# Patient Record
Sex: MALE | Race: WHITE | NOT HISPANIC OR LATINO | Employment: OTHER | URBAN - METROPOLITAN AREA
[De-identification: names, ages, dates, MRNs, and addresses within clinical notes are randomized per-mention and may not be internally consistent; named-entity substitution may affect disease eponyms.]

---

## 2018-04-04 DIAGNOSIS — M25.571 RIGHT ANKLE PAIN: ICD-10-CM

## 2018-04-04 RX ORDER — INDOMETHACIN 50 MG/1
CAPSULE ORAL
Qty: 21 CAPSULE | Refills: 1 | Status: SHIPPED | OUTPATIENT
Start: 2018-04-04 | End: 2018-09-24 | Stop reason: SDUPTHER

## 2018-04-04 NOTE — TELEPHONE ENCOUNTER
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

## 2018-09-24 DIAGNOSIS — M25.571 RIGHT ANKLE PAIN: ICD-10-CM

## 2018-09-24 RX ORDER — INDOMETHACIN 50 MG/1
CAPSULE ORAL
Qty: 21 CAPSULE | Refills: 0 | Status: SHIPPED | OUTPATIENT
Start: 2018-09-24 | End: 2018-10-17 | Stop reason: SDUPTHER

## 2018-09-24 NOTE — TELEPHONE ENCOUNTER
Patient last seen 2016 needs to come and get lab CBCs CMP lipids T4 TSH UA chest x-ray EKG is a physical if needs to be checked for gout and uric acid level.  One refill of indomethacin give time to come in get seen no further refills without lab work coming

## 2018-10-01 DIAGNOSIS — M25.571 RIGHT ANKLE PAIN: ICD-10-CM

## 2018-10-01 RX ORDER — INDOMETHACIN 50 MG/1
CAPSULE ORAL
Qty: 21 CAPSULE | Refills: 1 | Status: SHIPPED | OUTPATIENT
Start: 2018-10-01 | End: 2021-06-02

## 2018-10-01 NOTE — TELEPHONE ENCOUNTER
Pt not seen since in new computer needs come in fasting get seen , get lab , get refills -- needs CBC,CMP,lipid if has gout add uric acid level . Can overbook me 8 AM if needs be seen right away

## 2018-10-17 DIAGNOSIS — M25.571 RIGHT ANKLE PAIN: ICD-10-CM

## 2018-10-17 NOTE — TELEPHONE ENCOUNTER
----- Message from Valdez Joseph sent at 10/17/2018 11:47 AM CDT -----  Type:  RX Refill Request    Who Called:  Patient  Refill or New Rx:  Refill  RX Name and Strength:  indomethacin (INDOCIN) 50 MG capsule       How is the patient currently taking it? (ex. 1XDay):  As Needed  Is this a 30 day or 90 day RX:  7 Day  Preferred Pharmacy with phone number:    [id number] - [place] - [address]  [address] [place] [address]  Phone: [phone] Fax: [phone]      Local or Mail Order:  Local  Ordering Provider:  Beverley Yarbrough Call Back Number:  [phone]  Additional Information:

## 2018-10-18 RX ORDER — INDOMETHACIN 50 MG/1
CAPSULE ORAL
Qty: 21 CAPSULE | Refills: 0 | Status: SHIPPED | OUTPATIENT
Start: 2018-10-18 | End: 2019-04-01

## 2018-10-18 NOTE — TELEPHONE ENCOUNTER
Patient not new computer needs to come in get seen get lab CBCs CMP lipids T4 TSH uric acid level UA if over 2-3 years and chest x-ray EKG see me 2-3 weeks later OK 1 refill of in this

## 2019-04-01 ENCOUNTER — OFFICE VISIT (OUTPATIENT)
Dept: PRIMARY CARE CLINIC | Facility: CLINIC | Age: 69
End: 2019-04-01
Payer: COMMERCIAL

## 2019-04-01 VITALS
TEMPERATURE: 99 F | SYSTOLIC BLOOD PRESSURE: 143 MMHG | HEIGHT: 70 IN | BODY MASS INDEX: 36.79 KG/M2 | RESPIRATION RATE: 18 BRPM | OXYGEN SATURATION: 98 % | HEART RATE: 87 BPM | WEIGHT: 257 LBS | DIASTOLIC BLOOD PRESSURE: 71 MMHG

## 2019-04-01 DIAGNOSIS — R03.0 BORDERLINE HYPERTENSION: ICD-10-CM

## 2019-04-01 DIAGNOSIS — E66.9 OBESITY (BMI 35.0-39.9 WITHOUT COMORBIDITY): ICD-10-CM

## 2019-04-01 DIAGNOSIS — M10.9 ACUTE GOUT OF LEFT ANKLE, UNSPECIFIED CAUSE: Primary | ICD-10-CM

## 2019-04-01 DIAGNOSIS — B35.1 ONYCHOMYCOSIS: ICD-10-CM

## 2019-04-01 PROCEDURE — 96372 THER/PROPH/DIAG INJ SC/IM: CPT | Mod: S$GLB,,, | Performed by: FAMILY MEDICINE

## 2019-04-01 PROCEDURE — 1101F PT FALLS ASSESS-DOCD LE1/YR: CPT | Mod: CPTII,S$GLB,, | Performed by: FAMILY MEDICINE

## 2019-04-01 PROCEDURE — 99999 PR PBB SHADOW E&M-EST. PATIENT-LVL III: CPT | Mod: PBBFAC,,, | Performed by: FAMILY MEDICINE

## 2019-04-01 PROCEDURE — 99213 PR OFFICE/OUTPT VISIT, EST, LEVL III, 20-29 MIN: ICD-10-PCS | Mod: 25,S$GLB,, | Performed by: FAMILY MEDICINE

## 2019-04-01 PROCEDURE — 1101F PR PT FALLS ASSESS DOC 0-1 FALLS W/OUT INJ PAST YR: ICD-10-PCS | Mod: CPTII,S$GLB,, | Performed by: FAMILY MEDICINE

## 2019-04-01 PROCEDURE — 99213 OFFICE O/P EST LOW 20 MIN: CPT | Mod: 25,S$GLB,, | Performed by: FAMILY MEDICINE

## 2019-04-01 PROCEDURE — 96372 PR INJECTION,THERAP/PROPH/DIAG2ST, IM OR SUBCUT: ICD-10-PCS | Mod: S$GLB,,, | Performed by: FAMILY MEDICINE

## 2019-04-01 PROCEDURE — 99999 PR PBB SHADOW E&M-EST. PATIENT-LVL III: ICD-10-PCS | Mod: PBBFAC,,, | Performed by: FAMILY MEDICINE

## 2019-04-01 RX ORDER — TRIAMCINOLONE ACETONIDE 40 MG/ML
40 INJECTION, SUSPENSION INTRA-ARTICULAR; INTRAMUSCULAR
Status: COMPLETED | OUTPATIENT
Start: 2019-04-01 | End: 2019-04-01

## 2019-04-01 RX ORDER — IBUPROFEN 600 MG/1
600 TABLET ORAL EVERY 6 HOURS PRN
Qty: 100 TABLET | Refills: 5 | Status: SHIPPED | OUTPATIENT
Start: 2019-04-01 | End: 2019-04-01 | Stop reason: SDUPTHER

## 2019-04-01 RX ORDER — HYDROCODONE BITARTRATE AND ACETAMINOPHEN 5; 325 MG/1; MG/1
1 TABLET ORAL EVERY 6 HOURS PRN
Qty: 30 TABLET | Refills: 0 | Status: SHIPPED | OUTPATIENT
Start: 2019-04-01 | End: 2021-06-02

## 2019-04-01 RX ORDER — METHYLPREDNISOLONE 4 MG/1
TABLET ORAL
Qty: 1 PACKAGE | Refills: 0 | Status: SHIPPED | OUTPATIENT
Start: 2019-04-01 | End: 2021-06-02

## 2019-04-01 RX ORDER — INDOMETHACIN 50 MG/1
CAPSULE ORAL
Qty: 21 CAPSULE | Refills: 5 | Status: SHIPPED | OUTPATIENT
Start: 2019-04-01 | End: 2021-06-02 | Stop reason: SDUPTHER

## 2019-04-01 RX ORDER — IBUPROFEN 600 MG/1
TABLET ORAL
Qty: 100 TABLET | Refills: 5 | Status: SHIPPED | OUTPATIENT
Start: 2019-04-01 | End: 2020-08-16

## 2019-04-01 RX ADMIN — TRIAMCINOLONE ACETONIDE 40 MG: 40 INJECTION, SUSPENSION INTRA-ARTICULAR; INTRAMUSCULAR at 03:04

## 2019-04-01 NOTE — PROGRESS NOTES
Subjective:       Patient ID: Gamaliel Pete Jr. is a 68 y.o. male.    Chief Complaint: Gout (ankle pain)    HPI:  68-year-old white male in for gout pain left ankle--started Friday--little sore during the week but it is specially sore on Friday.  Pain is persisted since that time.  Had a fish plate Friday of catfish.  Avoiding crawfish and crab this season. Gout attacks ---October 2018.  Does have some minor episodes of pain not sure if it is gout or arthritis.  Has been on allopurinol in the past.    ROS:  Skin: no psoriasis, eczema, skin cancer  HEENT: No headache, ocular pain, blurred vision, diplopia, epistaxis, hoarseness change in voice, thyroid trouble  Lung: No pneumonia, asthma, Tb, wheezing, SOB, smoking  Heart: No chest pain, ankle edema, palpitations, MI, bam murmur, hypertension, hyperlipidemia blood pressure 143/77 blood pressure may be up due to severe pain in the foot no stent bypass arrhythmia  Abdomen: No nausea, vomiting, diarrhea, constipation, ulcers, hepatitis, gallbladder disease, melena, hematochezia, hematemesis  : no UTI, renal disease, stones  MS: no fractures, O/A, lupus, rheumatoid, +gout history of a fractured clavicle is a baby  Neuro: No dizziness, LOC, seizures   No diabetes, no anemia, no anxiety, no depression  , one child 2 stepchildren, work supervisor at [employer]. Lives with wife macario     Objective:   Physical Exam:  General: Well nourished, well developed, no acute distress  Skin: No lesions  HEENT: Eyes PERRLA, EOM intact, nose patent, throat non-erythematous   NECK: Supple, no bruits, No JVD, no nodes  Lungs: Clear, no rales, rhonchi, wheezing  Heart: Regular rate and rhythm, no murmurs, gallops, or rubs  Abdomen: flat, bowel sounds positive, no tenderness, or organomegaly  MS: Range of motion and muscle strength intact medial and lateral malleolar live markedly swollen warm to the touch pain with flexion extension inversion eversion or ambulation of  the ankle left foot   Neuro: Alert, CN intact, oriented X 3  Extremities: No cyanosis, clubbing, or edema         Assessment:       1. Acute gout of left ankle, unspecified cause    2. Borderline hypertension    3. Obesity (BMI 35.0-39.9 without comorbidity)    4. Onychomycosis        Plan:       Acute gout of left ankle, unspecified cause    Borderline hypertension    Obesity (BMI 35.0-39.9 without comorbidity)    Onychomycosis      Kenalog/Medrol--ibuprofen--for acute attack Indocin 50 mg t.i.d. x7 days only  Low-sodium diet/exercise/decrease weight  Wants well in off steroids for 2 weeks CBCs CMP lipids uric acid T4 TSH stool guaiac UA chest x-ray EKG is a physical  Needs copy of a low uric acid diet and low-sodium  Patient can drink black cherry juice--allopurinol 300 mg 1 p.o. q.d. if patient desires to try suppression was on this in the past and tolerated it well

## 2019-04-01 NOTE — PROGRESS NOTES
Verified pt ID using name and . NKDA. Administered 40 mg kenalog in right VG per physician order using aseptic technique. Aspirated and no blood return noted. Pt tolerated well with no adverse reactions noted.

## 2020-06-04 DIAGNOSIS — Z12.11 COLON CANCER SCREENING: ICD-10-CM

## 2020-08-16 RX ORDER — INDOMETHACIN 50 MG/1
CAPSULE ORAL
Qty: 21 CAPSULE | Refills: 2 | Status: SHIPPED | OUTPATIENT
Start: 2020-08-16 | End: 2021-06-02

## 2020-09-17 ENCOUNTER — PATIENT MESSAGE (OUTPATIENT)
Dept: ADMINISTRATIVE | Facility: HOSPITAL | Age: 70
End: 2020-09-17

## 2020-10-05 ENCOUNTER — PATIENT MESSAGE (OUTPATIENT)
Dept: ADMINISTRATIVE | Facility: HOSPITAL | Age: 70
End: 2020-10-05

## 2021-01-04 ENCOUNTER — PATIENT MESSAGE (OUTPATIENT)
Dept: ADMINISTRATIVE | Facility: HOSPITAL | Age: 71
End: 2021-01-04

## 2021-04-05 ENCOUNTER — PATIENT MESSAGE (OUTPATIENT)
Dept: ADMINISTRATIVE | Facility: HOSPITAL | Age: 71
End: 2021-04-05

## 2021-06-02 ENCOUNTER — OFFICE VISIT (OUTPATIENT)
Dept: PRIMARY CARE CLINIC | Facility: CLINIC | Age: 71
End: 2021-06-02
Payer: MEDICARE

## 2021-06-02 VITALS
WEIGHT: 263.25 LBS | BODY MASS INDEX: 37.69 KG/M2 | SYSTOLIC BLOOD PRESSURE: 130 MMHG | TEMPERATURE: 99 F | HEART RATE: 80 BPM | RESPIRATION RATE: 18 BRPM | OXYGEN SATURATION: 97 % | DIASTOLIC BLOOD PRESSURE: 76 MMHG | HEIGHT: 70 IN

## 2021-06-02 DIAGNOSIS — B35.1 ONYCHOMYCOSIS: ICD-10-CM

## 2021-06-02 DIAGNOSIS — Z12.11 ENCOUNTER FOR SCREENING FOR MALIGNANT NEOPLASM OF COLON: ICD-10-CM

## 2021-06-02 DIAGNOSIS — L29.9 PRURITUS, UNSPECIFIED: ICD-10-CM

## 2021-06-02 DIAGNOSIS — Z87.39 HISTORY OF GOUT: ICD-10-CM

## 2021-06-02 DIAGNOSIS — L82.1 SEBORRHEIC KERATOSIS: Primary | ICD-10-CM

## 2021-06-02 DIAGNOSIS — E66.9 OBESITY (BMI 35.0-39.9 WITHOUT COMORBIDITY): ICD-10-CM

## 2021-06-02 DIAGNOSIS — Z11.59 NEED FOR HEPATITIS C SCREENING TEST: ICD-10-CM

## 2021-06-02 PROCEDURE — 1126F AMNT PAIN NOTED NONE PRSNT: CPT | Mod: S$GLB,,, | Performed by: FAMILY MEDICINE

## 2021-06-02 PROCEDURE — 3008F BODY MASS INDEX DOCD: CPT | Mod: CPTII,S$GLB,, | Performed by: FAMILY MEDICINE

## 2021-06-02 PROCEDURE — 3288F FALL RISK ASSESSMENT DOCD: CPT | Mod: CPTII,S$GLB,, | Performed by: FAMILY MEDICINE

## 2021-06-02 PROCEDURE — 99999 PR PBB SHADOW E&M-EST. PATIENT-LVL III: CPT | Mod: PBBFAC,,, | Performed by: FAMILY MEDICINE

## 2021-06-02 PROCEDURE — 3288F PR FALLS RISK ASSESSMENT DOCUMENTED: ICD-10-PCS | Mod: CPTII,S$GLB,, | Performed by: FAMILY MEDICINE

## 2021-06-02 PROCEDURE — 1101F PT FALLS ASSESS-DOCD LE1/YR: CPT | Mod: CPTII,S$GLB,, | Performed by: FAMILY MEDICINE

## 2021-06-02 PROCEDURE — 1126F PR PAIN SEVERITY QUANTIFIED, NO PAIN PRESENT: ICD-10-PCS | Mod: S$GLB,,, | Performed by: FAMILY MEDICINE

## 2021-06-02 PROCEDURE — 99999 PR PBB SHADOW E&M-EST. PATIENT-LVL III: ICD-10-PCS | Mod: PBBFAC,,, | Performed by: FAMILY MEDICINE

## 2021-06-02 PROCEDURE — 99214 PR OFFICE/OUTPT VISIT, EST, LEVL IV, 30-39 MIN: ICD-10-PCS | Mod: S$GLB,,, | Performed by: FAMILY MEDICINE

## 2021-06-02 PROCEDURE — 3008F PR BODY MASS INDEX (BMI) DOCUMENTED: ICD-10-PCS | Mod: CPTII,S$GLB,, | Performed by: FAMILY MEDICINE

## 2021-06-02 PROCEDURE — 1101F PR PT FALLS ASSESS DOC 0-1 FALLS W/OUT INJ PAST YR: ICD-10-PCS | Mod: CPTII,S$GLB,, | Performed by: FAMILY MEDICINE

## 2021-06-02 PROCEDURE — 99214 OFFICE O/P EST MOD 30 MIN: CPT | Mod: S$GLB,,, | Performed by: FAMILY MEDICINE

## 2021-06-02 RX ORDER — INDOMETHACIN 50 MG/1
CAPSULE ORAL
Qty: 90 CAPSULE | Refills: 1 | Status: SHIPPED | OUTPATIENT
Start: 2021-06-02 | End: 2023-08-24 | Stop reason: SDUPTHER

## 2021-06-02 RX ORDER — SILDENAFIL 100 MG/1
100 TABLET, FILM COATED ORAL DAILY PRN
Qty: 30 TABLET | Refills: 11 | Status: SHIPPED | OUTPATIENT
Start: 2021-06-02 | End: 2023-12-12

## 2021-06-14 LAB — NONINV COLON CA DNA+OCC BLD SCRN STL QL: NORMAL

## 2021-07-23 LAB — NONINV COLON CA DNA+OCC BLD SCRN STL QL: POSITIVE

## 2021-07-25 DIAGNOSIS — R19.5 POSITIVE COLORECTAL CANCER SCREENING USING COLOGUARD TEST: Primary | ICD-10-CM

## 2021-07-26 ENCOUNTER — TELEPHONE (OUTPATIENT)
Dept: PRIMARY CARE CLINIC | Facility: CLINIC | Age: 71
End: 2021-07-26

## 2021-07-27 ENCOUNTER — TELEPHONE (OUTPATIENT)
Dept: SURGERY | Facility: CLINIC | Age: 71
End: 2021-07-27

## 2021-07-28 ENCOUNTER — TELEPHONE (OUTPATIENT)
Dept: SURGERY | Facility: CLINIC | Age: 71
End: 2021-07-28

## 2021-07-28 ENCOUNTER — TELEPHONE (OUTPATIENT)
Dept: PRIMARY CARE CLINIC | Facility: CLINIC | Age: 71
End: 2021-07-28

## 2021-12-07 ENCOUNTER — OFFICE VISIT (OUTPATIENT)
Dept: PRIMARY CARE CLINIC | Facility: CLINIC | Age: 71
End: 2021-12-07
Payer: MEDICARE

## 2021-12-07 ENCOUNTER — TELEPHONE (OUTPATIENT)
Dept: SURGERY | Facility: CLINIC | Age: 71
End: 2021-12-07
Payer: MEDICARE

## 2021-12-07 VITALS
DIASTOLIC BLOOD PRESSURE: 72 MMHG | HEIGHT: 70 IN | OXYGEN SATURATION: 97 % | SYSTOLIC BLOOD PRESSURE: 134 MMHG | BODY MASS INDEX: 35.9 KG/M2 | HEART RATE: 73 BPM | RESPIRATION RATE: 18 BRPM | WEIGHT: 250.75 LBS

## 2021-12-07 DIAGNOSIS — L29.9 PRURITUS, UNSPECIFIED: ICD-10-CM

## 2021-12-07 DIAGNOSIS — B35.4 TINEA CORPORIS: ICD-10-CM

## 2021-12-07 DIAGNOSIS — R19.5 POSITIVE COLORECTAL CANCER SCREENING USING COLOGUARD TEST: ICD-10-CM

## 2021-12-07 DIAGNOSIS — R03.0 BORDERLINE HYPERTENSION: Primary | ICD-10-CM

## 2021-12-07 DIAGNOSIS — E66.9 OBESITY (BMI 35.0-39.9 WITHOUT COMORBIDITY): ICD-10-CM

## 2021-12-07 DIAGNOSIS — Z87.39 HISTORY OF GOUT: ICD-10-CM

## 2021-12-07 PROCEDURE — 99214 OFFICE O/P EST MOD 30 MIN: CPT | Mod: S$GLB,,, | Performed by: FAMILY MEDICINE

## 2021-12-07 PROCEDURE — 99214 PR OFFICE/OUTPT VISIT, EST, LEVL IV, 30-39 MIN: ICD-10-PCS | Mod: S$GLB,,, | Performed by: FAMILY MEDICINE

## 2021-12-07 PROCEDURE — 99499 RISK ADDL DX/OHS AUDIT: ICD-10-PCS | Mod: S$GLB,,, | Performed by: FAMILY MEDICINE

## 2021-12-07 PROCEDURE — 99999 PR PBB SHADOW E&M-EST. PATIENT-LVL III: CPT | Mod: PBBFAC,,, | Performed by: FAMILY MEDICINE

## 2021-12-07 PROCEDURE — 99499 UNLISTED E&M SERVICE: CPT | Mod: S$GLB,,, | Performed by: FAMILY MEDICINE

## 2021-12-07 PROCEDURE — 99999 PR PBB SHADOW E&M-EST. PATIENT-LVL III: ICD-10-PCS | Mod: PBBFAC,,, | Performed by: FAMILY MEDICINE

## 2021-12-07 RX ORDER — BETAMETHASONE SODIUM PHOSPHATE AND BETAMETHASONE ACETATE 3; 3 MG/ML; MG/ML
12 INJECTION, SUSPENSION INTRA-ARTICULAR; INTRALESIONAL; INTRAMUSCULAR; SOFT TISSUE
Status: DISCONTINUED | OUTPATIENT
Start: 2021-12-07 | End: 2022-02-10

## 2021-12-07 RX ORDER — CLOTRIMAZOLE AND BETAMETHASONE DIPROPIONATE 10; .64 MG/G; MG/G
CREAM TOPICAL 2 TIMES DAILY
Qty: 45 G | Refills: 1 | Status: SHIPPED | OUTPATIENT
Start: 2021-12-07 | End: 2022-06-07

## 2021-12-07 RX ORDER — SODIUM, POTASSIUM,MAG SULFATES 17.5-3.13G
1 SOLUTION, RECONSTITUTED, ORAL ORAL DAILY
Qty: 1 KIT | Refills: 0 | Status: SHIPPED | OUTPATIENT
Start: 2021-12-07 | End: 2021-12-09

## 2021-12-07 RX ORDER — FLUCONAZOLE 150 MG/1
TABLET ORAL
Qty: 4 TABLET | Refills: 0 | Status: SHIPPED | OUTPATIENT
Start: 2021-12-07 | End: 2022-01-07 | Stop reason: CLARIF

## 2022-01-11 DIAGNOSIS — U07.1 COVID-19 VIRUS DETECTED: ICD-10-CM

## 2022-02-21 NOTE — TELEPHONE ENCOUNTER
----- Message from Rudy Matias sent at 2/21/2022  4:51 PM CST -----  Contact: pt  Pt calling to speak with you about a medication for indomethacin (INDOCIN) 50 MG capsule they are on back order at the [facility] pharmacy they will need to change it to 25 mg instead of 50mg capsules.Please advise

## 2022-02-22 NOTE — TELEPHONE ENCOUNTER
----- Message from Emiliana Dorado sent at 2/22/2022 11:29 AM CST -----  Contact: Self/[phone]  Pt said that he is calling in regards to needing to speak with the nurse to get see if the doctor okayed for the indomethacin (INDOCIN) 50 MG capsule  to be changed to 25mg (2 pills). Please advise

## 2022-02-23 RX ORDER — INDOMETHACIN 25 MG/1
25 CAPSULE ORAL 2 TIMES DAILY WITH MEALS
Qty: 180 CAPSULE | Refills: 1 | Status: SHIPPED | OUTPATIENT
Start: 2022-02-23 | End: 2023-08-24

## 2022-06-07 ENCOUNTER — OFFICE VISIT (OUTPATIENT)
Dept: PRIMARY CARE CLINIC | Facility: CLINIC | Age: 72
End: 2022-06-07
Payer: MEDICARE

## 2022-06-07 VITALS
HEIGHT: 70 IN | TEMPERATURE: 98 F | DIASTOLIC BLOOD PRESSURE: 72 MMHG | BODY MASS INDEX: 35.6 KG/M2 | WEIGHT: 248.69 LBS | OXYGEN SATURATION: 96 % | HEART RATE: 79 BPM | RESPIRATION RATE: 18 BRPM | SYSTOLIC BLOOD PRESSURE: 138 MMHG

## 2022-06-07 DIAGNOSIS — M79.645 FINGER PAIN, LEFT: ICD-10-CM

## 2022-06-07 DIAGNOSIS — E66.9 OBESITY (BMI 35.0-39.9 WITHOUT COMORBIDITY): ICD-10-CM

## 2022-06-07 DIAGNOSIS — Z87.39 HISTORY OF GOUT: ICD-10-CM

## 2022-06-07 DIAGNOSIS — M25.552 BILATERAL HIP PAIN: Primary | ICD-10-CM

## 2022-06-07 DIAGNOSIS — M25.551 BILATERAL HIP PAIN: Primary | ICD-10-CM

## 2022-06-07 DIAGNOSIS — R03.0 BORDERLINE HYPERTENSION: ICD-10-CM

## 2022-06-07 PROCEDURE — 3075F SYST BP GE 130 - 139MM HG: CPT | Mod: CPTII,S$GLB,, | Performed by: FAMILY MEDICINE

## 2022-06-07 PROCEDURE — 99499 UNLISTED E&M SERVICE: CPT | Mod: S$GLB,,, | Performed by: FAMILY MEDICINE

## 2022-06-07 PROCEDURE — 1101F PR PT FALLS ASSESS DOC 0-1 FALLS W/OUT INJ PAST YR: ICD-10-PCS | Mod: CPTII,S$GLB,, | Performed by: FAMILY MEDICINE

## 2022-06-07 PROCEDURE — 3075F PR MOST RECENT SYSTOLIC BLOOD PRESS GE 130-139MM HG: ICD-10-PCS | Mod: CPTII,S$GLB,, | Performed by: FAMILY MEDICINE

## 2022-06-07 PROCEDURE — 3288F PR FALLS RISK ASSESSMENT DOCUMENTED: ICD-10-PCS | Mod: CPTII,S$GLB,, | Performed by: FAMILY MEDICINE

## 2022-06-07 PROCEDURE — 3288F FALL RISK ASSESSMENT DOCD: CPT | Mod: CPTII,S$GLB,, | Performed by: FAMILY MEDICINE

## 2022-06-07 PROCEDURE — 99214 OFFICE O/P EST MOD 30 MIN: CPT | Mod: 25,S$GLB,, | Performed by: FAMILY MEDICINE

## 2022-06-07 PROCEDURE — 99999 PR PBB SHADOW E&M-EST. PATIENT-LVL IV: ICD-10-PCS | Mod: PBBFAC,,, | Performed by: FAMILY MEDICINE

## 2022-06-07 PROCEDURE — 96372 PR INJECTION,THERAP/PROPH/DIAG2ST, IM OR SUBCUT: ICD-10-PCS | Mod: S$GLB,,, | Performed by: FAMILY MEDICINE

## 2022-06-07 PROCEDURE — 1101F PT FALLS ASSESS-DOCD LE1/YR: CPT | Mod: CPTII,S$GLB,, | Performed by: FAMILY MEDICINE

## 2022-06-07 PROCEDURE — 3008F BODY MASS INDEX DOCD: CPT | Mod: CPTII,S$GLB,, | Performed by: FAMILY MEDICINE

## 2022-06-07 PROCEDURE — 3078F PR MOST RECENT DIASTOLIC BLOOD PRESSURE < 80 MM HG: ICD-10-PCS | Mod: CPTII,S$GLB,, | Performed by: FAMILY MEDICINE

## 2022-06-07 PROCEDURE — 1125F AMNT PAIN NOTED PAIN PRSNT: CPT | Mod: CPTII,S$GLB,, | Performed by: FAMILY MEDICINE

## 2022-06-07 PROCEDURE — 1159F PR MEDICATION LIST DOCUMENTED IN MEDICAL RECORD: ICD-10-PCS | Mod: CPTII,S$GLB,, | Performed by: FAMILY MEDICINE

## 2022-06-07 PROCEDURE — 99499 RISK ADDL DX/OHS AUDIT: ICD-10-PCS | Mod: S$GLB,,, | Performed by: FAMILY MEDICINE

## 2022-06-07 PROCEDURE — 1125F PR PAIN SEVERITY QUANTIFIED, PAIN PRESENT: ICD-10-PCS | Mod: CPTII,S$GLB,, | Performed by: FAMILY MEDICINE

## 2022-06-07 PROCEDURE — 99214 PR OFFICE/OUTPT VISIT, EST, LEVL IV, 30-39 MIN: ICD-10-PCS | Mod: 25,S$GLB,, | Performed by: FAMILY MEDICINE

## 2022-06-07 PROCEDURE — 3078F DIAST BP <80 MM HG: CPT | Mod: CPTII,S$GLB,, | Performed by: FAMILY MEDICINE

## 2022-06-07 PROCEDURE — 99999 PR PBB SHADOW E&M-EST. PATIENT-LVL IV: CPT | Mod: PBBFAC,,, | Performed by: FAMILY MEDICINE

## 2022-06-07 PROCEDURE — 96372 THER/PROPH/DIAG INJ SC/IM: CPT | Mod: S$GLB,,, | Performed by: FAMILY MEDICINE

## 2022-06-07 PROCEDURE — 1159F MED LIST DOCD IN RCRD: CPT | Mod: CPTII,S$GLB,, | Performed by: FAMILY MEDICINE

## 2022-06-07 PROCEDURE — 3008F PR BODY MASS INDEX (BMI) DOCUMENTED: ICD-10-PCS | Mod: CPTII,S$GLB,, | Performed by: FAMILY MEDICINE

## 2022-06-07 RX ORDER — MELOXICAM 7.5 MG/1
TABLET ORAL
Qty: 60 TABLET | Refills: 5 | Status: SHIPPED | OUTPATIENT
Start: 2022-06-07 | End: 2023-08-24

## 2022-06-07 RX ORDER — TRIAMCINOLONE ACETONIDE 40 MG/ML
40 INJECTION, SUSPENSION INTRA-ARTICULAR; INTRAMUSCULAR ONCE
Status: COMPLETED | OUTPATIENT
Start: 2022-06-07 | End: 2022-06-07

## 2022-06-07 RX ORDER — PREDNISONE 20 MG/1
TABLET ORAL
Qty: 10 TABLET | Refills: 0 | Status: SHIPPED | OUTPATIENT
Start: 2022-06-07 | End: 2023-08-28

## 2022-06-07 RX ADMIN — TRIAMCINOLONE ACETONIDE 40 MG: 40 INJECTION, SUSPENSION INTRA-ARTICULAR; INTRAMUSCULAR at 09:06

## 2022-06-07 NOTE — PROGRESS NOTES
Verified pt ID using name and . NKDA. Administered Kenalog 40MG in right VG per physician order using aseptic technique. Aspirated and no blood return noted. Pt tolerated well with no adverse reactions noted.

## 2022-06-07 NOTE — PROGRESS NOTES
Subjective:       Patient ID: Gamaliel Pete Jr. is a 71 y.o. male.    Chief Complaint: Follow-up    HPI:  70-year-old white male in for six-month follow--only complaint--getting arthritis--all over --hips.  Does get occasional gout flare up--has indomethacin takes intermittent--prn--not on allopurinol.  Usually in heel or ankle.   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 Left 3rd digit--light pain in joints all    ROS:  Skin: no psoriasis, eczema, skin cancer--lesion left leg popliteal area  HEENT: No headache, ocular pain, blurred vision, diplopia, epistaxis, hoarseness change in voice, thyroid trouble  Lung: No pneumonia, asthma, Tb, wheezing, SOB, no smoking  Heart: No chest pain, ankle edema, palpitations, MI, bam murmur, +hypertension,no hyperlipidemia--no stent bypass arrhythmia  Abdomen: No nausea, vomiting, diarrhea, constipation, ulcers, hepatitis, gallbladder disease, melena, hematochezia, hematemesis  : no UTI, renal disease, stones prostate venereal disease  MS: no fractures, O/A, lupus, rheumatoid, +gout--flares up on occasion but rarely--usually in ankle area used to be in the toe  Neuro: No dizziness, LOC, seizures   No diabetes, no anemia, no anxiety, no depression  , one biological 2 stepchildren work retired lives with wife     Objective:   Physical Exam:  General: Well nourished, well developed, no acute distress +obesity  Skin: No lesions skin lesion left shoulder seborrheic keratosis  HEENT: Eyes PERRLA, EOM intact, nose patent, throat non-erythematous ears TMs clear  NECK: Supple, no bruits, No JVD, no nodes  Lungs: Clear, no rales, rhonchi, wheezing  Heart: Regular rate and rhythm, no murmurs, gallops, or rubs  Abdomen: flat, bowel sounds positive, no tenderness, or organomegaly  MS:  Some tenderness left 3rd digit with flexion extension and  palpation no real significant swell---some pain with abduction abduction of the hip--some pain with squatting little difficulty arising anterior flexion the back normal  Neuro: Alert, CN intact, oriented X 3  Extremities: No cyanosis, clubbing, or edema         Assessment:       No diagnosis found.    Plan:       There are no diagnoses linked to this encounter.    Reason for visit annual physical with refills  Six-month checkup--hypertension blood pressure 134/72  History of gout having pain a ball of his foot right great toe--celestone --then indomethacin 50 mg t.i.d. p.r.n. gouty episodes could also be help with a Kenalog shot needs to be on low purine diet--can be helped with  black cherry juice  Tinea corporis popliteal area left knee--Lotrisone cream b.i.d. Diflucan 1 p.o. once a week x4  Obesity--exercise try to get ideal body weight   lab CBCs CMP lipids T4 TSH uric acid June 2022 in 6 months  Patient  Cologuard test +positive for blood needs colonoscopy  Health maintenance COVID tetanus shingles pneumococcal flu

## 2022-06-07 NOTE — PATIENT INSTRUCTIONS
Bilateral hip pain--left 3rd digit pain--x-ray hips bilaterally--  Kenalog shot IM now  Wednesday start prednisone 20 mg 1 p.o. q.d. times 10 days  After prednisone complete start Mobic 7.1 p.o. b.i.d. no other NSAIDs can take with Tylenol  Can use topical balms--theragesic--tiger balm--icy hot   Hypertension blood pressure 150/68 patient needs have arm blood pressure cuff should check blood pressure daily Needs blood pressure to be 139/90 or less and greater than 90/60  Exercise try to get ideal body weight  Recheck blood pressure in 2 weeks if still elevated will start on losartan 50 mg q.d.

## 2022-06-21 ENCOUNTER — CLINICAL SUPPORT (OUTPATIENT)
Dept: PRIMARY CARE CLINIC | Facility: CLINIC | Age: 72
End: 2022-06-21
Payer: MEDICARE

## 2022-06-21 VITALS — HEART RATE: 62 BPM | SYSTOLIC BLOOD PRESSURE: 138 MMHG | DIASTOLIC BLOOD PRESSURE: 64 MMHG | OXYGEN SATURATION: 96 %

## 2022-06-21 DIAGNOSIS — R03.0 ELEVATED BLOOD PRESSURE READING: Primary | ICD-10-CM

## 2022-06-21 PROCEDURE — 99999 PR PBB SHADOW E&M-EST. PATIENT-LVL I: ICD-10-PCS | Mod: PBBFAC,,,

## 2022-06-21 PROCEDURE — 99999 PR PBB SHADOW E&M-EST. PATIENT-LVL I: CPT | Mod: PBBFAC,,,

## 2022-06-21 NOTE — PROGRESS NOTES
Verified with patient name and date of birth. Patient here for a blood pressure check, due to last visit blood pressure was high. Patient doesn't take any blood pressure medications. He has also stated that he had about 3 cups of coffee before coming in today. Told patient to watch sodium in diet, drink lots of fluids and if any persistent headaches to call office. Patient states feels fine and he is on medication for back pain and he feels that is why his pressure goes up.

## 2022-10-14 ENCOUNTER — PES CALL (OUTPATIENT)
Dept: ADMINISTRATIVE | Facility: OTHER | Age: 72
End: 2022-10-14
Payer: MEDICARE

## 2023-08-22 ENCOUNTER — TELEPHONE (OUTPATIENT)
Dept: PRIMARY CARE CLINIC | Facility: CLINIC | Age: 73
End: 2023-08-22
Payer: MEDICARE

## 2023-08-22 NOTE — TELEPHONE ENCOUNTER
----- Message from Tash Zelaya sent at 8/22/2023  2:56 PM CDT -----  Contact: [phone] Patient  [email or web address]     Patient is calling for Medical Advice regarding: dark urine, passed some chunks of something.     How long has patient had these symptoms: 4 x in the last 3 weeks    Pharmacy name and phone#:   [facility] - [place] - [address]  [address] [place] [address]  Phone: [phone] Fax: [phone]        Would like response via [employer]:  Call Back Please. Pt would like to be seen if need be sooner than 08/30/2023 the next available or can just do a urine sample. Please call and advise.     Comments:

## 2023-08-22 NOTE — TELEPHONE ENCOUNTER
Patient states that he is having dark colored urine and is not sure if he has a UTI or if he is dehydrated.  I notified patient that I will call him tomorrow if I get a cancellation and will add him on to get a urine sample and to discuss with Dr. Lowery  Patient verbalized understanding.

## 2023-08-24 ENCOUNTER — TELEPHONE (OUTPATIENT)
Dept: PRIMARY CARE CLINIC | Facility: CLINIC | Age: 73
End: 2023-08-24
Payer: MEDICARE

## 2023-08-24 ENCOUNTER — OFFICE VISIT (OUTPATIENT)
Dept: PRIMARY CARE CLINIC | Facility: CLINIC | Age: 73
End: 2023-08-24
Payer: MEDICARE

## 2023-08-24 VITALS
BODY MASS INDEX: 34.74 KG/M2 | SYSTOLIC BLOOD PRESSURE: 134 MMHG | OXYGEN SATURATION: 98 % | HEART RATE: 76 BPM | WEIGHT: 242.63 LBS | RESPIRATION RATE: 18 BRPM | DIASTOLIC BLOOD PRESSURE: 74 MMHG | HEIGHT: 70 IN

## 2023-08-24 DIAGNOSIS — Z13.220 ENCOUNTER FOR LIPID SCREENING FOR CARDIOVASCULAR DISEASE: ICD-10-CM

## 2023-08-24 DIAGNOSIS — R31.0 GROSS HEMATURIA: Primary | ICD-10-CM

## 2023-08-24 DIAGNOSIS — R03.0 ELEVATED BLOOD PRESSURE READING: ICD-10-CM

## 2023-08-24 DIAGNOSIS — Z12.5 SCREENING FOR PROSTATE CANCER: ICD-10-CM

## 2023-08-24 DIAGNOSIS — Z13.6 ENCOUNTER FOR LIPID SCREENING FOR CARDIOVASCULAR DISEASE: ICD-10-CM

## 2023-08-24 DIAGNOSIS — Z87.39 HISTORY OF GOUT: ICD-10-CM

## 2023-08-24 DIAGNOSIS — R31.9 HEMATURIA, UNSPECIFIED TYPE: ICD-10-CM

## 2023-08-24 LAB
BILIRUB SERPL-MCNC: NEGATIVE MG/DL
BLOOD URINE, POC: 250
CLARITY, POC UA: ABNORMAL
COLOR, POC UA: ABNORMAL
GLUCOSE UR QL STRIP: NORMAL
KETONES UR QL STRIP: NEGATIVE
LEUKOCYTE ESTERASE URINE, POC: NEGATIVE
NITRITE, POC UA: NEGATIVE
PH, POC UA: 5
PROTEIN, POC: ABNORMAL
SPECIFIC GRAVITY, POC UA: 1.01
UROBILINOGEN, POC UA: NORMAL

## 2023-08-24 PROCEDURE — 99999 PR PBB SHADOW E&M-EST. PATIENT-LVL III: ICD-10-PCS | Mod: PBBFAC,,, | Performed by: INTERNAL MEDICINE

## 2023-08-24 PROCEDURE — 3288F FALL RISK ASSESSMENT DOCD: CPT | Mod: CPTII,S$GLB,, | Performed by: INTERNAL MEDICINE

## 2023-08-24 PROCEDURE — 3288F PR FALLS RISK ASSESSMENT DOCUMENTED: ICD-10-PCS | Mod: CPTII,S$GLB,, | Performed by: INTERNAL MEDICINE

## 2023-08-24 PROCEDURE — 99214 PR OFFICE/OUTPT VISIT, EST, LEVL IV, 30-39 MIN: ICD-10-PCS | Mod: S$GLB,,, | Performed by: INTERNAL MEDICINE

## 2023-08-24 PROCEDURE — 3078F DIAST BP <80 MM HG: CPT | Mod: CPTII,S$GLB,, | Performed by: INTERNAL MEDICINE

## 2023-08-24 PROCEDURE — 99999 PR PBB SHADOW E&M-EST. PATIENT-LVL III: CPT | Mod: PBBFAC,,, | Performed by: INTERNAL MEDICINE

## 2023-08-24 PROCEDURE — 3075F SYST BP GE 130 - 139MM HG: CPT | Mod: CPTII,S$GLB,, | Performed by: INTERNAL MEDICINE

## 2023-08-24 PROCEDURE — 1101F PR PT FALLS ASSESS DOC 0-1 FALLS W/OUT INJ PAST YR: ICD-10-PCS | Mod: CPTII,S$GLB,, | Performed by: INTERNAL MEDICINE

## 2023-08-24 PROCEDURE — 3075F PR MOST RECENT SYSTOLIC BLOOD PRESS GE 130-139MM HG: ICD-10-PCS | Mod: CPTII,S$GLB,, | Performed by: INTERNAL MEDICINE

## 2023-08-24 PROCEDURE — 1126F PR PAIN SEVERITY QUANTIFIED, NO PAIN PRESENT: ICD-10-PCS | Mod: CPTII,S$GLB,, | Performed by: INTERNAL MEDICINE

## 2023-08-24 PROCEDURE — 3008F PR BODY MASS INDEX (BMI) DOCUMENTED: ICD-10-PCS | Mod: CPTII,S$GLB,, | Performed by: INTERNAL MEDICINE

## 2023-08-24 PROCEDURE — 1101F PT FALLS ASSESS-DOCD LE1/YR: CPT | Mod: CPTII,S$GLB,, | Performed by: INTERNAL MEDICINE

## 2023-08-24 PROCEDURE — 3008F BODY MASS INDEX DOCD: CPT | Mod: CPTII,S$GLB,, | Performed by: INTERNAL MEDICINE

## 2023-08-24 PROCEDURE — 3078F PR MOST RECENT DIASTOLIC BLOOD PRESSURE < 80 MM HG: ICD-10-PCS | Mod: CPTII,S$GLB,, | Performed by: INTERNAL MEDICINE

## 2023-08-24 PROCEDURE — 1160F RVW MEDS BY RX/DR IN RCRD: CPT | Mod: CPTII,S$GLB,, | Performed by: INTERNAL MEDICINE

## 2023-08-24 PROCEDURE — 1159F PR MEDICATION LIST DOCUMENTED IN MEDICAL RECORD: ICD-10-PCS | Mod: CPTII,S$GLB,, | Performed by: INTERNAL MEDICINE

## 2023-08-24 PROCEDURE — 1159F MED LIST DOCD IN RCRD: CPT | Mod: CPTII,S$GLB,, | Performed by: INTERNAL MEDICINE

## 2023-08-24 PROCEDURE — 81002 URINALYSIS NONAUTO W/O SCOPE: CPT | Mod: S$GLB,,, | Performed by: INTERNAL MEDICINE

## 2023-08-24 PROCEDURE — 81002 POCT URINE DIPSTICK WITHOUT MICROSCOPE: ICD-10-PCS | Mod: S$GLB,,, | Performed by: INTERNAL MEDICINE

## 2023-08-24 PROCEDURE — 1126F AMNT PAIN NOTED NONE PRSNT: CPT | Mod: CPTII,S$GLB,, | Performed by: INTERNAL MEDICINE

## 2023-08-24 PROCEDURE — 1160F PR REVIEW ALL MEDS BY PRESCRIBER/CLIN PHARMACIST DOCUMENTED: ICD-10-PCS | Mod: CPTII,S$GLB,, | Performed by: INTERNAL MEDICINE

## 2023-08-24 PROCEDURE — 99214 OFFICE O/P EST MOD 30 MIN: CPT | Mod: S$GLB,,, | Performed by: INTERNAL MEDICINE

## 2023-08-24 RX ORDER — INDOMETHACIN 50 MG/1
CAPSULE ORAL
Qty: 90 CAPSULE | Refills: 0 | Status: ON HOLD | OUTPATIENT
Start: 2023-08-24 | End: 2023-11-18 | Stop reason: HOSPADM

## 2023-08-24 RX ORDER — CIPROFLOXACIN 500 MG/1
500 TABLET ORAL 2 TIMES DAILY
Qty: 20 TABLET | Refills: 0 | Status: SHIPPED | OUTPATIENT
Start: 2023-08-24 | End: 2023-10-01

## 2023-08-24 NOTE — TELEPHONE ENCOUNTER
Called patient regarding message. Patient said that he noticed blood in his urine no pain just blood. Patient said that needed to be seen asap. I asked  and I added the patient on the schedule for today. Patient verbalized understand.

## 2023-08-24 NOTE — TELEPHONE ENCOUNTER
----- Message from Marion Patel sent at 8/24/2023  1:14 PM CDT -----  Contact: [phone] @ patient  Good afternoon patient would like a call back ASAP he would like to see if he can get a apt today or tomorrow . Patient has been having blood in his urine for 3days. Please give patient a call back

## 2023-08-24 NOTE — PROGRESS NOTES
Subjective:       Patient ID: Gamaliel Peet Jr. is a 73 y.o. male.    Chief Complaint: Hematuria    HPI  Pt visit today with c/o seeing blood in urine on and off x 2 months no dysuria no fever chill no pelvic pain lower back pain he denies h/o kidney stone or any  problems in past no wt gain or loss . He ha sh/o gout that flare up sometime resolved with indocin and borderline elevated BP which is normal today he denies sob cp or JACOBSON  Review of Systems   Constitutional:  Negative for unexpected weight change.   Respiratory:  Negative for shortness of breath.    Cardiovascular:  Negative for chest pain.   Gastrointestinal:  Negative for abdominal distention.   Musculoskeletal:  Negative for arthralgias.   Psychiatric/Behavioral:  Negative for dysphoric mood.        Objective:      Physical Exam  Vitals and nursing note reviewed.   Constitutional:       General: He is not in acute distress.     Appearance: He is well-developed.   HENT:      Head: Normocephalic and atraumatic.      Right Ear: External ear normal.      Left Ear: External ear normal.      Nose: Nose normal.      Mouth/Throat:      Pharynx: No oropharyngeal exudate.   Eyes:      Extraocular Movements: Extraocular movements intact.      Conjunctiva/sclera: Conjunctivae normal.      Pupils: Pupils are equal, round, and reactive to light.   Neck:      Thyroid: No thyromegaly.   Cardiovascular:      Rate and Rhythm: Normal rate and regular rhythm.      Heart sounds: Normal heart sounds. No murmur heard.     No friction rub. No gallop.   Pulmonary:      Effort: Pulmonary effort is normal. No respiratory distress.      Breath sounds: Normal breath sounds. No wheezing.   Abdominal:      General: Bowel sounds are normal. There is no distension.      Palpations: Abdomen is soft.      Tenderness: There is no abdominal tenderness.   Musculoskeletal:         General: No tenderness or deformity. Normal range of motion.      Cervical back: Normal range of motion  and neck supple.   Lymphadenopathy:      Cervical: No cervical adenopathy.   Skin:     General: Skin is warm and dry.      Findings: No erythema or rash.   Neurological:      Mental Status: He is alert and oriented to person, place, and time.   Psychiatric:         Thought Content: Thought content normal.         Judgment: Judgment normal.         Assessment:       1. Gross hematuria    2. History of gout    3. Elevated blood pressure reading    4. Encounter for lipid screening for cardiovascular disease    5. Screening for prostate cancer    6. Hematuria, unspecified type        Plan:       Gross hematuria  Comments:  need  w/u and consult will tteat prophylactic with po antibiotic until results available  Orders:  -     ciprofloxacin HCl (CIPRO) 500 MG tablet; Take 1 tablet (500 mg total) by mouth 2 (two) times daily.  Dispense: 20 tablet; Refill: 0  -     US Retroperitoneal Complete (Kidney and; Future; Expected date: 08/24/2023  -     POCT URINE DIPSTICK WITHOUT MICROSCOPE    History of gout  -     indomethacin (INDOCIN) 50 MG capsule; One p.o. t.i.d. p.r.n. gouty attack no more than 7 days--do not take with other NSAIDs  Dispense: 90 capsule; Refill: 0  -     Uric Acid; Future; Expected date: 08/24/2023    Elevated blood pressure reading  Comments:  BP is normal today continue monitor  Orders:  -     CBC Auto Differential; Future; Expected date: 08/24/2023  -     Comprehensive Metabolic Panel; Future; Expected date: 08/24/2023    Encounter for lipid screening for cardiovascular disease  -     Lipid Panel; Future; Expected date: 08/24/2023    Screening for prostate cancer  -     PSA, Screening; Future; Expected date: 08/24/2023    Hematuria, unspecified type  -     POCT URINE DIPSTICK WITHOUT MICROSCOPE        Medication List with Changes/Refills   New Medications    CIPROFLOXACIN HCL (CIPRO) 500 MG TABLET    Take 1 tablet (500 mg total) by mouth 2 (two) times daily.   Current Medications    PREDNISONE  (DELTASONE) 20 MG TABLET    Start Wednesday 1 p.o. q.d. times 10 days then stop    SILDENAFIL (VIAGRA) 100 MG TABLET    Take 1 tablet (100 mg total) by mouth daily as needed for Erectile Dysfunction.   Changed and/or Refilled Medications    Modified Medication Previous Medication    INDOMETHACIN (INDOCIN) 25 MG CAPSULE indomethacin (INDOCIN) 25 MG capsule       TAKE 1 CAPSULE BY MOUTH TWICE DAILY WITH MEALS    Take 1 capsule (25 mg total) by mouth 2 (two) times daily with meals.    INDOMETHACIN (INDOCIN) 50 MG CAPSULE indomethacin (INDOCIN) 50 MG capsule       One p.o. t.i.d. p.r.n. gouty attack no more than 7 days--do not take with other NSAIDs    One p.o. t.i.d. p.r.n. gouty attack no more than 7 days--do not take with other NSAIDs   Discontinued Medications    MELOXICAM (MOBIC) 7.5 MG TABLET    Take after prednisone complete---1 p.o. b.i.d. p.r.n. pain or swelling--no other NSAIDs--can take with Tylenol

## 2023-08-28 DIAGNOSIS — Z87.39 HISTORY OF GOUT: Primary | ICD-10-CM

## 2023-08-28 DIAGNOSIS — R31.9 HEMATURIA, UNSPECIFIED TYPE: ICD-10-CM

## 2023-08-28 DIAGNOSIS — R97.20 ELEVATED PSA: ICD-10-CM

## 2023-08-28 RX ORDER — ALLOPURINOL 100 MG/1
100 TABLET ORAL DAILY
Qty: 30 TABLET | Refills: 11 | Status: SHIPPED | OUTPATIENT
Start: 2023-08-28 | End: 2023-10-01

## 2023-08-28 NOTE — PROGRESS NOTES
"Subjective:      Gamlaiel Pete Jr. is a 73 y.o. male who presents for evaluation of gross hematuria.      Elevated PSA  Patient is here with an elevated PSA. He has no personal history and no family history of prostate cancer. He has no prior genitourinary history of hematospermia, UTI, urolithiasis, previous  surgery.  Previous PSA values are :  Lab Results   Component Value Date    PSA 8.4 (H) 08/24/2023     Hematuria  Patient complains of gross hematuria. Onset of hematuria was several months ago and was sudden in onset. There is not a history of nephrolithiasis. There is not a history of urologic trauma. Denies LUTS. Patient admits to history of tobacco use as a teenager. Patient denies history of Agent Orange exposure,  surgeries, sexually transmitted diseases, tobacco use, trauma, and urolithiasis. Prior workup has been UA'S. POCT revealed +RBC's; no UC obtained. Patient is currently taking cipro x 10 days. Patient brought in cup with blood clots to show provider.     The following portions of the patient's history were reviewed and updated as appropriate: allergies, current medications, past family history, past medical history, past social history, past surgical history and problem list.    Review of Systems  Constitutional: no fever or chills  ENT: no nasal congestion or sore throat  Respiratory: no cough or shortness of breath  Cardiovascular: no chest pain or palpitations  Gastrointestinal: no nausea or vomiting, tolerating diet  Genitourinary: as per HPI  Hematologic/Lymphatic: no easy bruising or lymphadenopathy  Musculoskeletal: no arthralgias or myalgias  Neurological: no seizures or tremors  Behavioral/Psych: no auditory or visual hallucinations     Objective:   Vitals: BP (!) 193/88 (BP Location: Right arm, Patient Position: Sitting, BP Method: Small (Automatic))   Pulse 88   Ht 5' 10" (1.778 m)   Wt 111.1 kg (244 lb 14.9 oz)   BMI 35.14 kg/m²     Physical Exam   General: alert and " oriented, no acute distress  Head: normocephalic, atraumatic  Neck: supple, no lymphadenopathy, normal ROM, no masses  Respiratory: Symmetric expansion, non-labored breathing  Cardiovascular: regular rate and rhythm, nomal pulses, no peripheral edema  Skin: normal coloration and turgor, no rashes, no suspicious skin lesions noted  Neuro: alert and oriented x3, no gross deficits  Psych: normal judgment and insight, normal mood/affect, and non-anxious    Physical Exam    Lab Review   Urinalysis demonstrates sent for    Lab Results   Component Value Date    WBC 7.78 08/24/2023    HGB 13.5 (L) 08/24/2023    HCT 40.5 08/24/2023    MCV 88 08/24/2023     08/24/2023     Lab Results   Component Value Date    CREATININE 1.1 08/24/2023    BUN 16 08/24/2023     Lab Results   Component Value Date    PSA 8.4 (H) 08/24/2023       Assessment and Plan:   1. Gross hematuria  -- Discussed differential diagnosis of hematuria including but limited to: infection, inflammation, kidney stones, neoplasms of kidney, ureter, or bladder, trauma, prostate related issues such as BPH, prostate cancer, prostatitis.   - Urine culture  - CT Urogram Abd Pelvis W WO; Future  - Prostate Specific Antigen, Diagnostic; Future  - Cystoscopy; Future  - Creatinine, Serum; Future    2. Elevated PSA  - Urine culture  - Prostate Specific Antigen, Diagnostic; Future  - Creatinine, Serum; Future     --will notify with results  --RTC after CT     This note is dictated on M*Modal word recognition program.  There are word recognition mistakes that are occasionally missed on review.

## 2023-08-29 ENCOUNTER — OFFICE VISIT (OUTPATIENT)
Dept: UROLOGY | Facility: CLINIC | Age: 73
End: 2023-08-29
Payer: MEDICARE

## 2023-08-29 VITALS
WEIGHT: 244.94 LBS | BODY MASS INDEX: 35.07 KG/M2 | DIASTOLIC BLOOD PRESSURE: 88 MMHG | SYSTOLIC BLOOD PRESSURE: 193 MMHG | HEIGHT: 70 IN | HEART RATE: 88 BPM

## 2023-08-29 DIAGNOSIS — R97.20 ELEVATED PSA: ICD-10-CM

## 2023-08-29 DIAGNOSIS — R31.0 GROSS HEMATURIA: ICD-10-CM

## 2023-08-29 PROCEDURE — 1125F AMNT PAIN NOTED PAIN PRSNT: CPT | Mod: CPTII,S$GLB,, | Performed by: NURSE PRACTITIONER

## 2023-08-29 PROCEDURE — 99999 PR PBB SHADOW E&M-EST. PATIENT-LVL IV: ICD-10-PCS | Mod: PBBFAC,,, | Performed by: NURSE PRACTITIONER

## 2023-08-29 PROCEDURE — 87086 URINE CULTURE/COLONY COUNT: CPT | Performed by: NURSE PRACTITIONER

## 2023-08-29 PROCEDURE — 3288F FALL RISK ASSESSMENT DOCD: CPT | Mod: CPTII,S$GLB,, | Performed by: NURSE PRACTITIONER

## 2023-08-29 PROCEDURE — 3008F PR BODY MASS INDEX (BMI) DOCUMENTED: ICD-10-PCS | Mod: CPTII,S$GLB,, | Performed by: NURSE PRACTITIONER

## 2023-08-29 PROCEDURE — 1160F PR REVIEW ALL MEDS BY PRESCRIBER/CLIN PHARMACIST DOCUMENTED: ICD-10-PCS | Mod: CPTII,S$GLB,, | Performed by: NURSE PRACTITIONER

## 2023-08-29 PROCEDURE — 3077F PR MOST RECENT SYSTOLIC BLOOD PRESSURE >= 140 MM HG: ICD-10-PCS | Mod: CPTII,S$GLB,, | Performed by: NURSE PRACTITIONER

## 2023-08-29 PROCEDURE — 99204 PR OFFICE/OUTPT VISIT, NEW, LEVL IV, 45-59 MIN: ICD-10-PCS | Mod: S$GLB,,, | Performed by: NURSE PRACTITIONER

## 2023-08-29 PROCEDURE — 1160F RVW MEDS BY RX/DR IN RCRD: CPT | Mod: CPTII,S$GLB,, | Performed by: NURSE PRACTITIONER

## 2023-08-29 PROCEDURE — 3079F PR MOST RECENT DIASTOLIC BLOOD PRESSURE 80-89 MM HG: ICD-10-PCS | Mod: CPTII,S$GLB,, | Performed by: NURSE PRACTITIONER

## 2023-08-29 PROCEDURE — 1101F PT FALLS ASSESS-DOCD LE1/YR: CPT | Mod: CPTII,S$GLB,, | Performed by: NURSE PRACTITIONER

## 2023-08-29 PROCEDURE — 3079F DIAST BP 80-89 MM HG: CPT | Mod: CPTII,S$GLB,, | Performed by: NURSE PRACTITIONER

## 2023-08-29 PROCEDURE — 1101F PR PT FALLS ASSESS DOC 0-1 FALLS W/OUT INJ PAST YR: ICD-10-PCS | Mod: CPTII,S$GLB,, | Performed by: NURSE PRACTITIONER

## 2023-08-29 PROCEDURE — 1159F MED LIST DOCD IN RCRD: CPT | Mod: CPTII,S$GLB,, | Performed by: NURSE PRACTITIONER

## 2023-08-29 PROCEDURE — 3077F SYST BP >= 140 MM HG: CPT | Mod: CPTII,S$GLB,, | Performed by: NURSE PRACTITIONER

## 2023-08-29 PROCEDURE — 99204 OFFICE O/P NEW MOD 45 MIN: CPT | Mod: S$GLB,,, | Performed by: NURSE PRACTITIONER

## 2023-08-29 PROCEDURE — 3008F BODY MASS INDEX DOCD: CPT | Mod: CPTII,S$GLB,, | Performed by: NURSE PRACTITIONER

## 2023-08-29 PROCEDURE — 3288F PR FALLS RISK ASSESSMENT DOCUMENTED: ICD-10-PCS | Mod: CPTII,S$GLB,, | Performed by: NURSE PRACTITIONER

## 2023-08-29 PROCEDURE — 1125F PR PAIN SEVERITY QUANTIFIED, PAIN PRESENT: ICD-10-PCS | Mod: CPTII,S$GLB,, | Performed by: NURSE PRACTITIONER

## 2023-08-29 PROCEDURE — 99999 PR PBB SHADOW E&M-EST. PATIENT-LVL IV: CPT | Mod: PBBFAC,,, | Performed by: NURSE PRACTITIONER

## 2023-08-29 PROCEDURE — 1159F PR MEDICATION LIST DOCUMENTED IN MEDICAL RECORD: ICD-10-PCS | Mod: CPTII,S$GLB,, | Performed by: NURSE PRACTITIONER

## 2023-08-30 LAB — BACTERIA UR CULT: NO GROWTH

## 2023-08-31 ENCOUNTER — TELEPHONE (OUTPATIENT)
Dept: UROLOGY | Facility: CLINIC | Age: 73
End: 2023-08-31
Payer: MEDICARE

## 2023-08-31 NOTE — TELEPHONE ENCOUNTER
----- Message from Kyra Gallego NP sent at 8/31/2023  6:59 AM CDT -----  Please call patient and let him know that his urine culture is negative for infection.

## 2023-08-31 NOTE — TELEPHONE ENCOUNTER
Spoke with pt, pt is aware per broderick  his urine culture is negative for infection.   Pt understood.

## 2023-09-05 DIAGNOSIS — R31.0 GROSS HEMATURIA: Primary | ICD-10-CM

## 2023-09-05 DIAGNOSIS — N28.89 RENAL MASS, RIGHT: ICD-10-CM

## 2023-09-05 DIAGNOSIS — R97.20 ELEVATED PSA: ICD-10-CM

## 2023-09-05 DIAGNOSIS — N28.89 OTHER SPECIFIED DISORDERS OF KIDNEY AND URETER: ICD-10-CM

## 2023-09-06 ENCOUNTER — TELEPHONE (OUTPATIENT)
Dept: UROLOGY | Facility: CLINIC | Age: 73
End: 2023-09-06
Payer: MEDICARE

## 2023-09-06 NOTE — TELEPHONE ENCOUNTER
----- Message from Kyra Gallego NP sent at 9/6/2023  2:32 PM CDT -----  Regarding: Schedule imaging  Please schedule Chest CT and Renal CT ASAP. Orders are in; patient aware to expect call with scheduling details.     Thanks!  M

## 2023-09-06 NOTE — TELEPHONE ENCOUNTER
Called to schedule patient for a CT pelvic and CT chest scan on 9/7/23 at 4 and 5pm.  Patient was also scheduled for a MRI on 9/22/23 at 2pm.  Patient verbalized understanding.    KAUR Chew

## 2023-09-06 NOTE — TELEPHONE ENCOUNTER
----- Message from Karly Watson sent at 9/6/2023  2:01 PM CDT -----  Regarding: testing to be scheduled?  Contact: PT  [phone]  The patient called requesting to speak to Kyra Gallego, regarding some testing he says she wanted to order  Patient states has not heard from anyone, please reach out to patient to advise    No further information provided      Patient can be contacted @# [phone] (home)

## 2023-09-07 ENCOUNTER — PATIENT MESSAGE (OUTPATIENT)
Dept: HEMATOLOGY/ONCOLOGY | Facility: CLINIC | Age: 73
End: 2023-09-07
Payer: MEDICARE

## 2023-09-07 ENCOUNTER — TELEPHONE (OUTPATIENT)
Dept: HEMATOLOGY/ONCOLOGY | Facility: CLINIC | Age: 73
End: 2023-09-07
Payer: MEDICARE

## 2023-09-07 ENCOUNTER — TELEPHONE (OUTPATIENT)
Dept: UROLOGY | Facility: CLINIC | Age: 73
End: 2023-09-07
Payer: MEDICARE

## 2023-09-07 DIAGNOSIS — N28.89 RENAL MASS: Primary | ICD-10-CM

## 2023-09-07 NOTE — TELEPHONE ENCOUNTER
Called patient to discuss canceling MRI appointment.  Informed patient that I will cancel appointment due to the patient getting a CT scan on 9/7/23 at 4 and 5pm.  Sent patient [employer] message. Answered all of patient questions.  Patient verbalized understanding.    KAUR Chew

## 2023-09-07 NOTE — NURSING
Oncology Navigation   Intake  Cancer Type:   Internal / External Referral: Internal  Date of Referral: 09/06/23  Initial Nurse Navigator Contact: 09/07/23  Referral to Initial Contact Timeline (days): 1  Date Worked: 09/07/23  Appointment Date: 09/12/23  Multiple appointments: Yes     Treatment     Surgical Oncologist: Henry Michaels  Consult Date: 09/12/23    Medical Oncologist: Ruby Turk  Consult Date: 09/12/23       Procedures: CT  CT Schedule Date: 09/07/23                 Acuity      Follow Up  No follow-ups on file.

## 2023-09-07 NOTE — NURSING
Oncology nurse navigator contacted patient for scheduling referral to urology - Dr. Michaels and medical oncology- Dr. Turk. Date, time, and location discussed with patient. All questions/concerns addressed. Patient verbalized understanding. Contact information provided for further assistance.

## 2023-09-07 NOTE — TELEPHONE ENCOUNTER
"----- Message from Henry Michaels MD sent at 9/6/2023  1:46 PM CDT -----  Regarding: RE: follow up imaging  Kyra,    Please get a Ct renal protocol    Magalie, can you get this patient in to see me and Dr Burke clark after his imaging is done    Thanks    Sc      ----- Message -----  From: Kyra Gallego NP  Sent: 9/6/2023   8:23 AM CDT  To: Henry Michaels MD  Subject: follow up imaging                                Hey Dr. Michaels,     This gentleman came to see me last week with gross hematuria. Now improved- still having some episodes of dark colored urine.     CT urogram shows "Within the mid to lower pole of the right kidney is a 4.9 x 6.3 x 6.1 cm heterogeneous solid mass which is overall slightly hypodense in relation to the enhancing parenchyma.  The mass is partially exophytic posteriorly.  It does extend partially into the renal sinus."    CT also shows lung nodules concerning for METS.     I spoke with him about this results. I am going to order chest ct and additional renal imaging and get him setup to see you.  Do you prefer MRI or CT renal protocol?    ThanksKyra       ----- Message -----  From: Interface, Rad Results In  Sent: 9/5/2023   1:49 PM CDT  To: Kyra Gallego NP        "

## 2023-09-11 NOTE — PROGRESS NOTES
"Subjective:      Gamaliel Pete Jr. is a 73 y.o. male who presents today regarding his renal mass    Gross hematuria about 6 weeks ago  Painless    No flank pain    Saw Dr Aviles, PCP, in [facility]    No weight loss    Good appetite    Right knee pain    .    The following portions of the patient's history were reviewed and updated as appropriate: allergies, current medications, past family history, past medical history, past social history, past surgical history and problem list.    Review of Systems  Pertinent items are noted in HPI.  A comprehensive multipoint review of systems was negative except as otherwise stated in the HPI.    Past Medical History:   Diagnosis Date    Asthma     Borderline hypertension     ED (erectile dysfunction)     Gout      Past Surgical History:   Procedure Laterality Date    COLONOSCOPY  02/10/2022    COLONOSCOPY N/A 2/10/2022    Procedure: COLONOSCOPY;  Surgeon: Desmond Keenan MD;  Location: [facility];  Service: General;  Laterality: N/A;       Review of patient's allergies indicates:  No Known Allergies       Objective:   Vitals: BP (!) 170/76 (BP Location: Right arm, Patient Position: Sitting, BP Method: Large (Automatic))   Pulse 78   Resp 16   Ht 5' 10" (1.778 m)   Wt 107.9 kg (237 lb 12.3 oz)   SpO2 95%   BMI 34.12 kg/m²     Physical Exam   General: alert and oriented, no acute distress  Respiratory: Symmetric expansion, non-labored breathing  Cardiovascular: no peripheral edema  Abdomen: soft, non distended  Skin: normal coloration and turgor, no rashes, no suspicious skin lesions noted  Neuro: no gross deficits  Psych: normal judgment and insight, normal mood/affect, and non-anxious    Physical Exam    Lab Review   Urinalysis demonstrates no specimen    Lab Results   Component Value Date    WBC 7.78 08/24/2023    HGB 13.5 (L) 08/24/2023    HCT 40.5 08/24/2023    MCV 88 08/24/2023     08/24/2023     Lab Results   Component Value Date    CREATININE 1.1 " 09/05/2023    BUN 16 08/24/2023     Ca normal  LFT normal  Alk Phos normal    Imaging  CT ABDOMEN PELVIS W WO CONTRAST     CLINICAL HISTORY:  right renal mass; GH;Gross hematuria     TECHNIQUE:  Low dose axial images, sagittal and coronal reformations were obtained from the lung bases to the pubic symphysis before and following the IV administration of 100 mL of Omnipaque 350.  No oral contrast was given.  Examination was performed using the CT renal protocol.     COMPARISON:  CT urogram dated 09/05/2023.     FINDINGS:  CT renal protocol:     There is a 4.8 x 6.3 cm exophytic enhancing lesion in the lower pole of the right kidney.  There is hypoenhancement of the lesion compared to the normal renal parenchyma.  There is unchanged appearance of the mass extending into the right renal sinus.  There is no extension into the right renal vein     No additional enhancing lesions are identified.  There is a subcentimeter lesion in the right kidney is too small complete characterization.     There is prompt excretion from both collecting systems.  There is incomplete distension of the right distal ureter.  No definitive filling defect is identified within the.  The urinary bladder is unremarkable.     CT abdomen pelvis:     There is unchanged appearance of multiple pulmonary nodules in the lung bases.  No new nodules identified     The heart is unremarkable.  There is normal tapering of the abdominal aorta.  There are single bilateral renal arteries.  The renal veins remain patent.     There is no evidence of lymphadenopathy.     The esophagus, stomach, and duodenum are within normal limits.  The small bowel loops are unremarkable.  The appendix is not visualized.  There are no secondary findings of acute appendicitis.  There is colonic diverticula without evidence of acute diverticulitis.     The liver is unremarkable.  The gallbladder is within normal limits.  The biliary tree is within normal limits.  The spleen is  unremarkable.  The pancreas is within normal limits.  The adrenal glands are unremarkable.     There is no evidence of free fluid in the abdomen or pelvis.  There is no evidence of free air.  There is no evidence of pneumatosis.  No portal venous air is identified.     The psoas margins are unremarkable.  There are bilateral fat containing inguinal hernias.  There are degenerative changes in the osseous structures.  There is unchanged appearance of a lytic lesion involving the anterior aspect of the T9 vertebral body with associated cortical erosions.     Impression:     Unchanged 4.8 x 6.3 cm exophytic hypoenhancing lesion in the lower pole of the right kidney, concerning for renal cell carcinoma.  Extension of lesion into the renal sinus.  No evidence of renal vein invasion.  No regional lymphadenopathy.     Additional smaller subcentimeter lesion too small complete characterization in the right kidney.     Bilateral pulmonary nodules remain concerning for metastatic disease.     Lytic lesion along the anterior aspect of the T9 vertebral body, also concerning for osseous metastatic disease.     Additional findings as above.        Electronically signed by: Zeyad Phan MD  Date:                                            09/07/2023  Time:                                           20:18              Assessment and Plan:   Renal mass; right     Bone lesion; lytic T9    Pulmonary nodules    Gross hematuria    See heme onc today, Dr Turk  IR consult for biopsy    Cysto with Dr Michaels to complete hematuria work up

## 2023-09-12 ENCOUNTER — OFFICE VISIT (OUTPATIENT)
Dept: UROLOGY | Facility: CLINIC | Age: 73
End: 2023-09-12
Payer: MEDICARE

## 2023-09-12 ENCOUNTER — LAB VISIT (OUTPATIENT)
Dept: LAB | Facility: HOSPITAL | Age: 73
End: 2023-09-12
Attending: INTERNAL MEDICINE
Payer: MEDICARE

## 2023-09-12 ENCOUNTER — OFFICE VISIT (OUTPATIENT)
Dept: HEMATOLOGY/ONCOLOGY | Facility: CLINIC | Age: 73
End: 2023-09-12
Payer: MEDICARE

## 2023-09-12 VITALS
HEART RATE: 78 BPM | OXYGEN SATURATION: 95 % | OXYGEN SATURATION: 95 % | TEMPERATURE: 98 F | DIASTOLIC BLOOD PRESSURE: 76 MMHG | HEIGHT: 70 IN | HEART RATE: 78 BPM | WEIGHT: 237.75 LBS | HEIGHT: 70 IN | SYSTOLIC BLOOD PRESSURE: 170 MMHG | DIASTOLIC BLOOD PRESSURE: 76 MMHG | RESPIRATION RATE: 16 BRPM | BODY MASS INDEX: 34.04 KG/M2 | SYSTOLIC BLOOD PRESSURE: 170 MMHG | WEIGHT: 237.75 LBS | RESPIRATION RATE: 16 BRPM | BODY MASS INDEX: 34.04 KG/M2

## 2023-09-12 DIAGNOSIS — D50.9 MICROCYTIC ANEMIA: ICD-10-CM

## 2023-09-12 DIAGNOSIS — D50.8 OTHER IRON DEFICIENCY ANEMIA: ICD-10-CM

## 2023-09-12 DIAGNOSIS — M89.9 BONE LESION: ICD-10-CM

## 2023-09-12 DIAGNOSIS — N28.89 RENAL MASS: ICD-10-CM

## 2023-09-12 DIAGNOSIS — N28.89 RENAL MASS: Primary | ICD-10-CM

## 2023-09-12 DIAGNOSIS — M25.561 RIGHT KNEE PAIN, UNSPECIFIED CHRONICITY: ICD-10-CM

## 2023-09-12 DIAGNOSIS — C78.00 MALIGNANT NEOPLASM METASTATIC TO LUNG, UNSPECIFIED LATERALITY: ICD-10-CM

## 2023-09-12 DIAGNOSIS — R91.8 PULMONARY NODULES: ICD-10-CM

## 2023-09-12 DIAGNOSIS — R31.0 GROSS HEMATURIA: ICD-10-CM

## 2023-09-12 DIAGNOSIS — C79.51 METASTASIS TO BONE: ICD-10-CM

## 2023-09-12 LAB
ALBUMIN SERPL BCP-MCNC: 4.4 G/DL (ref 3.5–5.2)
ALP SERPL-CCNC: 77 U/L (ref 55–135)
ALT SERPL W/O P-5'-P-CCNC: 12 U/L (ref 10–44)
ANION GAP SERPL CALC-SCNC: 11 MMOL/L (ref 8–16)
AST SERPL-CCNC: 14 U/L (ref 10–40)
BASOPHILS # BLD AUTO: 0.04 K/UL (ref 0–0.2)
BASOPHILS NFR BLD: 0.5 % (ref 0–1.9)
BILIRUB SERPL-MCNC: 0.5 MG/DL (ref 0.1–1)
BUN SERPL-MCNC: 15 MG/DL (ref 8–23)
CALCIUM SERPL-MCNC: 10.3 MG/DL (ref 8.7–10.5)
CHLORIDE SERPL-SCNC: 105 MMOL/L (ref 95–110)
CO2 SERPL-SCNC: 27 MMOL/L (ref 23–29)
CREAT SERPL-MCNC: 1.1 MG/DL (ref 0.5–1.4)
DIFFERENTIAL METHOD: ABNORMAL
EOSINOPHIL # BLD AUTO: 0.1 K/UL (ref 0–0.5)
EOSINOPHIL NFR BLD: 1.8 % (ref 0–8)
ERYTHROCYTE [DISTWIDTH] IN BLOOD BY AUTOMATED COUNT: 13.2 % (ref 11.5–14.5)
EST. GFR  (NO RACE VARIABLE): >60 ML/MIN/1.73 M^2
GLUCOSE SERPL-MCNC: 98 MG/DL (ref 70–110)
HCT VFR BLD AUTO: 40 % (ref 40–54)
HGB BLD-MCNC: 13.4 G/DL (ref 14–18)
IMM GRANULOCYTES # BLD AUTO: 0.02 K/UL (ref 0–0.04)
IMM GRANULOCYTES NFR BLD AUTO: 0.3 % (ref 0–0.5)
IRON SERPL-MCNC: 50 UG/DL (ref 45–160)
LDH SERPL L TO P-CCNC: 177 U/L (ref 110–260)
LYMPHOCYTES # BLD AUTO: 2.5 K/UL (ref 1–4.8)
LYMPHOCYTES NFR BLD: 33.2 % (ref 18–48)
MCH RBC QN AUTO: 29.6 PG (ref 27–31)
MCHC RBC AUTO-ENTMCNC: 33.5 G/DL (ref 32–36)
MCV RBC AUTO: 88 FL (ref 82–98)
MONOCYTES # BLD AUTO: 0.6 K/UL (ref 0.3–1)
MONOCYTES NFR BLD: 8.4 % (ref 4–15)
NEUTROPHILS # BLD AUTO: 4.1 K/UL (ref 1.8–7.7)
NEUTROPHILS NFR BLD: 55.8 % (ref 38–73)
NRBC BLD-RTO: 0 /100 WBC
PLATELET # BLD AUTO: 253 K/UL (ref 150–450)
PMV BLD AUTO: 9.1 FL (ref 9.2–12.9)
POTASSIUM SERPL-SCNC: 4.8 MMOL/L (ref 3.5–5.1)
PROT SERPL-MCNC: 8.8 G/DL (ref 6–8.4)
RBC # BLD AUTO: 4.53 M/UL (ref 4.6–6.2)
SATURATED IRON: 12 % (ref 20–50)
SODIUM SERPL-SCNC: 143 MMOL/L (ref 136–145)
TOTAL IRON BINDING CAPACITY: 425 UG/DL (ref 250–450)
TRANSFERRIN SERPL-MCNC: 287 MG/DL (ref 200–375)
URATE SERPL-MCNC: 8 MG/DL (ref 3.4–7)
WBC # BLD AUTO: 7.4 K/UL (ref 3.9–12.7)

## 2023-09-12 PROCEDURE — 99205 PR OFFICE/OUTPT VISIT, NEW, LEVL V, 60-74 MIN: ICD-10-PCS | Mod: S$GLB,,, | Performed by: UROLOGY

## 2023-09-12 PROCEDURE — 99205 OFFICE O/P NEW HI 60 MIN: CPT | Mod: S$GLB,,, | Performed by: UROLOGY

## 2023-09-12 PROCEDURE — 1101F PR PT FALLS ASSESS DOC 0-1 FALLS W/OUT INJ PAST YR: ICD-10-PCS | Mod: CPTII,S$GLB,, | Performed by: UROLOGY

## 2023-09-12 PROCEDURE — 1101F PT FALLS ASSESS-DOCD LE1/YR: CPT | Mod: CPTII,S$GLB,, | Performed by: UROLOGY

## 2023-09-12 PROCEDURE — 84550 ASSAY OF BLOOD/URIC ACID: CPT | Performed by: INTERNAL MEDICINE

## 2023-09-12 PROCEDURE — 99999 PR PBB SHADOW E&M-EST. PATIENT-LVL III: ICD-10-PCS | Mod: PBBFAC,,, | Performed by: UROLOGY

## 2023-09-12 PROCEDURE — 1126F AMNT PAIN NOTED NONE PRSNT: CPT | Mod: CPTII,S$GLB,, | Performed by: UROLOGY

## 2023-09-12 PROCEDURE — 3288F FALL RISK ASSESSMENT DOCD: CPT | Mod: CPTII,S$GLB,, | Performed by: UROLOGY

## 2023-09-12 PROCEDURE — 3078F PR MOST RECENT DIASTOLIC BLOOD PRESSURE < 80 MM HG: ICD-10-PCS | Mod: CPTII,S$GLB,, | Performed by: UROLOGY

## 2023-09-12 PROCEDURE — 85025 COMPLETE CBC W/AUTO DIFF WBC: CPT | Performed by: INTERNAL MEDICINE

## 2023-09-12 PROCEDURE — 3078F DIAST BP <80 MM HG: CPT | Mod: CPTII,S$GLB,, | Performed by: UROLOGY

## 2023-09-12 PROCEDURE — 1126F AMNT PAIN NOTED NONE PRSNT: CPT | Mod: CPTII,S$GLB,, | Performed by: INTERNAL MEDICINE

## 2023-09-12 PROCEDURE — 99205 OFFICE O/P NEW HI 60 MIN: CPT | Mod: S$GLB,,, | Performed by: INTERNAL MEDICINE

## 2023-09-12 PROCEDURE — 36415 COLL VENOUS BLD VENIPUNCTURE: CPT | Performed by: INTERNAL MEDICINE

## 2023-09-12 PROCEDURE — 1126F PR PAIN SEVERITY QUANTIFIED, NO PAIN PRESENT: ICD-10-PCS | Mod: CPTII,S$GLB,, | Performed by: UROLOGY

## 2023-09-12 PROCEDURE — 1159F MED LIST DOCD IN RCRD: CPT | Mod: CPTII,S$GLB,, | Performed by: UROLOGY

## 2023-09-12 PROCEDURE — 83615 LACTATE (LD) (LDH) ENZYME: CPT | Performed by: INTERNAL MEDICINE

## 2023-09-12 PROCEDURE — 3078F DIAST BP <80 MM HG: CPT | Mod: CPTII,S$GLB,, | Performed by: INTERNAL MEDICINE

## 2023-09-12 PROCEDURE — 3077F PR MOST RECENT SYSTOLIC BLOOD PRESSURE >= 140 MM HG: ICD-10-PCS | Mod: CPTII,S$GLB,, | Performed by: UROLOGY

## 2023-09-12 PROCEDURE — 3008F BODY MASS INDEX DOCD: CPT | Mod: CPTII,S$GLB,, | Performed by: UROLOGY

## 2023-09-12 PROCEDURE — 1160F PR REVIEW ALL MEDS BY PRESCRIBER/CLIN PHARMACIST DOCUMENTED: ICD-10-PCS | Mod: CPTII,S$GLB,, | Performed by: INTERNAL MEDICINE

## 2023-09-12 PROCEDURE — 3008F BODY MASS INDEX DOCD: CPT | Mod: CPTII,S$GLB,, | Performed by: INTERNAL MEDICINE

## 2023-09-12 PROCEDURE — 3077F SYST BP >= 140 MM HG: CPT | Mod: CPTII,S$GLB,, | Performed by: INTERNAL MEDICINE

## 2023-09-12 PROCEDURE — 1159F MED LIST DOCD IN RCRD: CPT | Mod: CPTII,S$GLB,, | Performed by: INTERNAL MEDICINE

## 2023-09-12 PROCEDURE — 80053 COMPREHEN METABOLIC PANEL: CPT | Performed by: INTERNAL MEDICINE

## 2023-09-12 PROCEDURE — 99205 PR OFFICE/OUTPT VISIT, NEW, LEVL V, 60-74 MIN: ICD-10-PCS | Mod: S$GLB,,, | Performed by: INTERNAL MEDICINE

## 2023-09-12 PROCEDURE — 1126F PR PAIN SEVERITY QUANTIFIED, NO PAIN PRESENT: ICD-10-PCS | Mod: CPTII,S$GLB,, | Performed by: INTERNAL MEDICINE

## 2023-09-12 PROCEDURE — 3078F PR MOST RECENT DIASTOLIC BLOOD PRESSURE < 80 MM HG: ICD-10-PCS | Mod: CPTII,S$GLB,, | Performed by: INTERNAL MEDICINE

## 2023-09-12 PROCEDURE — 83540 ASSAY OF IRON: CPT | Performed by: INTERNAL MEDICINE

## 2023-09-12 PROCEDURE — 1160F RVW MEDS BY RX/DR IN RCRD: CPT | Mod: CPTII,S$GLB,, | Performed by: INTERNAL MEDICINE

## 2023-09-12 PROCEDURE — 1159F PR MEDICATION LIST DOCUMENTED IN MEDICAL RECORD: ICD-10-PCS | Mod: CPTII,S$GLB,, | Performed by: INTERNAL MEDICINE

## 2023-09-12 PROCEDURE — 3008F PR BODY MASS INDEX (BMI) DOCUMENTED: ICD-10-PCS | Mod: CPTII,S$GLB,, | Performed by: INTERNAL MEDICINE

## 2023-09-12 PROCEDURE — 1159F PR MEDICATION LIST DOCUMENTED IN MEDICAL RECORD: ICD-10-PCS | Mod: CPTII,S$GLB,, | Performed by: UROLOGY

## 2023-09-12 PROCEDURE — 99999 PR PBB SHADOW E&M-EST. PATIENT-LVL III: CPT | Mod: PBBFAC,,, | Performed by: UROLOGY

## 2023-09-12 PROCEDURE — 3077F PR MOST RECENT SYSTOLIC BLOOD PRESSURE >= 140 MM HG: ICD-10-PCS | Mod: CPTII,S$GLB,, | Performed by: INTERNAL MEDICINE

## 2023-09-12 PROCEDURE — 3077F SYST BP >= 140 MM HG: CPT | Mod: CPTII,S$GLB,, | Performed by: UROLOGY

## 2023-09-12 PROCEDURE — 3288F PR FALLS RISK ASSESSMENT DOCUMENTED: ICD-10-PCS | Mod: CPTII,S$GLB,, | Performed by: UROLOGY

## 2023-09-12 PROCEDURE — 99999 PR PBB SHADOW E&M-EST. PATIENT-LVL V: ICD-10-PCS | Mod: PBBFAC,,, | Performed by: INTERNAL MEDICINE

## 2023-09-12 PROCEDURE — 99999 PR PBB SHADOW E&M-EST. PATIENT-LVL V: CPT | Mod: PBBFAC,,, | Performed by: INTERNAL MEDICINE

## 2023-09-12 PROCEDURE — 3008F PR BODY MASS INDEX (BMI) DOCUMENTED: ICD-10-PCS | Mod: CPTII,S$GLB,, | Performed by: UROLOGY

## 2023-09-12 PROCEDURE — 84466 ASSAY OF TRANSFERRIN: CPT | Performed by: INTERNAL MEDICINE

## 2023-09-12 NOTE — PROGRESS NOTES
Subjective     Patient ID: Gamaliel Pete Jr. is a 73 y.o. male.    Chief Complaint: Renal mass    HPI    Presents a consult to discuss renal mass, bone lesion and innumerable pulmonary metastases    Reports has been clinically doing well with exception of hematuria leading to below work up  He has had no weight loss   Denies pain other than he may have current gout flare- takes indomethacin with flares- Does not take allopurinol  No fatigue  Had noted no changes    Oncology History:  - Presented with gross hematuria mid June 2023  Building a house and has been active working on this in the [place]- 1st noted dark urine and felt related to being dehydrated  Occurred a 2nd time approximately 2 weeks later and this time he noted darker and small clots  Noted again 2 weeks later and worse but ultimately he was prompted to seek evaluation when he had significant blood when he urinated    - went to his PCP and urine sample was yellow again  He had blood work done and referred to Urology at that time    - 9/5/2023 CT Urogram:  FINDINGS:  The lung bases demonstrate bilateral nodules, for example 9 mm at the right lung base and up to 11 mm at the left lung base.  Atelectasis present.  There are bilateral fat containing inguinal hernias.  The liver demonstrates no mass.  The spleen is not enlarged.  The stomach, pancreas and adrenal glands are within normal limits.  Gallbladder is unremarkable.  There is no biliary ductal dilatation.  The kidneys concentrate and excrete contrast satisfactorily.  There is no renal calculus or ureteral calculus.  Within the mid to lower pole of the right kidney is a 4.9 x 6.3 x 6.1 cm heterogeneous solid mass which is overall slightly hypodense in relation to the enhancing parenchyma.  The mass is partially exophytic posteriorly.  It does extend partially into the renal sinus  At the upper pole of the right kidney is a subcentimeter exophytic focus which is isodense to the parenchyma on  postcontrast imaging appearing slightly hyperdense on the noncontrast study, too small to characterize.  There are multiple additional subcentimeter hypodense right kidney foci which are suggestive of cysts.  Finally at the lower pole of the right kidney is a exophytic hypodense structure most compatible with a cyst.  The right main renal vein appears patent.  There is no significant periaortic lymphadenopathy.  Left kidney demonstrates several subcentimeter foci which are hypodense but too small to characterize, suggestive of cysts.  There is somewhat of a small amount of contrast within the upper collecting systems and ureters, with no definite focal abnormality, noting that there is some distortion of the collecting system in the region in which the right kidney mass involves the renal sinus.  The bladder is partially distended appearing grossly unremarkable.  The bowel demonstrates no significant abnormality.  The osseous structures demonstrate degenerative changes.  T9 demonstrates a lytic focus occupying the anterior half of the vertebral body.  There is slight loss of height along superior and inferior endplates with cortical disruption..  Impression:  Solid right renal mass concerning for neoplasm.  Multiple lung base nodules up to 11 mm, concerning for metastatic disease.  Recommend chest CT for full evaluation of lungs.  Lytic lesion at T9 with mild compression, concerning for pathologic compression fracture.  Subcentimeter right kidney lesion isodense to parenchyma on contrast enhanced imaging, too small to characterize.  Additional bilateral renal hypodensities which are too small to characterize but suggestive of cysts.    - 9/7/2023 CT Chest:  FINDINGS:  The base of the neck is within normal limits.  The thyroid gland is unremarkable.  The supraclavicular regions are within normal limits.  The trachea is unremarkable.  The central airways are within normal limits.  No endobronchial lesion is  identified.  There is no evidence of bronchiectasis.  The heart is unremarkable.  There are no pericardial effusions.  There are coronary artery calcifications.  The thoracic aorta is normal in caliber.  There are subcentimeter mediastinal and hilar lymph nodes.  There are subcentimeter axillary lymph nodes.  There are no pleural effusions.  There is no evidence of a pneumothorax.  There is no evidence of pneumomediastinum.  There are innumerable bilateral pulmonary nodules.  There is no focal consolidation.  The esophagus is unremarkable.  Please see the dedicated CT abdomen pelvis for the upper abdominal findings.  The chest wall is unremarkable.  There is unchanged lytic lesion involving the anterior aspect of T9 vertebral body with associated cortical erosions.  Impression:  Innumerable pulmonary nodules, concerning for metastatic disease to the chest.  Unchanged lytic lesion in the T9 vertebral body with cortical erosions.  Follow-up MRI of the spine, as clinically warranted.    - 9/7/2023 CT Abd:  FINDINGS:  CT renal protocol:  There is a 4.8 x 6.3 cm exophytic enhancing lesion in the lower pole of the right kidney.  There is hypoenhancement of the lesion compared to the normal renal parenchyma.  There is unchanged appearance of the mass extending into the right renal sinus.  There is no extension into the right renal vein  No additional enhancing lesions are identified.  There is a subcentimeter lesion in the right kidney is too small complete characterization.  There is prompt excretion from both collecting systems.  There is incomplete distension of the right distal ureter.  No definitive filling defect is identified within the.  The urinary bladder is unremarkable.  CT abdomen pelvis:  There is unchanged appearance of multiple pulmonary nodules in the lung bases.  No new nodules identified  The heart is unremarkable.  There is normal tapering of the abdominal aorta.  There are single bilateral renal  arteries.  The renal veins remain patent.  There is no evidence of lymphadenopathy.  The esophagus, stomach, and duodenum are within normal limits.  The small bowel loops are unremarkable.  The appendix is not visualized.  There are no secondary findings of acute appendicitis.  There is colonic diverticula without evidence of acute diverticulitis.  The liver is unremarkable.  The gallbladder is within normal limits.  The biliary tree is within normal limits.  The spleen is unremarkable.  The pancreas is within normal limits.  The adrenal glands are unremarkable.  There is no evidence of free fluid in the abdomen or pelvis.  There is no evidence of free air.  There is no evidence of pneumatosis.  No portal venous air is identified.  The psoas margins are unremarkable.  There are bilateral fat containing inguinal hernias.  There are degenerative changes in the osseous structures.  There is unchanged appearance of a lytic lesion involving the anterior aspect of the T9 vertebral body with associated cortical erosions.  Impression:  Unchanged 4.8 x 6.3 cm exophytic hypoenhancing lesion in the lower pole of the right kidney, concerning for renal cell carcinoma.  Extension of lesion into the renal sinus.  No evidence of renal vein invasion.  No regional lymphadenopathy.  Additional smaller subcentimeter lesion too small complete characterization in the right kidney.  Bilateral pulmonary nodules remain concerning for metastatic disease.  Lytic lesion along the anterior aspect of the T9 vertebral body, also concerning for osseous metastatic disease.  Additional findings as above.    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  - fractured collar bone  - Pediatric hernia    Active Ambulatory Problems     Diagnosis Date Noted    Obesity (BMI 35.0-39.9 without  comorbidity) 2019    Borderline hypertension 2019    Acute gout of left ankle 2019    Onychomycosis 2019    History of gout 2021    Seborrheic keratosis 2021    Tinea corporis 2021    Positive colorectal cancer screening using Cologuard test 2021     Resolved Ambulatory Problems     Diagnosis Date Noted    No Resolved Ambulatory Problems     Past Medical History:   Diagnosis Date    Asthma     ED (erectile dysfunction)     Gout      SH:  Retired from [employer]    1 child, 2 stepchildren  Prior tobacco- teens, early 20s  Social EtOH    FH:  Maternal grandmother-  at 91- she had prior cancers- colon cancer and breast cancer  Mother - liver cancer - prior blood transfusion Hep C-  at age 75 yo  Father-  in his 50s- EtOH cirrhosis  Paternal grandfather- cancer in his 70s, unclear type  Maternal grandfather- H+N cancer-  (smoker)    Review of Systems       Objective     Physical Exam  Vitals and nursing note reviewed.   Constitutional:       General: He is not in acute distress.     Appearance: Normal appearance. He is well-developed. He is obese. He is not ill-appearing.      Comments: Present with his wife  Overweight   Very pleasant   HENT:      Head: Normocephalic and atraumatic.   Eyes:      Extraocular Movements: Extraocular movements intact.      Conjunctiva/sclera: Conjunctivae normal.      Pupils: Pupils are equal, round, and reactive to light.   Neck:      Thyroid: No thyromegaly.   Cardiovascular:      Rate and Rhythm: Normal rate and regular rhythm.      Heart sounds: Normal heart sounds. No murmur heard.     No friction rub. No gallop.   Pulmonary:      Effort: Pulmonary effort is normal. No respiratory distress.      Breath sounds: Normal breath sounds. No wheezing.   Abdominal:      General: Abdomen is flat. Bowel sounds are normal. There is no distension.      Palpations: Abdomen is soft. There is no mass.      Tenderness: There is no  abdominal tenderness. There is no guarding or rebound.   Musculoskeletal:         General: No swelling, tenderness or deformity. Normal range of motion.      Cervical back: Normal range of motion and neck supple.      Right lower leg: No edema.      Left lower leg: No edema.   Lymphadenopathy:      Cervical: No cervical adenopathy.   Skin:     General: Skin is warm and dry.      Coloration: Skin is not jaundiced or pale.      Findings: No erythema, lesion or rash.   Neurological:      General: No focal deficit present.      Mental Status: He is alert and oriented to person, place, and time.      Motor: No weakness.      Coordination: Coordination normal.      Gait: Gait normal.   Psychiatric:         Mood and Affect: Mood normal.         Behavior: Behavior normal.         Thought Content: Thought content normal.         Judgment: Judgment normal.     Labs- reviewed  Imaging- reviewed       Assessment and Plan     1. Renal mass  -     Ambulatory referral/consult to Hematology / Oncology  -     CBC W/ AUTO DIFFERENTIAL; Future; Expected date: 09/12/2023  -     CMP; Future; Expected date: 09/12/2023  -     LACTATE DEHYDROGENASE; Future; Expected date: 09/12/2023  -     URIC ACID; Future; Expected date: 09/12/2023  -     NM Bone Scan Whole Body; Future; Expected date: 09/12/2023  -     Iron and TIBC; Future; Expected date: 09/12/2023  -     MRI Brain W WO Contrast; Future; Expected date: 09/12/2023    2. Microcytic anemia  -     Iron and TIBC; Future; Expected date: 09/12/2023    3. Malignant neoplasm metastatic to lung, unspecified laterality    4. Metastasis to bone      Lengthy discussion  Reviewed disease is metastatic and biopsy is indicated  We reviewed with significant volume of spread- surgery is not indicated  Needs biopsy and will discuss with IR    Needs to complete labs for risk stratification and scans - desires to do any scans here  Will include labs to work up anemia and include iron as hematuria    Will  be a candidate for bone directed therapy with bone lesion    90 minutes total time- 45 minutes direct with patient  30 minutes chart review and additional 15 minutes care coordination    Route Chart for Scheduling    Med Onc Chart Routing  Urgent    Follow up with physician . Bone scan and MRI brain [place] [facility] [place] [place], start IV iron next week x 2 weeks; Interventional radiology biopsy asap and RTC to see me 1 week post biopsy   Follow up with [facility]    Infusion scheduling note    Injection scheduling note    Labs    Imaging    Pharmacy appointment    Other referrals

## 2023-09-14 ENCOUNTER — TELEPHONE (OUTPATIENT)
Dept: HEMATOLOGY/ONCOLOGY | Facility: CLINIC | Age: 73
End: 2023-09-14
Payer: MEDICARE

## 2023-09-14 PROBLEM — C79.51 METASTASIS TO BONE: Status: ACTIVE | Noted: 2023-09-14

## 2023-09-14 PROBLEM — N28.89 RENAL MASS: Status: ACTIVE | Noted: 2023-09-14

## 2023-09-14 PROBLEM — D50.9 IRON DEFICIENCY ANEMIA: Status: ACTIVE | Noted: 2023-09-14

## 2023-09-14 PROBLEM — C78.00 CANCER WITH PULMONARY METASTASES: Status: ACTIVE | Noted: 2023-09-14

## 2023-09-14 RX ORDER — HEPARIN 100 UNIT/ML
5 SYRINGE INTRAVENOUS
Status: CANCELLED | OUTPATIENT
Start: 2023-09-25

## 2023-09-14 RX ORDER — DIPHENHYDRAMINE HYDROCHLORIDE 50 MG/ML
50 INJECTION INTRAMUSCULAR; INTRAVENOUS ONCE AS NEEDED
Status: CANCELLED | OUTPATIENT
Start: 2023-09-25

## 2023-09-14 RX ORDER — SODIUM CHLORIDE 0.9 % (FLUSH) 0.9 %
10 SYRINGE (ML) INJECTION
Status: CANCELLED | OUTPATIENT
Start: 2023-09-25

## 2023-09-14 RX ORDER — SODIUM CHLORIDE 9 MG/ML
INJECTION, SOLUTION INTRAVENOUS CONTINUOUS
Status: CANCELLED | OUTPATIENT
Start: 2023-09-25

## 2023-09-14 RX ORDER — EPINEPHRINE 0.3 MG/.3ML
0.3 INJECTION SUBCUTANEOUS ONCE AS NEEDED
Status: CANCELLED | OUTPATIENT
Start: 2023-09-25

## 2023-09-14 NOTE — TELEPHONE ENCOUNTER
Spoke to patient  MRI scheduled, will reach out about the bone scan.     Questions about the biopsy and insurance auth and why a biopsy and not surgery.     Would like to know if genetic screening is an option     Let him know that the biopsy will not cause the cancer to spread.

## 2023-09-14 NOTE — TELEPHONE ENCOUNTER
----- Message from Vivienne Luque sent at 9/14/2023 10:11 AM CDT -----  Regarding: Patient advice  Contact: Pt [phone]            Name of Caller:  Gamaliel     Contact Preference: [phone]    Nature of Call:  Requesting a call back have some additional questions he forgot to ask at visit

## 2023-09-15 ENCOUNTER — TELEPHONE (OUTPATIENT)
Dept: HEMATOLOGY/ONCOLOGY | Facility: CLINIC | Age: 73
End: 2023-09-15
Payer: MEDICARE

## 2023-09-15 NOTE — TELEPHONE ENCOUNTER
----- Message from Ree Hawkins sent at 9/15/2023  9:18 AM CDT -----  Type:  Needs Medical Advice     Who Called: LIBRA JENKINS JR. [[id number]]    Would the patient rather a call back or a response via [facility]? Call Back    Best Call Back Number: [phone]    Additional Information: would like to discuss test that was ordered

## 2023-09-17 ENCOUNTER — HOSPITAL ENCOUNTER (OUTPATIENT)
Dept: RADIOLOGY | Facility: HOSPITAL | Age: 73
Discharge: HOME | End: 2023-09-17
Attending: INTERNAL MEDICINE
Payer: MEDICARE

## 2023-09-17 DIAGNOSIS — N28.89 RENAL MASS: ICD-10-CM

## 2023-09-17 PROCEDURE — 70553 MRI BRAIN STEM W/O & W/DYE: CPT | Mod: TC

## 2023-09-17 PROCEDURE — A9585 GADOBUTROL INJECTION: HCPCS | Performed by: INTERNAL MEDICINE

## 2023-09-17 PROCEDURE — 70553 MRI BRAIN W WO CONTRAST: ICD-10-PCS | Mod: 26,,, | Performed by: RADIOLOGY

## 2023-09-17 PROCEDURE — 25500020 PHARM REV CODE 255: Performed by: INTERNAL MEDICINE

## 2023-09-17 PROCEDURE — 70553 MRI BRAIN STEM W/O & W/DYE: CPT | Mod: 26,,, | Performed by: RADIOLOGY

## 2023-09-17 RX ORDER — GADOBUTROL 604.72 MG/ML
10 INJECTION INTRAVENOUS
Status: COMPLETED | OUTPATIENT
Start: 2023-09-17 | End: 2023-09-17

## 2023-09-17 RX ADMIN — GADOBUTROL 10 ML: 604.72 INJECTION INTRAVENOUS at 08:09

## 2023-09-18 ENCOUNTER — TELEPHONE (OUTPATIENT)
Dept: HEMATOLOGY/ONCOLOGY | Facility: CLINIC | Age: 73
End: 2023-09-18
Payer: MEDICARE

## 2023-09-18 ENCOUNTER — PATIENT MESSAGE (OUTPATIENT)
Dept: PRIMARY CARE CLINIC | Facility: CLINIC | Age: 73
End: 2023-09-18
Payer: MEDICARE

## 2023-09-18 NOTE — TELEPHONE ENCOUNTER
----- Message from Elsa Shafer sent at 9/18/2023  9:25 AM CDT -----  Regarding: Medical Assistance  Contact: Patient  Patient is requesting a call back in reference to gout   Patient stated he would like to verify which medication is ok to take due to pain   Please Assist    Patient can be reached at [phone]

## 2023-09-18 NOTE — TELEPHONE ENCOUNTER
Spoke to patient, okay to take his medication for his gout flair. He will call with any other needs.

## 2023-09-19 ENCOUNTER — INFUSION (OUTPATIENT)
Dept: INFUSION THERAPY | Facility: HOSPITAL | Age: 73
End: 2023-09-19
Payer: MEDICARE

## 2023-09-19 VITALS
OXYGEN SATURATION: 97 % | BODY MASS INDEX: 33.52 KG/M2 | DIASTOLIC BLOOD PRESSURE: 77 MMHG | RESPIRATION RATE: 17 BRPM | SYSTOLIC BLOOD PRESSURE: 169 MMHG | WEIGHT: 234.13 LBS | TEMPERATURE: 98 F | HEART RATE: 80 BPM | HEIGHT: 70 IN

## 2023-09-19 DIAGNOSIS — D50.8 OTHER IRON DEFICIENCY ANEMIA: Primary | ICD-10-CM

## 2023-09-19 PROCEDURE — 63600175 PHARM REV CODE 636 W HCPCS: Mod: JZ,JG | Performed by: INTERNAL MEDICINE

## 2023-09-19 PROCEDURE — 96365 THER/PROPH/DIAG IV INF INIT: CPT

## 2023-09-19 PROCEDURE — 25000003 PHARM REV CODE 250: Performed by: INTERNAL MEDICINE

## 2023-09-19 RX ORDER — EPINEPHRINE 0.3 MG/.3ML
0.3 INJECTION SUBCUTANEOUS ONCE AS NEEDED
Status: CANCELLED | OUTPATIENT
Start: 2023-09-26

## 2023-09-19 RX ORDER — SODIUM CHLORIDE 9 MG/ML
INJECTION, SOLUTION INTRAVENOUS CONTINUOUS
Status: DISCONTINUED | OUTPATIENT
Start: 2023-09-19 | End: 2023-09-19 | Stop reason: HOSPADM

## 2023-09-19 RX ORDER — SODIUM CHLORIDE 0.9 % (FLUSH) 0.9 %
10 SYRINGE (ML) INJECTION
Status: DISCONTINUED | OUTPATIENT
Start: 2023-09-19 | End: 2023-09-19 | Stop reason: HOSPADM

## 2023-09-19 RX ORDER — HEPARIN 100 UNIT/ML
5 SYRINGE INTRAVENOUS
Status: CANCELLED | OUTPATIENT
Start: 2023-09-26

## 2023-09-19 RX ORDER — DIPHENHYDRAMINE HYDROCHLORIDE 50 MG/ML
50 INJECTION INTRAMUSCULAR; INTRAVENOUS ONCE AS NEEDED
Status: CANCELLED | OUTPATIENT
Start: 2023-09-26

## 2023-09-19 RX ORDER — SODIUM CHLORIDE 9 MG/ML
INJECTION, SOLUTION INTRAVENOUS CONTINUOUS
Status: CANCELLED | OUTPATIENT
Start: 2023-09-26

## 2023-09-19 RX ORDER — HEPARIN 100 UNIT/ML
5 SYRINGE INTRAVENOUS
Status: DISCONTINUED | OUTPATIENT
Start: 2023-09-19 | End: 2023-09-19 | Stop reason: HOSPADM

## 2023-09-19 RX ORDER — SODIUM CHLORIDE 0.9 % (FLUSH) 0.9 %
10 SYRINGE (ML) INJECTION
Status: CANCELLED | OUTPATIENT
Start: 2023-09-26

## 2023-09-19 RX ADMIN — SODIUM CHLORIDE: 9 INJECTION, SOLUTION INTRAVENOUS at 03:09

## 2023-09-19 RX ADMIN — FERRIC CARBOXYMALTOSE INJECTION 750 MG: 50 INJECTION, SOLUTION INTRAVENOUS at 03:09

## 2023-09-19 NOTE — NURSING
Pt tolerated tx1 without complication or complaint. VSS. Pt observed for 30 min post infusion. Pt aware of next appointment. Pt d/c from unit ambulatory and in stable condition with wife.

## 2023-09-20 ENCOUNTER — PATIENT MESSAGE (OUTPATIENT)
Dept: HEMATOLOGY/ONCOLOGY | Facility: CLINIC | Age: 73
End: 2023-09-20
Payer: MEDICARE

## 2023-09-20 ENCOUNTER — HOSPITAL ENCOUNTER (OUTPATIENT)
Dept: RADIOLOGY | Facility: HOSPITAL | Age: 73
Discharge: HOME | End: 2023-09-20
Attending: INTERNAL MEDICINE
Payer: MEDICARE

## 2023-09-20 DIAGNOSIS — C79.51 METASTASIS TO BONE: ICD-10-CM

## 2023-09-20 DIAGNOSIS — C78.00 SECONDARY MALIGNANT NEOPLASM OF UNSPECIFIED LUNG: ICD-10-CM

## 2023-09-20 DIAGNOSIS — C79.51 SECONDARY MALIGNANT NEOPLASM OF BONE: ICD-10-CM

## 2023-09-20 DIAGNOSIS — C78.00 MALIGNANT NEOPLASM METASTATIC TO LUNG, UNSPECIFIED LATERALITY: ICD-10-CM

## 2023-09-20 PROCEDURE — 78306 BONE IMAGING WHOLE BODY: CPT | Mod: 26,,, | Performed by: STUDENT IN AN ORGANIZED HEALTH CARE EDUCATION/TRAINING PROGRAM

## 2023-09-20 PROCEDURE — 78306 NM BONE SCAN WHOLE BODY: ICD-10-PCS | Mod: 26,,, | Performed by: STUDENT IN AN ORGANIZED HEALTH CARE EDUCATION/TRAINING PROGRAM

## 2023-09-20 PROCEDURE — 78306 BONE IMAGING WHOLE BODY: CPT | Mod: TC

## 2023-09-21 ENCOUNTER — PATIENT MESSAGE (OUTPATIENT)
Dept: HEMATOLOGY/ONCOLOGY | Facility: CLINIC | Age: 73
End: 2023-09-21
Payer: MEDICARE

## 2023-09-26 ENCOUNTER — INFUSION (OUTPATIENT)
Dept: INFUSION THERAPY | Facility: HOSPITAL | Age: 73
End: 2023-09-26
Payer: MEDICARE

## 2023-09-26 VITALS
TEMPERATURE: 99 F | BODY MASS INDEX: 33.52 KG/M2 | HEIGHT: 70 IN | HEART RATE: 100 BPM | SYSTOLIC BLOOD PRESSURE: 150 MMHG | DIASTOLIC BLOOD PRESSURE: 70 MMHG | RESPIRATION RATE: 18 BRPM | WEIGHT: 234.13 LBS

## 2023-09-26 DIAGNOSIS — D50.8 OTHER IRON DEFICIENCY ANEMIA: Primary | ICD-10-CM

## 2023-09-26 PROCEDURE — 96374 THER/PROPH/DIAG INJ IV PUSH: CPT

## 2023-09-26 PROCEDURE — 25000003 PHARM REV CODE 250: Performed by: INTERNAL MEDICINE

## 2023-09-26 PROCEDURE — 63600175 PHARM REV CODE 636 W HCPCS: Mod: JZ,JG | Performed by: INTERNAL MEDICINE

## 2023-09-26 RX ORDER — EPINEPHRINE 0.3 MG/.3ML
0.3 INJECTION SUBCUTANEOUS ONCE AS NEEDED
Status: DISCONTINUED | OUTPATIENT
Start: 2023-09-26 | End: 2023-09-26 | Stop reason: HOSPADM

## 2023-09-26 RX ORDER — SODIUM CHLORIDE 9 MG/ML
INJECTION, SOLUTION INTRAVENOUS CONTINUOUS
Status: DISCONTINUED | OUTPATIENT
Start: 2023-09-26 | End: 2023-09-26 | Stop reason: HOSPADM

## 2023-09-26 RX ORDER — DIPHENHYDRAMINE HYDROCHLORIDE 50 MG/ML
50 INJECTION INTRAMUSCULAR; INTRAVENOUS ONCE AS NEEDED
Status: DISCONTINUED | OUTPATIENT
Start: 2023-09-26 | End: 2023-09-26 | Stop reason: HOSPADM

## 2023-09-26 RX ORDER — SODIUM CHLORIDE 9 MG/ML
INJECTION, SOLUTION INTRAVENOUS CONTINUOUS
Status: CANCELLED | OUTPATIENT
Start: 2023-09-26

## 2023-09-26 RX ORDER — EPINEPHRINE 0.3 MG/.3ML
0.3 INJECTION SUBCUTANEOUS ONCE AS NEEDED
OUTPATIENT
Start: 2023-09-26

## 2023-09-26 RX ORDER — SODIUM CHLORIDE 0.9 % (FLUSH) 0.9 %
10 SYRINGE (ML) INJECTION
Status: DISCONTINUED | OUTPATIENT
Start: 2023-09-26 | End: 2023-09-26 | Stop reason: HOSPADM

## 2023-09-26 RX ORDER — SODIUM CHLORIDE 0.9 % (FLUSH) 0.9 %
10 SYRINGE (ML) INJECTION
Status: CANCELLED | OUTPATIENT
Start: 2023-09-26

## 2023-09-26 RX ORDER — HEPARIN 100 UNIT/ML
5 SYRINGE INTRAVENOUS
Status: DISCONTINUED | OUTPATIENT
Start: 2023-09-26 | End: 2023-09-26 | Stop reason: HOSPADM

## 2023-09-26 RX ORDER — HEPARIN 100 UNIT/ML
5 SYRINGE INTRAVENOUS
Status: CANCELLED | OUTPATIENT
Start: 2023-09-26

## 2023-09-26 RX ORDER — DIPHENHYDRAMINE HYDROCHLORIDE 50 MG/ML
50 INJECTION INTRAMUSCULAR; INTRAVENOUS ONCE AS NEEDED
OUTPATIENT
Start: 2023-09-26

## 2023-09-26 RX ADMIN — FERRIC CARBOXYMALTOSE INJECTION 750 MG: 50 INJECTION, SOLUTION INTRAVENOUS at 01:09

## 2023-09-26 RX ADMIN — SODIUM CHLORIDE: 0.9 INJECTION, SOLUTION INTRAVENOUS at 01:09

## 2023-09-28 ENCOUNTER — OFFICE VISIT (OUTPATIENT)
Dept: PRIMARY CARE CLINIC | Facility: CLINIC | Age: 73
End: 2023-09-28
Payer: MEDICARE

## 2023-09-28 VITALS
HEART RATE: 75 BPM | SYSTOLIC BLOOD PRESSURE: 142 MMHG | DIASTOLIC BLOOD PRESSURE: 60 MMHG | OXYGEN SATURATION: 97 % | BODY MASS INDEX: 33.12 KG/M2 | WEIGHT: 231.38 LBS | RESPIRATION RATE: 18 BRPM | HEIGHT: 70 IN

## 2023-09-28 DIAGNOSIS — R03.0 ELEVATED BLOOD PRESSURE READING: Primary | ICD-10-CM

## 2023-09-28 DIAGNOSIS — N28.89 RENAL MASS: ICD-10-CM

## 2023-09-28 DIAGNOSIS — R94.8 ABNORMAL BONE SCAN OF THORACIC SPINE: ICD-10-CM

## 2023-09-28 PROCEDURE — 3008F BODY MASS INDEX DOCD: CPT | Mod: CPTII,S$GLB,, | Performed by: INTERNAL MEDICINE

## 2023-09-28 PROCEDURE — 99999 PR PBB SHADOW E&M-EST. PATIENT-LVL III: CPT | Mod: PBBFAC,,, | Performed by: INTERNAL MEDICINE

## 2023-09-28 PROCEDURE — 3077F SYST BP >= 140 MM HG: CPT | Mod: CPTII,S$GLB,, | Performed by: INTERNAL MEDICINE

## 2023-09-28 PROCEDURE — 99214 PR OFFICE/OUTPT VISIT, EST, LEVL IV, 30-39 MIN: ICD-10-PCS | Mod: S$GLB,,, | Performed by: INTERNAL MEDICINE

## 2023-09-28 PROCEDURE — 3288F PR FALLS RISK ASSESSMENT DOCUMENTED: ICD-10-PCS | Mod: CPTII,S$GLB,, | Performed by: INTERNAL MEDICINE

## 2023-09-28 PROCEDURE — 1126F PR PAIN SEVERITY QUANTIFIED, NO PAIN PRESENT: ICD-10-PCS | Mod: CPTII,S$GLB,, | Performed by: INTERNAL MEDICINE

## 2023-09-28 PROCEDURE — 99214 OFFICE O/P EST MOD 30 MIN: CPT | Mod: S$GLB,,, | Performed by: INTERNAL MEDICINE

## 2023-09-28 PROCEDURE — 3008F PR BODY MASS INDEX (BMI) DOCUMENTED: ICD-10-PCS | Mod: CPTII,S$GLB,, | Performed by: INTERNAL MEDICINE

## 2023-09-28 PROCEDURE — 1101F PR PT FALLS ASSESS DOC 0-1 FALLS W/OUT INJ PAST YR: ICD-10-PCS | Mod: CPTII,S$GLB,, | Performed by: INTERNAL MEDICINE

## 2023-09-28 PROCEDURE — 1160F PR REVIEW ALL MEDS BY PRESCRIBER/CLIN PHARMACIST DOCUMENTED: ICD-10-PCS | Mod: CPTII,S$GLB,, | Performed by: INTERNAL MEDICINE

## 2023-09-28 PROCEDURE — 3078F DIAST BP <80 MM HG: CPT | Mod: CPTII,S$GLB,, | Performed by: INTERNAL MEDICINE

## 2023-09-28 PROCEDURE — 1159F PR MEDICATION LIST DOCUMENTED IN MEDICAL RECORD: ICD-10-PCS | Mod: CPTII,S$GLB,, | Performed by: INTERNAL MEDICINE

## 2023-09-28 PROCEDURE — 1101F PT FALLS ASSESS-DOCD LE1/YR: CPT | Mod: CPTII,S$GLB,, | Performed by: INTERNAL MEDICINE

## 2023-09-28 PROCEDURE — 99999 PR PBB SHADOW E&M-EST. PATIENT-LVL III: ICD-10-PCS | Mod: PBBFAC,,, | Performed by: INTERNAL MEDICINE

## 2023-09-28 PROCEDURE — 1126F AMNT PAIN NOTED NONE PRSNT: CPT | Mod: CPTII,S$GLB,, | Performed by: INTERNAL MEDICINE

## 2023-09-28 PROCEDURE — 3078F PR MOST RECENT DIASTOLIC BLOOD PRESSURE < 80 MM HG: ICD-10-PCS | Mod: CPTII,S$GLB,, | Performed by: INTERNAL MEDICINE

## 2023-09-28 PROCEDURE — 3077F PR MOST RECENT SYSTOLIC BLOOD PRESSURE >= 140 MM HG: ICD-10-PCS | Mod: CPTII,S$GLB,, | Performed by: INTERNAL MEDICINE

## 2023-09-28 PROCEDURE — 1160F RVW MEDS BY RX/DR IN RCRD: CPT | Mod: CPTII,S$GLB,, | Performed by: INTERNAL MEDICINE

## 2023-09-28 PROCEDURE — 3288F FALL RISK ASSESSMENT DOCD: CPT | Mod: CPTII,S$GLB,, | Performed by: INTERNAL MEDICINE

## 2023-09-28 PROCEDURE — 1159F MED LIST DOCD IN RCRD: CPT | Mod: CPTII,S$GLB,, | Performed by: INTERNAL MEDICINE

## 2023-09-28 RX ORDER — AMLODIPINE AND BENAZEPRIL HYDROCHLORIDE 5; 10 MG/1; MG/1
1 CAPSULE ORAL DAILY
Qty: 90 CAPSULE | Refills: 3 | Status: SHIPPED | OUTPATIENT
Start: 2023-09-28 | End: 2024-09-27

## 2023-10-01 NOTE — PROGRESS NOTES
Subjective:       Patient ID: Gamaliel Pete Jr. is a 73 y.o. male.    Chief Complaint: Follow-up    HPI  patient visit today for follow-up recently had an episode of gross hematuria found to have a right renal mass that extended into the right renal pelvis otherwise no evidence of metastasis except on the bone scan he has a active lesion at T9 otherwise unremarkable patient scheduled for thoracic spine biopsy before definitive treatment of the renal mass.  Clinically patient is doing well he deny any physical symptoms he is ready for the removal of the cancer from his right kidney he denies any history of bleeding disorder he is not on any anticoagulant does not smoke or drink alcohol he denies short of breath chest pain back pain or dyspnea with exertion he deny any adverse reaction to anesthesia he deny any pulmonary or cardiac history  Review of Systems    Objective:      Physical Exam  Vitals and nursing note reviewed.   Constitutional:       General: He is not in acute distress.     Appearance: He is well-developed.   HENT:      Head: Normocephalic and atraumatic.      Right Ear: External ear normal.      Left Ear: External ear normal.      Nose: Nose normal.      Mouth/Throat:      Pharynx: No oropharyngeal exudate.   Eyes:      General:         Right eye: No discharge.         Left eye: No discharge.      Conjunctiva/sclera: Conjunctivae normal.      Pupils: Pupils are equal, round, and reactive to light.   Neck:      Thyroid: No thyromegaly.   Cardiovascular:      Rate and Rhythm: Normal rate and regular rhythm.      Heart sounds: Normal heart sounds. No murmur heard.     No friction rub. No gallop.   Pulmonary:      Effort: Pulmonary effort is normal. No respiratory distress.      Breath sounds: Normal breath sounds. No wheezing.   Chest:      Chest wall: Tenderness present.   Abdominal:      General: Bowel sounds are normal. There is no distension.      Palpations: Abdomen is soft.      Tenderness:  There is no abdominal tenderness.   Musculoskeletal:         General: No tenderness or deformity. Normal range of motion.      Cervical back: Normal range of motion and neck supple.   Lymphadenopathy:      Cervical: No cervical adenopathy.   Skin:     General: Skin is warm and dry.      Findings: No erythema or rash.   Neurological:      Mental Status: He is alert and oriented to person, place, and time.   Psychiatric:         Mood and Affect: Mood normal.         Thought Content: Thought content normal.         Judgment: Judgment normal.         Assessment:       1. Elevated blood pressure reading    2. Renal mass    3. Abnormal bone scan of thoracic spine        Plan:       Elevated blood pressure reading  Comments:  Body from anxiety and stress continue with low-salt diet and monitor  Orders:  -     amlodipine-benazepril 5-10 mg (LOTREL) 5-10 mg per capsule; Take 1 capsule by mouth once daily.  Dispense: 90 capsule; Refill: 3    Renal mass  Comments:  Patient already to have it removed soon    Abnormal bone scan of thoracic spine  Comments:  Patient will have T9 biopsy next week        Medication List with Changes/Refills   New Medications    AMLODIPINE-BENAZEPRIL 5-10 MG (LOTREL) 5-10 MG PER CAPSULE    Take 1 capsule by mouth once daily.   Current Medications    INDOMETHACIN (INDOCIN) 50 MG CAPSULE    One p.o. t.i.d. p.r.n. gouty attack no more than 7 days--do not take with other NSAIDs    SILDENAFIL (VIAGRA) 100 MG TABLET    Take 1 tablet (100 mg total) by mouth daily as needed for Erectile Dysfunction.   Discontinued Medications    ALLOPURINOL (ZYLOPRIM) 100 MG TABLET    Take 1 tablet (100 mg total) by mouth once daily.    CIPROFLOXACIN HCL (CIPRO) 500 MG TABLET    Take 1 tablet (500 mg total) by mouth 2 (two) times daily.    INDOMETHACIN (INDOCIN) 25 MG CAPSULE    TAKE 1 CAPSULE BY MOUTH TWICE DAILY WITH MEALS

## 2023-10-02 ENCOUNTER — OFFICE VISIT (OUTPATIENT)
Dept: INTERVENTIONAL RADIOLOGY/VASCULAR | Facility: CLINIC | Age: 73
End: 2023-10-02
Payer: MEDICARE

## 2023-10-02 VITALS
HEART RATE: 70 BPM | SYSTOLIC BLOOD PRESSURE: 168 MMHG | HEIGHT: 70 IN | DIASTOLIC BLOOD PRESSURE: 73 MMHG | BODY MASS INDEX: 33.12 KG/M2 | WEIGHT: 231.38 LBS

## 2023-10-02 DIAGNOSIS — C79.51 METASTASIS TO BONE: Primary | ICD-10-CM

## 2023-10-02 DIAGNOSIS — D49.9 NEOPLASM: ICD-10-CM

## 2023-10-02 PROCEDURE — 1159F PR MEDICATION LIST DOCUMENTED IN MEDICAL RECORD: ICD-10-PCS | Mod: CPTII,S$GLB,,

## 2023-10-02 PROCEDURE — 99999 PR PBB SHADOW E&M-EST. PATIENT-LVL III: CPT | Mod: PBBFAC,,,

## 2023-10-02 PROCEDURE — 3008F BODY MASS INDEX DOCD: CPT | Mod: CPTII,S$GLB,,

## 2023-10-02 PROCEDURE — 1160F PR REVIEW ALL MEDS BY PRESCRIBER/CLIN PHARMACIST DOCUMENTED: ICD-10-PCS | Mod: CPTII,S$GLB,,

## 2023-10-02 PROCEDURE — 1159F MED LIST DOCD IN RCRD: CPT | Mod: CPTII,S$GLB,,

## 2023-10-02 PROCEDURE — 3078F DIAST BP <80 MM HG: CPT | Mod: CPTII,S$GLB,,

## 2023-10-02 PROCEDURE — 99204 PR OFFICE/OUTPT VISIT, NEW, LEVL IV, 45-59 MIN: ICD-10-PCS | Mod: S$GLB,,,

## 2023-10-02 PROCEDURE — 4010F ACE/ARB THERAPY RXD/TAKEN: CPT | Mod: CPTII,S$GLB,,

## 2023-10-02 PROCEDURE — 1160F RVW MEDS BY RX/DR IN RCRD: CPT | Mod: CPTII,S$GLB,,

## 2023-10-02 PROCEDURE — 3078F PR MOST RECENT DIASTOLIC BLOOD PRESSURE < 80 MM HG: ICD-10-PCS | Mod: CPTII,S$GLB,,

## 2023-10-02 PROCEDURE — 3077F SYST BP >= 140 MM HG: CPT | Mod: CPTII,S$GLB,,

## 2023-10-02 PROCEDURE — 3008F PR BODY MASS INDEX (BMI) DOCUMENTED: ICD-10-PCS | Mod: CPTII,S$GLB,,

## 2023-10-02 PROCEDURE — 3077F PR MOST RECENT SYSTOLIC BLOOD PRESSURE >= 140 MM HG: ICD-10-PCS | Mod: CPTII,S$GLB,,

## 2023-10-02 PROCEDURE — 99204 OFFICE O/P NEW MOD 45 MIN: CPT | Mod: S$GLB,,,

## 2023-10-02 PROCEDURE — 99999 PR PBB SHADOW E&M-EST. PATIENT-LVL III: ICD-10-PCS | Mod: PBBFAC,,,

## 2023-10-02 PROCEDURE — 4010F PR ACE/ARB THEARPY RXD/TAKEN: ICD-10-PCS | Mod: CPTII,S$GLB,,

## 2023-10-02 NOTE — PROGRESS NOTES
Subjective     Patient ID: Gamaliel Pete Jr. is a 73 y.o. male.    Chief Complaint: Back Pain    Referral from Dr. Turk to discuss and schedule T9 biopsy.   74 yo male presents with wife to discuss and schedule biopsy. He presented a couple months ago with painless hematuria. During imaging work up a right kidney mass was discovered concerning for RCC. There was also a T9 lytic lesion and pulmonary nodules. Patient reports felling well. He admits to thoracic back pain that comes and goes. He reports that he has changed his diet which has resulted in some weight loss but no unexpected weight loss or change in appetite.     PMH and medications reviewed.   Not taking any blood thinners.  No history of CPAP or O2 use at home.   Patient takes multiple vitamins and herbal supplements. Picture of supplements reviewed on phone. Does not need to hold for biopsy.      Review of Systems   Constitutional:  Negative for activity change, appetite change, fatigue and unexpected weight change.   Respiratory:  Negative for cough and shortness of breath.    Cardiovascular:  Negative for chest pain.   Gastrointestinal:  Negative for abdominal pain.   Genitourinary:  Positive for hematuria. Negative for bladder incontinence.   Musculoskeletal:  Positive for back pain. Negative for gait problem.   Neurological:  Negative for facial asymmetry and speech difficulty.   Psychiatric/Behavioral:  Negative for behavioral problems and confusion.         Objective     Physical Exam  Vitals reviewed.   Constitutional:       Appearance: Normal appearance.   HENT:      Head: Normocephalic and atraumatic.      Nose: Nose normal.   Eyes:      Conjunctiva/sclera: Conjunctivae normal.   Cardiovascular:      Rate and Rhythm: Normal rate.   Pulmonary:      Effort: Pulmonary effort is normal.   Abdominal:      General: Abdomen is flat.   Skin:     General: Skin is warm and dry.      Coloration: Skin is not jaundiced.   Neurological:      General:  No focal deficit present.      Mental Status: He is alert and oriented to person, place, and time.      Gait: Gait normal.   Psychiatric:         Mood and Affect: Mood normal.         Behavior: Behavior normal.                Assessment and Plan     1. Metastasis to bone  -     IR Biopsy Bone deep; Future; Expected date: 10/02/2023  -     CBC Auto Differential; Future; Expected date: 10/02/2023  -     Protime-INR; Future; Expected date: 10/02/2023    2. Neoplasm  -     Protime-INR; Future; Expected date: 10/02/2023    - Will schedule patient for T9 biopsy.   - Discussed procedure with patient.   - Discussed how the procedure will be performed, risks (including, but not limited to, pain, bleeding, infection, damage to nearby structures, and the need for additional procedures), benefits, possible complications, pre-post procedure expectations, and alternatives. The patient voices understanding and all questions have been answered.  The patient agrees to proceed as planned. Patient scheduled for 10/13/2023. Pre-procedure handout with clinic phone number provided.     Radha Bills PA-C  Interventional Radiology  [phone]

## 2023-10-02 NOTE — LETTER
October 2, 2023        Justin Pete Jr.  [address] [place] [id number]       [facility] Intervradiology 6th Fl  [address]  [place] [address]  Phone: [phone] PRE-PROCEDURE INSTRUCTIONS    Your procedure with Interventional Radiology is scheduled for 10/13/2023. Please arrive by 6:30am.    **Do not eat or drink anything between midnight and the time of your procedure. This includes gum, mints, and candy lemon drops.    **Do not smoke or drink alcoholic beverages 24 hours prior to your procedure.    **If you wear contact lenses, dentures, hearing aids, or glasses, bring a container to put them in during the procedure and give them to a family member for safekeeping.    **If you have been diagnosed with sleep apnea please bring your CPAP machine.    **If your doctor has scheduled you for an overnight stay, bring a small overnight bag with any personal items that you may need.    **Make arrangements in advance for transportation home by a responsible adult. It is not safe to drive a vehicle during the 24 hours following the procedure.    **All [facility] facilities and properties are tobacco free. Smoking is NOT allowed.    PLEASE NOTE: The procedure schedule has many variables which affect the time of your procedure. Family members should be available if your surgery time changes.    If you have any questions about these instructions call Interventional Radiology at [phone] Monday - Friday between 8:00am and 4:00pm or [phone] (ask for interventional radiology resident) for after hours.

## 2023-10-03 ENCOUNTER — TELEPHONE (OUTPATIENT)
Dept: HEMATOLOGY/ONCOLOGY | Facility: CLINIC | Age: 73
End: 2023-10-03
Payer: MEDICARE

## 2023-10-03 ENCOUNTER — PATIENT MESSAGE (OUTPATIENT)
Dept: HEMATOLOGY/ONCOLOGY | Facility: CLINIC | Age: 73
End: 2023-10-03
Payer: MEDICARE

## 2023-10-03 ENCOUNTER — ANESTHESIA EVENT (OUTPATIENT)
Dept: SURGERY | Facility: OTHER | Age: 73
DRG: 657 | End: 2023-10-03
Payer: MEDICARE

## 2023-10-03 ENCOUNTER — OFFICE VISIT (OUTPATIENT)
Dept: UROLOGY | Facility: CLINIC | Age: 73
End: 2023-10-03
Payer: MEDICARE

## 2023-10-03 VITALS
WEIGHT: 234.25 LBS | SYSTOLIC BLOOD PRESSURE: 172 MMHG | DIASTOLIC BLOOD PRESSURE: 79 MMHG | OXYGEN SATURATION: 97 % | HEIGHT: 70 IN | BODY MASS INDEX: 33.53 KG/M2 | HEART RATE: 79 BPM

## 2023-10-03 DIAGNOSIS — R31.0 GROSS HEMATURIA: Primary | ICD-10-CM

## 2023-10-03 DIAGNOSIS — R91.8 PULMONARY NODULES: ICD-10-CM

## 2023-10-03 DIAGNOSIS — M89.9 BONE LESION: ICD-10-CM

## 2023-10-03 DIAGNOSIS — N28.89 RENAL MASS: ICD-10-CM

## 2023-10-03 PROCEDURE — 1125F PR PAIN SEVERITY QUANTIFIED, PAIN PRESENT: ICD-10-PCS | Mod: CPTII,S$GLB,, | Performed by: UROLOGY

## 2023-10-03 PROCEDURE — 3078F PR MOST RECENT DIASTOLIC BLOOD PRESSURE < 80 MM HG: ICD-10-PCS | Mod: CPTII,S$GLB,, | Performed by: UROLOGY

## 2023-10-03 PROCEDURE — 99215 PR OFFICE/OUTPT VISIT, EST, LEVL V, 40-54 MIN: ICD-10-PCS | Mod: 57,S$GLB,, | Performed by: UROLOGY

## 2023-10-03 PROCEDURE — 4010F ACE/ARB THERAPY RXD/TAKEN: CPT | Mod: CPTII,S$GLB,, | Performed by: UROLOGY

## 2023-10-03 PROCEDURE — 1159F MED LIST DOCD IN RCRD: CPT | Mod: CPTII,S$GLB,, | Performed by: UROLOGY

## 2023-10-03 PROCEDURE — 4010F PR ACE/ARB THEARPY RXD/TAKEN: ICD-10-PCS | Mod: CPTII,S$GLB,, | Performed by: UROLOGY

## 2023-10-03 PROCEDURE — 1159F PR MEDICATION LIST DOCUMENTED IN MEDICAL RECORD: ICD-10-PCS | Mod: CPTII,S$GLB,, | Performed by: UROLOGY

## 2023-10-03 PROCEDURE — 3077F PR MOST RECENT SYSTOLIC BLOOD PRESSURE >= 140 MM HG: ICD-10-PCS | Mod: CPTII,S$GLB,, | Performed by: UROLOGY

## 2023-10-03 PROCEDURE — 1125F AMNT PAIN NOTED PAIN PRSNT: CPT | Mod: CPTII,S$GLB,, | Performed by: UROLOGY

## 2023-10-03 PROCEDURE — 99215 OFFICE O/P EST HI 40 MIN: CPT | Mod: 57,S$GLB,, | Performed by: UROLOGY

## 2023-10-03 PROCEDURE — 3078F DIAST BP <80 MM HG: CPT | Mod: CPTII,S$GLB,, | Performed by: UROLOGY

## 2023-10-03 PROCEDURE — 3008F PR BODY MASS INDEX (BMI) DOCUMENTED: ICD-10-PCS | Mod: CPTII,S$GLB,, | Performed by: UROLOGY

## 2023-10-03 PROCEDURE — 3077F SYST BP >= 140 MM HG: CPT | Mod: CPTII,S$GLB,, | Performed by: UROLOGY

## 2023-10-03 PROCEDURE — 3008F BODY MASS INDEX DOCD: CPT | Mod: CPTII,S$GLB,, | Performed by: UROLOGY

## 2023-10-03 NOTE — TELEPHONE ENCOUNTER
"----- Message from Anshugarfield Meekson sent at 10/3/2023  9:38 AM CDT -----  Consult/Advisory:      Name Of Caller: Self      Contact Preference?: [phone] (Mobile)      Provider Name: Burke      Does patient feel the need to be seen today? Yes      What is the nature of the call?: Calling to speak w/ nurse about wanting to be seen today. Stating he's currently experiencing a significant amount of blood in his urine (in solid chunks)      Additional Notes: Stating the matter is very urgent    "Thank you for all that you do for our patients"      "

## 2023-10-03 NOTE — H&P (VIEW-ONLY)
Subjective:      Gamaliel Pete Jr. is a 73 y.o. male who returns today regarding his     Urgent appt for severe hematuria.  Large amount of blood all day today.  Clots are long and stringy consistent with upper tract hematuria from a renal mass.    Otherwise doing well and excellent performance status    Cysto is scheduled 10/27 and IR biopsy of T9 mass is scheduled 10/13    He saw rebecca Saravia onc    The following portions of the patient's history were reviewed and updated as appropriate: allergies, current medications, past family history, past medical history, past social history, past surgical history and problem list.    Review of Systems  Pertinent items are noted in HPI.  A comprehensive multipoint review of systems was negative except as otherwise stated in the HPI.    Past Medical History:   Diagnosis Date    Asthma     Borderline hypertension     ED (erectile dysfunction)     Gout      Past Surgical History:   Procedure Laterality Date    COLONOSCOPY  02/10/2022    COLONOSCOPY N/A 2/10/2022    Procedure: COLONOSCOPY;  Surgeon: Desmond Keenan MD;  Location: [facility];  Service: General;  Laterality: N/A;       Review of patient's allergies indicates:  No Known Allergies       Objective:   Vitals: There were no vitals taken for this visit.    Physical Exam   General: alert and oriented, no acute distress  Respiratory: Symmetric expansion, non-labored breathing  Cardiovascular: no peripheral edema  Abdomen: soft, non distended  No scars  Skin: normal coloration and turgor, no rashes, no suspicious skin lesions noted  Neuro: no gross deficits  Psych: normal judgment and insight, normal mood/affect, and non-anxious    Physical Exam    Lab Review   Urinalysis demonstrates nitrite neg  TNTC rbc  +wbc    Lab Results   Component Value Date    WBC 7.40 09/12/2023    HGB 13.4 (L) 09/12/2023    HCT 40.0 09/12/2023    MCV 88 09/12/2023     09/12/2023     Lab Results   Component Value Date    CREATININE  1.1 09/12/2023    BUN 15 09/12/2023       Imaging      NM BONE SCAN WHOLE BODY     CLINICAL HISTORY:  Metastatic disease evaluation;  Secondary malignant neoplasm of unspecified lung     TECHNIQUE:  Approximately 2-3 hours following the IV administration of 23.3 mCi of Tc-99m-MDP, anterior and posterior delayed whole body images were acquired.  Lateral spot images of the skull and thorax were also acquired.     COMPARISON:  CT chest abdomen pelvis 09/07/2023     FINDINGS:  There is physiologic distribution of the radiopharmaceutical throughout the skeleton.     Focal uptake in the T9 vertebral body corresponding to lytic lesion seen on CT 09/05/2023.     Focal uptake in the right kidney in patient with known right renal mass.  There is otherwise normal uptake in the genitourinary system and soft tissues.     Impression:     Focal uptake in the T9 vertebral body corresponding to lytic lesion seen on prior CT and concerning for metastatic disease.  Additional focus of increased uptake in the right kidney in patient with known right renal mass.     Electronically signed by resident: Grupo Yo  Date:                                            09/20/2023  Time:                                           12:46     Electronically signed by: Ramez Doe  Date:                                            09/20/2023  Time:                                           14:56      MRI Brain no mets    CT CHEST WITHOUT CONTRAST     CLINICAL HISTORY:  nodules; CT urogram shows right renal mass with possible mets to lung; Gross hematuria     TECHNIQUE:  Low dose axial images, sagittal and coronal reformations were obtained from the thoracic inlet to the lung bases. Contrast was not administered.     COMPARISON:  Chest x-ray 06/04/2021     CT urogram dated 09/05/2023.     FINDINGS:  The base of the neck is within normal limits.  The thyroid gland is unremarkable.  The supraclavicular regions are within normal limits.     The  trachea is unremarkable.  The central airways are within normal limits.  No endobronchial lesion is identified.  There is no evidence of bronchiectasis.     The heart is unremarkable.  There are no pericardial effusions.  There are coronary artery calcifications.     The thoracic aorta is normal in caliber.  There are subcentimeter mediastinal and hilar lymph nodes.  There are subcentimeter axillary lymph nodes.     There are no pleural effusions.  There is no evidence of a pneumothorax.  There is no evidence of pneumomediastinum.     There are innumerable bilateral pulmonary nodules.  There is no focal consolidation.     The esophagus is unremarkable.  Please see the dedicated CT abdomen pelvis for the upper abdominal findings.     The chest wall is unremarkable.  There is unchanged lytic lesion involving the anterior aspect of T9 vertebral body with associated cortical erosions.     Impression:     Innumerable pulmonary nodules, concerning for metastatic disease to the chest.     Unchanged lytic lesion in the T9 vertebral body with cortical erosions.  Follow-up MRI of the spine, as clinically warranted.        Electronically signed by: Zeyad Phan MD  Date:                                            09/07/2023  Time:                                           16:56      Assessment and Plan:   Gross hematuria  Cysto today confirmed blood coming from the right ureteral orifice  Discussed with Dr Turk, heme onc  She agrees with plan for palliative nephrectomy    Renal mass  While cytoreductive nephrectomy is generally not the preferred upfront approach with widely metastatic disease, we should consider this since he has severe recurrent hematuria.   We discussed this vs angioembolization to control hematuria.    He prefers nephrectomy.    Schedule robotic right nephrectomy Thursday 10/5.  Discussed with Dr Turk.  She agrees with plan    Pulmonary nodules; likely to be mets  Per Dr Turk    Bone lesion  T9 biopsy  with IR 10/13  If nephrectomy specimen gives a pathologic diagnosis, we may be able to cancel bone biopsy  Will discuss further with Dr Turk

## 2023-10-03 NOTE — TELEPHONE ENCOUNTER
Spoke patient let him know he should reach out to urology and we will send them a message as well.

## 2023-10-03 NOTE — PRE ADMISSION SCREENING
Pre admit phone call completed.    Instructions given to patient about NPO status as follows:     The evening before surgery do not eat anything after 9 p.m. ( this includes hard candy, chewing gum and mints).  You may only have GATORADE, POWERADE AND WATER from 9 p.m. until you leave your home. DO NOT  DRINK ANY LIQUIDS ON THE WAY TO THE HOSPITAL.      Patient was also instructed on the below information:    Park in the Parking lot behind the hospital or in the [facility] across the street from the parking lot.  Parking is complimentary.  If you will be discharged the same day as your procedure, please arrange for a responsible adult to drive you home or  to accompany you if traveling by taxi.  YOU WILL NOT BE PERMITTED TO DRIVE OR TO LEAVE THE HOSPITAL ALONE AFTER SURGERY.  It is strongly recommended that you arrange for someone to remain with you for the first 24 hrs following your surgery.    Patient verbalized understanding of above instructions.

## 2023-10-03 NOTE — TELEPHONE ENCOUNTER
----- Message from Cassidy Harris sent at 10/3/2023  8:04 AM CDT -----  Regarding: Consult Advisoy  Contact: Pt  Consult/Advisory    Name Of Caller:Pt      Contact Preference:Phone     Nature of call:Pt is requesting a  callback regarding significant  amount of blood in urine he noticed this morning. Please adv pt      Confirmed contact below:   Contact Name:Gamaliel Pete  Phone Number: [phone]

## 2023-10-03 NOTE — PROGRESS NOTES
Subjective:      Gamaliel Pete Jr. is a 73 y.o. male who returns today regarding his     Urgent appt for severe hematuria.  Large amount of blood all day today.  Clots are long and stringy consistent with upper tract hematuria from a renal mass.    Otherwise doing well and excellent performance status    Cysto is scheduled 10/27 and IR biopsy of T9 mass is scheduled 10/13    He saw rebecca Saravia onc    The following portions of the patient's history were reviewed and updated as appropriate: allergies, current medications, past family history, past medical history, past social history, past surgical history and problem list.    Review of Systems  Pertinent items are noted in HPI.  A comprehensive multipoint review of systems was negative except as otherwise stated in the HPI.    Past Medical History:   Diagnosis Date    Asthma     Borderline hypertension     ED (erectile dysfunction)     Gout      Past Surgical History:   Procedure Laterality Date    COLONOSCOPY  02/10/2022    COLONOSCOPY N/A 2/10/2022    Procedure: COLONOSCOPY;  Surgeon: Desmond Keenan MD;  Location: [facility];  Service: General;  Laterality: N/A;       Review of patient's allergies indicates:  No Known Allergies       Objective:   Vitals: There were no vitals taken for this visit.    Physical Exam   General: alert and oriented, no acute distress  Respiratory: Symmetric expansion, non-labored breathing  Cardiovascular: no peripheral edema  Abdomen: soft, non distended  No scars  Skin: normal coloration and turgor, no rashes, no suspicious skin lesions noted  Neuro: no gross deficits  Psych: normal judgment and insight, normal mood/affect, and non-anxious    Physical Exam    Lab Review   Urinalysis demonstrates nitrite neg  TNTC rbc  +wbc    Lab Results   Component Value Date    WBC 7.40 09/12/2023    HGB 13.4 (L) 09/12/2023    HCT 40.0 09/12/2023    MCV 88 09/12/2023     09/12/2023     Lab Results   Component Value Date    CREATININE  1.1 09/12/2023    BUN 15 09/12/2023       Imaging      NM BONE SCAN WHOLE BODY     CLINICAL HISTORY:  Metastatic disease evaluation;  Secondary malignant neoplasm of unspecified lung     TECHNIQUE:  Approximately 2-3 hours following the IV administration of 23.3 mCi of Tc-99m-MDP, anterior and posterior delayed whole body images were acquired.  Lateral spot images of the skull and thorax were also acquired.     COMPARISON:  CT chest abdomen pelvis 09/07/2023     FINDINGS:  There is physiologic distribution of the radiopharmaceutical throughout the skeleton.     Focal uptake in the T9 vertebral body corresponding to lytic lesion seen on CT 09/05/2023.     Focal uptake in the right kidney in patient with known right renal mass.  There is otherwise normal uptake in the genitourinary system and soft tissues.     Impression:     Focal uptake in the T9 vertebral body corresponding to lytic lesion seen on prior CT and concerning for metastatic disease.  Additional focus of increased uptake in the right kidney in patient with known right renal mass.     Electronically signed by resident: Grupo Yo  Date:                                            09/20/2023  Time:                                           12:46     Electronically signed by: Ramez Doe  Date:                                            09/20/2023  Time:                                           14:56      MRI Brain no mets    CT CHEST WITHOUT CONTRAST     CLINICAL HISTORY:  nodules; CT urogram shows right renal mass with possible mets to lung; Gross hematuria     TECHNIQUE:  Low dose axial images, sagittal and coronal reformations were obtained from the thoracic inlet to the lung bases. Contrast was not administered.     COMPARISON:  Chest x-ray 06/04/2021     CT urogram dated 09/05/2023.     FINDINGS:  The base of the neck is within normal limits.  The thyroid gland is unremarkable.  The supraclavicular regions are within normal limits.     The  trachea is unremarkable.  The central airways are within normal limits.  No endobronchial lesion is identified.  There is no evidence of bronchiectasis.     The heart is unremarkable.  There are no pericardial effusions.  There are coronary artery calcifications.     The thoracic aorta is normal in caliber.  There are subcentimeter mediastinal and hilar lymph nodes.  There are subcentimeter axillary lymph nodes.     There are no pleural effusions.  There is no evidence of a pneumothorax.  There is no evidence of pneumomediastinum.     There are innumerable bilateral pulmonary nodules.  There is no focal consolidation.     The esophagus is unremarkable.  Please see the dedicated CT abdomen pelvis for the upper abdominal findings.     The chest wall is unremarkable.  There is unchanged lytic lesion involving the anterior aspect of T9 vertebral body with associated cortical erosions.     Impression:     Innumerable pulmonary nodules, concerning for metastatic disease to the chest.     Unchanged lytic lesion in the T9 vertebral body with cortical erosions.  Follow-up MRI of the spine, as clinically warranted.        Electronically signed by: Zeyad Phan MD  Date:                                            09/07/2023  Time:                                           16:56      Assessment and Plan:   Gross hematuria  Cysto today confirmed blood coming from the right ureteral orifice  Discussed with Dr Turk, heme onc  She agrees with plan for palliative nephrectomy    Renal mass  While cytoreductive nephrectomy is generally not the preferred upfront approach with widely metastatic disease, we should consider this since he has severe recurrent hematuria.   We discussed this vs angioembolization to control hematuria.    He prefers nephrectomy.    Schedule robotic right nephrectomy Thursday 10/5.  Discussed with Dr Turk.  She agrees with plan    Pulmonary nodules; likely to be mets  Per Dr Turk    Bone lesion  T9 biopsy  with IR 10/13  If nephrectomy specimen gives a pathologic diagnosis, we may be able to cancel bone biopsy  Will discuss further with Dr Turk

## 2023-10-04 ENCOUNTER — TELEPHONE (OUTPATIENT)
Dept: PRIMARY CARE CLINIC | Facility: CLINIC | Age: 73
End: 2023-10-04
Payer: MEDICARE

## 2023-10-04 ENCOUNTER — HOSPITAL ENCOUNTER (INPATIENT)
Facility: OTHER | Age: 73
LOS: 1 days | Discharge: HOME | DRG: 657 | End: 2023-10-05
Attending: UROLOGY | Admitting: UROLOGY
Payer: MEDICARE

## 2023-10-04 ENCOUNTER — ANESTHESIA (OUTPATIENT)
Dept: SURGERY | Facility: OTHER | Age: 73
DRG: 657 | End: 2023-10-04
Payer: MEDICARE

## 2023-10-04 DIAGNOSIS — N28.89 RENAL MASS: ICD-10-CM

## 2023-10-04 DIAGNOSIS — R31.0 GROSS HEMATURIA: ICD-10-CM

## 2023-10-04 LAB
ABO + RH BLD: NORMAL
ANION GAP SERPL CALC-SCNC: 9 MMOL/L (ref 8–16)
BASOPHILS # BLD AUTO: 0.03 K/UL (ref 0–0.2)
BASOPHILS NFR BLD: 0.5 % (ref 0–1.9)
BLD GP AB SCN CELLS X3 SERPL QL: NORMAL
BUN SERPL-MCNC: 16 MG/DL (ref 8–23)
CALCIUM SERPL-MCNC: 10.4 MG/DL (ref 8.7–10.5)
CHLORIDE SERPL-SCNC: 108 MMOL/L (ref 95–110)
CO2 SERPL-SCNC: 22 MMOL/L (ref 23–29)
CREAT SERPL-MCNC: 0.9 MG/DL (ref 0.5–1.4)
DIFFERENTIAL METHOD: ABNORMAL
EOSINOPHIL # BLD AUTO: 0.1 K/UL (ref 0–0.5)
EOSINOPHIL NFR BLD: 1.2 % (ref 0–8)
ERYTHROCYTE [DISTWIDTH] IN BLOOD BY AUTOMATED COUNT: 14.5 % (ref 11.5–14.5)
EST. GFR  (NO RACE VARIABLE): >60 ML/MIN/1.73 M^2
GLUCOSE SERPL-MCNC: 103 MG/DL (ref 70–110)
HCT VFR BLD AUTO: 39.7 % (ref 40–54)
HGB BLD-MCNC: 13.5 G/DL (ref 14–18)
IMM GRANULOCYTES # BLD AUTO: 0.03 K/UL (ref 0–0.04)
IMM GRANULOCYTES NFR BLD AUTO: 0.5 % (ref 0–0.5)
LYMPHOCYTES # BLD AUTO: 1.8 K/UL (ref 1–4.8)
LYMPHOCYTES NFR BLD: 27.7 % (ref 18–48)
MCH RBC QN AUTO: 29.5 PG (ref 27–31)
MCHC RBC AUTO-ENTMCNC: 34 G/DL (ref 32–36)
MCV RBC AUTO: 87 FL (ref 82–98)
MONOCYTES # BLD AUTO: 0.6 K/UL (ref 0.3–1)
MONOCYTES NFR BLD: 8.7 % (ref 4–15)
NEUTROPHILS # BLD AUTO: 3.9 K/UL (ref 1.8–7.7)
NEUTROPHILS NFR BLD: 61.4 % (ref 38–73)
NRBC BLD-RTO: 0 /100 WBC
PLATELET # BLD AUTO: 186 K/UL (ref 150–450)
PMV BLD AUTO: 9.2 FL (ref 9.2–12.9)
POTASSIUM SERPL-SCNC: 4.3 MMOL/L (ref 3.5–5.1)
RBC # BLD AUTO: 4.57 M/UL (ref 4.6–6.2)
SODIUM SERPL-SCNC: 139 MMOL/L (ref 136–145)
SPECIMEN OUTDATE: NORMAL
WBC # BLD AUTO: 6.42 K/UL (ref 3.9–12.7)

## 2023-10-04 PROCEDURE — 37000008 HC ANESTHESIA 1ST 15 MINUTES: Performed by: UROLOGY

## 2023-10-04 PROCEDURE — 99223 1ST HOSP IP/OBS HIGH 75: CPT | Mod: ,,, | Performed by: PHYSICIAN ASSISTANT

## 2023-10-04 PROCEDURE — 25000003 PHARM REV CODE 250: Performed by: NURSE ANESTHETIST, CERTIFIED REGISTERED

## 2023-10-04 PROCEDURE — 85025 COMPLETE CBC W/AUTO DIFF WBC: CPT | Performed by: UROLOGY

## 2023-10-04 PROCEDURE — 88307 TISSUE EXAM BY PATHOLOGIST: CPT | Mod: 26,,, | Performed by: PATHOLOGY

## 2023-10-04 PROCEDURE — 64486 PERIPHERAL BLOCK: ICD-10-PCS | Mod: 59,RT,, | Performed by: ANESTHESIOLOGY

## 2023-10-04 PROCEDURE — 63600175 PHARM REV CODE 636 W HCPCS: Performed by: NURSE ANESTHETIST, CERTIFIED REGISTERED

## 2023-10-04 PROCEDURE — D9220A PRA ANESTHESIA: Mod: ANES,,, | Performed by: ANESTHESIOLOGY

## 2023-10-04 PROCEDURE — 64486 TAP BLOCK UNIL BY INJECTION: CPT | Mod: 59,RT,, | Performed by: ANESTHESIOLOGY

## 2023-10-04 PROCEDURE — 76942 PR U/S GUIDANCE FOR NEEDLE GUIDANCE: ICD-10-PCS | Mod: 26,,, | Performed by: ANESTHESIOLOGY

## 2023-10-04 PROCEDURE — 80048 BASIC METABOLIC PNL TOTAL CA: CPT | Performed by: UROLOGY

## 2023-10-04 PROCEDURE — 50545 PR LAP, RADICAL NEPHRECTOMY: ICD-10-PCS | Mod: RT,,, | Performed by: UROLOGY

## 2023-10-04 PROCEDURE — 71000033 HC RECOVERY, INTIAL HOUR: Performed by: UROLOGY

## 2023-10-04 PROCEDURE — 36000712 HC OR TIME LEV V 1ST 15 MIN: Performed by: UROLOGY

## 2023-10-04 PROCEDURE — 25000003 PHARM REV CODE 250: Performed by: UROLOGY

## 2023-10-04 PROCEDURE — 86901 BLOOD TYPING SEROLOGIC RH(D): CPT | Performed by: UROLOGY

## 2023-10-04 PROCEDURE — D9220A PRA ANESTHESIA: Mod: CRNA,,, | Performed by: NURSE ANESTHETIST, CERTIFIED REGISTERED

## 2023-10-04 PROCEDURE — 63600175 PHARM REV CODE 636 W HCPCS: Performed by: ANESTHESIOLOGY

## 2023-10-04 PROCEDURE — 87086 URINE CULTURE/COLONY COUNT: CPT | Performed by: UROLOGY

## 2023-10-04 PROCEDURE — D9220A PRA ANESTHESIA: ICD-10-PCS | Mod: CRNA,,, | Performed by: NURSE ANESTHETIST, CERTIFIED REGISTERED

## 2023-10-04 PROCEDURE — 63600175 PHARM REV CODE 636 W HCPCS: Performed by: STUDENT IN AN ORGANIZED HEALTH CARE EDUCATION/TRAINING PROGRAM

## 2023-10-04 PROCEDURE — 88307 PR  SURG PATH,LEVEL V: ICD-10-PCS | Mod: 26,,, | Performed by: PATHOLOGY

## 2023-10-04 PROCEDURE — 64486 TAP BLOCK UNIL BY INJECTION: CPT | Performed by: ANESTHESIOLOGY

## 2023-10-04 PROCEDURE — 71000039 HC RECOVERY, EACH ADD'L HOUR: Performed by: UROLOGY

## 2023-10-04 PROCEDURE — 63600175 PHARM REV CODE 636 W HCPCS: Performed by: UROLOGY

## 2023-10-04 PROCEDURE — 27201423 OPTIME MED/SURG SUP & DEVICES STERILE SUPPLY: Performed by: UROLOGY

## 2023-10-04 PROCEDURE — 88342 IMHCHEM/IMCYTCHM 1ST ANTB: CPT | Mod: 26,,, | Performed by: PATHOLOGY

## 2023-10-04 PROCEDURE — C9290 INJ, BUPIVACAINE LIPOSOME: HCPCS | Performed by: ANESTHESIOLOGY

## 2023-10-04 PROCEDURE — 99223 PR INITIAL HOSPITAL CARE,LEVL III: ICD-10-PCS | Mod: ,,, | Performed by: PHYSICIAN ASSISTANT

## 2023-10-04 PROCEDURE — 88342 CHG IMMUNOCYTOCHEMISTRY: ICD-10-PCS | Mod: 26,,, | Performed by: PATHOLOGY

## 2023-10-04 PROCEDURE — 11000001 HC ACUTE MED/SURG PRIVATE ROOM

## 2023-10-04 PROCEDURE — 88307 TISSUE EXAM BY PATHOLOGIST: CPT | Performed by: PATHOLOGY

## 2023-10-04 PROCEDURE — 36000713 HC OR TIME LEV V EA ADD 15 MIN: Performed by: UROLOGY

## 2023-10-04 PROCEDURE — 25000003 PHARM REV CODE 250: Performed by: ANESTHESIOLOGY

## 2023-10-04 PROCEDURE — 76942 ECHO GUIDE FOR BIOPSY: CPT | Mod: 26,,, | Performed by: ANESTHESIOLOGY

## 2023-10-04 PROCEDURE — D9220A PRA ANESTHESIA: ICD-10-PCS | Mod: ANES,,, | Performed by: ANESTHESIOLOGY

## 2023-10-04 PROCEDURE — 37000009 HC ANESTHESIA EA ADD 15 MINS: Performed by: UROLOGY

## 2023-10-04 PROCEDURE — 25000003 PHARM REV CODE 250: Performed by: STUDENT IN AN ORGANIZED HEALTH CARE EDUCATION/TRAINING PROGRAM

## 2023-10-04 PROCEDURE — 88342 IMHCHEM/IMCYTCHM 1ST ANTB: CPT | Performed by: PATHOLOGY

## 2023-10-04 PROCEDURE — 50545 LAPARO RADICAL NEPHRECTOMY: CPT | Mod: RT,,, | Performed by: UROLOGY

## 2023-10-04 PROCEDURE — 63600175 PHARM REV CODE 636 W HCPCS

## 2023-10-04 PROCEDURE — 86920 COMPATIBILITY TEST SPIN: CPT | Performed by: UROLOGY

## 2023-10-04 PROCEDURE — P9045 ALBUMIN (HUMAN), 5%, 250 ML: HCPCS | Mod: JZ,JG | Performed by: NURSE ANESTHETIST, CERTIFIED REGISTERED

## 2023-10-04 RX ORDER — ALBUMIN HUMAN 50 G/1000ML
SOLUTION INTRAVENOUS
Status: DISCONTINUED | OUTPATIENT
Start: 2023-10-04 | End: 2023-10-04

## 2023-10-04 RX ORDER — MIDAZOLAM HYDROCHLORIDE 1 MG/ML
INJECTION, SOLUTION INTRAMUSCULAR; INTRAVENOUS
Status: DISCONTINUED | OUTPATIENT
Start: 2023-10-04 | End: 2023-10-04

## 2023-10-04 RX ORDER — SUCCINYLCHOLINE CHLORIDE 20 MG/ML
INJECTION INTRAMUSCULAR; INTRAVENOUS
Status: DISCONTINUED | OUTPATIENT
Start: 2023-10-04 | End: 2023-10-04

## 2023-10-04 RX ORDER — ACETAMINOPHEN 500 MG
1000 TABLET ORAL ONCE
Status: COMPLETED | OUTPATIENT
Start: 2023-10-04 | End: 2023-10-04

## 2023-10-04 RX ORDER — AMLODIPINE BESYLATE 5 MG/1
5 TABLET ORAL DAILY
Status: DISCONTINUED | OUTPATIENT
Start: 2023-10-05 | End: 2023-10-05 | Stop reason: HOSPADM

## 2023-10-04 RX ORDER — VECURONIUM BROMIDE FOR INJECTION 1 MG/ML
INJECTION, POWDER, LYOPHILIZED, FOR SOLUTION INTRAVENOUS
Status: DISCONTINUED | OUTPATIENT
Start: 2023-10-04 | End: 2023-10-04

## 2023-10-04 RX ORDER — EPHEDRINE SULFATE 50 MG/ML
INJECTION, SOLUTION INTRAVENOUS
Status: DISCONTINUED | OUTPATIENT
Start: 2023-10-04 | End: 2023-10-04

## 2023-10-04 RX ORDER — PREGABALIN 75 MG/1
150 CAPSULE ORAL NIGHTLY
Status: DISCONTINUED | OUTPATIENT
Start: 2023-10-04 | End: 2023-10-05 | Stop reason: HOSPADM

## 2023-10-04 RX ORDER — ACETAMINOPHEN 500 MG
1000 TABLET ORAL EVERY 6 HOURS
Status: DISCONTINUED | OUTPATIENT
Start: 2023-10-04 | End: 2023-10-05 | Stop reason: HOSPADM

## 2023-10-04 RX ORDER — AMOXICILLIN 500 MG
2 CAPSULE ORAL DAILY
COMMUNITY

## 2023-10-04 RX ORDER — KETOROLAC TROMETHAMINE 30 MG/ML
15 INJECTION, SOLUTION INTRAMUSCULAR; INTRAVENOUS EVERY 6 HOURS
Status: DISCONTINUED | OUTPATIENT
Start: 2023-10-04 | End: 2023-10-05 | Stop reason: HOSPADM

## 2023-10-04 RX ORDER — SODIUM CHLORIDE 0.9 % (FLUSH) 0.9 %
3 SYRINGE (ML) INJECTION
Status: DISCONTINUED | OUTPATIENT
Start: 2023-10-04 | End: 2023-10-04 | Stop reason: HOSPADM

## 2023-10-04 RX ORDER — OXYCODONE HYDROCHLORIDE 5 MG/1
10 TABLET ORAL EVERY 4 HOURS PRN
Status: DISCONTINUED | OUTPATIENT
Start: 2023-10-04 | End: 2023-10-05 | Stop reason: HOSPADM

## 2023-10-04 RX ORDER — MUPIROCIN 20 MG/G
OINTMENT TOPICAL 2 TIMES DAILY
Status: DISCONTINUED | OUTPATIENT
Start: 2023-10-04 | End: 2023-10-05 | Stop reason: HOSPADM

## 2023-10-04 RX ORDER — PROPOFOL 10 MG/ML
VIAL (ML) INTRAVENOUS
Status: DISCONTINUED | OUTPATIENT
Start: 2023-10-04 | End: 2023-10-04

## 2023-10-04 RX ORDER — TAMSULOSIN HYDROCHLORIDE 0.4 MG/1
0.4 CAPSULE ORAL NIGHTLY
Status: DISCONTINUED | OUTPATIENT
Start: 2023-10-04 | End: 2023-10-05 | Stop reason: HOSPADM

## 2023-10-04 RX ORDER — METHOCARBAMOL 500 MG/1
1000 TABLET, FILM COATED ORAL EVERY 6 HOURS PRN
Status: DISCONTINUED | OUTPATIENT
Start: 2023-10-04 | End: 2023-10-05 | Stop reason: HOSPADM

## 2023-10-04 RX ORDER — DIPHENHYDRAMINE HCL 25 MG
25 CAPSULE ORAL EVERY 12 HOURS PRN
Status: DISCONTINUED | OUTPATIENT
Start: 2023-10-04 | End: 2023-10-05 | Stop reason: HOSPADM

## 2023-10-04 RX ORDER — OXYCODONE HYDROCHLORIDE 5 MG/1
5 TABLET ORAL EVERY 4 HOURS PRN
Status: DISCONTINUED | OUTPATIENT
Start: 2023-10-04 | End: 2023-10-05 | Stop reason: HOSPADM

## 2023-10-04 RX ORDER — LIDOCAINE HYDROCHLORIDE 20 MG/ML
INJECTION INTRAVENOUS
Status: DISCONTINUED | OUTPATIENT
Start: 2023-10-04 | End: 2023-10-04

## 2023-10-04 RX ORDER — MEPERIDINE HYDROCHLORIDE 25 MG/ML
12.5 INJECTION INTRAMUSCULAR; INTRAVENOUS; SUBCUTANEOUS ONCE AS NEEDED
Status: DISCONTINUED | OUTPATIENT
Start: 2023-10-04 | End: 2023-10-04 | Stop reason: HOSPADM

## 2023-10-04 RX ORDER — ROCURONIUM BROMIDE 10 MG/ML
INJECTION, SOLUTION INTRAVENOUS
Status: DISCONTINUED | OUTPATIENT
Start: 2023-10-04 | End: 2023-10-04

## 2023-10-04 RX ORDER — HYDRALAZINE HYDROCHLORIDE 20 MG/ML
INJECTION INTRAMUSCULAR; INTRAVENOUS
Status: DISCONTINUED | OUTPATIENT
Start: 2023-10-04 | End: 2023-10-04

## 2023-10-04 RX ORDER — HYDROMORPHONE HYDROCHLORIDE 2 MG/ML
INJECTION, SOLUTION INTRAMUSCULAR; INTRAVENOUS; SUBCUTANEOUS
Status: DISCONTINUED | OUTPATIENT
Start: 2023-10-04 | End: 2023-10-04

## 2023-10-04 RX ORDER — FENTANYL CITRATE 50 UG/ML
INJECTION, SOLUTION INTRAMUSCULAR; INTRAVENOUS
Status: DISCONTINUED | OUTPATIENT
Start: 2023-10-04 | End: 2023-10-04

## 2023-10-04 RX ORDER — OXYCODONE HYDROCHLORIDE 5 MG/1
5 TABLET ORAL EVERY 4 HOURS PRN
Status: DISCONTINUED | OUTPATIENT
Start: 2023-10-04 | End: 2023-10-04 | Stop reason: HOSPADM

## 2023-10-04 RX ORDER — ONDANSETRON 2 MG/ML
4 INJECTION INTRAMUSCULAR; INTRAVENOUS EVERY 6 HOURS PRN
Status: DISCONTINUED | OUTPATIENT
Start: 2023-10-04 | End: 2023-10-05 | Stop reason: HOSPADM

## 2023-10-04 RX ORDER — FAMOTIDINE 20 MG/1
20 TABLET, FILM COATED ORAL 2 TIMES DAILY
Status: DISCONTINUED | OUTPATIENT
Start: 2023-10-04 | End: 2023-10-05 | Stop reason: HOSPADM

## 2023-10-04 RX ORDER — ONDANSETRON 2 MG/ML
INJECTION INTRAMUSCULAR; INTRAVENOUS
Status: DISCONTINUED | OUTPATIENT
Start: 2023-10-04 | End: 2023-10-04

## 2023-10-04 RX ORDER — LABETALOL HYDROCHLORIDE 5 MG/ML
INJECTION, SOLUTION INTRAVENOUS
Status: DISCONTINUED | OUTPATIENT
Start: 2023-10-04 | End: 2023-10-04

## 2023-10-04 RX ORDER — BENAZEPRIL HYDROCHLORIDE 5 MG/1
10 TABLET ORAL DAILY
Status: DISCONTINUED | OUTPATIENT
Start: 2023-10-05 | End: 2023-10-05 | Stop reason: HOSPADM

## 2023-10-04 RX ORDER — DEXAMETHASONE SODIUM PHOSPHATE 4 MG/ML
INJECTION, SOLUTION INTRA-ARTICULAR; INTRALESIONAL; INTRAMUSCULAR; INTRAVENOUS; SOFT TISSUE
Status: DISCONTINUED | OUTPATIENT
Start: 2023-10-04 | End: 2023-10-04

## 2023-10-04 RX ORDER — HYDROCODONE BITARTRATE AND ACETAMINOPHEN 500; 5 MG/1; MG/1
TABLET ORAL
Status: DISCONTINUED | OUTPATIENT
Start: 2023-10-04 | End: 2023-10-04 | Stop reason: HOSPADM

## 2023-10-04 RX ORDER — BUPIVACAINE HYDROCHLORIDE 2.5 MG/ML
INJECTION, SOLUTION EPIDURAL; INFILTRATION; INTRACAUDAL
Status: COMPLETED | OUTPATIENT
Start: 2023-10-04 | End: 2023-10-04

## 2023-10-04 RX ORDER — SODIUM CHLORIDE 9 MG/ML
INJECTION, SOLUTION INTRAVENOUS CONTINUOUS
Status: DISCONTINUED | OUTPATIENT
Start: 2023-10-04 | End: 2023-10-05 | Stop reason: HOSPADM

## 2023-10-04 RX ORDER — TALC
6 POWDER (GRAM) TOPICAL NIGHTLY PRN
Status: DISCONTINUED | OUTPATIENT
Start: 2023-10-04 | End: 2023-10-05 | Stop reason: HOSPADM

## 2023-10-04 RX ORDER — PROPOFOL 10 MG/ML
VIAL (ML) INTRAVENOUS CONTINUOUS PRN
Status: DISCONTINUED | OUTPATIENT
Start: 2023-10-04 | End: 2023-10-04

## 2023-10-04 RX ORDER — AMLODIPINE AND BENAZEPRIL HYDROCHLORIDE 5; 10 MG/1; MG/1
1 CAPSULE ORAL DAILY
Status: DISCONTINUED | OUTPATIENT
Start: 2023-10-05 | End: 2023-10-04

## 2023-10-04 RX ORDER — PROCHLORPERAZINE EDISYLATE 5 MG/ML
5 INJECTION INTRAMUSCULAR; INTRAVENOUS EVERY 30 MIN PRN
Status: DISCONTINUED | OUTPATIENT
Start: 2023-10-04 | End: 2023-10-04 | Stop reason: HOSPADM

## 2023-10-04 RX ORDER — HYDROMORPHONE HYDROCHLORIDE 2 MG/ML
0.4 INJECTION, SOLUTION INTRAMUSCULAR; INTRAVENOUS; SUBCUTANEOUS EVERY 5 MIN PRN
Status: DISCONTINUED | OUTPATIENT
Start: 2023-10-04 | End: 2023-10-04 | Stop reason: HOSPADM

## 2023-10-04 RX ORDER — DIPHENHYDRAMINE HYDROCHLORIDE 50 MG/ML
12.5 INJECTION INTRAMUSCULAR; INTRAVENOUS EVERY 30 MIN PRN
Status: DISCONTINUED | OUTPATIENT
Start: 2023-10-04 | End: 2023-10-04 | Stop reason: HOSPADM

## 2023-10-04 RX ADMIN — PROPOFOL 150 MG: 10 INJECTION, EMULSION INTRAVENOUS at 12:10

## 2023-10-04 RX ADMIN — VECURONIUM BROMIDE FOR INJECTION 3 MG: 1 INJECTION, POWDER, LYOPHILIZED, FOR SOLUTION INTRAVENOUS at 02:10

## 2023-10-04 RX ADMIN — SODIUM CHLORIDE, SODIUM LACTATE, POTASSIUM CHLORIDE, AND CALCIUM CHLORIDE: .6; .31; .03; .02 INJECTION, SOLUTION INTRAVENOUS at 02:10

## 2023-10-04 RX ADMIN — FENTANYL CITRATE 100 MCG: 50 INJECTION, SOLUTION INTRAMUSCULAR; INTRAVENOUS at 10:10

## 2023-10-04 RX ADMIN — BUPIVACAINE HYDROCHLORIDE 40 ML: 2.5 INJECTION, SOLUTION EPIDURAL; INFILTRATION; INTRACAUDAL; PERINEURAL at 11:10

## 2023-10-04 RX ADMIN — ROCURONIUM BROMIDE 40 MG: 10 INJECTION, SOLUTION INTRAVENOUS at 12:10

## 2023-10-04 RX ADMIN — HYDRALAZINE HYDROCHLORIDE 5 MG: 20 INJECTION INTRAMUSCULAR; INTRAVENOUS at 01:10

## 2023-10-04 RX ADMIN — DEXAMETHASONE SODIUM PHOSPHATE 8 MG: 4 INJECTION, SOLUTION INTRAMUSCULAR; INTRAVENOUS at 01:10

## 2023-10-04 RX ADMIN — PROPOFOL 60 MCG/KG/MIN: 10 INJECTION, EMULSION INTRAVENOUS at 12:10

## 2023-10-04 RX ADMIN — ACETAMINOPHEN 1000 MG: 500 TABLET ORAL at 09:10

## 2023-10-04 RX ADMIN — EPHEDRINE SULFATE 10 MG: 50 INJECTION INTRAVENOUS at 02:10

## 2023-10-04 RX ADMIN — SUCCINYLCHOLINE CHLORIDE 200 MG: 20 INJECTION, SOLUTION INTRAMUSCULAR; INTRAVENOUS at 12:10

## 2023-10-04 RX ADMIN — HYDROMORPHONE HYDROCHLORIDE 0.2 MG: 2 INJECTION INTRAMUSCULAR; INTRAVENOUS; SUBCUTANEOUS at 03:10

## 2023-10-04 RX ADMIN — VECURONIUM BROMIDE FOR INJECTION 3 MG: 1 INJECTION, POWDER, LYOPHILIZED, FOR SOLUTION INTRAVENOUS at 01:10

## 2023-10-04 RX ADMIN — TAMSULOSIN HYDROCHLORIDE 0.4 MG: 0.4 CAPSULE ORAL at 08:10

## 2023-10-04 RX ADMIN — ACETAMINOPHEN 1000 MG: 500 TABLET ORAL at 07:10

## 2023-10-04 RX ADMIN — FENTANYL CITRATE 100 MCG: 50 INJECTION, SOLUTION INTRAMUSCULAR; INTRAVENOUS at 12:10

## 2023-10-04 RX ADMIN — SODIUM CHLORIDE: 9 INJECTION, SOLUTION INTRAVENOUS at 06:10

## 2023-10-04 RX ADMIN — OXYCODONE HYDROCHLORIDE 5 MG: 5 TABLET ORAL at 04:10

## 2023-10-04 RX ADMIN — SUGAMMADEX 200 MG: 100 INJECTION, SOLUTION INTRAVENOUS at 03:10

## 2023-10-04 RX ADMIN — HYDROMORPHONE HYDROCHLORIDE 0.4 MG: 2 INJECTION INTRAMUSCULAR; INTRAVENOUS; SUBCUTANEOUS at 04:10

## 2023-10-04 RX ADMIN — VECURONIUM BROMIDE FOR INJECTION 2 MG: 1 INJECTION, POWDER, LYOPHILIZED, FOR SOLUTION INTRAVENOUS at 01:10

## 2023-10-04 RX ADMIN — GLYCOPYRROLATE 0.2 MG: 0.2 INJECTION, SOLUTION INTRAMUSCULAR; INTRAVITREAL at 12:10

## 2023-10-04 RX ADMIN — SODIUM CHLORIDE, SODIUM LACTATE, POTASSIUM CHLORIDE, AND CALCIUM CHLORIDE: .6; .31; .03; .02 INJECTION, SOLUTION INTRAVENOUS at 10:10

## 2023-10-04 RX ADMIN — HYDROMORPHONE HYDROCHLORIDE 0.5 MG: 2 INJECTION INTRAMUSCULAR; INTRAVENOUS; SUBCUTANEOUS at 01:10

## 2023-10-04 RX ADMIN — BUPIVACAINE 20 ML: 13.3 INJECTION, SUSPENSION, LIPOSOMAL INFILTRATION at 11:10

## 2023-10-04 RX ADMIN — KETOROLAC TROMETHAMINE 15 MG: 30 INJECTION, SOLUTION INTRAMUSCULAR; INTRAVENOUS at 07:10

## 2023-10-04 RX ADMIN — FAMOTIDINE 20 MG: 20 TABLET ORAL at 08:10

## 2023-10-04 RX ADMIN — ROCURONIUM BROMIDE 10 MG: 10 INJECTION, SOLUTION INTRAVENOUS at 12:10

## 2023-10-04 RX ADMIN — MUPIROCIN: 20 OINTMENT TOPICAL at 08:10

## 2023-10-04 RX ADMIN — MIDAZOLAM HYDROCHLORIDE 2 MG: 1 INJECTION, SOLUTION INTRAMUSCULAR; INTRAVENOUS at 10:10

## 2023-10-04 RX ADMIN — LIDOCAINE HYDROCHLORIDE 50 MG: 20 INJECTION, SOLUTION INTRAVENOUS at 12:10

## 2023-10-04 RX ADMIN — HYDROMORPHONE HYDROCHLORIDE 0.4 MG: 2 INJECTION INTRAMUSCULAR; INTRAVENOUS; SUBCUTANEOUS at 05:10

## 2023-10-04 RX ADMIN — CEFAZOLIN 2 G: 2 INJECTION, POWDER, FOR SOLUTION INTRAMUSCULAR; INTRAVENOUS at 12:10

## 2023-10-04 RX ADMIN — ONDANSETRON HYDROCHLORIDE 4 MG: 2 INJECTION INTRAMUSCULAR; INTRAVENOUS at 01:10

## 2023-10-04 RX ADMIN — ALBUMIN (HUMAN) 500 ML: 12.5 SOLUTION INTRAVENOUS at 01:10

## 2023-10-04 RX ADMIN — PREGABALIN 150 MG: 75 CAPSULE ORAL at 08:10

## 2023-10-04 RX ADMIN — LABETALOL HYDROCHLORIDE 15 MG: 5 INJECTION INTRAVENOUS at 01:10

## 2023-10-04 NOTE — ANESTHESIA POSTPROCEDURE EVALUATION
Anesthesia Post Evaluation    Patient: Gamaliel Pete Jr.    Procedure(s) Performed: Procedure(s) (LRB):  ROBOTIC NEPHRECTOMY (Right)    Final Anesthesia Type: general      Patient location during evaluation: PACU  Patient participation: Yes- Able to Participate  Level of consciousness: awake and alert  Post-procedure vital signs: reviewed and stable  Pain management: adequate  Airway patency: patent    PONV status at discharge: No PONV  Anesthetic complications: no      Cardiovascular status: blood pressure returned to baseline  Respiratory status: spontaneous ventilation  Hydration status: euvolemic  Follow-up not needed.          Vitals Value Taken Time   /70 10/04/23 1647   Temp 36.4 °C (97.6 °F) 10/04/23 1609   Pulse 70 10/04/23 1650   Resp 18 10/04/23 1645   SpO2 95 % 10/04/23 1650   Vitals shown include unvalidated device data.      No case tracking events are documented in the log.      Pain/Juany Score: Pain Rating Prior to Med Admin: 5 (10/4/2023  4:24 PM)  Pain Rating Post Med Admin: 5 (10/4/2023  4:39 PM)  Juany Score: 9 (10/4/2023  4:39 PM)

## 2023-10-04 NOTE — ANESTHESIA PROCEDURE NOTES
Intubation    Date/Time: 10/4/2023 12:40 PM    Performed by: Saida Felton CRNA  Authorized by: Farzad Del Real MD    Intubation:     Induction:  Intravenous    Intubated:  Postinduction    Mask Ventilation:  N/a    Attempts:  1    Attempted By:  CRNA    Method of Intubation:  Video laryngoscopy    Blade:  Yoder 4    Laryngeal View Grade: Grade I - full view of cords      Difficult Airway Encountered?: No      Complications:  None    Airway Device:  Oral endotracheal tube    Airway Device Size:  8.0    Style/Cuff Inflation:  Cuffed (inflated to minimal occlusive pressure)    Secured at:  The lips    Placement Verified By:  Capnometry    Complicating Factors:  None    Findings Post-Intubation:  BS equal bilateral and atraumatic/condition of teeth unchanged

## 2023-10-04 NOTE — BRIEF OP NOTE
[facility] Surgery ([place])  Brief Operative Note    SUMMARY     Surgery Date: 10/4/2023     Surgeon(s) and Role:     * Keyon Levy MD - Primary    Assisting Surgeon: None    Pre-op Diagnosis:  Gross hematuria [R31.0]  Renal mass [N28.89]    Post-op Diagnosis:  Post-Op Diagnosis Codes:     * Gross hematuria [R31.0]     * Renal mass [N28.89]    Procedure(s) (LRB):  ROBOTIC NEPHRECTOMY (Right)    Anesthesia: General    Operative Findings:   Robotic right nephrectomy performed in normal fashion.    Estimated Blood Loss: 75 mL    Estimated Blood Loss has been documented.         Specimens:   Specimen (24h ago, onward)       Start     Ordered    10/04/23 1515  Specimen to Pathology, Surgery Urology  Once        Comments: Pre-op Diagnosis: Gross hematuria [R31.0]Renal mass [N28.89]Procedure(s):ROBOTIC NEPHRECTOMY Number of specimens: 1Name of specimens: 1. Right Kidney - PERM     References:    Click here for ordering Quick Tip   Question Answer Comment   Procedure Type: Urology    Specimen Class: Routine/Screening    Which provider would you like to cc? KEYON LEVY    Release to patient Immediate        10/04/23 1526                    [id number]

## 2023-10-04 NOTE — INTERVAL H&P NOTE
The patient has been examined and the H&P has been reviewed:    I concur with the findings and no changes have occurred since H&P was written.    Surgery risks, benefits and alternative options discussed and understood by patient/family.    Side verified and marked  Hct 39  Cr normal    Answered all questions      There are no hospital problems to display for this patient.

## 2023-10-04 NOTE — ANESTHESIA PREPROCEDURE EVALUATION
10/04/2023  Gamaliel Pete Jr. is a 73 y.o., male.      Pre-op Assessment    I have reviewed the Patient Summary Reports.     I have reviewed the Nursing Notes. I have reviewed the NPO Status.   I have reviewed the Medications.     Review of Systems  Anesthesia Hx:  No problems with previous Anesthesia  Denies Family Hx of Anesthesia complications.   Denies Personal Hx of Anesthesia complications.   Social:  Former Smoker, No Alcohol Use Quit tob many years ago   Hematology/Oncology:         -- Anemia: Hematology Comments: hgb 13.5 Current/Recent Cancer.   Cardiovascular:   Exercise tolerance: good Hypertension Denies MI.    Denies CHF. hyperlipidemia    Pulmonary:   Asthma mild Denies Sleep Apnea.    Renal/:   Renal Mass   Hepatic/GI:  Hepatic/GI Normal    Musculoskeletal:   Arthritis   Spine Disorders: Degenerative disease    Neurological:  Neurology Normal    Endocrine:  Obesity / BMI > 30  Psych:  Psychiatric Normal           Physical Exam  General: Well nourished and Cooperative    Airway:  Mallampati: III   Mouth Opening: Normal  TM Distance: Normal  Neck ROM: Normal ROM    Dental:  Intact        Anesthesia Plan  Type of Anesthesia, risks & benefits discussed:    Anesthesia Type: Gen ETT  Intra-op Monitoring Plan: Standard ASA Monitors  Post Op Pain Control Plan: multimodal analgesia and peripheral nerve block  Induction:  IV  Airway Plan: Video  Informed Consent: Informed consent signed with the Patient and all parties understand the risks and agree with anesthesia plan.  All questions answered.   ASA Score: 3  Day of Surgery Review of History & Physical: H&P Update referred to the surgeon/provider.    Ready For Surgery From Anesthesia Perspective.     .

## 2023-10-04 NOTE — TELEPHONE ENCOUNTER
----- Message from Fred Aviles MD sent at 10/1/2023  4:37 PM CDT -----  Patient need to come back in 2 weeks for blood pressure check

## 2023-10-04 NOTE — TRANSFER OF CARE
"Anesthesia Transfer of Care Note    Patient: Gamaliel Pete Jr.    Procedure(s) Performed: Procedure(s) (LRB):  ROBOTIC NEPHRECTOMY (Right)    Patient location: PACU    Anesthesia Type: general    Transport from OR: Transported from OR on 6-10 L/min O2 by face mask with adequate spontaneous ventilation    Post pain: adequate analgesia    Post assessment: no apparent anesthetic complications and tolerated procedure well    Post vital signs: stable    Level of consciousness: awake    Nausea/Vomiting: no nausea/vomiting    Complications: none    Transfer of care protocol was followed      Last vitals:   Visit Vitals  BP (!) 160/65 (BP Location: Left arm, Patient Position: Lying)   Pulse 74   Temp 36.4 °C (97.6 °F)   Resp 18   Ht 5' 10" (1.778 m)   Wt 106.1 kg (234 lb)   SpO2 97%   BMI 33.58 kg/m²     "

## 2023-10-04 NOTE — OR NURSING
VSS on 2 L NC. Pain is tolerable per pt. X6 lap sites dermabond, CDI. PIV CDI. Meets PACU discharge criteria. Ready for transfer to [address] [place] [address]. Report given to BRIAN Esteves. Family notified.

## 2023-10-04 NOTE — ANESTHESIA PROCEDURE NOTES
Peripheral Block    Patient location during procedure: holding area   Block not for primary anesthetic.  Reason for block: at surgeon's request and post-op pain management   Post-op Pain Location: Right Flank Pain    End time: 10/4/2023 11:01 AM    Staffing  Authorizing Provider: Farzad Del Real MD  Performing Provider: Farzad Del Real MD    Staffing  Performed by: Farzad Del Real MD  Authorized by: Farzad Del Real MD    Preanesthetic Checklist  Completed: patient identified, IV checked, site marked, risks and benefits discussed, surgical consent, monitors and equipment checked, pre-op evaluation and timeout performed  Peripheral Block  Patient position: supine  Patient monitoring: heart rate, cardiac monitor, continuous pulse ox and frequent blood pressure checks  Block type: TAP  Laterality: right  Injection technique: single shot  Needle  Needle type: Echogenic   Needle gauge: 20 G  Needle localization: ultrasound guidance   -ultrasound image captured on disc.  Assessment  Injection assessment: negative aspiration and negative parasthesia  Paresthesia pain: none  Heart rate change: no  Slow fractionated injection: yes  Pain Tolerance: no complaints and comfortable throughout block  Medications:    Medications: bupivacaine (pf) (MARCAINE) injection 0.25% - Perineural   40 mL - 10/4/2023 11:02:00 AM    Additional Notes  Right TAP and Right Subcostal block; each with 20mL 0.25% bupiv and 10mL of Exparel

## 2023-10-04 NOTE — OP NOTE
[facility]  Operative Note     Date: 10/04/2023      Pre-Op Diagnosis: right renal mass suspicious for renal cell carcinoma. Significant gross hematuria    Patient Active Problem List   Diagnosis    Obesity (BMI 35.0-39.9 without comorbidity)    Borderline hypertension    Acute gout of left ankle    Onychomycosis    History of gout    Seborrheic keratosis    Tinea corporis    Positive colorectal cancer screening using Cologuard test    Renal mass    Cancer with pulmonary metastases    Metastasis to bone    Iron deficiency anemia        Post-Op Diagnosis: same     Procedure(s) Performed:   1.  Right robotic radical nephrectomy     Specimen(s):   - Right kidney     Surgeon: Henry Michaels MD     Bedside assistant: Jennifer HEART     Assistant: Jose Daniel Jones MD     Anesthesia: General endotracheal anesthesia     Indications: Gamaliel Pete Jr. is a 73 y.o. male  with right renal mass suspicious for renal cell carcinoma which is symptomatic with gross hematuria.  After discussion of management options and risks and benefits associated with each the patient has elected to pursue surgical management.       Findings:   - Robotic right nephrectomy performed in normal fashion.  - No immediate operative complications     EBL: 75 mL     Drains:   1.  20 Fr hyde catheter     Anesthesia: General endotracheal anesthesia     Complications:  none     Procedure in Detail: After discussion of risks and benefits of the procedure, informed consent was obtained.  The patient was brought to the operating room and placed supine on the operating table.  SCDs were applied and working prior to induction of anesthesia.  General anesthesia was administered. A 16 Fr Hyde catheter was placed in the standard fashion with 10 ml sterile water used to inflate the balloon. An OG tube was placed. The patient was then moved into the modified flank position with the right side up. The patient was  appropriately padded and secured to the table. The patient was then prepped and draped in the usual sterile fashion. Timeout was performed and preoperative antibiotics were confirmed.       A Veress needle was introduced into the abdomen. Entry into the peritoneal cavity was confirmed with the drop test and an initial insufflation pressure of <10mmHg. Aspiration during our drop test revealed no blood or succus. The peritoneal cavity was insufflated up to 15 mmHg and the Veress was removed. The location of the 8 mm camera port was marked with a marking pen and incised sharply using Bovie electrocautery. The 8 mm port was then introduced.  The camera was passed through the port and the abdomen was inspected and found to be free of bowel or vascular injury. Using direct vision the other 3 robotic ports were placed, just below the right costal margin and lower on the right abdomen, ensuring at least 8 cm between ports to allow robotic mobility. A 12 mm assistant port was placed in the lower midline and two 5 mm assistant port was placed in the upper midline. Each trocar was introduced under direct vision ensuring no injury to the underlying abdominal contents.       Dissection began along the white line of Toldt, reflecting the colon medially away from the kidney. The hepatic reflection was released.  The psoas muscle was identified.  Care was taken to avoid injury to the duodenum. Once the colon was reflected, dissection was carried out just below the kidney, and the ureter was identified.  The ureter was elevated and we continued our dissection toward the lower pole of the kidney proximally until we identified the renal hilum.      Careful dissection of the hilum was performed.  The renal vein was easily identified anteriorly.  There was 1 renal vein and 1 renal artery. The vessels were then skeletonized adequately. The artery and vein were then taken with two vascular staple loads. Hemostasis was excellent.      The  kidney was then freed from the remaining superior, posterior and lateral attachments. The ureter was clipped then divided. Hemostasis was again assessed and was excellent. The mass was placed into an EndoCatch bag via the 8mm midclavicular robotic port. The robot was undocked and all trocars were removed. The midclavicular port was extended from skin down to fascia to allow removal of the specimen. This was inspected and found to be intact. This was passed off the field for pathologic analysis.       The extraction site fascia was closed using interrupted 1-0 Ethibond in an interrupted fashion.  All skin incisions were closed using 4-0 monocryl. Dermabond was applied to all of the incisions.     The patient tolerated the procedure well and was transferred to the recovery room in stable condition.     Disposition: The patient will remain on the urology service overnight for observation.       Jose Daniel Jones MD

## 2023-10-05 ENCOUNTER — TELEPHONE (OUTPATIENT)
Dept: UROLOGY | Facility: CLINIC | Age: 73
End: 2023-10-05
Payer: MEDICARE

## 2023-10-05 VITALS
SYSTOLIC BLOOD PRESSURE: 127 MMHG | DIASTOLIC BLOOD PRESSURE: 58 MMHG | HEIGHT: 70 IN | WEIGHT: 230.63 LBS | HEART RATE: 76 BPM | BODY MASS INDEX: 33.02 KG/M2 | TEMPERATURE: 98 F | OXYGEN SATURATION: 95 % | RESPIRATION RATE: 20 BRPM

## 2023-10-05 LAB
ANION GAP SERPL CALC-SCNC: 9 MMOL/L (ref 8–16)
BACTERIA UR CULT: NO GROWTH
BASOPHILS # BLD AUTO: 0.01 K/UL (ref 0–0.2)
BASOPHILS NFR BLD: 0.1 % (ref 0–1.9)
BLD PROD TYP BPU: NORMAL
BLOOD UNIT EXPIRATION DATE: NORMAL
BLOOD UNIT TYPE CODE: 5100
BLOOD UNIT TYPE CODE: 5100
BLOOD UNIT TYPE CODE: 9500
BLOOD UNIT TYPE CODE: 9500
BLOOD UNIT TYPE: NORMAL
BUN SERPL-MCNC: 15 MG/DL (ref 8–23)
CALCIUM SERPL-MCNC: 9 MG/DL (ref 8.7–10.5)
CHLORIDE SERPL-SCNC: 108 MMOL/L (ref 95–110)
CO2 SERPL-SCNC: 20 MMOL/L (ref 23–29)
CODING SYSTEM: NORMAL
CREAT SERPL-MCNC: 1.3 MG/DL (ref 0.5–1.4)
CROSSMATCH INTERPRETATION: NORMAL
DIFFERENTIAL METHOD: ABNORMAL
DISPENSE STATUS: NORMAL
EOSINOPHIL # BLD AUTO: 0 K/UL (ref 0–0.5)
EOSINOPHIL NFR BLD: 0 % (ref 0–8)
ERYTHROCYTE [DISTWIDTH] IN BLOOD BY AUTOMATED COUNT: 14.6 % (ref 11.5–14.5)
EST. GFR  (NO RACE VARIABLE): 59 ML/MIN/1.73 M^2
GLUCOSE SERPL-MCNC: 132 MG/DL (ref 70–110)
HCT VFR BLD AUTO: 34.2 % (ref 40–54)
HGB BLD-MCNC: 11.1 G/DL (ref 14–18)
IMM GRANULOCYTES # BLD AUTO: 0.03 K/UL (ref 0–0.04)
IMM GRANULOCYTES NFR BLD AUTO: 0.4 % (ref 0–0.5)
LYMPHOCYTES # BLD AUTO: 1.2 K/UL (ref 1–4.8)
LYMPHOCYTES NFR BLD: 14.4 % (ref 18–48)
MCH RBC QN AUTO: 29.4 PG (ref 27–31)
MCHC RBC AUTO-ENTMCNC: 32.5 G/DL (ref 32–36)
MCV RBC AUTO: 91 FL (ref 82–98)
MONOCYTES # BLD AUTO: 0.7 K/UL (ref 0.3–1)
MONOCYTES NFR BLD: 8.1 % (ref 4–15)
NEUTROPHILS # BLD AUTO: 6.1 K/UL (ref 1.8–7.7)
NEUTROPHILS NFR BLD: 77 % (ref 38–73)
NRBC BLD-RTO: 0 /100 WBC
NUM UNITS TRANS PACKED RBC: NORMAL
PLATELET # BLD AUTO: 144 K/UL (ref 150–450)
PMV BLD AUTO: 9.6 FL (ref 9.2–12.9)
POTASSIUM SERPL-SCNC: 4.5 MMOL/L (ref 3.5–5.1)
RBC # BLD AUTO: 3.78 M/UL (ref 4.6–6.2)
SODIUM SERPL-SCNC: 137 MMOL/L (ref 136–145)
WBC # BLD AUTO: 7.98 K/UL (ref 3.9–12.7)

## 2023-10-05 PROCEDURE — 85025 COMPLETE CBC W/AUTO DIFF WBC: CPT | Performed by: STUDENT IN AN ORGANIZED HEALTH CARE EDUCATION/TRAINING PROGRAM

## 2023-10-05 PROCEDURE — 36415 COLL VENOUS BLD VENIPUNCTURE: CPT | Performed by: STUDENT IN AN ORGANIZED HEALTH CARE EDUCATION/TRAINING PROGRAM

## 2023-10-05 PROCEDURE — 80048 BASIC METABOLIC PNL TOTAL CA: CPT | Performed by: STUDENT IN AN ORGANIZED HEALTH CARE EDUCATION/TRAINING PROGRAM

## 2023-10-05 PROCEDURE — 63600175 PHARM REV CODE 636 W HCPCS: Performed by: STUDENT IN AN ORGANIZED HEALTH CARE EDUCATION/TRAINING PROGRAM

## 2023-10-05 PROCEDURE — 25000003 PHARM REV CODE 250: Performed by: UROLOGY

## 2023-10-05 PROCEDURE — 25000003 PHARM REV CODE 250: Performed by: STUDENT IN AN ORGANIZED HEALTH CARE EDUCATION/TRAINING PROGRAM

## 2023-10-05 RX ORDER — DEXTROMETHORPHAN HYDROBROMIDE, GUAIFENESIN 5; 100 MG/5ML; MG/5ML
650 LIQUID ORAL EVERY 8 HOURS
Qty: 20 TABLET | Refills: 0 | Status: SHIPPED | OUTPATIENT
Start: 2023-10-05 | End: 2023-10-12

## 2023-10-05 RX ORDER — OXYCODONE HYDROCHLORIDE 5 MG/1
5 TABLET ORAL EVERY 6 HOURS PRN
Qty: 10 TABLET | Refills: 0 | Status: SHIPPED | OUTPATIENT
Start: 2023-10-05 | End: 2023-10-25 | Stop reason: SDUPTHER

## 2023-10-05 RX ORDER — IBUPROFEN 800 MG/1
800 TABLET ORAL EVERY 8 HOURS
Qty: 20 TABLET | Refills: 0 | Status: ON HOLD | OUTPATIENT
Start: 2023-10-05 | End: 2023-11-02 | Stop reason: HOSPADM

## 2023-10-05 RX ADMIN — ACETAMINOPHEN 1000 MG: 500 TABLET ORAL at 12:10

## 2023-10-05 RX ADMIN — ACETAMINOPHEN 1000 MG: 500 TABLET ORAL at 01:10

## 2023-10-05 RX ADMIN — AMLODIPINE BESYLATE 5 MG: 5 TABLET ORAL at 09:10

## 2023-10-05 RX ADMIN — BENAZEPRIL HYDROCHLORIDE 10 MG: 5 TABLET ORAL at 09:10

## 2023-10-05 RX ADMIN — KETOROLAC TROMETHAMINE 15 MG: 30 INJECTION, SOLUTION INTRAMUSCULAR; INTRAVENOUS at 12:10

## 2023-10-05 RX ADMIN — KETOROLAC TROMETHAMINE 15 MG: 30 INJECTION, SOLUTION INTRAMUSCULAR; INTRAVENOUS at 07:10

## 2023-10-05 RX ADMIN — MUPIROCIN: 20 OINTMENT TOPICAL at 09:10

## 2023-10-05 RX ADMIN — FAMOTIDINE 20 MG: 20 TABLET ORAL at 09:10

## 2023-10-05 RX ADMIN — KETOROLAC TROMETHAMINE 15 MG: 30 INJECTION, SOLUTION INTRAMUSCULAR; INTRAVENOUS at 01:10

## 2023-10-05 NOTE — CONSULTS
[facility] Surg [address] Medicine  Consult Note    Patient Name: Gamaliel Pete Jr.  MRN: [id number]  Admission Date: 10/4/2023  Hospital Length of Stay: 0 days  Attending Physician: Henry Michaels MD   Primary Care Provider: Slava Lowery MD           Patient information was obtained from patient, past medical records and ER records.     Consults  Subjective:     Principal Problem: Renal mass    Chief Complaint: No chief complaint on file.       HPI: Gamaliel Pete Jr. is a 73M with history of innumerable pulmonary masses and bone lesion followed by Dr. Ruby Turk outpatient, hypertension and gout who presents for right robotic nephrectomy with Urology.  He had initially presented with hematuria and was found to have a mass suspicious for renal cell carcinoma.  He underwent planned procedure with Dr. Michaels today. At time of my exam he is resting in no apparent distress has no complaints, pain well controlled      Past Medical History:   Diagnosis Date    Asthma     no inhaler use    Borderline hypertension     ED (erectile dysfunction)     Gout     Hematuria     Hypertension        Past Surgical History:   Procedure Laterality Date    COLONOSCOPY  02/10/2022    COLONOSCOPY N/A 02/10/2022    Procedure: COLONOSCOPY;  Surgeon: Desmond Keenan MD;  Location: [facility];  Service: General;  Laterality: N/A;    TONSILLECTOMY         Review of patient's allergies indicates:  No Known Allergies    No current facility-administered medications on file prior to encounter.     Current Outpatient Medications on File Prior to Encounter   Medication Sig    amlodipine-benazepril 5-10 mg (LOTREL) 5-10 mg per capsule Take 1 capsule by mouth once daily.    indomethacin (INDOCIN) 50 MG capsule One p.o. t.i.d. p.r.n. gouty attack no more than 7 days--do not take with other NSAIDs    omega-3 fatty acids/fish oil (FISH OIL-OMEGA-3 FATTY ACIDS) 300-1,000 mg capsule Take 2 capsules by mouth  once daily.    turmeric (CURCUMIN MISC) 1,000 mg by Misc.(Non-Drug; Combo Route) route Daily.    UNABLE TO FIND Take 500 mg by mouth 2 (two) times a day. medication name: Tudca    UNABLE TO FIND Take 2 capsules by mouth 2 (two) times a day. medication name: Turkey tail mushroom    UNABLE TO FIND Take 15 drops by mouth once daily. medication name: Essiac-20    UNABLE TO FIND Take 5 mLs by mouth 2 (two) times a day. medication name: barley leaf juice powder    UNABLE TO FIND Take 5 mLs by mouth 2 (two) times a day. medication name: black seed oil (cumin seed)    OLIVE OIL ORAL Take 15 mLs by mouth 2 (two) times a day.    sildenafiL (VIAGRA) 100 MG tablet Take 1 tablet (100 mg total) by mouth daily as needed for Erectile Dysfunction.     Family History    None       Tobacco Use    Smoking status: Never    Smokeless tobacco: Never   Substance and Sexual Activity    Alcohol use: Not Currently     Alcohol/week: 0.0 standard drinks of alcohol     Comment: rarely    Drug use: Never    Sexual activity: Not on file     Review of Systems   Constitutional:  Negative for fever.   Respiratory:  Negative for cough and shortness of breath.    Cardiovascular:  Negative for chest pain.   Gastrointestinal:  Negative for abdominal pain and nausea.   Genitourinary:  Negative for difficulty urinating and dysuria.   Psychiatric/Behavioral:  Negative for agitation and confusion.      Objective:     Vital Signs (Most Recent):  Temp: 97.7 °F (36.5 °C) (10/04/23 1756)  Pulse: 74 (10/04/23 2011)  Resp: 20 (10/04/23 2011)  BP: (!) 151/68 (10/04/23 2011)  SpO2: 99 % (10/04/23 2011) Vital Signs (24h Range):  Temp:  [97.6 °F (36.4 °C)-98.1 °F (36.7 °C)] 97.7 °F (36.5 °C)  Pulse:  [64-81] 74  Resp:  [18-20] 20  SpO2:  [96 %-99 %] 99 %  BP: (142-174)/(65-77) 151/68     Weight: 104.6 kg (230 lb 9.6 oz)  Body mass index is 33.09 kg/m².     Physical Exam  Constitutional:       General: He is not in acute distress.     Appearance: He is  well-developed. He is not ill-appearing or diaphoretic.   HENT:      Head: Normocephalic and atraumatic.   Cardiovascular:      Rate and Rhythm: Normal rate and regular rhythm.   Pulmonary:      Effort: Pulmonary effort is normal. No respiratory distress.   Abdominal:      General: There is no distension.      Palpations: Abdomen is soft.   Musculoskeletal:      Right lower leg: No edema.      Left lower leg: No edema.   Skin:     General: Skin is warm and dry.   Neurological:      General: No focal deficit present.      Mental Status: He is alert.   Psychiatric:         Mood and Affect: Mood normal.         Behavior: Behavior normal.          Significant Labs: All pertinent labs within the past 24 hours have been reviewed.    Significant Imaging: I have reviewed all pertinent imaging results/findings within the past 24 hours.    Assessment/Plan:     * Renal mass  Concern for renal cell carcinoma   Right robotic nephrectomy done with Dr. Michaels 10/4  Management per primary  Follows with Dr. Turk outpatient      Borderline hypertension  Home amlodipine and benazepril continued      Iron deficiency anemia  On iron infusions outpatient        VTE Risk Mitigation (From admission, onward)    None              Thank you for your consult. I will follow-up with patient. Please contact us if you have any additional questions.    Elsa Mills PA-C  Department of Hospital Medicine   [facility] - Med Surg ([address])

## 2023-10-05 NOTE — PROGRESS NOTES
[facility] Surg [address])  Urology  Progress Note    Patient Name: Gamaliel Pete Jr.  MRN: [id number]  Admission Date: 10/4/2023  Hospital Length of Stay: 1 days  Code Status: Full Code   Attending Provider: Henry Michaels MD   Primary Care Physician: Slava Lowery MD    Subjective:     HPI:  Gamaliel Pete Jr. is a 73M with history of pulmonary masses and bone lesion followed by Dr. Ruby Turk outpatient, hypertension and gout who presents for right robotic nephrectomy.  He had initially presented with significantly symptomatic hematuria and was found to have a mass suspicious for renal cell carcinoma.      Interval History: NAEON. AFVSS. Tolerating PO. Ambulating well. Pain controlled.      Objective:     Temp:  [97.6 °F (36.4 °C)-98.4 °F (36.9 °C)] 98 °F (36.7 °C)  Pulse:  [64-81] 66  Resp:  [16-20] 18  SpO2:  [96 %-99 %] 98 %  BP: (127-174)/(60-77) 144/67     Body mass index is 33.09 kg/m².           Drains       Drain  Duration                  Urethral Catheter 10/04/23 1300 Non-latex;Straight-tip 16 Fr. <1 day                     Physical Exam  Vitals reviewed.   Constitutional:       General: He is not in acute distress.     Appearance: He is obese. He is not diaphoretic.   HENT:      Head: Normocephalic and atraumatic.      Nose: Nose normal.   Eyes:      Conjunctiva/sclera: Conjunctivae normal.   Cardiovascular:      Rate and Rhythm: Normal rate.   Pulmonary:      Effort: Pulmonary effort is normal. No respiratory distress.   Abdominal:      General: There is no distension.      Palpations: Abdomen is soft.      Tenderness: There is no abdominal tenderness. There is no guarding.      Comments: Lap incisions clean dry and intact.    Genitourinary:     Comments: Hoskins draining clear yellow urine.   Musculoskeletal:         General: Normal range of motion.      Cervical back: Normal range of motion.   Skin:     General: Skin is dry.   Neurological:      Mental Status: He is alert.    Psychiatric:         Behavior: Behavior normal.         Thought Content: Thought content normal.           Significant Labs:    BMP:  Recent Labs   Lab 10/04/23  0917 10/05/23  0424    137   K 4.3 4.5    108   CO2 22* 20*   BUN 16 15   CREATININE 0.9 1.3   CALCIUM 10.4 9.0       CBC:   Recent Labs   Lab 10/04/23  0917 10/05/23  0424   WBC 6.42 7.98   HGB 13.5* 11.1*   HCT 39.7* 34.2*    144*       All pertinent labs results from the past 24 hours have been reviewed.    Significant Imaging:  All pertinent imaging results/findings from the past 24 hours have been reviewed.                    Assessment/Plan:     * Renal mass  Gamaliel Pete . is an 73 y.o. male that is s/p Robotic right nephrectomy on 10/4/23. Doing well post-op.    - Pain - controlled  - Diet - tolerating diet  - OOB, ambulating  - Encourage IS  - DVT ppx   - Voiding Trial today    - Dispo: Discharge home after patient passes voiding trial.           VTE Risk Mitigation (From admission, onward)    None          Jose Daniel Jones MD  Urology  [facility] ([address])

## 2023-10-05 NOTE — SUBJECTIVE & OBJECTIVE
Interval History: NAEON. AFVSS. Tolerating PO. Ambulating well. Pain controlled.      Objective:     Temp:  [97.6 °F (36.4 °C)-98.4 °F (36.9 °C)] 98 °F (36.7 °C)  Pulse:  [64-81] 66  Resp:  [16-20] 18  SpO2:  [96 %-99 %] 98 %  BP: (127-174)/(60-77) 144/67     Body mass index is 33.09 kg/m².           Drains       Drain  Duration                  Urethral Catheter 10/04/23 1300 Non-latex;Straight-tip 16 Fr. <1 day                     Physical Exam  Vitals reviewed.   Constitutional:       General: He is not in acute distress.     Appearance: He is obese. He is not diaphoretic.   HENT:      Head: Normocephalic and atraumatic.      Nose: Nose normal.   Eyes:      Conjunctiva/sclera: Conjunctivae normal.   Cardiovascular:      Rate and Rhythm: Normal rate.   Pulmonary:      Effort: Pulmonary effort is normal. No respiratory distress.   Abdominal:      General: There is no distension.      Palpations: Abdomen is soft.      Tenderness: There is no abdominal tenderness. There is no guarding.      Comments: Lap incisions clean dry and intact.    Genitourinary:     Comments: Hoskins draining clear yellow urine.   Musculoskeletal:         General: Normal range of motion.      Cervical back: Normal range of motion.   Skin:     General: Skin is dry.   Neurological:      Mental Status: He is alert.   Psychiatric:         Behavior: Behavior normal.         Thought Content: Thought content normal.           Significant Labs:    BMP:  Recent Labs   Lab 10/04/23  0917 10/05/23  0424    137   K 4.3 4.5    108   CO2 22* 20*   BUN 16 15   CREATININE 0.9 1.3   CALCIUM 10.4 9.0       CBC:   Recent Labs   Lab 10/04/23  0917 10/05/23  0424   WBC 6.42 7.98   HGB 13.5* 11.1*   HCT 39.7* 34.2*    144*       All pertinent labs results from the past 24 hours have been reviewed.    Significant Imaging:  All pertinent imaging results/findings from the past 24 hours have been reviewed.

## 2023-10-05 NOTE — HPI
Gamaliel Pete . is a 73M with history of innumerable pulmonary masses and bone lesion followed by Dr. Ruby Turk outpatient, hypertension and gout who presents for right robotic nephrectomy with Urology.  He had initially presented with hematuria and was found to have a mass suspicious for renal cell carcinoma.  He underwent planned procedure with Dr. Michaels today. At time of my exam he is resting in no apparent distress has no complaints, pain well controlled

## 2023-10-05 NOTE — PLAN OF CARE
MSW met with the patient at the bedside. The patient's wife Mary is also at the bedside.     Patient is alert and oriented with no communication barriers.     Patient denies having DME at home.     Patients PCP is correct on the face sheet. Patient choice pharmacy is bedside delivery.     Patients' family will transport the patient home at discharge.     CM team will continue to follow.      10/05/23 8500   Discharge Assessment   Assessment Type Discharge Planning Assessment   Confirmed/corrected address, phone number and insurance Yes   Confirmed Demographics Correct on Facesheet   Source of Information patient;family;health record   People in Home significant other   Do you expect to return to your current living situation? Yes   Do you have help at home or someone to help you manage your care at home? Yes   Who are your caregiver(s) and their phone number(s)? wife/ Mary   Prior to hospitilization cognitive status: Alert/Oriented   Current cognitive status: Alert/Oriented   Equipment Currently Used at Home none   Readmission within 30 days? No   Patient currently being followed by outpatient case management? No   Do you currently have service(s) that help you manage your care at home? No   Do you take prescription medications? Yes   Do you have prescription coverage? No   Do you have any problems affording any of your prescribed medications? No   Is the patient taking medications as prescribed? yes   How do you get to doctors appointments? car, drives self;family or friend will provide   Are you on dialysis? No   Do you take coumadin? No   DME Needed Upon Discharge  none   Discharge Plan discussed with: Patient   Transition of Care Barriers None   Discharge Plan A Home with family   Discharge Plan B Home Health

## 2023-10-05 NOTE — ASSESSMENT & PLAN NOTE
Gamaliel ROSS Juan R Laureano. is an 73 y.o. male that is s/p Robotic right nephrectomy on 10/4/23. Doing well post-op.    - Pain - controlled  - Diet - tolerating diet  - OOB, ambulating  - Encourage IS  - DVT ppx   - Voiding Trial today    - Dispo: Discharge home after patient passes voiding trial.

## 2023-10-05 NOTE — HPI
Gamaliel ROSS Juan R Thomas is a 73M with history of pulmonary masses and bone lesion followed by Dr. Ruby Turk outpatient, hypertension and gout who presents for right robotic nephrectomy.  He had initially presented with significantly symptomatic hematuria and was found to have a mass suspicious for renal cell carcinoma.

## 2023-10-05 NOTE — NURSING
Plan of care reviewed with patient and spouse.  Due medications given.  Patient voided 125 ml post hyde removal.  Patient tolerating regular diet.  Patient ambulating in room.  Discharge orders noted.  IV's removed.  AVS printed and reviewed with patient and spouse.  Home medications delivered to bedside.

## 2023-10-05 NOTE — PLAN OF CARE
10/05/23 1005   Final Note   Assessment Type Final Discharge Note   Anticipated Discharge Disposition Home   Hospital Resources/Appts/Education Provided Provided patient/caregiver with written discharge plan information;Appointments scheduled and added to AVS   Post-Acute Status   Discharge Delays None known at this time

## 2023-10-05 NOTE — SUBJECTIVE & OBJECTIVE
Past Medical History:   Diagnosis Date    Asthma     no inhaler use    Borderline hypertension     ED (erectile dysfunction)     Gout     Hematuria     Hypertension        Past Surgical History:   Procedure Laterality Date    COLONOSCOPY  02/10/2022    COLONOSCOPY N/A 02/10/2022    Procedure: COLONOSCOPY;  Surgeon: Desmond Keenan MD;  Location: [facility];  Service: General;  Laterality: N/A;    TONSILLECTOMY         Review of patient's allergies indicates:  No Known Allergies    No current facility-administered medications on file prior to encounter.     Current Outpatient Medications on File Prior to Encounter   Medication Sig    amlodipine-benazepril 5-10 mg (LOTREL) 5-10 mg per capsule Take 1 capsule by mouth once daily.    indomethacin (INDOCIN) 50 MG capsule One p.o. t.i.d. p.r.n. gouty attack no more than 7 days--do not take with other NSAIDs    omega-3 fatty acids/fish oil (FISH OIL-OMEGA-3 FATTY ACIDS) 300-1,000 mg capsule Take 2 capsules by mouth once daily.    turmeric (CURCUMIN MISC) 1,000 mg by Misc.(Non-Drug; Combo Route) route Daily.    UNABLE TO FIND Take 500 mg by mouth 2 (two) times a day. medication name: Tudca    UNABLE TO FIND Take 2 capsules by mouth 2 (two) times a day. medication name: Turkey tail mushroom    UNABLE TO FIND Take 15 drops by mouth once daily. medication name: Essiac-20    UNABLE TO FIND Take 5 mLs by mouth 2 (two) times a day. medication name: barley leaf juice powder    UNABLE TO FIND Take 5 mLs by mouth 2 (two) times a day. medication name: black seed oil (cumin seed)    OLIVE OIL ORAL Take 15 mLs by mouth 2 (two) times a day.    sildenafiL (VIAGRA) 100 MG tablet Take 1 tablet (100 mg total) by mouth daily as needed for Erectile Dysfunction.     Family History    None       Tobacco Use    Smoking status: Never    Smokeless tobacco: Never   Substance and Sexual Activity    Alcohol use: Not Currently     Alcohol/week: 0.0 standard drinks of alcohol     Comment: rarely    Drug  use: Never    Sexual activity: Not on file     Review of Systems   Constitutional:  Negative for fever.   Respiratory:  Negative for cough and shortness of breath.    Cardiovascular:  Negative for chest pain.   Gastrointestinal:  Negative for abdominal pain and nausea.   Genitourinary:  Negative for difficulty urinating and dysuria.   Psychiatric/Behavioral:  Negative for agitation and confusion.      Objective:     Vital Signs (Most Recent):  Temp: 97.7 °F (36.5 °C) (10/04/23 1756)  Pulse: 74 (10/04/23 2011)  Resp: 20 (10/04/23 2011)  BP: (!) 151/68 (10/04/23 2011)  SpO2: 99 % (10/04/23 2011) Vital Signs (24h Range):  Temp:  [97.6 °F (36.4 °C)-98.1 °F (36.7 °C)] 97.7 °F (36.5 °C)  Pulse:  [64-81] 74  Resp:  [18-20] 20  SpO2:  [96 %-99 %] 99 %  BP: (142-174)/(65-77) 151/68     Weight: 104.6 kg (230 lb 9.6 oz)  Body mass index is 33.09 kg/m².     Physical Exam  Constitutional:       General: He is not in acute distress.     Appearance: He is well-developed. He is not ill-appearing or diaphoretic.   HENT:      Head: Normocephalic and atraumatic.   Cardiovascular:      Rate and Rhythm: Normal rate and regular rhythm.   Pulmonary:      Effort: Pulmonary effort is normal. No respiratory distress.   Abdominal:      General: There is no distension.      Palpations: Abdomen is soft.   Musculoskeletal:      Right lower leg: No edema.      Left lower leg: No edema.   Skin:     General: Skin is warm and dry.   Neurological:      General: No focal deficit present.      Mental Status: He is alert.   Psychiatric:         Mood and Affect: Mood normal.         Behavior: Behavior normal.          Significant Labs: All pertinent labs within the past 24 hours have been reviewed.    Significant Imaging: I have reviewed all pertinent imaging results/findings within the past 24 hours.

## 2023-10-05 NOTE — ASSESSMENT & PLAN NOTE
Concern for renal cell carcinoma   Right robotic nephrectomy done with Dr. [facility]aels 10/4  Management per primary  Follows with Dr. Turk outpatient

## 2023-10-06 ENCOUNTER — PATIENT OUTREACH (OUTPATIENT)
Dept: ADMINISTRATIVE | Facility: CLINIC | Age: 73
End: 2023-10-06
Payer: MEDICARE

## 2023-10-06 NOTE — PROGRESS NOTES
C3 nurse spoke with Gamaliel VANDANA Francoissamir Thomas's spouse Mary for a TCC post hospital discharge follow up call. The pts spouse denies any new symptoms. She had a question about when she could remove the pts dressings. C3 nurse messaged Jose Daniel Jones MD via SecureChat and he stated the dressingan be removed or exchanged whenever it is saturated. The pt verbalized understanding. The patient has a scheduled a HOSFU with Slava Lowery MD on 10/11/23 at 1440. No messages routed at this time.

## 2023-10-06 NOTE — DISCHARGE SUMMARY
[facility] ([address])  Urology  Discharge Summary      Patient Name: Gamaliel Pete Jr.  MRN: [id number]  Admission Date: 10/4/2023  Hospital Length of Stay: 1 days  Discharge Date and Time: 10/5/2023  4:49 PM  Attending Physician: Jose Daniel Jones MD    Discharging Provider: Jose Daniel Jones MD  Primary Care Physician: Slava Lowery MD    HPI:   Gamaliel Pete Jr. is a 73M with history of pulmonary masses and bone lesion followed by Dr. Ruby Turk outpatient, hypertension and gout who presents for right robotic nephrectomy.  He had initially presented with significantly symptomatic hematuria and was found to have a mass suspicious for renal cell carcinoma.     Procedure(s) (LRB):  ROBOTIC NEPHRECTOMY (Right)     Indwelling Lines/Drains at time of discharge:   Lines/Drains/Airways       None                   Hospital Course (synopsis of major diagnoses, care, treatment, and services provided during the course of the hospital stay):    CELESTINA PETE JR. 73 y.o.male underwent: Procedure(s) (LRB):  ROBOTIC NEPHRECTOMY (Right). The patient tolerated the procedure well, was transferred to recovery post-op, and then transferred to the floor for continuation of medical care. The patient's clinical condition progressively improved. By the time of discharge, he was tolerating a diet without nausea or vomiting, pain was well controlled with oral medications, and he was ambulating without difficulty. On POD 1 the patient was discharged to home. On discharge, the patient's incisions were c/d/i and the surgical site was soft and appropriately tender to palpation. Hoskins removed and VT passed. The patient will follow up in [facility] based on pathology results.      Medications and instructions as below.  For more thorough information, please refer to the hospital record and operative report.    Consults:     Significant Diagnostic Studies:     Pending Diagnostic Studies:       Procedure  Component Value Units Date/Time    Specimen to Pathology, Surgery Urology [[id number]] Collected: 10/04/23 1526    Order Status: Sent Lab Status: In process Updated: 10/04/23 1628    Specimen: Tissue             Final Active Diagnoses:    Diagnosis Date Noted POA    PRINCIPAL PROBLEM:  Renal mass [N28.89] 09/14/2023 Yes    Cancer with pulmonary metastases [C78.00] 09/14/2023 Yes    Iron deficiency anemia [D50.9] 09/14/2023 Yes    Borderline hypertension [R03.0] 04/01/2019 Yes      Problems Resolved During this Admission:       Discharged Condition: good    Disposition: Home or Self Care    Follow Up:   Follow-up Information       Henry [facility] MD Follow up in 2 week(s).    Specialty: Urology  Why: Post-op  Contact information:  [address] [place] [id number]  [phone]                           Patient Instructions:   No discharge procedures on file.  Medications:  Reconciled Home Medications:      Medication List        START taking these medications      acetaminophen 650 MG Tbsr  Commonly known as: TYLENOL  Take 1 tablet (650 mg total) by mouth every 8 (eight) hours. for 7 days     ibuprofen 800 MG tablet  Commonly known as: ADVIL,MOTRIN  Take 1 tablet (800 mg total) by mouth every 8 (eight) hours.     oxyCODONE 5 MG immediate release tablet  Commonly known as: ROXICODONE  Take 1 tablet (5 mg total) by mouth every 6 (six) hours as needed for Pain.            CONTINUE taking these medications      amlodipine-benazepril 5-10 mg 5-10 mg per capsule  Commonly known as: LOTREL  Take 1 capsule by mouth once daily.     CURCUMIN MISC  1,000 mg by Misc.(Non-Drug; Combo Route) route Daily.     fish oil-omega-3 fatty acids 300-1,000 mg capsule  Take 2 capsules by mouth once daily.     indomethacin 50 MG capsule  Commonly known as: INDOCIN  One p.o. t.i.d. p.r.n. gouty attack no more than 7 days--do not take with other NSAIDs     OLIVE OIL ORAL  Take 15 mLs by mouth 2 (two) times a day.     sildenafiL  100 MG tablet  Commonly known as: VIAGRA  Take 1 tablet (100 mg total) by mouth daily as needed for Erectile Dysfunction.     UNABLE TO FIND  Take 500 mg by mouth 2 (two) times a day. medication name: Tudca     UNABLE TO FIND  Take 2 capsules by mouth 2 (two) times a day. medication name: Turkey tail mushroom     UNABLE TO FIND  Take 15 drops by mouth once daily. medication name: Essiac-20     UNABLE TO FIND  Take 5 mLs by mouth 2 (two) times a day. medication name: barley leaf juice powder     UNABLE TO FIND  Take 5 mLs by mouth 2 (two) times a day. medication name: black seed oil (cumin seed)              Time spent on the discharge of patient: 15 minutes    Jose Daniel Jones MD  Urology  [facility] ([address])

## 2023-10-09 ENCOUNTER — TELEPHONE (OUTPATIENT)
Dept: HEMATOLOGY/ONCOLOGY | Facility: CLINIC | Age: 73
End: 2023-10-09
Payer: MEDICARE

## 2023-10-09 LAB
COMMENT: NORMAL
FINAL PATHOLOGIC DIAGNOSIS: NORMAL
GROSS: NORMAL
Lab: NORMAL

## 2023-10-09 NOTE — TELEPHONE ENCOUNTER
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  He is home and doing well following his nephrectomy    Do you still want the IR biopsy of the vertebral lesion or is this adequate to confirm the diagnosis of metastatic RCC?  The IR biopsy is scheduled for Friday    Thanks    Henry  ----- Message -----  From: Nate, [employer] [facility] Interface  Sent: 10/5/2023  10:30 PM CDT  To: Henry [facility] MD

## 2023-10-11 ENCOUNTER — OFFICE VISIT (OUTPATIENT)
Dept: PRIMARY CARE CLINIC | Facility: CLINIC | Age: 73
End: 2023-10-11
Payer: MEDICARE

## 2023-10-11 VITALS
BODY MASS INDEX: 32.71 KG/M2 | HEIGHT: 70 IN | OXYGEN SATURATION: 99 % | WEIGHT: 228.5 LBS | SYSTOLIC BLOOD PRESSURE: 132 MMHG | HEART RATE: 70 BPM | RESPIRATION RATE: 18 BRPM | DIASTOLIC BLOOD PRESSURE: 70 MMHG

## 2023-10-11 DIAGNOSIS — Z87.39 HISTORY OF GOUT: ICD-10-CM

## 2023-10-11 DIAGNOSIS — Z90.5 HISTORY OF RIGHT RADICAL NEPHRECTOMY: ICD-10-CM

## 2023-10-11 DIAGNOSIS — D50.0 IRON DEFICIENCY ANEMIA DUE TO CHRONIC BLOOD LOSS: ICD-10-CM

## 2023-10-11 DIAGNOSIS — C64.1 CLEAR CELL CARCINOMA OF RIGHT KIDNEY: ICD-10-CM

## 2023-10-11 DIAGNOSIS — E66.9 OBESITY (BMI 35.0-39.9 WITHOUT COMORBIDITY): ICD-10-CM

## 2023-10-11 DIAGNOSIS — R03.0 BORDERLINE HYPERTENSION: Primary | ICD-10-CM

## 2023-10-11 PROBLEM — M10.9 ACUTE GOUT OF LEFT ANKLE: Status: RESOLVED | Noted: 2019-04-01 | Resolved: 2023-10-11

## 2023-10-11 PROCEDURE — 3008F PR BODY MASS INDEX (BMI) DOCUMENTED: ICD-10-PCS | Mod: CPTII,S$GLB,, | Performed by: FAMILY MEDICINE

## 2023-10-11 PROCEDURE — 1159F PR MEDICATION LIST DOCUMENTED IN MEDICAL RECORD: ICD-10-PCS | Mod: CPTII,S$GLB,, | Performed by: FAMILY MEDICINE

## 2023-10-11 PROCEDURE — 3075F SYST BP GE 130 - 139MM HG: CPT | Mod: CPTII,S$GLB,, | Performed by: FAMILY MEDICINE

## 2023-10-11 PROCEDURE — 4010F ACE/ARB THERAPY RXD/TAKEN: CPT | Mod: CPTII,S$GLB,, | Performed by: FAMILY MEDICINE

## 2023-10-11 PROCEDURE — 1101F PR PT FALLS ASSESS DOC 0-1 FALLS W/OUT INJ PAST YR: ICD-10-PCS | Mod: CPTII,S$GLB,, | Performed by: FAMILY MEDICINE

## 2023-10-11 PROCEDURE — 3078F DIAST BP <80 MM HG: CPT | Mod: CPTII,S$GLB,, | Performed by: FAMILY MEDICINE

## 2023-10-11 PROCEDURE — 3288F FALL RISK ASSESSMENT DOCD: CPT | Mod: CPTII,S$GLB,, | Performed by: FAMILY MEDICINE

## 2023-10-11 PROCEDURE — 1101F PT FALLS ASSESS-DOCD LE1/YR: CPT | Mod: CPTII,S$GLB,, | Performed by: FAMILY MEDICINE

## 2023-10-11 PROCEDURE — 99214 OFFICE O/P EST MOD 30 MIN: CPT | Mod: S$GLB,,, | Performed by: FAMILY MEDICINE

## 2023-10-11 PROCEDURE — 1111F DSCHRG MED/CURRENT MED MERGE: CPT | Mod: CPTII,S$GLB,, | Performed by: FAMILY MEDICINE

## 2023-10-11 PROCEDURE — 1159F MED LIST DOCD IN RCRD: CPT | Mod: CPTII,S$GLB,, | Performed by: FAMILY MEDICINE

## 2023-10-11 PROCEDURE — 99999 PR PBB SHADOW E&M-EST. PATIENT-LVL III: ICD-10-PCS | Mod: PBBFAC,,, | Performed by: FAMILY MEDICINE

## 2023-10-11 PROCEDURE — 3008F BODY MASS INDEX DOCD: CPT | Mod: CPTII,S$GLB,, | Performed by: FAMILY MEDICINE

## 2023-10-11 PROCEDURE — 1126F AMNT PAIN NOTED NONE PRSNT: CPT | Mod: CPTII,S$GLB,, | Performed by: FAMILY MEDICINE

## 2023-10-11 PROCEDURE — 99214 PR OFFICE/OUTPT VISIT, EST, LEVL IV, 30-39 MIN: ICD-10-PCS | Mod: S$GLB,,, | Performed by: FAMILY MEDICINE

## 2023-10-11 PROCEDURE — 1111F PR DISCHARGE MEDS RECONCILED W/ CURRENT OUTPATIENT MED LIST: ICD-10-PCS | Mod: CPTII,S$GLB,, | Performed by: FAMILY MEDICINE

## 2023-10-11 PROCEDURE — 3288F PR FALLS RISK ASSESSMENT DOCUMENTED: ICD-10-PCS | Mod: CPTII,S$GLB,, | Performed by: FAMILY MEDICINE

## 2023-10-11 PROCEDURE — 4010F PR ACE/ARB THEARPY RXD/TAKEN: ICD-10-PCS | Mod: CPTII,S$GLB,, | Performed by: FAMILY MEDICINE

## 2023-10-11 PROCEDURE — 99999 PR PBB SHADOW E&M-EST. PATIENT-LVL III: CPT | Mod: PBBFAC,,, | Performed by: FAMILY MEDICINE

## 2023-10-11 PROCEDURE — 3075F PR MOST RECENT SYSTOLIC BLOOD PRESS GE 130-139MM HG: ICD-10-PCS | Mod: CPTII,S$GLB,, | Performed by: FAMILY MEDICINE

## 2023-10-11 PROCEDURE — 3078F PR MOST RECENT DIASTOLIC BLOOD PRESSURE < 80 MM HG: ICD-10-PCS | Mod: CPTII,S$GLB,, | Performed by: FAMILY MEDICINE

## 2023-10-11 PROCEDURE — 1126F PR PAIN SEVERITY QUANTIFIED, NO PAIN PRESENT: ICD-10-PCS | Mod: CPTII,S$GLB,, | Performed by: FAMILY MEDICINE

## 2023-10-11 NOTE — PROGRESS NOTES
Subjective:       Patient ID: Gamaliel Pete Jr. is a 73 y.o. male.    Chief Complaint: Follow-up (From surgery )    HPI:  73-year-old white male --came here had blood urine --sent Dr [facility]els---CT scan saw renal ---was bleeding on and off On 10-4-2023----bleeding really heavily --had right nephrectomy--Path report Clear cell renal cell carcinoma. Had spot on T9 was suppose be biopsied--2 spots one on each lung.   No further bleeding Hct decreased 40--39--34 had 2 iron infusions     ROS:  Skin: no psoriasis, eczema, skin cancer--  HEENT: No headache, ocular pain, blurred vision, diplopia, epistaxis, hoarseness change in voice, thyroid trouble  Lung: No pneumonia, asthma, Tb, wheezing, SOB, no smoking  Heart: No chest pain, ankle edema, palpitations, MI, bam murmur, +hypertension,no hyperlipidemia--no stent bypass arrhythmia  Abdomen: No nausea, vomiting, diarrhea, constipation, ulcers, hepatitis, gallbladder disease, melena, hematochezia, hematemesis  : no UTI, renal disease, stones prostate venereal disease--hx nephrectomy   MS: no fractures, O/A, lupus, rheumatoid, +gout--flares up on occasion but rarely--usually in ankle area used to be in the toe  Neuro: No dizziness, LOC, seizures   No diabetes, + allow after surgery nemia--, no anxiety, no depression  , one biological 2 stepchildren work retired lives with wife     Objective:   Physical Exam:  General: Well nourished, well developed, no acute distress +obesity  Skin: mephrectomy site covered with gauze--all over laparoscopic sites x 4 healing well   HEENT: Eyes PERRLA, EOM intact, nose patent, throat non-erythematous ears TMs clear  NECK: Supple, no bruits, No JVD, no nodes  Lungs: Clear, no rales, rhonchi, wheezing  Heart: Regular rate and rhythm, no murmurs, gallops, or rubs  Abdomen: flat, bowel sounds positive, no tenderness, or organomegaly  MS: ROM and MS intact   Neuro: Alert, CN intact, oriented X 3  Extremities: No cyanosis, clubbing,  or edema         Assessment:       1. Borderline hypertension    2. History of right radical nephrectomy    3. Clear cell carcinoma of right kidney    4. Iron deficiency anemia due to chronic blood loss    5. History of gout    6. Obesity (BMI 35.0-39.9 without comorbidity)        Plan:       Borderline hypertension    History of right radical nephrectomy    Clear cell carcinoma of right kidney  -     CBC Auto Differential; Future; Expected date: 10/11/2023  -     Comprehensive Metabolic Panel; Future; Expected date: 10/11/2023    Iron deficiency anemia due to chronic blood loss    History of gout    Obesity (BMI 35.0-39.9 without comorbidity)        Reason for visit annual physical with refills  Appt Dr Michaels Oct 23 rd check incision sites  Appt Mon 10- with Hem Onc   Surgery right nephrectomy [id number]   Hx anemia given 2 iron infusions   Patient has a possible lesion at T9 concern for metastatic disease supposed to have a bone biopsy--also had 2 lesions 1 in each lung  -- that were going to be discussed with heme Onc   hypertension blood pressure 132/70  History of gout no recent flairs   Tinea corporis popliteal area left knee--Lotrisone cream b.i.d. Diflucan 1 p.o. once a week x4  Obesity--exercise try to get ideal body weight   lab CBCs CMP after seeing hem onc in case wants additional tests  Health maintenance COVID tetanus shingles pneumococcal flu

## 2023-10-16 ENCOUNTER — OFFICE VISIT (OUTPATIENT)
Dept: HEMATOLOGY/ONCOLOGY | Facility: CLINIC | Age: 73
End: 2023-10-16
Payer: MEDICARE

## 2023-10-16 VITALS
TEMPERATURE: 98 F | WEIGHT: 224.19 LBS | HEIGHT: 70 IN | OXYGEN SATURATION: 97 % | DIASTOLIC BLOOD PRESSURE: 71 MMHG | SYSTOLIC BLOOD PRESSURE: 132 MMHG | RESPIRATION RATE: 18 BRPM | HEART RATE: 75 BPM | BODY MASS INDEX: 32.1 KG/M2

## 2023-10-16 DIAGNOSIS — C78.00 MALIGNANT NEOPLASM METASTATIC TO LUNG, UNSPECIFIED LATERALITY: Primary | ICD-10-CM

## 2023-10-16 DIAGNOSIS — C64.9 METASTATIC RENAL CELL CARCINOMA, UNSPECIFIED LATERALITY: ICD-10-CM

## 2023-10-16 DIAGNOSIS — C79.51 METASTASIS TO BONE: ICD-10-CM

## 2023-10-16 PROCEDURE — 99999 PR PBB SHADOW E&M-EST. PATIENT-LVL IV: ICD-10-PCS | Mod: PBBFAC,,, | Performed by: INTERNAL MEDICINE

## 2023-10-16 PROCEDURE — 1111F DSCHRG MED/CURRENT MED MERGE: CPT | Mod: CPTII,S$GLB,, | Performed by: INTERNAL MEDICINE

## 2023-10-16 PROCEDURE — 1101F PR PT FALLS ASSESS DOC 0-1 FALLS W/OUT INJ PAST YR: ICD-10-PCS | Mod: CPTII,S$GLB,, | Performed by: INTERNAL MEDICINE

## 2023-10-16 PROCEDURE — 1160F RVW MEDS BY RX/DR IN RCRD: CPT | Mod: CPTII,S$GLB,, | Performed by: INTERNAL MEDICINE

## 2023-10-16 PROCEDURE — 3078F PR MOST RECENT DIASTOLIC BLOOD PRESSURE < 80 MM HG: ICD-10-PCS | Mod: CPTII,S$GLB,, | Performed by: INTERNAL MEDICINE

## 2023-10-16 PROCEDURE — 3078F DIAST BP <80 MM HG: CPT | Mod: CPTII,S$GLB,, | Performed by: INTERNAL MEDICINE

## 2023-10-16 PROCEDURE — 3075F SYST BP GE 130 - 139MM HG: CPT | Mod: CPTII,S$GLB,, | Performed by: INTERNAL MEDICINE

## 2023-10-16 PROCEDURE — 99214 OFFICE O/P EST MOD 30 MIN: CPT | Mod: S$GLB,,, | Performed by: INTERNAL MEDICINE

## 2023-10-16 PROCEDURE — 3008F BODY MASS INDEX DOCD: CPT | Mod: CPTII,S$GLB,, | Performed by: INTERNAL MEDICINE

## 2023-10-16 PROCEDURE — 1160F PR REVIEW ALL MEDS BY PRESCRIBER/CLIN PHARMACIST DOCUMENTED: ICD-10-PCS | Mod: CPTII,S$GLB,, | Performed by: INTERNAL MEDICINE

## 2023-10-16 PROCEDURE — 1126F AMNT PAIN NOTED NONE PRSNT: CPT | Mod: CPTII,S$GLB,, | Performed by: INTERNAL MEDICINE

## 2023-10-16 PROCEDURE — 3075F PR MOST RECENT SYSTOLIC BLOOD PRESS GE 130-139MM HG: ICD-10-PCS | Mod: CPTII,S$GLB,, | Performed by: INTERNAL MEDICINE

## 2023-10-16 PROCEDURE — 4010F PR ACE/ARB THEARPY RXD/TAKEN: ICD-10-PCS | Mod: CPTII,S$GLB,, | Performed by: INTERNAL MEDICINE

## 2023-10-16 PROCEDURE — 3008F PR BODY MASS INDEX (BMI) DOCUMENTED: ICD-10-PCS | Mod: CPTII,S$GLB,, | Performed by: INTERNAL MEDICINE

## 2023-10-16 PROCEDURE — 3288F FALL RISK ASSESSMENT DOCD: CPT | Mod: CPTII,S$GLB,, | Performed by: INTERNAL MEDICINE

## 2023-10-16 PROCEDURE — 1126F PR PAIN SEVERITY QUANTIFIED, NO PAIN PRESENT: ICD-10-PCS | Mod: CPTII,S$GLB,, | Performed by: INTERNAL MEDICINE

## 2023-10-16 PROCEDURE — 99214 PR OFFICE/OUTPT VISIT, EST, LEVL IV, 30-39 MIN: ICD-10-PCS | Mod: S$GLB,,, | Performed by: INTERNAL MEDICINE

## 2023-10-16 PROCEDURE — 4010F ACE/ARB THERAPY RXD/TAKEN: CPT | Mod: CPTII,S$GLB,, | Performed by: INTERNAL MEDICINE

## 2023-10-16 PROCEDURE — 1159F PR MEDICATION LIST DOCUMENTED IN MEDICAL RECORD: ICD-10-PCS | Mod: CPTII,S$GLB,, | Performed by: INTERNAL MEDICINE

## 2023-10-16 PROCEDURE — 1101F PT FALLS ASSESS-DOCD LE1/YR: CPT | Mod: CPTII,S$GLB,, | Performed by: INTERNAL MEDICINE

## 2023-10-16 PROCEDURE — 1111F PR DISCHARGE MEDS RECONCILED W/ CURRENT OUTPATIENT MED LIST: ICD-10-PCS | Mod: CPTII,S$GLB,, | Performed by: INTERNAL MEDICINE

## 2023-10-16 PROCEDURE — 99999 PR PBB SHADOW E&M-EST. PATIENT-LVL IV: CPT | Mod: PBBFAC,,, | Performed by: INTERNAL MEDICINE

## 2023-10-16 PROCEDURE — 3288F PR FALLS RISK ASSESSMENT DOCUMENTED: ICD-10-PCS | Mod: CPTII,S$GLB,, | Performed by: INTERNAL MEDICINE

## 2023-10-16 PROCEDURE — 1159F MED LIST DOCD IN RCRD: CPT | Mod: CPTII,S$GLB,, | Performed by: INTERNAL MEDICINE

## 2023-10-16 NOTE — PROGRESS NOTES
Subjective     Patient ID: Gamaliel Pete Jr. is a 73 y.o. male.    Chief Complaint: Follow-up    HPI    Here for follow up  Notes diet changes-- no sugar  Using supplements    Recovered well from his surgery- minimal pain    Diagnosis: metastatic RCC     In the interval: required with hematuria as increased significantly    - 10/4/2023 Robotic right nephrectomy  Pathology:  RIGHT KIDNEY, TOTAL NEPHRECTOMY:   - Clear cell renal cell carcinoma, ISUP grade 4, 5.5 cm.   - Benign cortical cysts.   - See CAP synoptic report below.   SURGICAL PATHOLOGY CANCER CASE SUMMARY   Procedure: Total nephrectomy.   Specimen laterality: Right.   Tumor size: 5.5 cm.   Tumor focality: Unifocal.   Histologic type: Clear cell renal cell carcinoma.   Sarcomatoid features: Present, 5%.   Rhabdoid features: Not identified.   Histologic Grade (ISUP Grade): 4.   Tumor necrosis: Present, 20%.   Tumor extension: Extends into pelvicalyceal system and renal sinus fat.   Margins: Uninvolved.   Lymphovascular invasion (excluding renal vein and its segmental branches): Not identified.   Regional lymph nodes: No lymph nodes submitted or found.   Distant metastases: Not applicable in this specimen.   Pathologic stage (pTNM, AJCC 8th edition): pT3a pN not assigned (no lymph nodes submitted or found).      Oncology History:  - Presented with gross hematuria mid June 2023  Building a house and has been active working on this in the [facility]- 1st noted dark urine and felt related to being dehydrated  Occurred a 2nd time approximately 2 weeks later and this time he noted darker and small clots  Noted again 2 weeks later and worse but ultimately he was prompted to seek evaluation when he had significant blood when he urinated     - went to his PCP and urine sample was yellow again  He had blood work done and referred to Urology at that time     - 9/5/2023 CT Urogram:  FINDINGS:  The lung bases demonstrate bilateral nodules, for example 9 mm at the right  lung base and up to 11 mm at the left lung base.  Atelectasis present.  There are bilateral fat containing inguinal hernias.  The liver demonstrates no mass.  The spleen is not enlarged.  The stomach, pancreas and adrenal glands are within normal limits.  Gallbladder is unremarkable.  There is no biliary ductal dilatation.  The kidneys concentrate and excrete contrast satisfactorily.  There is no renal calculus or ureteral calculus.  Within the mid to lower pole of the right kidney is a 4.9 x 6.3 x 6.1 cm heterogeneous solid mass which is overall slightly hypodense in relation to the enhancing parenchyma.  The mass is partially exophytic posteriorly.  It does extend partially into the renal sinus  At the upper pole of the right kidney is a subcentimeter exophytic focus which is isodense to the parenchyma on postcontrast imaging appearing slightly hyperdense on the noncontrast study, too small to characterize.  There are multiple additional subcentimeter hypodense right kidney foci which are suggestive of cysts.  Finally at the lower pole of the right kidney is a exophytic hypodense structure most compatible with a cyst.  The right main renal vein appears patent.  There is no significant periaortic lymphadenopathy.  Left kidney demonstrates several subcentimeter foci which are hypodense but too small to characterize, suggestive of cysts.  There is somewhat of a small amount of contrast within the upper collecting systems and ureters, with no definite focal abnormality, noting that there is some distortion of the collecting system in the region in which the right kidney mass involves the renal sinus.  The bladder is partially distended appearing grossly unremarkable.  The bowel demonstrates no significant abnormality.  The osseous structures demonstrate degenerative changes.  T9 demonstrates a lytic focus occupying the anterior half of the vertebral body.  There is slight loss of height along superior and inferior  endplates with cortical disruption..  Impression:  Solid right renal mass concerning for neoplasm.  Multiple lung base nodules up to 11 mm, concerning for metastatic disease.  Recommend chest CT for full evaluation of lungs.  Lytic lesion at T9 with mild compression, concerning for pathologic compression fracture.  Subcentimeter right kidney lesion isodense to parenchyma on contrast enhanced imaging, too small to characterize.  Additional bilateral renal hypodensities which are too small to characterize but suggestive of cysts.     - 9/7/2023 CT Chest:  FINDINGS:  The base of the neck is within normal limits.  The thyroid gland is unremarkable.  The supraclavicular regions are within normal limits.  The trachea is unremarkable.  The central airways are within normal limits.  No endobronchial lesion is identified.  There is no evidence of bronchiectasis.  The heart is unremarkable.  There are no pericardial effusions.  There are coronary artery calcifications.  The thoracic aorta is normal in caliber.  There are subcentimeter mediastinal and hilar lymph nodes.  There are subcentimeter axillary lymph nodes.  There are no pleural effusions.  There is no evidence of a pneumothorax.  There is no evidence of pneumomediastinum.  There are innumerable bilateral pulmonary nodules.  There is no focal consolidation.  The esophagus is unremarkable.  Please see the dedicated CT abdomen pelvis for the upper abdominal findings.  The chest wall is unremarkable.  There is unchanged lytic lesion involving the anterior aspect of T9 vertebral body with associated cortical erosions.  Impression:  Innumerable pulmonary nodules, concerning for metastatic disease to the chest.  Unchanged lytic lesion in the T9 vertebral body with cortical erosions.  Follow-up MRI of the spine, as clinically warranted.     - 9/7/2023 CT Abd:  FINDINGS:  CT renal protocol:  There is a 4.8 x 6.3 cm exophytic enhancing lesion in the lower pole of the right  kidney.  There is hypoenhancement of the lesion compared to the normal renal parenchyma.  There is unchanged appearance of the mass extending into the right renal sinus.  There is no extension into the right renal vein  No additional enhancing lesions are identified.  There is a subcentimeter lesion in the right kidney is too small complete characterization.  There is prompt excretion from both collecting systems.  There is incomplete distension of the right distal ureter.  No definitive filling defect is identified within the.  The urinary bladder is unremarkable.  CT abdomen pelvis:  There is unchanged appearance of multiple pulmonary nodules in the lung bases.  No new nodules identified  The heart is unremarkable.  There is normal tapering of the abdominal aorta.  There are single bilateral renal arteries.  The renal veins remain patent.  There is no evidence of lymphadenopathy.  The esophagus, stomach, and duodenum are within normal limits.  The small bowel loops are unremarkable.  The appendix is not visualized.  There are no secondary findings of acute appendicitis.  There is colonic diverticula without evidence of acute diverticulitis.  The liver is unremarkable.  The gallbladder is within normal limits.  The biliary tree is within normal limits.  The spleen is unremarkable.  The pancreas is within normal limits.  The adrenal glands are unremarkable.  There is no evidence of free fluid in the abdomen or pelvis.  There is no evidence of free air.  There is no evidence of pneumatosis.  No portal venous air is identified.  The psoas margins are unremarkable.  There are bilateral fat containing inguinal hernias.  There are degenerative changes in the osseous structures.  There is unchanged appearance of a lytic lesion involving the anterior aspect of the T9 vertebral body with associated cortical erosions.  Impression:  Unchanged 4.8 x 6.3 cm exophytic hypoenhancing lesion in the lower pole of the right kidney,  concerning for renal cell carcinoma.  Extension of lesion into the renal sinus.  No evidence of renal vein invasion.  No regional lymphadenopathy.  Additional smaller subcentimeter lesion too small complete characterization in the right kidney.  Bilateral pulmonary nodules remain concerning for metastatic disease.  Lytic lesion along the anterior aspect of the T9 vertebral body, also concerning for osseous metastatic disease.  Additional findings as above.     - 9/20/2023 Bone scan:  FINDINGS:  There is physiologic distribution of the radiopharmaceutical throughout the skeleton.  Focal uptake in the T9 vertebral body corresponding to lytic lesion seen on CT 09/05/2023.  Focal uptake in the right kidney in patient with known right renal mass.  There is otherwise normal uptake in the genitourinary system and soft tissues.  Impression:  Focal uptake in the T9 vertebral body corresponding to lytic lesion seen on prior CT and concerning for metastatic disease.  Additional focus of increased uptake in the right kidney in patient with known right renal mass    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  - fractured collar bone  - Pediatric hernia          Active Ambulatory Problems     Diagnosis Date Noted    Obesity (BMI 35.0-39.9 without comorbidity) 04/01/2019    Borderline hypertension 04/01/2019    Acute gout of left ankle 04/01/2019    Onychomycosis 04/01/2019    History of gout 06/02/2021    Seborrheic keratosis 06/02/2021    Tinea corporis 12/07/2021    Positive colorectal cancer screening using Cologuard test 12/07/2021           Resolved Ambulatory Problems     Diagnosis Date Noted    No Resolved Ambulatory Problems           Past Medical History:   Diagnosis Date    Asthma      ED (erectile dysfunction)      Gout        SH:  Retired from  [employer]    1 child, 2 stepchildren  Prior tobacco- teens, early 20s  Social EtOH     FH:  Maternal grandmother-  at 91- she had prior cancers- colon cancer and breast cancer  Mother - liver cancer - prior blood transfusion Hep C-  at age 77 yo  Father-  in his 50s- EtOH cirrhosis  Paternal grandfather- cancer in his 70s, unclear type  Maternal grandfather- H+N cancer-  (smoker)    Review of Systems   Constitutional:  Negative for activity change, appetite change, fatigue and unexpected weight change.   Respiratory:  Negative for cough, shortness of breath and wheezing.    Cardiovascular:  Negative for chest pain, palpitations and leg swelling.   Gastrointestinal:  Negative for abdominal distention, abdominal pain, change in bowel habit, constipation, diarrhea, nausea, vomiting and reflux.   Genitourinary:  Negative for decreased urine volume, difficulty urinating, dysuria, frequency and hematuria.   Musculoskeletal:  Negative for arthralgias.   Neurological:  Negative for dizziness, weakness, numbness and headaches.   Psychiatric/Behavioral:  Negative for dysphoric mood. The patient is not nervous/anxious.           Objective     Physical Exam  Vitals and nursing note reviewed.   Constitutional:       General: He is not in acute distress.     Appearance: Normal appearance. He is well-developed. He is obese. He is not ill-appearing.      Comments: Present with his wife  Overweight   Very pleasant   HENT:      Head: Normocephalic and atraumatic.   Eyes:      Extraocular Movements: Extraocular movements intact.      Conjunctiva/sclera: Conjunctivae normal.      Pupils: Pupils are equal, round, and reactive to light.   Neck:      Thyroid: No thyromegaly.   Cardiovascular:      Rate and Rhythm: Normal rate and regular rhythm.      Heart sounds: Normal heart sounds. No murmur heard.     No friction rub. No gallop.   Pulmonary:      Effort: Pulmonary effort is normal. No respiratory distress.      Breath  sounds: Normal breath sounds. No wheezing or rhonchi.   Abdominal:      General: Abdomen is flat. Bowel sounds are normal. There is no distension.      Palpations: Abdomen is soft. There is no mass.      Tenderness: There is no abdominal tenderness. There is no guarding or rebound.   Musculoskeletal:         General: No swelling, tenderness or deformity. Normal range of motion.      Cervical back: Normal range of motion and neck supple.      Right lower leg: No edema.      Left lower leg: No edema.   Lymphadenopathy:      Cervical: No cervical adenopathy.   Skin:     General: Skin is warm and dry.      Coloration: Skin is not jaundiced or pale.      Findings: No erythema, lesion or rash.   Neurological:      General: No focal deficit present.      Mental Status: He is alert and oriented to person, place, and time.      Motor: No weakness.      Coordination: Coordination normal.      Gait: Gait normal.   Psychiatric:         Mood and Affect: Mood normal.         Behavior: Behavior normal.         Thought Content: Thought content normal.         Judgment: Judgment normal.       Labs- reviewed     Assessment and Plan     1. Malignant neoplasm metastatic to lung, unspecified laterality    2. Metastasis to bone    3. Metastatic renal cell carcinoma, unspecified laterality        Metastatic RCC   We will re-image for new baseline and then start systemic therapy    Route Chart for Scheduling    Med Onc Chart Routing      Follow up with physician . Ct scans nex week- virtual post   Follow up with [facility]    Infusion scheduling note    Injection scheduling note    Labs    Imaging    Pharmacy appointment    Other referrals                    Therapy Plan Information  EPINEPHrine (EPIPEN) 0.3 mg/0.3 mL pen injection 0.3 mg  0.3 mg, Intramuscular, PRN  diphenhydrAMINE injection 50 mg  50 mg, Intravenous, PRN  hydrocortisone sodium succinate injection 100 mg  100 mg, Intravenous, PRN

## 2023-10-18 ENCOUNTER — PATIENT MESSAGE (OUTPATIENT)
Dept: CARDIOLOGY | Facility: CLINIC | Age: 73
End: 2023-10-18
Payer: MEDICARE

## 2023-10-22 ENCOUNTER — HOSPITAL ENCOUNTER (EMERGENCY)
Facility: HOSPITAL | Age: 73
Discharge: HOME | End: 2023-10-23
Attending: STUDENT IN AN ORGANIZED HEALTH CARE EDUCATION/TRAINING PROGRAM
Payer: MEDICARE

## 2023-10-22 ENCOUNTER — NURSE TRIAGE (OUTPATIENT)
Dept: ADMINISTRATIVE | Facility: CLINIC | Age: 73
End: 2023-10-22
Payer: MEDICARE

## 2023-10-22 DIAGNOSIS — K59.00 CONSTIPATION, UNSPECIFIED CONSTIPATION TYPE: ICD-10-CM

## 2023-10-22 DIAGNOSIS — Z90.5 H/O RIGHT NEPHRECTOMY: ICD-10-CM

## 2023-10-22 DIAGNOSIS — R79.89 ELEVATED LFTS: ICD-10-CM

## 2023-10-22 DIAGNOSIS — M54.9 RIGHT-SIDED BACK PAIN: Primary | ICD-10-CM

## 2023-10-22 DIAGNOSIS — R10.9 RIGHT FLANK PAIN: ICD-10-CM

## 2023-10-22 LAB
ALBUMIN SERPL BCP-MCNC: 4.3 G/DL (ref 3.5–5.2)
ALP SERPL-CCNC: 81 U/L (ref 55–135)
ALT SERPL W/O P-5'-P-CCNC: 414 U/L (ref 10–44)
ANION GAP SERPL CALC-SCNC: 9 MMOL/L (ref 8–16)
AST SERPL-CCNC: 117 U/L (ref 10–40)
BACTERIA #/AREA URNS AUTO: NORMAL /HPF
BASOPHILS # BLD AUTO: 0.07 K/UL (ref 0–0.2)
BASOPHILS NFR BLD: 0.9 % (ref 0–1.9)
BILIRUB SERPL-MCNC: 0.4 MG/DL (ref 0.1–1)
BILIRUB UR QL STRIP: NEGATIVE
BUN SERPL-MCNC: 23 MG/DL (ref 8–23)
BUN SERPL-MCNC: 24 MG/DL (ref 6–30)
CALCIUM SERPL-MCNC: 10.4 MG/DL (ref 8.7–10.5)
CHLORIDE SERPL-SCNC: 106 MMOL/L (ref 95–110)
CHLORIDE SERPL-SCNC: 109 MMOL/L (ref 95–110)
CLARITY UR REFRACT.AUTO: CLEAR
CO2 SERPL-SCNC: 21 MMOL/L (ref 23–29)
COLOR UR AUTO: YELLOW
CREAT SERPL-MCNC: 1.6 MG/DL (ref 0.5–1.4)
CREAT SERPL-MCNC: 1.7 MG/DL (ref 0.5–1.4)
DIFFERENTIAL METHOD: ABNORMAL
EOSINOPHIL # BLD AUTO: 0.4 K/UL (ref 0–0.5)
EOSINOPHIL NFR BLD: 4.6 % (ref 0–8)
ERYTHROCYTE [DISTWIDTH] IN BLOOD BY AUTOMATED COUNT: 14.2 % (ref 11.5–14.5)
EST. GFR  (NO RACE VARIABLE): 45.8 ML/MIN/1.73 M^2
GLUCOSE SERPL-MCNC: 94 MG/DL (ref 70–110)
GLUCOSE SERPL-MCNC: 98 MG/DL (ref 70–110)
GLUCOSE UR QL STRIP: NEGATIVE
HCT VFR BLD AUTO: 38.6 % (ref 40–54)
HCT VFR BLD CALC: 39 %PCV (ref 36–54)
HCV AB SERPL QL IA: NORMAL
HGB BLD-MCNC: 13.1 G/DL (ref 14–18)
HGB UR QL STRIP: ABNORMAL
HIV 1+2 AB+HIV1 P24 AG SERPL QL IA: NORMAL
IMM GRANULOCYTES # BLD AUTO: 0.04 K/UL (ref 0–0.04)
IMM GRANULOCYTES NFR BLD AUTO: 0.5 % (ref 0–0.5)
KETONES UR QL STRIP: NEGATIVE
LEUKOCYTE ESTERASE UR QL STRIP: NEGATIVE
LYMPHOCYTES # BLD AUTO: 1.7 K/UL (ref 1–4.8)
LYMPHOCYTES NFR BLD: 21.6 % (ref 18–48)
MCH RBC QN AUTO: 30.4 PG (ref 27–31)
MCHC RBC AUTO-ENTMCNC: 33.9 G/DL (ref 32–36)
MCV RBC AUTO: 90 FL (ref 82–98)
MICROSCOPIC COMMENT: NORMAL
MONOCYTES # BLD AUTO: 0.7 K/UL (ref 0.3–1)
MONOCYTES NFR BLD: 8.2 % (ref 4–15)
NEUTROPHILS # BLD AUTO: 5.2 K/UL (ref 1.8–7.7)
NEUTROPHILS NFR BLD: 64.2 % (ref 38–73)
NITRITE UR QL STRIP: NEGATIVE
NRBC BLD-RTO: 0 /100 WBC
PH UR STRIP: 5 [PH] (ref 5–8)
PLATELET # BLD AUTO: 198 K/UL (ref 150–450)
PMV BLD AUTO: 9 FL (ref 9.2–12.9)
POC IONIZED CALCIUM: 1.28 MMOL/L (ref 1.06–1.42)
POC TCO2 (MEASURED): 23 MMOL/L (ref 23–27)
POTASSIUM BLD-SCNC: 4.4 MMOL/L (ref 3.5–5.1)
POTASSIUM SERPL-SCNC: 4.5 MMOL/L (ref 3.5–5.1)
PROT SERPL-MCNC: 8.4 G/DL (ref 6–8.4)
PROT UR QL STRIP: NEGATIVE
RBC # BLD AUTO: 4.31 M/UL (ref 4.6–6.2)
RBC #/AREA URNS AUTO: 3 /HPF (ref 0–4)
SAMPLE: ABNORMAL
SODIUM BLD-SCNC: 139 MMOL/L (ref 136–145)
SODIUM SERPL-SCNC: 139 MMOL/L (ref 136–145)
SP GR UR STRIP: 1.01 (ref 1–1.03)
SQUAMOUS #/AREA URNS AUTO: 0 /HPF
URN SPEC COLLECT METH UR: ABNORMAL
WBC # BLD AUTO: 8.06 K/UL (ref 3.9–12.7)
WBC #/AREA URNS AUTO: 1 /HPF (ref 0–5)

## 2023-10-22 PROCEDURE — 99285 EMERGENCY DEPT VISIT HI MDM: CPT | Mod: 25

## 2023-10-22 PROCEDURE — 81001 URINALYSIS AUTO W/SCOPE: CPT | Performed by: PHYSICIAN ASSISTANT

## 2023-10-22 PROCEDURE — 63600175 PHARM REV CODE 636 W HCPCS: Performed by: PHYSICIAN ASSISTANT

## 2023-10-22 PROCEDURE — 87389 HIV-1 AG W/HIV-1&-2 AB AG IA: CPT | Performed by: PHYSICIAN ASSISTANT

## 2023-10-22 PROCEDURE — 96375 TX/PRO/DX INJ NEW DRUG ADDON: CPT

## 2023-10-22 PROCEDURE — 96374 THER/PROPH/DIAG INJ IV PUSH: CPT

## 2023-10-22 PROCEDURE — 86803 HEPATITIS C AB TEST: CPT | Performed by: PHYSICIAN ASSISTANT

## 2023-10-22 PROCEDURE — 80053 COMPREHEN METABOLIC PANEL: CPT | Performed by: PHYSICIAN ASSISTANT

## 2023-10-22 PROCEDURE — 85025 COMPLETE CBC W/AUTO DIFF WBC: CPT | Performed by: PHYSICIAN ASSISTANT

## 2023-10-22 PROCEDURE — 96376 TX/PRO/DX INJ SAME DRUG ADON: CPT

## 2023-10-22 PROCEDURE — 25000003 PHARM REV CODE 250: Performed by: PHYSICIAN ASSISTANT

## 2023-10-22 PROCEDURE — 80048 BASIC METABOLIC PNL TOTAL CA: CPT | Mod: XB

## 2023-10-22 PROCEDURE — 96361 HYDRATE IV INFUSION ADD-ON: CPT

## 2023-10-22 PROCEDURE — 25500020 PHARM REV CODE 255: Performed by: STUDENT IN AN ORGANIZED HEALTH CARE EDUCATION/TRAINING PROGRAM

## 2023-10-22 RX ORDER — ONDANSETRON 2 MG/ML
4 INJECTION INTRAMUSCULAR; INTRAVENOUS
Status: COMPLETED | OUTPATIENT
Start: 2023-10-22 | End: 2023-10-22

## 2023-10-22 RX ORDER — MORPHINE SULFATE 4 MG/ML
4 INJECTION, SOLUTION INTRAMUSCULAR; INTRAVENOUS
Status: COMPLETED | OUTPATIENT
Start: 2023-10-22 | End: 2023-10-22

## 2023-10-22 RX ORDER — HYDROMORPHONE HYDROCHLORIDE 1 MG/ML
0.5 INJECTION, SOLUTION INTRAMUSCULAR; INTRAVENOUS; SUBCUTANEOUS
Status: COMPLETED | OUTPATIENT
Start: 2023-10-22 | End: 2023-10-22

## 2023-10-22 RX ORDER — METHOCARBAMOL 500 MG/1
1000 TABLET, FILM COATED ORAL 2 TIMES DAILY PRN
Qty: 20 TABLET | Refills: 0 | Status: SHIPPED | OUTPATIENT
Start: 2023-10-22 | End: 2023-10-27 | Stop reason: SDUPTHER

## 2023-10-22 RX ORDER — METHOCARBAMOL 500 MG/1
500 TABLET, FILM COATED ORAL
Status: COMPLETED | OUTPATIENT
Start: 2023-10-22 | End: 2023-10-22

## 2023-10-22 RX ORDER — ACETAMINOPHEN 500 MG
1000 TABLET ORAL
Status: COMPLETED | OUTPATIENT
Start: 2023-10-22 | End: 2023-10-22

## 2023-10-22 RX ADMIN — METHOCARBAMOL 500 MG: 500 TABLET ORAL at 03:10

## 2023-10-22 RX ADMIN — SODIUM CHLORIDE, POTASSIUM CHLORIDE, SODIUM LACTATE AND CALCIUM CHLORIDE 1000 ML: 600; 310; 30; 20 INJECTION, SOLUTION INTRAVENOUS at 05:10

## 2023-10-22 RX ADMIN — ACETAMINOPHEN 1000 MG: 500 TABLET ORAL at 03:10

## 2023-10-22 RX ADMIN — ONDANSETRON 4 MG: 2 INJECTION INTRAMUSCULAR; INTRAVENOUS at 11:10

## 2023-10-22 RX ADMIN — HYDROMORPHONE HYDROCHLORIDE 0.5 MG: 1 INJECTION, SOLUTION INTRAMUSCULAR; INTRAVENOUS; SUBCUTANEOUS at 11:10

## 2023-10-22 RX ADMIN — MORPHINE SULFATE 4 MG: 4 INJECTION INTRAVENOUS at 05:10

## 2023-10-22 RX ADMIN — IOHEXOL 100 ML: 350 INJECTION, SOLUTION INTRAVENOUS at 04:10

## 2023-10-22 RX ADMIN — ONDANSETRON 4 MG: 2 INJECTION INTRAMUSCULAR; INTRAVENOUS at 05:10

## 2023-10-22 NOTE — ED NOTES
I-STAT Chem-8+ Results:   Value Reference Range   Sodium 139 136-145 mmol/L   Potassium  4.4 3.5-5.1 mmol/L   Chloride 106  mmol/L   Ionized Calcium 1.28 1.06-1.42 mmol/L   CO2 (measured) 23 23-29 mmol/L   Glucose 98  mg/dL   BUN 24 6-30 mg/dL   Creatinine 1.7 0.5-1.4 mg/dL   Hematocrit 39 36-54%

## 2023-10-22 NOTE — ED NOTES
Patient identifiers verified and correct for  Mr Pete  C/C:  Back pain SEE NN  APPEARANCE: awake and alert in NAD. PAIN  10/10  SKIN: warm, dry and intact. No breakdown or bruising.  MUSCULOSKELETAL: Patient moving all extremities spontaneously, no obvious swelling or deformities noted. Ambulates independently.  RESPIRATORY: Denies shortness of breath.Respirations unlabored.   CARDIAC: Denies CP, 2+ distal pulses; no peripheral edema  ABDOMEN: S/ND/NT, Denies nausea  : voids spontaneously, denies difficulty  Neurologic: AAO x 4; follows commands equal strength in all extremities; denies numbness/tingling. Denies dizziness  Patient states right middle back pain,

## 2023-10-22 NOTE — PROVIDER PROGRESS NOTES - EMERGENCY DEPT.
"Encounter Date: 10/22/2023    ED Physician Progress Notes          Patient signed out to me by my colleague with instructions to follow-up pending work-up. Please see main ED note for previous ED stay documentation. Patient signed out to me with CT ab/pelv pending.    CT ab/pelv showing:  "Postoperative changes of right nephrectomy for clear cell carcinoma.  No enhancing mass or abscess within the nephrectomy bed.   Stable bilateral pulmonary nodules and stable T9 lytic lesion, concerning for metastatic disease."     Labs reviewed. Crt elevated 1.6, most recently 1.3 from after the surgery. AST/ALTs elevated 117/414. Normal Tbil. UA negative for UTI. Denies abdominal pain or N/V. Urology consulted given patient's post-operative status who evaluated at bedside. Suspect pain is more MSK in nature. Suspect Crt level is new baseline. IVF administered in the ED. RUQ U/S additionally obtained given LFT elevation which showed gallbladder wall adenomyomatosis in the gallbladder fundus. No sonographic acute cholecystitis findings. No RUQ tenderness or Rodriguez's sign on exam.      Discussed labs and imaging with patient and wife at bedside. Okay for outpatient follow-up with PCP and Urology. Patient has his previous prescription of oxycodone at home which I advised him to take as needed. Additionally provided RX for Robaxin. Advised him to avoid NSAIDs. Patient expresses understanding and agreeable to the plan. Return to ED precautions given for new, worsening, or concerning symptoms.    ED Diagnosis:  Final diagnoses:  [M54.9] Right-sided back pain (Primary)  [Z90.5] H/O right nephrectomy  [R10.9] Right flank pain  [R79.89] Elevated LFTs  [K59.00] Constipation, unspecified constipation type    ED Disposition:   ED Disposition Condition    Discharge Stable              "

## 2023-10-22 NOTE — TELEPHONE ENCOUNTER
"Pt calls stating that he is experiencing severe right-sided back pain that started several days ago. Pt is s/p robotic nephrectomy on 10/4. He describes the pain as "when I'm rubbing that spot it's at the lower part of my ribs but towards the back a little bit." Pt denies fever, new bruising, or swelling at the site. He states that he was prescribed tylenol, ibuprofen, and oxycodone for pain. Pt has been taking the tylenol and ibuprofen but does not feel comfortable taking oxycodone at this time. OCP, Dr. Zeng is called for further guidance. She advises that pt be evaluated in ED now if he feels that his pain is severe and cannot wait to be seen in clinic tomorrow.    Pt is informed. He verbalizes understanding and is instructed to call back with any new/worsening sxs, questions, or concerns.   Reason for Disposition   [1] SEVERE post-op pain (e.g., excruciating, pain scale 8-10) AND [2] not controlled with pain medications     Rates worse pain at a 10/10; pt was prescribed tylenol, ibuprofen, and oxycodone    Additional Information   Negative: Sounds like a life-threatening emergency to the triager   Negative: [1] Widespread rash AND [2] bright red, sunburn-like   Negative: [1] SEVERE headache AND [2] after spinal (epidural) anesthesia   Negative: [1] Vomiting AND [2] persists > 4 hours   Negative: [1] Vomiting AND [2] abdomen looks much more swollen than usual   Negative: [1] Drinking very little AND [2] dehydration suspected (e.g., no urine > 12 hours, very dry mouth, very lightheaded)   Negative: Patient sounds very sick or weak to the triager   Negative: Sounds like a serious complication to the triager   Negative: Fever > 100.4 F (38.0 C)    Protocols used: Post-Op Symptoms and [id number]    "

## 2023-10-22 NOTE — ED NOTES
Patient states non radiaitng  right middle  back pain onset Thursday, 2 1/2 weeks post op kidney removal for cancer, post op dressing in place right frontal abdomen, Oxycodone today  States pain worse with mvmt, no BM x 3 days

## 2023-10-22 NOTE — ED PROVIDER NOTES
Encounter Date: 10/22/2023       History     Chief Complaint   Patient presents with    Back Pain     X 3 days. Took 800mg Advil, then oxy bc pain was unbearable.      This is a 73 y.o. year old male with a PMH of metastatic RCC s/p Robotic right nephrectomy on 10/4/23, HTN, gout, sciatica, who presents to the ED with a chief complaint of intermittent back pain x 3 days described as stabbing.  The pain is R middle back without radiation.  Patient rates the pain 10/10. Aggravating factors include position and walking.  After nephrectomy patient was able to control pain with ibuprofen and tylenol.  Pt tried narcotic pain medications only today (oxycodone 5 mg at 11 AM).  Last BM 3 days ago.  Pt denies trauma, fever, chills, chest pain, shortness of breath, nausea, abdominal pain, bowel or bladder incontinence, hematuria, increased frequency, focal weakness or numbness.       Review of patient's allergies indicates:  No Known Allergies  Past Medical History:   Diagnosis Date    Asthma     no inhaler use    Borderline hypertension     ED (erectile dysfunction)     Gout     Hematuria     Hypertension      Past Surgical History:   Procedure Laterality Date    COLONOSCOPY  02/10/2022    COLONOSCOPY N/A 02/10/2022    Procedure: COLONOSCOPY;  Surgeon: Desmond Keenan MD;  Location: [facility] ENDO;  Service: General;  Laterality: N/A;    ROBOT-ASSISTED LAPAROSCOPIC NEPHRECTOMY Right 10/4/2023    Procedure: ROBOTIC NEPHRECTOMY;  Surgeon: Henry Michaels MD;  Location: [facility] OR;  Service: Urology;  Laterality: Right;    TONSILLECTOMY       History reviewed. No pertinent family history.  Social History     Tobacco Use    Smoking status: Never    Smokeless tobacco: Never   Substance Use Topics    Alcohol use: Not Currently     Alcohol/week: 0.0 standard drinks of alcohol     Comment: rarely    Drug use: Never     Review of Systems   Constitutional:  Negative for chills, diaphoresis, fatigue and fever.   Respiratory:  Negative for  cough and shortness of breath.    Cardiovascular:  Negative for chest pain, palpitations and leg swelling.   Gastrointestinal:  Negative for abdominal pain, diarrhea, nausea and vomiting.   Genitourinary:  Negative for difficulty urinating, dysuria, flank pain, frequency and hematuria.   Musculoskeletal:  Positive for back pain. Negative for arthralgias, gait problem and myalgias.   Skin:  Negative for color change.   Neurological:  Negative for dizziness, tremors, syncope, facial asymmetry, weakness, light-headedness, numbness and headaches.   Psychiatric/Behavioral:  Negative for confusion.        Physical Exam     Initial Vitals   BP Pulse Resp Temp SpO2   10/22/23 1234 10/22/23 1234 10/22/23 1234 10/22/23 1234 10/22/23 1235   (!) 143/65 67 18 97.7 °F (36.5 °C) 97 %      MAP       --                Physical Exam    Nursing note and vitals reviewed.  Constitutional: He appears well-developed and well-nourished.   HENT:   Head: Normocephalic and atraumatic.   Right Ear: External ear normal.   Left Ear: External ear normal.   Eyes: Conjunctivae and EOM are normal. Pupils are equal, round, and reactive to light.   Neck: Neck supple.   Normal range of motion.  Cardiovascular:  Normal rate and regular rhythm.           Pulmonary/Chest: Breath sounds normal. He has no wheezes.   Abdominal: Abdomen is soft. Bowel sounds are normal.   Musculoskeletal:         General: No tenderness. Normal range of motion.      Cervical back: Normal, normal range of motion and neck supple. No bony tenderness.      Thoracic back: Normal. No bony tenderness.      Lumbar back: Normal. No bony tenderness. Negative right straight leg raise test and negative left straight leg raise test.     Neurological: He is alert and oriented to person, place, and time. He has normal strength.   Skin: Skin is warm and dry.   Psychiatric: He has a normal mood and affect.         ED Course   Procedures  Labs Reviewed   URINALYSIS, REFLEX TO URINE CULTURE -  Abnormal; Notable for the following components:       Result Value    Occult Blood UA 1+ (*)     All other components within normal limits    Narrative:     Specimen Source->Urine   CBC W/ AUTO DIFFERENTIAL - Abnormal; Notable for the following components:    RBC 4.31 (*)     Hemoglobin 13.1 (*)     Hematocrit 38.6 (*)     MPV 9.0 (*)     All other components within normal limits   COMPREHENSIVE METABOLIC PANEL - Abnormal; Notable for the following components:    CO2 21 (*)     Creatinine 1.6 (*)      (*)      (*)     eGFR 45.8 (*)     All other components within normal limits   ISTAT PROCEDURE - Abnormal; Notable for the following components:    POC Creatinine 1.7 (*)     All other components within normal limits   URINALYSIS MICROSCOPIC    Narrative:     Specimen Source->Urine   HIV 1 / 2 ANTIBODY   HEPATITIS C ANTIBODY   ISTAT CHEM8          Imaging Results    None          Medications   acetaminophen tablet 1,000 mg (1,000 mg Oral Given 10/22/23 1532)   methocarbamoL tablet 500 mg (500 mg Oral Given 10/22/23 1532)     Medical Decision Making  73 y.o. year old male presenting with R mid back pain x 3 days.  Pt s/p Robotic right nephrectomy on 10/4 and only using tylenol and ibuprofen until today for pain management.    On exam patient is afebrile and nontoxic. HEENT exam normal. Heart rate and rhythm are regular. Lungs with clear breath sounds throughout. Abdomen is soft, nontender. No edema.  Straight leg raise negative.  No neurological deficits.  Gait stable.  No bony tenderness along entire spine.  Unable to reproduce pain with palpation.    DDx includes but is not limited to muscle strain/sprain, UTI/pyelonephritis, pneumonia, fracture.    ED workup reveals CBC without leukocytosis and hemoglobin at baseline.  UA  negative for infection.  CMP shows SABINE with Cr up from 1.3 to 1.6.      Imaging CXR and CT A/P with contrast ordered.    Pt given tylenol and robaxin.  Pt signed out to Lars Handy  LAURIE pending imaging and discussion with urology.    Discussed findings and plan with patient who verbalized understanding and agrees with the plan and course of treatment. I discussed the care of this patient with my supervising physician.     Amount and/or Complexity of Data Reviewed  Labs: ordered.  Radiology: ordered.    Risk  OTC drugs.  Prescription drug management.              Attending Attestation:     Physician Attestation Statement for NP/PA:   I have directed and reviewed the workup performed by the PA/NP.  I performed the substantive portion of the medical decision making.                               Clinical Impression:   Final diagnoses:  [M54.9] Right-sided back pain               Maya Sorto PA-C  10/22/23 1612       Fady Yap MD  10/22/23 8139

## 2023-10-23 VITALS
OXYGEN SATURATION: 96 % | TEMPERATURE: 98 F | WEIGHT: 240 LBS | BODY MASS INDEX: 34.44 KG/M2 | SYSTOLIC BLOOD PRESSURE: 137 MMHG | DIASTOLIC BLOOD PRESSURE: 60 MMHG | RESPIRATION RATE: 18 BRPM | HEART RATE: 59 BPM

## 2023-10-23 NOTE — CONSULTS
[facility] - Emergency Dept  Urology  Consult Note    Patient Name: Gamaliel Pete Jr.  MRN: [id number]  Admission Date: 10/22/2023  Hospital Length of Stay: 0   Code Status: Prior   Attending Provider: No att. providers found   Consulting Provider: Tash Samuels MD  Primary Care Physician: Slava Lowery MD  Principal Problem:<principal problem not specified>    Inpatient consult to Urology  Consult performed by: Tash Samuels MD  Consult ordered by: Lars Handy PA-C  Reason for consult: post-op nephrectomy           Subjective:     HPI:   73M s/p R robotic nephrectomy with Dr. Michaels on 10/4.  He presents today with 3 days of worsening right flank pain.  He denies fevers,chills, hematuria, dysuria.  He states he has been taking ibuprofen for the pain intermittently as well as 1 oxycodone today.  His flank pain is minimal at rest but worsens with movement.  Overall his postop course has been uncomplicated.    On assessment he is afebrile, vital signs are stable. Creatinine is 1.6 from 1.3 postop.  No leukocytosis and UA is not concerning for infection.  CTAP shows absent right kidney with no concern for abscess.        Past Medical History:   Diagnosis Date    Asthma     no inhaler use    Borderline hypertension     ED (erectile dysfunction)     Gout     Hematuria     Hypertension        Past Surgical History:   Procedure Laterality Date    COLONOSCOPY  02/10/2022    COLONOSCOPY N/A 02/10/2022    Procedure: COLONOSCOPY;  Surgeon: Desmond Keenan MD;  Location: [facility];  Service: General;  Laterality: N/A;    ROBOT-ASSISTED LAPAROSCOPIC NEPHRECTOMY Right 10/4/2023    Procedure: ROBOTIC NEPHRECTOMY;  Surgeon: Henry Michaels MD;  Location: [facility];  Service: Urology;  Laterality: Right;    TONSILLECTOMY         Review of patient's allergies indicates:  No Known Allergies    Family History    None         Tobacco Use    Smoking status: Never    Smokeless tobacco: Never   Substance and Sexual Activity     Alcohol use: Not Currently     Alcohol/week: 0.0 standard drinks of alcohol     Comment: rarely    Drug use: Never    Sexual activity: Not on file       Review of Systems   Constitutional:  Negative for chills and fever.   HENT:  Negative for trouble swallowing.    Gastrointestinal:  Negative for abdominal distention, abdominal pain and vomiting.   Genitourinary:  Positive for flank pain. Negative for decreased urine volume, difficulty urinating, dysuria and hematuria.       Objective:     Temp:  [97.7 °F (36.5 °C)-97.9 °F (36.6 °C)] 97.9 °F (36.6 °C)  Pulse:  [62-70] 62  Resp:  [18] 18  SpO2:  [96 %-98 %] 96 %  BP: (141-148)/(65-66) 148/66  Weight: 108.9 kg (240 lb)  Body mass index is 34.44 kg/m².           Drains       None                    Physical Exam  Constitutional:       General: He is not in acute distress.  HENT:      Head: Normocephalic.   Eyes:      Extraocular Movements: Extraocular movements intact.   Cardiovascular:      Rate and Rhythm: Normal rate.   Pulmonary:      Effort: Pulmonary effort is normal. No respiratory distress.   Abdominal:      Palpations: Abdomen is soft. There is no mass.      Comments: Abdominal incisions well healed with scabs.  Soft, nontender.     Musculoskeletal:         General: No swelling or deformity.      Cervical back: Normal range of motion.   Skin:     General: Skin is warm and dry.   Neurological:      General: No focal deficit present.      Mental Status: He is alert.   Psychiatric:         Mood and Affect: Mood normal.         Behavior: Behavior normal.          Significant Labs:    BMP:  Recent Labs   Lab 10/22/23  1521      K 4.5      CO2 21*   BUN 23   CREATININE 1.6*   CALCIUM 10.4       CBC:  Recent Labs   Lab 10/22/23  1521 10/22/23  1527   WBC 8.06  --    HGB 13.1*  --    HCT 38.6* 39     --        All pertinent labs results from the past 24 hours have been reviewed.    Significant Imaging:  All pertinent imaging results/findings  from the past 24 hours have been reviewed.                      Assessment and Plan:     Renal mass  73-year-old male status post right nephrectomy..    --okay for discharge from the ED  --recommend multimodal pain control including Robaxin for likely MSK pain  -- would discontinue ibuprofen given mild SABINE  --s/p 1 L bolus of LR  -- encourage p.o. hydration          VTE Risk Mitigation (From admission, onward)    None          Thank you for your consult. I will sign off. Please contact us if you have any additional questions.    Tash Samuels MD  Urology  [facility] - Emergency Dept

## 2023-10-23 NOTE — DISCHARGE INSTRUCTIONS
Continue hydrating by drinking 10-12 glasses of water per day   Continue Tylenol and the prescribed Robaxin for pain at home  Avoid NSAIDs like Ibuprofen, Advil, Naproxen, Mobic    Follow-up with your primary care provider and Dr. Michaels for ongoing management.    For your constipation:  Drink a cup of Smooth Move Tea before going to bed. This has senna in it and can also provide hydration.  Start a soluble fiber such as Metamucil, Fibercon, or Citrucel. Make sure it dissolves in liquid and is not a capsule or tablet.  Take Miralax (17g or 1 cap full with 8 oz of fluid) twice daily.  Start use Doculax (Bisacodyl) suppositories if you are not having satisfactory bowel movements with the above regimen.     You can purchase all of the above from most drugstores or grocery stores. Opioid containing pain medications very commonly cause constipation. Please limit these pain medications as much as possible. Increase your consumption of fruits/vegetables while also decreasing your intake of meats and fatty foods.     Return to the emergency room for new, worsening, or concerning symptoms.     Future Appointments   Date Time Provider Department Center   10/25/2023  2:30 PM Chanel Carias NP [facility] UROLOGY [facility]   10/27/2023  2:00 PM [facility] CT1 [facility] CT JASVIR Garsia   4/15/2024  3:00 PM [facility]

## 2023-10-23 NOTE — SUBJECTIVE & OBJECTIVE
Past Medical History:   Diagnosis Date    Asthma     no inhaler use    Borderline hypertension     ED (erectile dysfunction)     Gout     Hematuria     Hypertension        Past Surgical History:   Procedure Laterality Date    COLONOSCOPY  02/10/2022    COLONOSCOPY N/A 02/10/2022    Procedure: COLONOSCOPY;  Surgeon: Desmond Keenan MD;  Location: [facility];  Service: General;  Laterality: N/A;    ROBOT-ASSISTED LAPAROSCOPIC NEPHRECTOMY Right 10/4/2023    Procedure: ROBOTIC NEPHRECTOMY;  Surgeon: Henry Michaels MD;  Location: [facility];  Service: Urology;  Laterality: Right;    TONSILLECTOMY         Review of patient's allergies indicates:  No Known Allergies    Family History    None         Tobacco Use    Smoking status: Never    Smokeless tobacco: Never   Substance and Sexual Activity    Alcohol use: Not Currently     Alcohol/week: 0.0 standard drinks of alcohol     Comment: rarely    Drug use: Never    Sexual activity: Not on file       Review of Systems   Constitutional:  Negative for chills and fever.   HENT:  Negative for trouble swallowing.    Gastrointestinal:  Negative for abdominal distention, abdominal pain and vomiting.   Genitourinary:  Positive for flank pain. Negative for decreased urine volume, difficulty urinating, dysuria and hematuria.       Objective:     Temp:  [97.7 °F (36.5 °C)-97.9 °F (36.6 °C)] 97.9 °F (36.6 °C)  Pulse:  [62-70] 62  Resp:  [18] 18  SpO2:  [96 %-98 %] 96 %  BP: (141-148)/(65-66) 148/66  Weight: 108.9 kg (240 lb)  Body mass index is 34.44 kg/m².           Drains       None                    Physical Exam  Constitutional:       General: He is not in acute distress.  HENT:      Head: Normocephalic.   Eyes:      Extraocular Movements: Extraocular movements intact.   Cardiovascular:      Rate and Rhythm: Normal rate.   Pulmonary:      Effort: Pulmonary effort is normal. No respiratory distress.   Abdominal:      Palpations: Abdomen is soft. There is no mass.      Comments:  Abdominal incisions well healed with scabs.  Soft, nontender.     Musculoskeletal:         General: No swelling or deformity.      Cervical back: Normal range of motion.   Skin:     General: Skin is warm and dry.   Neurological:      General: No focal deficit present.      Mental Status: He is alert.   Psychiatric:         Mood and Affect: Mood normal.         Behavior: Behavior normal.          Significant Labs:    BMP:  Recent Labs   Lab 10/22/23  1521      K 4.5      CO2 21*   BUN 23   CREATININE 1.6*   CALCIUM 10.4       CBC:  Recent Labs   Lab 10/22/23  1521 10/22/23  1527   WBC 8.06  --    HGB 13.1*  --    HCT 38.6* 39     --        All pertinent labs results from the past 24 hours have been reviewed.    Significant Imaging:  All pertinent imaging results/findings from the past 24 hours have been reviewed.

## 2023-10-23 NOTE — HPI
73M s/p R robotic nephrectomy with Dr. Michaels on 10/4.  He presents today with 3 days of worsening right flank pain.  He denies fevers,chills, hematuria, dysuria.  He states he has been taking ibuprofen for the pain intermittently as well as 1 oxycodone today.  His flank pain is minimal at rest but worsens with movement.  Overall his postop course has been uncomplicated.    On assessment he is afebrile, vital signs are stable. Creatinine is 1.6 from 1.3 postop.  No leukocytosis and UA is not concerning for infection.  CTAP shows absent right kidney with no concern for abscess.

## 2023-10-24 ENCOUNTER — TUMOR BOARD CONFERENCE (OUTPATIENT)
Dept: UROLOGY | Facility: HOSPITAL | Age: 73
End: 2023-10-24
Payer: MEDICARE

## 2023-10-24 NOTE — PROGRESS NOTES
[facility]      GENITOURINARY MULTIDISCIPLINARY TUMOR BOARD  PATIENT REVIEW FORM     CLINIC #: [id number]  DATE: 10/24/2023    TUMOR SITE:   Kidney     ATTENDING:   Henry Michaels MD; Ruby Turk MD     PATIENT SUMMARY:   Gamaliel Pete Jr. is a 73M with history of pulmonary masses and bone lesion who recently underwent right robotic nephrectomy on 10/4/23 with Dr. Michaels.  Path significant for grade 4 RCC with sarcomatoid features.  He had initially presented with significantly symptomatic hematuria and was found to have a mass suspicious for renal cell carcinoma.    - 9/20/2023 Bone scan: Focal uptake in the T9 vertebral body corresponding to lytic lesion seen on prior CT and concerning for metastatic disease.  Additional focus of increased uptake in the right kidney in patient with known right renal mass    - 10/4/2023 Robotic right nephrectomy  Pathology:  RIGHT KIDNEY, TOTAL NEPHRECTOMY:  - Clear cell renal cell carcinoma, ISUP grade 4, 5.5 cm.  Tumor size: 5.5 cm.  Histologic type: Clear cell renal cell carcinoma.  Sarcomatoid features: Present, 5%.  Rhabdoid features: Not identified.  Histologic Grade (ISUP Grade): 4.  Tumor necrosis: Present, 20%.  Tumor extension: Extends into pelvicalyceal system and renal sinus fat.  Margins: Uninvolved.  Lymphovascular invasion: Not identified.  Regional lymph nodes: No lymph nodes submitted or found.  Distant metastases: Not applicable in this specimen.  Pathologic stage (pTNM, AJCC 8th edition): pT3a pN not assigned (no lymph nodes submitted or found).     DISCUSSION:  Question: Metastatic RCC s/p nephrectomy. Discuss therapy options.   RT for his T9 if positive, also have to follow some pulmonary nodules     PERFORMANCE STATUS:  ECOG 0    Estimated GFR/CKD Stage: GFR 45 (Cr 1.6)    Clinical/Pathologic Stage (TNM): [id number]    FACULTY IN ATTENDANCE:    Urologic Oncology: Henry Michaels MD; Abdirahman Quarles MD; Adrien Vasquez MD     Radiation Oncology: Russ  MD Akash    Hematology/Oncology: Ruby Turk MD; Lars Sullivan MD    Pathology    CONSULT NEEDED:     [x] Urologic Oncology    [x] Hem/Onc    [x] Rad/Onc   []     [] Physical/Occupational Therapy    [] Psychology  [] Other: ___________________    [x] Treatment Guidelines (NCCN and AUA) reviewed and care planned is consistent with guidelines.    PRESENTATION AT CANCER CONFERENCE:         [] Prospective    [] Retrospective     [] Follow-Up          [] Eligible for clinical trial    TUMOR BOARD RECOMMENDATIONS/PLAN/CONSENSUS:     Case discussed among group. Pathology and radiologic images were reviewed (if applicable).    Needs T9 biopsy.  Possible SBRT if biopsy proves mets.

## 2023-10-25 ENCOUNTER — TELEPHONE (OUTPATIENT)
Dept: HEMATOLOGY/ONCOLOGY | Facility: CLINIC | Age: 73
End: 2023-10-25
Payer: MEDICARE

## 2023-10-25 ENCOUNTER — OFFICE VISIT (OUTPATIENT)
Dept: UROLOGY | Facility: CLINIC | Age: 73
End: 2023-10-25
Payer: MEDICARE

## 2023-10-25 VITALS
BODY MASS INDEX: 34.37 KG/M2 | DIASTOLIC BLOOD PRESSURE: 65 MMHG | SYSTOLIC BLOOD PRESSURE: 137 MMHG | OXYGEN SATURATION: 96 % | HEIGHT: 70 IN | HEART RATE: 75 BPM | WEIGHT: 240.06 LBS

## 2023-10-25 DIAGNOSIS — C64.1 RENAL CELL CARCINOMA OF RIGHT KIDNEY METASTATIC TO OTHER SITE: ICD-10-CM

## 2023-10-25 DIAGNOSIS — M54.9 RIGHT-SIDED BACK PAIN, UNSPECIFIED BACK LOCATION, UNSPECIFIED CHRONICITY: Primary | ICD-10-CM

## 2023-10-25 PROCEDURE — 1159F PR MEDICATION LIST DOCUMENTED IN MEDICAL RECORD: ICD-10-PCS | Mod: CPTII,S$GLB,, | Performed by: NURSE PRACTITIONER

## 2023-10-25 PROCEDURE — 3078F PR MOST RECENT DIASTOLIC BLOOD PRESSURE < 80 MM HG: ICD-10-PCS | Mod: CPTII,S$GLB,, | Performed by: NURSE PRACTITIONER

## 2023-10-25 PROCEDURE — 1160F PR REVIEW ALL MEDS BY PRESCRIBER/CLIN PHARMACIST DOCUMENTED: ICD-10-PCS | Mod: CPTII,S$GLB,, | Performed by: NURSE PRACTITIONER

## 2023-10-25 PROCEDURE — 1125F AMNT PAIN NOTED PAIN PRSNT: CPT | Mod: CPTII,S$GLB,, | Performed by: NURSE PRACTITIONER

## 2023-10-25 PROCEDURE — 99024 PR POST-OP FOLLOW-UP VISIT: ICD-10-PCS | Mod: S$GLB,,, | Performed by: NURSE PRACTITIONER

## 2023-10-25 PROCEDURE — 3075F SYST BP GE 130 - 139MM HG: CPT | Mod: CPTII,S$GLB,, | Performed by: NURSE PRACTITIONER

## 2023-10-25 PROCEDURE — 4010F ACE/ARB THERAPY RXD/TAKEN: CPT | Mod: CPTII,S$GLB,, | Performed by: NURSE PRACTITIONER

## 2023-10-25 PROCEDURE — 3078F DIAST BP <80 MM HG: CPT | Mod: CPTII,S$GLB,, | Performed by: NURSE PRACTITIONER

## 2023-10-25 PROCEDURE — 1125F PR PAIN SEVERITY QUANTIFIED, PAIN PRESENT: ICD-10-PCS | Mod: CPTII,S$GLB,, | Performed by: NURSE PRACTITIONER

## 2023-10-25 PROCEDURE — 4010F PR ACE/ARB THEARPY RXD/TAKEN: ICD-10-PCS | Mod: CPTII,S$GLB,, | Performed by: NURSE PRACTITIONER

## 2023-10-25 PROCEDURE — 99024 POSTOP FOLLOW-UP VISIT: CPT | Mod: S$GLB,,, | Performed by: NURSE PRACTITIONER

## 2023-10-25 PROCEDURE — 1160F RVW MEDS BY RX/DR IN RCRD: CPT | Mod: CPTII,S$GLB,, | Performed by: NURSE PRACTITIONER

## 2023-10-25 PROCEDURE — 1159F MED LIST DOCD IN RCRD: CPT | Mod: CPTII,S$GLB,, | Performed by: NURSE PRACTITIONER

## 2023-10-25 PROCEDURE — 3075F PR MOST RECENT SYSTOLIC BLOOD PRESS GE 130-139MM HG: ICD-10-PCS | Mod: CPTII,S$GLB,, | Performed by: NURSE PRACTITIONER

## 2023-10-25 RX ORDER — OXYCODONE HYDROCHLORIDE 5 MG/1
5 TABLET ORAL EVERY 6 HOURS PRN
Qty: 12 TABLET | Refills: 0 | Status: ON HOLD | OUTPATIENT
Start: 2023-10-25 | End: 2023-11-02 | Stop reason: HOSPADM

## 2023-10-25 NOTE — NURSING
Oncology nurse navigator contacted patient to discuss necessary visit with Dr. Turk to review most recent scans completed over the weekend while patient was in the ED. Patient agreed to appt on 10/31,3pm.  Date, time, location of appointment discussed. Encouraged patient to call with any concerns.

## 2023-10-25 NOTE — PROGRESS NOTES
"Subjective:      Gamaliel Pete Jr. is a 73 y.o. male who returns today for wound check. Established patient of Dr. Michaels' and is new to me today.     The patient is s/p R robotic nephrectomy on 10/4/23 with Dr. Michaels. Discharged on POD#2.     He presented to the ED on 10/22 with midline back pain that began on 10/19. CT demonstrated "Postoperative changes of right nephrectomy for clear cell carcinoma.  No enhancing mass or abscess within the nephrectomy bed. Stable bilateral pulmonary nodules and stable T9 lytic lesion, concerning for metastatic disease." Increase in creatinine to 1.6 (baseline 0.9, 1.3 post operatively). UA non concerning for infection. Discharged with robaxin.     Today he reports persistence of the back pain. The pain is aggravated by movement. Improved yesterday with robaxin but not today. Taking ibuprofen 600 mg. He denies dysuria, frequency, urgency, gross hematuria, and fever/chills. Nausea only after taking a narcotic. + constipation.     Scheduled to see Dr. Turk on 10/31.     The following portions of the patient's history were reviewed and updated as appropriate: allergies, current medications, past family history, past medical history, past social history, past surgical history and problem list.    Review of Systems  Constitutional: no fever or chills  ENT: no nasal congestion or sore throat  Respiratory: no cough or shortness of breath  Cardiovascular: no chest pain or palpitations  Gastrointestinal: no nausea or vomiting, tolerating diet  Genitourinary: as per HPI  Hematologic/Lymphatic: no easy bruising or lymphadenopathy  Musculoskeletal: no arthralgias or myalgias  Neurological: no seizures or tremors  Behavioral/Psych: no auditory or visual hallucinations     Objective:   Vitals:   Vitals:    10/25/23 1319   BP: 137/65   Pulse: 75     Physical Exam   General: alert and oriented, no acute distress  Head: normocephalic, atraumatic  Neck: supple, normal ROM  Respiratory: " "Symmetric expansion, non-labored breathing  Cardiovascular: regular rate and rhythm  Abdomen: soft, non tender, non distended; abdominal incisions CDI dressed with dermabond healing well  Genitourinary: deferred  Skin: normal coloration and turgor, no rashes, no suspicious skin lesions noted  Neuro: alert and oriented x3, no gross deficits  Psych: normal judgment and insight, normal mood/affect, and non-anxious    Lab Review   Urinalysis demonstrates : no sample  Lab Results   Component Value Date    WBC 8.06 10/22/2023    HGB 13.1 (L) 10/22/2023    HCT 39 10/22/2023    HCT 38.6 (L) 10/22/2023    MCV 90 10/22/2023     10/22/2023     Lab Results   Component Value Date    CREATININE 1.6 (H) 10/22/2023    BUN 23 10/22/2023     Lab Results   Component Value Date    PSA 8.4 (H) 08/24/2023     Imaging  CT abdomen/pelvis with contrast- "Postoperative changes of right nephrectomy for clear cell carcinoma.  No enhancing mass or abscess within the nephrectomy bed. Stable bilateral pulmonary nodules and stable T9 lytic lesion, concerning for metastatic disease."    Final Pathologic Diagnosis RIGHT KIDNEY, TOTAL NEPHRECTOMY:   - Clear cell renal cell carcinoma, ISUP grade 4, 5.5 cm.   - Benign cortical cysts.   - See [facility] synoptic report below.     SURGICAL PATHOLOGY CANCER CASE SUMMARY   Procedure: Total nephrectomy.   Specimen laterality: Right.   Tumor size: 5.5 cm.   Tumor focality: Unifocal.   Histologic type: Clear cell renal cell carcinoma.   Sarcomatoid features: Present, 5%.   Rhabdoid features: Not identified.   Histologic Grade (ISUP Grade): 4.   Tumor necrosis: Present, 20%.   Tumor extension: Extends into pelvicalyceal system and renal sinus fat.   Margins: Uninvolved.   Lymphovascular invasion (excluding renal vein and its segmental branches): Not identified.   Regional lymph nodes: No lymph nodes submitted or found.   Distant metastases: Not applicable in this specimen.   Pathologic stage (pTNM, AJCC 8th " edition): pT3a pN not assigned (no lymph nodes submitted or found).     Blocks for potential molecular or ancillary studies:   Tumor block: 1G or 1E      Assessment:     1. Right-sided back pain, unspecified back location, unspecified chronicity    2. Renal cell carcinoma of right kidney metastatic to other site      Plan:   Gamaliel was seen today for post op .    Diagnoses and all orders for this visit:    Right-sided back pain, unspecified back location, unspecified chronicity  -     Ambulatory referral/consult to [facility]; Future  -     oxyCODONE (ROXICODONE) 5 MG immediate release tablet; Take 1 tablet (5 mg total) by mouth every 6 (six) hours as needed for Pain.    Renal cell carcinoma of right kidney metastatic to other site    Plan:  --Minimize ibuprofen with solitary kidney  --Try consistent tylenol with robaxin, backup rx for oxycodone  --Miralax and stool softener   --Follow up with Dr. Turk as scheduled

## 2023-10-25 NOTE — Clinical Note
Mr. Pete has had significant thoracic back pain- CT without abnormality. Discharged from the ED with robaxin. Any further recommendations?

## 2023-10-26 ENCOUNTER — TELEPHONE (OUTPATIENT)
Dept: ORTHOPEDICS | Facility: CLINIC | Age: 73
End: 2023-10-26
Payer: MEDICARE

## 2023-10-26 ENCOUNTER — PATIENT MESSAGE (OUTPATIENT)
Dept: UROLOGY | Facility: CLINIC | Age: 73
End: 2023-10-26
Payer: MEDICARE

## 2023-10-26 DIAGNOSIS — M54.6 ACUTE RIGHT-SIDED THORACIC BACK PAIN: Primary | ICD-10-CM

## 2023-10-26 DIAGNOSIS — M51.36 DDD (DEGENERATIVE DISC DISEASE), LUMBAR: Primary | ICD-10-CM

## 2023-10-27 DIAGNOSIS — R10.9 FLANK PAIN: Primary | ICD-10-CM

## 2023-10-27 RX ORDER — METHOCARBAMOL 500 MG/1
1000 TABLET, FILM COATED ORAL 2 TIMES DAILY PRN
Qty: 20 TABLET | Refills: 0 | Status: ON HOLD | OUTPATIENT
Start: 2023-10-27 | End: 2023-11-02 | Stop reason: HOSPADM

## 2023-10-30 ENCOUNTER — OFFICE VISIT (OUTPATIENT)
Dept: ORTHOPEDICS | Facility: CLINIC | Age: 73
DRG: 948 | End: 2023-10-30
Payer: MEDICARE

## 2023-10-30 ENCOUNTER — HOSPITAL ENCOUNTER (OUTPATIENT)
Dept: RADIOLOGY | Facility: HOSPITAL | Age: 73
Discharge: HOME | DRG: 948 | End: 2023-10-30
Attending: ORTHOPAEDIC SURGERY
Payer: MEDICARE

## 2023-10-30 DIAGNOSIS — M54.9 RIGHT-SIDED BACK PAIN, UNSPECIFIED BACK LOCATION, UNSPECIFIED CHRONICITY: ICD-10-CM

## 2023-10-30 DIAGNOSIS — M51.36 DDD (DEGENERATIVE DISC DISEASE), LUMBAR: ICD-10-CM

## 2023-10-30 PROCEDURE — 4010F ACE/ARB THERAPY RXD/TAKEN: CPT | Mod: CPTII,S$GLB,, | Performed by: ORTHOPAEDIC SURGERY

## 2023-10-30 PROCEDURE — 4010F PR ACE/ARB THEARPY RXD/TAKEN: ICD-10-PCS | Mod: CPTII,S$GLB,, | Performed by: ORTHOPAEDIC SURGERY

## 2023-10-30 PROCEDURE — 99999 PR PBB SHADOW E&M-EST. PATIENT-LVL I: ICD-10-PCS | Mod: PBBFAC,,, | Performed by: ORTHOPAEDIC SURGERY

## 2023-10-30 PROCEDURE — 72110 XR LUMBAR SPINE AP AND LAT WITH FLEX/EXT: ICD-10-PCS | Mod: 26,,, | Performed by: RADIOLOGY

## 2023-10-30 PROCEDURE — 99204 OFFICE O/P NEW MOD 45 MIN: CPT | Mod: S$GLB,,, | Performed by: ORTHOPAEDIC SURGERY

## 2023-10-30 PROCEDURE — 99999 PR PBB SHADOW E&M-EST. PATIENT-LVL I: CPT | Mod: PBBFAC,,, | Performed by: ORTHOPAEDIC SURGERY

## 2023-10-30 PROCEDURE — 1111F DSCHRG MED/CURRENT MED MERGE: CPT | Mod: CPTII,S$GLB,, | Performed by: ORTHOPAEDIC SURGERY

## 2023-10-30 PROCEDURE — 72110 X-RAY EXAM L-2 SPINE 4/>VWS: CPT | Mod: TC

## 2023-10-30 PROCEDURE — 1111F PR DISCHARGE MEDS RECONCILED W/ CURRENT OUTPATIENT MED LIST: ICD-10-PCS | Mod: CPTII,S$GLB,, | Performed by: ORTHOPAEDIC SURGERY

## 2023-10-30 PROCEDURE — 72110 X-RAY EXAM L-2 SPINE 4/>VWS: CPT | Mod: 26,,, | Performed by: RADIOLOGY

## 2023-10-30 PROCEDURE — 99204 PR OFFICE/OUTPT VISIT, NEW, LEVL IV, 45-59 MIN: ICD-10-PCS | Mod: S$GLB,,, | Performed by: ORTHOPAEDIC SURGERY

## 2023-10-30 RX ORDER — GABAPENTIN 300 MG/1
300 CAPSULE ORAL 3 TIMES DAILY
Qty: 90 CAPSULE | Refills: 11 | Status: SHIPPED | OUTPATIENT
Start: 2023-10-30 | End: 2023-10-30

## 2023-10-30 RX ORDER — GABAPENTIN 300 MG/1
300 CAPSULE ORAL 3 TIMES DAILY
Qty: 90 CAPSULE | Refills: 11 | Status: SHIPPED | OUTPATIENT
Start: 2023-10-30 | End: 2024-10-29

## 2023-10-30 NOTE — PROGRESS NOTES
DATE: 10/30/2023  PATIENT: Gamaliel Pete Jr.    Supervising Physician: Jean Paul Petersen M.D.    CHIEF COMPLAINT: back pain    HISTORY:  Gamaliel Pete Jr. is a 73 y.o. male with a pmhx of renal cell cx (sp right nephrectomy 10/4/23)  here for initial evaluation of mid/low back pain (Back - 10, Leg - 0).  The pain in the left side of the mid/low back is what bothers him most.  The pain has been present for a few weeks and started shortly after his right nephrectomy surgery. The patient describes the pain as sharp and burning.  The pain is worse with movement and improved by nothing. There is negative associated numbness and tingling. There is negative subjective weakness. Prior treatments have included no previous PT, ESIs, surgery.    The patient denies myelopathic symptoms such as handwriting changes or difficulty with buttons/coins/keys. Denies perineal paresthesias, bowel/bladder dysfunction.    PAST MEDICAL/SURGICAL HISTORY:  Past Medical History:   Diagnosis Date    Asthma     no inhaler use    Borderline hypertension     ED (erectile dysfunction)     Gout     Hematuria     Hypertension      Past Surgical History:   Procedure Laterality Date    COLONOSCOPY  02/10/2022    COLONOSCOPY N/A 02/10/2022    Procedure: COLONOSCOPY;  Surgeon: Desmond Keenan MD;  Location: [facility];  Service: General;  Laterality: N/A;    ROBOT-ASSISTED LAPAROSCOPIC NEPHRECTOMY Right 10/4/2023    Procedure: ROBOTIC NEPHRECTOMY;  Surgeon: Henry Michaels MD;  Location: [facility];  Service: Urology;  Laterality: Right;    TONSILLECTOMY         Medications:   Current Outpatient Medications on File Prior to Visit   Medication Sig Dispense Refill    amlodipine-benazepril 5-10 mg (LOTREL) 5-10 mg per capsule Take 1 capsule by mouth once daily. 90 capsule 3    ibuprofen (ADVIL,MOTRIN) 800 MG tablet Take 1 tablet (800 mg total) by mouth every 8 (eight) hours. 20 tablet 0    indomethacin (INDOCIN) 50 MG capsule One p.o. t.i.d. p.r.patricia manning  attack no more than 7 days--do not take with other NSAIDs 90 capsule 0    methocarbamoL (ROBAXIN) 500 MG Tab Take 2 tablets (1,000 mg total) by mouth 2 (two) times daily as needed (pain). 20 tablet 0    OLIVE OIL ORAL Take 15 mLs by mouth 2 (two) times a day.      omega-3 fatty acids/fish oil (FISH OIL-OMEGA-3 FATTY ACIDS) 300-1,000 mg capsule Take 2 capsules by mouth once daily.      oxyCODONE (ROXICODONE) 5 MG immediate release tablet Take 1 tablet (5 mg total) by mouth every 6 (six) hours as needed for Pain. 12 tablet 0    sildenafiL (VIAGRA) 100 MG tablet Take 1 tablet (100 mg total) by mouth daily as needed for Erectile Dysfunction. 30 tablet 11    turmeric (CURCUMIN MISC) 1,000 mg by Misc.(Non-Drug; Combo Route) route Daily.      UNABLE TO FIND Take 500 mg by mouth 2 (two) times a day. medication name: Tudca      UNABLE TO FIND Take 2 capsules by mouth 2 (two) times a day. medication name: Turkey tail mushroom      UNABLE TO FIND Take 15 drops by mouth once daily. medication name: Essiac-20      UNABLE TO FIND Take 5 mLs by mouth 2 (two) times a day. medication name: barley leaf juice powder      UNABLE TO FIND Take 5 mLs by mouth 2 (two) times a day. medication name: black seed oil (cumin seed)       No current facility-administered medications on file prior to visit.       Social History:   Social History     Socioeconomic History    Marital status:    Tobacco Use    Smoking status: Never    Smokeless tobacco: Never   Substance and Sexual Activity    Alcohol use: Not Currently     Alcohol/week: 0.0 standard drinks of alcohol     Comment: rarely    Drug use: Never       REVIEW OF SYSTEMS:  Constitution: Negative. Negative for chills, fever and night sweats.   Cardiovascular: Negative for chest pain and syncope.   Respiratory: Negative for cough and shortness of breath.   Gastrointestinal: See HPI. Negative for nausea/vomiting. Negative for abdominal pain.  Genitourinary: See HPI. Negative for  discoloration or dysuria.  Skin: Negative for dry skin, itching and rash.   Hematologic/Lymphatic: Negative for bleeding problem. Does not bruise/bleed easily.   Musculoskeletal: Negative for falls and muscle weakness.   Neurological: See HPI. No seizures.   Endocrine: Negative for polydipsia, polyphagia and polyuria.   Allergic/Immunologic: Negative for hives and persistent infections.     EXAM:  There were no vitals taken for this visit.    General: The patient is a very pleasant 73 y.o. male in no apparent distress, the patient is oriented to person, place and time.  Psych: Normal mood and affect  HEENT: Vision grossly intact, hearing intact to the spoken word.  Lungs: Respirations unlabored.  Gait: Antalgic station and gait, no difficulty with toe or heel walk.   Skin: Dorsal lumbar skin negative for rashes, lesions, hairy patches and surgical scars. There is positive right sided thoracolumbar tenderness to palpation, around level of T9  Range of motion: Lumbar range of motion is acceptable.  Spinal Balance: Global saggital and coronal spinal balance acceptable, not significant for scoliosis and kyphosis.  Musculoskeletal: No pain with the range of motion of the bilateral hips. No trochanteric tenderness to palpation.  Vascular: Bilateral lower extremities warm and well perfused, dorsalis pedis pulses 2+ bilaterally.  Neurological: Normal strength and tone in all major motor groups in the bilateral lower extremities. Normal sensation to light touch in the L2-S1 dermatomes bilaterally.  Deep tendon reflexes symmetric 2+ in the bilateral lower extremities.  Negative Babinski bilaterally. Straight leg raise negative bilaterally.    IMAGING:      Today I personally reviewed AP, Lat and Flex/Ex  upright L-spine films that demonstrate minimal degenerative changes.    CT abdomen pelvis demonstrates stable T9 lytic lesion, concerning for metastatic disease.     There is no height or weight on file to calculate BMI.   "  No results found for: "HGBA1C"        ASSESSMENT/PLAN:    There are no diagnoses linked to this encounter.    Pt sp right nephrectomy for renal cancer presents with acute back pain. Pain seems to be at the level of previously identified T9 lesion. Will send gabapentin to pharmacy and message oncology. Pt has appt with Dr. Turk tomorrow.    "

## 2023-10-31 ENCOUNTER — OFFICE VISIT (OUTPATIENT)
Dept: HEMATOLOGY/ONCOLOGY | Facility: CLINIC | Age: 73
End: 2023-10-31
Payer: MEDICARE

## 2023-10-31 ENCOUNTER — HOSPITAL ENCOUNTER (INPATIENT)
Facility: HOSPITAL | Age: 73
LOS: 2 days | Discharge: HOME | DRG: 948 | End: 2023-11-02
Attending: EMERGENCY MEDICINE | Admitting: HOSPITALIST
Payer: MEDICARE

## 2023-10-31 VITALS
BODY MASS INDEX: 31.78 KG/M2 | SYSTOLIC BLOOD PRESSURE: 148 MMHG | RESPIRATION RATE: 17 BRPM | HEIGHT: 70 IN | HEART RATE: 82 BPM | WEIGHT: 222 LBS | TEMPERATURE: 97 F | DIASTOLIC BLOOD PRESSURE: 65 MMHG | OXYGEN SATURATION: 98 %

## 2023-10-31 DIAGNOSIS — C78.00 MALIGNANT NEOPLASM METASTATIC TO LUNG, UNSPECIFIED LATERALITY: ICD-10-CM

## 2023-10-31 DIAGNOSIS — M54.9 RIGHT-SIDED BACK PAIN, UNSPECIFIED BACK LOCATION, UNSPECIFIED CHRONICITY: ICD-10-CM

## 2023-10-31 DIAGNOSIS — M84.48XA PATHOLOGICAL FRACTURE OF CERVICAL VERTEBRA, INITIAL ENCOUNTER: ICD-10-CM

## 2023-10-31 DIAGNOSIS — M54.9 INTRACTABLE BACK PAIN: ICD-10-CM

## 2023-10-31 DIAGNOSIS — R07.9 CHEST PAIN: ICD-10-CM

## 2023-10-31 DIAGNOSIS — C79.51 METASTASIS TO BONE: ICD-10-CM

## 2023-10-31 DIAGNOSIS — R52 INADEQUATE PAIN CONTROL: Primary | ICD-10-CM

## 2023-10-31 DIAGNOSIS — C64.1 CLEAR CELL CARCINOMA OF RIGHT KIDNEY: Primary | ICD-10-CM

## 2023-10-31 PROBLEM — R74.01 TRANSAMINITIS: Status: ACTIVE | Noted: 2023-10-31

## 2023-10-31 PROBLEM — N17.9 AKI (ACUTE KIDNEY INJURY): Status: ACTIVE | Noted: 2023-10-31

## 2023-10-31 PROBLEM — E83.52 HYPERCALCEMIA: Status: ACTIVE | Noted: 2023-10-31

## 2023-10-31 PROBLEM — C64.9 METASTATIC RENAL CELL CARCINOMA: Status: RESOLVED | Noted: 2023-10-16 | Resolved: 2023-10-31

## 2023-10-31 LAB
ALBUMIN SERPL BCP-MCNC: 4.4 G/DL (ref 3.5–5.2)
ALP SERPL-CCNC: 111 U/L (ref 55–135)
ALT SERPL W/O P-5'-P-CCNC: 144 U/L (ref 10–44)
ANION GAP SERPL CALC-SCNC: 8 MMOL/L (ref 8–16)
AST SERPL-CCNC: 43 U/L (ref 10–40)
BASOPHILS # BLD AUTO: 0.06 K/UL (ref 0–0.2)
BASOPHILS NFR BLD: 0.7 % (ref 0–1.9)
BILIRUB SERPL-MCNC: 0.4 MG/DL (ref 0.1–1)
BUN SERPL-MCNC: 30 MG/DL (ref 8–23)
CALCIUM SERPL-MCNC: 10.9 MG/DL (ref 8.7–10.5)
CHLORIDE SERPL-SCNC: 101 MMOL/L (ref 95–110)
CO2 SERPL-SCNC: 27 MMOL/L (ref 23–29)
CREAT SERPL-MCNC: 1.5 MG/DL (ref 0.5–1.4)
DIFFERENTIAL METHOD: ABNORMAL
EOSINOPHIL # BLD AUTO: 0.5 K/UL (ref 0–0.5)
EOSINOPHIL NFR BLD: 6.2 % (ref 0–8)
ERYTHROCYTE [DISTWIDTH] IN BLOOD BY AUTOMATED COUNT: 13.7 % (ref 11.5–14.5)
EST. GFR  (NO RACE VARIABLE): 49.5 ML/MIN/1.73 M^2
GLUCOSE SERPL-MCNC: 90 MG/DL (ref 70–110)
HCT VFR BLD AUTO: 40.4 % (ref 40–54)
HGB BLD-MCNC: 13.6 G/DL (ref 14–18)
IMM GRANULOCYTES # BLD AUTO: 0.03 K/UL (ref 0–0.04)
IMM GRANULOCYTES NFR BLD AUTO: 0.4 % (ref 0–0.5)
LYMPHOCYTES # BLD AUTO: 2.3 K/UL (ref 1–4.8)
LYMPHOCYTES NFR BLD: 28.1 % (ref 18–48)
MCH RBC QN AUTO: 30 PG (ref 27–31)
MCHC RBC AUTO-ENTMCNC: 33.7 G/DL (ref 32–36)
MCV RBC AUTO: 89 FL (ref 82–98)
MONOCYTES # BLD AUTO: 0.8 K/UL (ref 0.3–1)
MONOCYTES NFR BLD: 9.2 % (ref 4–15)
NEUTROPHILS # BLD AUTO: 4.6 K/UL (ref 1.8–7.7)
NEUTROPHILS NFR BLD: 55.4 % (ref 38–73)
NRBC BLD-RTO: 0 /100 WBC
PLATELET # BLD AUTO: 223 K/UL (ref 150–450)
PMV BLD AUTO: 9 FL (ref 9.2–12.9)
POTASSIUM SERPL-SCNC: 5 MMOL/L (ref 3.5–5.1)
PROT SERPL-MCNC: 9.1 G/DL (ref 6–8.4)
RBC # BLD AUTO: 4.53 M/UL (ref 4.6–6.2)
SARS-COV-2 RDRP RESP QL NAA+PROBE: NEGATIVE
SODIUM SERPL-SCNC: 136 MMOL/L (ref 136–145)
WBC # BLD AUTO: 8.33 K/UL (ref 3.9–12.7)

## 2023-10-31 PROCEDURE — 99285 EMERGENCY DEPT VISIT HI MDM: CPT | Mod: 25

## 2023-10-31 PROCEDURE — 1101F PT FALLS ASSESS-DOCD LE1/YR: CPT | Mod: CPTII,S$GLB,, | Performed by: INTERNAL MEDICINE

## 2023-10-31 PROCEDURE — 1125F AMNT PAIN NOTED PAIN PRSNT: CPT | Mod: CPTII,S$GLB,, | Performed by: INTERNAL MEDICINE

## 2023-10-31 PROCEDURE — 99999 PR PBB SHADOW E&M-EST. PATIENT-LVL IV: ICD-10-PCS | Mod: PBBFAC,,, | Performed by: INTERNAL MEDICINE

## 2023-10-31 PROCEDURE — 1159F PR MEDICATION LIST DOCUMENTED IN MEDICAL RECORD: ICD-10-PCS | Mod: CPTII,S$GLB,, | Performed by: INTERNAL MEDICINE

## 2023-10-31 PROCEDURE — 3078F PR MOST RECENT DIASTOLIC BLOOD PRESSURE < 80 MM HG: ICD-10-PCS | Mod: CPTII,S$GLB,, | Performed by: INTERNAL MEDICINE

## 2023-10-31 PROCEDURE — 3077F PR MOST RECENT SYSTOLIC BLOOD PRESSURE >= 140 MM HG: ICD-10-PCS | Mod: CPTII,S$GLB,, | Performed by: INTERNAL MEDICINE

## 2023-10-31 PROCEDURE — 99214 PR OFFICE/OUTPT VISIT, EST, LEVL IV, 30-39 MIN: ICD-10-PCS | Mod: S$GLB,,, | Performed by: INTERNAL MEDICINE

## 2023-10-31 PROCEDURE — 3288F PR FALLS RISK ASSESSMENT DOCUMENTED: ICD-10-PCS | Mod: CPTII,S$GLB,, | Performed by: INTERNAL MEDICINE

## 2023-10-31 PROCEDURE — 1101F PR PT FALLS ASSESS DOC 0-1 FALLS W/OUT INJ PAST YR: ICD-10-PCS | Mod: CPTII,S$GLB,, | Performed by: INTERNAL MEDICINE

## 2023-10-31 PROCEDURE — 1111F PR DISCHARGE MEDS RECONCILED W/ CURRENT OUTPATIENT MED LIST: ICD-10-PCS | Mod: CPTII,S$GLB,, | Performed by: INTERNAL MEDICINE

## 2023-10-31 PROCEDURE — 63600175 PHARM REV CODE 636 W HCPCS

## 2023-10-31 PROCEDURE — 3008F BODY MASS INDEX DOCD: CPT | Mod: CPTII,S$GLB,, | Performed by: INTERNAL MEDICINE

## 2023-10-31 PROCEDURE — 1160F PR REVIEW ALL MEDS BY PRESCRIBER/CLIN PHARMACIST DOCUMENTED: ICD-10-PCS | Mod: CPTII,S$GLB,, | Performed by: INTERNAL MEDICINE

## 2023-10-31 PROCEDURE — 1111F DSCHRG MED/CURRENT MED MERGE: CPT | Mod: CPTII,S$GLB,, | Performed by: INTERNAL MEDICINE

## 2023-10-31 PROCEDURE — 3008F PR BODY MASS INDEX (BMI) DOCUMENTED: ICD-10-PCS | Mod: CPTII,S$GLB,, | Performed by: INTERNAL MEDICINE

## 2023-10-31 PROCEDURE — 20600001 HC STEP DOWN PRIVATE ROOM

## 2023-10-31 PROCEDURE — 1125F PR PAIN SEVERITY QUANTIFIED, PAIN PRESENT: ICD-10-PCS | Mod: CPTII,S$GLB,, | Performed by: INTERNAL MEDICINE

## 2023-10-31 PROCEDURE — 25500020 PHARM REV CODE 255: Performed by: EMERGENCY MEDICINE

## 2023-10-31 PROCEDURE — 96375 TX/PRO/DX INJ NEW DRUG ADDON: CPT

## 2023-10-31 PROCEDURE — 3077F SYST BP >= 140 MM HG: CPT | Mod: CPTII,S$GLB,, | Performed by: INTERNAL MEDICINE

## 2023-10-31 PROCEDURE — 99214 OFFICE O/P EST MOD 30 MIN: CPT | Mod: S$GLB,,, | Performed by: INTERNAL MEDICINE

## 2023-10-31 PROCEDURE — 1160F RVW MEDS BY RX/DR IN RCRD: CPT | Mod: CPTII,S$GLB,, | Performed by: INTERNAL MEDICINE

## 2023-10-31 PROCEDURE — 3078F DIAST BP <80 MM HG: CPT | Mod: CPTII,S$GLB,, | Performed by: INTERNAL MEDICINE

## 2023-10-31 PROCEDURE — 4010F ACE/ARB THERAPY RXD/TAKEN: CPT | Mod: CPTII,S$GLB,, | Performed by: INTERNAL MEDICINE

## 2023-10-31 PROCEDURE — 4010F PR ACE/ARB THEARPY RXD/TAKEN: ICD-10-PCS | Mod: CPTII,S$GLB,, | Performed by: INTERNAL MEDICINE

## 2023-10-31 PROCEDURE — A9585 GADOBUTROL INJECTION: HCPCS | Performed by: EMERGENCY MEDICINE

## 2023-10-31 PROCEDURE — 1159F MED LIST DOCD IN RCRD: CPT | Mod: CPTII,S$GLB,, | Performed by: INTERNAL MEDICINE

## 2023-10-31 PROCEDURE — 85025 COMPLETE CBC W/AUTO DIFF WBC: CPT

## 2023-10-31 PROCEDURE — 80053 COMPREHEN METABOLIC PANEL: CPT

## 2023-10-31 PROCEDURE — 25000003 PHARM REV CODE 250

## 2023-10-31 PROCEDURE — 99999 PR PBB SHADOW E&M-EST. PATIENT-LVL IV: CPT | Mod: PBBFAC,,, | Performed by: INTERNAL MEDICINE

## 2023-10-31 PROCEDURE — 96374 THER/PROPH/DIAG INJ IV PUSH: CPT

## 2023-10-31 PROCEDURE — 3288F FALL RISK ASSESSMENT DOCD: CPT | Mod: CPTII,S$GLB,, | Performed by: INTERNAL MEDICINE

## 2023-10-31 PROCEDURE — U0002 COVID-19 LAB TEST NON-CDC: HCPCS

## 2023-10-31 RX ORDER — IBUPROFEN 200 MG
16 TABLET ORAL
Status: DISCONTINUED | OUTPATIENT
Start: 2023-10-31 | End: 2023-11-02 | Stop reason: HOSPADM

## 2023-10-31 RX ORDER — SODIUM CHLORIDE 0.9 % (FLUSH) 0.9 %
10 SYRINGE (ML) INJECTION EVERY 12 HOURS PRN
Status: DISCONTINUED | OUTPATIENT
Start: 2023-10-31 | End: 2023-11-02 | Stop reason: HOSPADM

## 2023-10-31 RX ORDER — HYDROMORPHONE HYDROCHLORIDE 1 MG/ML
1 INJECTION, SOLUTION INTRAMUSCULAR; INTRAVENOUS; SUBCUTANEOUS
Status: COMPLETED | OUTPATIENT
Start: 2023-10-31 | End: 2023-10-31

## 2023-10-31 RX ORDER — SODIUM CHLORIDE 9 MG/ML
INJECTION, SOLUTION INTRAVENOUS CONTINUOUS
Status: ACTIVE | OUTPATIENT
Start: 2023-10-31 | End: 2023-11-01

## 2023-10-31 RX ORDER — HYDROMORPHONE HYDROCHLORIDE 1 MG/ML
1 INJECTION, SOLUTION INTRAMUSCULAR; INTRAVENOUS; SUBCUTANEOUS EVERY 6 HOURS PRN
Status: DISCONTINUED | OUTPATIENT
Start: 2023-10-31 | End: 2023-11-01

## 2023-10-31 RX ORDER — HYDROMORPHONE HYDROCHLORIDE 1 MG/ML
1 INJECTION, SOLUTION INTRAMUSCULAR; INTRAVENOUS; SUBCUTANEOUS EVERY 6 HOURS PRN
Status: DISCONTINUED | OUTPATIENT
Start: 2023-10-31 | End: 2023-10-31

## 2023-10-31 RX ORDER — METHOCARBAMOL 500 MG/1
500 TABLET, FILM COATED ORAL 3 TIMES DAILY
Status: DISCONTINUED | OUTPATIENT
Start: 2023-10-31 | End: 2023-11-02 | Stop reason: HOSPADM

## 2023-10-31 RX ORDER — POLYETHYLENE GLYCOL 3350 17 G/17G
17 POWDER, FOR SOLUTION ORAL DAILY
Status: DISCONTINUED | OUTPATIENT
Start: 2023-11-01 | End: 2023-11-02 | Stop reason: HOSPADM

## 2023-10-31 RX ORDER — ENOXAPARIN SODIUM 100 MG/ML
40 INJECTION SUBCUTANEOUS EVERY 24 HOURS
Status: DISCONTINUED | OUTPATIENT
Start: 2023-11-01 | End: 2023-11-02 | Stop reason: HOSPADM

## 2023-10-31 RX ORDER — SENNOSIDES 8.6 MG/1
8.6 TABLET ORAL DAILY PRN
Status: DISCONTINUED | OUTPATIENT
Start: 2023-10-31 | End: 2023-11-02

## 2023-10-31 RX ORDER — MORPHINE SULFATE 2 MG/ML
6 INJECTION, SOLUTION INTRAMUSCULAR; INTRAVENOUS
Status: COMPLETED | OUTPATIENT
Start: 2023-10-31 | End: 2023-10-31

## 2023-10-31 RX ORDER — AMLODIPINE AND BENAZEPRIL HYDROCHLORIDE 5; 10 MG/1; MG/1
1 CAPSULE ORAL DAILY
Status: DISCONTINUED | OUTPATIENT
Start: 2023-11-01 | End: 2023-11-02 | Stop reason: HOSPADM

## 2023-10-31 RX ORDER — IBUPROFEN 200 MG
24 TABLET ORAL
Status: DISCONTINUED | OUTPATIENT
Start: 2023-10-31 | End: 2023-11-02 | Stop reason: HOSPADM

## 2023-10-31 RX ORDER — GADOBUTROL 604.72 MG/ML
10 INJECTION INTRAVENOUS
Status: COMPLETED | OUTPATIENT
Start: 2023-10-31 | End: 2023-10-31

## 2023-10-31 RX ORDER — GABAPENTIN 300 MG/1
300 CAPSULE ORAL 3 TIMES DAILY
Status: DISCONTINUED | OUTPATIENT
Start: 2023-10-31 | End: 2023-11-02 | Stop reason: HOSPADM

## 2023-10-31 RX ORDER — NALOXONE HCL 0.4 MG/ML
0.02 VIAL (ML) INJECTION
Status: DISCONTINUED | OUTPATIENT
Start: 2023-10-31 | End: 2023-11-02 | Stop reason: HOSPADM

## 2023-10-31 RX ORDER — ONDANSETRON 2 MG/ML
4 INJECTION INTRAMUSCULAR; INTRAVENOUS
Status: COMPLETED | OUTPATIENT
Start: 2023-10-31 | End: 2023-10-31

## 2023-10-31 RX ORDER — OXYCODONE HYDROCHLORIDE 5 MG/1
5 TABLET ORAL EVERY 6 HOURS PRN
Status: DISCONTINUED | OUTPATIENT
Start: 2023-10-31 | End: 2023-11-01

## 2023-10-31 RX ORDER — GLUCAGON 1 MG
1 KIT INJECTION
Status: DISCONTINUED | OUTPATIENT
Start: 2023-10-31 | End: 2023-11-02 | Stop reason: HOSPADM

## 2023-10-31 RX ADMIN — HYDROMORPHONE HYDROCHLORIDE 1 MG: 1 INJECTION, SOLUTION INTRAMUSCULAR; INTRAVENOUS; SUBCUTANEOUS at 04:10

## 2023-10-31 RX ADMIN — SODIUM CHLORIDE: 9 INJECTION, SOLUTION INTRAVENOUS at 09:10

## 2023-10-31 RX ADMIN — MORPHINE SULFATE 6 MG: 2 INJECTION, SOLUTION INTRAMUSCULAR; INTRAVENOUS at 06:10

## 2023-10-31 RX ADMIN — ONDANSETRON 4 MG: 2 INJECTION INTRAMUSCULAR; INTRAVENOUS at 04:10

## 2023-10-31 RX ADMIN — GABAPENTIN 300 MG: 300 CAPSULE ORAL at 09:10

## 2023-10-31 RX ADMIN — METHOCARBAMOL 500 MG: 500 TABLET ORAL at 09:10

## 2023-10-31 RX ADMIN — GADOBUTROL 10 ML: 604.72 INJECTION INTRAVENOUS at 05:10

## 2023-10-31 NOTE — PROGRESS NOTES
Subjective     Patient ID: Gamaliel Pete Jr. is a 73 y.o. male.    Chief Complaint: Malignant neoplasm metastatic to lung, unspecified laterali    HPI    Here for follow up  Presents with intractable back pain  He has recently been to ED for this and NSAIDs not relieving  He saw orthopedics yesterday and prescribed Gabapentin-- thought pain better yesterday but back 10/10  He is having trouble ambulating with pain  10/10  Radiates in band around to front  Denies numbness or weakness below    Constipated and relieved with Mag Citrate    Most recent imaging:  - 10/22/2023 CT C/A/P:  FINDINGS:  Lungs: Stable bilateral pulmonary nodules measuring up to 0.9 cm on the right and 0.9 cm on the left.  Right lower lobe subsegmental atelectasis versus bandlike scarring.  Heart: Normal size. No pericardial effusion.  Liver: No focal abnormality.  Gallbladder: No pericholecystic inflammatory changes.  Bile ducts: No intrahepatic or extrahepatic biliary ductal dilatation.  Spleen: Normal size.  Pancreas: No mass or ductal dilatation.  No peripancreatic fat stranding.  Adrenals: No significant abnormality  Renal/Ureters: Postoperative changes of right nephrectomy.  No enhancing mass within the nephrectomy bed.  No left-sided hydroureteronephrosis or stones.  The urinary bladder is not well distended.  Reproductive: Prostate is mildly enlarged with dystrophic calcifications.  Stomach/Bowel: No evidence of obstruction or inflammatory changes.  Appendix is normal.  Peritoneum: No organized fluid collections.  No free fluid. No intraperitoneal free air.  Lymph Nodes: No pathologic nicole enlargement in the abdomen or pelvis.  Vasculature: Abdominal aorta tapers normally.  Mild atherosclerosis of the abdominal aorta and its branches. Portal vasculature, SMV, and splenic vein are patent.  Bones: Partially imaged lytic lesion within anterior aspect T9 vertebral body, better evaluated on prior imaging.  Hemangioma at L4. Degenerative  changes of the spine.  Soft Tissues: Bilateral fat containing inguinal hernias.  Postoperative changes anterior abdominal wall.  Impression:  Postoperative changes of right nephrectomy for clear cell carcinoma.  No enhancing mass or abscess within the nephrectomy bed.  Stable bilateral pulmonary nodules and stable T9 lytic lesion, concerning for metastatic disease.    Diagnosis: metastatic RCC     Oncology History:  - Presented with gross hematuria mid June 2023  Building a house and has been active working on this in the hat- 1st noted dark urine and felt related to being dehydrated  Occurred a 2nd time approximately 2 weeks later and this time he noted darker and small clots  Noted again 2 weeks later and worse but ultimately he was prompted to seek evaluation when he had significant blood when he urinated     - went to his PCP and urine sample was yellow again  He had blood work done and referred to Urology at that time     - 9/5/2023 CT Urogram:  FINDINGS:  The lung bases demonstrate bilateral nodules, for example 9 mm at the right lung base and up to 11 mm at the left lung base.  Atelectasis present.  There are bilateral fat containing inguinal hernias.  The liver demonstrates no mass.  The spleen is not enlarged.  The stomach, pancreas and adrenal glands are within normal limits.  Gallbladder is unremarkable.  There is no biliary ductal dilatation.  The kidneys concentrate and excrete contrast satisfactorily.  There is no renal calculus or ureteral calculus.  Within the mid to lower pole of the right kidney is a 4.9 x 6.3 x 6.1 cm heterogeneous solid mass which is overall slightly hypodense in relation to the enhancing parenchyma.  The mass is partially exophytic posteriorly.  It does extend partially into the renal sinus  At the upper pole of the right kidney is a subcentimeter exophytic focus which is isodense to the parenchyma on postcontrast imaging appearing slightly hyperdense on the noncontrast study,  too small to characterize.  There are multiple additional subcentimeter hypodense right kidney foci which are suggestive of cysts.  Finally at the lower pole of the right kidney is a exophytic hypodense structure most compatible with a cyst.  The right main renal vein appears patent.  There is no significant periaortic lymphadenopathy.  Left kidney demonstrates several subcentimeter foci which are hypodense but too small to characterize, suggestive of cysts.  There is somewhat of a small amount of contrast within the upper collecting systems and ureters, with no definite focal abnormality, noting that there is some distortion of the collecting system in the region in which the right kidney mass involves the renal sinus.  The bladder is partially distended appearing grossly unremarkable.  The bowel demonstrates no significant abnormality.  The osseous structures demonstrate degenerative changes.  T9 demonstrates a lytic focus occupying the anterior half of the vertebral body.  There is slight loss of height along superior and inferior endplates with cortical disruption..  Impression:  Solid right renal mass concerning for neoplasm.  Multiple lung base nodules up to 11 mm, concerning for metastatic disease.  Recommend chest CT for full evaluation of lungs.  Lytic lesion at T9 with mild compression, concerning for pathologic compression fracture.  Subcentimeter right kidney lesion isodense to parenchyma on contrast enhanced imaging, too small to characterize.  Additional bilateral renal hypodensities which are too small to characterize but suggestive of cysts.     - 9/7/2023 CT Chest:  FINDINGS:  The base of the neck is within normal limits.  The thyroid gland is unremarkable.  The supraclavicular regions are within normal limits.  The trachea is unremarkable.  The central airways are within normal limits.  No endobronchial lesion is identified.  There is no evidence of bronchiectasis.  The heart is unremarkable.   There are no pericardial effusions.  There are coronary artery calcifications.  The thoracic aorta is normal in caliber.  There are subcentimeter mediastinal and hilar lymph nodes.  There are subcentimeter axillary lymph nodes.  There are no pleural effusions.  There is no evidence of a pneumothorax.  There is no evidence of pneumomediastinum.  There are innumerable bilateral pulmonary nodules.  There is no focal consolidation.  The esophagus is unremarkable.  Please see the dedicated CT abdomen pelvis for the upper abdominal findings.  The chest wall is unremarkable.  There is unchanged lytic lesion involving the anterior aspect of T9 vertebral body with associated cortical erosions.  Impression:  Innumerable pulmonary nodules, concerning for metastatic disease to the chest.  Unchanged lytic lesion in the T9 vertebral body with cortical erosions.  Follow-up MRI of the spine, as clinically warranted.     - 9/7/2023 CT Abd:  FINDINGS:  CT renal protocol:  There is a 4.8 x 6.3 cm exophytic enhancing lesion in the lower pole of the right kidney.  There is hypoenhancement of the lesion compared to the normal renal parenchyma.  There is unchanged appearance of the mass extending into the right renal sinus.  There is no extension into the right renal vein  No additional enhancing lesions are identified.  There is a subcentimeter lesion in the right kidney is too small complete characterization.  There is prompt excretion from both collecting systems.  There is incomplete distension of the right distal ureter.  No definitive filling defect is identified within the.  The urinary bladder is unremarkable.  CT abdomen pelvis:  There is unchanged appearance of multiple pulmonary nodules in the lung bases.  No new nodules identified  The heart is unremarkable.  There is normal tapering of the abdominal aorta.  There are single bilateral renal arteries.  The renal veins remain patent.  There is no evidence of  lymphadenopathy.  The esophagus, stomach, and duodenum are within normal limits.  The small bowel loops are unremarkable.  The appendix is not visualized.  There are no secondary findings of acute appendicitis.  There is colonic diverticula without evidence of acute diverticulitis.  The liver is unremarkable.  The gallbladder is within normal limits.  The biliary tree is within normal limits.  The spleen is unremarkable.  The pancreas is within normal limits.  The adrenal glands are unremarkable.  There is no evidence of free fluid in the abdomen or pelvis.  There is no evidence of free air.  There is no evidence of pneumatosis.  No portal venous air is identified.  The psoas margins are unremarkable.  There are bilateral fat containing inguinal hernias.  There are degenerative changes in the osseous structures.  There is unchanged appearance of a lytic lesion involving the anterior aspect of the T9 vertebral body with associated cortical erosions.  Impression:  Unchanged 4.8 x 6.3 cm exophytic hypoenhancing lesion in the lower pole of the right kidney, concerning for renal cell carcinoma.  Extension of lesion into the renal sinus.  No evidence of renal vein invasion.  No regional lymphadenopathy.  Additional smaller subcentimeter lesion too small complete characterization in the right kidney.  Bilateral pulmonary nodules remain concerning for metastatic disease.  Lytic lesion along the anterior aspect of the T9 vertebral body, also concerning for osseous metastatic disease.  Additional findings as above.     - 9/20/2023 Bone scan:  FINDINGS:  There is physiologic distribution of the radiopharmaceutical throughout the skeleton.  Focal uptake in the T9 vertebral body corresponding to lytic lesion seen on CT 09/05/2023.  Focal uptake in the right kidney in patient with known right renal mass.  There is otherwise normal uptake in the genitourinary system and soft tissues.  Impression:  Focal uptake in the T9 vertebral  body corresponding to lytic lesion seen on prior CT and concerning for metastatic disease.  Additional focus of increased uptake in the right kidney in patient with known right renal mass     - required nephrectomy with hematuria as increased significantly     - 10/4/2023 Robotic right nephrectomy  Pathology:  RIGHT KIDNEY, TOTAL NEPHRECTOMY:   - Clear cell renal cell carcinoma, ISUP grade 4, 5.5 cm.   - Benign cortical cysts.   - See [facility] synoptic report below.   SURGICAL PATHOLOGY CANCER CASE SUMMARY   Procedure: Total nephrectomy.   Specimen laterality: Right.   Tumor size: 5.5 cm.   Tumor focality: Unifocal.   Histologic type: Clear cell renal cell carcinoma.   Sarcomatoid features: Present, 5%.   Rhabdoid features: Not identified.   Histologic Grade (ISUP Grade): 4.   Tumor necrosis: Present, 20%.   Tumor extension: Extends into pelvicalyceal system and renal sinus fat.   Margins: Uninvolved.   Lymphovascular invasion (excluding renal vein and its segmental branches): Not identified.   Regional lymph nodes: No lymph nodes submitted or found.   Distant metastases: Not applicable in this specimen.   Pathologic stage (pTNM, AJCC 8th edition): pT3a pN not assigned (no lymph nodes submitted or found).      [facility]                                                GENITOURINARY MULTIDISCIPLINARY TUMOR BOARD  PATIENT REVIEW FORM   CLINIC #: [id number]      DATE: 10/24/2023  TUMOR SITE:   Kidney   ATTENDING:   Henry Michaels MD; Ruby Turk MD   PATIENT SUMMARY:   Gamaliel Pete Jr. is a 73M with history of pulmonary masses and bone lesion who recently underwent right robotic nephrectomy on 10/4/23 with Dr. Michaels.  Path significant for grade 4 RCC with sarcomatoid features.  He had initially presented with significantly symptomatic hematuria and was found to have a mass suspicious for renal cell carcinoma.  - 9/20/2023 Bone scan: Focal uptake in the T9 vertebral body corresponding to lytic lesion seen on  prior CT and concerning for metastatic disease.  Additional focus of increased uptake in the right kidney in patient with known right renal mass  - 10/4/2023 Robotic right nephrectomy  Pathology:  RIGHT KIDNEY, TOTAL NEPHRECTOMY:  - Clear cell renal cell carcinoma, ISUP grade 4, 5.5 cm.  Tumor size: 5.5 cm.  Histologic type: Clear cell renal cell carcinoma.  Sarcomatoid features: Present, 5%.  Rhabdoid features: Not identified.  Histologic Grade (ISUP Grade): 4.  Tumor necrosis: Present, 20%.  Tumor extension: Extends into pelvicalyceal system and renal sinus fat.  Margins: Uninvolved.  Lymphovascular invasion: Not identified.  Regional lymph nodes: No lymph nodes submitted or found.  Distant metastases: Not applicable in this specimen.  Pathologic stage (pTNM, AJCC 8th edition): pT3a pN not assigned (no lymph nodes submitted or found).   DISCUSSION:  Question: Metastatic RCC s/p nephrectomy. Discuss therapy options.   RT for his T9 if positive, also have to follow some pulmonary nodules   PERFORMANCE STATUS:  ECOG 0  Estimated GFR/CKD Stage: GFR 45 (Cr 1.6)  Clinical/Pathologic Stage (TNM): [id number]  FACULTY IN ATTENDANCE:    Urologic Oncology: Henry Michaels MD; Abdirahman Quarles MD; Adrien Vasquez MD   Radiation Oncology: Russ Bustos MD  Hematology/Oncology: Ruby Turk MD; Lars Sullivan MD  Pathology  CONSULT NEEDED:     [x] Urologic Oncology    [x] Hem/Onc    [x] Rad/Onc   []     [] Physical/Occupational Therapy    [] Psychology  [] Other: ___________________  [x] Treatment Guidelines (NCCN and AUA) reviewed and care planned is consistent with guidelines.  PRESENTATION AT CANCER CONFERENCE:         [] Prospective    [] Retrospective     [] Follow-Up           [] Eligible for clinical trial     TUMOR BOARD RECOMMENDATIONS/PLAN/CONSENSUS:   Case discussed among group. Pathology and radiologic images were reviewed (if applicable).  Needs T9 biopsy.  Possible SBRT if biopsy proves mets.      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  - fractured collar bone  - Pediatric hernia             Active Ambulatory Problems     Diagnosis Date Noted    Obesity (BMI 35.0-39.9 without comorbidity) 2019    Borderline hypertension 2019    Acute gout of left ankle 2019    Onychomycosis 2019    History of gout 2021    Seborrheic keratosis 2021    Tinea corporis 2021    Positive colorectal cancer screening using Cologuard test 2021              Resolved Ambulatory Problems     Diagnosis Date Noted    No Resolved Ambulatory Problems              Past Medical History:   Diagnosis Date    Asthma      ED (erectile dysfunction)      Gout        SH:  Retired from [employer]    1 child, 2 stepchildren  Prior tobacco- teens, early 20s  Social EtOH     FH:  Maternal grandmother-  at 91- she had prior cancers- colon cancer and breast cancer  Mother - liver cancer - prior blood transfusion Hep C-  at age 77 yo  Father-  in his 50s- EtOH cirrhosis  Paternal grandfather- cancer in his 70s, unclear type  Maternal grandfather- H+N cancer-  (smoker)    Review of Systems  See above     Objective     Physical Exam  Abnormal gait  No leg weakness or numbness     Assessment and Plan     1. Clear cell carcinoma of right kidney    2. Malignant neoplasm metastatic to lung, unspecified laterality    3. Metastasis to bone    4. Intractable back pain    Brought to ED for MRI T spine  Discussed may need admission for management of pain and to initiate XRT    Route Chart for Scheduling    Med Onc Chart Routing  Urgent    Follow up with physician . 1 week- next Tuesday   Follow up with [facility]    Infusion scheduling note    Injection scheduling note    Labs    Imaging    Pharmacy appointment    Other referrals                 Therapy Plan Information  EPINEPHrine (EPIPEN) 0.3 mg/0.3 mL pen injection 0.3 mg  0.3 mg, Intramuscular, PRN  diphenhydrAMINE injection 50 mg  50 mg, Intravenous, PRN  hydrocortisone sodium succinate injection 100 mg  100 mg, Intravenous, PRN

## 2023-10-31 NOTE — ED PROVIDER NOTES
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  Patient was at hematology oncology clinic and was instructed to come to the emergency department for MRI and further evaluation and pain control.  Patient reports that pain has worsened over the last several days.  He reports that today he had to move around to go upstairs and get in the car in which the pain was severe.  He reports that he took an Oxy yesterday and was taking gabapentin which minimally improved his pain.   Patient currently denies any numbness, tingling, urinary or bowel incontinence, difficulty walking secondary to leg weakness or any other complaints at this time.    The history is provided by the patient.     Review of patient's allergies indicates:  No Known Allergies  Past Medical History:   Diagnosis Date    Asthma     no inhaler use    Borderline hypertension     ED (erectile dysfunction)     Gout     Hematuria     Hypertension      Past Surgical History:   Procedure Laterality Date    COLONOSCOPY  02/10/2022    COLONOSCOPY N/A 02/10/2022    Procedure: COLONOSCOPY;  Surgeon: Desmond Keenan MD;  Location: [facility];  Service: General;  Laterality: N/A;    ROBOT-ASSISTED LAPAROSCOPIC NEPHRECTOMY Right 10/4/2023    Procedure: ROBOTIC NEPHRECTOMY;  Surgeon: Henry Michaels MD;  Location: [facility];  Service: Urology;  Laterality: Right;    TONSILLECTOMY       History reviewed. No pertinent family history.  Social History     Tobacco Use    Smoking status: Never    Smokeless tobacco: Never   Substance Use Topics    Alcohol use: Not Currently     Alcohol/week: 0.0 standard drinks of alcohol     Comment: rarely    Drug  use: Never         Physical Exam     Initial Vitals   BP Pulse Resp Temp SpO2   10/31/23 1552 10/31/23 1552 10/31/23 1552 10/31/23 1916 10/31/23 1552   (!) 143/65 77 18 98.1 °F (36.7 °C) 98 %      MAP       --                Physical Exam    Nursing note and vitals reviewed.  Constitutional: He appears well-developed and well-nourished.   Cardiovascular:  Normal rate and regular rhythm.           Pulmonary/Chest: Breath sounds normal.   Abdominal: Abdomen is soft. He exhibits no distension.   Musculoskeletal:         General: Normal range of motion.     Neurological: He is alert and oriented to person, place, and time. He has normal strength. No cranial nerve deficit or sensory deficit.   Skin: Skin is warm and dry.         ED Course   Procedures  Labs Reviewed   CBC W/ AUTO DIFFERENTIAL - Abnormal; Notable for the following components:       Result Value    RBC 4.53 (*)     Hemoglobin 13.6 (*)     MPV 9.0 (*)     All other components within normal limits   COMPREHENSIVE METABOLIC PANEL - Abnormal; Notable for the following components:    BUN 30 (*)     Creatinine 1.5 (*)     Calcium 10.9 (*)     Total Protein 9.1 (*)     AST 43 (*)      (*)     eGFR 49.5 (*)     All other components within normal limits   SARS-COV-2 RNA AMPLIFICATION, QUAL   URINALYSIS, REFLEX TO URINE CULTURE          Imaging Results              MRI Thoracic Spine W WO Cont (Final result)  Result time 10/31/23 19:08:07      Final result by Lars Snyder MD (10/31/23 19:08:07)                   Impression:      Enhancing destructive lesions involving the C5 and T9 vertebral bodies with associated compression deformity/height loss, progressed from prior at the T9 level.  No prior imaging of the C5 vertebral body is available for comparison.  Destruction of the C5 vertebral body contributes to moderate canal stenosis at this level.  Subtle signal abnormality of the spinal cord at that level possibly reflecting compressive myelopathy.   Additional mild retropulsion of T9 causing mild canal stenosis at the T8-T9 level.  Possible lesion involving the anterior C6 vertebral body.  Consider further evaluation with cervical spine MRI.    Subcentimeter right hepatic lobe lesion demonstrating questionable enhancement without corresponding abnormality on recent CT.    Additional findings as described.    Electronically signed by resident: Syd Smith  Date:    10/31/2023  Time:    18:01    Electronically signed by: Lars Snyder MD  Date:    10/31/2023  Time:    19:08               Narrative:    EXAMINATION:  MRI THORACIC SPINE W WO CONTRAST    CLINICAL HISTORY:  Bone mass or bone pain, thoracic spine, aggressive features on xray;    TECHNIQUE:  Multiplanar multisequence images of the thoracic spine were obtained before and after the administration of 10 mL of intravenous gadobutrol.    COMPARISON:  Lumbar spine radiograph 10/30/2023, CT abdomen pelvis 10/22/2023, CT chest 09/07/2023, nuclear medicine bone scan 09/20/2023    FINDINGS:  ALIGNMENT: Sagittal alignment is preserved.    BONE: T1 hypointense lesion demonstrating STIR abnormality and postcontrast enhancement of the T9 vertebral body with associated compression deformity, progressed from prior examination 09/07/2023, allowing for differences in imaging technique.  Minimal retropulsion into the spinal canal.  Additional lesion identified involving the C5 vertebral body with associated compression deformity and approximately 40% height loss.  Abnormal signal is noted extending into the right lateral aspect of the C4 vertebral body (sequence 16 image 9).  This level is not well evaluated on postcontrast enhanced sequence.  Possible additional metastatic lesion involving the anterior C6 vertebral body.    DISC: Mild multilevel desiccation and height loss.    SPINAL CANAL: Subtle increased T2/STIR signal noted at the C4-C5 level, not well imaged on this examination.  The conus medullaris has a  normal appearance and terminates below the field of view.  No enhancing spinal cord lesions.    PARASPINAL SOFT TISSUES/MISCELLANEOUS: Right hepatic lobe subcentimeter T2 intermediate (sequence 19 image 56), not well evaluated on this examination.  No corresponding abnormality noted of on recent CT 10/22/2023.  Mild possible enhancement on the postcontrast T1 images in that region (sequence 23 image 56).  Several scattered bilateral pulmonary nodules are noted, better demonstrated on previous cross-sectional imaging    SIGNIFICANT FINDINGS:    C4-C5: Compression deformity of the C5 vertebral body with retrolisthesis of C4 on C5 as well as a posterior disc osteophyte complex/retropulsion of the C5 contributes to at least moderate spinal canal stenosis at this level.    T8-T9: Posterior disc protrusion/osteophyte with minimal retropulsion of T9 causes mild canal stenosis.  No high-grade neural foraminal narrowing.    No evidence of high-grade spinal canal stenosis or neural foraminal narrowing throughout the remainder of the visualized thoracic spine.                                       Medications   oxyCODONE immediate release tablet 5 mg (has no administration in time range)   amlodipine-benazepril 5-10 mg per capsule 1 capsule (has no administration in time range)   gabapentin capsule 300 mg (has no administration in time range)   methocarbamoL tablet 500 mg (has no administration in time range)   HYDROmorphone injection 1 mg (1 mg Intravenous Given 10/31/23 1651)   ondansetron injection 4 mg (4 mg Intravenous Given 10/31/23 1649)   gadobutroL (GADAVIST) injection 10 mL (10 mLs Intravenous Given 10/31/23 1746)   morphine injection 6 mg (6 mg Intravenous Given 10/31/23 1824)     Medical Decision Making  73-year-old male presenting to the emergency department from Heme-Onc Clinic for persistent pain and Justine lesion requiring potential radiation therapy.  On examination patient is alert oriented in no acute  distress.  Patient has no numbness tingling.  Strength is intact in the lower extremity.  No history of bowel or urinary incontinence.  Low suspicion for cauda equina compression of the spinal cord however MRI recommended by oncologist.  MRI obtained.  Patient given 1 mg of Dilaudid followed by 4 mg Zofran for nausea.  Patient reported mild relief in symptoms and after MRI was still in pain given 6 of morphine with symptomatic relief.  CBC, CMP obtained evaluate for leukocytosis anemia, metabolic derangements renal impairment.  At this time discussed case with hematology oncology for admission.  After evaluation they admitted patient for continued care.  Plan discussed with patient and wife who are agreeable with admission and plan.  At this time MRI reading pending.    Amount and/or Complexity of Data Reviewed  Labs: ordered.  Radiology: ordered.    Risk  Prescription drug management.  Decision regarding hospitalization.                               Clinical Impression:   Final diagnoses:  [R52] Inadequate pain control        ED Disposition Condition    Admit                 Wiliam Manriquez MD  Resident  10/31/23 2007

## 2023-10-31 NOTE — ED TRIAGE NOTES
Gamaliel Pete Jr., a 73 y.o. male presents to the ED w/ complaint of back pain.  Patient had kidney removed on 10/4. Back pain started 2 weeks after kidney removal.  Lesions found on T9.  Denies any bowel/bladder issues.  Pain is not midline more so upon bending or twisting. Pain 8/10.  Recently prescribed gabapentin.    Triage note:  Chief Complaint   Patient presents with    Back Pain     Sent here for mri , had lesion on back  T9, denies bowel/bladder incontinence     Review of patient's allergies indicates:  No Known Allergies  Past Medical History:   Diagnosis Date    Asthma     no inhaler use    Borderline hypertension     ED (erectile dysfunction)     Gout     Hematuria     Hypertension

## 2023-11-01 ENCOUNTER — TELEPHONE (OUTPATIENT)
Dept: ORTHOPEDICS | Facility: CLINIC | Age: 73
End: 2023-11-01
Payer: MEDICARE

## 2023-11-01 LAB
ALBUMIN SERPL BCP-MCNC: 3.6 G/DL (ref 3.5–5.2)
ALP SERPL-CCNC: 96 U/L (ref 55–135)
ALT SERPL W/O P-5'-P-CCNC: 112 U/L (ref 10–44)
ANION GAP SERPL CALC-SCNC: 7 MMOL/L (ref 8–16)
AST SERPL-CCNC: 33 U/L (ref 10–40)
BASOPHILS # BLD AUTO: 0.05 K/UL (ref 0–0.2)
BASOPHILS NFR BLD: 0.8 % (ref 0–1.9)
BILIRUB SERPL-MCNC: 0.4 MG/DL (ref 0.1–1)
BILIRUB UR QL STRIP: NEGATIVE
BUN SERPL-MCNC: 26 MG/DL (ref 8–23)
CALCIUM SERPL-MCNC: 10.2 MG/DL (ref 8.7–10.5)
CHLORIDE SERPL-SCNC: 105 MMOL/L (ref 95–110)
CLARITY UR REFRACT.AUTO: CLEAR
CO2 SERPL-SCNC: 25 MMOL/L (ref 23–29)
COLOR UR AUTO: YELLOW
CREAT SERPL-MCNC: 1.5 MG/DL (ref 0.5–1.4)
DIFFERENTIAL METHOD: ABNORMAL
EOSINOPHIL # BLD AUTO: 0.6 K/UL (ref 0–0.5)
EOSINOPHIL NFR BLD: 8.5 % (ref 0–8)
ERYTHROCYTE [DISTWIDTH] IN BLOOD BY AUTOMATED COUNT: 13.7 % (ref 11.5–14.5)
EST. GFR  (NO RACE VARIABLE): 49.5 ML/MIN/1.73 M^2
GLUCOSE SERPL-MCNC: 100 MG/DL (ref 70–110)
GLUCOSE UR QL STRIP: NEGATIVE
HCT VFR BLD AUTO: 36.7 % (ref 40–54)
HGB BLD-MCNC: 12.1 G/DL (ref 14–18)
HGB UR QL STRIP: NEGATIVE
IMM GRANULOCYTES # BLD AUTO: 0.02 K/UL (ref 0–0.04)
IMM GRANULOCYTES NFR BLD AUTO: 0.3 % (ref 0–0.5)
KETONES UR QL STRIP: NEGATIVE
LEUKOCYTE ESTERASE UR QL STRIP: NEGATIVE
LYMPHOCYTES # BLD AUTO: 1.9 K/UL (ref 1–4.8)
LYMPHOCYTES NFR BLD: 29.2 % (ref 18–48)
MAGNESIUM SERPL-MCNC: 2.5 MG/DL (ref 1.6–2.6)
MCH RBC QN AUTO: 29.3 PG (ref 27–31)
MCHC RBC AUTO-ENTMCNC: 33 G/DL (ref 32–36)
MCV RBC AUTO: 89 FL (ref 82–98)
MONOCYTES # BLD AUTO: 0.8 K/UL (ref 0.3–1)
MONOCYTES NFR BLD: 11.9 % (ref 4–15)
NEUTROPHILS # BLD AUTO: 3.2 K/UL (ref 1.8–7.7)
NEUTROPHILS NFR BLD: 49.3 % (ref 38–73)
NITRITE UR QL STRIP: NEGATIVE
NRBC BLD-RTO: 0 /100 WBC
PH UR STRIP: 6 [PH] (ref 5–8)
PHOSPHATE SERPL-MCNC: 3.9 MG/DL (ref 2.7–4.5)
PLATELET # BLD AUTO: 187 K/UL (ref 150–450)
PMV BLD AUTO: 9.2 FL (ref 9.2–12.9)
POTASSIUM SERPL-SCNC: 4.2 MMOL/L (ref 3.5–5.1)
PROT SERPL-MCNC: 7.5 G/DL (ref 6–8.4)
PROT UR QL STRIP: NEGATIVE
RBC # BLD AUTO: 4.13 M/UL (ref 4.6–6.2)
SODIUM SERPL-SCNC: 137 MMOL/L (ref 136–145)
SP GR UR STRIP: 1.01 (ref 1–1.03)
URN SPEC COLLECT METH UR: NORMAL
WBC # BLD AUTO: 6.55 K/UL (ref 3.9–12.7)

## 2023-11-01 PROCEDURE — 84100 ASSAY OF PHOSPHORUS: CPT

## 2023-11-01 PROCEDURE — 85025 COMPLETE CBC W/AUTO DIFF WBC: CPT

## 2023-11-01 PROCEDURE — 81003 URINALYSIS AUTO W/O SCOPE: CPT

## 2023-11-01 PROCEDURE — 83735 ASSAY OF MAGNESIUM: CPT

## 2023-11-01 PROCEDURE — 80053 COMPREHEN METABOLIC PANEL: CPT

## 2023-11-01 PROCEDURE — 20600001 HC STEP DOWN PRIVATE ROOM

## 2023-11-01 PROCEDURE — 99223 PR INITIAL HOSPITAL CARE,LEVL III: ICD-10-PCS | Mod: AI,GC,, | Performed by: HOSPITALIST

## 2023-11-01 PROCEDURE — 97162 PT EVAL MOD COMPLEX 30 MIN: CPT

## 2023-11-01 PROCEDURE — 99223 1ST HOSP IP/OBS HIGH 75: CPT | Mod: AI,GC,, | Performed by: HOSPITALIST

## 2023-11-01 PROCEDURE — 36415 COLL VENOUS BLD VENIPUNCTURE: CPT

## 2023-11-01 PROCEDURE — 25000003 PHARM REV CODE 250

## 2023-11-01 PROCEDURE — 63600175 PHARM REV CODE 636 W HCPCS

## 2023-11-01 PROCEDURE — 97530 THERAPEUTIC ACTIVITIES: CPT

## 2023-11-01 RX ORDER — HYDROMORPHONE HYDROCHLORIDE 1 MG/ML
1 INJECTION, SOLUTION INTRAMUSCULAR; INTRAVENOUS; SUBCUTANEOUS EVERY 6 HOURS PRN
Status: DISCONTINUED | OUTPATIENT
Start: 2023-11-01 | End: 2023-11-02 | Stop reason: HOSPADM

## 2023-11-01 RX ORDER — INDOMETHACIN 50 MG/1
50 CAPSULE ORAL 3 TIMES DAILY PRN
Status: DISCONTINUED | OUTPATIENT
Start: 2023-11-01 | End: 2023-11-02

## 2023-11-01 RX ORDER — INDOMETHACIN 25 MG/1
25 CAPSULE ORAL 3 TIMES DAILY PRN
Status: DISCONTINUED | OUTPATIENT
Start: 2023-11-01 | End: 2023-11-01

## 2023-11-01 RX ORDER — OXYCODONE HYDROCHLORIDE 5 MG/1
5 TABLET ORAL EVERY 4 HOURS PRN
Status: DISCONTINUED | OUTPATIENT
Start: 2023-11-01 | End: 2023-11-01

## 2023-11-01 RX ORDER — OXYCODONE HYDROCHLORIDE 10 MG/1
10 TABLET ORAL EVERY 4 HOURS PRN
Status: DISCONTINUED | OUTPATIENT
Start: 2023-11-01 | End: 2023-11-02

## 2023-11-01 RX ADMIN — HYDROMORPHONE HYDROCHLORIDE 1 MG: 1 INJECTION, SOLUTION INTRAMUSCULAR; INTRAVENOUS; SUBCUTANEOUS at 09:11

## 2023-11-01 RX ADMIN — AMLODIPINE AND BENAZEPRIL HYDROCHLORIDE 1 CAPSULE: 5; 10 CAPSULE ORAL at 08:11

## 2023-11-01 RX ADMIN — SODIUM CHLORIDE 1000 ML: 9 INJECTION, SOLUTION INTRAVENOUS at 11:11

## 2023-11-01 RX ADMIN — POLYETHYLENE GLYCOL 3350 17 G: 17 POWDER, FOR SOLUTION ORAL at 08:11

## 2023-11-01 RX ADMIN — GABAPENTIN 300 MG: 300 CAPSULE ORAL at 08:11

## 2023-11-01 RX ADMIN — OXYCODONE HYDROCHLORIDE 10 MG: 10 TABLET ORAL at 06:11

## 2023-11-01 RX ADMIN — SODIUM CHLORIDE: 9 INJECTION, SOLUTION INTRAVENOUS at 06:11

## 2023-11-01 RX ADMIN — METHOCARBAMOL 500 MG: 500 TABLET ORAL at 08:11

## 2023-11-01 RX ADMIN — GABAPENTIN 300 MG: 300 CAPSULE ORAL at 03:11

## 2023-11-01 RX ADMIN — OXYCODONE HYDROCHLORIDE 10 MG: 10 TABLET ORAL at 01:11

## 2023-11-01 RX ADMIN — METHOCARBAMOL 500 MG: 500 TABLET ORAL at 03:11

## 2023-11-01 RX ADMIN — HYDROMORPHONE HYDROCHLORIDE 1 MG: 1 INJECTION, SOLUTION INTRAMUSCULAR; INTRAVENOUS; SUBCUTANEOUS at 01:11

## 2023-11-01 RX ADMIN — INDOMETHACIN 50 MG: 50 CAPSULE ORAL at 09:11

## 2023-11-01 RX ADMIN — INDOMETHACIN 50 MG: 50 CAPSULE ORAL at 12:11

## 2023-11-01 NOTE — CONSULTS
[facility] - Oncology ([facility])  Neurosurgery  Consult Note    Inpatient consult to Neurosurgery  Consult performed by: Brandon Hawk MD  Consult ordered by: Dustin Cordero DO        Subjective:     Chief Complaint/Reason for Admission: Back pain. C5/t9 mets    History of Present Illness: Mr. 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 Pain has been worsening over the lat several days. He endorses low back pain, and mild neck pain however his chief pain source is in his back. He denies GI symptoms except occasional constipation that is relieved with OTC remedies. MRI concerning for C5 and T9 lesion.Patient admitted to medical oncology for management of pain symptoms, SABINE, hypercalcemia, with rad onc and NSGY consulted due to the spine mets.        Medications Prior to Admission   Medication Sig Dispense Refill Last Dose    amlodipine-benazepril 5-10 mg (LOTREL) 5-10 mg per capsule Take 1 capsule by mouth once daily. 90 capsule 3     gabapentin (NEURONTIN) 300 MG capsule Take 1 capsule (300 mg total) by mouth 3 (three) times daily. 90 capsule 11     ibuprofen (ADVIL,MOTRIN) 800 MG tablet Take 1 tablet (800 mg total) by mouth every 8 (eight) hours. 20 tablet 0     indomethacin (INDOCIN) 50 MG capsule One p.o. t.i.d. p.r.n. gouty attack no more than 7 days--do not take with other NSAIDs 90 capsule 0     methocarbamoL (ROBAXIN) 500 MG Tab Take 2 tablets (1,000 mg total) by mouth 2 (two) times daily as needed (pain). 20 tablet 0     OLIVE OIL ORAL Take 15 mLs by mouth 2 (two) times a day.       omega-3 fatty acids/fish oil (FISH OIL-OMEGA-3 FATTY ACIDS) 300-1,000 mg capsule Take 2 capsules by mouth once daily.       oxyCODONE (ROXICODONE) 5 MG immediate release tablet Take 1 tablet (5 mg total) by mouth every 6 (six) hours as needed for Pain. 12 tablet 0     sildenafiL (VIAGRA) 100 MG tablet Take 1  tablet (100 mg total) by mouth daily as needed for Erectile Dysfunction. 30 tablet 11     turmeric (CURCUMIN MISC) 1,000 mg by Misc.(Non-Drug; Combo Route) route Daily.       UNABLE TO FIND Take 500 mg by mouth 2 (two) times a day. medication name: Tudca       UNABLE TO FIND Take 2 capsules by mouth 2 (two) times a day. medication name: Turkey tail mushroom       UNABLE TO FIND Take 15 drops by mouth once daily. medication name: Essiac-20       UNABLE TO FIND Take 5 mLs by mouth 2 (two) times a day. medication name: barley leaf juice powder       UNABLE TO FIND Take 5 mLs by mouth 2 (two) times a day. medication name: black seed oil (cumin seed)          Review of patient's allergies indicates:  No Known Allergies    Past Medical History:   Diagnosis Date    Asthma     no inhaler use    Borderline hypertension     ED (erectile dysfunction)     Gout     Hematuria     Hypertension      Past Surgical History:   Procedure Laterality Date    COLONOSCOPY  02/10/2022    COLONOSCOPY N/A 02/10/2022    Procedure: COLONOSCOPY;  Surgeon: Desmond Keenan MD;  Location: [facility]O;  Service: General;  Laterality: N/A;    ROBOT-ASSISTED LAPAROSCOPIC NEPHRECTOMY Right 10/4/2023    Procedure: ROBOTIC NEPHRECTOMY;  Surgeon: Henry Michaels MD;  Location: [facility] OR;  Service: Urology;  Laterality: Right;    TONSILLECTOMY       Family History    None       Tobacco Use    Smoking status: Never    Smokeless tobacco: Never   Substance and Sexual Activity    Alcohol use: Not Currently     Alcohol/week: 0.0 standard drinks of alcohol     Comment: rarely    Drug use: Never    Sexual activity: Not on file     Review of Systems  Objective:     Weight: 100 kg (220 lb 7.4 oz)  Body mass index is 31.63 kg/m².  Vital Signs (Most Recent):  Temp: 97.6 °F (36.4 °C) (11/01/23 0425)  Pulse: 76 (11/01/23 0425)  Resp: 16 (11/01/23 0425)  BP: (!) 112/53 (11/01/23 0425)  SpO2: 95 % (11/01/23 0425) Vital Signs (24h Range):  Temp:  [97 °F (36.1 °C)-98.1 °F  "(36.7 °C)] 97.6 °F (36.4 °C)  Pulse:  [68-82] 76  Resp:  [16-22] 16  SpO2:  [95 %-98 %] 95 %  BP: (112-148)/(53-69) 112/53                                 Neurosurgery Physical Exam    General: well developed, well nourished, no distress.   HEENT: normocephalic, atraumatic  CV: regular rate   Pulmonary: normal respirations, no signs of respiratory distress  Abdomen: soft, non-distended, not tender to palpation. Kidney incision cdi  Skin: Skin is warm, dry and intact  Heme: no bruising    Neuro:   Mental Status: AO x4  CN II-XII intact  Sensory: intact to light touch throughout  Motor:    Strength   Deltoids Biceps Triceps Wrist Extension Wrist Flexion    Upper: R 5/5 5/5 5/5 5/5 5/5 5/5     L 5/5 5/5 5/5 5/5 5/5 5/5       Hip Flex Knee Ext Knee Flex DF PF EHL   Lower: R 5/5 5/5 5/5 5/5 5/5 5/5     L 5/5 5/5 5/5 5/5 5/5 5/5   Reflexes: -Maldonado's, -Babinski, no clonus. Patellar: 2+ bilaterally   Back: no tenderness to palpation along spinous processes  Rectal: tone intact              Significant Labs:  Recent Labs   Lab 10/31/23  1655 11/01/23  0333   GLU 90 100    137   K 5.0 4.2    105   CO2 27 25   BUN 30* 26*   CREATININE 1.5* 1.5*   CALCIUM 10.9* 10.2   MG  --  2.5     Recent Labs   Lab 10/31/23  1655 11/01/23  0333   WBC 8.33 6.55   HGB 13.6* 12.1*   HCT 40.4 36.7*    187     No results for input(s): "LABPT", "INR", "APTT" in the last 48 hours.  Microbiology Results (last 7 days)       ** No results found for the last 168 hours. **          All pertinent labs from the last 24 hours have been reviewed.    Significant Diagnostics:  MRI: No results found in the last 24 hours.    Assessment/Plan:     Metastasis to bone  Mr. 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 Pain has been worsening over the lat several days. He endorses low back pain, and mild neck pain however his " chief pain source is in his back. He denies GI symptoms except occasional constipation that is relieved with OTC remedies. MRI concerning for C5 and T9 lesion.Patient admitted to medical oncology for management of pain symptoms, SABINE, hypercalcemia, with rad onc and NSGY consulted due to the spine mets.      Imaging:  MRI Tsp: C5 &T9 Bony destruction with mild canal stenosis at C5.  CT Spine: Pending  MRI C spine: Pending    Recommendations:  -Pending imaging  -Continue to monitor clinically, notify NSGY immediately with any changes in neuro status    Dispo: Admitted to [facility], ongoing  Discussed with Dr. Martinez          Thank you for your consult. I will follow-up with patient. Please contact us if you have any additional questions.    Brandon Hawk MD  Neurosurgery  [facility] - Oncology ([facility])

## 2023-11-01 NOTE — H&P
[facility] - Oncology ([facility])  Hematology/Oncology  H&P    Patient Name: Gamaliel Pete Jr.  MRN: [id number]  Admission Date: 10/31/2023  Code Status: Full Code   Attending Provider: Jerri Rubi MD  Primary Care Physician: Slava Lowery MD  Principal Problem:Intractable back pain    Subjective:     HPI: Mr. Pete is a 73 year old male with hx of RCC with lung and spinal mets s/p resection presents with worsening back pain unrelieved with home PO regimen. Pain has been worsening over the lat several days. He endorses low back pain, and mild neck pain however his chief pain source is in his back. He denies GI symptoms except occasional constipation that is relieved with OTC remedies. MRI concerning for C5 and T9 lesion as well as compressive myelopathy. He also has a mildly elevated calcium and Cr.Patient admitted to medical oncology for management of pain symptoms, SABINE, hypercalcemia, with rad onc and NSGY consult.      Oncology Treatment Plan:   [No matching plan found]    Medications:  Continuous Infusions:   sodium chloride 0.9%       Scheduled Meds:   [START ON 11/1/2023] amlodipine-benazepril 5-10 mg  1 capsule Oral Daily    [START ON 11/1/2023] enoxparin  40 mg Subcutaneous Daily    gabapentin  300 mg Oral TID    methocarbamoL  500 mg Oral TID    [START ON 11/1/2023] polyethylene glycol  17 g Oral Daily     PRN Meds:glucagon (human recombinant), glucose, glucose, naloxone, oxyCODONE, senna, sodium chloride 0.9%     Review of patient's allergies indicates:  No Known Allergies     Past Medical History:   Diagnosis Date    Asthma     no inhaler use    Borderline hypertension     ED (erectile dysfunction)     Gout     Hematuria     Hypertension      Past Surgical History:   Procedure Laterality Date    COLONOSCOPY  02/10/2022    COLONOSCOPY N/A 02/10/2022    Procedure: COLONOSCOPY;  Surgeon: Desmond Keenan MD;  Location: [facility];  Service: General;  Laterality: N/A;    ROBOT-ASSISTED  LAPAROSCOPIC NEPHRECTOMY Right 10/4/2023    Procedure: ROBOTIC NEPHRECTOMY;  Surgeon: Henry Michaels MD;  Location: [facility];  Service: Urology;  Laterality: Right;    TONSILLECTOMY       Family History    None       Tobacco Use    Smoking status: Never    Smokeless tobacco: Never   Substance and Sexual Activity    Alcohol use: Not Currently     Alcohol/week: 0.0 standard drinks of alcohol     Comment: rarely    Drug use: Never    Sexual activity: Not on file       Review of Systems   Constitutional:  Negative for appetite change, chills, diaphoresis, fatigue and fever.   Respiratory:  Negative for shortness of breath and wheezing.    Cardiovascular:  Negative for chest pain, palpitations and leg swelling.   Gastrointestinal:  Negative for abdominal distention, abdominal pain, diarrhea, nausea and vomiting.   Genitourinary: Negative.    Musculoskeletal:  Positive for back pain and neck pain.   Skin: Negative.    Psychiatric/Behavioral:  Negative for agitation, behavioral problems and confusion.      Objective:     Vital Signs (Most Recent):  Temp: 97.7 °F (36.5 °C) (10/31/23 2056)  Pulse: 68 (10/31/23 2056)  Resp: 16 (10/31/23 2056)  BP: 133/69 (10/31/23 2056)  SpO2: 97 % (10/31/23 2056) Vital Signs (24h Range):  Temp:  [97 °F (36.1 °C)-98.1 °F (36.7 °C)] 97.7 °F (36.5 °C)  Pulse:  [68-82] 68  Resp:  [16-18] 16  SpO2:  [97 %-98 %] 97 %  BP: (128-148)/(65-69) 133/69     Weight: 100 kg (220 lb 7.4 oz)  Body mass index is 31.63 kg/m².  Body surface area is 2.22 meters squared.    No intake or output data in the 24 hours ending 10/31/23 2100     Physical Exam  Constitutional:       General: He is not in acute distress.     Appearance: He is not diaphoretic.   HENT:      Head: Normocephalic and atraumatic.   Cardiovascular:      Rate and Rhythm: Normal rate and regular rhythm.   Pulmonary:      Effort: Pulmonary effort is normal. No respiratory distress.      Breath sounds: Normal breath sounds. No wheezing.    Abdominal:      General: There is no distension.      Tenderness: There is no abdominal tenderness.   Musculoskeletal:         General: Tenderness present.   Skin:     General: Skin is warm and dry.   Neurological:      General: No focal deficit present.      Mental Status: He is oriented to person, place, and time. Mental status is at baseline.   Psychiatric:         Mood and Affect: Mood normal.         Behavior: Behavior normal.          Significant Labs:   All pertinent labs from the last 24 hours have been reviewed.    Diagnostic Results:  I have reviewed all pertinent imaging results/findings within the past 24 hours.  Assessment/Plan:     * Intractable back pain  73 year old with RCC s/p R nephrectomy presents at request of outpatient oncologist for worsening back meseret most likely 2/2 to spinal mets for pain control and rational oncology consult. Patient overall feels well with no symptoms other than abdominal pain 2/2 to recent nephrectomy, and some occasional constipation most likely attributed to opioid usage    Plan:   - Resume home pain regimen robaxin, gabapentin, oxy. Escalate PO and IV for adequate pain control   - MM pain regimen   - Rad onc consult       Hypercalcemia  Calcium 10.9 at time of admission, was previously borderline 10.4 on several occasions prior. IN setting of Cr increase though right nephrectomy less than 3 weeks prior coincides with Cr increase.     Plan:   - daily BMP  - IVF NS for now       SABINE (acute kidney injury)  Cr elevated 1.5>1.6>1.3>0.9 as far back as 3 weeks prior though patient had nephrectomy 10/4. Cr may be new baseline but unclear. Will assume SABINE for now. Also in setting of mild hypercalcemia, dehydration may be a factor     Plan:   - NS 100cc/hr for 10 hours   - Dialy BMP   - Cr increase may be new baseline s/p surgery     Clear cell carcinoma of right kidney  See Intractable back Pain     History of right radical nephrectomy  See SABINE     Metastasis to  "bone  Patient admitted for worsening back pain concern for mets to spine. Also on MRI found concerns for possible "compressive myelopathy" though patient has no neuro deficits at time of admission    Plan:  - NSGY consult, spoke to overnight team without neuro deficits no immediate concerns will formally staff in am       Borderline hypertension  Cont dat CCB             Dustin Cordero,   Hematology/Oncology  [facility] - Oncology (Hospital)        "

## 2023-11-01 NOTE — PLAN OF CARE
Plan of care reviewed with patient. No acute events. Only complaint today was pain, PRN oxy given with moderate relief obtained. Back brace and cervical collar given to patient, PT evaluated patient and gave instructions for use. Xray and Ct scan performed. VSS, q1h patient rounds, bed in low position and wheels locked, rails up x2, call light and personal belongings within reach.    Gabi Sultana   11/1/2023   4:34 PM

## 2023-11-01 NOTE — PT/OT/SLP EVAL
Physical Therapy Evaluation and Discharge Note    Patient Name:  Gamaliel Pete Jr.   MRN:  [id number]    Recommendations:     Discharge Recommendations: No Therapy Indicated  Discharge Equipment Recommendations: none   Barriers to discharge: None    Assessment:     Gamaliel Pete Jr. is a 73 y.o. male admitted with a medical diagnosis of Intractable back pain. .  At this time, patient is functioning at their prior level of function and does not require further acute PT services.     Recent Surgery: * No surgery found *      Plan:     During this hospitalization, patient does not require further acute PT services.  Please re-consult if situation changes.      Subjective     Chief Complaint: mild back pain  Patient/Family Comments/goals: pt reports he feels they got the right medication and dosage and feels like he is at PLOF now as a few days ago he could not ambulate 2/2 pain  Pain/Comfort:  Pain Rating 1: 3/10  Location - Orientation 1: generalized  Location 1: back  Pain Addressed 1: Pre-medicate for activity, Reposition, Distraction  Pain Rating Post-Intervention 1: 3/10    Patients cultural, spiritual, Oriental orthodox conflicts given the current situation: no    Living Environment:  Pt lives w/ wife in a 2SH w/ 5 DIEGO no handrail and a full flight of stairs up to bedroom and full bathroom w/ RHR.  Prior to admission, patients level of function was independent.  Equipment used at home: none.  DME owned (not currently used): none.  Upon discharge, patient will have assistance from wife.    Objective:     Communicated with RN prior to session.  Patient found HOB elevated with  (no active lines) upon PT entry to room.    General Precautions: Standard, fall    Orthopedic Precautions:spinal precautions   Braces: Cervical collar, TLSO  Respiratory Status: Room air    Exams:  Cognitive Exam:  Patient is oriented to Person, Place, Time, and Situation  Sensation:    -       Intact  RLE ROM: WFL  RLE Strength: WFL  LLE  ROM: WFL  LLE Strength: WFL    Functional Mobility:  Bed Mobility:     Supine to Sit: independence  Sit to Supine: independence  Transfers:     Sit to Stand:  independence with no AD  Gait: 30' independently  Tinetti Total Score: 28  < 18 = High Risk of Falls, 19-23 = Moderate Risk of Falls, > 24 = Low Risk of Falls    AM-PAC 6 CLICK MOBILITY  Total Score:24       Treatment and Education:  Pt educated on use of C collar and TLSO for OOB mobility and assisted w/ donning/doffing braces w/ step-by-step cueing provided w/ goal to progress to pt being able to independently don/doff braces w/ family assist as needed. Pt informed they are required to wear these braces until told otherwise by MD. Pt verbalized understanding. Pt able to doff both braces independently w/ minimal cueing.  PT educated patient on spinal precautions: no bending, lifting >10lbs (~1 gallon of milk), or twisting, w/ acronym no BLT to assist w/ remembering precautions. Patient verbalized understanding of spinal precautions and that these precautions will remain in place until MD clears pt to return to these activities.   Pt and family educated on PT assessment and plan to d/c orders 2/2 no current needs for skilled PT. Pt instructed to slowly progress mobility each day towards PLOF without attempting to jump right into previous daily routine immediately upon d/c 2/2 general deconditioning from being in hospital. Pt and family in agreement w/ POC and verbalized understanding w/ plan to slowly progress to normal daily activities at home and inform MD team if pt experiences any difficulty progressing back to PLOF while here or upon d/c in order for PT to be re-consulted/orders placed for OP PT as needed.     AM-PAC 6 CLICK MOBILITY  Total Score:24     Patient left sitting edge of bed with all lines intact, call button in reach, RN notified, and spouse and transport staff present.    GOALS:   Multidisciplinary Problems       Physical Therapy Goals        Not on file                    History:     Past Medical History:   Diagnosis Date    Asthma     no inhaler use    Borderline hypertension     ED (erectile dysfunction)     Gout     Hematuria     Hypertension        Past Surgical History:   Procedure Laterality Date    COLONOSCOPY  02/10/2022    COLONOSCOPY N/A 02/10/2022    Procedure: COLONOSCOPY;  Surgeon: Desmond Keenan MD;  Location: [facility];  Service: General;  Laterality: N/A;    ROBOT-ASSISTED LAPAROSCOPIC NEPHRECTOMY Right 10/4/2023    Procedure: ROBOTIC NEPHRECTOMY;  Surgeon: Henry Michaels MD;  Location: [facility];  Service: Urology;  Laterality: Right;    TONSILLECTOMY         Time Tracking:     PT Received On: 11/01/23  PT Start Time: 1605     PT Stop Time: 1624  PT Total Time (min): 19 min     Billable Minutes: Evaluation 9 and Therapeutic Activity 10      11/01/2023

## 2023-11-01 NOTE — SUBJECTIVE & OBJECTIVE
Medications Prior to Admission   Medication Sig Dispense Refill Last Dose    amlodipine-benazepril 5-10 mg (LOTREL) 5-10 mg per capsule Take 1 capsule by mouth once daily. 90 capsule 3     gabapentin (NEURONTIN) 300 MG capsule Take 1 capsule (300 mg total) by mouth 3 (three) times daily. 90 capsule 11     ibuprofen (ADVIL,MOTRIN) 800 MG tablet Take 1 tablet (800 mg total) by mouth every 8 (eight) hours. 20 tablet 0     indomethacin (INDOCIN) 50 MG capsule One p.o. t.i.d. p.r.n. gouty attack no more than 7 days--do not take with other NSAIDs 90 capsule 0     methocarbamoL (ROBAXIN) 500 MG Tab Take 2 tablets (1,000 mg total) by mouth 2 (two) times daily as needed (pain). 20 tablet 0     OLIVE OIL ORAL Take 15 mLs by mouth 2 (two) times a day.       omega-3 fatty acids/fish oil (FISH OIL-OMEGA-3 FATTY ACIDS) 300-1,000 mg capsule Take 2 capsules by mouth once daily.       oxyCODONE (ROXICODONE) 5 MG immediate release tablet Take 1 tablet (5 mg total) by mouth every 6 (six) hours as needed for Pain. 12 tablet 0     sildenafiL (VIAGRA) 100 MG tablet Take 1 tablet (100 mg total) by mouth daily as needed for Erectile Dysfunction. 30 tablet 11     turmeric (CURCUMIN MISC) 1,000 mg by Misc.(Non-Drug; Combo Route) route Daily.       UNABLE TO FIND Take 500 mg by mouth 2 (two) times a day. medication name: Tudca       UNABLE TO FIND Take 2 capsules by mouth 2 (two) times a day. medication name: Turkey tail mushroom       UNABLE TO FIND Take 15 drops by mouth once daily. medication name: Essiac-20       UNABLE TO FIND Take 5 mLs by mouth 2 (two) times a day. medication name: barley leaf juice powder       UNABLE TO FIND Take 5 mLs by mouth 2 (two) times a day. medication name: black seed oil (cumin seed)          Review of patient's allergies indicates:  No Known Allergies    Past Medical History:   Diagnosis Date    Asthma     no inhaler use    Borderline hypertension     ED (erectile dysfunction)     Gout     Hematuria      Hypertension      Past Surgical History:   Procedure Laterality Date    COLONOSCOPY  02/10/2022    COLONOSCOPY N/A 02/10/2022    Procedure: COLONOSCOPY;  Surgeon: Desmond Keenan MD;  Location: [facility];  Service: General;  Laterality: N/A;    ROBOT-ASSISTED LAPAROSCOPIC NEPHRECTOMY Right 10/4/2023    Procedure: ROBOTIC NEPHRECTOMY;  Surgeon: Henry Michaels MD;  Location: [facility];  Service: Urology;  Laterality: Right;    TONSILLECTOMY       Family History    None       Tobacco Use    Smoking status: Never    Smokeless tobacco: Never   Substance and Sexual Activity    Alcohol use: Not Currently     Alcohol/week: 0.0 standard drinks of alcohol     Comment: rarely    Drug use: Never    Sexual activity: Not on file     Review of Systems  Objective:     Weight: 100 kg (220 lb 7.4 oz)  Body mass index is 31.63 kg/m².  Vital Signs (Most Recent):  Temp: 97.6 °F (36.4 °C) (11/01/23 0425)  Pulse: 76 (11/01/23 0425)  Resp: 16 (11/01/23 0425)  BP: (!) 112/53 (11/01/23 0425)  SpO2: 95 % (11/01/23 0425) Vital Signs (24h Range):  Temp:  [97 °F (36.1 °C)-98.1 °F (36.7 °C)] 97.6 °F (36.4 °C)  Pulse:  [68-82] 76  Resp:  [16-22] 16  SpO2:  [95 %-98 %] 95 %  BP: (112-148)/(53-69) 112/53                                 Neurosurgery Physical Exam    General: well developed, well nourished, no distress.   HEENT: normocephalic, atraumatic  CV: regular rate   Pulmonary: normal respirations, no signs of respiratory distress  Abdomen: soft, non-distended, not tender to palpation. Kidney incision cdi  Skin: Skin is warm, dry and intact  Heme: no bruising    Neuro:   Mental Status: AO x4  CN II-XII intact  Sensory: intact to light touch throughout  Motor:    Strength   Deltoids Biceps Triceps Wrist Extension Wrist Flexion    Upper: R 5/5 5/5 5/5 5/5 5/5 5/5     L 5/5 5/5 5/5 5/5 5/5 5/5       Hip Flex Knee Ext Knee Flex DF PF EHL   Lower: R 5/5 5/5 5/5 5/5 5/5 5/5     L 5/5 5/5 5/5 5/5 5/5 5/5   Reflexes: -Maldonado's, -Babinski, no  "clonus. Patellar: 2+ bilaterally   Back: no tenderness to palpation along spinous processes  Rectal: tone intact              Significant Labs:  Recent Labs   Lab 10/31/23  1655 11/01/23  0333   GLU 90 100    137   K 5.0 4.2    105   CO2 27 25   BUN 30* 26*   CREATININE 1.5* 1.5*   CALCIUM 10.9* 10.2   MG  --  2.5     Recent Labs   Lab 10/31/23  1655 11/01/23  0333   WBC 8.33 6.55   HGB 13.6* 12.1*   HCT 40.4 36.7*    187     No results for input(s): "LABPT", "INR", "APTT" in the last 48 hours.  Microbiology Results (last 7 days)       ** No results found for the last 168 hours. **          All pertinent labs from the last 24 hours have been reviewed.    Significant Diagnostics:  MRI: No results found in the last 24 hours.  "

## 2023-11-01 NOTE — HPI
Mr. 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 Pain has been worsening over the lat several days. He endorses low back pain, and mild neck pain however his chief pain source is in his back. He denies GI symptoms except occasional constipation that is relieved with OTC remedies. MRI concerning for C5 and T9 lesion.Patient admitted to medical oncology for management of pain symptoms, SABINE, hypercalcemia, with rad onc and NSGY consulted due to the spine mets.

## 2023-11-01 NOTE — SUBJECTIVE & OBJECTIVE
Oncology Treatment Plan:   [No matching plan found]    Medications:  Continuous Infusions:   sodium chloride 0.9%       Scheduled Meds:   [START ON 11/1/2023] amlodipine-benazepril 5-10 mg  1 capsule Oral Daily    [START ON 11/1/2023] enoxparin  40 mg Subcutaneous Daily    gabapentin  300 mg Oral TID    methocarbamoL  500 mg Oral TID    [START ON 11/1/2023] polyethylene glycol  17 g Oral Daily     PRN Meds:glucagon (human recombinant), glucose, glucose, naloxone, oxyCODONE, senna, sodium chloride 0.9%     Review of patient's allergies indicates:  No Known Allergies     Past Medical History:   Diagnosis Date    Asthma     no inhaler use    Borderline hypertension     ED (erectile dysfunction)     Gout     Hematuria     Hypertension      Past Surgical History:   Procedure Laterality Date    COLONOSCOPY  02/10/2022    COLONOSCOPY N/A 02/10/2022    Procedure: COLONOSCOPY;  Surgeon: Desmond Keenan MD;  Location: [facility];  Service: General;  Laterality: N/A;    ROBOT-ASSISTED LAPAROSCOPIC NEPHRECTOMY Right 10/4/2023    Procedure: ROBOTIC NEPHRECTOMY;  Surgeon: Henry Michaels MD;  Location: [facility];  Service: Urology;  Laterality: Right;    TONSILLECTOMY       Family History    None       Tobacco Use    Smoking status: Never    Smokeless tobacco: Never   Substance and Sexual Activity    Alcohol use: Not Currently     Alcohol/week: 0.0 standard drinks of alcohol     Comment: rarely    Drug use: Never    Sexual activity: Not on file       Review of Systems   Constitutional:  Negative for appetite change, chills, diaphoresis, fatigue and fever.   Respiratory:  Negative for shortness of breath and wheezing.    Cardiovascular:  Negative for chest pain, palpitations and leg swelling.   Gastrointestinal:  Negative for abdominal distention, abdominal pain, diarrhea, nausea and vomiting.   Genitourinary: Negative.    Musculoskeletal:  Positive for back pain and neck pain.   Skin: Negative.    Psychiatric/Behavioral:   Negative for agitation, behavioral problems and confusion.      Objective:     Vital Signs (Most Recent):  Temp: 97.7 °F (36.5 °C) (10/31/23 2056)  Pulse: 68 (10/31/23 2056)  Resp: 16 (10/31/23 2056)  BP: 133/69 (10/31/23 2056)  SpO2: 97 % (10/31/23 2056) Vital Signs (24h Range):  Temp:  [97 °F (36.1 °C)-98.1 °F (36.7 °C)] 97.7 °F (36.5 °C)  Pulse:  [68-82] 68  Resp:  [16-18] 16  SpO2:  [97 %-98 %] 97 %  BP: (128-148)/(65-69) 133/69     Weight: 100 kg (220 lb 7.4 oz)  Body mass index is 31.63 kg/m².  Body surface area is 2.22 meters squared.    No intake or output data in the 24 hours ending 10/31/23 2100     Physical Exam  Constitutional:       General: He is not in acute distress.     Appearance: He is not diaphoretic.   HENT:      Head: Normocephalic and atraumatic.   Cardiovascular:      Rate and Rhythm: Normal rate and regular rhythm.   Pulmonary:      Effort: Pulmonary effort is normal. No respiratory distress.      Breath sounds: Normal breath sounds. No wheezing.   Abdominal:      General: There is no distension.      Tenderness: There is no abdominal tenderness.   Musculoskeletal:         General: Tenderness present.   Skin:     General: Skin is warm and dry.   Neurological:      General: No focal deficit present.      Mental Status: He is oriented to person, place, and time. Mental status is at baseline.   Psychiatric:         Mood and Affect: Mood normal.         Behavior: Behavior normal.          Significant Labs:   All pertinent labs from the last 24 hours have been reviewed.    Diagnostic Results:  I have reviewed all pertinent imaging results/findings within the past 24 hours.

## 2023-11-01 NOTE — NURSING
Pt admitted into room 804. Pt brought to floor on stretcher via hospital transportation accompanied by spouse. MD notified of pt arrival.    Vitals and assessment as charted. Incision site at right abdomen open to air. C/d/I. All skin dry, clean, and intact.    Upon arrival to room, pt and family oriented to room, floor, and call light. Bed locked and in lowest position. Call light within reach. Instructed to call for assistance.

## 2023-11-01 NOTE — CONSULTS
PATIENT IDENTIFICATION:  Patient Name: Gamaliel Pete Jr.  MRN: [id number]  : 1950    DIAGNOSIS:  Cancer Staging   Clear cell carcinoma of right kidney  Staging form: Kidney, AJCC 8th Edition  - Clinical stage from 10/31/2023: Stage IV (cT3a, cNX, cM1) - Signed by Ruby Turk MD on 10/31/2023      HISTORY OF PRESENT ILLNESS:   The patient is a 73 year old man with recent diagnosis of metastatic renal cell carcinoma.  He has suspicious lesions at C5 and T9.  The patient has been referred for consideration of palliative radiation.  The patient presented in 2023 with complaint of gross hematuria.  The patient was referred to Urology for management.  CT scan performed on 2023 revealed a mass in the right kidney lower pole measuring 4.9 x 6.3 x 6.1 cm.  There were multiple lung nodules seen measuring up to 11 mm concerning for metastatic disease.  A lytic lesion at T9 with mild compression was noted.    Bone scan performed on 2023 revealed focal uptake in the T9 vertebral body corresponding to the lytic lesion seen on CT scan.  The patient was planned for biopsy of the T9 lesion but developed worsening hematuria and was recommended to undergo nephrectomy.    The patient was taken to the operating room on 10/04/2023 for right robotic nephrectomy.  Pathology revealed a grade 4 clear cell renal cell carcinoma measuring 5.5 cm in greatest dimension.  There were no lymph nodes submitted.    The patient reports worsening back pain over the past two weeks.  He was admitted on 10/31/2023 for pain control and evaluation by Neurosurgery.  MRI of the thoracic spine performed yesterday revealed enhancing destructive lesions involving C5 and T9 vertebral bodies with associated compression deformity, progressed from prior at level T9.  Additional mild retropulsion of T9 causing mild canal stenosis at the T8-T9 level.  Possible lesion involving the anterior C6 vertebral body.  Consider further evaluation  with cervical spine MRI.    Oncology History   Clear cell carcinoma of right kidney   10/11/2023 Initial Diagnosis    Clear cell carcinoma of right kidney     10/31/2023 Cancer Staged    Staging form: Kidney, AJCC 8th Edition  - Clinical stage from 10/31/2023: Stage IV (cT3a, cNX, cM1)          REVIEW OF SYSTEMS:   Review of Systems   Musculoskeletal:  Positive for back pain.   Neurological:  Negative for dizziness and headaches.       PAST MEDICAL HISTORY:  Past Medical History:   Diagnosis Date    Asthma     no inhaler use    Borderline hypertension     ED (erectile dysfunction)     Gout     Hematuria     Hypertension        PAST SURGICAL HISTORY:  Past Surgical History:   Procedure Laterality Date    COLONOSCOPY  02/10/2022    COLONOSCOPY N/A 02/10/2022    Procedure: COLONOSCOPY;  Surgeon: Desmond Keenan MD;  Location: [facility];  Service: General;  Laterality: N/A;    ROBOT-ASSISTED LAPAROSCOPIC NEPHRECTOMY Right 10/4/2023    Procedure: ROBOTIC NEPHRECTOMY;  Surgeon: Henry Michaels MD;  [facility];  Service: Urology;  Laterality: Right;    TONSILLECTOMY         ALLERGIES:   Review of patient's allergies indicates:  No Known Allergies    MEDICATIONS:  Current Facility-Administered Medications   Medication    amlodipine-benazepril 5-10 mg per capsule 1 capsule    enoxaparin injection 40 mg    gabapentin capsule 300 mg    glucagon (human recombinant) injection 1 mg    glucose chewable tablet 16 g    glucose chewable tablet 24 g    HYDROmorphone injection 1 mg    indomethacin capsule 25 mg    methocarbamoL tablet 500 mg    naloxone 0.4 mg/mL injection 0.02 mg    oxyCODONE immediate release tablet 5 mg    polyethylene glycol packet 17 g    senna tablet 8.6 mg    sodium chloride 0.9% bolus 1,000 mL 1,000 mL    sodium chloride 0.9% flush 10 mL       SOCIAL HISTORY:  Social History     Socioeconomic History    Marital status:    Tobacco Use    Smoking status: Never    Smokeless tobacco: Never    Substance and Sexual Activity    Alcohol use: Not Currently     Alcohol/week: 0.0 standard drinks of alcohol     Comment: rarely    Drug use: Never       FAMILY HISTORY:  History reviewed. No pertinent family history.      PHYSICAL EXAMINATION:  Vitals:    23 0928   BP:    Pulse:    Resp: 16   Temp:      Body mass index is 31.63 kg/m².    ECO  Physical Exam  Constitutional:       General: He is not in acute distress.     Appearance: Normal appearance. He is normal weight.   HENT:      Head: Normocephalic and atraumatic.      Mouth/Throat:      Mouth: Mucous membranes are moist.   Eyes:      Extraocular Movements: Extraocular movements intact.      Conjunctiva/sclera: Conjunctivae normal.   Pulmonary:      Effort: Pulmonary effort is normal.   Abdominal:      Palpations: Abdomen is soft.   Neurological:      Mental Status: He is alert and oriented to person, place, and time.   Psychiatric:         Mood and Affect: Mood normal.         Behavior: Behavior normal.         Thought Content: Thought content normal.         Judgment: Judgment normal.           RADIOLOGY  MRI T SPINE 10/31/23  Impression:     Enhancing destructive lesions involving the C5 and T9 vertebral bodies with associated compression deformity/height loss, progressed from prior at the T9 level.  No prior imaging of the C5 vertebral body is available for comparison.  Destruction of the C5 vertebral body contributes to moderate canal stenosis at this level.  Subtle signal abnormality of the spinal cord at that level possibly reflecting compressive myelopathy.  Additional mild retropulsion of T9 causing mild canal stenosis at the T8-T9 level.  Possible lesion involving the anterior C6 vertebral body.  Consider further evaluation with cervical spine MRI.     Subcentimeter right hepatic lobe lesion demonstrating questionable enhancement without corresponding abnormality on recent CT.          ASSESSMENT/PLAN:  The patient is a 73 year old man  metastatic renal cell carcinoma involving T9 and likely cervical spine.  Agree with plan to perform MRI C spine to better characterize disease at that site.      Awaiting recommendations from NSG to determine if patient will undergo kyphoplasty or decompressive laminectomy at T9.  If planned for surgery then would recommend post-operative radiation.  If no surgical intervention then would recommend SABR in 2-3 fractions to the T9 lesion.

## 2023-11-01 NOTE — PLAN OF CARE
Care Update:    CT spine reviewed with patient and Dr. Martinez at bedside.  -Significant for C5 lytic lesion with extensive destruction of vertebral body, extending into inferior C4 vertebral body as well. No significant retropulsion into the canal.  -T9 lytic lesion within the vertebral body with approximately 25% anterior loss of height, without apparent retropulsion.    Patient remains neurologically intact without deficits, pain relatively well controlled at this time.    Plan/Recommendations:  - C-collar and TLSO brace when OOB/ambulating (ordered)  - Upright/supine XR's of the cervical and thoracic spine  - MRI cervical spine w/wo contrast  - Likely would recommend C4/5 corpectomy and fusion for treatment of cervical lesion.  - For T9 lesion, would recommend IR consult/referral for kyphoplasty, and concurrent bone biopsy.   - As long as X-rays appear stable and pt's pain is able to be reasonably controlled, this could all be done as an outpatient  - Will follow up with patient once imaging completed  - Please contact NSGY with any questions/concerns in the meantime    Kandace Hastings PA-C  Neurosurgery  [facility]

## 2023-11-01 NOTE — HPI
Mr. Pete is a 73 year old male with hx of RCC with lung and spinal mets s/p resection presents with worsening back pain unrelieved with home PO regimen. Pain has been worsening over the lat several days. He endorses low back pain, and mild neck pain however his chief pain source is in his back. He denies GI symptoms except occasional constipation that is relieved with OTC remedies. MRI concerning for C5 and T9 lesion as well as compressive myelopathy. He also has a mildly elevated calcium and Cr.Patient admitted to medical oncology for management of pain symptoms, SABINE, hypercalcemia, with rad onc and NSGY consult.

## 2023-11-01 NOTE — PLAN OF CARE
Pt involved in plan of care and communicating needs throughout shift. Up in room and to bathroom with standby assist. Pt c/o intense back pain. PRN IV dilaudid administered x1. Heating pads given. Moderate relief obtained. UA sent. IVF infusing. All VSS; no acute events so far this shift.  Pt remaining free from falls or injury throughout shift; bed locked and in lowest position; call light within reach.  Pt instructed to call for assistance as needed. Q2H rounding done on pt.

## 2023-11-02 VITALS
HEART RATE: 72 BPM | SYSTOLIC BLOOD PRESSURE: 145 MMHG | HEIGHT: 70 IN | WEIGHT: 220.44 LBS | TEMPERATURE: 98 F | RESPIRATION RATE: 20 BRPM | DIASTOLIC BLOOD PRESSURE: 69 MMHG | BODY MASS INDEX: 31.56 KG/M2 | OXYGEN SATURATION: 93 %

## 2023-11-02 DIAGNOSIS — M84.58XS PATHOLOGICAL FRACTURE IN NEOPLASTIC DISEASE, OTHER SPECIFIED SITE, SEQUELA: ICD-10-CM

## 2023-11-02 DIAGNOSIS — C79.51 METASTATIC CANCER TO SPINE: Primary | ICD-10-CM

## 2023-11-02 LAB
ALBUMIN SERPL BCP-MCNC: 3.2 G/DL (ref 3.5–5.2)
ALP SERPL-CCNC: 84 U/L (ref 55–135)
ALT SERPL W/O P-5'-P-CCNC: 84 U/L (ref 10–44)
ANION GAP SERPL CALC-SCNC: 6 MMOL/L (ref 8–16)
AST SERPL-CCNC: 26 U/L (ref 10–40)
BASOPHILS # BLD AUTO: 0.04 K/UL (ref 0–0.2)
BASOPHILS NFR BLD: 0.7 % (ref 0–1.9)
BILIRUB SERPL-MCNC: 0.2 MG/DL (ref 0.1–1)
BUN SERPL-MCNC: 23 MG/DL (ref 8–23)
CALCIUM SERPL-MCNC: 9.3 MG/DL (ref 8.7–10.5)
CHLORIDE SERPL-SCNC: 107 MMOL/L (ref 95–110)
CO2 SERPL-SCNC: 25 MMOL/L (ref 23–29)
CREAT SERPL-MCNC: 1.6 MG/DL (ref 0.5–1.4)
DIFFERENTIAL METHOD: ABNORMAL
EOSINOPHIL # BLD AUTO: 0.5 K/UL (ref 0–0.5)
EOSINOPHIL NFR BLD: 8.6 % (ref 0–8)
ERYTHROCYTE [DISTWIDTH] IN BLOOD BY AUTOMATED COUNT: 13.7 % (ref 11.5–14.5)
EST. GFR  (NO RACE VARIABLE): 45.8 ML/MIN/1.73 M^2
GLUCOSE SERPL-MCNC: 113 MG/DL (ref 70–110)
HCT VFR BLD AUTO: 31.6 % (ref 40–54)
HGB BLD-MCNC: 10.4 G/DL (ref 14–18)
IMM GRANULOCYTES # BLD AUTO: 0.01 K/UL (ref 0–0.04)
IMM GRANULOCYTES NFR BLD AUTO: 0.2 % (ref 0–0.5)
LYMPHOCYTES # BLD AUTO: 1.8 K/UL (ref 1–4.8)
LYMPHOCYTES NFR BLD: 32 % (ref 18–48)
MAGNESIUM SERPL-MCNC: 2.4 MG/DL (ref 1.6–2.6)
MCH RBC QN AUTO: 29.5 PG (ref 27–31)
MCHC RBC AUTO-ENTMCNC: 32.9 G/DL (ref 32–36)
MCV RBC AUTO: 90 FL (ref 82–98)
MONOCYTES # BLD AUTO: 0.7 K/UL (ref 0.3–1)
MONOCYTES NFR BLD: 13.3 % (ref 4–15)
NEUTROPHILS # BLD AUTO: 2.5 K/UL (ref 1.8–7.7)
NEUTROPHILS NFR BLD: 45.2 % (ref 38–73)
NRBC BLD-RTO: 0 /100 WBC
PHOSPHATE SERPL-MCNC: 4.4 MG/DL (ref 2.7–4.5)
PLATELET # BLD AUTO: 170 K/UL (ref 150–450)
PMV BLD AUTO: 9.2 FL (ref 9.2–12.9)
POTASSIUM SERPL-SCNC: 4.5 MMOL/L (ref 3.5–5.1)
PROT SERPL-MCNC: 6.7 G/DL (ref 6–8.4)
RBC # BLD AUTO: 3.53 M/UL (ref 4.6–6.2)
SODIUM SERPL-SCNC: 138 MMOL/L (ref 136–145)
WBC # BLD AUTO: 5.47 K/UL (ref 3.9–12.7)

## 2023-11-02 PROCEDURE — 84100 ASSAY OF PHOSPHORUS: CPT

## 2023-11-02 PROCEDURE — 80053 COMPREHEN METABOLIC PANEL: CPT

## 2023-11-02 PROCEDURE — 97530 THERAPEUTIC ACTIVITIES: CPT

## 2023-11-02 PROCEDURE — A9585 GADOBUTROL INJECTION: HCPCS | Performed by: HOSPITALIST

## 2023-11-02 PROCEDURE — 99233 PR SUBSEQUENT HOSPITAL CARE,LEVL III: ICD-10-PCS | Mod: ,,, | Performed by: PHYSICIAN ASSISTANT

## 2023-11-02 PROCEDURE — 99239 PR HOSPITAL DISCHARGE DAY,>30 MIN: ICD-10-PCS | Mod: GC,,, | Performed by: HOSPITALIST

## 2023-11-02 PROCEDURE — 25500020 PHARM REV CODE 255: Performed by: HOSPITALIST

## 2023-11-02 PROCEDURE — 25000003 PHARM REV CODE 250

## 2023-11-02 PROCEDURE — 97165 OT EVAL LOW COMPLEX 30 MIN: CPT

## 2023-11-02 PROCEDURE — 99239 HOSP IP/OBS DSCHRG MGMT >30: CPT | Mod: GC,,, | Performed by: HOSPITALIST

## 2023-11-02 PROCEDURE — 99233 SBSQ HOSP IP/OBS HIGH 50: CPT | Mod: ,,, | Performed by: PHYSICIAN ASSISTANT

## 2023-11-02 PROCEDURE — 36415 COLL VENOUS BLD VENIPUNCTURE: CPT

## 2023-11-02 PROCEDURE — 85025 COMPLETE CBC W/AUTO DIFF WBC: CPT

## 2023-11-02 PROCEDURE — 83735 ASSAY OF MAGNESIUM: CPT

## 2023-11-02 PROCEDURE — 25000003 PHARM REV CODE 250: Performed by: STUDENT IN AN ORGANIZED HEALTH CARE EDUCATION/TRAINING PROGRAM

## 2023-11-02 PROCEDURE — 63600175 PHARM REV CODE 636 W HCPCS: Performed by: STUDENT IN AN ORGANIZED HEALTH CARE EDUCATION/TRAINING PROGRAM

## 2023-11-02 RX ORDER — SENNOSIDES 8.6 MG/1
8.6 TABLET ORAL DAILY
Status: DISCONTINUED | OUTPATIENT
Start: 2023-11-02 | End: 2023-11-02 | Stop reason: HOSPADM

## 2023-11-02 RX ORDER — OXYCODONE HCL 20 MG/1
20 TABLET, FILM COATED, EXTENDED RELEASE ORAL EVERY 12 HOURS
Qty: 14 TABLET | Refills: 0 | Status: SHIPPED | OUTPATIENT
Start: 2023-11-02 | End: 2023-11-02 | Stop reason: HOSPADM

## 2023-11-02 RX ORDER — NALOXONE HYDROCHLORIDE 4 MG/.1ML
SPRAY NASAL
Qty: 2 EACH | Refills: 0 | Status: SHIPPED | OUTPATIENT
Start: 2023-11-02

## 2023-11-02 RX ORDER — OXYCODONE 18 MG/1
18 CAPSULE, EXTENDED RELEASE ORAL 2 TIMES DAILY
Qty: 14 CAPSULE | Refills: 0 | Status: ON HOLD | OUTPATIENT
Start: 2023-11-02 | End: 2023-11-09 | Stop reason: SDUPTHER

## 2023-11-02 RX ORDER — SODIUM CHLORIDE 9 MG/ML
INJECTION, SOLUTION INTRAVENOUS CONTINUOUS
Status: DISCONTINUED | OUTPATIENT
Start: 2023-11-02 | End: 2023-11-02 | Stop reason: HOSPADM

## 2023-11-02 RX ORDER — SENNOSIDES 8.6 MG/1
1 TABLET ORAL DAILY
Qty: 30 TABLET | Refills: 0 | Status: SHIPPED | OUTPATIENT
Start: 2023-11-03 | End: 2023-12-03

## 2023-11-02 RX ORDER — OXYCODONE HCL 10 MG/1
20 TABLET, FILM COATED, EXTENDED RELEASE ORAL EVERY 12 HOURS
Status: DISCONTINUED | OUTPATIENT
Start: 2023-11-02 | End: 2023-11-02 | Stop reason: HOSPADM

## 2023-11-02 RX ORDER — INDOMETHACIN 50 MG/1
50 CAPSULE ORAL 3 TIMES DAILY PRN
Status: DISCONTINUED | OUTPATIENT
Start: 2023-11-02 | End: 2023-11-02 | Stop reason: HOSPADM

## 2023-11-02 RX ORDER — OXYCODONE HYDROCHLORIDE 10 MG/1
10 TABLET ORAL EVERY 6 HOURS PRN
Qty: 28 TABLET | Refills: 0 | Status: ON HOLD | OUTPATIENT
Start: 2023-11-02 | End: 2023-11-09 | Stop reason: HOSPADM

## 2023-11-02 RX ORDER — POLYETHYLENE GLYCOL 3350 17 G/17G
17 POWDER, FOR SOLUTION ORAL DAILY
Qty: 238 G | Refills: 0 | Status: SHIPPED | OUTPATIENT
Start: 2023-11-03 | End: 2023-12-03

## 2023-11-02 RX ORDER — METHOCARBAMOL 500 MG/1
500 TABLET, FILM COATED ORAL 3 TIMES DAILY
Qty: 30 TABLET | Refills: 0 | Status: SHIPPED | OUTPATIENT
Start: 2023-11-02 | End: 2023-11-12

## 2023-11-02 RX ORDER — OXYCODONE HYDROCHLORIDE 10 MG/1
10 TABLET ORAL EVERY 6 HOURS PRN
Status: DISCONTINUED | OUTPATIENT
Start: 2023-11-02 | End: 2023-11-02 | Stop reason: HOSPADM

## 2023-11-02 RX ORDER — GADOBUTROL 604.72 MG/ML
10 INJECTION INTRAVENOUS
Status: COMPLETED | OUTPATIENT
Start: 2023-11-02 | End: 2023-11-02

## 2023-11-02 RX ADMIN — OXYCODONE HYDROCHLORIDE 10 MG: 10 TABLET ORAL at 06:11

## 2023-11-02 RX ADMIN — HYDROMORPHONE HYDROCHLORIDE 1 MG: 1 INJECTION, SOLUTION INTRAMUSCULAR; INTRAVENOUS; SUBCUTANEOUS at 04:11

## 2023-11-02 RX ADMIN — OXYCODONE HYDROCHLORIDE 20 MG: 10 TABLET, FILM COATED, EXTENDED RELEASE ORAL at 10:11

## 2023-11-02 RX ADMIN — GADOBUTROL 10 ML: 604.72 INJECTION INTRAVENOUS at 05:11

## 2023-11-02 RX ADMIN — AMLODIPINE AND BENAZEPRIL HYDROCHLORIDE 1 CAPSULE: 5; 10 CAPSULE ORAL at 09:11

## 2023-11-02 RX ADMIN — SENNOSIDES 8.6 MG: 8.6 TABLET, FILM COATED ORAL at 10:11

## 2023-11-02 RX ADMIN — OXYCODONE HYDROCHLORIDE 10 MG: 10 TABLET ORAL at 12:11

## 2023-11-02 RX ADMIN — METHOCARBAMOL 500 MG: 500 TABLET ORAL at 09:11

## 2023-11-02 RX ADMIN — GABAPENTIN 300 MG: 300 CAPSULE ORAL at 02:11

## 2023-11-02 RX ADMIN — METHOCARBAMOL 500 MG: 500 TABLET ORAL at 02:11

## 2023-11-02 RX ADMIN — POLYETHYLENE GLYCOL 3350 17 G: 17 POWDER, FOR SOLUTION ORAL at 09:11

## 2023-11-02 RX ADMIN — SODIUM CHLORIDE: 9 INJECTION, SOLUTION INTRAVENOUS at 10:11

## 2023-11-02 RX ADMIN — GABAPENTIN 300 MG: 300 CAPSULE ORAL at 09:11

## 2023-11-02 NOTE — PT/OT/SLP EVAL
Occupational Therapy   Evaluation    Name: Gamaliel Pete Jr.  MRN: [id number]  Admitting Diagnosis: Intractable back pain  Recent Surgery: Procedure(s) (LRB):  CORPECTOMY, SPINE, CERVICAL (Right)      Recommendations:     Discharge Recommendations: No Therapy Indicated  Discharge Equipment Recommendations:  none  Barriers to discharge:  None    Assessment:     Gamaliel Pete Jr. is a 73 y.o. male with a medical diagnosis of Intractable back pain.  He presents with decreased independence with ADL's. Performance deficits affecting function: impaired self care skills, impaired functional mobility, impaired balance, orthopedic precautions.      Rehab Prognosis: Good; patient would benefit from acute skilled OT services to address these deficits and reach maximum level of function.       Plan:     Patient to be seen 2 x/week to address the above listed problems via self-care/home management, therapeutic activities, therapeutic exercises  Plan of Care Expires: 12/02/23  Plan of Care Reviewed with: patient, spouse    Subjective     Chief Complaint: not wanting to wear TLSO when using toilet for BM.  Patient/Family Comments/goals: Pt reported that he did not want to have to wear the C-collar while urinating due to not being able to bend his neck & did not want to wear the TLSO while toileting in sitting due to worries about soiling the brace during hygiene aspects of toileting.    Occupational Profile:  Living Environment: Pt resides with spouse in 2 story house with 5 steps no rails to enter & full flight with right sided rail to 2nd floor where bed & full bath are located. Pt has a 1/2 bath on first floor & has been sleeping in the recliner since recent surgery.  Pt has required (A) with donning socks since surgery however normally is fully independent with ADL's & ambulation. 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  Pt enjoys spending time with his family.  Pt has a walk-in  shower with built-in bench & hand held shower hose for bathing.    Equipment Used at Home: none  Assistance upon Discharge: spouse    Pain/Comfort:  Pain Rating 1: 0/10  Pain Rating Post-Intervention 1: 0/10    Patients cultural, spiritual, Buddhist conflicts given the current situation: no    Objective:     Communicated with: RN prior to session.  Patient found supine with  (no lines; spouse present during session) upon OT entry to room.    General Precautions: Standard, fall  Orthopedic Precautions: spinal precautions  Braces: Cervical collar, TLSO  Respiratory Status: Room air    Occupational Performance:    Bed Mobility:    Patient completed Supine to Sit with modified independence with HOB elevated  Patient completed Sit to Supine with modified independence with HOB elevated    Functional Mobility/Transfers:  Patient completed Sit <> Stand Transfer with modified independence  with  no assistive device   Patient completed Toilet Transfer Step Transfer technique with modified independence with  no AD  Functional Mobility: Modified independent with functional mobility to bathroom & back during ADL's    Activities of Daily Living:  Upper Body Dressing: minimum assistance while seated EOB to don gown around back; Max (A) with donning TLSO & c-collar (pt reported that his wife would (A) - wife demonstrated fair understanding of donning braces)  Lower Body Dressing: total assistance donning socks seated EOB  Toileting: modified independence from standard commode for urinating    Cognitive/Visual Perceptual:  Cognitive/Psychosocial Skills:     -       Oriented to: Person, Place, Time, and Situation   -       Follows Commands/attention:Follows multistep  commands  -       Safety awareness/insight to disability: intact     Physical Exam:  Sensation:    -       Intact  Dominant hand:    -       right  Upper Extremity Range of Motion:  BUE WFL  Upper Extremity Strength: NT formally due to spinal precautions    Strength: BUE WFL    AMPA 6 Click ADL:  AMPAC Total Score: 15    Treatment & Education:  Provided education on spinal precautions, safety during mobility, importance of wearing c-collar & TLSO as directed, & how to don both braces.  Discussed at length that orders for braces were to be worn when OOB (pt reported that he would wear then when out of the house but did not feel need to wear during toileting & mobility to bathroom as just a short distance to walk - provided education that orders were for don when OOB and that he should discuss his thoughts regarding when to wear braces with MD, pt reported that he would discuss with MD).  Provided education regarding role of OT, POC, & discharge recommendations with pt & spouse verbalizing understanding.  Pt had no further questions & when asked whether there were any concerns pt reported none.      Patient left supine with all lines intact, call button in reach, RN notified, and spouse present    GOALS:   Multidisciplinary Problems       Occupational Therapy Goals          Problem: Occupational Therapy    Goal Priority Disciplines Outcome Interventions   Occupational Therapy Goal     OT, PT/OT Ongoing, Progressing    Description: Goals to be met by: 11/27     Patient will increase functional independence with ADLs by performing:    UE Dressing with [place].  LE Dressing with Minimal Assistance.  Grooming while standing with [place].  Toileting from toilet with [place] for hygiene and clothing management.   Supine to sit with [place] with HOB flat.  Step transfer with [place]  Toilet transfer to toilet with [place].                         History:     Past Medical History:   Diagnosis Date    Asthma     no inhaler use    Borderline hypertension     ED (erectile dysfunction)     Gout     Hematuria     Hypertension          Past Surgical History:   Procedure Laterality Date    COLONOSCOPY  02/10/2022    COLONOSCOPY N/A 02/10/2022     Procedure: COLONOSCOPY;  Surgeon: Desmond Keenan MD;  Location: [facility];  Service: General;  Laterality: N/A;    ROBOT-ASSISTED LAPAROSCOPIC NEPHRECTOMY Right 10/4/2023    Procedure: ROBOTIC NEPHRECTOMY;  Surgeon: Henry Michaels MD;  Location: [facility];  Service: Urology;  Laterality: Right;    TONSILLECTOMY         Time Tracking:     OT Date of Treatment: 11/02/23  OT Start Time: 0930  OT Stop Time: 1008  OT Total Time (min): 38 min    Billable Minutes:Evaluation 15  Therapeutic Activity 23 11/2/2023

## 2023-11-02 NOTE — PROGRESS NOTES
Admit Assessment    Patient Identification  Gamaliel Pete Jr.   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   Additional oncologic and medical history is noted in H&P, in chart.     Oncology Social Worker is involved. Patient was referred to the Social Work Department via admission list/census. Patient presents as a 73 y.o. year old, ,  male.    Persons interviewed with patient's permission: Met with patient and his wife Mary Pete in patient's room to complete assessment. Patient was alert, oriented, cooperative, and conversant, as was his wife.     Living Situation:    Patient's resides with his wife Mary Pete in a 2-story house located at [address] [place] [address].   The bedrooms and full bathrooms are on the second floor. There is a half bathroom on the first floor. Patient has been sleeping in a reclining chair in the living room on the first floor for the past several weeks since his surgery and discharge from hospital. He has gone upstairs to take a shower maybe 2-3 times.  Phone Numbers:  [phone] Home  [phone] patient's cell.  [phone] wife's cell.      Patient and wife have been  35 years. He has a son from his prior marriage and she has 2 sons from her prior marriage. Together they have 5 grandchildren and 2 great-grandsons.     Patient has had difficulty with ambulation and mobility, and it has been hard for him to stand up straight for at least the past two weeks. He has needed assistance from his wife with ADLs. He had previously been completely independent with everything.    Patient's wife is retired and  home with him daily. She is ambulatory and independent; and is able to assist him. She drives during the daytime and has been driving him to his medical appointments as he cannot drive at present.     They recently finished building a house in [place] but haven't moved into it yet (their detention home).     (RETIRED) Functional Status Prior  Ambulation Prior: 0-->independent  Transferrin-->independent  Toiletin-->independent  Bathin-->independent  Dressin-->independent  Eatin-->independent  Communication: understands/communicates without difficulty  Swallowing: swallows foods/liquids without difficulty    Current or Past Agencies and Description of Services/Supplies    DME  Agency Name: N/a  Agency Phone Number: N/a  Equipment Currently Used at Home: none    Home Health  Agency Name: N/a  Agency Phone Number: N/a  Services: None    IV Infusion  Agency Name: N/a  Agency Phone Number: N/a  Services/Supplies Provided: None    Nutrition: Oral Diet    Outpatient Pharmacy:     [facility] [place] - [address]  [address] [place]) [place] [address]  Phone: [phone] Fax: [phone]      Patient Preference of agencies include: none specified.     Patient/Caregiver informed of right to choose providers or agencies.  Patient provides permission to release any necessary information to [facility] and to Non[facility] agencies as needed to facilitate patient care, treatment planning, and patient discharge planning.  Written and verbal resources will be provided as needed/requested.      Coping  Appropriate to situation.   Patient has good family support.  Patient has a positive attitude.     Last Noted Distress Score: 0 - No Distress [10/31/23 at 1505]          Adjustment to Diagnosis and Treatment  Appropriate to situation.      Emotional/Behavioral/Cognitive Issues  None noted/observed.          History/Current Symptoms of Anxiety/Depression:  No    History/Current Substance Use: As per chart:  Social History     Tobacco Use    Smoking status: Never    Smokeless tobacco: Never   Substance and Sexual Activity    Alcohol use: Not Currently     Alcohol/week: 0.0 standard drinks of alcohol     Comment: rarely    Drug use: Never    Sexual activity: Not on file       Indications of Abuse/Neglect: no  As per chart:  Abuse Screen (yes response referral indicated)  Feels Unsafe at Home or Work/School: no  Feels Threatened by Someone: no  Does anyone try to keep you from having contact with others or doing things outside your home?: no  Physical Signs of Abuse Present: no      Financial:  Payer/Plan Subscr  Sex Relation Sub. Ins. ID Effective Group Num   1. 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     His medical insurance is [employer] Managed Medicare.         Other identified concerns/needs: PT/OT evals    Plan: Patient plans to return home with his wife via family car at discharge from the hospital. Patient and wife stated no needs at this time.    Interventions/Referrals: to be determined    Patient/caregiver engaged in treatment planning process.    Oncology Social Worker providing psychosocial and supportive counseling, resources, education, assistance and discharge planning as appropriate.  Patient/caregiver state understanding of  available resources,  following, remains available.    Provided patient and wife with Oncology Social Worker's business card containing all contact information, and advised them to contact me as needed.     Will continue to follow.

## 2023-11-02 NOTE — HOSPITAL COURSE
Pt admitted for intractable back pain in the setting of renal carcinoma (CCC) s/p R nephrectomy 10/4 followed by Dr. Turk not on systemic therapy. CT and MRI concerning for spinal, lung, and hepatic mets. NSGY, Rad Onc, and IR on board in the hospital and evaluated for C5 and T9 lesions on spine. Pt controlled with oxycodone 12 HR BID and Oxycodone 10 PRN for thoracic and cervical pain. Neurologically stable and no need for acute intervention. NSGY to embolize and operate on C4/5 11/6-11/7 outpatient, IR to perform osteocool/kyphoplasty/biopsy T9 11/14. To see Dr. Turk for systemic therapy afterwards as well as option for postoperative radiation. Stable to discharge home with c-collar and back brace.

## 2023-11-02 NOTE — ASSESSMENT & PLAN NOTE
"Patient admitted for worsening back pain concern for mets to spine. Also on MRI found concerns for possible "compressive myelopathy" though patient has no neuro deficits at time of admission    Plan:  - NSGY consult, spoke to overnight team without neuro deficits no immediate concerns will formally staff in am     "
73 year old with RCC s/p R nephrectomy presents at request of outpatient oncologist for worsening back meseret most likely 2/2 to spinal mets for pain control and rational oncology consult. Patient overall feels well with no symptoms other than abdominal pain 2/2 to recent nephrectomy, and some occasional constipation most likely attributed to opioid usage    Plan:   - Resume home pain regimen robaxin, gabapentin, oxy. Escalate with IV for adequate pain control   - Rad onc consult     
AST/ALT elevation notable 43/144 down from 9 days prior 117/414     Plan:  - daily cmp  - AST/ALT downtrending     
Calcium 10.9 at time of admission, was previously borderline 10.4 on several occasions prior. IN setting of Cr increase though right nephrectomy less than 3 weeks prior coincides with Cr increase.     Plan:   - daily BMP  - IVF NS for now     
Cont dat CCB   
Cr elevated 1.5>1.6>1.3>0.9 as far back as 3 weeks prior though patient had nephrectomy 10/4. Cr may be new baseline but unclear. Will assume SABINE for now. Also in setting of mild hypercalcemia, dehydration may be a factor     Plan:   - NS 100cc/hr for 10 hours   - Dialy BMP   - Cr increase may be new baseline s/p surgery   
Mr. 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 Pain has been worsening over the lat several days. He endorses low back pain, and mild neck pain however his chief pain source is in his back. He denies GI symptoms except occasional constipation that is relieved with OTC remedies. MRI concerning for C5 and T9 lesion.Patient admitted to medical oncology for management of pain symptoms, SABINE, hypercalcemia, with rad onc and NSGY consulted due to the spine mets.      Imaging:  MRI C and T spine completed, C5 vertebral body metastatic lesion extending across disc space into C4 vertebral body with epidural extension. T9 vertebral body metastatic lesion with 25% height loss and minimal retropulsion without high grade stenosis. Reviewed with Dr. Martinez.     -Recommend C4 and C5 corpectomies with IR embolization prior. Scheduled with IR on 11/6 for embolization of C4, C5 and T9. OR scheduled 11/7. Recommend T9 osteocool and kyphoplasty with IR, scheduled on 11/14.  -Patient should wear c-collar when OOB.   -Okay to discharge home as patient is neuro stable and return for scheduled procedures.       
Mr. 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 Pain has been worsening over the lat several days. He endorses low back pain, and mild neck pain however his chief pain source is in his back. He denies GI symptoms except occasional constipation that is relieved with OTC remedies. MRI concerning for C5 and T9 lesion.Patient admitted to medical oncology for management of pain symptoms, SABINE, hypercalcemia, with rad onc and NSGY consulted due to the spine mets.      Imaging:  MRI Tsp: C5 &T9 Bony destruction with mild canal stenosis at C5.  CT Spine: Pending  MRI C spine: Pending    Recommendations:  -Pending imaging  -Continue to monitor clinically, notify NSGY immediately with any changes in neuro status    Dispo: Admitted to [facility], ongoing  Discussed with Dr. Martinez    
See Intractable back Pain   
See SABINE   
No

## 2023-11-02 NOTE — DISCHARGE SUMMARY
[facility] - Oncology ([facility])  Hematology/Oncology  Discharge Summary      Patient Name: Gamaliel Pete Jr.  MRN: [id number]  Admission Date: 10/31/2023  Hospital Length of Stay: 2 days  Discharge Date and Time:  11/02/2023 1:40 PM  Attending Physician: Jerri Rubi MD   Discharging Provider: José Shaffer MD  Primary Care Provider: Slava Lowery MD    HPI: Mr. Pete is a 73 year old male with hx of RCC with lung and spinal mets s/p resection presents with worsening back pain unrelieved with home PO regimen. Pain has been worsening over the lat several days. He endorses low back pain, and mild neck pain however his chief pain source is in his back. He denies GI symptoms except occasional constipation that is relieved with OTC remedies. MRI concerning for C5 and T9 lesion as well as compressive myelopathy. He also has a mildly elevated calcium and Cr.Patient admitted to medical oncology for management of pain symptoms, SABINE, hypercalcemia, with rad onc and NSGY consult.     Procedure(s) (LRB):  CORPECTOMY, SPINE, CERVICAL (Right)     Hospital Course: Pt admitted for intractable back pain in the setting of renal carcinoma (CCC) s/p R nephrectomy 10/4 followed by Dr. Turk not on systemic therapy. CT and MRI concerning for spinal, lung, and hepatic mets. NSGY, Rad Onc, and IR on board in the hospital and evaluated for C5 and T9 lesions on spine. Pt controlled with oxycodone 12 HR BID and Oxycodone 10 PRN for thoracic and cervical pain. Neurologically stable and no need for acute intervention. NSGY to embolize and operate on C4/5 11/6-11/7 outpatient, IR to perform osteocool/kyphoplasty/biopsy T9 11/14. To see Dr. Turk for systemic therapy afterwards as well as option for postoperative radiation. Stable to discharge home with c-collar and back brace.         Vitals:    11/02/23 0609 11/02/23 0856 11/02/23 1046 11/02/23 1120   BP:  (!) 158/70  (!) 145/69   BP Location:    Left arm   Patient Position:   Lying  Lying   Pulse:  68  72   Resp: 19 16 16 20   Temp:  97.6 °F (36.4 °C)  98 °F (36.7 °C)   TempSrc:  Oral  Oral   SpO2:  96%  (!) 93%   Weight:       Height:           Physical Exam  Constitutional:       General: He is not in acute distress.     Appearance: He is not diaphoretic.   HENT:      Head: Normocephalic and atraumatic.   Cardiovascular:      Rate and Rhythm: Normal rate and regular rhythm.   Pulmonary:      Effort: Pulmonary effort is normal. No respiratory distress.      Breath sounds: Normal breath sounds. No wheezing.   Abdominal:      General: There is no distension.      Tenderness: There is no abdominal tenderness.   Musculoskeletal:         General: Tenderness present.   Skin:     General: Skin is warm and dry.   Neurological:      General: No focal deficit present.      Mental Status: He is oriented to person, place, and time. Mental status is at baseline.   Psychiatric:         Mood and Affect: Mood normal.         Behavior: Behavior normal.       Goals of Care Treatment Preferences:  Code Status: Full Code      Consults:   Consults (From admission, onward)          Status Ordering Provider     Inpatient consult to Interventional Radiology  Once        Provider:  (Not yet assigned)    RUSLAN Martinez     Inpatient consult to Neurosurgery  Once        Provider:  (Not yet assigned)    Completed ARNEL LOVETT     Inpatient consult to Radiation Oncology  Once        Provider:  (Not yet assigned)    Completed ARNEL LOVETT            Significant Diagnostic Studies: N/A    Pending Diagnostic Studies:       Procedure Component Value Units Date/Time    IR Angiogram Cerebral Intracranial ea Add vessel [[id number]]     Order Status: Sent Lab Status: No result           Final Active Diagnoses:    Diagnosis Date Noted POA    PRINCIPAL PROBLEM:  Intractable back pain [M54.9] 10/31/2023 Yes    SABINE (acute kidney injury) [N17.9] 10/31/2023 Yes    Hypercalcemia [E83.52] 10/31/2023 Yes     Transaminitis [R74.01] 10/31/2023 Yes    History of right radical nephrectomy [Z90.5] 10/11/2023 Not Applicable    Clear cell carcinoma of right kidney [C64.1] 10/11/2023 Yes    Metastasis to bone [C79.51] 09/14/2023 Yes    Borderline hypertension [R03.0] 04/01/2019 Yes      Problems Resolved During this Admission:      Discharged Condition: stable    Disposition: Home or Self Care    Follow Up:    Patient Instructions:      Ambulatory referral/consult to [facility] Palliative Care   Standing Status: Future   Referral Priority: Routine Referral Type: Consultation   Requested Specialty: Palliative Medicine   Number of Visits Requested: 1     Medications:  Reconciled Home Medications:      Medication List        START taking these medications      naloxone 4 mg/actuation Spry  Commonly known as: NARCAN  1 spray (4mg) by nasal route as needed for opioid overdose; may repeat every 2-3 minutes in alternating nostrils until medical help arrives. Call 911     polyethylene glycol 17 gram/dose powder  Commonly known as: GLYCOLAX  Use cap to measure 17 grams, mix in liquid and take by mouth once daily.  Start taking on: November 3, 2023     SENNA 8.6 mg tablet  Generic drug: senna  Take 1 tablet by mouth once daily.  Start taking on: November 3, 2023     XTAMPZA ER 18 mg Cspt  Generic drug: oxyCODONE myristate  Take 1 capsule (18 mg total) by mouth 2 (two) times a day. for 7 days            CHANGE how you take these medications      methocarbamoL 500 MG Tab  Commonly known as: ROBAXIN  Take 1 tablet (500 mg total) by mouth 3 (three) times daily. for 10 days  What changed:   how much to take  when to take this  reasons to take this     oxyCODONE 10 mg Tab immediate release tablet  Commonly known as: ROXICODONE  Take 1 tablet (10 mg total) by mouth every 6 (six) hours as needed for Pain.  What changed:   medication strength  how much to take            CONTINUE taking these medications      amlodipine-benazepril 5-10 mg 5-10 mg per  capsule  Commonly known as: LOTREL  Take 1 capsule by mouth once daily.     CURCUMIN MISC  1,000 mg by Misc.(Non-Drug; Combo Route) route Daily.     fish oil-omega-3 fatty acids 300-1,000 mg capsule  Take 2 capsules by mouth once daily.     gabapentin 300 MG capsule  Commonly known as: NEURONTIN  Take 1 capsule (300 mg total) by mouth 3 (three) times daily.     indomethacin 50 MG capsule  Commonly known as: INDOCIN  One p.o. t.i.d. p.r.n. gouty attack no more than 7 days--do not take with other NSAIDs     OLIVE OIL ORAL  Take 15 mLs by mouth 2 (two) times a day.     sildenafiL 100 MG tablet  Commonly known as: VIAGRA  Take 1 tablet (100 mg total) by mouth daily as needed for Erectile Dysfunction.     UNABLE TO FIND  Take 500 mg by mouth 2 (two) times a day. medication name: Tudca     UNABLE TO FIND  Take 2 capsules by mouth 2 (two) times a day. medication name: Turkey tail mushroom     UNABLE TO FIND  Take 15 drops by mouth once daily. medication name: Essiac-20     UNABLE TO FIND  Take 5 mLs by mouth 2 (two) times a day. medication name: barley leaf juice powder     UNABLE TO FIND  Take 5 mLs by mouth 2 (two) times a day. medication name: black seed oil (cumin seed)            STOP taking these medications      ibuprofen 800 MG tablet  Commonly known as: LEYDA GARCIA MD  Hematology/Oncology  [facility] - Oncology \[facility]\"")

## 2023-11-02 NOTE — PROGRESS NOTES
Lj Krueger - Oncology ([facility])  Neurosurgery  Progress Note    Subjective:     History of Present Illness: Mr. 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 Pain has been worsening over the lat several days. He endorses low back pain, and mild neck pain however his chief pain source is in his back. He denies GI symptoms except occasional constipation that is relieved with OTC remedies. MRI concerning for C5 and T9 lesion.Patient admitted to medical oncology for management of pain symptoms, SABINE, hypercalcemia, with rad onc and NSGY consulted due to the spine mets.        Post-Op Info:  Procedure(s) (LRB):  CORPECTOMY, SPINE, CERVICAL (Right)         Interval History: Patient neuro stable. He reports mid back pain and tenderness. Also reports soreness around the ribs bilaterally. MRI C and T spine completed, C5 vertebral body metastatic lesion extending across disc space into C4 vertebral body with epidural extension. T9 vertebral body metastatic lesion with 25% height loss and minimal retropulsion without high grade stenosis. Reviewed with Dr. Martinez. Recommend C4 and C5 corpectomies with IR embolization prior. Scheduled with IR on 11/6 for embolization of C4, C5 and T9. OR scheduled 11/7. Recommend T9 osteocool and kyphoplasty with IR, scheduled on 11/14. Patient should wear c-collar when OOB.     Medications:  Continuous Infusions:   sodium chloride 0.9% 100 mL/hr at 11/02/23 1051     Scheduled Meds:   amlodipine-benazepril 5-10 mg  1 capsule Oral Daily    enoxparin  40 mg Subcutaneous Daily    gabapentin  300 mg Oral TID    methocarbamoL  500 mg Oral TID    oxyCODONE  20 mg Oral Q12H    polyethylene glycol  17 g Oral Daily    senna  8.6 mg Oral Daily     PRN Meds:glucagon (human recombinant), glucose, glucose, HYDROmorphone, indomethacin, naloxone, oxyCODONE, sodium chloride 0.9%     Review of  Systems  Objective:     Weight: 100 kg (220 lb 7.4 oz)  Body mass index is 31.63 kg/m².  Vital Signs (Most Recent):  Temp: 98 °F (36.7 °C) (11/02/23 1120)  Pulse: 72 (11/02/23 1120)  Resp: 20 (11/02/23 1120)  BP: (!) 145/69 (11/02/23 1120)  SpO2: (!) 93 % (11/02/23 1120) Vital Signs (24h Range):  Temp:  [97.5 °F (36.4 °C)-98 °F (36.7 °C)] 98 °F (36.7 °C)  Pulse:  [58-73] 72  Resp:  [16-20] 20  SpO2:  [93 %-96 %] 93 %  BP: (134-159)/(61-70) 145/69         Neurosurgery Physical Exam  General: well developed, well nourished, no distress.   Head: normocephalic, atraumatic  Neurologic: Alert and oriented. Thought content appropriate.  GCS: Motor: 6/Verbal: 5/Eyes: 4 GCS Total: 15  Mental Status: Awake, Alert, Oriented x 4  Language: No aphasia  Speech: No dysarthria  Cranial nerves: face symmetric, tongue midline, CN II-XII grossly intact.   Eyes: pupils equal, round, reactive to light with accommodation, EOMI.   Pulmonary: normal respirations, no signs of respiratory distress  Skin: Skin is warm, dry and intact.  Sensory: intact to light touch throughout  Motor Strength:Moves all extremities spontaneously with good tone.  Full strength upper and lower extremities. No abnormal movements seen.     Strength  Deltoids Triceps Biceps Wrist Extension Wrist Flexion Hand    Upper: R 5/5 5/5 5/5 5/5 5/5 5/5    L 5/5 5/5 5/5 5/5 5/5 5/5     Iliopsoas Quadriceps Knee  Flexion Tibialis  anterior Gastro- cnemius EHL   Lower: R 5/5 5/5 5/5 5/5 5/5 5/5    L 5/5 5/5 5/5 5/5 5/5 5/5     DTR: 2+ symmetrically throughout.  Maldonado's: Negative.  Clonus: Negative.            Significant Labs:  Recent Labs   Lab 10/31/23  1655 11/01/23  0333 11/02/23  0246   GLU 90 100 113*    137 138   K 5.0 4.2 4.5    105 107   CO2 27 25 25   BUN 30* 26* 23   CREATININE 1.5* 1.5* 1.6*   CALCIUM 10.9* 10.2 9.3   MG  --  2.5 2.4     Recent Labs   Lab 10/31/23  1655 11/01/23  0333 11/02/23  0246   WBC 8.33 6.55 5.47   HGB 13.6* 12.1* 10.4*  "  HCT 40.4 36.7* 31.6*    187 170     No results for input(s): "LABPT", "INR", "APTT" in the last 48 hours.  Microbiology Results (last 7 days)       ** No results found for the last 168 hours. **          All pertinent labs from the last 24 hours have been reviewed.    Significant Diagnostics:  I have reviewed all pertinent imaging results/findings within the past 24 hours.    Assessment/Plan:     Metastasis to bone  Mr. 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 Pain has been worsening over the lat several days. He endorses low back pain, and mild neck pain however his chief pain source is in his back. He denies GI symptoms except occasional constipation that is relieved with OTC remedies. MRI concerning for C5 and T9 lesion.Patient admitted to medical oncology for management of pain symptoms, SABINE, hypercalcemia, with rad onc and NSGY consulted due to the spine mets.      Imaging:  MRI C and T spine completed, C5 vertebral body metastatic lesion extending across disc space into C4 vertebral body with epidural extension. T9 vertebral body metastatic lesion with 25% height loss and minimal retropulsion without high grade stenosis. Reviewed with Dr. Martinez.     -Recommend C4 and C5 corpectomies with IR embolization prior. Scheduled with IR on 11/6 for embolization of C4, C5 and T9. OR scheduled 11/7. Recommend T9 osteocool and kyphoplasty with IR, scheduled on 11/14.  -Patient should wear c-collar when OOB.   -Okay to discharge home as patient is neuro stable and return for scheduled procedures.             Esther Hood PA-C  Neurosurgery  [facility] - [facility] (Hospital)  "

## 2023-11-02 NOTE — PLAN OF CARE
Problem: Adult Inpatient Plan of Care  Goal: Readiness for Transition of Care  Intervention: Mutually Develop Transition Plan  Flowsheets (Taken 11/2/2023 5110)  Communicated WAQAS with patient/caregiver: Yes  Do you expect to return to your current living situation?: Yes  Do you have help at home or someone to help you manage your care at home?: Yes     Problem: Adult Inpatient Plan of Care  Goal: Plan of Care Review  Outcome: Met

## 2023-11-02 NOTE — H&P (VIEW-ONLY)
Lj Krueger - Oncology ([facility])  Neurosurgery  Progress Note    Subjective:     History of Present Illness: Mr. 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 Pain has been worsening over the lat several days. He endorses low back pain, and mild neck pain however his chief pain source is in his back. He denies GI symptoms except occasional constipation that is relieved with OTC remedies. MRI concerning for C5 and T9 lesion.Patient admitted to medical oncology for management of pain symptoms, SABINE, hypercalcemia, with rad onc and NSGY consulted due to the spine mets.        Post-Op Info:  Procedure(s) (LRB):  CORPECTOMY, SPINE, CERVICAL (Right)         Interval History: Patient neuro stable. He reports mid back pain and tenderness. Also reports soreness around the ribs bilaterally. MRI C and T spine completed, C5 vertebral body metastatic lesion extending across disc space into C4 vertebral body with epidural extension. T9 vertebral body metastatic lesion with 25% height loss and minimal retropulsion without high grade stenosis. Reviewed with Dr. Martinez. Recommend C4 and C5 corpectomies with IR embolization prior. Scheduled with IR on 11/6 for embolization of C4, C5 and T9. OR scheduled 11/7. Recommend T9 osteocool and kyphoplasty with IR, scheduled on 11/14. Patient should wear c-collar when OOB.     Medications:  Continuous Infusions:   sodium chloride 0.9% 100 mL/hr at 11/02/23 1051     Scheduled Meds:   amlodipine-benazepril 5-10 mg  1 capsule Oral Daily    enoxparin  40 mg Subcutaneous Daily    gabapentin  300 mg Oral TID    methocarbamoL  500 mg Oral TID    oxyCODONE  20 mg Oral Q12H    polyethylene glycol  17 g Oral Daily    senna  8.6 mg Oral Daily     PRN Meds:glucagon (human recombinant), glucose, glucose, HYDROmorphone, indomethacin, naloxone, oxyCODONE, sodium chloride 0.9%     Review of  Systems  Objective:     Weight: 100 kg (220 lb 7.4 oz)  Body mass index is 31.63 kg/m².  Vital Signs (Most Recent):  Temp: 98 °F (36.7 °C) (11/02/23 1120)  Pulse: 72 (11/02/23 1120)  Resp: 20 (11/02/23 1120)  BP: (!) 145/69 (11/02/23 1120)  SpO2: (!) 93 % (11/02/23 1120) Vital Signs (24h Range):  Temp:  [97.5 °F (36.4 °C)-98 °F (36.7 °C)] 98 °F (36.7 °C)  Pulse:  [58-73] 72  Resp:  [16-20] 20  SpO2:  [93 %-96 %] 93 %  BP: (134-159)/(61-70) 145/69         Neurosurgery Physical Exam  General: well developed, well nourished, no distress.   Head: normocephalic, atraumatic  Neurologic: Alert and oriented. Thought content appropriate.  GCS: Motor: 6/Verbal: 5/Eyes: 4 GCS Total: 15  Mental Status: Awake, Alert, Oriented x 4  Language: No aphasia  Speech: No dysarthria  Cranial nerves: face symmetric, tongue midline, CN II-XII grossly intact.   Eyes: pupils equal, round, reactive to light with accommodation, EOMI.   Pulmonary: normal respirations, no signs of respiratory distress  Skin: Skin is warm, dry and intact.  Sensory: intact to light touch throughout  Motor Strength:Moves all extremities spontaneously with good tone.  Full strength upper and lower extremities. No abnormal movements seen.     Strength  Deltoids Triceps Biceps Wrist Extension Wrist Flexion Hand    Upper: R 5/5 5/5 5/5 5/5 5/5 5/5    L 5/5 5/5 5/5 5/5 5/5 5/5     Iliopsoas Quadriceps Knee  Flexion Tibialis  anterior Gastro- cnemius EHL   Lower: R 5/5 5/5 5/5 5/5 5/5 5/5    L 5/5 5/5 5/5 5/5 5/5 5/5     DTR: 2+ symmetrically throughout.  Maldonado's: Negative.  Clonus: Negative.            Significant Labs:  Recent Labs   Lab 10/31/23  1655 11/01/23  0333 11/02/23  0246   GLU 90 100 113*    137 138   K 5.0 4.2 4.5    105 107   CO2 27 25 25   BUN 30* 26* 23   CREATININE 1.5* 1.5* 1.6*   CALCIUM 10.9* 10.2 9.3   MG  --  2.5 2.4     Recent Labs   Lab 10/31/23  1655 11/01/23  0333 11/02/23  0246   WBC 8.33 6.55 5.47   HGB 13.6* 12.1* 10.4*  "  HCT 40.4 36.7* 31.6*    187 170     No results for input(s): "LABPT", "INR", "APTT" in the last 48 hours.  Microbiology Results (last 7 days)       ** No results found for the last 168 hours. **          All pertinent labs from the last 24 hours have been reviewed.    Significant Diagnostics:  I have reviewed all pertinent imaging results/findings within the past 24 hours.    Assessment/Plan:     Metastasis to bone  Mr. 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 Pain has been worsening over the lat several days. He endorses low back pain, and mild neck pain however his chief pain source is in his back. He denies GI symptoms except occasional constipation that is relieved with OTC remedies. MRI concerning for C5 and T9 lesion.Patient admitted to medical oncology for management of pain symptoms, SABINE, hypercalcemia, with rad onc and NSGY consulted due to the spine mets.      Imaging:  MRI C and T spine completed, C5 vertebral body metastatic lesion extending across disc space into C4 vertebral body with epidural extension. T9 vertebral body metastatic lesion with 25% height loss and minimal retropulsion without high grade stenosis. Reviewed with Dr. Martinez.     -Recommend C4 and C5 corpectomies with IR embolization prior. Scheduled with IR on 11/6 for embolization of C4, C5 and T9. OR scheduled 11/7. Recommend T9 osteocool and kyphoplasty with IR, scheduled on 11/14.  -Patient should wear c-collar when OOB.   -Okay to discharge home as patient is neuro stable and return for scheduled procedures.             Esther Hood PA-C  Neurosurgery  [facility] ([facility]  "

## 2023-11-02 NOTE — PLAN OF CARE
Problem: Occupational Therapy  Goal: Occupational Therapy Goal  Description: Goals to be met by: 11/27     Patient will increase functional independence with ADLs by performing:    UE Dressing with [place].  LE Dressing with Minimal Assistance.  Grooming while standing with [place].  Toileting from toilet with [place] for hygiene and clothing management.   Supine to sit with [place] with HOB flat.  Step transfer with [place]  Toilet transfer to toilet with [place].    Outcome: Ongoing, Progressing     OT eval completed.

## 2023-11-02 NOTE — NURSING
Assumed care for patient at 0700. VSS, AOX4. Resting in bed. Ambulates with assistance to restroom. Voiding approprietly. All medications given as scheduled. Prn pain medications given as need. Discharge orders have been placed and reviewed with patient and spouse. Medications being delivered to bedside. Education about new medications provided. AVS given. PIV removed. No acute needs at this time.

## 2023-11-02 NOTE — PLAN OF CARE
Plan of care reviewed with patient and wife. VSS. No acute events overnight. Pain controlled with scheduled meds and PRN oxy x1. Pt unable to complete MRI on day shift due to back pain. Attempting again this AM with dose of Dilaudid prior to scan. Pt ambulates with stand-by assist for safety. Nonskid footwear on with bed in lowest position and locked. Pt instructed to call for assistance if needed. Call light within reach and verbalized understanding.

## 2023-11-02 NOTE — SUBJECTIVE & OBJECTIVE
Interval History: Patient neuro stable. He reports mid back pain and tenderness. Also reports soreness around the ribs bilaterally. MRI C and T spine completed, C5 vertebral body metastatic lesion extending across disc space into C4 vertebral body with epidural extension. T9 vertebral body metastatic lesion with 25% height loss and minimal retropulsion without high grade stenosis. Reviewed with Dr. Martinez. Recommend C4 and C5 corpectomies with IR embolization prior. Scheduled with IR on 11/6 for embolization of C4, C5 and T9. OR scheduled 11/7. Recommend T9 osteocool and kyphoplasty with IR, scheduled on 11/14. Patient should wear c-collar when OOB.     Medications:  Continuous Infusions:   sodium chloride 0.9% 100 mL/hr at 11/02/23 1051     Scheduled Meds:   amlodipine-benazepril 5-10 mg  1 capsule Oral Daily    enoxparin  40 mg Subcutaneous Daily    gabapentin  300 mg Oral TID    methocarbamoL  500 mg Oral TID    oxyCODONE  20 mg Oral Q12H    polyethylene glycol  17 g Oral Daily    senna  8.6 mg Oral Daily     PRN Meds:glucagon (human recombinant), glucose, glucose, HYDROmorphone, indomethacin, naloxone, oxyCODONE, sodium chloride 0.9%     Review of Systems  Objective:     Weight: 100 kg (220 lb 7.4 oz)  Body mass index is 31.63 kg/m².  Vital Signs (Most Recent):  Temp: 98 °F (36.7 °C) (11/02/23 1120)  Pulse: 72 (11/02/23 1120)  Resp: 20 (11/02/23 1120)  BP: (!) 145/69 (11/02/23 1120)  SpO2: (!) 93 % (11/02/23 1120) Vital Signs (24h Range):  Temp:  [97.5 °F (36.4 °C)-98 °F (36.7 °C)] 98 °F (36.7 °C)  Pulse:  [58-73] 72  Resp:  [16-20] 20  SpO2:  [93 %-96 %] 93 %  BP: (134-159)/(61-70) 145/69         Neurosurgery Physical Exam  General: well developed, well nourished, no distress.   Head: normocephalic, atraumatic  Neurologic: Alert and oriented. Thought content appropriate.  GCS: Motor: 6/Verbal: 5/Eyes: 4 GCS Total: 15  Mental Status: Awake, Alert, Oriented x 4  Language: No aphasia  Speech: No  "dysarthria  Cranial nerves: face symmetric, tongue midline, CN II-XII grossly intact.   Eyes: pupils equal, round, reactive to light with accommodation, EOMI.   Pulmonary: normal respirations, no signs of respiratory distress  Skin: Skin is warm, dry and intact.  Sensory: intact to light touch throughout  Motor Strength:Moves all extremities spontaneously with good tone.  Full strength upper and lower extremities. No abnormal movements seen.     Strength  Deltoids Triceps Biceps Wrist Extension Wrist Flexion Hand    Upper: R 5/5 5/5 5/5 5/5 5/5 5/5    L 5/5 5/5 5/5 5/5 5/5 5/5     Iliopsoas Quadriceps Knee  Flexion Tibialis  anterior Gastro- cnemius EHL   Lower: R 5/5 5/5 5/5 5/5 5/5 5/5    L 5/5 5/5 5/5 5/5 5/5 5/5     DTR: 2+ symmetrically throughout.  Maldonado's: Negative.  Clonus: Negative.            Significant Labs:  Recent Labs   Lab 10/31/23  1655 11/01/23  0333 11/02/23  0246   GLU 90 100 113*    137 138   K 5.0 4.2 4.5    105 107   CO2 27 25 25   BUN 30* 26* 23   CREATININE 1.5* 1.5* 1.6*   CALCIUM 10.9* 10.2 9.3   MG  --  2.5 2.4     Recent Labs   Lab 10/31/23  1655 11/01/23  0333 11/02/23  0246   WBC 8.33 6.55 5.47   HGB 13.6* 12.1* 10.4*   HCT 40.4 36.7* 31.6*    187 170     No results for input(s): "LABPT", "INR", "APTT" in the last 48 hours.  Microbiology Results (last 7 days)       ** No results found for the last 168 hours. **          All pertinent labs from the last 24 hours have been reviewed.    Significant Diagnostics:  I have reviewed all pertinent imaging results/findings within the past 24 hours.  "

## 2023-11-03 ENCOUNTER — TELEPHONE (OUTPATIENT)
Dept: PALLIATIVE MEDICINE | Facility: CLINIC | Age: 73
End: 2023-11-03
Payer: MEDICARE

## 2023-11-06 ENCOUNTER — ANESTHESIA EVENT (OUTPATIENT)
Dept: INTERVENTIONAL RADIOLOGY/VASCULAR | Facility: HOSPITAL | Age: 73
DRG: 472 | End: 2023-11-06
Payer: MEDICARE

## 2023-11-06 ENCOUNTER — ANESTHESIA EVENT (OUTPATIENT)
Dept: SURGERY | Facility: HOSPITAL | Age: 73
DRG: 472 | End: 2023-11-06
Payer: MEDICARE

## 2023-11-06 ENCOUNTER — HOSPITAL ENCOUNTER (INPATIENT)
Dept: INTERVENTIONAL RADIOLOGY/VASCULAR | Facility: HOSPITAL | Age: 73
LOS: 3 days | Discharge: HOME HEALTH | DRG: 472 | End: 2023-11-09
Admitting: INTERNAL MEDICINE
Payer: MEDICARE

## 2023-11-06 DIAGNOSIS — C79.51 METASTATIC CANCER TO SPINE: Primary | ICD-10-CM

## 2023-11-06 DIAGNOSIS — M54.9 INTRACTABLE BACK PAIN: ICD-10-CM

## 2023-11-06 DIAGNOSIS — C79.51 SPINAL CORD COMPRESSION DUE TO MALIGNANT NEOPLASM METASTATIC TO SPINE: ICD-10-CM

## 2023-11-06 DIAGNOSIS — G95.29 SPINAL CORD COMPRESSION DUE TO MALIGNANT NEOPLASM METASTATIC TO SPINE: ICD-10-CM

## 2023-11-06 DIAGNOSIS — R52 INADEQUATE PAIN CONTROL: ICD-10-CM

## 2023-11-06 DIAGNOSIS — Z09 SURGERY FOLLOW-UP: ICD-10-CM

## 2023-11-06 DIAGNOSIS — C79.51 METASTASIS TO BONE: ICD-10-CM

## 2023-11-06 LAB — POCT GLUCOSE: 120 MG/DL (ref 70–110)

## 2023-11-06 PROCEDURE — 63600175 PHARM REV CODE 636 W HCPCS: Performed by: REGISTERED NURSE

## 2023-11-06 PROCEDURE — 75774 PR  ANGIO EA ADDNL SELECTV VESSEL: ICD-10-PCS | Mod: 26,59,, | Performed by: PSYCHIATRY & NEUROLOGY

## 2023-11-06 PROCEDURE — 99223 PR INITIAL HOSPITAL CARE,LEVL III: ICD-10-PCS | Mod: ,,, | Performed by: PHYSICIAN ASSISTANT

## 2023-11-06 PROCEDURE — 37243 VASC EMBOLIZE/OCCLUDE ORGAN: CPT | Performed by: PSYCHIATRY & NEUROLOGY

## 2023-11-06 PROCEDURE — 36224 PR ANGIO INTRCRNL ART +/- CERVIOCEREBRAL ARCH, INTRNL CAROTID ART, SELECTV CATH ,S&I: ICD-10-PCS | Mod: 59,50,, | Performed by: PSYCHIATRY & NEUROLOGY

## 2023-11-06 PROCEDURE — 36226 PR ANGIO VERTEBRAL ARTERY +/- CERVIOCEREBRAL ARCH, VERTEBRAL ART, SELECTV CATH,S&I: ICD-10-PCS | Mod: 59,50,, | Performed by: PSYCHIATRY & NEUROLOGY

## 2023-11-06 PROCEDURE — D9220A PRA ANESTHESIA: ICD-10-PCS | Mod: CRNA,,, | Performed by: REGISTERED NURSE

## 2023-11-06 PROCEDURE — 36226 PLACE CATH VERTEBRAL ART: CPT | Mod: 59,50 | Performed by: PSYCHIATRY & NEUROLOGY

## 2023-11-06 PROCEDURE — 25000003 PHARM REV CODE 250: Performed by: SURGERY

## 2023-11-06 PROCEDURE — D9220A PRA ANESTHESIA: Mod: CRNA,,, | Performed by: REGISTERED NURSE

## 2023-11-06 PROCEDURE — 75774 ARTERY X-RAY EACH VESSEL: CPT | Mod: 26,59,, | Performed by: PSYCHIATRY & NEUROLOGY

## 2023-11-06 PROCEDURE — 37243 IR ANGIOGRAM CEREBRAL INTRACRANIAL EA ADD VESSEL: ICD-10-PCS | Mod: ,,, | Performed by: PSYCHIATRY & NEUROLOGY

## 2023-11-06 PROCEDURE — 25000003 PHARM REV CODE 250: Performed by: REGISTERED NURSE

## 2023-11-06 PROCEDURE — 20000000 HC ICU ROOM

## 2023-11-06 PROCEDURE — 25000003 PHARM REV CODE 250: Performed by: FAMILY MEDICINE

## 2023-11-06 PROCEDURE — 63600175 PHARM REV CODE 636 W HCPCS: Performed by: SURGERY

## 2023-11-06 PROCEDURE — 36226 PLACE CATH VERTEBRAL ART: CPT | Mod: 59,50,, | Performed by: PSYCHIATRY & NEUROLOGY

## 2023-11-06 PROCEDURE — 37000008 HC ANESTHESIA 1ST 15 MINUTES

## 2023-11-06 PROCEDURE — 25500020 PHARM REV CODE 255: Performed by: SURGERY

## 2023-11-06 PROCEDURE — 37243 VASC EMBOLIZE/OCCLUDE ORGAN: CPT | Mod: ,,, | Performed by: PSYCHIATRY & NEUROLOGY

## 2023-11-06 PROCEDURE — 36224 PLACE CATH CAROTD ART: CPT | Mod: 59,50 | Performed by: PSYCHIATRY & NEUROLOGY

## 2023-11-06 PROCEDURE — 36218 PR PLACE CATH ADDN SUBSELEC ART,NECK: ICD-10-PCS | Mod: 59,LT,, | Performed by: PSYCHIATRY & NEUROLOGY

## 2023-11-06 PROCEDURE — 25000003 PHARM REV CODE 250: Performed by: PHYSICIAN ASSISTANT

## 2023-11-06 PROCEDURE — 36224 PLACE CATH CAROTD ART: CPT | Mod: 59,50,, | Performed by: PSYCHIATRY & NEUROLOGY

## 2023-11-06 PROCEDURE — 99291 CRITICAL CARE FIRST HOUR: CPT | Mod: GC,,, | Performed by: INTERNAL MEDICINE

## 2023-11-06 PROCEDURE — D9220A PRA ANESTHESIA: ICD-10-PCS | Mod: ANES,,, | Performed by: ANESTHESIOLOGY

## 2023-11-06 PROCEDURE — 36218 PLACE CATHETER IN ARTERY: CPT | Mod: 59,LT,, | Performed by: PSYCHIATRY & NEUROLOGY

## 2023-11-06 PROCEDURE — 99291 PR CRITICAL CARE, E/M 30-74 MINUTES: ICD-10-PCS | Mod: GC,,, | Performed by: INTERNAL MEDICINE

## 2023-11-06 PROCEDURE — 99223 1ST HOSP IP/OBS HIGH 75: CPT | Mod: ,,, | Performed by: PHYSICIAN ASSISTANT

## 2023-11-06 PROCEDURE — C1760 CLOSURE DEV, VASC: HCPCS

## 2023-11-06 PROCEDURE — 37000009 HC ANESTHESIA EA ADD 15 MINS

## 2023-11-06 PROCEDURE — D9220A PRA ANESTHESIA: Mod: ANES,,, | Performed by: ANESTHESIOLOGY

## 2023-11-06 PROCEDURE — 36218 PLACE CATHETER IN ARTERY: CPT | Mod: 59,LT | Performed by: PSYCHIATRY & NEUROLOGY

## 2023-11-06 PROCEDURE — 75774 ARTERY X-RAY EACH VESSEL: CPT | Mod: TC,59 | Performed by: PSYCHIATRY & NEUROLOGY

## 2023-11-06 RX ORDER — PHENYLEPHRINE HYDROCHLORIDE 10 MG/ML
INJECTION INTRAVENOUS
Status: DISCONTINUED | OUTPATIENT
Start: 2023-11-06 | End: 2023-11-06

## 2023-11-06 RX ORDER — AMOXICILLIN 250 MG
2 CAPSULE ORAL NIGHTLY
Status: CANCELLED | OUTPATIENT
Start: 2023-11-06

## 2023-11-06 RX ORDER — MUPIROCIN 20 MG/G
OINTMENT TOPICAL 2 TIMES DAILY
Status: CANCELLED | OUTPATIENT
Start: 2023-11-06 | End: 2023-11-11

## 2023-11-06 RX ORDER — HYDROMORPHONE HYDROCHLORIDE 1 MG/ML
0.2 INJECTION, SOLUTION INTRAMUSCULAR; INTRAVENOUS; SUBCUTANEOUS EVERY 5 MIN PRN
Status: DISCONTINUED | OUTPATIENT
Start: 2023-11-06 | End: 2023-11-07

## 2023-11-06 RX ORDER — DEXAMETHASONE SODIUM PHOSPHATE 4 MG/ML
INJECTION, SOLUTION INTRA-ARTICULAR; INTRALESIONAL; INTRAMUSCULAR; INTRAVENOUS; SOFT TISSUE
Status: DISCONTINUED | OUTPATIENT
Start: 2023-11-06 | End: 2023-11-06

## 2023-11-06 RX ORDER — OXYCODONE HYDROCHLORIDE 5 MG/1
5 TABLET ORAL EVERY 4 HOURS PRN
Status: CANCELLED | OUTPATIENT
Start: 2023-11-06

## 2023-11-06 RX ORDER — MAG HYDROX/ALUMINUM HYD/SIMETH 200-200-20
30 SUSPENSION, ORAL (FINAL DOSE FORM) ORAL EVERY 4 HOURS PRN
Status: CANCELLED | OUTPATIENT
Start: 2023-11-06

## 2023-11-06 RX ORDER — IODIXANOL 320 MG/ML
210 INJECTION, SOLUTION INTRAVASCULAR
Status: COMPLETED | OUTPATIENT
Start: 2023-11-06 | End: 2023-11-06

## 2023-11-06 RX ORDER — SODIUM CHLORIDE 9 MG/ML
INJECTION, SOLUTION INTRAVENOUS ONCE
Status: COMPLETED | OUTPATIENT
Start: 2023-11-06 | End: 2023-11-06

## 2023-11-06 RX ORDER — DEXMEDETOMIDINE HYDROCHLORIDE 100 UG/ML
INJECTION, SOLUTION INTRAVENOUS
Status: DISCONTINUED | OUTPATIENT
Start: 2023-11-06 | End: 2023-11-06

## 2023-11-06 RX ORDER — ACETAMINOPHEN 325 MG/1
650 TABLET ORAL EVERY 6 HOURS PRN
Status: DISCONTINUED | OUTPATIENT
Start: 2023-11-06 | End: 2023-11-07

## 2023-11-06 RX ORDER — EPHEDRINE SULFATE 50 MG/ML
INJECTION, SOLUTION INTRAVENOUS
Status: DISCONTINUED | OUTPATIENT
Start: 2023-11-06 | End: 2023-11-06

## 2023-11-06 RX ORDER — SODIUM CHLORIDE 9 MG/ML
INJECTION, SOLUTION INTRAVENOUS CONTINUOUS
Status: DISCONTINUED | OUTPATIENT
Start: 2023-11-06 | End: 2023-11-08

## 2023-11-06 RX ORDER — FENTANYL CITRATE 50 UG/ML
25 INJECTION, SOLUTION INTRAMUSCULAR; INTRAVENOUS EVERY 5 MIN PRN
Status: DISCONTINUED | OUTPATIENT
Start: 2023-11-06 | End: 2023-11-06 | Stop reason: SDUPTHER

## 2023-11-06 RX ORDER — PROPOFOL 10 MG/ML
VIAL (ML) INTRAVENOUS
Status: DISCONTINUED | OUTPATIENT
Start: 2023-11-06 | End: 2023-11-06

## 2023-11-06 RX ORDER — ROCURONIUM BROMIDE 10 MG/ML
INJECTION, SOLUTION INTRAVENOUS
Status: DISCONTINUED | OUTPATIENT
Start: 2023-11-06 | End: 2023-11-06

## 2023-11-06 RX ORDER — LIDOCAINE HYDROCHLORIDE 20 MG/ML
INJECTION INTRAVENOUS
Status: DISCONTINUED | OUTPATIENT
Start: 2023-11-06 | End: 2023-11-06

## 2023-11-06 RX ORDER — SODIUM CHLORIDE 0.9 % (FLUSH) 0.9 %
10 SYRINGE (ML) INJECTION
Status: DISCONTINUED | OUTPATIENT
Start: 2023-11-06 | End: 2023-11-07

## 2023-11-06 RX ORDER — ONDANSETRON 2 MG/ML
4 INJECTION INTRAMUSCULAR; INTRAVENOUS DAILY PRN
Status: DISCONTINUED | OUTPATIENT
Start: 2023-11-06 | End: 2023-11-07

## 2023-11-06 RX ORDER — FENTANYL CITRATE 50 UG/ML
INJECTION, SOLUTION INTRAMUSCULAR; INTRAVENOUS
Status: DISCONTINUED | OUTPATIENT
Start: 2023-11-06 | End: 2023-11-06

## 2023-11-06 RX ORDER — ONDANSETRON 2 MG/ML
INJECTION INTRAMUSCULAR; INTRAVENOUS
Status: DISCONTINUED | OUTPATIENT
Start: 2023-11-06 | End: 2023-11-06

## 2023-11-06 RX ORDER — FENTANYL CITRATE 50 UG/ML
25 INJECTION, SOLUTION INTRAMUSCULAR; INTRAVENOUS EVERY 5 MIN PRN
Status: DISCONTINUED | OUTPATIENT
Start: 2023-11-06 | End: 2023-11-07

## 2023-11-06 RX ORDER — SUCCINYLCHOLINE CHLORIDE 20 MG/ML
INJECTION INTRAMUSCULAR; INTRAVENOUS
Status: DISCONTINUED | OUTPATIENT
Start: 2023-11-06 | End: 2023-11-06

## 2023-11-06 RX ORDER — LIDOCAINE HYDROCHLORIDE 10 MG/ML
1 INJECTION, SOLUTION EPIDURAL; INFILTRATION; INTRACAUDAL; PERINEURAL ONCE
Status: DISCONTINUED | OUTPATIENT
Start: 2023-11-06 | End: 2023-11-08

## 2023-11-06 RX ORDER — POLYETHYLENE GLYCOL 3350 17 G/17G
17 POWDER, FOR SOLUTION ORAL DAILY
Status: DISCONTINUED | OUTPATIENT
Start: 2023-11-06 | End: 2023-11-08

## 2023-11-06 RX ORDER — VASOPRESSIN 20 [USP'U]/ML
INJECTION, SOLUTION INTRAMUSCULAR; SUBCUTANEOUS
Status: DISCONTINUED | OUTPATIENT
Start: 2023-11-06 | End: 2023-11-06

## 2023-11-06 RX ADMIN — DEXAMETHASONE SODIUM PHOSPHATE 4 MG: 4 INJECTION, SOLUTION INTRAMUSCULAR; INTRAVENOUS at 02:11

## 2023-11-06 RX ADMIN — EPHEDRINE SULFATE 10 MG: 50 INJECTION INTRAVENOUS at 02:11

## 2023-11-06 RX ADMIN — LIDOCAINE HYDROCHLORIDE 100 MG: 20 INJECTION INTRAVENOUS at 02:11

## 2023-11-06 RX ADMIN — HYDROMORPHONE HYDROCHLORIDE 0.2 MG: 1 INJECTION, SOLUTION INTRAMUSCULAR; INTRAVENOUS; SUBCUTANEOUS at 05:11

## 2023-11-06 RX ADMIN — SUCCINYLCHOLINE CHLORIDE 120 MG: 20 INJECTION, SOLUTION INTRAMUSCULAR; INTRAVENOUS at 02:11

## 2023-11-06 RX ADMIN — VASOPRESSIN 1 UNITS: 20 INJECTION INTRAVENOUS at 03:11

## 2023-11-06 RX ADMIN — ROCURONIUM BROMIDE 10 MG: 10 INJECTION, SOLUTION INTRAVENOUS at 02:11

## 2023-11-06 RX ADMIN — ROCURONIUM BROMIDE 25 MG: 10 INJECTION, SOLUTION INTRAVENOUS at 02:11

## 2023-11-06 RX ADMIN — PHENYLEPHRINE HYDROCHLORIDE 100 MCG: 10 INJECTION INTRAVENOUS at 02:11

## 2023-11-06 RX ADMIN — IODIXANOL 210 ML: 320 INJECTION, SOLUTION INTRAVASCULAR at 04:11

## 2023-11-06 RX ADMIN — PROPOFOL 200 MG: 10 INJECTION, EMULSION INTRAVENOUS at 02:11

## 2023-11-06 RX ADMIN — DEXMEDETOMIDINE 12 MCG: 100 INJECTION, SOLUTION, CONCENTRATE INTRAVENOUS at 03:11

## 2023-11-06 RX ADMIN — PHENYLEPHRINE HYDROCHLORIDE 200 MCG: 10 INJECTION INTRAVENOUS at 02:11

## 2023-11-06 RX ADMIN — SODIUM CHLORIDE: 9 INJECTION, SOLUTION INTRAVENOUS at 10:11

## 2023-11-06 RX ADMIN — FENTANYL CITRATE 100 MCG: 50 INJECTION, SOLUTION INTRAMUSCULAR; INTRAVENOUS at 02:11

## 2023-11-06 RX ADMIN — ACETAMINOPHEN 650 MG: 325 TABLET ORAL at 11:11

## 2023-11-06 RX ADMIN — VASOPRESSIN 1 UNITS: 20 INJECTION INTRAVENOUS at 02:11

## 2023-11-06 RX ADMIN — SUGAMMADEX 400 MG: 100 INJECTION, SOLUTION INTRAVENOUS at 03:11

## 2023-11-06 RX ADMIN — HYDROMORPHONE HYDROCHLORIDE 0.2 MG: 1 INJECTION, SOLUTION INTRAMUSCULAR; INTRAVENOUS; SUBCUTANEOUS at 06:11

## 2023-11-06 RX ADMIN — POLYETHYLENE GLYCOL 3350 17 G: 17 POWDER, FOR SOLUTION ORAL at 09:11

## 2023-11-06 RX ADMIN — ROCURONIUM BROMIDE 5 MG: 10 INJECTION, SOLUTION INTRAVENOUS at 02:11

## 2023-11-06 RX ADMIN — SODIUM CHLORIDE: 9 INJECTION, SOLUTION INTRAVENOUS at 12:11

## 2023-11-06 RX ADMIN — ONDANSETRON 4 MG: 2 INJECTION INTRAMUSCULAR; INTRAVENOUS at 03:11

## 2023-11-06 RX ADMIN — SODIUM CHLORIDE: 0.9 INJECTION, SOLUTION INTRAVENOUS at 01:11

## 2023-11-06 NOTE — PLAN OF CARE
Pt arrived to IR rm 4 for Spinal Angiogram, embollization. Pt oriented to unit and staff, Pt safely transferred from stretcher to procedural table. Fall risk reviewed and comfort measures utilized with interventions. Safety strap applied, position pillows to minimize pressure points. Blankets applied. Pt prepped and draped utilizing standard sterile technique. Patient placed on continuous monitoring, as required by sedation policy. Timeouts implemented utilizing standard universal time-out per department and facility policy. CRNA to remain at bedside with continuous monitoring. Pt resting comfortably. Denies pain/discomfort. Will continue to monitor. See flow sheets for monitoring, medication administration, and updates. patient verbalizes understanding.

## 2023-11-06 NOTE — ANESTHESIA PROCEDURE NOTES
Intubation    Date/Time: 11/6/2023 2:10 PM    Performed by: Charmaine Luu CRNA  Authorized by: Armand Burgos MD    Intubation:     Induction:  Rapid sequence induction    Intubated:  Postinduction    Mask Ventilation:  Not attempted    Attempts:  1    Attempted By:  CRNA    Method of Intubation:  Video laryngoscopy    Blade:  Yoder 3    Laryngeal View Grade: Grade I - full view of cords      Difficult Airway Encountered?: No      Complications:  None    Airway Device:  Oral endotracheal tube    Airway Device Size:  7.5    Style/Cuff Inflation:  Cuffed (inflated to minimal occlusive pressure)    Tube secured:  23    Secured at:  The lips    Placement Verified By:  Capnometry (auscultation)    Complicating Factors:  None    Findings Post-Intubation:  BS equal bilateral and atraumatic/condition of teeth unchanged  Notes:      Head and neck neutral

## 2023-11-06 NOTE — ANESTHESIA PREPROCEDURE EVALUATION
[facility]  Anesthesia Pre-Operative Evaluation         Patient Name: Gamaliel Pete Jr.  YOB: 1950  MRN: [id number]    SUBJECTIVE:     Pre-operative evaluation for * No procedures listed *     11/06/2023    Gamaliel Pete Jr. is a 73 y.o. 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    Scheduled with IR on 11/6 for embolization of C4, C5 and T9.     Patient now presents for the above procedure(s).      LDA: None documented.        Prev airway: None documented.    Drips: None documented.       Patient Active Problem List   Diagnosis    Obesity (BMI 35.0-39.9 without comorbidity)    Borderline hypertension    Onychomycosis    History of gout    Seborrheic keratosis    Tinea corporis    Positive colorectal cancer screening using Cologuard test    Renal mass    Cancer with pulmonary metastases    Metastasis to bone    Iron deficiency anemia    History of right radical nephrectomy    Clear cell carcinoma of right kidney    Intractable back pain    SABINE (acute kidney injury)    Hypercalcemia    Transaminitis       Review of patient's allergies indicates:  No Known Allergies    Current Outpatient Medications:    Current Outpatient Medications:     amlodipine-benazepril 5-10 mg (LOTREL) 5-10 mg per capsule, Take 1 capsule by mouth once daily., Disp: 90 capsule, Rfl: 3    gabapentin (NEURONTIN) 300 MG capsule, Take 1 capsule (300 mg total) by mouth 3 (three) times daily., Disp: 90 capsule, Rfl: 11    indomethacin (INDOCIN) 50 MG capsule, One p.o. t.i.d. p.r.n. gouty attack no more than 7 days--do not take with other NSAIDs, Disp: 90 capsule, Rfl: 0    methocarbamoL (ROBAXIN) 500 MG Tab, Take 1 tablet (500 mg total) by mouth 3 (three) times daily. for 10 days, Disp: 30 tablet, Rfl: 0    naloxone (NARCAN) 4 mg/actuation Spry, 1 spray (4mg) by nasal route as needed for opioid overdose;  may repeat every 2-3 minutes in alternating nostrils until medical help arrives. Call 911, Disp: 2 each, Rfl: 0    OLIVE OIL ORAL, Take 15 mLs by mouth 2 (two) times a day., Disp: , Rfl:     omega-3 fatty acids/fish oil (FISH OIL-OMEGA-3 FATTY ACIDS) 300-1,000 mg capsule, Take 2 capsules by mouth once daily., Disp: , Rfl:     oxyCODONE (ROXICODONE) 10 mg Tab immediate release tablet, Take 1 tablet (10 mg total) by mouth every 6 (six) hours as needed for Pain., Disp: 28 tablet, Rfl: 0    oxyCODONE myristate (XTAMPZA ER) 18 mg CSpT, Take 1 capsule (18 mg total) by mouth 2 (two) times a day. for 7 days, Disp: 14 capsule, Rfl: 0    polyethylene glycol (GLYCOLAX) 17 gram/dose powder, Use cap to measure 17 grams, mix in liquid and take by mouth once daily., Disp: 238 g, Rfl: 0    senna (SENOKOT) 8.6 mg tablet, Take 1 tablet by mouth once daily., Disp: 30 tablet, Rfl: 0    sildenafiL (VIAGRA) 100 MG tablet, Take 1 tablet (100 mg total) by mouth daily as needed for Erectile Dysfunction., Disp: 30 tablet, Rfl: 11    turmeric (CURCUMIN MISC), 1,000 mg by Misc.(Non-Drug; Combo Route) route Daily., Disp: , Rfl:     UNABLE TO FIND, Take 500 mg by mouth 2 (two) times a day. medication name: Tudca, Disp: , Rfl:     UNABLE TO FIND, Take 2 capsules by mouth 2 (two) times a day. medication name: Turkey tail mushroom, Disp: , Rfl:     UNABLE TO FIND, Take 15 drops by mouth once daily. medication name: Essiac-20, Disp: , Rfl:     UNABLE TO FIND, Take 5 mLs by mouth 2 (two) times a day. medication name: barley leaf juice powder, Disp: , Rfl:     UNABLE TO FIND, Take 5 mLs by mouth 2 (two) times a day. medication name: black seed oil (cumin seed), Disp: , Rfl:     Past Surgical History:   Procedure Laterality Date    COLONOSCOPY  02/10/2022    COLONOSCOPY N/A 02/10/2022    Procedure: COLONOSCOPY;  Surgeon: Desmond Keenan MD;  Location: [facility];  Service: General;  Laterality: N/A;    ROBOT-ASSISTED LAPAROSCOPIC NEPHRECTOMY Right  10/4/2023    Procedure: ROBOTIC NEPHRECTOMY;  Surgeon: Henry Michaels MD;  Location: [facility];  Service: Urology;  Laterality: Right;    TONSILLECTOMY         Social History     Socioeconomic History    Marital status:    Tobacco Use    Smoking status: Never    Smokeless tobacco: Never   Substance and Sexual Activity    Alcohol use: Not Currently     Alcohol/week: 0.0 standard drinks of alcohol     Comment: rarely    Drug use: Never       OBJECTIVE:     Vital Signs Range (Last 24H):         Significant Labs:  Lab Results   Component Value Date    WBC 5.47 11/02/2023    HGB 10.4 (L) 11/02/2023    HCT 31.6 (L) 11/02/2023     11/02/2023    CHOL 221 (H) 08/24/2023    TRIG 208 (H) 08/24/2023    HDL 30 (L) 08/24/2023    ALT 84 (H) 11/02/2023    AST 26 11/02/2023     11/02/2023    K 4.5 11/02/2023     11/02/2023    CREATININE 1.6 (H) 11/02/2023    BUN 23 11/02/2023    CO2 25 11/02/2023    TSH 2.854 06/04/2021    PSA 8.4 (H) 08/24/2023       Diagnostic Studies: No relevant studies.    EKG:   Results for orders placed or performed during the hospital encounter of 02/10/22   EKG 12-lead    Collection Time: 02/10/22  8:13 AM    Narrative    Test Reason : Z01.818,    Vent. Rate : 077 BPM     Atrial Rate : 077 BPM     P-R Int : 192 ms          QRS Dur : 148 ms      QT Int : 424 ms       P-R-T Axes : 049 -74 061 degrees     QTc Int : 479 ms    Normal sinus rhythm  Right bundle branch block  Left anterior fascicular block   Bifascicular block   Cannot rule out Anteroseptal infarct ,age undetermined  Abnormal ECG  No previous ECGs available  Confirmed by Liban Woodson MD ([id number]) on 2/10/2022 8:15:29 PM    Referred By: JOHNATHON PAYNE           Confirmed By:Liban Woodson MD       2D ECHO:  TTE:  No results found for this or any previous visit.    JOEY:  No results found for this or any previous visit.    ASSESSMENT/PLAN:           Pre-op Assessment    I have reviewed the Patient Summary Reports.     I  have reviewed the Nursing Notes. I have reviewed the NPO Status.   I have reviewed the Medications.     Review of Systems  Anesthesia Hx:  No problems with previous Anesthesia             Denies Family Hx of Anesthesia complications.    Denies Personal Hx of Anesthesia complications.                    Social:  Former Smoker, No Alcohol Use       Hematology/Oncology:       -- Anemia:                  Current/Recent Cancer.                Cardiovascular:  Exercise tolerance: good   Hypertension   Denies MI.         Denies CHF.    hyperlipidemia                             Pulmonary:    Asthma mild    Denies Sleep Apnea.                Renal/:     Renal Mass             Hepatic/GI:  Hepatic/GI Normal                 Musculoskeletal:  Arthritis          Spine Disorders:  Degenerative disease           Neurological:  Neurology Normal                                      Endocrine:        Obesity / BMI > 30  Psych:  Psychiatric Normal                    Physical Exam  General: Well nourished, Cooperative, Alert and Oriented    Airway:  Mallampati: III   Mouth Opening: Normal  TM Distance: Normal  Neck ROM: Normal ROM    Dental:  Intact    Chest/Lungs:  Clear to auscultation, Normal Respiratory Rate    Heart:  Rate: Normal  Sounds: Normal        Anesthesia Plan  Type of Anesthesia, risks & benefits discussed:    Anesthesia Type: Gen ETT  Intra-op Monitoring Plan: Standard ASA Monitors  Post Op Pain Control Plan: multimodal analgesia and peripheral nerve block  Induction:  IV and rapid sequence  Airway Plan: Video  Informed Consent: Informed consent signed with the Patient and all parties understand the risks and agree with anesthesia plan.  All questions answered.   ASA Score: 3  Day of Surgery Review of History & Physical: H&P Update referred to the surgeon/provider.  Anesthesia Plan Notes: Patient NPO since 8am (toast and coffee) will need to wait for NPO>6hours for a light meal prior to anesthesia    Ready For  Surgery From Anesthesia Perspective.     .

## 2023-11-06 NOTE — PROCEDURES
Radiology Post-Procedure Note    Pre Op Diagnosis: Renal cell carcinoma with Mets vertebral column at C4-C5, and T9 levels    Post Op Diagnosis:  Same    Procedure: Cerebral angiogram and artial Embolization of C4 through left thyrocervical trunk    Procedure performed by: Graham Masters MD    Written Informed Consent Obtained: Yes    Specimen Removed: NO    Estimated Blood Loss: less than 100     Procedure report:     A 5F sheath was placed into the right femoral artery and a 5F Colton catheter was advanced into the aortic arch. The  right and left left ECA and subclavian arteries were subselected and angiography of the brain was performed after injection into each of these vessels.    Select 132 microcatheter was advanced over synchro 2 micro wire.  After confirming the placement into right thyrocervical trunk and confirming the tumor blush, selective contrast from was performed.  After multiple attempts, access into the feeding artery was not successful.     Using same system, access was obtained into the left thyrocervical trunk and feeding artery was identified using the selective contrast injection.  After confirming the tumor blush and accessing the feeding artery, embolization using 250 micron particles was performed.     Preliminary interpretation:  Successful partial embolization at feeders from left thyrocervical trunk at C4 level.  Please see Imaging report for full details.    A right femoral artery angiogram was performed, the sheath removed and hemostasis achieved using Vascade device.  No hematoma was present at the time of hemostasis.    The patient tolerated the procedure well.         Jake Bradley MD, MHA  Fellow, NeuroEndovascular Surgery, [facility]  Neurologist, [facility]

## 2023-11-06 NOTE — ANESTHESIA PREPROCEDURE EVALUATION
[facility]  Anesthesia Pre-Operative Evaluation         Patient Name: Gamaliel Pete Jr.  YOB: 1950  MRN: [id number]    SUBJECTIVE:     Pre-operative evaluation for Procedure(s) (LRB):  CORPECTOMY, SPINE, CERVICAL (Right)     11/06/2023    Gamaliel Pete Jr. is a 73 y.o. 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 He presented for evaluation of back/neck pain. He presented on 11/6 for spinal emobolization w/ IR and now presents for further surgical intervention.    Patient now presents for the above procedure(s).      LDA:        Peripheral IV - Single Lumen 11/06/23 1245 20 G Left;Posterior Hand (Active)   Site Assessment Clean;Dry;Intact 11/06/23 1600   Extremity Assessment Distal to IV No swelling 11/06/23 1317   Line Status Flushed;Saline locked 11/06/23 1600   Dressing Status Clean;Intact;Dry 11/06/23 1600   Dressing Intervention First dressing 11/06/23 1317   Number of days: 0       Prev airway: 11/06/23; Placement Time: 1410 (created via procedure documentation); Method of Intubation: Video Laryngoscopy; Intubated: Postinduction; Blade: Yoder #3; Airway Device Size: 7.5; Cuff Inflation: Minimal occlusive pressure; Placement Verified By: Capnometry (auscultation); Complicating Factors: None     Drips: None documented.      Patient Active Problem List   Diagnosis    Obesity (BMI 35.0-39.9 without comorbidity)    Borderline hypertension    Onychomycosis    History of gout    Seborrheic keratosis    Tinea corporis    Positive colorectal cancer screening using Cologuard test    Renal mass    Cancer with pulmonary metastases    Metastasis to bone    Iron deficiency anemia    History of right radical nephrectomy    Clear cell carcinoma of right kidney    Intractable back pain    SABINE (acute kidney injury)    Hypercalcemia    Transaminitis       Review of patient's allergies indicates:  No Known Allergies    Current  Inpatient Medications:      Current Facility-Administered Medications on File Prior to Encounter   Medication Dose Route Frequency Provider Last Rate Last Admin    0.9%  NaCl infusion   Intravenous Continuous InyoJennifer Gonzales, NP        fentaNYL 50 mcg/mL injection 25 mcg  25 mcg Intravenous Q5 Min PRN Armand Burgos MD        HYDROmorphone injection 0.2 mg  0.2 mg Intravenous Q5 Min PRN Armand Burgos MD        LIDOcaine (PF) 10 mg/ml (1%) injection 10 mg  1 mL Other Once Jennifer Hummel NP        ondansetron injection 4 mg  4 mg Intravenous Daily PRN Gabriel Haro MD        sodium chloride 0.9% flush 10 mL  10 mL Intravenous PRN Armand Burgos MD         Current Outpatient Medications on File Prior to Encounter   Medication Sig Dispense Refill    amlodipine-benazepril 5-10 mg (LOTREL) 5-10 mg per capsule Take 1 capsule by mouth once daily. 90 capsule 3    gabapentin (NEURONTIN) 300 MG capsule Take 1 capsule (300 mg total) by mouth 3 (three) times daily. 90 capsule 11    indomethacin (INDOCIN) 50 MG capsule One p.o. t.i.d. p.r.n. gouty attack no more than 7 days--do not take with other NSAIDs 90 capsule 0    methocarbamoL (ROBAXIN) 500 MG Tab Take 1 tablet (500 mg total) by mouth 3 (three) times daily. for 10 days 30 tablet 0    naloxone (NARCAN) 4 mg/actuation Spry 1 spray (4mg) by nasal route as needed for opioid overdose; may repeat every 2-3 minutes in alternating nostrils until medical help arrives. Call 911 2 each 0    OLIVE OIL ORAL Take 15 mLs by mouth 2 (two) times a day.      omega-3 fatty acids/fish oil (FISH OIL-OMEGA-3 FATTY ACIDS) 300-1,000 mg capsule Take 2 capsules by mouth once daily.      oxyCODONE (ROXICODONE) 10 mg Tab immediate release tablet Take 1 tablet (10 mg total) by mouth every 6 (six) hours as needed for Pain. 28 tablet 0    oxyCODONE myristate (XTAMPZA ER) 18 mg CSpT Take 1 capsule (18 mg total) by mouth 2 (two) times a day. for 7 days 14 capsule 0     polyethylene glycol (GLYCOLAX) 17 gram/dose powder Use cap to measure 17 grams, mix in liquid and take by mouth once daily. (Patient taking differently: Take 8 g by mouth once daily.) 238 g 0    senna (SENOKOT) 8.6 mg tablet Take 1 tablet by mouth once daily. 30 tablet 0    sildenafiL (VIAGRA) 100 MG tablet Take 1 tablet (100 mg total) by mouth daily as needed for Erectile Dysfunction. 30 tablet 11    turmeric (CURCUMIN MISC) 1,000 mg by Misc.(Non-Drug; Combo Route) route Daily.      UNABLE TO FIND Take 500 mg by mouth 2 (two) times a day. medication name: Tudca      UNABLE TO FIND Take 2 capsules by mouth 2 (two) times a day. medication name: Turkey tail mushroom      UNABLE TO FIND Take 15 drops by mouth once daily. medication name: Essiac-20      UNABLE TO FIND Take 5 mLs by mouth 2 (two) times a day. medication name: barley leaf juice powder      UNABLE TO FIND Take 5 mLs by mouth 2 (two) times a day. medication name: black seed oil (cumin seed)         Past Surgical History:   Procedure Laterality Date    COLONOSCOPY  02/10/2022    COLONOSCOPY N/A 02/10/2022    Procedure: COLONOSCOPY;  Surgeon: Desmond Keenan MD;  Location: [facility];  Service: General;  Laterality: N/A;    ROBOT-ASSISTED LAPAROSCOPIC NEPHRECTOMY Right 10/4/2023    Procedure: ROBOTIC NEPHRECTOMY;  Surgeon: Henry Michaels MD;  Location: [facility];  Service: Urology;  Laterality: Right;    TONSILLECTOMY         Social History:  Tobacco Use: Low Risk  (11/6/2023)    Patient History     Smoking Tobacco Use: Never     Smokeless Tobacco Use: Never     Passive Exposure: Not on file      Alcohol Use: Not on file        OBJECTIVE:     Vital Signs Range (Last 24H):  Temp:  [36.6 °C (97.9 °F)-36.7 °C (98.1 °F)]   Pulse:  []   Resp:  [10-20]   BP: (145-160)/(67-74)   SpO2:  [96 %-99 %]       Significant Labs:  Lab Results   Component Value Date    WBC 5.47 11/02/2023    HGB 10.4 (L) 11/02/2023    HCT 31.6 (L) 11/02/2023     11/02/2023     CHOL 221 (H) 08/24/2023    TRIG 208 (H) 08/24/2023    HDL 30 (L) 08/24/2023    ALT 84 (H) 11/02/2023    AST 26 11/02/2023     11/02/2023    K 4.5 11/02/2023     11/02/2023    CREATININE 1.6 (H) 11/02/2023    BUN 23 11/02/2023    CO2 25 11/02/2023    TSH 2.854 06/04/2021    PSA 8.4 (H) 08/24/2023       Diagnostic Studies: No relevant studies.    EKG:   Results for orders placed or performed during the hospital encounter of 02/10/22   EKG 12-lead    Collection Time: 02/10/22  8:13 AM    Narrative    Test Reason : Z01.818,    Vent. Rate : 077 BPM     Atrial Rate : 077 BPM     P-R Int : 192 ms          QRS Dur : 148 ms      QT Int : 424 ms       P-R-T Axes : 049 -74 061 degrees     QTc Int : 479 ms    Normal sinus rhythm  Right bundle branch block  Left anterior fascicular block   Bifascicular block   Cannot rule out Anteroseptal infarct ,age undetermined  Abnormal ECG  No previous ECGs available  Confirmed by Liban Woodson MD ([id number]) on 2/10/2022 8:15:29 PM    Referred By: JOHNATHON PAYNE           Confirmed By:Liban Woodson MD       2D ECHO:  TTE:  No results found for this or any previous visit.    JOEY:  No results found for this or any previous visit.    ASSESSMENT/PLAN:           Pre-op Assessment    I have reviewed the Patient Summary Reports.     I have reviewed the Nursing Notes. I have reviewed the NPO Status.   I have reviewed the Medications.     Review of Systems  Anesthesia Hx:  No problems with previous Anesthesia   History of prior surgery of interest to airway management or planning:            Denies Personal Hx of Anesthesia complications.                    Social:  Non-Smoker       Hematology/Oncology:  Hematology Normal                     Current/Recent Cancer.                EENT/Dental:  EENT/Dental Normal           Cardiovascular:  Exercise tolerance: good   Hypertension                                        Pulmonary:    Asthma                    Renal/:  Renal/ Normal                  Hepatic/GI:  Hepatic/GI Normal                 Musculoskeletal:  Musculoskeletal Normal                Neurological:  Neurology Normal                                      Endocrine:  Endocrine Normal                Physical Exam  General: Well nourished, Cooperative, Alert and Oriented    Airway:  Mallampati: III   Tongue: Normal    Dental:  Periodontal disease        Anesthesia Plan  Type of Anesthesia, risks & benefits discussed:    Anesthesia Type: Gen ETT  Intra-op Monitoring Plan: Standard ASA Monitors and Art Line  Post Op Pain Control Plan: multimodal analgesia and IV/PO Opioids PRN  Induction:  IV  Airway Plan: Direct, Post-Induction  Informed Consent: Informed consent signed with the Patient and all parties understand the risks and agree with anesthesia plan.  All questions answered.   ASA Score: 3  Day of Surgery Review of History & Physical: H&P Update referred to the surgeon/provider.    Ready For Surgery From Anesthesia Perspective.     .

## 2023-11-06 NOTE — H&P
Interventional Neuroradiology Pre-procedure Note    Procedure: Diagnostic cerebral angiogram    History of Present Illness:  Gamaliel Pete Jr. is a 73 y.o. 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 Most recent admission H&P reviewed.    ROS:   Hematological: no known coagulopathies  Respiratory: no shortness of breath  Cardiovascular: no chest pain  Gastrointestinal: no abdominal pain  Genito-Urinary: no dysuria  Musculoskeletal: +back pain  Neurological: no TIA or stroke symptoms     Scheduled Meds:   Current Meds:   Current Outpatient Medications:     amlodipine-benazepril 5-10 mg (LOTREL) 5-10 mg per capsule, Take 1 capsule by mouth once daily., Disp: 90 capsule, Rfl: 3    gabapentin (NEURONTIN) 300 MG capsule, Take 1 capsule (300 mg total) by mouth 3 (three) times daily., Disp: 90 capsule, Rfl: 11    indomethacin (INDOCIN) 50 MG capsule, One p.o. t.i.d. p.r.n. gouty attack no more than 7 days--do not take with other NSAIDs, Disp: 90 capsule, Rfl: 0    methocarbamoL (ROBAXIN) 500 MG Tab, Take 1 tablet (500 mg total) by mouth 3 (three) times daily. for 10 days, Disp: 30 tablet, Rfl: 0    naloxone (NARCAN) 4 mg/actuation Spry, 1 spray (4mg) by nasal route as needed for opioid overdose; may repeat every 2-3 minutes in alternating nostrils until medical help arrives. Call 911, Disp: 2 each, Rfl: 0    OLIVE OIL ORAL, Take 15 mLs by mouth 2 (two) times a day., Disp: , Rfl:     omega-3 fatty acids/fish oil (FISH OIL-OMEGA-3 FATTY ACIDS) 300-1,000 mg capsule, Take 2 capsules by mouth once daily., Disp: , Rfl:     oxyCODONE (ROXICODONE) 10 mg Tab immediate release tablet, Take 1 tablet (10 mg total) by mouth every 6 (six) hours as needed for Pain., Disp: 28 tablet, Rfl: 0    oxyCODONE myristate (XTAMPZA ER) 18 mg CSpT, Take 1 capsule (18 mg total) by mouth 2 (two) times a day. for 7 days,  Disp: 14 capsule, Rfl: 0    polyethylene glycol (GLYCOLAX) 17 gram/dose powder, Use cap to measure 17 grams, mix in liquid and take by mouth once daily., Disp: 238 g, Rfl: 0    senna (SENOKOT) 8.6 mg tablet, Take 1 tablet by mouth once daily., Disp: 30 tablet, Rfl: 0    sildenafiL (VIAGRA) 100 MG tablet, Take 1 tablet (100 mg total) by mouth daily as needed for Erectile Dysfunction., Disp: 30 tablet, Rfl: 11    turmeric (CURCUMIN MISC), 1,000 mg by Misc.(Non-Drug; Combo Route) route Daily., Disp: , Rfl:     UNABLE TO FIND, Take 500 mg by mouth 2 (two) times a day. medication name: Tudca, Disp: , Rfl:     UNABLE TO FIND, Take 2 capsules by mouth 2 (two) times a day. medication name: Turkey tail mushroom, Disp: , Rfl:     UNABLE TO FIND, Take 15 drops by mouth once daily. medication name: Essiac-20, Disp: , Rfl:     UNABLE TO FIND, Take 5 mLs by mouth 2 (two) times a day. medication name: barley leaf juice powder, Disp: , Rfl:     UNABLE TO FIND, Take 5 mLs by mouth 2 (two) times a day. medication name: black seed oil (cumin seed), Disp: , Rfl:    Continuous Infusions:   PRN Meds:    Allergies: Review of patient's allergies indicates:  No Known Allergies  Sedation Hx: No adverse events.    Labs:  All the relevant labs were reviewed     Objective:  Vitals: There were no vitals taken for this visit.     Physical Exam:  General: well developed, well nourished, no distress.   Head: normocephalic, atraumatic  Neck: No tracheal deviation. No palpable masses. Full ROM.   Neurologic: Alert and oriented. Thought content appropriate.  GCS: E4 V5 M6; Total: 15  Language: No aphasia  Speech: No dysarthria  Cranial nerves: face symmetric, tongue midline, CN II-XII grossly intact.   Eyes: pupils equal, round, reactive to light with accomodation, EOMI.   Pulmonary: normal respirations, no signs of respiratory distress  Abdomen: soft, non-distended, not tender to palpation  Vascular: Pulses 2+ and symmetric radial and dorsalis pedis.  No LE edema.   Skin: Skin is warm, dry and intact.  Sensory: intact to light touch throughout  Motor Strength: Moves all extremities spontaneously with good tone.  Full strength upper and lower extremities. No abnormal movements seen.     ASA: 2  MAL: 2    Plan:  -Patient will undergo: DSA for diagnostic as well as T9 and possibly C4,C5 spinal level embolization  -Sedation Plan: Per anesthesia            Jake Bradley MD, MHA  Fellow, NeuroEndovascular Surgery, [facility]  Neurologist, [facility], [place]

## 2023-11-06 NOTE — PLAN OF CARE
Procedure complete. Pt tolerated well. Recovery for 2 hr flat. Hemostasis time 1530. Site CDI. Pt transferred to PACU and report to be given bedside, bed request in for Neuro ICU.

## 2023-11-07 ENCOUNTER — ANESTHESIA (OUTPATIENT)
Dept: SURGERY | Facility: HOSPITAL | Age: 73
DRG: 472 | End: 2023-11-07
Payer: MEDICARE

## 2023-11-07 PROBLEM — K59.03 THERAPEUTIC OPIOID-INDUCED CONSTIPATION (OIC): Status: ACTIVE | Noted: 2023-11-07

## 2023-11-07 PROBLEM — T40.2X5A THERAPEUTIC OPIOID-INDUCED CONSTIPATION (OIC): Status: ACTIVE | Noted: 2023-11-07

## 2023-11-07 PROBLEM — I10 ESSENTIAL HYPERTENSION: Status: ACTIVE | Noted: 2023-11-07

## 2023-11-07 PROBLEM — E66.811 CLASS 1 OBESITY WITHOUT SERIOUS COMORBIDITY WITH BODY MASS INDEX (BMI) OF 31.0 TO 31.9 IN ADULT: Status: ACTIVE | Noted: 2019-04-01

## 2023-11-07 LAB
ABO + RH BLD: NORMAL
ALBUMIN SERPL BCP-MCNC: 3.3 G/DL (ref 3.5–5.2)
ALP SERPL-CCNC: 93 U/L (ref 55–135)
ALT SERPL W/O P-5'-P-CCNC: 73 U/L (ref 10–44)
ANION GAP SERPL CALC-SCNC: 13 MMOL/L (ref 8–16)
ANISOCYTOSIS BLD QL SMEAR: SLIGHT
APTT PPP: 22.8 SEC (ref 21–32)
AST SERPL-CCNC: 31 U/L (ref 10–40)
BASOPHILS # BLD AUTO: 0.02 K/UL (ref 0–0.2)
BASOPHILS NFR BLD: 0.4 % (ref 0–1.9)
BILIRUB SERPL-MCNC: 0.3 MG/DL (ref 0.1–1)
BLD GP AB SCN CELLS X3 SERPL QL: NORMAL
BUN SERPL-MCNC: 27 MG/DL (ref 8–23)
CALCIUM SERPL-MCNC: 9.8 MG/DL (ref 8.7–10.5)
CHLORIDE SERPL-SCNC: 103 MMOL/L (ref 95–110)
CO2 SERPL-SCNC: 21 MMOL/L (ref 23–29)
CREAT SERPL-MCNC: 1.4 MG/DL (ref 0.5–1.4)
DIFFERENTIAL METHOD: ABNORMAL
EOSINOPHIL # BLD AUTO: 0 K/UL (ref 0–0.5)
EOSINOPHIL NFR BLD: 0 % (ref 0–8)
ERYTHROCYTE [DISTWIDTH] IN BLOOD BY AUTOMATED COUNT: 13.3 % (ref 11.5–14.5)
EST. GFR  (NO RACE VARIABLE): 53.7 ML/MIN/1.73 M^2
GLUCOSE SERPL-MCNC: 129 MG/DL (ref 70–110)
GLUCOSE SERPL-MCNC: 132 MG/DL (ref 70–110)
HCO3 UR-SCNC: 24.2 MMOL/L (ref 24–28)
HCT VFR BLD AUTO: 33.8 % (ref 40–54)
HCT VFR BLD CALC: 26 %PCV (ref 36–54)
HGB BLD-MCNC: 11.6 G/DL (ref 14–18)
HYPOCHROMIA BLD QL SMEAR: ABNORMAL
IMM GRANULOCYTES # BLD AUTO: 0.04 K/UL (ref 0–0.04)
IMM GRANULOCYTES NFR BLD AUTO: 0.7 % (ref 0–0.5)
INR PPP: 1 (ref 0.8–1.2)
LYMPHOCYTES # BLD AUTO: 0.8 K/UL (ref 1–4.8)
LYMPHOCYTES NFR BLD: 14.8 % (ref 18–48)
MAGNESIUM SERPL-MCNC: 1.9 MG/DL (ref 1.6–2.6)
MCH RBC QN AUTO: 30.4 PG (ref 27–31)
MCHC RBC AUTO-ENTMCNC: 34.3 G/DL (ref 32–36)
MCV RBC AUTO: 89 FL (ref 82–98)
MONOCYTES # BLD AUTO: 0.5 K/UL (ref 0.3–1)
MONOCYTES NFR BLD: 8.4 % (ref 4–15)
NEUTROPHILS # BLD AUTO: 4.1 K/UL (ref 1.8–7.7)
NEUTROPHILS NFR BLD: 75.7 % (ref 38–73)
NRBC BLD-RTO: 0 /100 WBC
PCO2 BLDA: 37.3 MMHG (ref 35–45)
PH SMN: 7.42 [PH] (ref 7.35–7.45)
PHOSPHATE SERPL-MCNC: 3.9 MG/DL (ref 2.7–4.5)
PLATELET # BLD AUTO: 194 K/UL (ref 150–450)
PLATELET BLD QL SMEAR: ABNORMAL
PMV BLD AUTO: 9.2 FL (ref 9.2–12.9)
PO2 BLDA: 112 MMHG (ref 80–100)
POC BE: 0 MMOL/L
POC IONIZED CALCIUM: 1.23 MMOL/L (ref 1.06–1.42)
POC SATURATED O2: 98 % (ref 95–100)
POC TCO2: 25 MMOL/L (ref 23–27)
POCT GLUCOSE: 121 MG/DL (ref 70–110)
POTASSIUM BLD-SCNC: 4.4 MMOL/L (ref 3.5–5.1)
POTASSIUM SERPL-SCNC: 4.7 MMOL/L (ref 3.5–5.1)
POTASSIUM SERPL-SCNC: 5.5 MMOL/L (ref 3.5–5.1)
PROT SERPL-MCNC: 7.4 G/DL (ref 6–8.4)
PROTHROMBIN TIME: 11.1 SEC (ref 9–12.5)
RBC # BLD AUTO: 3.82 M/UL (ref 4.6–6.2)
SAMPLE: ABNORMAL
SODIUM BLD-SCNC: 137 MMOL/L (ref 136–145)
SODIUM SERPL-SCNC: 137 MMOL/L (ref 136–145)
SPECIMEN OUTDATE: NORMAL
WBC # BLD AUTO: 5.47 K/UL (ref 3.9–12.7)

## 2023-11-07 PROCEDURE — 22554 ARTHRD ANT NTRBD MIN DSC CRV: CPT | Mod: 51,,, | Performed by: STUDENT IN AN ORGANIZED HEALTH CARE EDUCATION/TRAINING PROGRAM

## 2023-11-07 PROCEDURE — 36000710: Performed by: STUDENT IN AN ORGANIZED HEALTH CARE EDUCATION/TRAINING PROGRAM

## 2023-11-07 PROCEDURE — C1713 ANCHOR/SCREW BN/BN,TIS/BN: HCPCS | Performed by: STUDENT IN AN ORGANIZED HEALTH CARE EDUCATION/TRAINING PROGRAM

## 2023-11-07 PROCEDURE — 25000003 PHARM REV CODE 250: Performed by: PHYSICIAN ASSISTANT

## 2023-11-07 PROCEDURE — 99291 CRITICAL CARE FIRST HOUR: CPT | Mod: GC,,, | Performed by: INTERNAL MEDICINE

## 2023-11-07 PROCEDURE — 27800903 OPTIME MED/SURG SUP & DEVICES OTHER IMPLANTS: Performed by: STUDENT IN AN ORGANIZED HEALTH CARE EDUCATION/TRAINING PROGRAM

## 2023-11-07 PROCEDURE — 20930 SP BONE ALGRFT MORSEL ADD-ON: CPT | Mod: ,,, | Performed by: STUDENT IN AN ORGANIZED HEALTH CARE EDUCATION/TRAINING PROGRAM

## 2023-11-07 PROCEDURE — 63308 REMOVE VERTEBRAL BODY ADD-ON: CPT | Mod: ,,, | Performed by: STUDENT IN AN ORGANIZED HEALTH CARE EDUCATION/TRAINING PROGRAM

## 2023-11-07 PROCEDURE — 37000009 HC ANESTHESIA EA ADD 15 MINS: Performed by: STUDENT IN AN ORGANIZED HEALTH CARE EDUCATION/TRAINING PROGRAM

## 2023-11-07 PROCEDURE — 36620 ARTERIAL: ICD-10-PCS | Mod: 59,,, | Performed by: ANESTHESIOLOGY

## 2023-11-07 PROCEDURE — 25000003 PHARM REV CODE 250: Performed by: NURSE PRACTITIONER

## 2023-11-07 PROCEDURE — 25000003 PHARM REV CODE 250: Performed by: STUDENT IN AN ORGANIZED HEALTH CARE EDUCATION/TRAINING PROGRAM

## 2023-11-07 PROCEDURE — 63308 PR REMV VERT,EACH ADDNL SEGMENT: ICD-10-PCS | Mod: ,,, | Performed by: STUDENT IN AN ORGANIZED HEALTH CARE EDUCATION/TRAINING PROGRAM

## 2023-11-07 PROCEDURE — 25500020 PHARM REV CODE 255: Performed by: STUDENT IN AN ORGANIZED HEALTH CARE EDUCATION/TRAINING PROGRAM

## 2023-11-07 PROCEDURE — 93010 ELECTROCARDIOGRAM REPORT: CPT | Mod: ,,, | Performed by: INTERNAL MEDICINE

## 2023-11-07 PROCEDURE — 63600175 PHARM REV CODE 636 W HCPCS

## 2023-11-07 PROCEDURE — 22585 ARTHRD ANT NTRBD MIN DSC EA: CPT | Mod: ,,, | Performed by: STUDENT IN AN ORGANIZED HEALTH CARE EDUCATION/TRAINING PROGRAM

## 2023-11-07 PROCEDURE — 36000711: Performed by: STUDENT IN AN ORGANIZED HEALTH CARE EDUCATION/TRAINING PROGRAM

## 2023-11-07 PROCEDURE — 27201037 HC PRESSURE MONITORING SET UP

## 2023-11-07 PROCEDURE — 36620 INSERTION CATHETER ARTERY: CPT | Mod: 59,,, | Performed by: ANESTHESIOLOGY

## 2023-11-07 PROCEDURE — 63600175 PHARM REV CODE 636 W HCPCS: Performed by: STUDENT IN AN ORGANIZED HEALTH CARE EDUCATION/TRAINING PROGRAM

## 2023-11-07 PROCEDURE — 84132 ASSAY OF SERUM POTASSIUM: CPT | Performed by: PHYSICIAN ASSISTANT

## 2023-11-07 PROCEDURE — 22854 INSJ BIOMECHANICAL DEVICE: CPT | Mod: ,,, | Performed by: STUDENT IN AN ORGANIZED HEALTH CARE EDUCATION/TRAINING PROGRAM

## 2023-11-07 PROCEDURE — C1769 GUIDE WIRE: HCPCS | Performed by: STUDENT IN AN ORGANIZED HEALTH CARE EDUCATION/TRAINING PROGRAM

## 2023-11-07 PROCEDURE — C1729 CATH, DRAINAGE: HCPCS | Performed by: STUDENT IN AN ORGANIZED HEALTH CARE EDUCATION/TRAINING PROGRAM

## 2023-11-07 PROCEDURE — 22846 PR ANTERIOR INSTRUMENTATION 4-7 VERTEBRAL SEGMENTS: ICD-10-PCS | Mod: 59,,, | Performed by: STUDENT IN AN ORGANIZED HEALTH CARE EDUCATION/TRAINING PROGRAM

## 2023-11-07 PROCEDURE — 22554 PR ARTHRODESIS ANT INTERBODY MIN DISCECTOMY, CERVICAL BELOW C2: ICD-10-PCS | Mod: 51,,, | Performed by: STUDENT IN AN ORGANIZED HEALTH CARE EDUCATION/TRAINING PROGRAM

## 2023-11-07 PROCEDURE — 20000000 HC ICU ROOM

## 2023-11-07 PROCEDURE — D9220A PRA ANESTHESIA: Mod: ,,, | Performed by: ANESTHESIOLOGY

## 2023-11-07 PROCEDURE — 99900035 HC TECH TIME PER 15 MIN (STAT)

## 2023-11-07 PROCEDURE — D9220A PRA ANESTHESIA: ICD-10-PCS | Mod: ,,, | Performed by: ANESTHESIOLOGY

## 2023-11-07 PROCEDURE — 93005 ELECTROCARDIOGRAM TRACING: CPT

## 2023-11-07 PROCEDURE — 85610 PROTHROMBIN TIME: CPT | Performed by: PHYSICIAN ASSISTANT

## 2023-11-07 PROCEDURE — 99291 PR CRITICAL CARE, E/M 30-74 MINUTES: ICD-10-PCS | Mod: GC,,, | Performed by: INTERNAL MEDICINE

## 2023-11-07 PROCEDURE — 22854 PR INS BIOMECH DEV, VERT CORPECTOMY W/INTRBODY ARTHRODESIS EA INTERSPC: ICD-10-PCS | Mod: ,,, | Performed by: STUDENT IN AN ORGANIZED HEALTH CARE EDUCATION/TRAINING PROGRAM

## 2023-11-07 PROCEDURE — 63300 PR REMV VERT,EXDUR,CERV TUMOR: ICD-10-PCS | Mod: ,,, | Performed by: STUDENT IN AN ORGANIZED HEALTH CARE EDUCATION/TRAINING PROGRAM

## 2023-11-07 PROCEDURE — 93010 EKG 12-LEAD: ICD-10-PCS | Mod: ,,, | Performed by: INTERNAL MEDICINE

## 2023-11-07 PROCEDURE — 83735 ASSAY OF MAGNESIUM: CPT | Performed by: PHYSICIAN ASSISTANT

## 2023-11-07 PROCEDURE — 94761 N-INVAS EAR/PLS OXIMETRY MLT: CPT

## 2023-11-07 PROCEDURE — 37000008 HC ANESTHESIA 1ST 15 MINUTES: Performed by: STUDENT IN AN ORGANIZED HEALTH CARE EDUCATION/TRAINING PROGRAM

## 2023-11-07 PROCEDURE — 86920 COMPATIBILITY TEST SPIN: CPT | Performed by: INTERNAL MEDICINE

## 2023-11-07 PROCEDURE — 22846 INSERT SPINE FIXATION DEVICE: CPT | Mod: 59,,, | Performed by: STUDENT IN AN ORGANIZED HEALTH CARE EDUCATION/TRAINING PROGRAM

## 2023-11-07 PROCEDURE — 86901 BLOOD TYPING SEROLOGIC RH(D): CPT | Performed by: PHYSICIAN ASSISTANT

## 2023-11-07 PROCEDURE — 63300 REMOVE VERT XDRL BODY CRVCL: CPT | Mod: ,,, | Performed by: STUDENT IN AN ORGANIZED HEALTH CARE EDUCATION/TRAINING PROGRAM

## 2023-11-07 PROCEDURE — 25000003 PHARM REV CODE 250

## 2023-11-07 PROCEDURE — 22585 PR ARTHRODESIS ANT INTERBODY MIN DISCECTOMY,EA ADDL: ICD-10-PCS | Mod: ,,, | Performed by: STUDENT IN AN ORGANIZED HEALTH CARE EDUCATION/TRAINING PROGRAM

## 2023-11-07 PROCEDURE — 27201423 OPTIME MED/SURG SUP & DEVICES STERILE SUPPLY: Performed by: STUDENT IN AN ORGANIZED HEALTH CARE EDUCATION/TRAINING PROGRAM

## 2023-11-07 PROCEDURE — 80053 COMPREHEN METABOLIC PANEL: CPT | Performed by: PHYSICIAN ASSISTANT

## 2023-11-07 PROCEDURE — 85730 THROMBOPLASTIN TIME PARTIAL: CPT | Performed by: PHYSICIAN ASSISTANT

## 2023-11-07 PROCEDURE — 84100 ASSAY OF PHOSPHORUS: CPT | Performed by: PHYSICIAN ASSISTANT

## 2023-11-07 PROCEDURE — 85025 COMPLETE CBC W/AUTO DIFF WBC: CPT | Performed by: PHYSICIAN ASSISTANT

## 2023-11-07 PROCEDURE — 20930 PR ALLOGRAFT FOR SPINE SURGERY ONLY MORSELIZED: ICD-10-PCS | Mod: ,,, | Performed by: STUDENT IN AN ORGANIZED HEALTH CARE EDUCATION/TRAINING PROGRAM

## 2023-11-07 DEVICE — IMPLANTABLE DEVICE: Type: IMPLANTABLE DEVICE | Site: SPINE CERVICAL | Status: FUNCTIONAL

## 2023-11-07 DEVICE — SPACER FORTIFY SAG 12X14X12MM: Type: IMPLANTABLE DEVICE | Site: SPINE CERVICAL | Status: FUNCTIONAL

## 2023-11-07 DEVICE — SCREW XTEND VA SD 4.2X16MM: Type: IMPLANTABLE DEVICE | Site: SPINE CERVICAL | Status: FUNCTIONAL

## 2023-11-07 DEVICE — PIN DISTRACTOR 14MM: Type: IMPLANTABLE DEVICE | Site: SPINE CERVICAL | Status: FUNCTIONAL

## 2023-11-07 DEVICE — GRAFT ALTAPORE BIOACTIVE 2.5ML: Type: IMPLANTABLE DEVICE | Site: SPINE CERVICAL | Status: FUNCTIONAL

## 2023-11-07 DEVICE — SPACER FORTIFY 12MM 33-53MM: Type: IMPLANTABLE DEVICE | Site: SPINE CERVICAL | Status: FUNCTIONAL

## 2023-11-07 RX ORDER — AMOXICILLIN 250 MG
2 CAPSULE ORAL 2 TIMES DAILY
Status: DISCONTINUED | OUTPATIENT
Start: 2023-11-07 | End: 2023-11-09 | Stop reason: HOSPADM

## 2023-11-07 RX ORDER — PROCHLORPERAZINE EDISYLATE 5 MG/ML
5 INJECTION INTRAMUSCULAR; INTRAVENOUS EVERY 6 HOURS PRN
Status: CANCELLED | OUTPATIENT
Start: 2023-11-07

## 2023-11-07 RX ORDER — DEXAMETHASONE SODIUM PHOSPHATE 4 MG/ML
INJECTION, SOLUTION INTRA-ARTICULAR; INTRALESIONAL; INTRAMUSCULAR; INTRAVENOUS; SOFT TISSUE
Status: DISCONTINUED | OUTPATIENT
Start: 2023-11-07 | End: 2023-11-07

## 2023-11-07 RX ORDER — ACETAMINOPHEN 325 MG/1
650 TABLET ORAL EVERY 6 HOURS PRN
Status: DISCONTINUED | OUTPATIENT
Start: 2023-11-07 | End: 2023-11-07

## 2023-11-07 RX ORDER — OXYCODONE AND ACETAMINOPHEN 10; 325 MG/1; MG/1
1 TABLET ORAL EVERY 4 HOURS PRN
Status: DISCONTINUED | OUTPATIENT
Start: 2023-11-07 | End: 2023-11-08

## 2023-11-07 RX ORDER — OXYCODONE HYDROCHLORIDE 5 MG/1
5 TABLET ORAL EVERY 4 HOURS PRN
Status: CANCELLED | OUTPATIENT
Start: 2023-11-07

## 2023-11-07 RX ORDER — LIDOCAINE HYDROCHLORIDE 20 MG/ML
INJECTION, SOLUTION EPIDURAL; INFILTRATION; INTRACAUDAL; PERINEURAL
Status: DISCONTINUED | OUTPATIENT
Start: 2023-11-07 | End: 2023-11-07

## 2023-11-07 RX ORDER — PHENYLEPHRINE HCL IN 0.9% NACL 1 MG/10 ML
SYRINGE (ML) INTRAVENOUS
Status: DISCONTINUED | OUTPATIENT
Start: 2023-11-07 | End: 2023-11-07

## 2023-11-07 RX ORDER — HYDROMORPHONE HYDROCHLORIDE 1 MG/ML
1 INJECTION, SOLUTION INTRAMUSCULAR; INTRAVENOUS; SUBCUTANEOUS EVERY 6 HOURS PRN
Status: DISCONTINUED | OUTPATIENT
Start: 2023-11-07 | End: 2023-11-09

## 2023-11-07 RX ORDER — ONDANSETRON 8 MG/1
8 TABLET, ORALLY DISINTEGRATING ORAL EVERY 6 HOURS PRN
Status: CANCELLED | OUTPATIENT
Start: 2023-11-07

## 2023-11-07 RX ORDER — LANOLIN ALCOHOL/MO/W.PET/CERES
800 CREAM (GRAM) TOPICAL
Status: DISCONTINUED | OUTPATIENT
Start: 2023-11-07 | End: 2023-11-09

## 2023-11-07 RX ORDER — OXYCODONE HYDROCHLORIDE 10 MG/1
10 TABLET ORAL EVERY 4 HOURS PRN
Status: CANCELLED | OUTPATIENT
Start: 2023-11-07

## 2023-11-07 RX ORDER — AMOXICILLIN 250 MG
2 CAPSULE ORAL NIGHTLY PRN
Status: CANCELLED | OUTPATIENT
Start: 2023-11-07

## 2023-11-07 RX ORDER — ACETAMINOPHEN 500 MG
1000 TABLET ORAL EVERY 8 HOURS
Status: DISCONTINUED | OUTPATIENT
Start: 2023-11-07 | End: 2023-11-09 | Stop reason: HOSPADM

## 2023-11-07 RX ORDER — SODIUM CHLORIDE, SODIUM LACTATE, POTASSIUM CHLORIDE, CALCIUM CHLORIDE 600; 310; 30; 20 MG/100ML; MG/100ML; MG/100ML; MG/100ML
INJECTION, SOLUTION INTRAVENOUS CONTINUOUS
Status: CANCELLED | OUTPATIENT
Start: 2023-11-07

## 2023-11-07 RX ORDER — AMLODIPINE AND BENAZEPRIL HYDROCHLORIDE 5; 10 MG/1; MG/1
1 CAPSULE ORAL DAILY
Status: DISCONTINUED | OUTPATIENT
Start: 2023-11-07 | End: 2023-11-07

## 2023-11-07 RX ORDER — ACETAMINOPHEN 325 MG/1
650 TABLET ORAL EVERY 6 HOURS
Status: CANCELLED | OUTPATIENT
Start: 2023-11-07

## 2023-11-07 RX ORDER — LIDOCAINE HYDROCHLORIDE AND EPINEPHRINE 10; 10 MG/ML; UG/ML
INJECTION, SOLUTION INFILTRATION; PERINEURAL
Status: DISCONTINUED | OUTPATIENT
Start: 2023-11-07 | End: 2023-11-07 | Stop reason: HOSPADM

## 2023-11-07 RX ORDER — SODIUM,POTASSIUM PHOSPHATES 280-250MG
2 POWDER IN PACKET (EA) ORAL
Status: DISCONTINUED | OUTPATIENT
Start: 2023-11-07 | End: 2023-11-09

## 2023-11-07 RX ORDER — MUPIROCIN 20 MG/G
OINTMENT TOPICAL 2 TIMES DAILY
Status: CANCELLED | OUTPATIENT
Start: 2023-11-07 | End: 2023-11-12

## 2023-11-07 RX ORDER — ACETAMINOPHEN 10 MG/ML
INJECTION, SOLUTION INTRAVENOUS
Status: DISCONTINUED | OUTPATIENT
Start: 2023-11-07 | End: 2023-11-07

## 2023-11-07 RX ORDER — SUCCINYLCHOLINE CHLORIDE 20 MG/ML
INJECTION INTRAMUSCULAR; INTRAVENOUS
Status: DISCONTINUED | OUTPATIENT
Start: 2023-11-07 | End: 2023-11-07

## 2023-11-07 RX ORDER — DIAZEPAM 5 MG/1
5 TABLET ORAL EVERY 6 HOURS PRN
Status: CANCELLED | OUTPATIENT
Start: 2023-11-10

## 2023-11-07 RX ORDER — FENTANYL CITRATE 50 UG/ML
INJECTION, SOLUTION INTRAMUSCULAR; INTRAVENOUS
Status: DISCONTINUED | OUTPATIENT
Start: 2023-11-07 | End: 2023-11-07

## 2023-11-07 RX ORDER — PROPOFOL 10 MG/ML
VIAL (ML) INTRAVENOUS
Status: DISCONTINUED | OUTPATIENT
Start: 2023-11-07 | End: 2023-11-07

## 2023-11-07 RX ORDER — FAMOTIDINE 10 MG/ML
20 INJECTION INTRAVENOUS 2 TIMES DAILY
Status: DISCONTINUED | OUTPATIENT
Start: 2023-11-07 | End: 2023-11-08

## 2023-11-07 RX ORDER — HEPARIN SODIUM 5000 [USP'U]/ML
5000 INJECTION, SOLUTION INTRAVENOUS; SUBCUTANEOUS EVERY 8 HOURS
Status: CANCELLED | OUTPATIENT
Start: 2023-11-08

## 2023-11-07 RX ORDER — ONDANSETRON 2 MG/ML
INJECTION INTRAMUSCULAR; INTRAVENOUS
Status: DISCONTINUED | OUTPATIENT
Start: 2023-11-07 | End: 2023-11-07

## 2023-11-07 RX ORDER — MORPHINE SULFATE 2 MG/ML
2 INJECTION, SOLUTION INTRAMUSCULAR; INTRAVENOUS
Status: CANCELLED | OUTPATIENT
Start: 2023-11-07

## 2023-11-07 RX ORDER — BISACODYL 10 MG
10 SUPPOSITORY, RECTAL RECTAL DAILY PRN
Status: DISCONTINUED | OUTPATIENT
Start: 2023-11-07 | End: 2023-11-09 | Stop reason: HOSPADM

## 2023-11-07 RX ORDER — HEPARIN SODIUM 5000 [USP'U]/ML
5000 INJECTION, SOLUTION INTRAVENOUS; SUBCUTANEOUS EVERY 8 HOURS
Status: DISCONTINUED | OUTPATIENT
Start: 2023-11-07 | End: 2023-11-07

## 2023-11-07 RX ORDER — KETAMINE HCL IN 0.9 % NACL 50 MG/5 ML
SYRINGE (ML) INTRAVENOUS
Status: DISCONTINUED | OUTPATIENT
Start: 2023-11-07 | End: 2023-11-07

## 2023-11-07 RX ORDER — ACETAMINOPHEN 325 MG/1
650 TABLET ORAL EVERY 6 HOURS PRN
Status: CANCELLED | OUTPATIENT
Start: 2023-11-07

## 2023-11-07 RX ORDER — TALC
6 POWDER (GRAM) TOPICAL NIGHTLY PRN
Status: CANCELLED | OUTPATIENT
Start: 2023-11-07

## 2023-11-07 RX ORDER — OXYCODONE HYDROCHLORIDE 5 MG/1
5 TABLET ORAL EVERY 4 HOURS PRN
Status: DISCONTINUED | OUTPATIENT
Start: 2023-11-07 | End: 2023-11-07

## 2023-11-07 RX ORDER — ROCURONIUM BROMIDE 10 MG/ML
INJECTION, SOLUTION INTRAVENOUS
Status: DISCONTINUED | OUTPATIENT
Start: 2023-11-07 | End: 2023-11-07

## 2023-11-07 RX ORDER — GABAPENTIN 300 MG/1
300 CAPSULE ORAL 3 TIMES DAILY
Status: DISCONTINUED | OUTPATIENT
Start: 2023-11-07 | End: 2023-11-09 | Stop reason: HOSPADM

## 2023-11-07 RX ORDER — LABETALOL HCL 20 MG/4 ML
10 SYRINGE (ML) INTRAVENOUS EVERY 4 HOURS PRN
Status: DISCONTINUED | OUTPATIENT
Start: 2023-11-07 | End: 2023-11-09

## 2023-11-07 RX ORDER — HEPARIN SODIUM 5000 [USP'U]/ML
5000 INJECTION, SOLUTION INTRAVENOUS; SUBCUTANEOUS EVERY 8 HOURS
Status: DISCONTINUED | OUTPATIENT
Start: 2023-11-08 | End: 2023-11-09 | Stop reason: HOSPADM

## 2023-11-07 RX ORDER — METHYLPREDNISOLONE ACETATE 40 MG/ML
INJECTION, SUSPENSION INTRA-ARTICULAR; INTRALESIONAL; INTRAMUSCULAR; SOFT TISSUE
Status: DISCONTINUED | OUTPATIENT
Start: 2023-11-07 | End: 2023-11-07 | Stop reason: HOSPADM

## 2023-11-07 RX ORDER — MAG HYDROX/ALUMINUM HYD/SIMETH 200-200-20
30 SUSPENSION, ORAL (FINAL DOSE FORM) ORAL EVERY 4 HOURS PRN
Status: CANCELLED | OUTPATIENT
Start: 2023-11-07

## 2023-11-07 RX ORDER — METHOCARBAMOL 500 MG/1
500 TABLET, FILM COATED ORAL 3 TIMES DAILY
Status: DISCONTINUED | OUTPATIENT
Start: 2023-11-07 | End: 2023-11-09 | Stop reason: HOSPADM

## 2023-11-07 RX ORDER — AMLODIPINE BESYLATE 5 MG/1
5 TABLET ORAL DAILY
Status: DISCONTINUED | OUTPATIENT
Start: 2023-11-07 | End: 2023-11-09 | Stop reason: HOSPADM

## 2023-11-07 RX ORDER — CEFAZOLIN SODIUM 1 G/3ML
INJECTION, POWDER, FOR SOLUTION INTRAMUSCULAR; INTRAVENOUS
Status: DISCONTINUED | OUTPATIENT
Start: 2023-11-07 | End: 2023-11-07

## 2023-11-07 RX ADMIN — Medication 5 MG: at 09:11

## 2023-11-07 RX ADMIN — ROCURONIUM BROMIDE 5 MG: 10 INJECTION INTRAVENOUS at 07:11

## 2023-11-07 RX ADMIN — DEXAMETHASONE SODIUM PHOSPHATE 12 MG: 4 INJECTION, SOLUTION INTRAMUSCULAR; INTRAVENOUS at 07:11

## 2023-11-07 RX ADMIN — GABAPENTIN 300 MG: 300 CAPSULE ORAL at 03:11

## 2023-11-07 RX ADMIN — Medication 20 MG: at 07:11

## 2023-11-07 RX ADMIN — SODIUM CHLORIDE: 9 INJECTION, SOLUTION INTRAVENOUS at 07:11

## 2023-11-07 RX ADMIN — SODIUM CHLORIDE 0.8 MCG/KG/MIN: 9 INJECTION, SOLUTION INTRAVENOUS at 07:11

## 2023-11-07 RX ADMIN — OXYCODONE AND ACETAMINOPHEN 1 TABLET: 10; 325 TABLET ORAL at 06:11

## 2023-11-07 RX ADMIN — ONDANSETRON 4 MG: 2 INJECTION INTRAMUSCULAR; INTRAVENOUS at 12:11

## 2023-11-07 RX ADMIN — ACETAMINOPHEN 1000 MG: 500 TABLET ORAL at 03:11

## 2023-11-07 RX ADMIN — SODIUM CHLORIDE 0.25 MCG/KG/MIN: 9 INJECTION, SOLUTION INTRAVENOUS at 07:11

## 2023-11-07 RX ADMIN — AMLODIPINE BESYLATE 5 MG: 5 TABLET ORAL at 01:11

## 2023-11-07 RX ADMIN — CEFAZOLIN 2 G: 2 INJECTION, POWDER, FOR SOLUTION INTRAMUSCULAR; INTRAVENOUS at 10:11

## 2023-11-07 RX ADMIN — Medication 10 MG: at 10:11

## 2023-11-07 RX ADMIN — SUCCINYLCHOLINE CHLORIDE 200 MG: 20 INJECTION, SOLUTION INTRAMUSCULAR; INTRAVENOUS at 07:11

## 2023-11-07 RX ADMIN — CEFAZOLIN 2 G: 330 INJECTION, POWDER, FOR SOLUTION INTRAMUSCULAR; INTRAVENOUS at 11:11

## 2023-11-07 RX ADMIN — SENNOSIDES AND DOCUSATE SODIUM 2 TABLET: 8.6; 5 TABLET ORAL at 09:11

## 2023-11-07 RX ADMIN — CEFAZOLIN 2 G: 330 INJECTION, POWDER, FOR SOLUTION INTRAMUSCULAR; INTRAVENOUS at 07:11

## 2023-11-07 RX ADMIN — FENTANYL CITRATE 100 MCG: 50 INJECTION, SOLUTION INTRAMUSCULAR; INTRAVENOUS at 07:11

## 2023-11-07 RX ADMIN — Medication 100 MCG: at 07:11

## 2023-11-07 RX ADMIN — SODIUM CHLORIDE 0.2 MCG/KG/MIN: 9 INJECTION, SOLUTION INTRAVENOUS at 07:11

## 2023-11-07 RX ADMIN — FAMOTIDINE 20 MG: 10 INJECTION, SOLUTION INTRAVENOUS at 09:11

## 2023-11-07 RX ADMIN — Medication 15 MG: at 08:11

## 2023-11-07 RX ADMIN — HYDROMORPHONE HYDROCHLORIDE 1 MG: 1 INJECTION, SOLUTION INTRAMUSCULAR; INTRAVENOUS; SUBCUTANEOUS at 09:11

## 2023-11-07 RX ADMIN — CEFAZOLIN 2 G: 2 INJECTION, POWDER, FOR SOLUTION INTRAMUSCULAR; INTRAVENOUS at 03:11

## 2023-11-07 RX ADMIN — PROPOFOL 200 MG: 10 INJECTION, EMULSION INTRAVENOUS at 07:11

## 2023-11-07 RX ADMIN — GABAPENTIN 300 MG: 300 CAPSULE ORAL at 09:11

## 2023-11-07 RX ADMIN — ACETAMINOPHEN 1000 MG: 10 INJECTION, SOLUTION INTRAVENOUS at 09:11

## 2023-11-07 RX ADMIN — METHOCARBAMOL 500 MG: 500 TABLET ORAL at 03:11

## 2023-11-07 RX ADMIN — Medication 100 MCG: at 08:11

## 2023-11-07 RX ADMIN — OXYCODONE AND ACETAMINOPHEN 1 TABLET: 10; 325 TABLET ORAL at 10:11

## 2023-11-07 RX ADMIN — ACETAMINOPHEN 1000 MG: 500 TABLET ORAL at 09:11

## 2023-11-07 RX ADMIN — METHOCARBAMOL 500 MG: 500 TABLET ORAL at 09:11

## 2023-11-07 RX ADMIN — Medication 10 MG: at 11:11

## 2023-11-07 RX ADMIN — HYDROMORPHONE HYDROCHLORIDE 1 MG: 1 INJECTION, SOLUTION INTRAMUSCULAR; INTRAVENOUS; SUBCUTANEOUS at 01:11

## 2023-11-07 RX ADMIN — OXYCODONE AND ACETAMINOPHEN 1 TABLET: 10; 325 TABLET ORAL at 01:11

## 2023-11-07 RX ADMIN — SODIUM CHLORIDE, SODIUM GLUCONATE, SODIUM ACETATE, POTASSIUM CHLORIDE, MAGNESIUM CHLORIDE, SODIUM PHOSPHATE, DIBASIC, AND POTASSIUM PHOSPHATE: .53; .5; .37; .037; .03; .012; .00082 INJECTION, SOLUTION INTRAVENOUS at 07:11

## 2023-11-07 RX ADMIN — LIDOCAINE HYDROCHLORIDE 100 MG: 20 INJECTION, SOLUTION EPIDURAL; INFILTRATION; INTRACAUDAL; PERINEURAL at 07:11

## 2023-11-07 RX ADMIN — PROPOFOL 30 MG: 10 INJECTION, EMULSION INTRAVENOUS at 08:11

## 2023-11-07 NOTE — ASSESSMENT & PLAN NOTE
Holding home amlodipine-Benazepril for hyperkalemia 5.5 prior to surgery  SBP < 160   Amlodipine 5 mg post-operatively   Labetalol PRN for SBP > 160

## 2023-11-07 NOTE — PLAN OF CARE
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 [G95.29, C79.51]  Metastasis to bone [C79.51]    Admission Date: 11/6/2023  Expected Discharge Date:     Transition of Care Barriers: None    Payor: [employer] MEDICARE / Plan: [employer] 65 / Product Type: Medicare Advantage /     Extended Emergency Contact Information  Primary Emergency Contact: Mary Pete  Address: [address] [place] [address] [id number] [place] [address]  Home Phone: [phone]  Mobile Phone: [phone]  Relation: Spouse    Discharge Plan A: Home Health  Discharge Plan B: Home      [facility] [place] - [address]  [address] [place]) [place] [address]  Phone: [phone] Fax: [phone]      Transferred from:  home    Past Medical History:   Diagnosis Date    Asthma     no inhaler use    Borderline hypertension     ED (erectile dysfunction)     Gout     Hematuria     Hypertension          CM met with patient and Mary Pete (wife) [phone] in room for Discharge Planning Assessment.  Patient is able to answer questions.  Per patient, he lives with his wife in a 2 story house with 5 step(s) to enter.  The bed and bath are on the second floor with 10 steps to a landing and then 6 steps.  Patient stated that he has been sleeping in a recliner on the first floor and there is a half bath downstairs.  Per patient, he was independent with ADLS (needs help with socks)and used no dme for ambulation.  Patient will have assistance from his wife upon discharge.   Discharge Planning Booklet given to patient/family and discussed.  All questions addressed.  CM will follow for needs.    Patient stated that the recliner is painful to sleep in.  Patient stated he cannot go upstairs.  Hospital bed ordered per   Tom for discharge.     Discharge Plan A and Plan B have been determined by review of patient's clinical status, future medical and therapeutic needs, and coverage/benefits for post-acute care in coordination with multidisciplinary team members.      Initial Assessment (most recent)       Adult Discharge Assessment - 11/07/23 1604          Discharge Assessment    Assessment Type Discharge Planning Assessment     Confirmed/corrected address, phone number and insurance Yes     Confirmed Demographics Correct on Facesheet     Communicated WAQAS with patient/caregiver Date not available/Unable to determine     Reason For Admission Metastatic cancer to spine     People in Home spouse     Facility Arrived From: home     Do you expect to return to your current living situation? Yes     Do you have help at home or someone to help you manage your care at home? Yes     Who are your caregiver(s) and their phone number(s)? Mary Pete (wife) [phone]     Prior to hospitilization cognitive status: Alert/Oriented     Current cognitive status: Alert/Oriented     Walking or Climbing Stairs --   independent    Dressing/Bathing dressing difficulty, requires equipment     Dressing/Bathing Management help with socks     Home Accessibility stairs to enter home     Number of Stairs, Main Entrance five     Stair Railings, Main Entrance none     Home Layout Bathroom on 2nd floor;Bedroom on 2nd floor;Able to live on 1st floor     Equipment Currently Used at Home none     Readmission within 30 days? Yes     Patient currently being followed by outpatient case management? No     Do you currently have service(s) that help you manage your care at home? No     Do you take prescription medications? Yes     Do you have prescription coverage? Yes     Coverage [employer] CHOICES 65     Do you have any problems affording any of your prescribed medications? No     Is the patient taking medications as  prescribed? yes     Who is going to help you get home at discharge? Mary Pete (wife) [phone]     How do you get to doctors appointments? family or friend will provide     Are you on dialysis? No     Do you take coumadin? No     DME Needed Upon Discharge  hospital bed     Discharge Plan discussed with: Patient;Spouse/sig other     Name(s) and Number(s) Mary Pete (wife) [phone] (at bedside)     Transition of Care Barriers None     Discharge Plan A Home Health     Discharge Plan B Home        Physical Activity    On average, how many days per week do you engage in moderate to strenuous exercise (like a brisk walk)? 0 days     On average, how many minutes do you engage in exercise at this level? 0 min        Financial Resource Strain    How hard is it for you to pay for the very basics like food, housing, medical care, and heating? Not hard at all        Housing Stability    In the last 12 months, was there a time when you were not able to pay the mortgage or rent on time? No     In the last 12 months, how many places have you lived? 1     In the last 12 months, was there a time when you did not have a steady place to sleep or slept in a shelter (including now)? No        Transportation Needs    In the past 12 months, has lack of transportation kept you from medical appointments or from getting medications? No     In the past 12 months, has lack of transportation kept you from meetings, work, or from getting things needed for daily living? No        Food Insecurity    Within the past 12 months, you worried that your food would run out before you got the money to buy more. Never true     Within the past 12 months, the food you bought just didn't last and you didn't have money to get more. Never true        Stress    Do you feel stress - tense, restless, nervous, or anxious, or unable to sleep at night because your mind is troubled all the time - these days? Not at all        Social Connections    In  a typical week, how many times do you talk on the phone with family, friends, or neighbors? More than three times a week     How often do you get together with friends or relatives? Once a week     How often do you attend Restorationist or Roman Catholic services? Never     Do you belong to any clubs or organizations such as Restorationist groups, unions, fraternal or athletic groups, or school groups? No     How often do you attend meetings of the clubs or organizations you belong to? Never     Are you , , , , never , or living with a partner?         Alcohol Use    Q1: How often do you have a drink containing alcohol? Never     Q2: How many drinks containing alcohol do you have on a typical day when you are drinking? Patient does not drink     Q3: How often do you have six or more drinks on one occasion? Never        OTHER    Name(s) of People in Home Mary Pete (wife) [phone]                     Readmission Assessment (most recent)       Readmission Assessment - 11/07/23 1608          Readmission    Why were you hospitalized in the last 30 days? Back Pain     Why were you readmitted? Planned readmission     When you left the hospital where did you go? Home with Family     Did patient/caregiver refused recommended DC plan? No     Tell me about what happened between when you left the hospital and the day you returned. Planned surgery     Was this a planned readmission? Yes                    Harriett Ingram RN, CCRN-K, [facility]  Neuro-Critical Care   [phone]

## 2023-11-07 NOTE — ASSESSMENT & PLAN NOTE
Body mass index is 31.44 kg/m².  Dietary, nutritional, weight loss counseling on discharge  Nutrition consulted, f/u recs  PT/OT evals pending

## 2023-11-07 NOTE — H&P
[facility] - Neuro Critical Care  Neurocritical Care  History & Physical    Admit Date: 11/6/2023  Service Date: 11/07/2023  Length of Stay: 1    Subjective:     Chief Complaint: Metastatic cancer to spine    History of Present Illness: Justin Pete is a 73-year-old male with PMHx of HTN, asthma, RCC (s/p kidney resection) with lung and spinal mets (C4-5 and T9) who presents to NCC unit post-operatively after DSA and partial embolization of C4. Pending OR tomorrow for C4-5 corpectomies. Patient tolerated procedure well and is being admitted for Q1H post-op neurological monitoring.         Past Medical History:   Diagnosis Date    Asthma     no inhaler use    Borderline hypertension     ED (erectile dysfunction)     Gout     Hematuria     Hypertension      Past Surgical History:   Procedure Laterality Date    COLONOSCOPY  02/10/2022    COLONOSCOPY N/A 02/10/2022    Procedure: COLONOSCOPY;  Surgeon: Desmond Keenan MD;  Location: [facility] ENDO;  Service: General;  Laterality: N/A;    ROBOT-ASSISTED LAPAROSCOPIC NEPHRECTOMY Right 10/4/2023    Procedure: ROBOTIC NEPHRECTOMY;  Surgeon: Henry Michaels MD;  Location: [facility] OR;  Service: Urology;  Laterality: Right;    TONSILLECTOMY        No current facility-administered medications on file prior to encounter.     Current Outpatient Medications on File Prior to Encounter   Medication Sig Dispense Refill    amlodipine-benazepril 5-10 mg (LOTREL) 5-10 mg per capsule Take 1 capsule by mouth once daily. 90 capsule 3    gabapentin (NEURONTIN) 300 MG capsule Take 1 capsule (300 mg total) by mouth 3 (three) times daily. 90 capsule 11    indomethacin (INDOCIN) 50 MG capsule One p.o. t.i.d. p.r.n. gouty attack no more than 7 days--do not take with other NSAIDs 90 capsule 0    methocarbamoL (ROBAXIN) 500 MG Tab Take 1 tablet (500 mg total) by mouth 3 (three) times daily. for 10 days 30 tablet 0    oxyCODONE (ROXICODONE) 10 mg Tab immediate release tablet Take 1 tablet  (10 mg total) by mouth every 6 (six) hours as needed for Pain. 28 tablet 0    oxyCODONE myristate (XTAMPZA ER) 18 mg CSpT Take 1 capsule (18 mg total) by mouth 2 (two) times a day. for 7 days 14 capsule 0    polyethylene glycol (GLYCOLAX) 17 gram/dose powder Use cap to measure 17 grams, mix in liquid and take by mouth once daily. (Patient taking differently: Take 8 g by mouth once daily.) 238 g 0    senna (SENOKOT) 8.6 mg tablet Take 1 tablet by mouth once daily. 30 tablet 0    turmeric (CURCUMIN MISC) 1,000 mg by Misc.(Non-Drug; Combo Route) route Daily.      UNABLE TO FIND Take 500 mg by mouth 2 (two) times a day. medication name: Tudca      UNABLE TO FIND Take 2 capsules by mouth 2 (two) times a day. medication name: Turkey tail mushroom      UNABLE TO FIND Take 15 drops by mouth once daily. medication name: Essiac-20      UNABLE TO FIND Take 5 mLs by mouth 2 (two) times a day. medication name: barley leaf juice powder      UNABLE TO FIND Take 5 mLs by mouth 2 (two) times a day. medication name: black seed oil (cumin seed)      naloxone (NARCAN) 4 mg/actuation Spry 1 spray (4mg) by nasal route as needed for opioid overdose; may repeat every 2-3 minutes in alternating nostrils until medical help arrives. Call 911 2 each 0    OLIVE OIL ORAL Take 15 mLs by mouth 2 (two) times a day.      omega-3 fatty acids/fish oil (FISH OIL-OMEGA-3 FATTY ACIDS) 300-1,000 mg capsule Take 2 capsules by mouth once daily.      sildenafiL (VIAGRA) 100 MG tablet Take 1 tablet (100 mg total) by mouth daily as needed for Erectile Dysfunction. 30 tablet 11      Allergies: Patient has no known allergies.    N/A  History reviewed. No pertinent family history.  Social History     Tobacco Use    Smoking status: Never    Smokeless tobacco: Never   Substance Use Topics    Alcohol use: Not Currently     Alcohol/week: 0.0 standard drinks of alcohol     Comment: rarely    Drug use: Never     Review of Systems   Constitutional:   Negative for appetite change, fatigue and fever.   HENT:  Negative for sore throat, trouble swallowing and voice change.    Eyes:  Negative for photophobia, pain and visual disturbance.   Respiratory:  Negative for cough, shortness of breath and wheezing.    Cardiovascular:  Negative for chest pain, palpitations and leg swelling.   Gastrointestinal:  Positive for constipation. Negative for abdominal pain, nausea and vomiting.   Endocrine: Negative.    Genitourinary:  Negative for decreased urine volume, difficulty urinating and dysuria.   Musculoskeletal:  Positive for back pain. Negative for gait problem and neck stiffness.   Skin:  Negative for pallor, rash and wound.   Allergic/Immunologic: Negative for environmental allergies, food allergies and immunocompromised state.   Neurological:  Negative for seizures, facial asymmetry, speech difficulty, weakness, numbness and headaches.   Hematological: Negative.    Psychiatric/Behavioral:  Negative for confusion and sleep disturbance. The patient is not nervous/anxious.      Objective:     Vitals:    Temp: 98 °F (36.7 °C)  Pulse: 74  Rhythm: normal sinus rhythm  BP: 132/60  MAP (mmHg): 87  Resp: 12  SpO2: (!) 93 %    Temp  Min: 97.8 °F (36.6 °C)  Max: 98.4 °F (36.9 °C)  Pulse  Min: 74  Max: 106  BP  Min: 123/60  Max: 164/77  MAP (mmHg)  Min: 86  Max: 110  Resp  Min: 10  Max: 29  SpO2  Min: 91 %  Max: 99 %    11/06 0701 - 11/07 0700  In: 360 [P.O.:360]  Out: -             Physical Exam  Vitals and nursing note reviewed.   Constitutional:       General: He is not in acute distress.     Appearance: He is well-developed. He is obese. He is not ill-appearing.   HENT:      Head: Normocephalic and atraumatic.      Right Ear: External ear normal.      Left Ear: External ear normal.      Nose: Nose normal.      Right Nostril: No epistaxis.      Left Nostril: No epistaxis.      Mouth/Throat:      Lips: Pink. No lesions.      Mouth: Mucous membranes are moist. No angioedema.    Eyes:      General: Lids are normal. No scleral icterus.     Conjunctiva/sclera:      Right eye: Right conjunctiva is not injected.      Left eye: Left conjunctiva is not injected.   Neck:      Vascular: No JVD.      Trachea: Phonation normal.   Cardiovascular:      Rate and Rhythm: Normal rate and regular rhythm.      Pulses: No decreased pulses.   Pulmonary:      Effort: Pulmonary effort is normal. No tachypnea, bradypnea or respiratory distress.   Abdominal:      General: Abdomen is flat.      Palpations: Abdomen is soft.      Tenderness: There is no abdominal tenderness.   Musculoskeletal:      Cervical back: Neck supple. No edema or erythema.      Right lower leg: No edema.      Left lower leg: No edema.   Neurological:      Comments:   Mentation: Awake, alert, and oriented x4;   Following commands   Fluent spontaneous speech  Normal attention and concentration  GCS: 15 (E4V5M6)  CN: hearing intact to conversational speech  EOMI, PERRL (2mm and brisk)  No facial asymmetry  No aphasia, no dysarthria, swallowing intact  SILT, equal sensation bilaterally  SANGITA, normal tone and bulk in UE and LE  No pronator drift  RUE 5/5, RLE 5/5  LUE 5/5, LLE 5/5  Coordination: intact   Gait: not tested for safety   Psychiatric:         Behavior: Behavior is cooperative.          Today I personally reviewed pertinent medications, lines/drains/airways, imaging, cardiology results, laboratory results, microbiology results,       Assessment/Plan:     Renal/  SABINE (acute kidney injury)  Baseline Cr around 1.3  Current Cr 1.6  NS @75mL/hr while NPO for OR  Strict I/O's  Goal UOP >0.5 ml/hr  Avoid nephrotoxic agents    Oncology  * 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 Pending OR tomorrow for C4-5 corpectomies.    - Admit to [facility] for close neurologic monitoring  - Q1H neuro checks  -  Q1H VS  - NPO at midnight for OR    - SBP goal <160   - PRN labetalol  - Goal euvolemia, eunatremia  - CXR: pending  - EKG: pending     - PT/INR, PTT: pending prior to OR  - Daily CBC, CMP, Mg, Phos   - Replete electrolytes PRN  - SCDs, defer SQH until after surgery  - PT/OT/SLP consulted  - Nutrition, PMR, and SW/CM consulted    Clear cell carcinoma of right kidney  S/p R radical nephrectomy  Mets to lungs and spine    Endocrine  Class 1 obesity without serious comorbidity with body mass index (BMI) of 31.0 to 31.9 in adult  Body mass index is 31.44 kg/m².  Dietary, nutritional, weight loss counseling on discharge  Nutrition consulted, f/u recs  PT/OT evals pending    GI  Therapeutic opioid-induced constipation (OIC)  Senna-docusate BID  Miralax QD  PRN suppository          The patient is being Prophylaxed for:  Venous Thromboembolism with: Mechanical  Stress Ulcer with: None  Ventilator Pneumonia with: not applicable    N/A  Family history non-contributory to current problem     Activity Orders          Diet NPO: NPO starting at 11/07 0001    Elevate HOB 30 starting at 11/06 2122    Bed rest starting at 11/06 1237        Full Code     Elsa Castillo PA-C  Neurocritical Care  [facility] - Neuro Critical Care

## 2023-11-07 NOTE — PLAN OF CARE
Attempted to meet with patient and or family in room for discharge planning assessment. Pt unable to answer questions 2/2 patient in surgery.  No family present in room.  Will revisit.      Harriett Ingram RN, CCRN-K, [facility]  Neuro-Critical Care   [phone]

## 2023-11-07 NOTE — ANESTHESIA POSTPROCEDURE EVALUATION
Anesthesia Post Evaluation    Patient: Gamaliel Pete JrLizz    Procedure(s) Performed: * No procedures listed *    Final Anesthesia Type: general      Patient location during evaluation: PACU  Patient participation: Yes- Able to Participate  Level of consciousness: awake and alert  Post-procedure vital signs: reviewed and stable  Pain management: adequate  Airway patency: patent    PONV status at discharge: No PONV  Anesthetic complications: no      Cardiovascular status: blood pressure returned to baseline  Respiratory status: unassisted and nasal cannula  Hydration status: euvolemic  Follow-up not needed.          Vitals Value Taken Time   /81 11/07/23 0701   Temp 37.1 °C (98.7 °F) 11/07/23 0701   Pulse 109 11/07/23 0714   Resp 36 11/07/23 0709   SpO2 96 % 11/07/23 0714   Vitals shown include unvalidated device data.      Event Time   Out of Recovery 20:30:00         Pain/Juany Score: Pain Rating Prior to Med Admin: 6 (11/6/2023 11:35 PM)  Pain Rating Post Med Admin: 5 (11/6/2023  6:45 PM)  Juany Score: 10 (11/6/2023  6:45 PM)

## 2023-11-07 NOTE — NURSING
"Gamaliel "Justin"Juan R 74 yo Male FULL CODE NKDA    PMHx: RCC with lung and spinal mets s/p kidney resection; obesity, borderline HTN, gout, cancer with pulmonary metastases, metastasis to bone, iron deficiency anemia, hx of right radical nephrectomy, clear cell carcinoma of right kidney, SABINE    Lung cancer, htn, kidney resection.      Pt presents on 11/6 for spinal embolization of C4, C5, and T9 with IR .     NEURO: intact, moves, equal, speech fine    CARDIAC: NSR in 90s 120-140/70    RESPIRATORY: 2L to RA    GI: no diet. - wants to eat before midnight.     :     SKIN:   2x2 gauze cdi.      ACCESS: 20 ga SL left hand    Gtts:     LABS:     NOTES:     CEREBRAL REJI         "

## 2023-11-07 NOTE — HPI
Justin Pete is a 73-year-old male with PMHx of HTN, asthma, RCC (s/p kidney resection) with lung and spinal mets (C4-5 and T9) who presents to NCC unit post-operatively after DSA and partial embolization of C4. Pending OR tomorrow for C4-5 corpectomies. Patient tolerated procedure well and is being admitted for Q1H post-op neurological monitoring.

## 2023-11-07 NOTE — ANESTHESIA PROCEDURE NOTES
Intubation    Date/Time: 11/7/2023 7:34 AM    Performed by: Giuseppe Kaur MD  Authorized by: Raisa Rider MD    Intubation:     Induction:  Intravenous    Intubated:  Postinduction    Mask Ventilation:  Easy mask    Attempts:  1    Attempted By:  Resident anesthesiologist    Method of Intubation:  Video laryngoscopy    Blade:  Yoder 3    Laryngeal View Grade: Grade I - full view of cords      Difficult Airway Encountered?: No      Complications:  None    Airway Device:  Oral endotracheal tube    Airway Device Size:  7.5    Style/Cuff Inflation:  Cuffed (inflated to minimal occlusive pressure)    Secured at:  The lips    Placement Verified By:  Capnometry and Revisualization with laryngoscopy    Complicating Factors:  Obesity, short neck and poor neck/head extension    Findings Post-Intubation:  BS equal bilateral and atraumatic/condition of teeth unchanged

## 2023-11-07 NOTE — ANESTHESIA POSTPROCEDURE EVALUATION
Anesthesia Post Evaluation    Patient: Gamaliel Pete Jr.    Procedure(s) Performed: Procedure(s) (LRB):  CORPECTOMY, SPINE, CERVICAL (Right)    Final Anesthesia Type: general      Patient location during evaluation: ICU  Patient participation: Yes- Able to Participate  Level of consciousness: awake and alert  Post-procedure vital signs: reviewed and stable  Pain management: adequate  Airway patency: patent    PONV status at discharge: No PONV  Anesthetic complications: no      Cardiovascular status: blood pressure returned to baseline  Respiratory status: unassisted  Hydration status: euvolemic  Follow-up not needed.          Vitals Value Taken Time   /69 11/07/23 1401   Temp 36.7 °C (98.1 °F) 11/07/23 1310   Pulse 82 11/07/23 1413   Resp 12 11/07/23 1413   SpO2 96 % 11/07/23 1413   Vitals shown include unvalidated device data.      No case tracking events are documented in the log.      Pain/Juany Score: Pain Rating Prior to Med Admin: 8 (11/7/2023  1:55 PM)  Pain Rating Post Med Admin: 7 (11/7/2023  1:49 PM)  Juany Score: 10 (11/6/2023  6:45 PM)

## 2023-11-07 NOTE — PT/OT/SLP PROGRESS
Physical Therapy Attempt      Patient Name:  Gamaliel Pete Jr.   MRN:  [id number]    PT orders received and acknowledged. Patient not seen today secondary to Off the floor for procedure/surgery. Patient discharged from PT due to surgery; please re-consult PT for evaluation post-surgically as appropriate.

## 2023-11-07 NOTE — NURSING
Patient arrived to [facility] from PACU     Report received from: BRIAN Adams    Type of stroke/diagnosis:  Spinal cord compression      Current symptoms: A/Ox4, equal strength, Pulses 2+, no defects, right groin site    Skin Assessment done: Yes  Wounds noted:Right groin  *If wounds noted, was Wound Care consulted?  *If wounds noted, LDA placed? Y/N  Skin Assessment Verified by:  BRIAN De Luna and BRIAN Arguelles Completed? Yes    Patient Belongings on Admit: none    NCC notified: CHINO Castillo

## 2023-11-07 NOTE — TRANSFER OF CARE
"Anesthesia Transfer of Care Note    Patient: Gamaliel Pete Jr.    Procedure(s) Performed: Procedure(s) (LRB):  CORPECTOMY, SPINE, CERVICAL (Right)    Patient location: ICU    Anesthesia Type: general    Transport from OR: Transported from OR on 6-10 L/min O2 by face mask with adequate spontaneous ventilation    Post pain: adequate analgesia    Post vital signs: stable    Level of consciousness: awake and responds to stimulation    Nausea/Vomiting: no nausea/vomiting    Complications: none    Transfer of care protocol was followed    Last vitals: Visit Vitals  BP (!) 157/81 (BP Location: Right arm, Patient Position: Lying)   Pulse 83   Temp 37.1 °C (98.7 °F) (Oral)   Resp 18   Ht 5' 10" (1.778 m)   Wt 99.4 kg (219 lb 2.2 oz)   SpO2 97%   BMI 31.44 kg/m²     "

## 2023-11-07 NOTE — NURSING TRANSFER
Nursing Transfer Note      11/6/2023   8:12 PM    Nurse giving handoff:Angie TORRES  Nurse receiving handoff:Blane TORRES    Reason patient is being transferred: [facility]    Transfer : [id number]    Transfer via stretcher    Transfer with monitor    Transported by RN    Transfer Vital Signs:  Blood Pressure:see flowsheet  Heart Rate:  O2:  Temperature:  Respirations:    Telemetry: yes  Order for Tele Monitor? ICU    Additional Lines: yes  4eyes on Skin: yes    Medicines sent: no    Any special needs or follow-up needed: no    Patient belongings transferred with patient: no    Chart send with patient: yes    Notified: spouse @ BS    Patient reassessed at: 11/0+6/23@2000

## 2023-11-07 NOTE — SUBJECTIVE & OBJECTIVE
Past Medical History:   Diagnosis Date    Asthma     no inhaler use    Borderline hypertension     ED (erectile dysfunction)     Gout     Hematuria     Hypertension      Past Surgical History:   Procedure Laterality Date    COLONOSCOPY  02/10/2022    COLONOSCOPY N/A 02/10/2022    Procedure: COLONOSCOPY;  Surgeon: Desmond Keenan MD;  Location: [facility];  Service: General;  Laterality: N/A;    ROBOT-ASSISTED LAPAROSCOPIC NEPHRECTOMY Right 10/4/2023    Procedure: ROBOTIC NEPHRECTOMY;  Surgeon: Henry Michaels MD;  Location: [facility];  Service: Urology;  Laterality: Right;    TONSILLECTOMY        No current facility-administered medications on file prior to encounter.     Current Outpatient Medications on File Prior to Encounter   Medication Sig Dispense Refill    amlodipine-benazepril 5-10 mg (LOTREL) 5-10 mg per capsule Take 1 capsule by mouth once daily. 90 capsule 3    gabapentin (NEURONTIN) 300 MG capsule Take 1 capsule (300 mg total) by mouth 3 (three) times daily. 90 capsule 11    indomethacin (INDOCIN) 50 MG capsule One p.o. t.i.d. p.r.n. gouty attack no more than 7 days--do not take with other NSAIDs 90 capsule 0    methocarbamoL (ROBAXIN) 500 MG Tab Take 1 tablet (500 mg total) by mouth 3 (three) times daily. for 10 days 30 tablet 0    oxyCODONE (ROXICODONE) 10 mg Tab immediate release tablet Take 1 tablet (10 mg total) by mouth every 6 (six) hours as needed for Pain. 28 tablet 0    oxyCODONE myristate (XTAMPZA ER) 18 mg CSpT Take 1 capsule (18 mg total) by mouth 2 (two) times a day. for 7 days 14 capsule 0    polyethylene glycol (GLYCOLAX) 17 gram/dose powder Use cap to measure 17 grams, mix in liquid and take by mouth once daily. (Patient taking differently: Take 8 g by mouth once daily.) 238 g 0    senna (SENOKOT) 8.6 mg tablet Take 1 tablet by mouth once daily. 30 tablet 0    turmeric (CURCUMIN MISC) 1,000 mg by Misc.(Non-Drug; Combo Route) route Daily.      UNABLE TO FIND Take 500 mg by mouth 2  (two) times a day. medication name: Tudca      UNABLE TO FIND Take 2 capsules by mouth 2 (two) times a day. medication name: Turkey tail mushroom      UNABLE TO FIND Take 15 drops by mouth once daily. medication name: Essiac-20      UNABLE TO FIND Take 5 mLs by mouth 2 (two) times a day. medication name: barley leaf juice powder      UNABLE TO FIND Take 5 mLs by mouth 2 (two) times a day. medication name: black seed oil (cumin seed)      naloxone (NARCAN) 4 mg/actuation Spry 1 spray (4mg) by nasal route as needed for opioid overdose; may repeat every 2-3 minutes in alternating nostrils until medical help arrives. Call [phone] each 0    OLIVE OIL ORAL Take 15 mLs by mouth 2 (two) times a day.      omega-3 fatty acids/fish oil (FISH OIL-OMEGA-3 FATTY ACIDS) 300-1,000 mg capsule Take 2 capsules by mouth once daily.      sildenafiL (VIAGRA) 100 MG tablet Take 1 tablet (100 mg total) by mouth daily as needed for Erectile Dysfunction. 30 tablet 11      Allergies: Patient has no known allergies.    N/A  History reviewed. No pertinent family history.  Social History     Tobacco Use    Smoking status: Never    Smokeless tobacco: Never   Substance Use Topics    Alcohol use: Not Currently     Alcohol/week: 0.0 standard drinks of alcohol     Comment: rarely    Drug use: Never     Review of Systems   Constitutional:  Negative for appetite change, fatigue and fever.   HENT:  Negative for sore throat, trouble swallowing and voice change.    Eyes:  Negative for photophobia, pain and visual disturbance.   Respiratory:  Negative for cough, shortness of breath and wheezing.    Cardiovascular:  Negative for chest pain, palpitations and leg swelling.   Gastrointestinal:  Positive for constipation. Negative for abdominal pain, nausea and vomiting.   Endocrine: Negative.    Genitourinary:  Negative for decreased urine volume, difficulty urinating and dysuria.   Musculoskeletal:  Positive for back pain. Negative for gait problem and neck  stiffness.   Skin:  Negative for pallor, rash and wound.   Allergic/Immunologic: Negative for environmental allergies, food allergies and immunocompromised state.   Neurological:  Negative for seizures, facial asymmetry, speech difficulty, weakness, numbness and headaches.   Hematological: Negative.    Psychiatric/Behavioral:  Negative for confusion and sleep disturbance. The patient is not nervous/anxious.      Objective:     Vitals:    Temp: 98 °F (36.7 °C)  Pulse: 74  Rhythm: normal sinus rhythm  BP: 132/60  MAP (mmHg): 87  Resp: 12  SpO2: (!) 93 %    Temp  Min: 97.8 °F (36.6 °C)  Max: 98.4 °F (36.9 °C)  Pulse  Min: 74  Max: 106  BP  Min: 123/60  Max: 164/77  MAP (mmHg)  Min: 86  Max: 110  Resp  Min: 10  Max: 29  SpO2  Min: 91 %  Max: 99 %    11/06 0701 - 11/07 0700  In: 360 [P.O.:360]  Out: -             Physical Exam  Vitals and nursing note reviewed.   Constitutional:       General: He is not in acute distress.     Appearance: He is well-developed. He is obese. He is not ill-appearing.   HENT:      Head: Normocephalic and atraumatic.      Right Ear: External ear normal.      Left Ear: External ear normal.      Nose: Nose normal.      Right Nostril: No epistaxis.      Left Nostril: No epistaxis.      Mouth/Throat:      Lips: Pink. No lesions.      Mouth: Mucous membranes are moist. No angioedema.   Eyes:      General: Lids are normal. No scleral icterus.     Conjunctiva/sclera:      Right eye: Right conjunctiva is not injected.      Left eye: Left conjunctiva is not injected.   Neck:      Vascular: No JVD.      Trachea: Phonation normal.   Cardiovascular:      Rate and Rhythm: Normal rate and regular rhythm.      Pulses: No decreased pulses.   Pulmonary:      Effort: Pulmonary effort is normal. No tachypnea, bradypnea or respiratory distress.   Abdominal:      General: Abdomen is flat.      Palpations: Abdomen is soft.      Tenderness: There is no abdominal tenderness.   Musculoskeletal:      Cervical back:  Neck supple. No edema or erythema.      Right lower leg: No edema.      Left lower leg: No edema.   Neurological:      Comments:   Mentation: Awake, alert, and oriented x4;   Following commands   Fluent spontaneous speech  Normal attention and concentration  GCS: 15 (E4V5M6)  CN: hearing intact to conversational speech  EOMI, PERRL (2mm and brisk)  No facial asymmetry  No aphasia, no dysarthria, swallowing intact  SILT, equal sensation bilaterally  SANGITA, normal tone and bulk in UE and LE  No pronator drift  RUE 5/5, RLE 5/5  LUE 5/5, LLE 5/5  Coordination: intact   Gait: not tested for safety   Psychiatric:         Behavior: Behavior is cooperative.          Today I personally reviewed pertinent medications, lines/drains/airways, imaging, cardiology results, laboratory results, microbiology results,

## 2023-11-07 NOTE — ANESTHESIA PROCEDURE NOTES
Arterial    Diagnosis: Metastatic disease    Patient location during procedure: done in OR    Staffing  Authorizing Provider: Raisa Rider MD  Performing Provider: Giuseppe Kaur MD    Staffing  Performed by: Giuseppe Kaur MD  Authorized by: Raisa Rider MD    Anesthesiologist was present at the time of the procedure.    Preanesthetic Checklist  Completed: patient identified, IV checked, site marked, risks and benefits discussed, surgical consent, monitors and equipment checked, pre-op evaluation, timeout performed and anesthesia consent givenArterial  Skin Prep: chlorhexidine gluconate  Local Infiltration: none  Orientation: left  Location: radial    Catheter Size: 20 G  Catheter placement by Ultrasound guidance. Heme positive aspiration all ports.   Vessel Caliber: patent  Needle advanced into vessel with real time Ultrasound guidance.Insertion Attempts: 1  Assessment  Dressing: secured with tape and tegaderm  Patient: Tolerated well

## 2023-11-07 NOTE — NURSING
13:12 - 12 lead EKG obtained at this time. NCC team made aware that EKG is complete. QT/QTc: 344/456. Paper copy in chart at bedside and electronic copy sent to [place].

## 2023-11-07 NOTE — SUBJECTIVE & OBJECTIVE
Interval History:  Patient back from C4-C5 corpectomy. Tolerated surgery well but is complaining of neck pain and stiffness.     Review of Systems   Musculoskeletal:  Positive for neck pain and neck stiffness.   Neurological:  Negative for headaches.     Objective:     Vitals:  Temp: 98.7 °F (37.1 °C)  Pulse: 83  Rhythm: normal sinus rhythm  BP: (!) 157/81  MAP (mmHg): 105  Resp: 18  SpO2: 97 %    Temp  Min: 97.8 °F (36.6 °C)  Max: 98.7 °F (37.1 °C)  Pulse  Min: 72  Max: 106  BP  Min: 122/59  Max: 164/77  MAP (mmHg)  Min: 85  Max: 110  Resp  Min: 10  Max: 29  SpO2  Min: 91 %  Max: 99 %    11/06 0701 - 11/07 0700  In: 360 [P.O.:360]  Out: -    Unmeasured Output  Urine Occurrence: 1        Physical Exam  Vitals and nursing note reviewed.   Constitutional:       General: He is not in acute distress.     Appearance: He is well-developed. He is obese. He is not ill-appearing.   HENT:      Head: Normocephalic and atraumatic.      Right Ear: External ear normal.      Left Ear: External ear normal.      Nose: Nose normal.      Right Nostril: No epistaxis.      Left Nostril: No epistaxis.      Mouth/Throat:      Lips: Pink. No lesions.      Mouth: Mucous membranes are moist. No angioedema.   Eyes:      General: Lids are normal. No scleral icterus.     Conjunctiva/sclera:      Right eye: Right conjunctiva is not injected.      Left eye: Left conjunctiva is not injected.   Neck:      Vascular: No JVD.      Trachea: Phonation normal.   Cardiovascular:      Rate and Rhythm: Normal rate and regular rhythm.      Pulses: No decreased pulses.   Pulmonary:      Effort: Pulmonary effort is normal. No tachypnea, bradypnea or respiratory distress.   Abdominal:      General: Abdomen is flat.      Palpations: Abdomen is soft.      Tenderness: There is no abdominal tenderness.   Musculoskeletal:      Cervical back: No edema or erythema.      Right lower leg: No edema.      Left lower leg: No edema.      Comments: Surgical drain to suction  in place at R anterior neck   Skin:     General: Skin is warm.   Neurological:      General: No focal deficit present.      Mental Status: He is oriented to person, place, and time. Mental status is at baseline.      Comments: Mentation: Awake, alert, and oriented x4;   Following commands, repetition intact.   EOMI, PERRL (2mm and brisk)  No pronator drift  RUE 5/5, RLE 5/5  LUE 5/5, LLE 5/5     Psychiatric:         Behavior: Behavior is cooperative.              Medications:  Continuoussodium chloride 0.9%, Last Rate: 75 mL/hr at 11/06/23 2202    Scheduledacetaminophen, 1,000 mg, Q8H  amLODIPine, 5 mg, Daily  famotidine (PF), 20 mg, BID  gabapentin, 300 mg, TID  [START ON 11/8/2023] heparin (porcine), 5,000 Units, Q8H  LIDOcaine (PF) 10 mg/ml (1%), 1 mL, Once  methocarbamoL, 500 mg, TID  polyethylene glycol, 17 g, Daily  senna-docusate 8.6-50 mg, 2 tablet, BID    PRNbisacodyL, 10 mg, Daily PRN  HYDROmorphone, 1 mg, Q6H PRN  labetalol, 10 mg, Q4H PRN  magnesium oxide, 800 mg, PRN  magnesium oxide, 800 mg, PRN  oxyCODONE-acetaminophen, 1 tablet, Q4H PRN  potassium bicarbonate, 35 mEq, PRN  potassium bicarbonate, 50 mEq, PRN  potassium bicarbonate, 60 mEq, PRN  potassium, sodium phosphates, 2 packet, PRN  potassium, sodium phosphates, 2 packet, PRN  potassium, sodium phosphates, 2 packet, PRN      Today I personally reviewed pertinent medications, lines/drains/airways, imaging, laboratory results, notably:    Diet  Diet Adult Regular (IDDSI Level 7)  Diet Adult Regular (IDDSI Level 7)

## 2023-11-07 NOTE — ASSESSMENT & PLAN NOTE
73-year-old male with PMHx of HTN, asthma, RCC (s/p kidney resection) with lung and spinal mets (C4-5 and T9) admitted after DSA and partial embolization of C4. Went for C4-5 corpectomies on 11/7.    - Admit to [facility] for close neurologic monitoring  - Q1H neuro checks  - Q1H VS  - No head of bed restrictions per NSGY, HOB 30 when resting   - Surgical drain to full suction  - C collar when out of bed  - Discontinue Hoskins within 24 hours of surgery  - Resume diet as tolerated  - SBP goal <160   - PRN labetalol  - Goal euvolemia, eunatremia  - CXR: pending post-op  - EKG: pending post-op   - Daily CBC, CMP, Mg, Phos   - Replete electrolytes PRN  - SCDs, SubQ heparin at 24 hours post-op  - PT/OT/SLP consulted  - Nutrition, PMR, and SW/CM consulted

## 2023-11-07 NOTE — OP NOTE
DATE OF SURGERY: 11/7/2023                PREOPERATIVE DIAGNOSIS:  1. Cervical 4/5 vertebral body lesions  2. Hx of renal cell cancer s/p nephrectomy with mets  3. Impending cervical instability        POSTOPERATIVE DIAGNOSIS:  1. As above     PROCEDURE PERFORMED:  1. Anterior cervical corpectomy C4/5 for resection of C4/5 vertebral body lesions  2. Application of titanium expandable corpectomy cage C4/5  3. Application of cervical plate C3-6  4. Use of intraoperative microscope for microdissection  5. Use of neuromonitoring with MEPs  6. Use of intraoperative fluoroscopy  7. Synthetic bone grafting  8. Cervical traction with Lobo Wells tongs     SURGEON: Nasim Martinez DO        ASSISTANT: Viksa Gonzales MD     ANESTHESIA: GETA     ESTIMATED BLOOD LOSS: 500cc     COMPLICATIONS: None     DRAINS: HV     SPECIMENS SENT: C4/5 vertebral body tumor     INDICATIONS:   Justin Pete is a 73-year-old male with PMHx of HTN, asthma, RCC (s/p kidney resection) with lung and spinal mets (C4-5 and T9).  Imaging showed complete involvement of the C5 vertebral body and partial of the C4 vertebral body.  His cervical spine demonstrated impending instability due to the involvement of the tumor.  He underwent preop embolization of the cervical tumor with IR on 11/6/23.  He was offered a C4/5 anterior cervical corpectomy in order to obtain tissue diagnosis, resect the lesions, and to stabilize the spine following.       Risks, benefits, alternatives, indications and methods were reviewed in detail and the patient wished to proceed. All questions were answered. No guarantees about the results of the procedure were made.     PROCEDURE:     The patient was brought into the operating room where he was intubated and placed under general anesthesia without difficulty. All lines were placed. The patient was positioned supine onto the operating table with appropriate padding of all pressure points.  Lobo Wells tongs were placed  bitemporally in neutral position. The head and neck were placed in slight extension, secured in caspar head galeana. Lateral x-rays were taken for localization and to assess cervical lordosis. Baseline motors were present in all 4s and reliable. SSEPs were unreliable in the LLE. 15 lbs of cervical traction was then placed and motors were repeated which were stable from baseline.  Fluoro showed satisfactory alignment.  The right neck was marked, prepped and draped in the usual sterile fashion. A timeout was performed prior to the procedure. Ten mL of Lidocaine with epinephrine were injected into the skin.     A transverse linear skin incision was made at the right neck and Weitlaner retractors were used to widen the incision. The platysma was divided sharply with Metzenbaum scissors. A sub-plastysmal dissection was carried out with Bovie electrocautery. A Jackson Dumont approach to the the anterior cervical spine was performed in the usual fashion. The carotid artery was palpated lateral to the working corridor. The prevertebral soft tissues were bluntly dissected with Kitner dissectors. A spinal needle was placed into the presumed C3/4 disc space, and was confirmed on lateral x-ray. Subperiosteal dissection was carried out at C3-6 vertebral bodies with Bovie electrocautery.   The Trimline retractor system was put into place. Kuna pins were placed at C3 and C6 and confirmed to be in good position on xray. They were then distracted to widen the disc spaces.     Discectomies were performed at C5/6 and C3/4 with straight and angled curettes and kerrison punches until the PLL was encountered.  The C5 vertebral body was removed with pituitaries as it was composed of tumor.  This was sent for pathology.  A trough was drilled in the C4 body.  Omnipaque was then placed in the trough and an AP fluoro shot confirmed our midline location.  The microscope was then brought into the field for microdissection and to complete our  C4/5 corpectomies.  The remaining portions of the C4,5 vertebral bodies were drilled out with high speed sri until the PLL was encountered from C3-6.  Greater than 50% of the C4/5 vertebral bodies were removed once completed.  There was copious bleeding which was controlled with gel foam, surgiflo, and bipolar.  A nerve hook was used to elevate the PLL from the underlying dura.  Kerrison punches and upgoing curettes were used to gently elevate the PLL from the underlying dura from C3-6.  Once complete there was excellent decompression from C3/4-5/6 centrally and out the foramen bilaterally from C3/4 to 5/6.  This was confirmed with nerve hook.  Hemostasis was then achieved with bipolar, surgiflo, and gel foam.  10 lbs of cervical traction was then removed from the tongs.     A cervical corpectomy trial was then placed into the defect and expanded until it was flush with the endplates.  We then countersunk an expandable titanium cervical corpectomy cage packed with synthetic bone into the defect and expanded it until it had a snug fit.  Fluoro showed excellent placement of hardware.  All weight was then removed from the GW tongs.   A cervical plate was then sized and secured to the C3 and C6 vertebral bodies using size 16 variable screws.  Fluoro showed excellent placement of hardware.  The screws were finally tightened and locked.  The trimline retractors were then removed.     The wound was then copiously irrigated with dilute betadine and saline.  Hemostasis was achieved with bipolar and surgiflo.  Depomedrol was placed over the esophagus.  A HV drain was left in the prevertebral space and tunneled out of the wound where it was secured with vicryl suture.  The wound was then closed in layers with 3/0 vicryls.  A subcuticular 4/0 monocryl was used for the skin.  The incision was then covered with dermabond.     Motors and SSEPs remained stable throughout the case from baseline.  I was present for all critical  portions of the procedure and all counts were correct.     The GW tongs were then removed from the patient and he was transferred onto his hospital bed where he was extubated and sent to the ICU for recovery.

## 2023-11-07 NOTE — HOSPITAL COURSE
11/7: Patient had partial embolization of C4 prior to admission to [facility], and is now s/p C4-C5 corpectomy with neurosurgery.   11/8: Post-operative day 1 after C4-C5 corpectomy. Pain well controlled, patient eating and drinking. Pending post-op cervical spine x-ray. Stable for transfer to medical oncology service.

## 2023-11-07 NOTE — PT/OT/SLP PROGRESS
Occupational Therapy      Patient Name:  Gamaliel Pete .   MRN:  [id number]    OT orders received and acknowledged, however patient not seen today secondary to Off the floor for procedure/surgery. Current OT orders discharged, will require new orders once medically appropriate.  11/7/2023

## 2023-11-07 NOTE — PROGRESS NOTES
[facility] - Neuro Critical Care  Neurocritical Care  Progress Note    Admit Date: 11/6/2023  Service Date: 11/07/2023  Length of Stay: 1    Subjective:     Chief Complaint: Metastatic cancer to spine    History of Present Illness: Justin Pete is a 73-year-old male with PMHx of HTN, asthma, RCC (s/p kidney resection) with lung and spinal mets (C4-5 and T9) who presents to [facility] unit post-operatively after DSA and partial embolization of C4. Pending OR tomorrow for C4-5 corpectomies. Patient tolerated procedure well and is being admitted for Q1H post-op neurological monitoring.     Hospital Course: 11/7: Patient had partial embolization of C4 prior to admission to [facility], and is now s/p C4-C5 corpectomy with neurosurgery.     Interval History:  Patient back from C4-C5 corpectomy. Tolerated surgery well but is complaining of neck pain and stiffness.     Review of Systems   Musculoskeletal:  Positive for neck pain and neck stiffness.   Neurological:  Negative for headaches.     Objective:     Vitals:  Temp: 98.7 °F (37.1 °C)  Pulse: 83  Rhythm: normal sinus rhythm  BP: (!) 157/81  MAP (mmHg): 105  Resp: 18  SpO2: 97 %    Temp  Min: 97.8 °F (36.6 °C)  Max: 98.7 °F (37.1 °C)  Pulse  Min: 72  Max: 106  BP  Min: 122/59  Max: 164/77  MAP (mmHg)  Min: 85  Max: 110  Resp  Min: 10  Max: 29  SpO2  Min: 91 %  Max: 99 %    11/06 0701 - 11/07 0700  In: 360 [P.O.:360]  Out: -    Unmeasured Output  Urine Occurrence: 1        Physical Exam  Vitals and nursing note reviewed.   Constitutional:       General: He is not in acute distress.     Appearance: He is well-developed. He is obese. He is not ill-appearing.   HENT:      Head: Normocephalic and atraumatic.      Right Ear: External ear normal.      Left Ear: External ear normal.      Nose: Nose normal.      Right Nostril: No epistaxis.      Left Nostril: No epistaxis.      Mouth/Throat:      Lips: Pink. No lesions.      Mouth: Mucous membranes are moist. No angioedema.   Eyes:       General: Lids are normal. No scleral icterus.     Conjunctiva/sclera:      Right eye: Right conjunctiva is not injected.      Left eye: Left conjunctiva is not injected.   Neck:      Vascular: No JVD.      Trachea: Phonation normal.   Cardiovascular:      Rate and Rhythm: Normal rate and regular rhythm.      Pulses: No decreased pulses.   Pulmonary:      Effort: Pulmonary effort is normal. No tachypnea, bradypnea or respiratory distress.   Abdominal:      General: Abdomen is flat.      Palpations: Abdomen is soft.      Tenderness: There is no abdominal tenderness.   Musculoskeletal:      Cervical back: No edema or erythema.      Right lower leg: No edema.      Left lower leg: No edema.      Comments: Surgical drain to suction in place at R anterior neck   Skin:     General: Skin is warm.   Neurological:      General: No focal deficit present.      Mental Status: He is oriented to person, place, and time. Mental status is at baseline.      Comments: Mentation: Awake, alert, and oriented x4;   Following commands, repetition intact.   EOMI, PERRL (2mm and brisk)  No pronator drift  RUE 5/5, RLE 5/5  LUE 5/5, LLE 5/5     Psychiatric:         Behavior: Behavior is cooperative.              Medications:  Continuoussodium chloride 0.9%, Last Rate: 75 mL/hr at 11/06/23 2202    Scheduledacetaminophen, 1,000 mg, Q8H  amLODIPine, 5 mg, Daily  famotidine (PF), 20 mg, BID  gabapentin, 300 mg, TID  [START ON 11/8/2023] heparin (porcine), 5,000 Units, Q8H  LIDOcaine (PF) 10 mg/ml (1%), 1 mL, Once  methocarbamoL, 500 mg, TID  polyethylene glycol, 17 g, Daily  senna-docusate 8.6-50 mg, 2 tablet, BID    PRNbisacodyL, 10 mg, Daily PRN  HYDROmorphone, 1 mg, Q6H PRN  labetalol, 10 mg, Q4H PRN  magnesium oxide, 800 mg, PRN  magnesium oxide, 800 mg, PRN  oxyCODONE-acetaminophen, 1 tablet, Q4H PRN  potassium bicarbonate, 35 mEq, PRN  potassium bicarbonate, 50 mEq, PRN  potassium bicarbonate, 60 mEq, PRN  potassium, sodium phosphates, 2  packet, PRN  potassium, sodium phosphates, 2 packet, PRN  potassium, sodium phosphates, 2 packet, PRN      Today I personally reviewed pertinent medications, lines/drains/airways, imaging, laboratory results, notably:    Diet  Diet Adult Regular (IDDSI Level 7)  Diet Adult Regular (IDDSI Level 7)        Assessment/Plan:     Cardiac/Vascular  Essential hypertension  Holding home amlodipine-Benazepril for hyperkalemia 5.5 prior to surgery  SBP < 160   Amlodipine 5 mg post-operatively   Labetalol PRN for SBP > 160     Renal/  SABINE (acute kidney injury)  Baseline Cr around 1.3  Current Cr 1.6    Strict I/O's  Goal UOP >0.5 ml/hr  Avoid nephrotoxic agents  Renally dose medications  Replete K, P, Mg as needed    Oncology  * 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 Went for C4-5 corpectomies on 11/7.    - Admit to [facility] for close neurologic monitoring  - Q1H neuro checks  - Q1H VS  - No head of bed restrictions per NSGY, HOB 30 when resting   - Surgical drain to full suction  - C collar when out of bed  - Discontinue Hoskins within 24 hours of surgery  - Resume diet as tolerated  - SBP goal <160   - PRN labetalol  - Goal euvolemia, eunatremia  - CXR: pending post-op  - EKG: pending post-op   - Daily CBC, CMP, Mg, Phos   - Replete electrolytes PRN  - SCDs, SubQ heparin at 24 hours post-op  - PT/OT/SLP consulted  - Nutrition, PMR, and SW/CM consulted    Clear cell carcinoma of right kidney  S/p R radical nephrectomy  Mets to lungs and spine    Endocrine  Class 1 obesity without serious comorbidity with body mass index (BMI) of 31.0 to 31.9 in adult  Body mass index is 31.44 kg/m².  Dietary, nutritional, weight loss counseling on discharge  Nutrition consulted, f/u recs  PT/OT evals pending    GI  Therapeutic opioid-induced constipation (OIC)  Senna-docusate BID  Miralax QD  PRN  suppository          The patient is being Prophylaxed for:  Venous Thromboembolism with: Mechanical  Stress Ulcer with: H2B  Ventilator Pneumonia with: not applicable    Activity Orders            Turn patient every 2 hours starting at 11/07 1400    Diet Adult Regular (IDDSI Level 7): Regular starting at 11/07 1302    Elevate HOB 30 starting at 11/06 2122          Full Code    Clyde Goodman MD  Neurocritical Care  [facility] - Neuro Critical Care

## 2023-11-07 NOTE — ASSESSMENT & PLAN NOTE
Baseline Cr around 1.3  Current Cr 1.6    Strict I/O's  Goal UOP >0.5 ml/hr  Avoid nephrotoxic agents  Renally dose medications  Replete K, P, Mg as needed

## 2023-11-07 NOTE — PLAN OF CARE
"[facility] Care Plan    POC reviewed with Gamaliel Pete Jr. and family at 0300. Pt verbalized understanding. Questions and concerns addressed. No acute events overnight. Pt progressing toward goals. Will continue to monitor. See below and flowsheets for full assessment and VS info.     - NPO after midnight for Corpectomy procedure.    - CHG bath given.      Is this a stroke patient? no    Neuro:  Stevo Coma Scale  Best Eye Response: 4-->(E4) spontaneous  Best Motor Response: 6-->(M6) obeys commands  Best Verbal Response: 5-->(V5) oriented  [place] Coma Scale Score: 15  Assessment Qualifiers: no eye obstruction present, patient not sedated/intubated  Pupil PERRLA: yes     24hr Temp:  [97.8 °F (36.6 °C)-98.4 °F (36.9 °C)]     CV:   Rhythm: normal sinus rhythm  BP goals:   SBP < 160  MAP > 65    Resp: 2L NC          Plan: N/A    GI/:     Diet/Nutrition Received: NPO  Last Bowel Movement: 11/06/23       Intake/Output Summary (Last 24 hours) at 11/7/2023 0532  Last data filed at 11/6/2023 2201  Gross per 24 hour   Intake 360 ml   Output --   Net 360 ml          Labs/Accuchecks:  Recent Labs   Lab 11/07/23  0247   WBC 5.47   RBC 3.82*   HGB 11.6*   HCT 33.8*         Recent Labs   Lab 11/07/23  0247      K 5.5*   CO2 21*      BUN 27*   CREATININE 1.4   ALKPHOS 93   ALT 73*   AST 31   BILITOT 0.3      Recent Labs   Lab 11/07/23  0337   INR 1.0   APTT 22.8    No results for input(s): "CPK", "CPKMB", "TROPONINI", "MB" in the last 168 hours.    Electrolytes: N/A - electrolytes WDL  Accuchecks: Q6H    Gtts:   sodium chloride 0.9% 75 mL/hr at 11/06/23 2202       LDA/Wounds:  Lines/Drains/Airways       Peripheral Intravenous Line  Duration                  Peripheral IV - Single Lumen 11/06/23 1245 20 G Left;Posterior Hand <1 day                  Wounds: Yes  Wound care consulted: Yes    "

## 2023-11-07 NOTE — INTERVAL H&P NOTE
The patient has been examined and the H&P has been reviewed:    I concur with the findings and no changes have occurred since H&P was written.    Surgery risks, benefits and alternative options discussed and understood by patient/family.          Active Hospital Problems    Diagnosis  POA    *Metastatic cancer to spine [C79.51]  Yes    Essential hypertension [I10]  Yes    Therapeutic opioid-induced constipation (OIC) [K59.03, T40.2X5A]  Yes    SABINE (acute kidney injury) [N17.9]  Yes    Clear cell carcinoma of right kidney [C64.1]  Yes    Class 1 obesity without serious comorbidity with body mass index (BMI) of 31.0 to 31.9 in adult [E66.9, Z68.31]  Not Applicable      Resolved Hospital Problems   No resolved problems to display.

## 2023-11-07 NOTE — ASSESSMENT & PLAN NOTE
73-year-old male with PMHx of HTN, asthma, RCC (s/p kidney resection) with lung and spinal mets (C4-5 and T9) admitted after DSA and partial embolization of C4. Pending OR tomorrow for C4-5 corpectomies.    - Admit to [facility] for close neurologic monitoring  - Q1H neuro checks  - Q1H VS  - NPO at midnight for OR    - SBP goal <160   - PRN labetalol  - Goal euvolemia, eunatremia  - CXR: pending  - EKG: pending     - PT/INR, PTT: pending prior to OR  - Daily CBC, CMP, Mg, Phos   - Replete electrolytes PRN  - SCDs, defer SQH until after surgery  - PT/OT/SLP consulted  - Nutrition, PMR, and SW/CM consulted

## 2023-11-07 NOTE — PLAN OF CARE
"  POC reviewed with Gamaliel Pete Jr. and family at 1400. Pt verbalized understanding. Questions and concerns addressed. No acute events today. Pt progressing toward goals. Will continue to monitor. See below and flowsheets for full assessment and VS info.     - Pt returned from OR s/p corpectomy @ 12:52. Hemovac drain in place to full suction. Arterial line in place.   - Carrillo passed post-op.   - Dilaudid 1 mg given x 1.   - Oxycodone 10 given x 1.   - Post-op cervical spine xrays completed.    Neuro:  Stevo Coma Scale  Best Eye Response: 4-->(E4) spontaneous  Best Motor Response: 6-->(M6) obeys commands  Best Verbal Response: 5-->(V5) oriented  [place] Coma Scale Score: 15  Assessment Qualifiers: patient not sedated/intubated, no eye obstruction present  Pupil PERRLA: yes     24 hr Temp:  [97.8 °F (36.6 °C)-98.7 °F (37.1 °C)]     CV:   Rhythm: sinus tachycardia  BP goals:   SBP < 160  MAP > 65    Resp:           Plan: N/A    GI/:     Diet/Nutrition Received: NPO  Last Bowel Movement: 11/03/23       Intake/Output Summary (Last 24 hours) at 11/7/2023 1412  Last data filed at 11/7/2023 1401  Gross per 24 hour   Intake 4056.33 ml   Output 1525 ml   Net 2531.33 ml     Unmeasured Output  Urine Occurrence: 1    Labs/Accuchecks:  Recent Labs   Lab 11/07/23  0247 11/07/23  1010   WBC 5.47  --    RBC 3.82*  --    HGB 11.6*  --    HCT 33.8* 26*     --       Recent Labs   Lab 11/07/23  0247 11/07/23  0653     --    K 5.5* 4.7   CO2 21*  --      --    BUN 27*  --    CREATININE 1.4  --    ALKPHOS 93  --    ALT 73*  --    AST 31  --    BILITOT 0.3  --       Recent Labs   Lab 11/07/23  0337   INR 1.0   APTT 22.8    No results for input(s): "CPK", "CPKMB", "TROPONINI", "MB" in the last 168 hours.    Electrolytes: N/A - electrolytes WDL  Accuchecks: none    Gtts:   sodium chloride 0.9% 75 mL/hr at 11/06/23 2202       LDA/Wounds:  Lines/Drains/Airways       Drain  Duration                  Closed/Suction " Drain 11/07/23 1215 Tube - 1 Anterior Neck 10 Fr. <1 day         Urethral Catheter 11/07/23 0737 Non-latex;Straight-tip;Double-lumen 16 Fr. <1 day              Arterial Line  Duration             Arterial Line 11/07/23 0804 Left Radial <1 day              Peripheral Intravenous Line  Duration                  Peripheral IV - Single Lumen 11/06/23 1245 20 G Left;Posterior Hand 1 day         Peripheral IV - Single Lumen 11/07/23 0700 20 G Anterior;Right Forearm <1 day         Peripheral IV - Single Lumen 11/07/23 0742 16 G Right Forearm <1 day                  Wounds: Yes  Wound care consulted: No    Is this a stroke patient? No.      Problem: Adult Inpatient Plan of Care  Goal: Plan of Care Review  Outcome: Ongoing, Progressing     Problem: Adult Inpatient Plan of Care  Goal: Optimal Comfort and Wellbeing  Outcome: Ongoing, Progressing     Problem: Infection  Goal: Absence of Infection Signs and Symptoms  Outcome: Ongoing, Progressing

## 2023-11-07 NOTE — ASSESSMENT & PLAN NOTE
Baseline Cr around 1.3  Current Cr 1.6  NS @75mL/hr while NPO for OR  Strict I/O's  Goal UOP >0.5 ml/hr  Avoid nephrotoxic agents

## 2023-11-08 LAB
ALBUMIN SERPL BCP-MCNC: 3.2 G/DL (ref 3.5–5.2)
ALP SERPL-CCNC: 91 U/L (ref 55–135)
ALT SERPL W/O P-5'-P-CCNC: 55 U/L (ref 10–44)
ANION GAP SERPL CALC-SCNC: 10 MMOL/L (ref 8–16)
AST SERPL-CCNC: 27 U/L (ref 10–40)
BASOPHILS # BLD AUTO: 0.01 K/UL (ref 0–0.2)
BASOPHILS NFR BLD: 0.1 % (ref 0–1.9)
BILIRUB SERPL-MCNC: 0.3 MG/DL (ref 0.1–1)
BUN SERPL-MCNC: 25 MG/DL (ref 8–23)
CALCIUM SERPL-MCNC: 9.4 MG/DL (ref 8.7–10.5)
CHLORIDE SERPL-SCNC: 106 MMOL/L (ref 95–110)
CO2 SERPL-SCNC: 24 MMOL/L (ref 23–29)
CREAT SERPL-MCNC: 1.4 MG/DL (ref 0.5–1.4)
DIFFERENTIAL METHOD: ABNORMAL
EOSINOPHIL # BLD AUTO: 0 K/UL (ref 0–0.5)
EOSINOPHIL NFR BLD: 0 % (ref 0–8)
ERYTHROCYTE [DISTWIDTH] IN BLOOD BY AUTOMATED COUNT: 13.3 % (ref 11.5–14.5)
EST. GFR  (NO RACE VARIABLE): 53.7 ML/MIN/1.73 M^2
GLUCOSE SERPL-MCNC: 117 MG/DL (ref 70–110)
HCT VFR BLD AUTO: 27.8 % (ref 40–54)
HGB BLD-MCNC: 9.2 G/DL (ref 14–18)
IMM GRANULOCYTES # BLD AUTO: 0.04 K/UL (ref 0–0.04)
IMM GRANULOCYTES NFR BLD AUTO: 0.4 % (ref 0–0.5)
LYMPHOCYTES # BLD AUTO: 1.1 K/UL (ref 1–4.8)
LYMPHOCYTES NFR BLD: 12.3 % (ref 18–48)
MAGNESIUM SERPL-MCNC: 2.3 MG/DL (ref 1.6–2.6)
MCH RBC QN AUTO: 29.2 PG (ref 27–31)
MCHC RBC AUTO-ENTMCNC: 33.1 G/DL (ref 32–36)
MCV RBC AUTO: 88 FL (ref 82–98)
MONOCYTES # BLD AUTO: 0.7 K/UL (ref 0.3–1)
MONOCYTES NFR BLD: 7.5 % (ref 4–15)
NEUTROPHILS # BLD AUTO: 7.2 K/UL (ref 1.8–7.7)
NEUTROPHILS NFR BLD: 79.7 % (ref 38–73)
NRBC BLD-RTO: 0 /100 WBC
PHOSPHATE SERPL-MCNC: 4.7 MG/DL (ref 2.7–4.5)
PLATELET # BLD AUTO: 200 K/UL (ref 150–450)
PMV BLD AUTO: 8.9 FL (ref 9.2–12.9)
POTASSIUM SERPL-SCNC: 5.1 MMOL/L (ref 3.5–5.1)
PROT SERPL-MCNC: 6.8 G/DL (ref 6–8.4)
RBC # BLD AUTO: 3.15 M/UL (ref 4.6–6.2)
SODIUM SERPL-SCNC: 140 MMOL/L (ref 136–145)
WBC # BLD AUTO: 9.02 K/UL (ref 3.9–12.7)

## 2023-11-08 PROCEDURE — 99291 PR CRITICAL CARE, E/M 30-74 MINUTES: ICD-10-PCS | Mod: GC,,, | Performed by: INTERNAL MEDICINE

## 2023-11-08 PROCEDURE — 25000003 PHARM REV CODE 250

## 2023-11-08 PROCEDURE — 99291 CRITICAL CARE FIRST HOUR: CPT | Mod: GC,,, | Performed by: INTERNAL MEDICINE

## 2023-11-08 PROCEDURE — 25000003 PHARM REV CODE 250: Performed by: PHYSICIAN ASSISTANT

## 2023-11-08 PROCEDURE — 97165 OT EVAL LOW COMPLEX 30 MIN: CPT

## 2023-11-08 PROCEDURE — 63600175 PHARM REV CODE 636 W HCPCS

## 2023-11-08 PROCEDURE — 99900035 HC TECH TIME PER 15 MIN (STAT)

## 2023-11-08 PROCEDURE — 63600175 PHARM REV CODE 636 W HCPCS: Performed by: PHYSICIAN ASSISTANT

## 2023-11-08 PROCEDURE — 83735 ASSAY OF MAGNESIUM: CPT | Performed by: PHYSICIAN ASSISTANT

## 2023-11-08 PROCEDURE — 25000003 PHARM REV CODE 250: Performed by: STUDENT IN AN ORGANIZED HEALTH CARE EDUCATION/TRAINING PROGRAM

## 2023-11-08 PROCEDURE — 20600001 HC STEP DOWN PRIVATE ROOM

## 2023-11-08 PROCEDURE — 94761 N-INVAS EAR/PLS OXIMETRY MLT: CPT

## 2023-11-08 PROCEDURE — 97112 NEUROMUSCULAR REEDUCATION: CPT

## 2023-11-08 PROCEDURE — 97530 THERAPEUTIC ACTIVITIES: CPT

## 2023-11-08 PROCEDURE — 84100 ASSAY OF PHOSPHORUS: CPT | Performed by: PHYSICIAN ASSISTANT

## 2023-11-08 PROCEDURE — 80053 COMPREHEN METABOLIC PANEL: CPT | Performed by: PHYSICIAN ASSISTANT

## 2023-11-08 PROCEDURE — 85025 COMPLETE CBC W/AUTO DIFF WBC: CPT | Performed by: PHYSICIAN ASSISTANT

## 2023-11-08 PROCEDURE — 97162 PT EVAL MOD COMPLEX 30 MIN: CPT

## 2023-11-08 RX ORDER — POLYETHYLENE GLYCOL 3350 17 G/17G
17 POWDER, FOR SOLUTION ORAL 2 TIMES DAILY
Status: DISCONTINUED | OUTPATIENT
Start: 2023-11-08 | End: 2023-11-09 | Stop reason: HOSPADM

## 2023-11-08 RX ORDER — OXYCODONE HYDROCHLORIDE 10 MG/1
10 TABLET ORAL EVERY 4 HOURS PRN
Status: DISCONTINUED | OUTPATIENT
Start: 2023-11-08 | End: 2023-11-09 | Stop reason: HOSPADM

## 2023-11-08 RX ADMIN — CEFAZOLIN 2 G: 2 INJECTION, POWDER, FOR SOLUTION INTRAMUSCULAR; INTRAVENOUS at 10:11

## 2023-11-08 RX ADMIN — SENNOSIDES AND DOCUSATE SODIUM 2 TABLET: 8.6; 5 TABLET ORAL at 09:11

## 2023-11-08 RX ADMIN — HYDROMORPHONE HYDROCHLORIDE 1 MG: 1 INJECTION, SOLUTION INTRAMUSCULAR; INTRAVENOUS; SUBCUTANEOUS at 02:11

## 2023-11-08 RX ADMIN — CEFAZOLIN 2 G: 2 INJECTION, POWDER, FOR SOLUTION INTRAMUSCULAR; INTRAVENOUS at 06:11

## 2023-11-08 RX ADMIN — POLYETHYLENE GLYCOL 3350 17 G: 17 POWDER, FOR SOLUTION ORAL at 09:11

## 2023-11-08 RX ADMIN — HEPARIN SODIUM 5000 UNITS: 5000 INJECTION INTRAVENOUS; SUBCUTANEOUS at 09:11

## 2023-11-08 RX ADMIN — LABETALOL HYDROCHLORIDE 10 MG: 5 INJECTION, SOLUTION INTRAVENOUS at 11:11

## 2023-11-08 RX ADMIN — METHOCARBAMOL 500 MG: 500 TABLET ORAL at 09:11

## 2023-11-08 RX ADMIN — GABAPENTIN 300 MG: 300 CAPSULE ORAL at 09:11

## 2023-11-08 RX ADMIN — CEFAZOLIN 2 G: 2 INJECTION, POWDER, FOR SOLUTION INTRAMUSCULAR; INTRAVENOUS at 03:11

## 2023-11-08 RX ADMIN — OXYCODONE AND ACETAMINOPHEN 1 TABLET: 10; 325 TABLET ORAL at 10:11

## 2023-11-08 RX ADMIN — AMLODIPINE BESYLATE 5 MG: 5 TABLET ORAL at 09:11

## 2023-11-08 RX ADMIN — METHOCARBAMOL 500 MG: 500 TABLET ORAL at 03:11

## 2023-11-08 RX ADMIN — ACETAMINOPHEN 1000 MG: 500 TABLET ORAL at 09:11

## 2023-11-08 RX ADMIN — LABETALOL HYDROCHLORIDE 10 MG: 5 INJECTION, SOLUTION INTRAVENOUS at 03:11

## 2023-11-08 RX ADMIN — ACETAMINOPHEN 1000 MG: 500 TABLET ORAL at 06:11

## 2023-11-08 RX ADMIN — OXYCODONE HYDROCHLORIDE 10 MG: 10 TABLET ORAL at 05:11

## 2023-11-08 RX ADMIN — HEPARIN SODIUM 5000 UNITS: 5000 INJECTION INTRAVENOUS; SUBCUTANEOUS at 01:11

## 2023-11-08 RX ADMIN — GABAPENTIN 300 MG: 300 CAPSULE ORAL at 03:11

## 2023-11-08 NOTE — PT/OT/SLP EVAL
Physical Therapy Co-Evaluation and Treatment    Patient Name: Gamaliel Pete Jr.   MRN: [id number]  Recent Surgery: * No procedures listed * 2 Days Post-Op    Recommendations:     Discharge Recommendations: Low Intensity Therapy    Discharge Equipment Recommendations: walker, rolling, bedside commode, hospital bed   Barriers to discharge: Increased level of assist    Assessment:     Gamaliel Pete Jr. is a 73 y.o. male admitted with a medical diagnosis of Metastatic cancer to spine. He presents with the following impairments/functional limitations: weakness, impaired self care skills, impaired functional mobility, impaired endurance, gait instability, impaired balance, decreased upper extremity function, decreased lower extremity function, decreased safety awareness, pain, orthopedic precautions. Pt reported discomfort from ill fitting cervical collar, w/ brace unable to be properly donned so was doffed and OOB mobility deferred. He demonstrates good strength and ROM, able to scoot along EOB w/ minimal increase in pain.    Rehab Prognosis: Good; patient would benefit from acute skilled PT services to address these deficits and reach maximum level of function.  Recent Surgery: * No procedures listed * 2 Days Post-Op    Plan:     During this hospitalization, patient to be seen 4 x/week to address the identified rehab impairments via gait training, therapeutic activities, therapeutic exercises, neuromuscular re-education and progress toward the following goals:    Plan of Care Expires: 12/08/23    Subjective     Chief Complaint: neck and back pain  Patient/Family Comments/Goals: pt reported discomfort from ill fitting cervical collar, w/ brace unable to be properly donned so was doffed and OOB mobility deferred  Pain/Comfort:  Pain Rating 1: 5/10  Location 1: neck  Pain Addressed 1: Pre-medicate for activity, Reposition, Distraction, Cessation of Activity  Pain Rating Post-Intervention 1: 7/10    Patients  cultural, spiritual, Latter day conflicts given the current situation: no    Social History:  Living Environment: Patient lives with their spouse in a 2-story house, number of outside stairs: 5, has been staying on first floor and sleeping in a recliner . Unable to go up stairs to bedroom 2/2 pain  Prior Level of Function: Prior to admission, patient was independent with ADLs.  Equipment Used at Home: none    DME owned (not currently used): none  Assistance Upon Discharge: family    Objective:     Communicated with RN prior to session. Patient found HOB elevated with telemetry, blood pressure cuff, pulse ox (continuous), SCD, peripheral IV, arterial line upon PT entry to room Co-evaluation w/ OT 2/2 suspected pt complexity and requirement of assistance from 2 skilled therapists to maximize treatment potential and maintain pt safety     General Precautions: Standard, fall   Orthopedic Precautions:spinal precautions   Braces: Cervical collar  Respiratory Status: Nasal cannula, flow 0.5 L/min    Exams:  Cognitive Exam: Patient is oriented to Person, Place, Time, Situation, follows commands 100% of the time  RLE ROM: WFL  RLE Strength: WFL  LLE ROM: WFL  LLE Strength: WFL  Sensation: Intact light touch to BLEs    Functional Mobility:  Bed Mobility:  Supine to Sit: stand by assistance  Sit to Supine: stand by assistance  Balance:   Static Sitting: stand by assistance at EOB  Dynamic Sitting: contact guard assistance at EOB    Therapeutic Activities and Exercises:  Patient educated on role of acute care PT and PT POC, safety while in hospital including calling nurse for mobility, and call light usage  Patient performed 2 set(s) of 5 repetitions of  the following seated exercises: ankle pumps, long arc quads, and marches for bilateral LE. Patient required skilled PT for instruction of exercises and appropriate cues to perform exercises safely and appropriately  Educated on spinal precautions including avoidance of bending,  lifting, and twisting. Patient expresses understanding. Educated on log roll technique, performed to left  Patient educated about importance of OOB mobility  Pt educated on use of C collar for OOB mobility and assisted w/ donning/doffing brace w/ step-by-step cueing provided w/ goal to progress to pt being able to independently don/doff brace w/ family assist as needed. Pt informed they are required to wear this brace until told otherwise by MD. Pt verbalized understanding.     Patient not clear to transfer with RN/PCT, medi-chair transfer via drawsheet only.    AM-PAC 6 CLICK MOBILITY  Turning over in bed (including adjusting bedclothes, sheets and blankets)?: 4  Sitting down on and standing up from a chair with arms (e.g., wheelchair, bedside commode, etc.): 3  Moving from lying on back to sitting on the side of the bed?: 3  Moving to and from a bed to a chair (including a wheelchair)?: 3  Need to walk in hospital room?: 2  Climbing 3-5 steps with a railing?: 2  Basic Mobility Total Score: 17     Patient left HOB elevated with all lines intact, call button in reach, RN notified, bed alarm on, and spouse present.    GOALS:   Multidisciplinary Problems       Physical Therapy Goals          Problem: Physical Therapy    Goal Priority Disciplines Outcome Goal Variances Interventions   Physical Therapy Goal     PT, PT/OT Ongoing, Progressing     Description: Goals to be completed by: 12/8/23    Pt will perform sup<>sit transfers w/ independence  Pt will have sufficient dynamic balance to sit EOB while performing ADLs/therex w/ independence  Pt will be able to stand up from EOB w/ independence using LRAD  Pt will ambulate 250 feet w/ modified independence using LRAD  Pt will be independent w/ HEP therex on BLE w/ good form and ROM   Pt will be independent w/ donning/doffing brace and recalling 3/3 spinal precautions                        History:     Past Medical History:   Diagnosis Date    Asthma     no inhaler use     Borderline hypertension     ED (erectile dysfunction)     Gout     Hematuria     Hypertension        Past Surgical History:   Procedure Laterality Date    COLONOSCOPY  02/10/2022    COLONOSCOPY N/A 02/10/2022    Procedure: COLONOSCOPY;  Surgeon: Desmond Keenan MD;  Location: [facility];  Service: General;  Laterality: N/A;    ROBOT-ASSISTED LAPAROSCOPIC NEPHRECTOMY Right 10/4/2023    Procedure: ROBOTIC NEPHRECTOMY;  Surgeon: Henry Michaels MD;  Location: [facility];  Service: Urology;  Laterality: Right;    SURGICAL REMOVAL OF VERTEBRAL BODY OF CERVICAL SPINE Right 11/7/2023    Procedure: CORPECTOMY, SPINE, CERVICAL;  Surgeon: Nasim Martinez DO;  Location: [address];  Service: Neurosurgery;  Laterality: Right;  C4 and C5 corpectomy with globus;  wells tongs; c-arm; neuromonitoring; microscope; type and cross 2 units    TONSILLECTOMY         Time Tracking:     PT Received On: 11/08/23  PT Start Time: 1304  PT Stop Time: 1330  PT Total Time (min): 26 min     Billable Minutes: Evaluation 11 and Neuromuscular Re-education 15    11/08/2023

## 2023-11-08 NOTE — PT/OT/SLP EVAL
Occupational Therapy   Co-Evaluation    Name: Gamaliel Pete Jr.  MRN: [id number]  Admitting Diagnosis: Metastatic cancer to spine  Recent Surgery: * No procedures listed * 2 Days Post-Op    Co-evaluation/treatment performed due to patient's multiple deficits requiring two skilled therapists to appropriately and safely assess patient's strength and endurance while facilitating functional tasks in addition to accommodating for patient's activity tolerance.    Recommendations:     Discharge Recommendations: Low Intensity Therapy  Discharge Equipment Recommendations:  walker, rolling, bedside commode, hospital bed  Barriers to discharge:  Inaccessible home environment    Assessment:     Gamaliel Pete Jr. is a 73 y.o. male with a medical diagnosis of Metastatic cancer to spine.  He presents with performance deficits affecting function: impaired balance, weakness, impaired endurance, orthopedic precautions, pain, decreased lower extremity function, gait instability, impaired functional mobility, impaired self care skills, decreased coordination, decreased safety awareness.    Pt tolerated tx well but unable to complete formal evaluation 2/2 ill fitting cervical collar. Pt reported discomfort and requested collar to removed so therapists unable to assess all aspects of functional mobility. Pt with good strength and ROM but limited 2/2 pain, spinal precautions, and lines intact. Pt will continue to benefit from skilled OT services to address impairments listed above.     Rehab Prognosis: Good; patient would benefit from acute skilled OT services to address these deficits and reach maximum level of function.       Plan:     Patient to be seen 3 x/week to address the above listed problems via self-care/home management, therapeutic exercises, therapeutic activities, neuromuscular re-education  Plan of Care Expires: 12/08/23  Plan of Care Reviewed with: patient, spouse    Subjective     Chief Complaint: low back  pain  Patient/Family Comments/goals: pt requested to remove cervical collar 2/2 discomfort from not fitting appropriately so OOB mobility was deferred     Occupational Profile:  Living Environment: Patient lives with their spouse in a 2-story house, number of outside stairs: 5, has been staying on first floor and sleeping in a recliner . Unable to go up stairs to bedroom 2/2 pain  Prior Level of Function: Prior to admission, patient was independent with ADLs but wife assisted him with socks and shoes   Equipment Used at Home: none  Assistance upon Discharge: wife    Pain/Comfort:  Pain Rating 1: 5/10  Location - Side 1: Right  Location - Orientation 1: lower  Location 1: back  Pain Addressed 1: Reposition, Distraction, Cessation of Activity  Pain Rating Post-Intervention 1: 7/10    Patients cultural, spiritual, Bahai conflicts given the current situation: no    Objective:     Communicated with: RN prior to session.  Patient found HOB elevated with arterial line, blood pressure cuff, pulse ox (continuous), SCD, peripheral IV upon OT entry to room.    General Precautions: Standard, fall  Orthopedic Precautions: spinal precautions  Braces: Cervical collar  Respiratory Status: Nasal cannula, flow 0.5 L/min    Occupational Performance:    Bed Mobility:    Patient completed Rolling/Turning to Left with  stand by assistance  Patient completed Scooting/Bridging with stand by assistance  Patient completed Supine to Sit with stand by assistance  Patient completed Sit to Supine with stand by assistance    Functional Mobility/Transfers:  Deferred 2/2 ill fitting cervical collar  Balance- sitting balance CGA-Min A seated EOB holding onto bed rail, pt able to let go of bed rail towards end of session for brief period of time  Activities of Daily Living:  Grooming: no need at this time      Cognitive/Visual Perceptual:  Cognitive/Psychosocial Skills:     -       Oriented to: Person, Place, Time, and Situation   -        Follows Commands/attention:Follows two-step commands  -       Communication: clear/fluent  -       Safety awareness/insight to disability: intact     Physical Exam:  Upper Extremity Range of Motion:     -       Right Upper Extremity: WFL  -       Left Upper Extremity: WFL  Upper Extremity Strength:    -       Right Upper Extremity: WFL  -       Left Upper Extremity: WFL   Strength:    -       Right Upper Extremity: WFL  -       Left Upper Extremity: WFL    AMPAC 6 Click ADL:  AMPAC Total Score: 20    Treatment & Education:  Pt educated on   Role of OT and OT POC  Use of cervical collar when OOB   Educated on spinal precautions, expressed understanding  Safe transfer techniques and proper body mechanics for fall prevention and improved independence with functional transfers  Importance of OOB activities to increase endurance and tolerance for increased participation in daily ADLs    Utilizing the call bell to request for assistance with all functional mobility to ensure safety during hospital stay.    Pt verbalized understanding and all questions were addressed within the scope of OT.       Patient left HOB elevated with all lines intact, call button in reach, RN notified, and spouse present    GOALS:   Multidisciplinary Problems       Occupational Therapy Goals          Problem: Occupational Therapy    Goal Priority Disciplines Outcome Interventions   Occupational Therapy Goal     OT, PT/OT Ongoing, Progressing    Description: Goals to be met by: 11/15/23     Patient will increase functional independence with ADLs by performing:    Feeding with Modified [place].  UE Dressing with [place].  LE Dressing with Minimal Assistance.  Grooming while standing at sink with Stand-by Assistance.  Step transfer with Supervision  Toilet transfer to toilet with Supervision.                         History:     Past Medical History:   Diagnosis Date    Asthma     no inhaler use    Borderline hypertension     ED  (erectile dysfunction)     Gout     Hematuria     Hypertension          Past Surgical History:   Procedure Laterality Date    COLONOSCOPY  02/10/2022    COLONOSCOPY N/A 02/10/2022    Procedure: COLONOSCOPY;  Surgeon: Desmond Keenan MD;  Location: [facility];  Service: General;  Laterality: N/A;    ROBOT-ASSISTED LAPAROSCOPIC NEPHRECTOMY Right 10/4/2023    Procedure: ROBOTIC NEPHRECTOMY;  Surgeon: Henry Michaels MD;  Location: [facility];  Service: Urology;  Laterality: Right;    SURGICAL REMOVAL OF VERTEBRAL BODY OF CERVICAL SPINE Right 11/7/2023    Procedure: CORPECTOMY, SPINE, CERVICAL;  Surgeon: Nasim Martinez DO;  Location: [address];  Service: Neurosurgery;  Laterality: Right;  C4 and C5 corpectomy with globus;  wells tongs; c-arm; neuromonitoring; microscope; type and cross 2 units    TONSILLECTOMY         Time Tracking:     OT Date of Treatment: 11/08/23  OT Start Time: 1304  OT Stop Time: 1330  OT Total Time (min): 26 min    Billable Minutes:Evaluation 11  Therapeutic Activity 15    11/8/2023

## 2023-11-08 NOTE — PLAN OF CARE
"  POC reviewed with Gamaliel Pete Jr. and family at 1400. Pt verbalized understanding. Questions and concerns addressed. No acute events today. Pt progressing toward goals. Will continue to monitor. See below and flowsheets for full assessment and VS info.     - Oxycodone given x 1 for neck pain.   - Transfer orders to ONC, room is ready, waiting for bed to become ready. Report has been called.   - Hoskins and arterial line discontinued.   - Regular size c-collar ordered for PT/OT.   - Pending spinal x-rays.     Neuro:  Stevo Coma Scale  Best Eye Response: 4-->(E4) spontaneous  Best Motor Response: 6-->(M6) obeys commands  Best Verbal Response: 5-->(V5) oriented  Stevo Coma Scale Score: 15  Assessment Qualifiers: patient not sedated/intubated, no eye obstruction present  Pupil PERRLA: yes     24 hr Temp:  [97.7 °F (36.5 °C)-98.6 °F (37 °C)]     CV:   Rhythm: normal sinus rhythm  BP goals:   SBP < 160  MAP > 65    Resp:           Plan: N/A    GI/:     Diet/Nutrition Received: regular  Last Bowel Movement: 11/03/23  Voiding Characteristics: urethral catheter (bladder)    Intake/Output Summary (Last 24 hours) at 11/8/2023 1425  Last data filed at 11/8/2023 1301  Gross per 24 hour   Intake 1541.38 ml   Output 2214 ml   Net -672.62 ml     Unmeasured Output  Urine Occurrence: 1    Labs/Accuchecks:  Recent Labs   Lab 11/08/23  0245   WBC 9.02   RBC 3.15*   HGB 9.2*   HCT 27.8*         Recent Labs   Lab 11/08/23  0245      K 5.1   CO2 24      BUN 25*   CREATININE 1.4   ALKPHOS 91   ALT 55*   AST 27   BILITOT 0.3      Recent Labs   Lab 11/07/23  0337   INR 1.0   APTT 22.8    No results for input(s): "CPK", "CPKMB", "TROPONINI", "MB" in the last 168 hours.    Electrolytes: N/A - electrolytes WDL  Accuchecks: none    Gtts:      LDA/Wounds:  Lines/Drains/Airways       Drain  Duration                  Closed/Suction Drain 11/07/23 1215 Tube - 1 Anterior Neck 10 Fr. 1 day              Peripheral " Intravenous Line  Duration                  Peripheral IV - Single Lumen 11/07/23 0700 20 G Anterior;Right Forearm 1 day         Peripheral IV - Single Lumen 11/07/23 0742 16 G Right Forearm 1 day                  Wounds: Yes  Wound care consulted: No    Is this a stroke patient? No.      Problem: Adult Inpatient Plan of Care  Goal: Plan of Care Review  11/8/2023 1425 by Padmini Palomo RN  Outcome: Adequate for Care Transition  11/8/2023 1425 by Padmini Palomo RN  Outcome: Ongoing, Progressing     Problem: Adult Inpatient Plan of Care  Goal: Optimal Comfort and Wellbeing  11/8/2023 1425 by Padmini Palomo RN  Outcome: Adequate for Care Transition  11/8/2023 1425 by Padmini Palomo RN  Outcome: Ongoing, Progressing     Problem: Adult Inpatient Plan of Care  Goal: Readiness for Transition of Care  11/8/2023 1425 by Padmini Palomo RN  Outcome: Adequate for Care Transition  11/8/2023 1425 by Padmini Palomo RN  Outcome: Ongoing, Progressing     Problem: Infection  Goal: Absence of Infection Signs and Symptoms  11/8/2023 1425 by Padmini Palomo RN  Outcome: Adequate for Care Transition  11/8/2023 1425 by Padmini Palomo RN  Outcome: Ongoing, Progressing

## 2023-11-08 NOTE — PROGRESS NOTES
[facility]  Neurosurgery  Progress Note    Subjective:     History of Present Illness: No notes on file    Post-Op Info:  * No procedures listed *   2 Days Post-Op     Interval History: 11/8: POD 1 from C4-C5 corpectomy. BOAZ. CLAUDETTE. HV w 4cc output ss. Pending post op films. Okay to TTF to oncology    Medications:  Continuous Infusions:   sodium chloride 0.9% 75 mL/hr at 11/06/23 2202     Scheduled Meds:   acetaminophen  1,000 mg Oral Q8H    amLODIPine  5 mg Oral Daily    ceFAZolin (ANCEF) IVPB  2 g Intravenous Q8H    gabapentin  300 mg Oral TID    heparin (porcine)  5,000 Units Subcutaneous Q8H    LIDOcaine (PF) 10 mg/ml (1%)  1 mL Other Once    methocarbamoL  500 mg Oral TID    polyethylene glycol  17 g Oral BID    senna-docusate 8.6-50 mg  2 tablet Oral BID     PRN Meds:bisacodyL, HYDROmorphone, labetalol, magnesium oxide, magnesium oxide, oxyCODONE-acetaminophen, potassium bicarbonate, potassium bicarbonate, potassium bicarbonate, potassium, sodium phosphates, potassium, sodium phosphates, potassium, sodium phosphates     Review of Systems  Objective:     Weight: 102.4 kg (225 lb 12 oz)  Body mass index is 32.39 kg/m².  Vital Signs (Most Recent):  Temp: 98.1 °F (36.7 °C) (11/08/23 0701)  Pulse: 91 (11/08/23 1033)  Resp: 20 (11/08/23 1050)  BP: (!) 150/68 (11/08/23 1001)  SpO2: 96 % (11/08/23 1001) Vital Signs (24h Range):  Temp:  [97.7 °F (36.5 °C)-98.4 °F (36.9 °C)] 98.1 °F (36.7 °C)  Pulse:  [] 91  Resp:  [8-24] 20  SpO2:  [95 %-99 %] 96 %  BP: (123-176)/(58-79) 150/68  Arterial Line BP: (115-169)/(37-65) 150/49     Date 11/08/23 0700 - 11/09/23 0659   Shift 5925-5302 2106-1518 6021-8807 24 Hour Total   INTAKE   P.O. 550   550   IV Piggyback 49.6   49.6   Shift Total(mL/kg) 599.6(5.9)   599.6(5.9)   OUTPUT   Urine(mL/kg/hr) 340   340   Shift Total(mL/kg) 340(3.3)   340(3.3)   Weight (kg) 102.4 102.4 102.4 102.4       Neurosurgery Physical Exam  General: well developed,  well nourished, no distress.   Head: normocephalic, atraumatic  Neurologic: Alert and oriented. Thought content appropriate.  GCS: Motor: 6/Verbal: 5/Eyes: 4 GCS Total: 15  Mental Status: Awake, Alert, Oriented x 4  Language: No aphasia  Speech: No dysarthria  Cranial nerves: face symmetric, tongue midline, CN II-XII grossly intact.   Eyes: pupils equal, round, reactive to light with accommodation, EOMI.   Pulmonary: normal respirations, no signs of respiratory distress  Skin: Skin is warm, dry and intact.  Sensory: intact to light touch throughout  Motor Strength:Moves all extremities spontaneously with good tone.  Full strength upper and lower extremities. No abnormal movements seen.     Strength  Deltoids Triceps Biceps Wrist Extension Wrist Flexion Hand    Upper: R 5/5 5/5 5/5 5/5 5/5 5/5    L 5/5 5/5 5/5 5/5 5/5 5/5     Iliopsoas Quadriceps Knee  Flexion Tibialis  anterior Gastro- cnemius EHL   Lower: R 5/5 5/5 5/5 5/5 5/5 5/5    L 5/5 5/5 5/5 5/5 5/5 5/5     DTR: 2+ symmetrically throughout.  Maldonado's: Negative.  Clonus: Negative.    Hvx1 in place, ss scant output  Incision c/d/i        Significant Labs:  Recent Labs   Lab 11/07/23  0247 11/07/23  0653 11/08/23  0245   *  --  117*     --  140   K 5.5* 4.7 5.1     --  106   CO2 21*  --  24   BUN 27*  --  25*   CREATININE 1.4  --  1.4   CALCIUM 9.8  --  9.4   MG 1.9  --  2.3       Recent Labs   Lab 11/07/23  0247 11/07/23  1010 11/08/23  0245   WBC 5.47  --  9.02   HGB 11.6*  --  9.2*   HCT 33.8* 26* 27.8*     --  200       Recent Labs   Lab 11/07/23  0337   INR 1.0   APTT 22.8     Microbiology Results (last 7 days)       ** No results found for the last 168 hours. **          All pertinent labs from the last 24 hours have been reviewed.    Significant Diagnostics:  I have reviewed all pertinent imaging results/findings within the past 24 hours.  X-Ray Chest AP Portable    Result Date: 11/7/2023  See above Electronically signed  by: Fred Garcia MD Date:    11/07/2023 Time:    14:34       Assessment/Plan:     * Metastatic cancer to spine  73-year-old male with PMHx of HTN, asthma, RCC (s/p kidney resection) with lung and spinal mets (C4-5 and T9).  Imaging showed complete involvement of the C5 vertebral body and partial of the C4 vertebral body.  His cervical spine demonstrated impending instability due to the involvement of the tumor.  He underwent preop embolization of the cervical tumor with IR on 11/6/23.  He was offered a C4/5 anterior cervical corpectomy in order to obtain tissue diagnosis, resect the lesions, and to stabilize the spine.     Now s/p C4-C5 corpectomy 11/7/23    Plan:  --admitted neuro ICU; q1h nc/vs in ICU, q2h stepdown, q4h floor  --Okay to TTF to oncology  --all labs and significant diagnostics reviewed   --pending post op films  --HV x1 in place; ancef while in place  --PT/OT  --brace when OOB  --please notify nsgy for any acute neurological decline        Brandt Cedeno MD  Neurosurgery  [facility] - [facility]

## 2023-11-08 NOTE — PLAN OF CARE
PT Eval complete, appropriate goals created    Problem: Physical Therapy  Goal: Physical Therapy Goal  Description: Goals to be completed by: 12/8/23    Pt will perform sup<>sit transfers w/ independence  Pt will have sufficient dynamic balance to sit EOB while performing ADLs/therex w/ independence  Pt will be able to stand up from EOB w/ independence using LRAD  Pt will ambulate 250 feet w/ modified independence using LRAD  Pt will be independent w/ HEP therex on BLE w/ good form and ROM   Pt will be independent w/ donning/doffing brace and recalling 3/3 spinal precautions   Outcome: Ongoing, Progressing

## 2023-11-08 NOTE — NURSING
RN attempted to call report, receiving RN stated that she just went on break. This RN will attempt to call report again around 13:15. [facility].     13:21 - RN attempted to call report again at this time. No answer at [facility] . Pt working with PT/OT currently at this time. Will attempt to call back shortly.     13:35 - Report called to BRIAN Lamb at this time. This RN spoke with [facility]C charge RN. No bed in room, new bed has been ordered and charge nurse stated that he would call [facility] when bed was ready. [facility].     15:07 - RN called ONC receiving RN and asked about status of bed. BRIAN Lamb stated that they were still working on it. [facility].

## 2023-11-08 NOTE — ASSESSMENT & PLAN NOTE
Baseline Cr around 1.3  Current Cr 1.4    Strict I/O's  Goal UOP >0.5 ml/hr  Avoid nephrotoxic agents  Renally dose medications  Replete K, P, Mg as needed

## 2023-11-08 NOTE — ASSESSMENT & PLAN NOTE
Holding home combo pill amlodipine-Benazepril for hyperkalemia 5.5 prior to surgery  SBP < 160   Amlodipine 5 mg post-operatively   Labetalol PRN for SBP > 160

## 2023-11-08 NOTE — ASSESSMENT & PLAN NOTE
73-year-old male with PMHx of HTN, asthma, RCC (s/p kidney resection) with lung and spinal mets (C4-5 and T9).  Imaging showed complete involvement of the C5 vertebral body and partial of the C4 vertebral body.  His cervical spine demonstrated impending instability due to the involvement of the tumor.  He underwent preop embolization of the cervical tumor with IR on 11/6/23.  He was offered a C4/5 anterior cervical corpectomy in order to obtain tissue diagnosis, resect the lesions, and to stabilize the spine.     Now s/p C4-C5 corpectomy 11/7/23    Plan:  --admitted neuro ICU; q1h nc/vs in ICU, q2h stepdown, q4h floor  --Okay to TTF to oncology  --all labs and significant diagnostics reviewed   --pending post op films  --HV x1 in place; ancef while in place  --PT/OT  --brace when OOB  --please notify nsgy for any acute neurological decline

## 2023-11-08 NOTE — HOSPITAL COURSE
11/8: POD 1 from C4-C5 corpectomy. NAEON. AFVSS. HV w 4cc output ss. Pending post op films. Okay to TTF to oncology  11/9: POD 2. Stepped down to floor. NAEON. AFVSS. Pt doing well. Postop XR's show good placement of hardware. Pain controlled. Neuro stable. HV drain with minimal output, removed. Tolerating solid diet.

## 2023-11-08 NOTE — SUBJECTIVE & OBJECTIVE
Interval History: 11/8: POD 1 from C4-C5 corpectomy. BOAZ. AFVSS. HV w 4cc output ss. Pending post op films. Okay to TTF to oncology    Medications:  Continuous Infusions:   sodium chloride 0.9% 75 mL/hr at 11/06/23 2202     Scheduled Meds:   acetaminophen  1,000 mg Oral Q8H    amLODIPine  5 mg Oral Daily    ceFAZolin (ANCEF) IVPB  2 g Intravenous Q8H    gabapentin  300 mg Oral TID    heparin (porcine)  5,000 Units Subcutaneous Q8H    LIDOcaine (PF) 10 mg/ml (1%)  1 mL Other Once    methocarbamoL  500 mg Oral TID    polyethylene glycol  17 g Oral BID    senna-docusate 8.6-50 mg  2 tablet Oral BID     PRN Meds:bisacodyL, HYDROmorphone, labetalol, magnesium oxide, magnesium oxide, oxyCODONE-acetaminophen, potassium bicarbonate, potassium bicarbonate, potassium bicarbonate, potassium, sodium phosphates, potassium, sodium phosphates, potassium, sodium phosphates     Review of Systems  Objective:     Weight: 102.4 kg (225 lb 12 oz)  Body mass index is 32.39 kg/m².  Vital Signs (Most Recent):  Temp: 98.1 °F (36.7 °C) (11/08/23 0701)  Pulse: 91 (11/08/23 1033)  Resp: 20 (11/08/23 1050)  BP: (!) 150/68 (11/08/23 1001)  SpO2: 96 % (11/08/23 1001) Vital Signs (24h Range):  Temp:  [97.7 °F (36.5 °C)-98.4 °F (36.9 °C)] 98.1 °F (36.7 °C)  Pulse:  [] 91  Resp:  [8-24] 20  SpO2:  [95 %-99 %] 96 %  BP: (123-176)/(58-79) 150/68  Arterial Line BP: (115-169)/(37-65) 150/49     Date 11/08/23 0700 - 11/09/23 0659   Shift 2238-8085 0059-3894 4933-8258 24 Hour Total   INTAKE   P.O. 550   550   IV Piggyback 49.6   49.6   Shift Total(mL/kg) 599.6(5.9)   599.6(5.9)   OUTPUT   Urine(mL/kg/hr) 340   340   Shift Total(mL/kg) 340(3.3)   340(3.3)   Weight (kg) 102.4 102.4 102.4 102.4       Neurosurgery Physical Exam  General: well developed, well nourished, no distress.   Head: normocephalic, atraumatic  Neurologic: Alert and oriented. Thought content appropriate.  GCS: Motor: 6/Verbal: 5/Eyes: 4 GCS Total: 15  Mental Status: Awake, Alert,  Oriented x 4  Language: No aphasia  Speech: No dysarthria  Cranial nerves: face symmetric, tongue midline, CN II-XII grossly intact.   Eyes: pupils equal, round, reactive to light with accommodation, EOMI.   Pulmonary: normal respirations, no signs of respiratory distress  Skin: Skin is warm, dry and intact.  Sensory: intact to light touch throughout  Motor Strength:Moves all extremities spontaneously with good tone.  Full strength upper and lower extremities. No abnormal movements seen.     Strength  Deltoids Triceps Biceps Wrist Extension Wrist Flexion Hand    Upper: R 5/5 5/5 5/5 5/5 5/5 5/5    L 5/5 5/5 5/5 5/5 5/5 5/5     Iliopsoas Quadriceps Knee  Flexion Tibialis  anterior Gastro- cnemius EHL   Lower: R 5/5 5/5 5/5 5/5 5/5 5/5    L 5/5 5/5 5/5 5/5 5/5 5/5     DTR: 2+ symmetrically throughout.  Maldonado's: Negative.  Clonus: Negative.    Hvx1 in place, ss scant output  Incision c/d/i        Significant Labs:  Recent Labs   Lab 11/07/23  0247 11/07/23  0653 11/08/23  0245   *  --  117*     --  140   K 5.5* 4.7 5.1     --  106   CO2 21*  --  24   BUN 27*  --  25*   CREATININE 1.4  --  1.4   CALCIUM 9.8  --  9.4   MG 1.9  --  2.3       Recent Labs   Lab 11/07/23  0247 11/07/23  1010 11/08/23  0245   WBC 5.47  --  9.02   HGB 11.6*  --  9.2*   HCT 33.8* 26* 27.8*     --  200       Recent Labs   Lab 11/07/23  0337   INR 1.0   APTT 22.8     Microbiology Results (last 7 days)       ** No results found for the last 168 hours. **          All pertinent labs from the last 24 hours have been reviewed.    Significant Diagnostics:  I have reviewed all pertinent imaging results/findings within the past 24 hours.  X-Ray Chest AP Portable    Result Date: 11/7/2023  See above Electronically signed by: Fred Garcia MD Date:    11/07/2023 Time:    14:34

## 2023-11-08 NOTE — ASSESSMENT & PLAN NOTE
73-year-old male with PMHx of HTN, asthma, RCC (s/p kidney resection) with lung and spinal mets (C4-5 and T9) admitted after DSA and partial embolization of C4. Went for C4-5 corpectomies on 11/7.    - Admit to [facility] for close neurologic monitoring  - Transition to q4 hour neuro checks.  - Transition to q4 hour vital signs.   - No head of bed restrictions per NSGY, HOB 30 when resting   - Surgical drain to full suction  - C collar when out of bed  - Discontinue Hoskins   - Resume diet as tolerated  - SBP goal <160   - PRN labetalol  - Goal euvolemia, eunatremia  - CXR completed  - Post op C-spine xrays pending  - EKG completed  - Daily CBC, CMP, Mg, Phos   - Replete electrolytes PRN  - SCDs, SubQ heparin at 24 hours post-op  - PT/OT/SLP consulted  - Nutrition, PMR, and SW/CM consulted

## 2023-11-08 NOTE — PLAN OF CARE
Problem: Occupational Therapy  Goal: Occupational Therapy Goal  Description: Goals to be met by: 11/15/23     Patient will increase functional independence with ADLs by performing:    Feeding with Modified [place].  UE Dressing with [place].  LE Dressing with Minimal Assistance.  Grooming while standing at sink with Stand-by Assistance.  Step transfer with Supervision  Toilet transfer to toilet with Supervision.    Outcome: Ongoing, Progressing

## 2023-11-08 NOTE — PROGRESS NOTES
[facility] - Neuro Critical Care  Neurocritical Care  Progress Note    Admit Date: 11/6/2023  Service Date: 11/08/2023  Length of Stay: 2    Subjective:     Chief Complaint: Metastatic cancer to spine    History of Present Illness: Justin Pete is a 73-year-old male with PMHx of HTN, asthma, RCC (s/p kidney resection) with lung and spinal mets (C4-5 and T9) who presents to [facility] unit post-operatively after DSA and partial embolization of C4. Pending OR tomorrow for C4-5 corpectomies. Patient tolerated procedure well and is being admitted for Q1H post-op neurological monitoring.     Hospital Course: 11/7: Patient had partial embolization of C4 prior to admission to [facility], and is now s/p C4-C5 corpectomy with neurosurgery.   11/8: Post-operative day 1 after C4-C5 corpectomy. Pain well controlled, patient eating and drinking. Pending post-op cervical spine x-ray. Stable for transfer to medical oncology service.     Interval History:  No acute events overnight. Patient reports minimal post operative pain. Blood pressure via arterial line increases when he speaks (very loquacious). Potassium on the upper limit of normal, continuing to hold home benazepril.      Review of Systems   Constitutional:  Negative for appetite change and fever.   HENT:  Negative for trouble swallowing and voice change.    Respiratory:  Negative for cough, choking, chest tightness and shortness of breath.    Gastrointestinal:  Positive for constipation. Negative for diarrhea, nausea and vomiting.   Musculoskeletal:  Positive for neck pain and neck stiffness.   Neurological:  Negative for dizziness, tremors, seizures, facial asymmetry, speech difficulty, weakness, numbness and headaches.   Psychiatric/Behavioral:  Negative for confusion.    All other systems reviewed and are negative.      Objective:     Vitals:  Temp: 98.6 °F (37 °C)  Pulse: 69  Rhythm: normal sinus rhythm  BP: (!) 144/67  MAP (mmHg): 96  Resp: (!) 21  SpO2: 97 %    Temp  Min: 97.7 °F  (36.5 °C)  Max: 98.6 °F (37 °C)  Pulse  Min: 55  Max: 114  BP  Min: 123/60  Max: 176/76  MAP (mmHg)  Min: 84  Max: 113  Resp  Min: 8  Max: 24  SpO2  Min: 95 %  Max: 99 %    11/07 0701 - 11/08 0700  In: 4338.1 [P.O.:690; I.V.:183.2]  Out: 3039 [Urine:2535; Drains:4]   Unmeasured Output  Urine Occurrence: 1        Physical Exam  Vitals and nursing note reviewed.   Constitutional:       General: He is not in acute distress.     Appearance: He is well-developed. He is obese. He is not ill-appearing.   HENT:      Head: Normocephalic and atraumatic.      Right Ear: External ear normal.      Left Ear: External ear normal.      Nose: Nose normal.      Right Nostril: No epistaxis.      Left Nostril: No epistaxis.      Mouth/Throat:      Lips: Pink. No lesions.      Mouth: Mucous membranes are moist. No angioedema.   Eyes:      General: Lids are normal. No scleral icterus.     Conjunctiva/sclera:      Right eye: Right conjunctiva is not injected.      Left eye: Left conjunctiva is not injected.   Neck:      Vascular: No JVD.      Trachea: Phonation normal.      Comments: Surgical drain to suction in place at R anterior neck  Surgical incision clean, dry, and intact  Cardiovascular:      Rate and Rhythm: Normal rate and regular rhythm.      Pulses: No decreased pulses.   Pulmonary:      Effort: Pulmonary effort is normal. No tachypnea, bradypnea or respiratory distress.   Abdominal:      General: Abdomen is flat.      Palpations: Abdomen is soft.      Tenderness: There is no abdominal tenderness.   Musculoskeletal:      Cervical back: No edema or erythema.      Right lower leg: No edema.      Left lower leg: No edema.      Comments: Surgical drain to suction in place at R anterior neck   Skin:     General: Skin is warm.   Neurological:      General: No focal deficit present.      Mental Status: He is oriented to person, place, and time. Mental status is at baseline.      Comments: Mentation: Awake, alert, and oriented x4;    Following commands, repetition intact.   EOMI, PERRL (2mm and brisk)  No pronator drift  RUE 5/5, RLE 5/5  LUE 5/5, LLE 5/5     Psychiatric:         Behavior: Behavior is cooperative.           Medications:  Continuoussodium chloride 0.9%, Last Rate: 75 mL/hr at 11/06/23 2202    Scheduledacetaminophen, 1,000 mg, Q8H  amLODIPine, 5 mg, Daily  ceFAZolin (ANCEF) IVPB, 2 g, Q8H  gabapentin, 300 mg, TID  heparin (porcine), 5,000 Units, Q8H  LIDOcaine (PF) 10 mg/ml (1%), 1 mL, Once  methocarbamoL, 500 mg, TID  polyethylene glycol, 17 g, BID  senna-docusate 8.6-50 mg, 2 tablet, BID    PRNbisacodyL, 10 mg, Daily PRN  HYDROmorphone, 1 mg, Q6H PRN  labetalol, 10 mg, Q4H PRN  magnesium oxide, 800 mg, PRN  magnesium oxide, 800 mg, PRN  oxyCODONE-acetaminophen, 1 tablet, Q4H PRN  potassium bicarbonate, 35 mEq, PRN  potassium bicarbonate, 50 mEq, PRN  potassium bicarbonate, 60 mEq, PRN  potassium, sodium phosphates, 2 packet, PRN  potassium, sodium phosphates, 2 packet, PRN  potassium, sodium phosphates, 2 packet, PRN      Today I personally reviewed pertinent medications, lines/drains/airways, imaging, laboratory results, notably:    XR CHEST AP PORTABLE     CLINICAL HISTORY:  post op;     TECHNIQUE:  Single frontal view of the chest was performed.     COMPARISON:  No 11/07/2023 ne     FINDINGS:  Heart size normal.  The lungs are clear.  No pleural effusion.    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 Went for C4-5 corpectomies on 11/7.    - Admit to [facility] for close neurologic monitoring  - Transition to q4 hour neuro checks.  - Transition to q4 hour vital signs.   - No head of bed restrictions per NSGY, HOB 30 when resting   - Surgical drain to full suction  - C collar when out of bed  - Discontinue Hoskins   - Resume diet as tolerated  - SBP goal <160   - PRN labetalol  - Goal euvolemia, eunatremia  - CXR completed  - Post op C-spine xrays pending  - EKG completed  - Daily CBC, CMP, Mg, Phos   - Replete electrolytes PRN  - SCDs, SubQ heparin at 24 hours post-op  - PT/OT/SLP consulted  - Nutrition, PMR, and SW/CM consulted    Clear cell carcinoma of right kidney  S/p R radical nephrectomy  Mets to lungs and spine    Step down to medical oncology    Endocrine  Class 1 obesity without serious comorbidity with body mass index (BMI) of 31.0 to 31.9 in adult  Body mass index is 32.39 kg/m².  Dietary, nutritional, weight loss counseling on discharge  Nutrition consulted, f/u recs  PT/OT evals pending    GI  Therapeutic opioid-induced constipation (OIC)  Senna-docusate BID  Miralax QD  PRN suppository  Last BM reported 10/3          The patient is being Prophylaxed for:  Venous Thromboembolism with: Chemical  Stress Ulcer with: Not Applicable   Ventilator Pneumonia with: not applicable    Activity Orders            Turn patient every 2 hours starting at 11/07 1400    Diet Adult Regular (IDDSI Level 7): Regular starting at 11/07 1302    Elevate HOB 30 starting at 11/06 2122          Full Code    Clyde Goodman MD  Neurocritical Care  [facility] - Neuro Critical Care

## 2023-11-08 NOTE — SUBJECTIVE & OBJECTIVE
Interval History:  No acute events overnight. Patient reports minimal post operative pain. Blood pressure via arterial line increases when he speaks (very loquacious). Potassium on the upper limit of normal, continuing to hold home benazepril.      Review of Systems   Constitutional:  Negative for appetite change and fever.   HENT:  Negative for trouble swallowing and voice change.    Respiratory:  Negative for cough, choking, chest tightness and shortness of breath.    Gastrointestinal:  Positive for constipation. Negative for diarrhea, nausea and vomiting.   Musculoskeletal:  Positive for neck pain and neck stiffness.   Neurological:  Negative for dizziness, tremors, seizures, facial asymmetry, speech difficulty, weakness, numbness and headaches.   Psychiatric/Behavioral:  Negative for confusion.    All other systems reviewed and are negative.      Objective:     Vitals:  Temp: 98.6 °F (37 °C)  Pulse: 69  Rhythm: normal sinus rhythm  BP: (!) 144/67  MAP (mmHg): 96  Resp: (!) 21  SpO2: 97 %    Temp  Min: 97.7 °F (36.5 °C)  Max: 98.6 °F (37 °C)  Pulse  Min: 55  Max: 114  BP  Min: 123/60  Max: 176/76  MAP (mmHg)  Min: 84  Max: 113  Resp  Min: 8  Max: 24  SpO2  Min: 95 %  Max: 99 %    11/07 0701 - 11/08 0700  In: 4338.1 [P.O.:690; I.V.:183.2]  Out: 3039 [Urine:2535; Drains:4]   Unmeasured Output  Urine Occurrence: 1        Physical Exam  Vitals and nursing note reviewed.   Constitutional:       General: He is not in acute distress.     Appearance: He is well-developed. He is obese. He is not ill-appearing.   HENT:      Head: Normocephalic and atraumatic.      Right Ear: External ear normal.      Left Ear: External ear normal.      Nose: Nose normal.      Right Nostril: No epistaxis.      Left Nostril: No epistaxis.      Mouth/Throat:      Lips: Pink. No lesions.      Mouth: Mucous membranes are moist. No angioedema.   Eyes:      General: Lids are normal. No scleral icterus.     Conjunctiva/sclera:      Right eye:  Right conjunctiva is not injected.      Left eye: Left conjunctiva is not injected.   Neck:      Vascular: No JVD.      Trachea: Phonation normal.      Comments: Surgical drain to suction in place at R anterior neck  Surgical incision clean, dry, and intact  Cardiovascular:      Rate and Rhythm: Normal rate and regular rhythm.      Pulses: No decreased pulses.   Pulmonary:      Effort: Pulmonary effort is normal. No tachypnea, bradypnea or respiratory distress.   Abdominal:      General: Abdomen is flat.      Palpations: Abdomen is soft.      Tenderness: There is no abdominal tenderness.   Musculoskeletal:      Cervical back: No edema or erythema.      Right lower leg: No edema.      Left lower leg: No edema.      Comments: Surgical drain to suction in place at R anterior neck   Skin:     General: Skin is warm.   Neurological:      General: No focal deficit present.      Mental Status: He is oriented to person, place, and time. Mental status is at baseline.      Comments: Mentation: Awake, alert, and oriented x4;   Following commands, repetition intact.   EOMI, PERRL (2mm and brisk)  No pronator drift  RUE 5/5, RLE 5/5  LUE 5/5, LLE 5/5     Psychiatric:         Behavior: Behavior is cooperative.           Medications:  Continuoussodium chloride 0.9%, Last Rate: 75 mL/hr at 11/06/23 2202    Scheduledacetaminophen, 1,000 mg, Q8H  amLODIPine, 5 mg, Daily  ceFAZolin (ANCEF) IVPB, 2 g, Q8H  gabapentin, 300 mg, TID  heparin (porcine), 5,000 Units, Q8H  LIDOcaine (PF) 10 mg/ml (1%), 1 mL, Once  methocarbamoL, 500 mg, TID  polyethylene glycol, 17 g, BID  senna-docusate 8.6-50 mg, 2 tablet, BID    PRNbisacodyL, 10 mg, Daily PRN  HYDROmorphone, 1 mg, Q6H PRN  labetalol, 10 mg, Q4H PRN  magnesium oxide, 800 mg, PRN  magnesium oxide, 800 mg, PRN  oxyCODONE-acetaminophen, 1 tablet, Q4H PRN  potassium bicarbonate, 35 mEq, PRN  potassium bicarbonate, 50 mEq, PRN  potassium bicarbonate, 60 mEq, PRN  potassium, sodium phosphates, 2  packet, PRN  potassium, sodium phosphates, 2 packet, PRN  potassium, sodium phosphates, 2 packet, PRN      Today I personally reviewed pertinent medications, lines/drains/airways, imaging, laboratory results, notably:    XR CHEST AP PORTABLE     CLINICAL HISTORY:  post op;     TECHNIQUE:  Single frontal view of the chest was performed.     COMPARISON:  No 11/07/2023 ne     FINDINGS:  Heart size normal.  The lungs are clear.  No pleural effusion.    Diet  Diet Adult Regular (IDDSI Level 7)  Diet Adult Regular (IDDSI Level 7)

## 2023-11-08 NOTE — NURSING TRANSFER
Nursing Transfer Note      11/8/2023   4:59 PM    Nurse giving handoff: BRIAN Washington  Nurse receiving handoff:BRIAN Lamb    Reason patient is being transferred: adequate for step down    Transfer To: 811    Transfer via bed    Transfer with cardiac monitoring    Transported by BRIAN Washington    Transfer Vital Signs:  Blood Pressure:127/59  Heart Rate:70  O2:96  Temperature:98.7  Respirations:19    Telemetry: Box Number [id number], Rate  , Rhythm NSR, and Telemetry  [place]  Order for Tele Monitor? Yes    Additional Lines: n/a    4eyes on Skin: yes    Medicines sent: none    Any special needs or follow-up needed: none    Patient belongings transferred with patient: Yes    Chart send with patient: Yes    Notified: spouse    Patient reassessed at: 11/8/2023 @ 16:55     Upon arrival to floor: cardiac monitor applied, patient oriented to room, call bell in reach, and bed in lowest position

## 2023-11-08 NOTE — ASSESSMENT & PLAN NOTE
Body mass index is 32.39 kg/m².  Dietary, nutritional, weight loss counseling on discharge  Nutrition consulted, f/u recs  PT/OT evals pending

## 2023-11-08 NOTE — PLAN OF CARE
SW placed referral for Care At Home due to High Risk.          Suyapa Coles LMSW  PRN - [facility]  [phone]

## 2023-11-09 VITALS
HEART RATE: 85 BPM | DIASTOLIC BLOOD PRESSURE: 67 MMHG | TEMPERATURE: 98 F | BODY MASS INDEX: 32.32 KG/M2 | RESPIRATION RATE: 18 BRPM | SYSTOLIC BLOOD PRESSURE: 155 MMHG | WEIGHT: 225.75 LBS | HEIGHT: 70 IN | OXYGEN SATURATION: 94 %

## 2023-11-09 DIAGNOSIS — C79.51 METASTATIC CANCER TO SPINE: Primary | ICD-10-CM

## 2023-11-09 DIAGNOSIS — M53.2X2 CERVICAL SPINE INSTABILITY: ICD-10-CM

## 2023-11-09 PROBLEM — R53.81 DEBILITY: Status: ACTIVE | Noted: 2023-11-09

## 2023-11-09 LAB
ALBUMIN SERPL BCP-MCNC: 3.2 G/DL (ref 3.5–5.2)
ALP SERPL-CCNC: 81 U/L (ref 55–135)
ALT SERPL W/O P-5'-P-CCNC: 29 U/L (ref 10–44)
ANION GAP SERPL CALC-SCNC: 8 MMOL/L (ref 8–16)
AST SERPL-CCNC: 21 U/L (ref 10–40)
BASOPHILS # BLD AUTO: 0.04 K/UL (ref 0–0.2)
BASOPHILS NFR BLD: 0.5 % (ref 0–1.9)
BILIRUB SERPL-MCNC: 0.2 MG/DL (ref 0.1–1)
BUN SERPL-MCNC: 33 MG/DL (ref 8–23)
CALCIUM SERPL-MCNC: 9.6 MG/DL (ref 8.7–10.5)
CHLORIDE SERPL-SCNC: 106 MMOL/L (ref 95–110)
CO2 SERPL-SCNC: 26 MMOL/L (ref 23–29)
CREAT SERPL-MCNC: 1.5 MG/DL (ref 0.5–1.4)
DIFFERENTIAL METHOD: ABNORMAL
EOSINOPHIL # BLD AUTO: 0 K/UL (ref 0–0.5)
EOSINOPHIL NFR BLD: 0.5 % (ref 0–8)
ERYTHROCYTE [DISTWIDTH] IN BLOOD BY AUTOMATED COUNT: 13.7 % (ref 11.5–14.5)
EST. GFR  (NO RACE VARIABLE): 49.5 ML/MIN/1.73 M^2
GLUCOSE SERPL-MCNC: 92 MG/DL (ref 70–110)
HCT VFR BLD AUTO: 25.6 % (ref 40–54)
HGB BLD-MCNC: 8.6 G/DL (ref 14–18)
IMM GRANULOCYTES # BLD AUTO: 0.05 K/UL (ref 0–0.04)
IMM GRANULOCYTES NFR BLD AUTO: 0.7 % (ref 0–0.5)
LYMPHOCYTES # BLD AUTO: 2.2 K/UL (ref 1–4.8)
LYMPHOCYTES NFR BLD: 29.3 % (ref 18–48)
MAGNESIUM SERPL-MCNC: 2.2 MG/DL (ref 1.6–2.6)
MCH RBC QN AUTO: 30 PG (ref 27–31)
MCHC RBC AUTO-ENTMCNC: 33.6 G/DL (ref 32–36)
MCV RBC AUTO: 89 FL (ref 82–98)
MONOCYTES # BLD AUTO: 0.8 K/UL (ref 0.3–1)
MONOCYTES NFR BLD: 10.5 % (ref 4–15)
NEUTROPHILS # BLD AUTO: 4.3 K/UL (ref 1.8–7.7)
NEUTROPHILS NFR BLD: 58.5 % (ref 38–73)
NRBC BLD-RTO: 0 /100 WBC
PHOSPHATE SERPL-MCNC: 3.5 MG/DL (ref 2.7–4.5)
PLATELET # BLD AUTO: 169 K/UL (ref 150–450)
PMV BLD AUTO: 9.2 FL (ref 9.2–12.9)
POTASSIUM SERPL-SCNC: 4.3 MMOL/L (ref 3.5–5.1)
PROT SERPL-MCNC: 6.6 G/DL (ref 6–8.4)
RBC # BLD AUTO: 2.87 M/UL (ref 4.6–6.2)
SODIUM SERPL-SCNC: 140 MMOL/L (ref 136–145)
WBC # BLD AUTO: 7.4 K/UL (ref 3.9–12.7)

## 2023-11-09 PROCEDURE — 25000003 PHARM REV CODE 250

## 2023-11-09 PROCEDURE — 97116 GAIT TRAINING THERAPY: CPT

## 2023-11-09 PROCEDURE — 99024 POSTOP FOLLOW-UP VISIT: CPT | Mod: ,,, | Performed by: PHYSICIAN ASSISTANT

## 2023-11-09 PROCEDURE — 36415 COLL VENOUS BLD VENIPUNCTURE: CPT | Performed by: PHYSICIAN ASSISTANT

## 2023-11-09 PROCEDURE — 99024 PR POST-OP FOLLOW-UP VISIT: ICD-10-PCS | Mod: ,,, | Performed by: PHYSICIAN ASSISTANT

## 2023-11-09 PROCEDURE — 83735 ASSAY OF MAGNESIUM: CPT | Performed by: PHYSICIAN ASSISTANT

## 2023-11-09 PROCEDURE — 63600175 PHARM REV CODE 636 W HCPCS

## 2023-11-09 PROCEDURE — 25000003 PHARM REV CODE 250: Performed by: PHYSICIAN ASSISTANT

## 2023-11-09 PROCEDURE — 85025 COMPLETE CBC W/AUTO DIFF WBC: CPT | Performed by: PHYSICIAN ASSISTANT

## 2023-11-09 PROCEDURE — 97530 THERAPEUTIC ACTIVITIES: CPT

## 2023-11-09 PROCEDURE — 99239 PR HOSPITAL DISCHARGE DAY,>30 MIN: ICD-10-PCS | Mod: GC,,, | Performed by: INTERNAL MEDICINE

## 2023-11-09 PROCEDURE — 80053 COMPREHEN METABOLIC PANEL: CPT | Performed by: PHYSICIAN ASSISTANT

## 2023-11-09 PROCEDURE — 84100 ASSAY OF PHOSPHORUS: CPT | Performed by: PHYSICIAN ASSISTANT

## 2023-11-09 PROCEDURE — 99239 HOSP IP/OBS DSCHRG MGMT >30: CPT | Mod: GC,,, | Performed by: INTERNAL MEDICINE

## 2023-11-09 PROCEDURE — 25000003 PHARM REV CODE 250: Performed by: STUDENT IN AN ORGANIZED HEALTH CARE EDUCATION/TRAINING PROGRAM

## 2023-11-09 RX ORDER — OXYCODONE 18 MG/1
18 CAPSULE, EXTENDED RELEASE ORAL 2 TIMES DAILY
Qty: 14 CAPSULE | Refills: 0 | Status: ON HOLD | OUTPATIENT
Start: 2023-11-09 | End: 2023-11-18 | Stop reason: HOSPADM

## 2023-11-09 RX ORDER — OXYCODONE HYDROCHLORIDE 10 MG/1
10 TABLET ORAL EVERY 4 HOURS PRN
Qty: 75 TABLET | Refills: 0 | Status: SHIPPED | OUTPATIENT
Start: 2023-11-09 | End: 2023-11-09 | Stop reason: SDUPTHER

## 2023-11-09 RX ORDER — OXYCODONE HYDROCHLORIDE 10 MG/1
10 TABLET ORAL EVERY 4 HOURS PRN
Qty: 50 TABLET | Refills: 0 | Status: ON HOLD | OUTPATIENT
Start: 2023-11-09 | End: 2023-11-18 | Stop reason: HOSPADM

## 2023-11-09 RX ADMIN — METHOCARBAMOL 500 MG: 500 TABLET ORAL at 04:11

## 2023-11-09 RX ADMIN — HEPARIN SODIUM 5000 UNITS: 5000 INJECTION INTRAVENOUS; SUBCUTANEOUS at 06:11

## 2023-11-09 RX ADMIN — GABAPENTIN 300 MG: 300 CAPSULE ORAL at 04:11

## 2023-11-09 RX ADMIN — SENNOSIDES AND DOCUSATE SODIUM 2 TABLET: 8.6; 5 TABLET ORAL at 09:11

## 2023-11-09 RX ADMIN — AMLODIPINE BESYLATE 5 MG: 5 TABLET ORAL at 09:11

## 2023-11-09 RX ADMIN — METHOCARBAMOL 500 MG: 500 TABLET ORAL at 09:11

## 2023-11-09 RX ADMIN — CEFAZOLIN 2 G: 2 INJECTION, POWDER, FOR SOLUTION INTRAMUSCULAR; INTRAVENOUS at 06:11

## 2023-11-09 RX ADMIN — OXYCODONE HYDROCHLORIDE 10 MG: 10 TABLET ORAL at 02:11

## 2023-11-09 RX ADMIN — GABAPENTIN 300 MG: 300 CAPSULE ORAL at 09:11

## 2023-11-09 RX ADMIN — OXYCODONE HYDROCHLORIDE 10 MG: 10 TABLET ORAL at 03:11

## 2023-11-09 RX ADMIN — POLYETHYLENE GLYCOL 3350 17 G: 17 POWDER, FOR SOLUTION ORAL at 09:11

## 2023-11-09 RX ADMIN — ACETAMINOPHEN 1000 MG: 500 TABLET ORAL at 06:11

## 2023-11-09 NOTE — ASSESSMENT & PLAN NOTE
Baseline Cr around 1.4  Current Cr 1.5     Strict I/O's  Goal UOP >0.5 ml/hr  Avoid nephrotoxic agents  Renally dose medications  Replete K, P, Mg as needed    PLAN:   - Discussed with patient increasing hydration and avoiding nephrotoxic agents

## 2023-11-09 NOTE — PLAN OF CARE
CHW met with patient/family at bedside. Patient experience rounding completed and reviewed the following.     Do you know your discharge plan? Yes,    If yes, what is the plan? Home Health     Have you discussed your needs and preferences with your SW/CM? Yes     If you are discharging home, do you have help at home? Yes     Do you think you will need help additional at home at discharge? Yes      Do you currently have difficulty keeping up with bills, affording medicine or buying food? No    Assigned SW/CM notified of any patient/family needs or concerns. Appropriate resources provided to address patient's needs.

## 2023-11-09 NOTE — ASSESSMENT & PLAN NOTE
73-year-old male with PMHx of HTN, asthma, RCC (s/p kidney resection) with lung and spinal mets (C4-5 and T9).  Imaging showed complete involvement of the C5 vertebral body and partial of the C4 vertebral body.  His cervical spine demonstrated impending instability due to the involvement of the tumor.  He underwent preop embolization of the cervical tumor with IR on 11/6/23.  He was offered a C4/5 anterior cervical corpectomy in order to obtain tissue diagnosis, resect the lesions, and to stabilize the spine.     Now s/p C4-C5 corpectomy 11/7/23    Plan:  --Stepped down to floor under Oncology service  --Q4H neuro checks  --all labs and significant diagnostics reviewed   --post op XR's show satisfactory alignment/placement of hardware  --HV drain x 1 removed 11/9  --PT/OT  --brace when OOB  --multimodal pain control; no NSAIDs  --T9 kyphoplasty booked with IR for 11/14  --Plan for 2nd stage of surgery with posterior cervical fixation, booked for 11/15  --Okay for discharge from NSGY standpoint when pt is otherwise medically ready  --please notify nsgy for any acute neurological decline

## 2023-11-09 NOTE — ASSESSMENT & PLAN NOTE
Staging form: Kidney, AJCC 8th Edition  - Clinical stage from 10/31/2023: Stage IV (cT3a, cNX, cM1)     PLAN:   - F/u with Dr. Turk on 11/20/23 for further evaluation and workup

## 2023-11-09 NOTE — DISCHARGE INSTRUCTIONS
Post op Spine Patient Instructions    Activity Restrictions:  [x]  Return to work will be determined on an individual basis.  [x]  No lifting greater than 5-10 pounds.  [x]  Avoid bending and twisting the area of your surgery more than 45 degrees from neutral position in any direction.  [x]  No driving or operating machinery:  [x]  until cleared by your surgeon.  [x]  while taking narcotic pain medications or muscle relaxants.  [x]  Wear your brace at all times except when lying in bed, showering, eating, using the restroom, or performing hygiene tasks.   [x]  Increase ambulation over the next 2 weeks. Walk on paved surfaces only. It is okay to walk up and down stairs while holding onto a side rail.  [x]  No sexual activity for 2 weeks.    Discharge Medication/Follow-up:  [x]  Please refer to discharge medication reconciliation form.  [x]  Take Tylenol (acetaminophen) 650 mg every 6 hours as needed for additional pain control.  [x]  Do not take ANY Aspirin or Aspirin containing products for 2 weeks after surgery (unless otherwise directed in discharge medication list).   [x]  Do not take ANY herbal supplements for 2 weeks after surgery.    [x]  Do not take ANY non-steroidal anti-inflammatory drugs (NSAIDS), including the following: ibuprofen, naprosyn, Aleve, Advil, Indocin, Mobic, or Celebrex for 12 weeks after surgery.   [x]  Prescriptions for appropriate medication will be given upon discharge.  [x]  Take the pain medication as prescribed. We recommend to wean use of your pain medication after 1 week of taking as prescribed (Ex: After 1 week of taking every 4 hours as needed, wean down to taking your pain medication every 6 hours as needed).  [x]  Take docusate (Colace 100 mg): take one capsule a day as needed for constipation. You can get this over the counter.  [x]  Follow-up appointment:   You will be schedule for stage 2 of surgery (posterior neck fusion) within the next 2 weeks.   We will contact you with  more information regarding date/time and other instructions.      Wound Care:  [x]  Remove the small dressing near your incision in 2 days.   [x]  No bandage required. Keep your incision open to the air. You have dermabond (skin glue) covering your incision. This will begin to flake off over the next 2 weeks. Do not remove this on your own, allow it to peel off. Do not apply ointments or creams to your incision.  [x]  You may shower on the 2nd day after your surgery. Keep the incision clean and dry at all times. Do not allow the force of water to hit the incision. If the incision gets damp, pat it dry. Do not rub or scrub the incision.  [x]  You cannot take a bath until 8 weeks after surgery.    Call your doctor or go to the Emergency Room for any signs of infection, including: increased redness, drainage, pain, or fever (temperature ?101). Call your doctor or go to the Emergency Room if there are any localized neurological changes; problems with speech, vision, numbness, tingling, weakness, or severe headache; inability to control urination or bowel movements; inability to urinate; or for other concerns.            Special Instructions:  [x]  No use of tobacco products.  [x]  Diet: Please eat a regular diet as tolerated.      Physicians need 3 days' notice for pain medicine to be refilled. Pain medicine will only be refilled between 8 AM and 5 PM, Monday through Friday, due to Food and Drug Administration regulation of documentation.    If you have any questions about this form, please call [phone].    Form No. 22616 (Revised 10/31/2013)            :  [facility], [phone], providing a rolling walker (delivery to patient's hospital room on 11/9/23) and a hospital bed (delivery to patient's home on tomorrow 11/10/23 per patient/wife's request). Bedside commode declined at this time.     Referral for home health services including skilled nursing assessments, aide  visits, physical and occupational therapy has been faxed to [facility] for authorization and agency placement.    Magalie Cheung, MSW, [facility]  [phone]

## 2023-11-09 NOTE — ASSESSMENT & PLAN NOTE
Patient s/p corpectomy of C4-C5 and recent diagnoses of RCC with mets to spine and lungs. Patient requires DME to improve his weakness and rehabilitate him s/p surgeries.     PLAN:     Justin Pete requires a hospital bed due to him requiring positioning of the body in ways not feasible with an ordinary bed to alleviate pain/ is completely immobile /or limited mobility and cannot independently make changes in body position without the use of the bed. The positioning of the body cannot be sufficiently resolved by the use of pillows and wedges.       The mobility limitation cannot be sufficiently resolved by the use of a cane. Patient's functional mobility deficit can be sufficiently resolved with the use of a Rolling Walker. Patient's mobility limitation significantly impairs their ability to participate in one of more activities of daily living. The use of a Rolling Walker will significantly improve the patient's ability to participate in MRADLS and the patient will use it on regular basis in the home.

## 2023-11-09 NOTE — NURSING
Nurses Note -- 4 Eyes      11/8/2023   7:34 PM      Skin assessed during: Transfer      [] No Altered Skin Integrity Present    [x]Prevention Measures Documented      [x] Yes- Altered Skin Integrity Present or Discovered   Wounds in LDA present before transfer to unit.    Wound Care Consulted? No    Attending Nurse:  Zainab TORRES     Second RN/Staff Member:  Gabi TORRES

## 2023-11-09 NOTE — ASSESSMENT & PLAN NOTE
73-year-old male with PMHx of HTN, asthma, RCC (s/p kidney resection) with lung and spinal mets (C4-5 and T9).  Imaging showed complete involvement of the C5 vertebral body and partial of the C4 vertebral body.  His cervical spine demonstrated impending instability due to the involvement of the tumor.  He underwent preop embolization of the cervical tumor with IR on 11/6/23.  He was offered a C4/5 anterior cervical corpectomy in order to obtain tissue diagnosis, resect the lesions, and to stabilize the spine. Patient s/p C4-C5 corpectomy on 11/7/23. Patient tolerated procedure well and drain has been pulled. Plan to follow up with IR for kyphoplasty of T9 on 11/14/23. And follow up with Dr. Martinez for second stage of operation on 11/15/23.        Plan:  - Cervical collar brace with precautions when patient is ambulating   - F/u with IR for kyphoplasty on 11/14/23. F/u with Dr. Martinez on 11/15/23 for second stage of corpectomy surgery. F/u with heme/onc for planning on 11/20/23  - Wear back brace as needed for stability and pain

## 2023-11-09 NOTE — PLAN OF CARE
Discharge instructions and prescriptions given and explained to pt. Pt. verbalized understanding with no further questions. Roller walker delivered to bedside. Hospital bed will be delivered to patient's home tomorrow, MD fine with patient discharging today. SW gave instructions given to patient and spouse before leaving. Oxycodone 10mg given for back pain, patient educated on taking opioids at home. Peripheral IV D/C'd with catheter tip intact. VS WDL. C collar on and patient educated on when to use. Patient is awaiting ride home by spouse.

## 2023-11-09 NOTE — SUBJECTIVE & OBJECTIVE
Interval History: POD 2. Stepped down to floor. NAEON. AFVSS. Pt doing well. Postop XR's show good placement of hardware. Pain controlled. Neuro stable. HV drain with minimal output, removed. Tolerating solid diet.     Medications:  Continuous Infusions:  Scheduled Meds:   acetaminophen  1,000 mg Oral Q8H    amLODIPine  5 mg Oral Daily    ceFAZolin (ANCEF) IVPB  2 g Intravenous Q8H    gabapentin  300 mg Oral TID    heparin (porcine)  5,000 Units Subcutaneous Q8H    methocarbamoL  500 mg Oral TID    polyethylene glycol  17 g Oral BID    senna-docusate 8.6-50 mg  2 tablet Oral BID     PRN Meds:bisacodyL, oxyCODONE     Review of Systems  Objective:     Weight: 102.4 kg (225 lb 12 oz)  Body mass index is 32.39 kg/m².  Vital Signs (Most Recent):  Temp: 97.6 °F (36.4 °C) (11/09/23 0734)  Pulse: 65 (11/09/23 0734)  Resp: 18 (11/09/23 0734)  BP: 137/63 (11/09/23 0734)  SpO2: (!) 94 % (11/09/23 0734) Vital Signs (24h Range):  Temp:  [97.5 °F (36.4 °C)-98.7 °F (37.1 °C)] 97.6 °F (36.4 °C)  Pulse:  [63-90] 65  Resp:  [11-25] 18  SpO2:  [91 %-97 %] 94 %  BP: (118-167)/(57-72) 137/63  Arterial Line BP: (168-170)/(60-66) 168/66                              Closed/Suction Drain 11/07/23 1215 Tube - 1 Anterior Neck 10 Fr. (Active)   Site Description Healing;Other (Comment) 11/09/23 0926   Dressing Type Gauze 11/09/23 0926   Dressing Status Old drainage 11/09/23 0926   Dressing Intervention Integrity maintained 11/09/23 0926   Drainage None 11/09/23 0926   Status Other (Comment) 11/09/23 0926   Output (mL) 0 mL 11/08/23 0300          Physical Exam         Neurosurgery Physical Exam  General: well developed, well nourished, no distress.   Head: normocephalic, atraumatic  Neurologic: Alert and oriented. Thought content appropriate.  GCS: Motor: 6/Verbal: 5/Eyes: 4 GCS Total: 15  Mental Status: Awake, Alert, Oriented x 4  Language: No aphasia  Speech: No dysarthria  Cranial nerves: face symmetric, tongue midline, CN II-XII grossly  "intact.   Eyes: pupils equal, round, reactive to light with accommodation, EOMI.   Pulmonary: normal respirations, no signs of respiratory distress  Skin: Skin is warm, dry and intact.  Sensory: intact to light touch throughout  Motor Strength:Moves all extremities spontaneously with good tone.  Full strength upper and lower extremities. No abnormal movements seen.      Strength   Deltoids Triceps Biceps Wrist Extension Wrist Flexion Hand    Upper: R 5/5 5/5 5/5 5/5 5/5 5/5     L 5/5 5/5 5/5 5/5 5/5 5/5       Iliopsoas Quadriceps Knee  Flexion Tibialis  anterior Gastro- cnemius EHL   Lower: R 5/5 5/5 5/5 5/5 5/5 5/5     L 5/5 5/5 5/5 5/5 5/5 5/5         Hvx1 in place, ss scant output  Anterior Cervical Incision c/d/I with Dermabond    Significant Labs:  Recent Labs   Lab 11/08/23  0245 11/09/23  0350   * 92    140   K 5.1 4.3    106   CO2 24 26   BUN 25* 33*   CREATININE 1.4 1.5*   CALCIUM 9.4 9.6   MG 2.3 2.2     Recent Labs   Lab 11/08/23  0245 11/09/23  0350   WBC 9.02 7.40   HGB 9.2* 8.6*   HCT 27.8* 25.6*    169     No results for input(s): "LABPT", "INR", "APTT" in the last 48 hours.  Microbiology Results (last 7 days)       ** No results found for the last 168 hours. **          All pertinent labs from the last 24 hours have been reviewed.    Significant Diagnostics:  I have reviewed and interpreted all pertinent imaging results/findings within the past 24 hours.  "

## 2023-11-09 NOTE — PLAN OF CARE
[facility] - Oncology ([facility])      HOME HEALTH ORDERS  FACE TO FACE ENCOUNTER    Patient Name: Gamaliel Pete Jr.  YOB: 1950    PCP: Slava Lowery MD   PCP Address: [address] / [place] [address]  PCP Phone Number: [phone]  PCP Fax: [phone]    Encounter Date: 11/6/23    Admit to Home Health    Diagnoses:  Active Hospital Problems    Diagnosis  POA    *Metastatic cancer to spine [C79.51]  Yes    Essential hypertension [I10]  Yes    Therapeutic opioid-induced constipation (OIC) [K59.03, T40.2X5A]  Yes    SABINE (acute kidney injury) [N17.9]  Yes    Clear cell carcinoma of right kidney [C64.1]  Yes    Class 1 obesity without serious comorbidity with body mass index (BMI) of 31.0 to 31.9 in adult [E66.9, Z68.31]  Not Applicable      Resolved Hospital Problems   No resolved problems to display.       Follow Up Appointments:  Future Appointments   Date Time Provider Department Center   11/14/2023 12:00 PM [facility] [id number] [facility]   11/20/2023 11:30 AM Ruby Turk MD [facility] [id number] [facility]   4/15/2024  3:00 PM Slava Lowery MD [facility]       Allergies:Review of patient's allergies indicates:  No Known Allergies    Medications: Review discharge medications with patient and family and provide education.    Current Facility-Administered Medications   Medication Dose Route Frequency Provider Last Rate Last Admin    acetaminophen tablet 1,000 mg  1,000 mg Oral Q8H Clyde Goodman MD   1,000 mg at 11/09/23 0620    amLODIPine tablet 5 mg  5 mg Oral Daily Clyde Goodman MD   5 mg at 11/09/23 0911    bisacodyL suppository 10 mg  10 mg Rectal Daily PRN Clyde Goodman MD        ceFAZolin 2 g in dextrose 5 % in water (D5W) 50 mL IVPB (MB+)  2 g Intravenous Q8H Jihan Jason MD   Stopped at 11/09/23 0656    gabapentin capsule 300 mg  300 mg Oral TID Elsa Castillo PA-C   300 mg at 11/09/23 0911    heparin (porcine) injection 5,000  Units  5,000 Units Subcutaneous Q8H Clyde Goodman MD   5,000 Units at 11/09/23 0620    methocarbamoL tablet 500 mg  500 mg Oral TID Elsa Castillo PA-C   500 mg at 11/09/23 0911    oxyCODONE immediate release tablet Tab 10 mg  10 mg Oral Q4H PRN Janiya Jarvis MD   10 mg at 11/09/23 0225    polyethylene glycol packet 17 g  17 g Oral BID Clyde Goodman MD   17 g at 11/09/23 0911    senna-docusate 8.6-50 mg per tablet 2 tablet  2 tablet Oral BID Elsa Castillo PA-C   2 tablet at 11/09/23 0911     Current Discharge Medication List        CONTINUE these medications which have CHANGED    Details   oxyCODONE (ROXICODONE) 10 mg Tab immediate release tablet Take 1 tablet (10 mg total) by mouth every 4 (four) hours as needed for Pain.  Qty: 50 tablet, Refills: 0    Comments: Quantity prescribed more than 7 day supply? Yes, quantity medically necessary      oxyCODONE myristate (XTAMPZA ER) 18 mg CSpT Take 1 capsule (18 mg total) by mouth 2 (two) times a day. for 7 days  Qty: 14 capsule, Refills: 0    Comments: Quantity prescribed more than 7 day supply? No  Associated Diagnoses: Inadequate pain control           CONTINUE these medications which have NOT CHANGED    Details   amlodipine-benazepril 5-10 mg (LOTREL) 5-10 mg per capsule Take 1 capsule by mouth once daily.  Qty: 90 capsule, Refills: 3    Associated Diagnoses: Elevated blood pressure reading      gabapentin (NEURONTIN) 300 MG capsule Take 1 capsule (300 mg total) by mouth 3 (three) times daily.  Qty: 90 capsule, Refills: 11      indomethacin (INDOCIN) 50 MG capsule One p.o. t.i.d. p.r.n. gouty attack no more than 7 days--do not take with other NSAIDs  Qty: 90 capsule, Refills: 0    Associated Diagnoses: History of gout      methocarbamoL (ROBAXIN) 500 MG Tab Take 1 tablet (500 mg total) by mouth 3 (three) times daily. for 10 days  Qty: 30 tablet, Refills: 0    Associated Diagnoses: Inadequate pain control      polyethylene glycol (GLYCOLAX) 17  gram/dose powder Use cap to measure 17 grams, mix in liquid and take by mouth once daily.  Qty: 238 g, Refills: 0    Associated Diagnoses: Inadequate pain control      senna (SENOKOT) 8.6 mg tablet Take 1 tablet by mouth once daily.  Qty: 30 tablet, Refills: 0    Associated Diagnoses: Inadequate pain control      turmeric (CURCUMIN MISC) 1,000 mg by Misc.(Non-Drug; Combo Route) route Daily.      !! UNABLE TO FIND Take 500 mg by mouth 2 (two) times a day. medication name: Tudca      !! UNABLE TO FIND Take 2 capsules by mouth 2 (two) times a day. medication name: Turkey tail mushroom      !! UNABLE TO FIND Take 15 drops by mouth once daily. medication name: Essiac-20      !! UNABLE TO FIND Take 5 mLs by mouth 2 (two) times a day. medication name: barley leaf juice powder      !! UNABLE TO FIND Take 5 mLs by mouth 2 (two) times a day. medication name: black seed oil (cumin seed)      naloxone (NARCAN) 4 mg/actuation Spry 1 spray (4mg) by nasal route as needed for opioid overdose; may repeat every 2-3 minutes in alternating nostrils until medical help arrives. Call 911  Qty: 2 each, Refills: 0    Associated Diagnoses: Intractable back pain      OLIVE OIL ORAL Take 15 mLs by mouth 2 (two) times a day.      omega-3 fatty acids/fish oil (FISH OIL-OMEGA-3 FATTY ACIDS) 300-1,000 mg capsule Take 2 capsules by mouth once daily.      sildenafiL (VIAGRA) 100 MG tablet Take 1 tablet (100 mg total) by mouth daily as needed for Erectile Dysfunction.  Qty: 30 tablet, Refills: 11       !! - Potential duplicate medications found. Please discuss with provider.            I have seen and examined this patient within the last 30 days. My clinical findings that support the need for the home health skilled services and home bound status are the following:no   Weakness/numbness causing balance and gait disturbance due to Surgery making it taxing to leave home.  Requiring assistive device to leave home due to unsteady gait caused by   Surgery.     Diet:   cardiac diet      Referrals/ Consults  Physical Therapy to evaluate and treat. Evaluate for home safety and equipment needs; Establish/upgrade home exercise program. Perform / instruct on therapeutic exercises, gait training, transfer training, and Range of Motion.  Occupational Therapy to evaluate and treat. Evaluate home environment for safety and equipment needs. Perform/Instruct on transfers, ADL training, ROM, and therapeutic exercises.  Aide to provide assistance with personal care, ADLs, and vital signs.    Activities:   activity as tolerated    Nursing:   Agency to admit patient within 24 hours of hospital discharge unless specified on physician order or at patient request    SN to complete comprehensive assessment including routine vital signs. Instruct on disease process and s/s of complications to report to MD. Review/verify medication list sent home with the patient at time of discharge  and instruct patient/caregiver as needed. Frequency may be adjusted depending on start of care date.     Skilled nurse to perform up to 3 visits PRN for symptoms related to diagnosis    Notify MD if SBP > 160 or < 90; DBP > 90 or < 50; HR > 120 or < 50; Temp > 101; O2 < 88%; Other:       Ok to schedule additional visits based on staff availability and patient request on consecutive days within the home health episode.    When multiple disciplines ordered:    Start of Care occurs on Sunday - Wednesday schedule remaining discipline evaluations as ordered on separate consecutive days following the start of care.    Thursday SOC -schedule subsequent evaluations Friday and Monday the following week.     Friday - Saturday SOC - schedule subsequent discipline evaluations on consecutive days starting Monday of the following week.    For all post-discharge communication and subsequent orders please contact patient's primary care physician.         Home Health Aide:  Nursing Three times weekly, Physical  Therapy Three times weekly, Occupational Therapy Three times weekly, and Home Health Aide Three times weekly    Wound Care Orders  no    I certify that this patient is confined to his home and needs intermittent skilled nursing care, physical therapy, and occupational therapy.

## 2023-11-09 NOTE — PT/OT/SLP PROGRESS
Physical Therapy Treatment/Discharge    Patient Name:  Gamaliel Pete Jr.   MRN:  [id number]    Recommendations:     Discharge Recommendations: No Therapy Indicated  Discharge Equipment Recommendations: none  Barriers to discharge: None    Assessment:     Gamaliel Pete Jr. is a 73 y.o. male admitted with a medical diagnosis of Metastatic cancer to spine.  He presents with the following impairments/functional limitations:  (pt. progressing towards functional baseline) Pt. cooperative and tolerated treatment well. Pt. progressing with mobility and has met goals for acute PT.    Rehab Prognosis: Good; patient would benefit from acute skilled PT services to address these deficits and reach maximum level of function.    Recent Surgery: * No procedures listed * 3 Days Post-Op    Plan:       (Discontinue acute PT)     Plan of Care Expires:  12/08/23    Subjective     Chief Complaint: no new c/o  Patient/Family Comments/goals: pt. Agreeable to PT  Pain/Comfort:  Pain Rating 1:  (pt. did not rate)      Objective:     Communicated with nursing prior to session.  Patient found supine with cervical collar, peripheral IV upon PT entry to room.     General Precautions: Standard, fall  Orthopedic Precautions: spinal precautions  Braces: Aspen collar  Respiratory Status: Room air     Functional Mobility:  Bed Mobility:     Rolling Right: supervision  Scooting: supervision  Supine to Sit: supervision  Transfers:     Sit to Stand:  supervision with no AD  Gait: >400' with Supervision without AD or LOB. Pt. amb. with steady gait with no significant deviations  Balance: good  Stairs:  Pt ascended/descended 4 stair(s) with No Assistive Device with right handrail with Supervision or Set-up Assistance.       AM-PAC 6 CLICK MOBILITY  Turning over in bed (including adjusting bedclothes, sheets and blankets)?: 4  Sitting down on and standing up from a chair with arms (e.g., wheelchair, bedside commode, etc.): 4  Moving from lying on  back to sitting on the side of the bed?: 4  Moving to and from a bed to a chair (including a wheelchair)?: 4  Need to walk in hospital room?: 3  Climbing 3-5 steps with a railing?: 3  Basic Mobility Total Score: 22       Treatment & Education:  Discussed pt.'s progress, goals, and POC.    Patient left supine with all lines intact and call button in reach..    GOALS:   Multidisciplinary Problems       Physical Therapy Goals       Not on file              Multidisciplinary Problems (Resolved)          Problem: Physical Therapy    Goal Priority Disciplines Outcome Goal Variances Interventions   Physical Therapy Goal   (Resolved)     PT, PT/OT Met     Description: Goals to be completed by: 12/8/23    Pt will perform sup<>sit transfers w/ independence  Pt will have sufficient dynamic balance to sit EOB while performing ADLs/therex w/ independence  Pt will be able to stand up from EOB w/ independence using LRAD  Pt will ambulate 250 feet w/ modified independence using LRAD  Pt will be independent w/ HEP therex on BLE w/ good form and ROM   Pt will be independent w/ donning/doffing brace and recalling 3/3 spinal precautions                        Time Tracking:     PT Received On: 11/09/23  PT Start Time: 0959     PT Stop Time: 1022  PT Total Time (min): 23 min     Billable Minutes: Gait Training 15 and Therapeutic Activity 8    Treatment Type: Treatment  PT/PTA: PT     Number of PTA visits since last PT visit: 0     11/09/2023

## 2023-11-09 NOTE — HOSPITAL COURSE
Pt admitted for intractable back pain in the setting of renal carcinoma (RCC) s/p R nephrectomy 10/4 followed by Dr. Turk not on systemic therapy. CT and MRI concerning for spinal, lung, and hepatic mets. NSGY, Rad Onc, and IR on board in the hospital and evaluated for C5 and T9 lesions on spine. Pt pain controlled with oxycodone 12 HR BID and Oxycodone 10 PRN for thoracic and cervical pain. Patient underwent preop embolization of cervical tumor with IR on 11/6/23. And then had a cervical corpectomy with NSGY on 11/7/23 which he tolerated well. They obtained tissue samples and sent it over to pathology. IR to perform osteocool/kyphoplasty/biopsy T9 11/14. 2nd stage of surgery with NSGY on 11/15 with Dr. Martinez after kyphoplasty. Patient is to see Dr. Turk for systemic therapy afterwards as well as option for postoperative radiation. Stable to discharge home with c-collar and back brace. Patient also to receive rolling walker and hospital bed due to weakness and pain.

## 2023-11-09 NOTE — PROGRESS NOTES
"Copy from AVS:  ":  [facility], [phone], providing a rolling walker (delivery to patient's hospital room on 11/9/23) and a hospital bed (delivery to patient's home on tomorrow 11/10/23 per patient/wife's request). Bedside commode declined at this time.      Referral for home health services including skilled nursing assessments, aide visits, physical and occupational therapy has been faxed to [facility] for authorization and agency placement.     Magalie Cheung, MSW, LCSW  [phone]"  "

## 2023-11-09 NOTE — PROGRESS NOTES
"Oncology Social Worker is now following patent's case again as he was stepped down to Medical Oncology Service.     Received report from the Medical Oncology team following rounds this morning that patient is being discharged today.     Sent a Secure Chat message to Elsa Gallego with [employer] re: the DME orders - a rolling walker and bedside commode were ordered today by Dr. Mora; a hospital bed order was entered on 11/7 by Dr. Santos. Elsa replied: "The patient is not eligible for BSC since he can ambulate with RW. If he wants BSC he will have to purchase out of pocket. We will need medical necessity documented by the ordering physician on the rolling walker & the hospital bed." Asked Elsa to let patient/wife know for the bedside commode and quote the purchase cost to them. Added the Medical Oncology team to the Secure Chat so the physicians can see what specific information needs to be added in their notes and discharge summary.     Sent a Secure Chat message to the physical and occupational therapists informing them that patient was stepped down to Medical Oncology and is being discharged today, and inquired if any additional training is needed with patient/family so that this can be done prior to discharge.         Completed the [facility] Medical Necessity Form and faxed it along with the home health orders, admission record, H&P, operative note, and current physician, physical therapy and occupational therapy notes to [facility] at [phone]. Called [facility] at [phone] and spoke with Melissa with the [place] [id number] team to inform her that I have faxed a referral requesting that home health services be set up for [place] [facility] [place]. She said that it has not shown up in their system yet but she will keep an eye out for it.    Sent a Secure Chat message to the Medical Oncology team informing that the home health referral has to be sent to [facility] for authorization " and agency placement by [facility]'s staff. Also informing that patient's not likely to receive much from home health in between tomorrow and Mon. He has a pre-op appointment next Tues., and surgery next Wed.    Will continue to follow and assist as needs are identified.

## 2023-11-09 NOTE — PLAN OF CARE
"Patient and spouse is involved in patient's care. Pt is AAOx4; VSS; afebrile; up with assist x1. Pt c/o back pain after deep coughing a "6/10"; pt rec'd oxy 10 mg prn as requested. Denies SOB, chest pain, N/V/D, and HA. Main concern is healing properly and having a BM. 1L NC. Hemovach noted on R IJ. Gauze applied to R IJ incision is CDI; drainage is sanguinous. Incision noted across neck. Mepilex on sacrum and heels for protection. SCDs noted. Telemetry- NSR. Rec'd abx. Urinal at bedside. Spouse at bedside. HOB elevated. Q2H rounding done on pt. Fall precaution in place with call light in reach. No new orders at the moment.  "

## 2023-11-09 NOTE — DISCHARGE SUMMARY
[facility] - Oncology ([facility])  Hematology/Oncology  Discharge Summary      Patient Name: Gamaliel Pete Jr.  MRN: [id number]  Admission Date: 11/6/2023  Hospital Length of Stay: 3 days  Discharge Date and Time:  11/09/2023 1:10 PM  Attending Physician: Nasim Ramirez MD   Discharging Provider: Luis Mora DO  Primary Care Provider: Slava Lowery MD    HPI: Justin Pete is a 73-year-old male with PMHx of HTN, asthma, RCC (s/p kidney resection) with lung and spinal mets (C4-5 and T9) who presents to medical oncology service s/p DSA , partial embolization of C4, and C4-5 corpectomies. Imaging showed complete involvement of the C5 vertebral body and partial of the C4 vertebral body.  His cervical spine demonstrated impending instability due to the involvement of the tumor.  He underwent preop embolization of the cervical tumor with IR on 11/6/23.  He was offered a C4/5 anterior cervical corpectomy on 11/7/23 in order to obtain tissue diagnosis, resect the lesions, and to stabilize the spine.  Patient tolerated procedure well and was step down from NCC to med onc floor for further evaluation pending discharge.    Oncologic History:     Diagnosis: metastatic RCC     - Presented with gross hematuria mid June 2023  Building a house and has been active working on this in the [facility]- 1st noted dark urine and felt related to being dehydrated  Occurred a 2nd time approximately 2 weeks later and this time he noted darker and small clots  Noted again 2 weeks later and worse but ultimately he was prompted to seek evaluation when he had significant blood when he urinated     - went to his PCP and urine sample was yellow again  He had blood work done and referred to Urology at that time     - 9/5/2023 CT Urogram:  FINDINGS:  The lung bases demonstrate bilateral nodules, for example 9 mm at the right lung base and up to 11 mm at the left lung base.  Atelectasis present.  There are bilateral fat containing inguinal  hernias.  The liver demonstrates no mass.  The spleen is not enlarged.  The stomach, pancreas and adrenal glands are within normal limits.  Gallbladder is unremarkable.  There is no biliary ductal dilatation.  The kidneys concentrate and excrete contrast satisfactorily.  There is no renal calculus or ureteral calculus.  Within the mid to lower pole of the right kidney is a 4.9 x 6.3 x 6.1 cm heterogeneous solid mass which is overall slightly hypodense in relation to the enhancing parenchyma.  The mass is partially exophytic posteriorly.  It does extend partially into the renal sinus  At the upper pole of the right kidney is a subcentimeter exophytic focus which is isodense to the parenchyma on postcontrast imaging appearing slightly hyperdense on the noncontrast study, too small to characterize.  There are multiple additional subcentimeter hypodense right kidney foci which are suggestive of cysts.  Finally at the lower pole of the right kidney is a exophytic hypodense structure most compatible with a cyst.  The right main renal vein appears patent.  There is no significant periaortic lymphadenopathy.  Left kidney demonstrates several subcentimeter foci which are hypodense but too small to characterize, suggestive of cysts.  There is somewhat of a small amount of contrast within the upper collecting systems and ureters, with no definite focal abnormality, noting that there is some distortion of the collecting system in the region in which the right kidney mass involves the renal sinus.  The bladder is partially distended appearing grossly unremarkable.  The bowel demonstrates no significant abnormality.  The osseous structures demonstrate degenerative changes.  T9 demonstrates a lytic focus occupying the anterior half of the vertebral body.  There is slight loss of height along superior and inferior endplates with cortical disruption..  Impression:  Solid right renal mass concerning for neoplasm.  Multiple lung base  nodules up to 11 mm, concerning for metastatic disease.  Recommend chest CT for full evaluation of lungs.  Lytic lesion at T9 with mild compression, concerning for pathologic compression fracture.  Subcentimeter right kidney lesion isodense to parenchyma on contrast enhanced imaging, too small to characterize.  Additional bilateral renal hypodensities which are too small to characterize but suggestive of cysts.     - 9/7/2023 CT Chest:  FINDINGS:  The base of the neck is within normal limits.  The thyroid gland is unremarkable.  The supraclavicular regions are within normal limits.  The trachea is unremarkable.  The central airways are within normal limits.  No endobronchial lesion is identified.  There is no evidence of bronchiectasis.  The heart is unremarkable.  There are no pericardial effusions.  There are coronary artery calcifications.  The thoracic aorta is normal in caliber.  There are subcentimeter mediastinal and hilar lymph nodes.  There are subcentimeter axillary lymph nodes.  There are no pleural effusions.  There is no evidence of a pneumothorax.  There is no evidence of pneumomediastinum.  There are innumerable bilateral pulmonary nodules.  There is no focal consolidation.  The esophagus is unremarkable.  Please see the dedicated CT abdomen pelvis for the upper abdominal findings.  The chest wall is unremarkable.  There is unchanged lytic lesion involving the anterior aspect of T9 vertebral body with associated cortical erosions.  Impression:  Innumerable pulmonary nodules, concerning for metastatic disease to the chest.  Unchanged lytic lesion in the T9 vertebral body with cortical erosions.  Follow-up MRI of the spine, as clinically warranted.     - 9/7/2023 CT Abd:  FINDINGS:  CT renal protocol:  There is a 4.8 x 6.3 cm exophytic enhancing lesion in the lower pole of the right kidney.  There is hypoenhancement of the lesion compared to the normal renal parenchyma.  There is unchanged appearance of  the mass extending into the right renal sinus.  There is no extension into the right renal vein  No additional enhancing lesions are identified.  There is a subcentimeter lesion in the right kidney is too small complete characterization.  There is prompt excretion from both collecting systems.  There is incomplete distension of the right distal ureter.  No definitive filling defect is identified within the.  The urinary bladder is unremarkable.  CT abdomen pelvis:  There is unchanged appearance of multiple pulmonary nodules in the lung bases.  No new nodules identified  The heart is unremarkable.  There is normal tapering of the abdominal aorta.  There are single bilateral renal arteries.  The renal veins remain patent.  There is no evidence of lymphadenopathy.  The esophagus, stomach, and duodenum are within normal limits.  The small bowel loops are unremarkable.  The appendix is not visualized.  There are no secondary findings of acute appendicitis.  There is colonic diverticula without evidence of acute diverticulitis.  The liver is unremarkable.  The gallbladder is within normal limits.  The biliary tree is within normal limits.  The spleen is unremarkable.  The pancreas is within normal limits.  The adrenal glands are unremarkable.  There is no evidence of free fluid in the abdomen or pelvis.  There is no evidence of free air.  There is no evidence of pneumatosis.  No portal venous air is identified.  The psoas margins are unremarkable.  There are bilateral fat containing inguinal hernias.  There are degenerative changes in the osseous structures.  There is unchanged appearance of a lytic lesion involving the anterior aspect of the T9 vertebral body with associated cortical erosions.  Impression:  Unchanged 4.8 x 6.3 cm exophytic hypoenhancing lesion in the lower pole of the right kidney, concerning for renal cell carcinoma.  Extension of lesion into the renal sinus.  No evidence of renal vein invasion.  No  regional lymphadenopathy.  Additional smaller subcentimeter lesion too small complete characterization in the right kidney.  Bilateral pulmonary nodules remain concerning for metastatic disease.  Lytic lesion along the anterior aspect of the T9 vertebral body, also concerning for osseous metastatic disease.  Additional findings as above.     - 9/20/2023 Bone scan:  FINDINGS:  There is physiologic distribution of the radiopharmaceutical throughout the skeleton.  Focal uptake in the T9 vertebral body corresponding to lytic lesion seen on CT 09/05/2023.  Focal uptake in the right kidney in patient with known right renal mass.  There is otherwise normal uptake in the genitourinary system and soft tissues.  Impression:  Focal uptake in the T9 vertebral body corresponding to lytic lesion seen on prior CT and concerning for metastatic disease.  Additional focus of increased uptake in the right kidney in patient with known right renal mass     - required nephrectomy with hematuria as increased significantly     - 10/4/2023 Robotic right nephrectomy  Pathology:  RIGHT KIDNEY, TOTAL NEPHRECTOMY:   - Clear cell renal cell carcinoma, ISUP grade 4, 5.5 cm.   - Benign cortical cysts.   - See CAP synoptic report below.   SURGICAL PATHOLOGY CANCER CASE SUMMARY   Procedure: Total nephrectomy.   Specimen laterality: Right.   Tumor size: 5.5 cm.   Tumor focality: Unifocal.   Histologic type: Clear cell renal cell carcinoma.   Sarcomatoid features: Present, 5%.   Rhabdoid features: Not identified.   Histologic Grade (ISUP Grade): 4.   Tumor necrosis: Present, 20%.   Tumor extension: Extends into pelvicalyceal system and renal sinus fat.   Margins: Uninvolved.   Lymphovascular invasion (excluding renal vein and its segmental branches): Not identified.   Regional lymph nodes: No lymph nodes submitted or found.   Distant metastases: Not applicable in this specimen.   Pathologic stage (pTNM, AJCC 8th edition): pT3a pN not assigned (no  lymph nodes submitted or found).     * No procedures listed *     Hospital Course: Pt admitted for intractable back pain in the setting of renal carcinoma (RCC) s/p R nephrectomy 10/4 followed by Dr. Turk not on systemic therapy. CT and MRI concerning for spinal, lung, and hepatic mets. NSGY, Rad Onc, and IR on board in the hospital and evaluated for C5 and T9 lesions on spine. Pt pain controlled with oxycodone 12 HR BID and Oxycodone 10 PRN for thoracic and cervical pain. Patient underwent preop embolization of cervical tumor with IR on 11/6/23. And then had a cervical corpectomy with NSGY on 11/7/23 which he tolerated well. They obtained tissue samples and sent it over to pathology. IR to perform osteocool/kyphoplasty/biopsy T9 11/14. 2nd stage of surgery with NSGY on 11/15 with Dr. Martinez after kyphoplasty. Patient is to see Dr. Turk for systemic therapy afterwards as well as option for postoperative radiation. Stable to discharge home with c-collar and back brace. Patient also to receive rolling walker and hospital bed due to weakness and pain.     Goals of Care Treatment Preferences:  Code Status: Full Code    Vitals:    11/09/23 1149   BP: (!) 141/66   Pulse: 81   Resp: 18   Temp: 98.6 °F (37 °C)      Physical Exam  Constitutional:       General: He is not in acute distress.     Appearance: He is not diaphoretic.   HENT:      Head: Normocephalic and atraumatic.   Cardiovascular:      Rate and Rhythm: Normal rate and regular rhythm.   Pulmonary:      Effort: Pulmonary effort is normal. No respiratory distress.      Breath sounds: Normal breath sounds. No wheezing.   Abdominal:      General: There is no distension.      Tenderness: There is no abdominal tenderness.   Musculoskeletal:         General: Tenderness present.   Skin:     General: Skin is warm and dry.   Neurological:      General: No focal deficit present.      Mental Status: He is oriented to person, place, and time. Mental status is at baseline.    Psychiatric:         Mood and Affect: Mood normal.         Behavior: Behavior normal.     Consults:     Significant Diagnostic Studies: N/A    Pending Diagnostic Studies:       Procedure Component Value Units Date/Time    Specimen to Pathology, Surgery Neurosurgery [[id number]] Collected: 11/07/23 1133    Order Status: Sent Lab Status: In process Updated: 11/07/23 2008    Specimen: Tissue     X-Ray Cervical Spine AP And Lateral [[id number]] Resulted: 11/08/23 1648    Order Status: Sent Lab Status: In process Updated: 11/08/23 1653          Final Active Diagnoses:    Diagnosis Date Noted POA    PRINCIPAL PROBLEM:  Metastatic cancer to spine [C79.51] 09/14/2023 Yes    Debility [R53.81] 11/09/2023 Unknown    Essential hypertension [I10] 11/07/2023 Yes    Therapeutic opioid-induced constipation (OIC) [K59.03, T40.2X5A] 11/07/2023 Yes    SABINE (acute kidney injury) [N17.9] 10/31/2023 Yes    Clear cell carcinoma of right kidney [C64.1] 10/11/2023 Yes    Class 1 obesity without serious comorbidity with body mass index (BMI) of 31.0 to 31.9 in adult [E66.9, Z68.31] 04/01/2019 Not Applicable      Problems Resolved During this Admission:      Discharged Condition: stable    Disposition: [place]-Health Care [facility] [place]    Follow Up:    Patient Instructions:     Patient s/p corpectomy of C4-C5 and recent diagnoses of RCC with mets to spine and lungs. Patient requires DME to improve his weakness and rehabilitate him s/p surgeries.     PLAN:     Justin Pete requires a hospital bed due to him requiring positioning of the body in ways not feasible with an ordinary bed to alleviate pain/ is completely immobile /or limited mobility and cannot independently make changes in body position without the use of the bed. The positioning of the body cannot be sufficiently resolved by the use of pillows and wedges.       The mobility limitation cannot be sufficiently resolved by the use of a cane. Patient's functional mobility deficit can be  "sufficiently resolved with the use of a Rolling Walker. Patient's mobility limitation significantly impairs their ability to participate in one of more activities of daily living. The use of a Rolling Walker will significantly improve the patient's ability to participate in MRADLS and the patient will use it on regular basis in the home.        HOSPITAL BED FOR HOME USE     Order Specific Question Answer Comments   Type: Semi-electric    Length of need (1-99 months): 99    Height: 5' 10" (1.778 m)    Weight: 99.4 kg (219 lb 2.2 oz)    Accessories: Low air loss mattress    Please check all that apply: Patient requires, for the alleviation of pain, positioning of the body in ways not feasible in an ordinary bed.      COMMODE FOR HOME USE     Order Specific Question Answer Comments   Type: Standard    Height: 5' 10" (1.778 m)    Weight: 102.4 kg (225 lb 12 oz)    Does patient have medical equipment at home? none    Length of need (1-99 months): 99      WALKER FOR HOME USE     Order Specific Question Answer Comments   Type of Walker: Adult (5'4"-6'6")    With wheels? Yes    Height: 5' 10" (1.778 m)    Weight: 102.4 kg (225 lb 12 oz)    Length of need (1-99 months): 99    Does patient have medical equipment at home? none    Please check all that apply: Patient is unable to safely ambulate without equipment.    Please check all that apply: Walker will be used for gait training.    Please check all that apply: Patient needs help to get in and out of chair.      Ambulatory referral/consult to [facility]   Standing Status: Future   Referral Priority: Routine Referral Type: Consultation   Referral Reason: Specialty Services Required   Number of Visits Requested: 1     Medications:  Reconciled Home Medications:      Medication List        CHANGE how you take these medications      oxyCODONE 10 mg Tab immediate release tablet  Commonly known as: ROXICODONE  Take 1 tablet (10 mg total) by mouth every 4 (four) " hours as needed for Pain.  What changed: when to take this     polyethylene glycol 17 gram/dose powder  Commonly known as: GLYCOLAX  Use cap to measure 17 grams, mix in liquid and take by mouth once daily.  What changed: how much to take            CONTINUE taking these medications      amlodipine-benazepril 5-10 mg 5-10 mg per capsule  Commonly known as: LOTREL  Take 1 capsule by mouth once daily.     CURCUMIN MISC  1,000 mg by Misc.(Non-Drug; Combo Route) route Daily.     fish oil-omega-3 fatty acids 300-1,000 mg capsule  Take 2 capsules by mouth once daily.     gabapentin 300 MG capsule  Commonly known as: NEURONTIN  Take 1 capsule (300 mg total) by mouth 3 (three) times daily.     indomethacin 50 MG capsule  Commonly known as: INDOCIN  One p.o. t.i.d. p.r.n. gouty attack no more than 7 days--do not take with other NSAIDs     methocarbamoL 500 MG Tab  Commonly known as: ROBAXIN  Take 1 tablet (500 mg total) by mouth 3 (three) times daily. for 10 days     naloxone 4 mg/actuation Spry  Commonly known as: NARCAN  1 spray (4mg) by nasal route as needed for opioid overdose; may repeat every 2-3 minutes in alternating nostrils until medical help arrives. Call 911     OLIVE OIL ORAL  Take 15 mLs by mouth 2 (two) times a day.     SENNA 8.6 mg tablet  Generic drug: senna  Take 1 tablet by mouth once daily.     sildenafiL 100 MG tablet  Commonly known as: VIAGRA  Take 1 tablet (100 mg total) by mouth daily as needed for Erectile Dysfunction.     UNABLE TO FIND  Take 500 mg by mouth 2 (two) times a day. medication name: Tudca     UNABLE TO FIND  Take 2 capsules by mouth 2 (two) times a day. medication name: Turkey tail mushroom     UNABLE TO FIND  Take 15 drops by mouth once daily. medication name: Essiac-20     UNABLE TO FIND  Take 5 mLs by mouth 2 (two) times a day. medication name: barley leaf juice powder     UNABLE TO FIND  Take 5 mLs by mouth 2 (two) times a day. medication name: black seed oil (cumin seed)      XTAMPZA ER 18 mg Cspt  Generic drug: oxyCODONE myristate  Take 1 capsule (18 mg total) by mouth 2 (two) times a day. for 7 days              Luis Mora DO  Hematology/Oncology  [facility] - Oncology ([facility])

## 2023-11-09 NOTE — HPI
Justin Pete is a 73-year-old male with PMHx of HTN, asthma, RCC (s/p kidney resection) with lung and spinal mets (C4-5 and T9) who presents to medical oncology service s/p DSA , partial embolization of C4, and C4-5 corpectomies. Imaging showed complete involvement of the C5 vertebral body and partial of the C4 vertebral body.  His cervical spine demonstrated impending instability due to the involvement of the tumor.  He underwent preop embolization of the cervical tumor with IR on 11/6/23.  He was offered a C4/5 anterior cervical corpectomy on 11/7/23 in order to obtain tissue diagnosis, resect the lesions, and to stabilize the spine.  Patient tolerated procedure well and was step down from NCC to med onc floor for further evaluation pending discharge.    Oncologic History:     Diagnosis: metastatic RCC     - Presented with gross hematuria mid June 2023  Building a house and has been active working on this in the hat- 1st noted dark urine and felt related to being dehydrated  Occurred a 2nd time approximately 2 weeks later and this time he noted darker and small clots  Noted again 2 weeks later and worse but ultimately he was prompted to seek evaluation when he had significant blood when he urinated     - went to his PCP and urine sample was yellow again  He had blood work done and referred to Urology at that time     - 9/5/2023 CT Urogram:  FINDINGS:  The lung bases demonstrate bilateral nodules, for example 9 mm at the right lung base and up to 11 mm at the left lung base.  Atelectasis present.  There are bilateral fat containing inguinal hernias.  The liver demonstrates no mass.  The spleen is not enlarged.  The stomach, pancreas and adrenal glands are within normal limits.  Gallbladder is unremarkable.  There is no biliary ductal dilatation.  The kidneys concentrate and excrete contrast satisfactorily.  There is no renal calculus or ureteral calculus.  Within the mid to lower pole of the right kidney is a 4.9  x 6.3 x 6.1 cm heterogeneous solid mass which is overall slightly hypodense in relation to the enhancing parenchyma.  The mass is partially exophytic posteriorly.  It does extend partially into the renal sinus  At the upper pole of the right kidney is a subcentimeter exophytic focus which is isodense to the parenchyma on postcontrast imaging appearing slightly hyperdense on the noncontrast study, too small to characterize.  There are multiple additional subcentimeter hypodense right kidney foci which are suggestive of cysts.  Finally at the lower pole of the right kidney is a exophytic hypodense structure most compatible with a cyst.  The right main renal vein appears patent.  There is no significant periaortic lymphadenopathy.  Left kidney demonstrates several subcentimeter foci which are hypodense but too small to characterize, suggestive of cysts.  There is somewhat of a small amount of contrast within the upper collecting systems and ureters, with no definite focal abnormality, noting that there is some distortion of the collecting system in the region in which the right kidney mass involves the renal sinus.  The bladder is partially distended appearing grossly unremarkable.  The bowel demonstrates no significant abnormality.  The osseous structures demonstrate degenerative changes.  T9 demonstrates a lytic focus occupying the anterior half of the vertebral body.  There is slight loss of height along superior and inferior endplates with cortical disruption..  Impression:  Solid right renal mass concerning for neoplasm.  Multiple lung base nodules up to 11 mm, concerning for metastatic disease.  Recommend chest CT for full evaluation of lungs.  Lytic lesion at T9 with mild compression, concerning for pathologic compression fracture.  Subcentimeter right kidney lesion isodense to parenchyma on contrast enhanced imaging, too small to characterize.  Additional bilateral renal hypodensities which are too small to  characterize but suggestive of cysts.     - 9/7/2023 CT Chest:  FINDINGS:  The base of the neck is within normal limits.  The thyroid gland is unremarkable.  The supraclavicular regions are within normal limits.  The trachea is unremarkable.  The central airways are within normal limits.  No endobronchial lesion is identified.  There is no evidence of bronchiectasis.  The heart is unremarkable.  There are no pericardial effusions.  There are coronary artery calcifications.  The thoracic aorta is normal in caliber.  There are subcentimeter mediastinal and hilar lymph nodes.  There are subcentimeter axillary lymph nodes.  There are no pleural effusions.  There is no evidence of a pneumothorax.  There is no evidence of pneumomediastinum.  There are innumerable bilateral pulmonary nodules.  There is no focal consolidation.  The esophagus is unremarkable.  Please see the dedicated CT abdomen pelvis for the upper abdominal findings.  The chest wall is unremarkable.  There is unchanged lytic lesion involving the anterior aspect of T9 vertebral body with associated cortical erosions.  Impression:  Innumerable pulmonary nodules, concerning for metastatic disease to the chest.  Unchanged lytic lesion in the T9 vertebral body with cortical erosions.  Follow-up MRI of the spine, as clinically warranted.     - 9/7/2023 CT Abd:  FINDINGS:  CT renal protocol:  There is a 4.8 x 6.3 cm exophytic enhancing lesion in the lower pole of the right kidney.  There is hypoenhancement of the lesion compared to the normal renal parenchyma.  There is unchanged appearance of the mass extending into the right renal sinus.  There is no extension into the right renal vein  No additional enhancing lesions are identified.  There is a subcentimeter lesion in the right kidney is too small complete characterization.  There is prompt excretion from both collecting systems.  There is incomplete distension of the right distal ureter.  No definitive  filling defect is identified within the.  The urinary bladder is unremarkable.  CT abdomen pelvis:  There is unchanged appearance of multiple pulmonary nodules in the lung bases.  No new nodules identified  The heart is unremarkable.  There is normal tapering of the abdominal aorta.  There are single bilateral renal arteries.  The renal veins remain patent.  There is no evidence of lymphadenopathy.  The esophagus, stomach, and duodenum are within normal limits.  The small bowel loops are unremarkable.  The appendix is not visualized.  There are no secondary findings of acute appendicitis.  There is colonic diverticula without evidence of acute diverticulitis.  The liver is unremarkable.  The gallbladder is within normal limits.  The biliary tree is within normal limits.  The spleen is unremarkable.  The pancreas is within normal limits.  The adrenal glands are unremarkable.  There is no evidence of free fluid in the abdomen or pelvis.  There is no evidence of free air.  There is no evidence of pneumatosis.  No portal venous air is identified.  The psoas margins are unremarkable.  There are bilateral fat containing inguinal hernias.  There are degenerative changes in the osseous structures.  There is unchanged appearance of a lytic lesion involving the anterior aspect of the T9 vertebral body with associated cortical erosions.  Impression:  Unchanged 4.8 x 6.3 cm exophytic hypoenhancing lesion in the lower pole of the right kidney, concerning for renal cell carcinoma.  Extension of lesion into the renal sinus.  No evidence of renal vein invasion.  No regional lymphadenopathy.  Additional smaller subcentimeter lesion too small complete characterization in the right kidney.  Bilateral pulmonary nodules remain concerning for metastatic disease.  Lytic lesion along the anterior aspect of the T9 vertebral body, also concerning for osseous metastatic disease.  Additional findings as above.     - 9/20/2023 Bone  scan:  FINDINGS:  There is physiologic distribution of the radiopharmaceutical throughout the skeleton.  Focal uptake in the T9 vertebral body corresponding to lytic lesion seen on CT 09/05/2023.  Focal uptake in the right kidney in patient with known right renal mass.  There is otherwise normal uptake in the genitourinary system and soft tissues.  Impression:  Focal uptake in the T9 vertebral body corresponding to lytic lesion seen on prior CT and concerning for metastatic disease.  Additional focus of increased uptake in the right kidney in patient with known right renal mass     - required nephrectomy with hematuria as increased significantly     - 10/4/2023 Robotic right nephrectomy  Pathology:  RIGHT KIDNEY, TOTAL NEPHRECTOMY:   - Clear cell renal cell carcinoma, ISUP grade 4, 5.5 cm.   - Benign cortical cysts.   - See CAP synoptic report below.   SURGICAL PATHOLOGY CANCER CASE SUMMARY   Procedure: Total nephrectomy.   Specimen laterality: Right.   Tumor size: 5.5 cm.   Tumor focality: Unifocal.   Histologic type: Clear cell renal cell carcinoma.   Sarcomatoid features: Present, 5%.   Rhabdoid features: Not identified.   Histologic Grade (ISUP Grade): 4.   Tumor necrosis: Present, 20%.   Tumor extension: Extends into pelvicalyceal system and renal sinus fat.   Margins: Uninvolved.   Lymphovascular invasion (excluding renal vein and its segmental branches): Not identified.   Regional lymph nodes: No lymph nodes submitted or found.   Distant metastases: Not applicable in this specimen.   Pathologic stage (pTNM, AJCC 8th edition): pT3a pN not assigned (no lymph nodes submitted or found).

## 2023-11-09 NOTE — PT/OT/SLP PROGRESS
Occupational Therapy      Patient Name:  Gamaliel Pete Jr.   MRN:  [id number]    Pt reporting that he walked well with PT earlier and is waiting to be D/Cd home later. Reports that from a strength and independence standpoint that he is good to go home. Discussed any DME needs and pt states that he would like a hospital bed, RW and a shower chair. He did not want to pay for a shower chair so OT recommended getting a BSC and using it as a shower chair. Pt understood and was in agreement to do so.   Time spent with patient = 12:49 - 12:59 (1 TA charged).    DAVID Arnold    11/9/2023

## 2023-11-09 NOTE — NURSING
Patient transferred from Neuro ICU to room 811 @17:00. SBP was 167, MD notified and said increased BP was due to pain. Oxycodone 10mg ordered and given. Pt. with nonskid footwear on, bed in lowest position, and locked with bed rails up x2. Bed alarm set. Pt. instructed to call prior to getting OOB. Pt. has call light and personal items within reach. All questions and concerns addressed at this time.

## 2023-11-09 NOTE — PROGRESS NOTES
[facility] - Oncology ([facility])  Neurosurgery  Progress Note    Subjective:     History of Present Illness: Mr. 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 Pain has been worsening over the lat several days. He endorses low back pain, and mild neck pain however his chief pain source is in his back. He denies GI symptoms except occasional constipation that is relieved with OTC remedies. MRI concerning for C5 and T9 lesion.Patient admitted to medical oncology for management of pain symptoms, SABINE, hypercalcemia, with rad onc and NSGY consulted due to the spine mets.        Post-Op Info:  * No procedures listed *   3 Days Post-Op     Interval History: POD 2. Stepped down to floor. NAEON. AFVSS. Pt doing well. Postop XR's show good placement of hardware. Pain controlled. Neuro stable. HV drain with minimal output, removed. Tolerating solid diet.     Medications:  Continuous Infusions:  Scheduled Meds:   acetaminophen  1,000 mg Oral Q8H    amLODIPine  5 mg Oral Daily    ceFAZolin (ANCEF) IVPB  2 g Intravenous Q8H    gabapentin  300 mg Oral TID    heparin (porcine)  5,000 Units Subcutaneous Q8H    methocarbamoL  500 mg Oral TID    polyethylene glycol  17 g Oral BID    senna-docusate 8.6-50 mg  2 tablet Oral BID     PRN Meds:bisacodyL, oxyCODONE     Review of Systems  Objective:     Weight: 102.4 kg (225 lb 12 oz)  Body mass index is 32.39 kg/m².  Vital Signs (Most Recent):  Temp: 97.6 °F (36.4 °C) (11/09/23 0734)  Pulse: 65 (11/09/23 0734)  Resp: 18 (11/09/23 0734)  BP: 137/63 (11/09/23 0734)  SpO2: (!) 94 % (11/09/23 0734) Vital Signs (24h Range):  Temp:  [97.5 °F (36.4 °C)-98.7 °F (37.1 °C)] 97.6 °F (36.4 °C)  Pulse:  [63-90] 65  Resp:  [11-25] 18  SpO2:  [91 %-97 %] 94 %  BP: (118-167)/(57-72) 137/63  Arterial Line BP: (168-170)/(60-66) 168/66                              Closed/Suction Drain  "11/07/23 1215 Tube - 1 Anterior Neck 10 Fr. (Active)   Site Description Healing;Other (Comment) 11/09/23 0926   Dressing Type Gauze 11/09/23 0926   Dressing Status Old drainage 11/09/23 0926   Dressing Intervention Integrity maintained 11/09/23 0926   Drainage None 11/09/23 0926   Status Other (Comment) 11/09/23 0926   Output (mL) 0 mL 11/08/23 0300          Physical Exam         Neurosurgery Physical Exam  General: well developed, well nourished, no distress.   Head: normocephalic, atraumatic  Neurologic: Alert and oriented. Thought content appropriate.  GCS: Motor: 6/Verbal: 5/Eyes: 4 GCS Total: 15  Mental Status: Awake, Alert, Oriented x 4  Language: No aphasia  Speech: No dysarthria  Cranial nerves: face symmetric, tongue midline, CN II-XII grossly intact.   Eyes: pupils equal, round, reactive to light with accommodation, EOMI.   Pulmonary: normal respirations, no signs of respiratory distress  Skin: Skin is warm, dry and intact.  Sensory: intact to light touch throughout  Motor Strength:Moves all extremities spontaneously with good tone.  Full strength upper and lower extremities. No abnormal movements seen.      Strength   Deltoids Triceps Biceps Wrist Extension Wrist Flexion Hand    Upper: R 5/5 5/5 5/5 5/5 5/5 5/5     L 5/5 5/5 5/5 5/5 5/5 5/5       Iliopsoas Quadriceps Knee  Flexion Tibialis  anterior Gastro- cnemius EHL   Lower: R 5/5 5/5 5/5 5/5 5/5 5/5     L 5/5 5/5 5/5 5/5 5/5 5/5         Hvx1 in place, ss scant output  Anterior Cervical Incision c/d/I with Dermabond    Significant Labs:  Recent Labs   Lab 11/08/23 0245 11/09/23  0350   * 92    140   K 5.1 4.3    106   CO2 24 26   BUN 25* 33*   CREATININE 1.4 1.5*   CALCIUM 9.4 9.6   MG 2.3 2.2     Recent Labs   Lab 11/08/23 0245 11/09/23  0350   WBC 9.02 7.40   HGB 9.2* 8.6*   HCT 27.8* 25.6*    169     No results for input(s): "LABPT", "INR", "APTT" in the last 48 hours.  Microbiology Results (last 7 days)       ** No " results found for the last 168 hours. **          All pertinent labs from the last 24 hours have been reviewed.    Significant Diagnostics:  I have reviewed and interpreted all pertinent imaging results/findings within the past 24 hours.    Assessment/Plan:     * Metastatic cancer to spine  73-year-old male with PMHx of HTN, asthma, RCC (s/p kidney resection) with lung and spinal mets (C4-5 and T9).  Imaging showed complete involvement of the C5 vertebral body and partial of the C4 vertebral body.  His cervical spine demonstrated impending instability due to the involvement of the tumor.  He underwent preop embolization of the cervical tumor with IR on 11/6/23.  He was offered a C4/5 anterior cervical corpectomy in order to obtain tissue diagnosis, resect the lesions, and to stabilize the spine.     Now s/p C4-C5 corpectomy 11/7/23    Plan:  --Stepped down to floor under Oncology service  --Q4H neuro checks  --all labs and significant diagnostics reviewed   --post op XR's show satisfactory alignment/placement of hardware  --HV drain x 1 removed 11/9  --PT/OT  --brace when OOB  --multimodal pain control; no NSAIDs  --T9 kyphoplasty booked with IR for 11/14  --Plan for 2nd stage of surgery with posterior cervical fixation, booked for 11/15  --Okay for discharge from NSGY standpoint when pt is otherwise medically ready  --please notify nsgy for any acute neurological decline        Kandace Hastings PA-C  Neurosurgery  [facility] - Oncology ([facility])

## 2023-11-10 ENCOUNTER — DOCUMENTATION ONLY (OUTPATIENT)
Dept: HEMATOLOGY/ONCOLOGY | Facility: CLINIC | Age: 73
End: 2023-11-10
Payer: MEDICARE

## 2023-11-10 ENCOUNTER — PES CALL (OUTPATIENT)
Dept: HOME HEALTH SERVICES | Facility: CLINIC | Age: 73
End: 2023-11-10
Payer: MEDICARE

## 2023-11-10 NOTE — PROGRESS NOTES
NOMI received a call from Princess with [facility] to inform the patient will be admitted to [facility] on Monday. Per Princess, Guardian  will contact the patient and to arrange a time to admit the patent on Monday. NOMI will continue to follow.     ROC Roland, [facility]  Oncology Social Worker   [facility] - Oncology  [phone]

## 2023-11-11 LAB
BLD PROD TYP BPU: NORMAL
BLD PROD TYP BPU: NORMAL
BLOOD UNIT EXPIRATION DATE: NORMAL
BLOOD UNIT EXPIRATION DATE: NORMAL
BLOOD UNIT TYPE CODE: 9500
BLOOD UNIT TYPE CODE: 9500
BLOOD UNIT TYPE: NORMAL
BLOOD UNIT TYPE: NORMAL
CODING SYSTEM: NORMAL
CODING SYSTEM: NORMAL
CROSSMATCH INTERPRETATION: NORMAL
CROSSMATCH INTERPRETATION: NORMAL
DISPENSE STATUS: NORMAL
DISPENSE STATUS: NORMAL
TRANS ERYTHROCYTES VOL PATIENT: NORMAL ML
TRANS ERYTHROCYTES VOL PATIENT: NORMAL ML

## 2023-11-13 ENCOUNTER — TELEPHONE (OUTPATIENT)
Dept: INTERVENTIONAL RADIOLOGY/VASCULAR | Facility: HOSPITAL | Age: 73
End: 2023-11-13
Payer: MEDICARE

## 2023-11-13 ENCOUNTER — PATIENT OUTREACH (OUTPATIENT)
Dept: ADMINISTRATIVE | Facility: CLINIC | Age: 73
End: 2023-11-13
Payer: MEDICARE

## 2023-11-13 LAB
COMMENT: NORMAL
FINAL PATHOLOGIC DIAGNOSIS: NORMAL
FROZEN SECTION DIAGNOSIS: NORMAL
FROZEN SECTION FOOTNOTE: NORMAL
GROSS: NORMAL
Lab: NORMAL
MICROSCOPIC EXAM: NORMAL

## 2023-11-13 NOTE — PATIENT INSTRUCTIONS
Please forward this important TCC information to your provider in order to maximize the post discharge care delivery of this patient.    C3 nurse spoke with Gamaliel Pete Jr.  for a TCC post hospital discharge follow up call. The patient does not have a scheduled HOSFU appointment with Slava Lowery MD  within 5-7 days post hospital discharge date 11/09/2023. C3 nurse was unable to schedule HOSFU appointment in [facility].  Please contact pcp and schedule follow up appointment using HOSFU visit type on or before 11/16/2023.    PATIENT HAVING UPCOMING PROCEDURE AND DECLINED HOSP F/U APPPTS. WILL SCHEDULE AFTER ALL PROCEDURES ARE COMPLETED.  DECLINED NP HOME VISIT.    Respectfully,  Maira Spears LPN    [facility] C[facility]    [email or web address]       Please do not reply to this message, as this inbox is not routinely monitored.

## 2023-11-13 NOTE — PROGRESS NOTES
C3 nurse spoke with Gamaliel Pete Jr.  for a TCC post hospital discharge follow up call. The patient does not have a scheduled HOSFU appointment with Slava Lowery MD  within 5-7 days post hospital discharge date 11/09/2023. C3 nurse was unable to schedule HOSFU appointment in [facility].  Please contact pcp and schedule follow up appointment using HOSFU visit type on or before 11/16/2023.    PATIENT HAVING UPCOMING PROCEDURE AND DECLINED HOSP F/U APPPTS. WILL SCHEDULE AFTER ALL PROCEDURES ARE COMPLETED.  DECLINED NP HOME VISIT.

## 2023-11-14 ENCOUNTER — ANESTHESIA EVENT (OUTPATIENT)
Dept: SURGERY | Facility: HOSPITAL | Age: 73
DRG: 454 | End: 2023-11-14
Payer: MEDICARE

## 2023-11-14 ENCOUNTER — TELEPHONE (OUTPATIENT)
Dept: NEUROSURGERY | Facility: CLINIC | Age: 73
End: 2023-11-14
Payer: MEDICARE

## 2023-11-14 ENCOUNTER — HOSPITAL ENCOUNTER (OUTPATIENT)
Dept: INTERVENTIONAL RADIOLOGY/VASCULAR | Facility: HOSPITAL | Age: 73
Discharge: HOME | DRG: 454 | End: 2023-11-14
Attending: RADIOLOGY | Admitting: RADIOLOGY
Payer: MEDICARE

## 2023-11-14 ENCOUNTER — HOSPITAL ENCOUNTER (INPATIENT)
Facility: HOSPITAL | Age: 73
LOS: 4 days | Discharge: HOME | DRG: 454 | End: 2023-11-18
Attending: STUDENT IN AN ORGANIZED HEALTH CARE EDUCATION/TRAINING PROGRAM | Admitting: STUDENT IN AN ORGANIZED HEALTH CARE EDUCATION/TRAINING PROGRAM
Payer: MEDICARE

## 2023-11-14 VITALS
WEIGHT: 225 LBS | HEIGHT: 70 IN | BODY MASS INDEX: 32.21 KG/M2 | TEMPERATURE: 99 F | OXYGEN SATURATION: 96 % | DIASTOLIC BLOOD PRESSURE: 58 MMHG | SYSTOLIC BLOOD PRESSURE: 123 MMHG | HEART RATE: 84 BPM | RESPIRATION RATE: 18 BRPM

## 2023-11-14 DIAGNOSIS — C79.51 METASTATIC CANCER TO SPINE: Primary | ICD-10-CM

## 2023-11-14 DIAGNOSIS — C79.51 METASTASIS TO BONE: ICD-10-CM

## 2023-11-14 DIAGNOSIS — M84.58XS PATHOLOGICAL FRACTURE IN NEOPLASTIC DISEASE, OTHER SPECIFIED SITE, SEQUELA: ICD-10-CM

## 2023-11-14 DIAGNOSIS — C64.1 CLEAR CELL CARCINOMA OF RIGHT KIDNEY: Primary | ICD-10-CM

## 2023-11-14 DIAGNOSIS — C79.51 METASTATIC CANCER TO SPINE: ICD-10-CM

## 2023-11-14 DIAGNOSIS — C79.51 CANCER, METASTATIC TO BONE: ICD-10-CM

## 2023-11-14 LAB
ABO + RH BLD: NORMAL
ALBUMIN SERPL BCP-MCNC: 3.6 G/DL (ref 3.5–5.2)
ALP SERPL-CCNC: 92 U/L (ref 55–135)
ALT SERPL W/O P-5'-P-CCNC: 43 U/L (ref 10–44)
ANION GAP SERPL CALC-SCNC: 10 MMOL/L (ref 8–16)
APTT PPP: 26.9 SEC (ref 21–32)
AST SERPL-CCNC: 29 U/L (ref 10–40)
BASOPHILS # BLD AUTO: 0.04 K/UL (ref 0–0.2)
BASOPHILS NFR BLD: 0.4 % (ref 0–1.9)
BILIRUB SERPL-MCNC: 0.5 MG/DL (ref 0.1–1)
BLD GP AB SCN CELLS X3 SERPL QL: NORMAL
BUN SERPL-MCNC: 19 MG/DL (ref 8–23)
CALCIUM SERPL-MCNC: 10.2 MG/DL (ref 8.7–10.5)
CHLORIDE SERPL-SCNC: 104 MMOL/L (ref 95–110)
CO2 SERPL-SCNC: 25 MMOL/L (ref 23–29)
CREAT SERPL-MCNC: 1.2 MG/DL (ref 0.5–1.4)
DIFFERENTIAL METHOD: ABNORMAL
EOSINOPHIL # BLD AUTO: 0.2 K/UL (ref 0–0.5)
EOSINOPHIL NFR BLD: 1.8 % (ref 0–8)
ERYTHROCYTE [DISTWIDTH] IN BLOOD BY AUTOMATED COUNT: 14.2 % (ref 11.5–14.5)
EST. GFR  (NO RACE VARIABLE): >60 ML/MIN/1.73 M^2
GLUCOSE SERPL-MCNC: 140 MG/DL (ref 70–110)
HCT VFR BLD AUTO: 27.7 % (ref 40–54)
HGB BLD-MCNC: 9.5 G/DL (ref 14–18)
IMM GRANULOCYTES # BLD AUTO: 0.08 K/UL (ref 0–0.04)
IMM GRANULOCYTES NFR BLD AUTO: 0.9 % (ref 0–0.5)
INR PPP: 1 (ref 0.8–1.2)
LYMPHOCYTES # BLD AUTO: 1.2 K/UL (ref 1–4.8)
LYMPHOCYTES NFR BLD: 12.8 % (ref 18–48)
MCH RBC QN AUTO: 30.2 PG (ref 27–31)
MCHC RBC AUTO-ENTMCNC: 34.3 G/DL (ref 32–36)
MCV RBC AUTO: 88 FL (ref 82–98)
MONOCYTES # BLD AUTO: 0.7 K/UL (ref 0.3–1)
MONOCYTES NFR BLD: 8 % (ref 4–15)
NEUTROPHILS # BLD AUTO: 6.9 K/UL (ref 1.8–7.7)
NEUTROPHILS NFR BLD: 76.1 % (ref 38–73)
NRBC BLD-RTO: 0 /100 WBC
PLATELET # BLD AUTO: 273 K/UL (ref 150–450)
PMV BLD AUTO: 9.4 FL (ref 9.2–12.9)
POTASSIUM SERPL-SCNC: 4.1 MMOL/L (ref 3.5–5.1)
PROT SERPL-MCNC: 7.5 G/DL (ref 6–8.4)
PROTHROMBIN TIME: 10.8 SEC (ref 9–12.5)
RBC # BLD AUTO: 3.15 M/UL (ref 4.6–6.2)
SODIUM SERPL-SCNC: 139 MMOL/L (ref 136–145)
SPECIMEN OUTDATE: NORMAL
WBC # BLD AUTO: 9.04 K/UL (ref 3.9–12.7)

## 2023-11-14 PROCEDURE — 85025 COMPLETE CBC W/AUTO DIFF WBC: CPT | Performed by: PHYSICIAN ASSISTANT

## 2023-11-14 PROCEDURE — 11000001 HC ACUTE MED/SURG PRIVATE ROOM

## 2023-11-14 PROCEDURE — 88342 IMHCHEM/IMCYTCHM 1ST ANTB: CPT | Performed by: PATHOLOGY

## 2023-11-14 PROCEDURE — 88342 IMHCHEM/IMCYTCHM 1ST ANTB: CPT | Mod: 26,,, | Performed by: PATHOLOGY

## 2023-11-14 PROCEDURE — 99152 MOD SED SAME PHYS/QHP 5/>YRS: CPT | Performed by: RADIOLOGY

## 2023-11-14 PROCEDURE — 25000003 PHARM REV CODE 250: Performed by: PHYSICIAN ASSISTANT

## 2023-11-14 PROCEDURE — 99153 MOD SED SAME PHYS/QHP EA: CPT | Performed by: RADIOLOGY

## 2023-11-14 PROCEDURE — 88307 TISSUE EXAM BY PATHOLOGIST: CPT | Mod: 26,,, | Performed by: PATHOLOGY

## 2023-11-14 PROCEDURE — 25000003 PHARM REV CODE 250: Performed by: RADIOLOGY

## 2023-11-14 PROCEDURE — 22513 PERQ VERTEBRAL AUGMENTATION: CPT | Performed by: RADIOLOGY

## 2023-11-14 PROCEDURE — 88342 CHG IMMUNOCYTOCHEMISTRY: ICD-10-PCS | Mod: 26,,, | Performed by: PATHOLOGY

## 2023-11-14 PROCEDURE — 20982 IR ABLATION RF BONE WITH CT IMAGING: ICD-10-PCS | Mod: 51,RT,, | Performed by: RADIOLOGY

## 2023-11-14 PROCEDURE — 88307 PR  SURG PATH,LEVEL V: ICD-10-PCS | Mod: 26,,, | Performed by: PATHOLOGY

## 2023-11-14 PROCEDURE — 88307 TISSUE EXAM BY PATHOLOGIST: CPT | Performed by: PATHOLOGY

## 2023-11-14 PROCEDURE — 80053 COMPREHEN METABOLIC PANEL: CPT | Performed by: PHYSICIAN ASSISTANT

## 2023-11-14 PROCEDURE — 22513 IR KYPHOPLASTY THORACIC FIRST VERTEBRAL: ICD-10-PCS | Mod: ,,, | Performed by: RADIOLOGY

## 2023-11-14 PROCEDURE — 85610 PROTHROMBIN TIME: CPT | Performed by: PHYSICIAN ASSISTANT

## 2023-11-14 PROCEDURE — 86850 RBC ANTIBODY SCREEN: CPT | Performed by: PHYSICIAN ASSISTANT

## 2023-11-14 PROCEDURE — 20982 ABLATE BONE TUMOR(S) PERQ: CPT | Mod: RT | Performed by: RADIOLOGY

## 2023-11-14 PROCEDURE — A4550 SURGICAL TRAYS: HCPCS

## 2023-11-14 PROCEDURE — 36415 COLL VENOUS BLD VENIPUNCTURE: CPT | Performed by: PHYSICIAN ASSISTANT

## 2023-11-14 PROCEDURE — 63600175 PHARM REV CODE 636 W HCPCS: Performed by: RADIOLOGY

## 2023-11-14 PROCEDURE — 85730 THROMBOPLASTIN TIME PARTIAL: CPT | Performed by: PHYSICIAN ASSISTANT

## 2023-11-14 RX ORDER — OXYCODONE HYDROCHLORIDE 10 MG/1
10 TABLET ORAL EVERY 4 HOURS PRN
Status: DISCONTINUED | OUTPATIENT
Start: 2023-11-14 | End: 2023-11-15

## 2023-11-14 RX ORDER — LIDOCAINE HYDROCHLORIDE 10 MG/ML
1 INJECTION, SOLUTION EPIDURAL; INFILTRATION; INTRACAUDAL; PERINEURAL ONCE
Status: DISCONTINUED | OUTPATIENT
Start: 2023-11-14 | End: 2023-11-15 | Stop reason: HOSPADM

## 2023-11-14 RX ORDER — OXYCODONE HYDROCHLORIDE 5 MG/1
10 TABLET ORAL ONCE
Status: COMPLETED | OUTPATIENT
Start: 2023-11-14 | End: 2023-11-14

## 2023-11-14 RX ORDER — GABAPENTIN 300 MG/1
300 CAPSULE ORAL 3 TIMES DAILY
Status: DISCONTINUED | OUTPATIENT
Start: 2023-11-14 | End: 2023-11-18 | Stop reason: HOSPADM

## 2023-11-14 RX ORDER — MIDAZOLAM HYDROCHLORIDE 1 MG/ML
INJECTION INTRAMUSCULAR; INTRAVENOUS
Status: COMPLETED | OUTPATIENT
Start: 2023-11-14 | End: 2023-11-14

## 2023-11-14 RX ORDER — CEFAZOLIN SODIUM 1 G/50ML
SOLUTION INTRAVENOUS
Status: COMPLETED | OUTPATIENT
Start: 2023-11-14 | End: 2023-11-14

## 2023-11-14 RX ORDER — AMLODIPINE BESYLATE 5 MG/1
5 TABLET ORAL DAILY
Status: DISCONTINUED | OUTPATIENT
Start: 2023-11-15 | End: 2023-11-18 | Stop reason: HOSPADM

## 2023-11-14 RX ORDER — IBUPROFEN 200 MG
24 TABLET ORAL
Status: DISCONTINUED | OUTPATIENT
Start: 2023-11-14 | End: 2023-11-18 | Stop reason: HOSPADM

## 2023-11-14 RX ORDER — SODIUM CHLORIDE 9 MG/ML
INJECTION, SOLUTION INTRAVENOUS CONTINUOUS
Status: DISCONTINUED | OUTPATIENT
Start: 2023-11-14 | End: 2023-11-15 | Stop reason: HOSPADM

## 2023-11-14 RX ORDER — FENTANYL CITRATE 50 UG/ML
INJECTION, SOLUTION INTRAMUSCULAR; INTRAVENOUS
Status: COMPLETED | OUTPATIENT
Start: 2023-11-14 | End: 2023-11-14

## 2023-11-14 RX ORDER — IBUPROFEN 200 MG
16 TABLET ORAL
Status: DISCONTINUED | OUTPATIENT
Start: 2023-11-14 | End: 2023-11-18 | Stop reason: HOSPADM

## 2023-11-14 RX ORDER — OXYCODONE HYDROCHLORIDE 5 MG/1
TABLET ORAL
Status: DISCONTINUED
Start: 2023-11-14 | End: 2023-11-15 | Stop reason: HOSPADM

## 2023-11-14 RX ORDER — GLUCAGON 1 MG
1 KIT INJECTION
Status: DISCONTINUED | OUTPATIENT
Start: 2023-11-14 | End: 2023-11-18 | Stop reason: HOSPADM

## 2023-11-14 RX ORDER — ONDANSETRON 2 MG/ML
4 INJECTION INTRAMUSCULAR; INTRAVENOUS EVERY 6 HOURS PRN
Status: DISCONTINUED | OUTPATIENT
Start: 2023-11-14 | End: 2023-11-15 | Stop reason: HOSPADM

## 2023-11-14 RX ADMIN — CEFAZOLIN SODIUM 1 G: 1 SOLUTION INTRAVENOUS at 12:11

## 2023-11-14 RX ADMIN — OXYCODONE HYDROCHLORIDE 10 MG: 10 TABLET ORAL at 11:11

## 2023-11-14 RX ADMIN — OXYCODONE HYDROCHLORIDE 10 MG: 10 TABLET ORAL at 07:11

## 2023-11-14 RX ADMIN — MIDAZOLAM HYDROCHLORIDE 1 MG: 2 INJECTION, SOLUTION INTRAMUSCULAR; INTRAVENOUS at 12:11

## 2023-11-14 RX ADMIN — OXYCODONE 10 MG: 5 TABLET ORAL at 02:11

## 2023-11-14 RX ADMIN — FENTANYL CITRATE 50 MCG: 50 INJECTION, SOLUTION INTRAMUSCULAR; INTRAVENOUS at 12:11

## 2023-11-14 RX ADMIN — GABAPENTIN 300 MG: 300 CAPSULE ORAL at 09:11

## 2023-11-14 NOTE — NURSING
Pt is being admitted to  946.  Pt report called to the floor nurse and all questions addressed.     no

## 2023-11-14 NOTE — PROCEDURES
Radiology Post-Procedure Note    Pre Op Diagnosis: T9 vertebral body Mets (primary: RCC)    Post Op Diagnosis:  Same    Procedure:  T9 vertebral body biopsy, RF ablation and kyphoplasty    Procedure performed by: Willian Jung MD and Jake Bradley MD    Written Informed Consent Obtained: Yes    Specimen Removed: YES. Aspiration biopsy of vertebral body     Estimated Blood Loss: less than 100     Findings: Moderate sedation was used.    Under fluoroscopic surveillance, after identifying T9 vertebral body, kyphoplasty needles were inserted from 2 sites via percutaneous  transpedicular approach at the level of T9.  Aspiration biopsy was performed after confirming the placement of the tip of the needles.  Following this, radiofrequency ablation using osteo cool was performed.  At the end, kyphoplasty was performed using contrast injected balloons inflation at the sites of ablation and injection of methylmethacrylate.         The patient tolerated the procedure well and there were no complications.            Jake Bradley MD, MHA  Fellow, NeuroEndovascular Surgery, [facility]  Neurologist, [facility] [place]

## 2023-11-14 NOTE — PLAN OF CARE
Pt tolerated T9 kyphoplasty w/osteocool well, middle back dressing c/d/I x2, 3 hr recovcery, 1st hr flat, next 2 hr roberth erect, transfer to mpu, report given at bedside

## 2023-11-14 NOTE — PLAN OF CARE
Pt arrived to 4 for T9 kyphoplasty w/ osteocool. Pt oriented to unit and staff, Pt safely transferred from stretcher to procedural table. Fall risk reviewed and comfort measures utilized with interventions. Safety strap applied, position pillows to minimize pressure points. Blankets applied. Pt prepped and draped utilizing standard sterile technique. Patient placed on continuous monitoring, as required by sedation policy. Timeouts implemented utilizing standard universal time-out per department and facility policy. RN to remain at bedside with continuous monitoring. Pt resting comfortably. Denies pain/discomfort. Will continue to monitor. See flow sheets for monitoring, medication administration, and updates. patient verbalizes understanding.

## 2023-11-14 NOTE — H&P
Radiology History & Physical      SUBJECTIVE:     Chief Complaint: T9 vertebral body mets    History of Present Illness:  Gamaliel Pete Jr. is a 73 y.o. male with renal carcinoma (RCC) s/p R nephrectomy and mets to T9 vertebral body is referred for T9 body biopsy, RF ablation and kyphoplasty       Past Medical History:   Diagnosis Date    Asthma     no inhaler use    Borderline hypertension     ED (erectile dysfunction)     Gout     Hematuria     Hypertension      Past Surgical History:   Procedure Laterality Date    COLONOSCOPY  02/10/2022    COLONOSCOPY N/A 02/10/2022    Procedure: COLONOSCOPY;  Surgeon: Desmond Keenan MD;  Location: [facility] ENDO;  Service: General;  Laterality: N/A;    ROBOT-ASSISTED LAPAROSCOPIC NEPHRECTOMY Right 10/4/2023    Procedure: ROBOTIC NEPHRECTOMY;  Surgeon: Henry Michaels MD;  Location: [facility] OR;  Service: Urology;  Laterality: Right;    SURGICAL REMOVAL OF VERTEBRAL BODY OF CERVICAL SPINE Right 11/7/2023    Procedure: CORPECTOMY, SPINE, CERVICAL;  Surgeon: Nasim Martinez DO;  Location: [address];  Service: Neurosurgery;  Laterality: Right;  C4 and C5 corpectomy with globus;  wells tongs; c-arm; neuromonitoring; microscope; type and cross 2 units    TONSILLECTOMY         Home Meds:   Prior to Admission medications    Medication Sig Start Date End Date Taking? Authorizing Provider   amlodipine-benazepril 5-10 mg (LOTREL) 5-10 mg per capsule Take 1 capsule by mouth once daily. 9/28/23 9/27/24  Fred Aviles MD   gabapentin (NEURONTIN) 300 MG capsule Take 1 capsule (300 mg total) by mouth 3 (three) times daily. 10/30/23 10/29/24  Sejal Yates PA-C   indomethacin (INDOCIN) 50 MG capsule One p.o. t.i.d. p.r.n. gouty attack no more than 7 days--do not take with other NSAIDs 8/24/23   Fred Aviles MD   naloxone (NARCAN) 4 mg/actuation Spry 1 spray (4mg) by nasal route as needed for opioid overdose; may repeat every 2-3 minutes in alternating nostrils  until medical help arrives. Call 911 11/2/23   Janiya Jarvis MD   OLIVE OIL ORAL Take 15 mLs by mouth 2 (two) times a day.    Provider, Historical   omega-3 fatty acids/fish oil (FISH OIL-OMEGA-3 FATTY ACIDS) 300-1,000 mg capsule Take 2 capsules by mouth once daily.    Provider, Historical   oxyCODONE (ROXICODONE) 10 mg Tab immediate release tablet Take 1 tablet (10 mg total) by mouth every 4 (four) hours as needed for Pain. 11/9/23   Luis Mora,    oxyCODONE myristate (XTAMPZA ER) 18 mg CSpT Take 1 capsule (18 mg total) by mouth 2 (two) times a day. for 7 days 11/9/23 11/16/23  Luis Mora DO   polyethylene glycol (GLYCOLAX) 17 gram/dose powder Use cap to measure 17 grams, mix in liquid and take by mouth once daily. 11/3/23 12/3/23  Janiya Jarvis MD   senna (SENOKOT) 8.6 mg tablet Take 1 tablet by mouth once daily. 11/3/23 12/3/23  Janiya Jarvis MD   sildenafiL (VIAGRA) 100 MG tablet Take 1 tablet (100 mg total) by mouth daily as needed for Erectile Dysfunction. 6/2/21 10/11/23  Slava Lowery MD   turmeric (CURCUMIN MISC) 1,000 mg by Misc.(Non-Drug; Combo Route) route Daily.    Provider, Historical   UNABLE TO FIND Take 500 mg by mouth 2 (two) times a day. medication name: Tudca    Provider, Historical   UNABLE TO FIND Take 2 capsules by mouth 2 (two) times a day. medication name: Turkey tail mushroom    Provider, Historical   UNABLE TO FIND Take 15 drops by mouth once daily. medication name: Essiac-20    Provider, Historical   UNABLE TO FIND Take 5 mLs by mouth 2 (two) times a day. medication name: barley leaf juice powder    Provider, Historical   UNABLE TO FIND Take 5 mLs by mouth 2 (two) times a day. medication name: black seed oil (cumin seed)    Provider, Historical     Anticoagulants/Antiplatelets: no anticoagulation    Allergies: Review of patient's allergies indicates:  No Known Allergies  Sedation History:  have not been any systemic reactions    Review of Systems:   Hematological: negative  no  known coagulopathies  Respiratory: no cough, shortness of breath, or wheezing  no shortness of breath  Cardiovascular: no chest pain or dyspnea on exertion  no chest pain  Gastrointestinal: no abdominal pain, change in bowel habits, or black or bloody stools  no abdominal pain  Genito-Urinary: no dysuria, trouble voiding, or hematuria  no dysuria  Musculoskeletal: +back pain  Neurological: no TIA or stroke symptoms         OBJECTIVE:     Vital Signs (Most Recent)       Physical Exam:  ASA: 2  Mallampati: 2    General: no acute distress  Mental Status: alert and oriented to person, place and time  HEENT: normocephalic, atraumatic  Chest: unlabored breathing  Heart: regular heart rate  Abdomen: nondistended  Extremity: moves all extremities    Laboratory  Lab Results   Component Value Date    INR 1.0 11/07/2023       Lab Results   Component Value Date    WBC 7.40 11/09/2023    HGB 8.6 (L) 11/09/2023    HCT 25.6 (L) 11/09/2023    MCV 89 11/09/2023     11/09/2023      Lab Results   Component Value Date    GLU 92 11/09/2023     11/09/2023    K 4.3 11/09/2023     11/09/2023    CO2 26 11/09/2023    BUN 33 (H) 11/09/2023    CREATININE 1.5 (H) 11/09/2023    CALCIUM 9.6 11/09/2023    MG 2.2 11/09/2023    ALT 29 11/09/2023    AST 21 11/09/2023    ALBUMIN 3.2 (L) 11/09/2023    BILITOT 0.2 11/09/2023       ASSESSMENT/PLAN:     Sedation Plan: Up to moderate   Patient will undergo: IR guided biopsy f/b RF ablation of vertebral body mets at T9 level f/b kyphoplasty               Jake Bradley MD, MHA  Fellow, NeuroEndovascular Surgery, [facility]  Neurologist, [facility] [place], [place]

## 2023-11-14 NOTE — DISCHARGE INSTRUCTIONS
For scheduling: Call Magalie at [phone]    For questions or concerns call: ASHLEY MON-FRI 8 AM- 5PM [phone]. Radiology resident on call [phone].    For immediate concerns that are not emergent, you may call our radiology clinic at: [phone]

## 2023-11-15 ENCOUNTER — ANESTHESIA (OUTPATIENT)
Dept: SURGERY | Facility: HOSPITAL | Age: 73
DRG: 454 | End: 2023-11-15
Payer: MEDICARE

## 2023-11-15 VITALS — RESPIRATION RATE: 10 BRPM

## 2023-11-15 LAB
ALBUMIN SERPL BCP-MCNC: 3.5 G/DL (ref 3.5–5.2)
ALP SERPL-CCNC: 93 U/L (ref 55–135)
ALT SERPL W/O P-5'-P-CCNC: 38 U/L (ref 10–44)
ANION GAP SERPL CALC-SCNC: 8 MMOL/L (ref 8–16)
AST SERPL-CCNC: 25 U/L (ref 10–40)
BASOPHILS # BLD AUTO: 0.05 K/UL (ref 0–0.2)
BASOPHILS NFR BLD: 0.8 % (ref 0–1.9)
BILIRUB SERPL-MCNC: 0.5 MG/DL (ref 0.1–1)
BUN SERPL-MCNC: 18 MG/DL (ref 8–23)
CALCIUM SERPL-MCNC: 10 MG/DL (ref 8.7–10.5)
CHLORIDE SERPL-SCNC: 104 MMOL/L (ref 95–110)
CO2 SERPL-SCNC: 27 MMOL/L (ref 23–29)
CREAT SERPL-MCNC: 1 MG/DL (ref 0.5–1.4)
DIFFERENTIAL METHOD: ABNORMAL
EOSINOPHIL # BLD AUTO: 0.2 K/UL (ref 0–0.5)
EOSINOPHIL NFR BLD: 2.5 % (ref 0–8)
ERYTHROCYTE [DISTWIDTH] IN BLOOD BY AUTOMATED COUNT: 13.8 % (ref 11.5–14.5)
EST. GFR  (NO RACE VARIABLE): >60 ML/MIN/1.73 M^2
GLUCOSE SERPL-MCNC: 97 MG/DL (ref 70–110)
HCT VFR BLD AUTO: 31.2 % (ref 40–54)
HGB BLD-MCNC: 10 G/DL (ref 14–18)
IMM GRANULOCYTES # BLD AUTO: 0.05 K/UL (ref 0–0.04)
IMM GRANULOCYTES NFR BLD AUTO: 0.8 % (ref 0–0.5)
LYMPHOCYTES # BLD AUTO: 1.7 K/UL (ref 1–4.8)
LYMPHOCYTES NFR BLD: 25.3 % (ref 18–48)
MCH RBC QN AUTO: 29.8 PG (ref 27–31)
MCHC RBC AUTO-ENTMCNC: 32.1 G/DL (ref 32–36)
MCV RBC AUTO: 93 FL (ref 82–98)
MONOCYTES # BLD AUTO: 0.8 K/UL (ref 0.3–1)
MONOCYTES NFR BLD: 11.5 % (ref 4–15)
NEUTROPHILS # BLD AUTO: 3.9 K/UL (ref 1.8–7.7)
NEUTROPHILS NFR BLD: 59.1 % (ref 38–73)
NRBC BLD-RTO: 0 /100 WBC
PLATELET # BLD AUTO: 224 K/UL (ref 150–450)
PMV BLD AUTO: 8.7 FL (ref 9.2–12.9)
POTASSIUM SERPL-SCNC: 4.3 MMOL/L (ref 3.5–5.1)
PROT SERPL-MCNC: 7.3 G/DL (ref 6–8.4)
RBC # BLD AUTO: 3.36 M/UL (ref 4.6–6.2)
SODIUM SERPL-SCNC: 139 MMOL/L (ref 136–145)
WBC # BLD AUTO: 6.52 K/UL (ref 3.9–12.7)

## 2023-11-15 PROCEDURE — 36620 ARTERIAL: ICD-10-PCS | Mod: 59,,, | Performed by: SURGERY

## 2023-11-15 PROCEDURE — 80053 COMPREHEN METABOLIC PANEL: CPT | Performed by: PHYSICIAN ASSISTANT

## 2023-11-15 PROCEDURE — 36000710: Performed by: STUDENT IN AN ORGANIZED HEALTH CARE EDUCATION/TRAINING PROGRAM

## 2023-11-15 PROCEDURE — 25000003 PHARM REV CODE 250: Performed by: PHYSICIAN ASSISTANT

## 2023-11-15 PROCEDURE — 22842 INSERT SPINE FIXATION DEVICE: CPT | Mod: 59,,, | Performed by: STUDENT IN AN ORGANIZED HEALTH CARE EDUCATION/TRAINING PROGRAM

## 2023-11-15 PROCEDURE — 27201037 HC PRESSURE MONITORING SET UP

## 2023-11-15 PROCEDURE — 99900035 HC TECH TIME PER 15 MIN (STAT)

## 2023-11-15 PROCEDURE — 25000003 PHARM REV CODE 250: Performed by: STUDENT IN AN ORGANIZED HEALTH CARE EDUCATION/TRAINING PROGRAM

## 2023-11-15 PROCEDURE — 37000008 HC ANESTHESIA 1ST 15 MINUTES: Performed by: STUDENT IN AN ORGANIZED HEALTH CARE EDUCATION/TRAINING PROGRAM

## 2023-11-15 PROCEDURE — 22600 ARTHRD PST TQ 1NTRSPC CRV: CPT | Mod: 51,78,, | Performed by: STUDENT IN AN ORGANIZED HEALTH CARE EDUCATION/TRAINING PROGRAM

## 2023-11-15 PROCEDURE — 63600175 PHARM REV CODE 636 W HCPCS: Performed by: STUDENT IN AN ORGANIZED HEALTH CARE EDUCATION/TRAINING PROGRAM

## 2023-11-15 PROCEDURE — D9220A PRA ANESTHESIA: Mod: ANES,,, | Performed by: SURGERY

## 2023-11-15 PROCEDURE — 85025 COMPLETE CBC W/AUTO DIFF WBC: CPT | Performed by: PHYSICIAN ASSISTANT

## 2023-11-15 PROCEDURE — 88300 PR  SURG PATH,GROSS,LEVEL I: ICD-10-PCS | Mod: 26,,, | Performed by: PATHOLOGY

## 2023-11-15 PROCEDURE — 36000711: Performed by: STUDENT IN AN ORGANIZED HEALTH CARE EDUCATION/TRAINING PROGRAM

## 2023-11-15 PROCEDURE — 71000015 HC POSTOP RECOV 1ST HR: Performed by: STUDENT IN AN ORGANIZED HEALTH CARE EDUCATION/TRAINING PROGRAM

## 2023-11-15 PROCEDURE — 88300 SURGICAL PATH GROSS: CPT | Performed by: PATHOLOGY

## 2023-11-15 PROCEDURE — D9220A PRA ANESTHESIA: ICD-10-PCS | Mod: ANES,,, | Performed by: SURGERY

## 2023-11-15 PROCEDURE — 22614 PR ARTHRODESIS, POST/POSTLAT, SNGL INTERSPACE, EA ADDTL: ICD-10-PCS | Mod: ,,, | Performed by: STUDENT IN AN ORGANIZED HEALTH CARE EDUCATION/TRAINING PROGRAM

## 2023-11-15 PROCEDURE — A6011 COLLAGEN GEL/PASTE WOUND FIL: HCPCS | Performed by: STUDENT IN AN ORGANIZED HEALTH CARE EDUCATION/TRAINING PROGRAM

## 2023-11-15 PROCEDURE — 36620 INSERTION CATHETER ARTERY: CPT | Mod: 59,,, | Performed by: SURGERY

## 2023-11-15 PROCEDURE — 22614 ARTHRD PST TQ 1NTRSPC EA ADD: CPT | Mod: ,,, | Performed by: STUDENT IN AN ORGANIZED HEALTH CARE EDUCATION/TRAINING PROGRAM

## 2023-11-15 PROCEDURE — 27201423 OPTIME MED/SURG SUP & DEVICES STERILE SUPPLY: Performed by: STUDENT IN AN ORGANIZED HEALTH CARE EDUCATION/TRAINING PROGRAM

## 2023-11-15 PROCEDURE — 71000033 HC RECOVERY, INTIAL HOUR: Performed by: STUDENT IN AN ORGANIZED HEALTH CARE EDUCATION/TRAINING PROGRAM

## 2023-11-15 PROCEDURE — 22600 PR ARTHRODESIS, POST/POSTLAT, SNGL INTERSPACE, CERVICAL BELOW C2: ICD-10-PCS | Mod: 51,78,, | Performed by: STUDENT IN AN ORGANIZED HEALTH CARE EDUCATION/TRAINING PROGRAM

## 2023-11-15 PROCEDURE — 27100171 HC OXYGEN HIGH FLOW UP TO 24 HOURS

## 2023-11-15 PROCEDURE — C1713 ANCHOR/SCREW BN/BN,TIS/BN: HCPCS | Performed by: STUDENT IN AN ORGANIZED HEALTH CARE EDUCATION/TRAINING PROGRAM

## 2023-11-15 PROCEDURE — 22842 PR POSTERIOR SEGMENTAL INSTRUMENTATION 3-6 VRT SEG: ICD-10-PCS | Mod: 59,,, | Performed by: STUDENT IN AN ORGANIZED HEALTH CARE EDUCATION/TRAINING PROGRAM

## 2023-11-15 PROCEDURE — 36415 COLL VENOUS BLD VENIPUNCTURE: CPT | Performed by: PHYSICIAN ASSISTANT

## 2023-11-15 PROCEDURE — D9220A PRA ANESTHESIA: Mod: CRNA,,, | Performed by: NURSE ANESTHETIST, CERTIFIED REGISTERED

## 2023-11-15 PROCEDURE — 22849 PR REINSERT SPINAL FIXATION: ICD-10-PCS | Mod: 78,,, | Performed by: STUDENT IN AN ORGANIZED HEALTH CARE EDUCATION/TRAINING PROGRAM

## 2023-11-15 PROCEDURE — D9220A PRA ANESTHESIA: ICD-10-PCS | Mod: CRNA,,, | Performed by: NURSE ANESTHETIST, CERTIFIED REGISTERED

## 2023-11-15 PROCEDURE — 20930 SP BONE ALGRFT MORSEL ADD-ON: CPT | Mod: ,,, | Performed by: STUDENT IN AN ORGANIZED HEALTH CARE EDUCATION/TRAINING PROGRAM

## 2023-11-15 PROCEDURE — 63600175 PHARM REV CODE 636 W HCPCS: Performed by: NURSE ANESTHETIST, CERTIFIED REGISTERED

## 2023-11-15 PROCEDURE — 20930 PR ALLOGRAFT FOR SPINE SURGERY ONLY MORSELIZED: ICD-10-PCS | Mod: ,,, | Performed by: STUDENT IN AN ORGANIZED HEALTH CARE EDUCATION/TRAINING PROGRAM

## 2023-11-15 PROCEDURE — 27800903 OPTIME MED/SURG SUP & DEVICES OTHER IMPLANTS: Performed by: STUDENT IN AN ORGANIZED HEALTH CARE EDUCATION/TRAINING PROGRAM

## 2023-11-15 PROCEDURE — 71000016 HC POSTOP RECOV ADDL HR: Performed by: STUDENT IN AN ORGANIZED HEALTH CARE EDUCATION/TRAINING PROGRAM

## 2023-11-15 PROCEDURE — 37000009 HC ANESTHESIA EA ADD 15 MINS: Performed by: STUDENT IN AN ORGANIZED HEALTH CARE EDUCATION/TRAINING PROGRAM

## 2023-11-15 PROCEDURE — 88300 SURGICAL PATH GROSS: CPT | Mod: 26,,, | Performed by: PATHOLOGY

## 2023-11-15 PROCEDURE — 94761 N-INVAS EAR/PLS OXIMETRY MLT: CPT

## 2023-11-15 PROCEDURE — 11000001 HC ACUTE MED/SURG PRIVATE ROOM

## 2023-11-15 PROCEDURE — 22849 REINSERT SPINAL FIXATION: CPT | Mod: 78,,, | Performed by: STUDENT IN AN ORGANIZED HEALTH CARE EDUCATION/TRAINING PROGRAM

## 2023-11-15 DEVICE — SCREW BONE SPINAL ANT 16MM: Type: IMPLANTABLE DEVICE | Site: CRANIAL | Status: FUNCTIONAL

## 2023-11-15 DEVICE — ROD SYMPHONY PRE-CUT 3.5X55MM: Type: IMPLANTABLE DEVICE | Site: CRANIAL | Status: FUNCTIONAL

## 2023-11-15 DEVICE — TAP SYMPHONY STD THRD 3.5MM: Type: IMPLANTABLE DEVICE | Site: CRANIAL | Status: FUNCTIONAL

## 2023-11-15 DEVICE — SET SYMPHONY SCREW NS: Type: IMPLANTABLE DEVICE | Site: CRANIAL | Status: FUNCTIONAL

## 2023-11-15 DEVICE — ROD SYMPHONY PRE-CUT 3.5X60MM: Type: IMPLANTABLE DEVICE | Site: CRANIAL | Status: FUNCTIONAL

## 2023-11-15 DEVICE — IMPLANTABLE DEVICE: Type: IMPLANTABLE DEVICE | Site: CRANIAL | Status: FUNCTIONAL

## 2023-11-15 DEVICE — SCREW SYMPHONY PA 3.5X14MM: Type: IMPLANTABLE DEVICE | Site: CRANIAL | Status: FUNCTIONAL

## 2023-11-15 DEVICE — BONE 30CC CANCELLOUS CRUSHED: Type: IMPLANTABLE DEVICE | Site: CRANIAL | Status: FUNCTIONAL

## 2023-11-15 DEVICE — GRAFT ALTAPORE BIOACTIVE 2.5ML: Type: IMPLANTABLE DEVICE | Site: CRANIAL | Status: FUNCTIONAL

## 2023-11-15 RX ORDER — ONDANSETRON 8 MG/1
8 TABLET, ORALLY DISINTEGRATING ORAL EVERY 6 HOURS PRN
Status: DISCONTINUED | OUTPATIENT
Start: 2023-11-15 | End: 2023-11-18 | Stop reason: HOSPADM

## 2023-11-15 RX ORDER — HYDROMORPHONE HYDROCHLORIDE 1 MG/ML
0.2 INJECTION, SOLUTION INTRAMUSCULAR; INTRAVENOUS; SUBCUTANEOUS EVERY 5 MIN PRN
Status: DISCONTINUED | OUTPATIENT
Start: 2023-11-15 | End: 2023-11-15 | Stop reason: HOSPADM

## 2023-11-15 RX ORDER — ONDANSETRON 2 MG/ML
INJECTION INTRAMUSCULAR; INTRAVENOUS
Status: DISCONTINUED | OUTPATIENT
Start: 2023-11-15 | End: 2023-11-15

## 2023-11-15 RX ORDER — METHOCARBAMOL 750 MG/1
750 TABLET, FILM COATED ORAL 4 TIMES DAILY
Status: DISCONTINUED | OUTPATIENT
Start: 2023-11-15 | End: 2023-11-18 | Stop reason: HOSPADM

## 2023-11-15 RX ORDER — AMOXICILLIN 250 MG
2 CAPSULE ORAL NIGHTLY PRN
Status: DISCONTINUED | OUTPATIENT
Start: 2023-11-15 | End: 2023-11-18 | Stop reason: HOSPADM

## 2023-11-15 RX ORDER — HEPARIN SODIUM 5000 [USP'U]/ML
5000 INJECTION, SOLUTION INTRAVENOUS; SUBCUTANEOUS EVERY 8 HOURS
Status: DISCONTINUED | OUTPATIENT
Start: 2023-11-15 | End: 2023-11-18 | Stop reason: HOSPADM

## 2023-11-15 RX ORDER — CEFAZOLIN SODIUM 1 G/3ML
INJECTION, POWDER, FOR SOLUTION INTRAMUSCULAR; INTRAVENOUS
Status: DISCONTINUED | OUTPATIENT
Start: 2023-11-15 | End: 2023-11-15

## 2023-11-15 RX ORDER — MUPIROCIN 20 MG/G
OINTMENT TOPICAL 2 TIMES DAILY
Status: DISCONTINUED | OUTPATIENT
Start: 2023-11-15 | End: 2023-11-18 | Stop reason: HOSPADM

## 2023-11-15 RX ORDER — PROCHLORPERAZINE EDISYLATE 5 MG/ML
5 INJECTION INTRAMUSCULAR; INTRAVENOUS EVERY 30 MIN PRN
Status: DISCONTINUED | OUTPATIENT
Start: 2023-11-15 | End: 2023-11-15 | Stop reason: HOSPADM

## 2023-11-15 RX ORDER — VANCOMYCIN HYDROCHLORIDE 1 G/20ML
INJECTION, POWDER, LYOPHILIZED, FOR SOLUTION INTRAVENOUS
Status: DISCONTINUED | OUTPATIENT
Start: 2023-11-15 | End: 2023-11-15 | Stop reason: HOSPADM

## 2023-11-15 RX ORDER — SUCCINYLCHOLINE CHLORIDE 20 MG/ML
INJECTION INTRAMUSCULAR; INTRAVENOUS
Status: DISCONTINUED | OUTPATIENT
Start: 2023-11-15 | End: 2023-11-15

## 2023-11-15 RX ORDER — LIDOCAINE HYDROCHLORIDE 20 MG/ML
INJECTION, SOLUTION EPIDURAL; INFILTRATION; INTRACAUDAL; PERINEURAL
Status: DISCONTINUED | OUTPATIENT
Start: 2023-11-15 | End: 2023-11-15

## 2023-11-15 RX ORDER — KETAMINE HCL IN 0.9 % NACL 50 MG/5 ML
SYRINGE (ML) INTRAVENOUS
Status: DISCONTINUED | OUTPATIENT
Start: 2023-11-15 | End: 2023-11-15

## 2023-11-15 RX ORDER — MAG HYDROX/ALUMINUM HYD/SIMETH 200-200-20
30 SUSPENSION, ORAL (FINAL DOSE FORM) ORAL EVERY 4 HOURS PRN
Status: DISCONTINUED | OUTPATIENT
Start: 2023-11-15 | End: 2023-11-18 | Stop reason: HOSPADM

## 2023-11-15 RX ORDER — LIDOCAINE HYDROCHLORIDE AND EPINEPHRINE 10; 10 MG/ML; UG/ML
INJECTION, SOLUTION INFILTRATION; PERINEURAL
Status: DISCONTINUED | OUTPATIENT
Start: 2023-11-15 | End: 2023-11-15 | Stop reason: HOSPADM

## 2023-11-15 RX ORDER — PROPOFOL 10 MG/ML
INJECTION, EMULSION INTRAVENOUS CONTINUOUS PRN
Status: DISCONTINUED | OUTPATIENT
Start: 2023-11-15 | End: 2023-11-15

## 2023-11-15 RX ORDER — SODIUM CHLORIDE 9 MG/ML
INJECTION, SOLUTION INTRAVENOUS CONTINUOUS
Status: DISCONTINUED | OUTPATIENT
Start: 2023-11-15 | End: 2023-11-16

## 2023-11-15 RX ORDER — PROCHLORPERAZINE EDISYLATE 5 MG/ML
5 INJECTION INTRAMUSCULAR; INTRAVENOUS EVERY 6 HOURS PRN
Status: DISCONTINUED | OUTPATIENT
Start: 2023-11-15 | End: 2023-11-18 | Stop reason: HOSPADM

## 2023-11-15 RX ORDER — BISACODYL 10 MG
10 SUPPOSITORY, RECTAL RECTAL DAILY
Status: DISCONTINUED | OUTPATIENT
Start: 2023-11-16 | End: 2023-11-18 | Stop reason: HOSPADM

## 2023-11-15 RX ORDER — OXYCODONE HYDROCHLORIDE 10 MG/1
10 TABLET ORAL EVERY 4 HOURS PRN
Status: DISCONTINUED | OUTPATIENT
Start: 2023-11-15 | End: 2023-11-18 | Stop reason: HOSPADM

## 2023-11-15 RX ORDER — LABETALOL HYDROCHLORIDE 5 MG/ML
INJECTION, SOLUTION INTRAVENOUS
Status: DISCONTINUED | OUTPATIENT
Start: 2023-11-15 | End: 2023-11-15

## 2023-11-15 RX ORDER — EPHEDRINE SULFATE 50 MG/ML
INJECTION, SOLUTION INTRAVENOUS
Status: DISCONTINUED | OUTPATIENT
Start: 2023-11-15 | End: 2023-11-15

## 2023-11-15 RX ORDER — HYDROMORPHONE HYDROCHLORIDE 1 MG/ML
2 INJECTION, SOLUTION INTRAMUSCULAR; INTRAVENOUS; SUBCUTANEOUS EVERY 4 HOURS PRN
Status: DISCONTINUED | OUTPATIENT
Start: 2023-11-15 | End: 2023-11-18 | Stop reason: HOSPADM

## 2023-11-15 RX ORDER — SODIUM CHLORIDE 0.9 % (FLUSH) 0.9 %
10 SYRINGE (ML) INJECTION
Status: DISCONTINUED | OUTPATIENT
Start: 2023-11-15 | End: 2023-11-18 | Stop reason: HOSPADM

## 2023-11-15 RX ORDER — FENTANYL CITRATE 50 UG/ML
INJECTION, SOLUTION INTRAMUSCULAR; INTRAVENOUS
Status: DISCONTINUED | OUTPATIENT
Start: 2023-11-15 | End: 2023-11-15

## 2023-11-15 RX ORDER — PROPOFOL 10 MG/ML
VIAL (ML) INTRAVENOUS
Status: DISCONTINUED | OUTPATIENT
Start: 2023-11-15 | End: 2023-11-15

## 2023-11-15 RX ORDER — METHYLPREDNISOLONE ACETATE 40 MG/ML
INJECTION, SUSPENSION INTRA-ARTICULAR; INTRALESIONAL; INTRAMUSCULAR; SOFT TISSUE
Status: DISCONTINUED | OUTPATIENT
Start: 2023-11-15 | End: 2023-11-15 | Stop reason: HOSPADM

## 2023-11-15 RX ORDER — REMIFENTANIL HYDROCHLORIDE 1 MG/ML
INJECTION, POWDER, LYOPHILIZED, FOR SOLUTION INTRAVENOUS CONTINUOUS PRN
Status: DISCONTINUED | OUTPATIENT
Start: 2023-11-15 | End: 2023-11-15

## 2023-11-15 RX ORDER — DEXAMETHASONE SODIUM PHOSPHATE 4 MG/ML
INJECTION, SOLUTION INTRA-ARTICULAR; INTRALESIONAL; INTRAMUSCULAR; INTRAVENOUS; SOFT TISSUE
Status: DISCONTINUED | OUTPATIENT
Start: 2023-11-15 | End: 2023-11-15

## 2023-11-15 RX ADMIN — SODIUM CHLORIDE, SODIUM GLUCONATE, SODIUM ACETATE, POTASSIUM CHLORIDE AND MAGNESIUM CHLORIDE: 526; 502; 368; 37; 30 INJECTION, SOLUTION INTRAVENOUS at 02:11

## 2023-11-15 RX ADMIN — OXYCODONE HYDROCHLORIDE 10 MG: 10 TABLET ORAL at 07:11

## 2023-11-15 RX ADMIN — HEPARIN SODIUM 5000 UNITS: 5000 INJECTION INTRAVENOUS; SUBCUTANEOUS at 09:11

## 2023-11-15 RX ADMIN — AMLODIPINE BESYLATE 5 MG: 5 TABLET ORAL at 08:11

## 2023-11-15 RX ADMIN — EPHEDRINE SULFATE 10 MG: 50 INJECTION INTRAVENOUS at 03:11

## 2023-11-15 RX ADMIN — CEFAZOLIN 2 G: 2 INJECTION, POWDER, FOR SOLUTION INTRAMUSCULAR; INTRAVENOUS at 09:11

## 2023-11-15 RX ADMIN — DEXAMETHASONE SODIUM PHOSPHATE 12 MG: 4 INJECTION INTRA-ARTICULAR; INTRALESIONAL; INTRAMUSCULAR; INTRAVENOUS; SOFT TISSUE at 02:11

## 2023-11-15 RX ADMIN — HYDROMORPHONE HYDROCHLORIDE 0.2 MG: 1 INJECTION, SOLUTION INTRAMUSCULAR; INTRAVENOUS; SUBCUTANEOUS at 06:11

## 2023-11-15 RX ADMIN — METHOCARBAMOL 750 MG: 750 TABLET ORAL at 08:11

## 2023-11-15 RX ADMIN — MUPIROCIN: 20 OINTMENT TOPICAL at 08:11

## 2023-11-15 RX ADMIN — Medication 10 MG: at 02:11

## 2023-11-15 RX ADMIN — PROPOFOL 200 MG: 10 INJECTION, EMULSION INTRAVENOUS at 01:11

## 2023-11-15 RX ADMIN — EPHEDRINE SULFATE 10 MG: 50 INJECTION INTRAVENOUS at 01:11

## 2023-11-15 RX ADMIN — PROPOFOL 150 MCG/KG/MIN: 10 INJECTION, EMULSION INTRAVENOUS at 01:11

## 2023-11-15 RX ADMIN — EPHEDRINE SULFATE 10 MG: 50 INJECTION INTRAVENOUS at 02:11

## 2023-11-15 RX ADMIN — SODIUM CHLORIDE 0.4 MCG/KG/MIN: 9 INJECTION, SOLUTION INTRAVENOUS at 02:11

## 2023-11-15 RX ADMIN — Medication 10 MG: at 03:11

## 2023-11-15 RX ADMIN — GABAPENTIN 300 MG: 300 CAPSULE ORAL at 08:11

## 2023-11-15 RX ADMIN — HYDROMORPHONE HYDROCHLORIDE 0.2 MG: 1 INJECTION, SOLUTION INTRAMUSCULAR; INTRAVENOUS; SUBCUTANEOUS at 07:11

## 2023-11-15 RX ADMIN — LIDOCAINE HYDROCHLORIDE 100 MG: 20 INJECTION, SOLUTION EPIDURAL; INFILTRATION; INTRACAUDAL at 01:11

## 2023-11-15 RX ADMIN — REMIFENTANIL HYDROCHLORIDE 0.2 MCG/KG/MIN: 1 INJECTION, POWDER, LYOPHILIZED, FOR SOLUTION INTRAVENOUS at 01:11

## 2023-11-15 RX ADMIN — HYDROMORPHONE HYDROCHLORIDE 2 MG: 1 INJECTION, SOLUTION INTRAMUSCULAR; INTRAVENOUS; SUBCUTANEOUS at 09:11

## 2023-11-15 RX ADMIN — ONDANSETRON 4 MG: 2 INJECTION INTRAMUSCULAR; INTRAVENOUS at 05:11

## 2023-11-15 RX ADMIN — SODIUM CHLORIDE: 9 INJECTION, SOLUTION INTRAVENOUS at 08:11

## 2023-11-15 RX ADMIN — CEFAZOLIN 2 G: 330 INJECTION, POWDER, FOR SOLUTION INTRAMUSCULAR; INTRAVENOUS at 02:11

## 2023-11-15 RX ADMIN — Medication 30 MG: at 01:11

## 2023-11-15 RX ADMIN — LABETALOL HYDROCHLORIDE 5 MG: 5 INJECTION, SOLUTION INTRAVENOUS at 05:11

## 2023-11-15 RX ADMIN — SUCCINYLCHOLINE CHLORIDE 120 MG: 20 INJECTION, SOLUTION INTRAMUSCULAR; INTRAVENOUS; PARENTERAL at 01:11

## 2023-11-15 RX ADMIN — SODIUM CHLORIDE: 9 INJECTION, SOLUTION INTRAVENOUS at 01:11

## 2023-11-15 RX ADMIN — FENTANYL CITRATE 100 MCG: 50 INJECTION INTRAMUSCULAR; INTRAVENOUS at 01:11

## 2023-11-15 RX ADMIN — OXYCODONE HYDROCHLORIDE 10 MG: 10 TABLET ORAL at 11:11

## 2023-11-15 NOTE — PLAN OF CARE
Problem: Fluid and Electrolyte Imbalance (Acute Kidney Injury/Impairment)  Goal: Fluid and Electrolyte Balance  Outcome: Ongoing, Progressing     Problem: Oral Intake Inadequate (Acute Kidney Injury/Impairment)  Goal: Optimal Nutrition Intake  Outcome: Ongoing, Progressing     Problem: Renal Function Impairment (Acute Kidney Injury/Impairment)  Goal: Effective Renal Function  11/15/2023 0357 by Jeffrey Lyons RN  Outcome: Ongoing, Progressing  11/15/2023 0356 by Jeffrey Lyons RN  Outcome: Ongoing, Progressing     Problem: Bleeding (Surgery Nonspecified)  Goal: Absence of Bleeding  Outcome: Ongoing, Progressing     Problem: Pain (Surgery Nonspecified)  Goal: Acceptable Pain Control  Outcome: Ongoing, Progressing

## 2023-11-15 NOTE — ANESTHESIA PROCEDURE NOTES
Intubation    Date/Time: 11/15/2023 1:26 PM    Performed by: Vidal Dailey MD  Authorized by: Armand Burgos MD    Intubation:     Induction:  Intravenous    Intubated:  Postinduction    Mask Ventilation:  Moderately difficult with oral airway    Attempts:  1    Attempted By:  Resident anesthesiologist    Method of Intubation:  Video laryngoscopy    Blade:  Yoder 3    Laryngeal View Grade: Grade I - full view of cords      Difficult Airway Encountered?: No      Complications:  None    Airway Device:  Oral endotracheal tube    Airway Device Size:  7.5    Style/Cuff Inflation:  Cuffed (inflated to minimal occlusive pressure)    Placement Verified By:  Capnometry    Complicating Factors:  Oropharyngeal edema or fat and obesity    Findings Post-Intubation:  BS equal bilateral and atraumatic/condition of teeth unchanged

## 2023-11-15 NOTE — BRIEF OP NOTE
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 Item Serial No.  Lot No. LRB No. Used Action   BONE 30CC CANCELLOUS CRUSHED - [id number]  BONE 30CC CANCELLOUS CRUSHED [id number] MUSCULOSKELETAL TRANSPLANT FND  N/A 1 Implanted   Cellerate   [id number]   N/A 1 Implanted   GRAFT [place] BIOACTIVE 2.5ML - [id number]  GRAFT [place] BIOACTIVE 2.5ML   [id number] N/A 1 Implanted   TAP SYMPHONY STD THRD 3.5MM - [id number]  TAP SYMPHONY STD THRD 3.5MM  [employer]  N/A 1 Implanted   SCREW SYMPHONY PA 3.5X14MM - [id number]  SCREW SYMPHONY PA 3.5X14MM  SY[employer]THES  N/A 8 Implanted   NGHIA SYMPHONY PRE-CUT 3.5X55MM - [id number]  NGHIA SYMPHONY PRE-CUT 3.5X55MM  SYNTHES  N/A 1 Implanted   NGHIA SYMPHONY PRE-CUT 3.5X60MM - [id number]  NGHIA SYMPHONY PRE-CUT 3.5X60MM  SYNTHES  N/A 1 Implanted   SET SYMPHONY SCREW NS - [id number]  SET SYMPHONY SCREW NS  SYNTHES  N/A 8 Implanted   PLATE TOMEKA LVL 3 ANT CERV 51MM - [id number]  PLATE TOMEKA LVL 3 ANT CERV 51MM  SYNTHES  N/A 1 Implanted   SCREW BONE SPINAL ANT 16MM - [id number]  SCREW BONE SPINAL ANT 16MM  [employer]  N/A 1 Implanted   4.5 x 16mm SCREW      N/A 4 Implanted       Operative Findings: C3-6 PSIF with revision of C4-5 corpectomy cage with C3-6 plate revision    Estimated Blood Loss: 100cc    Estimated Blood Loss has been documented.         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 KIN LAMB    Release to patient Immediate        11/15/23 6331                    [id number]

## 2023-11-15 NOTE — NURSING
Patient is awake and alert, received in room via a stretcher.Able to move all his extremities and to m,sharon his needs known.Bed is in low position, call light within reach and bed alarm on.

## 2023-11-15 NOTE — TRANSFER OF CARE
Anesthesia Transfer of Care Note    Patient: Gamaliel Pete Jr.    Procedure(s) Performed: Procedure(s) (LRB):  SPINE, CERVICAL, WITH POSTERIOR FUSION T4-6 (N/A)  CORPECTOMY, SPINE, CERVICAL C3-6 REVISION (N/A)    Patient location: PACU    Anesthesia Type: general    Transport from OR: Transported from OR on 6-10 L/min O2 by face mask with adequate spontaneous ventilation    Post pain: adequate analgesia    Post assessment: no apparent anesthetic complications and tolerated procedure well    Post vital signs: stable    Level of consciousness: sedated and responds to stimulation    Nausea/Vomiting: no nausea/vomiting    Complications: none    Transfer of care protocol was followed      Last vitals: Visit Vitals  /65 (BP Location: Left arm, Patient Position: Lying)   Pulse 73   Temp 37.2 °C (99 °F) (Oral)   Resp 18   Wt 102.1 kg (225 lb 1.4 oz)   SpO2 96%   BMI 32.30 kg/m²

## 2023-11-15 NOTE — PROGRESS NOTES
The resident was called to the bedside to review consent with the patient. Consent was signed by PA. Consent needed to be signed by surgeon.    Patient said his IR procedure was done with no Anesthesia. Anesthesia Consent not linked to surgery but was done yesterday. Dr Wells was called twice - no answer.    CHG wipes given. Heel pads and SCDs placed.

## 2023-11-15 NOTE — H&P
[facility] - Neurosurgery ([facility])    History & Physical      Patient Name: Gamaliel Pete Jr.  MRN: [id number]  Admission Date: 11/14/2023  Attending Physician: Nasim Martinez DO   Primary Care Provider: Slava Lowery MD         Patient information was obtained from patient.     Subjective:     Principal Problem:<principal problem not specified>    Chief Complaint: No chief complaint on file.       HPI: Mr. Pete is a 72yo man w/PMHx RCC s/p R nephrectomy with lung and spinal mets who was recently admitted on 11/1/23 for back pain. During that admission he was found to have a C4-C5 lytic lesion with extensive destruction of the vertebral body and a T9 lytic lesion. He re-presented 11/6 and underwent scheduled DSA and T9 embolization by Neuro IR, and C4-C5 anterior corpectomy and was discharged 11/9. He presented again 11/14 for scheduled T9 kyphoplasty and was admitted post operatively for pain control. He complains of severe neck pain with LUE radiculopathy to the elbow. He is otherwise neurologically intact. Scheduled for posterior cervical fusion with Dr. Martinez today.          Past Medical History:   Diagnosis Date    Asthma     no inhaler use    Borderline hypertension     ED (erectile dysfunction)     Gout     Hematuria     Hypertension        Past Surgical History:   Procedure Laterality Date    COLONOSCOPY  02/10/2022    COLONOSCOPY N/A 02/10/2022    Procedure: COLONOSCOPY;  Surgeon: Desmond Keenan MD;  Location: [facility];  Service: General;  Laterality: N/A;    ROBOT-ASSISTED LAPAROSCOPIC NEPHRECTOMY Right 10/4/2023    Procedure: ROBOTIC NEPHRECTOMY;  Surgeon: Henry Michaels MD;  Location: [facility];  Service: Urology;  Laterality: Right;    SURGICAL REMOVAL OF VERTEBRAL BODY OF CERVICAL SPINE Right 11/7/2023    Procedure: CORPECTOMY, SPINE, CERVICAL;  Surgeon: Nasim Martinez DO;  Location: [address];  Service: Neurosurgery;  Laterality: Right;  C4 and C5 corpectomy with  tommy; familia bustillos; c-arm; neuromonitoring; microscope; type and cross 2 units    TONSILLECTOMY         Review of patient's allergies indicates:  No Known Allergies    Current Facility-Administered Medications on File Prior to Encounter   Medication    [COMPLETED] ceFAZolin (ANCEF) 1 gram in dextrose 5 % 50 mL IVPB (premix)    [COMPLETED] fentaNYL 50 mcg/mL injection    [COMPLETED] midazolam (VERSED) 1 mg/mL injection    [COMPLETED] oxyCODONE immediate release tablet 10 mg    [DISCONTINUED] 0.9%  NaCl infusion    [DISCONTINUED] LIDOcaine (PF) 10 mg/ml (1%) injection 10 mg    [DISCONTINUED] ondansetron injection 4 mg    [DISCONTINUED] oxyCODONE (ROXICODONE) 5 MG immediate release tablet     Current Outpatient Medications on File Prior to Encounter   Medication Sig    amlodipine-benazepril 5-10 mg (LOTREL) 5-10 mg per capsule Take 1 capsule by mouth once daily.    gabapentin (NEURONTIN) 300 MG capsule Take 1 capsule (300 mg total) by mouth 3 (three) times daily.    indomethacin (INDOCIN) 50 MG capsule One p.o. t.i.d. p.r.n. gouty attack no more than 7 days--do not take with other NSAIDs    naloxone (NARCAN) 4 mg/actuation Spry 1 spray (4mg) by nasal route as needed for opioid overdose; may repeat every 2-3 minutes in alternating nostrils until medical help arrives. Call 911    OLIVE OIL ORAL Take 15 mLs by mouth 2 (two) times a day.    omega-3 fatty acids/fish oil (FISH OIL-OMEGA-3 FATTY ACIDS) 300-1,000 mg capsule Take 2 capsules by mouth once daily.    oxyCODONE (ROXICODONE) 10 mg Tab immediate release tablet Take 1 tablet (10 mg total) by mouth every 4 (four) hours as needed for Pain.    oxyCODONE myristate (XTAMPZA ER) 18 mg CSpT Take 1 capsule (18 mg total) by mouth 2 (two) times a day. for 7 days    polyethylene glycol (GLYCOLAX) 17 gram/dose powder Use cap to measure 17 grams, mix in liquid and take by mouth once daily.    senna (SENOKOT) 8.6 mg tablet Take 1 tablet by mouth once daily.    sildenafiL  (VIAGRA) 100 MG tablet Take 1 tablet (100 mg total) by mouth daily as needed for Erectile Dysfunction.    turmeric (CURCUMIN MISC) 1,000 mg by Misc.(Non-Drug; Combo Route) route Daily.    UNABLE TO FIND Take 500 mg by mouth 2 (two) times a day. medication name: Tudca    UNABLE TO FIND Take 2 capsules by mouth 2 (two) times a day. medication name: Turkey tail mushroom    UNABLE TO FIND Take 15 drops by mouth once daily. medication name: Essiac-20    UNABLE TO FIND Take 5 mLs by mouth 2 (two) times a day. medication name: barley leaf juice powder    UNABLE TO FIND Take 5 mLs by mouth 2 (two) times a day. medication name: black seed oil (cumin seed)     Family History    None       Tobacco Use    Smoking status: Never    Smokeless tobacco: Never   Substance and Sexual Activity    Alcohol use: Not Currently     Alcohol/week: 0.0 standard drinks of alcohol     Comment: rarely    Drug use: Never    Sexual activity: Not on file     Review of Systems  Objective:     Vital Signs (Most Recent):  Temp: 99 °F (37.2 °C) (11/15/23 0728)  Pulse: 73 (11/15/23 0728)  Resp: 18 (11/15/23 0754)  BP: 134/65 (11/15/23 0728)  SpO2: 96 % (11/15/23 0728) Vital Signs (24h Range):  Temp:  [97.5 °F (36.4 °C)-99 °F (37.2 °C)] 99 °F (37.2 °C)  Pulse:  [] 73  Resp:  [8-20] 18  SpO2:  [94 %-100 %] 96 %  BP: (106-194)/(58-88) 134/65     Weight: 102.1 kg (225 lb 1.4 oz)  Body mass index is 32.3 kg/m².    Physical Exam  General: well developed, well nourished, no distress.   Head: normocephalic, atraumatic  Mental Status: Awake, Alert, Oriented  Speech: Clear with content appropriate to conversation  Cranial nerves: face symmetric, CN II-XII grossly intact.   Sensory: intact to light touch throughout    Motor Strength:Moves all extremities spontaneously with good tone.  Full strength upper and lower extremities. No abnormal movements seen.     Strength  Deltoids Triceps Biceps Wrist Extension Wrist Flexion Hand    Upper: R 5/5 5/5 5/5 5/5  5/5 5/5    L 5/5 5/5 5/5 5/5 5/5 5/5     Iliopsoas Quadriceps Knee  Flexion Tibialis  anterior Gastro- cnemius EHL   Lower: R 5/5 5/5 5/5 5/5 5/5 5/5    L 5/5 5/5 5/5 5/5 5/5 5/5     Maldonado: absent  Anterior incision clean, dry, well-healing       Significant Labs: All pertinent labs within the past 24 hours have been reviewed.    Significant Imaging: I have reviewed all pertinent imaging results/findings within the past 24 hours.  No new relevant imaging to discuss    Assessment/Plan:     Mr. Pete is a 74yo man w/PMHx RCC s/p R nephrectomy with lung and spinal mets s/p C4-C5 anterior corpectomy, and DSA and T9 embolization by Neuro IR admitted for scheduled cervical posterior fusion by Dr. Martinez. Scheduled for OR today, risks vs benefits thoroughly discussed with patient and his wife who agreed to proceed; signed consent filed in chart.      Mariya Phelan PA-C  Department of Hospital Medicine   [facility] - Neurosurgery ([facility])

## 2023-11-15 NOTE — ANESTHESIA PREPROCEDURE EVALUATION
[facility]  Anesthesia Pre-Operative Evaluation         Patient Name: Gamaliel Pete Jr.  YOB: 1950  MRN: [id number]    SUBJECTIVE:     Pre-operative evaluation for Procedure(s) (LRB):  CORPECTOMY, SPINE, CERVICAL (Right)     11/14/2023    Gamaliel Pete Jr. is a 73 y.o. 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 He presented for evaluation of back/neck pain. He presented on 11/14 for kyphoplasty w/ IR and now presents for further surgical intervention.    Patient now presents for the above procedure(s).      LDA:        Peripheral IV - Single Lumen 11/06/23 1245 20 G Left;Posterior Hand (Active)   Site Assessment Clean;Dry;Intact 11/06/23 1600   Extremity Assessment Distal to IV No swelling 11/06/23 1317   Line Status Flushed;Saline locked 11/06/23 1600   Dressing Status Clean;Intact;Dry 11/06/23 1600   Dressing Intervention First dressing 11/06/23 1317   Number of days: 0       Prev airway: 11/06/23; Placement Time: 1410 (created via procedure documentation); Method of Intubation: Video Laryngoscopy; Intubated: Postinduction; Blade: Yoder #3; Airway Device Size: 7.5; Cuff Inflation: Minimal occlusive pressure; Placement Verified By: Capnometry (auscultation); Complicating Factors: None     Drips: None documented.      Patient Active Problem List   Diagnosis    Class 1 obesity without serious comorbidity with body mass index (BMI) of 31.0 to 31.9 in adult    Borderline hypertension    Onychomycosis    History of gout    Seborrheic keratosis    Tinea corporis    Positive colorectal cancer screening using Cologuard test    Renal mass    Cancer with pulmonary metastases    Metastatic cancer to spine    Iron deficiency anemia    History of right radical nephrectomy    Clear cell carcinoma of right kidney    Intractable back pain    SABINE (acute kidney injury)    Hypercalcemia    Transaminitis    Essential hypertension     Therapeutic opioid-induced constipation (OIC)    Debility       Review of patient's allergies indicates:  No Known Allergies    Current Inpatient Medications:      No current facility-administered medications on file prior to encounter.     Current Outpatient Medications on File Prior to Encounter   Medication Sig Dispense Refill    amlodipine-benazepril 5-10 mg (LOTREL) 5-10 mg per capsule Take 1 capsule by mouth once daily. 90 capsule 3    gabapentin (NEURONTIN) 300 MG capsule Take 1 capsule (300 mg total) by mouth 3 (three) times daily. 90 capsule 11    indomethacin (INDOCIN) 50 MG capsule One p.o. t.i.d. p.r.n. gouty attack no more than 7 days--do not take with other NSAIDs 90 capsule 0    naloxone (NARCAN) 4 mg/actuation Spry 1 spray (4mg) by nasal route as needed for opioid overdose; may repeat every 2-3 minutes in alternating nostrils until medical help arrives. Call 911 2 each 0    OLIVE OIL ORAL Take 15 mLs by mouth 2 (two) times a day.      omega-3 fatty acids/fish oil (FISH OIL-OMEGA-3 FATTY ACIDS) 300-1,000 mg capsule Take 2 capsules by mouth once daily.      oxyCODONE (ROXICODONE) 10 mg Tab immediate release tablet Take 1 tablet (10 mg total) by mouth every 4 (four) hours as needed for Pain. 50 tablet 0    oxyCODONE myristate (XTAMPZA ER) 18 mg CSpT Take 1 capsule (18 mg total) by mouth 2 (two) times a day. for 7 days 14 capsule 0    polyethylene glycol (GLYCOLAX) 17 gram/dose powder Use cap to measure 17 grams, mix in liquid and take by mouth once daily. 238 g 0    senna (SENOKOT) 8.6 mg tablet Take 1 tablet by mouth once daily. 30 tablet 0    sildenafiL (VIAGRA) 100 MG tablet Take 1 tablet (100 mg total) by mouth daily as needed for Erectile Dysfunction. 30 tablet 11    turmeric (CURCUMIN MISC) 1,000 mg by Misc.(Non-Drug; Combo Route) route Daily.      UNABLE TO FIND Take 500 mg by mouth 2 (two) times a day. medication name: Tudca      UNABLE TO FIND Take 2 capsules by mouth 2 (two) times a day.  medication name: Turkey tail mushroom      UNABLE TO FIND Take 15 drops by mouth once daily. medication name: Essiac-20      UNABLE TO FIND Take 5 mLs by mouth 2 (two) times a day. medication name: barley leaf juice powder      UNABLE TO FIND Take 5 mLs by mouth 2 (two) times a day. medication name: black seed oil (cumin seed)         Past Surgical History:   Procedure Laterality Date    COLONOSCOPY  02/10/2022    COLONOSCOPY N/A 02/10/2022    Procedure: COLONOSCOPY;  Surgeon: Desmond Keenan MD;  Location: [facility] ENDO;  Service: General;  Laterality: N/A;    ROBOT-ASSISTED LAPAROSCOPIC NEPHRECTOMY Right 10/4/2023    Procedure: ROBOTIC NEPHRECTOMY;  Surgeon: Henry Michaels MD;  Location: [facility] OR;  Service: Urology;  Laterality: Right;    SURGICAL REMOVAL OF VERTEBRAL BODY OF CERVICAL SPINE Right 11/7/2023    Procedure: CORPECTOMY, SPINE, CERVICAL;  Surgeon: Nasim Martinez DO;  Location: [address];  Service: Neurosurgery;  Laterality: Right;  C4 and C5 corpectomy with globus;  wells tongs; c-arm; neuromonitoring; microscope; type and cross 2 units    TONSILLECTOMY         Social History:  Tobacco Use: Low Risk  (11/14/2023)    Patient History     Smoking Tobacco Use: Never     Smokeless Tobacco Use: Never     Passive Exposure: Not on file      Alcohol Use: Not At Risk (11/7/2023)    AUDIT-C     Frequency of Alcohol Consumption: Never     Average Number of Drinks: Patient does not drink     Frequency of Binge Drinking: Never        OBJECTIVE:     Vital Signs Range (Last 24H):  Temp:  [36.4 °C (97.5 °F)-36.7 °C (98 °F)]   Pulse:  []   Resp:  [8-20]   BP: (106-194)/(58-88)   SpO2:  [94 %-100 %]       Significant Labs:  Lab Results   Component Value Date    WBC 7.40 11/09/2023    HGB 8.6 (L) 11/09/2023    HCT 25.6 (L) 11/09/2023     11/09/2023    CHOL 221 (H) 08/24/2023    TRIG 208 (H) 08/24/2023    HDL 30 (L) 08/24/2023    ALT 29 11/09/2023    AST 21 11/09/2023     11/09/2023     K 4.3 11/09/2023     11/09/2023    CREATININE 1.5 (H) 11/09/2023    BUN 33 (H) 11/09/2023    CO2 26 11/09/2023    TSH 2.854 06/04/2021    PSA 8.4 (H) 08/24/2023    INR 1.0 11/07/2023       Diagnostic Studies: No relevant studies.    EKG:   Results for orders placed or performed during the hospital encounter of 11/06/23   EKG 12-lead    Collection Time: 11/07/23  1:12 PM    Narrative    Test Reason : C79.51,    Vent. Rate : 106 BPM     Atrial Rate : 106 BPM     P-R Int : 226 ms          QRS Dur : 118 ms      QT Int : 344 ms       P-R-T Axes : 068 -73 085 degrees     QTc Int : 456 ms    Sinus tachycardia with 1st degree A-V block  Incomplete right bundle branch block  Left axis deviation  LVH with QRS widening and repolarization abnormality  Abnormal ECG  When compared with ECG of 10-FEB-2022 08:13,  No significant change was found    Confirmed by Vikas Wells MD ([id number]) on 11/7/2023 3:18:00 PM    Referred By: JESSA MELGAR           Confirmed By:Vikas Wells MD       2D ECHO:  TTE:  No results found for this or any previous visit.    JOEY:  No results found for this or any previous visit.    ASSESSMENT/PLAN:           Pre-op Assessment    I have reviewed the Patient Summary Reports.     I have reviewed the Nursing Notes. I have reviewed the NPO Status.   I have reviewed the Medications.     Review of Systems  Anesthesia Hx:  No problems with previous Anesthesia   History of prior surgery of interest to airway management or planning:            Denies Personal Hx of Anesthesia complications.                    Social:  Non-Smoker       Hematology/Oncology:  Hematology Normal                     Current/Recent Cancer.                EENT/Dental:  EENT/Dental Normal           Cardiovascular:  Exercise tolerance: good   Hypertension                                        Pulmonary:    Asthma                    Renal/:  Renal/ Normal                 Hepatic/GI:  Hepatic/GI Normal                  Musculoskeletal:  Musculoskeletal Normal                Neurological:  Neurology Normal                                      Endocrine:  Endocrine Normal                Physical Exam  General: Well nourished, Cooperative, Alert and Oriented    Airway:  Mallampati: III   Tongue: Normal    Dental:  Periodontal disease        Anesthesia Plan  Type of Anesthesia, risks & benefits discussed:    Anesthesia Type: Gen ETT  Intra-op Monitoring Plan: Standard ASA Monitors and Art Line  Post Op Pain Control Plan: multimodal analgesia and IV/PO Opioids PRN  Induction:  IV  Airway Plan: Direct, Post-Induction  Informed Consent: Informed consent signed with the Patient and all parties understand the risks and agree with anesthesia plan.  All questions answered.   ASA Score: 3  Day of Surgery Review of History & Physical: H&P Update referred to the surgeon/provider.    Ready For Surgery From Anesthesia Perspective.     .

## 2023-11-15 NOTE — ANESTHESIA PROCEDURE NOTES
Arterial    Diagnosis: Cervical spine instability    Patient location during procedure: done in OR    Staffing  Authorizing Provider: Armand Burgos MD  Performing Provider: Vidal Dailey MD    Staffing  Performed by: Vidal Dailey MD  Authorized by: Armand Burgos MD    Anesthesiologist was present at the time of the procedure.    Preanesthetic Checklist  Completed: patient identified, IV checked, site marked, risks and benefits discussed, surgical consent, monitors and equipment checked, pre-op evaluation, timeout performed and anesthesia consent givenArterial  Skin Prep: chlorhexidine gluconate and isopropyl alcohol  Local Infiltration: none  Location: radial    Catheter Size: 20 G  Catheter placement by Anatomical landmarks and Ultrasound guidance. Heme positive aspiration all ports.   Vessel Caliber: small, patent, compressibility normal  Needle advanced into vessel with real time Ultrasound guidance.Insertion Attempts: 2  Assessment  Dressing: secured with tape and tegaderm  Patient: Tolerated well

## 2023-11-15 NOTE — NURSING
Nurses Note -- 4 Eyes      11/14/2023   6:03 PM      Skin assessed during: Transfer      [x] No Altered Skin Integrity Present    [x]Prevention Measures Documented      [] Yes- Altered Skin Integrity Present or Discovered   [] LDA Added if Not in Epic (Describe Wound)   [] New Altered Skin Integrity was Present on Admit and Documented in LDA   [] Wound Image Taken    Wound Care Consulted? No    Attending Nurse:  BRIAN Gonzalez    Second RN/Staff Member:  BRIAN Rae

## 2023-11-16 PROBLEM — M54.9 INTRACTABLE BACK PAIN: Status: RESOLVED | Noted: 2023-10-31 | Resolved: 2023-11-16

## 2023-11-16 LAB
ALBUMIN SERPL BCP-MCNC: 3.4 G/DL (ref 3.5–5.2)
ALP SERPL-CCNC: 95 U/L (ref 55–135)
ALT SERPL W/O P-5'-P-CCNC: 29 U/L (ref 10–44)
ANION GAP SERPL CALC-SCNC: 10 MMOL/L (ref 8–16)
AST SERPL-CCNC: 24 U/L (ref 10–40)
BASOPHILS # BLD AUTO: 0.01 K/UL (ref 0–0.2)
BASOPHILS NFR BLD: 0.1 % (ref 0–1.9)
BILIRUB SERPL-MCNC: 0.4 MG/DL (ref 0.1–1)
BUN SERPL-MCNC: 17 MG/DL (ref 8–23)
CALCIUM SERPL-MCNC: 9.8 MG/DL (ref 8.7–10.5)
CHLORIDE SERPL-SCNC: 103 MMOL/L (ref 95–110)
CO2 SERPL-SCNC: 23 MMOL/L (ref 23–29)
CREAT SERPL-MCNC: 1.2 MG/DL (ref 0.5–1.4)
DIFFERENTIAL METHOD: ABNORMAL
EOSINOPHIL # BLD AUTO: 0 K/UL (ref 0–0.5)
EOSINOPHIL NFR BLD: 0 % (ref 0–8)
ERYTHROCYTE [DISTWIDTH] IN BLOOD BY AUTOMATED COUNT: 13.5 % (ref 11.5–14.5)
EST. GFR  (NO RACE VARIABLE): >60 ML/MIN/1.73 M^2
GLUCOSE SERPL-MCNC: 122 MG/DL (ref 70–110)
HCT VFR BLD AUTO: 27.2 % (ref 40–54)
HGB BLD-MCNC: 9.3 G/DL (ref 14–18)
IMM GRANULOCYTES # BLD AUTO: 0.09 K/UL (ref 0–0.04)
IMM GRANULOCYTES NFR BLD AUTO: 1.2 % (ref 0–0.5)
LYMPHOCYTES # BLD AUTO: 0.7 K/UL (ref 1–4.8)
LYMPHOCYTES NFR BLD: 9.6 % (ref 18–48)
MCH RBC QN AUTO: 30.2 PG (ref 27–31)
MCHC RBC AUTO-ENTMCNC: 34.2 G/DL (ref 32–36)
MCV RBC AUTO: 88 FL (ref 82–98)
MONOCYTES # BLD AUTO: 0.7 K/UL (ref 0.3–1)
MONOCYTES NFR BLD: 8.8 % (ref 4–15)
NEUTROPHILS # BLD AUTO: 6 K/UL (ref 1.8–7.7)
NEUTROPHILS NFR BLD: 80.3 % (ref 38–73)
NRBC BLD-RTO: 0 /100 WBC
PLATELET # BLD AUTO: 264 K/UL (ref 150–450)
PMV BLD AUTO: 9 FL (ref 9.2–12.9)
POTASSIUM SERPL-SCNC: 5.1 MMOL/L (ref 3.5–5.1)
PROT SERPL-MCNC: 7.4 G/DL (ref 6–8.4)
RBC # BLD AUTO: 3.08 M/UL (ref 4.6–6.2)
SODIUM SERPL-SCNC: 136 MMOL/L (ref 136–145)
WBC # BLD AUTO: 7.49 K/UL (ref 3.9–12.7)

## 2023-11-16 PROCEDURE — 36415 COLL VENOUS BLD VENIPUNCTURE: CPT | Performed by: PHYSICIAN ASSISTANT

## 2023-11-16 PROCEDURE — 85025 COMPLETE CBC W/AUTO DIFF WBC: CPT | Performed by: PHYSICIAN ASSISTANT

## 2023-11-16 PROCEDURE — 80053 COMPREHEN METABOLIC PANEL: CPT | Performed by: PHYSICIAN ASSISTANT

## 2023-11-16 PROCEDURE — 25000003 PHARM REV CODE 250: Performed by: STUDENT IN AN ORGANIZED HEALTH CARE EDUCATION/TRAINING PROGRAM

## 2023-11-16 PROCEDURE — 63600175 PHARM REV CODE 636 W HCPCS: Performed by: STUDENT IN AN ORGANIZED HEALTH CARE EDUCATION/TRAINING PROGRAM

## 2023-11-16 PROCEDURE — 11000001 HC ACUTE MED/SURG PRIVATE ROOM

## 2023-11-16 PROCEDURE — 94761 N-INVAS EAR/PLS OXIMETRY MLT: CPT

## 2023-11-16 PROCEDURE — 25000003 PHARM REV CODE 250: Performed by: PHYSICIAN ASSISTANT

## 2023-11-16 RX ADMIN — CEFAZOLIN 2 G: 2 INJECTION, POWDER, FOR SOLUTION INTRAMUSCULAR; INTRAVENOUS at 05:11

## 2023-11-16 RX ADMIN — METHOCARBAMOL 750 MG: 750 TABLET ORAL at 01:11

## 2023-11-16 RX ADMIN — HYDROMORPHONE HYDROCHLORIDE 2 MG: 1 INJECTION, SOLUTION INTRAMUSCULAR; INTRAVENOUS; SUBCUTANEOUS at 02:11

## 2023-11-16 RX ADMIN — HYDROMORPHONE HYDROCHLORIDE 2 MG: 1 INJECTION, SOLUTION INTRAMUSCULAR; INTRAVENOUS; SUBCUTANEOUS at 03:11

## 2023-11-16 RX ADMIN — METHOCARBAMOL 750 MG: 750 TABLET ORAL at 04:11

## 2023-11-16 RX ADMIN — HYDROMORPHONE HYDROCHLORIDE 2 MG: 1 INJECTION, SOLUTION INTRAMUSCULAR; INTRAVENOUS; SUBCUTANEOUS at 09:11

## 2023-11-16 RX ADMIN — METHOCARBAMOL 750 MG: 750 TABLET ORAL at 08:11

## 2023-11-16 RX ADMIN — MUPIROCIN: 20 OINTMENT TOPICAL at 10:11

## 2023-11-16 RX ADMIN — OXYCODONE HYDROCHLORIDE 10 MG: 10 TABLET ORAL at 05:11

## 2023-11-16 RX ADMIN — CEFAZOLIN 2 G: 2 INJECTION, POWDER, FOR SOLUTION INTRAMUSCULAR; INTRAVENOUS at 01:11

## 2023-11-16 RX ADMIN — AMLODIPINE BESYLATE 5 MG: 5 TABLET ORAL at 08:11

## 2023-11-16 RX ADMIN — OXYCODONE HYDROCHLORIDE 10 MG: 10 TABLET ORAL at 01:11

## 2023-11-16 RX ADMIN — GABAPENTIN 300 MG: 300 CAPSULE ORAL at 04:11

## 2023-11-16 RX ADMIN — CEFAZOLIN 2 G: 2 INJECTION, POWDER, FOR SOLUTION INTRAMUSCULAR; INTRAVENOUS at 09:11

## 2023-11-16 RX ADMIN — HYDROMORPHONE HYDROCHLORIDE 2 MG: 1 INJECTION, SOLUTION INTRAMUSCULAR; INTRAVENOUS; SUBCUTANEOUS at 10:11

## 2023-11-16 RX ADMIN — HEPARIN SODIUM 5000 UNITS: 5000 INJECTION INTRAVENOUS; SUBCUTANEOUS at 01:11

## 2023-11-16 RX ADMIN — BISACODYL 10 MG: 10 SUPPOSITORY RECTAL at 08:11

## 2023-11-16 RX ADMIN — HEPARIN SODIUM 5000 UNITS: 5000 INJECTION INTRAVENOUS; SUBCUTANEOUS at 09:11

## 2023-11-16 RX ADMIN — GABAPENTIN 300 MG: 300 CAPSULE ORAL at 09:11

## 2023-11-16 RX ADMIN — METHOCARBAMOL 750 MG: 750 TABLET ORAL at 09:11

## 2023-11-16 RX ADMIN — GABAPENTIN 300 MG: 300 CAPSULE ORAL at 08:11

## 2023-11-16 RX ADMIN — OXYCODONE HYDROCHLORIDE 10 MG: 10 TABLET ORAL at 09:11

## 2023-11-16 RX ADMIN — OXYCODONE HYDROCHLORIDE 10 MG: 10 TABLET ORAL at 06:11

## 2023-11-16 RX ADMIN — HEPARIN SODIUM 5000 UNITS: 5000 INJECTION INTRAVENOUS; SUBCUTANEOUS at 05:11

## 2023-11-16 NOTE — PLAN OF CARE
11/16/23 1602   Readmission   Why were you readmitted? Planned readmission   When you left the hospital where did you go? Home with Home Health   Was this a planned readmission? Yes

## 2023-11-16 NOTE — HOSPITAL COURSE
11/14: admitted; IR kyphoplasty of T9 today  11/15: OR today for C3-6 PCF.   11/16: POD 1 s/p C3-C6 PCF. NAEO. VSS, afebrile and WBC WNL. Patient complains of mild incisional pain but reports his LUE radiculopathy is improved. Reports mild discomfort with swallowing but no difficulty. Pending PT.   11/17: POD 2 s/p C3-C6 PCF. NAEO and VSS. Mild SABINE on BMP, will resume fluids. Anterior HV drain removed at bedside. Two posterior HV drains remain w/output 50 each.   11/18: POD 3. NAEON. AFVSS. Neurologically stable. Tolerating PO diet and voiding spontaneously. HV drain x 2 removed and pressure dressing placed. Medically stable to discharge home. Follow up in clinic in 2 weeks. Discharge instructions given verbally to patient. All of his questions were answered.  He is encouraged to call the clinic with any questions or concerns prior to follow up appt.     The above is purely a summary of documentation by other providers. This discharge summary is in no way a substitute for medical documentation that has been provided throughout the stay by care-giving providers. Please reference all documentation in the   Electronic medical record should any questions or concerns arise.       The patient presented [place] [facility] [place]14/23  for the above stated procedure. The patient tolerated the procedure well and there were no intra-operative complications. After surgery, the patient was extubated and taken to recovery in stable condition.  After observation in recovery, the patient was transferred to the neurosurgical floor.  Post-op X-rays showed good placement of the hardware. PT/OT were consulted, the patient progressed, and was cleared to go home.  Drain was removed on 11/18/23 without complication.  On 11/18/23 he was discharged home with pain medication and follow up appointments. The patient was tolerating a regular diet and voiding without difficulty. Activity as tolerated. At the time of discharge, the patient was  neurologically stable, was afebrile, and vital signs were stable. Discharge instructions were given verbally/written to the patient and their family, including wound care and follow-up appointments, and all of their questions were answered. The patient was also given a discharge instruction sheet explaining the aforementioned discussion.  The patient verbalized understanding of instructions and agreed to the plan. They were encouraged to call the clinic with any questions they might have prior to the follow up appointments.       Physical Exam 11/18/23:  General: well developed, well nourished, no distress.   Head: normocephalic, atraumatic  Neurologic: Alert and oriented. Thought content appropriate.  GCS: Motor: 6/Verbal: 5/Eyes: 4 GCS Total: 15  Mental Status: Awake, Alert, Oriented x 4  Language: No aphasia  Speech: No dysarthria  Cranial nerves: face symmetric, tongue midline, CN II-XII grossly intact.   Eyes: pupils equal, round, reactive to light with accommodation, EOMI.   Pulmonary: normal respirations, no signs of respiratory distress  Abdomen: soft, non-distended, not tender to palpation  Skin: Skin is warm, dry and intact.  Sensory: intact to light touch throughout  Motor Strength:Moves all extremities spontaneously with good tone.  Full strength upper and lower extremities. No abnormal movements seen.     Strength  Deltoids Triceps Biceps Wrist Extension Wrist Flexion Hand    Upper: R 5/5 5/5 5/5 5/5 5/5 5/5    L 5/5 5/5 5/5 5/5 5/5 5/5     Iliopsoas Quadriceps Knee  Flexion Tibialis  anterior Gastro- cnemius EHL   Lower: R 5/5 5/5 5/5 5/5 5/5 5/5    L 5/5 5/5 5/5 5/5 5/5 5/5     Apsen collar in place; anterior and posterior cervical incision with dermabond/prineo intact; HV drains x2 removed with ease and pressure dressing placed

## 2023-11-16 NOTE — ANESTHESIA POSTPROCEDURE EVALUATION
Anesthesia Post Evaluation    Patient: Gamaliel Pete JrLizz    Procedure(s) Performed: Procedure(s) (LRB):  SPINE, CERVICAL, WITH POSTERIOR FUSION T4-6 (N/A)  CORPECTOMY, SPINE, CERVICAL C3-6 REVISION (N/A)    Final Anesthesia Type: general      Patient location during evaluation: PACU  Patient participation: Yes- Able to Participate  Level of consciousness: awake and alert  Post-procedure vital signs: reviewed and stable  Pain management: adequate  Airway patency: patent  DONAVAN mitigation strategies: Multimodal analgesia, Preoperative use of mandibular advancement devices or oral appliances, Intraoperative administration of CPAP, nasopharyngeal airway, or oral appliance during sedation, Extubation while patient is awake and Verification of full reversal of neuromuscular block  PONV status at discharge: No PONV  Anesthetic complications: no      Cardiovascular status: blood pressure returned to baseline, hemodynamically stable and stable  Respiratory status: unassisted and spontaneous ventilation  Hydration status: euvolemic  Follow-up not needed.          Vitals Value Taken Time   /69 11/16/23 0724   Temp 36.7 °C (98 °F) 11/16/23 0724   Pulse 85 11/16/23 0724   Resp 18 11/16/23 0724   SpO2 95 % 11/16/23 0724         Event Time   Out of Recovery 11/15/2023 18:30:00         Pain/Juany Score: Pain Rating Prior to Med Admin: 5 (11/16/2023  5:18 AM)  Pain Rating Post Med Admin: 4 (11/16/2023  4:20 AM)  Juany Score: 9 (11/15/2023  6:30 PM)

## 2023-11-16 NOTE — ASSESSMENT & PLAN NOTE
Mr. Pete is a 72yo man w/PMHx RCC s/p R nephrectomy with lung and spinal mets s/p anterior C4-C5 corpectomy and T9 kyphoplasty who presented for scheduled C3-C6 PCF with Dr. Martinez.     Now s/p C3-C6 PCF 11/15    --3 Hemovac drains in place with qshift output recording  --Ancef while surgical drains are in place  --OOB with PT  --Maintain C collar when out of bed  --X-ray C spine AP and lateral  --Pain Control    Dispo ongoing    Case and plan discussed with Dr. Martinez

## 2023-11-16 NOTE — PLAN OF CARE
Problem: Adult Inpatient Plan of Care  Goal: Absence of Hospital-Acquired Illness or Injury  Outcome: Ongoing, Progressing  Goal: Optimal Comfort and Wellbeing  Outcome: Ongoing, Progressing     Problem: Renal Function Impairment (Acute Kidney Injury/Impairment)  Goal: Effective Renal Function  Outcome: Ongoing, Progressing     Problem: Bleeding (Surgery Nonspecified)  Goal: Absence of Bleeding  Outcome: Ongoing, Progressing     Problem: Pain (Surgery Nonspecified)  Goal: Acceptable Pain Control  Outcome: Ongoing, Progressing     Problem: Infection  Goal: Absence of Infection Signs and Symptoms  Outcome: Ongoing, Progressing   Patient is alertx4, room air,  pain controlled well overnight with oxycodone and dilaudid. Hoskins catheter intact. Noticed redness/excoriation on buttock after coming back from surgery. 4 eyes documented. Wound care order placed.

## 2023-11-16 NOTE — SUBJECTIVE & OBJECTIVE
Interval History: 11/16: POD 1 s/p C3-C6 PCF. NAEO. VSS, afebrile and WBC WNL. Patient complains of mild incisional pain but reports his LUE radiculopathy is improved. Reports mild discomfort with swallowing but no difficulty. Pending PT.     Medications:  Continuous Infusions:   sodium chloride 0.9% 100 mL/hr at 11/15/23 2055     Scheduled Meds:   amLODIPine  5 mg Oral Daily    bisacodyL  10 mg Rectal Daily    ceFAZolin (ANCEF) IVPB  2 g Intravenous Q8H    gabapentin  300 mg Oral TID    heparin (porcine)  5,000 Units Subcutaneous Q8H    methocarbamoL  750 mg Oral QID    mupirocin   Nasal BID     PRN Meds:aluminum-magnesium hydroxide-simethicone, dextrose 10%, dextrose 10%, glucagon (human recombinant), glucose, glucose, glucose, HYDROmorphone, ondansetron, oxyCODONE, prochlorperazine, senna-docusate 8.6-50 mg, sodium chloride 0.9%     Review of Systems  Objective:     Weight: 102.1 kg (225 lb 1.4 oz)  Body mass index is 32.3 kg/m².  Vital Signs (Most Recent):  Temp: 99.2 °F (37.3 °C) (11/16/23 1116)  Pulse: 95 (11/16/23 1116)  Resp: 18 (11/16/23 1116)  BP: (!) 157/76 (11/16/23 1116)  SpO2: 96 % (11/16/23 1116) Vital Signs (24h Range):  Temp:  [97.5 °F (36.4 °C)-99.2 °F (37.3 °C)] 99.2 °F (37.3 °C)  Pulse:  [77-95] 95  Resp:  [10-22] 18  SpO2:  [93 %-100 %] 96 %  BP: (148-182)/(69-84) 157/76          Closed/Suction Drain 11/15/23 1457 Tube - 1 Right Back Accordion 10 Fr. (Active)   Site Description Healing 11/16/23 0800   Dressing Type Transparent (Tegaderm) 11/16/23 0800   Dressing Status Clean;Dry;Intact 11/16/23 0800   Dressing Intervention Integrity maintained 11/16/23 0800   Drainage Sanguineous 11/16/23 0800   Status To bulb suction 11/16/23 0800   Output (mL) 50 mL 11/16/23 0435            Closed/Suction Drain 11/15/23 1458 Tube - 2 Left Back Accordion 10 Fr. (Active)   Site Description Healing 11/16/23 0800   Dressing Type Transparent (Tegaderm) 11/16/23 0800   Dressing Status Clean;Dry;Intact 11/16/23 0800    Dressing Intervention Integrity maintained 11/16/23 0800   Drainage Sanguineous 11/16/23 0800   Status To bulb suction 11/16/23 0800   Output (mL) 0 mL 11/16/23 0435            Closed/Suction Drain 11/15/23 1654 Right Neck Accordion 10 Fr. (Active)   Site Description Healing 11/16/23 0800   Dressing Type Transparent (Tegaderm) 11/16/23 0800   Dressing Status Clean;Dry;Intact 11/16/23 0800   Dressing Intervention Integrity maintained 11/16/23 0800   Drainage Sanguineous 11/16/23 0800   Status To bulb suction 11/16/23 0800   Output (mL) 20 mL 11/16/23 0435        Physical Exam  General: well developed, well nourished, no distress.   Head: normocephalic, atraumatic  Mental Status: Awake, Alert, Oriented  Speech: Clear with content appropriate to conversation  Cranial nerves: face symmetric, CN II-XII grossly intact.   Sensory: intact to light touch throughout    Motor Strength:Moves all extremities spontaneously with good tone.  Full strength upper and lower extremities. No abnormal movements seen.      Strength   Deltoids Triceps Biceps Wrist Extension Wrist Flexion Hand    Upper: R 5/5 5/5 5/5 5/5 5/5 5/5     L 5/5 5/5 5/5 5/5 5/5 5/5       Iliopsoas Quadriceps Knee  Flexion Tibialis  anterior Gastro- cnemius EHL   Lower: R 5/5 5/5 5/5 5/5 5/5 5/5     L 5/5 5/5 5/5 5/5 5/5 5/5      Maldonado: absent  Anterior incision clean, dry, well-healing  Posterior dressing in place  3 HV drains in place    Significant Labs:  Recent Labs   Lab 11/14/23  1850 11/15/23  0402 11/16/23  0348   * 97 122*    139 136   K 4.1 4.3 5.1    104 103   CO2 25 27 23   BUN 19 18 17   CREATININE 1.2 1.0 1.2   CALCIUM 10.2 10.0 9.8     Recent Labs   Lab 11/14/23  1850 11/15/23  0402 11/16/23  0348   WBC 9.04 6.52 7.49   HGB 9.5* 10.0* 9.3*   HCT 27.7* 31.2* 27.2*    224 264     Recent Labs   Lab 11/14/23  1850   INR 1.0   APTT 26.9     Microbiology Results (last 7 days)       ** No results found for the last 168  hours. **          All pertinent labs from the last 24 hours have been reviewed.    Significant Diagnostics:  No new relevant imaging to discuss.

## 2023-11-16 NOTE — PLAN OF CARE
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]    Admission Date: 11/14/2023  Expected Discharge Date: 11/17/2023    Transition of Care Barriers: None    Payor: [employer]ARE / Plan: [employer] 65 / Product Type: Medicare Advantage /     Extended Emergency Contact Information  Primary Emergency Contact: Mary Pete  Address: [address] [place] [address]           [place] [address] [facility]  Home Phone: [phone]  Mobile Phone: [phone]  Relation: Spouse    Discharge Plan A: Rehab  Discharge Plan B: Home with family, Home Health      [facility] - [place] - [address]  [address] [place] [address]  Phone: [phone] Fax: [phone]    CM obtained discharge planning assessment with spouse, patient is off unit.  Spouse declined discharge planning booklet stating I have one from last week.    Initial Assessment (most recent)       Adult Discharge Assessment - 11/16/23 1557          Discharge Assessment    Assessment Type Discharge Planning Assessment     Confirmed/corrected address, phone number and insurance Yes     Confirmed Demographics Correct on Facesheet     Source of Information family     Communicated WAQAS with patient/caregiver Yes     People in Home spouse     Do you expect to return to your current living situation? Yes     Do you have help at home or someone to help you manage your care at home? Yes     Who are your caregiver(s) and their phone number(s)? Lakesha - spouse [phone]     Prior to hospitilization cognitive status: Alert/Oriented     Current cognitive status: Alert/Oriented     Walking or Climbing Stairs ambulation difficulty, requires equipment     Mobility Management walker     Equipment Currently Used at Home walker,  rolling;hospital bed     Readmission within 30 days? Yes     Patient currently being followed by outpatient case management? No     Do you currently have service(s) that help you manage your care at home? Yes     Name and Contact number of agency hh     Is the pt/caregiver preference to resume services with current agency Yes     Do you take prescription medications? Yes     Do you have prescription coverage? Yes     Coverage [employer] - [employer] 65     Do you have any problems affording any of your prescribed medications? No     Is the patient taking medications as prescribed? yes     Who is going to help you get home at discharge? Family     How do you get to doctors appointments? family or friend will provide     Are you on dialysis? No     Do you take coumadin? No     DME Needed Upon Discharge  none     Discharge Plan discussed with: Spouse/sig other     Name(s) and Number(s) Lakesha - spouse [phone]     Transition of Care Barriers None     Discharge Plan A Rehab     Discharge Plan B Home with family;Home Health        Physical Activity    On average, how many days per week do you engage in moderate to strenuous exercise (like a brisk walk)? 0 days     On average, how many minutes do you engage in exercise at this level? 0 min        Financial Resource Strain    How hard is it for you to pay for the very basics like food, housing, medical care, and heating? Not hard at all        Housing Stability    In the last 12 months, was there a time when you were not able to pay the mortgage or rent on time? No     In the last 12 months, was there a time when you did not have a steady place to sleep or slept in a shelter (including now)? No        Transportation Needs    In the past 12 months, has lack of transportation kept you from medical appointments or from getting medications? No     In the past 12 months, has lack of transportation kept you from meetings, work, or from  getting things needed for daily living? No        Food Insecurity    Within the past 12 months, you worried that your food would run out before you got the money to buy more. Never true     Within the past 12 months, the food you bought just didn't last and you didn't have money to get more. Never true        Stress    Do you feel stress - tense, restless, nervous, or anxious, or unable to sleep at night because your mind is troubled all the time - these days? Not at all        Social Connections    In a typical week, how many times do you talk on the phone with family, friends, or neighbors? More than three times a week     How often do you get together with friends or relatives? More than three times a week     How often do you attend Lutheran or Gnosticist services? Never     Do you belong to any clubs or organizations such as Lutheran groups, unions, fraternal or athletic groups, or school groups? No     How often do you attend meetings of the clubs or organizations you belong to? Never     Are you , , , , never , or living with a partner?         Alcohol Use    Q1: How often do you have a drink containing alcohol? Never     Q2: How many drinks containing alcohol do you have on a typical day when you are drinking? Patient does not drink     Q3: How often do you have six or more drinks on one occasion? Never        OTHER    Name(s) of People in Home Lakesha - spouse                     Readmission Assessment (most recent)       Readmission Assessment - 11/16/23 1602          Readmission    Why were you readmitted? Planned readmission     When you left the hospital where did you go? Home with Home Health     Was this a planned readmission? Yes

## 2023-11-16 NOTE — OP NOTE
DATE OF PROCEDURE: 11/15/23    PREOPERATIVE DIAGNOSIS:  1. S/p C4/5 anterior cervical corpectomy and fusion  2. Hardware failure of C4/5 corpectomy and distal plate  3. 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     PROCEDURE PERFORMED:  1. Posterior spinal fusion, C3-C6  2. Posterior segmental spinal fixation, C3-C6 (Depuy)  3. Revision/fusion of C4/5 anterior cervical corpectomy cage with revision of plate from C3-6  4. Use of intraoperative flouroscopy  5. Use of intraoperative neuromonitoring with MEPs  6. Synthetic bone grafting     PRIMARY SURGEON: Nasim Martinez DO    ASSISTANT: Antony Forman MD     ANESTHESIA: GETA     ESTIMATED BLOOD LOSS: 50mL     COMPLICATIONS: None     DRAINS: One deep, one superficial HV posterior; one anterior HV drain     SPECIMENS SENT: C4/5 corpectomy cage     FINDINGS: None     INDICATIONS:     73 M with hx of metastatic RCC to spine who is s/p C4/5 corpectomy and C3-6 anterior fusion on 11/7/23 presented for elective posterior instrumented fusion from C3-6 to stabilize his anterior construct.  He had done well from the index procedure and his postop xrays showed no evidence of hardware failure or malfunction.  His pathology confirmed RCC from the corpectomy.    I explained the risks, benefits, alternatives, indications and methods of the procedure in detail. The patient voiced understanding and all questions were answered. No guarantees were made. The patient agreed to proceed as planned.    PROCEDURE:     The patient was brought into the operating room where he was intubated and placed under general anesthesia without difficulty. All lines were placed.  A Patel clamp was placed bitemporally and he was repositioned prone onto the hospital bed with the head fixed to the table in capital flexion and neck extension. Appropriate padding of all pressure points was placed.  Xrays were used to localize the region of interest. It also showed adequate spinal  alignment in the sagittal plane.  However, it showed that the prior C4/5 corpectomy cage had collapsed and was no longer flush with the endplates.  The C6 screws and plate were also backed out.  The decision was made to continue with the posterior instrumented fusion and then flip the patient supine to revise the prior corpectomy cage and plate.  The posterior cervical spine was marked prepped and draped in the usual sterile fashion.  A timeout was performed prior to the procedure.  10mL of Lidocaine with epinephrine were injected in the skin.     A linear incision was made with a 10 blade from approximately C3-C6.  Supra and subfascial dissection was carried out in the midline with Bovie electrocautery. Subfascial dissection was carried out with Bovie electrocautery and Sheppard elevators to expose the posterior elements from C3-C6. Levels were confirmed with lateral xray.     First, lateral mass screw tracts were prepared bilaterally from C3-C6 using freehand technique and anatomic landmarks.     Lateral mass screws were then placed in the previously prepared tracts. AP and lateral xray showed excellent position of all screws. Titanium rods were sized, contoured and reduced into the tulip heads. Set screws were finally tightened.  Final xrays showed excellent spinal alignment and hardware position.     The wound was copiously irrigated with a dilute Betadine solution and normal saline.   Exposed bony surfaces from C3-C6 were decorticated with a high-speed drill.  Synthetic bone was placed bilaterally for arthrodesis from C3-C6.  One gram of vancomycin powder was placed into the wound. A subfascial Hemovac drains was placed and tunneled through a separate incision, where it was secured with Vicryl sutures.  A watertight fascial closure was achieved with interrupted 0 vicryl sutures and a running Stratafix suture.  The suprafascial layer was then undermined with bovie to create a tension free closure.  A superficial HV  drain was left in this space and tunneled out of the wound where it was secured with vicryl suture.  The soft tissues were closed in layers and the skin was closed with 4/0 running monocryl.  Preneo tape and dermabond was then placed over the incision.  Motors remained consistent with baseline.    The patient was then removed from the [place] head galeana and transferred to his hospital bed.  He remained intubated.  He was then transferred to the OR table supine where his head and neck were placed in slight extension resting on foam donut.  The anterior neck was then prepped and draped in the usual sterile fashion.    The prior right anterior neck incision was opened with metzenbaum scissors.  The platysma was also opened with metzenbaum scissors.  A weitlaner retractor was then placed to open the incision.  The tissues from the prior corpectomy hadn't healed yet and the space between the SCM and the trachea and esophagus were easily dissected with blunt finger dissection.  The carotid was palpated lateral to the working corridor.  The trimline retractor was then put into place exposing the prior plate from C3-6.  The C6 screws were noticeably loose and easily removed.  The C3 screws had solid purchase and were removed as well.  The plate was then passed off the field.  The corpectomy cage was also loose and was then reengaged and removed from the operative site.  This cage was sent as specimen.  We then extended our dissection down further on the C6 vertebral body with bovie to expose more bone.  We then placed a new expandable titanium corpectomy cage packed with synthetic bone into the defect and expanded it until was flush with the endplates and had a very snug fit.  Excellent placement was confirmed on fluoro and motors remained stable.  A longer plate was then placed over the corpectomy cage and was secured with size 16 variable rescue screws.  Fluoro showed excellent placement of hardware.  The wound was then  copiously irrigated with dilute betadine and saline.  1 gm of vanc was placed into the wound and depomedrol was placed over the esophagus and retropharyngeal space.  A HV drain was left in the prevertebral space and tunneled out of the wound where it was secured with vicryl suture.  The platysma was closed with 3/0 vicryl and th wound was closed in layers with 4/0 subcuticular monocryl used for the skin.  Dermabond was placed over the incision.     The patient appeared to tolerate the procedure well from a hemodynamic and neuromonitoring standpoint.  Motor evoked potentials were present and stable in all extremities throughout the case. I was present for all critical portions of the case, and at the end of the case all counts were correct.   The patient was then transferred to his hospital bed and extubated and sent to PACU for recovery.

## 2023-11-16 NOTE — NURSING TRANSFER
Nursing Transfer Note      11/15/2023   8:20 PM    Nurse giving handoff:smith  Nurse receiving handoff:NPU RN    Reason patient is being transferred: recovery care complete    Transfer To: 946    Transfer via bed    Transported by PCT  Order for Tele Monitor? No    Additional Lines: Hosikns Catheter    4eyes on Skin: yes    Medicines sent: N/A    Any special needs or follow-up needed: routine    Patient belongings transferred with patient: No    Chart send with patient: Yes    Notified: spouse    Patient reassessed at: 11/15/23 2000 (date, time)

## 2023-11-16 NOTE — NURSING
Nurses Note -- 4 Eyes      11/16/2023   12:26 AM      Skin assessed during: Transfer      [] No Altered Skin Integrity Present    [x]Prevention Measures Documented      [x] Yes- Altered Skin Integrity Present or Discovered   [x] LDA Added if Not in Epic (Describe Wound)   [] New Altered Skin Integrity was Present on Admit and Documented in LDA   [] Wound Image Taken    Wound Care Consulted? yes    Attending Nurse:  Jeffrey Schwab RN/Staff Member:  Marcy

## 2023-11-16 NOTE — CONSULTS
[facility] - Neurosurgery ([facility])  Wound Care    Patient Name:  Gamaliel Pete Jr.   MRN:  [id number]  Date: 11/16/2023  Diagnosis: Metastatic cancer to spine    History:     Past Medical History:   Diagnosis Date    Asthma     no inhaler use    Borderline hypertension     ED (erectile dysfunction)     Gout     Hematuria     Hypertension        Social History     Socioeconomic History    Marital status:    Tobacco Use    Smoking status: Never    Smokeless tobacco: Never   Substance and Sexual Activity    Alcohol use: Not Currently     Alcohol/week: 0.0 standard drinks of alcohol     Comment: rarely    Drug use: Never     Social Determinants of Health     Financial Resource Strain: Low Risk  (11/7/2023)    Overall Financial Resource Strain (CARDIA)     Difficulty of Paying Living Expenses: Not hard at all   Food Insecurity: No Food Insecurity (11/7/2023)    Hunger Vital Sign     Worried About Running Out of Food in the Last Year: Never true     Ran Out of Food in the Last Year: Never true   Transportation Needs: No Transportation Needs (11/7/2023)    PRAPARE - Transportation     Lack of Transportation (Medical): No     Lack of Transportation (Non-Medical): No   Physical Activity: Inactive (11/7/2023)    Exercise Vital Sign     Days of Exercise per Week: 0 days     Minutes of Exercise per Session: 0 min   Stress: No Stress Concern Present (11/7/2023)    [employer] - Occupational Stress Questionnaire     Feeling of Stress : Not at all   Social Connections: Moderately Isolated (11/7/2023)    Social Connection and Isolation Panel [NHANES]     Frequency of Communication with Friends and Family: More than three times a week     Frequency of Social Gatherings with Friends and Family: Once a week     Attends Confucianist Services: Never     Active Member of Clubs or Organizations: No     Attends Club or Organization Meetings: Never     Marital Status:    Housing Stability: Low Risk   (11/7/2023)    Housing Stability Vital Sign     Unable to Pay for Housing in the Last Year: No     Number of Places Lived in the Last Year: 1     Unstable Housing in the Last Year: No       Precautions:     Allergies as of 11/14/2023    (No Known Allergies)       [facility] Assessment Details/Treatment     Patient seen for wound care consultation for buttocks.   Reviewed chart for this encounter.   See Flow Sheet for findings.    Pt found lying in bed. Pt states he has already had a dressing applied by bedside nursing. Pt states he often finds himself picking at the skin of his arms/elbows and a spot on his buttocks. Refusing turn at this time, recommended to pt to keep area covered w/ dressing of choice (extra foam dressings left at bedside) to minimize desire to pick. Verbalized understanding.      Nursing to continue care & monitoring.  Nursing to maintain pressure injury prevention interventions. Wound care will sign off.

## 2023-11-16 NOTE — PROGRESS NOTES
[facility] - Neurosurgery ([facility])  Neurosurgery  Progress Note    Subjective:     History of Present Illness: No notes on file    Post-Op Info:  Procedure(s) (LRB):  SPINE, CERVICAL, WITH POSTERIOR FUSION T4-6 (N/A)  CORPECTOMY, SPINE, CERVICAL C3-6 REVISION (N/A)   1 Day Post-Op   Interval History: 11/16: POD 1 s/p C3-C6 PCF. NAEO. VSS, afebrile and WBC WNL. Patient complains of mild incisional pain but reports his LUE radiculopathy is improved. Reports mild discomfort with swallowing but no difficulty. Pending PT.     Medications:  Continuous Infusions:   sodium chloride 0.9% 100 mL/hr at 11/15/23 2055     Scheduled Meds:   amLODIPine  5 mg Oral Daily    bisacodyL  10 mg Rectal Daily    ceFAZolin (ANCEF) IVPB  2 g Intravenous Q8H    gabapentin  300 mg Oral TID    heparin (porcine)  5,000 Units Subcutaneous Q8H    methocarbamoL  750 mg Oral QID    mupirocin   Nasal BID     PRN Meds:aluminum-magnesium hydroxide-simethicone, dextrose 10%, dextrose 10%, glucagon (human recombinant), glucose, glucose, glucose, HYDROmorphone, ondansetron, oxyCODONE, prochlorperazine, senna-docusate 8.6-50 mg, sodium chloride 0.9%     Review of Systems  Objective:     Weight: 102.1 kg (225 lb 1.4 oz)  Body mass index is 32.3 kg/m².  Vital Signs (Most Recent):  Temp: 99.2 °F (37.3 °C) (11/16/23 1116)  Pulse: 95 (11/16/23 1116)  Resp: 18 (11/16/23 1116)  BP: (!) 157/76 (11/16/23 1116)  SpO2: 96 % (11/16/23 1116) Vital Signs (24h Range):  Temp:  [97.5 °F (36.4 °C)-99.2 °F (37.3 °C)] 99.2 °F (37.3 °C)  Pulse:  [77-95] 95  Resp:  [10-22] 18  SpO2:  [93 %-100 %] 96 %  BP: (148-182)/(69-84) 157/76          Closed/Suction Drain 11/15/23 1457 Tube - 1 Right Back Accordion 10 Fr. (Active)   Site Description Healing 11/16/23 0800   Dressing Type Transparent (Tegaderm) 11/16/23 0800   Dressing Status Clean;Dry;Intact 11/16/23 0800   Dressing Intervention Integrity maintained 11/16/23 0800   Drainage Sanguineous 11/16/23 0800   Status To bulb  suction 11/16/23 0800   Output (mL) 50 mL 11/16/23 0435            Closed/Suction Drain 11/15/23 1458 Tube - 2 Left Back Accordion 10 Fr. (Active)   Site Description Healing 11/16/23 0800   Dressing Type Transparent (Tegaderm) 11/16/23 0800   Dressing Status Clean;Dry;Intact 11/16/23 0800   Dressing Intervention Integrity maintained 11/16/23 0800   Drainage Sanguineous 11/16/23 0800   Status To bulb suction 11/16/23 0800   Output (mL) 0 mL 11/16/23 0435            Closed/Suction Drain 11/15/23 1654 Right Neck Accordion 10 Fr. (Active)   Site Description Healing 11/16/23 0800   Dressing Type Transparent (Tegaderm) 11/16/23 0800   Dressing Status Clean;Dry;Intact 11/16/23 0800   Dressing Intervention Integrity maintained 11/16/23 0800   Drainage Sanguineous 11/16/23 0800   Status To bulb suction 11/16/23 0800   Output (mL) 20 mL 11/16/23 0435        Physical Exam  General: well developed, well nourished, no distress.   Head: normocephalic, atraumatic  Mental Status: Awake, Alert, Oriented  Speech: Clear with content appropriate to conversation  Cranial nerves: face symmetric, CN II-XII grossly intact.   Sensory: intact to light touch throughout    Motor Strength:Moves all extremities spontaneously with good tone.  Full strength upper and lower extremities. No abnormal movements seen.      Strength   Deltoids Triceps Biceps Wrist Extension Wrist Flexion Hand    Upper: R 5/5 5/5 5/5 5/5 5/5 5/5     L 5/5 5/5 5/5 5/5 5/5 5/5       Iliopsoas Quadriceps Knee  Flexion Tibialis  anterior Gastro- cnemius EHL   Lower: R 5/5 5/5 5/5 5/5 5/5 5/5     L 5/5 5/5 5/5 5/5 5/5 5/5      Maldon[place]: absent  Anterior incision clean, dry, well-healing  Posterior dressing in place  3 HV drains in place    Significant Labs:  Recent Labs   Lab 11/14/23  1850 11/15/23  0402 11/16/23  0348   * 97 122*    139 136   K 4.1 4.3 5.1    104 103   CO2 25 27 23   BUN 19 18 17   CREATININE 1.2 1.0 1.2   CALCIUM 10.2 10.0 9.8      Recent Labs   Lab 11/14/23  1850 11/15/23  0402 11/16/23  0348   WBC 9.04 6.52 7.49   HGB 9.5* 10.0* 9.3*   HCT 27.7* 31.2* 27.2*    224 264     Recent Labs   Lab 11/14/23  1850   INR 1.0   APTT 26.9     Microbiology Results (last 7 days)       ** No results found for the last 168 hours. **          All pertinent labs from the last 24 hours have been reviewed.    Significant Diagnostics:  No new relevant imaging to discuss.   Assessment/Plan:     * Metastatic cancer to spine  Mr. Pete is a 74yo man w/PMHx RCC s/p R nephrectomy with lung and spinal mets s/p anterior C4-C5 corpectomy and T9 kyphoplasty who presented for scheduled C3-C6 PCF with Dr. Martinez.     Now s/p C3-C6 PCF 11/15    --3 Hemovac drains in place with qshift output recording  --Ancef while surgical drains are in place  --OOB with PT  --Maintain C collar when out of bed  --X-ray C spine AP and lateral  --Pain Control    Dispo ongoing    Case and plan discussed with Dr. Martinez    Essential hypertension  --Continue home amlodipine        Mariya Phelan PA-C  Neurosurgery  [facility] - Neurosurgery ([facility])

## 2023-11-17 LAB
ALBUMIN SERPL BCP-MCNC: 3.1 G/DL (ref 3.5–5.2)
ALP SERPL-CCNC: 83 U/L (ref 55–135)
ALT SERPL W/O P-5'-P-CCNC: 16 U/L (ref 10–44)
ANION GAP SERPL CALC-SCNC: 8 MMOL/L (ref 8–16)
AST SERPL-CCNC: 17 U/L (ref 10–40)
BASOPHILS # BLD AUTO: 0.02 K/UL (ref 0–0.2)
BASOPHILS NFR BLD: 0.3 % (ref 0–1.9)
BILIRUB SERPL-MCNC: 0.2 MG/DL (ref 0.1–1)
BUN SERPL-MCNC: 25 MG/DL (ref 8–23)
CALCIUM SERPL-MCNC: 9.2 MG/DL (ref 8.7–10.5)
CHLORIDE SERPL-SCNC: 100 MMOL/L (ref 95–110)
CO2 SERPL-SCNC: 23 MMOL/L (ref 23–29)
CREAT SERPL-MCNC: 1.5 MG/DL (ref 0.5–1.4)
DIFFERENTIAL METHOD: ABNORMAL
EOSINOPHIL # BLD AUTO: 0 K/UL (ref 0–0.5)
EOSINOPHIL NFR BLD: 0.4 % (ref 0–8)
ERYTHROCYTE [DISTWIDTH] IN BLOOD BY AUTOMATED COUNT: 13.7 % (ref 11.5–14.5)
EST. GFR  (NO RACE VARIABLE): 49.5 ML/MIN/1.73 M^2
GLUCOSE SERPL-MCNC: 101 MG/DL (ref 70–110)
HCT VFR BLD AUTO: 24.7 % (ref 40–54)
HGB BLD-MCNC: 8.1 G/DL (ref 14–18)
IMM GRANULOCYTES # BLD AUTO: 0.07 K/UL (ref 0–0.04)
IMM GRANULOCYTES NFR BLD AUTO: 0.9 % (ref 0–0.5)
LYMPHOCYTES # BLD AUTO: 1.6 K/UL (ref 1–4.8)
LYMPHOCYTES NFR BLD: 19.8 % (ref 18–48)
MCH RBC QN AUTO: 29.8 PG (ref 27–31)
MCHC RBC AUTO-ENTMCNC: 32.8 G/DL (ref 32–36)
MCV RBC AUTO: 91 FL (ref 82–98)
MONOCYTES # BLD AUTO: 1.1 K/UL (ref 0.3–1)
MONOCYTES NFR BLD: 13.4 % (ref 4–15)
NEUTROPHILS # BLD AUTO: 5.2 K/UL (ref 1.8–7.7)
NEUTROPHILS NFR BLD: 65.2 % (ref 38–73)
NRBC BLD-RTO: 0 /100 WBC
PLATELET # BLD AUTO: 228 K/UL (ref 150–450)
PMV BLD AUTO: 8.8 FL (ref 9.2–12.9)
POTASSIUM SERPL-SCNC: 3.8 MMOL/L (ref 3.5–5.1)
PROT SERPL-MCNC: 6.7 G/DL (ref 6–8.4)
RBC # BLD AUTO: 2.72 M/UL (ref 4.6–6.2)
SODIUM SERPL-SCNC: 131 MMOL/L (ref 136–145)
WBC # BLD AUTO: 7.92 K/UL (ref 3.9–12.7)

## 2023-11-17 PROCEDURE — 97116 GAIT TRAINING THERAPY: CPT

## 2023-11-17 PROCEDURE — 36415 COLL VENOUS BLD VENIPUNCTURE: CPT | Performed by: PHYSICIAN ASSISTANT

## 2023-11-17 PROCEDURE — 11000001 HC ACUTE MED/SURG PRIVATE ROOM

## 2023-11-17 PROCEDURE — 85025 COMPLETE CBC W/AUTO DIFF WBC: CPT | Performed by: PHYSICIAN ASSISTANT

## 2023-11-17 PROCEDURE — 80053 COMPREHEN METABOLIC PANEL: CPT | Performed by: PHYSICIAN ASSISTANT

## 2023-11-17 PROCEDURE — 97530 THERAPEUTIC ACTIVITIES: CPT

## 2023-11-17 PROCEDURE — 25000003 PHARM REV CODE 250: Performed by: STUDENT IN AN ORGANIZED HEALTH CARE EDUCATION/TRAINING PROGRAM

## 2023-11-17 PROCEDURE — 25000003 PHARM REV CODE 250: Performed by: PHYSICIAN ASSISTANT

## 2023-11-17 PROCEDURE — 97166 OT EVAL MOD COMPLEX 45 MIN: CPT

## 2023-11-17 PROCEDURE — 63600175 PHARM REV CODE 636 W HCPCS: Performed by: STUDENT IN AN ORGANIZED HEALTH CARE EDUCATION/TRAINING PROGRAM

## 2023-11-17 PROCEDURE — 97162 PT EVAL MOD COMPLEX 30 MIN: CPT

## 2023-11-17 RX ORDER — SODIUM CHLORIDE 9 MG/ML
INJECTION, SOLUTION INTRAVENOUS CONTINUOUS
Status: DISCONTINUED | OUTPATIENT
Start: 2023-11-17 | End: 2023-11-18

## 2023-11-17 RX ORDER — POLYETHYLENE GLYCOL 3350 17 G/17G
17 POWDER, FOR SOLUTION ORAL DAILY
Status: DISCONTINUED | OUTPATIENT
Start: 2023-11-17 | End: 2023-11-18 | Stop reason: HOSPADM

## 2023-11-17 RX ADMIN — BISACODYL 10 MG: 10 SUPPOSITORY RECTAL at 08:11

## 2023-11-17 RX ADMIN — CEFAZOLIN 2 G: 2 INJECTION, POWDER, FOR SOLUTION INTRAMUSCULAR; INTRAVENOUS at 09:11

## 2023-11-17 RX ADMIN — METHOCARBAMOL 750 MG: 750 TABLET ORAL at 04:11

## 2023-11-17 RX ADMIN — POLYETHYLENE GLYCOL 3350 17 G: 17 POWDER, FOR SOLUTION ORAL at 11:11

## 2023-11-17 RX ADMIN — METHOCARBAMOL 750 MG: 750 TABLET ORAL at 09:11

## 2023-11-17 RX ADMIN — CEFAZOLIN 2 G: 2 INJECTION, POWDER, FOR SOLUTION INTRAMUSCULAR; INTRAVENOUS at 01:11

## 2023-11-17 RX ADMIN — GABAPENTIN 300 MG: 300 CAPSULE ORAL at 03:11

## 2023-11-17 RX ADMIN — HEPARIN SODIUM 5000 UNITS: 5000 INJECTION INTRAVENOUS; SUBCUTANEOUS at 05:11

## 2023-11-17 RX ADMIN — OXYCODONE HYDROCHLORIDE 10 MG: 10 TABLET ORAL at 03:11

## 2023-11-17 RX ADMIN — AMLODIPINE BESYLATE 5 MG: 5 TABLET ORAL at 08:11

## 2023-11-17 RX ADMIN — HEPARIN SODIUM 5000 UNITS: 5000 INJECTION INTRAVENOUS; SUBCUTANEOUS at 01:11

## 2023-11-17 RX ADMIN — SODIUM CHLORIDE: 9 INJECTION, SOLUTION INTRAVENOUS at 11:11

## 2023-11-17 RX ADMIN — OXYCODONE HYDROCHLORIDE 10 MG: 10 TABLET ORAL at 08:11

## 2023-11-17 RX ADMIN — GABAPENTIN 300 MG: 300 CAPSULE ORAL at 08:11

## 2023-11-17 RX ADMIN — MUPIROCIN: 20 OINTMENT TOPICAL at 09:11

## 2023-11-17 RX ADMIN — CEFAZOLIN 2 G: 2 INJECTION, POWDER, FOR SOLUTION INTRAMUSCULAR; INTRAVENOUS at 05:11

## 2023-11-17 RX ADMIN — METHOCARBAMOL 750 MG: 750 TABLET ORAL at 01:11

## 2023-11-17 RX ADMIN — HEPARIN SODIUM 5000 UNITS: 5000 INJECTION INTRAVENOUS; SUBCUTANEOUS at 09:11

## 2023-11-17 RX ADMIN — METHOCARBAMOL 750 MG: 750 TABLET ORAL at 08:11

## 2023-11-17 RX ADMIN — HYDROMORPHONE HYDROCHLORIDE 2 MG: 1 INJECTION, SOLUTION INTRAMUSCULAR; INTRAVENOUS; SUBCUTANEOUS at 11:11

## 2023-11-17 RX ADMIN — HYDROMORPHONE HYDROCHLORIDE 2 MG: 1 INJECTION, SOLUTION INTRAMUSCULAR; INTRAVENOUS; SUBCUTANEOUS at 03:11

## 2023-11-17 RX ADMIN — MUPIROCIN: 20 OINTMENT TOPICAL at 08:11

## 2023-11-17 RX ADMIN — GABAPENTIN 300 MG: 300 CAPSULE ORAL at 09:11

## 2023-11-17 RX ADMIN — OXYCODONE HYDROCHLORIDE 10 MG: 10 TABLET ORAL at 09:11

## 2023-11-17 RX ADMIN — OXYCODONE HYDROCHLORIDE 10 MG: 10 TABLET ORAL at 01:11

## 2023-11-17 NOTE — ASSESSMENT & PLAN NOTE
Mr. Pete is a 74yo man w/PMHx RCC s/p R nephrectomy with lung and spinal mets s/p anterior C4-C5 corpectomy and T9 kyphoplasty who presented for scheduled C3-C6 PCF with Dr. Martinez.     Now s/p C3-C6 PCF 11/15    --2 Hemovac drains in place with qshift output recording  --Ancef while surgical drains are in place  --OOB with PT  --Maintain C collar when out of bed  --Bowel regimen  --Pain Control    Dispo ongoing    Case and plan discussed with Dr. Martinez

## 2023-11-17 NOTE — SUBJECTIVE & OBJECTIVE
Interval History: 11/17: POD 2 s/p C3-C6 PCF. NAEO and VSS. Mild SABINE on BMP, will resume fluids. Anterior HV drain removed at bedside. Two posterior HV drains remain w/output 50 each.     Medications:  Continuous Infusions:  Scheduled Meds:   amLODIPine  5 mg Oral Daily    bisacodyL  10 mg Rectal Daily    ceFAZolin (ANCEF) IVPB  2 g Intravenous Q8H    gabapentin  300 mg Oral TID    heparin (porcine)  5,000 Units Subcutaneous Q8H    methocarbamoL  750 mg Oral QID    mupirocin   Nasal BID     PRN Meds:aluminum-magnesium hydroxide-simethicone, dextrose 10%, dextrose 10%, glucagon (human recombinant), glucose, glucose, glucose, HYDROmorphone, ondansetron, oxyCODONE, prochlorperazine, senna-docusate 8.6-50 mg, sodium chloride 0.9%     Review of Systems  Objective:     Weight: 102.7 kg (226 lb 6.6 oz)  Body mass index is 32.49 kg/m².  Vital Signs (Most Recent):  Temp: 98.7 °F (37.1 °C) (11/17/23 0734)  Pulse: 77 (11/17/23 0734)  Resp: 18 (11/17/23 0807)  BP: (!) 144/65 (11/17/23 0734)  SpO2: 95 % (11/17/23 0734) Vital Signs (24h Range):  Temp:  [98.2 °F (36.8 °C)-99.2 °F (37.3 °C)] 98.7 °F (37.1 °C)  Pulse:  [71-96] 77  Resp:  [16-18] 18  SpO2:  [93 %-97 %] 95 %  BP: (133-157)/(61-76) 144/65     Date 11/17/23 0700 - 11/18/23 0659   Shift 9336-4099 7249-5241 7867-1795 24 Hour Total   INTAKE   Shift Total(mL/kg)       OUTPUT   Drains 50   50   Shift Total(mL/kg) 50(0.5)   50(0.5)   Weight (kg) 102.7 102.7 102.7 102.7          Closed/Suction Drain 11/15/23 1457 Tube - 1 Right Back Accordion 10 Fr. (Active)   Site Description Healing 11/17/23 0753   Dressing Type Transparent (Tegaderm) 11/17/23 0753   Dressing Status Clean;Dry;Intact 11/17/23 0753   Dressing Intervention Integrity maintained 11/17/23 0753   Drainage Sanguineous 11/17/23 0753   Status To bulb suction 11/17/23 0753   Output (mL) 50 mL 11/17/23 0715            Closed/Suction Drain 11/15/23 1458 Tube - 2 Left Back Accordion 10 Fr. (Active)   Site Description  Healing 11/17/23 0753   Dressing Type Transparent (Tegaderm) 11/17/23 0753   Dressing Status Clean;Dry;Intact 11/17/23 0753   Dressing Intervention Integrity maintained 11/17/23 0753   Drainage Sanguineous 11/17/23 0753   Status To bulb suction 11/17/23 0753   Output (mL) 0 mL 11/16/23 1800            Closed/Suction Drain 11/15/23 1654 Right Neck Accordion 10 Fr. (Active)   Site Description Healing 11/17/23 0753   Dressing Type Transparent (Tegaderm) 11/17/23 0753   Dressing Status Clean;Dry;Intact 11/17/23 0753   Dressing Intervention Integrity maintained 11/17/23 0753   Drainage Sanguineous 11/17/23 0753   Status To bulb suction 11/17/23 0753   Output (mL) 15 mL 11/16/23 1800      Physical Exam  General: well developed, well nourished, no distress.   Head: normocephalic, atraumatic  Mental Status: Awake, Alert, Oriented  Speech: Clear with content appropriate to conversation  Cranial nerves: face symmetric, CN II-XII grossly intact.   Sensory: intact to light touch throughout     Motor Strength:Moves all extremities spontaneously with good tone.  Full strength upper and lower extremities. No abnormal movements seen.      Strength   Deltoids Triceps Biceps Wrist Extension Wrist Flexion Hand    Upper: R 5/5 5/5 5/5 5/5 5/5 5/5     L 5/5 5/5 5/5 5/5 5/5 5/5       Iliopsoas Quadriceps Knee  Flexion Tibialis  anterior Gastro- cnemius EHL   Lower: R 5/5 5/5 5/5 5/5 5/5 5/5     L 5/5 5/5 5/5 5/5 5/5 5/5      Maldonado: absent  Anterior incision clean, dry, well-healing  Posterior dressing in place  2 HV drains in place, anterior drain removed and drain site covered with gauze and tegaderm     Significant Labs:  Recent Labs   Lab 11/16/23 0348 11/17/23 0343   * 101    131*   K 5.1 3.8    100   CO2 23 23   BUN 17 25*   CREATININE 1.2 1.5*   CALCIUM 9.8 9.2     Recent Labs   Lab 11/16/23 0348 11/17/23 0343   WBC 7.49 7.92   HGB 9.3* 8.1*   HCT 27.2* 24.7*    228     No results for input(s):  ""LABPT", "INR", "APTT" in the last 48 hours.  Microbiology Results (last 7 days)       ** No results found for the last 168 hours. **          All pertinent labs from the last 24 hours have been reviewed.    Significant Diagnostics:  X-ray cervical spine 11/15/23 : Intact hardware with good alignment.     I have personally reviewed the above mentioned imaging.   "

## 2023-11-17 NOTE — PLAN OF CARE
CHW met with patient/family at bedside. Patient experience rounding completed and reviewed the following.     Do you know your discharge plan?   Yes Rehab    Have you discussed your needs and preferences with your SW/CM?  Yes     If you are discharging home, do you have help at home?  Yes    Do you think you will need help additional at home at discharge?   No     Do you currently have difficulty keeping up with bills, affording medicine or buying food?  No    Assigned SW/CM notified of any patient/family needs or concerns. Appropriate resources provided to address patient's needs.  Nicole Morales KINZA  Case Management  [phone]

## 2023-11-17 NOTE — PLAN OF CARE
[facility] (Hospital)  Discharge Reassessment    Primary Care Provider: Slava Lowery MD    Expected Discharge Date: 11/20/2023    Patient does not want hh or OP at this time. Patient has equipment at home.    Reassessment (most recent)       Discharge Reassessment - 11/17/23 1413          Discharge Reassessment    Assessment Type Discharge Planning Reassessment     Did the patient's condition or plan change since previous assessment? Yes     Discharge Plan discussed with: Patient     Communicated WAQAS with patient/caregiver Yes     Discharge Plan A Home with family     Discharge Plan B Home with family;Home Health

## 2023-11-17 NOTE — PLAN OF CARE
Problem: Adult Inpatient Plan of Care  Goal: Plan of Care Review  Outcome: Ongoing, Progressing  Goal: Patient-Specific Goal (Individualized)  Outcome: Ongoing, Progressing  Goal: Absence of Hospital-Acquired Illness or Injury  Outcome: Ongoing, Progressing  Goal: Optimal Comfort and Wellbeing  Outcome: Ongoing, Progressing  Goal: Readiness for Transition of Care  Outcome: Ongoing, Progressing     Problem: Fluid and Electrolyte Imbalance (Acute Kidney Injury/Impairment)  Goal: Fluid and Electrolyte Balance  Outcome: Ongoing, Progressing     Problem: Oral Intake Inadequate (Acute Kidney Injury/Impairment)  Goal: Optimal Nutrition Intake  Outcome: Ongoing, Progressing     Problem: Renal Function Impairment (Acute Kidney Injury/Impairment)  Goal: Effective Renal Function  Outcome: Ongoing, Progressing     Problem: Bleeding (Surgery Nonspecified)  Goal: Absence of Bleeding  Outcome: Ongoing, Progressing     Problem: Pain (Surgery Nonspecified)  Goal: Acceptable Pain Control  Outcome: Ongoing, Progressing     Problem: Skin Injury Risk Increased  Goal: Skin Health and Integrity  Outcome: Ongoing, Progressing     Problem: Infection  Goal: Absence of Infection Signs and Symptoms  Outcome: Ongoing, Progressing     Problem: Impaired Wound Healing  Goal: Optimal Wound Healing  Outcome: Ongoing, Progressing     Problem: Fall Injury Risk  Goal: Absence of Fall and Fall-Related Injury  Outcome: Ongoing, Progressing

## 2023-11-17 NOTE — PT/OT/SLP EVAL
Occupational Therapy   Evaluation and Discharge Note    Name: Gamaliel Pete Jr.  MRN: [id number]  Admitting Diagnosis: Metastatic cancer to spine  Recent Surgery: Procedure(s) (LRB):  SPINE, CERVICAL, WITH POSTERIOR FUSION T4-6 (N/A)  CORPECTOMY, SPINE, CERVICAL C3-6 REVISION (N/A) 2 Days Post-Op    Recommendations:     Discharge Recommendations: No Therapy Indicated  Discharge Equipment Recommendations: none  Barriers to discharge:  None    Assessment:     Gamaliel Pete Jr. is a 73 y.o. male with a medical diagnosis of Metastatic cancer to spine. At this time, patient is functioning at their prior level of function and does not require further acute OT services.     Plan:     During this hospitalization, patient does not require further acute OT services.  Please re-consult if situation changes.    Plan of Care Reviewed with: patient, spouse    Subjective     Chief Complaint: None at this time   Patient/Family Comments/goals: DC     Occupational Profile:  Living Environment: Patient lives in 2 [place] home with spouse and 5 DIE[address]. Patient bedroom on 1st floor, bathroom on 2nd floor. Patient has both a walk in shower and tub.   Previous level of function: Patient needing intermittent assist at bathing and LBD   Roles and Routines: DC   Equipment Used at home: walker, rolling, hospital bed  Assistance upon Discharge: Wife     Pain/Comfort:  Pain Rating 1: 6/10  Location - Side 1: Bilateral  Location - Orientation 1: posterior  Location 1: neck  Pain Addressed 1: Distraction, Cessation of Activity    Patients cultural, spiritual, [employer] conflicts given the current situation: no    Objective:     Communicated with: RN prior to session.  Patient found supine with hemovac, cervical collar upon OT entry to room.    General Precautions: Standard, fall  Orthopedic Precautions: spinal precautions  Braces: Aspen collar  Respiratory Status: Room air     Occupational Performance:    Bed Mobility:    Patient completed  Rolling/Turning to Left with  stand by assistance  Patient completed Scooting/Bridging with stand by assistance  Patient completed Supine to Sit with stand by assistance  Patient completed Sit to Supine with stand by assistance    Functional Mobility/Transfers:  Patient completed Sit <> Stand Transfer with stand by assistance  with  no assistive device   Functional Mobility: Patient able to ambulate throughout velazquez with no AD and standby A. Patient with one slight LOB; patient self recovered.     Activities of Daily Living:  Grooming: stand by assistance    Upper Body Dressing: stand by assistance    Lower Body Dressing: stand by assistance      Cognitive/Visual Perceptual:  Cognitive/Psychosocial Skills:     -       Oriented to: Person, Place, and Time   -       Follows Commands/attention:Follows multistep  commands  -       Communication: clear/fluent  -       Safety awareness/insight to disability: intact   Visual/Perceptual:      -Intact      Physical Exam:  Upper Extremity Range of Motion:     Upper Extremity Strength:    -       Right Upper Extremity: WFL  -       Left Upper Extremity: WFL   Strength:    -       Right Upper Extremity: WFL  -       Left Upper Extremity: WFL  Fine Motor Coordination:    -       Intact    AMPAC 6 Click ADL:  AMPAC Total Score: 24    Treatment & Education:  Co-evaluation completed due to patient medical instability and to ensure patient safety. Provided education regarding role of OT, POC, & discharge recommendations.  Pt with no further questions/concerns at this time. OT provided education on home recommendations and fall prevention in preparation for D/C.     Patient left supine with all lines intact and call button in reach    GOALS:   Multidisciplinary Problems       Occupational Therapy Goals       Not on file                    History:     Past Medical History:   Diagnosis Date    Asthma     no inhaler use    Borderline hypertension     ED (erectile dysfunction)      Gout     Hematuria     Hypertension          Past Surgical History:   Procedure Laterality Date    COLONOSCOPY  02/10/2022    COLONOSCOPY N/A 02/10/2022    Procedure: COLONOSCOPY;  Surgeon: Desmond Keenan MD;  Location: [facility];  Service: General;  Laterality: N/A;    POSTERIOR FUSION OF CERVICAL SPINE WITH LAMINECTOMY N/A 11/15/2023    Procedure: SPINE, CERVICAL, WITH POSTERIOR FUSION T4-6;  Surgeon: Nasim Martinez DO;  Location: [address];  Service: Neurosurgery;  Laterality: N/A;  C2-T1 laminectomy and posterior fusion. [place]. C-arm. [employer]. Neuromonitoring.    ROBOT-ASSISTED LAPAROSCOPIC NEPHRECTOMY Right 10/4/2023    Procedure: ROBOTIC NEPHRECTOMY;  Surgeon: Henry Michaels MD;  Location: [facility];  Service: Urology;  Laterality: Right;    SURGICAL REMOVAL OF VERTEBRAL BODY OF CERVICAL SPINE Right 11/7/2023    Procedure: CORPECTOMY, SPINE, CERVICAL;  Surgeon: Nasim Martinez DO;  Location: [address];  Service: Neurosurgery;  Laterality: Right;  C4 and C5 corpectomy with globus;  wells tongs; c-arm; neuromonitoring; microscope; type and cross 2 units    SURGICAL REMOVAL OF VERTEBRAL BODY OF CERVICAL SPINE N/A 11/15/2023    Procedure: CORPECTOMY, SPINE, CERVICAL C3-6 REVISION;  Surgeon: Nasim Martinez DO;  Location: [facility] OR [address] [place] [address];  Service: Neurosurgery;  Laterality: N/A;    TONSILLECTOMY         Time Tracking:     OT Date of Treatment: 11/17/23  OT Start Time: 1021   OT Stop Time:  1039  OT Total Time (min):  18 min     Billable Minutes:Evaluation 8 min  Therapeutic Activity 10 min    11/17/2023

## 2023-11-17 NOTE — PLAN OF CARE
[facility] (Hospital)  Discharge Reassessment    Primary Care Provider: Slava Lowery MD    Expected Discharge Date: 11/17/2023    Patient discharge plan changed to home with home health - Plan A and Home - Plan B    Discharge Plan A and Plan B have been determined by review of patient's clinical status, future medical and therapeutic needs, and coverage/benefits for post-acute care in coordination with multidisciplinary team members.     Patient does not want home health or OP therapy.  Reassessment (most recent)       Discharge Reassessment - 11/17/23 1100          Discharge Reassessment    Assessment Type Discharge Planning Reassessment     Did the patient's condition or plan change since previous assessment? Yes     Discharge Plan A Home with family;Home Health     Discharge Plan B Home with family     DME Needed Upon Discharge  none     Transition of Care Barriers None     Why the patient remains in the hospital Requires continued medical care        Post-Acute Status    Discharge Delays None known at this time

## 2023-11-17 NOTE — PROGRESS NOTES
[facility] ([facility])  Neurosurgery  Progress Note    Subjective:     History of Present Illness: No notes on file    Post-Op Info:  Procedure(s) (LRB):  SPINE, CERVICAL, WITH POSTERIOR FUSION T4-6 (N/A)  CORPECTOMY, SPINE, CERVICAL C3-6 REVISION (N/A)   2 Days Post-Op   Interval History: 11/17: POD 2 s/p C3-C6 PCF. NAEO and VSS. Mild SABINE on BMP, will resume fluids. Anterior HV drain removed at bedside. Two posterior HV drains remain w/output 50 each.     Medications:  Continuous Infusions:  Scheduled Meds:   amLODIPine  5 mg Oral Daily    bisacodyL  10 mg Rectal Daily    ceFAZolin (ANCEF) IVPB  2 g Intravenous Q8H    gabapentin  300 mg Oral TID    heparin (porcine)  5,000 Units Subcutaneous Q8H    methocarbamoL  750 mg Oral QID    mupirocin   Nasal BID     PRN Meds:aluminum-magnesium hydroxide-simethicone, dextrose 10%, dextrose 10%, glucagon (human recombinant), glucose, glucose, glucose, HYDROmorphone, ondansetron, oxyCODONE, prochlorperazine, senna-docusate 8.6-50 mg, sodium chloride 0.9%     Review of Systems  Objective:     Weight: 102.7 kg (226 lb 6.6 oz)  Body mass index is 32.49 kg/m².  Vital Signs (Most Recent):  Temp: 98.7 °F (37.1 °C) (11/17/23 0734)  Pulse: 77 (11/17/23 0734)  Resp: 18 (11/17/23 0807)  BP: (!) 144/65 (11/17/23 0734)  SpO2: 95 % (11/17/23 0734) Vital Signs (24h Range):  Temp:  [98.2 °F (36.8 °C)-99.2 °F (37.3 °C)] 98.7 °F (37.1 °C)  Pulse:  [71-96] 77  Resp:  [16-18] 18  SpO2:  [93 %-97 %] 95 %  BP: (133-157)/(61-76) 144/65     Date 11/17/23 0700 - 11/18/23 0659   Shift 5246-3626 9821-1830 5645-0317 24 Hour Total   INTAKE   Shift Total(mL/kg)       OUTPUT   Drains 50   50   Shift Total(mL/kg) 50(0.5)   50(0.5)   Weight (kg) 102.7 102.7 102.7 102.7          Closed/Suction Drain 11/15/23 1457 Tube - 1 Right Back Accordion 10 Fr. (Active)   Site Description Healing 11/17/23 0753   Dressing Type Transparent (Tegaderm) 11/17/23 0753   Dressing Status Clean;Dry;Intact 11/17/23  0753   Dressing Intervention Integrity maintained 11/17/23 0753   Drainage Sanguineous 11/17/23 0753   Status To bulb suction 11/17/23 0753   Output (mL) 50 mL 11/17/23 0715            Closed/Suction Drain 11/15/23 1458 Tube - 2 Left Back Accordion 10 Fr. (Active)   Site Description Healing 11/17/23 0753   Dressing Type Transparent (Tegaderm) 11/17/23 0753   Dressing Status Clean;Dry;Intact 11/17/23 0753   Dressing Intervention Integrity maintained 11/17/23 0753   Drainage Sanguineous 11/17/23 0753   Status To bulb suction 11/17/23 0753   Output (mL) 0 mL 11/16/23 1800            Closed/Suction Drain 11/15/23 1654 Right Neck Accordion 10 Fr. (Active)   Site Description Healing 11/17/23 0753   Dressing Type Transparent (Tegaderm) 11/17/23 0753   Dressing Status Clean;Dry;Intact 11/17/23 0753   Dressing Intervention Integrity maintained 11/17/23 0753   Drainage Sanguineous 11/17/23 0753   Status To bulb suction 11/17/23 0753   Output (mL) 15 mL 11/16/23 1800      Physical Exam  General: well developed, well nourished, no distress.   Head: normocephalic, atraumatic  Mental Status: Awake, Alert, Oriented  Speech: Clear with content appropriate to conversation  Cranial nerves: face symmetric, CN II-XII grossly intact.   Sensory: intact to light touch throughout     Motor Strength:Moves all extremities spontaneously with good tone.  Full strength upper and lower extremities. No abnormal movements seen.      Strength   Deltoids Triceps Biceps Wrist Extension Wrist Flexion Hand    Upper: R 5/5 5/5 5/5 5/5 5/5 5/5     L 5/5 5/5 5/5 5/5 5/5 5/5       Iliopsoas Quadriceps Knee  Flexion Tibialis  anterior Gastro- cnemius EHL   Lower: R 5/5 5/5 5/5 5/5 5/5 5/5     L 5/5 5/5 5/5 5/5 5/5 5/5      Maldonado: absent  Anterior incision clean, dry, well-healing  Posterior dressing in place  2 HV drains in place, anterior drain removed and drain site covered with gauze and tegaderm     Significant Labs:  Recent Labs   Lab  "11/16/23 0348 11/17/23  0343   * 101    131*   K 5.1 3.8    100   CO2 23 23   BUN 17 25*   CREATININE 1.2 1.5*   CALCIUM 9.8 9.2     Recent Labs   Lab 11/16/23 0348 11/17/23  0343   WBC 7.49 7.92   HGB 9.3* 8.1*   HCT 27.2* 24.7*    228     No results for input(s): "LABPT", "INR", "APTT" in the last 48 hours.  Microbiology Results (last 7 days)       ** No results found for the last 168 hours. **          All pertinent labs from the last 24 hours have been reviewed.    Significant Diagnostics:  X-ray cervical spine 11/15/23 : Intact hardware with good alignment.     I have personally reviewed the above mentioned imaging.   Assessment/Plan:     * Metastatic cancer to spine  Mr. Pete is a 74yo man w/PMHx RCC s/p R nephrectomy with lung and spinal mets s/p anterior C4-C5 corpectomy and T9 kyphoplasty who presented for scheduled C3-C6 PCF with Dr. Martinez.     Now s/p C3-C6 PCF 11/15    --2 Hemovac drains in place with qshift output recording  --Ancef while surgical drains are in place  --OOB with PT  --Maintain C collar when out of bed  --Bowel regimen  --Pain Control    Dispo ongoing    Case and plan discussed with Dr. Martinez    Essential hypertension  --Continue home amlodipine    SABINE (acute kidney injury)  - Continuous normal saline resumed  - Q12 BMPs        Mariya Phelan PA-C  Neurosurgery  [facility] Neurosurgery ([facility])  "

## 2023-11-17 NOTE — PT/OT/SLP EVAL
"Physical Therapy Evaluation and Discharge Note    Patient Name:  Gamaliel Pete Jr.   MRN:  [id number]    Recommendations:     Discharge Recommendations: No Therapy Indicated  Discharge Equipment Recommendations: none   Barriers to discharge: None    Assessment:     Gamaliel Pete Jr. is a 73 y.o. male admitted with a medical diagnosis of Metastatic cancer to spine. At this time, patient is functioning at their prior level of function and does not require further acute PT services. Patient performed all mobility and transfers with no assistance, no new acute deficits noted.    Patient is safe to ambulate with nursing, encouraged to sit up in chair when able.      Recent Surgery: Procedure(s) (LRB):  SPINE, CERVICAL, WITH POSTERIOR FUSION T4-6 (N/A)  CORPECTOMY, SPINE, CERVICAL C3-6 REVISION (N/A) 2 Days Post-Op    Plan:     During this hospitalization, patient does not require further acute PT services.  Please re-consult if situation changes.      Subjective     Chief Complaint: pain  Patient/Family Comments/goals: "I take things a little slower"  Pain/Comfort:  Pain Rating 1: 6/10  Location - Side 1: Bilateral  Location - Orientation 1: posterior  Location 1: neck  Pain Addressed 1: Distraction, Cessation of Activity  Pain Rating Post-Intervention 1: 6/10    Patients cultural, spiritual, Cheondoism conflicts given the current situation: no    Living Environment:  Patient lives with wife in [facility] with 0STE. Can stay on 1st floor.   Prior to admission, patients level of function was some assistance from wife.  Equipment used at home: walker, rolling, hospital bed.  DME owned (not currently used):  patient does not use walker often .  Upon discharge, patient will have assistance from wife (24/7).    Objective:     Communicated with RN prior to session.  Patient found HOB elevated with hemovac, cervical collar upon PT entry to room.    General Precautions: Standard, fall    Orthopedic Precautions:spinal " precautions   Braces: Aspen collar  Respiratory Status: Room air    Exams:  RLE ROM: WNL  RLE Strength: WNL  LLE ROM: WNL  LLE Strength: WNL    Functional Mobility:  Bed Mobility:     Supine to Sit: stand by assistance  Sit to Supine: stand by assistance  Transfers:     Sit to Stand:  stand by assistance with no AD  Gait: patient ambulated 70' with SBA no AD. Wide AHMET, decreased gait speed- due to patient walking with increased caution. Patient with 1 loss of balance, self corrected, due to turning corner too quickly.  Stairs:  Pt ascended/descended 12 stair(s) with No Assistive Device with right handrail with Stand-by Assistance.  Step to ascending, reciprocal descending.     AM-PAC 6 CLICK MOBILITY  Total Score:24       Treatment and Education:  Education: patient educated on POC and discharge from therapy, safety with mobility upon return home, discharge recommendations and equipment recommendations. Patient verbalized understanding of all topics.   Education and cues for ambulation to use walker at home and in hallway during hospitalization for safety and to decrease gait deviations.   Co-evaluation with OT performed due to patient complexity and deficits, requiring two skilled therapists to appropriately and safely assess patient's strength and endurance while facilitating functional tasks in addition to accommodating for patient's activity tolerance.        AM-PAC 6 CLICK MOBILITY  Total Score:24     Patient left HOB elevated with all lines intact, call button in reach, and wife present.    GOALS:   Multidisciplinary Problems       Physical Therapy Goals       Not on file              Multidisciplinary Problems (Resolved)          Problem: Physical Therapy    Goal Priority Disciplines Outcome Goal Variances Interventions   Physical Therapy Goal   (Resolved)     PT, PT/OT Met     Description: Patient evaluated and discharged, no goals or care plan created.                           History:     Past Medical  History:   Diagnosis Date    Asthma     no inhaler use    Borderline hypertension     ED (erectile dysfunction)     Gout     Hematuria     Hypertension        Past Surgical History:   Procedure Laterality Date    COLONOSCOPY  02/10/2022    COLONOSCOPY N/A 02/10/2022    Procedure: COLONOSCOPY;  Surgeon: Desmond Keenan MD;  Location: [facility];  Service: General;  Laterality: N/A;    POSTERIOR FUSION OF CERVICAL SPINE WITH LAMINECTOMY N/A 11/15/2023    Procedure: SPINE, CERVICAL, WITH POSTERIOR FUSION T4-6;  Surgeon: Nasim Martinez DO;  Location: [address];  Service: Neurosurgery;  Laterality: N/A;  C2-T1 laminectomy and posterior fusion. [place]. C-arm. 100Plus. Neuromonitoring.    ROBOT-ASSISTED LAPAROSCOPIC NEPHRECTOMY Right 10/4/2023    Procedure: ROBOTIC NEPHRECTOMY;  Surgeon: Henry Michaels MD;  Location: [facility];  Service: Urology;  Laterality: Right;    SURGICAL REMOVAL OF VERTEBRAL BODY OF CERVICAL SPINE Right 11/7/2023    Procedure: CORPECTOMY, SPINE, CERVICAL;  Surgeon: Nasim Martinez DO;  Location: [address];  Service: Neurosurgery;  Laterality: Right;  C4 and C5 corpectomy with globus;  wells tongs; c-arm; neuromonitoring; microscope; type and cross 2 units    SURGICAL REMOVAL OF VERTEBRAL BODY OF CERVICAL SPINE N/A 11/15/2023    Procedure: CORPECTOMY, SPINE, CERVICAL C3-6 REVISION;  Surgeon: Nasim Martinez DO;  Location: [facility] OR [address] [place] [address];  Service: Neurosurgery;  Laterality: N/A;    TONSILLECTOMY         Time Tracking:     PT Received On: 11/17/23  PT Start Time: 1021     PT Stop Time: 1039  PT Total Time (min): 18 min     Billable Minutes: Evaluation 10 minutes and Gait Training 8 minutes      11/17/2023

## 2023-11-18 VITALS
HEIGHT: 70 IN | HEART RATE: 86 BPM | WEIGHT: 226.44 LBS | OXYGEN SATURATION: 96 % | BODY MASS INDEX: 32.42 KG/M2 | RESPIRATION RATE: 18 BRPM | DIASTOLIC BLOOD PRESSURE: 68 MMHG | SYSTOLIC BLOOD PRESSURE: 158 MMHG | TEMPERATURE: 99 F

## 2023-11-18 LAB
ALBUMIN SERPL BCP-MCNC: 3.3 G/DL (ref 3.5–5.2)
ALP SERPL-CCNC: 90 U/L (ref 55–135)
ALT SERPL W/O P-5'-P-CCNC: 14 U/L (ref 10–44)
ANION GAP SERPL CALC-SCNC: 11 MMOL/L (ref 8–16)
AST SERPL-CCNC: 22 U/L (ref 10–40)
BASOPHILS # BLD AUTO: 0.05 K/UL (ref 0–0.2)
BASOPHILS NFR BLD: 0.5 % (ref 0–1.9)
BILIRUB SERPL-MCNC: 0.4 MG/DL (ref 0.1–1)
BUN SERPL-MCNC: 20 MG/DL (ref 8–23)
CALCIUM SERPL-MCNC: 10 MG/DL (ref 8.7–10.5)
CHLORIDE SERPL-SCNC: 103 MMOL/L (ref 95–110)
CO2 SERPL-SCNC: 22 MMOL/L (ref 23–29)
CREAT SERPL-MCNC: 1.2 MG/DL (ref 0.5–1.4)
DIFFERENTIAL METHOD: ABNORMAL
EOSINOPHIL # BLD AUTO: 0 K/UL (ref 0–0.5)
EOSINOPHIL NFR BLD: 0.3 % (ref 0–8)
ERYTHROCYTE [DISTWIDTH] IN BLOOD BY AUTOMATED COUNT: 13.7 % (ref 11.5–14.5)
EST. GFR  (NO RACE VARIABLE): >60 ML/MIN/1.73 M^2
GLUCOSE SERPL-MCNC: 95 MG/DL (ref 70–110)
HCT VFR BLD AUTO: 26.2 % (ref 40–54)
HGB BLD-MCNC: 8.8 G/DL (ref 14–18)
IMM GRANULOCYTES # BLD AUTO: 0.11 K/UL (ref 0–0.04)
IMM GRANULOCYTES NFR BLD AUTO: 1.1 % (ref 0–0.5)
LYMPHOCYTES # BLD AUTO: 1.8 K/UL (ref 1–4.8)
LYMPHOCYTES NFR BLD: 17.7 % (ref 18–48)
MCH RBC QN AUTO: 29.8 PG (ref 27–31)
MCHC RBC AUTO-ENTMCNC: 33.6 G/DL (ref 32–36)
MCV RBC AUTO: 89 FL (ref 82–98)
MONOCYTES # BLD AUTO: 1.4 K/UL (ref 0.3–1)
MONOCYTES NFR BLD: 13.3 % (ref 4–15)
NEUTROPHILS # BLD AUTO: 7 K/UL (ref 1.8–7.7)
NEUTROPHILS NFR BLD: 67.1 % (ref 38–73)
NRBC BLD-RTO: 0 /100 WBC
PLATELET # BLD AUTO: 240 K/UL (ref 150–450)
PMV BLD AUTO: 9.5 FL (ref 9.2–12.9)
POTASSIUM SERPL-SCNC: 4.7 MMOL/L (ref 3.5–5.1)
PROT SERPL-MCNC: 7.4 G/DL (ref 6–8.4)
RBC # BLD AUTO: 2.95 M/UL (ref 4.6–6.2)
SODIUM SERPL-SCNC: 136 MMOL/L (ref 136–145)
WBC # BLD AUTO: 10.35 K/UL (ref 3.9–12.7)

## 2023-11-18 PROCEDURE — 99024 PR POST-OP FOLLOW-UP VISIT: ICD-10-PCS | Mod: ,,, | Performed by: PHYSICIAN ASSISTANT

## 2023-11-18 PROCEDURE — 99024 POSTOP FOLLOW-UP VISIT: CPT | Mod: ,,, | Performed by: PHYSICIAN ASSISTANT

## 2023-11-18 PROCEDURE — 80053 COMPREHEN METABOLIC PANEL: CPT | Performed by: PHYSICIAN ASSISTANT

## 2023-11-18 PROCEDURE — 85025 COMPLETE CBC W/AUTO DIFF WBC: CPT | Performed by: PHYSICIAN ASSISTANT

## 2023-11-18 PROCEDURE — 36415 COLL VENOUS BLD VENIPUNCTURE: CPT | Performed by: PHYSICIAN ASSISTANT

## 2023-11-18 PROCEDURE — 25000003 PHARM REV CODE 250: Performed by: PHYSICIAN ASSISTANT

## 2023-11-18 PROCEDURE — 25000003 PHARM REV CODE 250: Performed by: STUDENT IN AN ORGANIZED HEALTH CARE EDUCATION/TRAINING PROGRAM

## 2023-11-18 PROCEDURE — 63600175 PHARM REV CODE 636 W HCPCS: Performed by: STUDENT IN AN ORGANIZED HEALTH CARE EDUCATION/TRAINING PROGRAM

## 2023-11-18 RX ORDER — LIDOCAINE 50 MG/G
1 PATCH TOPICAL DAILY
Qty: 15 PATCH | Refills: 0 | Status: ON HOLD | OUTPATIENT
Start: 2023-11-18 | End: 2024-01-03

## 2023-11-18 RX ORDER — OXYCODONE AND ACETAMINOPHEN 5; 325 MG/1; MG/1
TABLET ORAL
Qty: 56 TABLET | Refills: 0 | Status: SHIPPED | OUTPATIENT
Start: 2023-11-18 | End: 2023-12-12 | Stop reason: SDUPTHER

## 2023-11-18 RX ORDER — METHOCARBAMOL 750 MG/1
750 TABLET, FILM COATED ORAL 4 TIMES DAILY PRN
Qty: 90 TABLET | Refills: 0 | Status: ON HOLD | OUTPATIENT
Start: 2023-11-18 | End: 2024-01-10 | Stop reason: HOSPADM

## 2023-11-18 RX ORDER — DIAZEPAM 5 MG/1
5 TABLET ORAL EVERY 6 HOURS PRN
Qty: 10 TABLET | Refills: 0 | Status: ON HOLD | OUTPATIENT
Start: 2023-11-18 | End: 2024-02-06 | Stop reason: HOSPADM

## 2023-11-18 RX ADMIN — HEPARIN SODIUM 5000 UNITS: 5000 INJECTION INTRAVENOUS; SUBCUTANEOUS at 06:11

## 2023-11-18 RX ADMIN — OXYCODONE HYDROCHLORIDE 10 MG: 10 TABLET ORAL at 03:11

## 2023-11-18 RX ADMIN — METHOCARBAMOL 750 MG: 750 TABLET ORAL at 07:11

## 2023-11-18 RX ADMIN — OXYCODONE HYDROCHLORIDE 10 MG: 10 TABLET ORAL at 12:11

## 2023-11-18 RX ADMIN — METHOCARBAMOL 750 MG: 750 TABLET ORAL at 12:11

## 2023-11-18 RX ADMIN — AMLODIPINE BESYLATE 5 MG: 5 TABLET ORAL at 08:11

## 2023-11-18 RX ADMIN — CEFAZOLIN 2 G: 2 INJECTION, POWDER, FOR SOLUTION INTRAMUSCULAR; INTRAVENOUS at 06:11

## 2023-11-18 RX ADMIN — OXYCODONE HYDROCHLORIDE 10 MG: 10 TABLET ORAL at 07:11

## 2023-11-18 RX ADMIN — MUPIROCIN: 20 OINTMENT TOPICAL at 08:11

## 2023-11-18 RX ADMIN — GABAPENTIN 300 MG: 300 CAPSULE ORAL at 07:11

## 2023-11-18 NOTE — DISCHARGE SUMMARY
[facility] - Neurosurgery ([facility])  Neurosurgery  Discharge Summary      Patient Name: Gamaliel Pete Jr.  MRN: [id number]  Admission Date: 11/14/2023  Hospital Length of Stay: 4 days  Discharge Date and Time:  11/18/2023 12:05 PM  Attending Physician: Nasim Martinez DO   Discharging Provider: Esther Hood PA-C  Primary Care Provider: Slava Lowery MD    HPI:   No notes on file    Procedure(s) (LRB):  SPINE, CERVICAL, WITH POSTERIOR FUSION T4-6 (N/A)  CORPECTOMY, SPINE, CERVICAL C3-6 REVISION (N/A)     Hospital Course: 11/14: admitted; IR kyphoplasty of T9 today  11/15: OR today for C3-6 PCF.   11/16: POD 1 s/p C3-C6 PCF. NAEO. VSS, afebrile and WBC WNL. Patient complains of mild incisional pain but reports his LUE radiculopathy is improved. Reports mild discomfort with swallowing but no difficulty. Pending PT.   11/17: POD 2 s/p C3-C6 PCF. NAEO and VSS. Mild SABINE on BMP, will resume fluids. Anterior HV drain removed at bedside. Two posterior HV drains remain w/output 50 each.   11/18: POD 3. NAEON. AFVSS. Neurologically stable. Tolerating PO diet and voiding spontaneously. HV drain x 2 removed and pressure dressing placed. Medically stable to discharge home. Follow up in clinic in 2 weeks. Discharge instructions given verbally to patient. All of his questions were answered.  He is encouraged to call the clinic with any questions or concerns prior to follow up appt.     The above is purely a summary of documentation by other providers. This discharge summary is in no way a substitute for medical documentation that has been provided throughout the stay by care-giving providers. Please reference all documentation in the   Electronic medical record should any questions or concerns arise.       The patient presented to [facility] [place]14/23  for the above stated procedure. The patient tolerated the procedure well and there were no intra-operative complications. After surgery, the patient was extubated and  taken to recovery in stable condition.  After observation in recovery, the patient was transferred to the neurosurgical floor.  Post-op X-rays showed good placement of the hardware. PT/OT were consulted, the patient progressed, and was cleared to go home.  Drain was removed on 11/18/23 without complication.  On 11/18/23 he was discharged home with pain medication and follow up appointments. The patient was tolerating a regular diet and voiding without difficulty. Activity as tolerated. At the time of discharge, the patient was neurologically stable, was afebrile, and vital signs were stable. Discharge instructions were given verbally/written to the patient and their family, including wound care and follow-up appointments, and all of their questions were answered. The patient was also given a discharge instruction sheet explaining the aforementioned discussion.  The patient verbalized understanding of instructions and agreed to the plan. They were encouraged to call the clinic with any questions they might have prior to the follow up appointments.       Physical Exam 11/18/23:  General: well developed, well nourished, no distress.   Head: normocephalic, atraumatic  Neurologic: Alert and oriented. Thought content appropriate.  GCS: Motor: 6/Verbal: 5/Eyes: 4 GCS Total: 15  Mental Status: Awake, Alert, Oriented x 4  Language: No aphasia  Speech: No dysarthria  Cranial nerves: face symmetric, tongue midline, CN II-XII grossly intact.   Eyes: pupils equal, round, reactive to light with accommodation, EOMI.   Pulmonary: normal respirations, no signs of respiratory distress  Abdomen: soft, non-distended, not tender to palpation  Skin: Skin is warm, dry and intact.  Sensory: intact to light touch throughout  Motor Strength:Moves all extremities spontaneously with good tone.  Full strength upper and lower extremities. No abnormal movements seen.     Strength  Deltoids Triceps Biceps Wrist Extension Wrist Flexion Hand     Upper: R 5/5 5/5 5/5 5/5 5/5 5/5    L 5/5 5/5 5/5 5/5 5/5 5/5     Iliopsoas Quadriceps Knee  Flexion Tibialis  anterior Gastro- cnemius EHL   Lower: R 5/5 5/5 5/5 5/5 5/5 5/5    L 5/5 5/5 5/5 5/5 5/5 5/5     Apsen collar in place; anterior and posterior cervical incision with dermabond/prineo intact; HV drains x2 removed with ease and pressure dressing placed               Goals of Care Treatment Preferences:  Code Status: Full Code      Consults:     Significant Diagnostic Studies: Labs: BMP:   Recent Labs   Lab 11/17/23  0343 11/18/23  0322    95   * 136   K 3.8 4.7    103   CO2 23 22*   BUN 25* 20   CREATININE 1.5* 1.2   CALCIUM 9.2 10.0   , CMP   Recent Labs   Lab 11/17/23 0343 11/18/23  0322   * 136   K 3.8 4.7    103   CO2 23 22*    95   BUN 25* 20   CREATININE 1.5* 1.2   CALCIUM 9.2 10.0   PROT 6.7 7.4   ALBUMIN 3.1* 3.3*   BILITOT 0.2 0.4   ALKPHOS 83 90   AST 17 22   ALT 16 14   ANIONGAP 8 11   , CBC   Recent Labs   Lab 11/17/23  0343 11/18/23  0322   WBC 7.92 10.35   HGB 8.1* 8.8*   HCT 24.7* 26.2*    240   , and All labs within the past 24 hours have been reviewed  Radiology: X-Ray:  cervical AP/lat  Specimen (24h ago, onward)      None            Pending Diagnostic Studies:       Procedure Component Value Units Date/Time    Specimen to Pathology, Surgery Neurosurgery [[id number]] Collected: 11/15/23 1647    Order Status: Sent Lab Status: In process Updated: 11/16/23 1111    Specimen: Tissue           Final Active Diagnoses:    Diagnosis Date Noted POA    PRINCIPAL PROBLEM:  Metastatic cancer to spine [C79.51] 09/14/2023 Yes    Essential hypertension [I10] 11/07/2023 Yes    SABINE (acute kidney injury) [N17.9] 10/31/2023 Yes    Clear cell carcinoma of right kidney [C64.1] 10/11/2023 Yes    History of right radical nephrectomy [Z90.5] 10/11/2023 Not Applicable      Problems Resolved During this Admission:      Discharged Condition: stable     Disposition: Home  or Self Care    Follow Up:   Future Appointments   Date Time Provider Department Center   11/20/2023 11:30 AM Ruby Turk MD [facility] [address] [place]   4/15/2024  3:00 PM [facility] weeks [facility] op     Patient Instructions:      X-Ray Cervical Spine AP And Lateral   Standing Status: Future Standing Exp. Date: 11/18/24     Order Specific Question Answer Comments   Does the patient have a neck collar or brace on? Yes    May the Collar or Brace be removed for the Xray? No    Reason for Exam: s/p corpectomy and PSF    May the Radiologist modify the order per protocol to meet the clinical needs of the patient? Yes    Release to patient Immediate      No driving until:   Order Comments: 6 weeks     Notify your health care provider if you experience any of the following:  increased confusion or weakness     Notify your health care provider if you experience any of the following:  persistent dizziness, light-headedness, or visual disturbances     Notify your health care provider if you experience any of the following:  worsening rash     Notify your health care provider if you experience any of the following:  severe persistent headache     Notify your health care provider if you experience any of the following:  difficulty breathing or increased cough     Notify your health care provider if you experience any of the following:  redness, tenderness, or signs of infection (pain, swelling, redness, odor or green/yellow discharge around incision site)     Notify your health care provider if you experience any of the following:  severe uncontrolled pain     Notify your health care provider if you experience any of the following:  persistent nausea and vomiting or diarrhea     Notify your health care provider if you experience any of the following:  temperature >100.4     Activity as tolerated     Weight bearing restrictions (specify):   Order Comments: No lifting > 10 lbs      Medications:  Reconciled Home Medications:      Medication List        START taking these medications      diazePAM 5 MG tablet  Commonly known as: VALIUM  Take 1 tablet (5 mg total) by mouth every 6 (six) hours as needed (severe muscles spasms).     LIDOcaine 5 %  Commonly known as: LIDODERM  Place 1 patch onto the skin once daily. Remove & Discard patch within 12 hours or as directed by MD     methocarbamoL 750 MG Tab  Commonly known as: ROBAXIN  Take 1 tablet (750 mg total) by mouth 4 (four) times daily as needed (muscle spasms).     oxyCODONE-acetaminophen 5-325 mg per tablet  Commonly known as: PERCOCET  Take 1 to 2 tablets by mouth every 4-6 hours as needed for pain            CONTINUE taking these medications      amlodipine-benazepril 5-10 mg 5-10 mg per capsule  Commonly known as: LOTREL  Take 1 capsule by mouth once daily.     CURCUMIN MISC  1,000 mg by Misc.(Non-Drug; Combo Route) route Daily.     fish oil-omega-3 fatty acids 300-1,000 mg capsule  Take 2 capsules by mouth once daily.     gabapentin 300 MG capsule  Commonly known as: NEURONTIN  Take 1 capsule (300 mg total) by mouth 3 (three) times daily.     naloxone 4 mg/actuation Spry  Commonly known as: NARCAN  1 spray (4mg) by nasal route as needed for opioid overdose; may repeat every 2-3 minutes in alternating nostrils until medical help arrives. Call 911     polyethylene glycol 17 gram/dose powder  Commonly known as: GLYCOLAX  Use cap to measure 17 grams, mix in liquid and take by mouth once daily.     SENNA 8.6 mg tablet  Generic drug: senna  Take 1 tablet by mouth once daily.     sildenafiL 100 MG tablet  Commonly known as: VIAGRA  Take 1 tablet (100 mg total) by mouth daily as needed for Erectile Dysfunction.     UNABLE TO FIND  Take 500 mg by mouth 2 (two) times a day. medication name: Tudca     UNABLE TO FIND  Take 2 capsules by mouth 2 (two) times a day. medication name: Turkey tail mushroom     UNABLE TO FIND  Take 15 drops by mouth once  daily. medication name: Essiac-20     UNABLE TO FIND  Take 5 mLs by mouth 2 (two) times a day. medication name: barley leaf juice powder     UNABLE TO FIND  Take 5 mLs by mouth 2 (two) times a day. medication name: black seed oil (cumin seed)            STOP taking these medications      indomethacin 50 MG capsule  Commonly known as: INDOCIN     OLIVE OIL ORAL     oxyCODONE 10 mg Tab immediate release tablet  Commonly known as: ROXICODONE     XTAMPZA ER 18 mg Cspt  Generic drug: oxyCODONE myristate              Esther Hood PA-C  Neurosurgery  [facility] - Neurosurgery ([facility])

## 2023-11-18 NOTE — DISCHARGE INSTRUCTIONS
Post op Spine Patient Instructions    Activity Restrictions:  [x]  Return to work will be determined on an individual basis.  [x]  No lifting greater than 5-10 pounds.  [x]  Avoid bending and twisting the area of your surgery more than 45 degrees from neutral position in any direction.  [x]  No driving or operating machinery:  [x]  until cleared by your surgeon.  [x]  while taking narcotic pain medications or muscle relaxants.  [x]  Wear your brace at all times except when lying in bed, showering, eating, using the restroom, or performing hygiene tasks.   [x]  Increase ambulation over the next 2 weeks. Walk on paved surfaces only. It is okay to walk up and down stairs while holding onto a side rail.  [x]  No sexual activity for 2 weeks.    Discharge Medication/Follow-up:  [x]  Please refer to discharge medication reconciliation form.  [x]  Do not take any OTC products containing acetaminophen (tylenol) at the same time as you take your narcotic pain medication. Medications that may contain acetaminophen include but are not limited to: Excedrin and other headache medications, arthritis medications, cold and sinus medications, etc. Please review the list of active ingredients in any OTC medication prior to taking it.  [x]  Do not take ANY Aspirin or Aspirin containing products for 2 weeks after surgery (unless otherwise directed in discharge medication list).   [x]  Do not take ANY herbal supplements for 2 weeks after surgery.    [x]  Do not take ANY non-steroidal anti-inflammatory drugs (NSAIDS), including the following: ibuprofen, naprosyn, Aleve, Advil, Indocin, Mobic, or Celebrex for 12 weeks after surgery.   [x]  Prescriptions for appropriate medication will be given upon discharge.  [x]  Take the pain medication as prescribed. We recommend to wean use of your pain medication after 1 week of taking as prescribed (Ex: After 1 week of taking every 4 hours as needed, wean down to taking your pain medication every 6  hours as needed).  [x]  Take docusate (Colace 100 mg): take one capsule a day as needed for constipation. You can get this over the counter.  [x]  Follow-up appointment:  [x]  10-14 days post-op for wound check by physician assistant/nurse  [x]  4-6 weeks with MD:  [x]  with x-rays  [x]  An appointment will be mailed to you.  Future Appointments   Date Time Provider Department [place]   11/20/2023 11:30 AM Ruby Turk MD [facility] [address] [place]   4/15/2024  3:00 PM Slava Lowery MD [facility]         Wound Care:  [x]  Remove the small dressing near your incision in 2 days.   [x]  No bandage required. Keep your incision open to the air. You have Prineo (skin glue) covering your incision. This will begin to flake off over the next 2 weeks. Do not remove this on your own, allow it to peel off. Do not apply ointments or creams to your incision.  [x]  You may shower on the 2nd day after your surgery. Keep the incision clean and dry at all times. Do not allow the force of water to hit the incision. If the incision gets damp, pat it dry. Do not rub or scrub the incision.  [x]  You cannot take a bath until 8 weeks after surgery.    Call your doctor or go to the Emergency Room for any signs of infection, including: increased redness, drainage, pain, or fever (temperature ?101). Call your doctor or go to the Emergency Room if there are any localized neurological changes; problems with speech, vision, numbness, tingling, weakness, or severe headache; inability to control urination or bowel movements; inability to urinate; or for other concerns.            Special Instructions:  [x]  No use of tobacco products.  [x]  Diet: Please eat a regular diet as tolerated.      Physicians need 3 days' notice for pain medicine to be refilled. Pain medicine will only be refilled between 8 AM and 5 PM, Monday through Friday, due to Food and Drug Administration regulation of documentation.    If you have any questions  about this form, please call [phone].    Form No. 69899 (Revised 10/31/2013)

## 2023-11-18 NOTE — NURSING
Patient resting in room at this time, all alarms and safety features active, plan of care discussed with patient and family when available. All meds given and tolerated by patient unless otherwise specified.  All questions and concerned answered. All personal items needed in reach, call light in reach as well. Hourly purposeful rounding noted and explained to family and patient. No further concerns at this time will cont with current plan of care.    Drains x2 in place, no drainage at this time     Preparing patient for discharge home with family     Verified with CHINO Roberts that patient is okay to discharge prior to getting next Abx dose and IV can be removed for prior to discharge     Update:    Patient and family received medication from pharmacy,   no further concerns at this time  Patient discharged at 1330      Patient is discharged to home with self care

## 2023-11-18 NOTE — PLAN OF CARE
[facility] - Neurosurgery (Hospital)  Discharge Final Note    Primary Care Provider: Slava Lowery MD    Expected Discharge Date: 11/18/2023    Patient cleared to discharge from case management standpoint.      Final Discharge Note (most recent)       Final Note - 11/18/23 1357          Final Note    Assessment Type Final Discharge Note     Anticipated Discharge Disposition Home or Self Care     Hospital Resources/Appts/Education Provided Provided patient/caregiver with written discharge plan information        Post-Acute Status    Discharge Delays None known at this time                   Important Message from Medicare  Important Message from Medicare regarding Discharge Appeal Rights: Given to patient/caregiver, Explained to patient/caregiver, Signed/date by patient/caregiver     Date IMM was signed: 11/17/23  Time IMM was signed: 1004      Kelsea Ruano RN  Weekend  - [facility] [place]  Spectralink: [phone]

## 2023-11-18 NOTE — PLAN OF CARE
CM noted discharge order. Reviewed chart to confirm plan and needs. Patient to discharge home, no post-acute needs. Appointments scheduled and added to discharge paperwork.       Kelsea Ruano RN  Weekend  - [facility] [place] [address]  Spectralink: [phone]

## 2023-11-18 NOTE — NURSING
Nurses Note -- 4 Eyes      11/17/2023  8:00 PM      Skin assessed during: Q Shift Change      [] No Altered Skin Integrity Present    []Prevention Measures Documented      [] Yes- Altered Skin Integrity Present or Discovered   [] LDA Added if Not in Epic (Describe Wound)   [x] New Altered Skin Integrity was Present on Admit and Documented in LDA   [] Wound Image Taken    Wound Care Consulted? Yes    Attending Nurse:  Funmi Schwab RN/Staff Member:   Lynnette

## 2023-11-20 ENCOUNTER — HOSPITAL ENCOUNTER (EMERGENCY)
Facility: HOSPITAL | Age: 73
Discharge: HOME | End: 2023-11-20
Attending: EMERGENCY MEDICINE
Payer: MEDICARE

## 2023-11-20 ENCOUNTER — TELEPHONE (OUTPATIENT)
Dept: NEUROSURGERY | Facility: CLINIC | Age: 73
End: 2023-11-20
Payer: MEDICARE

## 2023-11-20 ENCOUNTER — OFFICE VISIT (OUTPATIENT)
Dept: HEMATOLOGY/ONCOLOGY | Facility: CLINIC | Age: 73
End: 2023-11-20
Payer: MEDICARE

## 2023-11-20 VITALS
HEART RATE: 75 BPM | DIASTOLIC BLOOD PRESSURE: 61 MMHG | HEIGHT: 70 IN | BODY MASS INDEX: 24.34 KG/M2 | WEIGHT: 170 LBS | OXYGEN SATURATION: 97 % | SYSTOLIC BLOOD PRESSURE: 128 MMHG | RESPIRATION RATE: 18 BRPM | TEMPERATURE: 98 F

## 2023-11-20 VITALS
TEMPERATURE: 97 F | OXYGEN SATURATION: 99 % | HEART RATE: 86 BPM | RESPIRATION RATE: 16 BRPM | DIASTOLIC BLOOD PRESSURE: 64 MMHG | SYSTOLIC BLOOD PRESSURE: 119 MMHG

## 2023-11-20 DIAGNOSIS — R55 SYNCOPE: Primary | ICD-10-CM

## 2023-11-20 DIAGNOSIS — C64.1 CLEAR CELL CARCINOMA OF RIGHT KIDNEY: Primary | ICD-10-CM

## 2023-11-20 DIAGNOSIS — R11.2 NAUSEA AND VOMITING, UNSPECIFIED VOMITING TYPE: ICD-10-CM

## 2023-11-20 DIAGNOSIS — D64.9 NORMOCYTIC ANEMIA: ICD-10-CM

## 2023-11-20 DIAGNOSIS — C78.00 MALIGNANT NEOPLASM METASTATIC TO LUNG, UNSPECIFIED LATERALITY: ICD-10-CM

## 2023-11-20 DIAGNOSIS — C79.51 METASTATIC CANCER TO SPINE: ICD-10-CM

## 2023-11-20 DIAGNOSIS — R61 DIAPHORESIS: ICD-10-CM

## 2023-11-20 LAB
ALBUMIN SERPL BCP-MCNC: 3.2 G/DL (ref 3.5–5.2)
ALP SERPL-CCNC: 87 U/L (ref 55–135)
ALT SERPL W/O P-5'-P-CCNC: 25 U/L (ref 10–44)
ANION GAP SERPL CALC-SCNC: 9 MMOL/L (ref 8–16)
AST SERPL-CCNC: 25 U/L (ref 10–40)
BACTERIA #/AREA URNS AUTO: NORMAL /HPF
BASOPHILS # BLD AUTO: 0.02 K/UL (ref 0–0.2)
BASOPHILS NFR BLD: 0.2 % (ref 0–1.9)
BILIRUB SERPL-MCNC: 0.4 MG/DL (ref 0.1–1)
BILIRUB UR QL STRIP: NEGATIVE
BUN SERPL-MCNC: 21 MG/DL (ref 8–23)
CALCIUM SERPL-MCNC: 10.2 MG/DL (ref 8.7–10.5)
CHLORIDE SERPL-SCNC: 100 MMOL/L (ref 95–110)
CLARITY UR REFRACT.AUTO: CLEAR
CO2 SERPL-SCNC: 27 MMOL/L (ref 23–29)
COLOR UR AUTO: YELLOW
CREAT SERPL-MCNC: 1.1 MG/DL (ref 0.5–1.4)
D DIMER PPP IA.FEU-MCNC: 2.51 MG/L FEU
DIFFERENTIAL METHOD: ABNORMAL
EOSINOPHIL # BLD AUTO: 0 K/UL (ref 0–0.5)
EOSINOPHIL NFR BLD: 0.4 % (ref 0–8)
ERYTHROCYTE [DISTWIDTH] IN BLOOD BY AUTOMATED COUNT: 13.8 % (ref 11.5–14.5)
EST. GFR  (NO RACE VARIABLE): >60 ML/MIN/1.73 M^2
GLUCOSE SERPL-MCNC: 101 MG/DL (ref 70–110)
GLUCOSE UR QL STRIP: NEGATIVE
HCT VFR BLD AUTO: 27 % (ref 40–54)
HGB BLD-MCNC: 9.1 G/DL (ref 14–18)
HGB UR QL STRIP: ABNORMAL
HYALINE CASTS UR QL AUTO: 0 /LPF
IMM GRANULOCYTES # BLD AUTO: 0.09 K/UL (ref 0–0.04)
IMM GRANULOCYTES NFR BLD AUTO: 0.8 % (ref 0–0.5)
KETONES UR QL STRIP: NEGATIVE
LEUKOCYTE ESTERASE UR QL STRIP: NEGATIVE
LYMPHOCYTES # BLD AUTO: 1 K/UL (ref 1–4.8)
LYMPHOCYTES NFR BLD: 8.7 % (ref 18–48)
MCH RBC QN AUTO: 30.1 PG (ref 27–31)
MCHC RBC AUTO-ENTMCNC: 33.7 G/DL (ref 32–36)
MCV RBC AUTO: 89 FL (ref 82–98)
MICROSCOPIC COMMENT: NORMAL
MONOCYTES # BLD AUTO: 0.9 K/UL (ref 0.3–1)
MONOCYTES NFR BLD: 7.6 % (ref 4–15)
NEUTROPHILS # BLD AUTO: 9.3 K/UL (ref 1.8–7.7)
NEUTROPHILS NFR BLD: 82.3 % (ref 38–73)
NITRITE UR QL STRIP: NEGATIVE
NRBC BLD-RTO: 0 /100 WBC
PH UR STRIP: 6 [PH] (ref 5–8)
PLATELET # BLD AUTO: 280 K/UL (ref 150–450)
PMV BLD AUTO: 9.1 FL (ref 9.2–12.9)
POTASSIUM SERPL-SCNC: 4.7 MMOL/L (ref 3.5–5.1)
PROT SERPL-MCNC: 7.5 G/DL (ref 6–8.4)
PROT UR QL STRIP: ABNORMAL
RBC # BLD AUTO: 3.02 M/UL (ref 4.6–6.2)
RBC #/AREA URNS AUTO: 3 /HPF (ref 0–4)
SODIUM SERPL-SCNC: 136 MMOL/L (ref 136–145)
SP GR UR STRIP: 1.02 (ref 1–1.03)
TROPONIN I SERPL DL<=0.01 NG/ML-MCNC: <0.006 NG/ML (ref 0–0.03)
URN SPEC COLLECT METH UR: ABNORMAL
WBC # BLD AUTO: 11.23 K/UL (ref 3.9–12.7)
WBC #/AREA URNS AUTO: 1 /HPF (ref 0–5)

## 2023-11-20 PROCEDURE — 99999 PR PBB SHADOW E&M-EST. PATIENT-LVL III: CPT | Mod: PBBFAC,,, | Performed by: INTERNAL MEDICINE

## 2023-11-20 PROCEDURE — 3074F PR MOST RECENT SYSTOLIC BLOOD PRESSURE < 130 MM HG: ICD-10-PCS | Mod: CPTII,S$GLB,, | Performed by: INTERNAL MEDICINE

## 2023-11-20 PROCEDURE — 80053 COMPREHEN METABOLIC PANEL: CPT | Performed by: EMERGENCY MEDICINE

## 2023-11-20 PROCEDURE — 99285 EMERGENCY DEPT VISIT HI MDM: CPT | Mod: 25

## 2023-11-20 PROCEDURE — 4010F ACE/ARB THERAPY RXD/TAKEN: CPT | Mod: CPTII,S$GLB,, | Performed by: INTERNAL MEDICINE

## 2023-11-20 PROCEDURE — 3074F SYST BP LT 130 MM HG: CPT | Mod: CPTII,S$GLB,, | Performed by: INTERNAL MEDICINE

## 2023-11-20 PROCEDURE — 81001 URINALYSIS AUTO W/SCOPE: CPT

## 2023-11-20 PROCEDURE — 3078F PR MOST RECENT DIASTOLIC BLOOD PRESSURE < 80 MM HG: ICD-10-PCS | Mod: CPTII,S$GLB,, | Performed by: INTERNAL MEDICINE

## 2023-11-20 PROCEDURE — 96374 THER/PROPH/DIAG INJ IV PUSH: CPT

## 2023-11-20 PROCEDURE — 63600175 PHARM REV CODE 636 W HCPCS

## 2023-11-20 PROCEDURE — 1111F PR DISCHARGE MEDS RECONCILED W/ CURRENT OUTPATIENT MED LIST: ICD-10-PCS | Mod: CPTII,S$GLB,, | Performed by: INTERNAL MEDICINE

## 2023-11-20 PROCEDURE — 99999 PR PBB SHADOW E&M-EST. PATIENT-LVL III: ICD-10-PCS | Mod: PBBFAC,,, | Performed by: INTERNAL MEDICINE

## 2023-11-20 PROCEDURE — 3078F DIAST BP <80 MM HG: CPT | Mod: CPTII,S$GLB,, | Performed by: INTERNAL MEDICINE

## 2023-11-20 PROCEDURE — 1111F DSCHRG MED/CURRENT MED MERGE: CPT | Mod: CPTII,S$GLB,, | Performed by: INTERNAL MEDICINE

## 2023-11-20 PROCEDURE — 99213 OFFICE O/P EST LOW 20 MIN: CPT | Mod: S$GLB,,, | Performed by: INTERNAL MEDICINE

## 2023-11-20 PROCEDURE — 85379 FIBRIN DEGRADATION QUANT: CPT | Performed by: EMERGENCY MEDICINE

## 2023-11-20 PROCEDURE — 93010 ELECTROCARDIOGRAM REPORT: CPT | Mod: ,,, | Performed by: INTERNAL MEDICINE

## 2023-11-20 PROCEDURE — 93010 EKG 12-LEAD: ICD-10-PCS | Mod: ,,, | Performed by: INTERNAL MEDICINE

## 2023-11-20 PROCEDURE — 25500020 PHARM REV CODE 255: Performed by: EMERGENCY MEDICINE

## 2023-11-20 PROCEDURE — 93005 ELECTROCARDIOGRAM TRACING: CPT

## 2023-11-20 PROCEDURE — 84484 ASSAY OF TROPONIN QUANT: CPT | Performed by: EMERGENCY MEDICINE

## 2023-11-20 PROCEDURE — 85025 COMPLETE CBC W/AUTO DIFF WBC: CPT | Performed by: EMERGENCY MEDICINE

## 2023-11-20 PROCEDURE — 99213 PR OFFICE/OUTPT VISIT, EST, LEVL III, 20-29 MIN: ICD-10-PCS | Mod: S$GLB,,, | Performed by: INTERNAL MEDICINE

## 2023-11-20 PROCEDURE — 4010F PR ACE/ARB THEARPY RXD/TAKEN: ICD-10-PCS | Mod: CPTII,S$GLB,, | Performed by: INTERNAL MEDICINE

## 2023-11-20 RX ORDER — MORPHINE SULFATE 2 MG/ML
2 INJECTION, SOLUTION INTRAMUSCULAR; INTRAVENOUS
Status: COMPLETED | OUTPATIENT
Start: 2023-11-20 | End: 2023-11-20

## 2023-11-20 RX ADMIN — IOHEXOL 100 ML: 350 INJECTION, SOLUTION INTRAVENOUS at 08:11

## 2023-11-20 RX ADMIN — MORPHINE SULFATE 2 MG: 2 INJECTION, SOLUTION INTRAMUSCULAR; INTRAVENOUS at 05:11

## 2023-11-20 NOTE — ED PROVIDER NOTES
Encounter Date: 11/20/2023       History     Chief Complaint   Patient presents with    Mutiple complaints     Sent from [place], earlier today, sweats, vomited, denies loc but wife reports was slapping face and not responding     73-year-old male with a past medical history of HTN, asthma, gout, HTN and RCC status post nephrectomy and surgical removal of vertebral bodies due to metastatic cells presents the ED with wife at bedside sent from the [facility] as the patient had an episode of syncope around 9:00 a.m. this morning.  Patient was recently discharged and decided to walk up stairs to shower.  He states that he felt lightheaded and her his wife he became unresponsive and did not wake up after stopping his face repeatedly.  Patient regained consciousness in 1 minute and has since had no symptoms.  He did have 1 episode of vomiting.  Wife also notes that he took oxycodone, gabapentin and Robaxin earlier this morning around 7:00 a.m..  He denies chest pain, shortness of breath, abdominal pain, headache.  No other complaints at this time.    The history is provided by the patient and medical records.     Review of patient's allergies indicates:  No Known Allergies  Past Medical History:   Diagnosis Date    Asthma     no inhaler use    Borderline hypertension     ED (erectile dysfunction)     Gout     Hematuria     Hypertension      Past Surgical History:   Procedure Laterality Date    COLONOSCOPY  02/10/2022    COLONOSCOPY N/A 02/10/2022    Procedure: COLONOSCOPY;  Surgeon: Desmond Keenan MD;  Location: [facility];  Service: General;  Laterality: N/A;    POSTERIOR FUSION OF CERVICAL SPINE WITH LAMINECTOMY N/A 11/15/2023    Procedure: SPINE, CERVICAL, WITH POSTERIOR FUSION T4-6;  Surgeon: Nasim Martinez DO;  Location: [address];  Service: Neurosurgery;  Laterality: N/A;  C2-T1 laminectomy and posterior fusion. Patel. C-arm. [employer]. Neuromonitoring.    ROBOT-ASSISTED LAPAROSCOPIC NEPHRECTOMY  Right 10/4/2023    Procedure: ROBOTIC NEPHRECTOMY;  Surgeon: Henry Michaels MD;  Location: [facility];  Service: Urology;  Laterality: Right;    SURGICAL REMOVAL OF VERTEBRAL BODY OF CERVICAL SPINE Right 11/7/2023    Procedure: CORPECTOMY, SPINE, CERVICAL;  Surgeon: Nasim Martinez DO;  [facility];  Service: Neurosurgery;  Laterality: Right;  C4 and C5 corpectomy with globus;  wells tongs; c-arm; neuromonitoring; microscope; type and cross 2 units    SURGICAL REMOVAL OF VERTEBRAL BODY OF CERVICAL SPINE N/A 11/15/2023    Procedure: CORPECTOMY, SPINE, CERVICAL C3-6 REVISION;  Surgeon: Nasim Martinez DO;  [facility];  Service: Neurosurgery;  Laterality: N/A;    TONSILLECTOMY       History reviewed. No pertinent family history.  Social History     Tobacco Use    Smoking status: Never    Smokeless tobacco: Never   Substance Use Topics    Alcohol use: Not Currently     Alcohol/week: 0.0 standard drinks of alcohol     Comment: rarely    Drug use: Never     Review of Systems    Physical Exam     Initial Vitals [11/20/23 1213]   BP Pulse Resp Temp SpO2   121/83 86 18 97.6 °F (36.4 °C) 100 %      MAP       --         Physical Exam    Nursing note and vitals reviewed.  Constitutional: Vital signs are normal. He appears well-developed and well-nourished. He is not diaphoretic. No distress.   HENT:   Head: Normocephalic and atraumatic.   Right Ear: External ear normal.   Left Ear: External ear normal.   Neck: Trachea normal. Neck supple. No thyroid mass present.   In C-collar.    Postsurgical scar in the anterior and posterior neck.  Well-appearing.   Cardiovascular:  Normal rate, regular rhythm, normal heart sounds and intact distal pulses.     Exam reveals no gallop and no friction rub.       No murmur heard.  Pulmonary/Chest: Breath sounds normal. No respiratory distress. He has no wheezes. He has no rhonchi. He has no rales.   Abdominal: Abdomen is soft. Bowel sounds are  normal. He exhibits no distension. There is no abdominal tenderness. There is no rebound and no guarding.   Musculoskeletal:         General: No tenderness or edema. Normal range of motion.      Cervical back: Neck supple.     Neurological: He is alert and oriented to person, place, and time. He has normal strength. No cranial nerve deficit or sensory deficit. GCS score is 15. GCS eye subscore is 4. GCS verbal subscore is 5. GCS motor subscore is 6.   Skin: Skin is warm and dry. Capillary refill takes less than 2 seconds. No rash noted.   Psychiatric: He has a normal mood and affect.         ED Course   Procedures  Labs Reviewed   CBC W/ AUTO DIFFERENTIAL - Abnormal; Notable for the following components:       Result Value    RBC 3.02 (*)     Hemoglobin 9.1 (*)     Hematocrit 27.0 (*)     MPV 9.1 (*)     Immature Granulocytes 0.8 (*)     Gran # (ANC) 9.3 (*)     Immature Grans (Abs) 0.09 (*)     Gran % 82.3 (*)     Lymph % 8.7 (*)     All other components within normal limits   COMPREHENSIVE METABOLIC PANEL - Abnormal; Notable for the following components:    Albumin 3.2 (*)     All other components within normal limits   URINALYSIS, REFLEX TO URINE CULTURE - Abnormal; Notable for the following components:    Protein, UA 1+ (*)     Occult Blood UA Trace (*)     All other components within normal limits    Narrative:     Specimen Source->Urine   D DIMER, QUANTITATIVE - Abnormal; Notable for the following components:    D-Dimer 2.51 (*)     All other components within normal limits   TROPONIN I   URINALYSIS MICROSCOPIC    Narrative:     Specimen Source->Urine     EKG Readings: (Independently Interpreted)   81 beats per minute.  Normal sinus rhythm. RBBB.  No STEMI.     ECG Results              EKG 12-lead (Final result)  Result time 11/20/23 16:43:48      Final result by Interface, Lab In [facility] (11/20/23 16:43:48)                   Narrative:    Test Reason : R55,    Vent. Rate : 081 BPM     Atrial Rate : 081 BPM      P-R Int : 160 ms          QRS Dur : 146 ms      QT Int : 402 ms       P-R-T Axes : 000 246 129 degrees     QTc Int : 466 ms    Normal sinus rhythm  Right bundle branch block  Septal infarct ,age undetermined  T wave abnormality, consider lateral ischemia  Abnormal ECG  When compared with ECG of 07-NOV-2023 13:12,  Significant changes have occurred  Confirmed by Teodoro WEIR, Jade ([id number]) on 11/20/2023 4:43:39 PM    Referred By: [email or web address]   SELF           Confirmed By:Jade Valerio MD                                  Imaging Results              X-Ray Chest AP Portable (Final result)  Result time 11/20/23 15:14:14      Final result by Hugo Corcoran MD (11/20/23 15:14:14)                   Impression:      See above      Electronically signed by: Hugo Corcoran MD  Date:    11/20/2023  Time:    15:14               Narrative:    EXAMINATION:  XR CHEST AP PORTABLE    CLINICAL HISTORY:  Syncope and collapse    TECHNIQUE:  Single frontal view of the chest was performed.    COMPARISON:  11/07/2023    FINDINGS:  Cardiac size is normal.  Lungs are clear and well aerated.  No infiltrate or vascular congestion.                                       Medications   morphine injection 2 mg (2 mg Intravenous Given 11/20/23 1728)     Medical Decision Making  73-year-old male who appears to be in NAD presents to the ED with wife at bedside complaining of syncope this morning at 9:00 AM.  ABC's intact.  Initial triage vital signs are within normal limits.    Differential diagnosis includes but is not limited to ACS, cardiac arrhythmia, electrolyte abnormality, anemia, pneumonia, unlikely pulmonary embolism.    Amount and/or Complexity of Data Reviewed  Labs:  Decision-making details documented in ED Course.  Radiology: ordered. Decision-making details documented in ED Course.    Risk  Prescription drug management.               ED Course as of 11/20/23 1903   Mon Nov 20, 2023   1243 Following initial evaluation I will  obtain labs and imaging were obtained with the patient's episode of syncope earlier today at 9:00 a.m.. [BG]   1500 CBC auto differential(!)  No leukocytosis.  Stable anemia. [BG]   1500 D-Dimer(!): 2.51  Will order CT PE. [BG]   1533 Troponin I: <0.006  Decreases likelihood of ACS in the setting of syncope episode occurring at 9:00 a.m.. [BG]   1533 Comprehensive metabolic panel(!)  Unremarkable. [BG]   1534 X-Ray Chest AP Portable  Cardiac size is normal.  Lungs are clear and well aerated.  No infiltrate or vascular congestion. [BG]   1657 Urinalysis Microscopic  Unlikely UTI. [BG]   1727 RN notified me that he has pain of his neck due to his recent interventions.  I will give him 2 mg of morphine. [BG]   1903 Patient is active pending a CT PE. I anticipate likely disposition is discharge. I will sign out patient to oncoming provider and leave ultimate disposition up to them. [BG]      ED Course User Index  [BG] Carmelo Palmer MD                        Clinical Impression:  Final diagnoses:  [R55] Syncope                 Carmelo Palmer MD  Resident  11/20/23 1904

## 2023-11-20 NOTE — TELEPHONE ENCOUNTER
----- Message from Esther Hood PA-C sent at 11/18/2023  9:11 AM CST -----  Regarding: post op appts  Please schedule a follow up appointment for this patient:    With: RN @ 2 weeks; jazz/benja @ 6 weeks  Diagnosis: mets to spine s/p C4-5 corpectomy (11/7) and C4-5 PSF (11/15/)  Imaging: cervical AP/Lat XR @ 6 weeks (ordered)      Thank you!    Esther

## 2023-11-20 NOTE — PROGRESS NOTES
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  diphenhydrAMINE injection 50 mg  50 mg, Intravenous, PRN  hydrocortisone sodium succinate injection 100 mg  100 mg, Intravenous, PRN

## 2023-11-20 NOTE — ED TRIAGE NOTES
Gamaliel VANDANA Pete Jr., a 73 y.o. male presents to the ED w/ complaint of syncopal episode at home around 0930. Wife reports pt passed out before getting into shower. Pt was awake but unresponsive to voice. Episode lasted approx 2 minutes before pt came to. Had appt at [facility] already,  sent pt to ED for eval after being made aware of syncopal episode    Triage note:  Chief Complaint   Patient presents with    Mutiple complaints     Sent from [place], earlier today, sweats, vomited, denies loc but wife reports was slapping face and not responding     Review of patient's allergies indicates:  No Known Allergies  Past Medical History:   Diagnosis Date    Asthma     no inhaler use    Borderline hypertension     ED (erectile dysfunction)     Gout     Hematuria     Hypertension

## 2023-11-20 NOTE — FIRST PROVIDER EVALUATION
"Medical screening examination initiated.  I have conducted a focused provider triage encounter, findings are as follows:    Brief history of present illness:    73-year-old past medical history of renal cell carcinoma status post right nephrectomy.  C5 T9 lesion on spine.  Presenting from [facility] for syncope/near-syncope episode.  Patient mentions prior to presentation have an episode of diaphoresis along with severe nausea and vomiting.  Patient denies LOC however patient's wife accompanying him mentions patient had syncopal episode as she was trying to hit his face to wake him up.  Episode lasted for seconds.  Denies any chest pain, shortness of breath.      Vitals:    11/20/23 1213   BP: 121/83   Pulse: 86   Resp: 18   Temp: 97.6 °F (36.4 °C)   TempSrc: Oral   SpO2: 100%   Weight: 77.1 kg (170 lb)   Height: 5' 10" (1.778 m)       Pertinent physical exam:    Pt awake alert,   Moving all extremities   NAD     Brief workup plan:    Cbc, cmp, EKG, trop     Preliminary workup initiated; this workup will be continued and followed by the physician or advanced practice provider that is assigned to the patient when roomed.  "

## 2023-11-20 NOTE — PLAN OF CARE
CLINICAL TRIAL SELECTED - Renal Cell    Trial 2019.229: [id number] PD-Inhibitor (Nivolumab) and Ipilimumab Followed by   Nivolumab vs. VEGF TKI Cabozantinib with Nivolumab: A Phase III Trial in   Metastatic Untreated Renal Cell Cancer (PDIGREE)    **Trial eligibility and accrual should be confirmed by your research team**    Patient Characteristics:  Stage IV (Unresected T4M0 or Any T, M1)/Metastatic Disease, Clear Cell, First   Line, Intermediate or Poor Risk  Therapeutic Status: Stage IV (Unresected T4M0 or Any T, M1)/Metastatic Disease  Histology: Clear Cell  Line of Therapy: First Line  Risk Status: Poor Risk  Intent of Therapy:  Non-Curative / Palliative Intent, Discussed with Patient

## 2023-11-21 LAB
FINAL PATHOLOGIC DIAGNOSIS: ABNORMAL
GROSS: ABNORMAL
Lab: ABNORMAL
MICROSCOPIC EXAM: ABNORMAL

## 2023-11-21 NOTE — DISCHARGE INSTRUCTIONS
Imaging Results              CTA Chest Non-Coronary (PE Studies) (Final result)  Result time 11/20/23 20:47:01      Final result by Anshu Vu DO (11/20/23 20:47:01)                   Impression:      1. No large central or lobar pulmonary embolism.  2. Stable numerous bilateral pulmonary nodules, concerning for pulmonary metastatic disease.      Electronically signed by: Anshu Vu  Date:    11/20/2023  Time:    20:47               Narrative:    EXAMINATION:  CTA CHEST NON CORONARY (PE STUDIES)    CLINICAL HISTORY:  Pulmonary embolism (PE) suspected, positive D-dimer;    TECHNIQUE:  Low dose axial images, sagittal and coronal reformations were obtained from the thoracic inlet to the lung bases following the IV administration of 100 mL of Omnipaque 350.  Contrast timing was optimized to evaluate the pulmonary arteries.  Maximum intensity projection images were provided for review.    COMPARISON:  CT chest dated 09/07/2023.    FINDINGS:  Pulmonary vasculature: Slightly suboptimal contrast bolus timing and streak and motion artifact limits examination.  There is no evidence of a large central or lobar pulmonary embolism seen.    Aorta: Left-sided aortic arch.  No aneurysm and no significant atherosclerosis.    Base of Neck: No significant abnormality.    Thoracic soft tissues: Normal.    Heart: Normal size. No effusion.    Cynthia/Mediastinum: No pathologic nicole enlargement.    Airways: The large airways are patent. No foci of endobronchial filling.    Lungs/Pleura: There are numerous bilateral pulmonary nodules, the largest of which in the right lung measures approximately 8 mm (series 4, image 332), previously 8 mm.  The largest of which in the left lung measures approximately 8 mm (series 4, image 258), previously 8 mm.  No new pulmonary nodules or significant detrimental change.  Pleural spaces are clear.    Esophagus: Normal.    Upper Abdomen: No abnormality of the partially imaged upper  abdomen.    Bones: Interval vertebroplasty cement placement within the T9 vertebral body with interval worsening height loss when compared with prior CT dated 09/07/2023.  The previously seen lytic lesion within the T9 vertebral body is obscured no new focal osseous lesions.  No acute fracture or dislocation.                                       X-Ray Chest AP Portable (Final result)  Result time 11/20/23 15:14:14      Final result by Hugo Corcoran MD (11/20/23 15:14:14)                   Impression:      See above      Electronically signed by: Hugo Corcoran MD  Date:    11/20/2023  Time:    15:14               Narrative:    EXAMINATION:  XR CHEST AP PORTABLE    CLINICAL HISTORY:  Syncope and collapse    TECHNIQUE:  Single frontal view of the chest was performed.    COMPARISON:  11/07/2023    FINDINGS:  Cardiac size is normal.  Lungs are clear and well aerated.  No infiltrate or vascular congestion.                                    Future Appointments   Date Time Provider Department Center   11/29/2023 11:30 AM [facility]   12/28/2023  1:00 PM [facility] OIC-XRAY [facility] XRAY [facility]   12/28/2023  2:30 PM Nadine Santos, PA-CHERY [facility] NEUROS8 [facility]   4/15/2024  3:00 PM Slava Lowery MD [facility] Clin

## 2023-11-21 NOTE — PROVIDER PROGRESS NOTES - EMERGENCY DEPT.
Encounter Date: 11/20/2023    ED Physician Progress Notes          STAFF PHYSICIAN F/U NOTE:  Gamaliel Pete Jr. has been evaluated and treated.  Transfer of care to me pending CT imaging which shows no pulmonary emboli.  Discussed with patient pulmonary nodules.  Imaging Results              CTA Chest Non-Coronary (PE Studies) (Final result)  Result time 11/20/23 20:47:01      Final result by Anshu Vu DO (11/20/23 20:47:01)                   Impression:      1. No large central or lobar pulmonary embolism.  2. Stable numerous bilateral pulmonary nodules, concerning for pulmonary metastatic disease.      Electronically signed by: Anshu Vu  Date:    11/20/2023  Time:    20:47               Narrative:    EXAMINATION:  CTA CHEST NON CORONARY (PE STUDIES)    CLINICAL HISTORY:  Pulmonary embolism (PE) suspected, positive D-dimer;    TECHNIQUE:  Low dose axial images, sagittal and coronal reformations were obtained from the thoracic inlet to the lung bases following the IV administration of 100 mL of Omnipaque 350.  Contrast timing was optimized to evaluate the pulmonary arteries.  Maximum intensity projection images were provided for review.    COMPARISON:  CT chest dated 09/07/2023.    FINDINGS:  Pulmonary vasculature: Slightly suboptimal contrast bolus timing and streak and motion artifact limits examination.  There is no evidence of a large central or lobar pulmonary embolism seen.    Aorta: Left-sided aortic arch.  No aneurysm and no significant atherosclerosis.    Base of Neck: No significant abnormality.    Thoracic soft tissues: Normal.    Heart: Normal size. No effusion.    Cynthia/Mediastinum: No pathologic nicole enlargement.    Airways: The large airways are patent. No foci of endobronchial filling.    Lungs/Pleura: There are numerous bilateral pulmonary nodules, the largest of which in the right lung measures approximately 8 mm (series 4, image 332), previously 8 mm.  The largest of which in  the left lung measures approximately 8 mm (series 4, image 258), previously 8 mm.  No new pulmonary nodules or significant detrimental change.  Pleural spaces are clear.    Esophagus: Normal.    Upper Abdomen: No abnormality of the partially imaged upper abdomen.    Bones: Interval vertebroplasty cement placement within the T9 vertebral body with interval worsening height loss when compared with prior CT dated 09/07/2023.  The previously seen lytic lesion within the T9 vertebral body is obscured no new focal osseous lesions.  No acute fracture or dislocation.                                       X-Ray Chest AP Portable (Final result)  Result time 11/20/23 15:14:14      Final result by Hugo Corcoran MD (11/20/23 15:14:14)                   Impression:      See above      Electronically signed by: Hugo Corcoran MD  Date:    11/20/2023  Time:    15:14               Narrative:    EXAMINATION:  XR CHEST AP PORTABLE    CLINICAL HISTORY:  Syncope and collapse    TECHNIQUE:  Single frontal view of the chest was performed.    COMPARISON:  11/07/2023    FINDINGS:  Cardiac size is normal.  Lungs are clear and well aerated.  No infiltrate or vascular congestion.                                    He reports much improvement/complete resolution of Sx and is ready to return home. Currently patient reports no new Sx and is tolerating PO challenge. We discussed Sx warranting immediate ED return, which were acknowledged. I recommended F/U and discussion of ED visit with primary care physician.  ____________________  Ray Lei MD, [facility]  Emergency Medicine Staff  8:58 PM 11/20/2023

## 2023-11-29 ENCOUNTER — CLINICAL SUPPORT (OUTPATIENT)
Dept: NEUROSURGERY | Facility: CLINIC | Age: 73
End: 2023-11-29
Payer: MEDICARE

## 2023-11-29 DIAGNOSIS — M53.2X2 CERVICAL SPINE INSTABILITY: Primary | ICD-10-CM

## 2023-11-29 PROCEDURE — 99999 PR PBB SHADOW E&M-EST. PATIENT-LVL II: ICD-10-PCS | Mod: PBBFAC,,,

## 2023-11-29 PROCEDURE — 99999 PR PBB SHADOW E&M-EST. PATIENT-LVL II: CPT | Mod: PBBFAC,,,

## 2023-11-29 NOTE — PROGRESS NOTES
Gamaliel Pete Jr.is a 73 y.o. male here for 2 week post op wound check.    Surgery: Pt is POD 14 FROM Spine, Cervical, With Posterior Fusion T4-6 and Corpectomy, Spine, Cervical C3-6 Revision on 11/15/2023 by Dr. Martinez.    DME: none    Brace in Use: Rigid C-Collar     SUBJECTIVE    New Symptoms: none      PAIN MANAGEMENT     0,       INCISION (S)    Location: anterior cervical and posterior neck    Incision Status: Clean, Dry, ELIJAH. Edges well-approximated. No drainage or swelling noted.  and preneo dressing to anterior neck incision. Starting to roll at bottom edge. Instructed wife may clip when it is rolling if it is bothering pt.     PICTURE        INTERVENTION    Incision: Dissolvable Sutures in Place    Medication Refills Requested: None     EDUCATION    Reviewed Post Op instructions with patient/caregiver.  Keep incision ELIJAH, no lotions, creams or bandages.  Ok to shower without direct water pressure to incision. Pat dry after shower.  Do not submerge wound in bath tub or go swimming until released by surgeon.  No lifting > 10lbs.  No twisting or bending surgical area greater than 45 degrees from neutral position.  No driving or operating machinery while taking narcotic pain medication or muscle relaxers and cleared by surgeon.  Wear brace at all times except when lying in bed, reclining or taking a shower/no brace required.  Patient encouraged to walk as much as possible but advised to walk with someone and rest as necessary.  Do not take ANY non-steroidal anti-inflammatory drugs (NSAIDS), including the following: ibuprofen, naprosyn Aleve, Advil, Indocin, Mobic, or Celebrex for 6 weeks following surgery.   Take over the counter stool softner/laxative for constipation.  Call your doctor or report to the Emergency Room for any signs of infection, including: increased redness, drainage, pain or fever (temperature greater than or equal to 101.5 for 24 hours). Call doctor or go to the Emergency Room if there  are any localized neurological changes; problems with speech, vision, numbness, tingling, weakness, or severe headache; or for other concerns.      Written copy of post op instructions provided to patient/caregiver. All questions answered. Pt/caregiver encouraged to call clinic or send message through portal with any future concerns. Patient/caregiver verbalized understanding.     FOLLOW UP    Future Appointments   Date Time Provider Department Center   12/22/2023 10:00 AM Ruby Turk MD [facility] HEM ONC [place]   12/28/2023  1:00 PM [facility] OIC-XRAY N[facility] XRAY IC Imaging Ctr   12/28/2023  2:30 PM Nadine Santos, PA-C [facility] NEUROS8 [address]   4/15/2024  3:00 PM Slava Lowery MD [facility]

## 2023-12-05 ENCOUNTER — PATIENT MESSAGE (OUTPATIENT)
Dept: HEMATOLOGY/ONCOLOGY | Facility: CLINIC | Age: 73
End: 2023-12-05
Payer: MEDICARE

## 2023-12-06 LAB
FINAL PATHOLOGIC DIAGNOSIS: NORMAL
GROSS: NORMAL
Lab: NORMAL

## 2023-12-07 ENCOUNTER — PATIENT MESSAGE (OUTPATIENT)
Dept: NEUROSURGERY | Facility: CLINIC | Age: 73
End: 2023-12-07
Payer: MEDICARE

## 2023-12-08 ENCOUNTER — PATIENT MESSAGE (OUTPATIENT)
Dept: PRIMARY CARE CLINIC | Facility: CLINIC | Age: 73
End: 2023-12-08
Payer: MEDICARE

## 2023-12-08 NOTE — TELEPHONE ENCOUNTER
Patient message in asking if you can prescribe something else other then Indomethacin for his gout attack. Patient said that  don't want him to take any NSAIDs due to surgery recovery. Can you please prescribe an alternative.

## 2023-12-11 ENCOUNTER — TELEPHONE (OUTPATIENT)
Dept: HEMATOLOGY/ONCOLOGY | Facility: CLINIC | Age: 73
End: 2023-12-11
Payer: MEDICARE

## 2023-12-11 NOTE — TELEPHONE ENCOUNTER
Called patient regarding message. Patient was informed of providers message. Patient was schedule for Nurse Visit on tomorrow for Kenalog 40mg injection.

## 2023-12-11 NOTE — TELEPHONE ENCOUNTER
I called patient to discuss [employer] testing, scheduled lab following appt with provider. Verified appt/location/time, pt verbalized understanding. [employer] sent with additional information on [employer] and their financial assistance, if interested.

## 2023-12-12 ENCOUNTER — OFFICE VISIT (OUTPATIENT)
Dept: HEMATOLOGY/ONCOLOGY | Facility: CLINIC | Age: 73
End: 2023-12-12
Payer: MEDICARE

## 2023-12-12 ENCOUNTER — CLINICAL SUPPORT (OUTPATIENT)
Dept: PRIMARY CARE CLINIC | Facility: CLINIC | Age: 73
End: 2023-12-12
Payer: MEDICARE

## 2023-12-12 ENCOUNTER — LAB VISIT (OUTPATIENT)
Dept: LAB | Facility: HOSPITAL | Age: 73
End: 2023-12-12
Payer: MEDICARE

## 2023-12-12 ENCOUNTER — PATIENT MESSAGE (OUTPATIENT)
Dept: ADMINISTRATIVE | Facility: OTHER | Age: 73
End: 2023-12-12
Payer: MEDICARE

## 2023-12-12 VITALS
TEMPERATURE: 98 F | SYSTOLIC BLOOD PRESSURE: 149 MMHG | RESPIRATION RATE: 18 BRPM | DIASTOLIC BLOOD PRESSURE: 71 MMHG | BODY MASS INDEX: 29.97 KG/M2 | HEART RATE: 80 BPM | WEIGHT: 208.88 LBS | OXYGEN SATURATION: 100 %

## 2023-12-12 DIAGNOSIS — R93.89 ABNORMAL FINDINGS ON DIAGNOSTIC IMAGING OF OTHER SPECIFIED BODY STRUCTURES: ICD-10-CM

## 2023-12-12 DIAGNOSIS — M10.9 GOUT, UNSPECIFIED CAUSE, UNSPECIFIED CHRONICITY, UNSPECIFIED SITE: Primary | ICD-10-CM

## 2023-12-12 DIAGNOSIS — C79.51 METASTASIS TO BONE: ICD-10-CM

## 2023-12-12 DIAGNOSIS — M79.604 RIGHT LEG PAIN: Primary | ICD-10-CM

## 2023-12-12 DIAGNOSIS — D64.9 NORMOCYTIC ANEMIA: ICD-10-CM

## 2023-12-12 DIAGNOSIS — C64.1 CLEAR CELL CARCINOMA OF RIGHT KIDNEY: ICD-10-CM

## 2023-12-12 DIAGNOSIS — M10.9 GOUT, UNSPECIFIED CAUSE, UNSPECIFIED CHRONICITY, UNSPECIFIED SITE: ICD-10-CM

## 2023-12-12 DIAGNOSIS — C79.51 METASTATIC CANCER TO SPINE: ICD-10-CM

## 2023-12-12 DIAGNOSIS — I10 ESSENTIAL HYPERTENSION: ICD-10-CM

## 2023-12-12 DIAGNOSIS — C78.00 MALIGNANT NEOPLASM METASTATIC TO LUNG, UNSPECIFIED LATERALITY: ICD-10-CM

## 2023-12-12 LAB
ALBUMIN SERPL BCP-MCNC: 3.9 G/DL (ref 3.5–5.2)
ALP SERPL-CCNC: 86 U/L (ref 55–135)
ALT SERPL W/O P-5'-P-CCNC: 11 U/L (ref 10–44)
ANION GAP SERPL CALC-SCNC: 11 MMOL/L (ref 8–16)
AST SERPL-CCNC: 11 U/L (ref 10–40)
BASOPHILS # BLD AUTO: 0.03 K/UL (ref 0–0.2)
BASOPHILS NFR BLD: 0.6 % (ref 0–1.9)
BILIRUB SERPL-MCNC: 0.5 MG/DL (ref 0.1–1)
BUN SERPL-MCNC: 20 MG/DL (ref 8–23)
CALCIUM SERPL-MCNC: 10.8 MG/DL (ref 8.7–10.5)
CHLORIDE SERPL-SCNC: 105 MMOL/L (ref 95–110)
CO2 SERPL-SCNC: 25 MMOL/L (ref 23–29)
CREAT SERPL-MCNC: 1.3 MG/DL (ref 0.5–1.4)
DIFFERENTIAL METHOD: ABNORMAL
EOSINOPHIL # BLD AUTO: 0.1 K/UL (ref 0–0.5)
EOSINOPHIL NFR BLD: 2.8 % (ref 0–8)
ERYTHROCYTE [DISTWIDTH] IN BLOOD BY AUTOMATED COUNT: 14 % (ref 11.5–14.5)
EST. GFR  (NO RACE VARIABLE): 58.7 ML/MIN/1.73 M^2
GLUCOSE SERPL-MCNC: 92 MG/DL (ref 70–110)
HCT VFR BLD AUTO: 33.5 % (ref 40–54)
HGB BLD-MCNC: 11.3 G/DL (ref 14–18)
IMM GRANULOCYTES # BLD AUTO: 0.01 K/UL (ref 0–0.04)
IMM GRANULOCYTES NFR BLD AUTO: 0.2 % (ref 0–0.5)
LYMPHOCYTES # BLD AUTO: 1.1 K/UL (ref 1–4.8)
LYMPHOCYTES NFR BLD: 22.3 % (ref 18–48)
MCH RBC QN AUTO: 30.1 PG (ref 27–31)
MCHC RBC AUTO-ENTMCNC: 33.7 G/DL (ref 32–36)
MCV RBC AUTO: 89 FL (ref 82–98)
MONOCYTES # BLD AUTO: 0.4 K/UL (ref 0.3–1)
MONOCYTES NFR BLD: 7.2 % (ref 4–15)
NEUTROPHILS # BLD AUTO: 3.4 K/UL (ref 1.8–7.7)
NEUTROPHILS NFR BLD: 66.9 % (ref 38–73)
NRBC BLD-RTO: 0 /100 WBC
PLATELET # BLD AUTO: 228 K/UL (ref 150–450)
PMV BLD AUTO: 9 FL (ref 9.2–12.9)
POTASSIUM SERPL-SCNC: 4.4 MMOL/L (ref 3.5–5.1)
PROT SERPL-MCNC: 8.6 G/DL (ref 6–8.4)
RBC # BLD AUTO: 3.75 M/UL (ref 4.6–6.2)
SODIUM SERPL-SCNC: 141 MMOL/L (ref 136–145)
WBC # BLD AUTO: 5.03 K/UL (ref 3.9–12.7)

## 2023-12-12 PROCEDURE — 1101F PR PT FALLS ASSESS DOC 0-1 FALLS W/OUT INJ PAST YR: ICD-10-PCS | Mod: CPTII,S$GLB,, | Performed by: INTERNAL MEDICINE

## 2023-12-12 PROCEDURE — 3078F DIAST BP <80 MM HG: CPT | Mod: CPTII,S$GLB,, | Performed by: INTERNAL MEDICINE

## 2023-12-12 PROCEDURE — 99499 UNLISTED E&M SERVICE: CPT | Mod: S$GLB,,, | Performed by: INTERNAL MEDICINE

## 2023-12-12 PROCEDURE — 99999 PR PBB SHADOW E&M-EST. PATIENT-LVL IV: ICD-10-PCS | Mod: PBBFAC,,, | Performed by: INTERNAL MEDICINE

## 2023-12-12 PROCEDURE — 4010F ACE/ARB THERAPY RXD/TAKEN: CPT | Mod: CPTII,S$GLB,, | Performed by: INTERNAL MEDICINE

## 2023-12-12 PROCEDURE — 1111F PR DISCHARGE MEDS RECONCILED W/ CURRENT OUTPATIENT MED LIST: ICD-10-PCS | Mod: CPTII,S$GLB,, | Performed by: INTERNAL MEDICINE

## 2023-12-12 PROCEDURE — 96372 THER/PROPH/DIAG INJ SC/IM: CPT | Mod: S$GLB,,, | Performed by: FAMILY MEDICINE

## 2023-12-12 PROCEDURE — 80053 COMPREHEN METABOLIC PANEL: CPT | Performed by: INTERNAL MEDICINE

## 2023-12-12 PROCEDURE — 1125F AMNT PAIN NOTED PAIN PRSNT: CPT | Mod: CPTII,S$GLB,, | Performed by: INTERNAL MEDICINE

## 2023-12-12 PROCEDURE — 3008F BODY MASS INDEX DOCD: CPT | Mod: CPTII,S$GLB,, | Performed by: INTERNAL MEDICINE

## 2023-12-12 PROCEDURE — 1160F RVW MEDS BY RX/DR IN RCRD: CPT | Mod: CPTII,S$GLB,, | Performed by: INTERNAL MEDICINE

## 2023-12-12 PROCEDURE — 1125F PR PAIN SEVERITY QUANTIFIED, PAIN PRESENT: ICD-10-PCS | Mod: CPTII,S$GLB,, | Performed by: INTERNAL MEDICINE

## 2023-12-12 PROCEDURE — 36415 COLL VENOUS BLD VENIPUNCTURE: CPT | Performed by: INTERNAL MEDICINE

## 2023-12-12 PROCEDURE — 99999 PR PBB SHADOW E&M-EST. PATIENT-LVL IV: CPT | Mod: PBBFAC,,, | Performed by: INTERNAL MEDICINE

## 2023-12-12 PROCEDURE — 1159F MED LIST DOCD IN RCRD: CPT | Mod: CPTII,S$GLB,, | Performed by: INTERNAL MEDICINE

## 2023-12-12 PROCEDURE — 4010F PR ACE/ARB THEARPY RXD/TAKEN: ICD-10-PCS | Mod: CPTII,S$GLB,, | Performed by: INTERNAL MEDICINE

## 2023-12-12 PROCEDURE — 85025 COMPLETE CBC W/AUTO DIFF WBC: CPT | Performed by: INTERNAL MEDICINE

## 2023-12-12 PROCEDURE — 3077F SYST BP >= 140 MM HG: CPT | Mod: CPTII,S$GLB,, | Performed by: INTERNAL MEDICINE

## 2023-12-12 PROCEDURE — 1159F PR MEDICATION LIST DOCUMENTED IN MEDICAL RECORD: ICD-10-PCS | Mod: CPTII,S$GLB,, | Performed by: INTERNAL MEDICINE

## 2023-12-12 PROCEDURE — 3008F PR BODY MASS INDEX (BMI) DOCUMENTED: ICD-10-PCS | Mod: CPTII,S$GLB,, | Performed by: INTERNAL MEDICINE

## 2023-12-12 PROCEDURE — 3077F PR MOST RECENT SYSTOLIC BLOOD PRESSURE >= 140 MM HG: ICD-10-PCS | Mod: CPTII,S$GLB,, | Performed by: INTERNAL MEDICINE

## 2023-12-12 PROCEDURE — 1101F PT FALLS ASSESS-DOCD LE1/YR: CPT | Mod: CPTII,S$GLB,, | Performed by: INTERNAL MEDICINE

## 2023-12-12 PROCEDURE — 3288F FALL RISK ASSESSMENT DOCD: CPT | Mod: CPTII,S$GLB,, | Performed by: INTERNAL MEDICINE

## 2023-12-12 PROCEDURE — 99215 OFFICE O/P EST HI 40 MIN: CPT | Mod: S$GLB,,, | Performed by: INTERNAL MEDICINE

## 2023-12-12 PROCEDURE — 99215 PR OFFICE/OUTPT VISIT, EST, LEVL V, 40-54 MIN: ICD-10-PCS | Mod: S$GLB,,, | Performed by: INTERNAL MEDICINE

## 2023-12-12 PROCEDURE — 3288F PR FALLS RISK ASSESSMENT DOCUMENTED: ICD-10-PCS | Mod: CPTII,S$GLB,, | Performed by: INTERNAL MEDICINE

## 2023-12-12 PROCEDURE — 96372 PR INJECTION,THERAP/PROPH/DIAG2ST, IM OR SUBCUT: ICD-10-PCS | Mod: S$GLB,,, | Performed by: FAMILY MEDICINE

## 2023-12-12 PROCEDURE — 1111F DSCHRG MED/CURRENT MED MERGE: CPT | Mod: CPTII,S$GLB,, | Performed by: INTERNAL MEDICINE

## 2023-12-12 PROCEDURE — 1160F PR REVIEW ALL MEDS BY PRESCRIBER/CLIN PHARMACIST DOCUMENTED: ICD-10-PCS | Mod: CPTII,S$GLB,, | Performed by: INTERNAL MEDICINE

## 2023-12-12 PROCEDURE — 3078F PR MOST RECENT DIASTOLIC BLOOD PRESSURE < 80 MM HG: ICD-10-PCS | Mod: CPTII,S$GLB,, | Performed by: INTERNAL MEDICINE

## 2023-12-12 RX ORDER — TRIAMCINOLONE ACETONIDE 40 MG/ML
40 INJECTION, SUSPENSION INTRA-ARTICULAR; INTRAMUSCULAR
Status: COMPLETED | OUTPATIENT
Start: 2023-12-12 | End: 2023-12-12

## 2023-12-12 RX ORDER — ALLOPURINOL 100 MG/1
100 TABLET ORAL DAILY
Qty: 30 TABLET | Refills: 3 | Status: SHIPPED | OUTPATIENT
Start: 2023-12-12

## 2023-12-12 RX ORDER — MORPHINE SULFATE 15 MG/1
15 TABLET, FILM COATED, EXTENDED RELEASE ORAL EVERY 12 HOURS
Qty: 60 TABLET | Refills: 0 | Status: SHIPPED | OUTPATIENT
Start: 2023-12-12 | End: 2023-12-27

## 2023-12-12 RX ORDER — OXYCODONE AND ACETAMINOPHEN 5; 325 MG/1; MG/1
TABLET ORAL
Qty: 100 TABLET | Refills: 0 | Status: SHIPPED | OUTPATIENT
Start: 2023-12-12 | End: 2023-12-28

## 2023-12-12 RX ADMIN — TRIAMCINOLONE ACETONIDE 40 MG: 40 INJECTION, SUSPENSION INTRA-ARTICULAR; INTRAMUSCULAR at 03:12

## 2023-12-12 NOTE — PROGRESS NOTES
Subjective     Patient ID: Gamaliel Pete Jr. is a 73 y.o. male.    Chief Complaint: Clear cell carcinoma of right kidney    HPI    Here for follow up and to consent for systemic therapy- Pembrolizumab and Axitinib    In the interval he reports the following:  Left ankle gout flare  PCP notified and avoided NSAIDs due to diagnosis  Not on allopurinol- discussed starting today  Sees PCP later today for a steroid injection and oral medications    Reports a right upper leg pain  Feels like arthritis pain  States pretty constant  Taking Oxy 5 mg- tries to avoid taking pain meds so stretches to every 8- 24 hours    Sent to ED at last visit to rule out cardiac event as reported syncope and wife stated unresponsive that AM at home  ED ruled out for cardiac event- however, note EKG had changes from EKG on 11/7/2023  - 11/20/2023 EKG:  Vent. Rate : 081 BPM     Atrial Rate : 081 BPM      P-R Int : 160 ms          QRS Dur : 146 ms       QT Int : 402 ms       P-R-T Axes : 000 246 129 degrees      QTc Int : 466 ms   Normal sinus rhythm   Right bundle branch block   Septal infarct ,age undetermined   T wave abnormality, consider lateral ischemia   Abnormal ECG   When compared with ECG of 07-NOV-2023 13:12,   Significant changes have occurred     - Troponin negative    - 11/20/2023 CTA:  Impression:  1. No large central or lobar pulmonary embolism.  2. Stable numerous bilateral pulmonary nodules, concerning for pulmonary metastatic disease.    [facility] (11/30/2023 9:26 AM CST)  Results - [facility] (11/30/2023 9:26 AM CST)  Component Value Ref Range Test Method Analysis Time Performed At Pathologist Signature   Reason for Study To identify somatic and germline mutations relevant to patient's cancer.     11/30/2023 9:26 AM CST [facility]     Genetic Diseases Assessed Cancer     11/30/2023 9:26 AM CST [facility]     Description of Ranges of DNA Sequences Examined 648 gene panel     11/30/2023 9:26 AM CST [facility]      Overall Interpretation positive     11/30/2023 9:26 AM CST [facility]     MSI Stable     11/30/2023 9:26 AM CST [facility]     TMB 7.4 m/MB   11/30/2023 9:26 AM CST [facility]     [facility] Portal [email or web address]     11/30/2023 9:26 AM CST [facility]     Comment: [facility] link   Fusion Addendum Issue Type NEGATIVE  Negative - This addendum is being issued to report that no gene fusions or altered splicing variants from RNA sequencing analysis were found.     11/30/2023 9:26 AM CST [facility]     Therapy Count 4     11/30/2023 9:26 AM CST [facility]     [facility]: Potential Therapy 1 Gene: 02508^VHL^HGNC  Variant: p.E186fs  Agent: Belzutifan  Tissue: Renal Cell Carcinoma  Association: response  Evidence Status: Consensus  Evidence ID: NCCN  Evidence URL:  FDA Approved?: Yes  on label?: No     11/30/2023 9:26 AM CST [facility]     [facility]: Potential Therapy 2 Gene: 43672^PBRM1^HGNC  Variant: p.L618fs  Agent: Nivolumab  Tissue: Clear Cell Renal Cell Carcinoma  Association: response  Evidence Status: Clinical research  Evidence ID: [id number]  Evidence URL: https://www.ncbi.nlm.nih.gov/pubmed/41547585  FDA Approved?: Yes  on label?: Yes     11/30/2023 9:26 AM CST [facility]     [facility]: Potential Therapy 3 Gene: 8975^PIK3CA^HGNC  Variant: p.Q546E  Agent: Capivasertib + Fulvestrant  Tissue: Breast Cancer  Association: response  Evidence Status: Consensus  Evidence ID: FDA  Evidence URL:  FDA Approved?: Yes  on label?: No     11/30/2023 9:26 AM CST [facility]     [facility]: Potential Therapy 4 Gene: 8975^PIK3CA^HGNC  Variant: p.Q546E  Agent: Alpelisib  Tissue: Solid Tumors  Association: response  Evidence Status: Clinical research  Evidence ID: [id number]  Evidence URL: https://www.ncbi.nlm.nih.gov/pubmed/34709205  FDA Approved?: Yes  on label?: No     11/30/2023 9:26 AM CST [facility]     Trial Count 3      11/30/2023 9:26 AM CST [facility]     [facility]: Clinical Trial Match 1 Clinical Trial NCT ID: [id number]  Clinical Trial Title: XGS424 as a Single Agent and in Combination With Everolimus & Immuno-Oncology Agents in Advanced/Relapsed Renal Cancer & Other Malignancies  Clinical Trial URL: https://clinicaltrials.gov/ct2/show/OIW03770738  Clinical Phase: Phase 1  Matched criteria: VHL p.E186fs mutation     11/30/2023 9:26 AM CST [facility]     [facility]: Clinical Trial Match 2 Clinical Trial NCT ID: [id number]  Clinical Trial Title: First-in-Human Study of STX-478 as Monotherapy and in Combination With Other Antineoplastic Agents in Participants With Advanced Solid Tumors  Clinical Trial URL: https://clinicaltrials.gov/ct2/show/NHY98086344  Clinical Phase: Phase 1/Phase 2  Matched criteria: PIK3CA p.Q546E mutation     11/30/2023 9:26 AM CST [facility]     [facility]: Clinical Trial Match 3 Clinical Trial NCT ID: [id number]  Clinical Trial Title: Testing the Combination of Two Anti-cancer Drugs, Peposertib () and  for Advanced Solid Tumors  Clinical Trial URL: https://clinicaltrials.gov/ct2/show/LWE03867425  Clinical Phase: Phase 1  Matched criteria: PBRM1 p.L618fs mutation     11/30/2023 9:26 AM CST [facility]       Results - [facility] x[facility] (11/30/2023 9:26 AM CST)  Specimen (Source) Anatomical Location / Laterality Collection Method / Volume Collection Time Received Time   Tissue       11/16/2023 11:02 AM CST     Results - [facility] x[facility] (11/30/2023 9:26 AM CST)  Narrative   This result has genomic variants that were not included in this document.         Results - [facility] (11/30/2023 9:26 AM CST)  Authorizing Provider Result Type   Ruby Turk MD LAB MOLECULAR DIAGNOSTICS ORDERABLES     Results - [facility] x[facility] (11/30/2023 9:26 AM CST)  Performing Organization Address City/State/ZIP Code Phone Number   [facility]  [address], [address]  [place] [address], [place]  [phone]        Diagnosis: metastatic RCC      Oncology History:  - Presented with gross hematuria mid June 2023  Building a house and has been active working on this in the hat- 1st noted dark urine and felt related to being dehydrated  Occurred a 2nd time approximately 2 weeks later and this time he noted darker and small clots  Noted again 2 weeks later and worse but ultimately he was prompted to seek evaluation when he had significant blood when he urinated     - went to his PCP and urine sample was yellow again  He had blood work done and referred to Urology at that time     - 9/5/2023 CT Urogram:  FINDINGS:  The lung bases demonstrate bilateral nodules, for example 9 mm at the right lung base and up to 11 mm at the left lung base.  Atelectasis present.  There are bilateral fat containing inguinal hernias.  The liver demonstrates no mass.  The spleen is not enlarged.  The stomach, pancreas and adrenal glands are within normal limits.  Gallbladder is unremarkable.  There is no biliary ductal dilatation.  The kidneys concentrate and excrete contrast satisfactorily.  There is no renal calculus or ureteral calculus.  Within the mid to lower pole of the right kidney is a 4.9 x 6.3 x 6.1 cm heterogeneous solid mass which is overall slightly hypodense in relation to the enhancing parenchyma.  The mass is partially exophytic posteriorly.  It does extend partially into the renal sinus  At the upper pole of the right kidney is a subcentimeter exophytic focus which is isodense to the parenchyma on postcontrast imaging appearing slightly hyperdense on the noncontrast study, too small to characterize.  There are multiple additional subcentimeter hypodense right kidney foci which are suggestive of cysts.  Finally at the lower pole of the right kidney is a exophytic hypodense structure most compatible with a cyst.  The right main renal vein appears patent.  There is no significant periaortic lymphadenopathy.  Left kidney demonstrates several subcentimeter foci which are  hypodense but too small to characterize, suggestive of cysts.  There is somewhat of a small amount of contrast within the upper collecting systems and ureters, with no definite focal abnormality, noting that there is some distortion of the collecting system in the region in which the right kidney mass involves the renal sinus.  The bladder is partially distended appearing grossly unremarkable.  The bowel demonstrates no significant abnormality.  The osseous structures demonstrate degenerative changes.  T9 demonstrates a lytic focus occupying the anterior half of the vertebral body.  There is slight loss of height along superior and inferior endplates with cortical disruption..  Impression:  Solid right renal mass concerning for neoplasm.  Multiple lung base nodules up to 11 mm, concerning for metastatic disease.  Recommend chest CT for full evaluation of lungs.  Lytic lesion at T9 with mild compression, concerning for pathologic compression fracture.  Subcentimeter right kidney lesion isodense to parenchyma on contrast enhanced imaging, too small to characterize.  Additional bilateral renal hypodensities which are too small to characterize but suggestive of cysts.     - 9/7/2023 CT Chest:  FINDINGS:  The base of the neck is within normal limits.  The thyroid gland is unremarkable.  The supraclavicular regions are within normal limits.  The trachea is unremarkable.  The central airways are within normal limits.  No endobronchial lesion is identified.  There is no evidence of bronchiectasis.  The heart is unremarkable.  There are no pericardial effusions.  There are coronary artery calcifications.  The thoracic aorta is normal in caliber.  There are subcentimeter mediastinal and hilar lymph nodes.  There are subcentimeter axillary lymph nodes.  There are no pleural effusions.  There is no evidence of a pneumothorax.  There is no evidence of pneumomediastinum.  There are innumerable bilateral pulmonary nodules.  There  is no focal consolidation.  The esophagus is unremarkable.  Please see the dedicated CT abdomen pelvis for the upper abdominal findings.  The chest wall is unremarkable.  There is unchanged lytic lesion involving the anterior aspect of T9 vertebral body with associated cortical erosions.  Impression:  Innumerable pulmonary nodules, concerning for metastatic disease to the chest.  Unchanged lytic lesion in the T9 vertebral body with cortical erosions.  Follow-up MRI of the spine, as clinically warranted.     - 9/7/2023 CT Abd:  FINDINGS:  CT renal protocol:  There is a 4.8 x 6.3 cm exophytic enhancing lesion in the lower pole of the right kidney.  There is hypoenhancement of the lesion compared to the normal renal parenchyma.  There is unchanged appearance of the mass extending into the right renal sinus.  There is no extension into the right renal vein  No additional enhancing lesions are identified.  There is a subcentimeter lesion in the right kidney is too small complete characterization.  There is prompt excretion from both collecting systems.  There is incomplete distension of the right distal ureter.  No definitive filling defect is identified within the.  The urinary bladder is unremarkable.  CT abdomen pelvis:  There is unchanged appearance of multiple pulmonary nodules in the lung bases.  No new nodules identified  The heart is unremarkable.  There is normal tapering of the abdominal aorta.  There are single bilateral renal arteries.  The renal veins remain patent.  There is no evidence of lymphadenopathy.  The esophagus, stomach, and duodenum are within normal limits.  The small bowel loops are unremarkable.  The appendix is not visualized.  There are no secondary findings of acute appendicitis.  There is colonic diverticula without evidence of acute diverticulitis.  The liver is unremarkable.  The gallbladder is within normal limits.  The biliary tree is within normal limits.  The spleen is unremarkable.   The pancreas is within normal limits.  The adrenal glands are unremarkable.  There is no evidence of free fluid in the abdomen or pelvis.  There is no evidence of free air.  There is no evidence of pneumatosis.  No portal venous air is identified.  The psoas margins are unremarkable.  There are bilateral fat containing inguinal hernias.  There are degenerative changes in the osseous structures.  There is unchanged appearance of a lytic lesion involving the anterior aspect of the T9 vertebral body with associated cortical erosions.  Impression:  Unchanged 4.8 x 6.3 cm exophytic hypoenhancing lesion in the lower pole of the right kidney, concerning for renal cell carcinoma.  Extension of lesion into the renal sinus.  No evidence of renal vein invasion.  No regional lymphadenopathy.  Additional smaller subcentimeter lesion too small complete characterization in the right kidney.  Bilateral pulmonary nodules remain concerning for metastatic disease.  Lytic lesion along the anterior aspect of the T9 vertebral body, also concerning for osseous metastatic disease.  Additional findings as above.     - 9/20/2023 Bone scan:  FINDINGS:  There is physiologic distribution of the radiopharmaceutical throughout the skeleton.  Focal uptake in the T9 vertebral body corresponding to lytic lesion seen on CT 09/05/2023.  Focal uptake in the right kidney in patient with known right renal mass.  There is otherwise normal uptake in the genitourinary system and soft tissues.  Impression:  Focal uptake in the T9 vertebral body corresponding to lytic lesion seen on prior CT and concerning for metastatic disease.  Additional focus of increased uptake in the right kidney in patient with known right renal mass     - 10/4/2023 Robotic right nephrectomy  Pathology:  RIGHT KIDNEY, TOTAL NEPHRECTOMY:   - Clear cell renal cell carcinoma, ISUP grade 4, 5.5 cm.   - Benign cortical cysts.   - See [facility] synoptic report below.   SURGICAL PATHOLOGY CANCER  CASE SUMMARY   Procedure: Total nephrectomy.   Specimen laterality: Right.   Tumor size: 5.5 cm.   Tumor focality: Unifocal.   Histologic type: Clear cell renal cell carcinoma.   Sarcomatoid features: Present, 5%.   Rhabdoid features: Not identified.   Histologic Grade (ISUP Grade): 4.   Tumor necrosis: Present, 20%.   Tumor extension: Extends into pelvicalyceal system and renal sinus fat.   Margins: Uninvolved.   Lymphovascular invasion (excluding renal vein and its segmental branches): Not identified.   Regional lymph nodes: No lymph nodes submitted or found.   Distant metastases: Not applicable in this specimen.   Pathologic stage (pTNM, AJCC 8th edition): pT3a pN not assigned (no lymph nodes submitted or found).     - treatment not initiated with below issues:  2 prior admissions      - Admitted from 11/6- 11/9/2023  Hospital Course: Pt admitted for intractable back pain in the setting of renal carcinoma (RCC) s/p R nephrectomy 10/4 followed by Dr. Turk not on systemic therapy. CT and MRI concerning for spinal, lung, and hepatic mets. NSGY, Rad Onc, and IR on board in the hospital and evaluated for C5 and T9 lesions on spine. Pt pain controlled with oxycodone 12 HR BID and Oxycodone 10 PRN for thoracic and cervical pain. Patient underwent preop embolization of cervical tumor with IR on 11/6/23. And then had a cervical corpectomy with NSGY on 11/7/23 which he tolerated well. They obtained tissue samples and sent it over to pathology. IR to perform osteocool/kyphoplasty/biopsy T9 11/14. 2nd stage of surgery with NSGY on 11/15 with Dr. Martinez after kyphoplasty. Patient is to see Dr. Turk for systemic therapy afterwards as well as option for postoperative radiation. Stable to discharge home with c-collar and back brace. Patient also to receive rolling walker and hospital bed due to weakness and pain.   Pathology:  Spine, C5 vertebral body, corpectomy:   - Metastatic renal cell carcinoma, consistent with  patient's history   - [facility] has been ordered and results will be issued in a separate      - Admitted 11/14- 11/18/2023 and underwent IR kyphoplasty of T4-6  Procedure(s) (LRB):  SPINE, CERVICAL, WITH POSTERIOR FUSION T4-6 (N/A)  CORPECTOMY, SPINE, CERVICAL C3-6 REVISION (N/A)   Hospital Course: 11/14: admitted; IR kyphoplasty of T9 today  11/15: OR today for C3-6 PCF.   11/16: POD 1 s/p C3-C6 PCF. NAEO. VSS, afebrile and WBC WNL. Patient complains of mild incisional pain but reports his LUE radiculopathy is improved. Reports mild discomfort with swallowing but no difficulty. Pending PT.   11/17: POD 2 s/p C3-C6 PCF. NAEO and VSS. Mild SABINE on BMP, will resume fluids. Anterior HV drain removed at bedside. Two posterior HV drains remain w/output 50 each.   11/18: POD 3. NAEON. AFVSS. Neurologically stable. Tolerating PO diet and voiding spontaneously. HV drain x 2 removed and pressure dressing placed. Medically stable to discharge home. Follow up in clinic in 2 weeks. Discharge instructions given verbally to patient. All of his questions were answered.  He is encouraged to call the clinic with any questions or concerns prior to follow up appt.     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  - fractured collar bone  - Pediatric hernia             Active Ambulatory Problems     Diagnosis Date Noted    Obesity (BMI 35.0-39.9 without comorbidity) 04/01/2019    Borderline hypertension 04/01/2019    Acute gout of left ankle 04/01/2019    Onychomycosis 04/01/2019    History of gout 06/02/2021    Seborrheic keratosis 06/02/2021    Tinea corporis 12/07/2021    Positive colorectal cancer screening using Cologuard test 12/07/2021              Resolved Ambulatory Problems     Diagnosis Date Noted    No Resolved Ambulatory Problems              Past  Medical History:   Diagnosis Date    Asthma      ED (erectile dysfunction)      Gout        SH:  Retired from [employer]    1 child, 2 stepchildren  Prior tobacco- teens, early 20s  Social EtOH     FH:  Maternal grandmother-  at 91- she had prior cancers- colon cancer and breast cancer  Mother - liver cancer - prior blood transfusion Hep C-  at age 77 yo  Father-  in his 50s- EtOH cirrhosis  Paternal grandfather- cancer in his 70s, unclear type  Maternal grandfather- H+N cancer-  (smoker)    Review of Systems   Constitutional:  Positive for fatigue. Negative for activity change, appetite change and unexpected weight change.   Respiratory:  Negative for cough, shortness of breath and wheezing.    Cardiovascular:  Negative for chest pain, palpitations and leg swelling.   Gastrointestinal:  Negative for abdominal distention, abdominal pain, change in bowel habit, constipation, diarrhea, nausea, vomiting and reflux.   Genitourinary:  Negative for decreased urine volume, difficulty urinating, dysuria, frequency and hematuria.   Musculoskeletal:  Positive for arthralgias, back pain and neck pain.   Neurological:  Negative for dizziness, weakness, numbness and headaches.   Psychiatric/Behavioral:  Negative for dysphoric mood and sleep disturbance. The patient is not nervous/anxious.           Objective     Physical Exam  Vitals and nursing note reviewed.   Constitutional:       General: He is not in acute distress.     Appearance: Normal appearance. He is normal weight. He is not ill-appearing.      Comments: Presents with his wife  Cervical collar in place  Very pleasant  ECOG= 0   Musculoskeletal:      Cervical back: Rigidity present.   Neurological:      Mental Status: He is alert and oriented to person, place, and time.      Motor: No weakness.      Gait: Gait normal.   Psychiatric:         Mood and Affect: Mood normal.         Behavior: Behavior normal.         Thought Content: Thought content  normal.         Judgment: Judgment normal.        Assessment and Plan     1. Right leg pain  -     X-Ray Femur 2 View Right; Future; Expected date: 12/12/2023    2. Clear cell carcinoma of right kidney  -     [facility] Enrollment Chemotherapy; [facility]  -     morphine (MS CONTIN) 15 MG 12 hr tablet; Take 1 tablet (15 mg total) by mouth every 12 (twelve) hours.  Dispense: 60 tablet; Refill: 0  -     oxyCODONE-acetaminophen (PERCOCET) 5-325 mg per tablet; Take 1 to 2 tablets by mouth every 4-6 hours as needed for pain  Dispense: 100 tablet; Refill: 0    3. Gout, unspecified cause, unspecified chronicity, unspecified site  -     allopurinoL (ZYLOPRIM) 100 MG tablet; Take 1 tablet (100 mg total) by mouth once daily.  Dispense: 30 tablet; Refill: 3    4. Metastatic cancer to spine    5. Malignant neoplasm metastatic to lung, unspecified laterality    6. Essential hypertension    7. Normocytic anemia        Right leg pain- imaging needed    Metastatic RCC to spine, pulmonary metastases  Initiate Pembrolizumab, Axitinib  Consented today- consent for reviewed and signed    Gout- see above    Reports plans to move ot [facility]  Hopes to move treatment to [facility] in future date and reviewed this is possible    Chemo Care companion enrollment today    > 60 minutes total    Route Chart for Scheduling    Med Onc Chart Routing  Urgent    Follow up with physician . Start chemo asap, needs cbc, cmp, tsh and t4 (consented today)- Please let me know when scheduled and he femur xrays, needs to see [facility] 3 weeks later with labs 9cbc, cmp, tsh, t4) and EP day prior and chemo next day   Follow up with [facility]    Infusion scheduling note    Injection scheduling note    Labs    Imaging    Pharmacy appointment    Other referrals            Treatment Plan Information   OP AXITINIB + PEMBROLIZUMAB 200MG Q3W   Ruby Turk MD   Upcoming Treatment Dates - OP AXITINIB + PEMBROLIZUMAB 200MG Q3W    12/25/2023       Antiemetics        Physician communication order       Take Home Chemotherapy       axitinib (INLYTA) 5 mg Tab  12/26/2023       Chemotherapy       PEMBROLIZUMAB INFUSION  1/16/2024       Chemotherapy       PEMBROLIZUMAB INFUSION  2/6/2024       Chemotherapy       PEMBROLIZUMAB INFUSION    Therapy Plan Information  EPINEPHrine (EPIPEN) 0.3 mg/0.3 mL pen injection 0.3 mg  0.3 mg, Intramuscular, PRN  diphenhydrAMINE injection 50 mg  50 mg, Intravenous, PRN  hydrocortisone sodium succinate injection 100 mg  100 mg, Intravenous, PRN

## 2023-12-12 NOTE — PROGRESS NOTES
Verified pt ID using name and . ndka. Administered kenalog 40 mg  in left dorsagluteal per physician order using aseptic technique. Aspirated and no blood return noted. Pt tolerated well with no adverse reactions noted.

## 2023-12-12 NOTE — PLAN OF CARE
DISCONTINUE SELECTED CLINICAL TRIAL - Renal Cell    Trial 2019.229: [id number] PD-Inhibitor (Nivolumab) and Ipilimumab Followed by   Nivolumab vs. VEGF TKI Cabozantinib with Nivolumab: A Phase III Trial in   Metastatic Untreated Renal Cell Cancer (PDIGREE)    **Trial eligibility and accrual should be confirmed by your research team**    REASON: Other Reason  PRIOR TREATMENT: Trial 2019.229: [id number] PD-Inhibitor (Nivolumab) and Ipilimumab   Followed by Nivolumab vs. VEGF TKI Cabozantinib with Nivolumab: A Phase III   Trial in Metastatic Untreated Renal Cell Cancer (PDIGREE)  TREATMENT RESPONSE: Unable to Evaluate    START ON PATHWAY REGIMEN - Renal Cell    [id number]        Axitinib (Inlyta)       Pembrolizumab (Keytruda)           Additional Orders: Axitinib dosing requires titration, see PI for   details. In KEYNOTE-426, pembrolizumab was administered for a maximum of 24   months, and axitinib was given until progression or unacceptable toxicity.   Serious immune-mediated adverse events can occur with pembrolizumab. Please   monitor your patient and refer to the linked immune-mediated adverse reaction   management materials for more information.    **Always confirm dose/schedule in your pharmacy ordering system**    Patient Characteristics:  Stage IV (Unresected T4M0 or Any T, M1)/Metastatic Disease, Clear Cell, First   Line, Intermediate or Poor Risk  Therapeutic Status: Stage IV (Unresected T4M0 or Any T, M1)/Metastatic Disease  Histology: Clear Cell  Line of Therapy: First Line  Risk Status: Poor Risk  Intent of Therapy:  Non-Curative / Palliative Intent, Discussed with Patient

## 2023-12-13 ENCOUNTER — PATIENT MESSAGE (OUTPATIENT)
Dept: HEMATOLOGY/ONCOLOGY | Facility: CLINIC | Age: 73
End: 2023-12-13
Payer: MEDICARE

## 2023-12-13 DIAGNOSIS — C79.51 METASTATIC CANCER TO SPINE: Primary | ICD-10-CM

## 2023-12-13 RX ORDER — PREDNISONE 20 MG/1
20 TABLET ORAL DAILY
Qty: 10 TABLET | Refills: 0 | Status: ON HOLD | OUTPATIENT
Start: 2023-12-13 | End: 2024-01-03

## 2023-12-14 DIAGNOSIS — C79.51 METASTATIC ADENOCARCINOMA TO RIGHT FEMUR: Primary | ICD-10-CM

## 2023-12-15 ENCOUNTER — TELEPHONE (OUTPATIENT)
Dept: ORTHOPEDICS | Facility: CLINIC | Age: 73
End: 2023-12-15
Payer: MEDICARE

## 2023-12-15 DIAGNOSIS — M89.9 LYTIC BONE LESIONS ON XRAY: Primary | ICD-10-CM

## 2023-12-15 NOTE — TELEPHONE ENCOUNTER
Spoke to patient. Scheduled for Xrays and appointment on Monday. Patient is asked about extra imaging for shoulder worried about metastases. R shoulder xrays included.     Arminda Centeno MS, ATC, OTC  Clinical Assistant - Dr. Parvez Rock   Orthopedic Oncology   [facility]  Phone: [phone]

## 2023-12-18 ENCOUNTER — TELEPHONE (OUTPATIENT)
Dept: PREADMISSION TESTING | Facility: HOSPITAL | Age: 73
End: 2023-12-18
Payer: MEDICARE

## 2023-12-18 ENCOUNTER — OFFICE VISIT (OUTPATIENT)
Dept: ORTHOPEDICS | Facility: CLINIC | Age: 73
End: 2023-12-18
Payer: MEDICARE

## 2023-12-18 ENCOUNTER — HOSPITAL ENCOUNTER (OUTPATIENT)
Dept: RADIOLOGY | Facility: HOSPITAL | Age: 73
Discharge: HOME | End: 2023-12-18
Attending: ORTHOPAEDIC SURGERY
Payer: MEDICARE

## 2023-12-18 DIAGNOSIS — C78.00 MALIGNANT NEOPLASM METASTATIC TO LUNG, UNSPECIFIED LATERALITY: Primary | ICD-10-CM

## 2023-12-18 DIAGNOSIS — C64.9 METASTATIC RENAL CELL CARCINOMA TO BONE: Primary | ICD-10-CM

## 2023-12-18 DIAGNOSIS — C79.51 METASTATIC ADENOCARCINOMA TO RIGHT FEMUR: ICD-10-CM

## 2023-12-18 DIAGNOSIS — M89.9 LYTIC BONE LESIONS ON XRAY: ICD-10-CM

## 2023-12-18 DIAGNOSIS — C79.51 METASTATIC RENAL CELL CARCINOMA TO BONE: Primary | ICD-10-CM

## 2023-12-18 DIAGNOSIS — Z91.89 IMPENDING PATHOLOGIC FRACTURE: Primary | ICD-10-CM

## 2023-12-18 DIAGNOSIS — M89.9 LYTIC BONE LESIONS ON XRAY: Primary | ICD-10-CM

## 2023-12-18 PROCEDURE — 1111F PR DISCHARGE MEDS RECONCILED W/ CURRENT OUTPATIENT MED LIST: ICD-10-PCS | Mod: CPTII,S$GLB,, | Performed by: ORTHOPAEDIC SURGERY

## 2023-12-18 PROCEDURE — 72170 X-RAY EXAM OF PELVIS: CPT | Mod: TC

## 2023-12-18 PROCEDURE — 73030 X-RAY EXAM OF SHOULDER: CPT | Mod: 26,RT,, | Performed by: RADIOLOGY

## 2023-12-18 PROCEDURE — 99203 OFFICE O/P NEW LOW 30 MIN: CPT | Mod: S$GLB,,, | Performed by: ORTHOPAEDIC SURGERY

## 2023-12-18 PROCEDURE — 73030 XR SHOULDER COMPLETE 2 OR MORE VIEWS RIGHT: ICD-10-PCS | Mod: 26,RT,, | Performed by: RADIOLOGY

## 2023-12-18 PROCEDURE — 1111F DSCHRG MED/CURRENT MED MERGE: CPT | Mod: CPTII,S$GLB,, | Performed by: ORTHOPAEDIC SURGERY

## 2023-12-18 PROCEDURE — 4010F PR ACE/ARB THEARPY RXD/TAKEN: ICD-10-PCS | Mod: CPTII,S$GLB,, | Performed by: ORTHOPAEDIC SURGERY

## 2023-12-18 PROCEDURE — 72170 X-RAY EXAM OF PELVIS: CPT | Mod: 26,,, | Performed by: RADIOLOGY

## 2023-12-18 PROCEDURE — 73552 XR FEMUR 2 VIEW LEFT: ICD-10-PCS | Mod: 26,LT,, | Performed by: RADIOLOGY

## 2023-12-18 PROCEDURE — 72170 XR PELVIS ROUTINE AP: ICD-10-PCS | Mod: 26,,, | Performed by: RADIOLOGY

## 2023-12-18 PROCEDURE — 73552 X-RAY EXAM OF FEMUR 2/>: CPT | Mod: TC,LT

## 2023-12-18 PROCEDURE — 4010F ACE/ARB THERAPY RXD/TAKEN: CPT | Mod: CPTII,S$GLB,, | Performed by: ORTHOPAEDIC SURGERY

## 2023-12-18 PROCEDURE — 73552 X-RAY EXAM OF FEMUR 2/>: CPT | Mod: 26,LT,, | Performed by: RADIOLOGY

## 2023-12-18 PROCEDURE — 99203 PR OFFICE/OUTPT VISIT, NEW, LEVL III, 30-44 MIN: ICD-10-PCS | Mod: S$GLB,,, | Performed by: ORTHOPAEDIC SURGERY

## 2023-12-18 PROCEDURE — 73030 X-RAY EXAM OF SHOULDER: CPT | Mod: TC,RT

## 2023-12-18 NOTE — PROGRESS NOTES
Orthopaedic Oncology New Patient Visit:      Dear Self, Aaareferral  No address on file and Slava Lowery MD,    We had the pleasure of evaluating Gamaliel Pete Jr. in the Orthopaedic Clinic today at the [facility] today.      Chief Complaint: right thigh pain, right shoulder pain, metastatic RCC    As you may recall, Gamaliel Pete Jr. is a 73 y.o. male has been experiencing a few weeks of right thigh pain. He had radiographs done which demonstrated a new proximal femur lesion and was therefore referred to me. He ambulates without assistive devices, but he does have a walker and cane. He did undergo revision C spine surgery in mid November and is recovering from that currently. He is in a c collar. He denies any left thigh or leg pain. He also reports some right shoulder pain which is relatively new. 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 He is currently not on any therapy. He tells me is going to start new immunotherapy soon. He has not had any radiation. He denies new numbness or tingling. No recent falls. No rest pain.     PMH:   Past Medical History:   Diagnosis Date    Asthma     no inhaler use    Borderline hypertension     ED (erectile dysfunction)     Gout     Hematuria     Hypertension        PSH:  has a past surgical history that includes Colonoscopy (02/10/2022); Colonoscopy (N/A, 02/10/2022); Tonsillectomy; Robot-assisted laparoscopic nephrectomy (Right, 10/4/2023); Surgical removal of vertebral body of cervical spine (Right, 11/7/2023); Posterior fusion of cervical spine with laminectomy (N/A, 11/15/2023); and Surgical removal of vertebral body of cervical spine (N/A, 11/15/2023).    Allergies:   Allergies as of 12/18/2023    (No Known Allergies)       Medications:    Current Outpatient Medications:     allopurinoL (ZYLOPRIM) 100 MG tablet, Take 1 tablet (100 mg total) by mouth once daily., Disp: 30 tablet, Rfl: 3     amlodipine-benazepril 5-10 mg (LOTREL) 5-10 mg per capsule, Take 1 capsule by mouth once daily., Disp: 90 capsule, Rfl: 3    diazePAM (VALIUM) 5 MG tablet, Take 1 tablet (5 mg total) by mouth every 6 (six) hours as needed (severe muscles spasms). (Patient not taking: Reported on 11/29/2023), Disp: 10 tablet, Rfl: 0    gabapentin (NEURONTIN) 300 MG capsule, Take 1 capsule (300 mg total) by mouth 3 (three) times daily., Disp: 90 capsule, Rfl: 11    LIDOcaine (LIDODERM) 5 %, Place 1 patch onto the skin once daily. Remove & Discard patch within 12 hours or as directed by MD, Disp: 15 patch, Rfl: 0    methocarbamoL (ROBAXIN) 750 MG Tab, Take 1 tablet (750 mg total) by mouth 4 (four) times daily as needed (muscle spasms)., Disp: 90 tablet, Rfl: 0    morphine (MS CONTIN) 15 MG 12 hr tablet, Take 1 tablet (15 mg total) by mouth every 12 (twelve) hours., Disp: 60 tablet, Rfl: 0    naloxone (NARCAN) 4 mg/actuation Spry, 1 spray (4mg) by nasal route as needed for opioid overdose; may repeat every 2-3 minutes in alternating nostrils until medical help arrives. Call 911, Disp: 2 each, Rfl: 0    omega-3 fatty acids/fish oil (FISH OIL-OMEGA-3 FATTY ACIDS) 300-1,000 mg capsule, Take 2 capsules by mouth once daily., Disp: , Rfl:     oxyCODONE-acetaminophen (PERCOCET) 5-325 mg per tablet, Take 1 to 2 tablets by mouth every 4-6 hours as needed for pain, Disp: 100 tablet, Rfl: 0    predniSONE (DELTASONE) 20 MG tablet, Take 1 tablet (20 mg total) by mouth once daily., Disp: 10 tablet, Rfl: 0    sildenafiL (VIAGRA) 100 MG tablet, Take 1 tablet (100 mg total) by mouth daily as needed for Erectile Dysfunction., Disp: 30 tablet, Rfl: 11    turmeric (CURCUMIN MISC), 1,000 mg by Misc.(Non-Drug; Combo Route) route Daily., Disp: , Rfl:     UNABLE TO FIND, Take 500 mg by mouth 2 (two) times a day. medication name: Tudca, Disp: , Rfl:     UNABLE TO FIND, Take 2 capsules by mouth 2 (two) times a day. medication name: Turkey tail mushroom, Disp: ,  Rfl:     UNABLE TO FIND, Take 15 drops by mouth once daily. medication name: Essiac-20, Disp: , Rfl:     UNABLE TO FIND, Take 5 mLs by mouth 2 (two) times a day. medication name: barley leaf juice powder, Disp: , Rfl:     UNABLE TO FIND, Take 5 mLs by mouth 2 (two) times a day. medication name: black seed oil (cumin seed), Disp: , Rfl:     Family History: family history is not on file.    Social History:  reports that he has never smoked. He has never used smokeless tobacco. He reports that he does not currently use alcohol. He reports that he does not use drugs.    ROS:  A Complete review of systems was performed.  Pertinent positives are listed in the history of present illness above.  Remainder of review of systems were negative.      Vital Signs:      Physical exam:  General:  AAOX3, No acute distress, normal affect and disposition  Respiratory: Respirations unlabored  C collar in place and well fitting  Anterior neck incision healing well    Right lower extremity  Focal pain about the right proximal lateral thigh  Tolerates axial load and log roll  No pain with knee or ankle ROM  Distal neurovascular exam was normal with 5/5 motor for the tibialis anterior, extensor hallucis longus, and gastrocsoleus.  Sensory exam intact in sural, saphenous, superficial peroneal, deep peroneal, and tibial nerve distributions  Foot is perfused    Right upper extremity  No deformity  Pain with palpation distal clavicle  Tolerates passive shoulder ROM  Neurovascularly intact distally    Left lower extremity  No pain with palpation  Tolerates hip ROM log roll and axial load  No pain with knee or ankle ROM  Neurovascularly intact distally      Imaging:    Radiographic Data:  right femur radiograph demonstrates a cortically based lytic lesion about the lateral subtrochanteric region, no acute fracture, left femur radiograph demonstrates a diaphyseal lytic lesion which is smaller than the contralateral one visualized, also no acute  fracture, right shoulder radiograph may demonstrate a subtle lucency in the distal clavicle/acromion region but not a clear lesion, no acute fracture    Pathology: metastatic RCC from spine biopsy    Assessment/Plan: Gamaliel Pete Jr. is a 73 y.o. 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 We had a long conversation about options in light of his total care. I am concerned about his fracture risk on the right side given its location and also his new pain. I did explain for RCC lesion I have a strong preference to coordinate with preoperative embolization with IR when able to prevent unnecessary blood loss from these hypervascular tumors even though I would be doing IMN, I would still open the area and curettage and possibly cement augment the area so I do think there is significant benefit to doing preoperative embolization. We would coordinate this within 24 hours of surgery ideally. Additionally I will keep a close eye on the left femur. He has no symptoms whatsoever on the left side currently, and it is possible that this lesion would stabilize on treatment, but we will need to see. I did explain that although there is some lucency about the right shoulder in the distal clavicle/acromion region that this could be normal, but also will keep an eye on this in light of his known metastatic disease. Additionally, we will need to coordinate with his medical oncologist about what immunotherapy he will be starting and if we need to hold for surgery. All of this will take time and since he is able to ambulate currently without a walker I do think it is prudent to coordinate all of these things in a safe fashion before surgery. That being said we will tentatively try for a 1/9 surgical date pending coordination. Lastly I would like to make sure from his cervical spine perspective he would be healed enough for surgery then as well. I will see him back  on 1/3 with repeat radiographs of bilateral femurs. He will use a walker at all times in the interim. We had a good informed discussion about risk of fracture and he understands concerning signs and symptoms. He will reach out if he develops worsening pain or new pain to let me know as soon as possible. I will reach out to pertinent teams to coordinate surgery for the target date of 1/9. Consent was signed and obtained in clinic today including the risks of surgery. All questions answered.     Thank you for the referral and the opportunity to participate in the care of your patient.  If you have any questions regarding our treatment recommendations don't hesitate to call.    Electronically signed by:    Parvez Rock MD   Orthopedic Oncology and Complex Arthroplasty Reconstruction  [facility]

## 2023-12-19 ENCOUNTER — PATIENT MESSAGE (OUTPATIENT)
Dept: NEUROSURGERY | Facility: CLINIC | Age: 73
End: 2023-12-19
Payer: MEDICARE

## 2023-12-19 LAB
DNA RANGE(S) EXAMINED NAR: NORMAL
GENE DIS ANL INTERP-IMP: POSITIVE
GENE DIS ASSESSED: NORMAL
REASON FOR STUDY: NORMAL
TEMPUS LCA: NORMAL
TEMPUS PORTAL: NORMAL
TEMPUS THERAPY1: NORMAL
TEMPUS THERAPY2: NORMAL
TEMPUS THERAPY3: NORMAL
TEMPUS THERAPYCOUNT: 3
TEMPUS TRIAL1: NORMAL
TEMPUS TRIAL2: NORMAL
TEMPUS TRIAL3: NORMAL
TEMPUS TRIALCOUNT: 3

## 2023-12-20 ENCOUNTER — TELEPHONE (OUTPATIENT)
Dept: NEUROSURGERY | Facility: CLINIC | Age: 73
End: 2023-12-20
Payer: MEDICARE

## 2023-12-20 DIAGNOSIS — M54.50 LOW BACK PAIN, UNSPECIFIED BACK PAIN LATERALITY, UNSPECIFIED CHRONICITY, UNSPECIFIED WHETHER SCIATICA PRESENT: Primary | ICD-10-CM

## 2023-12-20 LAB
DNA RANGE(S) EXAMINED NAR: NORMAL
GENE DIS ANL INTERP-IMP: POSITIVE
GENE DIS ASSESSED: NORMAL
GENE MUT TESTED BLD/T: 7.4 M/MB
MSI CA SPEC-IMP: NORMAL
REASON FOR STUDY: NORMAL
TEMPUS AMENDMENTNOTE1: NORMAL
TEMPUS FUSIONADDENDUM: NORMAL
TEMPUS PORTAL: NORMAL
TEMPUS THERAPY1: NORMAL
TEMPUS THERAPY2: NORMAL
TEMPUS THERAPY3: NORMAL
TEMPUS THERAPYCOUNT: 3
TEMPUS TRIAL1: NORMAL
TEMPUS TRIAL2: NORMAL
TEMPUS TRIAL3: NORMAL
TEMPUS TRIALCOUNT: 3

## 2023-12-21 ENCOUNTER — TELEPHONE (OUTPATIENT)
Dept: HEMATOLOGY/ONCOLOGY | Facility: CLINIC | Age: 73
End: 2023-12-21
Payer: MEDICARE

## 2023-12-21 DIAGNOSIS — Z91.89 IMPENDING PATHOLOGIC FRACTURE: Primary | ICD-10-CM

## 2023-12-21 NOTE — TELEPHONE ENCOUNTER
Patient states that he takes Axitinib (INLYTA) Q12hrs and would like to verify that it is ok for him to take his AM does prior to his infusion (OK to take on the same day) Please advise.

## 2023-12-22 ENCOUNTER — OFFICE VISIT (OUTPATIENT)
Dept: HEMATOLOGY/ONCOLOGY | Facility: CLINIC | Age: 73
End: 2023-12-22
Payer: MEDICARE

## 2023-12-22 ENCOUNTER — INFUSION (OUTPATIENT)
Dept: INFUSION THERAPY | Facility: OTHER | Age: 73
End: 2023-12-22
Attending: STUDENT IN AN ORGANIZED HEALTH CARE EDUCATION/TRAINING PROGRAM
Payer: MEDICARE

## 2023-12-22 ENCOUNTER — PATIENT MESSAGE (OUTPATIENT)
Dept: HEMATOLOGY/ONCOLOGY | Facility: CLINIC | Age: 73
End: 2023-12-22
Payer: MEDICARE

## 2023-12-22 ENCOUNTER — PATIENT MESSAGE (OUTPATIENT)
Dept: ADMINISTRATIVE | Facility: OTHER | Age: 73
End: 2023-12-22
Payer: MEDICARE

## 2023-12-22 VITALS
RESPIRATION RATE: 18 BRPM | WEIGHT: 208.75 LBS | TEMPERATURE: 98 F | DIASTOLIC BLOOD PRESSURE: 77 MMHG | HEART RATE: 79 BPM | SYSTOLIC BLOOD PRESSURE: 163 MMHG | HEIGHT: 70 IN | BODY MASS INDEX: 29.89 KG/M2 | OXYGEN SATURATION: 100 %

## 2023-12-22 DIAGNOSIS — C78.00 MALIGNANT NEOPLASM METASTATIC TO LUNG, UNSPECIFIED LATERALITY: Primary | ICD-10-CM

## 2023-12-22 DIAGNOSIS — C78.00 MALIGNANT NEOPLASM METASTATIC TO LUNG, UNSPECIFIED LATERALITY: ICD-10-CM

## 2023-12-22 DIAGNOSIS — I10 ESSENTIAL HYPERTENSION: ICD-10-CM

## 2023-12-22 DIAGNOSIS — C64.1 CLEAR CELL CARCINOMA OF RIGHT KIDNEY: Primary | ICD-10-CM

## 2023-12-22 DIAGNOSIS — C64.1 CLEAR CELL CARCINOMA OF RIGHT KIDNEY: ICD-10-CM

## 2023-12-22 DIAGNOSIS — R93.89 ABNORMAL FINDINGS ON DIAGNOSTIC IMAGING OF OTHER SPECIFIED BODY STRUCTURES: ICD-10-CM

## 2023-12-22 DIAGNOSIS — C79.51 METASTATIC CANCER TO SPINE: ICD-10-CM

## 2023-12-22 LAB
ALBUMIN SERPL BCP-MCNC: 4.1 G/DL (ref 3.5–5.2)
ALP SERPL-CCNC: 68 U/L (ref 55–135)
ALT SERPL W/O P-5'-P-CCNC: 30 U/L (ref 10–44)
ANION GAP SERPL CALC-SCNC: 14 MMOL/L (ref 8–16)
AST SERPL-CCNC: 28 U/L (ref 10–40)
BASOPHILS # BLD AUTO: 0.02 K/UL (ref 0–0.2)
BASOPHILS NFR BLD: 0.2 % (ref 0–1.9)
BILIRUB SERPL-MCNC: 0.4 MG/DL (ref 0.1–1)
BUN SERPL-MCNC: 28 MG/DL (ref 8–23)
CALCIUM SERPL-MCNC: 10.5 MG/DL (ref 8.7–10.5)
CHLORIDE SERPL-SCNC: 102 MMOL/L (ref 95–110)
CO2 SERPL-SCNC: 20 MMOL/L (ref 23–29)
CREAT SERPL-MCNC: 1.3 MG/DL (ref 0.5–1.4)
DIFFERENTIAL METHOD: ABNORMAL
EOSINOPHIL # BLD AUTO: 0 K/UL (ref 0–0.5)
EOSINOPHIL NFR BLD: 0 % (ref 0–8)
ERYTHROCYTE [DISTWIDTH] IN BLOOD BY AUTOMATED COUNT: 14.5 % (ref 11.5–14.5)
EST. GFR  (NO RACE VARIABLE): 59 ML/MIN/1.73 M^2
GLUCOSE SERPL-MCNC: 109 MG/DL (ref 70–110)
HCT VFR BLD AUTO: 35.4 % (ref 40–54)
HGB BLD-MCNC: 11.8 G/DL (ref 14–18)
IMM GRANULOCYTES # BLD AUTO: 0.09 K/UL (ref 0–0.04)
IMM GRANULOCYTES NFR BLD AUTO: 0.9 % (ref 0–0.5)
LYMPHOCYTES # BLD AUTO: 1 K/UL (ref 1–4.8)
LYMPHOCYTES NFR BLD: 10.6 % (ref 18–48)
MCH RBC QN AUTO: 29.5 PG (ref 27–31)
MCHC RBC AUTO-ENTMCNC: 33.3 G/DL (ref 32–36)
MCV RBC AUTO: 89 FL (ref 82–98)
MONOCYTES # BLD AUTO: 0.4 K/UL (ref 0.3–1)
MONOCYTES NFR BLD: 3.6 % (ref 4–15)
NEUTROPHILS # BLD AUTO: 8.2 K/UL (ref 1.8–7.7)
NEUTROPHILS NFR BLD: 84.7 % (ref 38–73)
NRBC BLD-RTO: 0 /100 WBC
PLATELET # BLD AUTO: 291 K/UL (ref 150–450)
PMV BLD AUTO: 9.2 FL (ref 9.2–12.9)
POTASSIUM SERPL-SCNC: 5.1 MMOL/L (ref 3.5–5.1)
PROT SERPL-MCNC: 8.7 G/DL (ref 6–8.4)
RBC # BLD AUTO: 4 M/UL (ref 4.6–6.2)
SODIUM SERPL-SCNC: 136 MMOL/L (ref 136–145)
T4 SERPL-MCNC: 7.8 UG/DL (ref 4.5–11.5)
TSH SERPL DL<=0.005 MIU/L-ACNC: 0.85 UIU/ML (ref 0.4–4)
WBC # BLD AUTO: 9.71 K/UL (ref 3.9–12.7)

## 2023-12-22 PROCEDURE — 4010F PR ACE/ARB THEARPY RXD/TAKEN: ICD-10-PCS | Mod: CPTII,95,, | Performed by: INTERNAL MEDICINE

## 2023-12-22 PROCEDURE — 25000003 PHARM REV CODE 250: Performed by: INTERNAL MEDICINE

## 2023-12-22 PROCEDURE — 99499 UNLISTED E&M SERVICE: CPT | Mod: 95,,, | Performed by: INTERNAL MEDICINE

## 2023-12-22 PROCEDURE — 80053 COMPREHEN METABOLIC PANEL: CPT | Performed by: INTERNAL MEDICINE

## 2023-12-22 PROCEDURE — 99213 PR OFFICE/OUTPT VISIT, EST, LEVL III, 20-29 MIN: ICD-10-PCS | Mod: 95,,, | Performed by: INTERNAL MEDICINE

## 2023-12-22 PROCEDURE — 85025 COMPLETE CBC W/AUTO DIFF WBC: CPT | Performed by: INTERNAL MEDICINE

## 2023-12-22 PROCEDURE — 84443 ASSAY THYROID STIM HORMONE: CPT | Performed by: INTERNAL MEDICINE

## 2023-12-22 PROCEDURE — 1160F RVW MEDS BY RX/DR IN RCRD: CPT | Mod: CPTII,95,, | Performed by: INTERNAL MEDICINE

## 2023-12-22 PROCEDURE — 1160F PR REVIEW ALL MEDS BY PRESCRIBER/CLIN PHARMACIST DOCUMENTED: ICD-10-PCS | Mod: CPTII,95,, | Performed by: INTERNAL MEDICINE

## 2023-12-22 PROCEDURE — 1159F MED LIST DOCD IN RCRD: CPT | Mod: CPTII,95,, | Performed by: INTERNAL MEDICINE

## 2023-12-22 PROCEDURE — 96413 CHEMO IV INFUSION 1 HR: CPT

## 2023-12-22 PROCEDURE — 1159F PR MEDICATION LIST DOCUMENTED IN MEDICAL RECORD: ICD-10-PCS | Mod: CPTII,95,, | Performed by: INTERNAL MEDICINE

## 2023-12-22 PROCEDURE — 4010F ACE/ARB THERAPY RXD/TAKEN: CPT | Mod: CPTII,95,, | Performed by: INTERNAL MEDICINE

## 2023-12-22 PROCEDURE — 84436 ASSAY OF TOTAL THYROXINE: CPT | Performed by: INTERNAL MEDICINE

## 2023-12-22 PROCEDURE — 99213 OFFICE O/P EST LOW 20 MIN: CPT | Mod: 95,,, | Performed by: INTERNAL MEDICINE

## 2023-12-22 PROCEDURE — 63600175 PHARM REV CODE 636 W HCPCS: Mod: JZ,JG | Performed by: INTERNAL MEDICINE

## 2023-12-22 RX ORDER — EPINEPHRINE 0.3 MG/.3ML
0.3 INJECTION SUBCUTANEOUS ONCE AS NEEDED
Status: DISCONTINUED | OUTPATIENT
Start: 2023-12-22 | End: 2023-12-22 | Stop reason: HOSPADM

## 2023-12-22 RX ORDER — SODIUM CHLORIDE 0.9 % (FLUSH) 0.9 %
10 SYRINGE (ML) INJECTION
Status: CANCELLED | OUTPATIENT
Start: 2023-12-26

## 2023-12-22 RX ORDER — HEPARIN 100 UNIT/ML
500 SYRINGE INTRAVENOUS
Status: CANCELLED | OUTPATIENT
Start: 2023-12-26

## 2023-12-22 RX ORDER — DIPHENHYDRAMINE HYDROCHLORIDE 50 MG/ML
50 INJECTION INTRAMUSCULAR; INTRAVENOUS ONCE AS NEEDED
Status: DISCONTINUED | OUTPATIENT
Start: 2023-12-22 | End: 2023-12-22 | Stop reason: HOSPADM

## 2023-12-22 RX ORDER — HEPARIN 100 UNIT/ML
500 SYRINGE INTRAVENOUS
Status: DISCONTINUED | OUTPATIENT
Start: 2023-12-22 | End: 2023-12-22 | Stop reason: HOSPADM

## 2023-12-22 RX ORDER — SODIUM CHLORIDE 0.9 % (FLUSH) 0.9 %
10 SYRINGE (ML) INJECTION
Status: DISCONTINUED | OUTPATIENT
Start: 2023-12-22 | End: 2023-12-22 | Stop reason: HOSPADM

## 2023-12-22 RX ORDER — DIPHENHYDRAMINE HYDROCHLORIDE 50 MG/ML
50 INJECTION INTRAMUSCULAR; INTRAVENOUS ONCE AS NEEDED
Status: CANCELLED | OUTPATIENT
Start: 2023-12-26

## 2023-12-22 RX ORDER — EPINEPHRINE 0.3 MG/.3ML
0.3 INJECTION SUBCUTANEOUS ONCE AS NEEDED
Status: CANCELLED | OUTPATIENT
Start: 2023-12-26

## 2023-12-22 RX ADMIN — SODIUM CHLORIDE: 9 INJECTION, SOLUTION INTRAVENOUS at 11:12

## 2023-12-22 RX ADMIN — SODIUM CHLORIDE 200 MG: 9 INJECTION, SOLUTION INTRAVENOUS at 12:12

## 2023-12-22 NOTE — PROGRESS NOTES
Subjective     Patient ID: Gamaliel Pete Jr. is a 73 y.o. male.    Chief Complaint: No chief complaint on file.    HPI    The patient location is: home  The chief complaint leading to consultation is: follow up  Visit type: audiovisual  Face to Face time with patient: 10 minutes  15 minutes of total time spent on the encounter, which includes face to face time and non-face to face time preparing to see the patient (eg, review of tests), Obtaining and/or reviewing separately obtained history, Documenting clinical information in the electronic or other health record, Independently interpreting results (not separately reported) and communicating results to the patient/family/caregiver, or Care coordination (not separately reported).   Each patient to whom he or she provides medical services by telemedicine is:  (1) informed of the relationship between the physician and patient and the respective role of any other health care provider with respect to management of the patient; and (2) notified that he or she may decline to receive medical services by telemedicine and may withdraw from such care at any time.    Notes:     In the interval we referred him to Dr. Rock after concern for imaging findings at right femur.  She plans to presumptively do IMN surgery on 1/9 including preoperative embolization with IR when able to prevent unnecessary blood loss from these hypervascular tumors.    He has just started systemic therapy- Pembrolizumab and Axitinib  He reports overall better pain control but has times when poorly controlled. He acknowledged holding doses of his long-acting pain medication, or delaying, when he feels the pain is well-controlled and taking short acting pain medications instead.  We discussed the importance of maintaining schedule for long-acting pain medication.  He will take on schedule, and document when he needs short acting medication so we can help better calculate pain medication  needs.  Denies new pain areas.  Weight stable.  Has noted no side effects of treatment to date.  Notes- needs handicap form for DMV.     Diagnosis: metastatic RCC     Oncology History:  - Presented with gross hematuria mid June 2023  Building a house and has been active working on this in the hat- 1st noted dark urine and felt related to being dehydrated  Occurred a 2nd time approximately 2 weeks later and this time he noted darker and small clots  Noted again 2 weeks later and worse but ultimately he was prompted to seek evaluation when he had significant blood when he urinated     - went to his PCP and urine sample was yellow again  He had blood work done and referred to Urology at that time     - 9/5/2023 CT Urogram:  FINDINGS:  The lung bases demonstrate bilateral nodules, for example 9 mm at the right lung base and up to 11 mm at the left lung base.  Atelectasis present.  There are bilateral fat containing inguinal hernias.  The liver demonstrates no mass.  The spleen is not enlarged.  The stomach, pancreas and adrenal glands are within normal limits.  Gallbladder is unremarkable.  There is no biliary ductal dilatation.  The kidneys concentrate and excrete contrast satisfactorily.  There is no renal calculus or ureteral calculus.  Within the mid to lower pole of the right kidney is a 4.9 x 6.3 x 6.1 cm heterogeneous solid mass which is overall slightly hypodense in relation to the enhancing parenchyma.  The mass is partially exophytic posteriorly.  It does extend partially into the renal sinus  At the upper pole of the right kidney is a subcentimeter exophytic focus which is isodense to the parenchyma on postcontrast imaging appearing slightly hyperdense on the noncontrast study, too small to characterize.  There are multiple additional subcentimeter hypodense right kidney foci which are suggestive of cysts.  Finally at the lower pole of the right kidney is a exophytic hypodense structure most compatible with a  cyst.  The right main renal vein appears patent.  There is no significant periaortic lymphadenopathy.  Left kidney demonstrates several subcentimeter foci which are hypodense but too small to characterize, suggestive of cysts.  There is somewhat of a small amount of contrast within the upper collecting systems and ureters, with no definite focal abnormality, noting that there is some distortion of the collecting system in the region in which the right kidney mass involves the renal sinus.  The bladder is partially distended appearing grossly unremarkable.  The bowel demonstrates no significant abnormality.  The osseous structures demonstrate degenerative changes.  T9 demonstrates a lytic focus occupying the anterior half of the vertebral body.  There is slight loss of height along superior and inferior endplates with cortical disruption..  Impression:  Solid right renal mass concerning for neoplasm.  Multiple lung base nodules up to 11 mm, concerning for metastatic disease.  Recommend chest CT for full evaluation of lungs.  Lytic lesion at T9 with mild compression, concerning for pathologic compression fracture.  Subcentimeter right kidney lesion isodense to parenchyma on contrast enhanced imaging, too small to characterize.  Additional bilateral renal hypodensities which are too small to characterize but suggestive of cysts.     - 9/7/2023 CT Chest:  FINDINGS:  The base of the neck is within normal limits.  The thyroid gland is unremarkable.  The supraclavicular regions are within normal limits.  The trachea is unremarkable.  The central airways are within normal limits.  No endobronchial lesion is identified.  There is no evidence of bronchiectasis.  The heart is unremarkable.  There are no pericardial effusions.  There are coronary artery calcifications.  The thoracic aorta is normal in caliber.  There are subcentimeter mediastinal and hilar lymph nodes.  There are subcentimeter axillary lymph nodes.  There are  no pleural effusions.  There is no evidence of a pneumothorax.  There is no evidence of pneumomediastinum.  There are innumerable bilateral pulmonary nodules.  There is no focal consolidation.  The esophagus is unremarkable.  Please see the dedicated CT abdomen pelvis for the upper abdominal findings.  The chest wall is unremarkable.  There is unchanged lytic lesion involving the anterior aspect of T9 vertebral body with associated cortical erosions.  Impression:  Innumerable pulmonary nodules, concerning for metastatic disease to the chest.  Unchanged lytic lesion in the T9 vertebral body with cortical erosions.  Follow-up MRI of the spine, as clinically warranted.     - 9/7/2023 CT Abd:  FINDINGS:  CT renal protocol:  There is a 4.8 x 6.3 cm exophytic enhancing lesion in the lower pole of the right kidney.  There is hypoenhancement of the lesion compared to the normal renal parenchyma.  There is unchanged appearance of the mass extending into the right renal sinus.  There is no extension into the right renal vein  No additional enhancing lesions are identified.  There is a subcentimeter lesion in the right kidney is too small complete characterization.  There is prompt excretion from both collecting systems.  There is incomplete distension of the right distal ureter.  No definitive filling defect is identified within the.  The urinary bladder is unremarkable.  CT abdomen pelvis:  There is unchanged appearance of multiple pulmonary nodules in the lung bases.  No new nodules identified  The heart is unremarkable.  There is normal tapering of the abdominal aorta.  There are single bilateral renal arteries.  The renal veins remain patent.  There is no evidence of lymphadenopathy.  The esophagus, stomach, and duodenum are within normal limits.  The small bowel loops are unremarkable.  The appendix is not visualized.  There are no secondary findings of acute appendicitis.  There is colonic diverticula without evidence  of acute diverticulitis.  The liver is unremarkable.  The gallbladder is within normal limits.  The biliary tree is within normal limits.  The spleen is unremarkable.  The pancreas is within normal limits.  The adrenal glands are unremarkable.  There is no evidence of free fluid in the abdomen or pelvis.  There is no evidence of free air.  There is no evidence of pneumatosis.  No portal venous air is identified.  The psoas margins are unremarkable.  There are bilateral fat containing inguinal hernias.  There are degenerative changes in the osseous structures.  There is unchanged appearance of a lytic lesion involving the anterior aspect of the T9 vertebral body with associated cortical erosions.  Impression:  Unchanged 4.8 x 6.3 cm exophytic hypoenhancing lesion in the lower pole of the right kidney, concerning for renal cell carcinoma.  Extension of lesion into the renal sinus.  No evidence of renal vein invasion.  No regional lymphadenopathy.  Additional smaller subcentimeter lesion too small complete characterization in the right kidney.  Bilateral pulmonary nodules remain concerning for metastatic disease.  Lytic lesion along the anterior aspect of the T9 vertebral body, also concerning for osseous metastatic disease.  Additional findings as above.     - 9/20/2023 Bone scan:  FINDINGS:  There is physiologic distribution of the radiopharmaceutical throughout the skeleton.  Focal uptake in the T9 vertebral body corresponding to lytic lesion seen on CT 09/05/2023.  Focal uptake in the right kidney in patient with known right renal mass.  There is otherwise normal uptake in the genitourinary system and soft tissues.  Impression:  Focal uptake in the T9 vertebral body corresponding to lytic lesion seen on prior CT and concerning for metastatic disease.  Additional focus of increased uptake in the right kidney in patient with known right renal mass     - 10/4/2023 Robotic right nephrectomy  Pathology:  RIGHT KIDNEY,  TOTAL NEPHRECTOMY:   - Clear cell renal cell carcinoma, ISUP grade 4, 5.5 cm.   - Benign cortical cysts.   - See [facility] synoptic report below.   SURGICAL PATHOLOGY CANCER CASE SUMMARY   Procedure: Total nephrectomy.   Specimen laterality: Right.   Tumor size: 5.5 cm.   Tumor focality: Unifocal.   Histologic type: Clear cell renal cell carcinoma.   Sarcomatoid features: Present, 5%.   Rhabdoid features: Not identified.   Histologic Grade (ISUP Grade): 4.   Tumor necrosis: Present, 20%.   Tumor extension: Extends into pelvicalyceal system and renal sinus fat.   Margins: Uninvolved.   Lymphovascular invasion (excluding renal vein and its segmental branches): Not identified.   Regional lymph nodes: No lymph nodes submitted or found.   Distant metastases: Not applicable in this specimen.   Pathologic stage (pTNM, AJCC 8th edition): pT3a pN not assigned (no lymph nodes submitted or found).      - treatment not initiated with below issues:  2 prior admissions      - Admitted from 11/6- 11/9/2023  Hospital Course: Pt admitted for intractable back pain in the setting of renal carcinoma (RCC) s/p R nephrectomy 10/4 followed by Dr. Turk not on systemic therapy. CT and MRI concerning for spinal, lung, and hepatic mets. NSGY, Rad Onc, and IR on board in the hospital and evaluated for C5 and T9 lesions on spine. Pt pain controlled with oxycodone 12 HR BID and Oxycodone 10 PRN for thoracic and cervical pain. Patient underwent preop embolization of cervical tumor with IR on 11/6/23. And then had a cervical corpectomy with NSGY on 11/7/23 which he tolerated well. They obtained tissue samples and sent it over to pathology. IR to perform osteocool/kyphoplasty/biopsy T9 11/14. 2nd stage of surgery with NSGY on 11/15 with Dr. Martinez after kyphoplasty. Patient is to see Dr. Turk for systemic therapy afterwards as well as option for postoperative radiation. Stable to discharge home with c-collar and back brace. Patient also to receive  rolling walker and hospital bed due to weakness and pain.   Pathology:  Spine, C5 vertebral body, corpectomy:   - Metastatic renal cell carcinoma, consistent with patient's history   - [facility] has been ordered and results will be issued in a separate      - Admitted 11/14- 11/18/2023 and underwent IR kyphoplasty of T4-6  Procedure(s) (LRB):  SPINE, CERVICAL, WITH POSTERIOR FUSION T4-6 (N/A)  CORPECTOMY, SPINE, CERVICAL C3-6 REVISION (N/A)   Hospital Course: 11/14: admitted; IR kyphoplasty of T9 today  11/15: OR today for C3-6 PCF.   11/16: POD 1 s/p C3-C6 PCF. NAEO. VSS, afebrile and WBC WNL. Patient complains of mild incisional pain but reports his LUE radiculopathy is improved. Reports mild discomfort with swallowing but no difficulty. Pending PT.   11/17: POD 2 s/p C3-C6 PCF. NAEO and VSS. Mild SAIBNE on BMP, will resume fluids. Anterior HV drain removed at bedside. Two posterior HV drains remain w/output 50 each.   11/18: POD 3. NAEON. AFVSS. Neurologically stable. Tolerating PO diet and voiding spontaneously. HV drain x 2 removed and pressure dressing placed. Medically stable to discharge home. Follow up in clinic in 2 weeks. Discharge instructions given verbally to patient. All of his questions were answered.  He is encouraged to call the clinic with any questions or concerns prior to follow up appt.      [facility] (11/30/2023 9:26 AM CST)  Results - [facility] (11/30/2023 9:26 AM CST)  Component Value Ref Range Test Method Analysis Time Performed At Pathologist Signature   Reason for Study To identify somatic and germline mutations relevant to patient's cancer.     11/30/2023 9:26 AM CST [facility]     Genetic Diseases Assessed Cancer     11/30/2023 9:26 AM CST [facility]     Description of Ranges of DNA Sequences Examined 648 gene panel     11/30/2023 9:26 AM CST [facility]     Overall Interpretation positive     11/30/2023 9:26 AM CST [facility]     MSI Stable     11/30/2023 9:26 AM CST [facility]      TMB 7.4 m/MB   11/30/2023 9:26 AM CST [facility]     [facility] [email or web address]     11/30/2023 9:26 AM CST [facility]     Comment: [facility] link   Fusion Addendum Issue Type NEGATIVE  Negative - This addendum is being issued to report that no gene fusions or altered splicing variants from RNA sequencing analysis were found.     11/30/2023 9:26 AM CST [facility]     Therapy Count 4     11/30/2023 9:26 AM CST [facility]     [facility]: Potential Therapy 1 Gene: 63479^VHL^HGNC  Variant: p.E186fs  Agent: Belzutifan  Tissue: Renal Cell Carcinoma  Association: response  Evidence Status: Consensus  Evidence ID: NCCN  Evidence URL:  FDA Approved?: Yes  on label?: No     11/30/2023 9:26 AM CST [facility]     [facility]: Potential Therapy 2 Gene: 18334^PBRM1^HGNC  Variant: p.L618fs  Agent: Nivolumab  Tissue: Clear Cell Renal Cell Carcinoma  Association: response  Evidence Status: Clinical research  Evidence ID: [id number]  Evidence URL: https://www.ncbi.nlm.nih.gov/pubmed/61687890  FDA Approved?: Yes  on label?: Yes     11/30/2023 9:26 AM CST [facility]     [facility]: Potential Therapy 3 Gene: 8975^PIK3CA^HGNC  Variant: p.Q546E  Agent: Capivasertib + Fulvestrant  Tissue: Breast Cancer  Association: response  Evidence Status: Consensus  Evidence ID: FDA  Evidence URL:  FDA Approved?: Yes  on label?: No     11/30/2023 9:26 AM CST [facility]     [facility]: Potential Therapy 4 Gene: 8975^PIK3CA^HGNC  Variant: p.Q546E  Agent: Alpelisib  Tissue: Solid Tumors  Association: response  Evidence Status: Clinical research  Evidence ID: [id number]  Evidence URL: https://www.ncbi.nlm.nih.gov/pubmed/68714137  FDA Approved?: Yes  on label?: No     11/30/2023 9:26 AM CST [facility]     Trial Count 3     11/30/2023 9:26 AM CST [facility]     [facility]: Clinical Trial Match 1 Clinical Trial NCT ID: [id number]  Clinical Trial Title:  HWW317 as a Single Agent and in Combination With Everolimus & Immuno-Oncology Agents in Advanced/Relapsed Renal Cancer & Other Malignancies  Clinical Trial URL: https://clinicaltrials.gov/ct2/show/JBU01983767  Clinical Phase: Phase 1  Matched criteria: VHL p.E186fs mutation     11/30/2023 9:26 AM CST [facility]     [facility]: Clinical Trial Match 2 Clinical Trial NCT ID: [id number]  Clinical Trial Title: First-in-Human Study of STX-478 as Monotherapy and in Combination With Other Antineoplastic Agents in Participants With Advanced Solid Tumors  Clinical Trial URL: https://clinicaltrials.gov/ct2/show/KXZ55599709  Clinical Phase: Phase 1/Phase 2  Matched criteria: PIK3CA p.Q546E mutation     11/30/2023 9:26 AM CST [facility]     [facility]: Clinical Trial Match 3 Clinical Trial NCT ID: [id number]  Clinical Trial Title: Testing the Combination of Two Anti-cancer Drugs, Peposertib () and  for Advanced Solid Tumors  Clinical Trial URL: https://clinicaltrials.gov/ct2/show/HVA46120845  Clinical Phase: Phase 1  Matched criteria: PBRM1 p.L618fs mutation     11/30/2023 9:26 AM CST No [place]!        Results - [facility] (11/30/2023 9:26 AM CST)  Specimen (Source) Anatomical Location / Laterality Collection Method / Volume Collection Time Received Time   Tissue       11/16/2023 11:02 AM CST      Results - [facility] (11/30/2023 9:26 AM CST)  Narrative   This result has genomic variants that were not included in this document.          Results - [facility] (11/30/2023 9:26 AM CST)  Authorizing Provider Result Type   Ruby Turk MD LAB MOLECULAR DIAGNOSTICS ORDERABLES      Results - [facility] (11/30/2023 9:26 AM CST)  Performing Organization Address City/State/ZIP Code Phone Number   [place]  [address], [address]  [place] [address],   [phone]         - initiated systemic therapy with Pembro/Axitinib on 12/18/2023    PMH:  - Borderline HTN   Initiated on antihypertensives for borderline parameters  Off  therapy x years  - Gout  - Childhood asthma  Rare as an adult- worked for [employer] - 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  - fractured collar bone  - Pediatric hernia             Active Ambulatory Problems     Diagnosis Date Noted    Obesity (BMI 35.0-39.9 without comorbidity) 2019    Borderline hypertension 2019    Acute gout of left ankle 2019    Onychomycosis 2019    History of gout 2021    Seborrheic keratosis 2021    Tinea corporis 2021    Positive colorectal cancer screening using Cologuard test 2021              Resolved Ambulatory Problems     Diagnosis Date Noted    No Resolved Ambulatory Problems              Past Medical History:   Diagnosis Date    Asthma      ED (erectile dysfunction)      Gout        SH:  Retired from [employer]    1 child, 2 stepchildren  Prior tobacco- teens, early 20s  Social EtOH     FH:  Maternal grandmother-  at 91- she had prior cancers- colon cancer and breast cancer  Mother - liver cancer - prior blood transfusion Hep C-  at age 77 yo  Father-  in his 50s- EtOH cirrhosis  Paternal grandfather- cancer in his 70s, unclear type  Maternal grandfather- H+N cancer-  (smoker)      Review of Systems  See above     Objective     Physical Exam  Very pleasant  Virtual visit     Assessment and Plan     1. Clear cell carcinoma of right kidney    2. Metastatic cancer to spine    3. Malignant neoplasm metastatic to lung, unspecified laterality    4. Essential hypertension    Other orders  -     Cancel: pembrolizumab (KEYTRUDA) 200 mg in sodium chloride 0.9% SolP 108 mL infusion  -     Cancel: EPINEPHrine (EPIPEN) 0.3 mg/0.3 mL pen injection 0.3 mg  -     Cancel: diphenhydrAMINE injection 50 mg  -     Cancel: hydrocortisone sodium succinate injection 100 mg  -     Cancel: sodium chloride 0.9% 100 mL flush bag  -     Cancel: sodium chloride 0.9% flush 10 mL  -     Cancel: heparin, porcine (PF) 100 unit/mL injection flush 500  Units  -     Cancel: alteplase injection 2 mg    Pembro scheduled well around surgical date- as surgery 1/9 likely, will give C2 following week  He will need to hold axitinib at least 2 days (I prefer 4-5 days) pre-operatively - we will finalize plan when surgery date verified    Reviewed pain medications    HTN- monitor    Route Chart for Scheduling    Med Onc Chart Routing  Urgent    Follow up with physician . For [facility] schedulers: 1/16/2023 needs cbc, cmp, tsh and t4 and appt with Kelly; 1/19/2023 Chemo infusion   Follow up with [facility]    Infusion scheduling note    Injection scheduling note    Labs    Imaging    Pharmacy appointment    Other referrals            Treatment Plan Information   OP AXITINIB + PEMBROLIZUMAB 200MG Q3W   Ruby Turk MD   Upcoming Treatment Dates - OP AXITINIB + PEMBROLIZUMAB 200MG Q3W    1/16/2024       Chemotherapy       pembrolizumab (KEYTRUDA) 200 mg in sodium chloride 0.9% SolP 108 mL infusion  2/6/2024       Chemotherapy       pembrolizumab (KEYTRUDA) 200 mg in sodium chloride 0.9% SolP 108 mL infusion  2/27/2024       Chemotherapy       pembrolizumab (KEYTRUDA) 200 mg in sodium chloride 0.9% SolP 108 mL infusion  3/19/2024       Chemotherapy       pembrolizumab (KEYTRUDA) 200 mg in sodium chloride 0.9% SolP 108 mL infusion    Therapy Plan Information  EPINEPHrine (EPIPEN) 0.3 mg/0.3 mL pen injection 0.3 mg  0.3 mg, Intramuscular, PRN  diphenhydrAMINE injection 50 mg  50 mg, Intravenous, PRN  hydrocortisone sodium succinate injection 100 mg  100 mg, Intravenous, PRN               No follow-ups on file.

## 2023-12-22 NOTE — PLAN OF CARE
Vital Signs Stable, No apparent distress noted; Pt tolerated _Keytruda_ infusion w/o difficulty.  24g piv removed;No bleeding,swelling or drainage noted to the site.  AVS/Discharge instructions reviewed;all questions answered ;Pt verbalizes understanding. Follow up appts per mychart; ambulated from clinic in NAD, accompanied by wife

## 2023-12-23 ENCOUNTER — PATIENT MESSAGE (OUTPATIENT)
Dept: ADMINISTRATIVE | Facility: OTHER | Age: 73
End: 2023-12-23
Payer: MEDICARE

## 2023-12-24 ENCOUNTER — PATIENT MESSAGE (OUTPATIENT)
Dept: ADMINISTRATIVE | Facility: OTHER | Age: 73
End: 2023-12-24
Payer: MEDICARE

## 2023-12-25 ENCOUNTER — PATIENT MESSAGE (OUTPATIENT)
Dept: ADMINISTRATIVE | Facility: OTHER | Age: 73
End: 2023-12-25
Payer: MEDICARE

## 2023-12-26 ENCOUNTER — TELEPHONE (OUTPATIENT)
Dept: HEMATOLOGY/ONCOLOGY | Facility: CLINIC | Age: 73
End: 2023-12-26
Payer: MEDICARE

## 2023-12-26 ENCOUNTER — PATIENT MESSAGE (OUTPATIENT)
Dept: HEMATOLOGY/ONCOLOGY | Facility: CLINIC | Age: 73
End: 2023-12-26
Payer: MEDICARE

## 2023-12-26 ENCOUNTER — PATIENT MESSAGE (OUTPATIENT)
Dept: ADMINISTRATIVE | Facility: OTHER | Age: 73
End: 2023-12-26
Payer: MEDICARE

## 2023-12-26 DIAGNOSIS — C64.1 CLEAR CELL CARCINOMA OF RIGHT KIDNEY: ICD-10-CM

## 2023-12-26 NOTE — TELEPHONE ENCOUNTER
Dr. Turk is out of clinic.   I called and spoke with the pt.   Pt is experiencing extreme back pain which he said he will talk to Dr. Pacheco office about. However he is also experiencing weight loss as shown below. He uses the [employer] scale that Dr. Turk set him up with.   I offered him a visit- as ROSINA Mendoza has an urgent care appt on Thursday.   He would like Kelly or Dr. Turk to call him to talk through what's going on to make sure/ see if he would need to come in.   I did advise that Dr. Turk is out of clinic this week so someone will be in touch.     Pt thankful for call from clinic.

## 2023-12-26 NOTE — TELEPHONE ENCOUNTER
Spoke with patient. Started treatment on 12/22/23 with pembrolizumab + axitinib. Chemo care companion readings show progressive weight loss. Eating well overall (2 eggs and sausage for breakfast around 8AM, large leftover [employer] dinner plate for lunch, and ready for dinner). Drinking a lot more water. States underwear are fitting the same over the last week (not wearing a lot of clothes to compare with week prior). Not having any diarrhea, nausea or vomiting. Has been picking scale up after readings and moving to new locations each day. Encouraged to place scale in single location for better consistency. Will trial this for a few days. If still continuing to downtrend, will have him bring equipment to [facility] for eval and/or get him set up with nutrition. He is in agreement with this plan.     Pain is the same pain since bone biopsy to T9. Currently taking MS Contin BID + percocet q4-6 hours as needed. Scheduled to see Dr. Martinez's team this week with new imaging.

## 2023-12-26 NOTE — TELEPHONE ENCOUNTER
Care Companion Intervention    Reason for intervention: Questionnaire response  Comment:  New pain    Intervention: Education provided to patient  Comment:  Message sent via [facility] asking detailed questions about pain. Will continue to follow.

## 2023-12-27 ENCOUNTER — PATIENT MESSAGE (OUTPATIENT)
Dept: ADMINISTRATIVE | Facility: OTHER | Age: 73
End: 2023-12-27
Payer: MEDICARE

## 2023-12-27 ENCOUNTER — TELEPHONE (OUTPATIENT)
Dept: HEMATOLOGY/ONCOLOGY | Facility: CLINIC | Age: 73
End: 2023-12-27
Payer: MEDICARE

## 2023-12-27 DIAGNOSIS — C64.1 CLEAR CELL CARCINOMA OF RIGHT KIDNEY: ICD-10-CM

## 2023-12-27 RX ORDER — MORPHINE SULFATE 30 MG/1
30 TABLET, FILM COATED, EXTENDED RELEASE ORAL EVERY 12 HOURS
Qty: 60 TABLET | Refills: 0 | Status: CANCELLED | OUTPATIENT
Start: 2023-12-27

## 2023-12-27 RX ORDER — MORPHINE SULFATE 30 MG/1
30 TABLET, FILM COATED, EXTENDED RELEASE ORAL EVERY 12 HOURS
Qty: 60 TABLET | Refills: 0 | Status: SHIPPED | OUTPATIENT
Start: 2023-12-27 | End: 2023-12-28

## 2023-12-27 NOTE — TELEPHONE ENCOUNTER
----- Message from Maria Eugenia Wilson sent at 12/27/2023  2:11 PM CST -----  Type: General Call Back     Name of Caller:pharmacy  Symptoms:medication  Would the patient rather a call back or a response via [employer]? Call back   Best Call Back Number:[phone]  Additional Information: pharmacist is calling in regard to medication prescribed. Pharmacist is wondering if the 30mg is correct due to last prescription being 15mg. Pharmacist would like a call back about correct milligrams. Please call back with further assistance and more information.

## 2023-12-27 NOTE — TELEPHONE ENCOUNTER
Pt messaged on portal requesting Rx to be sent over to [facility] instead of [facility].

## 2023-12-28 ENCOUNTER — OFFICE VISIT (OUTPATIENT)
Dept: NEUROSURGERY | Facility: CLINIC | Age: 73
End: 2023-12-28
Payer: MEDICARE

## 2023-12-28 ENCOUNTER — HOSPITAL ENCOUNTER (OUTPATIENT)
Dept: RADIOLOGY | Facility: HOSPITAL | Age: 73
Discharge: HOME | End: 2023-12-28
Payer: MEDICARE

## 2023-12-28 ENCOUNTER — PATIENT MESSAGE (OUTPATIENT)
Dept: ADMINISTRATIVE | Facility: OTHER | Age: 73
End: 2023-12-28
Payer: MEDICARE

## 2023-12-28 VITALS — SYSTOLIC BLOOD PRESSURE: 111 MMHG | HEART RATE: 89 BPM | DIASTOLIC BLOOD PRESSURE: 68 MMHG

## 2023-12-28 DIAGNOSIS — Z98.1 S/P CERVICAL SPINAL FUSION: Primary | ICD-10-CM

## 2023-12-28 DIAGNOSIS — C79.51 METASTATIC CANCER TO SPINE: ICD-10-CM

## 2023-12-28 DIAGNOSIS — C79.51 CANCER, METASTATIC TO BONE: ICD-10-CM

## 2023-12-28 DIAGNOSIS — M54.50 LOW BACK PAIN, UNSPECIFIED BACK PAIN LATERALITY, UNSPECIFIED CHRONICITY, UNSPECIFIED WHETHER SCIATICA PRESENT: ICD-10-CM

## 2023-12-28 DIAGNOSIS — S22.070D COMPRESSION FRACTURE OF T9 VERTEBRA WITH ROUTINE HEALING, SUBSEQUENT ENCOUNTER: ICD-10-CM

## 2023-12-28 DIAGNOSIS — M53.2X2 CERVICAL SPINE INSTABILITY: ICD-10-CM

## 2023-12-28 PROCEDURE — 99999 PR PBB SHADOW E&M-EST. PATIENT-LVL III: ICD-10-PCS | Mod: PBBFAC,,,

## 2023-12-28 PROCEDURE — 72040 XR CERVICAL SPINE AP LATERAL: ICD-10-PCS | Mod: 26,,, | Performed by: RADIOLOGY

## 2023-12-28 PROCEDURE — 3288F FALL RISK ASSESSMENT DOCD: CPT | Mod: CPTII,S$GLB,,

## 2023-12-28 PROCEDURE — 3074F PR MOST RECENT SYSTOLIC BLOOD PRESSURE < 130 MM HG: ICD-10-PCS | Mod: CPTII,S$GLB,,

## 2023-12-28 PROCEDURE — 99024 PR POST-OP FOLLOW-UP VISIT: ICD-10-PCS | Mod: S$GLB,,,

## 2023-12-28 PROCEDURE — 99999 PR PBB SHADOW E&M-EST. PATIENT-LVL III: CPT | Mod: PBBFAC,,,

## 2023-12-28 PROCEDURE — 3078F DIAST BP <80 MM HG: CPT | Mod: CPTII,S$GLB,,

## 2023-12-28 PROCEDURE — 1159F PR MEDICATION LIST DOCUMENTED IN MEDICAL RECORD: ICD-10-PCS | Mod: CPTII,S$GLB,,

## 2023-12-28 PROCEDURE — 72100 X-RAY EXAM L-S SPINE 2/3 VWS: CPT | Mod: 26,,, | Performed by: RADIOLOGY

## 2023-12-28 PROCEDURE — 1125F PR PAIN SEVERITY QUANTIFIED, PAIN PRESENT: ICD-10-PCS | Mod: CPTII,S$GLB,,

## 2023-12-28 PROCEDURE — 72070 X-RAY EXAM THORAC SPINE 2VWS: CPT | Mod: TC

## 2023-12-28 PROCEDURE — 1125F AMNT PAIN NOTED PAIN PRSNT: CPT | Mod: CPTII,S$GLB,,

## 2023-12-28 PROCEDURE — 72100 XR LUMBAR SPINE AP AND LATERAL: ICD-10-PCS | Mod: 26,,, | Performed by: RADIOLOGY

## 2023-12-28 PROCEDURE — 99024 POSTOP FOLLOW-UP VISIT: CPT | Mod: S$GLB,,,

## 2023-12-28 PROCEDURE — 72070 X-RAY EXAM THORAC SPINE 2VWS: CPT | Mod: 26,,, | Performed by: RADIOLOGY

## 2023-12-28 PROCEDURE — 4010F PR ACE/ARB THEARPY RXD/TAKEN: ICD-10-PCS | Mod: CPTII,S$GLB,,

## 2023-12-28 PROCEDURE — 1159F MED LIST DOCD IN RCRD: CPT | Mod: CPTII,S$GLB,,

## 2023-12-28 PROCEDURE — 1101F PR PT FALLS ASSESS DOC 0-1 FALLS W/OUT INJ PAST YR: ICD-10-PCS | Mod: CPTII,S$GLB,,

## 2023-12-28 PROCEDURE — 4010F ACE/ARB THERAPY RXD/TAKEN: CPT | Mod: CPTII,S$GLB,,

## 2023-12-28 PROCEDURE — 1101F PT FALLS ASSESS-DOCD LE1/YR: CPT | Mod: CPTII,S$GLB,,

## 2023-12-28 PROCEDURE — 3288F PR FALLS RISK ASSESSMENT DOCUMENTED: ICD-10-PCS | Mod: CPTII,S$GLB,,

## 2023-12-28 PROCEDURE — 72040 X-RAY EXAM NECK SPINE 2-3 VW: CPT | Mod: TC

## 2023-12-28 PROCEDURE — 3078F PR MOST RECENT DIASTOLIC BLOOD PRESSURE < 80 MM HG: ICD-10-PCS | Mod: CPTII,S$GLB,,

## 2023-12-28 PROCEDURE — 3074F SYST BP LT 130 MM HG: CPT | Mod: CPTII,S$GLB,,

## 2023-12-28 PROCEDURE — 72070 XR THORACIC SPINE AP LATERAL: ICD-10-PCS | Mod: 26,,, | Performed by: RADIOLOGY

## 2023-12-28 PROCEDURE — 72100 X-RAY EXAM L-S SPINE 2/3 VWS: CPT | Mod: TC

## 2023-12-28 PROCEDURE — 72040 X-RAY EXAM NECK SPINE 2-3 VW: CPT | Mod: 26,,, | Performed by: RADIOLOGY

## 2023-12-28 RX ORDER — MORPHINE SULFATE 30 MG/1
30 TABLET, FILM COATED, EXTENDED RELEASE ORAL EVERY 12 HOURS
Qty: 60 TABLET | Refills: 0 | Status: ON HOLD | OUTPATIENT
Start: 2023-12-28 | End: 2024-01-10 | Stop reason: HOSPADM

## 2023-12-28 RX ORDER — OXYCODONE AND ACETAMINOPHEN 5; 325 MG/1; MG/1
TABLET ORAL
Qty: 100 TABLET | Refills: 0 | Status: ON HOLD | OUTPATIENT
Start: 2023-12-28 | End: 2024-02-06 | Stop reason: HOSPADM

## 2023-12-29 ENCOUNTER — PATIENT MESSAGE (OUTPATIENT)
Dept: NEUROSURGERY | Facility: CLINIC | Age: 73
End: 2023-12-29
Payer: MEDICARE

## 2023-12-29 ENCOUNTER — TELEPHONE (OUTPATIENT)
Dept: HEMATOLOGY/ONCOLOGY | Facility: CLINIC | Age: 73
End: 2023-12-29
Payer: MEDICARE

## 2023-12-29 ENCOUNTER — PATIENT MESSAGE (OUTPATIENT)
Dept: ADMINISTRATIVE | Facility: OTHER | Age: 73
End: 2023-12-29
Payer: MEDICARE

## 2023-12-29 ENCOUNTER — PATIENT MESSAGE (OUTPATIENT)
Dept: HEMATOLOGY/ONCOLOGY | Facility: CLINIC | Age: 73
End: 2023-12-29
Payer: MEDICARE

## 2023-12-29 DIAGNOSIS — S22.070D COMPRESSION FRACTURE OF T9 VERTEBRA WITH ROUTINE HEALING, SUBSEQUENT ENCOUNTER: Primary | ICD-10-CM

## 2023-12-29 NOTE — PROGRESS NOTES
[facility]  Neurosurgery    SUBJECTIVE:   History of Present Illness:  Gamaliel Pete Jr. is a 73 y.o. 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 Martinez due to metastatic disease to those areas. He is also s/p T9 kyphoplasty for a pathological fracture at that level. Patient states that his back pain at that level worsened after the kyphoplasty. The pain is burning and located in the middle-left area, radiating around the trunk at that level on the left. The pain is constant. He is on multiple pain medications for his cancer and states they do not help with the pain. They just make him sleepy. He denies new weakness, numbness, tingling, bowel/bladder dysfunction, gait difficulties, falls or other injuries. He has been compliant with his c-collar and wears a TLSO for comfort.     Of note, the patient has started chemotherapy- Pembrolizumab and Axitinib . There are also plans for a right femur IMN on 1/9 for metastasis to this area and high fracture risk. There is also some lucency about the right shoulder int he distal clavicle/acromian that ortho plans to monitor for metastasis.      Review of patient's allergies indicates:  No Known Allergies    Current Outpatient Medications:     allopurinoL (ZYLOPRIM) 100 MG tablet, Take 1 tablet (100 mg total) by mouth once daily., Disp: 30 tablet, Rfl: 3    amlodipine-benazepril 5-10 mg (LOTREL) 5-10 mg per capsule, Take 1 capsule by mouth once daily., Disp: 90 capsule, Rfl: 3    axitinib (INLYTA) 5 mg Tab, Take 1 tablet (5 mg) by mouth 2 (two) times daily., Disp: 60 tablet, Rfl: 11    diazePAM (VALIUM) 5 MG tablet, Take 1 tablet (5 mg total) by mouth every 6 (six) hours as needed (severe muscles spasms)., Disp: 10 tablet, Rfl: 0    gabapentin (NEURONTIN) 300 MG capsule, Take 1 capsule (300 mg  total) by mouth 3 (three) times daily., Disp: 90 capsule, Rfl: 11    morphine (MS CONTIN) 30 MG 12 hr tablet, Take 1 tablet (30 mg total) by mouth every 12 (twelve) hours., Disp: 60 tablet, Rfl: 0    oxyCODONE-acetaminophen (PERCOCET) 5-325 mg per tablet, Take 1 to 2 tablets by mouth every 4-6 hours as needed for pain, Disp: 100 tablet, Rfl: 0    LIDOcaine (LIDODERM) 5 %, Place 1 patch onto the skin once daily. Remove & Discard patch within 12 hours or as directed by MD (Patient not taking: Reported on 12/28/2023), Disp: 15 patch, Rfl: 0    methocarbamoL (ROBAXIN) 750 MG Tab, Take 1 tablet (750 mg total) by mouth 4 (four) times daily as needed (muscle spasms)., Disp: 90 tablet, Rfl: 0    naloxone (NARCAN) 4 mg/actuation Spry, 1 spray (4mg) by nasal route as needed for opioid overdose; may repeat every 2-3 minutes in alternating nostrils until medical help arrives. Call 911 (Patient not taking: Reported on 12/28/2023), Disp: 2 each, Rfl: 0    omega-3 fatty acids/fish oil (FISH OIL-OMEGA-3 FATTY ACIDS) 300-1,000 mg capsule, Take 2 capsules by mouth once daily., Disp: , Rfl:     predniSONE (DELTASONE) 20 MG tablet, Take 1 tablet (20 mg total) by mouth once daily. (Patient not taking: Reported on 12/28/2023), Disp: 10 tablet, Rfl: 0    sildenafiL (VIAGRA) 100 MG tablet, Take 1 tablet (100 mg total) by mouth daily as needed for Erectile Dysfunction., Disp: 30 tablet, Rfl: 11    turmeric (CURCUMIN MISC), 1,000 mg by Misc.(Non-Drug; Combo Route) route Daily., Disp: , Rfl:     UNABLE TO FIND, Take 500 mg by mouth 2 (two) times a day. medication name: Tudca, Disp: , Rfl:     UNABLE TO FIND, Take 2 capsules by mouth 2 (two) times a day. medication name: Turkey tail mushroom, Disp: , Rfl:     UNABLE TO FIND, Take 15 drops by mouth once daily. medication name: Essiac-20, Disp: , Rfl:     UNABLE TO FIND, Take 5 mLs by mouth 2 (two) times a day. medication name: barley leaf juice powder, Disp: , Rfl:     UNABLE TO FIND, Take 5  mLs by mouth 2 (two) times a day. medication name: black seed oil (cumin seed), Disp: , Rfl:      Past Medical History:   Diagnosis Date    Asthma     no inhaler use    Borderline hypertension     ED (erectile dysfunction)     Gout     Hematuria     Hypertension      Past Surgical History:   Procedure Laterality Date    COLONOSCOPY  02/10/2022    COLONOSCOPY N/A 02/10/2022    Procedure: COLONOSCOPY;  Surgeon: Desmond Keenan MD;  Location: [facility];  Service: General;  Laterality: N/A;    POSTERIOR FUSION OF CERVICAL SPINE WITH LAMINECTOMY N/A 11/15/2023    Procedure: SPINE, CERVICAL, WITH POSTERIOR FUSION T4-6;  Surgeon: Nasim Martinez DO;  Location: [facility] OR 2ND FLR;  Service: Neurosurgery;  Laterality: N/A;  C2-T1 laminectomy and posterior fusion. [place]. C-arm. [employer]. Neuromonitoring.    ROBOT-ASSISTED LAPAROSCOPIC NEPHRECTOMY Right 10/4/2023    Procedure: ROBOTIC NEPHRECTOMY;  Surgeon: Henry Michaels MD;  Location: [facility];  Service: Urology;  Laterality: Right;    SURGICAL REMOVAL OF VERTEBRAL BODY OF CERVICAL SPINE Right 11/7/2023    Procedure: CORPECTOMY, SPINE, CERVICAL;  Surgeon: Nasim Martinez DO;  Location: [facility] OR 2ND FLR;  Service: Neurosurgery;  Laterality: Right;  C4 and C5 corpectomy with globus;  wells tongs; c-arm; neuromonitoring; microscope; type and cross 2 units    SURGICAL REMOVAL OF VERTEBRAL BODY OF CERVICAL SPINE N/A 11/15/2023    Procedure: CORPECTOMY, SPINE, CERVICAL C3-6 REVISION;  Surgeon: Nasim Martinez DO;  Location: [facility] OR 2ND FLR;  Service: Neurosurgery;  Laterality: N/A;    TONSILLECTOMY       No family history on file.  Social History     Tobacco Use    Smoking status: Never    Smokeless tobacco: Never   Substance Use Topics    Alcohol use: Not Currently     Alcohol/week: 0.0 standard drinks of alcohol     Comment: rarely    Drug use: Never       OBJECTIVE:     Vital Signs (Most Recent):  Pulse: 89 (12/28/23 1426)  BP: 111/68 (12/28/23  1426)    Physical Exam:  General: Well developed, well nourished, no distress.  Head: Normocephalic, atraumatic.  Neurologic: Alert and oriented. Thought content appropriate  GCS: Motor: 6/Verbal: 5/Eyes: 4 GCS Total: 15  Mental Status: Awake, Alert, Oriented x 4.  Language: No aphasia.  Speech: No dysarthria.  Cranial nerves: Face symmetric, tongue midline, CN II-XII grossly intact.   Eyes: Pupils equal, round, reactive to light with accommodation, EOMI.  Pulmonary: Normal respirations, not labored, no accessory muscles used.  Abdomen: Soft, non-distended, not tender to palpation.  Sensory: Intact to light touch throughout.  Motor Strength: Moves all extremities spontaneously with good tone. Full strength upper and lower extremities. No abnormal movements seen.     Strength  Deltoids Triceps Biceps Wrist Extension Wrist Flexion Hand    Upper: R 5/5 5/5 5/5 5/5 5/5 5/5    L 5/5 5/5 5/5 5/5 5/5 5/5     Iliopsoas Quadriceps Knee  Flexion Tibialis  anterior Gastro- cnemius EHL   Lower: R 5/5 5/5 5/5 5/5 5/5 5/5    L 5/5 5/5 5/5 5/5 5/5 5/5     [place]aldon[place]: Absent.  Clonus: Absent.  Vascular: Pulses 2+ and symmetric radial and dorsalis pedis. No LE edema or skin changes.  Skin: Warm, dry and intact, no rashes.  Gait: Normal.      Anterior and posterior incisions well healed.     Laboratory:  I have reviewed all pertinent lab results within the past 24 hours.    Diagnostic Results:  X-Ray cervical spine AP and Lateral 12/28: no hardware complication or acute abnormalities.  X-Ray lumbar spine AP and Lateral 12/28: no acute abnormalities.    ASSESSMENT/PLAN:     Gamaliel Pete Jr. is a 73 y.o. 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 Martinez due to metastatic disease to those areas. He is also s/p T9 kyphoplasty for a pathological fracture at that  level.     Patient is doing well in regards to his cervical spine fusion. There is no hardware complication seen on cervical spine X-Rays. He remains neurologically stable.     I would also like for the patient to obtain an X-Ray thoracic spine AP and Lateral to ensure there are no acute changes.     All symptoms and signs that require emergent or urgent treatment were reviewed. The plan was discussed with the patient. All of the the patient's questions and concerns were answered and he voiced understanding. Please feel free to call with any further questions.     Nadine Santos PA-C  Neurosurgery   [facility]

## 2023-12-29 NOTE — TELEPHONE ENCOUNTER
----- Message from Vivienne Luque sent at 12/29/2023  9:29 AM CST -----  Regarding: Refill  Contact: Silver [phone]            Name of Pharmacy:    [facility] [place] - [address]  [address] [place]) [place] [address]  Phone: [phone] Fax: [phone]       Name Of caller: Silver     Name of Medication:  morphine (MS CONTIN) 30 MG 12 hr tablet    Comments/advisory:  Requesting approval to allow pt to start this scrip pt received 15mg 12/12/23 for 30 day supply

## 2023-12-30 ENCOUNTER — HOSPITAL ENCOUNTER (OUTPATIENT)
Dept: RADIOLOGY | Facility: HOSPITAL | Age: 73
Discharge: HOME | End: 2023-12-30
Payer: MEDICARE

## 2023-12-30 DIAGNOSIS — S22.070D COMPRESSION FRACTURE OF T9 VERTEBRA WITH ROUTINE HEALING, SUBSEQUENT ENCOUNTER: ICD-10-CM

## 2023-12-30 PROCEDURE — 72128 CT CHEST SPINE W/O DYE: CPT | Mod: TC

## 2023-12-30 PROCEDURE — 25500020 PHARM REV CODE 255

## 2023-12-30 PROCEDURE — 72157 MRI CHEST SPINE W/O & W/DYE: CPT | Mod: TC

## 2023-12-30 PROCEDURE — 72157 MRI CHEST SPINE W/O & W/DYE: CPT | Mod: 26,,, | Performed by: RADIOLOGY

## 2023-12-30 PROCEDURE — 72157 MRI THORACIC SPINE W WO CONTRAST: ICD-10-PCS | Mod: 26,,, | Performed by: RADIOLOGY

## 2023-12-30 PROCEDURE — A9585 GADOBUTROL INJECTION: HCPCS

## 2023-12-30 PROCEDURE — 72128 CT CHEST SPINE W/O DYE: CPT | Mod: 26,,, | Performed by: INTERNAL MEDICINE

## 2023-12-30 PROCEDURE — 72128 CT THORACIC SPINE WITHOUT CONTRAST: ICD-10-PCS | Mod: 26,,, | Performed by: INTERNAL MEDICINE

## 2023-12-30 RX ORDER — GADOBUTROL 604.72 MG/ML
10 INJECTION INTRAVENOUS
Status: COMPLETED | OUTPATIENT
Start: 2023-12-30 | End: 2023-12-30

## 2023-12-30 RX ADMIN — GADOBUTROL 10 ML: 604.72 INJECTION INTRAVENOUS at 11:12

## 2023-12-31 ENCOUNTER — PATIENT MESSAGE (OUTPATIENT)
Dept: HEMATOLOGY/ONCOLOGY | Facility: CLINIC | Age: 73
End: 2023-12-31
Payer: MEDICARE

## 2023-12-31 ENCOUNTER — PATIENT MESSAGE (OUTPATIENT)
Dept: ORTHOPEDICS | Facility: CLINIC | Age: 73
End: 2023-12-31
Payer: MEDICARE

## 2023-12-31 ENCOUNTER — PATIENT MESSAGE (OUTPATIENT)
Dept: ADMINISTRATIVE | Facility: OTHER | Age: 73
End: 2023-12-31
Payer: MEDICARE

## 2023-12-31 ENCOUNTER — PATIENT MESSAGE (OUTPATIENT)
Dept: NEUROSURGERY | Facility: CLINIC | Age: 73
End: 2023-12-31
Payer: MEDICARE

## 2024-01-01 ENCOUNTER — PATIENT MESSAGE (OUTPATIENT)
Dept: ADMINISTRATIVE | Facility: OTHER | Age: 74
End: 2024-01-01
Payer: MEDICARE

## 2024-01-02 ENCOUNTER — TELEPHONE (OUTPATIENT)
Dept: ORTHOPEDICS | Facility: CLINIC | Age: 74
End: 2024-01-02
Payer: MEDICARE

## 2024-01-02 ENCOUNTER — HOSPITAL ENCOUNTER (INPATIENT)
Facility: HOSPITAL | Age: 74
LOS: 8 days | Discharge: HOME HEALTH | DRG: 457 | End: 2024-01-10
Attending: EMERGENCY MEDICINE | Admitting: INTERNAL MEDICINE
Payer: MEDICARE

## 2024-01-02 ENCOUNTER — TELEPHONE (OUTPATIENT)
Dept: NEUROSURGERY | Facility: CLINIC | Age: 74
End: 2024-01-02
Payer: MEDICARE

## 2024-01-02 ENCOUNTER — TELEPHONE (OUTPATIENT)
Dept: HEMATOLOGY/ONCOLOGY | Facility: CLINIC | Age: 74
End: 2024-01-02
Payer: MEDICARE

## 2024-01-02 DIAGNOSIS — Z01.810 PREOP CARDIOVASCULAR EXAM: ICD-10-CM

## 2024-01-02 DIAGNOSIS — R07.9 CHEST PAIN: ICD-10-CM

## 2024-01-02 DIAGNOSIS — M84.58XA PATHOLOGICAL FRACTURE OF THORACIC VERTEBRA DUE TO NEOPLASTIC DISEASE: ICD-10-CM

## 2024-01-02 DIAGNOSIS — C79.51 METASTATIC CANCER TO SPINE: Primary | ICD-10-CM

## 2024-01-02 LAB
ABO + RH BLD: NORMAL
ALBUMIN SERPL BCP-MCNC: 3.9 G/DL (ref 3.5–5.2)
ALP SERPL-CCNC: 91 U/L (ref 55–135)
ALT SERPL W/O P-5'-P-CCNC: 465 U/L (ref 10–44)
ANION GAP SERPL CALC-SCNC: 14 MMOL/L (ref 8–16)
APTT PPP: 30.5 SEC (ref 21–32)
AST SERPL-CCNC: 224 U/L (ref 10–40)
BASOPHILS # BLD AUTO: 0.05 K/UL (ref 0–0.2)
BASOPHILS NFR BLD: 0.6 % (ref 0–1.9)
BILIRUB SERPL-MCNC: 1.2 MG/DL (ref 0.1–1)
BLD GP AB SCN CELLS X3 SERPL QL: NORMAL
BUN SERPL-MCNC: 17 MG/DL (ref 8–23)
CALCIUM SERPL-MCNC: 10.4 MG/DL (ref 8.7–10.5)
CHLORIDE SERPL-SCNC: 99 MMOL/L (ref 95–110)
CO2 SERPL-SCNC: 21 MMOL/L (ref 23–29)
CREAT SERPL-MCNC: 1.1 MG/DL (ref 0.5–1.4)
DIFFERENTIAL METHOD BLD: ABNORMAL
EOSINOPHIL # BLD AUTO: 0.2 K/UL (ref 0–0.5)
EOSINOPHIL NFR BLD: 1.9 % (ref 0–8)
ERYTHROCYTE [DISTWIDTH] IN BLOOD BY AUTOMATED COUNT: 13.7 % (ref 11.5–14.5)
EST. GFR  (NO RACE VARIABLE): >60 ML/MIN/1.73 M^2
GLUCOSE SERPL-MCNC: 84 MG/DL (ref 70–110)
HCT VFR BLD AUTO: 40.5 % (ref 40–54)
HGB BLD-MCNC: 14.3 G/DL (ref 14–18)
IMM GRANULOCYTES # BLD AUTO: 0.03 K/UL (ref 0–0.04)
IMM GRANULOCYTES NFR BLD AUTO: 0.4 % (ref 0–0.5)
INR PPP: 1 (ref 0.8–1.2)
LYMPHOCYTES # BLD AUTO: 1.6 K/UL (ref 1–4.8)
LYMPHOCYTES NFR BLD: 20.2 % (ref 18–48)
MAGNESIUM SERPL-MCNC: 1.9 MG/DL (ref 1.6–2.6)
MCH RBC QN AUTO: 29.4 PG (ref 27–31)
MCHC RBC AUTO-ENTMCNC: 35.3 G/DL (ref 32–36)
MCV RBC AUTO: 83 FL (ref 82–98)
MONOCYTES # BLD AUTO: 0.8 K/UL (ref 0.3–1)
MONOCYTES NFR BLD: 10.1 % (ref 4–15)
NEUTROPHILS # BLD AUTO: 5.2 K/UL (ref 1.8–7.7)
NEUTROPHILS NFR BLD: 66.8 % (ref 38–73)
NRBC BLD-RTO: 0 /100 WBC
PHOSPHATE SERPL-MCNC: 3.7 MG/DL (ref 2.7–4.5)
PLATELET # BLD AUTO: 190 K/UL (ref 150–450)
PMV BLD AUTO: 9 FL (ref 9.2–12.9)
POTASSIUM SERPL-SCNC: 3.8 MMOL/L (ref 3.5–5.1)
PROT SERPL-MCNC: 8.8 G/DL (ref 6–8.4)
PROTHROMBIN TIME: 11.2 SEC (ref 9–12.5)
RBC # BLD AUTO: 4.87 M/UL (ref 4.6–6.2)
SODIUM SERPL-SCNC: 134 MMOL/L (ref 136–145)
SPECIMEN OUTDATE: NORMAL
WBC # BLD AUTO: 7.74 K/UL (ref 3.9–12.7)

## 2024-01-02 PROCEDURE — 93005 ELECTROCARDIOGRAM TRACING: CPT

## 2024-01-02 PROCEDURE — 85610 PROTHROMBIN TIME: CPT | Performed by: INTERNAL MEDICINE

## 2024-01-02 PROCEDURE — 85025 COMPLETE CBC W/AUTO DIFF WBC: CPT | Performed by: STUDENT IN AN ORGANIZED HEALTH CARE EDUCATION/TRAINING PROGRAM

## 2024-01-02 PROCEDURE — 93010 ELECTROCARDIOGRAM REPORT: CPT | Mod: ,,, | Performed by: INTERNAL MEDICINE

## 2024-01-02 PROCEDURE — 84100 ASSAY OF PHOSPHORUS: CPT | Performed by: INTERNAL MEDICINE

## 2024-01-02 PROCEDURE — 99285 EMERGENCY DEPT VISIT HI MDM: CPT

## 2024-01-02 PROCEDURE — 86850 RBC ANTIBODY SCREEN: CPT | Performed by: INTERNAL MEDICINE

## 2024-01-02 PROCEDURE — 85730 THROMBOPLASTIN TIME PARTIAL: CPT | Performed by: INTERNAL MEDICINE

## 2024-01-02 PROCEDURE — 12000002 HC ACUTE/MED SURGE SEMI-PRIVATE ROOM

## 2024-01-02 PROCEDURE — 25000003 PHARM REV CODE 250: Performed by: INTERNAL MEDICINE

## 2024-01-02 PROCEDURE — 83735 ASSAY OF MAGNESIUM: CPT | Performed by: INTERNAL MEDICINE

## 2024-01-02 PROCEDURE — U0002 COVID-19 LAB TEST NON-CDC: HCPCS | Performed by: INTERNAL MEDICINE

## 2024-01-02 PROCEDURE — 80053 COMPREHEN METABOLIC PANEL: CPT | Performed by: STUDENT IN AN ORGANIZED HEALTH CARE EDUCATION/TRAINING PROGRAM

## 2024-01-02 RX ORDER — PANTOPRAZOLE SODIUM 40 MG/1
40 TABLET, DELAYED RELEASE ORAL DAILY
Status: DISCONTINUED | OUTPATIENT
Start: 2024-01-02 | End: 2024-01-10 | Stop reason: HOSPADM

## 2024-01-02 RX ORDER — IBUPROFEN 200 MG
24 TABLET ORAL
Status: DISCONTINUED | OUTPATIENT
Start: 2024-01-02 | End: 2024-01-10 | Stop reason: HOSPADM

## 2024-01-02 RX ORDER — IPRATROPIUM BROMIDE AND ALBUTEROL SULFATE 2.5; .5 MG/3ML; MG/3ML
3 SOLUTION RESPIRATORY (INHALATION) EVERY 4 HOURS PRN
Status: DISCONTINUED | OUTPATIENT
Start: 2024-01-02 | End: 2024-01-10 | Stop reason: HOSPADM

## 2024-01-02 RX ORDER — ALLOPURINOL 100 MG/1
100 TABLET ORAL DAILY
Status: DISCONTINUED | OUTPATIENT
Start: 2024-01-03 | End: 2024-01-10 | Stop reason: HOSPADM

## 2024-01-02 RX ORDER — AMOXICILLIN 250 MG
1 CAPSULE ORAL 2 TIMES DAILY
Status: DISCONTINUED | OUTPATIENT
Start: 2024-01-02 | End: 2024-01-10 | Stop reason: HOSPADM

## 2024-01-02 RX ORDER — OXYCODONE HYDROCHLORIDE 10 MG/1
10 TABLET ORAL EVERY 6 HOURS PRN
Status: DISCONTINUED | OUTPATIENT
Start: 2024-01-02 | End: 2024-01-10 | Stop reason: HOSPADM

## 2024-01-02 RX ORDER — METHOCARBAMOL 750 MG/1
750 TABLET, FILM COATED ORAL 4 TIMES DAILY PRN
Status: DISCONTINUED | OUTPATIENT
Start: 2024-01-02 | End: 2024-01-05

## 2024-01-02 RX ORDER — SODIUM CHLORIDE 0.9 % (FLUSH) 0.9 %
10 SYRINGE (ML) INJECTION EVERY 12 HOURS PRN
Status: DISCONTINUED | OUTPATIENT
Start: 2024-01-02 | End: 2024-01-10 | Stop reason: HOSPADM

## 2024-01-02 RX ORDER — SODIUM CHLORIDE 9 MG/ML
INJECTION, SOLUTION INTRAVENOUS CONTINUOUS
Status: DISCONTINUED | OUTPATIENT
Start: 2024-01-03 | End: 2024-01-04

## 2024-01-02 RX ORDER — AMLODIPINE AND BENAZEPRIL HYDROCHLORIDE 5; 10 MG/1; MG/1
1 CAPSULE ORAL DAILY
Status: DISCONTINUED | OUTPATIENT
Start: 2024-01-03 | End: 2024-01-02

## 2024-01-02 RX ORDER — LISINOPRIL 5 MG/1
10 TABLET ORAL DAILY
Status: DISCONTINUED | OUTPATIENT
Start: 2024-01-03 | End: 2024-01-03

## 2024-01-02 RX ORDER — ONDANSETRON HYDROCHLORIDE 2 MG/ML
4 INJECTION, SOLUTION INTRAVENOUS EVERY 8 HOURS PRN
Status: DISCONTINUED | OUTPATIENT
Start: 2024-01-02 | End: 2024-01-10 | Stop reason: HOSPADM

## 2024-01-02 RX ORDER — POLYETHYLENE GLYCOL 3350 17 G/17G
17 POWDER, FOR SOLUTION ORAL DAILY
Status: DISCONTINUED | OUTPATIENT
Start: 2024-01-03 | End: 2024-01-10 | Stop reason: HOSPADM

## 2024-01-02 RX ORDER — ACETAMINOPHEN 325 MG/1
650 TABLET ORAL EVERY 4 HOURS PRN
Status: DISCONTINUED | OUTPATIENT
Start: 2024-01-02 | End: 2024-01-05

## 2024-01-02 RX ORDER — PROCHLORPERAZINE EDISYLATE 5 MG/ML
5 INJECTION INTRAMUSCULAR; INTRAVENOUS EVERY 6 HOURS PRN
Status: DISCONTINUED | OUTPATIENT
Start: 2024-01-02 | End: 2024-01-10 | Stop reason: HOSPADM

## 2024-01-02 RX ORDER — OXYCODONE HYDROCHLORIDE 5 MG/1
5 TABLET ORAL EVERY 6 HOURS PRN
Status: DISCONTINUED | OUTPATIENT
Start: 2024-01-02 | End: 2024-01-10 | Stop reason: HOSPADM

## 2024-01-02 RX ORDER — TALC
6 POWDER (GRAM) TOPICAL NIGHTLY PRN
Status: DISCONTINUED | OUTPATIENT
Start: 2024-01-02 | End: 2024-01-10 | Stop reason: HOSPADM

## 2024-01-02 RX ORDER — SIMETHICONE 80 MG
1 TABLET,CHEWABLE ORAL 4 TIMES DAILY PRN
Status: DISCONTINUED | OUTPATIENT
Start: 2024-01-02 | End: 2024-01-10 | Stop reason: HOSPADM

## 2024-01-02 RX ORDER — IBUPROFEN 200 MG
16 TABLET ORAL
Status: DISCONTINUED | OUTPATIENT
Start: 2024-01-02 | End: 2024-01-10 | Stop reason: HOSPADM

## 2024-01-02 RX ORDER — BISACODYL 10 MG/1
10 SUPPOSITORY RECTAL DAILY PRN
Status: DISCONTINUED | OUTPATIENT
Start: 2024-01-02 | End: 2024-01-09

## 2024-01-02 RX ORDER — DIAZEPAM 5 MG/1
5 TABLET ORAL EVERY 6 HOURS PRN
Status: DISCONTINUED | OUTPATIENT
Start: 2024-01-02 | End: 2024-01-09

## 2024-01-02 RX ORDER — GLUCAGON 1 MG
1 KIT INJECTION
Status: DISCONTINUED | OUTPATIENT
Start: 2024-01-02 | End: 2024-01-10 | Stop reason: HOSPADM

## 2024-01-02 RX ORDER — DEXAMETHASONE SODIUM PHOSPHATE 4 MG/ML
4 INJECTION, SOLUTION INTRA-ARTICULAR; INTRALESIONAL; INTRAMUSCULAR; INTRAVENOUS; SOFT TISSUE EVERY 6 HOURS
Status: DISCONTINUED | OUTPATIENT
Start: 2024-01-02 | End: 2024-01-03

## 2024-01-02 RX ORDER — MORPHINE SULFATE 30 MG/1
30 TABLET, FILM COATED, EXTENDED RELEASE ORAL EVERY 12 HOURS
Status: DISPENSED | OUTPATIENT
Start: 2024-01-02 | End: 2024-01-09

## 2024-01-02 RX ORDER — NALOXONE HCL 0.4 MG/ML
0.02 VIAL (ML) INJECTION
Status: DISCONTINUED | OUTPATIENT
Start: 2024-01-02 | End: 2024-01-10 | Stop reason: HOSPADM

## 2024-01-02 RX ORDER — GABAPENTIN 300 MG/1
300 CAPSULE ORAL 3 TIMES DAILY
Status: DISCONTINUED | OUTPATIENT
Start: 2024-01-02 | End: 2024-01-10 | Stop reason: HOSPADM

## 2024-01-02 RX ORDER — AMLODIPINE BESYLATE 5 MG/1
5 TABLET ORAL DAILY
Status: DISCONTINUED | OUTPATIENT
Start: 2024-01-03 | End: 2024-01-04

## 2024-01-02 RX ORDER — ALUMINUM HYDROXIDE, MAGNESIUM HYDROXIDE, AND SIMETHICONE 1200; 120; 1200 MG/30ML; MG/30ML; MG/30ML
30 SUSPENSION ORAL 4 TIMES DAILY PRN
Status: DISCONTINUED | OUTPATIENT
Start: 2024-01-02 | End: 2024-01-10 | Stop reason: HOSPADM

## 2024-01-02 RX ADMIN — SENNOSIDES AND DOCUSATE SODIUM 1 TABLET: 8.6; 5 TABLET ORAL at 09:01

## 2024-01-02 RX ADMIN — GABAPENTIN 300 MG: 300 CAPSULE ORAL at 09:01

## 2024-01-02 RX ADMIN — MORPHINE SULFATE 30 MG: 30 TABLET, FILM COATED, EXTENDED RELEASE ORAL at 09:01

## 2024-01-02 NOTE — TELEPHONE ENCOUNTER
"----- Message from Anshu Meekson sent at 1/2/2024  9:46 AM CST -----  Consult/Advisory:        Name Of Caller: Mary Pete (Spouse)      Contact Preference?: [phone]      Provider Name: Juan      Does patient feel the need to be seen today? No      What is the nature of the call?: Following up w/ office regarding urgent message from yesterday; stating pt is currently still experiencing a lot of pain      Additional Notes:  "Thank you for all that you do for our patients"       "

## 2024-01-02 NOTE — TELEPHONE ENCOUNTER
MD called pt. Advised below.     ----- Message from Ruby Turk MD sent at 1/2/2024 12:46 PM CST -----  Regarding: FW: Thoracic fracture  Please save message    Called Dr. Martinez  He spoke via phone to patient and I will be calling him next   Pain is intractable  Denied neuro changes  Desires IR embolization prior to surgery- has time on Friday  He also does not feel safe for him to remain home with scan findings  He will transfer to their service post surgery    Called and advised we should admit him for intractable pain- we discussed direct admit but he is unable to get out of the bed so will need to take an ambulance   We advised ED required if coming by ambulance and we will call ED and floor to make aware  Consult Dr. Martinez inpatient   States he has a couple of things to do before he leaves his home    Spoke to Nasim juan who will work on bed assignment  Spoke to ED to give above report and plan        ----- Message -----  From: Nasim Martinez DO  Sent: 1/2/2024  12:27 PM CST  To: Ruby Turk MD  Subject: Thoracic fracture                                Good afternoon Dr. Turk,    Would you be able to give me a call about this pt.  His thoracic fracture is worse and there looks like there is now epidural extension of tumor at T9 causing cord compression.  I have called the patient and his back pain is worse.  I think he needs a thoracic decompression/fusion in the near future.  My cell is .  Thank you for your help.    Nasim Martinez

## 2024-01-02 NOTE — TELEPHONE ENCOUNTER
Patient spoke with Dr. Rock. His T spine disease has advanced. He is seeing neurosurgeon Thursday to see if they are going to do anything. He does not need to come in tomorrow but he is going to let me know what plan for T spine is after appointment Thursday.     Arminda Centeno MS, ATC, OTC  Clinical Assistant - Dr. Parvez Rock   Orthopedic Oncology   [facility]  Phone: [phone]

## 2024-01-02 NOTE — TELEPHONE ENCOUNTER
Spoke to patient. Still waiting to hear back from Dr. Martinez. Can do pre-op clearance while patient is admitted if needed. Patient verbalized understanding.    Arminda Centeno MS, ATC, OTC  Clinical Assistant - Dr. Parvez Rock   Orthopedic Oncology   [facility]  Phone: [phone]

## 2024-01-02 NOTE — TELEPHONE ENCOUNTER
"Called pt. He stated he is in excruciating pain. (Back)  I asked if he was taking the med's as prescribed. He said yes but they don't help.   I then asked how is he taking them and he said that he takes the morphine every 12 hours. But the oxycodone he took 2 and it made him throw up; so he didn't take anymore. He also said he was told to only take it when he needs it.   I asked about his appetite- he said he's not hungry because he's in pain. I explained the importance of eating and drinking while taking pain medications.   I also explained to the pt that he is prescribed medications for when he needs it and right now he needs it.   I advised him to take his morphine and ONE oxycodone at 10 am and to continue taking he oxycodone only every 4 hours. To get the pain under control.     I explained to the patient that until he gets the pain under control it will always feel like nothing is working.     I asked how his bowel movements were and he explained that he is taking senacot and glycolax- I again informed him of the importance that he have something in his stomach prior to taking the medications as they will upset his stomach and cause constipation.     He voiced understanding at the end of our call and was very thankful for the information being given.     ----- Message from Inez Soriano sent at 1/2/2024  9:38 AM CST -----  Regarding: Pt advice  Contact: Pt     Pt requesting call back in regards to portal message sent yesterday. Pt stated he is in pain  Please call and adv @       Confirmed contact below:   Contact Name: Gamaliel VANDANA Pete Jr.  Phone Number: [phone]               Additional Notes:  "Thank you for all that you do for our patients"                                           "

## 2024-01-03 PROBLEM — M84.58XA PATHOLOGICAL FRACTURE OF THORACIC VERTEBRA DUE TO NEOPLASTIC DISEASE: Status: ACTIVE | Noted: 2024-01-03

## 2024-01-03 PROBLEM — I10 PRIMARY HYPERTENSION: Status: RESOLVED | Noted: 2019-04-01 | Resolved: 2024-01-03

## 2024-01-03 PROBLEM — M10.9 GOUT OF ANKLE: Status: ACTIVE | Noted: 2024-01-03

## 2024-01-03 PROBLEM — I10 PRIMARY HYPERTENSION: Status: ACTIVE | Noted: 2019-04-01

## 2024-01-03 LAB
ANION GAP SERPL CALC-SCNC: 13 MMOL/L (ref 8–16)
BASOPHILS # BLD AUTO: 0.02 K/UL (ref 0–0.2)
BASOPHILS NFR BLD: 0.3 % (ref 0–1.9)
BILIRUB UR QL STRIP: NEGATIVE
BUN SERPL-MCNC: 22 MG/DL (ref 8–23)
CALCIUM SERPL-MCNC: 9.5 MG/DL (ref 8.7–10.5)
CHLORIDE SERPL-SCNC: 101 MMOL/L (ref 95–110)
CLARITY UR REFRACT.AUTO: CLEAR
CO2 SERPL-SCNC: 19 MMOL/L (ref 23–29)
COLOR UR AUTO: YELLOW
CREAT SERPL-MCNC: 1 MG/DL (ref 0.5–1.4)
DIFFERENTIAL METHOD BLD: ABNORMAL
EOSINOPHIL # BLD AUTO: 0 K/UL (ref 0–0.5)
EOSINOPHIL NFR BLD: 0.3 % (ref 0–8)
ERYTHROCYTE [DISTWIDTH] IN BLOOD BY AUTOMATED COUNT: 13.7 % (ref 11.5–14.5)
EST. GFR  (NO RACE VARIABLE): >60 ML/MIN/1.73 M^2
GLUCOSE SERPL-MCNC: 106 MG/DL (ref 70–110)
GLUCOSE UR QL STRIP: NEGATIVE
HCT VFR BLD AUTO: 35.9 % (ref 40–54)
HGB BLD-MCNC: 12.4 G/DL (ref 14–18)
HGB UR QL STRIP: ABNORMAL
IMM GRANULOCYTES # BLD AUTO: 0.03 K/UL (ref 0–0.04)
IMM GRANULOCYTES NFR BLD AUTO: 0.4 % (ref 0–0.5)
KETONES UR QL STRIP: ABNORMAL
LEUKOCYTE ESTERASE UR QL STRIP: NEGATIVE
LYMPHOCYTES # BLD AUTO: 0.6 K/UL (ref 1–4.8)
LYMPHOCYTES NFR BLD: 9.6 % (ref 18–48)
MAGNESIUM SERPL-MCNC: 2 MG/DL (ref 1.6–2.6)
MCH RBC QN AUTO: 28.9 PG (ref 27–31)
MCHC RBC AUTO-ENTMCNC: 34.5 G/DL (ref 32–36)
MCV RBC AUTO: 84 FL (ref 82–98)
MONOCYTES # BLD AUTO: 0.3 K/UL (ref 0.3–1)
MONOCYTES NFR BLD: 5.1 % (ref 4–15)
NEUTROPHILS # BLD AUTO: 5.6 K/UL (ref 1.8–7.7)
NEUTROPHILS NFR BLD: 84.3 % (ref 38–73)
NITRITE UR QL STRIP: NEGATIVE
NRBC BLD-RTO: 0 /100 WBC
PH UR STRIP: 5 [PH] (ref 5–8)
PHOSPHATE SERPL-MCNC: 4.5 MG/DL (ref 2.7–4.5)
PLATELET # BLD AUTO: 172 K/UL (ref 150–450)
PMV BLD AUTO: 9.3 FL (ref 9.2–12.9)
POTASSIUM SERPL-SCNC: 4.4 MMOL/L (ref 3.5–5.1)
PROT UR QL STRIP: ABNORMAL
RBC # BLD AUTO: 4.29 M/UL (ref 4.6–6.2)
SARS-COV-2 RDRP RESP QL NAA+PROBE: NEGATIVE
SODIUM SERPL-SCNC: 133 MMOL/L (ref 136–145)
SP GR UR STRIP: 1.02 (ref 1–1.03)
URN SPEC COLLECT METH UR: ABNORMAL
WBC # BLD AUTO: 6.68 K/UL (ref 3.9–12.7)

## 2024-01-03 PROCEDURE — 20600001 HC STEP DOWN PRIVATE ROOM

## 2024-01-03 PROCEDURE — 63600175 PHARM REV CODE 636 W HCPCS: Performed by: INTERNAL MEDICINE

## 2024-01-03 PROCEDURE — 80048 BASIC METABOLIC PNL TOTAL CA: CPT | Performed by: INTERNAL MEDICINE

## 2024-01-03 PROCEDURE — 81003 URINALYSIS AUTO W/O SCOPE: CPT | Performed by: INTERNAL MEDICINE

## 2024-01-03 PROCEDURE — 25000003 PHARM REV CODE 250: Performed by: INTERNAL MEDICINE

## 2024-01-03 PROCEDURE — 84100 ASSAY OF PHOSPHORUS: CPT | Performed by: INTERNAL MEDICINE

## 2024-01-03 PROCEDURE — 83735 ASSAY OF MAGNESIUM: CPT | Performed by: INTERNAL MEDICINE

## 2024-01-03 PROCEDURE — 36415 COLL VENOUS BLD VENIPUNCTURE: CPT | Performed by: INTERNAL MEDICINE

## 2024-01-03 PROCEDURE — 85025 COMPLETE CBC W/AUTO DIFF WBC: CPT | Performed by: INTERNAL MEDICINE

## 2024-01-03 RX ORDER — DEXAMETHASONE SODIUM PHOSPHATE 4 MG/ML
4 INJECTION, SOLUTION INTRA-ARTICULAR; INTRALESIONAL; INTRAMUSCULAR; INTRAVENOUS; SOFT TISSUE EVERY 6 HOURS
Status: COMPLETED | OUTPATIENT
Start: 2024-01-03 | End: 2024-01-08

## 2024-01-03 RX ADMIN — SODIUM CHLORIDE: 9 INJECTION, SOLUTION INTRAVENOUS at 03:01

## 2024-01-03 RX ADMIN — PANTOPRAZOLE SODIUM 40 MG: 40 TABLET, DELAYED RELEASE ORAL at 09:01

## 2024-01-03 RX ADMIN — GABAPENTIN 300 MG: 300 CAPSULE ORAL at 09:01

## 2024-01-03 RX ADMIN — GABAPENTIN 300 MG: 300 CAPSULE ORAL at 03:01

## 2024-01-03 RX ADMIN — MORPHINE SULFATE 30 MG: 30 TABLET, FILM COATED, EXTENDED RELEASE ORAL at 09:01

## 2024-01-03 RX ADMIN — DEXAMETHASONE SODIUM PHOSPHATE 4 MG: 4 INJECTION INTRA-ARTICULAR; INTRALESIONAL; INTRAMUSCULAR; INTRAVENOUS; SOFT TISSUE at 11:01

## 2024-01-03 RX ADMIN — POLYETHYLENE GLYCOL 3350 17 G: 17 POWDER, FOR SOLUTION ORAL at 09:01

## 2024-01-03 RX ADMIN — SODIUM CHLORIDE: 9 INJECTION, SOLUTION INTRAVENOUS at 01:01

## 2024-01-03 RX ADMIN — DEXAMETHASONE SODIUM PHOSPHATE 4 MG: 4 INJECTION INTRA-ARTICULAR; INTRALESIONAL; INTRAMUSCULAR; INTRAVENOUS; SOFT TISSUE at 09:01

## 2024-01-03 RX ADMIN — DEXAMETHASONE SODIUM PHOSPHATE 4 MG: 4 INJECTION INTRA-ARTICULAR; INTRALESIONAL; INTRAMUSCULAR; INTRAVENOUS; SOFT TISSUE at 01:01

## 2024-01-03 RX ADMIN — SENNOSIDES AND DOCUSATE SODIUM 1 TABLET: 8.6; 5 TABLET ORAL at 09:01

## 2024-01-03 RX ADMIN — ALLOPURINOL 100 MG: 100 TABLET ORAL at 09:01

## 2024-01-03 RX ADMIN — AMLODIPINE BESYLATE 5 MG: 5 TABLET ORAL at 09:01

## 2024-01-03 RX ADMIN — DEXAMETHASONE SODIUM PHOSPHATE 4 MG: 4 INJECTION INTRA-ARTICULAR; INTRALESIONAL; INTRAMUSCULAR; INTRAVENOUS; SOFT TISSUE at 06:01

## 2024-01-03 NOTE — ED NOTES
Pt placed on telemetry box [id number] . Rhythm and rate confirmed by telemetry technician: NSR at a rate of 71

## 2024-01-03 NOTE — ED TRIAGE NOTES
Patient is a 73-year-old male presents to the ED via EMS with c/o back pain. Patient states that he has a surgery scheduled tomorrow and he is not suppose to be in the ER. Denies SOB or chest pain

## 2024-01-03 NOTE — ASSESSMENT & PLAN NOTE
He is also followed in Heme-Onc clinic by Dr. 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  She notes that he was being referred to Dr. Rock for right femur findings, for which IMN surgery on 1/9/24 with preoperative embolization by IR.  He just started systemic therapy with Pembrolizumab (KEYTRUDA) and Axitinib.

## 2024-01-03 NOTE — CONSULTS
Left message on voicemail to contact office to schedule Ortho appointment.   [facility] - Transplant Stepdown  Neurosurgery  Consult Note    Inpatient consult to Neurosurgery  Consult performed by: Esther Hood PA-C  Consult ordered by: TAMIA Meléndez MD        Subjective:     Chief Complaint/Reason for Admission: RCC mets to spine     History of Present Illness: Gamaliel Pete Jr. is a 73 y.o. 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 Martinez due to metastatic disease to those areas. He is also s/p T9 kyphoplasty for a pathological fracture at that level. Patient states that his back pain at that level worsened after the kyphoplasty. The pain is burning and located in the middle-left area, radiating around the trunk at that level on the left. The pain is constant. He is on multiple pain medications for his cancer and states they do not help with the pain. They just make him sleepy. He denies new weakness, numbness, tingling, bowel/bladder dysfunction, gait difficulties, falls or other injuries. He has been compliant with his c-collar and wears a TLSO for comfort. Patient had outpatient MRI and CT of thoracic spine which demonstrates new hung mets to T6, T8, T12, and progressive tumor growth at T9 with extension into ventral epidural space with cord compression as well as invasion into T10 superior endplate. Patient was notified of new results and was direct admitted with plans for IR embolization of T9 lesion with OR booked for 1/5/24 for T7-T11 Robotic fusion.     Of note, the patient has started chemotherapy- Pembrolizumab and Axitinib . There are also plans for a right femur IMN on 1/9 for metastasis to this area and high fracture risk. There is also some lucency about the right shoulder int he distal clavicle/acromian that ortho plans to monitor for metastasis.    Medications Prior to Admission    Medication Sig Dispense Refill Last Dose    allopurinoL (ZYLOPRIM) 100 MG tablet Take 1 tablet (100 mg total) by mouth once daily. 30 tablet 3     amlodipine-benazepril 5-10 mg (LOTREL) 5-10 mg per capsule Take 1 capsule by mouth once daily. 90 capsule 3     axitinib (INLYTA) 5 mg Tab Take 1 tablet (5 mg) by mouth 2 (two) times daily. 60 tablet 11     diazePAM (VALIUM) 5 MG tablet Take 1 tablet (5 mg total) by mouth every 6 (six) hours as needed (severe muscles spasms). 10 tablet 0     gabapentin (NEURONTIN) 300 MG capsule Take 1 capsule (300 mg total) by mouth 3 (three) times daily. 90 capsule 11     methocarbamoL (ROBAXIN) 750 MG Tab Take 1 tablet (750 mg total) by mouth 4 (four) times daily as needed (muscle spasms). 90 tablet 0     morphine (MS CONTIN) 30 MG 12 hr tablet Take 1 tablet (30 mg total) by mouth every 12 (twelve) hours. 60 tablet 0     naloxone (NARCAN) 4 mg/actuation Spry 1 spray (4mg) by nasal route as needed for opioid overdose; may repeat every 2-3 minutes in alternating nostrils until medical help arrives. Call 911 (Patient not taking: Reported on 12/28/2023) 2 each 0     omega-3 fatty acids/fish oil (FISH OIL-OMEGA-3 FATTY ACIDS) 300-1,000 mg capsule Take 2 capsules by mouth once daily.       oxyCODONE-acetaminophen (PERCOCET) 5-325 mg per tablet Take 1 to 2 tablets by mouth every 4-6 hours as needed for pain 100 tablet 0     sildenafiL (VIAGRA) 100 MG tablet Take 1 tablet (100 mg total) by mouth daily as needed for Erectile Dysfunction. 30 tablet 11     turmeric (CURCUMIN MISC) 1,000 mg by Misc.(Non-Drug; Combo Route) route Daily.       UNABLE TO FIND Take 500 mg by mouth 2 (two) times a day. medication name: Tudca       UNABLE TO FIND Take 2 capsules by mouth 2 (two) times a day. medication name: Turkey tail mushroom       UNABLE TO FIND Take 15 drops by mouth once daily. medication name: Essiac-20       UNABLE TO FIND Take 5 mLs by mouth 2 (two) times a day. medication name: barley leaf  juice powder       UNABLE TO FIND Take 5 mLs by mouth 2 (two) times a day. medication name: black seed oil (cumin seed)          Review of patient's allergies indicates:  No Known Allergies    Past Medical History:   Diagnosis Date    Asthma     no inhaler use    Borderline hypertension     ED (erectile dysfunction)     Gout     Hematuria     Hypertension      Past Surgical History:   Procedure Laterality Date    COLONOSCOPY  02/10/2022    COLONOSCOPY N/A 02/10/2022    Procedure: COLONOSCOPY;  Surgeon: Desmond Keenan MD;  Location: [facility];  Service: General;  Laterality: N/A;    POSTERIOR FUSION OF CERVICAL SPINE WITH LAMINECTOMY N/A 11/15/2023    Procedure: SPINE, CERVICAL, WITH POSTERIOR FUSION T4-6;  Surgeon: Nasim Martinez DO;  Location: [address];  Service: Neurosurgery;  Laterality: N/A;  C2-T1 laminectomy and posterior fusion. [place]. C-arm. [place]. Neuromonitoring.    ROBOT-ASSISTED LAPAROSCOPIC NEPHRECTOMY Right 10/4/2023    Procedure: ROBOTIC NEPHRECTOMY;  Surgeon: Henry Michaels MD;  Location: [facility];  Service: Urology;  Laterality: Right;    SURGICAL REMOVAL OF VERTEBRAL BODY OF CERVICAL SPINE Right 11/7/2023    Procedure: CORPECTOMY, SPINE, CERVICAL;  Surgeon: Nasim Martinez DO;  Location: [facility] OR [address] [place] [address];  Service: Neurosurgery;  Laterality: Right;  C4 and C5 corpectomy with globus;  wells tongs; c-arm; neuromonitoring; microscope; type and cross 2 units    SURGICAL REMOVAL OF VERTEBRAL BODY OF CERVICAL SPINE N/A 11/15/2023    Procedure: CORPECTOMY, SPINE, CERVICAL C3-6 REVISION;  Surgeon: Nasim Martinez DO;  [facility];  Service: Neurosurgery;  Laterality: N/A;    TONSILLECTOMY       Family History    None       Tobacco Use    Smoking status: Never    Smokeless tobacco: Never   Substance and Sexual Activity    Alcohol use: Not Currently     Alcohol/week: 0.0 standard drinks of alcohol     Comment: rarely    Drug use: Never    Sexual activity:  Not on file     Review of Systems   Constitutional:  Negative for chills, fatigue and fever.   Eyes:  Negative for photophobia and visual disturbance.   Respiratory:  Negative for cough and shortness of breath.    Cardiovascular:  Negative for chest pain, palpitations and leg swelling.   Gastrointestinal:  Negative for constipation, diarrhea, nausea and vomiting.   Genitourinary:  Negative for difficulty urinating, dysuria, frequency and urgency.   Musculoskeletal:  Positive for back pain. Negative for gait problem and neck pain.   Neurological:  Negative for dizziness, seizures, speech difficulty, weakness, numbness and headaches.   Psychiatric/Behavioral:  Negative for confusion. The patient is not nervous/anxious.      Objective:     Weight: 95.3 kg (210 lb)  Body mass index is 30.13 kg/m².  Vital Signs (Most Recent):  Temp: 98.5 °F (36.9 °C) (01/03/24 0818)  Pulse: 98 (01/03/24 1115)  Resp: 18 (01/03/24 0950)  BP: 124/63 (01/03/24 0818)  SpO2: 96 % (01/03/24 0517) Vital Signs (24h Range):  Temp:  [97.7 °F (36.5 °C)-98.5 °F (36.9 °C)] 98.5 °F (36.9 °C)  Pulse:  [70-98] 98  Resp:  [18-20] 18  SpO2:  [96 %-99 %] 96 %  BP: (124-160)/() 124/63     Date 01/03/24 0700 - 01/04/24 0659   Shift 4343-1542 6240-2490 0643-8729 24 Hour Total   INTAKE   Shift Total(mL/kg)       OUTPUT   Urine(mL/kg/hr) 625   625   Shift Total(mL/kg) 625(6.6)   625(6.6)   Weight (kg) 95.3 95.3 95.3 95.3                            Closed/Suction Drain 11/15/23 1457 Tube - 1 Right Back Accordion 10 Fr. (Active)            Closed/Suction Drain 11/15/23 1458 Tube - 2 Left Back Accordion 10 Fr. (Active)         Neurosurgery Physical Exam  General: Well developed, well nourished, no distress.  Head: Normocephalic, atraumatic.  Neurologic: Alert and oriented. Thought content appropriate  GCS: Motor: 6/Verbal: 5/Eyes: 4 GCS Total: 15  Mental Status: Awake, Alert, Oriented x 4.  Language: No aphasia.  Speech: No dysarthria.  Cranial nerves: Face  symmetric, tongue midline, CN II-XII grossly intact.   Eyes: Pupils equal, round, reactive to light with accommodation, EOMI.  Pulmonary: Normal respirations, not labored, no accessory muscles used.  Sensory: Intact to light touch throughout.  Motor Strength: Moves all extremities spontaneously with good tone. Full strength upper and lower extremities. No abnormal movements seen.      Strength   Deltoids Triceps Biceps Wrist Extension Wrist Flexion Hand    Upper: R 5/5 5/5 5/5 5/5 5/5 5/5     L 5/5 5/5 5/5 5/5 5/5 5/5       Iliopsoas Quadriceps Knee  Flexion Tibialis  anterior Gastro- cnemius EHL   Lower: R 5/5 5/5 5/5 5/5 5/5 5/5     L 5/5 5/5 5/5 5/5 5/5 5/5      [place]: Absent.  Clonus: subtle bilateral clonus (2+ beat) appreciated  Vascular: Pulses 2+ and symmetric radial and dorsalis pedis. No LE edema or skin changes.  Skin: Warm, dry and intact, no rashes.  Gait: Normal.                    Anterior and posterior incisions well healed.  Significant Labs:  Recent Labs   Lab 01/02/24 2104 01/03/24  0623   GLU 84 106   * 133*   K 3.8 4.4   CL 99 101   CO2 21* 19*   BUN 17 22   CREATININE 1.1 1.0   CALCIUM 10.4 9.5   MG 1.9 2.0     Recent Labs   Lab 01/02/24 2104 01/03/24  0623   WBC 7.74 6.68   HGB 14.3 12.4*   HCT 40.5 35.9*    172     Recent Labs   Lab 01/02/24 2104   INR 1.0   APTT 30.5     Microbiology Results (last 7 days)       ** No results found for the last 168 hours. **          All pertinent labs from the last 24 hours have been reviewed.    Significant Diagnostics:  I have reviewed all pertinent imaging results/findings within the past 24 hours.  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 Martinez due to metastatic disease to those areas. He is also s/p T9 kyphoplasty for a pathological fracture at that level. Unfortunately, T9 lesion continued to grow, now with hung destruction of T9 with ventral epidural space extension as well as extension to T10 superior enplate causing moderate cord compression and T9-T10 foraminal stenosis.     MRI and CT of thoracic spine which demonstrates new hung mets to T6, T8, T12, and progressive tumor growth at T9 with extension into ventral epidural space with cord compression as well as invasion into T10 superior endplate.    -admitted to oncology   -q4h neuro checks  -IR consulted for pre-op emobolization of T9 lesion  -OR booked for T7-T11 robotic fusion with Dr. Martinez on 1/5/23  -TLSO brace to be worn at all times  -Spine precautions  -Pre-op cleared per primary team  -Notify nsgy with any acute exam changes     Discussed with Dr. Martinez         Thank you for your consult. I will follow-up with patient. Please contact us if you have any additional questions.    Esther Hood PA-C  Neurosurgery  [facility] - Transplant Stepdown

## 2024-01-03 NOTE — SUBJECTIVE & OBJECTIVE
Past Medical History:   Diagnosis Date    Asthma     no inhaler use    Borderline hypertension     ED (erectile dysfunction)     Gout     Hematuria     Hypertension        Past Surgical History:   Procedure Laterality Date    COLONOSCOPY  02/10/2022    COLONOSCOPY N/A 02/10/2022    Procedure: COLONOSCOPY;  Surgeon: Desmond Keenan MD;  Location: [facility];  Service: General;  Laterality: N/A;    POSTERIOR FUSION OF CERVICAL SPINE WITH LAMINECTOMY N/A 11/15/2023    Procedure: SPINE, CERVICAL, WITH POSTERIOR FUSION T4-6;  Surgeon: Nasim Martinez DO;  Location: [address];  Service: Neurosurgery;  Laterality: N/A;  C2-T1 laminectomy and posterior fusion. [place]. C-arm. [employer]. Neuromonitoring.    ROBOT-ASSISTED LAPAROSCOPIC NEPHRECTOMY Right 10/4/2023    Procedure: ROBOTIC NEPHRECTOMY;  Surgeon: Henry Michaels MD;  Location: [facility];  Service: Urology;  Laterality: Right;    SURGICAL REMOVAL OF VERTEBRAL BODY OF CERVICAL SPINE Right 11/7/2023    Procedure: CORPECTOMY, SPINE, CERVICAL;  Surgeon: Nasim Martinez DO;  Location: [address];  Service: Neurosurgery;  Laterality: Right;  C4 and C5 corpectomy with globus; familia wells tongs; c-arm; neuromonitoring; microscope; type and cross 2 units    SURGICAL REMOVAL OF VERTEBRAL BODY OF CERVICAL SPINE N/A 11/15/2023    Procedure: CORPECTOMY, SPINE, CERVICAL C3-6 REVISION;  Surgeon: Nasim Martinez DO;  Location: [facility] OR [address] [place] [address];  Service: Neurosurgery;  Laterality: N/A;    TONSILLECTOMY         Review of patient's allergies indicates:  No Known Allergies    No current facility-administered medications on file prior to encounter.     Current Outpatient Medications on File Prior to Encounter   Medication Sig    allopurinoL (ZYLOPRIM) 100 MG tablet Take 1 tablet (100 mg total) by mouth once daily.    amlodipine-benazepril 5-10 mg (LOTREL) 5-10 mg per capsule Take 1 capsule by mouth once daily.    axitinib (INLYTA) 5 mg Tab Take 1 tablet (5 mg)  by mouth 2 (two) times daily.    diazePAM (VALIUM) 5 MG tablet Take 1 tablet (5 mg total) by mouth every 6 (six) hours as needed (severe muscles spasms).    gabapentin (NEURONTIN) 300 MG capsule Take 1 capsule (300 mg total) by mouth 3 (three) times daily.    LIDOcaine (LIDODERM) 5 % Place 1 patch onto the skin once daily. Remove & Discard patch within 12 hours or as directed by MD (Patient not taking: Reported on 12/28/2023)    methocarbamoL (ROBAXIN) 750 MG Tab Take 1 tablet (750 mg total) by mouth 4 (four) times daily as needed (muscle spasms).    morphine (MS CONTIN) 30 MG 12 hr tablet Take 1 tablet (30 mg total) by mouth every 12 (twelve) hours.    naloxone (NARCAN) 4 mg/actuation Spry 1 spray (4mg) by nasal route as needed for opioid overdose; may repeat every 2-3 minutes in alternating nostrils until medical help arrives. Call 911 (Patient not taking: Reported on 12/28/2023)    omega-3 fatty acids/fish oil (FISH OIL-OMEGA-3 FATTY ACIDS) 300-1,000 mg capsule Take 2 capsules by mouth once daily.    oxyCODONE-acetaminophen (PERCOCET) 5-325 mg per tablet Take 1 to 2 tablets by mouth every 4-6 hours as needed for pain    predniSONE (DELTASONE) 20 MG tablet Take 1 tablet (20 mg total) by mouth once daily. (Patient not taking: Reported on 12/28/2023)    sildenafiL (VIAGRA) 100 MG tablet Take 1 tablet (100 mg total) by mouth daily as needed for Erectile Dysfunction.    turmeric (CURCUMIN MISC) 1,000 mg by Misc.(Non-Drug; Combo Route) route Daily.    UNABLE TO FIND Take 500 mg by mouth 2 (two) times a day. medication name: Tudca    UNABLE TO FIND Take 2 capsules by mouth 2 (two) times a day. medication name: Turkey tail mushroom    UNABLE TO FIND Take 15 drops by mouth once daily. medication name: Essiac-20    UNABLE TO FIND Take 5 mLs by mouth 2 (two) times a day. medication name: barley leaf juice powder    UNABLE TO FIND Take 5 mLs by mouth 2 (two) times a day. medication name: black seed oil (cumin seed)      Family History    None       Tobacco Use    Smoking status: Never    Smokeless tobacco: Never   Substance and Sexual Activity    Alcohol use: Not Currently     Alcohol/week: 0.0 standard drinks of alcohol     Comment: rarely    Drug use: Never    Sexual activity: Not on file     Review of Systems   Constitutional:  Positive for activity change and fatigue.   HENT:  Negative for congestion.    Eyes:  Negative for visual disturbance.   Respiratory:  Negative for cough and shortness of breath.    Cardiovascular:  Negative for chest pain.   Gastrointestinal:  Negative for diarrhea, nausea and vomiting.   Endocrine: Positive for cold intolerance.   Genitourinary:  Negative for dysuria.   Musculoskeletal:  Positive for arthralgias, back pain and gait problem.   Skin:  Negative for rash.   Allergic/Immunologic: Positive for immunocompromised state.   Neurological:  Negative for light-headedness and headaches.   Hematological:  Does not bruise/bleed easily.   Psychiatric/Behavioral:  Negative for decreased concentration and dysphoric mood.      Objective:     Vital Signs (Most Recent):  Temp: 97.7 °F (36.5 °C) (01/02/24 2135)  Pulse: 82 (01/03/24 0110)  Resp: 18 (01/03/24 0110)  BP: 130/69 (01/03/24 0110)  SpO2: 99 % (01/03/24 0110) Vital Signs (24h Range):  Temp:  [97.7 °F (36.5 °C)-98 °F (36.7 °C)] 97.7 °F (36.5 °C)  Pulse:  [70-82] 82  Resp:  [18-20] 18  SpO2:  [96 %-99 %] 99 %  BP: (124-160)/() 130/69     Weight: 95.3 kg (210 lb)  Body mass index is 30.13 kg/m².     Physical Exam  Constitutional:       General: He is not in acute distress.     Appearance: He is normal weight. He is ill-appearing.   HENT:      Head: Normocephalic and atraumatic.   Neck:      Comments: Cervical collar in place  Pulmonary:      Effort: No tachypnea or bradypnea.   Abdominal:      Comments: Mid thoracic and abdominal binding hard brace in place   Genitourinary:     Comments: No hyde in place  Neurological:      Mental Status: He  is alert and oriented to person, place, and time.      Comments: He is moving all extremities on camera with no limitations   Psychiatric:         Attention and Perception: Attention and perception normal.         Speech: Speech is tangential.         Cognition and Memory: Cognition and memory normal.                Significant Labs: All pertinent labs within the past 24 hours have been reviewed.  Recent Results (from the past 24 hour(s))   CBC auto differential    Collection Time: 01/02/24  9:04 PM   Result Value Ref Range    WBC 7.74 3.90 - 12.70 K/uL    RBC 4.87 4.60 - 6.20 M/uL    Hemoglobin 14.3 14.0 - 18.0 g/dL    Hematocrit 40.5 40.0 - 54.0 %    MCV 83 82 - 98 fL    MCH 29.4 27.0 - 31.0 pg    MCHC 35.3 32.0 - 36.0 g/dL    RDW 13.7 11.5 - 14.5 %    Platelets 190 150 - 450 K/uL    MPV 9.0 (L) 9.2 - 12.9 fL    Immature Granulocytes 0.4 0.0 - 0.5 %    Gran # (ANC) 5.2 1.8 - 7.7 K/uL    Immature Grans (Abs) 0.03 0.00 - 0.04 K/uL    Lymph # 1.6 1.0 - 4.8 K/uL    Mono # 0.8 0.3 - 1.0 K/uL    Eos # 0.2 0.0 - 0.5 K/uL    Baso # 0.05 0.00 - 0.20 K/uL    nRBC 0 0 /100 WBC    Gran % 66.8 38.0 - 73.0 %    Lymph % 20.2 18.0 - 48.0 %    Mono % 10.1 4.0 - 15.0 %    Eosinophil % 1.9 0.0 - 8.0 %    Basophil % 0.6 0.0 - 1.9 %    Differential Method Automated    Comprehensive metabolic panel    Collection Time: 01/02/24  9:04 PM   Result Value Ref Range    Sodium 134 (L) 136 - 145 mmol/L    Potassium 3.8 3.5 - 5.1 mmol/L    Chloride 99 95 - 110 mmol/L    CO2 21 (L) 23 - 29 mmol/L    Glucose 84 70 - 110 mg/dL    BUN 17 8 - 23 mg/dL    Creatinine 1.1 0.5 - 1.4 mg/dL    Calcium 10.4 8.7 - 10.5 mg/dL    Total Protein 8.8 (H) 6.0 - 8.4 g/dL    Albumin 3.9 3.5 - 5.2 g/dL    Total Bilirubin 1.2 (H) 0.1 - 1.0 mg/dL    Alkaline Phosphatase 91 55 - 135 U/L     (H) 10 - 40 U/L     (H) 10 - 44 U/L    eGFR >60.0 >60 mL/min/1.73 m^2    Anion Gap 14 8 - 16 mmol/L   APTT    Collection Time: 01/02/24  9:04 PM   Result Value Ref  Range    aPTT 30.5 21.0 - 32.0 sec   Protime-INR    Collection Time: 01/02/24  9:04 PM   Result Value Ref Range    Prothrombin Time 11.2 9.0 - 12.5 sec    INR 1.0 0.8 - 1.2   Type & Screen    Collection Time: 01/02/24  9:04 PM   Result Value Ref Range    Group & Rh O NEG     Indirect Anil NEG     Specimen Outdate 01/05/2024 23:59    Magnesium    Collection Time: 01/02/24  9:04 PM   Result Value Ref Range    Magnesium 1.9 1.6 - 2.6 mg/dL   Phosphorus    Collection Time: 01/02/24  9:04 PM   Result Value Ref Range    Phosphorus 3.7 2.7 - 4.5 mg/dL   COVID-19 Rapid Screening    Collection Time: 01/02/24 10:47 PM   Result Value Ref Range    SARS-CoV-2 RNA, Amplification, Qual Negative Negative         Significant Imaging: I have reviewed all pertinent imaging results/findings within the past 24 hours.

## 2024-01-03 NOTE — ASSESSMENT & PLAN NOTE
Body mass index is 30.13 kg/m². Morbid obesity complicates all aspects of disease management from diagnostic modalities to treatment. Weight loss encouraged and health benefits explained to patient.

## 2024-01-03 NOTE — ASSESSMENT & PLAN NOTE
Gamaliel Pete Jr. is a 73 y.o. 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 Martinez due to metastatic disease to those areas. He is also s/p T9 kyphoplasty for a pathological fracture at that level. Unfortunately, T9 lesion continued to grow, now with hung destruction of T9 with ventral epidural space extension as well as extension to T10 superior enplate causing moderate cord compression and T9-T10 foraminal stenosis.     MRI and CT of thoracic spine which demonstrates new hung mets to T6, T8, T12, and progressive tumor growth at T9 with extension into ventral epidural space with cord compression as well as invasion into T10 superior endplate.    -admitted to oncology   -q4h neuro checks  -IR consulted for pre-op emobolization of T9 lesion  -OR booked for T7-T11 robotic fusion with Dr. Martinez on 1/5/23  -TLSO brace to be worn at all times  -Spine precautions  -Pre-op cleared per primary team  -Notify nsgy with any acute exam changes     Discussed with Dr. Martinez

## 2024-01-03 NOTE — NURSING
"Pt transferred to unit via stretcher. Pt AAOx4, VSS. NSR on tele. Pt expressed confusion regarding POC, spoke with  via telemedicine with questions answered. Pt NPO since midnight for possible procedure tomorrow (awaiting further assessment). NS infusion started @75cc/h. Pt bed bound, able to roll but pt experiences severe pain with movement. Pt with chronic pain refusing pain medication -- states that "I have tried everything, nothing makes the pain better"--pt informed of PRN muscle relaxants and pain medications available. Pt with TLSO brace and neck brace in place... states that he can remove his neck brace but prefers to keep wearing the TLSO brace. Awaiting urine sample. Wife at bedside, bed in low locked position, call light within reach, POC ongoing.   "

## 2024-01-03 NOTE — ASSESSMENT & PLAN NOTE
MRI shows significant worsening of T9 compression Fx since kyphoplasty  This is in tandem with significant new pain in that area    MRI T spine 12/30/23 showed possible cord involvement: entered at the T9 level there is posterior buckling of the cortex and overlying enhancing soft tissue in the ventral epidural space, possibly combination of extraosseous extension of neoplastic soft tissue and inflammatory change. This enhancing soft tissue measures on the order of 8 x 19 x 27 mm (AP x ML x CC). This contributes to moderate central spinal canal stenosis with effacement of CSF and ventral cord deformity. Question intramedullary edema-like signal.    Holding NPO and placed consult to NSG and XRT  Started Decadron 4mg iv q 6 hours  Opioids for pain control  Continue to wear thoracic brace and lie flat on bed for now  Holding DVT dose lovenox for now pending need for IR thromboembolism and spine surgery  Consult IR for preop embolization

## 2024-01-03 NOTE — ASSESSMENT & PLAN NOTE
His bony and lung metastatic disease has also been complicated by lytic lesions in his spine with pathologic compression fractures.  He was admitted 11/6/23-11/9/23 with cervical corpectomy with NSGY on 11/7/23 which he tolerated well. They obtained tissue samples and sent it over to pathology. IR to perform osteocool/kyphoplasty/biopsy T9 11/14. 2nd stage of surgery with NSGY on 11/15 with Dr. Martinez after kyphoplasty.  He was then admitted 11/4/23-11/18/23 and underwent IR kyphoplasty of T4-6, SPINE, CERVICAL, WITH POSTERIOR FUSION T4-6 (N/A), CORPECTOMY, SPINE, CERVICAL C3-6 REVISION (N/A).          diazePAM tablet 5 mg, 5 mg, Oral, Q6H PRN, back spasm    gabapentin capsule 300 mg, 300 mg, Oral, TID     methocarbamoL tablet 750 mg, 750 mg, Oral, QID PRN, back spasm    morphine 12 hr tablet 30 mg, 30 mg, Oral, Q12H     oxyCODONE immediate release tablet 5 mg, 5 mg, Oral, Q6H PRN, moderate pain    oxyCODONE immediate release tablet Tab 10 mg, 10 mg, Oral, Q6H PRN, severe pain

## 2024-01-03 NOTE — SUBJECTIVE & OBJECTIVE
Medications Prior to Admission   Medication Sig Dispense Refill Last Dose    allopurinoL (ZYLOPRIM) 100 MG tablet Take 1 tablet (100 mg total) by mouth once daily. 30 tablet 3     amlodipine-benazepril 5-10 mg (LOTREL) 5-10 mg per capsule Take 1 capsule by mouth once daily. 90 capsule 3     axitinib (INLYTA) 5 mg Tab Take 1 tablet (5 mg) by mouth 2 (two) times daily. 60 tablet 11     diazePAM (VALIUM) 5 MG tablet Take 1 tablet (5 mg total) by mouth every 6 (six) hours as needed (severe muscles spasms). 10 tablet 0     gabapentin (NEURONTIN) 300 MG capsule Take 1 capsule (300 mg total) by mouth 3 (three) times daily. 90 capsule 11     methocarbamoL (ROBAXIN) 750 MG Tab Take 1 tablet (750 mg total) by mouth 4 (four) times daily as needed (muscle spasms). 90 tablet 0     morphine (MS CONTIN) 30 MG 12 hr tablet Take 1 tablet (30 mg total) by mouth every 12 (twelve) hours. 60 tablet 0     naloxone (NARCAN) 4 mg/actuation Spry 1 spray (4mg) by nasal route as needed for opioid overdose; may repeat every 2-3 minutes in alternating nostrils until medical help arrives. Call 911 (Patient not taking: Reported on 12/28/2023) 2 each 0     omega-3 fatty acids/fish oil (FISH OIL-OMEGA-3 FATTY ACIDS) 300-1,000 mg capsule Take 2 capsules by mouth once daily.       oxyCODONE-acetaminophen (PERCOCET) 5-325 mg per tablet Take 1 to 2 tablets by mouth every 4-6 hours as needed for pain 100 tablet 0     sildenafiL (VIAGRA) 100 MG tablet Take 1 tablet (100 mg total) by mouth daily as needed for Erectile Dysfunction. 30 tablet 11     turmeric (CURCUMIN MISC) 1,000 mg by Misc.(Non-Drug; Combo Route) route Daily.       UNABLE TO FIND Take 500 mg by mouth 2 (two) times a day. medication name: Tudca       UNABLE TO FIND Take 2 capsules by mouth 2 (two) times a day. medication name: Turkey tail mushroom       UNABLE TO FIND Take 15 drops by mouth once daily. medication name: Essiac-20       UNABLE TO FIND Take 5 mLs by mouth 2 (two) times a day.  medication name: barley leaf juice powder       UNABLE TO FIND Take 5 mLs by mouth 2 (two) times a day. medication name: black seed oil (cumin seed)          Review of patient's allergies indicates:  No Known Allergies    Past Medical History:   Diagnosis Date    Asthma     no inhaler use    Borderline hypertension     ED (erectile dysfunction)     Gout     Hematuria     Hypertension      Past Surgical History:   Procedure Laterality Date    COLONOSCOPY  02/10/2022    COLONOSCOPY N/A 02/10/2022    Procedure: COLONOSCOPY;  Surgeon: Desmond Keenan MD;  Location: [facility];  Service: General;  Laterality: N/A;    POSTERIOR FUSION OF CERVICAL SPINE WITH LAMINECTOMY N/A 11/15/2023    Procedure: SPINE, CERVICAL, WITH POSTERIOR FUSION T4-6;  Surgeon: Nasim Martinez DO;  Location: [address];  Service: Neurosurgery;  Laterality: N/A;  C2-T1 laminectomy and posterior fusion. [place]. C-arm. Tubis. Neuromonitoring.    ROBOT-ASSISTED LAPAROSCOPIC NEPHRECTOMY Right 10/4/2023    Procedure: ROBOTIC NEPHRECTOMY;  Surgeon: Henry Michaels MD;  Location: [facility];  Service: Urology;  Laterality: Right;    SURGICAL REMOVAL OF VERTEBRAL BODY OF CERVICAL SPINE Right 11/7/2023    Procedure: CORPECTOMY, SPINE, CERVICAL;  Surgeon: Nasim Martinez DO;  Location: [address];  Service: Neurosurgery;  Laterality: Right;  C4 and C5 corpectomy with globus;  wells tongs; c-arm; neuromonitoring; microscope; type and cross 2 units    SURGICAL REMOVAL OF VERTEBRAL BODY OF CERVICAL SPINE N/A 11/15/2023    Procedure: CORPECTOMY, SPINE, CERVICAL C3-6 REVISION;  Surgeon: Nasim Martinez DO;  Location: [facility] OR [address] [place] [address];  Service: Neurosurgery;  Laterality: N/A;    TONSILLECTOMY       Family History    None       Tobacco Use    Smoking status: Never    Smokeless tobacco: Never   Substance and Sexual Activity    Alcohol use: Not Currently     Alcohol/week: 0.0 standard drinks of alcohol     Comment: rarely    Drug use:  Never    Sexual activity: Not on file     Review of Systems   Constitutional:  Negative for chills, fatigue and fever.   Eyes:  Negative for photophobia and visual disturbance.   Respiratory:  Negative for cough and shortness of breath.    Cardiovascular:  Negative for chest pain, palpitations and leg swelling.   Gastrointestinal:  Negative for constipation, diarrhea, nausea and vomiting.   Genitourinary:  Negative for difficulty urinating, dysuria, frequency and urgency.   Musculoskeletal:  Positive for back pain. Negative for gait problem and neck pain.   Neurological:  Negative for dizziness, seizures, speech difficulty, weakness, numbness and headaches.   Psychiatric/Behavioral:  Negative for confusion. The patient is not nervous/anxious.      Objective:     Weight: 95.3 kg (210 lb)  Body mass index is 30.13 kg/m².  Vital Signs (Most Recent):  Temp: 98.5 °F (36.9 °C) (01/03/24 0818)  Pulse: 98 (01/03/24 1115)  Resp: 18 (01/03/24 0950)  BP: 124/63 (01/03/24 0818)  SpO2: 96 % (01/03/24 0517) Vital Signs (24h Range):  Temp:  [97.7 °F (36.5 °C)-98.5 °F (36.9 °C)] 98.5 °F (36.9 °C)  Pulse:  [70-98] 98  Resp:  [18-20] 18  SpO2:  [96 %-99 %] 96 %  BP: (124-160)/() 124/63     Date 01/03/24 0700 - 01/04/24 0659   Shift 3602-6560 5181-7822 4969-2147 24 Hour Total   INTAKE   Shift Total(mL/kg)       OUTPUT   Urine(mL/kg/hr) 625   625   Shift Total(mL/kg) 625(6.6)   625(6.6)   Weight (kg) 95.3 95.3 95.3 95.3                            Closed/Suction Drain 11/15/23 1457 Tube - 1 Right Back Accordion 10 Fr. (Active)            Closed/Suction Drain 11/15/23 1458 Tube - 2 Left Back Accordion 10 Fr. (Active)         Neurosurgery Physical Exam  General: Well developed, well nourished, no distress.  Head: Normocephalic, atraumatic.  Neurologic: Alert and oriented. Thought content appropriate  GCS: Motor: 6/Verbal: 5/Eyes: 4 GCS Total: 15  Mental Status: Awake, Alert, Oriented x 4.  Language: No aphasia.  Speech: No  dysarthria.  Cranial nerves: Face symmetric, tongue midline, CN II-XII grossly intact.   Eyes: Pupils equal, round, reactive to light with accommodation, EOMI.  Pulmonary: Normal respirations, not labored, no accessory muscles used.  Sensory: Intact to light touch throughout.  Motor Strength: Moves all extremities spontaneously with good tone. Full strength upper and lower extremities. No abnormal movements seen.      Strength   Deltoids Triceps Biceps Wrist Extension Wrist Flexion Hand    Upper: R 5/5 5/5 5/5 5/5 5/5 5/5     L 5/5 5/5 5/5 5/5 5/5 5/5       Iliopsoas Quadriceps Knee  Flexion Tibialis  anterior Gastro- cnemius EHL   Lower: R 5/5 5/5 5/5 5/5 5/5 5/5     L 5/5 5/5 5/5 5/5 5/5 5/5      [place]: Absent.  Clonus: subtle bilateral clonus (2+ beat) appreciated  Vascular: Pulses 2+ and symmetric radial and dorsalis pedis. No LE edema or skin changes.  Skin: Warm, dry and intact, no rashes.  Gait: Normal.                    Anterior and posterior incisions well healed.  Significant Labs:  Recent Labs   Lab 01/02/24 2104 01/03/24  0623   GLU 84 106   * 133*   K 3.8 4.4   CL 99 101   CO2 21* 19*   BUN 17 22   CREATININE 1.1 1.0   CALCIUM 10.4 9.5   MG 1.9 2.0     Recent Labs   Lab 01/02/24 2104 01/03/24  0623   WBC 7.74 6.68   HGB 14.3 12.4*   HCT 40.5 35.9*    172     Recent Labs   Lab 01/02/24 2104   INR 1.0   APTT 30.5     Microbiology Results (last 7 days)       ** No results found for the last 168 hours. **          All pertinent labs from the last 24 hours have been reviewed.    Significant Diagnostics:  I have reviewed all pertinent imaging results/findings within the past 24 hours.

## 2024-01-03 NOTE — ASSESSMENT & PLAN NOTE
He notes recurrent gout of both ankles  The decadron for his spine should assist with this as well

## 2024-01-03 NOTE — HPI
Mr. Pete is a 72yo man with a past medical history of asthma, HTN, ED, gout, and HTN.  He is also followed in Heme-Onc clinic by Dr. 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  She notes that he was being referred to Dr. Rcok for right femur findings, for which IMN surgery on 1/9/24 with preoperative embolization by IR.  He just started systemic therapy with Pembrolizumab (KEYTRUDA) and Axitinib.      His bony and lung metastatic disease has also been complicated by lytic lesions in his spine with pathologic compression fractures.  He was admitted 11/6/23-11/9/23 with cervical corpectomy with NSGY on 11/7/23 which he tolerated well. They obtained tissue samples and sent it over to pathology. IR to perform osteocool/kyphoplasty/biopsy T9 11/14. 2nd stage of surgery with NSGY on 11/15 with Dr. Martinez after kyphoplasty.  He was then admitted 11/4/23-11/18/23 and underwent IR kyphoplasty of T4-6, SPINE, CERVICAL, WITH POSTERIOR FUSION T4-6 (N/A), CORPECTOMY, SPINE, CERVICAL C3-6 REVISION (N/A).     He recently underwent NC CT T-spine on 12/30/23 which showed a pathologic compression fracture of T9 status post vertebral augmentation.  Compared to 11/01/2023, there is increased height loss and an enlarging destructive lesion with extraosseous extension into the ventral epidural space resulting in moderate canal stenosis and left T9-10 neural foramen resulting in moderate foraminal stenosis.  New erosive changes involving the T10 superior endplate, concerning for direct invasion.  New 0.9 cm lytic lesion in the T6 vertebral body.      Follow up MRI T-spine that same day showed 1. Relative to prior MRI of the thoracic spine performed 10/31/2023, the patient is now status post vertebral augmentation at T9, at the level of presumed pathologic fracture due to metastasis. There has been further  "height loss of the vertebra since the reference MR examination, however configuration of the T9 vertebra appears similar when compared to more recently performed intervening CTA of the chest of 11/20/2023.  2. On the current examination, centered at the T9 level there is posterior buckling of the cortex and overlying enhancing soft tissue in the ventral epidural space, possibly combination of extraosseous extension of neoplastic soft tissue and inflammatory change. This enhancing soft tissue measures on the order of 8 x 19 x 27 mm (AP x ML x CC). This contributes to moderate central spinal canal stenosis with effacement of CSF and ventral cord deformity. Question intramedullary edema-like signal.  3. Additionally, there is new mild height loss of the T8 vertebra with patchy edema-like signal enhancement since the October 2023 MRI, at least some of which is likely related to acute or subacute fracture deformity, with underlying extension of metastasis not excluded. Question subtle height loss of the T8 vertebra since the 11/20/2023 CT chest.  4. Further, there appears to be a new enhancing lesion within the left paramedian T6 vertebral body, possibly metastatic lesion.  Equivocal new T2 weighted lesion within T12 vertebra.    Dr. Martinez followed up and found that his back pain was worse and notified Dr. Turk, "His thoracic fracture is worse and there looks like there is now epidural extension of tumor at T9 causing cord compression.  I have called the patient and his back pain is worse.  I think he needs a thoracic decompression/fusion in the near future."  Dr. Turk instructed him to come to the ED.  The patient tells me that his legs are fine with no focal weakness or neuropathic pain.  He is mostly having severe pain to his mid-thoracic back making it almost impossible to move around.  He is fine if lying perfectly flat on his back without moving, but excruciating with any attempt to stand or walk.      His " only other complaint is new gout that started over the past few days to his ankles and maybe early gout to left knee.    In the ED he was treated with:  Medications - No data to display

## 2024-01-03 NOTE — CONSULTS
PATIENT IDENTIFICATION:  Patient Name: Gamaliel Pete Jr.  MRN: [id number]  : 1950    DIAGNOSIS:  Cancer Staging   Clear cell carcinoma of right kidney  Staging form: Kidney, AJCC 8th Edition  - Clinical stage from 10/31/2023: Stage IV (cT3a, cNX, cM1) - Signed by Ruby Turk MD on 10/31/2023      HISTORY OF PRESENT ILLNESS:   The patient is a 73 year old man with metastatic RCC.  He is status post c spine corpectomy, T9 kyphoplasty.  He now has spinal cord compression at T9 and is awaiting decompressive laminectomy and intramedullary nail at the right femur.  The patient has been referred for consideration of palliative radiation to these sites.    The patient presented in 2023 with complaint of gross hematuria.  The patient was referred to Urology for management.  CT scan performed on 2023 revealed a mass in the right kidney lower pole measuring 4.9 x 6.3 x 6.1 cm.  There were multiple lung nodules seen measuring up to 11 mm concerning for metastatic disease.  A lytic lesion at T9 with mild compression was noted.     Bone scan performed on 2023 revealed focal uptake in the T9 vertebral body corresponding to the lytic lesion seen on CT scan.  The patient was planned for biopsy of the T9 lesion but developed worsening hematuria and was recommended to undergo nephrectomy.     The patient was taken to the operating room on 10/04/2023 for right robotic nephrectomy.  Pathology revealed a grade 4 clear cell renal cell carcinoma measuring 5.5 cm in greatest dimension.  There were no lymph nodes submitted.     The patient reports worsening back pain over the past two weeks.  He was admitted on 10/31/2023 for pain control and evaluation by Neurosurgery.  MRI of the thoracic spine performed yesterday revealed enhancing destructive lesions involving C5 and T9 vertebral bodies with associated compression deformity, progressed from prior at level T9.  Additional mild retropulsion of T9 causing  mild canal stenosis at the T8-T9 level.  Possible lesion involving the anterior C6 vertebral body.  Consider further evaluation with cervical spine MRI.    The patient was taken to the OR on 11/7/23 for C spine corpectomy.  Pathology was c/w metastatic RCC.    MRI t spine performed 12/30/23: Impression:     1. Relative to prior MRI of the thoracic spine performed 10/31/2023, the patient is now status post vertebral augmentation at T9, at the level of presumed pathologic fracture due to metastasis. There has been further height loss of the vertebra since the reference MR examination, however configuration of the T9 vertebra appears similar when compared to more recently performed intervening CTA of the chest of 11/20/2023.  2. On the current examination, centered at the T9 level there is posterior buckling of the cortex and overlying enhancing soft tissue in the ventral epidural space, possibly combination of extraosseous extension of neoplastic soft tissue and inflammatory change. This enhancing soft tissue measures on the order of 8 x 19 x 27 mm (AP x ML x CC). This contributes to moderate central spinal canal stenosis with effacement of CSF and ventral cord deformity. Question intramedullary edema-like signal.  3. Additionally, there is new mild height loss of the T8 vertebra with patchy edema-like signal enhancement since the October 2023 MRI, at least some of which is likely related to acute or subacute fracture deformity, with underlying extension of metastasis not excluded. Question subtle height loss of the T8 vertebra since the 11/20/2023 CT chest.  4. Further, there appears to be a new enhancing lesion within the left paramedian T6 vertebral body, possibly metastatic lesion.  Equivocal new T2 weighted lesion within T12 vertebra.  Attention on follow-up recommended.      Oncology History   Cancer with pulmonary metastases   9/14/2023 Initial Diagnosis    Cancer with pulmonary metastases     12/22/2023 -   Chemotherapy    Treatment Summary   Plan Name: OP AXITINIB + PEMBROLIZUMAB 200MG Q3W  Treatment Goal: Palliative  Status: Active  Start Date: 12/22/2023  End Date: 12/9/2025 (Planned)  Provider: Ruby Turk MD  Chemotherapy: axitinib (INLYTA) 5 mg Tab, 5 mg, Oral, 2 times daily, 1 of 1 cycle, Start date: 12/18/2023, End date: --     Clear cell carcinoma of right kidney   10/11/2023 Initial Diagnosis    Clear cell carcinoma of right kidney     10/31/2023 Cancer Staged    Staging form: Kidney, AJCC 8th Edition  - Clinical stage from 10/31/2023: Stage IV (cT3a, cNX, cM1)          REVIEW OF SYSTEMS:   Review of Systems   Musculoskeletal:  Positive for back pain.       PAST MEDICAL HISTORY:  Past Medical History:   Diagnosis Date    Asthma     no inhaler use    Borderline hypertension     ED (erectile dysfunction)     Gout     Hematuria     Hypertension        PAST SURGICAL HISTORY:  Past Surgical History:   Procedure Laterality Date    COLONOSCOPY  02/10/2022    COLONOSCOPY N/A 02/10/2022    Procedure: COLONOSCOPY;  Surgeon: Desmond Keenan MD;  Location: [facility];  Service: General;  Laterality: N/A;    POSTERIOR FUSION OF CERVICAL SPINE WITH LAMINECTOMY N/A 11/15/2023    Procedure: SPINE, CERVICAL, WITH POSTERIOR FUSION T4-6;  Surgeon: Nasim Martinez DO;  Location: [address];  Service: Neurosurgery;  Laterality: N/A;  C2-T1 laminectomy and posterior fusion. [place]. C-arm. [employer]. Neuromonitoring.    ROBOT-ASSISTED LAPAROSCOPIC NEPHRECTOMY Right 10/4/2023    Procedure: ROBOTIC NEPHRECTOMY;  Surgeon: Henry Michaels MD;  Location: [facility];  Service: Urology;  Laterality: Right;    SURGICAL REMOVAL OF VERTEBRAL BODY OF CERVICAL SPINE Right 11/7/2023    Procedure: CORPECTOMY, SPINE, CERVICAL;  Surgeon: Nasim Martinez DO;  Location: [address];  Service: Neurosurgery;  Laterality: Right;  C4 and C5 corpectomy with globus;  wells tongs; c-arm; neuromonitoring; microscope; type and  cross 2 units    SURGICAL REMOVAL OF VERTEBRAL BODY OF CERVICAL SPINE N/A 11/15/2023    Procedure: CORPECTOMY, SPINE, CERVICAL C3-6 REVISION;  Surgeon: Nasim Martinez DO;  Location: [facility] OR [address] [place] [address];  Service: Neurosurgery;  Laterality: N/A;    TONSILLECTOMY         ALLERGIES:   Review of patient's allergies indicates:  No Known Allergies    MEDICATIONS:  Current Facility-Administered Medications   Medication    0.9%  NaCl infusion    acetaminophen tablet 650 mg    albuterol-ipratropium 2.5 mg-0.5 mg/3 mL nebulizer solution 3 mL    allopurinoL tablet 100 mg    aluminum-magnesium hydroxide-simethicone 200-200-20 mg/5 mL suspension 30 mL    amLODIPine tablet 5 mg    bisacodyL suppository 10 mg    dexAMETHasone injection 4 mg    dextrose 10% bolus 125 mL 125 mL    dextrose 10% bolus 250 mL 250 mL    diazePAM tablet 5 mg    gabapentin capsule 300 mg    glucagon (human recombinant) injection 1 mg    glucose chewable tablet 16 g    glucose chewable tablet 24 g    melatonin tablet 6 mg    methocarbamoL tablet 750 mg    morphine 12 hr tablet 30 mg    naloxone 0.4 mg/mL injection 0.02 mg    ondansetron injection 4 mg    oxyCODONE immediate release tablet 5 mg    oxyCODONE immediate release tablet Tab 10 mg    pantoprazole EC tablet 40 mg    polyethylene glycol packet 17 g    prochlorperazine injection Soln 5 mg    senna-docusate 8.6-50 mg per tablet 1 tablet    simethicone chewable tablet 80 mg    sodium chloride 0.9% flush 10 mL       SOCIAL HISTORY:  Social History     Socioeconomic History    Marital status:    Tobacco Use    Smoking status: Never    Smokeless tobacco: Never   Substance and Sexual Activity    Alcohol use: Not Currently     Alcohol/week: 0.0 standard drinks of alcohol     Comment: rarely    Drug use: Never     Social Determinants of Health     Financial Resource Strain: Patient Declined (12/22/2023)    Overall Financial Resource Strain (CARDIA)     Difficulty of Paying Living Expenses: Patient  declined   Food Insecurity: Patient Declined (12/22/2023)    Hunger Vital Sign     Worried About Running Out of Food in the Last Year: Patient declined     Ran Out of Food in the Last Year: Patient declined   Transportation Needs: Patient Declined (12/22/2023)    PRAPARE - Transportation     Lack of Transportation (Medical): Patient declined     Lack of Transportation (Non-Medical): Patient declined   Physical Activity: Unknown (12/22/2023)    Exercise Vital Sign     Days of Exercise per Week: Patient declined     Minutes of Exercise per Session: 0 min   Recent Concern: Physical Activity - Inactive (11/16/2023)    Exercise Vital Sign     Days of Exercise per Week: 0 days     Minutes of Exercise per Session: 0 min   Stress: Patient Declined (12/22/2023)    [employer] - Occupational Stress Questionnaire     Feeling of Stress : Patient declined   Social Connections: Unknown (12/22/2023)    Social Connection and Isolation Panel [NHANES]     Frequency of Communication with Friends and Family: Patient declined     Frequency of Social Gatherings with Friends and Family: Patient declined     Attends Caodaism Services: Never     Active Member of Clubs or Organizations: Patient declined     Attends Club or Organization Meetings: Patient declined     Marital Status: Patient declined   Recent Concern: Social Connections - Moderately Isolated (11/16/2023)    Social Connection and Isolation Panel [NHANES]     Frequency of Communication with Friends and Family: More than three times a week     Frequency of Social Gatherings with Friends and Family: More than three times a week     Attends Caodaism Services: Never     Active Member of Clubs or Organizations: No     Attends Club or Organization Meetings: Never     Marital Status:    Housing Stability: Patient Declined (12/22/2023)    Housing Stability Vital Sign     Unable to Pay for Housing in the Last Year: Patient declined     Number of  Places Lived in the Last Year: 1     Unstable Housing in the Last Year: Patient declined       FAMILY HISTORY:  No family history on file.      PHYSICAL EXAMINATION:  Vitals:    24 1143   BP: 119/69   Pulse: 86   Resp: 17   Temp: 97.4 °F (36.3 °C)     Body mass index is 30.13 kg/m².    ECO  Physical Exam  Constitutional:       Appearance: Normal appearance.   HENT:      Head: Normocephalic and atraumatic.      Nose: Nose normal.      Mouth/Throat:      Mouth: Mucous membranes are moist.   Cardiovascular:      Rate and Rhythm: Normal rate.   Pulmonary:      Effort: Pulmonary effort is normal.   Abdominal:      General: Abdomen is flat.      Palpations: Abdomen is soft.   Musculoskeletal:         General: Normal range of motion.      Cervical back: Normal range of motion.   Skin:     General: Skin is warm and dry.   Neurological:      General: No focal deficit present.      Mental Status: He is alert. Mental status is at baseline.             ASSESSMENT/PLAN:      The patient is a 73 year old man with metastatic RCC to the cervical spine, thoracic spine and femur.  He has evidence of spinal cord compression at T9 and is awaiting decompressive laminectomy.  He would be recommended palliative radiation to the cervical, thoracic spine and right femur.  Refer patient to radiation oncology once he has been cleared by Orthopedics and NSG for adjuvant radiation.  He would receive 1-2 weeks of radiation to these sites.    The risks and benefits of treatment have been discussed with the patient and he expressed full understanding. he understands the treatment plan and willing to proceed accordingly.    I spent approximately 60 minutes reviewing the available records and evaluating the patient, out of which over 50% of the time was spent face to face with the patient in counseling and coordinating this patient's care.

## 2024-01-03 NOTE — ED NOTES
Patient refused to put hospital gown on. Patient states he will put in on when he gets up to his room. Patient requesting to do urinalysis when he gets up to his room

## 2024-01-03 NOTE — HPI
Gamaliel Pete Jr. is a 73 y.o. 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 Martinez due to metastatic disease to those areas. He is also s/p T9 kyphoplasty for a pathological fracture at that level. Patient states that his back pain at that level worsened after the kyphoplasty. The pain is burning and located in the middle-left area, radiating around the trunk at that level on the left. The pain is constant. He is on multiple pain medications for his cancer and states they do not help with the pain. They just make him sleepy. He denies new weakness, numbness, tingling, bowel/bladder dysfunction, gait difficulties, falls or other injuries. He has been compliant with his c-collar and wears a TLSO for comfort. Patient had outpatient MRI and CT of thoracic spine which demonstrates new hung mets to T6, T8, T12, and progressive tumor growth at T9 with extension into ventral epidural space with cord compression as well as invasion into T10 superior endplate. Patient was notified of new results and was direct admitted with plans for IR embolization of T9 lesion with OR booked for 1/5/24 for T7-T11 Robotic fusion.     Of note, the patient has started chemotherapy- Pembrolizumab and Axitinib . There are also plans for a right femur IMN on 1/9 for metastasis to this area and high fracture risk. There is also some lucency about the right shoulder int he distal clavicle/acromian that ortho plans to monitor for metastasis.

## 2024-01-03 NOTE — H&P
[facility] Medicine  History & Physical    Patient Name: Gamaliel Pete Jr.  MRN: [id number]  Admission Date: 1/2/2024  Attending Physician: Esther Ayala MD   Primary Care Provider: Slava Lowery MD         Patient information was obtained from patient, spouse/SO, past medical records, and ER records.       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 Hx of multiple spinal surgeries.         HPI: Mr. Pete is a 74yo man with a past medical history of asthma, HTN, ED, gout, and HTN.  He is also followed in Heme-Onc clinic by Dr. uTrk for metastatic right RCC (RIGHT KIDNEY, TOTAL NEPHRECTOMY: Clear cell renal cell carcinoma, ISUP grade 4, 5.5 cm), who last saw him in a VM visit on 12/22/23.  She notes that he was being referred to Dr. Rock for right femur findings, for which IMN surgery on 1/9/24 with preoperative embolization by IR.  He just started systemic therapy with Pembrolizumab (KEYTRUDA) and Axitinib.      His bony and lung metastatic disease has also been complicated by lytic lesions in his spine with pathologic compression fractures.  He was admitted 11/6/23-11/9/23 with cervical corpectomy with NSGY on 11/7/23 which he tolerated well. They obtained tissue samples and sent it over to pathology. IR to perform osteocool/kyphoplasty/biopsy T9 11/14. 2nd stage of surgery with NSGY on 11/15 with Dr. Martinez after kyphoplasty.  He was then admitted 11/4/23-11/18/23 and underwent IR kyphoplasty of T4-6, SPINE, CERVICAL, WITH POSTERIOR FUSION T4-6 (N/A), CORPECTOMY, SPINE, CERVICAL C3-6 REVISION (N/A).     He recently underwent NC CT T-spine on 12/30/23 which showed a pathologic compression fracture of T9 status post vertebral augmentation.   Compared to 11/01/2023, there is increased height loss and an enlarging destructive lesion with extraosseous extension into the ventral epidural space resulting in moderate canal stenosis and left T9-10 neural foramen resulting in moderate foraminal stenosis.  New erosive changes involving the T10 superior endplate, concerning for direct invasion.  New 0.9 cm lytic lesion in the T6 vertebral body.      Follow up MRI T-spine that same day showed 1. Relative to prior MRI of the thoracic spine performed 10/31/2023, the patient is now status post vertebral augmentation at T9, at the level of presumed pathologic fracture due to metastasis. There has been further height loss of the vertebra since the reference MR examination, however configuration of the T9 vertebra appears similar when compared to more recently performed intervening CTA of the chest of 11/20/2023.  2. On the current examination, centered at the T9 level there is posterior buckling of the cortex and overlying enhancing soft tissue in the ventral epidural space, possibly combination of extraosseous extension of neoplastic soft tissue and inflammatory change. This enhancing soft tissue measures on the order of 8 x 19 x 27 mm (AP x ML x CC). This contributes to moderate central spinal canal stenosis with effacement of CSF and ventral cord deformity. Question intramedullary edema-like signal.  3. Additionally, there is new mild height loss of the T8 vertebra with patchy edema-like signal enhancement since the October 2023 MRI, at least some of which is likely related to acute or subacute fracture deformity, with underlying extension of metastasis not excluded. Question subtle height loss of the T8 vertebra since the 11/20/2023 CT chest.  4. Further, there appears to be a new enhancing lesion within the left paramedian T6 vertebral body, possibly metastatic lesion.  Equivocal new T2 weighted lesion within T12 vertebra.    Dr. Martinez followed up and found that  "his back pain was worse and notified Dr. Turk, "His thoracic fracture is worse and there looks like there is now epidural extension of tumor at T9 causing cord compression.  I have called the patient and his back pain is worse.  I think he needs a thoracic decompression/fusion in the near future."  Dr. Turk instructed him to come to the ED.  The patient tells me that his legs are fine with no focal weakness or neuropathic pain.  He is mostly having severe pain to his mid-thoracic back making it almost impossible to move around.  He is fine if lying perfectly flat on his back without moving, but excruciating with any attempt to stand or walk.      His only other complaint is new gout that started over the past few days to his ankles and maybe early gout to left knee.    In the ED he was treated with:  Medications - No data to display            Past Medical History:   Diagnosis Date    Asthma     no inhaler use    Borderline hypertension     ED (erectile dysfunction)     Gout     Hematuria     Hypertension        Past Surgical History:   Procedure Laterality Date    COLONOSCOPY  02/10/2022    COLONOSCOPY N/A 02/10/2022    Procedure: COLONOSCOPY;  Surgeon: Desmond Keenan MD;  Location: [facility];  Service: General;  Laterality: N/A;    POSTERIOR FUSION OF CERVICAL SPINE WITH LAMINECTOMY N/A 11/15/2023    Procedure: SPINE, CERVICAL, WITH POSTERIOR FUSION T4-6;  Surgeon: Nasim Martinez DO;  Location: [address];  Service: Neurosurgery;  Laterality: N/A;  C2-T1 laminectomy and posterior fusion. [facility]. C-arm. [employer]. Neuromonitoring.    ROBOT-ASSISTED LAPAROSCOPIC NEPHRECTOMY Right 10/4/2023    Procedure: ROBOTIC NEPHRECTOMY;  Surgeon: Henry Michaels MD;  Location[facility];  Service: Urology;  Laterality: Right;    SURGICAL REMOVAL OF VERTEBRAL BODY OF CERVICAL SPINE Right 11/7/2023    Procedure: CORPECTOMY, SPINE, CERVICAL;  Surgeon: Nasim Martinez DO;  Location: [address];  Service: " Neurosurgery;  Laterality: Right;  C4 and C5 corpectomy with globus; familia brito tongs; c-arm; neuromonitoring; microscope; type and cross 2 units    SURGICAL REMOVAL OF VERTEBRAL BODY OF CERVICAL SPINE N/A 11/15/2023    Procedure: CORPECTOMY, SPINE, CERVICAL C3-6 REVISION;  Surgeon: Nasim Martinez DO;  Location: [facility] OR [address] [place] [address];  Service: Neurosurgery;  Laterality: N/A;    TONSILLECTOMY         Review of patient's allergies indicates:  No Known Allergies    No current facility-administered medications on file prior to encounter.     Current Outpatient Medications on File Prior to Encounter   Medication Sig    allopurinoL (ZYLOPRIM) 100 MG tablet Take 1 tablet (100 mg total) by mouth once daily.    amlodipine-benazepril 5-10 mg (LOTREL) 5-10 mg per capsule Take 1 capsule by mouth once daily.    axitinib (INLYTA) 5 mg Tab Take 1 tablet (5 mg) by mouth 2 (two) times daily.    diazePAM (VALIUM) 5 MG tablet Take 1 tablet (5 mg total) by mouth every 6 (six) hours as needed (severe muscles spasms).    gabapentin (NEURONTIN) 300 MG capsule Take 1 capsule (300 mg total) by mouth 3 (three) times daily.    LIDOcaine (LIDODERM) 5 % Place 1 patch onto the skin once daily. Remove & Discard patch within 12 hours or as directed by MD (Patient not taking: Reported on 12/28/2023)    methocarbamoL (ROBAXIN) 750 MG Tab Take 1 tablet (750 mg total) by mouth 4 (four) times daily as needed (muscle spasms).    morphine (MS CONTIN) 30 MG 12 hr tablet Take 1 tablet (30 mg total) by mouth every 12 (twelve) hours.    naloxone (NARCAN) 4 mg/actuation Spry 1 spray (4mg) by nasal route as needed for opioid overdose; may repeat every 2-3 minutes in alternating nostrils until medical help arrives. Call 911 (Patient not taking: Reported on 12/28/2023)    omega-3 fatty acids/fish oil (FISH OIL-OMEGA-3 FATTY ACIDS) 300-1,000 mg capsule Take 2 capsules by mouth once daily.    oxyCODONE-acetaminophen (PERCOCET) 5-325 mg per tablet Take 1 to 2  tablets by mouth every 4-6 hours as needed for pain    predniSONE (DELTASONE) 20 MG tablet Take 1 tablet (20 mg total) by mouth once daily. (Patient not taking: Reported on 12/28/2023)    sildenafiL (VIAGRA) 100 MG tablet Take 1 tablet (100 mg total) by mouth daily as needed for Erectile Dysfunction.    turmeric (CURCUMIN MISC) 1,000 mg by Misc.(Non-Drug; Combo Route) route Daily.    UNABLE TO FIND Take 500 mg by mouth 2 (two) times a day. medication name: Tudca    UNABLE TO FIND Take 2 capsules by mouth 2 (two) times a day. medication name: Turkey tail mushroom    UNABLE TO FIND Take 15 drops by mouth once daily. medication name: Essiac-20    UNABLE TO FIND Take 5 mLs by mouth 2 (two) times a day. medication name: barley leaf juice powder    UNABLE TO FIND Take 5 mLs by mouth 2 (two) times a day. medication name: black seed oil (cumin seed)     Family History    None       Tobacco Use    Smoking status: Never    Smokeless tobacco: Never   Substance and Sexual Activity    Alcohol use: Not Currently     Alcohol/week: 0.0 standard drinks of alcohol     Comment: rarely    Drug use: Never    Sexual activity: Not on file     Review of Systems   Constitutional:  Positive for activity change and fatigue.   HENT:  Negative for congestion.    Eyes:  Negative for visual disturbance.   Respiratory:  Negative for cough and shortness of breath.    Cardiovascular:  Negative for chest pain.   Gastrointestinal:  Negative for diarrhea, nausea and vomiting.   Endocrine: Positive for cold intolerance.   Genitourinary:  Negative for dysuria.   Musculoskeletal:  Positive for arthralgias, back pain and gait problem.   Skin:  Negative for rash.   Allergic/Immunologic: Positive for immunocompromised state.   Neurological:  Negative for light-headedness and headaches.   Hematological:  Does not bruise/bleed easily.   Psychiatric/Behavioral:  Negative for decreased concentration and dysphoric mood.      Objective:     Vital Signs (Most  Recent):  Temp: 97.7 °F (36.5 °C) (01/02/24 2135)  Pulse: 82 (01/03/24 0110)  Resp: 18 (01/03/24 0110)  BP: 130/69 (01/03/24 0110)  SpO2: 99 % (01/03/24 0110) Vital Signs (24h Range):  Temp:  [97.7 °F (36.5 °C)-98 °F (36.7 °C)] 97.7 °F (36.5 °C)  Pulse:  [70-82] 82  Resp:  [18-20] 18  SpO2:  [96 %-99 %] 99 %  BP: (124-160)/() 130/69     Weight: 95.3 kg (210 lb)  Body mass index is 30.13 kg/m².     Physical Exam  Constitutional:       General: He is not in acute distress.     Appearance: He is normal weight. He is ill-appearing.   HENT:      Head: Normocephalic and atraumatic.   Neck:      Comments: Cervical collar in place  Pulmonary:      Effort: No tachypnea or bradypnea.   Abdominal:      Comments: Mid thoracic and abdominal binding hard brace in place   Genitourinary:     Comments: No hyde in place  Neurological:      Mental Status: He is alert and oriented to person, place, and time.      Comments: He is moving all extremities on camera with no limitations   Psychiatric:         Attention and Perception: Attention and perception normal.         Speech: Speech is tangential.         Cognition and Memory: Cognition and memory normal.                Significant Labs: All pertinent labs within the past 24 hours have been reviewed.  Recent Results (from the past 24 hour(s))   CBC auto differential    Collection Time: 01/02/24  9:04 PM   Result Value Ref Range    WBC 7.74 3.90 - 12.70 K/uL    RBC 4.87 4.60 - 6.20 M/uL    Hemoglobin 14.3 14.0 - 18.0 g/dL    Hematocrit 40.5 40.0 - 54.0 %    MCV 83 82 - 98 fL    MCH 29.4 27.0 - 31.0 pg    MCHC 35.3 32.0 - 36.0 g/dL    RDW 13.7 11.5 - 14.5 %    Platelets 190 150 - 450 K/uL    MPV 9.0 (L) 9.2 - 12.9 fL    Immature Granulocytes 0.4 0.0 - 0.5 %    Gran # (ANC) 5.2 1.8 - 7.7 K/uL    Immature Grans (Abs) 0.03 0.00 - 0.04 K/uL    Lymph # 1.6 1.0 - 4.8 K/uL    Mono # 0.8 0.3 - 1.0 K/uL    Eos # 0.2 0.0 - 0.5 K/uL    Baso # 0.05 0.00 - 0.20 K/uL    nRBC 0 0 /100 WBC     Gran % 66.8 38.0 - 73.0 %    Lymph % 20.2 18.0 - 48.0 %    Mono % 10.1 4.0 - 15.0 %    Eosinophil % 1.9 0.0 - 8.0 %    Basophil % 0.6 0.0 - 1.9 %    Differential Method Automated    Comprehensive metabolic panel    Collection Time: 01/02/24  9:04 PM   Result Value Ref Range    Sodium 134 (L) 136 - 145 mmol/L    Potassium 3.8 3.5 - 5.1 mmol/L    Chloride 99 95 - 110 mmol/L    CO2 21 (L) 23 - 29 mmol/L    Glucose 84 70 - 110 mg/dL    BUN 17 8 - 23 mg/dL    Creatinine 1.1 0.5 - 1.4 mg/dL    Calcium 10.4 8.7 - 10.5 mg/dL    Total Protein 8.8 (H) 6.0 - 8.4 g/dL    Albumin 3.9 3.5 - 5.2 g/dL    Total Bilirubin 1.2 (H) 0.1 - 1.0 mg/dL    Alkaline Phosphatase 91 55 - 135 U/L     (H) 10 - 40 U/L     (H) 10 - 44 U/L    eGFR >60.0 >60 mL/min/1.73 m^2    Anion Gap 14 8 - 16 mmol/L   APTT    Collection Time: 01/02/24  9:04 PM   Result Value Ref Range    aPTT 30.5 21.0 - 32.0 sec   Protime-INR    Collection Time: 01/02/24  9:04 PM   Result Value Ref Range    Prothrombin Time 11.2 9.0 - 12.5 sec    INR 1.0 0.8 - 1.2   Type & Screen    Collection Time: 01/02/24  9:04 PM   Result Value Ref Range    Group & Rh O NEG     Indirect Anil NEG     Specimen Outdate 01/05/2024 23:59    Magnesium    Collection Time: 01/02/24  9:04 PM   Result Value Ref Range    Magnesium 1.9 1.6 - 2.6 mg/dL   Phosphorus    Collection Time: 01/02/24  9:04 PM   Result Value Ref Range    Phosphorus 3.7 2.7 - 4.5 mg/dL   COVID-19 Rapid Screening    Collection Time: 01/02/24 10:47 PM   Result Value Ref Range    SARS-CoV-2 RNA, Amplification, Qual Negative Negative         Significant Imaging: I have reviewed all pertinent imaging results/findings within the past 24 hours.  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 This enhancing soft tissue measures on the order of 8 x 19 x 27 mm (AP x ML x CC). This contributes to moderate central spinal canal stenosis with effacement of CSF and ventral cord deformity. Question intramedullary edema-like signal.    Holding NPO and placed consult to NSG and XRT  Started Decadron 4mg iv q 6 hours  Opioids for pain control  Continue to wear thoracic brace and lie flat on bed for now  Holding DVT dose lovenox for now pending need for IR thromboembolism and spine surgery  Consult IR for preop embolization      Metastatic cancer to spine  His bony and lung metastatic disease has also been complicated by lytic lesions in his spine with pathologic compression fractures.  He was admitted 11/6/23-11/9/23 with cervical corpectomy with NSGY on 11/7/23 which he tolerated well. They obtained tissue samples and sent it over to pathology. IR to perform osteocool/kyphoplasty/biopsy T9 11/14. 2nd stage of surgery with NSGY on 11/15 with Dr. Martinez after kyphoplasty.  He was then admitted 11/4/23-11/18/23 and underwent IR kyphoplasty of T4-6, SPINE, CERVICAL, WITH POSTERIOR FUSION T4-6 (N/A), CORPECTOMY, SPINE, CERVICAL C3-6 REVISION (N/A).          diazePAM tablet 5 mg, 5 mg, Oral, Q6H PRN, back spasm    gabapentin capsule 300 mg, 300 mg, Oral, TID     methocarbamoL tablet 750 mg, 750 mg, Oral, QID PRN, back spasm    morphine 12 hr tablet 30 mg, 30 mg, Oral, Q12H     oxyCODONE immediate release tablet 5 mg, 5 mg, Oral, Q6H PRN, moderate pain    oxyCODONE immediate release tablet Tab 10 mg, 10 mg, Oral, Q6H PRN, severe pain      Clear cell carcinoma of right kidney  He is also followed in Heme-Onc clinic by Dr. Turk for metastatic right RCC (RIGHT KIDNEY, TOTAL NEPHRECTOMY: Clear  cell renal cell carcinoma, ISUP grade 4, 5.5 cm), who last saw him in a VM visit on 12/22/23.  She notes that he was being referred to Dr. Rock for right femur findings, for which IMN surgery on 1/9/24 with preoperative embolization by IR.  He just started systemic therapy with Pembrolizumab (KEYTRUDA) and Axitinib.        Essential hypertension    amLODIPine tablet 5 mg, 5 mg, Oral, Daily     lisinopriL tablet 10 mg, 10 mg, Oral, Daily         Class 1 obesity without serious comorbidity with body mass index (BMI) of 31.0 to 31.9 in adult  Body mass index is 30.13 kg/m². Morbid obesity complicates all aspects of disease management from diagnostic modalities to treatment. Weight loss encouraged and health benefits explained to patient.         Gout of ankle  He notes recurrent gout of both ankles  The decadron for his spine should assist with this as well        VTE Risk Mitigation (From admission, onward)           Ordered     Place JONATHON hose  Until discontinued         01/02/24 1953     Reason for No Pharmacological VTE Prophylaxis  Once        Question:  Reasons:  Answer:  Physician Provided (leave comment)  Comment:  May need spine surgery in am    01/02/24 1953     IP VTE HIGH RISK PATIENT  Once         01/02/24 1953     Place sequential compression device  Until discontinued         01/02/24 1953                         The attending portion of this evaluation, treatment, and documentation was performed per KINZA Meléndez MD via Telemedicine [employer] using the secure [employer] software platform with 2 way audio/video. The provider was located off-site and the patient is located in the hospital. The aforementioned video software was utilized to document the relevant history and physical exam            KINZA Meléndez MD  Department of Hospital Medicine   [facility] [place]

## 2024-01-04 ENCOUNTER — ANESTHESIA EVENT (OUTPATIENT)
Dept: INTERVENTIONAL RADIOLOGY/VASCULAR | Facility: HOSPITAL | Age: 74
DRG: 457 | End: 2024-01-04
Payer: MEDICARE

## 2024-01-04 ENCOUNTER — ANESTHESIA EVENT (OUTPATIENT)
Dept: SURGERY | Facility: HOSPITAL | Age: 74
DRG: 457 | End: 2024-01-04
Payer: MEDICARE

## 2024-01-04 LAB
ANION GAP SERPL CALC-SCNC: 11 MMOL/L (ref 8–16)
BASOPHILS # BLD AUTO: 0 K/UL (ref 0–0.2)
BASOPHILS NFR BLD: 0 % (ref 0–1.9)
BUN SERPL-MCNC: 28 MG/DL (ref 8–23)
CALCIUM SERPL-MCNC: 9.7 MG/DL (ref 8.7–10.5)
CHLORIDE SERPL-SCNC: 108 MMOL/L (ref 95–110)
CO2 SERPL-SCNC: 18 MMOL/L (ref 23–29)
CREAT SERPL-MCNC: 1.1 MG/DL (ref 0.5–1.4)
DIFFERENTIAL METHOD BLD: ABNORMAL
EOSINOPHIL # BLD AUTO: 0 K/UL (ref 0–0.5)
EOSINOPHIL NFR BLD: 0 % (ref 0–8)
ERYTHROCYTE [DISTWIDTH] IN BLOOD BY AUTOMATED COUNT: 13.8 % (ref 11.5–14.5)
EST. GFR  (NO RACE VARIABLE): >60 ML/MIN/1.73 M^2
GLUCOSE SERPL-MCNC: 124 MG/DL (ref 70–110)
HCT VFR BLD AUTO: 33.2 % (ref 40–54)
HGB BLD-MCNC: 11.6 G/DL (ref 14–18)
IMM GRANULOCYTES # BLD AUTO: 0.02 K/UL (ref 0–0.04)
IMM GRANULOCYTES NFR BLD AUTO: 0.6 % (ref 0–0.5)
LYMPHOCYTES # BLD AUTO: 0.8 K/UL (ref 1–4.8)
LYMPHOCYTES NFR BLD: 23.4 % (ref 18–48)
MAGNESIUM SERPL-MCNC: 2.2 MG/DL (ref 1.6–2.6)
MCH RBC QN AUTO: 29 PG (ref 27–31)
MCHC RBC AUTO-ENTMCNC: 34.9 G/DL (ref 32–36)
MCV RBC AUTO: 83 FL (ref 82–98)
MONOCYTES # BLD AUTO: 0.2 K/UL (ref 0.3–1)
MONOCYTES NFR BLD: 5.3 % (ref 4–15)
NEUTROPHILS # BLD AUTO: 2.4 K/UL (ref 1.8–7.7)
NEUTROPHILS NFR BLD: 70.7 % (ref 38–73)
NRBC BLD-RTO: 0 /100 WBC
PHOSPHATE SERPL-MCNC: 3.9 MG/DL (ref 2.7–4.5)
PLATELET # BLD AUTO: 189 K/UL (ref 150–450)
PMV BLD AUTO: 9 FL (ref 9.2–12.9)
POTASSIUM SERPL-SCNC: 4.7 MMOL/L (ref 3.5–5.1)
RBC # BLD AUTO: 4 M/UL (ref 4.6–6.2)
SODIUM SERPL-SCNC: 137 MMOL/L (ref 136–145)
WBC # BLD AUTO: 3.37 K/UL (ref 3.9–12.7)

## 2024-01-04 PROCEDURE — 63600175 PHARM REV CODE 636 W HCPCS: Performed by: PSYCHIATRY & NEUROLOGY

## 2024-01-04 PROCEDURE — 25000003 PHARM REV CODE 250: Performed by: NURSE ANESTHETIST, CERTIFIED REGISTERED

## 2024-01-04 PROCEDURE — 84100 ASSAY OF PHOSPHORUS: CPT | Performed by: INTERNAL MEDICINE

## 2024-01-04 PROCEDURE — 25000003 PHARM REV CODE 250: Performed by: ANESTHESIOLOGY

## 2024-01-04 PROCEDURE — 63600175 PHARM REV CODE 636 W HCPCS: Performed by: STUDENT IN AN ORGANIZED HEALTH CARE EDUCATION/TRAINING PROGRAM

## 2024-01-04 PROCEDURE — D9220A PRA ANESTHESIA: Mod: CRNA,,, | Performed by: NURSE ANESTHETIST, CERTIFIED REGISTERED

## 2024-01-04 PROCEDURE — 83735 ASSAY OF MAGNESIUM: CPT | Performed by: INTERNAL MEDICINE

## 2024-01-04 PROCEDURE — D9220A PRA ANESTHESIA: Mod: ANES,,, | Performed by: ANESTHESIOLOGY

## 2024-01-04 PROCEDURE — 36415 COLL VENOUS BLD VENIPUNCTURE: CPT | Performed by: INTERNAL MEDICINE

## 2024-01-04 PROCEDURE — 20600001 HC STEP DOWN PRIVATE ROOM

## 2024-01-04 PROCEDURE — 25500020 PHARM REV CODE 255: Performed by: INTERNAL MEDICINE

## 2024-01-04 PROCEDURE — 85025 COMPLETE CBC W/AUTO DIFF WBC: CPT | Performed by: INTERNAL MEDICINE

## 2024-01-04 PROCEDURE — 25000003 PHARM REV CODE 250

## 2024-01-04 PROCEDURE — 25000003 PHARM REV CODE 250: Performed by: STUDENT IN AN ORGANIZED HEALTH CARE EDUCATION/TRAINING PROGRAM

## 2024-01-04 PROCEDURE — B31NYZZ FLUOROSCOPY OF OTHER UPPER ARTERIES USING OTHER CONTRAST: ICD-10-PCS | Performed by: PSYCHIATRY & NEUROLOGY

## 2024-01-04 PROCEDURE — 80048 BASIC METABOLIC PNL TOTAL CA: CPT | Performed by: INTERNAL MEDICINE

## 2024-01-04 PROCEDURE — 99233 SBSQ HOSP IP/OBS HIGH 50: CPT | Mod: GC,,, | Performed by: INTERNAL MEDICINE

## 2024-01-04 PROCEDURE — 63600175 PHARM REV CODE 636 W HCPCS: Performed by: INTERNAL MEDICINE

## 2024-01-04 PROCEDURE — 25000003 PHARM REV CODE 250: Performed by: PSYCHIATRY & NEUROLOGY

## 2024-01-04 PROCEDURE — 25000003 PHARM REV CODE 250: Performed by: INTERNAL MEDICINE

## 2024-01-04 PROCEDURE — 03LY3DZ OCCLUSION OF UPPER ARTERY WITH INTRALUMINAL DEVICE, PERCUTANEOUS APPROACH: ICD-10-PCS | Performed by: PSYCHIATRY & NEUROLOGY

## 2024-01-04 PROCEDURE — 63600175 PHARM REV CODE 636 W HCPCS: Performed by: ANESTHESIOLOGY

## 2024-01-04 PROCEDURE — 99024 POSTOP FOLLOW-UP VISIT: CPT | Mod: ,,,

## 2024-01-04 RX ORDER — MIDAZOLAM HYDROCHLORIDE 1 MG/ML
INJECTION, SOLUTION INTRAMUSCULAR; INTRAVENOUS
Status: DISCONTINUED | OUTPATIENT
Start: 2024-01-04 | End: 2024-01-04

## 2024-01-04 RX ORDER — HYDROMORPHONE HYDROCHLORIDE 1 MG/ML
0.5 INJECTION, SOLUTION INTRAMUSCULAR; INTRAVENOUS; SUBCUTANEOUS EVERY 10 MIN PRN
Status: DISCONTINUED | OUTPATIENT
Start: 2024-01-04 | End: 2024-01-04

## 2024-01-04 RX ORDER — HYDROMORPHONE HYDROCHLORIDE 1 MG/ML
0.5 INJECTION, SOLUTION INTRAMUSCULAR; INTRAVENOUS; SUBCUTANEOUS EVERY 6 HOURS PRN
Status: DISCONTINUED | OUTPATIENT
Start: 2024-01-04 | End: 2024-01-05

## 2024-01-04 RX ORDER — ONDANSETRON HYDROCHLORIDE 2 MG/ML
4 INJECTION, SOLUTION INTRAVENOUS DAILY PRN
Status: DISCONTINUED | OUTPATIENT
Start: 2024-01-04 | End: 2024-01-05 | Stop reason: HOSPADM

## 2024-01-04 RX ORDER — LIDOCAINE HYDROCHLORIDE 10 MG/ML
INJECTION, SOLUTION INTRAVENOUS
Status: DISCONTINUED | OUTPATIENT
Start: 2024-01-04 | End: 2024-01-04

## 2024-01-04 RX ORDER — SODIUM CHLORIDE 0.9 % (FLUSH) 0.9 %
10 SYRINGE (ML) INJECTION
Status: DISCONTINUED | OUTPATIENT
Start: 2024-01-04 | End: 2024-01-10 | Stop reason: HOSPADM

## 2024-01-04 RX ORDER — PROCHLORPERAZINE EDISYLATE 5 MG/ML
5 INJECTION INTRAMUSCULAR; INTRAVENOUS EVERY 30 MIN PRN
Status: DISCONTINUED | OUTPATIENT
Start: 2024-01-04 | End: 2024-01-05 | Stop reason: HOSPADM

## 2024-01-04 RX ORDER — FENTANYL CITRATE 50 UG/ML
INJECTION, SOLUTION INTRAMUSCULAR; INTRAVENOUS
Status: DISCONTINUED | OUTPATIENT
Start: 2024-01-04 | End: 2024-01-04

## 2024-01-04 RX ORDER — FENTANYL CITRATE 50 UG/ML
25 INJECTION, SOLUTION INTRAMUSCULAR; INTRAVENOUS EVERY 5 MIN PRN
Status: DISCONTINUED | OUTPATIENT
Start: 2024-01-04 | End: 2024-01-04

## 2024-01-04 RX ORDER — AMLODIPINE BESYLATE 10 MG/1
10 TABLET ORAL DAILY
Status: DISCONTINUED | OUTPATIENT
Start: 2024-01-04 | End: 2024-01-10 | Stop reason: HOSPADM

## 2024-01-04 RX ORDER — IODIXANOL 320 MG/ML
200 INJECTION, SOLUTION INTRAVASCULAR
Status: COMPLETED | OUTPATIENT
Start: 2024-01-04 | End: 2024-01-04

## 2024-01-04 RX ORDER — AMLODIPINE BESYLATE 5 MG/1
5 TABLET ORAL ONCE
Status: DISCONTINUED | OUTPATIENT
Start: 2024-01-04 | End: 2024-01-04

## 2024-01-04 RX ORDER — SODIUM CHLORIDE 9 MG/ML
INJECTION, SOLUTION INTRAVENOUS CONTINUOUS
Status: DISCONTINUED | OUTPATIENT
Start: 2024-01-05 | End: 2024-01-05

## 2024-01-04 RX ORDER — IODIXANOL 320 MG/ML
200 INJECTION, SOLUTION INTRAVASCULAR
Status: DISCONTINUED | OUTPATIENT
Start: 2024-01-04 | End: 2024-01-10 | Stop reason: HOSPADM

## 2024-01-04 RX ORDER — HYDRALAZINE HYDROCHLORIDE 25 MG/1
25 TABLET, FILM COATED ORAL EVERY 6 HOURS PRN
Status: DISCONTINUED | OUTPATIENT
Start: 2024-01-04 | End: 2024-01-10 | Stop reason: HOSPADM

## 2024-01-04 RX ORDER — PROPOFOL 10 MG/ML
VIAL (ML) INTRAVENOUS
Status: DISCONTINUED | OUTPATIENT
Start: 2024-01-04 | End: 2024-01-04

## 2024-01-04 RX ORDER — AMLODIPINE BESYLATE 10 MG/1
10 TABLET ORAL DAILY
Status: DISCONTINUED | OUTPATIENT
Start: 2024-01-05 | End: 2024-01-04

## 2024-01-04 RX ORDER — OXYCODONE HYDROCHLORIDE 5 MG/1
5 TABLET ORAL
Status: DISCONTINUED | OUTPATIENT
Start: 2024-01-04 | End: 2024-01-05 | Stop reason: HOSPADM

## 2024-01-04 RX ADMIN — DEXAMETHASONE SODIUM PHOSPHATE 4 MG: 4 INJECTION INTRA-ARTICULAR; INTRALESIONAL; INTRAMUSCULAR; INTRAVENOUS; SOFT TISSUE at 05:01

## 2024-01-04 RX ADMIN — IODIXANOL 120 ML: 320 INJECTION, SOLUTION INTRAVASCULAR at 01:01

## 2024-01-04 RX ADMIN — GABAPENTIN 300 MG: 300 CAPSULE ORAL at 11:01

## 2024-01-04 RX ADMIN — DEXAMETHASONE SODIUM PHOSPHATE 4 MG: 4 INJECTION INTRA-ARTICULAR; INTRALESIONAL; INTRAMUSCULAR; INTRAVENOUS; SOFT TISSUE at 11:01

## 2024-01-04 RX ADMIN — FENTANYL CITRATE 25 MCG: 50 INJECTION, SOLUTION INTRAMUSCULAR; INTRAVENOUS at 11:01

## 2024-01-04 RX ADMIN — FENTANYL CITRATE 25 MCG: 50 INJECTION, SOLUTION INTRAMUSCULAR; INTRAVENOUS at 12:01

## 2024-01-04 RX ADMIN — SENNOSIDES AND DOCUSATE SODIUM 1 TABLET: 8.6; 5 TABLET ORAL at 05:01

## 2024-01-04 RX ADMIN — MIDAZOLAM 2 MG: 1 INJECTION INTRAMUSCULAR; INTRAVENOUS at 11:01

## 2024-01-04 RX ADMIN — PROPOFOL 50 MG: 10 INJECTION, EMULSION INTRAVENOUS at 12:01

## 2024-01-04 RX ADMIN — AMLODIPINE BESYLATE 10 MG: 10 TABLET ORAL at 05:01

## 2024-01-04 RX ADMIN — MORPHINE SULFATE 30 MG: 30 TABLET, FILM COATED, EXTENDED RELEASE ORAL at 08:01

## 2024-01-04 RX ADMIN — ALLOPURINOL 100 MG: 100 TABLET ORAL at 05:01

## 2024-01-04 RX ADMIN — PANTOPRAZOLE SODIUM 40 MG: 40 TABLET, DELAYED RELEASE ORAL at 05:01

## 2024-01-04 RX ADMIN — SENNOSIDES AND DOCUSATE SODIUM 1 TABLET: 8.6; 5 TABLET ORAL at 11:01

## 2024-01-04 RX ADMIN — SODIUM CHLORIDE: 9 INJECTION, SOLUTION INTRAVENOUS at 11:01

## 2024-01-04 RX ADMIN — SODIUM CHLORIDE: 0.9 INJECTION, SOLUTION INTRAVENOUS at 11:01

## 2024-01-04 RX ADMIN — OXYCODONE HYDROCHLORIDE 5 MG: 5 TABLET ORAL at 05:01

## 2024-01-04 RX ADMIN — HEPARIN SODIUM 5000 ML: 1000 INJECTION, SOLUTION INTRAVENOUS; SUBCUTANEOUS at 12:01

## 2024-01-04 RX ADMIN — GABAPENTIN 300 MG: 300 CAPSULE ORAL at 05:01

## 2024-01-04 RX ADMIN — FENTANYL CITRATE 50 MCG: 50 INJECTION, SOLUTION INTRAMUSCULAR; INTRAVENOUS at 12:01

## 2024-01-04 RX ADMIN — SODIUM CHLORIDE: 9 INJECTION, SOLUTION INTRAVENOUS at 02:01

## 2024-01-04 RX ADMIN — LIDOCAINE HYDROCHLORIDE 50 MG: 10 INJECTION, SOLUTION INTRAVENOUS at 12:01

## 2024-01-04 RX ADMIN — PROPOFOL 75 MCG/KG/MIN: 10 INJECTION, EMULSION INTRAVENOUS at 12:01

## 2024-01-04 RX ADMIN — HYDROMORPHONE HYDROCHLORIDE 0.5 MG: 1 INJECTION, SOLUTION INTRAMUSCULAR; INTRAVENOUS; SUBCUTANEOUS at 06:01

## 2024-01-04 RX ADMIN — METHOCARBAMOL 750 MG: 750 TABLET ORAL at 05:01

## 2024-01-04 NOTE — HPI
Mr. Pete is a 72yo man with a past medical history of asthma, HTN, ED, gout, and HTN.  He is also followed in Heme-Onc clinic by Dr. 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  She notes that he was being referred to Dr. Rock for right femur findings, for which IMN surgery on 1/9/24 with preoperative embolization by IR.  He just started systemic therapy with Pembrolizumab (KEYTRUDA) and Axitinib.       His bony and lung metastatic disease has also been complicated by lytic lesions in his spine with pathologic compression fractures.  He was admitted 11/6/23-11/9/23 with cervical corpectomy with NSGY on 11/7/23 which he tolerated well. They obtained tissue samples and sent it over to pathology. IR to perform osteocool/kyphoplasty/biopsy T9 11/14. 2nd stage of surgery with NSGY on 11/15 with Dr. Martinez after kyphoplasty.  He was then admitted 11/4/23-11/18/23 and underwent IR kyphoplasty of T4-6, SPINE, CERVICAL, WITH POSTERIOR FUSION T4-6 (N/A), CORPECTOMY, SPINE, CERVICAL C3-6 REVISION (N/A).      He recently underwent NC CT T-spine on 12/30/23 which showed a pathologic compression fracture of T9 status post vertebral augmentation.  Compared to 11/01/2023, there is increased height loss and an enlarging destructive lesion with extraosseous extension into the ventral epidural space resulting in moderate canal stenosis and left T9-10 neural foramen resulting in moderate foraminal stenosis.  New erosive changes involving the T10 superior endplate, concerning for direct invasion.  New 0.9 cm lytic lesion in the T6 vertebral body.       Follow up MRI T-spine that same day showed 1. Relative to prior MRI of the thoracic spine performed 10/31/2023, the patient is now status post vertebral augmentation at T9, at the level of presumed pathologic fracture due to metastasis. There has been further  "height loss of the vertebra since the reference MR examination, however configuration of the T9 vertebra appears similar when compared to more recently performed intervening CTA of the chest of 11/20/2023.  2. On the current examination, centered at the T9 level there is posterior buckling of the cortex and overlying enhancing soft tissue in the ventral epidural space, possibly combination of extraosseous extension of neoplastic soft tissue and inflammatory change. This enhancing soft tissue measures on the order of 8 x 19 x 27 mm (AP x ML x CC). This contributes to moderate central spinal canal stenosis with effacement of CSF and ventral cord deformity. Question intramedullary edema-like signal.  3. Additionally, there is new mild height loss of the T8 vertebra with patchy edema-like signal enhancement since the October 2023 MRI, at least some of which is likely related to acute or subacute fracture deformity, with underlying extension of metastasis not excluded. Question subtle height loss of the T8 vertebra since the 11/20/2023 CT chest.  4. Further, there appears to be a new enhancing lesion within the left paramedian T6 vertebral body, possibly metastatic lesion.  Equivocal new T2 weighted lesion within T12 vertebra.     Dr. Martinez followed up and found that his back pain was worse and notified Dr. Turk, "His thoracic fracture is worse and there looks like there is now epidural extension of tumor at T9 causing cord compression.  I have called the patient and his back pain is worse.  I think he needs a thoracic decompression/fusion in the near future."  Dr. Turk instructed him to come to the ED.  The patient tells me that his legs are fine with no focal weakness or neuropathic pain.  He is mostly having severe pain to his mid-thoracic back making it almost impossible to move around.  He is fine if lying perfectly flat on his back without moving, but excruciating with any attempt to stand or walk.       His " only other complaint is new gout that started over the past few days to his ankles and maybe early gout to left knee.

## 2024-01-04 NOTE — ASSESSMENT & PLAN NOTE
Pathologic T9 fx with severe pain with movement. Noted metastatic process on T6, T8, T12, and progressive tumor growth at T9 with extension into ventral epidural space with cord compression as well as invasion into T10 superior endplate. Patient was notified of new results and was direct admitted with plans for IR embolization of T9 lesion with OR booked for 1/5/24 for T7-T11 Robotic fusion.     PLAN:  -pain control  -NPO MN for NSGY  -holding LVX and Lisinopril

## 2024-01-04 NOTE — SUBJECTIVE & OBJECTIVE
Interval History: BOAZ, VSS. Pain well controlled. No previous poor reactions to anesthesia, no recent illnesses, no dyspnea, no CP.     Oncology Treatment Plan:   OP AXITINIB + PEMBROLIZUMAB 200MG Q3W    Medications:  Continuous Infusions:   sodium chloride 0.9% 75 mL/hr at 01/04/24 0234     Scheduled Meds:   allopurinoL  100 mg Oral Daily    amLODIPine  5 mg Oral Daily    dexAMETHasone  4 mg Intravenous Q6H    gabapentin  300 mg Oral TID    morphine  30 mg Oral Q12H    pantoprazole  40 mg Oral Daily    polyethylene glycol  17 g Oral Daily    senna-docusate 8.6-50 mg  1 tablet Oral BID     PRN Meds:acetaminophen, albuterol-ipratropium, aluminum-magnesium hydroxide-simethicone, bisacodyL, dextrose 10%, dextrose 10%, diazePAM, glucagon (human recombinant), glucose, glucose, melatonin, methocarbamoL, naloxone, ondansetron, oxyCODONE, oxyCODONE, prochlorperazine, simethicone, sodium chloride 0.9%     Review of Systems   Constitutional:  Negative for appetite change, chills, fatigue and fever.   HENT:  Negative for congestion, mouth sores, nosebleeds and tinnitus.    Eyes:  Negative for discharge and itching.   Respiratory:  Negative for cough, chest tightness and shortness of breath.    Cardiovascular:  Negative for chest pain and leg swelling.   Gastrointestinal:  Negative for abdominal distention and abdominal pain.   Endocrine: Negative for cold intolerance and heat intolerance.   Genitourinary:  Negative for dysuria and frequency.   Musculoskeletal:  Positive for back pain. Negative for arthralgias, joint swelling and neck pain.   Skin:  Negative for color change and pallor.   Allergic/Immunologic: Negative for environmental allergies and food allergies.   Neurological:  Negative for dizziness and light-headedness.   Hematological:  Negative for adenopathy.   Psychiatric/Behavioral:  Negative for agitation, behavioral problems and confusion.      Objective:     Vital Signs (Most Recent):  Temp: 97.6 °F (36.4 °C)  (01/04/24 0756)  Pulse: 66 (01/04/24 1128)  Resp: 18 (01/04/24 0756)  BP: 138/65 (01/04/24 1000)  SpO2: 95 % (01/04/24 0756) Vital Signs (24h Range):  Temp:  [97 °F (36.1 °C)-98 °F (36.7 °C)] 97.6 °F (36.4 °C)  Pulse:  [55-77] 66  Resp:  [15-20] 18  SpO2:  [90 %-97 %] 95 %  BP: (136-144)/(61-69) 138/65     Weight: 94.6 kg (208 lb 8.9 oz)  Body mass index is 29.92 kg/m².  Body surface area is 2.16 meters squared.      Intake/Output Summary (Last 24 hours) at 1/4/2024 1253  Last data filed at 1/4/2024 0552  Gross per 24 hour   Intake 1985.5 ml   Output 1620 ml   Net 365.5 ml        Physical Exam  Constitutional:       General: He is not in acute distress.     Appearance: Normal appearance. He is not ill-appearing.   HENT:      Head: Normocephalic and atraumatic.      Comments: C-collar resting behind patient's head     Right Ear: External ear normal.      Left Ear: External ear normal.      Nose: Nose normal.      Mouth/Throat:      Mouth: Mucous membranes are moist.      Pharynx: Oropharynx is clear.   Eyes:      Extraocular Movements: Extraocular movements intact.      Pupils: Pupils are equal, round, and reactive to light.   Cardiovascular:      Rate and Rhythm: Normal rate and regular rhythm.      Pulses: Normal pulses.      Heart sounds: Normal heart sounds.   Pulmonary:      Effort: Pulmonary effort is normal.      Breath sounds: Normal breath sounds.   Abdominal:      General: Abdomen is flat. There is no distension.      Palpations: Abdomen is soft.      Tenderness: There is no abdominal tenderness.   Musculoskeletal:         General: No swelling. Normal range of motion.      Cervical back: Normal range of motion and neck supple.   Skin:     General: Skin is warm and dry.      Capillary Refill: Capillary refill takes less than 2 seconds.   Neurological:      General: No focal deficit present.      Mental Status: He is alert and oriented to person, place, and time.      Comments: 5/5 strength in b/l UE and  LE  No cerebellar signs  Sensation intact to light touch   Psychiatric:         Mood and Affect: Mood normal.         Behavior: Behavior normal.          Significant Labs:   CBC:   Recent Labs   Lab 01/02/24 2104 01/03/24 0623 01/04/24 0602   WBC 7.74 6.68 3.37*   HGB 14.3 12.4* 11.6*   HCT 40.5 35.9* 33.2*    172 189    and CMP:   Recent Labs   Lab 01/02/24 2104 01/03/24 0623 01/04/24  0602   * 133* 137   K 3.8 4.4 4.7   CL 99 101 108   CO2 21* 19* 18*   GLU 84 106 124*   BUN 17 22 28*   CREATININE 1.1 1.0 1.1   CALCIUM 10.4 9.5 9.7   PROT 8.8*  --   --    ALBUMIN 3.9  --   --    BILITOT 1.2*  --   --    ALKPHOS 91  --   --    *  --   --    *  --   --    ANIONGAP 14 13 11       Diagnostic Results:  I have reviewed all pertinent imaging results/findings within the past 24 hours.

## 2024-01-04 NOTE — PROCEDURES
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 The segmental arteries from bilateral T7 to L4  were subselected and angiography was performed after injection into each of these vessels that reveals tumor stain arising from the right T8-T9 and T10 (Highest flow coming from Right T9). Embolization using 150-250 microns PVA particles was performed at all the three levels. Follow up angiographic run demonstrated near total statis and resolution of tumor stain. Left T9 segmental artery injection also revealed supply to the tumor, however, it was skipped as artery of adamkiewicz is noted to be arising from the left T9 segmental artery.     Preliminary interpretation: Successful particle embolization of the right T8-T9-T10 levels.  Please see Imaging report for full details.    A right femoral artery angiogram was performed, the sheath removed and hemostasis achieved using 6F Vascade device.  No hematoma was present at the time of hemostasis.    The patient tolerated the procedure well.     Plan:  -To medicine after recovery in PACU   -Bed rest for 2h  -Groin check and pulse check q2h   -Remainder of care, per Nsx and Medicine teams   -Avoid carrying heavy weights > 10 lbs x 24 hrs   -Remove groin dressing tomorrow                       Jake Bradley MD, MHA  Fellow, NeuroEndovascular Surgery, [facility]  Neurologist, [facility] [place], [place]

## 2024-01-04 NOTE — ANESTHESIA PREPROCEDURE EVALUATION
[facility]  Anesthesia Pre-Operative Evaluation   01/04/2024        Gamaliel Pete Jr., 1950  [id number]  Procedure(s) (LRB):  LAMINECTOMY, SPINE, THORACIC, WITH FUSION (N/A)    Subjective    Gamaliel Pete Jr. is a 73 y.o. male w/ a significant PMHx of RCC,asthma, s/p C4/5 corpectomy,, C3-C6 anterior fusion, T9 hypoplasty,  , s/p radial nephrectomy, obesity, HTN who was admitted for IR embolization of T9 lesion who is not to have T7-T11 robotic fusion. On ACEi.     Patient now presents for above procedure(s).     Prev Airway: Performed by: Vidal Dailey MD  Authorized by: Armand Burgos MD    Intubation:     Induction:  Intravenous    Intubated:  Postinduction    Mask Ventilation:  Moderately difficult with oral airway    Attempts:  1    Attempted By:  Resident anesthesiologist    Method of Intubation:  Video laryngoscopy    Blade:  Yoder 3    Laryngeal View Grade: Grade I - full view of cords      Difficult Airway Encountered?: No      Complications:  None    Airway Device:  Oral endotracheal tube    Airway Device Size:  7.5    Style/Cuff Inflation:  Cuffed (inflated to minimal occlusive pressure)    Placement Verified By:  Capnometry    Complicating Factors:  Oropharyngeal edema or fat and obesity    Findings Post-Intubation:  BS equal bilateral and atraumatic/condition of teeth unchanged          Peripheral IV - Single Lumen 01/02/24 2106 1 3/4 in;20 G Left Antecubital (Active)   Site Assessment Clean;Dry;Intact;No redness;No swelling 01/04/24 0758   Extremity Assessment Distal to IV No warmth;No swelling;No redness 01/04/24 0758   Line Status Infusing 01/04/24 0758   Dressing Status Clean;Dry;Intact 01/04/24 0758   Dressing Intervention Integrity maintained 01/04/24 0758   Dressing Change Due 01/06/24 01/04/24 0758   Site Change Due 01/06/24 01/04/24 0758   Reason Not Rotated Not due 01/04/24 0758   Number of days: 1            Closed/Suction Drain 11/15/23 1457 Tube - 1 Right  Back Accordion 10 Fr. (Active)   Number of days: 49            Closed/Suction Drain 11/15/23 1458 Tube - 2 Left Back Accordion 10 Fr. (Active)   Number of days: 49       Drips:    sodium chloride 0.9% 75 mL/hr at 01/04/24 0234       Patient Active Problem List   Diagnosis    Class 1 obesity without serious comorbidity with body mass index (BMI) of 31.0 to 31.9 in adult    Onychomycosis    History of gout    Seborrheic keratosis    Tinea corporis    Positive colorectal cancer screening using Cologuard test    Renal mass    Cancer with pulmonary metastases    Metastatic cancer to spine    Iron deficiency anemia    History of right radical nephrectomy    Clear cell carcinoma of right kidney    SABINE (acute kidney injury)    Hypercalcemia    Transaminitis    Essential hypertension    Therapeutic opioid-induced constipation (OIC)    Debility    Diaphoresis    Nausea & vomiting    Normocytic anemia    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     Current Outpatient Medications on File Prior to Encounter   Medication Sig Dispense Refill    allopurinoL (ZYLOPRIM) 100 MG tablet Take 1 tablet (100 mg total) by mouth once daily. 30 tablet 3    amlodipine-benazepril 5-10 mg (LOTREL) 5-10 mg per capsule Take 1 capsule by mouth once daily. 90 capsule 3    axitinib (INLYTA) 5 mg Tab Take 1 tablet (5 mg) by mouth 2 (two) times daily. 60 tablet 11    diazePAM (VALIUM) 5 MG tablet Take 1 tablet (5 mg total) by mouth every 6 (six) hours  as needed (severe muscles spasms). 10 tablet 0    gabapentin (NEURONTIN) 300 MG capsule Take 1 capsule (300 mg total) by mouth 3 (three) times daily. 90 capsule 11    methocarbamoL (ROBAXIN) 750 MG Tab Take 1 tablet (750 mg total) by mouth 4 (four) times daily as needed (muscle spasms). 90 tablet 0    morphine (MS CONTIN) 30 MG 12 hr tablet Take 1 tablet (30 mg total) by mouth every 12 (twelve) hours. 60 tablet 0    naloxone (NARCAN) 4 mg/actuation Spry 1 spray (4mg) by nasal route as needed for opioid overdose; may repeat every 2-3 minutes in alternating nostrils until medical help arrives. Call 911 (Patient not taking: Reported on 12/28/2023) 2 each 0    omega-3 fatty acids/fish oil (FISH OIL-OMEGA-3 FATTY ACIDS) 300-1,000 mg capsule Take 2 capsules by mouth once daily.      oxyCODONE-acetaminophen (PERCOCET) 5-325 mg per tablet Take 1 to 2 tablets by mouth every 4-6 hours as needed for pain 100 tablet 0    sildenafiL (VIAGRA) 100 MG tablet Take 1 tablet (100 mg total) by mouth daily as needed for Erectile Dysfunction. 30 tablet 11    turmeric (CURCUMIN MISC) 1,000 mg by Misc.(Non-Drug; Combo Route) route Daily.      UNABLE TO FIND Take 500 mg by mouth 2 (two) times a day. medication name: Tudca      UNABLE TO FIND Take 2 capsules by mouth 2 (two) times a day. medication name: Turkey tail mushroom      UNABLE TO FIND Take 15 drops by mouth once daily. medication name: Essiac-20      UNABLE TO FIND Take 5 mLs by mouth 2 (two) times a day. medication name: barley leaf juice powder      UNABLE TO FIND Take 5 mLs by mouth 2 (two) times a day. medication name: black seed oil (cumin seed)         Past Surgical History:   Procedure Laterality Date    COLONOSCOPY  02/10/2022    COLONOSCOPY N/A 02/10/2022    Procedure: COLONOSCOPY;  Surgeon: Desmond Keenan MD;  Location: [facility];  Service: General;  Laterality: N/A;    POSTERIOR FUSION OF CERVICAL SPINE WITH LAMINECTOMY N/A 11/15/2023    Procedure: SPINE, CERVICAL,  WITH POSTERIOR FUSION T4-6;  Surgeon: Nasim Martinez DO;  Location: [facility] OR 2ND FLR;  Service: Neurosurgery;  Laterality: N/A;  C2-T1 laminectomy and posterior fusion. [place]. C-arm. [place]. Neuromonitoring.    ROBOT-ASSISTED LAPAROSCOPIC NEPHRECTOMY Right 10/4/2023    Procedure: ROBOTIC NEPHRECTOMY;  Surgeon: Henry Michaels MD;  Location: [facility];  Service: Urology;  Laterality: Right;    SURGICAL REMOVAL OF VERTEBRAL BODY OF CERVICAL SPINE Right 11/7/2023    Procedure: CORPECTOMY, SPINE, CERVICAL;  Surgeon: Nasim Martinez DO;  Location: [facility] OR 2ND FLR;  Service: Neurosurgery;  Laterality: Right;  C4 and C5 corpectomy with globus;  wells tongs; c-arm; neuromonitoring; microscope; type and cross 2 units    SURGICAL REMOVAL OF VERTEBRAL BODY OF CERVICAL SPINE N/A 11/15/2023    Procedure: CORPECTOMY, SPINE, CERVICAL C3-6 REVISION;  Surgeon: Nasim Martinez DO;  [facility];  Service: Neurosurgery;  Laterality: N/A;    TONSILLECTOMY         Social History:  Tobacco Use: Low Risk  (1/2/2024)    Patient History     Smoking Tobacco Use: Never     Smokeless Tobacco Use: Never     Passive Exposure: Not on file       Alcohol Use: Patient Declined (12/22/2023)    AUDIT-C     Frequency of Alcohol Consumption: Patient declined     Average Number of Drinks: Patient declined     Frequency of Binge Drinking: Patient declined       Objective    Vital Signs Range:  BMI Readings from Last 1 Encounters:   01/04/24 29.92 kg/m²       Temp:  [36.1 °C (97 °F)-36.7 °C (98 °F)]   Pulse:  [55-61]   Resp:  [15-18]   BP: (136-144)/(61-65)   SpO2:  [95 %-97 %]        Significant Labs:        Component Value Date/Time    WBC 3.37 (L) 01/04/2024 0602    HGB 11.6 (L) 01/04/2024 0602    HCT 33.2 (L) 01/04/2024 0602    HCT 26 (L) 11/07/2023 1010     01/04/2024 0602     01/04/2024 0602    K 4.7 01/04/2024 0602     01/04/2024 0602    CO2 18 (L) 01/04/2024 0602     (H) 01/04/2024  0602    BUN 28 (H) 01/04/2024 0602    CREATININE 1.1 01/04/2024 0602    MG 2.2 01/04/2024 0602    PHOS 3.9 01/04/2024 0602    CALCIUM 9.7 01/04/2024 0602    ALBUMIN 3.9 01/02/2024 2104    PROT 8.8 (H) 01/02/2024 2104    ALKPHOS 91 01/02/2024 2104    BILITOT 1.2 (H) 01/02/2024 2104     (H) 01/02/2024 2104     (H) 01/02/2024 2104    INR 1.0 01/02/2024 2104        Please see Results Review for additional labs.     Diagnostic Studies: All relevant studies, reviewed.      EKG:   Results for orders placed or performed during the hospital encounter of 01/02/24   EKG 12-lead    Collection Time: 01/02/24  8:34 PM    Narrative    Test Reason : Z01.810,    Vent. Rate : 079 BPM     Atrial Rate : 079 BPM     P-R Int : 210 ms          QRS Dur : 162 ms      QT Int : 410 ms       P-R-T Axes : 068 -78 079 degrees     QTc Int : 470 ms    Sinus rhythm with 1st degree A-V block  Right bundle branch block  Left anterior fascicular block   Bifascicular block   LVH with repolarization abnormality  Cannot rule out Septal infarct (cited on or before 20-NOV-2023)  Abnormal ECG  When compared with ECG of 20-NOV-2023 12:17,  LA interval has increased  Confirmed by NAMAN SANTOS MD ([id number]) on 1/3/2024 11:36:57 PM    Referred By: [email or web address]   SELF           Confirmed By:NAMAN SANTOS MD       ECHO:  No results found for this or any previous visit.      Pre-op Assessment    I have reviewed the Patient Summary Reports.     I have reviewed the Nursing Notes. I have reviewed the NPO Status.   I have reviewed the Medications.     Review of Systems  Anesthesia Hx:   History of prior surgery of interest to airway management or planning:          Denies Family Hx of Anesthesia complications.    Denies Personal Hx of Anesthesia complications.                    Hematology/Oncology:                      Current/Recent Cancer.                Cardiovascular:     Hypertension                                        Pulmonary:    Asthma                     Renal/:  Chronic Renal Disease                    Physical Exam  General: Well nourished, Cooperative, Alert and Oriented    Airway:  Mallampati: III / II  Mouth Opening: Small, but > 3cm  TM Distance: Normal  Tongue: Normal  Neck ROM: Extension Decreased, Flexion Decreased, Left Lateral Motion Decreased, Right Lateral Motion Decreased, Extension Painful    Dental:  Intact    Skin:  beard      Anesthesia Plan  Type of Anesthesia, risks & benefits discussed:    Anesthesia Type: Gen ETT  Intra-op Monitoring Plan: Standard ASA Monitors and Art Line  Post Op Pain Control Plan: multimodal analgesia and IV/PO Opioids PRN  Induction:  IV  Airway Plan: Direct and Video  Informed Consent: Informed consent signed with the Patient and all parties understand the risks and agree with anesthesia plan.  All questions answered. Patient consented to blood products? Yes  ASA Score: 3  Day of Surgery Review of History & Physical: H&P Update referred to the surgeon/provider.    Ready For Surgery From Anesthesia Perspective.     .

## 2024-01-04 NOTE — PROGRESS NOTES
[facility]  Neurosurgery  Progress Note    Subjective:     History of Present Illness: Gamaliel Pete Jr. is a 73 y.o. 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 Martinez due to metastatic disease to those areas. He is also s/p T9 kyphoplasty for a pathological fracture at that level. Patient states that his back pain at that level worsened after the kyphoplasty. The pain is burning and located in the middle-left area, radiating around the trunk at that level on the left. The pain is constant. He is on multiple pain medications for his cancer and states they do not help with the pain. They just make him sleepy. He denies new weakness, numbness, tingling, bowel/bladder dysfunction, gait difficulties, falls or other injuries. He has been compliant with his c-collar and wears a TLSO for comfort. Patient had outpatient MRI and CT of thoracic spine which demonstrates new hung mets to T6, T8, T12, and progressive tumor growth at T9 with extension into ventral epidural space with cord compression as well as invasion into T10 superior endplate. Patient was notified of new results and was direct admitted with plans for IR embolization of T9 lesion with OR booked for 1/5/24 for T7-T11 Robotic fusion.     Of note, the patient has started chemotherapy- Pembrolizumab and Axitinib . There are also plans for a right femur IMN on 1/9 for metastasis to this area and high fracture risk. There is also some lucency about the right shoulder int he distal clavicle/acromian that ortho plans to monitor for metastasis.    Post-Op Info:  Procedure(s) (LRB):  LAMINECTOMY, SPINE, THORACIC, WITH FUSION (N/A)       Interval History: MARITZAEON. IR embolization of T9 today. Reports his back pain has improved. Denies new symtpoms. +BM last night, voiding  spontaneously. Neuro stable. Plans for T7-11 decompression/fusion and separation surgery tomorrow. Please optimize patient and provide preop risk stratification. Preop coags reviewed. NPO at midnight tonight. Informed consents obtained.     Medications:  Continuous Infusions:   sodium chloride 0.9% 75 mL/hr at 01/04/24 0234     Scheduled Meds:   allopurinoL  100 mg Oral Daily    amLODIPine  5 mg Oral Daily    dexAMETHasone  4 mg Intravenous Q6H    gabapentin  300 mg Oral TID    morphine  30 mg Oral Q12H    pantoprazole  40 mg Oral Daily    polyethylene glycol  17 g Oral Daily    senna-docusate 8.6-50 mg  1 tablet Oral BID     PRN Meds:acetaminophen, albuterol-ipratropium, aluminum-magnesium hydroxide-simethicone, bisacodyL, dextrose 10%, dextrose 10%, diazePAM, glucagon (human recombinant), glucose, glucose, melatonin, methocarbamoL, naloxone, ondansetron, oxyCODONE, oxyCODONE, prochlorperazine, simethicone, sodium chloride 0.9%     Review of Systems  Objective:     Weight: 94.6 kg (208 lb 8.9 oz)  Body mass index is 29.92 kg/m².  Vital Signs (Most Recent):  Temp: 97.6 °F (36.4 °C) (01/04/24 0756)  Pulse: (!) 58 (01/04/24 0756)  Resp: 18 (01/04/24 0756)  BP: 136/61 (01/04/24 0756)  SpO2: 95 % (01/04/24 0756) Vital Signs (24h Range):  Temp:  [97 °F (36.1 °C)-98 °F (36.7 °C)] 97.6 °F (36.4 °C)  Pulse:  [55-98] 58  Resp:  [15-20] 18  SpO2:  [90 %-97 %] 95 %  BP: (119-144)/(61-69) 136/61                              Closed/Suction Drain 11/15/23 1457 Tube - 1 Right Back Accordion 10 Fr. (Active)            Closed/Suction Drain 11/15/23 1458 Tube - 2 Left Back Accordion 10 Fr. (Active)         Neurosurgery Physical Exam  General: Well developed, well nourished, no distress.  Head: Normocephalic, atraumatic.  Neurologic: Alert and oriented. Thought content appropriate  GCS: Motor: 6/Verbal: 5/Eyes: 4 GCS Total: 15  Mental Status: Awake, Alert, Oriented x 4.  Language: No aphasia.  Speech: No dysarthria.  Cranial nerves:  Face symmetric, tongue midline, CN II-XII grossly intact.   Eyes: Pupils equal, round, reactive to light with accommodation, EOMI.  Pulmonary: Normal respirations, not labored, no accessory muscles used.  Sensory: Intact to light touch throughout.  Motor Strength: Moves all extremities spontaneously with good tone. Full strength upper and lower extremities. No abnormal movements seen.      Strength   Deltoids Triceps Biceps Wrist Extension Wrist Flexion Hand    Upper: R 5/5 5/5 5/5 5/5 5/5 5/5     L 5/5 5/5 5/5 5/5 5/5 5/5       Iliopsoas Quadriceps Knee  Flexion Tibialis  anterior Gastro- cnemius EHL   Lower: R 5/5 5/5 5/5 5/5 5/5 5/5     L 5/5 5/5 5/5 5/5 5/5 5/5      [place]: Absent.  Clonus: subtle bilateral clonus (2+ beat) appreciated  Vascular: Pulses 2+ and symmetric radial and dorsalis pedis. No LE edema or skin changes.  Skin: Warm, dry and intact, no rashes.  Gait: Normal.                    Anterior and posterior incisions well healed.    Significant Labs:  Recent Labs   Lab 01/02/24 2104 01/03/24  0623 01/04/24  0602   GLU 84 106 124*   * 133* 137   K 3.8 4.4 4.7   CL 99 101 108   CO2 21* 19* 18*   BUN 17 22 28*   CREATININE 1.1 1.0 1.1   CALCIUM 10.4 9.5 9.7   MG 1.9 2.0 2.2     Recent Labs   Lab 01/02/24 2104 01/03/24  0623 01/04/24  0602   WBC 7.74 6.68 3.37*   HGB 14.3 12.4* 11.6*   HCT 40.5 35.9* 33.2*    172 189     Recent Labs   Lab 01/02/24 2104   INR 1.0   APTT 30.5     Microbiology Results (last 7 days)       ** No results found for the last 168 hours. **          All pertinent labs from the last 24 hours have been reviewed.    Significant Diagnostics:  I have reviewed and interpreted all pertinent imaging results/findings within the past 24 hours.  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 is a 73 y.o. 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 Martinez due to metastatic disease to those areas. He is also s/p T9 kyphoplasty for a pathological fracture at that level. Unfortunately, T9 lesion continued to grow, now with hung destruction of T9 with ventral epidural space extension as well as extension to T10 superior enplate causing moderate cord compression and T9-T10 foraminal stenosis.     MRI and CT of thoracic spine which demonstrates new hung mets to T6, T8, T12, and progressive tumor growth at T9 with extension into ventral epidural space with cord compression as well as invasion into T10 superior endplate.    -Admitted to oncology.   -q4h neuro checks.  -NeuroIR consulted for pre-op emobolization of T9 lesion, likely today 1/4.  -OR booked for T7-T11 robotic decompression/fusion and separation surgery with Dr. Martinez on 1/5/23.   -Pre-op risk stratification and optimization per primary team.   -NPO at midnight tonight.   -Preop coags reviewed.   -Informed consents obtained.   -Holding chemotherapy medications per onc.  -TLSO brace to be worn at all times.  -Spine precautions when transferring and during bed mobility.  -Rad onc recs radiation tx to the cervical and thoracic spine. Likely okay to start 2 weeks post op once incision has healed. Will need clearance at 2 week post op visit.   -Ortho plans for intramedullary dulce to R femur 1/9. Will determine clearance after spine surgery 1/5.   -Pain control per primary team.  -Notify nsgy with any acute exam changes.     Discussed with Dr. Juan Santos PA-C  Neurosurgery  [facility]

## 2024-01-04 NOTE — PROGRESS NOTES
Reached out to treatment team via secure chat with Dr. Cassidy Shelby regarding patient's blood pressures. An order was placed with parameters to notify physician is SBP is greater than 140. Patient's SBP have been running between 140 and 146. Dr. Shelby stated that patient was ok to return to his room. She stated that he's slightly hypertensive because she was holding a BP med while he's undergoing procedures/surgery.

## 2024-01-04 NOTE — PLAN OF CARE
Pt AAOx4, VSS. NSR on tele. Pt NPO since midnight for embolization procedure with IR. NS infusion infusing @75cc/h. Pt bed bound, able to roll but pt experiences severe pain with movement. Pt with TLSO brace and neck brace in place..orders to wear TLSO brace at all times. Pt voiding , see flowsheet for I/O. Wife at bedside, bed in low locked position, call light within reach, POC ongoing.

## 2024-01-04 NOTE — PLAN OF CARE
Patient remaining free from injury this shift.  Patient noted in bed with back brace in place.  Patient denies skin breakdown and would not turn for nurse to check, and wife confirmed no breakdown.  Patient reports pain < 5 at rest as long as not moving.  Patient declines getting up in chair.  Patient unable to tolerated HOB > 35 degrees.  Patient VSS.  Patient has had 625cc urine.  Noted to patient that he needs to void in next 30 min or may need a hyde placed.  Plan to do embolizations tomorrow.  NPO at midnight.  Patient denies numbness or tingling to extremeties.  Patient able to move limbs independently.

## 2024-01-04 NOTE — NURSING TRANSFER
Nursing Transfer Note      Reason patient is being transferred: PACU     Transfer To: [facility] [id number]    Transfer via stretcher    Transfer with cardiac monitoring    Transported by Patient Transport    Medicines sent: None    Any special needs or follow-up needed: Routine Care    Chart send with patient: Yes    Notified: spouse    Patient reassessed at: 1/4/2024 9877 (date, time)

## 2024-01-04 NOTE — ASSESSMENT & PLAN NOTE
Gamaliel Pete Jr. is a 73 y.o. 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 Martinez due to metastatic disease to those areas. He is also s/p T9 kyphoplasty for a pathological fracture at that level. Unfortunately, T9 lesion continued to grow, now with hung destruction of T9 with ventral epidural space extension as well as extension to T10 superior enplate causing moderate cord compression and T9-T10 foraminal stenosis.     MRI and CT of thoracic spine which demonstrates new hung mets to T6, T8, T12, and progressive tumor growth at T9 with extension into ventral epidural space with cord compression as well as invasion into T10 superior endplate.    -Admitted to oncology.   -q4h neuro checks.  -NeuroIR consulted for pre-op emobolization of T9 lesion, likely today 1/4.  -OR booked for T7-T11 robotic decompression/fusion and separation surgery with Dr. Martinez on 1/5/23.   -Pre-op risk stratification and optimization per primary team.   -NPO at midnight tonight.   -Preop coags reviewed.   -Informed consents obtained.   -Holding chemotherapy medications per onc.  -TLSO brace to be worn at all times.  -Spine precautions when transferring and during bed mobility.  -Rad onc recs radiation tx to the cervical and thoracic spine. Likely okay to start 2 weeks post op once incision has healed. Will need clearance at 2 week post op visit.   -Ortho plans for intramedullary dulce to R femur 1/9. Will determine clearance after spine surgery 1/5.   -Pain control per primary team.  -Notify nsgy with any acute exam changes.     Discussed with Dr. Martinez

## 2024-01-04 NOTE — PROGRESS NOTES
[facility]  Hematology/Oncology  Progress Note    Patient Name: Gamaliel Pete Jr.  Admission Date: 1/2/2024  Hospital Length of Stay: 2 days  Code Status: Full Code     Subjective:     HPI:  Mr. Pete is a 72yo man with a past medical history of asthma, HTN, ED, gout, and HTN.  He is also followed in Heme-Onc clinic by Dr. 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  She notes that he was being referred to Dr. Rock for right femur findings, for which IMN surgery on 1/9/24 with preoperative embolization by IR.  He just started systemic therapy with Pembrolizumab (KEYTRUDA) and Axitinib.       His bony and lung metastatic disease has also been complicated by lytic lesions in his spine with pathologic compression fractures.  He was admitted 11/6/23-11/9/23 with cervical corpectomy with NSGY on 11/7/23 which he tolerated well. They obtained tissue samples and sent it over to pathology. IR to perform osteocool/kyphoplasty/biopsy T9 11/14. 2nd stage of surgery with NSGY on 11/15 with Dr. Martinez after kyphoplasty.  He was then admitted 11/4/23-11/18/23 and underwent IR kyphoplasty of T4-6, SPINE, CERVICAL, WITH POSTERIOR FUSION T4-6 (N/A), CORPECTOMY, SPINE, CERVICAL C3-6 REVISION (N/A).      He recently underwent NC CT T-spine on 12/30/23 which showed a pathologic compression fracture of T9 status post vertebral augmentation.  Compared to 11/01/2023, there is increased height loss and an enlarging destructive lesion with extraosseous extension into the ventral epidural space resulting in moderate canal stenosis and left T9-10 neural foramen resulting in moderate foraminal stenosis.  New erosive changes involving the T10 superior endplate, concerning for direct invasion.  New 0.9 cm lytic lesion in the T6 vertebral body.       Follow up MRI T-spine that same day showed  "1. Relative to prior MRI of the thoracic spine performed 10/31/2023, the patient is now status post vertebral augmentation at T9, at the level of presumed pathologic fracture due to metastasis. There has been further height loss of the vertebra since the reference MR examination, however configuration of the T9 vertebra appears similar when compared to more recently performed intervening CTA of the chest of 11/20/2023.  2. On the current examination, centered at the T9 level there is posterior buckling of the cortex and overlying enhancing soft tissue in the ventral epidural space, possibly combination of extraosseous extension of neoplastic soft tissue and inflammatory change. This enhancing soft tissue measures on the order of 8 x 19 x 27 mm (AP x ML x CC). This contributes to moderate central spinal canal stenosis with effacement of CSF and ventral cord deformity. Question intramedullary edema-like signal.  3. Additionally, there is new mild height loss of the T8 vertebra with patchy edema-like signal enhancement since the October 2023 MRI, at least some of which is likely related to acute or subacute fracture deformity, with underlying extension of metastasis not excluded. Question subtle height loss of the T8 vertebra since the 11/20/2023 CT chest.  4. Further, there appears to be a new enhancing lesion within the left paramedian T6 vertebral body, possibly metastatic lesion.  Equivocal new T2 weighted lesion within T12 vertebra.     Dr. Mratinez followed up and found that his back pain was worse and notified Dr. Turk, "His thoracic fracture is worse and there looks like there is now epidural extension of tumor at T9 causing cord compression.  I have called the patient and his back pain is worse.  I think he needs a thoracic decompression/fusion in the near future."  Dr. Turk instructed him to come to the ED.  The patient tells me that his legs are fine with no focal weakness or neuropathic pain.  He is " mostly having severe pain to his mid-thoracic back making it almost impossible to move around.  He is fine if lying perfectly flat on his back without moving, but excruciating with any attempt to stand or walk.       His only other complaint is new gout that started over the past few days to his ankles and maybe early gout to left knee.    Interval History: WARNER SANDRA. Pain well controlled. No previous poor reactions to anesthesia, no recent illnesses, no dyspnea, no CP.     Oncology Treatment Plan:   OP AXITINIB + PEMBROLIZUMAB 200MG Q3W    Medications:  Continuous Infusions:   sodium chloride 0.9% 75 mL/hr at 01/04/24 0234     Scheduled Meds:   allopurinoL  100 mg Oral Daily    amLODIPine  5 mg Oral Daily    dexAMETHasone  4 mg Intravenous Q6H    gabapentin  300 mg Oral TID    morphine  30 mg Oral Q12H    pantoprazole  40 mg Oral Daily    polyethylene glycol  17 g Oral Daily    senna-docusate 8.6-50 mg  1 tablet Oral BID     PRN Meds:acetaminophen, albuterol-ipratropium, aluminum-magnesium hydroxide-simethicone, bisacodyL, dextrose 10%, dextrose 10%, diazePAM, glucagon (human recombinant), glucose, glucose, melatonin, methocarbamoL, naloxone, ondansetron, oxyCODONE, oxyCODONE, prochlorperazine, simethicone, sodium chloride 0.9%     Review of Systems   Constitutional:  Negative for appetite change, chills, fatigue and fever.   HENT:  Negative for congestion, mouth sores, nosebleeds and tinnitus.    Eyes:  Negative for discharge and itching.   Respiratory:  Negative for cough, chest tightness and shortness of breath.    Cardiovascular:  Negative for chest pain and leg swelling.   Gastrointestinal:  Negative for abdominal distention and abdominal pain.   Endocrine: Negative for cold intolerance and heat intolerance.   Genitourinary:  Negative for dysuria and frequency.   Musculoskeletal:  Positive for back pain. Negative for arthralgias, joint swelling and neck pain.   Skin:  Negative for color change and pallor.    Allergic/Immunologic: Negative for environmental allergies and food allergies.   Neurological:  Negative for dizziness and light-headedness.   Hematological:  Negative for adenopathy.   Psychiatric/Behavioral:  Negative for agitation, behavioral problems and confusion.      Objective:     Vital Signs (Most Recent):  Temp: 97.6 °F (36.4 °C) (01/04/24 0756)  Pulse: 66 (01/04/24 1128)  Resp: 18 (01/04/24 0756)  BP: 138/65 (01/04/24 1000)  SpO2: 95 % (01/04/24 0756) Vital Signs (24h Range):  Temp:  [97 °F (36.1 °C)-98 °F (36.7 °C)] 97.6 °F (36.4 °C)  Pulse:  [55-77] 66  Resp:  [15-20] 18  SpO2:  [90 %-97 %] 95 %  BP: (136-144)/(61-69) 138/65     Weight: 94.6 kg (208 lb 8.9 oz)  Body mass index is 29.92 kg/m².  Body surface area is 2.16 meters squared.      Intake/Output Summary (Last 24 hours) at 1/4/2024 1253  Last data filed at 1/4/2024 0552  Gross per 24 hour   Intake 1985.5 ml   Output 1620 ml   Net 365.5 ml        Physical Exam  Constitutional:       General: He is not in acute distress.     Appearance: Normal appearance. He is not ill-appearing.   HENT:      Head: Normocephalic and atraumatic.      Comments: C-collar resting behind patient's head     Right Ear: External ear normal.      Left Ear: External ear normal.      Nose: Nose normal.      Mouth/Throat:      Mouth: Mucous membranes are moist.      Pharynx: Oropharynx is clear.   Eyes:      Extraocular Movements: Extraocular movements intact.      Pupils: Pupils are equal, round, and reactive to light.   Cardiovascular:      Rate and Rhythm: Normal rate and regular rhythm.      Pulses: Normal pulses.      Heart sounds: Normal heart sounds.   Pulmonary:      Effort: Pulmonary effort is normal.      Breath sounds: Normal breath sounds.   Abdominal:      General: Abdomen is flat. There is no distension.      Palpations: Abdomen is soft.      Tenderness: There is no abdominal tenderness.   Musculoskeletal:         General: No swelling. Normal range of motion.       Cervical back: Normal range of motion and neck supple.   Skin:     General: Skin is warm and dry.      Capillary Refill: Capillary refill takes less than 2 seconds.   Neurological:      General: No focal deficit present.      Mental Status: He is alert and oriented to person, place, and time.      Comments: 5/5 strength in b/l UE and LE  No cerebellar signs  Sensation intact to light touch   Psychiatric:         Mood and Affect: Mood normal.         Behavior: Behavior normal.          Significant Labs:   CBC:   Recent Labs   Lab 01/02/24 2104 01/03/24 0623 01/04/24 0602   WBC 7.74 6.68 3.37*   HGB 14.3 12.4* 11.6*   HCT 40.5 35.9* 33.2*    172 189    and CMP:   Recent Labs   Lab 01/02/24 2104 01/03/24 0623 01/04/24 0602   * 133* 137   K 3.8 4.4 4.7   CL 99 101 108   CO2 21* 19* 18*   GLU 84 106 124*   BUN 17 22 28*   CREATININE 1.1 1.0 1.1   CALCIUM 10.4 9.5 9.7   PROT 8.8*  --   --    ALBUMIN 3.9  --   --    BILITOT 1.2*  --   --    ALKPHOS 91  --   --    *  --   --    *  --   --    ANIONGAP 14 13 11       Diagnostic Results:  I have reviewed all pertinent imaging results/findings within the past 24 hours.  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 Noted metastatic process on T6, T8, T12, and progressive tumor growth at T9 with extension into ventral epidural space with cord compression as well as invasion into T10 superior endplate. Patient was notified of new results and was direct admitted with plans for IR embolization of T9 lesion with OR booked for 1/5/24 for T7-T11 Robotic fusion.     PLAN:  -pain control  -NPO MN for NSGY  -holding LVX and Lisinopril    Gout of ankle  Continue allopurinol    Essential hypertension  Holding Lisinopril in anticipation of surgical intervention. Continue amlodipine 10mg    Clear cell carcinoma of right kidney  See HPI for more  details    Metastatic cancer to spine  To undergo T7-T11 Robotic fusion on 1/5/24. IR embolization 1/4/24 to minimize bleeding risk. RCRI as below. Low risk of adverse cardiac event given previous ability to achieve 4 METS and lack of comborbid medical conditions. EKG obtained - widened QRS (160ms) with 1st degree AV block and bifasicular block. Lytes within normal limits. No arrhythmia.     Revised Cardiac Risk Index   High risk surgery: No  History of ischemic heart disease: No  History of congestive heart failure: No  History of stroke: No  Insulin dependent diabetes: No  Cr > 2: No  0 points, class I risk, 3.9% risk of cardiac event    Class 1 obesity without serious comorbidity with body mass index (BMI) of 31.0 to 31.9 in adult  Advised to maintain regular physical activity following recovery from orthopedic and neurosurgical procedures             Cassidy Shelby MD  Hematology/Oncology  [facility] - [facility] Stepdown

## 2024-01-04 NOTE — ASSESSMENT & PLAN NOTE
To undergo T7-T11 Robotic fusion on 1/5/24. IR embolization 1/4/24 to minimize bleeding risk. RCRI as below. Low risk of adverse cardiac event given previous ability to achieve 4 METS and lack of comborbid medical conditions. EKG obtained - widened QRS (160ms) with 1st degree AV block and bifasicular block. Lytes within normal limits. No arrhythmia.     Revised Cardiac Risk Index   High risk surgery: No  History of ischemic heart disease: No  History of congestive heart failure: No  History of stroke: No  Insulin dependent diabetes: No  Cr > 2: No  0 points, class I risk, 3.9% risk of cardiac event

## 2024-01-04 NOTE — PLAN OF CARE
Spinal angiogram with T9 embolization procedure completed. Vascade closure device deployed in right  femoral artery; hemostasis achieved at 1305. Patient to lay flat for 2 hours until 1505 on 01/04/2024 per MD. Right groin site clean, dry, and intact; no bleeding or hematoma noted. Patient to be transferred to PACU for post-procedural recovery per MD. Report to be given at bedside to RN.

## 2024-01-04 NOTE — SUBJECTIVE & OBJECTIVE
Interval History: NAEON. IR embolization of T9 today. Reports his back pain has improved. Denies new symtpoms. +BM last night, voiding spontaneously. Neuro stable. Plans for T7-11 decompression/fusion and separation surgery tomorrow. Please optimize patient and provide preop risk stratification. Preop coags reviewed. NPO at midnight tonight. Informed consents obtained.     Medications:  Continuous Infusions:   sodium chloride 0.9% 75 mL/hr at 01/04/24 0234     Scheduled Meds:   allopurinoL  100 mg Oral Daily    amLODIPine  5 mg Oral Daily    dexAMETHasone  4 mg Intravenous Q6H    gabapentin  300 mg Oral TID    morphine  30 mg Oral Q12H    pantoprazole  40 mg Oral Daily    polyethylene glycol  17 g Oral Daily    senna-docusate 8.6-50 mg  1 tablet Oral BID     PRN Meds:acetaminophen, albuterol-ipratropium, aluminum-magnesium hydroxide-simethicone, bisacodyL, dextrose 10%, dextrose 10%, diazePAM, glucagon (human recombinant), glucose, glucose, melatonin, methocarbamoL, naloxone, ondansetron, oxyCODONE, oxyCODONE, prochlorperazine, simethicone, sodium chloride 0.9%     Review of Systems  Objective:     Weight: 94.6 kg (208 lb 8.9 oz)  Body mass index is 29.92 kg/m².  Vital Signs (Most Recent):  Temp: 97.6 °F (36.4 °C) (01/04/24 0756)  Pulse: (!) 58 (01/04/24 0756)  Resp: 18 (01/04/24 0756)  BP: 136/61 (01/04/24 0756)  SpO2: 95 % (01/04/24 0756) Vital Signs (24h Range):  Temp:  [97 °F (36.1 °C)-98 °F (36.7 °C)] 97.6 °F (36.4 °C)  Pulse:  [55-98] 58  Resp:  [15-20] 18  SpO2:  [90 %-97 %] 95 %  BP: (119-144)/(61-69) 136/61                              Closed/Suction Drain 11/15/23 1457 Tube - 1 Right Back Accordion 10 Fr. (Active)            Closed/Suction Drain 11/15/23 1458 Tube - 2 Left Back Accordion 10 Fr. (Active)         Neurosurgery Physical Exam  General: Well developed, well nourished, no distress.  Head: Normocephalic, atraumatic.  Neurologic: Alert and oriented. Thought content appropriate  GCS: Motor:  6/Verbal: 5/Eyes: 4 GCS Total: 15  Mental Status: Awake, Alert, Oriented x 4.  Language: No aphasia.  Speech: No dysarthria.  Cranial nerves: Face symmetric, tongue midline, CN II-XII grossly intact.   Eyes: Pupils equal, round, reactive to light with accommodation, EOMI.  Pulmonary: Normal respirations, not labored, no accessory muscles used.  Sensory: Intact to light touch throughout.  Motor Strength: Moves all extremities spontaneously with good tone. Full strength upper and lower extremities. No abnormal movements seen.      Strength   Deltoids Triceps Biceps Wrist Extension Wrist Flexion Hand    Upper: R 5/5 5/5 5/5 5/5 5/5 5/5     L 5/5 5/5 5/5 5/5 5/5 5/5       Iliopsoas Quadriceps Knee  Flexion Tibialis  anterior Gastro- cnemius EHL   Lower: R 5/5 5/5 5/5 5/5 5/5 5/5     L 5/5 5/5 5/5 5/5 5/5 5/5      [place]: Absent.  Clonus: subtle bilateral clonus (2+ beat) appreciated  Vascular: Pulses 2+ and symmetric radial and dorsalis pedis. No LE edema or skin changes.  Skin: Warm, dry and intact, no rashes.  Gait: Normal.                    Anterior and posterior incisions well healed.    Significant Labs:  Recent Labs   Lab 01/02/24 2104 01/03/24 0623 01/04/24  0602   GLU 84 106 124*   * 133* 137   K 3.8 4.4 4.7   CL 99 101 108   CO2 21* 19* 18*   BUN 17 22 28*   CREATININE 1.1 1.0 1.1   CALCIUM 10.4 9.5 9.7   MG 1.9 2.0 2.2     Recent Labs   Lab 01/02/24 2104 01/03/24 0623 01/04/24  0602   WBC 7.74 6.68 3.37*   HGB 14.3 12.4* 11.6*   HCT 40.5 35.9* 33.2*    172 189     Recent Labs   Lab 01/02/24 2104   INR 1.0   APTT 30.5     Microbiology Results (last 7 days)       ** No results found for the last 168 hours. **          All pertinent labs from the last 24 hours have been reviewed.    Significant Diagnostics:  I have reviewed and interpreted all pertinent imaging results/findings within the past 24 hours.

## 2024-01-04 NOTE — ASSESSMENT & PLAN NOTE
Advised to maintain regular physical activity following recovery from orthopedic and neurosurgical procedures

## 2024-01-04 NOTE — ANESTHESIA PREPROCEDURE EVALUATION
01/04/2024  Gamaliel Pete Jr. is a 73 y.o., male.    Pre-operative evaluation for * No procedures listed *    Gamaliel Pete Jr. is a 73 y.o. male     Patient Active Problem List   Diagnosis    Class 1 obesity without serious comorbidity with body mass index (BMI) of 31.0 to 31.9 in adult    Onychomycosis    History of gout    Seborrheic keratosis    Tinea corporis    Positive colorectal cancer screening using Cologuard test    Renal mass    Cancer with pulmonary metastases    Metastatic cancer to spine    Iron deficiency anemia    History of right radical nephrectomy    Clear cell carcinoma of right kidney    SABINE (acute kidney injury)    Hypercalcemia    Transaminitis    Essential hypertension    Therapeutic opioid-induced constipation (OIC)    Debility    Diaphoresis    Nausea & vomiting    Normocytic anemia    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     Current Outpatient Medications on File Prior to Encounter   Medication Sig Dispense Refill    allopurinoL (ZYLOPRIM) 100 MG tablet Take 1 tablet (100 mg total) by mouth once daily. 30 tablet 3    amlodipine-benazepril 5-10 mg (LOTREL) 5-10 mg per capsule Take 1 capsule by mouth once daily. 90 capsule 3    axitinib (INLYTA) 5 mg Tab Take 1 tablet (5 mg) by mouth 2 (two) times daily. 60 tablet 11    diazePAM (VALIUM) 5 MG tablet Take 1 tablet (5 mg total) by mouth every 6 (six) hours as needed (severe muscles spasms). 10 tablet 0    gabapentin (NEURONTIN) 300 MG capsule Take 1 capsule (300 mg total) by mouth 3 (three) times daily. 90 capsule 11    methocarbamoL (ROBAXIN) 750 MG Tab Take 1 tablet (750 mg total) by mouth 4 (four) times daily as needed (muscle  spasms). 90 tablet 0    morphine (MS CONTIN) 30 MG 12 hr tablet Take 1 tablet (30 mg total) by mouth every 12 (twelve) hours. 60 tablet 0    naloxone (NARCAN) 4 mg/actuation Spry 1 spray (4mg) by nasal route as needed for opioid overdose; may repeat every 2-3 minutes in alternating nostrils until medical help arrives. Call 911 (Patient not taking: Reported on 12/28/2023) 2 each 0    omega-3 fatty acids/fish oil (FISH OIL-OMEGA-3 FATTY ACIDS) 300-1,000 mg capsule Take 2 capsules by mouth once daily.      oxyCODONE-acetaminophen (PERCOCET) 5-325 mg per tablet Take 1 to 2 tablets by mouth every 4-6 hours as needed for pain 100 tablet 0    sildenafiL (VIAGRA) 100 MG tablet Take 1 tablet (100 mg total) by mouth daily as needed for Erectile Dysfunction. 30 tablet 11    turmeric (CURCUMIN MISC) 1,000 mg by Misc.(Non-Drug; Combo Route) route Daily.      UNABLE TO FIND Take 500 mg by mouth 2 (two) times a day. medication name: Tudca      UNABLE TO FIND Take 2 capsules by mouth 2 (two) times a day. medication name: Turkey tail mushroom      UNABLE TO FIND Take 15 drops by mouth once daily. medication name: Essiac-20      UNABLE TO FIND Take 5 mLs by mouth 2 (two) times a day. medication name: barley leaf juice powder      UNABLE TO FIND Take 5 mLs by mouth 2 (two) times a day. medication name: black seed oil (cumin seed)         Past Surgical History:   Procedure Laterality Date    COLONOSCOPY  02/10/2022    COLONOSCOPY N/A 02/10/2022    Procedure: COLONOSCOPY;  Surgeon: Desmond Keenan MD;  Location: [facility];  Service: General;  Laterality: N/A;    POSTERIOR FUSION OF CERVICAL SPINE WITH LAMINECTOMY N/A 11/15/2023    Procedure: SPINE, CERVICAL, WITH POSTERIOR FUSION T4-6;  Surgeon: Nasim Martinez DO;  Location: [address];  Service: Neurosurgery;  Laterality: N/A;  C2-T1 laminectomy and posterior fusion. Patel. C-arm. [facility]. Neuromonitoring.    ROBOT-ASSISTED LAPAROSCOPIC NEPHRECTOMY Right 10/4/2023     Procedure: ROBOTIC NEPHRECTOMY;  Surgeon: Henry Michaels MD;  Location: [facility];  Service: Urology;  Laterality: Right;    SURGICAL REMOVAL OF VERTEBRAL BODY OF CERVICAL SPINE Right 11/7/2023    Procedure: CORPECTOMY, SPINE, CERVICAL;  Surgeon: Nasim Martinez DO;  [facility];  Service: Neurosurgery;  Laterality: Right;  C4 and C5 corpectomy with globus;  wells tongs; c-arm; neuromonitoring; microscope; type and cross 2 units    SURGICAL REMOVAL OF VERTEBRAL BODY OF CERVICAL SPINE N/A 11/15/2023    Procedure: CORPECTOMY, SPINE, CERVICAL C3-6 REVISION;  Surgeon: Nasim Martinez DO;  [facility];  Service: Neurosurgery;  Laterality: N/A;    TONSILLECTOMY         Social History     Socioeconomic History    Marital status:    Tobacco Use    Smoking status: Never    Smokeless tobacco: Never   Substance and Sexual Activity    Alcohol use: Not Currently     Alcohol/week: 0.0 standard drinks of alcohol     Comment: rarely    Drug use: Never     Social Determinants of Health     Financial Resource Strain: Patient Declined (12/22/2023)    Overall Financial Resource Strain (CARDIA)     Difficulty of Paying Living Expenses: Patient declined   Food Insecurity: Patient Declined (12/22/2023)    Hunger Vital Sign     Worried About Running Out of Food in the Last Year: Patient declined     Ran Out of Food in the Last Year: Patient declined   Transportation Needs: Patient Declined (12/22/2023)    PRAPARE - Transportation     Lack of Transportation (Medical): Patient declined     Lack of Transportation (Non-Medical): Patient declined   Physical Activity: Unknown (12/22/2023)    Exercise Vital Sign     Days of Exercise per Week: Patient declined     Minutes of Exercise per Session: 0 min   Recent Concern: Physical Activity - Inactive (11/16/2023)    Exercise Vital Sign     Days of Exercise per Week: 0 days     Minutes of Exercise per Session: 0 min   Stress: Patient Declined  (12/22/2023)    [employer] [place] [employer] - Occupational Stress Questionnaire     Feeling of Stress : Patient declined   Social Connections: Unknown (12/22/2023)    Social Connection and Isolation Panel [NHANES]     Frequency of Communication with Friends and Family: Patient declined     Frequency of Social Gatherings with Friends and Family: Patient declined     Attends Evangelical Services: Never     Active Member of Clubs or Organizations: Patient declined     Attends Club or Organization Meetings: Patient declined     Marital Status: Patient declined   Recent Concern: Social Connections - Moderately Isolated (11/16/2023)    Social Connection and Isolation Panel [NHANES]     Frequency of Communication with Friends and Family: More than three times a week     Frequency of Social Gatherings with Friends and Family: More than three times a week     Attends Evangelical Services: Never     Active Member of Clubs or Organizations: No     Attends Club or Organization Meetings: Never     Marital Status:    Housing Stability: Patient Declined (12/22/2023)    Housing Stability Vital Sign     Unable to Pay for Housing in the Last Year: Patient declined     Number of Places Lived in the Last Year: 1     Unstable Housing in the Last Year: Patient declined         CBC:   Recent Labs     01/03/24  0623 01/04/24  0602   WBC 6.68 3.37*   RBC 4.29* 4.00*   HGB 12.4* 11.6*   HCT 35.9* 33.2*    189   MCV 84 83   MCH 28.9 29.0   MCHC 34.5 34.9       CMP:   Recent Labs     01/02/24  2104 01/03/24  0623 01/04/24  0602   * 133* 137   K 3.8 4.4 4.7   CL 99 101 108   CO2 21* 19* 18*   BUN 17 22 28*   CREATININE 1.1 1.0 1.1   GLU 84 106 124*   MG 1.9 2.0 2.2   PHOS 3.7 4.5 3.9   CALCIUM 10.4 9.5 9.7   ALBUMIN 3.9  --   --    PROT 8.8*  --   --    ALKPHOS 91  --   --    *  --   --    *  --   --    BILITOT 1.2*  --   --        INR  Recent Labs     01/02/24 2104   INR 1.0   APTT 30.5            Diagnostic Studies:      EKD Echo:  No results found for this or any previous visit.        Pre-op Assessment    I have reviewed the Patient Summary Reports.     I have reviewed the Nursing Notes. I have reviewed the NPO Status.   I have reviewed the Medications.     Review of Systems  Anesthesia Hx:  No problems with previous Anesthesia                Social:  No Alcohol Use, Non-Smoker       Hematology/Oncology:       -- Anemia:                                  Cardiovascular:  Exercise tolerance: poor   Hypertension               Exercise tolerance recently limited by pain. He was able to walk 2 blocks about one to 2 months ago/                         Pulmonary:    Asthma (In childhood)                    Renal/:  Chronic Renal Disease                Musculoskeletal:         Spine Disorders: (metastatic RCC to the spine, now s/p C4/5 corpectomy and C3-6 anterior fusion and revision of the C4/5 anterior corpectomy cage and C3-6 plate on 23 and then C3-6 posterior fusion on 23) cervical            Neurological:  Neurology Normal                                          Physical Exam  General: Well nourished, Cooperative and Alert    Airway:  Mallampati: III   Mouth Opening: Normal  TM Distance: Normal  Tongue: Normal  Neck ROM: Extension Decreased    Dental:  Periodontal disease    Chest/Lungs:  Normal Respiratory Rate    Heart:  Rate: Normal        Anesthesia Plan  Type of Anesthesia, risks & benefits discussed:    Anesthesia Type: Gen ETT, Gen Natural Airway  Intra-op Monitoring Plan: Standard ASA Monitors  Post Op Pain Control Plan: multimodal analgesia and IV/PO Opioids PRN  Induction:  IV  Airway Plan: Video and Fiberoptic, Post-Induction  Informed Consent: Informed consent signed with the Patient and all parties understand the risks and agree with anesthesia plan.  All questions answered.   ASA Score: 3  Day of Surgery Review of History & Physical: H&P Update referred to the  surgeon/provider.    Ready For Surgery From Anesthesia Perspective.     .

## 2024-01-04 NOTE — HOSPITAL COURSE
Admitted to [facility] on 1/3/24 for neurosurgical evaluation of T9 pathologic fracture with central cord compression. IR consulted to perform embolization of T9 met to mitigate bleeding for NSGY fusion on 1/5/24.     Pt stepped down to med onc after NSGY performed intervention. Managing pain and working on mobilizing pt. PT/OT recommending high intensity therapy upon discharge. He was scheduled for a femur embolization/stabilization this Tuesday, but given he has required transfusion of pRBCS, surgery has rescheduled these procedures.

## 2024-01-04 NOTE — SEDATION DOCUMENTATION
Pt arrived to 4 for spinal angio. Pt oriented to unit and staff, Pt safely transferred from stretcher to procedural table. Fall risk reviewed and comfort measures utilized with interventions. Safety strap applied, position pillows to minimize pressure points. Blankets applied. Pt prepped and draped utilizing standard sterile technique. Patient placed on continuous monitoring, as required by sedation policy. Timeouts implemented utilizing standard universal time-out per department and facility policy. CRNA to remain at bedside with continuous monitoring. Pt resting comfortably. Denies pain/discomfort. Will continue to monitor. See flow sheets for monitoring, medication administration, and updates. patient verbalizes understanding.

## 2024-01-04 NOTE — TRANSFER OF CARE
"Anesthesia Transfer of Care Note    Patient: Gamaliel Pete Jr.    Procedure(s) Performed: * No procedures listed *    Patient location: PACU    Anesthesia Type: general    Transport from OR: Transported from OR on 6-10 L/min O2 by face mask with adequate spontaneous ventilation    Post pain: adequate analgesia    Post assessment: no apparent anesthetic complications    Post vital signs: stable    Level of consciousness: awake, alert and oriented    Nausea/Vomiting: no nausea/vomiting    Complications: none    Transfer of care protocol was followed      Last vitals: Visit Vitals  /65   Pulse 66   Temp 36.4 °C (97.6 °F) (Oral)   Resp 18   Ht 5' 10" (1.778 m)   Wt 94.6 kg (208 lb 8.9 oz)   SpO2 95%   BMI 29.92 kg/m²     "

## 2024-01-05 ENCOUNTER — ANESTHESIA (OUTPATIENT)
Dept: SURGERY | Facility: HOSPITAL | Age: 74
DRG: 457 | End: 2024-01-05
Payer: MEDICARE

## 2024-01-05 LAB
ALBUMIN SERPL BCP-MCNC: 2.7 G/DL (ref 3.5–5.2)
ALLENS TEST: ABNORMAL
ALP SERPL-CCNC: 58 U/L (ref 55–135)
ALT SERPL W/O P-5'-P-CCNC: 141 U/L (ref 10–44)
ANION GAP SERPL CALC-SCNC: 12 MMOL/L (ref 8–16)
ANION GAP SERPL CALC-SCNC: 8 MMOL/L (ref 8–16)
APTT PPP: 22.3 SEC (ref 21–32)
AST SERPL-CCNC: 32 U/L (ref 10–40)
BASOPHILS # BLD AUTO: 0.02 K/UL (ref 0–0.2)
BASOPHILS # BLD AUTO: 0.03 K/UL (ref 0–0.2)
BASOPHILS NFR BLD: 0.1 % (ref 0–1.9)
BASOPHILS NFR BLD: 0.4 % (ref 0–1.9)
BILIRUB SERPL-MCNC: 0.6 MG/DL (ref 0.1–1)
BUN SERPL-MCNC: 28 MG/DL (ref 8–23)
BUN SERPL-MCNC: 33 MG/DL (ref 8–23)
CALCIUM SERPL-MCNC: 8.1 MG/DL (ref 8.7–10.5)
CALCIUM SERPL-MCNC: 9.3 MG/DL (ref 8.7–10.5)
CHLORIDE SERPL-SCNC: 110 MMOL/L (ref 95–110)
CHLORIDE SERPL-SCNC: 111 MMOL/L (ref 95–110)
CO2 SERPL-SCNC: 15 MMOL/L (ref 23–29)
CO2 SERPL-SCNC: 21 MMOL/L (ref 23–29)
CREAT SERPL-MCNC: 1.1 MG/DL (ref 0.5–1.4)
CREAT SERPL-MCNC: 1.2 MG/DL (ref 0.5–1.4)
DIFFERENTIAL METHOD BLD: ABNORMAL
DIFFERENTIAL METHOD BLD: ABNORMAL
EOSINOPHIL # BLD AUTO: 0 K/UL (ref 0–0.5)
EOSINOPHIL # BLD AUTO: 0 K/UL (ref 0–0.5)
EOSINOPHIL NFR BLD: 0 % (ref 0–8)
EOSINOPHIL NFR BLD: 0 % (ref 0–8)
ERYTHROCYTE [DISTWIDTH] IN BLOOD BY AUTOMATED COUNT: 13.9 % (ref 11.5–14.5)
ERYTHROCYTE [DISTWIDTH] IN BLOOD BY AUTOMATED COUNT: 14 % (ref 11.5–14.5)
EST. GFR  (NO RACE VARIABLE): >60 ML/MIN/1.73 M^2
EST. GFR  (NO RACE VARIABLE): >60 ML/MIN/1.73 M^2
FIBRINOGEN PPP-MCNC: 365 MG/DL (ref 182–400)
GLUCOSE SERPL-MCNC: 116 MG/DL (ref 70–110)
GLUCOSE SERPL-MCNC: 122 MG/DL (ref 70–110)
GLUCOSE SERPL-MCNC: 149 MG/DL (ref 70–110)
HCO3 UR-SCNC: 19.8 MMOL/L (ref 24–28)
HCO3 UR-SCNC: 20.8 MMOL/L (ref 24–28)
HCT VFR BLD AUTO: 29 % (ref 40–54)
HCT VFR BLD AUTO: 35.1 % (ref 40–54)
HCT VFR BLD CALC: 26 %PCV (ref 36–54)
HCT VFR BLD CALC: 27 %PCV (ref 36–54)
HGB BLD-MCNC: 12 G/DL (ref 14–18)
HGB BLD-MCNC: 9.9 G/DL (ref 14–18)
IMM GRANULOCYTES # BLD AUTO: 0.06 K/UL (ref 0–0.04)
IMM GRANULOCYTES # BLD AUTO: 0.17 K/UL (ref 0–0.04)
IMM GRANULOCYTES NFR BLD AUTO: 0.7 % (ref 0–0.5)
IMM GRANULOCYTES NFR BLD AUTO: 1.2 % (ref 0–0.5)
INR PPP: 1 (ref 0.8–1.2)
LACTATE SERPL-SCNC: 1.8 MMOL/L (ref 0.5–2.2)
LYMPHOCYTES # BLD AUTO: 0.5 K/UL (ref 1–4.8)
LYMPHOCYTES # BLD AUTO: 0.5 K/UL (ref 1–4.8)
LYMPHOCYTES NFR BLD: 3.5 % (ref 18–48)
LYMPHOCYTES NFR BLD: 5.8 % (ref 18–48)
MAGNESIUM SERPL-MCNC: 2.2 MG/DL (ref 1.6–2.6)
MCH RBC QN AUTO: 29.3 PG (ref 27–31)
MCH RBC QN AUTO: 29.4 PG (ref 27–31)
MCHC RBC AUTO-ENTMCNC: 34.1 G/DL (ref 32–36)
MCHC RBC AUTO-ENTMCNC: 34.2 G/DL (ref 32–36)
MCV RBC AUTO: 86 FL (ref 82–98)
MCV RBC AUTO: 86 FL (ref 82–98)
MONOCYTES # BLD AUTO: 0.3 K/UL (ref 0.3–1)
MONOCYTES # BLD AUTO: 0.7 K/UL (ref 0.3–1)
MONOCYTES NFR BLD: 3.4 % (ref 4–15)
MONOCYTES NFR BLD: 5.1 % (ref 4–15)
NEUTROPHILS # BLD AUTO: 12.6 K/UL (ref 1.8–7.7)
NEUTROPHILS # BLD AUTO: 7.7 K/UL (ref 1.8–7.7)
NEUTROPHILS NFR BLD: 89.7 % (ref 38–73)
NEUTROPHILS NFR BLD: 90.1 % (ref 38–73)
NRBC BLD-RTO: 0 /100 WBC
NRBC BLD-RTO: 0 /100 WBC
PCO2 BLDA: 36.7 MMHG (ref 35–45)
PCO2 BLDA: 43.9 MMHG (ref 35–45)
PH SMN: 7.28 [PH] (ref 7.35–7.45)
PH SMN: 7.34 [PH] (ref 7.35–7.45)
PHOSPHATE SERPL-MCNC: 3.7 MG/DL (ref 2.7–4.5)
PLATELET # BLD AUTO: 205 K/UL (ref 150–450)
PLATELET # BLD AUTO: 294 K/UL (ref 150–450)
PMV BLD AUTO: 8.9 FL (ref 9.2–12.9)
PMV BLD AUTO: 9.2 FL (ref 9.2–12.9)
PO2 BLDA: 163 MMHG (ref 80–100)
PO2 BLDA: 422 MMHG (ref 80–100)
POC BE: -6 MMOL/L
POC BE: -6 MMOL/L
POC IONIZED CALCIUM: 1.18 MMOL/L (ref 1.06–1.42)
POC IONIZED CALCIUM: 1.21 MMOL/L (ref 1.06–1.42)
POC SATURATED O2: 100 % (ref 95–100)
POC SATURATED O2: 99 % (ref 95–100)
POC TCO2: 21 MMOL/L (ref 23–27)
POC TCO2: 22 MMOL/L (ref 23–27)
POTASSIUM BLD-SCNC: 4.1 MMOL/L (ref 3.5–5.1)
POTASSIUM BLD-SCNC: 4.5 MMOL/L (ref 3.5–5.1)
POTASSIUM SERPL-SCNC: 4.7 MMOL/L (ref 3.5–5.1)
POTASSIUM SERPL-SCNC: 4.9 MMOL/L (ref 3.5–5.1)
PROT SERPL-MCNC: 5.9 G/DL (ref 6–8.4)
PROTHROMBIN TIME: 10.8 SEC (ref 9–12.5)
RBC # BLD AUTO: 3.37 M/UL (ref 4.6–6.2)
RBC # BLD AUTO: 4.09 M/UL (ref 4.6–6.2)
SAMPLE: ABNORMAL
SAMPLE: ABNORMAL
SITE: ABNORMAL
SODIUM BLD-SCNC: 138 MMOL/L (ref 136–145)
SODIUM BLD-SCNC: 139 MMOL/L (ref 136–145)
SODIUM SERPL-SCNC: 138 MMOL/L (ref 136–145)
SODIUM SERPL-SCNC: 139 MMOL/L (ref 136–145)
WBC # BLD AUTO: 13.97 K/UL (ref 3.9–12.7)
WBC # BLD AUTO: 8.57 K/UL (ref 3.9–12.7)

## 2024-01-05 PROCEDURE — 85025 COMPLETE CBC W/AUTO DIFF WBC: CPT | Mod: 91 | Performed by: STUDENT IN AN ORGANIZED HEALTH CARE EDUCATION/TRAINING PROGRAM

## 2024-01-05 PROCEDURE — 88341 IMHCHEM/IMCYTCHM EA ADD ANTB: CPT | Performed by: STUDENT IN AN ORGANIZED HEALTH CARE EDUCATION/TRAINING PROGRAM

## 2024-01-05 PROCEDURE — 0RG70J1 FUSION OF 2 TO 7 THORACIC VERTEBRAL JOINTS WITH SYNTHETIC SUBSTITUTE, POSTERIOR APPROACH, POSTERIOR COLUMN, OPEN APPROACH: ICD-10-PCS | Performed by: STUDENT IN AN ORGANIZED HEALTH CARE EDUCATION/TRAINING PROGRAM

## 2024-01-05 PROCEDURE — 36415 COLL VENOUS BLD VENIPUNCTURE: CPT | Performed by: INTERNAL MEDICINE

## 2024-01-05 PROCEDURE — 85730 THROMBOPLASTIN TIME PARTIAL: CPT | Performed by: STUDENT IN AN ORGANIZED HEALTH CARE EDUCATION/TRAINING PROGRAM

## 2024-01-05 PROCEDURE — 83735 ASSAY OF MAGNESIUM: CPT | Performed by: INTERNAL MEDICINE

## 2024-01-05 PROCEDURE — 22842 INSERT SPINE FIXATION DEVICE: CPT | Mod: ,,, | Performed by: STUDENT IN AN ORGANIZED HEALTH CARE EDUCATION/TRAINING PROGRAM

## 2024-01-05 PROCEDURE — 94761 N-INVAS EAR/PLS OXIMETRY MLT: CPT

## 2024-01-05 PROCEDURE — 86920 COMPATIBILITY TEST SPIN: CPT | Performed by: STUDENT IN AN ORGANIZED HEALTH CARE EDUCATION/TRAINING PROGRAM

## 2024-01-05 PROCEDURE — 8E0W0CZ ROBOTIC ASSISTED PROCEDURE OF TRUNK REGION, OPEN APPROACH: ICD-10-PCS | Performed by: ORTHOPAEDIC SURGERY

## 2024-01-05 PROCEDURE — 0PU40JZ SUPPLEMENT THORACIC VERTEBRA WITH SYNTHETIC SUBSTITUTE, OPEN APPROACH: ICD-10-PCS | Performed by: STUDENT IN AN ORGANIZED HEALTH CARE EDUCATION/TRAINING PROGRAM

## 2024-01-05 PROCEDURE — 82803 BLOOD GASES ANY COMBINATION: CPT

## 2024-01-05 PROCEDURE — 84100 ASSAY OF PHOSPHORUS: CPT | Performed by: INTERNAL MEDICINE

## 2024-01-05 PROCEDURE — 84295 ASSAY OF SERUM SODIUM: CPT

## 2024-01-05 PROCEDURE — 00NX0ZZ RELEASE THORACIC SPINAL CORD, OPEN APPROACH: ICD-10-PCS | Performed by: ORTHOPAEDIC SURGERY

## 2024-01-05 PROCEDURE — 63600175 PHARM REV CODE 636 W HCPCS

## 2024-01-05 PROCEDURE — 36620 INSERTION CATHETER ARTERY: CPT | Mod: 59,,, | Performed by: ANESTHESIOLOGY

## 2024-01-05 PROCEDURE — 88342 IMHCHEM/IMCYTCHM 1ST ANTB: CPT | Mod: 26,,, | Performed by: STUDENT IN AN ORGANIZED HEALTH CARE EDUCATION/TRAINING PROGRAM

## 2024-01-05 PROCEDURE — 80048 BASIC METABOLIC PNL TOTAL CA: CPT | Mod: XB | Performed by: INTERNAL MEDICINE

## 2024-01-05 PROCEDURE — 25000003 PHARM REV CODE 250: Performed by: NURSE ANESTHETIST, CERTIFIED REGISTERED

## 2024-01-05 PROCEDURE — 85014 HEMATOCRIT: CPT

## 2024-01-05 PROCEDURE — 61783 SCAN PROC SPINAL: CPT | Mod: ,,, | Performed by: STUDENT IN AN ORGANIZED HEALTH CARE EDUCATION/TRAINING PROGRAM

## 2024-01-05 PROCEDURE — 36000711: Performed by: STUDENT IN AN ORGANIZED HEALTH CARE EDUCATION/TRAINING PROGRAM

## 2024-01-05 PROCEDURE — C1729 CATH, DRAINAGE: HCPCS | Performed by: STUDENT IN AN ORGANIZED HEALTH CARE EDUCATION/TRAINING PROGRAM

## 2024-01-05 PROCEDURE — 25000003 PHARM REV CODE 250: Performed by: STUDENT IN AN ORGANIZED HEALTH CARE EDUCATION/TRAINING PROGRAM

## 2024-01-05 PROCEDURE — 37000009 HC ANESTHESIA EA ADD 15 MINS: Performed by: STUDENT IN AN ORGANIZED HEALTH CARE EDUCATION/TRAINING PROGRAM

## 2024-01-05 PROCEDURE — 82800 BLOOD PH: CPT

## 2024-01-05 PROCEDURE — 30233N1 TRANSFUSION OF NONAUTOLOGOUS RED BLOOD CELLS INTO PERIPHERAL VEIN, PERCUTANEOUS APPROACH: ICD-10-PCS | Performed by: STUDENT IN AN ORGANIZED HEALTH CARE EDUCATION/TRAINING PROGRAM

## 2024-01-05 PROCEDURE — 63600175 PHARM REV CODE 636 W HCPCS: Performed by: STUDENT IN AN ORGANIZED HEALTH CARE EDUCATION/TRAINING PROGRAM

## 2024-01-05 PROCEDURE — 63600175 PHARM REV CODE 636 W HCPCS: Performed by: INTERNAL MEDICINE

## 2024-01-05 PROCEDURE — C1713 ANCHOR/SCREW BN/BN,TIS/BN: HCPCS | Performed by: STUDENT IN AN ORGANIZED HEALTH CARE EDUCATION/TRAINING PROGRAM

## 2024-01-05 PROCEDURE — 25000003 PHARM REV CODE 250: Performed by: INTERNAL MEDICINE

## 2024-01-05 PROCEDURE — 20000000 HC ICU ROOM

## 2024-01-05 PROCEDURE — 86850 RBC ANTIBODY SCREEN: CPT | Performed by: STUDENT IN AN ORGANIZED HEALTH CARE EDUCATION/TRAINING PROGRAM

## 2024-01-05 PROCEDURE — 82330 ASSAY OF CALCIUM: CPT

## 2024-01-05 PROCEDURE — 36000710: Performed by: STUDENT IN AN ORGANIZED HEALTH CARE EDUCATION/TRAINING PROGRAM

## 2024-01-05 PROCEDURE — 88307 TISSUE EXAM BY PATHOLOGIST: CPT | Performed by: STUDENT IN AN ORGANIZED HEALTH CARE EDUCATION/TRAINING PROGRAM

## 2024-01-05 PROCEDURE — 99291 CRITICAL CARE FIRST HOUR: CPT | Mod: ,,,

## 2024-01-05 PROCEDURE — 27201423 OPTIME MED/SURG SUP & DEVICES STERILE SUPPLY: Performed by: STUDENT IN AN ORGANIZED HEALTH CARE EDUCATION/TRAINING PROGRAM

## 2024-01-05 PROCEDURE — D9220A PRA ANESTHESIA: Mod: CRNA,,, | Performed by: NURSE ANESTHETIST, CERTIFIED REGISTERED

## 2024-01-05 PROCEDURE — 25000003 PHARM REV CODE 250

## 2024-01-05 PROCEDURE — 88342 IMHCHEM/IMCYTCHM 1ST ANTB: CPT | Performed by: STUDENT IN AN ORGANIZED HEALTH CARE EDUCATION/TRAINING PROGRAM

## 2024-01-05 PROCEDURE — P9016 RBC LEUKOCYTES REDUCED: HCPCS | Performed by: STUDENT IN AN ORGANIZED HEALTH CARE EDUCATION/TRAINING PROGRAM

## 2024-01-05 PROCEDURE — 22610 ARTHRD PST TQ 1NTRSPC THRC: CPT | Mod: 62,51,78, | Performed by: STUDENT IN AN ORGANIZED HEALTH CARE EDUCATION/TRAINING PROGRAM

## 2024-01-05 PROCEDURE — 63600175 PHARM REV CODE 636 W HCPCS: Performed by: NURSE ANESTHETIST, CERTIFIED REGISTERED

## 2024-01-05 PROCEDURE — 37000008 HC ANESTHESIA 1ST 15 MINUTES: Performed by: STUDENT IN AN ORGANIZED HEALTH CARE EDUCATION/TRAINING PROGRAM

## 2024-01-05 PROCEDURE — 63276 BX/EXC XDRL SPINE LESN THRC: CPT | Mod: 62,78,, | Performed by: ORTHOPAEDIC SURGERY

## 2024-01-05 PROCEDURE — 88307 TISSUE EXAM BY PATHOLOGIST: CPT | Mod: 26,,, | Performed by: STUDENT IN AN ORGANIZED HEALTH CARE EDUCATION/TRAINING PROGRAM

## 2024-01-05 PROCEDURE — 85384 FIBRINOGEN ACTIVITY: CPT | Performed by: STUDENT IN AN ORGANIZED HEALTH CARE EDUCATION/TRAINING PROGRAM

## 2024-01-05 PROCEDURE — 88341 IMHCHEM/IMCYTCHM EA ADD ANTB: CPT | Mod: 26,,, | Performed by: STUDENT IN AN ORGANIZED HEALTH CARE EDUCATION/TRAINING PROGRAM

## 2024-01-05 PROCEDURE — 85025 COMPLETE CBC W/AUTO DIFF WBC: CPT | Performed by: INTERNAL MEDICINE

## 2024-01-05 PROCEDURE — 83605 ASSAY OF LACTIC ACID: CPT | Performed by: STUDENT IN AN ORGANIZED HEALTH CARE EDUCATION/TRAINING PROGRAM

## 2024-01-05 PROCEDURE — 20930 SP BONE ALGRFT MORSEL ADD-ON: CPT | Mod: ,,, | Performed by: STUDENT IN AN ORGANIZED HEALTH CARE EDUCATION/TRAINING PROGRAM

## 2024-01-05 PROCEDURE — 84132 ASSAY OF SERUM POTASSIUM: CPT

## 2024-01-05 PROCEDURE — 99900035 HC TECH TIME PER 15 MIN (STAT)

## 2024-01-05 PROCEDURE — 88309 TISSUE EXAM BY PATHOLOGIST: CPT | Performed by: STUDENT IN AN ORGANIZED HEALTH CARE EDUCATION/TRAINING PROGRAM

## 2024-01-05 PROCEDURE — 85610 PROTHROMBIN TIME: CPT | Performed by: STUDENT IN AN ORGANIZED HEALTH CARE EDUCATION/TRAINING PROGRAM

## 2024-01-05 PROCEDURE — D9220A PRA ANESTHESIA: Mod: ANES,,, | Performed by: ANESTHESIOLOGY

## 2024-01-05 PROCEDURE — 99233 SBSQ HOSP IP/OBS HIGH 50: CPT | Mod: GC,,, | Performed by: HOSPITALIST

## 2024-01-05 PROCEDURE — 22614 ARTHRD PST TQ 1NTRSPC EA ADD: CPT | Mod: 62,,, | Performed by: ORTHOPAEDIC SURGERY

## 2024-01-05 PROCEDURE — 80053 COMPREHEN METABOLIC PANEL: CPT | Performed by: STUDENT IN AN ORGANIZED HEALTH CARE EDUCATION/TRAINING PROGRAM

## 2024-01-05 PROCEDURE — 22842 INSERT SPINE FIXATION DEVICE: CPT | Mod: 82,,, | Performed by: ORTHOPAEDIC SURGERY

## 2024-01-05 PROCEDURE — 22614 ARTHRD PST TQ 1NTRSPC EA ADD: CPT | Mod: 62,,, | Performed by: STUDENT IN AN ORGANIZED HEALTH CARE EDUCATION/TRAINING PROGRAM

## 2024-01-05 PROCEDURE — 37799 UNLISTED PX VASCULAR SURGERY: CPT

## 2024-01-05 PROCEDURE — 63276 BX/EXC XDRL SPINE LESN THRC: CPT | Mod: 62,78,, | Performed by: STUDENT IN AN ORGANIZED HEALTH CARE EDUCATION/TRAINING PROGRAM

## 2024-01-05 PROCEDURE — 27800903 OPTIME MED/SURG SUP & DEVICES OTHER IMPLANTS: Performed by: STUDENT IN AN ORGANIZED HEALTH CARE EDUCATION/TRAINING PROGRAM

## 2024-01-05 PROCEDURE — 22610 ARTHRD PST TQ 1NTRSPC THRC: CPT | Mod: 62,51,78, | Performed by: ORTHOPAEDIC SURGERY

## 2024-01-05 DEVICE — IMPLANTABLE DEVICE: Type: IMPLANTABLE DEVICE | Site: BACK | Status: FUNCTIONAL

## 2024-01-05 DEVICE — GRAFT ALTAPORE BIOACTIVE 10ML: Type: IMPLANTABLE DEVICE | Site: BACK | Status: FUNCTIONAL

## 2024-01-05 DEVICE — CAP CREO LOK SPINE FENESTRATE: Type: IMPLANTABLE DEVICE | Site: BACK | Status: FUNCTIONAL

## 2024-01-05 RX ORDER — HYDROMORPHONE HYDROCHLORIDE 1 MG/ML
2 INJECTION, SOLUTION INTRAMUSCULAR; INTRAVENOUS; SUBCUTANEOUS EVERY 6 HOURS PRN
Status: DISCONTINUED | OUTPATIENT
Start: 2024-01-05 | End: 2024-01-05

## 2024-01-05 RX ORDER — FENTANYL CITRATE 50 UG/ML
INJECTION, SOLUTION INTRAMUSCULAR; INTRAVENOUS
Status: DISCONTINUED | OUTPATIENT
Start: 2024-01-05 | End: 2024-01-05

## 2024-01-05 RX ORDER — LORAZEPAM 2 MG/ML
0.25 INJECTION INTRAMUSCULAR ONCE AS NEEDED
Status: CANCELLED | OUTPATIENT
Start: 2024-01-05 | End: 2035-06-03

## 2024-01-05 RX ORDER — HYDROMORPHONE HYDROCHLORIDE 1 MG/ML
0.2 INJECTION, SOLUTION INTRAMUSCULAR; INTRAVENOUS; SUBCUTANEOUS EVERY 6 HOURS PRN
Status: DISCONTINUED | OUTPATIENT
Start: 2024-01-05 | End: 2024-01-09

## 2024-01-05 RX ORDER — ACETAMINOPHEN 500 MG
1000 TABLET ORAL EVERY 8 HOURS
Status: DISCONTINUED | OUTPATIENT
Start: 2024-01-05 | End: 2024-01-06

## 2024-01-05 RX ORDER — EPHEDRINE SULFATE 50 MG/ML
INJECTION, SOLUTION INTRAVENOUS
Status: DISCONTINUED | OUTPATIENT
Start: 2024-01-05 | End: 2024-01-05

## 2024-01-05 RX ORDER — PHENYLEPHRINE HYDROCHLORIDE 10 MG/ML
INJECTION INTRAVENOUS
Status: DISCONTINUED | OUTPATIENT
Start: 2024-01-05 | End: 2024-01-05

## 2024-01-05 RX ORDER — METHOCARBAMOL 500 MG/1
500 TABLET, FILM COATED ORAL 4 TIMES DAILY
Status: DISCONTINUED | OUTPATIENT
Start: 2024-01-05 | End: 2024-01-05

## 2024-01-05 RX ORDER — SUCCINYLCHOLINE CHLORIDE 20 MG/ML
INJECTION INTRAMUSCULAR; INTRAVENOUS
Status: DISCONTINUED | OUTPATIENT
Start: 2024-01-05 | End: 2024-01-05

## 2024-01-05 RX ORDER — PROPOFOL 10 MG/ML
VIAL (ML) INTRAVENOUS
Status: DISCONTINUED | OUTPATIENT
Start: 2024-01-05 | End: 2024-01-05

## 2024-01-05 RX ORDER — HYDROCODONE BITARTRATE AND ACETAMINOPHEN 500; 5 MG/1; MG/1
TABLET ORAL
Status: DISCONTINUED | OUTPATIENT
Start: 2024-01-05 | End: 2024-01-10 | Stop reason: HOSPADM

## 2024-01-05 RX ORDER — HALOPERIDOL 5 MG/ML
0.5 INJECTION INTRAMUSCULAR EVERY 10 MIN PRN
Status: CANCELLED | OUTPATIENT
Start: 2024-01-05

## 2024-01-05 RX ORDER — METHOCARBAMOL 750 MG/1
750 TABLET, FILM COATED ORAL 4 TIMES DAILY
Status: DISCONTINUED | OUTPATIENT
Start: 2024-01-05 | End: 2024-01-10 | Stop reason: HOSPADM

## 2024-01-05 RX ORDER — MIDAZOLAM HYDROCHLORIDE 1 MG/ML
INJECTION, SOLUTION INTRAMUSCULAR; INTRAVENOUS
Status: DISCONTINUED | OUTPATIENT
Start: 2024-01-05 | End: 2024-01-05

## 2024-01-05 RX ORDER — DEXMEDETOMIDINE HYDROCHLORIDE 100 UG/ML
INJECTION, SOLUTION INTRAVENOUS
Status: DISCONTINUED | OUTPATIENT
Start: 2024-01-05 | End: 2024-01-05

## 2024-01-05 RX ORDER — SODIUM CHLORIDE 9 MG/ML
INJECTION, SOLUTION INTRAVENOUS CONTINUOUS
Status: CANCELLED | OUTPATIENT
Start: 2024-01-05

## 2024-01-05 RX ORDER — PHENYLEPHRINE HCL IN 0.9% NACL 1 MG/10 ML
0-3 SYRINGE (ML) INTRAVENOUS CONTINUOUS
Status: DISCONTINUED | OUTPATIENT
Start: 2024-01-05 | End: 2024-01-05

## 2024-01-05 RX ORDER — ROCURONIUM BROMIDE 10 MG/ML
INJECTION, SOLUTION INTRAVENOUS
Status: DISCONTINUED | OUTPATIENT
Start: 2024-01-05 | End: 2024-01-05

## 2024-01-05 RX ORDER — KETAMINE HYDROCHLORIDE 100 MG/ML
INJECTION, SOLUTION INTRAMUSCULAR; INTRAVENOUS
Status: DISCONTINUED | OUTPATIENT
Start: 2024-01-05 | End: 2024-01-05

## 2024-01-05 RX ORDER — LIDOCAINE HYDROCHLORIDE 20 MG/ML
INJECTION, SOLUTION EPIDURAL; INFILTRATION; INTRACAUDAL; PERINEURAL
Status: DISCONTINUED | OUTPATIENT
Start: 2024-01-05 | End: 2024-01-05

## 2024-01-05 RX ORDER — REMIFENTANIL HYDROCHLORIDE 1 MG/ML
INJECTION, POWDER, LYOPHILIZED, FOR SOLUTION INTRAVENOUS CONTINUOUS PRN
Status: DISCONTINUED | OUTPATIENT
Start: 2024-01-05 | End: 2024-01-05

## 2024-01-05 RX ORDER — CEFAZOLIN SODIUM 1 G/3ML
INJECTION, POWDER, FOR SOLUTION INTRAMUSCULAR; INTRAVENOUS
Status: DISCONTINUED | OUTPATIENT
Start: 2024-01-05 | End: 2024-01-05

## 2024-01-05 RX ORDER — ONDANSETRON HYDROCHLORIDE 2 MG/ML
4 INJECTION, SOLUTION INTRAVENOUS ONCE AS NEEDED
Status: CANCELLED | OUTPATIENT
Start: 2024-01-05 | End: 2035-06-03

## 2024-01-05 RX ORDER — ONDANSETRON 2 MG/ML
INJECTION INTRAMUSCULAR; INTRAVENOUS
Status: DISCONTINUED | OUTPATIENT
Start: 2024-01-05 | End: 2024-01-05

## 2024-01-05 RX ORDER — HYDROMORPHONE HYDROCHLORIDE 1 MG/ML
0.2 INJECTION, SOLUTION INTRAMUSCULAR; INTRAVENOUS; SUBCUTANEOUS EVERY 5 MIN PRN
Status: CANCELLED | OUTPATIENT
Start: 2024-01-05

## 2024-01-05 RX ORDER — SODIUM CHLORIDE 9 MG/ML
INJECTION, SOLUTION INTRAVENOUS CONTINUOUS
Status: DISCONTINUED | OUTPATIENT
Start: 2024-01-05 | End: 2024-01-06

## 2024-01-05 RX ORDER — SODIUM CHLORIDE 0.9 % (FLUSH) 0.9 %
3 SYRINGE (ML) INJECTION
Status: CANCELLED | OUTPATIENT
Start: 2024-01-05

## 2024-01-05 RX ORDER — PHENYLEPHRINE HCL IN 0.9% NACL 20MG/250ML
0-5 PLASTIC BAG, INJECTION (ML) INTRAVENOUS CONTINUOUS
Status: DISCONTINUED | OUTPATIENT
Start: 2024-01-05 | End: 2024-01-06

## 2024-01-05 RX ORDER — VANCOMYCIN HYDROCHLORIDE 1 G/20ML
INJECTION, POWDER, LYOPHILIZED, FOR SOLUTION INTRAVENOUS
Status: DISCONTINUED | OUTPATIENT
Start: 2024-01-05 | End: 2024-01-05 | Stop reason: HOSPADM

## 2024-01-05 RX ADMIN — CEFAZOLIN 2 G: 330 INJECTION, POWDER, FOR SOLUTION INTRAMUSCULAR; INTRAVENOUS at 03:01

## 2024-01-05 RX ADMIN — HYDROMORPHONE HYDROCHLORIDE 0.2 MG: 0.5 INJECTION, SOLUTION INTRAMUSCULAR; INTRAVENOUS; SUBCUTANEOUS at 09:01

## 2024-01-05 RX ADMIN — DEXMEDETOMIDINE 20 MCG: 100 INJECTION, SOLUTION, CONCENTRATE INTRAVENOUS at 06:01

## 2024-01-05 RX ADMIN — PROPOFOL 100 MG: 10 INJECTION, EMULSION INTRAVENOUS at 02:01

## 2024-01-05 RX ADMIN — SUGAMMADEX 200 MG: 100 INJECTION, SOLUTION INTRAVENOUS at 04:01

## 2024-01-05 RX ADMIN — MIDAZOLAM HYDROCHLORIDE 2 MG: 1 INJECTION, SOLUTION INTRAMUSCULAR; INTRAVENOUS at 02:01

## 2024-01-05 RX ADMIN — METHOCARBAMOL 750 MG: 750 TABLET ORAL at 09:01

## 2024-01-05 RX ADMIN — AMLODIPINE BESYLATE 10 MG: 10 TABLET ORAL at 08:01

## 2024-01-05 RX ADMIN — PHENYLEPHRINE HYDROCHLORIDE 100 MCG: 10 INJECTION INTRAVENOUS at 04:01

## 2024-01-05 RX ADMIN — GABAPENTIN 300 MG: 300 CAPSULE ORAL at 09:01

## 2024-01-05 RX ADMIN — FENTANYL CITRATE 50 MCG: 50 INJECTION INTRAMUSCULAR; INTRAVENOUS at 02:01

## 2024-01-05 RX ADMIN — PANTOPRAZOLE SODIUM 40 MG: 40 TABLET, DELAYED RELEASE ORAL at 08:01

## 2024-01-05 RX ADMIN — SUCCINYLCHOLINE CHLORIDE 100 MG: 20 INJECTION, SOLUTION INTRAMUSCULAR; INTRAVENOUS; PARENTERAL at 02:01

## 2024-01-05 RX ADMIN — KETAMINE HYDROCHLORIDE 20 MG: 100 INJECTION INTRAMUSCULAR; INTRAVENOUS at 03:01

## 2024-01-05 RX ADMIN — SODIUM CHLORIDE: 9 INJECTION, SOLUTION INTRAVENOUS at 08:01

## 2024-01-05 RX ADMIN — MORPHINE SULFATE 30 MG: 30 TABLET, FILM COATED, EXTENDED RELEASE ORAL at 09:01

## 2024-01-05 RX ADMIN — REMIFENTANIL HYDROCHLORIDE 0.2 MCG/KG/MIN: 1 INJECTION, POWDER, LYOPHILIZED, FOR SOLUTION INTRAVENOUS at 02:01

## 2024-01-05 RX ADMIN — EPHEDRINE SULFATE 10 MG: 50 INJECTION INTRAVENOUS at 03:01

## 2024-01-05 RX ADMIN — OXYCODONE HYDROCHLORIDE 10 MG: 10 TABLET ORAL at 11:01

## 2024-01-05 RX ADMIN — ACETAMINOPHEN 1000 MG: 500 TABLET ORAL at 09:01

## 2024-01-05 RX ADMIN — LIDOCAINE HYDROCHLORIDE 100 MG: 20 INJECTION, SOLUTION EPIDURAL; INFILTRATION; INTRACAUDAL at 02:01

## 2024-01-05 RX ADMIN — ALLOPURINOL 100 MG: 100 TABLET ORAL at 08:01

## 2024-01-05 RX ADMIN — EPHEDRINE SULFATE 5 MG: 50 INJECTION INTRAVENOUS at 02:01

## 2024-01-05 RX ADMIN — SODIUM CHLORIDE 0.2 MCG/KG/MIN: 9 INJECTION, SOLUTION INTRAVENOUS at 02:01

## 2024-01-05 RX ADMIN — DEXAMETHASONE SODIUM PHOSPHATE 4 MG: 4 INJECTION INTRA-ARTICULAR; INTRALESIONAL; INTRAMUSCULAR; INTRAVENOUS; SOFT TISSUE at 05:01

## 2024-01-05 RX ADMIN — KETAMINE HYDROCHLORIDE 10 MG: 100 INJECTION INTRAMUSCULAR; INTRAVENOUS at 05:01

## 2024-01-05 RX ADMIN — GABAPENTIN 300 MG: 300 CAPSULE ORAL at 08:01

## 2024-01-05 RX ADMIN — Medication 0.5 MCG/KG/MIN: at 09:01

## 2024-01-05 RX ADMIN — ROCURONIUM BROMIDE 10 MG: 10 INJECTION INTRAVENOUS at 02:01

## 2024-01-05 RX ADMIN — DEXAMETHASONE SODIUM PHOSPHATE 4 MG: 4 INJECTION INTRA-ARTICULAR; INTRALESIONAL; INTRAMUSCULAR; INTRAVENOUS; SOFT TISSUE at 11:01

## 2024-01-05 RX ADMIN — SODIUM CHLORIDE, SODIUM GLUCONATE, SODIUM ACETATE, POTASSIUM CHLORIDE, MAGNESIUM CHLORIDE, SODIUM PHOSPHATE, DIBASIC, AND POTASSIUM PHOSPHATE: .53; .5; .37; .037; .03; .012; .00082 INJECTION, SOLUTION INTRAVENOUS at 03:01

## 2024-01-05 RX ADMIN — ONDANSETRON 4 MG: 2 INJECTION INTRAMUSCULAR; INTRAVENOUS at 06:01

## 2024-01-05 RX ADMIN — EPHEDRINE SULFATE 15 MG: 50 INJECTION INTRAVENOUS at 06:01

## 2024-01-05 RX ADMIN — PHENYLEPHRINE HYDROCHLORIDE 100 MCG: 10 INJECTION INTRAVENOUS at 02:01

## 2024-01-05 RX ADMIN — ROCURONIUM BROMIDE 40 MG: 10 INJECTION INTRAVENOUS at 03:01

## 2024-01-05 RX ADMIN — SENNOSIDES AND DOCUSATE SODIUM 1 TABLET: 8.6; 5 TABLET ORAL at 09:01

## 2024-01-05 RX ADMIN — EPHEDRINE SULFATE 10 MG: 50 INJECTION INTRAVENOUS at 06:01

## 2024-01-05 RX ADMIN — DEXAMETHASONE SODIUM PHOSPHATE 10 MG: 4 INJECTION INTRA-ARTICULAR; INTRALESIONAL; INTRAMUSCULAR; INTRAVENOUS; SOFT TISSUE at 03:01

## 2024-01-05 RX ADMIN — KETAMINE HYDROCHLORIDE 20 MG: 100 INJECTION INTRAMUSCULAR; INTRAVENOUS at 05:01

## 2024-01-05 RX ADMIN — SODIUM CHLORIDE: 0.9 INJECTION, SOLUTION INTRAVENOUS at 02:01

## 2024-01-05 NOTE — ASSESSMENT & PLAN NOTE
Gamaliel Pete Jr. is a 73 y.o. 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 Martinez due to metastatic disease to those areas. He is also s/p T9 kyphoplasty for a pathological fracture at that level. Unfortunately, T9 lesion continued to grow, now with hung destruction of T9 with ventral epidural space extension as well as extension to T10 superior enplate causing moderate cord compression and T9-T10 foraminal stenosis.     MRI and CT of thoracic spine which demonstrates new hung mets to T6, T8, T12, and progressive tumor growth at T9 with extension into ventral epidural space with cord compression as well as invasion into T10 superior endplate.    Now status post IR embolization on 01/04        -Admitted to oncology.   -q4h neuro checks.  -OR today for T7-T11 robotic decompression/fusion and separation surgery with Dr. Martinez on 1/5/23.   -Pre-op risk stratification and optimization per primary team.   -NPO at midnight.   -Preop coags reviewed.   -Informed consents obtained.   -Holding chemotherapy medications per onc.  -TLSO brace to be worn at all times.  -Spine precautions when transferring and during bed mobility.  -Rad onc recs radiation tx to the cervical and thoracic spine. Likely okay to start 2 weeks post op once incision has healed. Will need clearance at 2 week post op visit.   -Ortho plans for intramedullary dulce to R femur 1/9. Will determine clearance after spine surgery 1/5.   -Pain control per primary team.  -Notify nsgy with any acute exam changes.

## 2024-01-05 NOTE — PROGRESS NOTES
Admit Assessment    Patient Identification  Gamaliel Pete Jr.   :  1950  Admit Date:  2024  Attending Provider:  Esther Ayala MD              Referral:   Patient was admitted to Medical Oncology Service with a diagnosis of Clear Cell Renal Cell Carcinoma of the Right Kidney, s/p Right Total Nephrectomy, with Mets. to Lung and Bone, and he was admitted this hospital stay due to 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.  Preop cardiovascular exam [Z01.810]  Metastatic cancer to spine [C79.51]  Chest pain [R07.9].   Additional oncologic and medical history is noted in H&P, in chart.    Oncology Social Worker is involved. Patient  was referred to the Social Work Department via admission list/census - routine referral.  Patient presents as a 73 y.o. year old, ,  male.    Patient is known to Oncology Social Worker from recent hospital stay in 2023.    Persons interviewed: Met with patient's wife Mary Pete and two other family members in patient's room this afternoon. Patient was off of the floor for a diagnostic spinal angiogram and tumor embolization at right T8-T9-T10.      Living Situation:    Patient's resides with his wife Mary Pete in a 2-story house located at [address] [place] [address].   The bedrooms and full bathrooms are on the second floor. There is a half bathroom on the first floor. Patient had been sleeping in a reclining chair in the living room on the first floor. A hospital bed was ordered for him at discharge from the hospital in November.   Phone Numbers:  [phone] Home  [phone] patient's cell.  [phone] wife's cell.       Patient and wife have been  35 years. He has a son from his prior marriage and she has 2 sons from her prior marriage. Together they have 5 grandchildren and 2 great-grandsons.      Patient experienced increasingly worse back pain and immobility since  [place]. His wife assisted him as needed with ADLs.     (RETIRED) Functional Status Prior  Ambulation Prior: 3-->assistive equipment and person  Transferring: 3-->assistive equipment and person  Toileting: 3-->assistive equipment and person  Bathing: 3-->assistive equipment and person  Dressing: 3-->assistive equipment and person  Eatin-->independent  Communication: understands/communicates without difficulty  Swallowing: swallows foods/liquids without difficulty    Patient's wife is retired and home with him daily. She is ambulatory and independent; and is able to assist him. She drives during the daytime and has been driving him to his medical appointments as he cannot drive at present.      They recently finished building a house in [place] but haven't moved into it yet (their halfway home).            Current or Past Agencies and Description of Services/Supplies    DME  Agency Name: [facility]  Agency Phone Number: [phone]  A rolling walker was delivered to patient's hospital room on 23 for discharge, and a hospital bed was delivered to patient's home on 11/10/23 per patient's wife's request. They declined the bedside commode that was ordered.     Home Health  Agency Name: [facility]  Services: Skilled nursing assessments, aide visits, physical and occupational therapy services were ordered for discharge from the hospital on 23 and the referral was sent to [facility] for authorization and agency placement. During his subsequent hospital stay in November the  noted that patient did not want home health or outpatient therapy at the time.       IV Infusion  None    Nutrition: Oral diet      Outpatient Pharmacy:     [facility] [place] - [address] [place] [address]  Phone: [phone] Fax: [phone]      Patient Preference of agencies include: none specified    Patient/Caregiver informed of right to  choose providers or agencies.  Patient provides permission to release any necessary information to [facility] and to Non[facility] agencies as needed to facilitate patient care, treatment planning, and patient discharge planning.  Written and verbal resources provided.      Coping  Unable to assess patient - he was out of his room for a procedure.  Wife appeared to be coping appropriately to the situation.      Last Noted Distress Score: 0 - No Distress [10/31/23 at 1505]           Adjustment to Diagnosis and Treatment  Unable to assess patient - he was out of his room for a procedure.  Ongoing process; patient and his wife will benefit from reinforcement of information regarding his diagnosis and treatment plan, and from supportive services.      Emotional/Behavioral/Cognitive Issues  Unable to assess patient - he was out of his room for a procedure.           History/Current Symptoms of Anxiety/Depression: No    History/Current Substance Use: As per chart:  Social History     Tobacco Use    Smoking status: Never    Smokeless tobacco: Never   Substance and Sexual Activity    Alcohol use: Not Currently     Alcohol/week: 0.0 standard drinks of alcohol     Comment: rarely    Drug use: Never    Sexual activity: Not on file       Indications of Abuse/Neglect: No  As per chart:  Abuse Screen (yes response referral indicated)  Feels Unsafe at Home or Work/School: no  Feels Threatened by Someone: no  Does anyone try to keep you from having contact with others or doing things outside your home?: no  Physical Signs of Abuse Present: no    Financial:  Payer/Plan Subscr  Sex Relation Sub. Ins. ID Effective Group Num   1. 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      His medical insurance is [employer].      Other identified  concerns/needs: none stated by patient's wife today    Plan: to be determined; PT and OT evals will be needed after patient has surgery and is medically stable     Interventions/Referrals: to be determined    Patient/caregiver engaged in treatment planning process.    Oncology Social Worker providing psychosocial and supportive counseling, resources, education, assistance and discharge planning as appropriate.  Patient/caregiver state understanding of  available resources,  following, remains available.    Magalie Cheung, MSW, LCSW  [phone]

## 2024-01-05 NOTE — PROGRESS NOTES
[facility] - Surgery ([facility])  Hematology/Oncology  Progress Note    Patient Name: Gamaliel Pete Jr.  Admission Date: 1/2/2024  Hospital Length of Stay: 3 days  Code Status: Full Code     Subjective:     HPI:  Mr. Pete is a 74yo man with a past medical history of asthma, HTN, ED, gout, and HTN.  He is also followed in Heme-Onc clinic by Dr. 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  She notes that he was being referred to Dr. Rock for right femur findings, for which IMN surgery on 1/9/24 with preoperative embolization by IR.  He just started systemic therapy with Pembrolizumab (KEYTRUDA) and Axitinib.       His bony and lung metastatic disease has also been complicated by lytic lesions in his spine with pathologic compression fractures.  He was admitted 11/6/23-11/9/23 with cervical corpectomy with NSGY on 11/7/23 which he tolerated well. They obtained tissue samples and sent it over to pathology. IR to perform osteocool/kyphoplasty/biopsy T9 11/14. 2nd stage of surgery with NSGY on 11/15 with Dr. Martinez after kyphoplasty.  He was then admitted 11/4/23-11/18/23 and underwent IR kyphoplasty of T4-6, SPINE, CERVICAL, WITH POSTERIOR FUSION T4-6 (N/A), CORPECTOMY, SPINE, CERVICAL C3-6 REVISION (N/A).      He recently underwent NC CT T-spine on 12/30/23 which showed a pathologic compression fracture of T9 status post vertebral augmentation.  Compared to 11/01/2023, there is increased height loss and an enlarging destructive lesion with extraosseous extension into the ventral epidural space resulting in moderate canal stenosis and left T9-10 neural foramen resulting in moderate foraminal stenosis.  New erosive changes involving the T10 superior endplate, concerning for direct invasion.  New 0.9 cm lytic lesion in the T6 vertebral body.       Follow up MRI T-spine that same day showed 1.  "Relative to prior MRI of the thoracic spine performed 10/31/2023, the patient is now status post vertebral augmentation at T9, at the level of presumed pathologic fracture due to metastasis. There has been further height loss of the vertebra since the reference MR examination, however configuration of the T9 vertebra appears similar when compared to more recently performed intervening CTA of the chest of 11/20/2023.  2. On the current examination, centered at the T9 level there is posterior buckling of the cortex and overlying enhancing soft tissue in the ventral epidural space, possibly combination of extraosseous extension of neoplastic soft tissue and inflammatory change. This enhancing soft tissue measures on the order of 8 x 19 x 27 mm (AP x ML x CC). This contributes to moderate central spinal canal stenosis with effacement of CSF and ventral cord deformity. Question intramedullary edema-like signal.  3. Additionally, there is new mild height loss of the T8 vertebra with patchy edema-like signal enhancement since the October 2023 MRI, at least some of which is likely related to acute or subacute fracture deformity, with underlying extension of metastasis not excluded. Question subtle height loss of the T8 vertebra since the 11/20/2023 CT chest.  4. Further, there appears to be a new enhancing lesion within the left paramedian T6 vertebral body, possibly metastatic lesion.  Equivocal new T2 weighted lesion within T12 vertebra.     Dr. Martinez followed up and found that his back pain was worse and notified Dr. Turk, "His thoracic fracture is worse and there looks like there is now epidural extension of tumor at T9 causing cord compression.  I have called the patient and his back pain is worse.  I think he needs a thoracic decompression/fusion in the near future."  Dr. Turk instructed him to come to the ED.  The patient tells me that his legs are fine with no focal weakness or neuropathic pain.  He is mostly " having severe pain to his mid-thoracic back making it almost impossible to move around.  He is fine if lying perfectly flat on his back without moving, but excruciating with any attempt to stand or walk.       His only other complaint is new gout that started over the past few days to his ankles and maybe early gout to left knee.    Interval History: NAEON. Underwent IR embo yday, OR with NSGY today    Oncology Treatment Plan:   OP AXITINIB + PEMBROLIZUMAB 200MG Q3W    Medications:  Continuous Infusions:   sodium chloride 0.9% 75 mL/hr at 01/04/24 2356     Scheduled Meds:   allopurinoL  100 mg Oral Daily    amLODIPine  10 mg Oral Daily    dexAMETHasone  4 mg Intravenous Q6H    gabapentin  300 mg Oral TID    morphine  30 mg Oral Q12H    pantoprazole  40 mg Oral Daily    polyethylene glycol  17 g Oral Daily    senna-docusate 8.6-50 mg  1 tablet Oral BID     PRN Meds:acetaminophen, albuterol-ipratropium, aluminum-magnesium hydroxide-simethicone, bisacodyL, dextrose 10%, dextrose 10%, diazePAM, glucagon (human recombinant), glucose, glucose, hydrALAZINE, HYDROmorphone, iodixanoL, melatonin, methocarbamoL, naloxone, ondansetron, ondansetron, oxyCODONE, oxyCODONE, oxyCODONE, prochlorperazine, prochlorperazine, simethicone, sodium chloride 0.9%, sodium chloride 0.9%     Review of Systems   Constitutional:  Negative for appetite change, chills, fatigue and fever.   HENT:  Negative for congestion, mouth sores, nosebleeds and tinnitus.    Eyes:  Negative for discharge and itching.   Respiratory:  Negative for cough, chest tightness and shortness of breath.    Cardiovascular:  Negative for chest pain and leg swelling.   Gastrointestinal:  Negative for abdominal distention and abdominal pain.   Endocrine: Negative for cold intolerance and heat intolerance.   Genitourinary:  Negative for dysuria and frequency.   Musculoskeletal:  Positive for back pain. Negative for arthralgias, joint swelling and neck pain.   Skin:  Negative  for color change and pallor.   Allergic/Immunologic: Negative for environmental allergies and food allergies.   Neurological:  Negative for dizziness and light-headedness.   Hematological:  Negative for adenopathy.   Psychiatric/Behavioral:  Negative for agitation, behavioral problems and confusion.      Objective:     Vital Signs (Most Recent):  Temp: 97.7 °F (36.5 °C) (01/05/24 1142)  Pulse: 66 (01/05/24 1142)  Resp: 18 (01/05/24 1142)  BP: (!) 140/65 (01/05/24 1142)  SpO2: (!) 94 % (01/05/24 1142) Vital Signs (24h Range):  Temp:  [97.3 °F (36.3 °C)-97.8 °F (36.6 °C)] 97.7 °F (36.5 °C)  Pulse:  [53-78] 66  Resp:  [10-22] 18  SpO2:  [94 %-99 %] 94 %  BP: (125-150)/(55-75) 140/65     Weight: 94.6 kg (208 lb 8.9 oz)  Body mass index is 29.92 kg/m².  Body surface area is 2.16 meters squared.      Intake/Output Summary (Last 24 hours) at 1/5/2024 1333  Last data filed at 1/5/2024 0548  Gross per 24 hour   Intake 1912.08 ml   Output 2200 ml   Net -287.92 ml        Physical Exam  Constitutional:       General: He is not in acute distress.     Appearance: Normal appearance. He is not ill-appearing.   HENT:      Head: Normocephalic and atraumatic.      Comments: C-collar resting behind patient's head     Right Ear: External ear normal.      Left Ear: External ear normal.      Nose: Nose normal.      Mouth/Throat:      Mouth: Mucous membranes are moist.      Pharynx: Oropharynx is clear.   Eyes:      Extraocular Movements: Extraocular movements intact.      Pupils: Pupils are equal, round, and reactive to light.   Cardiovascular:      Rate and Rhythm: Normal rate and regular rhythm.      Pulses: Normal pulses.      Heart sounds: Normal heart sounds.   Pulmonary:      Effort: Pulmonary effort is normal.      Breath sounds: Normal breath sounds.   Abdominal:      General: Abdomen is flat. There is no distension.      Palpations: Abdomen is soft.      Tenderness: There is no abdominal tenderness.   Musculoskeletal:          General: No swelling. Normal range of motion.      Cervical back: Normal range of motion and neck supple.   Skin:     General: Skin is warm and dry.      Capillary Refill: Capillary refill takes less than 2 seconds.   Neurological:      General: No focal deficit present.      Mental Status: He is alert and oriented to person, place, and time.      Comments: 5/5 strength in b/l UE and LE  No cerebellar signs  Sensation intact to light touch   Psychiatric:         Mood and Affect: Mood normal.         Behavior: Behavior normal.          Significant Labs:   All pertinent labs from the last 24 hours have been reviewed.    Diagnostic Results:  I have reviewed all pertinent imaging results/findings within the past 24 hours.  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 Noted metastatic process on T6, T8, T12, and progressive tumor growth at T9 with extension into ventral epidural space with cord compression as well as invasion into T10 superior endplate. Patient was notified of new results and was direct admitted with plans for IR embolization of T9 lesion with OR booked for 1/5/24 for T7-T11 Robotic fusion.     PLAN:  -pain control  -NPO for NSGY, will got to NCC after  -holding LVX and Lisinopril    Gout of ankle  Continue allopurinol    Essential hypertension  Holding Lisinopril in anticipation of surgical intervention. 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 IR embolization 1/4/24 to minimize bleeding risk. RCRI as below. Low risk of adverse cardiac event given previous ability to achieve 4 METS and lack of comborbid medical conditions. EKG obtained - widened QRS (160ms) with 1st degree AV block and bifasicular block. Lytes within normal limits. No arrhythmia.     Revised Cardiac Risk Index   High  risk surgery: No  History of ischemic heart disease: No  History of congestive heart failure: No  History of stroke: No  Insulin dependent diabetes: No  Cr > 2: No  0 points, class I risk, 3.9% risk of cardiac event    Class 1 obesity without serious comorbidity with body mass index (BMI) of 31.0 to 31.9 in adult  Advised to maintain regular physical activity following recovery from orthopedic and neurosurgical procedures             Angel Leary MD  Hematology/Oncology  [facility] - Surgery (2nd Fl)

## 2024-01-05 NOTE — PROGRESS NOTES
[facility] - Transplant Stepdown  Neurosurgery  Progress Note    Subjective:     History of Present Illness: Gamaliel Pete Jr. is a 73 y.o. 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 Martinez due to metastatic disease to those areas. He is also s/p T9 kyphoplasty for a pathological fracture at that level. Patient states that his back pain at that level worsened after the kyphoplasty. The pain is burning and located in the middle-left area, radiating around the trunk at that level on the left. The pain is constant. He is on multiple pain medications for his cancer and states they do not help with the pain. They just make him sleepy. He denies new weakness, numbness, tingling, bowel/bladder dysfunction, gait difficulties, falls or other injuries. He has been compliant with his c-collar and wears a TLSO for comfort. Patient had outpatient MRI and CT of thoracic spine which demonstrates new hung mets to T6, T8, T12, and progressive tumor growth at T9 with extension into ventral epidural space with cord compression as well as invasion into T10 superior endplate. Patient was notified of new results and was direct admitted with plans for IR embolization of T9 lesion with OR booked for 1/5/24 for T7-T11 Robotic fusion.     Of note, the patient has started chemotherapy- Pembrolizumab and Axitinib . There are also plans for a right femur IMN on 1/9 for metastasis to this area and high fracture risk. There is also some lucency about the right shoulder int he distal clavicle/acromian that ortho plans to monitor for metastasis.    Post-Op Info:  Procedure(s) (LRB):  LAMINECTOMY, SPINE, THORACIC, WITH FUSION (N/A)   Day of Surgery   Interval History: MARITZAEON.  Underwent IR embolization yesterday.  Neuro exam is stable.  Denies any new complaints.  Plan for OR  today       Medications:  Continuous Infusions:   sodium chloride 0.9% 75 mL/hr at 01/04/24 0234     Scheduled Meds:   allopurinoL  100 mg Oral Daily    amLODIPine  5 mg Oral Daily    dexAMETHasone  4 mg Intravenous Q6H    gabapentin  300 mg Oral TID    morphine  30 mg Oral Q12H    pantoprazole  40 mg Oral Daily    polyethylene glycol  17 g Oral Daily    senna-docusate 8.6-50 mg  1 tablet Oral BID     PRN Meds:acetaminophen, albuterol-ipratropium, aluminum-magnesium hydroxide-simethicone, bisacodyL, dextrose 10%, dextrose 10%, diazePAM, glucagon (human recombinant), glucose, glucose, melatonin, methocarbamoL, naloxone, ondansetron, oxyCODONE, oxyCODONE, prochlorperazine, simethicone, sodium chloride 0.9%     Review of Systems  Objective:     Weight: 94.6 kg (208 lb 8.9 oz)  Body mass index is 29.92 kg/m².  Vital Signs (Most Recent):  Temp: 97.6 °F (36.4 °C) (01/04/24 0756)  Pulse: (!) 58 (01/04/24 0756)  Resp: 18 (01/04/24 0756)  BP: 136/61 (01/04/24 0756)  SpO2: 95 % (01/04/24 0756) Vital Signs (24h Range):  Temp:  [97 °F (36.1 °C)-98 °F (36.7 °C)] 97.6 °F (36.4 °C)  Pulse:  [55-98] 58  Resp:  [15-20] 18  SpO2:  [90 %-97 %] 95 %  BP: (119-144)/(61-69) 136/61                              Closed/Suction Drain 11/15/23 1457 Tube - 1 Right Back Accordion 10 Fr. (Active)            Closed/Suction Drain 11/15/23 1458 Tube - 2 Left Back Accordion 10 Fr. (Active)         Neurosurgery Physical Exam  General: Well developed, well nourished, no distress.  Head: Normocephalic, atraumatic.  Neurologic: Alert and oriented. Thought content appropriate  GCS: Motor: 6/Verbal: 5/Eyes: 4 GCS Total: 15  Mental Status: Awake, Alert, Oriented x 4.  Language: No aphasia.  Speech: No dysarthria.  Cranial nerves: Face symmetric, tongue midline, CN II-XII grossly intact.   Eyes: Pupils equal, round, reactive to light with accommodation, EOMI.  Pulmonary: Normal respirations, not labored, no accessory muscles used.  Sensory: Intact to light  touch throughout.  Motor Strength: Moves all extremities spontaneously with good tone. Full strength upper and lower extremities. No abnormal movements seen.      Strength   Deltoids Triceps Biceps Wrist Extension Wrist Flexion Hand    Upper: R 5/5 5/5 5/5 5/5 5/5 5/5     L 5/5 5/5 5/5 5/5 5/5 5/5       Iliopsoas Quadriceps Knee  Flexion Tibialis  anterior Gastro- cnemius EHL   Lower: R 5/5 5/5 5/5 5/5 5/5 5/5     L 5/5 5/5 5/5 5/5 5/5 5/5      [place]on[place]: Absent.  Clonus: subtle bilateral clonus (2+ beat) appreciated  Vascular: Pulses 2+ and symmetric radial and dorsalis pedis. No LE edema or skin changes.  Skin: Warm, dry and intact, no rashes.  Gait: Normal.                    Anterior and posterior incisions well healed.    Significant Labs:  Recent Labs   Lab 01/02/24 2104 01/03/24  0623 01/04/24  0602   GLU 84 106 124*   * 133* 137   K 3.8 4.4 4.7   CL 99 101 108   CO2 21* 19* 18*   BUN 17 22 28*   CREATININE 1.1 1.0 1.1   CALCIUM 10.4 9.5 9.7   MG 1.9 2.0 2.2     Recent Labs   Lab 01/02/24 2104 01/03/24 0623 01/04/24  0602   WBC 7.74 6.68 3.37*   HGB 14.3 12.4* 11.6*   HCT 40.5 35.9* 33.2*    172 189     Recent Labs   Lab 01/02/24 2104   INR 1.0   APTT 30.5     Microbiology Results (last 7 days)       ** No results found for the last 168 hours. **          All pertinent labs from the last 24 hours have been reviewed.    Significant Diagnostics:  I have reviewed and interpreted all pertinent imaging results/findings within the past 24 hours.  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 Martinez due to metastatic disease to those areas. He is also s/p T9 kyphoplasty for a pathological fracture at that  level. Unfortunately, T9 lesion continued to grow, now with hung destruction of T9 with ventral epidural space extension as well as extension to T10 superior enplate causing moderate cord compression and T9-T10 foraminal stenosis.     MRI and CT of thoracic spine which demonstrates new hung mets to T6, T8, T12, and progressive tumor growth at T9 with extension into ventral epidural space with cord compression as well as invasion into T10 superior endplate.    Now status post IR embolization on 01/04        -Admitted to oncology.   -q4h neuro checks.  -OR today for T7-T11 robotic decompression/fusion and separation surgery with Dr. Martinez on 1/5/23.   -Pre-op risk stratification and optimization per primary team.   -NPO at midnight.   -Preop coags reviewed.   -Informed consents obtained.   -Holding chemotherapy medications per onc.  -TLSO brace to be worn at all times.  -Spine precautions when transferring and during bed mobility.  -Rad onc recs radiation tx to the cervical and thoracic spine. Likely okay to start 2 weeks post op once incision has healed. Will need clearance at 2 week post op visit.   -Ortho plans for intramedullary dulce to R femur 1/9. Will determine clearance after spine surgery 1/5.   -Pain control per primary team.  -Notify nsgy with any acute exam changes.           Luke Landaverde MD  Neurosurgery  [facility] - Transplant Stepdown

## 2024-01-05 NOTE — ASSESSMENT & PLAN NOTE
Advised to maintain regular physical activity following recovery from orthopedic and neurosurgical procedures   no 16-Sep-2019 17:19

## 2024-01-05 NOTE — ASSESSMENT & PLAN NOTE
Pathologic T9 fx with severe pain with movement. Noted metastatic process on T6, T8, T12, and progressive tumor growth at T9 with extension into ventral epidural space with cord compression as well as invasion into T10 superior endplate. Patient was notified of new results and was direct admitted with plans for IR embolization of T9 lesion with OR booked for 1/5/24 for T7-T11 Robotic fusion.     PLAN:  -pain control  -NPO for NSGY, will got to NCC after  -holding LVX and Lisinopril

## 2024-01-05 NOTE — SUBJECTIVE & OBJECTIVE
Interval History: NAEON.  Underwent IR embolization yesterday.  Neuro exam is stable.  Denies any new complaints.  Plan for OR today       Medications:  Continuous Infusions:   sodium chloride 0.9% 75 mL/hr at 01/04/24 0234     Scheduled Meds:   allopurinoL  100 mg Oral Daily    amLODIPine  5 mg Oral Daily    dexAMETHasone  4 mg Intravenous Q6H    gabapentin  300 mg Oral TID    morphine  30 mg Oral Q12H    pantoprazole  40 mg Oral Daily    polyethylene glycol  17 g Oral Daily    senna-docusate 8.6-50 mg  1 tablet Oral BID     PRN Meds:acetaminophen, albuterol-ipratropium, aluminum-magnesium hydroxide-simethicone, bisacodyL, dextrose 10%, dextrose 10%, diazePAM, glucagon (human recombinant), glucose, glucose, melatonin, methocarbamoL, naloxone, ondansetron, oxyCODONE, oxyCODONE, prochlorperazine, simethicone, sodium chloride 0.9%     Review of Systems  Objective:     Weight: 94.6 kg (208 lb 8.9 oz)  Body mass index is 29.92 kg/m².  Vital Signs (Most Recent):  Temp: 97.6 °F (36.4 °C) (01/04/24 0756)  Pulse: (!) 58 (01/04/24 0756)  Resp: 18 (01/04/24 0756)  BP: 136/61 (01/04/24 0756)  SpO2: 95 % (01/04/24 0756) Vital Signs (24h Range):  Temp:  [97 °F (36.1 °C)-98 °F (36.7 °C)] 97.6 °F (36.4 °C)  Pulse:  [55-98] 58  Resp:  [15-20] 18  SpO2:  [90 %-97 %] 95 %  BP: (119-144)/(61-69) 136/61                              Closed/Suction Drain 11/15/23 1457 Tube - 1 Right Back Accordion 10 Fr. (Active)            Closed/Suction Drain 11/15/23 1458 Tube - 2 Left Back Accordion 10 Fr. (Active)         Neurosurgery Physical Exam  General: Well developed, well nourished, no distress.  Head: Normocephalic, atraumatic.  Neurologic: Alert and oriented. Thought content appropriate  GCS: Motor: 6/Verbal: 5/Eyes: 4 GCS Total: 15  Mental Status: Awake, Alert, Oriented x 4.  Language: No aphasia.  Speech: No dysarthria.  Cranial nerves: Face symmetric, tongue midline, CN II-XII grossly intact.   Eyes: Pupils equal, round, reactive to  light with accommodation, EOMI.  Pulmonary: Normal respirations, not labored, no accessory muscles used.  Sensory: Intact to light touch throughout.  Motor Strength: Moves all extremities spontaneously with good tone. Full strength upper and lower extremities. No abnormal movements seen.      Strength   Deltoids Triceps Biceps Wrist Extension Wrist Flexion Hand    Upper: R 5/5 5/5 5/5 5/5 5/5 5/5     L 5/5 5/5 5/5 5/5 5/5 5/5       Iliopsoas Quadriceps Knee  Flexion Tibialis  anterior Gastro- cnemius EHL   Lower: R 5/5 5/5 5/5 5/5 5/5 5/5     L 5/5 5/5 5/5 5/5 5/5 5/5      [place]on[place]: Absent.  Clonus: subtle bilateral clonus (2+ beat) appreciated  Vascular: Pulses 2+ and symmetric radial and dorsalis pedis. No LE edema or skin changes.  Skin: Warm, dry and intact, no rashes.  Gait: Normal.                    Anterior and posterior incisions well healed.    Significant Labs:  Recent Labs   Lab 01/02/24 2104 01/03/24  0623 01/04/24  0602   GLU 84 106 124*   * 133* 137   K 3.8 4.4 4.7   CL 99 101 108   CO2 21* 19* 18*   BUN 17 22 28*   CREATININE 1.1 1.0 1.1   CALCIUM 10.4 9.5 9.7   MG 1.9 2.0 2.2     Recent Labs   Lab 01/02/24  2104 01/03/24  0623 01/04/24  0602   WBC 7.74 6.68 3.37*   HGB 14.3 12.4* 11.6*   HCT 40.5 35.9* 33.2*    172 189     Recent Labs   Lab 01/02/24  2104   INR 1.0   APTT 30.5     Microbiology Results (last 7 days)       ** No results found for the last 168 hours. **          All pertinent labs from the last 24 hours have been reviewed.    Significant Diagnostics:  I have reviewed and interpreted all pertinent imaging results/findings within the past 24 hours.

## 2024-01-05 NOTE — PROGRESS NOTES
Called by  to stated that she can hear patient screaming in his room.  Upon entering, patient lying flat and moaning and groaning that he had to lie flat and could not tolerate being at the 25 degrees to eat.  Notified Dr. Shelby and received clarification and order for Diluaded for BTP.  Patient requesting to continue to lie flat.  Will continue to monitor patient.

## 2024-01-05 NOTE — PLAN OF CARE
Pt AAOx4, VSS. NSR on tele. Pt NPO since midnight for spinal fusion procedure 1/5. NS infusion infusing @75cc/h. Pt bed bound, able to roll but pt experiences severe pain with movement. Pt with TLSO brace and neck brace in place..orders to wear TLSO brace at all times. Pt voiding , see flowsheet for I/O. Wife at bedside, bed in low locked position, call light within reach, POC ongoing.

## 2024-01-05 NOTE — SUBJECTIVE & OBJECTIVE
Interval History: NAEON. Underwent IR embo yday, OR with NSGY today    Oncology Treatment Plan:   OP AXITINIB + PEMBROLIZUMAB 200MG Q3W    Medications:  Continuous Infusions:   sodium chloride 0.9% 75 mL/hr at 01/04/24 2356     Scheduled Meds:   allopurinoL  100 mg Oral Daily    amLODIPine  10 mg Oral Daily    dexAMETHasone  4 mg Intravenous Q6H    gabapentin  300 mg Oral TID    morphine  30 mg Oral Q12H    pantoprazole  40 mg Oral Daily    polyethylene glycol  17 g Oral Daily    senna-docusate 8.6-50 mg  1 tablet Oral BID     PRN Meds:acetaminophen, albuterol-ipratropium, aluminum-magnesium hydroxide-simethicone, bisacodyL, dextrose 10%, dextrose 10%, diazePAM, glucagon (human recombinant), glucose, glucose, hydrALAZINE, HYDROmorphone, iodixanoL, melatonin, methocarbamoL, naloxone, ondansetron, ondansetron, oxyCODONE, oxyCODONE, oxyCODONE, prochlorperazine, prochlorperazine, simethicone, sodium chloride 0.9%, sodium chloride 0.9%     Review of Systems   Constitutional:  Negative for appetite change, chills, fatigue and fever.   HENT:  Negative for congestion, mouth sores, nosebleeds and tinnitus.    Eyes:  Negative for discharge and itching.   Respiratory:  Negative for cough, chest tightness and shortness of breath.    Cardiovascular:  Negative for chest pain and leg swelling.   Gastrointestinal:  Negative for abdominal distention and abdominal pain.   Endocrine: Negative for cold intolerance and heat intolerance.   Genitourinary:  Negative for dysuria and frequency.   Musculoskeletal:  Positive for back pain. Negative for arthralgias, joint swelling and neck pain.   Skin:  Negative for color change and pallor.   Allergic/Immunologic: Negative for environmental allergies and food allergies.   Neurological:  Negative for dizziness and light-headedness.   Hematological:  Negative for adenopathy.   Psychiatric/Behavioral:  Negative for agitation, behavioral problems and confusion.      Objective:     Vital Signs  (Most Recent):  Temp: 97.7 °F (36.5 °C) (01/05/24 1142)  Pulse: 66 (01/05/24 1142)  Resp: 18 (01/05/24 1142)  BP: (!) 140/65 (01/05/24 1142)  SpO2: (!) 94 % (01/05/24 1142) Vital Signs (24h Range):  Temp:  [97.3 °F (36.3 °C)-97.8 °F (36.6 °C)] 97.7 °F (36.5 °C)  Pulse:  [53-78] 66  Resp:  [10-22] 18  SpO2:  [94 %-99 %] 94 %  BP: (125-150)/(55-75) 140/65     Weight: 94.6 kg (208 lb 8.9 oz)  Body mass index is 29.92 kg/m².  Body surface area is 2.16 meters squared.      Intake/Output Summary (Last 24 hours) at 1/5/2024 1333  Last data filed at 1/5/2024 0548  Gross per 24 hour   Intake 1912.08 ml   Output 2200 ml   Net -287.92 ml        Physical Exam  Constitutional:       General: He is not in acute distress.     Appearance: Normal appearance. He is not ill-appearing.   HENT:      Head: Normocephalic and atraumatic.      Comments: C-collar resting behind patient's head     Right Ear: External ear normal.      Left Ear: External ear normal.      Nose: Nose normal.      Mouth/Throat:      Mouth: Mucous membranes are moist.      Pharynx: Oropharynx is clear.   Eyes:      Extraocular Movements: Extraocular movements intact.      Pupils: Pupils are equal, round, and reactive to light.   Cardiovascular:      Rate and Rhythm: Normal rate and regular rhythm.      Pulses: Normal pulses.      Heart sounds: Normal heart sounds.   Pulmonary:      Effort: Pulmonary effort is normal.      Breath sounds: Normal breath sounds.   Abdominal:      General: Abdomen is flat. There is no distension.      Palpations: Abdomen is soft.      Tenderness: There is no abdominal tenderness.   Musculoskeletal:         General: No swelling. Normal range of motion.      Cervical back: Normal range of motion and neck supple.   Skin:     General: Skin is warm and dry.      Capillary Refill: Capillary refill takes less than 2 seconds.   Neurological:      General: No focal deficit present.      Mental Status: He is alert and oriented to person, place,  and time.      Comments: 5/5 strength in b/l UE and LE  No cerebellar signs  Sensation intact to light touch   Psychiatric:         Mood and Affect: Mood normal.         Behavior: Behavior normal.          Significant Labs:   All pertinent labs from the last 24 hours have been reviewed.    Diagnostic Results:  I have reviewed all pertinent imaging results/findings within the past 24 hours.

## 2024-01-05 NOTE — ANESTHESIA PROCEDURE NOTES
Intubation    Date/Time: 1/5/2024 2:27 PM    Performed by: Kandace Marshall CRNA  Authorized by: Dimitrios Wells MD    Intubation:     Induction:  Intravenous    Intubated:  Postinduction    Mask Ventilation:  N/a    Attempts:  1    Attempted By:  Student    Method of Intubation:  Video laryngoscopy    Blade:  Yoder 3    Laryngeal View Grade: Grade I - full view of cords      Difficult Airway Encountered?: No      Airway Device:  Oral endotracheal tube    Airway Device Size:  7.5    Style/Cuff Inflation:  Cuffed (inflated to minimal occlusive pressure)    Tube secured:  23    Secured at:  The lips    Placement Verified By:  Capnometry and Revisualization with laryngoscopy    Complicating Factors:  None    Findings Post-Intubation:  BS equal bilateral and atraumatic/condition of teeth unchanged

## 2024-01-05 NOTE — PROGRESS NOTES
Patient returned via stretcher from IR.  Patient pulled from stretcher to bed.  Back and neck brace in place.  Patient reports pain about a 5/10.  Patient AAOx4, VSS, and in NAD.  R groin gauze and tegaderm dressing CDI.  Wife and friends at bedside.  Will continue to monitor patient.

## 2024-01-05 NOTE — ANESTHESIA PROCEDURE NOTES
Arterial    Diagnosis: spinal mass    Patient location during procedure: done in OR  Procedure start time: 1/5/2024 2:38 PM  Timeout: 1/5/2024 2:37 PM  Procedure end time: 1/5/2024 2:40 PM    Staffing  Authorizing Provider: Dimitrios Wells MD  Performing Provider: Dimitrios Wells MD    Staffing  Performed by: Dimitrios Wells MD  Authorized by: Dimitrios Wells MD    Anesthesiologist was present at the time of the procedure.    Preanesthetic Checklist  Completed: patient identified, IV checked, site marked, risks and benefits discussed, surgical consent, monitors and equipment checked, pre-op evaluation, timeout performed and anesthesia consent givenArterial  Skin Prep: chlorhexidine gluconate  Local Infiltration: none  Orientation: left  Location: radial    Catheter Size: 20 G  Catheter placement by Ultrasound guidance. Heme positive aspiration all ports.   Vessel Caliber: medium, patent, compressibility normal  Vascular Doppler:  not done  Needle advanced into vessel with real time Ultrasound guidance.Insertion Attempts: 2  Assessment  Dressing: secured with tape and tegaderm  Patient: Tolerated well

## 2024-01-05 NOTE — ANESTHESIA POSTPROCEDURE EVALUATION
Anesthesia Post Evaluation    Patient: Gamaliel Pete     Procedure(s) Performed: * No procedures listed *    Final Anesthesia Type: general      Patient location during evaluation: PACU  Patient participation: Yes- Able to Participate  Level of consciousness: awake  Post-procedure vital signs: reviewed and stable  Pain management: adequate  Airway patency: patent    PONV status at discharge: No PONV  Anesthetic complications: no      Cardiovascular status: blood pressure returned to baseline  Respiratory status: unassisted  Hydration status: euvolemic  Follow-up not needed.              Vitals Value Taken Time   /55 01/05/24 0555   Temp 36.4 °C (97.5 °F) 01/05/24 0555   Pulse 68 01/05/24 0555   Resp 15 01/05/24 0555   SpO2 95 % 01/05/24 0555         Event Time   Out of Recovery 01/04/2024 16:59:57         Pain/Juany Score: Pain Rating Prior to Med Admin: 10 (1/4/2024  6:16 PM)  Pain Rating Post Med Admin: 8 (1/4/2024  6:36 PM)  Juany Score: 10 (1/4/2024  2:00 PM)

## 2024-01-05 NOTE — PLAN OF CARE
Patient remained free from injury today.  Patient off floor to IR for embolization.  Patient tolerated procedure well and dressing CDI to R groin. Patient voided >1L urine and had BM per bedpan.  Patient would not allow RN to turn patient and check buttocks, but patient's wife states it is intact.  Patient unable to sit up > 20 degrees today.  Patient noted with BTP and Dilauded administered in addition to Oxy 5mg and Robaxin.  Patient keeping back brace on at all times.  Patient to be NPO tonight and go to surgery for fusion tomorrow and to NCC after for monitoring.

## 2024-01-05 NOTE — PROGRESS NOTES
Patient arrived to [facility] from [id number]. Patient changed from clothes into gown. NPO status confirmed. Upon inspection of consents, consents signed by patient and witness but not MD. Neurosurgery called and asked to come to bedside to redo consents. PIV flushed and fluids started. Wife at bedside and signed up for texts. Anesthesia consent witnessed.

## 2024-01-06 LAB
ALBUMIN SERPL BCP-MCNC: 2.7 G/DL (ref 3.5–5.2)
ALP SERPL-CCNC: 61 U/L (ref 55–135)
ALT SERPL W/O P-5'-P-CCNC: 129 U/L (ref 10–44)
ANION GAP SERPL CALC-SCNC: 9 MMOL/L (ref 8–16)
AST SERPL-CCNC: 30 U/L (ref 10–40)
BASOPHILS # BLD AUTO: 0.02 K/UL (ref 0–0.2)
BASOPHILS NFR BLD: 0.1 % (ref 0–1.9)
BILIRUB SERPL-MCNC: 0.5 MG/DL (ref 0.1–1)
BLD PROD TYP BPU: NORMAL
BLD PROD TYP BPU: NORMAL
BLOOD UNIT EXPIRATION DATE: NORMAL
BLOOD UNIT EXPIRATION DATE: NORMAL
BLOOD UNIT TYPE CODE: 5100
BLOOD UNIT TYPE CODE: 5100
BLOOD UNIT TYPE: NORMAL
BLOOD UNIT TYPE: NORMAL
BUN SERPL-MCNC: 28 MG/DL (ref 8–23)
CALCIUM SERPL-MCNC: 8.2 MG/DL (ref 8.7–10.5)
CHLORIDE SERPL-SCNC: 112 MMOL/L (ref 95–110)
CO2 SERPL-SCNC: 17 MMOL/L (ref 23–29)
CODING SYSTEM: NORMAL
CODING SYSTEM: NORMAL
CREAT SERPL-MCNC: 1 MG/DL (ref 0.5–1.4)
CROSSMATCH INTERPRETATION: NORMAL
CROSSMATCH INTERPRETATION: NORMAL
DIFFERENTIAL METHOD BLD: ABNORMAL
DISPENSE STATUS: NORMAL
DISPENSE STATUS: NORMAL
EOSINOPHIL # BLD AUTO: 0 K/UL (ref 0–0.5)
EOSINOPHIL NFR BLD: 0 % (ref 0–8)
ERYTHROCYTE [DISTWIDTH] IN BLOOD BY AUTOMATED COUNT: 14.5 % (ref 11.5–14.5)
EST. GFR  (NO RACE VARIABLE): >60 ML/MIN/1.73 M^2
GLUCOSE SERPL-MCNC: 141 MG/DL (ref 70–110)
HCT VFR BLD AUTO: 30.7 % (ref 40–54)
HGB BLD-MCNC: 10.6 G/DL (ref 14–18)
IMM GRANULOCYTES # BLD AUTO: 0.13 K/UL (ref 0–0.04)
IMM GRANULOCYTES NFR BLD AUTO: 0.9 % (ref 0–0.5)
LYMPHOCYTES # BLD AUTO: 0.6 K/UL (ref 1–4.8)
LYMPHOCYTES NFR BLD: 4 % (ref 18–48)
MAGNESIUM SERPL-MCNC: 2.3 MG/DL (ref 1.6–2.6)
MCH RBC QN AUTO: 28.8 PG (ref 27–31)
MCHC RBC AUTO-ENTMCNC: 34.5 G/DL (ref 32–36)
MCV RBC AUTO: 83 FL (ref 82–98)
MONOCYTES # BLD AUTO: 0.9 K/UL (ref 0.3–1)
MONOCYTES NFR BLD: 5.9 % (ref 4–15)
NEUTROPHILS # BLD AUTO: 13.5 K/UL (ref 1.8–7.7)
NEUTROPHILS NFR BLD: 89.1 % (ref 38–73)
NRBC BLD-RTO: 0 /100 WBC
NUM UNITS TRANS PACKED RBC: NORMAL
NUM UNITS TRANS PACKED RBC: NORMAL
PHOSPHATE SERPL-MCNC: 3.8 MG/DL (ref 2.7–4.5)
PLATELET # BLD AUTO: 267 K/UL (ref 150–450)
PMV BLD AUTO: 8.8 FL (ref 9.2–12.9)
POTASSIUM SERPL-SCNC: 4.7 MMOL/L (ref 3.5–5.1)
PROT SERPL-MCNC: 5.8 G/DL (ref 6–8.4)
RBC # BLD AUTO: 3.68 M/UL (ref 4.6–6.2)
SODIUM SERPL-SCNC: 138 MMOL/L (ref 136–145)
WBC # BLD AUTO: 15.12 K/UL (ref 3.9–12.7)

## 2024-01-06 PROCEDURE — 97530 THERAPEUTIC ACTIVITIES: CPT

## 2024-01-06 PROCEDURE — 25000003 PHARM REV CODE 250

## 2024-01-06 PROCEDURE — 80053 COMPREHEN METABOLIC PANEL: CPT | Performed by: PSYCHIATRY & NEUROLOGY

## 2024-01-06 PROCEDURE — 63600175 PHARM REV CODE 636 W HCPCS: Performed by: REGISTERED NURSE

## 2024-01-06 PROCEDURE — 99233 SBSQ HOSP IP/OBS HIGH 50: CPT | Mod: FS,,, | Performed by: PSYCHIATRY & NEUROLOGY

## 2024-01-06 PROCEDURE — 97112 NEUROMUSCULAR REEDUCATION: CPT

## 2024-01-06 PROCEDURE — 25000003 PHARM REV CODE 250: Performed by: INTERNAL MEDICINE

## 2024-01-06 PROCEDURE — 63600175 PHARM REV CODE 636 W HCPCS

## 2024-01-06 PROCEDURE — 84100 ASSAY OF PHOSPHORUS: CPT | Performed by: PSYCHIATRY & NEUROLOGY

## 2024-01-06 PROCEDURE — 63600175 PHARM REV CODE 636 W HCPCS: Performed by: INTERNAL MEDICINE

## 2024-01-06 PROCEDURE — 25000003 PHARM REV CODE 250: Performed by: STUDENT IN AN ORGANIZED HEALTH CARE EDUCATION/TRAINING PROGRAM

## 2024-01-06 PROCEDURE — 85025 COMPLETE CBC W/AUTO DIFF WBC: CPT | Performed by: PSYCHIATRY & NEUROLOGY

## 2024-01-06 PROCEDURE — 94761 N-INVAS EAR/PLS OXIMETRY MLT: CPT

## 2024-01-06 PROCEDURE — 20600001 HC STEP DOWN PRIVATE ROOM

## 2024-01-06 PROCEDURE — 83735 ASSAY OF MAGNESIUM: CPT | Performed by: PSYCHIATRY & NEUROLOGY

## 2024-01-06 PROCEDURE — 97162 PT EVAL MOD COMPLEX 30 MIN: CPT

## 2024-01-06 PROCEDURE — 97166 OT EVAL MOD COMPLEX 45 MIN: CPT

## 2024-01-06 RX ORDER — ENOXAPARIN SODIUM 100 MG/ML
40 INJECTION SUBCUTANEOUS EVERY 24 HOURS
Status: DISCONTINUED | OUTPATIENT
Start: 2024-01-06 | End: 2024-01-10 | Stop reason: HOSPADM

## 2024-01-06 RX ORDER — ACETAMINOPHEN 325 MG/1
650 TABLET ORAL EVERY 6 HOURS PRN
Status: DISCONTINUED | OUTPATIENT
Start: 2024-01-06 | End: 2024-01-10 | Stop reason: HOSPADM

## 2024-01-06 RX ORDER — ENOXAPARIN SODIUM 100 MG/ML
30 INJECTION SUBCUTANEOUS EVERY 24 HOURS
Status: DISCONTINUED | OUTPATIENT
Start: 2024-01-06 | End: 2024-01-06

## 2024-01-06 RX ADMIN — DEXAMETHASONE SODIUM PHOSPHATE 4 MG: 4 INJECTION INTRA-ARTICULAR; INTRALESIONAL; INTRAMUSCULAR; INTRAVENOUS; SOFT TISSUE at 12:01

## 2024-01-06 RX ADMIN — Medication 0.5 MCG/KG/MIN: at 10:01

## 2024-01-06 RX ADMIN — HYDROMORPHONE HYDROCHLORIDE 0.2 MG: 0.5 INJECTION, SOLUTION INTRAMUSCULAR; INTRAVENOUS; SUBCUTANEOUS at 02:01

## 2024-01-06 RX ADMIN — DEXAMETHASONE SODIUM PHOSPHATE 4 MG: 4 INJECTION INTRA-ARTICULAR; INTRALESIONAL; INTRAMUSCULAR; INTRAVENOUS; SOFT TISSUE at 05:01

## 2024-01-06 RX ADMIN — DIAZEPAM 5 MG: 5 TABLET ORAL at 01:01

## 2024-01-06 RX ADMIN — OXYCODONE HYDROCHLORIDE 10 MG: 10 TABLET ORAL at 05:01

## 2024-01-06 RX ADMIN — SODIUM CHLORIDE: 9 INJECTION, SOLUTION INTRAVENOUS at 05:01

## 2024-01-06 RX ADMIN — METHOCARBAMOL 750 MG: 750 TABLET ORAL at 05:01

## 2024-01-06 RX ADMIN — Medication 6 MG: at 01:01

## 2024-01-06 RX ADMIN — GABAPENTIN 300 MG: 300 CAPSULE ORAL at 08:01

## 2024-01-06 RX ADMIN — MORPHINE SULFATE 30 MG: 30 TABLET, FILM COATED, EXTENDED RELEASE ORAL at 09:01

## 2024-01-06 RX ADMIN — GABAPENTIN 300 MG: 300 CAPSULE ORAL at 02:01

## 2024-01-06 RX ADMIN — SENNOSIDES AND DOCUSATE SODIUM 1 TABLET: 8.6; 5 TABLET ORAL at 09:01

## 2024-01-06 RX ADMIN — ACETAMINOPHEN 650 MG: 325 TABLET ORAL at 08:01

## 2024-01-06 RX ADMIN — GABAPENTIN 300 MG: 300 CAPSULE ORAL at 09:01

## 2024-01-06 RX ADMIN — SENNOSIDES AND DOCUSATE SODIUM 1 TABLET: 8.6; 5 TABLET ORAL at 08:01

## 2024-01-06 RX ADMIN — HYDROMORPHONE HYDROCHLORIDE 0.2 MG: 0.5 INJECTION, SOLUTION INTRAMUSCULAR; INTRAVENOUS; SUBCUTANEOUS at 08:01

## 2024-01-06 RX ADMIN — PANTOPRAZOLE SODIUM 40 MG: 40 TABLET, DELAYED RELEASE ORAL at 08:01

## 2024-01-06 RX ADMIN — METHOCARBAMOL 750 MG: 750 TABLET ORAL at 12:01

## 2024-01-06 RX ADMIN — Medication 0.5 MCG/KG/MIN: at 03:01

## 2024-01-06 RX ADMIN — ACETAMINOPHEN 650 MG: 325 TABLET ORAL at 02:01

## 2024-01-06 RX ADMIN — METHOCARBAMOL 750 MG: 750 TABLET ORAL at 09:01

## 2024-01-06 RX ADMIN — ENOXAPARIN SODIUM 40 MG: 40 INJECTION SUBCUTANEOUS at 05:01

## 2024-01-06 RX ADMIN — OXYCODONE HYDROCHLORIDE 10 MG: 10 TABLET ORAL at 02:01

## 2024-01-06 RX ADMIN — METHOCARBAMOL 750 MG: 750 TABLET ORAL at 08:01

## 2024-01-06 RX ADMIN — MORPHINE SULFATE 30 MG: 30 TABLET, FILM COATED, EXTENDED RELEASE ORAL at 08:01

## 2024-01-06 RX ADMIN — AMLODIPINE BESYLATE 10 MG: 10 TABLET ORAL at 08:01

## 2024-01-06 RX ADMIN — ALLOPURINOL 100 MG: 100 TABLET ORAL at 08:01

## 2024-01-06 NOTE — OP NOTE
DATE OF PROCEDURE: 1/5/24     PRIMARY SURGEON: Nasim Martinez DO Neurosurgery      CO-SURGEON: Jean Paul Petersen M.D. 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 Laminectomy for decompression of thecal sac T8-10  2. Bilateral complete facetectomies T8/9, 9/10  3. Bilateral T9 transpedicular decompression for resection of ventral epidural tumor  4. Posterior spinal fusion, T7-11  5. Segmental fixation with pedicle screws and rods, T7-11 Globus  6. Use of Intalio robotic navigation for instrumentation.  7. Synthetic bone grafting  8. Use of intraoperative neuromonitoring with MEPs, SSEPs  9. Use of intraoperative flouroscopy  10. Cement augmentation of bilateral T7,11 pedicle screws     ANESTHESIA: General endotracheal anesthesia.     ESTIMATED BLOOD LOSS: 200 mL.     IMPLANTS: [employer] Synthes screws     SPECIMENS: T9 tumor     DRAINS: One deep and one superficial HV drain.     COMPLICATIONS: None.     SPONGE AND NEEDLE COUNT: Correct x2.     NEUROLOGICAL MONITORING: Motor evoked potentials, somatosensory evoked potential, free-running EMG.  There were no changes to preincisional MEP and SSEP baselines.       REASON FOR OPERATION AND BRIEF HISTORY AND PHYSICAL: Gamaliel Pete Jr.is a 73 y.o. male with a hx of known metastatic renal cell cancer to the spine s/p 360 cervical decompression/fusion and T9 vertebroplasty presented in fu endorsing worsening thoracic back pain.  Repeat imaging demonstrated worsening collapse of the T9 vertebral body with new ventral epidural tumor causing moderate to severe central stenosis.  He was unable to ambulate d/t his back pain although he had no weakness or numbness in his legs.  He was offered a T7-11 decompression/fusion in order to resect ventral epidural tumor and to stabilize his spine  following.  He had undergone bilateral T9 embolization of the metastatic renal cell tumor on 1/4/24.    Risks, benefits, alternatives, indications and methods were reviewed in detail and the patient wished to proceed. All questions were answered. No guarantees about the results of the procedure were made.     DESCRIPTION OF INFORMED CONSENT: Please see Dr. Martinez's note for informed consent.     DESCRIPTION OF PROCEDURE: The patient was brought to the OR following his angiogram and successful bilateral embolization of the T9 lesion on 1/4/24.  He was intubated and all lines were placed by anesthesia.  He was then flipped prone onto the Bo 4 post with appropriate padding of all pressure points.   AP and lateral x-rays were used to localize from T7-T12.  Motors and SSEPs were present and reliable in all 4 extremities. The thoracic spine was marked and prepped in sterile fashion.  A timeout was performed prior to the procedure.  10ccs of lidocaine with epinephrine were injected into the skin.     A linear skin incision was made with the 10 blade from approximately T7-T12.  Supra and subfascial midline dissection was carried out with Bovie electrocautery.  Subperiosteal dissection was carried out with Bovie electrocautery and Sheppard elevators to expose the posterior elements from approximately T7-12.  Levels were localized with AP/lateral xrays.     The neuronavigation array was then placed on the T12 spinous process.  We then obtained AP/lateral fluoro images to merge onto the robotic platform where we had planned our bilateral T7-11 pedicle screws.  The merge was satisfactory and the robotic arm was brought into the field for instrumentation.      Navigation was then used to place bilateral T7-11 pedicle screws skipping bilateral T9.  Fluoro showed satisfactory placement of hardware.  There were no changes in neuromonitoring.  We then performed bilateral T7,11 cement augmentation of the pedicle screws under  fluoroscopy.  Fluoro showed excellent placement of cement and there were no changes in monitoring.        We then turned our attention to the neurologic decompression.  A laminectomy was performed from T8-10 with a combination of high-speed drill, Leksell rongeurs and Kerrison punches.  We then turned our attention to performing bilateral T8/9, 9/10 facetectomies with high speed drill and kerrison punches.  This provided a wide decompression and we were able to mobilize the epidural tumor located laterally on the thecal sac away from the cord.  This was completed with penfield 4 and pituitaries.  This epidural tumor was sent for pathology.  Next we used high speed drill, rongeurs, and kerrison punches to perform a right sided T9 transpedicular decompression.  We then used down pushing curettes to push ventral tumor away from the thecal sac into the defect which we created.  This tumor was also sent as specimen.  We then repeated the same maneuver from a left sided T9 transpedicular approach.  Once this was completed the thecal sac was circumferentially decompressed from tumor from T8 to T10.  This was confirmed by easily passing a perez ball feeler and robert probe ventrally from one side of the thecal sac and visualizing it on the other.  We then turned our attention to hemostasis with gelfoam, nu-knit patties, and bipolar.  There was excellent hemostasis following this.     Titanium rods were sequentially sized contoured and reduced into the tulip heads on both sides.  Set screws were finally tightened and locked.  Final x-rays showed excellent position of the final construct.  Motors and SSEPs remained consistent with baseline.  The wound was copiously irrigated with sterile normal saline and a dilute betadine solution.  Exposed bony surfaces from T7-11 were decorticated with the high-speed drill.  Synthetic bone was placed posterolaterally from T7-11 bilaterally for arthrodesis.  1 gm of vanc was placed into  the subfascial space.  A deep HV drain was left in the subfascial space and tunneled out of the wound where it was secured with vicryl suture.     A watertight fascial closure was achieved with interrupted 0 Vicryl sutures and a running Stratafix suture.      Please see Dr. Martinez's OP Note for closure and extubation.    JUSTIFICATION OF CO-SURGEON AND MODIFIER -22: This was a complex thoracic decompression/fusion in a patient with metastatic renal cell cancer and very small pedicles.  It was felt that 2 attending surgeons were needed in order to accurately place hardware, limit blood loss, and perform the decompression/fusion in order to optimize patient outcomes.     Jean Paul Petersen MD  Orthopaedic Spine Surgeon  Department of Orthopaedic Surgery  [phone]

## 2024-01-06 NOTE — HPI
Gamaliel Pete Jr. Is a 73 y.o. 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 The patient was admitted to the hospital on 1/3/24 with non-traumatic back pain. Recent outpatient MRI and CT T-spine showed new bony mets to T6, T8, and T12 with progressive tumor growth at T9 with extension into the ventral epidural space, as well as invasion into the T10 superior end plate. He was taken to IR for tumor embolization on 1/4/24 and went to the OR today, 1/5/24 for T7-T11 laminectomy and fusion. The patient will be admitted to [facility] for close monitoring and a higher level of care.

## 2024-01-06 NOTE — TRANSFER OF CARE
"Anesthesia Transfer of Care Note    Patient: Gamaliel Pete Jr.    Procedure(s) Performed: Procedure(s) (LRB):  LAMINECTOMY, SPINE, THORACIC, WITH FUSION (N/A)    Patient location: ICU    Anesthesia Type: general    Transport from OR: Transported from OR on 6-10 L/min O2 by face mask with adequate spontaneous ventilation. Continuous ECG monitoring in transport. Continuous SpO2 monitoring in transport. Continuos invasive BP monitoring in transport    Post pain: adequate analgesia    Post assessment: no apparent anesthetic complications and tolerated procedure well    Post vital signs: stable    Level of consciousness: sedated and responds to stimulation    Nausea/Vomiting: no nausea/vomiting    Complications: none    Transfer of care protocol was followed      Last vitals: Visit Vitals  /66 (BP Location: Right arm, Patient Position: Lying)   Pulse (!) 117   Temp 36.7 °C (98.1 °F) (Temporal)   Resp (!) 24   Ht 5' 10" (1.778 m)   Wt 94.6 kg (208 lb 8.9 oz)   SpO2 100%   BMI 29.92 kg/m²     "

## 2024-01-06 NOTE — PT/OT/SLP EVAL
Occupational Therapy  Co- Evaluation w/ PT      Name: Gamaliel Pete Jr.  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 is a 73 y.o. 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  He presents with decrease functional status secondary to medical diagnosis. Performance deficits affecting function: weakness, impaired endurance, impaired self care skills, decreased lower extremity function, decreased ROM, impaired balance, gait instability, impaired functional mobility, pain.  Patient with fair tolerance to therapy this date HR rising to 144 therefore requiring multiple seated RB to manage tachycardia. Patient with good bed mobility and EOB balance but unable to achieve full standing position due to increased back P! this date. Patient is highly motivated to progress with therapy and wanting to complete mobility tasks without physical assistance. Patient is therefore appropriate for acute OT services to increase patient self care performance and functional mobility. Following DC from [facility] patient should continue with a high intensity therapy to ensure patient safety and promote return to independence.       Rehab Prognosis: Good; patient would benefit from acute skilled OT services to address these deficits and reach maximum level of function.       Plan:     Patient to be seen 4 x/week to address the above listed problems via self-care/home management, therapeutic activities, therapeutic exercises, neuromuscular re-education  Plan of Care Expires: 02/06/24  Plan of Care Reviewed with: patient, spouse    Subjective  "    Chief Complaint: Back p!   Patient/Family Comments/goals: Gain functional status     Occupational Profile:  Living Environment: Patient lives with spouse in 2 story house with no stairs to enter.   Previous level of function: Independent with decreased mobility lately due to back pain.  Roles and Routines: unknown   Equipment Used at Home: walker, rolling, hospital bed  Assistance upon Discharge: Wife     Pain/Comfort:   Pain Rating 1:  ("sharp and quick")  Location - Orientation 1: lower  Location 1: back  Pain Addressed 1: Reposition, Cessation of Activity, Distraction    Patients cultural, spiritual, Worship conflicts given the current situation: no    Objective:     Communicated with: RN prior to session.  Patient found supine with telemetry, blood pressure cuff upon OT entry to room.    General Precautions: Standard, fall  Orthopedic Precautions: N/A  Braces: N/A  Respiratory Status: Room air    Occupational Performance:    Bed Mobility:    Patient completed Scooting/Bridging with contact guard assistance; additional time needed   Patient completed Supine to Sit with contact guard assistance  Patient completed Sit to Supine with contact guard assistance    Functional Mobility/Transfers:  Patient completed Sit <> Stand Transfer with contact guard assistance  with  no assistive device   Patient completed stand >sit transfer with CGA and no AD   X3 trials from bed; patient declined assist from therapist able to achieve 40% stand     Activities of Daily Living:  Lower Body Dressing: maximal assistance to don socks EOB    Cognitive/Visual Perceptual:  Cognitive/Psychosocial Skills:     -       Oriented to: Person, Place, Time, and Situation   -       Follows Commands/attention:Follows multistep  commands  -       Communication: clear/fluent  -       Safety awareness/insight to disability: intact   Visual/Perceptual:      -Intact      Physical Exam:  Sensation:    -       Intact  Upper Extremity Range of " Motion:     -       Right Upper Extremity: WFL  -       Left Upper Extremity: WFL  Upper Extremity Strength:    -       Right Upper Extremity: WFL  -       Left Upper Extremity: WFL   Strength:    -       Right Upper Extremity: WFL  -       Left Upper Extremity: WFL  Fine Motor Coordination:    -       Intact    AMPAC 6 Click ADL:  AMPAC Total Score: 12    Treatment & Education:  Co-evaluation completed due to patient medical instability and to ensure patient safety. Provided education regarding role of OT, POC, & discharge recommendations.  Pt with no further questions/concerns at this time. OT provided education on home recommendations and fall prevention in preparation for D/C.     Patient left HOB elevated with all lines intact and call button in reach    GOALS:   Multidisciplinary Problems       Occupational Therapy Goals          Problem: Occupational Therapy    Goal Priority Disciplines Outcome Interventions   Occupational Therapy Goal     OT, PT/OT Ongoing, Progressing    Description: Goals to be met by: 2/6/24     Patient will increase functional independence with ADLs by performing:    UE Dressing with [place].  LE Dressing with [place].  Grooming while standing with [place].  Toileting from toilet with [place] for hygiene and clothing management.   Toilet transfer to toilet with [place].                         History:     Past Medical History:   Diagnosis Date    Asthma     no inhaler use    Borderline hypertension     ED (erectile dysfunction)     Gout     Hematuria     Hypertension          Past Surgical History:   Procedure Laterality Date    COLONOSCOPY  02/10/2022    COLONOSCOPY N/A 02/10/2022    Procedure: COLONOSCOPY;  Surgeon: Desmond Keenan MD;  Location: [facility];  Service: General;  Laterality: N/A;    POSTERIOR FUSION OF CERVICAL SPINE WITH LAMINECTOMY N/A 11/15/2023    Procedure: SPINE, CERVICAL, WITH POSTERIOR FUSION T4-6;  Surgeon: Nasim Martinez DO;   [facility];  Service: Neurosurgery;  Laterality: N/A;  C2-T1 laminectomy and posterior fusion. [place]. C-arm. [employer]. Neuromonitoring.    ROBOT-ASSISTED LAPAROSCOPIC NEPHRECTOMY Right 10/4/2023    Procedure: ROBOTIC NEPHRECTOMY;  Surgeon: Henry Michaels MD;  Location: [facility];  Service: Urology;  Laterality: Right;    SURGICAL REMOVAL OF VERTEBRAL BODY OF CERVICAL SPINE Right 11/7/2023    Procedure: CORPECTOMY, SPINE, CERVICAL;  Surgeon: Nasim Martinez DO;  Location: [facility] OR [address] [place] [address];  Service: Neurosurgery;  Laterality: Right;  C4 and C5 corpectomy with globus; familia brito tongs; c-arm; neuromonitoring; microscope; type and cross 2 units    SURGICAL REMOVAL OF VERTEBRAL BODY OF CERVICAL SPINE N/A 11/15/2023    Procedure: CORPECTOMY, SPINE, CERVICAL C3-6 REVISION;  Surgeon: Nasim Martinez DO;  Location: [facility] OR [address] [place] [address];  Service: Neurosurgery;  Laterality: N/A;    TONSILLECTOMY         Time Tracking:     OT Date of Treatment: 01/06/24  OT Start Time: 1321  OT Stop Time: 1358  OT Total Time (min): 37 min    Billable Minutes:Evaluation 10 minutes  Therapeutic Activity 15  Neuromuscular Re-education 12 minutes    1/6/2024

## 2024-01-06 NOTE — HPI
Gamaliel Pete Jr. Is a 73 y.o. 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 Per chart review, the patient was admitted to the hospital on 1/3/24 for non-traumatic back pain. Recent outpatient MRI and CT T-spine showed new bony mets to T6, T8, and T12 with progressive tumor growth at T9 with extension into the ventral epidural space, as well as invasion into the T10 superior end plate. He was taken to IR for tumor embolization on 1/4/24 and was taken to the OR on 1/5/24 for T7-11 laminectomy and fusion. The patient will be admitted to [facility] for close monitoring and a higher level of care.

## 2024-01-06 NOTE — SUBJECTIVE & OBJECTIVE
Interval History: 1 unit PRBCs given overnight for slight decrease in H/H. No other issues. Stable for stepdown to heme/onc.    Review of Systems   Respiratory:  Negative for cough and shortness of breath.    Cardiovascular:  Negative for chest pain and palpitations.   Gastrointestinal:  Negative for abdominal pain, nausea and vomiting.   Musculoskeletal:  Positive for back pain.   Neurological:  Negative for weakness, numbness and headaches.       Objective:     Vitals:  Temp: 97.9 °F (36.6 °C)  Pulse: 108  Rhythm: normal sinus rhythm  BP: (!) 101/58  MAP (mmHg): 75  Resp: (!) 24  SpO2: 98 %    Temp  Min: 97.4 °F (36.3 °C)  Max: 97.9 °F (36.6 °C)  Pulse  Min: 64  Max: 118  BP  Min: 101/58  Max: 163/63  MAP (mmHg)  Min: 75  Max: 105  Resp  Min: 8  Max: 27  SpO2  Min: 93 %  Max: 100 %    01/05 0701 - 01/06 0700  In: 6296 [I.V.:2464.8]  Out: 1730 [Urine:1420; Drains:210]            Physical Exam  Vitals and nursing note reviewed.   Constitutional:       General: He is not in acute distress.     Appearance: Normal appearance.   Eyes:      Extraocular Movements: Extraocular movements intact.      Pupils: Pupils are equal, round, and reactive to light.   Cardiovascular:      Rate and Rhythm: Normal rate and regular rhythm.      Pulses: Normal pulses.   Pulmonary:      Effort: Pulmonary effort is normal.   Abdominal:      Palpations: Abdomen is soft.   Musculoskeletal:         General: Normal range of motion.      Cervical back: Normal range of motion and neck supple.   Skin:     General: Skin is warm and dry.      Capillary Refill: Capillary refill takes less than 2 seconds.   Neurological:      Mental Status: He is alert and oriented to person, place, and time.      GCS: GCS eye subscore is 4. GCS verbal subscore is 5. GCS motor subscore is 6.      Cranial Nerves: Cranial nerves 2-12 are intact.      Sensory: Sensation is intact.      Motor: Motor function is intact.      Coordination: Coordination is intact.       Comments:   -E4 V5 M6  -PERRL  -Oriented to person, place, time, and situation  -Follows commands equally in all extremities  -BARRERA equally/spontaneously  -RUE 5/5  -LUE 5/5  -RLE 5/5  -LLE 5/5              Medications:  Continuous ScheduledallopurinoL, 100 mg, Daily  amLODIPine, 10 mg, Daily  dexAMETHasone, 4 mg, Q6H  enoxparin, 30 mg, Q24H (prophylaxis, 1700)  gabapentin, 300 mg, TID  methocarbamoL, 750 mg, QID  morphine, 30 mg, Q12H  pantoprazole, 40 mg, Daily  polyethylene glycol, 17 g, Daily  senna-docusate 8.6-50 mg, 1 tablet, BID    PRN0.9%  NaCl infusion (for blood administration), , Q24H PRN  acetaminophen, 650 mg, Q6H PRN  albuterol-ipratropium, 3 mL, Q4H PRN  aluminum-magnesium hydroxide-simethicone, 30 mL, QID PRN  bisacodyL, 10 mg, Daily PRN  dextrose 10%, 12.5 g, PRN  dextrose 10%, 25 g, PRN  diazePAM, 5 mg, Q6H PRN  glucagon (human recombinant), 1 mg, PRN  glucose, 16 g, PRN  glucose, 24 g, PRN  hydrALAZINE, 25 mg, Q6H PRN  HYDROmorphone, 0.2 mg, Q6H PRN  iodixanoL, 200 mL, ONCE PRN  melatonin, 6 mg, Nightly PRN  naloxone, 0.02 mg, PRN  ondansetron, 4 mg, Q8H PRN  oxyCODONE, 5 mg, Q6H PRN  oxyCODONE, 10 mg, Q6H PRN  prochlorperazine, 5 mg, Q6H PRN  simethicone, 1 tablet, QID PRN  sodium chloride 0.9%, 10 mL, Q12H PRN  sodium chloride 0.9%, 10 mL, PRN      Today I personally reviewed pertinent medications, lines/drains/airways, imaging, cardiology results, laboratory results,  Diet  Diet Adult Regular (IDDSI Level 7)  Diet Adult Regular (IDDSI Level 7)

## 2024-01-06 NOTE — SUBJECTIVE & OBJECTIVE
Past Medical History:   Diagnosis Date    Asthma     no inhaler use    Borderline hypertension     ED (erectile dysfunction)     Gout     Hematuria     Hypertension      Past Surgical History:   Procedure Laterality Date    COLONOSCOPY  02/10/2022    COLONOSCOPY N/A 02/10/2022    Procedure: COLONOSCOPY;  Surgeon: Desmond Keenan MD;  Location: [facility];  Service: General;  Laterality: N/A;    POSTERIOR FUSION OF CERVICAL SPINE WITH LAMINECTOMY N/A 11/15/2023    Procedure: SPINE, CERVICAL, WITH POSTERIOR FUSION T4-6;  Surgeon: Nasim Martinez DO;  Location: [address];  Service: Neurosurgery;  Laterality: N/A;  C2-T1 laminectomy and posterior fusion. [place]. C-arm. [employer]. Neuromonitoring.    ROBOT-ASSISTED LAPAROSCOPIC NEPHRECTOMY Right 10/4/2023    Procedure: ROBOTIC NEPHRECTOMY;  Surgeon: Henry Michaels MD;  Location: [facility];  Service: Urology;  Laterality: Right;    SURGICAL REMOVAL OF VERTEBRAL BODY OF CERVICAL SPINE Right 11/7/2023    Procedure: CORPECTOMY, SPINE, CERVICAL;  Surgeon: Nasim Martinez DO;  Location: [address];  Service: Neurosurgery;  Laterality: Right;  C4 and C5 corpectomy with globus; familia wells tongs; c-arm; neuromonitoring; microscope; type and cross 2 units    SURGICAL REMOVAL OF VERTEBRAL BODY OF CERVICAL SPINE N/A 11/15/2023    Procedure: CORPECTOMY, SPINE, CERVICAL C3-6 REVISION;  Surgeon: Nasim Martinez DO;  Location: [facility] OR [address] [place] [address];  Service: Neurosurgery;  Laterality: N/A;    TONSILLECTOMY        No current facility-administered medications on file prior to encounter.     Current Outpatient Medications on File Prior to Encounter   Medication Sig Dispense Refill    allopurinoL (ZYLOPRIM) 100 MG tablet Take 1 tablet (100 mg total) by mouth once daily. 30 tablet 3    amlodipine-benazepril 5-10 mg (LOTREL) 5-10 mg per capsule Take 1 capsule by mouth once daily. 90 capsule 3    axitinib (INLYTA) 5 mg Tab Take 1 tablet (5 mg) by mouth 2 (two) times  OBSTETRIC HISTORY AND PHYSICAL EXAM    PRIMARY CERTIFIED NURSE MIDWIFE:  MARICRUZ  ADMITTING ATTENDING:  Cassandra Ruiz MD  ADMITTING CERTIFIED NURSE MIDWIFE:  Beba Huerta, CNMIGUEL ANGEL    Chief Complaint   Patient presents with   • Leakage/loss Of Fluid     HISTORY OF PRESENT ILLNESS:  Dimitrios Curiel is a 23 year old Black/  female, , estimated date of delivery 2023, by 7w Ultrasound @ 34w1d who presents with leaking of fluid. Describes the fluid as clear. Patient noted leaking at 0300 this morning. She is currently experiencing cramping about every 5-6 minutes. Coping and declines pain medication at this time. Fetal movement present.     Pregnancy is significant for previous mole pregnancy, gonorrhea infection and GBS unknown status.    OB History    Para Term  AB Living   2       1     SAB IAB Ectopic Molar Multiple Live Births     0   1          # Outcome Date GA Lbr Ad/2nd Weight Sex Delivery Anes PTL Lv   2 Current            1 Molar 19              Obstetric Comments   Menache: 8-9 cycle: monthly x 3-5 d with clots/back pain   Past use contraception: OCPs   Past STIs: none     CURRENT PREGNANCY:   No past medical history on file.  Past Surgical History:   Procedure Laterality Date   • D and c N/A     with molar pregnancy age 19       GYNECOLOGIC HISTORY:  Dimitrios Curiel has positive history of sexually transmitted diseases, Gonorrhea.  She has no history of abnormal Pap smears.       SOCIAL HISTORY:  Social History     Tobacco Use   • Smoking status: Never   • Smokeless tobacco: Never   Substance Use Topics   • Alcohol use: Never       Sexually Active: Yes             Partners with: Male       Birth Control/Protection: Condom      Drug Use:    Never           Review of patient's social economics indicates:  No social economics status on file      FAMILY HISTORY:  Family History   Problem Relation Age of Onset   • Systemic Lupus Erythematosus Maternal Grandmother     • Patient is unaware of any medical problems Paternal Grandmother    • Patient is unaware of any medical problems Paternal Grandfather        ACTIVE MEDICATIONS:  Outpatient Medications Marked as Taking for the 12/30/22 encounter (Hospital Encounter)   Medication Sig Dispense Refill   • ondansetron (ZOFRAN ODT) 4 MG disintegrating tablet Place 1 tablet onto the tongue every 6 hours as needed (severe prolonged nausea with vomiting). 20 tablet 1   • Prenatal 27-1 MG tablet Take 1 tablet by mouth daily. 90 tablet 3       ALLERGIES:  ALLERGIES:  Patient has no known allergies.    REVIEW OF SYSTEMS:    Eye Problem(s):  negative  ENT Problem(s):  negative  Cardiovascular problem(s):  negative  Respiratory problem(s):  negative  Gastrointestinal problem(s):  Negative except for abdominal cramping     Genitourinary problem(s):  Negative except for leaking of fluid  Musculoskeletal problem(s):  negative  Integumentary problem(s):  negative  Neurological problem(s):  negative  Psychiatric problem(s):  negative  Endocrine problem(s):  negative  Hematologic and/or Lymphatic problem(s):  negative    PHYSICAL EXAM:  Visit Vitals  /75   Pulse 84   Temp 98.5 °F (36.9 °C) (Oral)   Resp 16   Ht 5' 2\" (1.575 m)   Wt 53.7 kg   LMP 04/03/2022   SpO2 98%   BMI 21.65 kg/m²       General:   alert, appears stated age and cooperative,  female   Psychiatric:  Appropriate mood and affect   Skin:   normal   Lungs:   Respirations unlabored   Heart:  Regular rate and perfusion     Abdomen:  soft, non-tender; bowel sounds normal; no masses,  no organomegaly   Extremities Normal   Fetal Surveillance/  Tocodynamometer: Fetal heart variability: moderate  Fetal Heart Rate decelerations: none  Fetal Heart Rate accelerations: yes  Baseline FHR: 145 per minute  Uterine contractions: regular, every 5-6 minutes   Presentation: cephalic by SVE     Sterile Vaginal Exam:    Dilation 4 cm   Effacement 90%   Station +2   Consistency    daily. 60 tablet 11    diazePAM (VALIUM) 5 MG tablet Take 1 tablet (5 mg total) by mouth every 6 (six) hours as needed (severe muscles spasms). 10 tablet 0    gabapentin (NEURONTIN) 300 MG capsule Take 1 capsule (300 mg total) by mouth 3 (three) times daily. 90 capsule 11    methocarbamoL (ROBAXIN) 750 MG Tab Take 1 tablet (750 mg total) by mouth 4 (four) times daily as needed (muscle spasms). 90 tablet 0    morphine (MS CONTIN) 30 MG 12 hr tablet Take 1 tablet (30 mg total) by mouth every 12 (twelve) hours. 60 tablet 0    naloxone (NARCAN) 4 mg/actuation Spry 1 spray (4mg) by nasal route as needed for opioid overdose; may repeat every 2-3 minutes in alternating nostrils until medical help arrives. Call 911 (Patient not taking: Reported on 12/28/2023) 2 each 0    omega-3 fatty acids/fish oil (FISH OIL-OMEGA-3 FATTY ACIDS) 300-1,000 mg capsule Take 2 capsules by mouth once daily.      oxyCODONE-acetaminophen (PERCOCET) 5-325 mg per tablet Take 1 to 2 tablets by mouth every 4-6 hours as needed for pain 100 tablet 0    sildenafiL (VIAGRA) 100 MG tablet Take 1 tablet (100 mg total) by mouth daily as needed for Erectile Dysfunction. 30 tablet 11    turmeric (CURCUMIN MISC) 1,000 mg by Misc.(Non-Drug; Combo Route) route Daily.      UNABLE TO FIND Take 500 mg by mouth 2 (two) times a day. medication name: Tudca      UNABLE TO FIND Take 2 capsules by mouth 2 (two) times a day. medication name: Turkey tail mushroom      UNABLE TO FIND Take 15 drops by mouth once daily. medication name: Essiac-20      UNABLE TO FIND Take 5 mLs by mouth 2 (two) times a day. medication name: barley leaf juice powder      UNABLE TO FIND Take 5 mLs by mouth 2 (two) times a day. medication name: black seed oil (cumin seed)        Allergies: Patient has no known allergies.  History reviewed. No pertinent family history.  Social History     Tobacco Use    Smoking status: Never    Smokeless tobacco: Never   Substance Use Topics    Alcohol use: Not    Position  Presentation             PRENATAL LABS:  Blood type:  O Rh Positive      Recent Labs   Lab 22  1535 22  1531   Neisseria gonorrhoeae by Nucleic Acid Amplification Positive*  --    Chlamydia trachomatis by Nucleic Acid Amplification Negative  --    HGB 11.1* 13.4   HCT 32.6* 39.9    208   HIV Antigen/ Antibody Combo Screen Nonreactive Nonreactive   Rubella Antibody, IgG  --  54.1   RPR Nonreactive Nonreactive     Group B Streptococcus:  unknown  HGB (g/dL)   Date Value   2022 11.1 (L)   /  HCT (%)   Date Value   2022 32.6 (L)     Most Recent Pap Smear:  No results found for: THINHPVRPT  First Trimester/Quad Screen:  declined  One hour glucose tolerance test:  Normal      IMAGING:  Please see imaging tab for full ultrasound reports  22 at 32w1d [facility] anatomy:norm placenta:anter MVP:  5.22 AC: 29% EFW: 1770g, 25%      ASSESSMENT/PLAN:  Dimitrios Curiel is a 23 year old  female at 34w1d with DEMARIO: 2023 by 7 wk US who presents with pPROM and early latent labor.  Patient Active Problem List   Diagnosis   • Prenatal care, subsequent pregnancy in first trimester   • Gonorrhea affecting pregnancy   •  premature rupture of membranes (PPROM) with unknown onset of labor      1. Latent  Labor  1.  Admit to L&D, inpatient  2. GBS unknown. PCN IV ordered  3. Diet: regular  4. Labs: CBC, T&S, COVID19, RPR  5. Insert capped IV  6. IV fluids: LR  2. Fetal Well-Being  1. CEFM  3. Postpartum        1. Male infant        2. breastfeeding  Expectant management  Beba Wilsonn, CNM       Currently     Alcohol/week: 0.0 standard drinks of alcohol     Comment: rarely    Drug use: Never     Review of Systems   Unable to perform ROS: Other (level of consciousness)     Objective:     Vitals:    Temp: 97.6 °F (36.4 °C)  Pulse: 80  Rhythm: normal sinus rhythm  BP: (!) 158/72  MAP (mmHg): 103  Resp: 15  SpO2: 97 %    Temp  Min: 97.4 °F (36.3 °C)  Max: 98.1 °F (36.7 °C)  Pulse  Min: 59  Max: 118  BP  Min: 108/66  Max: 158/72  MAP (mmHg)  Min: 78  Max: 104  Resp  Min: 8  Max: 24  SpO2  Min: 94 %  Max: 100 %    01/05 0701 - 01/06 0700  In: 5034 [I.V.:1534]  Out: 1275 [Urine:1175]            Physical Exam  Vitals and nursing note reviewed.   Constitutional:       General: He is not in acute distress.     Appearance: Normal appearance.      Comments: Examined in the immediate post-operative period, exam slightly limited by anesthesia     HENT:      Head: Normocephalic and atraumatic.      Right Ear: External ear normal.      Left Ear: External ear normal.      Nose: Nose normal.      Mouth/Throat:      Mouth: Mucous membranes are moist.      Pharynx: Oropharynx is clear.   Eyes:      General: No scleral icterus.     Extraocular Movements: Extraocular movements intact.      Pupils: Pupils are equal, round, and reactive to light.   Cardiovascular:      Rate and Rhythm: Normal rate and regular rhythm.   Pulmonary:      Effort: Pulmonary effort is normal. No respiratory distress.   Abdominal:      General: Abdomen is flat. There is no distension.   Musculoskeletal:      Right lower leg: No edema.      Left lower leg: No edema.   Skin:     General: Skin is warm and dry.   Neurological:      Mental Status: He is alert.      Comments: E3V5M6  Drowsy, but easily arousable. Oriented x 4. Speech fluent. Follows commands briskly.   PERRL. EOMI. No facial asymmetry. Conversational hearing intact. Tongue protrudes midline with no deviations or fasciculations.   Moves all extremities spontaneously, symmetrically, and against  gravity. SILT in all 4 extremities. Tone normal.         Gait & coordination exams deferred.       Today I personally reviewed pertinent medications, lines/drains/airways, imaging, laboratory results, notably:    Laboratory:  CBC:  Recent Labs   Lab 01/05/24 2013   WBC 13.97*   RBC 3.37*   HGB 9.9*   HCT 29.0*      MCV 86   MCH 29.4   MCHC 34.1       CMP:  Recent Labs   Lab 01/05/24 2013   CALCIUM 8.1*   ALBUMIN 2.7*   PROT 5.9*      K 4.7   CO2 15*   *   BUN 28*   CREATININE 1.1   ALKPHOS 58   *   AST 32   BILITOT 0.6     Recent Labs   Lab 01/05/24  0729   MG 2.2   PHOS 3.7       Coagulation:  Recent Labs   Lab 01/05/24 2013   LABPROT 10.8   INR 1.0   APTT 22.3   FIBRINOGEN 365     ABG:  Recent Labs     01/05/24 1956   PH 7.282*   PCO2 43.9   HCO3 20.8*   POCSATURATED 99   BE -6*       Urinalysis:  Recent Labs   Lab 01/03/24  0825   COLORU Yellow   SPECGRAV 1.020   PHUR 5.0   OCCULTUA Trace*   GLUCUA Negative   KETONESU Trace*   PROTEINUA Trace*   BILIRUBINUA Negative

## 2024-01-06 NOTE — HOSPITAL COURSE
01/06/2024 Slight decrease in H/H following OR; 1 unit PRBCs given overnight. MAP goal relaxed, stable for stepdown.

## 2024-01-06 NOTE — NURSING
1945 - Patient arrived to 9067 from OR on lima gtt @ 0.5 and no sedation currently infusing. CRNA at bedside reporting soft pressures following extubation. Oropharyngeal airway (yellow) in place with 4L O2 via simple facemask on.   Patient's BP cuff 108/66 MAP 82, A-line 111/49 MAP 73. Tachycardia noted, -125. Patient unresponsive with no response to painful stimuli in all extremities, pupils brisk equal and reactive. Absent gag and cough, corneals present. CRNA removed oropharyngeal airway and placed simple face mask back on. Patient satting 100% on 4L but noisy, labored breathing noted following removal, RN immediately replaced airway device out of precaution. RT notified and came to bedside.   CRNA at bedside with concern of post-op bleeding. CHINO Alfred notified and came to bedside. ABG ordered, hematocrit 26 on results. 1u RBC ordered.     2032 - Blood transfusion started. Patient tolerating well.     2100 - Patient now AAOx4, opening eyes spontaneously, and moving all four extremities to command. All reflexes present. Oropharyngeal airway removed, tolerated well. O2 weaned, now on room air and satting %. Remains tachycardic 110-115, /64 MAP 85. Complaining of pain.     2145 - HR now NSR 75-90, patient AAOx4 and following commands. /71 MAP 96. WCTM.

## 2024-01-06 NOTE — OP NOTE
DATE OF SURGERY: 1/5/2024             PREOPERATIVE DIAGNOSIS:  T9 pathologic compression fracture with ventral epidural tumor  Severe thoracic stenosis T9  Hx of metastatic renal cell cancer     POSTOPERATIVE DIAGNOSIS:  As above     PROCEDURE PERFORMED:  1. Laminectomy for decompression of thecal sac T8-10  2. Bilateral complete facetectomies T8/9, 9/10  3. Bilateral T9 transpedicular decompression for resection of ventral epidural tumor  4. Posterior spinal fusion, T7-11  5. Segmental fixation with pedicle screws and rods, T7-11 Globus  6. Use of Globus robotic navigation for instrumentation.  7. Synthetic bone grafting  8. Use of intraoperative neuromonitoring with MEPs, SSEPs  9. Use of intraoperative flouroscopy  10. Cement augmentation of bilateral T7,11 pedicle screws     PRIMARY SURGEON: Nasim Martinez DO    Cosurgeon: Jean Paul Petersen MD Ortho Spine     Assistant: CHINO Ulrich; Vikas Gonzales MD     ANESTHESIA: GETA     ESTIMATED BLOOD LOSS: 200cc     COMPLICATIONS: None     DRAINS: 1 deep HV, 1 superficial HV     SPECIMENS SENT: Ventral epidural tumor        INDICATIONS:  73 M with hx of known metastatic renal cell cancer to the spine s/p 360 cervical decompression/fusion and T9 vertebroplasty presented in fu endorsing worsening thoracic back pain.  Repeat imaging demonstrated worsening collapse of the T9 vertebral body with new ventral epidural tumor causing moderate to severe central stenosis.  He was unable to ambulate d/t his back pain although he had no weakness or numbness in his legs.  He was offered a T7-11 decompression/fusion in order to resect ventral epidural tumor and to stabilize his spine following.  He had undergone bilateral T9 embolization of the metastatic renal cell tumor on 1/4/24.    Risks, benefits, alternatives, indications and methods were reviewed in detail and the patient wished to proceed. All questions were answered. No guarantees about the results of the procedure were  made.        PROCEDURE:     The patient was brought to the OR following his angiogram and successful bilateral embolization of the T9 lesion on 1/4/24.  He was intubated and all lines were placed by anesthesia.  He was then flipped prone onto the Bo 4 post with appropriate padding of all pressure points.   AP and lateral x-rays were used to localize from T7-T12.  Motors and SSEPs were present and reliable in all 4 extremities. The thoracic spine was marked and prepped in sterile fashion.  A timeout was performed prior to the procedure.  10ccs of lidocaine with epinephrine were injected into the skin.     A linear skin incision was made with the 10 blade from approximately T7-T12.  Supra and subfascial midline dissection was carried out with Bovie electrocautery.  Subperiosteal dissection was carried out with Bovie electrocautery and Sheppard elevators to expose the posterior elements from approximately T7-12.  Levels were localized with AP/lateral xrays.     The neuronavigation array was then placed on the T12 spinous process.  We then obtained AP/lateral fluoro images to merge onto the robotic platform where we had planned our bilateral T7-11 pedicle screws.  The merge was satisfactory and the robotic arm was brought into the field for instrumentation.      Navigation was then used to place bilateral T7-11 pedicle screws skipping bilateral T9.  Fluoro showed satisfactory placement of hardware.  There were no changes in neuromonitoring.  We then performed bilateral T7,11 cement augmentation of the pedicle screws under fluoroscopy.  Fluoro showed excellent placement of cement and there were no changes in monitoring.        We then turned our attention to the neurologic decompression.  A laminectomy was performed from T8-10 with a combination of high-speed drill, Leksell rongeurs and Kerrison punches.  We then turned our attention to performing bilateral T8/9, 9/10 facetectomies with high speed drill and kerrison  punches.  This provided a wide decompression and we were able to mobilize the epidural tumor located laterally on the thecal sac away from the cord.  This was completed with penfield 4 and pituitaries.  This epidural tumor was sent for pathology.  Next we used high speed drill, rongeurs, and kerrison punches to perform a right sided T9 transpedicular decompression.  We then used down pushing curettes to push ventral tumor away from the thecal sac into the defect which we created.  This tumor was also sent as specimen.  We then repeated the same maneuver from a left sided T9 transpedicular approach.  Once this was completed the thecal sac was circumferentially decompressed from tumor from T8 to T10.  This was confirmed by easily passing a perez ball feeler and robert probe ventrally from one side of the thecal sac and visualizing it on the other.  We then turned our attention to hemostasis with gelfoam, nu-knit patties, and bipolar.  There was excellent hemostasis following this.     Titanium rods were sequentially sized contoured and reduced into the tulip heads on both sides.  Set screws were finally tightened and locked.  Final x-rays showed excellent position of the final construct.  Motors and SSEPs remained consistent with baseline.  The wound was copiously irrigated with sterile normal saline and a dilute betadine solution.  Exposed bony surfaces from T7-11 were decorticated with the high-speed drill.  Synthetic bone was placed posterolaterally from T7-11 bilaterally for arthrodesis.  1 gm of vanc was placed into the subfascial space.  A deep HV drain was left in the subfascial space and tunneled out of the wound where it was secured with vicryl suture.     A watertight fascial closure was achieved with interrupted 0 Vicryl sutures and a running Stratafix suture.  The suprafascial layer was then undermined with bovie in order to create a tension free closure.  A superficial HV drain was then left in this  space and tunneled out of the wound where it was secured with vicryl suture.  The soft tissues were closed in layers.  4/0 running monocryl was used for the skin.  Preneo tape covered with dermabond was then placed over the incision.     The patient appeared to tolerate the procedure well from a hemodynamic and neural monitoring standpoint.  Motors and SSEPs remained stable and reliable throughout the case.  I was present for all portions of the procedure and all counts were correct.     The patient was then flipped supine onto his ICU bed where he was extubated and sent to the ICU for recovery.    Justification of Cosurgeon: This was a complex thoracic decompression/fusion in a patient with metastatic renal cell cancer and very small pedicles.  It was felt that 2 attending surgeons were needed in order to accurately place hardware, limit blood loss, and perform the decompression/fusion in order to optimize patient outcomes.

## 2024-01-06 NOTE — ASSESSMENT & PLAN NOTE
73 y.o. male with history of HTN, gout, and right RCC with mets to the spine (s/p right total nephrectomy and multiple spine surgeries) admitted s/p T7-T11 laminectomy and fusion.      -Admitted to NCC  -NSGY following  -Stable for stepdown to Heme/Onc  -Neuro checks/VS q4h  -CBC, CMP, Mag, Phos daily  -Relax MAP goal to > 65; discontinue Dutch  -Multimodal pain regimen  -Continue dex 4 q6h  -PT/OT  -Lovenox for DVT prevention

## 2024-01-06 NOTE — NURSING
Patient arrived to [facility] from OR     Type of stroke/diagnosis:  T7-T11 Lami/fusion    Current symptoms: difficult to arouse post-op     Skin Assessment done: Yes, 2 HVD's present  Wounds noted: none  *If wounds noted, was Wound Care consulted? no  *If wounds noted, LDA placed? no  Skin Assessment Verified by:  Gerry Hickey Completed? pending     Patient Belongings on Admit: nahid GOTTLIEB Collar, TLSO brace     NCC notified: Rosi MAGANA

## 2024-01-06 NOTE — PT/OT/SLP PROGRESS
Occupational Therapy      Patient Name:  Gamaliel Parrishsamir Laureano.   MRN:  [id number]    Patient not seen today secondary to  (pt eating lunch at time of attempt.  This OT unable to re-attempt however another OT will attempt later in day.). Will follow-up as appropriate.    1/6/2024

## 2024-01-06 NOTE — ASSESSMENT & PLAN NOTE
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

## 2024-01-06 NOTE — NURSING
Nursing Transfer Note       Transfer To     Transfer From [facility] 9019    Transfer via bed    Transfer with cardiac monitoring    Transported by RN    Medicines sent:  n/a    Chart sent with patient: Yes    Belongings sent with patient: yes, pt's spouse present during transfer and had all belongings; aspen and tlso brace sent     Notified: spouse    Bedside Neuro assessment performed: Yes    Bedside Handoff given to: BRIAN Hooks    Upon arrival to floor: cardiac monitor applied, patient oriented to room, call bell in reach, and bed in lowest position

## 2024-01-06 NOTE — SUBJECTIVE & OBJECTIVE
Interval History: POD1 s/p thoracic decompression fusion. Tolerated well and Exam stable. Ok to TTF and keep drains today. Ok to work w PTOT w brace       Medications:  Continuous Infusions:   sodium chloride 0.9% 75 mL/hr at 01/04/24 0234     Scheduled Meds:   allopurinoL  100 mg Oral Daily    amLODIPine  5 mg Oral Daily    dexAMETHasone  4 mg Intravenous Q6H    gabapentin  300 mg Oral TID    morphine  30 mg Oral Q12H    pantoprazole  40 mg Oral Daily    polyethylene glycol  17 g Oral Daily    senna-docusate 8.6-50 mg  1 tablet Oral BID     PRN Meds:acetaminophen, albuterol-ipratropium, aluminum-magnesium hydroxide-simethicone, bisacodyL, dextrose 10%, dextrose 10%, diazePAM, glucagon (human recombinant), glucose, glucose, melatonin, methocarbamoL, naloxone, ondansetron, oxyCODONE, oxyCODONE, prochlorperazine, simethicone, sodium chloride 0.9%     Review of Systems  Objective:     Weight: 94.6 kg (208 lb 8.9 oz)  Body mass index is 29.92 kg/m².  Vital Signs (Most Recent):  Temp: 97.6 °F (36.4 °C) (01/04/24 0756)  Pulse: (!) 58 (01/04/24 0756)  Resp: 18 (01/04/24 0756)  BP: 136/61 (01/04/24 0756)  SpO2: 95 % (01/04/24 0756) Vital Signs (24h Range):  Temp:  [97 °F (36.1 °C)-98 °F (36.7 °C)] 97.6 °F (36.4 °C)  Pulse:  [55-98] 58  Resp:  [15-20] 18  SpO2:  [90 %-97 %] 95 %  BP: (119-144)/(61-69) 136/61                              Closed/Suction Drain 11/15/23 1457 Tube - 1 Right Back Accordion 10 Fr. (Active)            Closed/Suction Drain 11/15/23 1458 Tube - 2 Left Back Accordion 10 Fr. (Active)         Neurosurgery Physical Exam  General: Well developed, well nourished, no distress.  Head: Normocephalic, atraumatic.  Neurologic: Alert and oriented. Thought content appropriate  GCS: Motor: 6/Verbal: 5/Eyes: 4 GCS Total: 15  Mental Status: Awake, Alert, Oriented x 4.  Language: No aphasia.  Speech: No dysarthria.  Cranial nerves: Face symmetric, tongue midline, CN II-XII grossly intact.   Eyes: Pupils equal, round,  reactive to light with accommodation, EOMI.  Pulmonary: Normal respirations, not labored, no accessory muscles used.  Sensory: Intact to light touch throughout.  Motor Strength: Moves all extremities spontaneously with good tone. Full strength upper and lower extremities. No abnormal movements seen.      Strength   Deltoids Triceps Biceps Wrist Extension Wrist Flexion Hand    Upper: R 5/5 5/5 5/5 5/5 5/5 5/5     L 5/5 5/5 5/5 5/5 5/5 5/5       Iliopsoas Quadriceps Knee  Flexion Tibialis  anterior Gastro- cnemius EHL   Lower: R 5/5 5/5 5/5 5/5 5/5 5/5     L 5/5 5/5 5/5 5/5 5/5 5/5      [place]on[place]: Absent.  Clonus: subtle bilateral clonus (2+ beat) appreciated  Vascular: Pulses 2+ and symmetric radial and dorsalis pedis. No LE edema or skin changes.  Skin: Warm, dry and intact, no rashes.  Gait: Normal.                    Anterior and posterior incisions well healed.    Significant Labs:  Recent Labs   Lab 01/02/24 2104 01/03/24  0623 01/04/24  0602   GLU 84 106 124*   * 133* 137   K 3.8 4.4 4.7   CL 99 101 108   CO2 21* 19* 18*   BUN 17 22 28*   CREATININE 1.1 1.0 1.1   CALCIUM 10.4 9.5 9.7   MG 1.9 2.0 2.2     Recent Labs   Lab 01/02/24 2104 01/03/24  0623 01/04/24  0602   WBC 7.74 6.68 3.37*   HGB 14.3 12.4* 11.6*   HCT 40.5 35.9* 33.2*    172 189     Recent Labs   Lab 01/02/24 2104   INR 1.0   APTT 30.5     Microbiology Results (last 7 days)       ** No results found for the last 168 hours. **          All pertinent labs from the last 24 hours have been reviewed.    Significant Diagnostics:  I have reviewed and interpreted all pertinent imaging results/findings within the past 24 hours.

## 2024-01-06 NOTE — PT/OT/SLP EVAL
Physical Therapy Co-Evaluation and Co-Treatment  Co-evaluation with OT for maximal pt participation, safety, and activity tolerance    Patient Name:  Gamaliel Pete Jr.   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 is a 73 y.o. 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  He presents with the following impairments/functional limitations: weakness, gait instability, decreased lower extremity function, impaired balance, impaired endurance, decreased safety awareness, pain, impaired self care skills, impaired functional mobility, orthopedic precautions.     Pt agreeable to therapy, eager to move and try to get out of bed, very pleasant and cooperative but does get tachy very easily so vitals need to be watched. Pt resistant to physical assistance into standing but unable to complete full stand on his own due to pain in low back, continue to progress mobility as able and encourage OOB.    Rehab Prognosis: Fair; patient would benefit from acute skilled PT services to address these deficits and reach maximum level of function.      Treatment Tolerated: Fairly Well    Highest level of mobility achieved this visit: partial stand at EOB with CGA    Activity with RN/PCT: bed mobility only    Plan:     During this hospitalization, patient to be seen 4 x/week to address the identified rehab impairments via gait training, therapeutic activities, neuromuscular re-education,  "therapeutic exercises and progress towards the established goals.    Plan of Care Expires:  02/06/24    Subjective     BRIAN Fallon notified prior to session. Pt's wife present upon PT entrance into room.    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   Drive: yes.       Objective:     Additional staff present: OT Dorota    Patient found HOB elevated with: telemetry, blood pressure cuff     General Precautions: Standard, fall   Orthopedic Precautions:spinal precautions   Braces: N/A   Body mass index is 29.92 kg/m².  Oxygen Device: Room Air    Vitals: BP (!) 113/59   Pulse 95   Temp 98 °F (36.7 °C) (Oral)   Resp 16   Ht 5' 10" (1.778 m)   Wt 94.6 kg (208 lb 8.9 oz)   SpO2 96%   BMI 29.92 kg/m²     Exams:  Cognition:   Alert and Cooperative  Command following: Follows two-step verbal commands  Fluency: clear/fluent  Hearing: Intact  Vision:  Intact  Skin Integrity: Visible skin intact    Physical Exam:   Edema - None noted  ROM - GENE LEs WFL  Strength - GENE LEs WFL   Sensation - Intact to light touch  Coordination - No deficits noted    Outcome Measures:    AM-PAC 6 CLICK MOBILITY  Turning over in bed (including adjusting bedclothes, sheets and blankets)?: 3  Sitting down on and standing up from a chair with arms (e.g., wheelchair, bedside commode, etc.): 1  Moving from lying on back to sitting on the side of the bed?: 3  Moving to and from a bed to a chair (including a wheelchair)?: 1  Need to walk in " hospital room?: 1  Climbing 3-5 steps with a railing?: 1  Basic Mobility Total Score: 10     Functional Mobility:    Bed Mobility:   Supine to Sit: contact guard assistance; from L side of bed  Additional time required  Scooting anteriorly to EOB to have both feet planted on floor: contact guard assistance  Sit to Supine: contact guard assistance; to R side of bed  Additional time required    Sitting Balance at Edge of Bed:  Assistance Level Required: Stand-by Assistance  Time: 25 min  Postural deviations noted: rounded shoulders  Encouraged: upright sitting    Transfers:   Sit <> Stand Transfer: contact guard assistance with no assistive device  Stand <> Sit Transfer: contact guard assistance with no assistive device  x3 trials from EOB  Pt declined assistance from therapist  Able to stand to about 40% on his own    Therapeutic Exercises:   Static and dynamic sitting balance at EOB  Postural control  Sitting tolerance  Midline alignment  LE activation into standing  Education:  Time provided for education, counseling and discussion of health disposition in regards to patient's current status  All questions answered within PT scope of practice and to patient's satisfaction  PT role in POC to address current functional deficits  Pt educated on proper body mechanics, safety techniques, and energy conservation with PT facilitation and cueing throughout session    Patient left HOB elevated with all lines intact, call button in reach, RN Vasyl notified, and wife present.    GOALS:   Multidisciplinary Problems       Physical Therapy Goals          Problem: Physical Therapy    Goal Priority Disciplines Outcome Goal Variances Interventions   Physical Therapy Goal     PT, PT/OT Ongoing, Progressing     Description: Goals to met by 1/20/2024    1. Supine to sit with Stand-by Assistance  2. Sit to supine with Stand-by Assistance  3. Rolling to Left and Right with Stand-by Assistance.  4. Sit to stand transfer with Stand-by  Assistance  5. Bed to chair transfer with Stand-by Assistance using LRAD  6. Gait  x 75 feet with Contact Guard Assistance using LRAD   7. Ascend/descend 15 stair(s) with bilateral Handrails Contact Guard Assistance using LRAD.   8. Stand for 5 minutes with Stand-by Assistance using LRAD  9. Lower extremity exercise program x15 reps per Instruction, with assistance as needed in order to facilitate improved postural control and improvement in functional independence                       History:     Past Medical History:   Diagnosis Date    Asthma     no inhaler use    Borderline hypertension     ED (erectile dysfunction)     Gout     Hematuria     Hypertension        Past Surgical History:   Procedure Laterality Date    COLONOSCOPY  02/10/2022    COLONOSCOPY N/A 02/10/2022    Procedure: COLONOSCOPY;  Surgeon: Desmond Keenan MD;  Location: [facility];  Service: General;  Laterality: N/A;    POSTERIOR FUSION OF CERVICAL SPINE WITH LAMINECTOMY N/A 11/15/2023    Procedure: SPINE, CERVICAL, WITH POSTERIOR FUSION T4-6;  Surgeon: Nasim Martinez DO;  Location: [address];  Service: Neurosurgery;  Laterality: N/A;  C2-T1 laminectomy and posterior fusion. [place]. C-arm. [employer]. Neuromonitoring.    ROBOT-ASSISTED LAPAROSCOPIC NEPHRECTOMY Right 10/4/2023    Procedure: ROBOTIC NEPHRECTOMY;  Surgeon: Henry Michaels MD;  Location: [facility];  Service: Urology;  Laterality: Right;    SURGICAL REMOVAL OF VERTEBRAL BODY OF CERVICAL SPINE Right 11/7/2023    Procedure: CORPECTOMY, SPINE, CERVICAL;  Surgeon: Nasim Martinez DO;  [address];  Service: Neurosurgery;  Laterality: Right;  C4 and C5 corpectomy with globus;  wells tongs; c-arm; neuromonitoring; microscope; type and cross 2 units    SURGICAL REMOVAL OF VERTEBRAL BODY OF CERVICAL SPINE N/A 11/15/2023    Procedure: CORPECTOMY, SPINE, CERVICAL C3-6 REVISION;  Surgeon: Nasim Martinez DO;  [facility];  Service:  Neurosurgery;  Laterality: N/A;    TONSILLECTOMY         Time Tracking:     PT Received On: 01/06/24  PT Start Time: 1321     PT Stop Time: 1358  PT Total Time (min): 37 min     Billable Minutes: Evaluation 1 procedure, Therapeutic Activity 12 min, and Neuromuscular Re-education 12 min    Jose Cho, PT, DPT  1/6/2024

## 2024-01-06 NOTE — H&P
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 Is a 73 y.o. 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 Per chart review, the patient was admitted to the hospital on 1/3/24 for non-traumatic back pain. Recent outpatient MRI and CT T-spine showed new bony mets to T6, T8, and T12 with progressive tumor growth at T9 with extension into the ventral epidural space, as well as invasion into the T10 superior end plate. He was taken to IR for tumor embolization on 1/4/24 and was taken to the OR on 1/5/24 for T7-11 laminectomy and fusion. The patient will be admitted to [facility] for close monitoring and a higher level of care.       Past Medical History:   Diagnosis Date    Asthma     no inhaler use    Borderline hypertension     ED (erectile dysfunction)     Gout     Hematuria     Hypertension      Past Surgical History:   Procedure Laterality Date    COLONOSCOPY  02/10/2022    COLONOSCOPY N/A 02/10/2022    Procedure: COLONOSCOPY;  Surgeon: Desmond Keenan MD;  [facility];  Service: General;  Laterality: N/A;    POSTERIOR FUSION OF CERVICAL SPINE WITH LAMINECTOMY N/A 11/15/2023    Procedure: SPINE, CERVICAL, WITH POSTERIOR FUSION T4-6;  Surgeon: Nasim Martinez DO;  Location: [address];  Service: Neurosurgery;  Laterality: N/A;  C2-T1 laminectomy and posterior fusion. [place]. C-arm. [place]. Neuromonitoring.    ROBOT-ASSISTED LAPAROSCOPIC NEPHRECTOMY Right 10/4/2023    Procedure: ROBOTIC  NEPHRECTOMY;  Surgeon: Henry Michaels MD;  [facility];  Service: Urology;  Laterality: Right;    SURGICAL REMOVAL OF VERTEBRAL BODY OF CERVICAL SPINE Right 11/7/2023    Procedure: CORPECTOMY, SPINE, CERVICAL;  Surgeon: Nasim Martinez DO;  Location: [facility] OR 2ND FLR;  Service: Neurosurgery;  Laterality: Right;  C4 and C5 corpectomy with globus;  wells tongs; c-arm; neuromonitoring; microscope; type and cross 2 units    SURGICAL REMOVAL OF VERTEBRAL BODY OF CERVICAL SPINE N/A 11/15/2023    Procedure: CORPECTOMY, SPINE, CERVICAL C3-6 REVISION;  Surgeon: Nasim Martinez DO;  Location: [facility] OR 2ND FLR;  Service: Neurosurgery;  Laterality: N/A;    TONSILLECTOMY        No current facility-administered medications on file prior to encounter.     Current Outpatient Medications on File Prior to Encounter   Medication Sig Dispense Refill    allopurinoL (ZYLOPRIM) 100 MG tablet Take 1 tablet (100 mg total) by mouth once daily. 30 tablet 3    amlodipine-benazepril 5-10 mg (LOTREL) 5-10 mg per capsule Take 1 capsule by mouth once daily. 90 capsule 3    axitinib (INLYTA) 5 mg Tab Take 1 tablet (5 mg) by mouth 2 (two) times daily. 60 tablet 11    diazePAM (VALIUM) 5 MG tablet Take 1 tablet (5 mg total) by mouth every 6 (six) hours as needed (severe muscles spasms). 10 tablet 0    gabapentin (NEURONTIN) 300 MG capsule Take 1 capsule (300 mg total) by mouth 3 (three) times daily. 90 capsule 11    methocarbamoL (ROBAXIN) 750 MG Tab Take 1 tablet (750 mg total) by mouth 4 (four) times daily as needed (muscle spasms). 90 tablet 0    morphine (MS CONTIN) 30 MG 12 hr tablet Take 1 tablet (30 mg total) by mouth every 12 (twelve) hours. 60 tablet 0    naloxone (NARCAN) 4 mg/actuation Spry 1 spray (4mg) by nasal route as needed for opioid overdose; may repeat every 2-3 minutes in alternating nostrils until medical help arrives. Call 911 (Patient not taking: Reported on 12/28/2023) 2 each 0    omega-3 fatty  acids/fish oil (FISH OIL-OMEGA-3 FATTY ACIDS) 300-1,000 mg capsule Take 2 capsules by mouth once daily.      oxyCODONE-acetaminophen (PERCOCET) 5-325 mg per tablet Take 1 to 2 tablets by mouth every 4-6 hours as needed for pain 100 tablet 0    sildenafiL (VIAGRA) 100 MG tablet Take 1 tablet (100 mg total) by mouth daily as needed for Erectile Dysfunction. 30 tablet 11    turmeric (CURCUMIN MISC) 1,000 mg by Misc.(Non-Drug; Combo Route) route Daily.      UNABLE TO FIND Take 500 mg by mouth 2 (two) times a day. medication name: Tudca      UNABLE TO FIND Take 2 capsules by mouth 2 (two) times a day. medication name: Turkey tail mushroom      UNABLE TO FIND Take 15 drops by mouth once daily. medication name: Essiac-20      UNABLE TO FIND Take 5 mLs by mouth 2 (two) times a day. medication name: barley leaf juice powder      UNABLE TO FIND Take 5 mLs by mouth 2 (two) times a day. medication name: black seed oil (cumin seed)        Allergies: Patient has no known allergies.  History reviewed. No pertinent family history.  Social History     Tobacco Use    Smoking status: Never    Smokeless tobacco: Never   Substance Use Topics    Alcohol use: Not Currently     Alcohol/week: 0.0 standard drinks of alcohol     Comment: rarely    Drug use: Never     Review of Systems   Unable to perform ROS: Other (level of consciousness)     Objective:     Vitals:    Temp: 97.6 °F (36.4 °C)  Pulse: 76  Rhythm: normal sinus rhythm  BP: 133/70  MAP (mmHg): 95  Resp: 16  SpO2: 96 %    Temp  Min: 97.4 °F (36.3 °C)  Max: 98.1 °F (36.7 °C)  Pulse  Min: 59  Max: 118  BP  Min: 108/66  Max: 158/72  MAP (mmHg)  Min: 78  Max: 105  Resp  Min: 8  Max: 27  SpO2  Min: 94 %  Max: 100 %    01/05 0701 - 01/06 0700  In: 5312.3 [I.V.:1812.3]  Out: 1395 [Urine:1295]            Physical Exam  Vitals and nursing note reviewed.   Constitutional:       General: He is not in acute distress.     Appearance: Normal appearance.      Comments: Well developed. Well  nourished. Resting comfortably in bed.   HENT:      Head: Normocephalic and atraumatic.      Right Ear: External ear normal.      Left Ear: External ear normal.      Nose: Nose normal.      Mouth/Throat:      Mouth: Mucous membranes are moist.      Pharynx: Oropharynx is clear.   Eyes:      General: No scleral icterus.     Extraocular Movements: Extraocular movements intact.      Pupils: Pupils are equal, round, and reactive to light.   Cardiovascular:      Rate and Rhythm: Normal rate and regular rhythm.   Pulmonary:      Effort: Pulmonary effort is normal. No respiratory distress.   Abdominal:      General: Abdomen is flat. There is no distension.   Musculoskeletal:      Right lower leg: No edema.      Left lower leg: No edema.      Comments: 2 HV drains in place to full suction   Skin:     General: Skin is warm and dry.   Neurological:      Mental Status: He is alert.      Comments: E4V5M6  Awake, alert, & oriented x 4. Speech fluent. Follows commands briskly.   PERRL. EOMI. SILT in V1, V2, V3. No facial asymmetry. Conversational hearing intact.    Moves all extremities spontaneously, symmetrically, and AG. SILT in all 4 extremities. No pronator drift. Tone normal.         Gait & coordination exams deferred.        Today I personally reviewed pertinent medications, lines/drains/airways, imaging, laboratory results, notably:    Laboratory:  CBC:  Recent Labs   Lab 01/05/24 2013   WBC 13.97*   RBC 3.37*   HGB 9.9*   HCT 29.0*      MCV 86   MCH 29.4   MCHC 34.1         CMP:  Recent Labs   Lab 01/05/24 2013   CALCIUM 8.1*   ALBUMIN 2.7*   PROT 5.9*      K 4.7   CO2 15*   *   BUN 28*   CREATININE 1.1   ALKPHOS 58   *   AST 32   BILITOT 0.6     Recent Labs   Lab 01/05/24  0729   MG 2.2   PHOS 3.7       Coagulation:  Recent Labs   Lab 01/05/24 2013   LABPROT 10.8   INR 1.0   APTT 22.3   FIBRINOGEN 365     ABG:  Recent Labs     01/05/24 1956   PH 7.282*   PCO2 43.9   HCO3 20.8*    POCSATURATED 99   BE -6*         Imaging:  CT Thoracic Spine Without Contrast:  Impression:  Pathologic compression fracture of T9 status post vertebral augmentation.  Compared to 11/01/2023, there is increased height loss and an enlarging destructive lesion with extraosseous extension into the ventral epidural space resulting in moderate canal stenosis and left T9-10 neural foramen resulting in moderate foraminal stenosis.  New erosive changes involving the T10 superior endplate, concerning for direct invasion.  New 0.9 cm lytic lesion in the T6 vertebral body.  Mediastinal nicole metastases, slightly improved from 11/20/2023.  Visualized pulmonary metastases are unchanged.  This report was flagged in Epic as abnormal.  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 male with history of HTN, gout, and right RCC with mets to the spine (s/p right total nephrectomy and multiple spine surgeries) admitted s/p T7-T11 laminectomy and fusion.    [facility]  - NSGY following  - q1h neuro checks, vitals, I/Os  - PT/INR, aPTT   - CBC, CMP, mag, phos now and then daily  - SBP < 140, MAP > 85  - IVF  - Dutch gtt, wean as able  - PRN labetalol, hydralazine  - Multimodal pain regimen  - Dex 4mg q6h with PUD prophylaxis  - Post-op imaging per neurosurgery   - PT/OT/SLP as appropriate  - VTE prophylaxis: mechanical, hold chemical in the acute setting      Cardiac/Vascular  Essential hypertension  History of,  - EKG, Echo  - SBP < 140, MAP > 85  - IVF, wean as able  - Dutch gtt, wean as able   - PRN labetalol, hydralazine  - Continue home meds    Oncology  Clear cell carcinoma of right kidney  History of,   - Heme-onc following, appreciate recs  - See Pathological fracture of thoracic vertebra due to neoplastic  disease    Metastatic cancer to spine  History of,   - Heme-onc following, appreciate recs  - See 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           The patient is being Prophylaxed for:  Venous Thromboembolism with: Mechanical  Stress Ulcer with: PPI  Ventilator Pneumonia with: not applicable    Activity Orders            Diet Adult Regular (IDDSI Level 7): Regular starting at 01/05 1813    Progressive Mobility Protocol (mobilize patient to their highest level of functioning at least twice daily) starting at 01/04 2000          Full Code      Critical condition in that Patient has a condition that poses threat to life and bodily function: see problem list above     38 minutes of Critical care time was spent personally by me on the following activities: development of treatment plan with patient or surrogate and bedside caregivers, discussions with consultants, evaluation of patient's response to treatment, examination of patient, ordering and performing treatments and interventions, ordering and review of laboratory studies, ordering and review of radiographic studies, pulse oximetry, vasopressor titration if applicable, re-evaluation of patient's condition. This critical care time did not overlap with that of any other provider or involve time for any procedures. There is high probability for acute neurological change leading to clinical and possibly life-threatening deterioration requiring highest level of provider preparedness for urgent intervention.     Rosi Alfred PA-C  Neurocritical Care  [facility] - Neuro Critical Care

## 2024-01-06 NOTE — PLAN OF CARE
Problem: Physical Therapy  Goal: Physical Therapy Goal  Description: Goals to met by 1/20/2024    1. Supine to sit with Stand-by Assistance  2. Sit to supine with Stand-by Assistance  3. Rolling to Left and Right with Stand-by Assistance.  4. Sit to stand transfer with Stand-by Assistance  5. Bed to chair transfer with Stand-by Assistance using LRAD  6. Gait  x 75 feet with Contact Guard Assistance using LRAD   7. Ascend/descend 15 stair(s) with bilateral Handrails Contact Guard Assistance using LRAD.   8. Stand for 5 minutes with Stand-by Assistance using LRAD  9. Lower extremity exercise program x15 reps per Instruction, with assistance as needed in order to facilitate improved postural control and improvement in functional independence    PT Eval: 1/6/2024

## 2024-01-06 NOTE — ASSESSMENT & PLAN NOTE
History of,  - EKG, Echo  - SBP < 140, MAP > 85  - IVF, wean as able  - Dutch gtt, wean as able   - PRN labetalol, hydralazine  - Continue home meds

## 2024-01-06 NOTE — SUBJECTIVE & OBJECTIVE
Past Medical History:   Diagnosis Date    Asthma     no inhaler use    Borderline hypertension     ED (erectile dysfunction)     Gout     Hematuria     Hypertension      Past Surgical History:   Procedure Laterality Date    COLONOSCOPY  02/10/2022    COLONOSCOPY N/A 02/10/2022    Procedure: COLONOSCOPY;  Surgeon: Desmond Keenan MD;  Location: [facility];  Service: General;  Laterality: N/A;    POSTERIOR FUSION OF CERVICAL SPINE WITH LAMINECTOMY N/A 11/15/2023    Procedure: SPINE, CERVICAL, WITH POSTERIOR FUSION T4-6;  Surgeon: Nasim Martinez DO;  Location: [address];  Service: Neurosurgery;  Laterality: N/A;  C2-T1 laminectomy and posterior fusion. [place]. C-arm. Pramana. Neuromonitoring.    ROBOT-ASSISTED LAPAROSCOPIC NEPHRECTOMY Right 10/4/2023    Procedure: ROBOTIC NEPHRECTOMY;  Surgeon: Henry Michaels MD;  Location: [facility];  Service: Urology;  Laterality: Right;    SURGICAL REMOVAL OF VERTEBRAL BODY OF CERVICAL SPINE Right 11/7/2023    Procedure: CORPECTOMY, SPINE, CERVICAL;  Surgeon: Nasim Martinez DO;  Location: [address];  Service: Neurosurgery;  Laterality: Right;  C4 and C5 corpectomy with globus; familia wells tongs; c-arm; neuromonitoring; microscope; type and cross 2 units    SURGICAL REMOVAL OF VERTEBRAL BODY OF CERVICAL SPINE N/A 11/15/2023    Procedure: CORPECTOMY, SPINE, CERVICAL C3-6 REVISION;  Surgeon: Nasim Martinez DO;  Location: [facility] OR [address] [place] [address];  Service: Neurosurgery;  Laterality: N/A;    TONSILLECTOMY        No current facility-administered medications on file prior to encounter.     Current Outpatient Medications on File Prior to Encounter   Medication Sig Dispense Refill    allopurinoL (ZYLOPRIM) 100 MG tablet Take 1 tablet (100 mg total) by mouth once daily. 30 tablet 3    amlodipine-benazepril 5-10 mg (LOTREL) 5-10 mg per capsule Take 1 capsule by mouth once daily. 90 capsule 3    axitinib (INLYTA) 5 mg Tab Take 1 tablet (5 mg) by mouth 2 (two) times  daily. 60 tablet 11    diazePAM (VALIUM) 5 MG tablet Take 1 tablet (5 mg total) by mouth every 6 (six) hours as needed (severe muscles spasms). 10 tablet 0    gabapentin (NEURONTIN) 300 MG capsule Take 1 capsule (300 mg total) by mouth 3 (three) times daily. 90 capsule 11    methocarbamoL (ROBAXIN) 750 MG Tab Take 1 tablet (750 mg total) by mouth 4 (four) times daily as needed (muscle spasms). 90 tablet 0    morphine (MS CONTIN) 30 MG 12 hr tablet Take 1 tablet (30 mg total) by mouth every 12 (twelve) hours. 60 tablet 0    naloxone (NARCAN) 4 mg/actuation Spry 1 spray (4mg) by nasal route as needed for opioid overdose; may repeat every 2-3 minutes in alternating nostrils until medical help arrives. Call 911 (Patient not taking: Reported on 12/28/2023) 2 each 0    omega-3 fatty acids/fish oil (FISH OIL-OMEGA-3 FATTY ACIDS) 300-1,000 mg capsule Take 2 capsules by mouth once daily.      oxyCODONE-acetaminophen (PERCOCET) 5-325 mg per tablet Take 1 to 2 tablets by mouth every 4-6 hours as needed for pain 100 tablet 0    sildenafiL (VIAGRA) 100 MG tablet Take 1 tablet (100 mg total) by mouth daily as needed for Erectile Dysfunction. 30 tablet 11    turmeric (CURCUMIN MISC) 1,000 mg by Misc.(Non-Drug; Combo Route) route Daily.      UNABLE TO FIND Take 500 mg by mouth 2 (two) times a day. medication name: Tudca      UNABLE TO FIND Take 2 capsules by mouth 2 (two) times a day. medication name: Turkey tail mushroom      UNABLE TO FIND Take 15 drops by mouth once daily. medication name: Essiac-20      UNABLE TO FIND Take 5 mLs by mouth 2 (two) times a day. medication name: barley leaf juice powder      UNABLE TO FIND Take 5 mLs by mouth 2 (two) times a day. medication name: black seed oil (cumin seed)        Allergies: Patient has no known allergies.  History reviewed. No pertinent family history.  Social History     Tobacco Use    Smoking status: Never    Smokeless tobacco: Never   Substance Use Topics    Alcohol use: Not  Currently     Alcohol/week: 0.0 standard drinks of alcohol     Comment: rarely    Drug use: Never     Review of Systems   Unable to perform ROS: Other (level of consciousness)     Objective:     Vitals:    Temp: 97.6 °F (36.4 °C)  Pulse: 76  Rhythm: normal sinus rhythm  BP: 133/70  MAP (mmHg): 95  Resp: 16  SpO2: 96 %    Temp  Min: 97.4 °F (36.3 °C)  Max: 98.1 °F (36.7 °C)  Pulse  Min: 59  Max: 118  BP  Min: 108/66  Max: 158/72  MAP (mmHg)  Min: 78  Max: 105  Resp  Min: 8  Max: 27  SpO2  Min: 94 %  Max: 100 %    01/05 0701 - 01/06 0700  In: 5312.3 [I.V.:1812.3]  Out: 1395 [Urine:1295]            Physical Exam  Vitals and nursing note reviewed.   Constitutional:       General: He is not in acute distress.     Appearance: Normal appearance.      Comments: Well developed. Well nourished. Resting comfortably in bed.   HENT:      Head: Normocephalic and atraumatic.      Right Ear: External ear normal.      Left Ear: External ear normal.      Nose: Nose normal.      Mouth/Throat:      Mouth: Mucous membranes are moist.      Pharynx: Oropharynx is clear.   Eyes:      General: No scleral icterus.     Extraocular Movements: Extraocular movements intact.      Pupils: Pupils are equal, round, and reactive to light.   Cardiovascular:      Rate and Rhythm: Normal rate and regular rhythm.   Pulmonary:      Effort: Pulmonary effort is normal. No respiratory distress.   Abdominal:      General: Abdomen is flat. There is no distension.   Musculoskeletal:      Right lower leg: No edema.      Left lower leg: No edema.      Comments: 2 HV drains in place to full suction   Skin:     General: Skin is warm and dry.   Neurological:      Mental Status: He is alert.      Comments: E4V5M6  Awake, alert, & oriented x 4. Speech fluent. Follows commands briskly.   PERRL. EOMI. SILT in V1, V2, V3. No facial asymmetry. Conversational hearing intact.    Moves all extremities spontaneously, symmetrically, and AG. SILT in all 4 extremities. No  pronator drift. Tone normal.         Gait & coordination exams deferred.        Today I personally reviewed pertinent medications, lines/drains/airways, imaging, laboratory results, notably:    Laboratory:  CBC:  Recent Labs   Lab 01/05/24 2013   WBC 13.97*   RBC 3.37*   HGB 9.9*   HCT 29.0*      MCV 86   MCH 29.4   MCHC 34.1         CMP:  Recent Labs   Lab 01/05/24 2013   CALCIUM 8.1*   ALBUMIN 2.7*   PROT 5.9*      K 4.7   CO2 15*   *   BUN 28*   CREATININE 1.1   ALKPHOS 58   *   AST 32   BILITOT 0.6     Recent Labs   Lab 01/05/24  0729   MG 2.2   PHOS 3.7       Coagulation:  Recent Labs   Lab 01/05/24 2013   LABPROT 10.8   INR 1.0   APTT 22.3   FIBRINOGEN 365     ABG:  Recent Labs     01/05/24 1956   PH 7.282*   PCO2 43.9   HCO3 20.8*   POCSATURATED 99   BE -6*         Imaging:  CT Thoracic Spine Without Contrast:  Impression:  Pathologic compression fracture of T9 status post vertebral augmentation.  Compared to 11/01/2023, there is increased height loss and an enlarging destructive lesion with extraosseous extension into the ventral epidural space resulting in moderate canal stenosis and left T9-10 neural foramen resulting in moderate foraminal stenosis.  New erosive changes involving the T10 superior endplate, concerning for direct invasion.  New 0.9 cm lytic lesion in the T6 vertebral body.  Mediastinal nicole metastases, slightly improved from 11/20/2023.  Visualized pulmonary metastases are unchanged.  This report was flagged in Epic as abnormal.  Electronically signed by: Florencio Almonte  Date:                                            12/30/2023  Time:                                           16:41

## 2024-01-06 NOTE — PROGRESS NOTES
[facility] Critical [facility]  Neurosurgery  Progress Note    Subjective:     History of Present Illness: Gamaliel Pete Jr. is a 73 y.o. 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 Martinez due to metastatic disease to those areas. He is also s/p T9 kyphoplasty for a pathological fracture at that level. Patient states that his back pain at that level worsened after the kyphoplasty. The pain is burning and located in the middle-left area, radiating around the trunk at that level on the left. The pain is constant. He is on multiple pain medications for his cancer and states they do not help with the pain. They just make him sleepy. He denies new weakness, numbness, tingling, bowel/bladder dysfunction, gait difficulties, falls or other injuries. He has been compliant with his c-collar and wears a TLSO for comfort. Patient had outpatient MRI and CT of thoracic spine which demonstrates new hung mets to T6, T8, T12, and progressive tumor growth at T9 with extension into ventral epidural space with cord compression as well as invasion into T10 superior endplate. Patient was notified of new results and was direct admitted with plans for IR embolization of T9 lesion with OR booked for 1/5/24 for T7-T11 Robotic fusion.     Of note, the patient has started chemotherapy- Pembrolizumab and Axitinib . There are also plans for a right femur IMN on 1/9 for metastasis to this area and high fracture risk. There is also some lucency about the right shoulder int he distal clavicle/acromian that ortho plans to monitor for metastasis.    Post-Op Info:  Procedure(s) (LRB):  LAMINECTOMY, SPINE, THORACIC, WITH FUSION (N/A)   1 Day Post-Op   Interval History: POD1 s/p thoracic decompression fusion. Tolerated well and Exam stable. Ok to TTF and keep drains today. Ok to  work w PTOT w brace       Medications:  Continuous Infusions:   sodium chloride 0.9% 75 mL/hr at 01/04/24 0234     Scheduled Meds:   allopurinoL  100 mg Oral Daily    amLODIPine  5 mg Oral Daily    dexAMETHasone  4 mg Intravenous Q6H    gabapentin  300 mg Oral TID    morphine  30 mg Oral Q12H    pantoprazole  40 mg Oral Daily    polyethylene glycol  17 g Oral Daily    senna-docusate 8.6-50 mg  1 tablet Oral BID     PRN Meds:acetaminophen, albuterol-ipratropium, aluminum-magnesium hydroxide-simethicone, bisacodyL, dextrose 10%, dextrose 10%, diazePAM, glucagon (human recombinant), glucose, glucose, melatonin, methocarbamoL, naloxone, ondansetron, oxyCODONE, oxyCODONE, prochlorperazine, simethicone, sodium chloride 0.9%     Review of Systems  Objective:     Weight: 94.6 kg (208 lb 8.9 oz)  Body mass index is 29.92 kg/m².  Vital Signs (Most Recent):  Temp: 97.6 °F (36.4 °C) (01/04/24 0756)  Pulse: (!) 58 (01/04/24 0756)  Resp: 18 (01/04/24 0756)  BP: 136/61 (01/04/24 0756)  SpO2: 95 % (01/04/24 0756) Vital Signs (24h Range):  Temp:  [97 °F (36.1 °C)-98 °F (36.7 °C)] 97.6 °F (36.4 °C)  Pulse:  [55-98] 58  Resp:  [15-20] 18  SpO2:  [90 %-97 %] 95 %  BP: (119-144)/(61-69) 136/61                              Closed/Suction Drain 11/15/23 1457 Tube - 1 Right Back Accordion 10 Fr. (Active)            Closed/Suction Drain 11/15/23 1458 Tube - 2 Left Back Accordion 10 Fr. (Active)         Neurosurgery Physical Exam  General: Well developed, well nourished, no distress.  Head: Normocephalic, atraumatic.  Neurologic: Alert and oriented. Thought content appropriate  GCS: Motor: 6/Verbal: 5/Eyes: 4 GCS Total: 15  Mental Status: Awake, Alert, Oriented x 4.  Language: No aphasia.  Speech: No dysarthria.  Cranial nerves: Face symmetric, tongue midline, CN II-XII grossly intact.   Eyes: Pupils equal, round, reactive to light with accommodation, EOMI.  Pulmonary: Normal respirations, not labored, no accessory muscles used.  Sensory:  Intact to light touch throughout.  Motor Strength: Moves all extremities spontaneously with good tone. Full strength upper and lower extremities. No abnormal movements seen.      Strength   Deltoids Triceps Biceps Wrist Extension Wrist Flexion Hand    Upper: R 5/5 5/5 5/5 5/5 5/5 5/5     L 5/5 5/5 5/5 5/5 5/5 5/5       Iliopsoas Quadriceps Knee  Flexion Tibialis  anterior Gastro- cnemius EHL   Lower: R 5/5 5/5 5/5 5/5 5/5 5/5     L 5/5 5/5 5/5 5/5 5/5 5/5      [place]: Absent.  Clonus: subtle bilateral clonus (2+ beat) appreciated  Vascular: Pulses 2+ and symmetric radial and dorsalis pedis. No LE edema or skin changes.  Skin: Warm, dry and intact, no rashes.  Gait: Normal.                    Anterior and posterior incisions well healed.    Significant Labs:  Recent Labs   Lab 01/02/24 2104 01/03/24 0623 01/04/24  0602   GLU 84 106 124*   * 133* 137   K 3.8 4.4 4.7   CL 99 101 108   CO2 21* 19* 18*   BUN 17 22 28*   CREATININE 1.1 1.0 1.1   CALCIUM 10.4 9.5 9.7   MG 1.9 2.0 2.2     Recent Labs   Lab 01/02/24 2104 01/03/24 0623 01/04/24  0602   WBC 7.74 6.68 3.37*   HGB 14.3 12.4* 11.6*   HCT 40.5 35.9* 33.2*    172 189     Recent Labs   Lab 01/02/24 2104   INR 1.0   APTT 30.5     Microbiology Results (last 7 days)       ** No results found for the last 168 hours. **          All pertinent labs from the last 24 hours have been reviewed.    Significant Diagnostics:  I have reviewed and interpreted all pertinent imaging results/findings within the past 24 hours.  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 Martinez due to metastatic disease to those areas. He is also s/p T9 kyphoplasty for a pathological  fracture at that level. Unfortunately, T9 lesion continued to grow, now with hung destruction of T9 with ventral epidural space extension as well as extension to T10 superior enplate causing moderate cord compression and T9-T10 foraminal stenosis.     MRI and CT of thoracic spine which demonstrates new hung mets to T6, T8, T12, and progressive tumor growth at T9 with extension into ventral epidural space with cord compression as well as invasion into T10 superior endplate.    Now status post IR embolization on 01/04    Now s/p thoracic decompresion and fusion on 1/6    POD1 s/p thoracic decompression fusion. Tolerated well and Exam stable. Ok to TTF and keep drains today. Ok to work w PTOT w brace        -Admitted to NCC, ok to Ttf back to oncology.   -q4h neuro checks.  -FU post op XR  - FU Post op drains, Abx while in place  -TLSO brace to be worn at all times.  -Spine precautions when transferring and during bed mobility.  -Rad onc recs radiation tx to the cervical and thoracic spine. Likely okay to start 2 weeks post op once incision has healed. Will need clearance at 2 week post op visit.   -Ortho plans for intramedullary dulce to R femur 1/9. Will determine clearance after spine surgery 1/5.   -Pain control per primary team.  -Notify nsgy with any acute exam changes.           Vikas Gonzales MD  Neurosurgery  [facility]

## 2024-01-06 NOTE — ASSESSMENT & PLAN NOTE
73 y.o. male with history of HTN, gout, and right RCC with mets to the spine (s/p right total nephrectomy and multiple spine surgeries) admitted s/p T7-T11 laminectomy and fusion.    - Admit to [facility]  - NSGY following  - q1h neuro checks, vitals, I/Os  - PT/INR, aPTT   - CBC, CMP, mag, phos now and then daily  - SBP < 140, MAP > 85  - IVF  - Dutch gtt, wean as able  - PRN labetalol, hydralazine  - Multimodal pain regimen  - Dex 4mg q6h with PUD prophylaxis  - Post-op imaging per neurosurgery   - PT/OT/SLP as appropriate  - VTE prophylaxis: mechanical, hold chemical in the acute setting

## 2024-01-06 NOTE — PLAN OF CARE
Problem: Occupational Therapy  Goal: Occupational Therapy Goal  Description: Goals to be met by: 2/6/24     Patient will increase functional independence with ADLs by performing:    UE Dressing with [place].  LE Dressing with [place].  Grooming while standing with [place].  Toileting from toilet with [place] for hygiene and clothing management.   Toilet transfer to toilet with [place].    Outcome: Ongoing, Progressing

## 2024-01-06 NOTE — ASSESSMENT & PLAN NOTE
Gamaliel Pete Jr. is a 73 y.o. 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 Martinez due to metastatic disease to those areas. He is also s/p T9 kyphoplasty for a pathological fracture at that level. Unfortunately, T9 lesion continued to grow, now with hung destruction of T9 with ventral epidural space extension as well as extension to T10 superior enplate causing moderate cord compression and T9-T10 foraminal stenosis.     MRI and CT of thoracic spine which demonstrates new hung mets to T6, T8, T12, and progressive tumor growth at T9 with extension into ventral epidural space with cord compression as well as invasion into T10 superior endplate.    Now status post IR embolization on 01/04    Now s/p thoracic decompresion and fusion on 1/6    POD1 s/p thoracic decompression fusion. Tolerated well and Exam stable. Ok to TTF and keep drains today. Ok to work w PTOT w brace        -Admitted to NCC, ok to Ttf back to oncology.   -q4h neuro checks.  -FU post op XR  - FU Post op drains, Abx while in place  -TLSO brace to be worn at all times.  -Spine precautions when transferring and during bed mobility.  -Rad onc recs radiation tx to the cervical and thoracic spine. Likely okay to start 2 weeks post op once incision has healed. Will need clearance at 2 week post op visit.   -Ortho plans for intramedullary dulce to R femur 1/9. Will determine clearance after spine surgery 1/5.   -Pain control per primary team.  -Notify nsgy with any acute exam changes.

## 2024-01-06 NOTE — ASSESSMENT & PLAN NOTE
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

## 2024-01-06 NOTE — PROGRESS NOTES
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 Is a 73 y.o. 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 Per chart review, the patient was admitted to the hospital on 1/3/24 for non-traumatic back pain. Recent outpatient MRI and CT T-spine showed new bony mets to T6, T8, and T12 with progressive tumor growth at T9 with extension into the ventral epidural space, as well as invasion into the T10 superior end plate. He was taken to IR for tumor embolization on 1/4/24 and was taken to the OR on 1/5/24 for T7-11 laminectomy and fusion. The patient will be admitted to [facility] for close monitoring and a higher level of care.     Hospital Course: 01/06/2024 Slight decrease in H/H following OR; 1 unit PRBCs given overnight. MAP goal relaxed, stable for stepdown.     Interval History: 1 unit PRBCs given overnight for slight decrease in H/H. No other issues. Stable for stepdown to heme/onc.    Review of Systems   Respiratory:  Negative for cough and shortness of breath.    Cardiovascular:  Negative for chest pain and palpitations.   Gastrointestinal:  Negative for abdominal pain, nausea and vomiting.   Musculoskeletal:  Positive for back pain.   Neurological:  Negative for weakness, numbness and headaches.       Objective:     Vitals:  Temp: 97.9 °F (36.6 °C)  Pulse: 108  Rhythm: normal sinus rhythm  BP: (!) 101/58  MAP (mmHg): 75  Resp: (!) 24  SpO2: 98 %    Temp  Min: 97.4 °F (36.3 °C)  Max: 97.9 °F (36.6 °C)  Pulse   Min: 64  Max: 118  BP  Min: 101/58  Max: 163/63  MAP (mmHg)  Min: 75  Max: 105  Resp  Min: 8  Max: 27  SpO2  Min: 93 %  Max: 100 %    01/05 0701 - 01/06 0700  In: 6296 [I.V.:2464.8]  Out: 1730 [Urine:1420; Drains:210]            Physical Exam  Vitals and nursing note reviewed.   Constitutional:       General: He is not in acute distress.     Appearance: Normal appearance.   Eyes:      Extraocular Movements: Extraocular movements intact.      Pupils: Pupils are equal, round, and reactive to light.   Cardiovascular:      Rate and Rhythm: Normal rate and regular rhythm.      Pulses: Normal pulses.   Pulmonary:      Effort: Pulmonary effort is normal.   Abdominal:      Palpations: Abdomen is soft.   Musculoskeletal:         General: Normal range of motion.      Cervical back: Normal range of motion and neck supple.   Skin:     General: Skin is warm and dry.      Capillary Refill: Capillary refill takes less than 2 seconds.   Neurological:      Mental Status: He is alert and oriented to person, place, and time.      GCS: GCS eye subscore is 4. GCS verbal subscore is 5. GCS motor subscore is 6.      Cranial Nerves: Cranial nerves 2-12 are intact.      Sensory: Sensation is intact.      Motor: Motor function is intact.      Coordination: Coordination is intact.      Comments:   -E4 V5 M6  -PERRL  -Oriented to person, place, time, and situation  -Follows commands equally in all extremities  -BARRERA equally/spontaneously  -RUE 5/5  -LUE 5/5  -RLE 5/5  -LLE 5/5              Medications:  Continuous ScheduledallopurinoL, 100 mg, Daily  amLODIPine, 10 mg, Daily  dexAMETHasone, 4 mg, Q6H  enoxparin, 30 mg, Q24H (prophylaxis, 1700)  gabapentin, 300 mg, TID  methocarbamoL, 750 mg, QID  morphine, 30 mg, Q12H  pantoprazole, 40 mg, Daily  polyethylene glycol, 17 g, Daily  senna-docusate 8.6-50 mg, 1 tablet, BID    PRN0.9%  NaCl infusion (for blood administration), , Q24H PRN  acetaminophen, 650 mg, Q6H PRN  albuterol-ipratropium, 3 mL,  Q4H PRN  aluminum-magnesium hydroxide-simethicone, 30 mL, QID PRN  bisacodyL, 10 mg, Daily PRN  dextrose 10%, 12.5 g, PRN  dextrose 10%, 25 g, PRN  diazePAM, 5 mg, Q6H PRN  glucagon (human recombinant), 1 mg, PRN  glucose, 16 g, PRN  glucose, 24 g, PRN  hydrALAZINE, 25 mg, Q6H PRN  HYDROmorphone, 0.2 mg, Q6H PRN  iodixanoL, 200 mL, ONCE PRN  melatonin, 6 mg, Nightly PRN  naloxone, 0.02 mg, PRN  ondansetron, 4 mg, Q8H PRN  oxyCODONE, 5 mg, Q6H PRN  oxyCODONE, 10 mg, Q6H PRN  prochlorperazine, 5 mg, Q6H PRN  simethicone, 1 tablet, QID PRN  sodium chloride 0.9%, 10 mL, Q12H PRN  sodium chloride 0.9%, 10 mL, PRN      Today I personally reviewed pertinent medications, lines/drains/airways, imaging, cardiology results, laboratory results,  Diet  Diet Adult Regular (IDDSI Level 7)  Diet Adult Regular (IDDSI Level 7)    Assessment/Plan:     Neuro  * 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 male with history of HTN, gout, and right RCC with mets to the spine (s/p right total nephrectomy and multiple spine surgeries) admitted s/p T7-T11 laminectomy and fusion.      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    Metastatic cancer to spine  -Heme/Onc following - stable for stepdown to their service today  -See 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    Orthopedic  Gout of ankle  -Continue allopurinol          The patient is being Prophylaxed for:  Venous  Thromboembolism with: Mechanical or Chemical  Stress Ulcer with: H2B  Ventilator Pneumonia with: not applicable    Activity Orders            Diet Adult Regular (IDDSI Level 7): Regular starting at 01/05 1813    Progressive Mobility Protocol (mobilize patient to their highest level of functioning at least twice daily) starting at 01/04 2000          Full Code    Jess Pike NP  Neurocritical Care  [facility] - Oncology ([facility])

## 2024-01-07 LAB
ALBUMIN SERPL BCP-MCNC: 2.7 G/DL (ref 3.5–5.2)
ALP SERPL-CCNC: 53 U/L (ref 55–135)
ALT SERPL W/O P-5'-P-CCNC: 79 U/L (ref 10–44)
ANION GAP SERPL CALC-SCNC: 5 MMOL/L (ref 8–16)
AST SERPL-CCNC: 28 U/L (ref 10–40)
BASOPHILS # BLD AUTO: 0.01 K/UL (ref 0–0.2)
BASOPHILS NFR BLD: 0.1 % (ref 0–1.9)
BILIRUB SERPL-MCNC: 0.4 MG/DL (ref 0.1–1)
BUN SERPL-MCNC: 25 MG/DL (ref 8–23)
CALCIUM SERPL-MCNC: 8.4 MG/DL (ref 8.7–10.5)
CHLORIDE SERPL-SCNC: 108 MMOL/L (ref 95–110)
CO2 SERPL-SCNC: 20 MMOL/L (ref 23–29)
CREAT SERPL-MCNC: 1.1 MG/DL (ref 0.5–1.4)
DIFFERENTIAL METHOD BLD: ABNORMAL
EOSINOPHIL # BLD AUTO: 0 K/UL (ref 0–0.5)
EOSINOPHIL NFR BLD: 0 % (ref 0–8)
ERYTHROCYTE [DISTWIDTH] IN BLOOD BY AUTOMATED COUNT: 14.3 % (ref 11.5–14.5)
EST. GFR  (NO RACE VARIABLE): >60 ML/MIN/1.73 M^2
GLUCOSE SERPL-MCNC: 126 MG/DL (ref 70–110)
HCT VFR BLD AUTO: 22.8 % (ref 40–54)
HGB BLD-MCNC: 8 G/DL (ref 14–18)
IMM GRANULOCYTES # BLD AUTO: 0.06 K/UL (ref 0–0.04)
IMM GRANULOCYTES NFR BLD AUTO: 0.9 % (ref 0–0.5)
LYMPHOCYTES # BLD AUTO: 0.6 K/UL (ref 1–4.8)
LYMPHOCYTES NFR BLD: 8.3 % (ref 18–48)
MAGNESIUM SERPL-MCNC: 2.2 MG/DL (ref 1.6–2.6)
MCH RBC QN AUTO: 29.4 PG (ref 27–31)
MCHC RBC AUTO-ENTMCNC: 35.1 G/DL (ref 32–36)
MCV RBC AUTO: 84 FL (ref 82–98)
MONOCYTES # BLD AUTO: 0.5 K/UL (ref 0.3–1)
MONOCYTES NFR BLD: 7.4 % (ref 4–15)
NEUTROPHILS # BLD AUTO: 5.8 K/UL (ref 1.8–7.7)
NEUTROPHILS NFR BLD: 83.3 % (ref 38–73)
NRBC BLD-RTO: 0 /100 WBC
PHOSPHATE SERPL-MCNC: 3.1 MG/DL (ref 2.7–4.5)
PLATELET # BLD AUTO: 143 K/UL (ref 150–450)
PMV BLD AUTO: 9.6 FL (ref 9.2–12.9)
POTASSIUM SERPL-SCNC: 4.6 MMOL/L (ref 3.5–5.1)
PROT SERPL-MCNC: 5.4 G/DL (ref 6–8.4)
RBC # BLD AUTO: 2.72 M/UL (ref 4.6–6.2)
SODIUM SERPL-SCNC: 133 MMOL/L (ref 136–145)
WBC # BLD AUTO: 6.98 K/UL (ref 3.9–12.7)

## 2024-01-07 PROCEDURE — 80053 COMPREHEN METABOLIC PANEL: CPT | Performed by: PSYCHIATRY & NEUROLOGY

## 2024-01-07 PROCEDURE — 63600175 PHARM REV CODE 636 W HCPCS: Performed by: INTERNAL MEDICINE

## 2024-01-07 PROCEDURE — 84100 ASSAY OF PHOSPHORUS: CPT | Performed by: PSYCHIATRY & NEUROLOGY

## 2024-01-07 PROCEDURE — 25000003 PHARM REV CODE 250

## 2024-01-07 PROCEDURE — 99232 SBSQ HOSP IP/OBS MODERATE 35: CPT | Mod: ,,, | Performed by: STUDENT IN AN ORGANIZED HEALTH CARE EDUCATION/TRAINING PROGRAM

## 2024-01-07 PROCEDURE — 99233 SBSQ HOSP IP/OBS HIGH 50: CPT | Mod: GC,,, | Performed by: HOSPITALIST

## 2024-01-07 PROCEDURE — 20600001 HC STEP DOWN PRIVATE ROOM

## 2024-01-07 PROCEDURE — 36415 COLL VENOUS BLD VENIPUNCTURE: CPT | Performed by: PSYCHIATRY & NEUROLOGY

## 2024-01-07 PROCEDURE — 25000003 PHARM REV CODE 250: Performed by: INTERNAL MEDICINE

## 2024-01-07 PROCEDURE — 85025 COMPLETE CBC W/AUTO DIFF WBC: CPT | Performed by: PSYCHIATRY & NEUROLOGY

## 2024-01-07 PROCEDURE — 25000003 PHARM REV CODE 250: Performed by: STUDENT IN AN ORGANIZED HEALTH CARE EDUCATION/TRAINING PROGRAM

## 2024-01-07 PROCEDURE — 83735 ASSAY OF MAGNESIUM: CPT | Performed by: PSYCHIATRY & NEUROLOGY

## 2024-01-07 PROCEDURE — 63600175 PHARM REV CODE 636 W HCPCS: Performed by: REGISTERED NURSE

## 2024-01-07 PROCEDURE — 63600175 PHARM REV CODE 636 W HCPCS

## 2024-01-07 RX ORDER — LISINOPRIL 10 MG/1
10 TABLET ORAL DAILY
Status: DISCONTINUED | OUTPATIENT
Start: 2024-01-07 | End: 2024-01-07

## 2024-01-07 RX ADMIN — METHOCARBAMOL 750 MG: 750 TABLET ORAL at 08:01

## 2024-01-07 RX ADMIN — MORPHINE SULFATE 30 MG: 30 TABLET, FILM COATED, EXTENDED RELEASE ORAL at 08:01

## 2024-01-07 RX ADMIN — DEXAMETHASONE SODIUM PHOSPHATE 4 MG: 4 INJECTION INTRA-ARTICULAR; INTRALESIONAL; INTRAMUSCULAR; INTRAVENOUS; SOFT TISSUE at 11:01

## 2024-01-07 RX ADMIN — GABAPENTIN 300 MG: 300 CAPSULE ORAL at 03:01

## 2024-01-07 RX ADMIN — SENNOSIDES AND DOCUSATE SODIUM 1 TABLET: 8.6; 5 TABLET ORAL at 08:01

## 2024-01-07 RX ADMIN — AMLODIPINE BESYLATE 10 MG: 10 TABLET ORAL at 08:01

## 2024-01-07 RX ADMIN — DEXAMETHASONE SODIUM PHOSPHATE 4 MG: 4 INJECTION INTRA-ARTICULAR; INTRALESIONAL; INTRAMUSCULAR; INTRAVENOUS; SOFT TISSUE at 06:01

## 2024-01-07 RX ADMIN — GABAPENTIN 300 MG: 300 CAPSULE ORAL at 08:01

## 2024-01-07 RX ADMIN — ALLOPURINOL 100 MG: 100 TABLET ORAL at 08:01

## 2024-01-07 RX ADMIN — PANTOPRAZOLE SODIUM 40 MG: 40 TABLET, DELAYED RELEASE ORAL at 08:01

## 2024-01-07 RX ADMIN — DEXAMETHASONE SODIUM PHOSPHATE 4 MG: 4 INJECTION INTRA-ARTICULAR; INTRALESIONAL; INTRAMUSCULAR; INTRAVENOUS; SOFT TISSUE at 12:01

## 2024-01-07 RX ADMIN — ENOXAPARIN SODIUM 40 MG: 40 INJECTION SUBCUTANEOUS at 04:01

## 2024-01-07 RX ADMIN — DEXAMETHASONE SODIUM PHOSPHATE 4 MG: 4 INJECTION INTRA-ARTICULAR; INTRALESIONAL; INTRAMUSCULAR; INTRAVENOUS; SOFT TISSUE at 05:01

## 2024-01-07 RX ADMIN — CEFAZOLIN 2 G: 2 INJECTION, POWDER, FOR SOLUTION INTRAMUSCULAR; INTRAVENOUS at 03:01

## 2024-01-07 RX ADMIN — METHOCARBAMOL 750 MG: 750 TABLET ORAL at 12:01

## 2024-01-07 RX ADMIN — METHOCARBAMOL 750 MG: 750 TABLET ORAL at 04:01

## 2024-01-07 RX ADMIN — CEFAZOLIN 2 G: 2 INJECTION, POWDER, FOR SOLUTION INTRAMUSCULAR; INTRAVENOUS at 11:01

## 2024-01-07 NOTE — PLAN OF CARE
Plan of care reviewed with patient. No acute events or complaints during this shift. Bed alarm set, family to remain at bedside. Telemetry in place. Neuro checks q 4 charted. VSS, q1h patient rounds, bed in low position and wheels locked, rails up x2, call light and personal belongings within reach.    Gabi Sultana   1/7/2024   3:56 PM

## 2024-01-07 NOTE — ASSESSMENT & PLAN NOTE
To undergo T7-T11 Robotic fusion on 1/5/24. IR embolization 1/4/24 to minimize bleeding risk. RCRI as below. Low risk of adverse cardiac event given previous ability to achieve 4 METS and lack of comborbid medical conditions. EKG obtained - widened QRS (160ms) with 1st degree AV block and bifasicular block. Lytes within normal limits. No arrhythmia.     Revised Cardiac Risk Index   High risk surgery: No  History of ischemic heart disease: No  History of congestive heart failure: No  History of stroke: No  Insulin dependent diabetes: No  Cr > 2: No  0 points, class I risk, 3.9% risk of cardiac event    -S/p fracture repair with NSGY

## 2024-01-07 NOTE — CONSULTS
Inpatient Radiology Pre-procedure Note    History of Present Illness:  Gamaliel Pete Jr. is a 73 y.o. male who presents RCC with multiple osseous metastases. He was initially planned to have an outpatient embolization of right femoral lesion prior to surgery but was admitted last week for back surgery. Surgery still plan on intramedullary nail placement on Tuesday and would like for us to proceed with right femoral mass embolization tomorrow.    Patient currently with no complaints and feeling well following recent back surgery.    Admission H&P reviewed.  Past Medical History:   Diagnosis Date    Asthma     no inhaler use    Borderline hypertension     ED (erectile dysfunction)     Gout     Hematuria     Hypertension      Past Surgical History:   Procedure Laterality Date    COLONOSCOPY  02/10/2022    COLONOSCOPY N/A 02/10/2022    Procedure: COLONOSCOPY;  Surgeon: Desmond Keenan MD;  Location: [facility];  Service: General;  Laterality: N/A;    POSTERIOR FUSION OF CERVICAL SPINE WITH LAMINECTOMY N/A 11/15/2023    Procedure: SPINE, CERVICAL, WITH POSTERIOR FUSION T4-6;  Surgeon: Nasim Martinez DO;  Location: [address];  Service: Neurosurgery;  Laterality: N/A;  C2-T1 laminectomy and posterior fusion. [place]. C-arm. [employer]. Neuromonitoring.    ROBOT-ASSISTED LAPAROSCOPIC NEPHRECTOMY Right 10/4/2023    Procedure: ROBOTIC NEPHRECTOMY;  Surgeon: Henry Michaels MD;  Location: [facility];  Service: Urology;  Laterality: Right;    SURGICAL REMOVAL OF VERTEBRAL BODY OF CERVICAL SPINE Right 11/7/2023    Procedure: CORPECTOMY, SPINE, CERVICAL;  Surgeon: Nasim Martinez DO;  Location: [address];  Service: Neurosurgery;  Laterality: Right;  C4 and C5 corpectomy with globus;  wells tongs; c-arm; neuromonitoring; microscope; type and cross 2 units    SURGICAL REMOVAL OF VERTEBRAL BODY OF CERVICAL SPINE N/A 11/15/2023    Procedure: CORPECTOMY, SPINE, CERVICAL C3-6 REVISION;  Surgeon: Juan  Nasim CANTRELL DO;  Location: [facility] OR [address] [place] [address];  Service: Neurosurgery;  Laterality: N/A;    TONSILLECTOMY         Review of Systems:   As documented in primary team H&P    Home Meds:   Prior to Admission medications    Medication Sig Start Date End Date Taking? Authorizing Provider   allopurinoL (ZYLOPRIM) 100 MG tablet Take 1 tablet (100 mg total) by mouth once daily. 12/12/23   Ruby Turk MD   amlodipine-benazepril 5-10 mg (LOTREL) 5-10 mg per capsule Take 1 capsule by mouth once daily. 9/28/23 9/27/24  Fred Aviles MD   axitinib (INLYTA) 5 mg Tab Take 1 tablet (5 mg) by mouth 2 (two) times daily. 12/18/23   Ruby Turk MD   diazePAM (VALIUM) 5 MG tablet Take 1 tablet (5 mg total) by mouth every 6 (six) hours as needed (severe muscles spasms). 11/18/23   Esther Hood PA-C   gabapentin (NEURONTIN) 300 MG capsule Take 1 capsule (300 mg total) by mouth 3 (three) times daily. 10/30/23 10/29/24  Sejal Yates PA-C   methocarbamoL (ROBAXIN) 750 MG Tab Take 1 tablet (750 mg total) by mouth 4 (four) times daily as needed (muscle spasms). 11/18/23   Esther Hood PA-C   morphine (MS CONTIN) 30 MG 12 hr tablet Take 1 tablet (30 mg total) by mouth every 12 (twelve) hours. 12/28/23   Ann Hinton NP   naloxone (NARCAN) 4 mg/actuation Spry 1 spray (4mg) by nasal route as needed for opioid overdose; may repeat every 2-3 minutes in alternating nostrils until medical help arrives. Call 911  Patient not taking: Reported on 12/28/2023 11/2/23   Janiya Jarvis MD   omega-3 fatty acids/fish oil (FISH OIL-OMEGA-3 FATTY ACIDS) 300-1,000 mg capsule Take 2 capsules by mouth once daily.    Provider, Historical   oxyCODONE-acetaminophen (PERCOCET) 5-325 mg per tablet Take 1 to 2 tablets by mouth every 4-6 hours as needed for pain 12/28/23   Ann Hinton, NP   sildenafiL (VIAGRA) 100 MG tablet Take 1 tablet (100 mg total) by mouth daily as needed for Erectile Dysfunction. 6/2/21 12/12/23   Slava Lowery MD   turmeric (CURCUMIN MISC) 1,000 mg by Misc.(Non-Drug; Combo Route) route Daily.    Provider, Historical   UNABLE TO FIND Take 500 mg by mouth 2 (two) times a day. medication name: Tudca    Provider, Historical   UNABLE TO FIND Take 2 capsules by mouth 2 (two) times a day. medication name: Turkey tail mushroom    Provider, Historical   UNABLE TO FIND Take 15 drops by mouth once daily. medication name: Essiac-20    Provider, Historical   UNABLE TO FIND Take 5 mLs by mouth 2 (two) times a day. medication name: barley leaf juice powder    Provider, Historical   UNABLE TO FIND Take 5 mLs by mouth 2 (two) times a day. medication name: black seed oil (cumin seed)    Provider, Historical     Scheduled Meds:    allopurinoL  100 mg Oral Daily    amLODIPine  10 mg Oral Daily    ceFAZolin (ANCEF) IVPB  2 g Intravenous Q8H    dexAMETHasone  4 mg Intravenous Q6H    enoxparin  40 mg Subcutaneous Q24H (prophylaxis, 1700)    gabapentin  300 mg Oral TID    methocarbamoL  750 mg Oral QID    morphine  30 mg Oral Q12H    pantoprazole  40 mg Oral Daily    polyethylene glycol  17 g Oral Daily    senna-docusate 8.6-50 mg  1 tablet Oral BID     Continuous Infusions:   PRN Meds:0.9%  NaCl infusion (for blood administration), acetaminophen, albuterol-ipratropium, aluminum-magnesium hydroxide-simethicone, bisacodyL, dextrose 10%, dextrose 10%, diazePAM, glucagon (human recombinant), glucose, glucose, hydrALAZINE, HYDROmorphone, iodixanoL, melatonin, naloxone, ondansetron, oxyCODONE, oxyCODONE, prochlorperazine, simethicone, sodium chloride 0.9%, sodium chloride 0.9%  Anticoagulants/Antiplatelets: no anticoagulation    Allergies: Review of patient's allergies indicates:  No Known Allergies  Sedation Hx: have not been any systemic reactions    Labs:  Recent Labs   Lab 01/05/24 2013   INR 1.0       Recent Labs   Lab 01/07/24  0531   WBC 6.98   HGB 8.0*   HCT 22.8*   MCV 84   *      Recent Labs   Lab 01/07/24  0531  "  *   *   K 4.6      CO2 20*   BUN 25*   CREATININE 1.1   CALCIUM 8.4*   MG 2.2   ALT 79*   AST 28   ALBUMIN 2.7*   BILITOT 0.4         Vitals:  Temp: 98.6 °F (37 °C) (01/07/24 1132)  Pulse: 96 (01/07/24 1511)  Resp: 16 (01/07/24 1132)  BP: 126/64 (01/07/24 1132)  SpO2: 96 % (01/07/24 1300)     Physical Exam:  ASA: 3  Mallampati: 3    General: no acute distress  Mental Status: alert and oriented to person, place and time  HEENT: normocephalic, atraumatic  Chest: unlabored breathing  Heart: regular heart rate  Abdomen: nondistended  Extremity: moves all extremities    Plan: Right femoral tumor embolization from left femoral access  Sedation Plan: titi Chow MD ([place])  Interventional Radiology          "

## 2024-01-07 NOTE — ASSESSMENT & PLAN NOTE
Gamaliel Pete Jr. is a 73 y.o. 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 Martinez due to metastatic disease to those areas. He is also s/p T9 kyphoplasty for a pathological fracture at that level. Unfortunately, T9 lesion continued to grow, now with hung destruction of T9 with ventral epidural space extension as well as extension to T10 superior enplate causing moderate cord compression and T9-T10 foraminal stenosis.     MRI and CT of thoracic spine which demonstrates new hung mets to T6, T8, T12, and progressive tumor growth at T9 with extension into ventral epidural space with cord compression as well as invasion into T10 superior endplate.    Now status post IR embolization on 01/04    Now s/p thoracic decompresion and fusion on 1/6    POD1 s/p thoracic decompression fusion. Tolerated well and Exam stable. Ok to TTF and keep drains today. Ok to work w PTOT w brace        -Admitted to NCC, ok to Ttf back to oncology.   -q4h neuro checks.  -FU post op XR  - FU Post op drains, Abx while in place  -TLSO brace to be worn at all times.  -Spine precautions when transferring and during bed mobility.  -Rad onc recs radiation tx to the cervical and thoracic spine. Likely okay to start 2 weeks post op once incision has healed. Will need clearance at 2 week post op visit.   -Ortho plans for intramedullary dulce to R femur 1/9. Will determine clearance after spine surgery 1/5.   -Pain control per primary team.  -Notify nsgy with any acute exam changes.      You can try alpha lipoic acid for your neuropathy.  Take 600 mg daily. This is available over the counter.  Potential benefits: improvement in stabbing pain, burning pain, paresthesia, and asleep numbness  Potential adverse effects: nausea, vomiting, and vertigo  Try for 4 weeks.  If you don't notice a benefit then you should stop.      Capsaicin cream is an over the counter topical which can help with neuropathic pain.      Take duloxetine 60 mg daily x 2 weeks then stop.

## 2024-01-07 NOTE — PLAN OF CARE
POC reviewed with patient; understanding verbalized. Pt. with nonskid footwear on, bed in lowest position, and locked with bed rails up x2.  Pt. instructed to call prior to getting OOB.  Pt. has call light and personal items within reach. Patient is bedrest. VSS and afebrile this shift. All questions and concerns addressed at this time. Will continue to monitor.

## 2024-01-07 NOTE — PROGRESS NOTES
[facility] ([facility])  Hematology/Oncology  Progress Note    Patient Name: Gamaliel Pete Jr.  Admission Date: 1/2/2024  Hospital Length of Stay: 5 days  Code Status: Full Code     Subjective:     HPI:  Mr. Pete is a 74yo man with a past medical history of asthma, HTN, ED, gout, and HTN.  He is also followed in Heme-Onc clinic by Dr. 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  She notes that he was being referred to Dr. Rock for right femur findings, for which IMN surgery on 1/9/24 with preoperative embolization by IR.  He just started systemic therapy with Pembrolizumab (KEYTRUDA) and Axitinib.       His bony and lung metastatic disease has also been complicated by lytic lesions in his spine with pathologic compression fractures.  He was admitted 11/6/23-11/9/23 with cervical corpectomy with NSGY on 11/7/23 which he tolerated well. They obtained tissue samples and sent it over to pathology. IR to perform osteocool/kyphoplasty/biopsy T9 11/14. 2nd stage of surgery with NSGY on 11/15 with Dr. Martinez after kyphoplasty.  He was then admitted 11/4/23-11/18/23 and underwent IR kyphoplasty of T4-6, SPINE, CERVICAL, WITH POSTERIOR FUSION T4-6 (N/A), CORPECTOMY, SPINE, CERVICAL C3-6 REVISION (N/A).      He recently underwent NC CT T-spine on 12/30/23 which showed a pathologic compression fracture of T9 status post vertebral augmentation.  Compared to 11/01/2023, there is increased height loss and an enlarging destructive lesion with extraosseous extension into the ventral epidural space resulting in moderate canal stenosis and left T9-10 neural foramen resulting in moderate foraminal stenosis.  New erosive changes involving the T10 superior endplate, concerning for direct invasion.  New 0.9 cm lytic lesion in the T6 vertebral body.       Follow up MRI T-spine that same day showed  "1. Relative to prior MRI of the thoracic spine performed 10/31/2023, the patient is now status post vertebral augmentation at T9, at the level of presumed pathologic fracture due to metastasis. There has been further height loss of the vertebra since the reference MR examination, however configuration of the T9 vertebra appears similar when compared to more recently performed intervening CTA of the chest of 11/20/2023.  2. On the current examination, centered at the T9 level there is posterior buckling of the cortex and overlying enhancing soft tissue in the ventral epidural space, possibly combination of extraosseous extension of neoplastic soft tissue and inflammatory change. This enhancing soft tissue measures on the order of 8 x 19 x 27 mm (AP x ML x CC). This contributes to moderate central spinal canal stenosis with effacement of CSF and ventral cord deformity. Question intramedullary edema-like signal.  3. Additionally, there is new mild height loss of the T8 vertebra with patchy edema-like signal enhancement since the October 2023 MRI, at least some of which is likely related to acute or subacute fracture deformity, with underlying extension of metastasis not excluded. Question subtle height loss of the T8 vertebra since the 11/20/2023 CT chest.  4. Further, there appears to be a new enhancing lesion within the left paramedian T6 vertebral body, possibly metastatic lesion.  Equivocal new T2 weighted lesion within T12 vertebra.     Dr. Martinez followed up and found that his back pain was worse and notified Dr. Turk, "His thoracic fracture is worse and there looks like there is now epidural extension of tumor at T9 causing cord compression.  I have called the patient and his back pain is worse.  I think he needs a thoracic decompression/fusion in the near future."  Dr. Turk instructed him to come to the ED.  The patient tells me that his legs are fine with no focal weakness or neuropathic pain.  He is " mostly having severe pain to his mid-thoracic back making it almost impossible to move around.  He is fine if lying perfectly flat on his back without moving, but excruciating with any attempt to stand or walk.       His only other complaint is new gout that started over the past few days to his ankles and maybe early gout to left knee.    Interval History: NAEON and no new complaints. Pt states significant improvement in pain after surgery. Working with PT/OT. Reaching out to ortho onc for plan regarding femur stabilization surgery originally scheduled for Tuesday.     Oncology Treatment Plan:   OP AXITINIB + PEMBROLIZUMAB 200MG Q3W    Medications:  Continuous Infusions:  Scheduled Meds:   allopurinoL  100 mg Oral Daily    amLODIPine  10 mg Oral Daily    dexAMETHasone  4 mg Intravenous Q6H    enoxparin  40 mg Subcutaneous Q24H (prophylaxis, 1700)    gabapentin  300 mg Oral TID    methocarbamoL  750 mg Oral QID    morphine  30 mg Oral Q12H    pantoprazole  40 mg Oral Daily    polyethylene glycol  17 g Oral Daily    senna-docusate 8.6-50 mg  1 tablet Oral BID     PRN Meds:0.9%  NaCl infusion (for blood administration), acetaminophen, albuterol-ipratropium, aluminum-magnesium hydroxide-simethicone, bisacodyL, dextrose 10%, dextrose 10%, diazePAM, glucagon (human recombinant), glucose, glucose, hydrALAZINE, HYDROmorphone, iodixanoL, melatonin, naloxone, ondansetron, oxyCODONE, oxyCODONE, prochlorperazine, simethicone, sodium chloride 0.9%, sodium chloride 0.9%     Review of Systems   Constitutional:  Negative for chills and fever.   Respiratory:  Negative for shortness of breath.    Cardiovascular:  Negative for chest pain.   Gastrointestinal:  Negative for abdominal pain, nausea and vomiting.   Genitourinary:  Negative for dysuria.   Musculoskeletal:  Positive for back pain.   Neurological:  Negative for headaches.     Objective:     Vital Signs (Most Recent):  Temp: 98.6 °F (37 °C) (01/07/24 1132)  Pulse: 92  (01/07/24 1132)  Resp: 16 (01/07/24 1132)  BP: 126/64 (01/07/24 1132)  SpO2: 96 % (01/07/24 1300) Vital Signs (24h Range):  Temp:  [97.8 °F (36.6 °C)-98.6 °F (37 °C)] 98.6 °F (37 °C)  Pulse:  [] 92  Resp:  [16-17] 16  SpO2:  [92 %-98 %] 96 %  BP: (113-146)/(57-66) 126/64     Weight: 94.6 kg (208 lb 8.9 oz)  Body mass index is 29.92 kg/m².  Body surface area is 2.16 meters squared.      Intake/Output Summary (Last 24 hours) at 1/7/2024 1423  Last data filed at 1/7/2024 1300  Gross per 24 hour   Intake 500 ml   Output 2010 ml   Net -1510 ml        Physical Exam  HENT:      Head: Normocephalic and atraumatic.      Mouth/Throat:      Pharynx: Oropharynx is clear.   Eyes:      General: No scleral icterus.     Extraocular Movements: Extraocular movements intact.   Cardiovascular:      Rate and Rhythm: Normal rate and regular rhythm.      Pulses: Normal pulses.      Heart sounds: No murmur heard.  Pulmonary:      Effort: Pulmonary effort is normal. No respiratory distress.      Breath sounds: Normal breath sounds. No wheezing.   Abdominal:      General: There is no distension.      Palpations: Abdomen is soft.      Tenderness: There is no abdominal tenderness.   Musculoskeletal:      Right lower leg: No edema.      Left lower leg: No edema.      Comments: Drain present from surgical site   Skin:     General: Skin is warm and dry.      Coloration: Skin is not jaundiced.   Neurological:      Mental Status: Mental status is at baseline.   Psychiatric:         Mood and Affect: Mood normal.         Behavior: Behavior normal.          Significant Labs:   CBC:   Recent Labs   Lab 01/05/24 2013 01/06/24 0125 01/07/24  0531   WBC 13.97* 15.12* 6.98   HGB 9.9* 10.6* 8.0*   HCT 29.0* 30.7* 22.8*    267 143*    and CMP:   Recent Labs   Lab 01/05/24 2013 01/06/24  0125 01/07/24  0531    138 133*   K 4.7 4.7 4.6   * 112* 108   CO2 15* 17* 20*   * 141* 126*   BUN 28* 28* 25*   CREATININE 1.1 1.0 1.1    CALCIUM 8.1* 8.2* 8.4*   PROT 5.9* 5.8* 5.4*   ALBUMIN 2.7* 2.7* 2.7*   BILITOT 0.6 0.5 0.4   ALKPHOS 58 61 53*   AST 32 30 28   * 129* 79*   ANIONGAP 12 9 5*       Diagnostic Results:  I have reviewed and interpreted all pertinent imaging results/findings within the past 24 hours.  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 Noted metastatic process on T6, T8, T12, and progressive tumor growth at T9 with extension into ventral epidural space with cord compression as well as invasion into T10 superior endplate. Patient was notified of new results and was direct admitted with plans for IR embolization of T9 lesion with OR booked for 1/5/24 for T7-T11 Robotic fusion.     PLAN:  -pain control  -s/p repair with NSGY, they are following, see there note for further details.  -dexamethasone 4 mg q6hr    Gout of ankle  Continue allopurinol    Debility  -PT/OT consulted; recommending high intensity therapy at discharge.    Essential hypertension  Holding Lisinopril in anticipation of surgical intervention.     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 IR embolization 1/4/24 to minimize bleeding risk. RCRI as below. Low risk of adverse cardiac event given previous ability to achieve 4 METS and lack of comborbid medical conditions. EKG obtained - widened QRS (160ms) with 1st degree AV block and bifasicular block. Lytes within normal limits. No arrhythmia.     Revised Cardiac Risk Index   High risk surgery: No  History of ischemic heart disease: No  History of congestive heart failure: No  History of stroke: No  Insulin dependent diabetes: No  Cr > 2: No  0 points, class I risk, 3.9% risk of cardiac event    -S/p fracture repair with NSGY    Class 1 obesity  without serious comorbidity with body mass index (BMI) of 31.0 to 31.9 in adult  Advised to maintain regular physical activity following recovery from orthopedic and neurosurgical procedures             Dennis Davis DO  Hematology/Oncology  [address] - Oncology ([facility])

## 2024-01-07 NOTE — ASSESSMENT & PLAN NOTE
Pathologic T9 fx with severe pain with movement. Noted metastatic process on T6, T8, T12, and progressive tumor growth at T9 with extension into ventral epidural space with cord compression as well as invasion into T10 superior endplate. Patient was notified of new results and was direct admitted with plans for IR embolization of T9 lesion with OR booked for 1/5/24 for T7-T11 Robotic fusion.     PLAN:  -pain control  -s/p repair with NSGY, they are following, see there note for further details.  -dexamethasone 4 mg q6hr

## 2024-01-07 NOTE — SUBJECTIVE & OBJECTIVE
Interval History: NAEON. Pt was up in bed this morning eating breakfast. Drains put out 270cc and 0 cc.     Medications:  Continuous Infusions:  Scheduled Meds:   allopurinoL  100 mg Oral Daily    amLODIPine  10 mg Oral Daily    dexAMETHasone  4 mg Intravenous Q6H    enoxparin  40 mg Subcutaneous Q24H (prophylaxis, 1700)    gabapentin  300 mg Oral TID    methocarbamoL  750 mg Oral QID    morphine  30 mg Oral Q12H    pantoprazole  40 mg Oral Daily    polyethylene glycol  17 g Oral Daily    senna-docusate 8.6-50 mg  1 tablet Oral BID     PRN Meds:0.9%  NaCl infusion (for blood administration), acetaminophen, albuterol-ipratropium, aluminum-magnesium hydroxide-simethicone, bisacodyL, dextrose 10%, dextrose 10%, diazePAM, glucagon (human recombinant), glucose, glucose, hydrALAZINE, HYDROmorphone, iodixanoL, melatonin, naloxone, ondansetron, oxyCODONE, oxyCODONE, prochlorperazine, simethicone, sodium chloride 0.9%, sodium chloride 0.9%     Review of Systems  Objective:     Weight: 94.6 kg (208 lb 8.9 oz)  Body mass index is 29.92 kg/m².  Vital Signs (Most Recent):  Temp: 98.3 °F (36.8 °C) (01/07/24 0728)  Pulse: 72 (01/07/24 0728)  Resp: 16 (01/07/24 0728)  BP: (!) 146/66 (01/07/24 0728)  SpO2: 97 % (01/07/24 0728) Vital Signs (24h Range):  Temp:  [97.8 °F (36.6 °C)-98.5 °F (36.9 °C)] 98.3 °F (36.8 °C)  Pulse:  [] 72  Resp:  [16-24] 16  SpO2:  [92 %-98 %] 97 %  BP: (101-163)/(57-72) 146/66  Arterial Line BP: (159-163)/(63-64) 163/63                              Closed/Suction Drain 01/05/24 1930 Inferior;Left Back (Active)   Site Description Healing 01/06/24 1305   Dressing Type Transparent (Tegaderm) 01/06/24 1305   Dressing Status Clean;Dry;Intact 01/06/24 2026   Dressing Intervention Integrity maintained 01/06/24 1305   Drainage Sanguineous 01/06/24 1305   Status Other (Comment) 01/06/24 1305   Output (mL) 0 mL 01/06/24 1405            Closed/Suction Drain 01/05/24 1930 Inferior;Lateral;Right Back (Active)    Site Description Healing 01/06/24 1305   Dressing Type Transparent (Tegaderm) 01/06/24 1305   Dressing Status Clean;Dry;Intact 01/06/24 2026   Dressing Intervention Integrity maintained 01/06/24 1305   Drainage Sanguineous 01/06/24 1305   Status Other (Comment) 01/06/24 1305   Output (mL) 80 mL 01/07/24 0600          Physical Exam       General: Well developed, well nourished, no distress.  Head: Normocephalic, atraumatic.  Neurologic: Alert and oriented. Thought content appropriate  GCS: Motor: 6/Verbal: 5/Eyes: 4 GCS Total: 15  Mental Status: Awake, Alert, Oriented x 4.  Language: No aphasia.  Speech: No dysarthria.  Cranial nerves: Face symmetric, tongue midline, CN II-XII grossly intact.   Eyes: Pupils equal, round, reactive to light with accommodation, EOMI.  Pulmonary: Normal respirations, not labored, no accessory muscles used.  Sensory: Intact to light touch throughout.  Motor Strength: Moves all extremities spontaneously with good tone. Full strength upper and lower extremities. No abnormal movements seen.      Strength   Deltoids Triceps Biceps Wrist Extension Wrist Flexion Hand    Upper: R 5/5 5/5 5/5 5/5 5/5 5/5     L 5/5 5/5 5/5 5/5 5/5 5/5       Iliopsoas Quadriceps Knee  Flexion Tibialis  anterior Gastro- cnemius EHL   Lower: R 5/5 5/5 5/5 5/5 5/5 5/5     L 5/5 5/5 5/5 5/5 5/5 5/5      Maldonado: Absent.  Clonus: subtle bilateral clonus (2+ beat) appreciated  Vascular: Pulses 2+ and symmetric radial and dorsalis pedis. No LE edema or skin changes.  Skin: Warm, dry and intact, no rashes.  Gait: Normal.                    Anterior and posterior incisions well healed.    Significant Labs:  Recent Labs   Lab 01/05/24 2013 01/06/24  0125 01/07/24  0531   * 141* 126*    138 133*   K 4.7 4.7 4.6   * 112* 108   CO2 15* 17* 20*   BUN 28* 28* 25*   CREATININE 1.1 1.0 1.1   CALCIUM 8.1* 8.2* 8.4*   MG  --  2.3 2.2     Recent Labs   Lab 01/05/24 2013 01/06/24  0125 01/07/24  0531   WBC  13.97* 15.12* 6.98   HGB 9.9* 10.6* 8.0*   HCT 29.0* 30.7* 22.8*    267 143*     Recent Labs   Lab 01/05/24 2013   INR 1.0   APTT 22.3     Microbiology Results (last 7 days)       ** No results found for the last 168 hours. **          All pertinent labs from the last 24 hours have been reviewed.    Significant Diagnostics:  I have reviewed all pertinent imaging results/findings within the past 24 hours.

## 2024-01-07 NOTE — SUBJECTIVE & OBJECTIVE
Interval History: NAEON and no new complaints. Pt states significant improvement in pain after surgery. Working with PT/OT. Reaching out to ortho onc for plan regarding femur stabilization surgery originally scheduled for Tuesday.     Oncology Treatment Plan:   OP AXITINIB + PEMBROLIZUMAB 200MG Q3W    Medications:  Continuous Infusions:  Scheduled Meds:   allopurinoL  100 mg Oral Daily    amLODIPine  10 mg Oral Daily    dexAMETHasone  4 mg Intravenous Q6H    enoxparin  40 mg Subcutaneous Q24H (prophylaxis, 1700)    gabapentin  300 mg Oral TID    methocarbamoL  750 mg Oral QID    morphine  30 mg Oral Q12H    pantoprazole  40 mg Oral Daily    polyethylene glycol  17 g Oral Daily    senna-docusate 8.6-50 mg  1 tablet Oral BID     PRN Meds:0.9%  NaCl infusion (for blood administration), acetaminophen, albuterol-ipratropium, aluminum-magnesium hydroxide-simethicone, bisacodyL, dextrose 10%, dextrose 10%, diazePAM, glucagon (human recombinant), glucose, glucose, hydrALAZINE, HYDROmorphone, iodixanoL, melatonin, naloxone, ondansetron, oxyCODONE, oxyCODONE, prochlorperazine, simethicone, sodium chloride 0.9%, sodium chloride 0.9%     Review of Systems   Constitutional:  Negative for chills and fever.   Respiratory:  Negative for shortness of breath.    Cardiovascular:  Negative for chest pain.   Gastrointestinal:  Negative for abdominal pain, nausea and vomiting.   Genitourinary:  Negative for dysuria.   Musculoskeletal:  Positive for back pain.   Neurological:  Negative for headaches.     Objective:     Vital Signs (Most Recent):  Temp: 98.6 °F (37 °C) (01/07/24 1132)  Pulse: 92 (01/07/24 1132)  Resp: 16 (01/07/24 1132)  BP: 126/64 (01/07/24 1132)  SpO2: 96 % (01/07/24 1300) Vital Signs (24h Range):  Temp:  [97.8 °F (36.6 °C)-98.6 °F (37 °C)] 98.6 °F (37 °C)  Pulse:  [] 92  Resp:  [16-17] 16  SpO2:  [92 %-98 %] 96 %  BP: (113-146)/(57-66) 126/64     Weight: 94.6 kg (208 lb 8.9 oz)  Body mass index is 29.92  kg/m².  Body surface area is 2.16 meters squared.      Intake/Output Summary (Last 24 hours) at 1/7/2024 1423  Last data filed at 1/7/2024 1300  Gross per 24 hour   Intake 500 ml   Output 2010 ml   Net -1510 ml        Physical Exam  HENT:      Head: Normocephalic and atraumatic.      Mouth/Throat:      Pharynx: Oropharynx is clear.   Eyes:      General: No scleral icterus.     Extraocular Movements: Extraocular movements intact.   Cardiovascular:      Rate and Rhythm: Normal rate and regular rhythm.      Pulses: Normal pulses.      Heart sounds: No murmur heard.  Pulmonary:      Effort: Pulmonary effort is normal. No respiratory distress.      Breath sounds: Normal breath sounds. No wheezing.   Abdominal:      General: There is no distension.      Palpations: Abdomen is soft.      Tenderness: There is no abdominal tenderness.   Musculoskeletal:      Right lower leg: No edema.      Left lower leg: No edema.      Comments: Drain present from surgical site   Skin:     General: Skin is warm and dry.      Coloration: Skin is not jaundiced.   Neurological:      Mental Status: Mental status is at baseline.   Psychiatric:         Mood and Affect: Mood normal.         Behavior: Behavior normal.          Significant Labs:   CBC:   Recent Labs   Lab 01/05/24 2013 01/06/24  0125 01/07/24  0531   WBC 13.97* 15.12* 6.98   HGB 9.9* 10.6* 8.0*   HCT 29.0* 30.7* 22.8*    267 143*    and CMP:   Recent Labs   Lab 01/05/24 2013 01/06/24  0125 01/07/24  0531    138 133*   K 4.7 4.7 4.6   * 112* 108   CO2 15* 17* 20*   * 141* 126*   BUN 28* 28* 25*   CREATININE 1.1 1.0 1.1   CALCIUM 8.1* 8.2* 8.4*   PROT 5.9* 5.8* 5.4*   ALBUMIN 2.7* 2.7* 2.7*   BILITOT 0.6 0.5 0.4   ALKPHOS 58 61 53*   AST 32 30 28   * 129* 79*   ANIONGAP 12 9 5*       Diagnostic Results:  I have reviewed and interpreted all pertinent imaging results/findings within the past 24 hours.

## 2024-01-07 NOTE — PROGRESS NOTES
[facility] - Oncology ([facility])  Neurosurgery  Progress Note    Subjective:     History of Present Illness: Gamaliel Pete Jr. is a 73 y.o. 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 Martinez due to metastatic disease to those areas. He is also s/p T9 kyphoplasty for a pathological fracture at that level. Patient states that his back pain at that level worsened after the kyphoplasty. The pain is burning and located in the middle-left area, radiating around the trunk at that level on the left. The pain is constant. He is on multiple pain medications for his cancer and states they do not help with the pain. They just make him sleepy. He denies new weakness, numbness, tingling, bowel/bladder dysfunction, gait difficulties, falls or other injuries. He has been compliant with his c-collar and wears a TLSO for comfort. Patient had outpatient MRI and CT of thoracic spine which demonstrates new hung mets to T6, T8, T12, and progressive tumor growth at T9 with extension into ventral epidural space with cord compression as well as invasion into T10 superior endplate. Patient was notified of new results and was direct admitted with plans for IR embolization of T9 lesion with OR booked for 1/5/24 for T7-T11 Robotic fusion.     Of note, the patient has started chemotherapy- Pembrolizumab and Axitinib . There are also plans for a right femur IMN on 1/9 for metastasis to this area and high fracture risk. There is also some lucency about the right shoulder int he distal clavicle/acromian that ortho plans to monitor for metastasis.    Post-Op Info:  Procedure(s) (LRB):  LAMINECTOMY, SPINE, THORACIC, WITH FUSION (N/A)   2 Days Post-Op   Interval History: NAEON. Pt was up in bed this morning eating breakfast. Drains put out 270cc and 0 cc.      Medications:  Continuous Infusions:  Scheduled Meds:   allopurinoL  100 mg Oral Daily    amLODIPine  10 mg Oral Daily    dexAMETHasone  4 mg Intravenous Q6H    enoxparin  40 mg Subcutaneous Q24H (prophylaxis, 1700)    gabapentin  300 mg Oral TID    methocarbamoL  750 mg Oral QID    morphine  30 mg Oral Q12H    pantoprazole  40 mg Oral Daily    polyethylene glycol  17 g Oral Daily    senna-docusate 8.6-50 mg  1 tablet Oral BID     PRN Meds:0.9%  NaCl infusion (for blood administration), acetaminophen, albuterol-ipratropium, aluminum-magnesium hydroxide-simethicone, bisacodyL, dextrose 10%, dextrose 10%, diazePAM, glucagon (human recombinant), glucose, glucose, hydrALAZINE, HYDROmorphone, iodixanoL, melatonin, naloxone, ondansetron, oxyCODONE, oxyCODONE, prochlorperazine, simethicone, sodium chloride 0.9%, sodium chloride 0.9%     Review of Systems  Objective:     Weight: 94.6 kg (208 lb 8.9 oz)  Body mass index is 29.92 kg/m².  Vital Signs (Most Recent):  Temp: 98.3 °F (36.8 °C) (01/07/24 0728)  Pulse: 72 (01/07/24 0728)  Resp: 16 (01/07/24 0728)  BP: (!) 146/66 (01/07/24 0728)  SpO2: 97 % (01/07/24 0728) Vital Signs (24h Range):  Temp:  [97.8 °F (36.6 °C)-98.5 °F (36.9 °C)] 98.3 °F (36.8 °C)  Pulse:  [] 72  Resp:  [16-24] 16  SpO2:  [92 %-98 %] 97 %  BP: (101-163)/(57-72) 146/66  Arterial Line BP: (159-163)/(63-64) 163/63                              Closed/Suction Drain 01/05/24 1930 Inferior;Left Back (Active)   Site Description Healing 01/06/24 1305   Dressing Type Transparent (Tegaderm) 01/06/24 1305   Dressing Status Clean;Dry;Intact 01/06/24 2026   Dressing Intervention Integrity maintained 01/06/24 1305   Drainage Sanguineous 01/06/24 1305   Status Other (Comment) 01/06/24 1305   Output (mL) 0 mL 01/06/24 1405            Closed/Suction Drain 01/05/24 1930 Inferior;Lateral;Right Back (Active)   Site Description Healing 01/06/24 1305   Dressing Type Transparent (Tegaderm) 01/06/24 1305   Dressing  Status Clean;Dry;Intact 01/06/24 2026   Dressing Intervention Integrity maintained 01/06/24 1305   Drainage Sanguineous 01/06/24 1305   Status Other (Comment) 01/06/24 1305   Output (mL) 80 mL 01/07/24 0600          Physical Exam       General: Well developed, well nourished, no distress.  Head: Normocephalic, atraumatic.  Neurologic: Alert and oriented. Thought content appropriate  GCS: Motor: 6/Verbal: 5/Eyes: 4 GCS Total: 15  Mental Status: Awake, Alert, Oriented x 4.  Language: No aphasia.  Speech: No dysarthria.  Cranial nerves: Face symmetric, tongue midline, CN II-XII grossly intact.   Eyes: Pupils equal, round, reactive to light with accommodation, EOMI.  Pulmonary: Normal respirations, not labored, no accessory muscles used.  Sensory: Intact to light touch throughout.  Motor Strength: Moves all extremities spontaneously with good tone. Full strength upper and lower extremities. No abnormal movements seen.      Strength   Deltoids Triceps Biceps Wrist Extension Wrist Flexion Hand    Upper: R 5/5 5/5 5/5 5/5 5/5 5/5     L 5/5 5/5 5/5 5/5 5/5 5/5       Iliopsoas Quadriceps Knee  Flexion Tibialis  anterior Gastro- cnemius EHL   Lower: R 5/5 5/5 5/5 5/5 5/5 5/5     L 5/5 5/5 5/5 5/5 5/5 5/5      Maldonado: Absent.  Clonus: subtle bilateral clonus (2+ beat) appreciated  Vascular: Pulses 2+ and symmetric radial and dorsalis pedis. No LE edema or skin changes.  Skin: Warm, dry and intact, no rashes.  Gait: Normal.                    Anterior and posterior incisions well healed.    Significant Labs:  Recent Labs   Lab 01/05/24 2013 01/06/24 0125 01/07/24  0531   * 141* 126*    138 133*   K 4.7 4.7 4.6   * 112* 108   CO2 15* 17* 20*   BUN 28* 28* 25*   CREATININE 1.1 1.0 1.1   CALCIUM 8.1* 8.2* 8.4*   MG  --  2.3 2.2     Recent Labs   Lab 01/05/24 2013 01/06/24 0125 01/07/24  0531   WBC 13.97* 15.12* 6.98   HGB 9.9* 10.6* 8.0*   HCT 29.0* 30.7* 22.8*    267 143*     Recent Labs   Lab  01/05/24 2013   INR 1.0   APTT 22.3     Microbiology Results (last 7 days)       ** No results found for the last 168 hours. **          All pertinent labs from the last 24 hours have been reviewed.    Significant Diagnostics:  I have reviewed all pertinent imaging results/findings within the past 24 hours.  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 is a 73 y.o. 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 Martinez due to metastatic disease to those areas. He is also s/p T9 kyphoplasty for a pathological fracture at that level. Unfortunately, T9 lesion continued to grow, now with hung destruction of T9 with ventral epidural space extension as well as extension to T10 superior enplate causing moderate cord compression and T9-T10 foraminal stenosis.     MRI and CT of thoracic spine which demonstrates new hung mets to T6, T8, T12, and progressive tumor growth at T9 with extension into ventral epidural space with cord compression as well as invasion into T10 superior endplate.    Now status post IR embolization on 01/04    Now s/p thoracic decompresion and fusion on 1/6    POD1 s/p thoracic decompression fusion. Tolerated well and Exam stable. Ok to TTF and keep drains today. Ok to work w PTOT w brace        -Admitted to [facility], ok to Ttf back to oncology.   -q4h neuro checks.  -FU post op XR  - FU Post op drains, Abx while in place  -TLSO brace to be worn at all times.  -Spine precautions when transferring and during bed mobility.  -Rad onc recs radiation tx to the cervical and thoracic spine. Likely okay to start 2 weeks post op once incision has healed. Will need clearance at 2 week post op visit.   -Ortho plans for intramedullary dulce to R femur 1/9. Will determine clearance after  spine surgery 1/5.   -Pain control per primary team.  -Notify nsgy with any acute exam changes.           Brandon Hawk MD  Neurosurgery  [facility] - Oncology ([facility])

## 2024-01-07 NOTE — ASSESSMENT & PLAN NOTE
Holding Lisinopril in anticipation of surgical intervention.     -Continue amlodipine 10mg  -Will resume lisinopril as appropriate

## 2024-01-07 NOTE — PLAN OF CARE
1919 REC'D RESTING IN BED VISITING WITH FAMILY. A/A/OX4. IN NO DISTRESS. REQUESTING VITALS AND ASSESSMENT TO BE COMPLETED AFTER FINISHING MEAL BROUGHT BY FAMILY. DENIES PAIN. CALL CAR NEAR, ENC TO CALL FOR ANY NEEDS/ASSISTANCE.     0608 NO CHANGES OVER THE NIGHT. NO COMPLAINTS OF PAIN. DRAINS INTACT-NO OUTPUT FROM LEFT DRAIN, 180ML FROM RIGHT DRAIN. SAFETY MEASURES INTACT.

## 2024-01-08 ENCOUNTER — TELEPHONE (OUTPATIENT)
Dept: ORTHOPEDICS | Facility: CLINIC | Age: 74
End: 2024-01-08
Payer: MEDICARE

## 2024-01-08 PROBLEM — D64.9 ANEMIA: Status: ACTIVE | Noted: 2024-01-08

## 2024-01-08 LAB
ABO + RH BLD: NORMAL
ALBUMIN SERPL BCP-MCNC: 2.6 G/DL (ref 3.5–5.2)
ALP SERPL-CCNC: 49 U/L (ref 55–135)
ALT SERPL W/O P-5'-P-CCNC: 58 U/L (ref 10–44)
ANION GAP SERPL CALC-SCNC: 8 MMOL/L (ref 8–16)
AST SERPL-CCNC: 22 U/L (ref 10–40)
BASOPHILS # BLD AUTO: 0.01 K/UL (ref 0–0.2)
BASOPHILS NFR BLD: 0.1 % (ref 0–1.9)
BILIRUB SERPL-MCNC: 0.3 MG/DL (ref 0.1–1)
BLD GP AB SCN CELLS X3 SERPL QL: NORMAL
BLD PROD TYP BPU: NORMAL
BLOOD UNIT EXPIRATION DATE: NORMAL
BLOOD UNIT TYPE CODE: 9500
BLOOD UNIT TYPE: NORMAL
BUN SERPL-MCNC: 26 MG/DL (ref 8–23)
CALCIUM SERPL-MCNC: 8.4 MG/DL (ref 8.7–10.5)
CHLORIDE SERPL-SCNC: 108 MMOL/L (ref 95–110)
CO2 SERPL-SCNC: 20 MMOL/L (ref 23–29)
CODING SYSTEM: NORMAL
CREAT SERPL-MCNC: 1 MG/DL (ref 0.5–1.4)
CROSSMATCH INTERPRETATION: NORMAL
DIFFERENTIAL METHOD BLD: ABNORMAL
DISPENSE STATUS: NORMAL
EOSINOPHIL # BLD AUTO: 0 K/UL (ref 0–0.5)
EOSINOPHIL NFR BLD: 0 % (ref 0–8)
ERYTHROCYTE [DISTWIDTH] IN BLOOD BY AUTOMATED COUNT: 14 % (ref 11.5–14.5)
EST. GFR  (NO RACE VARIABLE): >60 ML/MIN/1.73 M^2
GLUCOSE SERPL-MCNC: 124 MG/DL (ref 70–110)
HCT VFR BLD AUTO: 19.8 % (ref 40–54)
HGB BLD-MCNC: 7 G/DL (ref 14–18)
IMM GRANULOCYTES # BLD AUTO: 0.15 K/UL (ref 0–0.04)
IMM GRANULOCYTES NFR BLD AUTO: 2 % (ref 0–0.5)
LYMPHOCYTES # BLD AUTO: 0.6 K/UL (ref 1–4.8)
LYMPHOCYTES NFR BLD: 7.3 % (ref 18–48)
MAGNESIUM SERPL-MCNC: 2.1 MG/DL (ref 1.6–2.6)
MCH RBC QN AUTO: 29.2 PG (ref 27–31)
MCHC RBC AUTO-ENTMCNC: 35.4 G/DL (ref 32–36)
MCV RBC AUTO: 83 FL (ref 82–98)
MONOCYTES # BLD AUTO: 0.5 K/UL (ref 0.3–1)
MONOCYTES NFR BLD: 6.9 % (ref 4–15)
NEUTROPHILS # BLD AUTO: 6.3 K/UL (ref 1.8–7.7)
NEUTROPHILS NFR BLD: 83.7 % (ref 38–73)
NRBC BLD-RTO: 0 /100 WBC
NUM UNITS TRANS PACKED RBC: NORMAL
PHOSPHATE SERPL-MCNC: 2.9 MG/DL (ref 2.7–4.5)
PLATELET # BLD AUTO: 151 K/UL (ref 150–450)
PMV BLD AUTO: 9.2 FL (ref 9.2–12.9)
POTASSIUM SERPL-SCNC: 4.4 MMOL/L (ref 3.5–5.1)
PROT SERPL-MCNC: 5.5 G/DL (ref 6–8.4)
RBC # BLD AUTO: 2.4 M/UL (ref 4.6–6.2)
SODIUM SERPL-SCNC: 136 MMOL/L (ref 136–145)
SPECIMEN OUTDATE: NORMAL
WBC # BLD AUTO: 7.55 K/UL (ref 3.9–12.7)

## 2024-01-08 PROCEDURE — 86901 BLOOD TYPING SEROLOGIC RH(D): CPT

## 2024-01-08 PROCEDURE — 97530 THERAPEUTIC ACTIVITIES: CPT | Mod: CQ

## 2024-01-08 PROCEDURE — 36415 COLL VENOUS BLD VENIPUNCTURE: CPT | Performed by: PSYCHIATRY & NEUROLOGY

## 2024-01-08 PROCEDURE — 25000003 PHARM REV CODE 250

## 2024-01-08 PROCEDURE — 63600175 PHARM REV CODE 636 W HCPCS

## 2024-01-08 PROCEDURE — P9016 RBC LEUKOCYTES REDUCED: HCPCS

## 2024-01-08 PROCEDURE — 99024 POSTOP FOLLOW-UP VISIT: CPT | Mod: ,,,

## 2024-01-08 PROCEDURE — 63600175 PHARM REV CODE 636 W HCPCS: Performed by: INTERNAL MEDICINE

## 2024-01-08 PROCEDURE — 36415 COLL VENOUS BLD VENIPUNCTURE: CPT | Mod: XB

## 2024-01-08 PROCEDURE — 63600175 PHARM REV CODE 636 W HCPCS: Performed by: REGISTERED NURSE

## 2024-01-08 PROCEDURE — 80053 COMPREHEN METABOLIC PANEL: CPT | Performed by: PSYCHIATRY & NEUROLOGY

## 2024-01-08 PROCEDURE — 99233 SBSQ HOSP IP/OBS HIGH 50: CPT | Mod: GC,,, | Performed by: HOSPITALIST

## 2024-01-08 PROCEDURE — 85025 COMPLETE CBC W/AUTO DIFF WBC: CPT | Performed by: PSYCHIATRY & NEUROLOGY

## 2024-01-08 PROCEDURE — 25000003 PHARM REV CODE 250: Performed by: INTERNAL MEDICINE

## 2024-01-08 PROCEDURE — 20600001 HC STEP DOWN PRIVATE ROOM

## 2024-01-08 PROCEDURE — 25000003 PHARM REV CODE 250: Performed by: STUDENT IN AN ORGANIZED HEALTH CARE EDUCATION/TRAINING PROGRAM

## 2024-01-08 PROCEDURE — 86920 COMPATIBILITY TEST SPIN: CPT

## 2024-01-08 PROCEDURE — 84100 ASSAY OF PHOSPHORUS: CPT | Performed by: PSYCHIATRY & NEUROLOGY

## 2024-01-08 PROCEDURE — 83735 ASSAY OF MAGNESIUM: CPT | Performed by: PSYCHIATRY & NEUROLOGY

## 2024-01-08 PROCEDURE — 36430 TRANSFUSION BLD/BLD COMPNT: CPT

## 2024-01-08 RX ORDER — DEXAMETHASONE 4 MG/1
4 TABLET ORAL EVERY 12 HOURS
Status: DISCONTINUED | OUTPATIENT
Start: 2024-01-12 | End: 2024-01-10 | Stop reason: HOSPADM

## 2024-01-08 RX ORDER — DEXAMETHASONE 4 MG/1
4 TABLET ORAL EVERY 8 HOURS
Status: DISCONTINUED | OUTPATIENT
Start: 2024-01-09 | End: 2024-01-10 | Stop reason: HOSPADM

## 2024-01-08 RX ORDER — DEXAMETHASONE 1 MG/1
2 TABLET ORAL DAILY
Status: DISCONTINUED | OUTPATIENT
Start: 2024-01-20 | End: 2024-01-10 | Stop reason: HOSPADM

## 2024-01-08 RX ORDER — DEXAMETHASONE 1 MG/1
2 TABLET ORAL EVERY 12 HOURS
Status: DISCONTINUED | OUTPATIENT
Start: 2024-01-18 | End: 2024-01-10 | Stop reason: HOSPADM

## 2024-01-08 RX ORDER — DEXAMETHASONE 1 MG/1
2 TABLET ORAL EVERY 8 HOURS
Status: DISCONTINUED | OUTPATIENT
Start: 2024-01-16 | End: 2024-01-10 | Stop reason: HOSPADM

## 2024-01-08 RX ORDER — DEXAMETHASONE 1 MG/1
2 TABLET ORAL EVERY 6 HOURS
Status: DISCONTINUED | OUTPATIENT
Start: 2024-01-14 | End: 2024-01-10 | Stop reason: HOSPADM

## 2024-01-08 RX ORDER — BISACODYL 10 MG/1
10 SUPPOSITORY RECTAL DAILY
Status: DISCONTINUED | OUTPATIENT
Start: 2024-01-08 | End: 2024-01-09

## 2024-01-08 RX ORDER — HYDROCODONE BITARTRATE AND ACETAMINOPHEN 500; 5 MG/1; MG/1
TABLET ORAL
Status: DISCONTINUED | OUTPATIENT
Start: 2024-01-08 | End: 2024-01-10 | Stop reason: HOSPADM

## 2024-01-08 RX ADMIN — CEFAZOLIN 2 G: 2 INJECTION, POWDER, FOR SOLUTION INTRAMUSCULAR; INTRAVENOUS at 05:01

## 2024-01-08 RX ADMIN — ALLOPURINOL 100 MG: 100 TABLET ORAL at 08:01

## 2024-01-08 RX ADMIN — METHOCARBAMOL 750 MG: 750 TABLET ORAL at 12:01

## 2024-01-08 RX ADMIN — PANTOPRAZOLE SODIUM 40 MG: 40 TABLET, DELAYED RELEASE ORAL at 08:01

## 2024-01-08 RX ADMIN — METHOCARBAMOL 750 MG: 750 TABLET ORAL at 04:01

## 2024-01-08 RX ADMIN — SENNOSIDES AND DOCUSATE SODIUM 1 TABLET: 8.6; 5 TABLET ORAL at 08:01

## 2024-01-08 RX ADMIN — ENOXAPARIN SODIUM 40 MG: 40 INJECTION SUBCUTANEOUS at 04:01

## 2024-01-08 RX ADMIN — MORPHINE SULFATE 30 MG: 30 TABLET, FILM COATED, EXTENDED RELEASE ORAL at 08:01

## 2024-01-08 RX ADMIN — GABAPENTIN 300 MG: 300 CAPSULE ORAL at 02:01

## 2024-01-08 RX ADMIN — DEXAMETHASONE SODIUM PHOSPHATE 4 MG: 4 INJECTION INTRA-ARTICULAR; INTRALESIONAL; INTRAMUSCULAR; INTRAVENOUS; SOFT TISSUE at 11:01

## 2024-01-08 RX ADMIN — DEXAMETHASONE SODIUM PHOSPHATE 4 MG: 4 INJECTION INTRA-ARTICULAR; INTRALESIONAL; INTRAMUSCULAR; INTRAVENOUS; SOFT TISSUE at 05:01

## 2024-01-08 RX ADMIN — METHOCARBAMOL 750 MG: 750 TABLET ORAL at 08:01

## 2024-01-08 RX ADMIN — GABAPENTIN 300 MG: 300 CAPSULE ORAL at 08:01

## 2024-01-08 RX ADMIN — AMLODIPINE BESYLATE 10 MG: 10 TABLET ORAL at 08:01

## 2024-01-08 RX ADMIN — BISACODYL 10 MG: 10 SUPPOSITORY RECTAL at 10:01

## 2024-01-08 RX ADMIN — CEFAZOLIN 2 G: 2 INJECTION, POWDER, FOR SOLUTION INTRAMUSCULAR; INTRAVENOUS at 08:01

## 2024-01-08 NOTE — PHYSICIAN QUERY
PT Name: Gamaliel Pete Jr.  MR #: [id number]    DOCUMENTATION CLARIFICATION    CDI : Kimberli Shaikh RN, CDS              Contact information: [email or web address]    This form is a permanent document in the medical record.      Query Date: January 8, 2024    By submitting this query, we are merely seeking further clarification of documentation. Please utilize your independent clinical judgment when addressing the question(s) below.    The Medical Record contains the following:   Indicators  Supporting Clinical Findings Location in Medical Record   x Anemia documented [facility] Anesthesia record 1/4   x H&H H&H   14.3/40.5  H&H   12.4/35.9  H&H     7.0/19.8   Labs 1/2  Labs 1/3  Labs 1/8   x BP                    /138  HR 82  BP: (124-160)/()   Pulse:  [70-98]   BP: (101-163)/(57-72) Pulse:  []  ED provider 1/2  Neurosurgery 1/3  Neurosurgery 1/7     x Bleeding Drain output 210  Drains put out 270cc and 0 cc.     Neuro Critical Care 1/6  Neurosurgery 1/7     x Procedure/Surgery Performed/EBL PROCEDURE PERFORMED:  1. Laminectomy for decompression of thecal sac T8-10  2. Bilateral complete facetectomies T8/9, 9/10  3. Bilateral T9 transpedicular decompression for resection of ventral epidural tumor  4. Posterior spinal fusion, T7-11  5. Segmental fixation with pedicle screws and rods, T7-11 Globus  6. Use of Globus robotic navigation for instrumentation.  7. Synthetic bone grafting  8. Use of intraoperative neuromonitoring with MEPs, SSEPs  9. Use of intraoperative flouroscopy  10. Cement augmentation of bilateral T7,11 pedicle screws   cc   OP 1/5   x Transfusion(s) Transfusing with 1 u PRBC's Blood transfusion record 1/8   x Acute/Chronic illness T9 pathologic compression fracture with ventral epidural tumor  Severe thoracic stenosis T9  HTN, Clear cell carcinoma of right kidney , metastatic right RCC to the spine ,s/p total nephrectomy,  OP 1/5    Neuro critical care 1/5    Treatments       Other       Provider, please specify diagnosis or diagnoses associated with above clinical findings.   [   ] Acute blood loss anemia    [ x ] Acute blood loss anemia expected post-operatively    [   ] Other Hematological Diagnosis (please specify): _________________            Please document in your progress notes daily for the duration of treatment, until resolved, and include in your discharge summary.    Form No. [id number]

## 2024-01-08 NOTE — ASSESSMENT & PLAN NOTE
Pathologic T9 fx with severe pain with movement. Noted metastatic process on T6, T8, T12, and progressive tumor growth at T9 with extension into ventral epidural space with cord compression as well as invasion into T10 superior endplate. Patient was notified of new results and was direct admitted with plans for IR embolization of T9 lesion with OR booked for 1/5/24 for T7-T11 Robotic fusion.     PLAN:  -pain control  -s/p repair with NSGY, pt being transferred back to their service   -dexamethasone 4 mg q6hr; taper per NSGY  -Ancef 2g q8 while drains in place

## 2024-01-08 NOTE — PT/OT/SLP PROGRESS
Occupational Therapy      Patient Name:  Gamaliel VANDANA Pete Jr.   MRN:  [id number]    Patient not seen today secondary to pt. Just returned to bed and getting ready to receive blood. Will attempt 01-09-24 1/8/2024

## 2024-01-08 NOTE — SUBJECTIVE & OBJECTIVE
Interval History: Pt labs showed HH of 7/19; proceeding with 1 unit pRBC. Pt will transfer back to [facility] for continued care.     Oncology Treatment Plan:   OP AXITINIB + PEMBROLIZUMAB 200MG Q3W    Medications:  Continuous Infusions:  Scheduled Meds:   allopurinoL  100 mg Oral Daily    amLODIPine  10 mg Oral Daily    bisacodyL  10 mg Rectal Daily    ceFAZolin (ANCEF) IVPB  2 g Intravenous Q8H    dexAMETHasone  4 mg Intravenous Q6H    [START ON 1/9/2024] dexAMETHasone  4 mg Oral Q8H    Followed by    [START ON 1/12/2024] dexAMETHasone  4 mg Oral Q12H    Followed by    [START ON 1/14/2024] dexAMETHasone  2 mg Oral Q6H    Followed by    [START ON 1/16/2024] dexAMETHasone  2 mg Oral Q8H    Followed by    [START ON 1/18/2024] dexAMETHasone  2 mg Oral Q12H    Followed by    [START ON 1/20/2024] dexAMETHasone  2 mg Oral Daily    enoxparin  40 mg Subcutaneous Q24H (prophylaxis, 1700)    gabapentin  300 mg Oral TID    methocarbamoL  750 mg Oral QID    morphine  30 mg Oral Q12H    pantoprazole  40 mg Oral Daily    polyethylene glycol  17 g Oral Daily    senna-docusate 8.6-50 mg  1 tablet Oral BID     PRN Meds:0.9%  NaCl infusion (for blood administration), 0.9%  NaCl infusion (for blood administration), acetaminophen, albuterol-ipratropium, aluminum-magnesium hydroxide-simethicone, bisacodyL, dextrose 10%, dextrose 10%, diazePAM, glucagon (human recombinant), glucose, glucose, hydrALAZINE, HYDROmorphone, iodixanoL, melatonin, naloxone, ondansetron, oxyCODONE, oxyCODONE, prochlorperazine, simethicone, sodium chloride 0.9%, sodium chloride 0.9%     Review of Systems   Constitutional:  Negative for chills and fever.   Respiratory:  Negative for shortness of breath.    Cardiovascular:  Negative for chest pain.   Gastrointestinal:  Positive for constipation.   Genitourinary:  Negative for dysuria.   Neurological:  Negative for dizziness, light-headedness and headaches.     Objective:     Vital Signs (Most Recent):  Temp: 98.4 °F  (36.9 °C) (01/08/24 1115)  Pulse: 84 (01/08/24 1115)  Resp: 20 (01/08/24 1115)  BP: 137/72 (01/08/24 1115)  SpO2: 95 % (01/08/24 1251) Vital Signs (24h Range):  Temp:  [97.8 °F (36.6 °C)-99.3 °F (37.4 °C)] 98.4 °F (36.9 °C)  Pulse:  [55-96] 84  Resp:  [16-20] 20  SpO2:  [94 %-97 %] 95 %  BP: (131-154)/(60-72) 137/72     Weight: 94.6 kg (208 lb 8.9 oz)  Body mass index is 29.92 kg/m².  Body surface area is 2.16 meters squared.      Intake/Output Summary (Last 24 hours) at 1/8/2024 1410  Last data filed at 1/8/2024 1251  Gross per 24 hour   Intake 310.38 ml   Output 3065 ml   Net -2754.62 ml        Physical Exam  Constitutional:       General: He is not in acute distress.  HENT:      Head: Normocephalic and atraumatic.      Mouth/Throat:      Mouth: Mucous membranes are moist.      Pharynx: Oropharynx is clear.   Eyes:      General: No scleral icterus.  Cardiovascular:      Rate and Rhythm: Normal rate and regular rhythm.      Pulses: Normal pulses.      Heart sounds: Normal heart sounds. No murmur heard.  Pulmonary:      Effort: Pulmonary effort is normal. No respiratory distress.      Breath sounds: Normal breath sounds. No wheezing.   Abdominal:      Palpations: Abdomen is soft.      Tenderness: There is no abdominal tenderness.   Musculoskeletal:      Right lower leg: No edema.      Left lower leg: No edema.   Skin:     General: Skin is warm and dry.      Comments: Pt with 2 drains on his back and surgical scar down his spine; clean and without signs of infection or oozing.    Neurological:      Mental Status: He is alert. Mental status is at baseline.   Psychiatric:         Mood and Affect: Mood normal.         Behavior: Behavior normal.          Significant Labs:   CBC:   Recent Labs   Lab 01/07/24  0531 01/08/24  0310   WBC 6.98 7.55   HGB 8.0* 7.0*   HCT 22.8* 19.8*   * 151    and CMP:   Recent Labs   Lab 01/07/24  0531 01/08/24  0310   * 136   K 4.6 4.4    108   CO2 20* 20*   * 124*    BUN 25* 26*   CREATININE 1.1 1.0   CALCIUM 8.4* 8.4*   PROT 5.4* 5.5*   ALBUMIN 2.7* 2.6*   BILITOT 0.4 0.3   ALKPHOS 53* 49*   AST 28 22   ALT 79* 58*   ANIONGAP 5* 8       Diagnostic Results:  I have reviewed all pertinent imaging results/findings within the past 24 hours.

## 2024-01-08 NOTE — ASSESSMENT & PLAN NOTE
Gamaliel Pete Jr. is a 73 y.o. 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 Martinez due to metastatic disease to those areas. He is also s/p T9 kyphoplasty for a pathological fracture at that level. Unfortunately, the T9 lesion has continued to grow, now with hung destruction of T9 with ventral epidural space extension as well as extension to T10 superior enplate causing moderate cord compression and T9-T10 foraminal stenosis.     MRI and CT of thoracic spine which demonstrates new hung mets to T6, T8, T12, and progressive tumor growth at T9 with extension into ventral epidural space with cord compression as well as invasion into T10 superior endplate.    S/p T9 embolization on 1/04.    Now s/p T7-11 decompression and fusion on 1/6 with Dr. Martinez.    - Stepped down to Oncology floor. Patient will transfer to NSGY team primary.  - q4h neurochecks.  - Chemotx on hold perioperatively. Will resume per ONC.  - Decadron taper in.  - Post op XR pending.  - HV drains x 2 to full suction. IV abx while in place.  -TLSO brace to be worn when upright or OOB.  - PT/OT.  - Bowel regimen. Adding suppository today.  - Rad onc recs radiation tx to the cervical and thoracic spine. Likely okay to start 2 weeks post op once incision has healed. Will need clearance at 2 week post op visit.   - R femur IMN with ortho scheduled for 1/9 postponed 2/2 low H/H.  - Pain control with multimodal regimen.  - DVT ppx: LVX/tedhose/SCDs.  - Atelectasis ppx: IS hourly, up in chair at least 6hrs/day.

## 2024-01-08 NOTE — PT/OT/SLP PROGRESS
Physical Therapy Treatment    Patient Name:  Gamaliel Pete Jr.   MRN:  [id number]    Recommendations:     Discharge Recommendations: High Intensity Therapy  Discharge Equipment Recommendations: none  Barriers to discharge: Inaccessible home Evolving clinical presentation    Assessment:     Gamaliel Pete Jr. is a 73 y.o. 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  He presents with the following impairments/functional limitations: weakness, impaired endurance, impaired self care skills, impaired functional mobility, gait instability, impaired balance, decreased coordination, decreased upper extremity function, decreased lower extremity function, pain requiring light assistance and verbal cues for bed mob, sit < > stand transitions, OOB/BTB transfers, and gait to prevent falls due to weakness, pain, fatigue.   In light of pt's current functional level and deficits, it is anticipated that pt will need to participate in a high intensity rehab program consisting of PT and OT in order to achieve full rehab potential to return to previous level of function and roles.  Pt remains motivated to participate in PT session and will cont to benefit from skilled PT intervention.    Rehab Prognosis: Good; patient would benefit from acute skilled PT services to address these deficits and reach maximum level of function.    Recent Surgery: Procedure(s) (LRB):  LAMINECTOMY, SPINE, THORACIC, WITH FUSION (N/A) 3 Days Post-Op    Plan:     During this hospitalization, patient to be seen 4 x/week to address the identified rehab impairments via gait training, therapeutic activities, therapeutic exercises, neuromuscular re-education and progress toward the following goals:    Plan of Care Expires:  02/06/24    Subjective     Chief Complaint: weakness, fatigue    Pain/Comfort:  Pain Rating 1:  (no rating provided)  Location - Orientation 1: generalized  Location 1:  back  Pain Addressed 1: Pre-medicate for activity, Reposition, Distraction  Pain Rating Post-Intervention 1:  (no rating provided)      Objective:     Communicated with nurse (Gabi) prior to session.  Patient found sitting edge of bed with cervical collar, hemovac, telemetry, TLSO (JONATHON hose to EMBER Quiles, wife present) upon PT entry to room.     General Precautions: Standard, fall  Orthopedic Precautions: spinal precautions  Braces: Cervical collar, TLSO  Respiratory Status: Room air     Functional Mobility:  Transfers:     Sit to Stand:  from moderately raised EOB with CGA with rolling walker  Bed to Chair: contact guard assistance with  rolling walker  using  Step Transfer  Gait: RW 2-3 steps to the R during step transfer with CGA  Balance: static standing with RW CGA      AM-PAC 6 CLICK MOBILITY  Turning over in bed (including adjusting bedclothes, sheets and blankets)?: 3  Sitting down on and standing up from a chair with arms (e.g., wheelchair, bedside commode, etc.): 3  Moving from lying on back to sitting on the side of the bed?: 3  Moving to and from a bed to a chair (including a wheelchair)?: 3  Need to walk in hospital room?: 3  Climbing 3-5 steps with a railing?: 1  Basic Mobility Total Score: 16       Treatment & Education:  Patient provided with daily orientation and goals of this PT session. They were educated to call for assistance and to transfer with hospital staff only.  Also, pt was educated on the effects of prolonged immobility and the importance of performing OOB activity and exercises to promote healing and reduce recovery time    Patient left up in chair  with 3 blankets to build seat up, all lines intact, call button in reach, nurse notified, and wife present..    GOALS:   Multidisciplinary Problems       Physical Therapy Goals          Problem: Physical Therapy    Goal Priority Disciplines Outcome Goal Variances Interventions   Physical Therapy Goal     PT, PT/OT Ongoing, Progressing      Description: Goals to met by 1/20/2024    1. Supine to sit with Stand-by Assistance  2. Sit to supine with Stand-by Assistance  3. Rolling to Left and Right with Stand-by Assistance.  4. Sit to stand transfer with Stand-by Assistance  5. Bed to chair transfer with Stand-by Assistance using LRAD  6. Gait  x 75 feet with Contact Guard Assistance using LRAD   7. Ascend/descend 15 stair(s) with bilateral Handrails Contact Guard Assistance using LRAD.   8. Stand for 5 minutes with Stand-by Assistance using LRAD  9. Lower extremity exercise program x15 reps per Instruction, with assistance as needed in order to facilitate improved postural control and improvement in functional independence                       Time Tracking:     PT Received On: 01/08/24  PT Start Time: 1125     PT Stop Time: 1142  PT Total Time (min): 17 min     Billable Minutes: Therapeutic Activity 17    Treatment Type: Treatment  PT/PTA: PTA     Number of PTA visits since last PT visit: 1 01/08/2024

## 2024-01-08 NOTE — PROGRESS NOTES
[facility] - Oncology ([facility])  Neurosurgery  Progress Note    Subjective:     History of Present Illness: Gamaliel Pete Jr. is a 73 y.o. 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 Martinez due to metastatic disease to those areas. He is also s/p T9 kyphoplasty for a pathological fracture at that level. Patient states that his back pain at that level worsened after the kyphoplasty. The pain is burning and located in the middle-left area, radiating around the trunk at that level on the left. The pain is constant. He is on multiple pain medications for his cancer and states they do not help with the pain. They just make him sleepy. He denies new weakness, numbness, tingling, bowel/bladder dysfunction, gait difficulties, falls or other injuries. He has been compliant with his c-collar and wears a TLSO for comfort. Patient had outpatient MRI and CT of thoracic spine which demonstrates new hnug mets to T6, T8, T12, and progressive tumor growth at T9 with extension into ventral epidural space with cord compression as well as invasion into T10 superior endplate. Patient was notified of new results and was direct admitted with plans for IR embolization of T9 lesion with OR booked for 1/5/24 for T7-T11 Robotic fusion.     Of note, the patient has started chemotherapy- Pembrolizumab and Axitinib . There are also plans for a right femur IMN on 1/9 for metastasis to this area and high fracture risk. There is also some lucency about the right shoulder int he distal clavicle/acromian that ortho plans to monitor for metastasis.    Post-Op Info:  Procedure(s) (LRB):  LAMINECTOMY, SPINE, THORACIC, WITH FUSION (N/A)   3 Days Post-Op   Interval History: NAEON. Patient states his back pain preop has resolved and has expected surgical pain/soreness. Sat at EOB with  therapy, hopeful to ambulate today. Hg is 7.0 this AM. Primary will transfuse 1 unit prbcs. Will keep drains today. Post op XR is pending. Decadron wean in. No BM since preop. Increasing bowel regimen. While in the room, Dr. Ivy with Ortho called patient to inform him that she will be moving his IMN procedure to a later date 2/2 his low blood counts.     Plan for transfer of patient to NSGY team primary.    Medications:  Continuous Infusions:  Scheduled Meds:   allopurinoL  100 mg Oral Daily    amLODIPine  10 mg Oral Daily    bisacodyL  10 mg Rectal Daily    ceFAZolin (ANCEF) IVPB  2 g Intravenous Q8H    dexAMETHasone  4 mg Intravenous Q6H    [START ON 1/9/2024] dexAMETHasone  4 mg Oral Q8H    Followed by    [START ON 1/12/2024] dexAMETHasone  4 mg Oral Q12H    Followed by    [START ON 1/15/2024] dexAMETHasone  2 mg Oral Q6H    Followed by    [START ON 1/18/2024] dexAMETHasone  2 mg Oral Q8H    Followed by    [START ON 1/21/2024] dexAMETHasone  2 mg Oral Q12H    Followed by    [START ON 1/24/2024] dexAMETHasone  2 mg Oral Daily    enoxparin  40 mg Subcutaneous Q24H (prophylaxis, 1700)    gabapentin  300 mg Oral TID    methocarbamoL  750 mg Oral QID    morphine  30 mg Oral Q12H    pantoprazole  40 mg Oral Daily    polyethylene glycol  17 g Oral Daily    senna-docusate 8.6-50 mg  1 tablet Oral BID     PRN Meds:0.9%  NaCl infusion (for blood administration), 0.9%  NaCl infusion (for blood administration), acetaminophen, albuterol-ipratropium, aluminum-magnesium hydroxide-simethicone, bisacodyL, dextrose 10%, dextrose 10%, diazePAM, glucagon (human recombinant), glucose, glucose, hydrALAZINE, HYDROmorphone, iodixanoL, melatonin, naloxone, ondansetron, oxyCODONE, oxyCODONE, prochlorperazine, simethicone, sodium chloride 0.9%, sodium chloride 0.9%     Review of Systems  Objective:     Weight: 94.6 kg (208 lb 8.9 oz)  Body mass index is 29.92 kg/m².  Vital Signs (Most Recent):  Temp: 98.1 °F (36.7 °C) (01/08/24  0732)  Pulse: 60 (01/08/24 0732)  Resp: 16 (01/08/24 0828)  BP: (!) 143/60 (01/08/24 0732)  SpO2: 96 % (01/08/24 0732) Vital Signs (24h Range):  Temp:  [97.8 °F (36.6 °C)-99.3 °F (37.4 °C)] 98.1 °F (36.7 °C)  Pulse:  [55-96] 60  Resp:  [16-18] 16  SpO2:  [94 %-98 %] 96 %  BP: (126-154)/(60-69) 143/60                              Closed/Suction Drain 01/05/24 1930 Inferior;Left Back (Active)   Site Description Healing 01/07/24 0748   Dressing Type Transparent (Tegaderm) 01/07/24 0748   Dressing Status Clean;Dry;Intact 01/07/24 0748   Dressing Intervention Integrity maintained 01/07/24 0748   Drainage Sanguineous 01/07/24 0748   Status Other (Comment) 01/07/24 0748   Output (mL) 0 mL 01/08/24 0612            Closed/Suction Drain 01/05/24 1930 Inferior;Lateral;Right Back (Active)   Site Description Healing 01/07/24 0748   Dressing Type Transparent (Tegaderm) 01/07/24 0748   Dressing Status Intact;Clean;Dry 01/07/24 0748   Dressing Intervention Integrity maintained 01/07/24 0748   Drainage Sanguineous 01/07/24 0748   Status Other (Comment) 01/07/24 0748   Output (mL) 30 mL 01/08/24 0612         Neurosurgery Physical Exam  General: Well developed, well nourished, no distress.  Head: Normocephalic, atraumatic.  Neurologic: Alert and oriented. Thought content appropriate  GCS: Motor: 6/Verbal: 5/Eyes: 4 GCS Total: 15  Mental Status: Awake, Alert, Oriented x 4.  Language: No aphasia.  Speech: No dysarthria.  Cranial nerves: Face symmetric, tongue midline, CN II-XII grossly intact.   Eyes: Pupils equal, round, reactive to light with accommodation, EOMI.  Pulmonary: Normal respirations, not labored, no accessory muscles used.  Sensory: Intact to light touch throughout.  Motor Strength: Moves all extremities spontaneously with good tone. Full strength upper and lower extremities. No abnormal movements seen.      Strength   Deltoids Triceps Biceps Wrist Extension Wrist Flexion Hand    Upper: R 5/5 5/5 5/5 5/5 5/5 5/5     L  "5/5 5/5 5/5 5/5 5/5 5/5       Iliopsoas Quadriceps Knee  Flexion Tibialis  anterior Gastro- cnemius EHL   Lower: R 5/5 5/5 5/5 5/5 5/5 5/5     L 5/5 5/5 5/5 5/5 5/5 5/5      [place]: Absent.  Clonus: subtle bilateral clonus (2+ beat) appreciated  Vascular: Pulses 2+ and symmetric radial and dorsalis pedis. No LE edema or skin changes.  Skin: Warm, dry and intact, no rashes.  Gait: Normal.                    Anterior and posterior cervical incisions well healed.    Posterior thoracic incision intact with monocryl suture and prineo/dermabond. No drainage or dehiscence.    HV drains in place.    Significant Labs:  Recent Labs   Lab 01/07/24  0531 01/08/24  0310   * 124*   * 136   K 4.6 4.4    108   CO2 20* 20*   BUN 25* 26*   CREATININE 1.1 1.0   CALCIUM 8.4* 8.4*   MG 2.2 2.1     Recent Labs   Lab 01/07/24  0531 01/08/24  0310   WBC 6.98 7.55   HGB 8.0* 7.0*   HCT 22.8* 19.8*   * 151     No results for input(s): "LABPT", "INR", "APTT" in the last 48 hours.  Microbiology Results (last 7 days)       ** No results found for the last 168 hours. **          All pertinent labs from the last 24 hours have been reviewed.    Significant Diagnostics:  I have reviewed and interpreted all pertinent imaging results/findings within the past 24 hours.  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 is a 73 y.o. 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 Martinez due to metastatic disease to those areas. He is also s/p T9 kyphoplasty for a pathological fracture at that level. Unfortunately, the T9 lesion has continued to grow, now with hung destruction of T9 with ventral epidural space extension as well as extension to T10 superior enplate causing moderate cord compression " and T9-T10 foraminal stenosis.     MRI and CT of thoracic spine which demonstrates new hung mets to T6, T8, T12, and progressive tumor growth at T9 with extension into ventral epidural space with cord compression as well as invasion into T10 superior endplate.    S/p T9 embolization on 1/04.    Now s/p T7-11 decompression and fusion on 1/6 with Dr. Martinez.    - Stepped down to Oncology floor. Patient will transfer to NSGY team primary.  - q4h neurochecks.  - Chemotx on hold perioperatively. Will resume per ONC.  - Decadron taper in.  - Post op XR pending.  - HV drains x 2 to full suction. IV abx while in place.  -TLSO brace to be worn when upright or OOB.  - PT/OT.  - Bowel regimen. Adding suppository today.  - Rad onc recs radiation tx to the cervical and thoracic spine. Likely okay to start 2 weeks post op once incision has healed. Will need clearance at 2 week post op visit.   - R femur IMN with ortho scheduled for 1/9 postponed 2/2 low H/H.  - Pain control with multimodal regimen.  - DVT ppx: LVX/tedhose/SCDs.  - Atelectasis ppx: IS hourly, up in chair at least 6hrs/day.             Nadine Santos PA-C  Neurosurgery  [facility] - Oncology ([facility])

## 2024-01-08 NOTE — PROGRESS NOTES
Lj Krueger - Oncology ([facility])  Hematology/Oncology  Progress Note    Patient Name: Gamaliel Pete Jr.  Admission Date: 1/2/2024  Hospital Length of Stay: 6 days  Code Status: Full Code     Subjective:     HPI:  Mr. Pete is a 72yo man with a past medical history of asthma, HTN, ED, gout, and HTN.  He is also followed in Heme-Onc clinic by Dr. 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  She notes that he was being referred to Dr. Rock for right femur findings, for which IMN surgery on 1/9/24 with preoperative embolization by IR.  He just started systemic therapy with Pembrolizumab (KEYTRUDA) and Axitinib.       His bony and lung metastatic disease has also been complicated by lytic lesions in his spine with pathologic compression fractures.  He was admitted 11/6/23-11/9/23 with cervical corpectomy with NSGY on 11/7/23 which he tolerated well. They obtained tissue samples and sent it over to pathology. IR to perform osteocool/kyphoplasty/biopsy T9 11/14. 2nd stage of surgery with NSGY on 11/15 with Dr. Martinez after kyphoplasty.  He was then admitted 11/4/23-11/18/23 and underwent IR kyphoplasty of T4-6, SPINE, CERVICAL, WITH POSTERIOR FUSION T4-6 (N/A), CORPECTOMY, SPINE, CERVICAL C3-6 REVISION (N/A).      He recently underwent NC CT T-spine on 12/30/23 which showed a pathologic compression fracture of T9 status post vertebral augmentation.  Compared to 11/01/2023, there is increased height loss and an enlarging destructive lesion with extraosseous extension into the ventral epidural space resulting in moderate canal stenosis and left T9-10 neural foramen resulting in moderate foraminal stenosis.  New erosive changes involving the T10 superior endplate, concerning for direct invasion.  New 0.9 cm lytic lesion in the T6 vertebral body.       Follow up MRI T-spine that same day showed  "1. Relative to prior MRI of the thoracic spine performed 10/31/2023, the patient is now status post vertebral augmentation at T9, at the level of presumed pathologic fracture due to metastasis. There has been further height loss of the vertebra since the reference MR examination, however configuration of the T9 vertebra appears similar when compared to more recently performed intervening CTA of the chest of 11/20/2023.  2. On the current examination, centered at the T9 level there is posterior buckling of the cortex and overlying enhancing soft tissue in the ventral epidural space, possibly combination of extraosseous extension of neoplastic soft tissue and inflammatory change. This enhancing soft tissue measures on the order of 8 x 19 x 27 mm (AP x ML x CC). This contributes to moderate central spinal canal stenosis with effacement of CSF and ventral cord deformity. Question intramedullary edema-like signal.  3. Additionally, there is new mild height loss of the T8 vertebra with patchy edema-like signal enhancement since the October 2023 MRI, at least some of which is likely related to acute or subacute fracture deformity, with underlying extension of metastasis not excluded. Question subtle height loss of the T8 vertebra since the 11/20/2023 CT chest.  4. Further, there appears to be a new enhancing lesion within the left paramedian T6 vertebral body, possibly metastatic lesion.  Equivocal new T2 weighted lesion within T12 vertebra.     Dr. Martinez followed up and found that his back pain was worse and notified Dr. Turk, "His thoracic fracture is worse and there looks like there is now epidural extension of tumor at T9 causing cord compression.  I have called the patient and his back pain is worse.  I think he needs a thoracic decompression/fusion in the near future."  Dr. Turk instructed him to come to the ED.  The patient tells me that his legs are fine with no focal weakness or neuropathic pain.  He is " mostly having severe pain to his mid-thoracic back making it almost impossible to move around.  He is fine if lying perfectly flat on his back without moving, but excruciating with any attempt to stand or walk.       His only other complaint is new gout that started over the past few days to his ankles and maybe early gout to left knee.    Interval History: Pt labs showed HH of 7/19; proceeding with 1 unit pRBC. Pt will transfer back to [facility] for continued care.     Oncology Treatment Plan:   OP AXITINIB + PEMBROLIZUMAB 200MG Q3W    Medications:  Continuous Infusions:  Scheduled Meds:   allopurinoL  100 mg Oral Daily    amLODIPine  10 mg Oral Daily    bisacodyL  10 mg Rectal Daily    ceFAZolin (ANCEF) IVPB  2 g Intravenous Q8H    dexAMETHasone  4 mg Intravenous Q6H    [START ON 1/9/2024] dexAMETHasone  4 mg Oral Q8H    Followed by    [START ON 1/12/2024] dexAMETHasone  4 mg Oral Q12H    Followed by    [START ON 1/14/2024] dexAMETHasone  2 mg Oral Q6H    Followed by    [START ON 1/16/2024] dexAMETHasone  2 mg Oral Q8H    Followed by    [START ON 1/18/2024] dexAMETHasone  2 mg Oral Q12H    Followed by    [START ON 1/20/2024] dexAMETHasone  2 mg Oral Daily    enoxparin  40 mg Subcutaneous Q24H (prophylaxis, 1700)    gabapentin  300 mg Oral TID    methocarbamoL  750 mg Oral QID    morphine  30 mg Oral Q12H    pantoprazole  40 mg Oral Daily    polyethylene glycol  17 g Oral Daily    senna-docusate 8.6-50 mg  1 tablet Oral BID     PRN Meds:0.9%  NaCl infusion (for blood administration), 0.9%  NaCl infusion (for blood administration), acetaminophen, albuterol-ipratropium, aluminum-magnesium hydroxide-simethicone, bisacodyL, dextrose 10%, dextrose 10%, diazePAM, glucagon (human recombinant), glucose, glucose, hydrALAZINE, HYDROmorphone, iodixanoL, melatonin, naloxone, ondansetron, oxyCODONE, oxyCODONE, prochlorperazine, simethicone, sodium chloride 0.9%, sodium chloride 0.9%     Review of Systems   Constitutional:  Negative  for chills and fever.   Respiratory:  Negative for shortness of breath.    Cardiovascular:  Negative for chest pain.   Gastrointestinal:  Positive for constipation.   Genitourinary:  Negative for dysuria.   Neurological:  Negative for dizziness, light-headedness and headaches.     Objective:     Vital Signs (Most Recent):  Temp: 98.4 °F (36.9 °C) (01/08/24 1115)  Pulse: 84 (01/08/24 1115)  Resp: 20 (01/08/24 1115)  BP: 137/72 (01/08/24 1115)  SpO2: 95 % (01/08/24 1251) Vital Signs (24h Range):  Temp:  [97.8 °F (36.6 °C)-99.3 °F (37.4 °C)] 98.4 °F (36.9 °C)  Pulse:  [55-96] 84  Resp:  [16-20] 20  SpO2:  [94 %-97 %] 95 %  BP: (131-154)/(60-72) 137/72     Weight: 94.6 kg (208 lb 8.9 oz)  Body mass index is 29.92 kg/m².  Body surface area is 2.16 meters squared.      Intake/Output Summary (Last 24 hours) at 1/8/2024 1410  Last data filed at 1/8/2024 1251  Gross per 24 hour   Intake 310.38 ml   Output 3065 ml   Net -2754.62 ml        Physical Exam  Constitutional:       General: He is not in acute distress.  HENT:      Head: Normocephalic and atraumatic.      Mouth/Throat:      Mouth: Mucous membranes are moist.      Pharynx: Oropharynx is clear.   Eyes:      General: No scleral icterus.  Cardiovascular:      Rate and Rhythm: Normal rate and regular rhythm.      Pulses: Normal pulses.      Heart sounds: Normal heart sounds. No murmur heard.  Pulmonary:      Effort: Pulmonary effort is normal. No respiratory distress.      Breath sounds: Normal breath sounds. No wheezing.   Abdominal:      Palpations: Abdomen is soft.      Tenderness: There is no abdominal tenderness.   Musculoskeletal:      Right lower leg: No edema.      Left lower leg: No edema.   Skin:     General: Skin is warm and dry.      Comments: Pt with 2 drains on his back and surgical scar down his spine; clean and without signs of infection or oozing.    Neurological:      Mental Status: He is alert. Mental status is at baseline.   Psychiatric:         Mood  and Affect: Mood normal.         Behavior: Behavior normal.          Significant Labs:   CBC:   Recent Labs   Lab 01/07/24  0531 01/08/24  0310   WBC 6.98 7.55   HGB 8.0* 7.0*   HCT 22.8* 19.8*   * 151    and CMP:   Recent Labs   Lab 01/07/24  0531 01/08/24  0310   * 136   K 4.6 4.4    108   CO2 20* 20*   * 124*   BUN 25* 26*   CREATININE 1.1 1.0   CALCIUM 8.4* 8.4*   PROT 5.4* 5.5*   ALBUMIN 2.7* 2.6*   BILITOT 0.4 0.3   ALKPHOS 53* 49*   AST 28 22   ALT 79* 58*   ANIONGAP 5* 8       Diagnostic Results:  I have reviewed all pertinent imaging results/findings within the past 24 hours.  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 Noted metastatic process on T6, T8, T12, and progressive tumor growth at T9 with extension into ventral epidural space with cord compression as well as invasion into T10 superior endplate. Patient was notified of new results and was direct admitted with plans for IR embolization of T9 lesion with OR booked for 1/5/24 for T7-T11 Robotic fusion.     PLAN:  -pain control  -s/p repair with NSGY, pt being transferred back to their service   -dexamethasone 4 mg q6hr; taper per NSGY  -Ancef 2g q8 while drains in place    Anemia  Suspect 2/2 to surgical site drains. No other overt signs of bleeeding.    -CBC daily  -Transfuse for Hgb <7 or Hct <21      Gout of ankle  Continue allopurinol    Debility  -PT/OT consulted; recommending high intensity therapy at discharge.    Essential hypertension  Holding Lisinopril in anticipation of surgical intervention.     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 IR embolization 1/4/24 to minimize bleeding risk. RCRI as below. Low risk of adverse cardiac event given previous  ability to achieve 4 METS and lack of comborbid medical conditions. EKG obtained - widened QRS (160ms) with 1st degree AV block and bifasicular block. Lytes within normal limits. No arrhythmia.     Revised Cardiac Risk Index   High risk surgery: No  History of ischemic heart disease: No  History of congestive heart failure: No  History of stroke: No  Insulin dependent diabetes: No  Cr > 2: No  0 points, class I risk, 3.9% risk of cardiac event    -S/p fracture repair with NSGY    Class 1 obesity without serious comorbidity with body mass index (BMI) of 31.0 to 31.9 in adult  Advised to maintain regular physical activity following recovery from orthopedic and neurosurgical procedures             Dennis Davis DO  Hematology/Oncology  [facility] - Oncology ([facility])

## 2024-01-08 NOTE — PLAN OF CARE
Plan of care reviewed with patient. One unit of blood given, pt tolerated well. Pt afebrile throughout shift. No acute events or complaints during this shift. Bed alarm set. Telemetry in place. Back brace and neck collar used when pt is out of bed. Thoracic xray completed. Pt has two drains on the upper part of his back, both are C/D/I and attached to suction. VSS, q1h patient rounds, bed in low position and wheels locked, rails up x2, call light and personal belongings within reach.    Gabi Sultana   1/8/2024   3:33 PM

## 2024-01-08 NOTE — ASSESSMENT & PLAN NOTE
Suspect 2/2 to surgical site drains. No other overt signs of bleeeding.    -CBC daily  -Transfuse for Hgb <7 or Hct <21

## 2024-01-08 NOTE — TELEPHONE ENCOUNTER
Dr. Rock talked to patient and wife. Surgery and embo needs to be rescheduled to a later date due to low hemoglobin levels. Patient verbalized understanding. New surgery date 1/30/2024.    Arminda Centeno MS, ATC, OTC  Clinical Assistant - Dr. Parvez Rock   Orthopedic Oncology   [facility]  Phone: [phone]

## 2024-01-08 NOTE — PLAN OF CARE
1928 rec'd resting in bed a/a/ox4. Wife at bedside. No complaints at this time. Drains intact. Denies pain. VSS. On room air. Bed low and wheels locked. Call bell in reach, enc to call for any needs/assistance.     0524 no complaints over the night. Rested quietly. NPO with just a few small sips of water. NADN. VSS. Safety measures intact.

## 2024-01-08 NOTE — SUBJECTIVE & OBJECTIVE
Interval History: NAEON. Patient states his back pain preop has resolved and has expected surgical pain/soreness. Sat at EOB with therapy, hopeful to ambulate today. Hg is 7.0 this AM. Primary will transfuse 1 unit prbcs. Will keep drains today. Post op XR is pending. Decadron wean in. No BM since preop. Increasing bowel regimen. While in the room, Dr. Ivy with Ortho called patient to inform him that she will be moving his IMN procedure to a later date 2/2 his low blood counts.     Plan for transfer of patient to NSGY team primary.    Medications:  Continuous Infusions:  Scheduled Meds:   allopurinoL  100 mg Oral Daily    amLODIPine  10 mg Oral Daily    bisacodyL  10 mg Rectal Daily    ceFAZolin (ANCEF) IVPB  2 g Intravenous Q8H    dexAMETHasone  4 mg Intravenous Q6H    [START ON 1/9/2024] dexAMETHasone  4 mg Oral Q8H    Followed by    [START ON 1/12/2024] dexAMETHasone  4 mg Oral Q12H    Followed by    [START ON 1/15/2024] dexAMETHasone  2 mg Oral Q6H    Followed by    [START ON 1/18/2024] dexAMETHasone  2 mg Oral Q8H    Followed by    [START ON 1/21/2024] dexAMETHasone  2 mg Oral Q12H    Followed by    [START ON 1/24/2024] dexAMETHasone  2 mg Oral Daily    enoxparin  40 mg Subcutaneous Q24H (prophylaxis, 1700)    gabapentin  300 mg Oral TID    methocarbamoL  750 mg Oral QID    morphine  30 mg Oral Q12H    pantoprazole  40 mg Oral Daily    polyethylene glycol  17 g Oral Daily    senna-docusate 8.6-50 mg  1 tablet Oral BID     PRN Meds:0.9%  NaCl infusion (for blood administration), 0.9%  NaCl infusion (for blood administration), acetaminophen, albuterol-ipratropium, aluminum-magnesium hydroxide-simethicone, bisacodyL, dextrose 10%, dextrose 10%, diazePAM, glucagon (human recombinant), glucose, glucose, hydrALAZINE, HYDROmorphone, iodixanoL, melatonin, naloxone, ondansetron, oxyCODONE, oxyCODONE, prochlorperazine, simethicone, sodium chloride 0.9%, sodium chloride 0.9%     Review of Systems  Objective:      Weight: 94.6 kg (208 lb 8.9 oz)  Body mass index is 29.92 kg/m².  Vital Signs (Most Recent):  Temp: 98.1 °F (36.7 °C) (01/08/24 0732)  Pulse: 60 (01/08/24 0732)  Resp: 16 (01/08/24 0828)  BP: (!) 143/60 (01/08/24 0732)  SpO2: 96 % (01/08/24 0732) Vital Signs (24h Range):  Temp:  [97.8 °F (36.6 °C)-99.3 °F (37.4 °C)] 98.1 °F (36.7 °C)  Pulse:  [55-96] 60  Resp:  [16-18] 16  SpO2:  [94 %-98 %] 96 %  BP: (126-154)/(60-69) 143/60                              Closed/Suction Drain 01/05/24 1930 Inferior;Left Back (Active)   Site Description Healing 01/07/24 0748   Dressing Type Transparent (Tegaderm) 01/07/24 0748   Dressing Status Clean;Dry;Intact 01/07/24 0748   Dressing Intervention Integrity maintained 01/07/24 0748   Drainage Sanguineous 01/07/24 0748   Status Other (Comment) 01/07/24 0748   Output (mL) 0 mL 01/08/24 0612            Closed/Suction Drain 01/05/24 1930 Inferior;Lateral;Right Back (Active)   Site Description Healing 01/07/24 0748   Dressing Type Transparent (Tegaderm) 01/07/24 0748   Dressing Status Intact;Clean;Dry 01/07/24 0748   Dressing Intervention Integrity maintained 01/07/24 0748   Drainage Sanguineous 01/07/24 0748   Status Other (Comment) 01/07/24 0748   Output (mL) 30 mL 01/08/24 0612         Neurosurgery Physical Exam  General: Well developed, well nourished, no distress.  Head: Normocephalic, atraumatic.  Neurologic: Alert and oriented. Thought content appropriate  GCS: Motor: 6/Verbal: 5/Eyes: 4 GCS Total: 15  Mental Status: Awake, Alert, Oriented x 4.  Language: No aphasia.  Speech: No dysarthria.  Cranial nerves: Face symmetric, tongue midline, CN II-XII grossly intact.   Eyes: Pupils equal, round, reactive to light with accommodation, EOMI.  Pulmonary: Normal respirations, not labored, no accessory muscles used.  Sensory: Intact to light touch throughout.  Motor Strength: Moves all extremities spontaneously with good tone. Full strength upper and lower extremities. No abnormal  "movements seen.      Strength   Deltoids Triceps Biceps Wrist Extension Wrist Flexion Hand    Upper: R 5/5 5/5 5/5 5/5 5/5 5/5     L 5/5 5/5 5/5 5/5 5/5 5/5       Iliopsoas Quadriceps Knee  Flexion Tibialis  anterior Gastro- cnemius EHL   Lower: R 5/5 5/5 5/5 5/5 5/5 5/5     L 5/5 5/5 5/5 5/5 5/5 5/5      [place]: Absent.  Clonus: subtle bilateral clonus (2+ beat) appreciated  Vascular: Pulses 2+ and symmetric radial and dorsalis pedis. No LE edema or skin changes.  Skin: Warm, dry and intact, no rashes.  Gait: Normal.                    Anterior and posterior cervical incisions well healed.    Posterior thoracic incision intact with monocryl suture and prineo/dermabond. No drainage or dehiscence.    HV drains in place.    Significant Labs:  Recent Labs   Lab 01/07/24  0531 01/08/24  0310   * 124*   * 136   K 4.6 4.4    108   CO2 20* 20*   BUN 25* 26*   CREATININE 1.1 1.0   CALCIUM 8.4* 8.4*   MG 2.2 2.1     Recent Labs   Lab 01/07/24  0531 01/08/24  0310   WBC 6.98 7.55   HGB 8.0* 7.0*   HCT 22.8* 19.8*   * 151     No results for input(s): "LABPT", "INR", "APTT" in the last 48 hours.  Microbiology Results (last 7 days)       ** No results found for the last 168 hours. **          All pertinent labs from the last 24 hours have been reviewed.    Significant Diagnostics:  I have reviewed and interpreted all pertinent imaging results/findings within the past 24 hours.  "

## 2024-01-09 LAB
ALBUMIN SERPL BCP-MCNC: 2.8 G/DL (ref 3.5–5.2)
ALP SERPL-CCNC: 56 U/L (ref 55–135)
ALT SERPL W/O P-5'-P-CCNC: 36 U/L (ref 10–44)
ANION GAP SERPL CALC-SCNC: 6 MMOL/L (ref 8–16)
AST SERPL-CCNC: 17 U/L (ref 10–40)
BASOPHILS # BLD AUTO: 0.03 K/UL (ref 0–0.2)
BASOPHILS NFR BLD: 0.3 % (ref 0–1.9)
BILIRUB SERPL-MCNC: 0.4 MG/DL (ref 0.1–1)
BUN SERPL-MCNC: 24 MG/DL (ref 8–23)
CALCIUM SERPL-MCNC: 8.6 MG/DL (ref 8.7–10.5)
CHLORIDE SERPL-SCNC: 104 MMOL/L (ref 95–110)
CO2 SERPL-SCNC: 23 MMOL/L (ref 23–29)
CREAT SERPL-MCNC: 0.9 MG/DL (ref 0.5–1.4)
DIFFERENTIAL METHOD BLD: ABNORMAL
EOSINOPHIL # BLD AUTO: 0 K/UL (ref 0–0.5)
EOSINOPHIL NFR BLD: 0 % (ref 0–8)
ERYTHROCYTE [DISTWIDTH] IN BLOOD BY AUTOMATED COUNT: 14.4 % (ref 11.5–14.5)
EST. GFR  (NO RACE VARIABLE): >60 ML/MIN/1.73 M^2
GLUCOSE SERPL-MCNC: 123 MG/DL (ref 70–110)
HCT VFR BLD AUTO: 26.7 % (ref 40–54)
HGB BLD-MCNC: 9.3 G/DL (ref 14–18)
IMM GRANULOCYTES # BLD AUTO: 0.25 K/UL (ref 0–0.04)
IMM GRANULOCYTES NFR BLD AUTO: 2.2 % (ref 0–0.5)
LYMPHOCYTES # BLD AUTO: 0.7 K/UL (ref 1–4.8)
LYMPHOCYTES NFR BLD: 6 % (ref 18–48)
MCH RBC QN AUTO: 29.5 PG (ref 27–31)
MCHC RBC AUTO-ENTMCNC: 34.8 G/DL (ref 32–36)
MCV RBC AUTO: 85 FL (ref 82–98)
MONOCYTES # BLD AUTO: 0.9 K/UL (ref 0.3–1)
MONOCYTES NFR BLD: 7.7 % (ref 4–15)
NEUTROPHILS # BLD AUTO: 9.3 K/UL (ref 1.8–7.7)
NEUTROPHILS NFR BLD: 83.8 % (ref 38–73)
NRBC BLD-RTO: 0 /100 WBC
PLATELET # BLD AUTO: 237 K/UL (ref 150–450)
PMV BLD AUTO: 9 FL (ref 9.2–12.9)
POTASSIUM SERPL-SCNC: 4.1 MMOL/L (ref 3.5–5.1)
PROT SERPL-MCNC: 6.1 G/DL (ref 6–8.4)
RBC # BLD AUTO: 3.15 M/UL (ref 4.6–6.2)
SODIUM SERPL-SCNC: 133 MMOL/L (ref 136–145)
WBC # BLD AUTO: 11.14 K/UL (ref 3.9–12.7)

## 2024-01-09 PROCEDURE — 97530 THERAPEUTIC ACTIVITIES: CPT | Mod: CQ

## 2024-01-09 PROCEDURE — 36415 COLL VENOUS BLD VENIPUNCTURE: CPT

## 2024-01-09 PROCEDURE — 80053 COMPREHEN METABOLIC PANEL: CPT

## 2024-01-09 PROCEDURE — 25000003 PHARM REV CODE 250: Performed by: STUDENT IN AN ORGANIZED HEALTH CARE EDUCATION/TRAINING PROGRAM

## 2024-01-09 PROCEDURE — 97116 GAIT TRAINING THERAPY: CPT | Mod: CQ

## 2024-01-09 PROCEDURE — 20600001 HC STEP DOWN PRIVATE ROOM

## 2024-01-09 PROCEDURE — 63600175 PHARM REV CODE 636 W HCPCS

## 2024-01-09 PROCEDURE — 25000003 PHARM REV CODE 250

## 2024-01-09 PROCEDURE — 63600175 PHARM REV CODE 636 W HCPCS: Performed by: REGISTERED NURSE

## 2024-01-09 PROCEDURE — 97535 SELF CARE MNGMENT TRAINING: CPT

## 2024-01-09 PROCEDURE — 97530 THERAPEUTIC ACTIVITIES: CPT

## 2024-01-09 PROCEDURE — 25000003 PHARM REV CODE 250: Performed by: INTERNAL MEDICINE

## 2024-01-09 PROCEDURE — 85025 COMPLETE CBC W/AUTO DIFF WBC: CPT

## 2024-01-09 PROCEDURE — 99024 POSTOP FOLLOW-UP VISIT: CPT | Mod: ,,,

## 2024-01-09 RX ORDER — BISACODYL 10 MG/1
10 SUPPOSITORY RECTAL DAILY PRN
Status: DISCONTINUED | OUTPATIENT
Start: 2024-01-09 | End: 2024-01-10 | Stop reason: HOSPADM

## 2024-01-09 RX ORDER — MORPHINE SULFATE 15 MG/1
15 TABLET, FILM COATED, EXTENDED RELEASE ORAL EVERY 12 HOURS
Status: DISCONTINUED | OUTPATIENT
Start: 2024-01-10 | End: 2024-01-10 | Stop reason: HOSPADM

## 2024-01-09 RX ADMIN — AMLODIPINE BESYLATE 10 MG: 10 TABLET ORAL at 08:01

## 2024-01-09 RX ADMIN — ALLOPURINOL 100 MG: 100 TABLET ORAL at 08:01

## 2024-01-09 RX ADMIN — METHOCARBAMOL 750 MG: 750 TABLET ORAL at 08:01

## 2024-01-09 RX ADMIN — SENNOSIDES AND DOCUSATE SODIUM 1 TABLET: 8.6; 5 TABLET ORAL at 08:01

## 2024-01-09 RX ADMIN — DEXAMETHASONE 4 MG: 4 TABLET ORAL at 06:01

## 2024-01-09 RX ADMIN — CEFAZOLIN 2 G: 2 INJECTION, POWDER, FOR SOLUTION INTRAMUSCULAR; INTRAVENOUS at 08:01

## 2024-01-09 RX ADMIN — METHOCARBAMOL 750 MG: 750 TABLET ORAL at 09:01

## 2024-01-09 RX ADMIN — PANTOPRAZOLE SODIUM 40 MG: 40 TABLET, DELAYED RELEASE ORAL at 08:01

## 2024-01-09 RX ADMIN — METHOCARBAMOL 750 MG: 750 TABLET ORAL at 02:01

## 2024-01-09 RX ADMIN — CEFAZOLIN 2 G: 2 INJECTION, POWDER, FOR SOLUTION INTRAMUSCULAR; INTRAVENOUS at 12:01

## 2024-01-09 RX ADMIN — GABAPENTIN 300 MG: 300 CAPSULE ORAL at 08:01

## 2024-01-09 RX ADMIN — GABAPENTIN 300 MG: 300 CAPSULE ORAL at 09:01

## 2024-01-09 RX ADMIN — SENNOSIDES AND DOCUSATE SODIUM 1 TABLET: 8.6; 5 TABLET ORAL at 09:01

## 2024-01-09 RX ADMIN — GABAPENTIN 300 MG: 300 CAPSULE ORAL at 02:01

## 2024-01-09 RX ADMIN — METHOCARBAMOL 750 MG: 750 TABLET ORAL at 05:01

## 2024-01-09 RX ADMIN — DEXAMETHASONE 4 MG: 4 TABLET ORAL at 09:01

## 2024-01-09 RX ADMIN — ENOXAPARIN SODIUM 40 MG: 40 INJECTION SUBCUTANEOUS at 05:01

## 2024-01-09 RX ADMIN — DEXAMETHASONE 4 MG: 4 TABLET ORAL at 02:01

## 2024-01-09 NOTE — PLAN OF CARE
Problem: Occupational Therapy  Goal: Occupational Therapy Goal  Description: Goals to be met by: 2/6/24     Patient will increase functional independence with ADLs by performing:    UE Dressing with [place].  LE Dressing with [place].  Grooming while standing with [place].  Supine to sit with Modified independence  Toileting from toilet with [place] for hygiene and clothing management.   Toilet transfer to toilet with Modified [place].    Outcome: Ongoing, Progressing     Goals updated.

## 2024-01-09 NOTE — PLAN OF CARE
AAOx4. POC reviewed with patient; understanding verbalized. Neuro checks q4. Bilateral back drains taken out, gauze and transparent film over sites. Back brace and neck collar used when pt out of bed. Ambulated in hallway with PT/OT. Pt refused morphine 30mg ER, no complaints of pain this shift. Pt. with nonskid footwear on, bed in lowest position, and locked with bed rails up x2. Bed alarm set. Pt. instructed to call prior to getting OOB. Wife at bedside. Pt. has call light and personal items within reach. VSS and afebrile this shift. All questions and concerns addressed at this time.

## 2024-01-09 NOTE — PT/OT/SLP PROGRESS
Occupational Therapy   Treatment    Name: Gamaliel Pete Jr.  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 is a 73 y.o. 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  He presents with good participation and motivation. Pt with greatly improved mobility on this date therefore updated recommendations to low. Performance deficits affecting function are weakness, impaired endurance, impaired self care skills, impaired functional mobility, impaired balance.     Rehab Prognosis:  Good; patient would benefit from acute skilled OT services to address these deficits and reach maximum level of function.       Plan:     Patient to be seen 4 x/week to address the above listed problems via self-care/home management, therapeutic activities, therapeutic exercises, neuromuscular re-education  Plan of Care Expires: 02/06/24  Plan of Care Reviewed with: patient, spouse    Subjective     Chief Complaint: wanting to discharge home & not to rehab  Patient/Family Comments/goals: Pt reported that his wife has helped him through previous surgeries.  Pain/Comfort:  Pain Rating 1:  (did not report pain)  Pain Rating Post-Intervention 1:  (did not report pain)    Objective:     Communicated with: RN prior to session.  Patient found supine with hemovac, telemetry (spouse present during session) upon OT entry to room.    General Precautions: Standard, fall, aspiration    Orthopedic Precautions:spinal precautions  Braces: TLSO, Cervical collar  Respiratory Status: Room air     Occupational Performance:     Bed Mobility:    Patient completed Supine to Sit with stand by assistance     Functional Mobility/Transfers:  Patient  completed Sit <> Stand Transfer with contact guard assistance  with  rolling walker from EOB  Functional Mobility: CGA with RW with functional mobility in room & hallway for balance & endurance training    Activities of Daily Living:  Upper Body Dressing: minimum assistance donning gown, TLSO, & cervical collar while seated EOB  Lower Body Dressing: stand by assistance donning socks seated in long sitting utilizing figure 4 position      [facility] 6 Click ADL: 16    Treatment & Education:  Provided education on spinal precautions.  Discussed discharge recommendations, pt's mobility & pt's spouse reported that she would be able to assist pt at the level he is currently at with pt in agreement.  Pt had no further questions & when asked whether there were any concerns pt reported none.      Patient left up in chair with all lines intact, call button in reach, RN notified, and spouse present    GOALS:   Multidisciplinary Problems       Occupational Therapy Goals          Problem: Occupational Therapy    Goal Priority Disciplines Outcome Interventions   Occupational Therapy Goal     OT, PT/OT Ongoing, Progressing    Description: Goals to be met by: 2/6/24     Patient will increase functional independence with ADLs by performing:    UE Dressing with [place].  LE Dressing with [place].  Grooming while standing with [place].  Supine to sit with Modified independence  Toileting from toilet with [place] for hygiene and clothing management.   Toilet transfer to toilet with Modified [place].                         Time Tracking:     OT Date of Treatment: 01/09/24  OT Start Time: 1037  OT Stop Time: 1120  OT Total Time (min): 43 min    Billable Minutes:Self Care/Home Management 13  Therapeutic Activity 30    OT/ELIJAH: OT          1/9/2024

## 2024-01-09 NOTE — PROGRESS NOTES
Lj Krueger - Oncology ([facility])  Neurosurgery  Progress Note    Subjective:     History of Present Illness: Gamaliel Pete Jr. is a 73 y.o. 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 Martinez due to metastatic disease to those areas. He is also s/p T9 kyphoplasty for a pathological fracture at that level. Patient states that his back pain at that level worsened after the kyphoplasty. The pain is burning and located in the middle-left area, radiating around the trunk at that level on the left. The pain is constant. He is on multiple pain medications for his cancer and states they do not help with the pain. They just make him sleepy. He denies new weakness, numbness, tingling, bowel/bladder dysfunction, gait difficulties, falls or other injuries. He has been compliant with his c-collar and wears a TLSO for comfort. Patient had outpatient MRI and CT of thoracic spine which demonstrates new hung mets to T6, T8, T12, and progressive tumor growth at T9 with extension into ventral epidural space with cord compression as well as invasion into T10 superior endplate. Patient was notified of new results and was direct admitted with plans for IR embolization of T9 lesion with OR booked for 1/5/24 for T7-T11 Robotic fusion.     Of note, the patient has started chemotherapy- Pembrolizumab and Axitinib . There are also plans for a right femur IMN on 1/9 for metastasis to this area and high fracture risk. There is also some lucency about the right shoulder int he distal clavicle/acromian that ortho plans to monitor for metastasis.    Post-Op Info:  Procedure(s) (LRB):  LAMINECTOMY, SPINE, THORACIC, WITH FUSION (N/A)   4 Days Post-Op   Interval History:  Patient transferred to [facility] [place] primary. BOAZ. Neuro stable. Post op XR with hardware in satisfactory  positioning. Hg 9.3 this AM. Drains removed without issues. Reports no pain today. +BM. Voiding spontaneously. Will f/u PT/OT recs today.    Medications:  Continuous Infusions:  Scheduled Meds:   allopurinoL  100 mg Oral Daily    amLODIPine  10 mg Oral Daily    bisacodyL  10 mg Rectal Daily    ceFAZolin (ANCEF) IVPB  2 g Intravenous Q8H    dexAMETHasone  4 mg Oral Q8H    Followed by    [START ON 1/12/2024] dexAMETHasone  4 mg Oral Q12H    Followed by    [START ON 1/14/2024] dexAMETHasone  2 mg Oral Q6H    Followed by    [START ON 1/16/2024] dexAMETHasone  2 mg Oral Q8H    Followed by    [START ON 1/18/2024] dexAMETHasone  2 mg Oral Q12H    Followed by    [START ON 1/20/2024] dexAMETHasone  2 mg Oral Daily    enoxparin  40 mg Subcutaneous Q24H (prophylaxis, 1700)    gabapentin  300 mg Oral TID    methocarbamoL  750 mg Oral QID    morphine  30 mg Oral Q12H    pantoprazole  40 mg Oral Daily    polyethylene glycol  17 g Oral Daily    senna-docusate 8.6-50 mg  1 tablet Oral BID     PRN Meds:0.9%  NaCl infusion (for blood administration), 0.9%  NaCl infusion (for blood administration), acetaminophen, albuterol-ipratropium, aluminum-magnesium hydroxide-simethicone, bisacodyL, dextrose 10%, dextrose 10%, diazePAM, glucagon (human recombinant), glucose, glucose, hydrALAZINE, HYDROmorphone, iodixanoL, melatonin, naloxone, ondansetron, oxyCODONE, oxyCODONE, prochlorperazine, simethicone, sodium chloride 0.9%, sodium chloride 0.9%     Review of Systems  Objective:     Weight: 94.6 kg (208 lb 8.9 oz)  Body mass index is 29.92 kg/m².  Vital Signs (Most Recent):  Temp: 98.3 °F (36.8 °C) (01/09/24 1210)  Pulse: 80 (01/09/24 1210)  Resp: 19 (01/09/24 1210)  BP: 133/64 (01/09/24 1210)  SpO2: 98 % (01/09/24 1210) Vital Signs (24h Range):  Temp:  [97.7 °F (36.5 °C)-98.4 °F (36.9 °C)] 98.3 °F (36.8 °C)  Pulse:  [55-82] 80  Resp:  [18-19] 19  SpO2:  [94 %-99 %] 98 %  BP: (128-169)/(47-69) 133/64     Date 01/09/24 0700 - 01/10/24 0659    Shift 2197-1677 1331-4268 9921-3782 24 Hour Total   INTAKE   P.O. 300   300   Shift Total(mL/kg) 300(3.2)   300(3.2)   OUTPUT   Drains 70   70   Shift Total(mL/kg) 70(0.7)   70(0.7)   Weight (kg) 94.6 94.6 94.6 94.6                            Closed/Suction Drain 01/05/24 1930 Inferior;Left Back (Active)   Site Description Healing 01/09/24 0816   Dressing Type Transparent (Tegaderm) 01/08/24 0752   Dressing Status Dry;Clean;Intact 01/08/24 0752   Dressing Intervention Integrity maintained 01/08/24 0752   Drainage Serosanguineous 01/09/24 0816   Status Open to gravity drainage 01/09/24 0816   Output (mL) 10 mL 01/09/24 0816            Closed/Suction Drain 01/05/24 1930 Inferior;Lateral;Right Back (Active)   Site Description Healing 01/09/24 0816   Dressing Type Transparent (Tegaderm) 01/08/24 0752   Dressing Status Clean;Dry;Intact 01/08/24 0752   Dressing Intervention Integrity maintained 01/08/24 0752   Drainage Serosanguineous 01/09/24 0816   Status Open to gravity drainage 01/09/24 0816   Output (mL) 60 mL 01/09/24 0816            Neurosurgery Physical Exam  General: Well developed, well nourished, no distress.  Head: Normocephalic, atraumatic.  Neurologic: Alert and oriented. Thought content appropriate  GCS: Motor: 6/Verbal: 5/Eyes: 4 GCS Total: 15  Mental Status: Awake, Alert, Oriented x 4.  Language: No aphasia.  Speech: No dysarthria.  Cranial nerves: Face symmetric, tongue midline, CN II-XII grossly intact.   Eyes: Pupils equal, round, reactive to light with accommodation, EOMI.  Pulmonary: Normal respirations, not labored, no accessory muscles used.  Sensory: Intact to light touch throughout.  Motor Strength: Moves all extremities spontaneously with good tone. Full strength upper and lower extremities. No abnormal movements seen.      Strength   Deltoids Triceps Biceps Wrist Extension Wrist Flexion Hand    Upper: R 5/5 5/5 5/5 5/5 5/5 5/5     L 5/5 5/5 5/5 5/5 5/5 5/5       Iliopsoas Quadriceps  "Knee  Flexion Tibialis  anterior Gastro- cnemius EHL   Lower: R 5/5 5/5 5/5 5/5 5/5 5/5     L 5/5 5/5 5/5 5/5 5/5 5/5      Maldonado: Absent.  Clonus: subtle bilateral clonus (2+ beat) appreciated  Vascular: Pulses 2+ and symmetric radial and dorsalis pedis. No LE edema or skin changes.  Skin: Warm, dry and intact, no rashes.  Gait: Normal.                    Anterior and posterior cervical incisions well healed.     Posterior thoracic incision intact with monocryl suture and prineo/dermabond. No drainage or dehiscence.    Significant Labs:  Recent Labs   Lab 01/08/24 0310 01/09/24  0951   * 123*    133*   K 4.4 4.1    104   CO2 20* 23   BUN 26* 24*   CREATININE 1.0 0.9   CALCIUM 8.4* 8.6*   MG 2.1  --      Recent Labs   Lab 01/08/24 0310 01/09/24  0951   WBC 7.55 11.14   HGB 7.0* 9.3*   HCT 19.8* 26.7*    237     No results for input(s): "LABPT", "INR", "APTT" in the last 48 hours.  Microbiology Results (last 7 days)       ** No results found for the last 168 hours. **          All pertinent labs from the last 24 hours have been reviewed.    Significant Diagnostics:  I have reviewed and interpreted all pertinent imaging results/findings within the past 24 hours.  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 is a 73 y.o. 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 Martinez due to metastatic disease to those areas. He is also s/p T9 kyphoplasty for a pathological fracture at that level. Unfortunately, the T9 lesion has continued to grow, now with hung destruction of T9 with ventral epidural space extension as well as extension to T10 superior enplate causing moderate cord compression and T9-T10 foraminal stenosis.     MRI and CT of thoracic spine which " demonstrates new hung mets to T6, T8, T12, and progressive tumor growth at T9 with extension into ventral epidural space with cord compression as well as invasion into T10 superior endplate.    S/p T9 embolization on 1/04.    Now s/p T7-11 decompression and fusion on 1/6 with Dr. Martinez.    - Admitted to [facility] [place].  - q4h neurochecks.  - Chemotx on hold perioperatively. Will resume per ONC.  - Decadron taper in.  - Post op XR w/ hardware in satisfactory positioning.  - HV drains removed. DC IV abx.  -TLSO brace to be worn when upright or OOB.  - PT/OT.  - Bowel regimen. +BM 1/9. Dc suppository.  - Rad onc recs radiation tx to the cervical and thoracic spine. Likely okay to start 2 weeks post op once incision has healed. Will need clearance at 2 week post op visit.   - R femur IMN with ortho scheduled for 1/9 postponed 2/2 low H/H.  - Pain control with multimodal regimen.  - DVT ppx: LVX/tedhose/SCDs.  - Atelectasis ppx: IS hourly, up in chair at least 6hrs/day.             DINO AlfaroC  Neurosurgery  [facility] - Oncology ([facility])

## 2024-01-09 NOTE — SUBJECTIVE & OBJECTIVE
Interval History:  Patient transferred to [facility]. NAEON. Neuro stable. Post op XR with hardware in satisfactory positioning. Hg 9.3 this AM. Drains removed without issues. Reports no pain today. +BM. Voiding spontaneously. Will f/u PT/OT recs today.    Medications:  Continuous Infusions:  Scheduled Meds:   allopurinoL  100 mg Oral Daily    amLODIPine  10 mg Oral Daily    bisacodyL  10 mg Rectal Daily    ceFAZolin (ANCEF) IVPB  2 g Intravenous Q8H    dexAMETHasone  4 mg Oral Q8H    Followed by    [START ON 1/12/2024] dexAMETHasone  4 mg Oral Q12H    Followed by    [START ON 1/14/2024] dexAMETHasone  2 mg Oral Q6H    Followed by    [START ON 1/16/2024] dexAMETHasone  2 mg Oral Q8H    Followed by    [START ON 1/18/2024] dexAMETHasone  2 mg Oral Q12H    Followed by    [START ON 1/20/2024] dexAMETHasone  2 mg Oral Daily    enoxparin  40 mg Subcutaneous Q24H (prophylaxis, 1700)    gabapentin  300 mg Oral TID    methocarbamoL  750 mg Oral QID    morphine  30 mg Oral Q12H    pantoprazole  40 mg Oral Daily    polyethylene glycol  17 g Oral Daily    senna-docusate 8.6-50 mg  1 tablet Oral BID     PRN Meds:0.9%  NaCl infusion (for blood administration), 0.9%  NaCl infusion (for blood administration), acetaminophen, albuterol-ipratropium, aluminum-magnesium hydroxide-simethicone, bisacodyL, dextrose 10%, dextrose 10%, diazePAM, glucagon (human recombinant), glucose, glucose, hydrALAZINE, HYDROmorphone, iodixanoL, melatonin, naloxone, ondansetron, oxyCODONE, oxyCODONE, prochlorperazine, simethicone, sodium chloride 0.9%, sodium chloride 0.9%     Review of Systems  Objective:     Weight: 94.6 kg (208 lb 8.9 oz)  Body mass index is 29.92 kg/m².  Vital Signs (Most Recent):  Temp: 98.3 °F (36.8 °C) (01/09/24 1210)  Pulse: 80 (01/09/24 1210)  Resp: 19 (01/09/24 1210)  BP: 133/64 (01/09/24 1210)  SpO2: 98 % (01/09/24 1210) Vital Signs (24h Range):  Temp:  [97.7 °F (36.5 °C)-98.4 °F (36.9 °C)] 98.3 °F (36.8 °C)  Pulse:  [55-82]  80  Resp:  [18-19] 19  SpO2:  [94 %-99 %] 98 %  BP: (128-169)/(47-69) 133/64     Date 01/09/24 0700 - 01/10/24 0659   Shift 0042-6064 9535-8279 7317-7332 24 Hour Total   INTAKE   P.O. 300   300   Shift Total(mL/kg) 300(3.2)   300(3.2)   OUTPUT   Drains 70   70   Shift Total(mL/kg) 70(0.7)   70(0.7)   Weight (kg) 94.6 94.6 94.6 94.6                            Closed/Suction Drain 01/05/24 1930 Inferior;Left Back (Active)   Site Description Healing 01/09/24 0816   Dressing Type Transparent (Tegaderm) 01/08/24 0752   Dressing Status Dry;Clean;Intact 01/08/24 0752   Dressing Intervention Integrity maintained 01/08/24 0752   Drainage Serosanguineous 01/09/24 0816   Status Open to gravity drainage 01/09/24 0816   Output (mL) 10 mL 01/09/24 0816            Closed/Suction Drain 01/05/24 1930 Inferior;Lateral;Right Back (Active)   Site Description Healing 01/09/24 0816   Dressing Type Transparent (Tegaderm) 01/08/24 0752   Dressing Status Clean;Dry;Intact 01/08/24 0752   Dressing Intervention Integrity maintained 01/08/24 0752   Drainage Serosanguineous 01/09/24 0816   Status Open to gravity drainage 01/09/24 0816   Output (mL) 60 mL 01/09/24 0816            Neurosurgery Physical Exam  General: Well developed, well nourished, no distress.  Head: Normocephalic, atraumatic.  Neurologic: Alert and oriented. Thought content appropriate  GCS: Motor: 6/Verbal: 5/Eyes: 4 GCS Total: 15  Mental Status: Awake, Alert, Oriented x 4.  Language: No aphasia.  Speech: No dysarthria.  Cranial nerves: Face symmetric, tongue midline, CN II-XII grossly intact.   Eyes: Pupils equal, round, reactive to light with accommodation, EOMI.  Pulmonary: Normal respirations, not labored, no accessory muscles used.  Sensory: Intact to light touch throughout.  Motor Strength: Moves all extremities spontaneously with good tone. Full strength upper and lower extremities. No abnormal movements seen.      Strength   Deltoids Triceps Biceps Wrist Extension Wrist  "Flexion Hand    Upper: R 5/5 5/5 5/5 5/5 5/5 5/5     L 5/5 5/5 5/5 5/5 5/5 5/5       Iliopsoas Quadriceps Knee  Flexion Tibialis  anterior Gastro- cnemius EHL   Lower: R 5/5 5/5 5/5 5/5 5/5 5/5     L 5/5 5/5 5/5 5/5 5/5 5/5      Maldonado: Absent.  Clonus: subtle bilateral clonus (2+ beat) appreciated  Vascular: Pulses 2+ and symmetric radial and dorsalis pedis. No LE edema or skin changes.  Skin: Warm, dry and intact, no rashes.  Gait: Normal.                    Anterior and posterior cervical incisions well healed.     Posterior thoracic incision intact with monocryl suture and prineo/dermabond. No drainage or dehiscence.    Significant Labs:  Recent Labs   Lab 01/08/24  0310 01/09/24  0951   * 123*    133*   K 4.4 4.1    104   CO2 20* 23   BUN 26* 24*   CREATININE 1.0 0.9   CALCIUM 8.4* 8.6*   MG 2.1  --      Recent Labs   Lab 01/08/24  0310 01/09/24  0951   WBC 7.55 11.14   HGB 7.0* 9.3*   HCT 19.8* 26.7*    237     No results for input(s): "LABPT", "INR", "APTT" in the last 48 hours.  Microbiology Results (last 7 days)       ** No results found for the last 168 hours. **          All pertinent labs from the last 24 hours have been reviewed.    Significant Diagnostics:  I have reviewed and interpreted all pertinent imaging results/findings within the past 24 hours.  "

## 2024-01-09 NOTE — PROGRESS NOTES
Patient has been transferred from Medical Oncology Service to Neurosurgery Service. Oncology Social Worker signing off as primary  for the remainder of this hospital course, as patient will be followed by the / for the Neurosurgery Service.

## 2024-01-09 NOTE — ASSESSMENT & PLAN NOTE
Gamaliel Pete Jr. is a 73 y.o. 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 Martinez due to metastatic disease to those areas. He is also s/p T9 kyphoplasty for a pathological fracture at that level. Unfortunately, the T9 lesion has continued to grow, now with hung destruction of T9 with ventral epidural space extension as well as extension to T10 superior enplate causing moderate cord compression and T9-T10 foraminal stenosis.     MRI and CT of thoracic spine which demonstrates new hung mets to T6, T8, T12, and progressive tumor growth at T9 with extension into ventral epidural space with cord compression as well as invasion into T10 superior endplate.    S/p T9 embolization on 1/04.    Now s/p T7-11 decompression and fusion on 1/6 with Dr. Martinez.    - Admitted to [facility].  - q4h neurochecks.  - Chemotx on hold perioperatively. Will resume per ONC.  - Decadron taper in.  - Post op XR w/ hardware in satisfactory positioning.  - HV drains removed. DC IV abx.  -TLSO brace to be worn when upright or OOB.  - PT/OT.  - Bowel regimen. +BM 1/9. Dc suppository.  - Rad onc recs radiation tx to the cervical and thoracic spine. Likely okay to start 2 weeks post op once incision has healed. Will need clearance at 2 week post op visit.   - R femur IMN with ortho scheduled for 1/9 postponed 2/2 low H/H.  - Pain control with multimodal regimen.  - DVT ppx: LVX/tedhose/SCDs.  - Atelectasis ppx: IS hourly, up in chair at least 6hrs/day.

## 2024-01-09 NOTE — HOSPITAL COURSE
1/9: [place] [facility] [place]. NAEON. Neuro stable. Post op XR with hardware in satisfactory positioning. Hg 9.3 this AM. Drains removed without issues. Reports no pain today. +BM. Voiding spontaneously. Will f/u PT/OT recs today.

## 2024-01-09 NOTE — PLAN OF CARE
No acute events overnight. VSS. Afebrile. Nonskid footwear on with bed in lowest position and locked. Bed rails up x2. Pt. instructed to call for assistance if needed. Call light within reach and verbalized understanding. Wife at bedside. Will continue to monitor pt.

## 2024-01-09 NOTE — ANESTHESIA POSTPROCEDURE EVALUATION
Anesthesia Post Evaluation    Patient: Gamaliel Pete     Procedure(s) Performed: Procedure(s) (LRB):  LAMINECTOMY, SPINE, THORACIC, WITH FUSION (N/A)    Final Anesthesia Type: general      Patient location during evaluation: PACU  Patient participation: Yes- Able to Participate  Level of consciousness: awake and alert  Post-procedure vital signs: reviewed and stable  Pain management: adequate  Airway patency: patent    PONV status at discharge: No PONV  Anesthetic complications: no      Cardiovascular status: blood pressure returned to baseline  Respiratory status: unassisted  Hydration status: euvolemic  Follow-up not needed.              Vitals Value Taken Time   /67 01/09/24 1618   Temp 36.8 °C (98.3 °F) 01/09/24 1618   Pulse 87 01/09/24 1618   Resp 19 01/09/24 1618   SpO2 98 % 01/09/24 1618         No case tracking events are documented in the log.      Pain/Juany Score: Pain Rating Prior to Med Admin: 2 (1/8/2024  8:57 PM)  Pain Rating Post Med Admin: 1 (1/8/2024  9:57 PM)

## 2024-01-09 NOTE — PT/OT/SLP PROGRESS
Physical Therapy Treatment    Patient Name:  Gamaliel Pete Jr.   MRN:  [id number]    Recommendations:     Discharge Recommendations: Low Intensity Therapy (change in recommendation made with approval from PT, Chanel Long, after discussing pt's current level of function and required assistance)  Discharge Equipment Recommendations: none  Barriers to discharge: None    Assessment:     Gamaliel Pete Jr. is a 73 y.o. 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  He presents with the following impairments/functional limitations: weakness, impaired endurance, impaired self care skills, impaired functional mobility, gait instability, impaired balance, impaired cardiopulmonary response to activity, orthopedic precautions requiring light assistance and verbal cues for bed mob, sit < > stand transitions, OOB/BTB transfers, gait, and stairs to prevent falls due to weakness, instability, fatigue.   Pt remains motivated to participate in PT session and will cont to benefit from skilled PT intervention..  .    Rehab Prognosis: Good; patient would benefit from acute skilled PT services to address these deficits and reach maximum level of function.    Recent Surgery: Procedure(s) (LRB):  LAMINECTOMY, SPINE, THORACIC, WITH FUSION (N/A) 4 Days Post-Op    Plan:     During this hospitalization, patient to be seen 4 x/week to address the identified rehab impairments via gait training, therapeutic activities, therapeutic exercises, neuromuscular re-education and progress toward the following goals:    Plan of Care Expires:  02/06/24    Subjective     Chief Complaint: weakness, fatigue    Pain/Comfort:  Pain Rating 1: 0/10  Pain Rating Post-Intervention 1: 0/10      Objective:     Communicated with nurse (Airam) prior to session.  Patient found HOB elevated with telemetry (wife present) upon PT entry to room.     General Precautions: Standard, aspiration,  fall  Orthopedic Precautions: spinal precautions  Braces: Cervical collar, TLSO  Respiratory Status: Room air     Functional Mobility:  Bed Mobility:     Rolling Left:  min A with HOB flat, no use use of rail, with R HHA  Scooting: anteriorly to EOB with SBA  Supine to Sit: min/CGA for trunk and to guide LE's, exiting on L side, HOB flat  Sit to Supine: min A for trunk and LE's, HOB flat, no use of rail  Transfers:     Sit to Stand:  from moderately raised EOB and BSC with SBA with vc's for hand placement with rolling walker  Gait: RW SBA 140ft in hallway with chair rollow.  Pt demonstrates minor instability, but no LOB  Balance: static sitting at the EOB with close sup.  Pt requires assistance donning TLSO and C-collar at the EOB  Stairs:  Pt ascended/descended 15 stair(s) with No Assistive Device with R HR via sideways approach with Contact Guard Assistance.       AM-PAC 6 CLICK MOBILITY  Turning over in bed (including adjusting bedclothes, sheets and blankets)?: 3  Sitting down on and standing up from a chair with arms (e.g., wheelchair, bedside commode, etc.): 3  Moving from lying on back to sitting on the side of the bed?: 3  Moving to and from a bed to a chair (including a wheelchair)?: 3  Need to walk in hospital room?: 3  Climbing 3-5 steps with a railing?: 3  Basic Mobility Total Score: 18       Treatment & Education:  Patient provided with daily orientation and goals of this PT session. They were educated to call for assistance and to transfer with hospital staff only.  Also, pt was educated on the effects of prolonged immobility and the importance of performing OOB activity and exercises to promote healing and reduce recovery time.   Pt was educated on Spinal precautions    Patient left HOB elevated with all lines intact, call button in reach, nurse notified, and wife present..    GOALS:   Multidisciplinary Problems       Physical Therapy Goals          Problem: Physical Therapy    Goal Priority  Disciplines Outcome Goal Variances Interventions   Physical Therapy Goal     PT, PT/OT Ongoing, Progressing     Description: Goals to met by 1/20/2024    1. Supine to sit with Stand-by Assistance  2. Sit to supine with Stand-by Assistance  3. Rolling to Left and Right with Stand-by Assistance.  4. Sit to stand transfer with Stand-by Assistance  5. Bed to chair transfer with Stand-by Assistance using LRAD  6. Gait  x 75 feet with Contact Guard Assistance using LRAD   7. Ascend/descend 15 stair(s) with bilateral Handrails Contact Guard Assistance using LRAD.   8. Stand for 5 minutes with Stand-by Assistance using LRAD  9. Lower extremity exercise program x15 reps per Instruction, with assistance as needed in order to facilitate improved postural control and improvement in functional independence                       Time Tracking:     PT Received On: 01/09/24  PT Start Time: 1457 (1146)     PT Stop Time: 1540 (1155)  PT Total Time (min): 52 min     Billable Minutes: Gait Training 22 and Therapeutic Activity 30    Treatment Type: Treatment  PT/PTA: PTA     Number of PTA visits since last PT visit: 2     01/09/2024

## 2024-01-10 ENCOUNTER — TELEPHONE (OUTPATIENT)
Dept: NEUROSURGERY | Facility: CLINIC | Age: 74
End: 2024-01-10
Payer: MEDICARE

## 2024-01-10 VITALS
HEIGHT: 70 IN | SYSTOLIC BLOOD PRESSURE: 158 MMHG | HEART RATE: 68 BPM | WEIGHT: 208.56 LBS | OXYGEN SATURATION: 98 % | BODY MASS INDEX: 29.86 KG/M2 | TEMPERATURE: 97 F | RESPIRATION RATE: 18 BRPM | DIASTOLIC BLOOD PRESSURE: 70 MMHG

## 2024-01-10 PROCEDURE — 63600175 PHARM REV CODE 636 W HCPCS

## 2024-01-10 PROCEDURE — 25000003 PHARM REV CODE 250: Performed by: INTERNAL MEDICINE

## 2024-01-10 PROCEDURE — 25000003 PHARM REV CODE 250: Performed by: STUDENT IN AN ORGANIZED HEALTH CARE EDUCATION/TRAINING PROGRAM

## 2024-01-10 PROCEDURE — 99024 POSTOP FOLLOW-UP VISIT: CPT | Mod: ,,,

## 2024-01-10 PROCEDURE — 25000003 PHARM REV CODE 250

## 2024-01-10 RX ORDER — METHOCARBAMOL 750 MG/1
750 TABLET, FILM COATED ORAL 4 TIMES DAILY
Qty: 40 TABLET | Refills: 0 | Status: SHIPPED | OUTPATIENT
Start: 2024-01-10 | End: 2024-01-20

## 2024-01-10 RX ORDER — MORPHINE SULFATE 15 MG/1
15 TABLET, FILM COATED, EXTENDED RELEASE ORAL EVERY 12 HOURS
Qty: 30 TABLET | Refills: 0 | Status: SHIPPED | OUTPATIENT
Start: 2024-01-10 | End: 2024-01-10 | Stop reason: HOSPADM

## 2024-01-10 RX ORDER — DEXAMETHASONE 2 MG/1
TABLET ORAL
Qty: 39 TABLET | Refills: 0 | Status: SHIPPED | OUTPATIENT
Start: 2024-01-10 | End: 2024-01-21

## 2024-01-10 RX ADMIN — GABAPENTIN 300 MG: 300 CAPSULE ORAL at 10:01

## 2024-01-10 RX ADMIN — PANTOPRAZOLE SODIUM 40 MG: 40 TABLET, DELAYED RELEASE ORAL at 10:01

## 2024-01-10 RX ADMIN — DEXAMETHASONE 4 MG: 4 TABLET ORAL at 05:01

## 2024-01-10 RX ADMIN — AMLODIPINE BESYLATE 10 MG: 10 TABLET ORAL at 10:01

## 2024-01-10 RX ADMIN — METHOCARBAMOL 750 MG: 750 TABLET ORAL at 01:01

## 2024-01-10 RX ADMIN — METHOCARBAMOL 750 MG: 750 TABLET ORAL at 10:01

## 2024-01-10 RX ADMIN — DEXAMETHASONE 4 MG: 4 TABLET ORAL at 01:01

## 2024-01-10 RX ADMIN — ALLOPURINOL 100 MG: 100 TABLET ORAL at 10:01

## 2024-01-10 NOTE — TELEPHONE ENCOUNTER
----- Message from Nadine Santos PA-C sent at 1/10/2024  9:27 AM CST -----  Hi this patient needs a 2 week wound check later this month. He needs to be cleared for radiation so its important we get him in as soon as we can! He then needs a 6 week post op with X-ray thoracic spine AP and Lateral. Thank you! He has an appt with me in February for his cervical spine but we can move that to a later date so he can address both in one visit.

## 2024-01-10 NOTE — PLAN OF CARE
1941 rec'd resting in bed with wife at bedside. A/A/OX4. VSS. On room air. Dressings intact to back, no bleeding. Denies pain, declining anup morphine. No needs voiced. Bed low and wheels locked. Call bell near, enc to call for any assistance.     0535 no events over the night. No complaints of pain. Rested quietly with wife at bedside. VSS. NADN. Safety measures intact.

## 2024-01-10 NOTE — PLAN OF CARE
Discharge instructions and prescriptions given and explained to pt and wife. Discussed next appointments. Pt. verbalized understanding with no further questions. Explained dosing schedule for dexamethasone. Instructed on how to care for surgical incision and drain sites. Peripheral IVs D/C'd with catheter tips intact. VS WDL. Back and neck brace used when ambulating. Patient is awaiting ride home by wife.

## 2024-01-10 NOTE — PT/OT/SLP PROGRESS
Physical Therapy      Patient Name:  Gamaliel ROSS Juan R Thomas   MRN:  [id number]    Patient not seen today secondary to Other (Comment) (Pt being discharged from hospital). Will follow-up on next scheduled visit if not discharged.

## 2024-01-11 ENCOUNTER — PATIENT OUTREACH (OUTPATIENT)
Dept: ADMINISTRATIVE | Facility: CLINIC | Age: 74
End: 2024-01-11
Payer: MEDICARE

## 2024-01-11 LAB
COMMENT: NORMAL
FINAL PATHOLOGIC DIAGNOSIS: NORMAL
GROSS: NORMAL
Lab: NORMAL
MICROSCOPIC EXAM: NORMAL

## 2024-01-12 ENCOUNTER — PATIENT MESSAGE (OUTPATIENT)
Dept: ADMINISTRATIVE | Facility: OTHER | Age: 74
End: 2024-01-12
Payer: MEDICARE

## 2024-01-12 ENCOUNTER — TELEPHONE (OUTPATIENT)
Dept: PREADMISSION TESTING | Facility: HOSPITAL | Age: 74
End: 2024-01-12
Payer: MEDICARE

## 2024-01-12 NOTE — TELEPHONE ENCOUNTER
----- Message from Arminda Centeno sent at 1/8/2024  8:12 AM CST -----  Regarding: Rescheduled DOS:1/30/24  Good Morning,     Patient has been rescheduled for 1/30/24. Will need pre-op appointment. Please call patient for scheduling once he is discharged from in patient.      Arminda Centeno MS, ATC, OTC  Clinical Assistant - Dr. Parvez Rock   Orthopedic Oncology   [facility]  Phone: [phone]

## 2024-01-14 ENCOUNTER — PATIENT MESSAGE (OUTPATIENT)
Dept: NEUROSURGERY | Facility: CLINIC | Age: 74
End: 2024-01-14
Payer: MEDICARE

## 2024-01-16 ENCOUNTER — TELEPHONE (OUTPATIENT)
Dept: ORTHOPEDICS | Facility: CLINIC | Age: 74
End: 2024-01-16
Payer: MEDICARE

## 2024-01-16 NOTE — TELEPHONE ENCOUNTER
Spoke to patient. Would like to come in for surgical discussion. Possible reschedule of surgery. Patient scheduled for Friday at 1pm. Might come sooner if he gets done with his infusion.     Arminda Centeno MS, ATC, OTC  Clinical Assistant - Dr. Parvez Rock   Orthopedic Oncology   [facility]  Phone: [phone]

## 2024-01-17 ENCOUNTER — PATIENT MESSAGE (OUTPATIENT)
Dept: ADMINISTRATIVE | Facility: OTHER | Age: 74
End: 2024-01-17
Payer: MEDICARE

## 2024-01-18 ENCOUNTER — PATIENT MESSAGE (OUTPATIENT)
Dept: ADMINISTRATIVE | Facility: OTHER | Age: 74
End: 2024-01-18
Payer: MEDICARE

## 2024-01-18 ENCOUNTER — LAB VISIT (OUTPATIENT)
Dept: LAB | Facility: HOSPITAL | Age: 74
End: 2024-01-18
Attending: INTERNAL MEDICINE
Payer: MEDICARE

## 2024-01-18 ENCOUNTER — OFFICE VISIT (OUTPATIENT)
Dept: HEMATOLOGY/ONCOLOGY | Facility: CLINIC | Age: 74
End: 2024-01-18
Payer: MEDICARE

## 2024-01-18 ENCOUNTER — TELEPHONE (OUTPATIENT)
Dept: INTERVENTIONAL RADIOLOGY/VASCULAR | Facility: CLINIC | Age: 74
End: 2024-01-18
Payer: MEDICARE

## 2024-01-18 VITALS
WEIGHT: 190.13 LBS | SYSTOLIC BLOOD PRESSURE: 132 MMHG | TEMPERATURE: 98 F | DIASTOLIC BLOOD PRESSURE: 73 MMHG | BODY MASS INDEX: 27.22 KG/M2 | OXYGEN SATURATION: 98 % | HEIGHT: 70 IN | HEART RATE: 94 BPM

## 2024-01-18 DIAGNOSIS — R93.89 ABNORMAL FINDINGS ON DIAGNOSTIC IMAGING OF OTHER SPECIFIED BODY STRUCTURES: ICD-10-CM

## 2024-01-18 DIAGNOSIS — M54.9 INTRACTABLE BACK PAIN: ICD-10-CM

## 2024-01-18 DIAGNOSIS — C64.1 CLEAR CELL CARCINOMA OF RIGHT KIDNEY: Primary | ICD-10-CM

## 2024-01-18 DIAGNOSIS — C79.51 METASTATIC CANCER TO SPINE: ICD-10-CM

## 2024-01-18 DIAGNOSIS — Z79.899 IMMUNODEFICIENCY SECONDARY TO CHEMOTHERAPY: ICD-10-CM

## 2024-01-18 DIAGNOSIS — I10 ESSENTIAL HYPERTENSION: ICD-10-CM

## 2024-01-18 DIAGNOSIS — C78.00 MALIGNANT NEOPLASM METASTATIC TO LUNG, UNSPECIFIED LATERALITY: ICD-10-CM

## 2024-01-18 DIAGNOSIS — D49.9 IMMUNODEFICIENCY SECONDARY TO NEOPLASM: ICD-10-CM

## 2024-01-18 DIAGNOSIS — C79.51 METASTATIC ADENOCARCINOMA TO RIGHT FEMUR: ICD-10-CM

## 2024-01-18 DIAGNOSIS — T45.1X5A IMMUNODEFICIENCY SECONDARY TO CHEMOTHERAPY: ICD-10-CM

## 2024-01-18 DIAGNOSIS — C64.1 CLEAR CELL CARCINOMA OF RIGHT KIDNEY: ICD-10-CM

## 2024-01-18 DIAGNOSIS — M79.604 RIGHT LEG PAIN: ICD-10-CM

## 2024-01-18 DIAGNOSIS — D84.821 IMMUNODEFICIENCY SECONDARY TO CHEMOTHERAPY: ICD-10-CM

## 2024-01-18 DIAGNOSIS — D84.81 IMMUNODEFICIENCY SECONDARY TO NEOPLASM: ICD-10-CM

## 2024-01-18 LAB
ALBUMIN SERPL BCP-MCNC: 3.6 G/DL (ref 3.5–5.2)
ALP SERPL-CCNC: 93 U/L (ref 55–135)
ALT SERPL W/O P-5'-P-CCNC: 41 U/L (ref 10–44)
ANION GAP SERPL CALC-SCNC: 10 MMOL/L (ref 8–16)
AST SERPL-CCNC: 18 U/L (ref 10–40)
BASOPHILS # BLD AUTO: 0.03 K/UL (ref 0–0.2)
BASOPHILS NFR BLD: 0.2 % (ref 0–1.9)
BILIRUB SERPL-MCNC: 0.5 MG/DL (ref 0.1–1)
BUN SERPL-MCNC: 24 MG/DL (ref 8–23)
CALCIUM SERPL-MCNC: 9.4 MG/DL (ref 8.7–10.5)
CHLORIDE SERPL-SCNC: 103 MMOL/L (ref 95–110)
CO2 SERPL-SCNC: 18 MMOL/L (ref 23–29)
CREAT SERPL-MCNC: 1.1 MG/DL (ref 0.5–1.4)
DIFFERENTIAL METHOD BLD: ABNORMAL
EOSINOPHIL # BLD AUTO: 0 K/UL (ref 0–0.5)
EOSINOPHIL NFR BLD: 0.1 % (ref 0–8)
ERYTHROCYTE [DISTWIDTH] IN BLOOD BY AUTOMATED COUNT: 15.5 % (ref 11.5–14.5)
EST. GFR  (NO RACE VARIABLE): >60 ML/MIN/1.73 M^2
GLUCOSE SERPL-MCNC: 117 MG/DL (ref 70–110)
HCT VFR BLD AUTO: 39 % (ref 40–54)
HGB BLD-MCNC: 12.9 G/DL (ref 14–18)
IMM GRANULOCYTES # BLD AUTO: 0.2 K/UL (ref 0–0.04)
IMM GRANULOCYTES NFR BLD AUTO: 1.6 % (ref 0–0.5)
LYMPHOCYTES # BLD AUTO: 0.9 K/UL (ref 1–4.8)
LYMPHOCYTES NFR BLD: 7.4 % (ref 18–48)
MCH RBC QN AUTO: 29.3 PG (ref 27–31)
MCHC RBC AUTO-ENTMCNC: 33.1 G/DL (ref 32–36)
MCV RBC AUTO: 88 FL (ref 82–98)
MONOCYTES # BLD AUTO: 0.8 K/UL (ref 0.3–1)
MONOCYTES NFR BLD: 6.2 % (ref 4–15)
NEUTROPHILS # BLD AUTO: 10.3 K/UL (ref 1.8–7.7)
NEUTROPHILS NFR BLD: 84.5 % (ref 38–73)
NRBC BLD-RTO: 0 /100 WBC
PLATELET # BLD AUTO: 323 K/UL (ref 150–450)
PMV BLD AUTO: 8.2 FL (ref 9.2–12.9)
POTASSIUM SERPL-SCNC: 4.2 MMOL/L (ref 3.5–5.1)
PROT SERPL-MCNC: 7.3 G/DL (ref 6–8.4)
RBC # BLD AUTO: 4.41 M/UL (ref 4.6–6.2)
SODIUM SERPL-SCNC: 131 MMOL/L (ref 136–145)
T4 FREE SERPL-MCNC: 0.99 NG/DL (ref 0.71–1.51)
T4 SERPL-MCNC: 6.6 UG/DL (ref 4.5–11.5)
TSH SERPL DL<=0.005 MIU/L-ACNC: 4.77 UIU/ML (ref 0.4–4)
WBC # BLD AUTO: 12.14 K/UL (ref 3.9–12.7)

## 2024-01-18 PROCEDURE — 84443 ASSAY THYROID STIM HORMONE: CPT | Performed by: INTERNAL MEDICINE

## 2024-01-18 PROCEDURE — 99215 OFFICE O/P EST HI 40 MIN: CPT | Mod: S$GLB,,, | Performed by: REGISTERED NURSE

## 2024-01-18 PROCEDURE — 85025 COMPLETE CBC W/AUTO DIFF WBC: CPT | Performed by: INTERNAL MEDICINE

## 2024-01-18 PROCEDURE — 80053 COMPREHEN METABOLIC PANEL: CPT | Performed by: INTERNAL MEDICINE

## 2024-01-18 PROCEDURE — 84436 ASSAY OF TOTAL THYROXINE: CPT | Performed by: INTERNAL MEDICINE

## 2024-01-18 PROCEDURE — 84439 ASSAY OF FREE THYROXINE: CPT | Performed by: INTERNAL MEDICINE

## 2024-01-18 PROCEDURE — 99999 PR PBB SHADOW E&M-EST. PATIENT-LVL IV: CPT | Mod: PBBFAC,,, | Performed by: REGISTERED NURSE

## 2024-01-18 PROCEDURE — 36415 COLL VENOUS BLD VENIPUNCTURE: CPT | Performed by: INTERNAL MEDICINE

## 2024-01-18 RX ORDER — DIPHENHYDRAMINE HYDROCHLORIDE 50 MG/ML
50 INJECTION INTRAMUSCULAR; INTRAVENOUS ONCE AS NEEDED
Status: CANCELLED | OUTPATIENT
Start: 2024-01-18

## 2024-01-18 RX ORDER — HEPARIN 100 UNIT/ML
500 SYRINGE INTRAVENOUS
Status: CANCELLED | OUTPATIENT
Start: 2024-01-18

## 2024-01-18 RX ORDER — EPINEPHRINE 0.3 MG/.3ML
0.3 INJECTION SUBCUTANEOUS ONCE AS NEEDED
Status: CANCELLED | OUTPATIENT
Start: 2024-01-18

## 2024-01-18 RX ORDER — SODIUM CHLORIDE 0.9 % (FLUSH) 0.9 %
10 SYRINGE (ML) INJECTION
Status: CANCELLED | OUTPATIENT
Start: 2024-01-18

## 2024-01-18 NOTE — TELEPHONE ENCOUNTER
Spoke to pt on phone, pt stated that he needs to discuss with the dr if they are moving forward with the procedure, also needs to coordinate with the dr prior to surgery date, verbally understood, pt will call when if and when they are doing the procedure. thanks

## 2024-01-18 NOTE — PROGRESS NOTES
Subjective     Patient ID: Gamaliel Pete Jr. is a 73 y.o. male.    Chief Complaint: No chief complaint on file.    HPI    Presents for follow up prior to cycle 2   Recovered well from surgery   States no longer in pain and has stopped all pain medications  Reports he is only on gabapentin and robaxin   No other sites of pain  Reports activity has been more limited since surgery  States he feels his stamina and strength are low  Received call from PT but never heard back, desires to start  Eating well, weight decreased from prior  States compliant with inlyta and no issues  No fevers, chills or infectious complaints  No SOB, CP, palpitations  No nausea, diarrhea, constipation  No blood in urine or stool  No pain, redness or skin peeling to hands/feet  Does have small sore on buttocks which they state started while recovering from surgery and being in bed  Healing well and using coloplast barrier cream  No other concerns     Diagnosis: metastatic RCC     Oncology History:  - Presented with gross hematuria mid June 2023  Building a house and has been active working on this in the [place]- 1st noted dark urine and felt related to being dehydrated  Occurred a 2nd time approximately 2 weeks later and this time he noted darker and small clots  Noted again 2 weeks later and worse but ultimately he was prompted to seek evaluation when he had significant blood when he urinated     - went to his PCP and urine sample was yellow again  He had blood work done and referred to Urology at that time     - 9/5/2023 CT Urogram:  FINDINGS:  The lung bases demonstrate bilateral nodules, for example 9 mm at the right lung base and up to 11 mm at the left lung base.  Atelectasis present.  There are bilateral fat containing inguinal hernias.  The liver demonstrates no mass.  The spleen is not enlarged.  The stomach, pancreas and adrenal glands are within normal limits.  Gallbladder is unremarkable.  There is no biliary ductal  dilatation.  The kidneys concentrate and excrete contrast satisfactorily.  There is no renal calculus or ureteral calculus.  Within the mid to lower pole of the right kidney is a 4.9 x 6.3 x 6.1 cm heterogeneous solid mass which is overall slightly hypodense in relation to the enhancing parenchyma.  The mass is partially exophytic posteriorly.  It does extend partially into the renal sinus  At the upper pole of the right kidney is a subcentimeter exophytic focus which is isodense to the parenchyma on postcontrast imaging appearing slightly hyperdense on the noncontrast study, too small to characterize.  There are multiple additional subcentimeter hypodense right kidney foci which are suggestive of cysts.  Finally at the lower pole of the right kidney is a exophytic hypodense structure most compatible with a cyst.  The right main renal vein appears patent.  There is no significant periaortic lymphadenopathy.  Left kidney demonstrates several subcentimeter foci which are hypodense but too small to characterize, suggestive of cysts.  There is somewhat of a small amount of contrast within the upper collecting systems and ureters, with no definite focal abnormality, noting that there is some distortion of the collecting system in the region in which the right kidney mass involves the renal sinus.  The bladder is partially distended appearing grossly unremarkable.  The bowel demonstrates no significant abnormality.  The osseous structures demonstrate degenerative changes.  T9 demonstrates a lytic focus occupying the anterior half of the vertebral body.  There is slight loss of height along superior and inferior endplates with cortical disruption..  Impression:  Solid right renal mass concerning for neoplasm.  Multiple lung base nodules up to 11 mm, concerning for metastatic disease.  Recommend chest CT for full evaluation of lungs.  Lytic lesion at T9 with mild compression, concerning for pathologic compression  fracture.  Subcentimeter right kidney lesion isodense to parenchyma on contrast enhanced imaging, too small to characterize.  Additional bilateral renal hypodensities which are too small to characterize but suggestive of cysts.     - 9/7/2023 CT Chest:  FINDINGS:  The base of the neck is within normal limits.  The thyroid gland is unremarkable.  The supraclavicular regions are within normal limits.  The trachea is unremarkable.  The central airways are within normal limits.  No endobronchial lesion is identified.  There is no evidence of bronchiectasis.  The heart is unremarkable.  There are no pericardial effusions.  There are coronary artery calcifications.  The thoracic aorta is normal in caliber.  There are subcentimeter mediastinal and hilar lymph nodes.  There are subcentimeter axillary lymph nodes.  There are no pleural effusions.  There is no evidence of a pneumothorax.  There is no evidence of pneumomediastinum.  There are innumerable bilateral pulmonary nodules.  There is no focal consolidation.  The esophagus is unremarkable.  Please see the dedicated CT abdomen pelvis for the upper abdominal findings.  The chest wall is unremarkable.  There is unchanged lytic lesion involving the anterior aspect of T9 vertebral body with associated cortical erosions.  Impression:  Innumerable pulmonary nodules, concerning for metastatic disease to the chest.  Unchanged lytic lesion in the T9 vertebral body with cortical erosions.  Follow-up MRI of the spine, as clinically warranted.     - 9/7/2023 CT Abd:  FINDINGS:  CT renal protocol:  There is a 4.8 x 6.3 cm exophytic enhancing lesion in the lower pole of the right kidney.  There is hypoenhancement of the lesion compared to the normal renal parenchyma.  There is unchanged appearance of the mass extending into the right renal sinus.  There is no extension into the right renal vein  No additional enhancing lesions are identified.  There is a subcentimeter lesion in the  right kidney is too small complete characterization.  There is prompt excretion from both collecting systems.  There is incomplete distension of the right distal ureter.  No definitive filling defect is identified within the.  The urinary bladder is unremarkable.  CT abdomen pelvis:  There is unchanged appearance of multiple pulmonary nodules in the lung bases.  No new nodules identified  The heart is unremarkable.  There is normal tapering of the abdominal aorta.  There are single bilateral renal arteries.  The renal veins remain patent.  There is no evidence of lymphadenopathy.  The esophagus, stomach, and duodenum are within normal limits.  The small bowel loops are unremarkable.  The appendix is not visualized.  There are no secondary findings of acute appendicitis.  There is colonic diverticula without evidence of acute diverticulitis.  The liver is unremarkable.  The gallbladder is within normal limits.  The biliary tree is within normal limits.  The spleen is unremarkable.  The pancreas is within normal limits.  The adrenal glands are unremarkable.  There is no evidence of free fluid in the abdomen or pelvis.  There is no evidence of free air.  There is no evidence of pneumatosis.  No portal venous air is identified.  The psoas margins are unremarkable.  There are bilateral fat containing inguinal hernias.  There are degenerative changes in the osseous structures.  There is unchanged appearance of a lytic lesion involving the anterior aspect of the T9 vertebral body with associated cortical erosions.  Impression:  Unchanged 4.8 x 6.3 cm exophytic hypoenhancing lesion in the lower pole of the right kidney, concerning for renal cell carcinoma.  Extension of lesion into the renal sinus.  No evidence of renal vein invasion.  No regional lymphadenopathy.  Additional smaller subcentimeter lesion too small complete characterization in the right kidney.  Bilateral pulmonary nodules remain concerning for metastatic  disease.  Lytic lesion along the anterior aspect of the T9 vertebral body, also concerning for osseous metastatic disease.  Additional findings as above.     - 9/20/2023 Bone scan:  FINDINGS:  There is physiologic distribution of the radiopharmaceutical throughout the skeleton.  Focal uptake in the T9 vertebral body corresponding to lytic lesion seen on CT 09/05/2023.  Focal uptake in the right kidney in patient with known right renal mass.  There is otherwise normal uptake in the genitourinary system and soft tissues.  Impression:  Focal uptake in the T9 vertebral body corresponding to lytic lesion seen on prior CT and concerning for metastatic disease.  Additional focus of increased uptake in the right kidney in patient with known right renal mass     - 10/4/2023 Robotic right nephrectomy  Pathology:  RIGHT KIDNEY, TOTAL NEPHRECTOMY:   - Clear cell renal cell carcinoma, ISUP grade 4, 5.5 cm.   - Benign cortical cysts.   - See [facility] synoptic report below.   SURGICAL PATHOLOGY CANCER CASE SUMMARY   Procedure: Total nephrectomy.   Specimen laterality: Right.   Tumor size: 5.5 cm.   Tumor focality: Unifocal.   Histologic type: Clear cell renal cell carcinoma.   Sarcomatoid features: Present, 5%.   Rhabdoid features: Not identified.   Histologic Grade (ISUP Grade): 4.   Tumor necrosis: Present, 20%.   Tumor extension: Extends into pelvicalyceal system and renal sinus fat.   Margins: Uninvolved.   Lymphovascular invasion (excluding renal vein and its segmental branches): Not identified.   Regional lymph nodes: No lymph nodes submitted or found.   Distant metastases: Not applicable in this specimen.   Pathologic stage (pTNM, AJCC 8th edition): pT3a pN not assigned (no lymph nodes submitted or found).      - treatment not initiated with below issues:  2 prior admissions      - Admitted from 11/6- 11/9/2023  Hospital Course: Pt admitted for intractable back pain in the setting of renal carcinoma (RCC) s/p R nephrectomy 10/4  followed by Dr. Turk not on systemic therapy. CT and MRI concerning for spinal, lung, and hepatic mets. NSGY, Rad Onc, and IR on board in the hospital and evaluated for C5 and T9 lesions on spine. Pt pain controlled with oxycodone 12 HR BID and Oxycodone 10 PRN for thoracic and cervical pain. Patient underwent preop embolization of cervical tumor with IR on 11/6/23. And then had a cervical corpectomy with NSGY on 11/7/23 which he tolerated well. They obtained tissue samples and sent it over to pathology. IR to perform osteocool/kyphoplasty/biopsy T9 11/14. 2nd stage of surgery with NSGY on 11/15 with Dr. Martinez after kyphoplasty. Patient is to see Dr. Turk for systemic therapy afterwards as well as option for postoperative radiation. Stable to discharge home with c-collar and back brace. Patient also to receive rolling walker and hospital bed due to weakness and pain.   Pathology:  Spine, C5 vertebral body, corpectomy:   - Metastatic renal cell carcinoma, consistent with patient's history   - [facility] has been ordered and results will be issued in a separate      - Admitted 11/14- 11/18/2023 and underwent IR kyphoplasty of T4-6  Procedure(s) (LRB):  SPINE, CERVICAL, WITH POSTERIOR FUSION T4-6 (N/A)  CORPECTOMY, SPINE, CERVICAL C3-6 REVISION (N/A)   Hospital Course: 11/14: admitted; IR kyphoplasty of T9 today  11/15: OR today for C3-6 PCF.   11/16: POD 1 s/p C3-C6 PCF. NAEO. VSS, afebrile and WBC WNL. Patient complains of mild incisional pain but reports his LUE radiculopathy is improved. Reports mild discomfort with swallowing but no difficulty. Pending PT.   11/17: POD 2 s/p C3-C6 PCF. NAEO and VSS. Mild SABINE on BMP, will resume fluids. Anterior HV drain removed at bedside. Two posterior HV drains remain w/output 50 each.   11/18: POD 3. NAEON. AFVSS. Neurologically stable. Tolerating PO diet and voiding spontaneously. HV drain x 2 removed and pressure dressing placed. Medically stable to discharge home.  Follow up in clinic in 2 weeks. Discharge instructions given verbally to patient. All of his questions were answered.  He is encouraged to call the clinic with any questions or concerns prior to follow up appt.      [facility] x[facility] (11/30/2023 9:26 AM CST)  Results - [facility] (11/30/2023 9:26 AM CST)  Component Value Ref Range Test Method Analysis Time Performed At Pathologist Signature   Reason for Study To identify somatic and germline mutations relevant to patient's cancer.     11/30/2023 9:26 AM CST [facility]     Genetic Diseases Assessed Cancer     11/30/2023 9:26 AM CST [facility]     Description of Ranges of DNA Sequences Examined 648 gene panel     11/30/2023 9:26 AM CST [facility]     Overall Interpretation positive     11/30/2023 9:26 AM CST [facility]     MSI Stable     11/30/2023 9:26 AM CST [facility]     TMB 7.4 m/MB   11/30/2023 9:26 AM CST [facility]     [facility] Portal [email or web address]     11/30/2023 9:26 AM CST [facility]     Comment: [facility] link   Fusion Addendum Issue Type NEGATIVE  Negative - This addendum is being issued to report that no gene fusions or altered splicing variants from RNA sequencing analysis were found.     11/30/2023 9:26 AM CST [facility]     Therapy Count 4     11/30/2023 9:26 AM CST [facility]     [facility]: Potential Therapy 1 Gene: [id number]^VHL^HGNC  Variant: p.E186fs  Agent: Belzutifan  Tissue: Renal Cell Carcinoma  Association: response  Evidence Status: Consensus  Evidence ID: NCCN  Evidence URL:  FDA Approved?: Yes  on label?: No     11/30/2023 9:26 AM CST [facility]     [facility]: Potential Therapy 2 Gene: [id number]^PBRM1^HGNC  Variant: p.L618fs  Agent: Nivolumab  Tissue: Clear Cell Renal Cell Carcinoma  Association: response  Evidence Status: Clinical research  Evidence ID: [id number]  Evidence URL: https://www.ncbi.nlm.nih.gov/pubmed/43382796  FDA  Approved?: Yes  on label?: Yes     11/30/2023 9:26 AM CST [facility]     [facility]: Potential Therapy 3 Gene: 8975^PIK3CA^HGNC  Variant: p.Q546E  Agent: Capivasertib + Fulvestrant  Tissue: Breast Cancer  Association: response  Evidence Status: Consensus  Evidence ID: FDA  Evidence URL:  FDA Approved?: Yes  on label?: No     11/30/2023 9:26 AM CST [facility]     [facility]: Potential Therapy 4 Gene: 8975^PIK3CA^HGNC  Variant: p.Q546E  Agent: Alpelisib  Tissue: Solid Tumors  Association: response  Evidence Status: Clinical research  Evidence ID: [id number]  Evidence URL: https://www.ncbi.nlm.nih.gov/pubmed/60730648  FDA Approved?: Yes  on label?: No     11/30/2023 9:26 AM CST [facility]     Trial Count 3     11/30/2023 9:26 AM CST [facility]     [facility]: Clinical Trial Match 1 Clinical Trial NCT ID: [id number]  Clinical Trial Title: KLD628 as a Single Agent and in Combination With Everolimus & Immuno-Oncology Agents in Advanced/Relapsed Renal Cancer & Other Malignancies  Clinical Trial URL: https://clinicaltrials.gov/ct2/show/EBP29407978  Clinical Phase: Phase 1  Matched criteria: VHL p.E186fs mutation     11/30/2023 9:26 AM CST [facility]     [facility]: Clinical Trial Match 2 Clinical Trial NCT ID: [id number]  Clinical Trial Title: First-in-Human Study of STX-478 as Monotherapy and in Combination With Other Antineoplastic Agents in Participants With Advanced Solid Tumors  Clinical Trial URL: https://clinicaltrials.gov/ct2/show/BWG18036478  Clinical Phase: Phase 1/Phase 2  Matched criteria: PIK3CA p.Q546E mutation     11/30/2023 9:26 AM CST [facility]     [facility]: Clinical Trial Match 3 Clinical Trial NCT ID: [id number]  Clinical Trial Title: Testing the Combination of Two Anti-cancer Drugs, Peposertib () and  for Advanced Solid Tumors  Clinical Trial URL: https://clinicaltrials.gov/ct2/show/IAH18437536  Clinical Phase: Phase 1  Matched criteria: PBRM1 p.L618fs mutation     11/30/2023 9:26 AM [facility]         Results - [facility] x[facility] (2023 9:26 AM CST)  Specimen (Source) Anatomical Location / Laterality Collection Method / Volume Collection Time Received Time   Tissue       2023 11:02 AM CST      Results - [facility] (2023 9:26 AM CST)  Narrative   This result has genomic variants that were not included in this document.          Results - [facility] (2023 9:26 AM CST)  Authorizing Provider Result Type   Ruby Turk MD LAB MOLECULAR DIAGNOSTICS ORDERABLES      Results - [facility] x[facility] (2023 9:26 AM CST)  Performing Organization Address City/State/ZIP Code Phone Number   [facility]  [address], [address]  [place] [address],   [phone]         - initiated systemic therapy with Pembro/Axitinib on 2023    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  - fractured collar bone  - Pediatric hernia             Active Ambulatory Problems     Diagnosis Date Noted    Obesity (BMI 35.0-39.9 without comorbidity) 2019    Borderline hypertension 2019    Acute gout of left ankle 2019    Onychomycosis 2019    History of gout 2021    Seborrheic keratosis 2021    Tinea corporis 2021    Positive colorectal cancer screening using Cologuard test 2021              Resolved Ambulatory Problems     Diagnosis Date Noted    No Resolved Ambulatory Problems              Past Medical History:   Diagnosis Date    Asthma      ED (erectile dysfunction)      Gout        SH:  Retired from [employer]    1 child, 2 stepchildren  Prior tobacco- teens, early 20s  Social EtOH     FH:  Maternal grandmother-  at 91- she had prior cancers- colon cancer and breast cancer  Mother - liver cancer - prior blood transfusion Hep C-  at age 77 yo  Father-  in his 50s- EtOH cirrhosis  Paternal  grandfather- cancer in his 70s, unclear type  Maternal grandfather- H+N cancer-  (smoker)      Review of Systems   Constitutional:  Positive for activity change (since surgery), fatigue (decreased stamina, energy) and unexpected weight change. Negative for appetite change, chills and fever.   HENT:  Negative for trouble swallowing and voice change.    Respiratory:  Negative for cough, shortness of breath and wheezing.    Cardiovascular:  Negative for chest pain, palpitations and leg swelling.   Gastrointestinal:  Negative for abdominal distention, abdominal pain, change in bowel habit, constipation, diarrhea, nausea, vomiting and reflux.   Genitourinary:  Negative for decreased urine volume, difficulty urinating, dysuria, frequency and hematuria.   Musculoskeletal:  Positive for arthralgias. Negative for back pain and neck pain.   Integumentary:  Positive for wound (buttocks).   Neurological:  Negative for dizziness, weakness, numbness and headaches.   Psychiatric/Behavioral:  Negative for dysphoric mood and sleep disturbance. The patient is not nervous/anxious.       Objective     Physical Exam  Vitals reviewed.   Constitutional:       General: He is not in acute distress.     Appearance: Normal appearance. He is not ill-appearing, toxic-appearing or diaphoretic.   HENT:      Head: Normocephalic and atraumatic.      Right Ear: External ear normal.      Left Ear: External ear normal.      Nose: Nose normal.      Mouth/Throat:      Pharynx: Oropharynx is clear.   Eyes:      General: No scleral icterus.     Conjunctiva/sclera: Conjunctivae normal.   Cardiovascular:      Rate and Rhythm: Normal rate.   Pulmonary:      Effort: Pulmonary effort is normal.   Abdominal:      General: There is no distension.   Musculoskeletal:      Comments: Wearing back brace in clinic   Skin:     Coloration: Skin is not jaundiced or pale.      Findings: Lesion (to mid bilateral buttocks, ~1 cm, no open areas and appears to be healing,  barrier cream in place) present. No bruising, erythema or rash.   Neurological:      Mental Status: He is alert and oriented to person, place, and time.      Motor: Weakness (generalized) present.      Gait: Gait abnormal (in wheelchair for distances).   Psychiatric:         Mood and Affect: Mood normal.         Behavior: Behavior normal.        Assessment and Plan     1. Clear cell carcinoma of right kidney    2. Metastatic cancer to spine    3. 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 Malignant neoplasm metastatic to lung, unspecified laterality    5. 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 Essential hypertension    8. Intractable back pain    9. Right leg pain    Other orders  -     pembrolizumab (KEYTRUDA) 200 mg in sodium chloride 0.9% SolP 108 mL infusion  -     EPINEPHrine (EPIPEN) 0.3 mg/0.3 mL pen injection 0.3 mg  -     diphenhydrAMINE injection 50 mg  -     hydrocortisone sodium succinate injection 100 mg  -     sodium chloride 0.9% 100 mL flush bag  -     sodium chloride 0.9% flush 10 mL  -     heparin, porcine (PF) 100 unit/mL injection flush 500 Units  -     alteplase injection 2 mg      Metastatic renal carcinoma-  Tolerating treatment well  No issues with Inlyta - has resumed post-surgery  Labs reviewed, adequate for treatment  TSH slightly up, free T4 normal. Monitor closely on treatment.   Proceed with pembro infusion tomorrow  RTC in 3 weeks for next cycle    Of note, he is currently scheduled for intramedullary dulce placement on 1/30/24. He is unsure if he is going to proceed with this - plans to discuss risks/benefits/recovery with Dr. Rock. States he also has a vacation around [place] he is hoping to enjoy.      Pain-  No longer taking pain medications - only using gabapentin and robaxin  States pain resolved  Continue to monitor    HTN-   Controlled in clinic  Continue to monitor    Patient is in agreement with the proposed  treatment plan. All questions were answered to the patient's satisfaction. Pt knows to call clinic if anything is needed before the next clinic visit.    Patient discussed with collaborating physician, Dr. Turk.    At least 40 minutes were spent today on this encounter including face to face time with the patient, data gathering/interpretation and documentation.       Kelly Lopez, MSN, APRN, [facility]  Hematology and Medical Oncology  Clinical Nurse Specialist to Dr. Ramirez, Dr. Turk & Dr. Swift    Route Chart for Scheduling    Med Onc Chart Routing      Follow up with physician 3 weeks. with labs for cycle 3   Follow up with [facility] 6 weeks. with labs for cycle 4   Infusion scheduling note   keytruda every 3 weeks   Injection scheduling note    Labs CBC, CMP, TSH and free T4   Scheduling:  Preferred lab:  Lab interval:     Imaging    Pharmacy appointment    Other referrals              Treatment Plan Information   OP AXITINIB + PEMBROLIZUMAB 200MG Q3W   Ruby Turk MD   Upcoming Treatment Dates - OP AXITINIB + PEMBROLIZUMAB 200MG Q3W    1/16/2024       Chemotherapy       pembrolizumab (KEYTRUDA) 200 mg in sodium chloride 0.9% SolP 108 mL infusion  2/6/2024       Chemotherapy       pembrolizumab (KEYTRUDA) 200 mg in sodium chloride 0.9% SolP 108 mL infusion  2/27/2024       Chemotherapy       pembrolizumab (KEYTRUDA) 200 mg in sodium chloride 0.9% SolP 108 mL infusion  3/19/2024       Chemotherapy       pembrolizumab (KEYTRUDA) 200 mg in sodium chloride 0.9% SolP 108 mL infusion    Therapy Plan Information  EPINEPHrine (EPIPEN) 0.3 mg/0.3 mL pen injection 0.3 mg  0.3 mg, Intramuscular, PRN  diphenhydrAMINE injection 50 mg  50 mg, Intravenous, PRN  hydrocortisone sodium succinate injection 100 mg  100 mg, Intravenous, PRN   No follow-ups on file.

## 2024-01-19 ENCOUNTER — INFUSION (OUTPATIENT)
Dept: INFUSION THERAPY | Facility: HOSPITAL | Age: 74
End: 2024-01-19
Attending: STUDENT IN AN ORGANIZED HEALTH CARE EDUCATION/TRAINING PROGRAM
Payer: MEDICARE

## 2024-01-19 ENCOUNTER — PATIENT MESSAGE (OUTPATIENT)
Dept: ADMINISTRATIVE | Facility: OTHER | Age: 74
End: 2024-01-19
Payer: MEDICARE

## 2024-01-19 ENCOUNTER — OFFICE VISIT (OUTPATIENT)
Dept: ORTHOPEDICS | Facility: CLINIC | Age: 74
End: 2024-01-19
Payer: MEDICARE

## 2024-01-19 ENCOUNTER — OFFICE VISIT (OUTPATIENT)
Dept: NEUROSURGERY | Facility: CLINIC | Age: 74
End: 2024-01-19
Payer: MEDICARE

## 2024-01-19 VITALS
BODY MASS INDEX: 27.24 KG/M2 | HEART RATE: 85 BPM | SYSTOLIC BLOOD PRESSURE: 140 MMHG | WEIGHT: 190.25 LBS | RESPIRATION RATE: 18 BRPM | HEIGHT: 70 IN | DIASTOLIC BLOOD PRESSURE: 65 MMHG | TEMPERATURE: 98 F

## 2024-01-19 DIAGNOSIS — C64.9 METASTATIC RENAL CELL CARCINOMA TO BONE: Primary | ICD-10-CM

## 2024-01-19 DIAGNOSIS — C64.1 CLEAR CELL CARCINOMA OF RIGHT KIDNEY: ICD-10-CM

## 2024-01-19 DIAGNOSIS — C79.51 CANCER, METASTATIC TO BONE: ICD-10-CM

## 2024-01-19 DIAGNOSIS — C79.51 METASTATIC RENAL CELL CARCINOMA TO BONE: Primary | ICD-10-CM

## 2024-01-19 DIAGNOSIS — Z98.1 S/P FUSION OF THORACIC SPINE: Primary | ICD-10-CM

## 2024-01-19 DIAGNOSIS — C78.00 MALIGNANT NEOPLASM METASTATIC TO LUNG, UNSPECIFIED LATERALITY: Primary | ICD-10-CM

## 2024-01-19 PROCEDURE — 99024 POSTOP FOLLOW-UP VISIT: CPT | Mod: S$GLB,,,

## 2024-01-19 PROCEDURE — 99213 OFFICE O/P EST LOW 20 MIN: CPT | Mod: 24,S$GLB,, | Performed by: ORTHOPAEDIC SURGERY

## 2024-01-19 PROCEDURE — 63600175 PHARM REV CODE 636 W HCPCS: Mod: JZ,JG | Performed by: REGISTERED NURSE

## 2024-01-19 PROCEDURE — 96413 CHEMO IV INFUSION 1 HR: CPT

## 2024-01-19 PROCEDURE — 25000003 PHARM REV CODE 250: Performed by: REGISTERED NURSE

## 2024-01-19 PROCEDURE — 99999 PR PBB SHADOW E&M-EST. PATIENT-LVL I: CPT | Mod: PBBFAC,,,

## 2024-01-19 RX ORDER — HEPARIN 100 UNIT/ML
500 SYRINGE INTRAVENOUS
Status: DISCONTINUED | OUTPATIENT
Start: 2024-01-19 | End: 2024-01-19 | Stop reason: HOSPADM

## 2024-01-19 RX ORDER — DIPHENHYDRAMINE HYDROCHLORIDE 50 MG/ML
50 INJECTION INTRAMUSCULAR; INTRAVENOUS ONCE AS NEEDED
Status: DISCONTINUED | OUTPATIENT
Start: 2024-01-19 | End: 2024-01-19 | Stop reason: HOSPADM

## 2024-01-19 RX ORDER — SODIUM CHLORIDE 0.9 % (FLUSH) 0.9 %
10 SYRINGE (ML) INJECTION
Status: DISCONTINUED | OUTPATIENT
Start: 2024-01-19 | End: 2024-01-19 | Stop reason: HOSPADM

## 2024-01-19 RX ORDER — EPINEPHRINE 0.3 MG/.3ML
0.3 INJECTION SUBCUTANEOUS ONCE AS NEEDED
Status: DISCONTINUED | OUTPATIENT
Start: 2024-01-19 | End: 2024-01-19 | Stop reason: HOSPADM

## 2024-01-19 RX ADMIN — SODIUM CHLORIDE: 9 INJECTION, SOLUTION INTRAVENOUS at 10:01

## 2024-01-19 RX ADMIN — SODIUM CHLORIDE 200 MG: 9 INJECTION, SOLUTION INTRAVENOUS at 09:01

## 2024-01-19 NOTE — PROGRESS NOTES
Orthopaedic Oncology New Patient Visit:      Dear No referring provider defined for this encounter. and Slava Lowery MD,    We had the pleasure of evaluating Gamaliel Pete Jr. in the Orthopaedic Clinic today at the [facility] today.      Chief Complaint: right thigh pain, right shoulder pain, metastatic RCC    As you may recall, Gamaliel Pete Jr. is a 73 y.o. male has been experiencing a few weeks of right thigh pain. He had radiographs done which demonstrated a new proximal femur lesion and was therefore referred to me. He ambulates without assistive devices, but he does have a walker and cane. He did undergo revision C spine surgery in mid November and is recovering from that currently. He is in a c collar. He denies any left thigh or leg pain. He also reports some right shoulder pain which is relatively new. 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 He is currently not on any therapy. He tells me is going to start new immunotherapy soon. He has not had any radiation. He denies new numbness or tingling. No recent falls. No rest pain.     Interval History:  Since saw him last he unfortunately had emergent spine surgery for spine compression so we had to postpone his surgery understandably as he had undergone that surgery days prior and was not in a medical standpoint ready for our surgery. Since then he has been using walker/wheelchair. No significant pain in the right thigh. He is back on treatment. He is having some wound healing issues from his spine surgery but otherwise is in good spirits. He is planning a road trip to [place]. He is going to have radiation to his spine and is interested in getting radiation to his right leg and holding off on surgery until he recovers more. No new bone complaints.     PMH:   Past Medical History:   Diagnosis Date    Asthma     no inhaler use    Borderline hypertension     ED (erectile  dysfunction)     Gout     Hematuria     Hypertension        PSH:  has a past surgical history that includes Colonoscopy (02/10/2022); Colonoscopy (N/A, 02/10/2022); Tonsillectomy; Robot-assisted laparoscopic nephrectomy (Right, 10/4/2023); Surgical removal of vertebral body of cervical spine (Right, 11/7/2023); Posterior fusion of cervical spine with laminectomy (N/A, 11/15/2023); Surgical removal of vertebral body of cervical spine (N/A, 11/15/2023); and Thoracic laminectomy with fusion (N/A, 1/5/2024).    Allergies:   Allergies as of 01/19/2024    (No Known Allergies)       Medications:    Current Outpatient Medications:     allopurinoL (ZYLOPRIM) 100 MG tablet, Take 1 tablet (100 mg total) by mouth once daily., Disp: 30 tablet, Rfl: 3    amlodipine-benazepril 5-10 mg (LOTREL) 5-10 mg per capsule, Take 1 capsule by mouth once daily., Disp: 90 capsule, Rfl: 3    axitinib (INLYTA) 5 mg Tab, Take 1 tablet (5 mg) by mouth 2 (two) times daily., Disp: 60 tablet, Rfl: 11    dexAMETHasone (DECADRON) 2 MG tablet, Take 2 tablets by mouth every 8 hours for 2 days, THEN 2 tablets every 12 hours for 2 days, THEN 1 tablet every 6 hours for 2 days, THEN 1 tablet every 8 hours for 2 days, THEN 1 tablet every 12 hours for 2 days, THEN 1 tablet once daily for 1 day., Disp: 39 tablet, Rfl: 0    diazePAM (VALIUM) 5 MG tablet, Take 1 tablet (5 mg total) by mouth every 6 (six) hours as needed (severe muscles spasms). (Patient not taking: Reported on 1/18/2024), Disp: 10 tablet, Rfl: 0    gabapentin (NEURONTIN) 300 MG capsule, Take 1 capsule (300 mg total) by mouth 3 (three) times daily., Disp: 90 capsule, Rfl: 11    methocarbamoL (ROBAXIN) 750 MG Tab, Take 1 tablet (750 mg total) by mouth 4 (four) times daily. for 10 days, Disp: 40 tablet, Rfl: 0    naloxone (NARCAN) 4 mg/actuation Spry, 1 spray (4mg) by nasal route as needed for opioid overdose; may repeat every 2-3 minutes in alternating nostrils until medical help arrives. Call [phone]  (Patient not taking: Reported on 12/28/2023), Disp: 2 each, Rfl: 0    omega-3 fatty acids/fish oil (FISH OIL-OMEGA-3 FATTY ACIDS) 300-1,000 mg capsule, Take 2 capsules by mouth once daily., Disp: , Rfl:     oxyCODONE-acetaminophen (PERCOCET) 5-325 mg per tablet, Take 1 to 2 tablets by mouth every 4-6 hours as needed for pain (Patient not taking: Reported on 1/18/2024), Disp: 100 tablet, Rfl: 0    sildenafiL (VIAGRA) 100 MG tablet, Take 1 tablet (100 mg total) by mouth daily as needed for Erectile Dysfunction. (Patient not taking: Reported on 1/18/2024), Disp: 30 tablet, Rfl: 11    turmeric (CURCUMIN MISC), 1,000 mg by Misc.(Non-Drug; Combo Route) route Daily., Disp: , Rfl:     UNABLE TO FIND, Take 500 mg by mouth 2 (two) times a day. medication name: Tudca, Disp: , Rfl:     UNABLE TO FIND, Take 2 capsules by mouth 2 (two) times a day. medication name: Turkey tail mushroom, Disp: , Rfl:     UNABLE TO FIND, Take 15 drops by mouth once daily. medication name: Essiac-20, Disp: , Rfl:     UNABLE TO FIND, Take 5 mLs by mouth 2 (two) times a day. medication name: barley leaf juice powder, Disp: , Rfl:     UNABLE TO FIND, Take 5 mLs by mouth 2 (two) times a day. medication name: black seed oil (cumin seed), Disp: , Rfl:   No current facility-administered medications for this visit.    Family History: family history is not on file.    Social History:  reports that he has never smoked. He has never used smokeless tobacco. He reports that he does not currently use alcohol. He reports that he does not use drugs.    ROS:  A Complete review of systems was performed.  Pertinent positives are listed in the history of present illness above.  Remainder of review of systems were negative.      Vital Signs:      Physical exam:  General:  AAOX3, No acute distress, normal affect and disposition  Respiratory: Respirations unlabored  C collar in place and well fitting  Anterior neck incision healing well    Right lower extremity  Minimal  pain about the right proximal thigh  Tolerates axial load and log roll  No pain with knee or ankle ROM  Distal neurovascular exam was normal with 5/5 motor for the tibialis anterior, extensor hallucis longus, and gastrocsoleus.  Sensory exam intact in sural, saphenous, superficial peroneal, deep peroneal, and tibial nerve distributions  Foot is perfused    Right upper extremity  No deformity  Pain with palpation distal clavicle  Tolerates passive shoulder ROM  Neurovascularly intact distally    Left lower extremity  No pain with palpation  Tolerates hip ROM log roll and axial load  No pain with knee or ankle ROM  Neurovascularly intact distally      Imaging:    Radiographic Data:  No new radiographs available for review    Pathology: metastatic RCC from spine biopsy    Assessment/Plan: Gamaliel Pete Jr. is a 73 y.o. 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 We had a long conversation about options in light of his total care. He is understandably still recovering from his spine surgery and nervous about proceeding with fixation of the right femur too soon. He is essentially using wheelchair and walker for only limited mobility to be safe so he has not had much right leg pain. He has very clear understanding of the risk of fracture, but once again understandably is hesitant in light of all of his recent events not wanting to proceed right away with prophylactic fixation. We discussed radiation and RCC is not as predictable but I am not opposed to him pursuing in the interim. Additionally we would need to re time his surgery with his treatments anyway since he restarted systemic medications and currently is already having some issues with wound healing from his spine so would want to avoid further wound issues with another surgery. This is also reasonable thinking. We mutually agreed that we will put a pause on surgery  knowing he may fracture but that in light of everything else going on and the recovery he still needs to do that it is not unreasonable to try radiation up front and do the prophylactic fixation at a better time. He will be very careful and let me know if he develops worsening pain in the right leg. For now we will follow up in roughly 4 weeks with radiographs of the pelvis and bilateral femurs. We can time this around his trip and move as needed. Once he gets more mobile and recovers more from his spine surgery then we can re-discuss when to do prophylactic fixation of the right side.  All questions answered.     Thank you for the referral and the opportunity to participate in the care of your patient.  If you have any questions regarding our treatment recommendations don't hesitate to call.    Electronically signed by:    Parvez Rock MD   Orthopedic Oncology and Complex Arthroplasty Reconstruction  [facility]

## 2024-01-19 NOTE — PROGRESS NOTES
Gamaliel Pete Jr. is a 73 y.o. male s/p T7-11 decompression/fusion on 1/6/24 with Dr. Martinez presents to clinic for a wound check. He was getting a chemotherapy infusion today and was directed to NSGY clinic due to incision drainage. Patient states he noticed some yellowish drainage on his shirt one day but it had stopped after that. He denies fevers, chills, bodyaches, new focal weakness or numbness. He denies back pain but states his bottoms is extremely tender and sore. He was seen by wound care in the hospital for sacral pressure injuries to this area and was given a cream to take home to apply to the area but it has gotten worse.     Physical Exam:  General: Well developed, well nourished, not in acute distress.   Head: Normocephalic, atraumatic.  Neck: Full ROM.   Neurologic: Alert and oriented x 4. Thought content appropriate.  GCS: Motor:6  Verbal:5  Eyes:4   GCS Total: 15  Language: No aphasia.  Speech: No dysarthria.  Cranial nerves: Face symmetric, tongue midline, CN II-XII grossly intact.   Eyes: Pupils equal, round, reactive to light with accomodation, EOMI.   Pulmonary: Normal respirations, no signs of respiratory distress.  Abdomen: Soft, non-distended, non-tender to palpation.  Sensory: Intact to light touch throughout.  Motor Strength: Moves all extremities spontaneously with good tone. Full strength upper and lower extremities. No abnormal movements seen.      Thoracic incision: There is thick scabbing to the midline incision at the bottom where the prineo has come off. Some erythema surrounding the scab. No drainage or dehiscence. No underlying fluid collection.          Sacrum with erythema and tenderness. No open wounds.     Plan:  - Wound care referral for surgical wound and sacral pressure injury management.  - Advised patient to avoid laying/sitting on areas for long periods of time. Frequent repositioning, turning and use of pillows to off load pressure.  - Monitor for fevers, chills,  drainage and dehiscence of surgical wound. If any of these are present, seek urgent evaluation.   - Follow up as scheduled.     Discussed with Dr. Martinez.      Nadine Santos PA-C  Neurosurgery   [facility]

## 2024-01-23 ENCOUNTER — PATIENT MESSAGE (OUTPATIENT)
Dept: ADMINISTRATIVE | Facility: OTHER | Age: 74
End: 2024-01-23
Payer: MEDICARE

## 2024-01-23 ENCOUNTER — TELEPHONE (OUTPATIENT)
Dept: NEUROSURGERY | Facility: CLINIC | Age: 74
End: 2024-01-23
Payer: MEDICARE

## 2024-01-23 NOTE — TELEPHONE ENCOUNTER
Informed patient that Keyla from wound care specialist will be giving him a call to schedule in home appointments patient verbally understood.

## 2024-01-23 NOTE — TELEPHONE ENCOUNTER
----- Message from Luisa Moreira sent at 1/23/2024 10:57 AM CST -----  Regarding: PT IS CALLING REGARDING BED SORES ON BUTTOCKS  Contact: PT  Pt is requesting to please be called as soon as possible regarding issue.    Confirmed contact info below:  Contact Name: Gamaliel Pete  Phone Number: [phone]

## 2024-01-24 ENCOUNTER — PATIENT MESSAGE (OUTPATIENT)
Dept: ADMINISTRATIVE | Facility: OTHER | Age: 74
End: 2024-01-24
Payer: MEDICARE

## 2024-01-25 ENCOUNTER — PATIENT MESSAGE (OUTPATIENT)
Dept: ADMINISTRATIVE | Facility: OTHER | Age: 74
End: 2024-01-25
Payer: MEDICARE

## 2024-01-25 ENCOUNTER — PATIENT MESSAGE (OUTPATIENT)
Dept: HEMATOLOGY/ONCOLOGY | Facility: CLINIC | Age: 74
End: 2024-01-25
Payer: MEDICARE

## 2024-01-25 DIAGNOSIS — K13.79 MOUTH SORES: Primary | ICD-10-CM

## 2024-01-25 DIAGNOSIS — K13.79 MOUTH SORES: ICD-10-CM

## 2024-01-25 NOTE — TELEPHONE ENCOUNTER
----- Message from Vivienne Luque sent at 1/25/2024 11:30 AM CST -----  Regarding: Refill  Contact: [phone]            Name of Pharmacy:   [facility]  [place] - [address] [place] [address]  Phone: [phone] Fax: [phone]     Name Of caller: [facility]      Name of Medication:  duke's soln (benadryl 30 mL, mylanta 30 mL, LIDOcaine 30 mL, nystatin 30 mL) 120mL    Comments/advisory:  Need to send medication to different pharmacy they are not allowed to compound meds

## 2024-01-27 ENCOUNTER — PATIENT MESSAGE (OUTPATIENT)
Dept: ADMINISTRATIVE | Facility: OTHER | Age: 74
End: 2024-01-27
Payer: MEDICARE

## 2024-01-29 ENCOUNTER — HOSPITAL ENCOUNTER (OUTPATIENT)
Dept: RADIATION THERAPY | Facility: HOSPITAL | Age: 74
Discharge: HOME | End: 2024-01-29
Attending: FAMILY MEDICINE
Payer: MEDICARE

## 2024-01-29 ENCOUNTER — INFUSION (OUTPATIENT)
Dept: INFUSION THERAPY | Facility: HOSPITAL | Age: 74
End: 2024-01-29
Attending: STUDENT IN AN ORGANIZED HEALTH CARE EDUCATION/TRAINING PROGRAM
Payer: MEDICARE

## 2024-01-29 ENCOUNTER — OFFICE VISIT (OUTPATIENT)
Dept: HEMATOLOGY/ONCOLOGY | Facility: CLINIC | Age: 74
End: 2024-01-29
Payer: MEDICARE

## 2024-01-29 ENCOUNTER — OFFICE VISIT (OUTPATIENT)
Dept: RADIATION ONCOLOGY | Facility: CLINIC | Age: 74
End: 2024-01-29
Payer: MEDICARE

## 2024-01-29 VITALS
TEMPERATURE: 100 F | SYSTOLIC BLOOD PRESSURE: 79 MMHG | OXYGEN SATURATION: 95 % | DIASTOLIC BLOOD PRESSURE: 49 MMHG | HEART RATE: 108 BPM

## 2024-01-29 VITALS
WEIGHT: 192 LBS | DIASTOLIC BLOOD PRESSURE: 50 MMHG | BODY MASS INDEX: 27.49 KG/M2 | OXYGEN SATURATION: 96 % | HEIGHT: 70 IN | RESPIRATION RATE: 20 BRPM | TEMPERATURE: 99 F | SYSTOLIC BLOOD PRESSURE: 73 MMHG | HEART RATE: 98 BPM

## 2024-01-29 VITALS
TEMPERATURE: 99 F | RESPIRATION RATE: 18 BRPM | DIASTOLIC BLOOD PRESSURE: 53 MMHG | SYSTOLIC BLOOD PRESSURE: 97 MMHG | HEART RATE: 99 BPM | OXYGEN SATURATION: 95 %

## 2024-01-29 DIAGNOSIS — I95.2 HYPOTENSION DUE TO DRUGS: Primary | ICD-10-CM

## 2024-01-29 DIAGNOSIS — R93.89 ABNORMAL FINDINGS ON DIAGNOSTIC IMAGING OF OTHER SPECIFIED BODY STRUCTURES: ICD-10-CM

## 2024-01-29 DIAGNOSIS — K13.79 MOUTH SORES: ICD-10-CM

## 2024-01-29 DIAGNOSIS — Z90.5 HISTORY OF RIGHT RADICAL NEPHRECTOMY: ICD-10-CM

## 2024-01-29 DIAGNOSIS — C64.1 CLEAR CELL CARCINOMA OF RIGHT KIDNEY: Primary | ICD-10-CM

## 2024-01-29 DIAGNOSIS — C79.51 METASTATIC CANCER TO SPINE: ICD-10-CM

## 2024-01-29 PROCEDURE — 99999 PR PBB SHADOW E&M-EST. PATIENT-LVL IV: CPT | Mod: PBBFAC,,, | Performed by: NURSE PRACTITIONER

## 2024-01-29 PROCEDURE — 99215 OFFICE O/P EST HI 40 MIN: CPT | Mod: S$GLB,,, | Performed by: NURSE PRACTITIONER

## 2024-01-29 PROCEDURE — 25000003 PHARM REV CODE 250: Performed by: NURSE PRACTITIONER

## 2024-01-29 PROCEDURE — 99999 PR PBB SHADOW E&M-EST. PATIENT-LVL III: CPT | Mod: PBBFAC,,, | Performed by: RADIOLOGY

## 2024-01-29 PROCEDURE — 99215 OFFICE O/P EST HI 40 MIN: CPT | Mod: S$GLB,,, | Performed by: RADIOLOGY

## 2024-01-29 RX ORDER — HEPARIN 100 UNIT/ML
500 SYRINGE INTRAVENOUS
Status: CANCELLED | OUTPATIENT
Start: 2024-01-29

## 2024-01-29 RX ORDER — SODIUM CHLORIDE 0.9 % (FLUSH) 0.9 %
10 SYRINGE (ML) INJECTION
Status: CANCELLED | OUTPATIENT
Start: 2024-01-29

## 2024-01-29 RX ORDER — SODIUM CHLORIDE 0.9 % (FLUSH) 0.9 %
10 SYRINGE (ML) INJECTION
Status: DISCONTINUED | OUTPATIENT
Start: 2024-01-29 | End: 2024-01-29 | Stop reason: HOSPADM

## 2024-01-29 RX ORDER — HEPARIN 100 UNIT/ML
500 SYRINGE INTRAVENOUS
Status: DISCONTINUED | OUTPATIENT
Start: 2024-01-29 | End: 2024-01-29 | Stop reason: HOSPADM

## 2024-01-29 RX ADMIN — SODIUM CHLORIDE 1000 ML: 9 INJECTION, SOLUTION INTRAVENOUS at 01:01

## 2024-01-29 NOTE — PLAN OF CARE
Problem: Fall Injury Risk  Goal: Absence of Fall and Fall-Related Injury  Outcome: Ongoing, Progressing     Problem: Infection  Goal: Absence of Infection Signs and Symptoms  Outcome: Ongoing, Progressing    Ambulatory to clinic with wife.  C/O weakness and 4/10 back pain. Sent to clinic from MD appointment for IVF's for hypotension.  BP 97/53 and T 99.4 post 1L IVF infusion.  MD and NP notified and ordered patient to report to ER for hospital admission to monitor BP.  Patient refused admission.  Patient informed to continue monitoring BP, hold am BP meds, and report to ER via EMS if BP drops below 90/60.

## 2024-01-29 NOTE — PROGRESS NOTES
Subjective     Patient ID: Gamaliel Pete Jr. is a 73 y.o. male.    Chief Complaint: Consult    HPI    73 y.o. male, patient of Dr. Turk, to clinic for urgent care visit. He is currently receiving Inlya + Keytruda for metastatic RCC since 12/18/23. He has been holding Inlyta since 1/25/24. Mouth tenderness mainly to his tongue with a pain level on Friday at 9/10 and started on Grand with relief. Now down to 4/10 but has run out of dukes. BP low today in clinic. Took gabapentin, BP medication, and allopurinol, however, his wife additionally gave him a long acting 15mg morphine this morning around 7am. He has not taken any short acting narcotics since yesterday around 9pm. Was seen in rad onc today for sim. Feels weak. Denies SOB, could, cold symptoms, etc.     Diagnosis: metastatic RCC     Oncology History:  - Presented with gross hematuria mid June 2023  Building a house and has been active working on this in the hat- 1st noted dark urine and felt related to being dehydrated  Occurred a 2nd time approximately 2 weeks later and this time he noted darker and small clots  Noted again 2 weeks later and worse but ultimately he was prompted to seek evaluation when he had significant blood when he urinated     - went to his PCP and urine sample was yellow again  He had blood work done and referred to Urology at that time     - 9/5/2023 CT Urogram:  FINDINGS:  The lung bases demonstrate bilateral nodules, for example 9 mm at the right lung base and up to 11 mm at the left lung base.  Atelectasis present.  There are bilateral fat containing inguinal hernias.  The liver demonstrates no mass.  The spleen is not enlarged.  The stomach, pancreas and adrenal glands are within normal limits.  Gallbladder is unremarkable.  There is no biliary ductal dilatation.  The kidneys concentrate and excrete contrast satisfactorily.  There is no renal calculus or ureteral calculus.  Within the mid to lower pole of the right kidney is a  4.9 x 6.3 x 6.1 cm heterogeneous solid mass which is overall slightly hypodense in relation to the enhancing parenchyma.  The mass is partially exophytic posteriorly.  It does extend partially into the renal sinus  At the upper pole of the right kidney is a subcentimeter exophytic focus which is isodense to the parenchyma on postcontrast imaging appearing slightly hyperdense on the noncontrast study, too small to characterize.  There are multiple additional subcentimeter hypodense right kidney foci which are suggestive of cysts.  Finally at the lower pole of the right kidney is a exophytic hypodense structure most compatible with a cyst.  The right main renal vein appears patent.  There is no significant periaortic lymphadenopathy.  Left kidney demonstrates several subcentimeter foci which are hypodense but too small to characterize, suggestive of cysts.  There is somewhat of a small amount of contrast within the upper collecting systems and ureters, with no definite focal abnormality, noting that there is some distortion of the collecting system in the region in which the right kidney mass involves the renal sinus.  The bladder is partially distended appearing grossly unremarkable.  The bowel demonstrates no significant abnormality.  The osseous structures demonstrate degenerative changes.  T9 demonstrates a lytic focus occupying the anterior half of the vertebral body.  There is slight loss of height along superior and inferior endplates with cortical disruption..  Impression:  Solid right renal mass concerning for neoplasm.  Multiple lung base nodules up to 11 mm, concerning for metastatic disease.  Recommend chest CT for full evaluation of lungs.  Lytic lesion at T9 with mild compression, concerning for pathologic compression fracture.  Subcentimeter right kidney lesion isodense to parenchyma on contrast enhanced imaging, too small to characterize.  Additional bilateral renal hypodensities which are too small to  characterize but suggestive of cysts.     - 9/7/2023 CT Chest:  FINDINGS:  The base of the neck is within normal limits.  The thyroid gland is unremarkable.  The supraclavicular regions are within normal limits.  The trachea is unremarkable.  The central airways are within normal limits.  No endobronchial lesion is identified.  There is no evidence of bronchiectasis.  The heart is unremarkable.  There are no pericardial effusions.  There are coronary artery calcifications.  The thoracic aorta is normal in caliber.  There are subcentimeter mediastinal and hilar lymph nodes.  There are subcentimeter axillary lymph nodes.  There are no pleural effusions.  There is no evidence of a pneumothorax.  There is no evidence of pneumomediastinum.  There are innumerable bilateral pulmonary nodules.  There is no focal consolidation.  The esophagus is unremarkable.  Please see the dedicated CT abdomen pelvis for the upper abdominal findings.  The chest wall is unremarkable.  There is unchanged lytic lesion involving the anterior aspect of T9 vertebral body with associated cortical erosions.  Impression:  Innumerable pulmonary nodules, concerning for metastatic disease to the chest.  Unchanged lytic lesion in the T9 vertebral body with cortical erosions.  Follow-up MRI of the spine, as clinically warranted.     - 9/7/2023 CT Abd:  FINDINGS:  CT renal protocol:  There is a 4.8 x 6.3 cm exophytic enhancing lesion in the lower pole of the right kidney.  There is hypoenhancement of the lesion compared to the normal renal parenchyma.  There is unchanged appearance of the mass extending into the right renal sinus.  There is no extension into the right renal vein  No additional enhancing lesions are identified.  There is a subcentimeter lesion in the right kidney is too small complete characterization.  There is prompt excretion from both collecting systems.  There is incomplete distension of the right distal ureter.  No definitive  filling defect is identified within the.  The urinary bladder is unremarkable.  CT abdomen pelvis:  There is unchanged appearance of multiple pulmonary nodules in the lung bases.  No new nodules identified  The heart is unremarkable.  There is normal tapering of the abdominal aorta.  There are single bilateral renal arteries.  The renal veins remain patent.  There is no evidence of lymphadenopathy.  The esophagus, stomach, and duodenum are within normal limits.  The small bowel loops are unremarkable.  The appendix is not visualized.  There are no secondary findings of acute appendicitis.  There is colonic diverticula without evidence of acute diverticulitis.  The liver is unremarkable.  The gallbladder is within normal limits.  The biliary tree is within normal limits.  The spleen is unremarkable.  The pancreas is within normal limits.  The adrenal glands are unremarkable.  There is no evidence of free fluid in the abdomen or pelvis.  There is no evidence of free air.  There is no evidence of pneumatosis.  No portal venous air is identified.  The psoas margins are unremarkable.  There are bilateral fat containing inguinal hernias.  There are degenerative changes in the osseous structures.  There is unchanged appearance of a lytic lesion involving the anterior aspect of the T9 vertebral body with associated cortical erosions.  Impression:  Unchanged 4.8 x 6.3 cm exophytic hypoenhancing lesion in the lower pole of the right kidney, concerning for renal cell carcinoma.  Extension of lesion into the renal sinus.  No evidence of renal vein invasion.  No regional lymphadenopathy.  Additional smaller subcentimeter lesion too small complete characterization in the right kidney.  Bilateral pulmonary nodules remain concerning for metastatic disease.  Lytic lesion along the anterior aspect of the T9 vertebral body, also concerning for osseous metastatic disease.  Additional findings as above.     - 9/20/2023 Bone  scan:  FINDINGS:  There is physiologic distribution of the radiopharmaceutical throughout the skeleton.  Focal uptake in the T9 vertebral body corresponding to lytic lesion seen on CT 09/05/2023.  Focal uptake in the right kidney in patient with known right renal mass.  There is otherwise normal uptake in the genitourinary system and soft tissues.  Impression:  Focal uptake in the T9 vertebral body corresponding to lytic lesion seen on prior CT and concerning for metastatic disease.  Additional focus of increased uptake in the right kidney in patient with known right renal mass     - 10/4/2023 Robotic right nephrectomy  Pathology:  RIGHT KIDNEY, TOTAL NEPHRECTOMY:   - Clear cell renal cell carcinoma, ISUP grade 4, 5.5 cm.   - Benign cortical cysts.   - See [facility] synoptic report below.   SURGICAL PATHOLOGY CANCER CASE SUMMARY   Procedure: Total nephrectomy.   Specimen laterality: Right.   Tumor size: 5.5 cm.   Tumor focality: Unifocal.   Histologic type: Clear cell renal cell carcinoma.   Sarcomatoid features: Present, 5%.   Rhabdoid features: Not identified.   Histologic Grade (ISUP Grade): 4.   Tumor necrosis: Present, 20%.   Tumor extension: Extends into pelvicalyceal system and renal sinus fat.   Margins: Uninvolved.   Lymphovascular invasion (excluding renal vein and its segmental branches): Not identified.   Regional lymph nodes: No lymph nodes submitted or found.   Distant metastases: Not applicable in this specimen.   Pathologic stage (pTNM, AJCC 8th edition): pT3a pN not assigned (no lymph nodes submitted or found).      - treatment not initiated with below issues:  2 prior admissions      - Admitted from 11/6- 11/9/2023  Hospital Course: Pt admitted for intractable back pain in the setting of renal carcinoma (RCC) s/p R nephrectomy 10/4 followed by Dr. Turk not on systemic therapy. CT and MRI concerning for spinal, lung, and hepatic mets. NSGY, Rad Onc, and IR on board in the hospital and evaluated for C5  and T9 lesions on spine. Pt pain controlled with oxycodone 12 HR BID and Oxycodone 10 PRN for thoracic and cervical pain. Patient underwent preop embolization of cervical tumor with IR on 11/6/23. And then had a cervical corpectomy with NSGY on 11/7/23 which he tolerated well. They obtained tissue samples and sent it over to pathology. IR to perform osteocool/kyphoplasty/biopsy T9 11/14. 2nd stage of surgery with NSGY on 11/15 with Dr. Martinez after kyphoplasty. Patient is to see Dr. Turk for systemic therapy afterwards as well as option for postoperative radiation. Stable to discharge home with c-collar and back brace. Patient also to receive rolling walker and hospital bed due to weakness and pain.   Pathology:  Spine, C5 vertebral body, corpectomy:   - Metastatic renal cell carcinoma, consistent with patient's history   - [facility] has been ordered and results will be issued in a separate      - Admitted 11/14- 11/18/2023 and underwent IR kyphoplasty of T4-6  Procedure(s) (LRB):  SPINE, CERVICAL, WITH POSTERIOR FUSION T4-6 (N/A)  CORPECTOMY, SPINE, CERVICAL C3-6 REVISION (N/A)   Hospital Course: 11/14: admitted; IR kyphoplasty of T9 today  11/15: OR today for C3-6 PCF.   11/16: POD 1 s/p C3-C6 PCF. NAEO. VSS, afebrile and WBC WNL. Patient complains of mild incisional pain but reports his LUE radiculopathy is improved. Reports mild discomfort with swallowing but no difficulty. Pending PT.   11/17: POD 2 s/p C3-C6 PCF. NAEO and VSS. Mild SABINE on BMP, will resume fluids. Anterior HV drain removed at bedside. Two posterior HV drains remain w/output 50 each.   11/18: POD 3. NAEON. AFVSS. Neurologically stable. Tolerating PO diet and voiding spontaneously. HV drain x 2 removed and pressure dressing placed. Medically stable to discharge home. Follow up in clinic in 2 weeks. Discharge instructions given verbally to patient. All of his questions were answered.  He is encouraged to call the clinic with any questions or  concerns prior to follow up appt.      [facility] x[facility] (11/30/2023 9:26 AM CST)  Results - [facility] (11/30/2023 9:26 AM CST)  Component Value Ref Range Test Method Analysis Time Performed At Pathologist Signature   Reason for Study To identify somatic and germline mutations relevant to patient's cancer.     11/30/2023 9:26 AM CST [facility]     Genetic Diseases Assessed Cancer     11/30/2023 9:26 AM CST [facility]     Description of Ranges of DNA Sequences Examined 648 gene panel     11/30/2023 9:26 AM CST [facility]     Overall Interpretation positive     11/30/2023 9:26 AM CST [facility]     MSI Stable     11/30/2023 9:26 AM CST [facility]     TMB 7.4 m/MB   11/30/2023 9:26 AM CST [facility]     [facility] Portal [email or web address]     11/30/2023 9:26 AM CST [facility]     Comment: [facility] link   Fusion Addendum Issue Type NEGATIVE  Negative - This addendum is being issued to report that no gene fusions or altered splicing variants from RNA sequencing analysis were found.     11/30/2023 9:26 AM CST [facility]     Therapy Count 4     11/30/2023 9:26 AM CST [facility]     [facility]: Potential Therapy 1 Gene: 95866^VHL^HGNC  Variant: p.E186fs  Agent: Belzutifan  Tissue: Renal Cell Carcinoma  Association: response  Evidence Status: Consensus  Evidence ID: NCCN  Evidence URL:  FDA Approved?: Yes  on label?: No     11/30/2023 9:26 AM CST [facility]     [facility]: Potential Therapy 2 Gene: 81124^PBRM1^HGNC  Variant: p.L618fs  Agent: Nivolumab  Tissue: Clear Cell Renal Cell Carcinoma  Association: response  Evidence Status: Clinical research  Evidence ID: [id number]  Evidence URL: https://www.ncbi.nlm.nih.gov/pubmed/68675967  FDA Approved?: Yes  on label?: Yes     11/30/2023 9:26 AM CST [facility]     [facility]: Potential Therapy 3 Gene: 8975^PIK3CA^HGNC  Variant: p.Q546E  Agent: Capivasertib + Fulvestrant  Tissue:  Breast Cancer  Association: response  Evidence Status: Consensus  Evidence ID: FDA  Evidence URL:  FDA Approved?: Yes  on label?: No     11/30/2023 9:26 AM CST [facility]     [facility]: Potential Therapy 4 Gene: 8975^PIK3CA^HGNC  Variant: p.Q546E  Agent: Alpelisib  Tissue: Solid Tumors  Association: response  Evidence Status: Clinical research  Evidence ID: [id number]  Evidence URL: https://www.ncbi.nlm.nih.gov/pubmed/09328215  FDA Approved?: Yes  on label?: No     11/30/2023 9:26 AM CST [facility]     Trial Count 3     11/30/2023 9:26 AM CST [facility]     [facility]: Clinical Trial Match 1 Clinical Trial NCT ID: [id number]  Clinical Trial Title: SZM200 as a Single Agent and in Combination With Everolimus & Immuno-Oncology Agents in Advanced/Relapsed Renal Cancer & Other Malignancies  Clinical Trial URL: https://clinicaltrials.gov/ct2/show/GYO45004322  Clinical Phase: Phase 1  Matched criteria: VHL p.E186fs mutation     11/30/2023 9:26 AM CST [facility]     [facility]: Clinical Trial Match 2 Clinical Trial NCT ID: [id number]  Clinical Trial Title: First-in-Human Study of STX-478 as Monotherapy and in Combination With Other Antineoplastic Agents in Participants With Advanced Solid Tumors  Clinical Trial URL: https://clinicaltrials.gov/ct2/show/CWZ44881011  Clinical Phase: Phase 1/Phase 2  Matched criteria: PIK3CA p.Q546E mutation     11/30/2023 9:26 AM CST [facility]     [facility]: Clinical Trial Match 3 Clinical Trial NCT ID: [id number]  Clinical Trial Title: Testing the Combination of Two Anti-cancer Drugs, Peposertib () and  for Advanced Solid Tumors  Clinical Trial URL: https://clinicaltrials.gov/ct2/show/CGM27761959  Clinical Phase: Phase 1  Matched criteria: PBRM1 p.L618fs mutation     11/30/2023 9:26 AM CST [facility]        Results - [facility] x[facility] (11/30/2023 9:26 AM CST)  Specimen (Source) Anatomical Location / Laterality Collection Method / Volume Collection Time Received Time   Tissue       11/16/2023  11:02 AM CST      Results - [facility] (2023 9:26 AM CST)  Narrative   This result has genomic variants that were not included in this document.          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  - fractured collar bone  - Pediatric hernia             Active Ambulatory Problems     Diagnosis Date Noted    Obesity (BMI 35.0-39.9 without comorbidity) 2019    Borderline hypertension 2019    Acute gout of left ankle 2019    Onychomycosis 2019    History of gout 2021    Seborrheic keratosis 2021    Tinea corporis 2021    Positive colorectal cancer screening using Cologuard test 2021              Resolved Ambulatory Problems     Diagnosis Date Noted    No Resolved Ambulatory Problems              Past Medical History:   Diagnosis Date    Asthma      ED (erectile dysfunction)      Gout        SH:  Retired from [employer]    1 child, 2 stepchildren  Prior tobacco- teens, early 20s  Social EtOH     FH:  Maternal grandmother-  at 91- she had prior cancers- colon cancer and breast cancer  Mother - liver cancer - prior blood transfusion Hep C-  at age 77 yo  Father-  in his 50s- EtOH cirrhosis  Paternal grandfather- cancer in his 70s, unclear type  Maternal grandfather- H+N cancer-  (smoker)      Review of Systems   Constitutional:  Positive for activity change (since surgery), fatigue  (decreased stamina, energy) and unexpected weight change. Negative for appetite change, chills and fever.   HENT:  Negative for trouble swallowing and voice change.    Respiratory:  Negative for cough, shortness of breath and wheezing.    Cardiovascular:  Negative for chest pain, palpitations and leg swelling.   Gastrointestinal:  Negative for abdominal distention, abdominal pain, change in bowel habit, constipation, diarrhea, nausea, vomiting and reflux.   Genitourinary:  Negative for decreased urine volume, difficulty urinating, dysuria, frequency and hematuria.   Musculoskeletal:  Positive for arthralgias. Negative for back pain and neck pain.   Integumentary:  Positive for wound (buttocks).   Neurological:  Negative for dizziness, weakness, numbness and headaches.   Psychiatric/Behavioral:  Negative for dysphoric mood and sleep disturbance. The patient is not nervous/anxious.       Objective     Physical Exam  Vitals reviewed.   Constitutional:       Appearance: Normal appearance. He is not ill-appearing.   HENT:      Head: Normocephalic and atraumatic.      Nose: Nose normal. No congestion.      Mouth/Throat:      Mouth: Mucous membranes are moist.   Cardiovascular:      Rate and Rhythm: Normal rate.   Pulmonary:      Effort: Pulmonary effort is normal. No respiratory distress.      Breath sounds: Normal breath sounds.   Musculoskeletal:      Comments: In wheelchair, unable to be assessed.   Skin:     General: Skin is warm and dry.   Neurological:      Mental Status: He is alert and oriented to person, place, and time.   Psychiatric:         Mood and Affect: Mood normal.         Behavior: Behavior normal.         Thought Content: Thought content normal.         Judgment: Judgment normal.        Assessment and Plan     1. Hypotension due to drugs  -     CBC Oncology; Future  -     Comprehensive Metabolic Panel; Future    2. Mouth sores  -     duke's soln (benadryl 30 mL, mylanta 30 mL, LIDOcaine 30 mL, nystatin  30 mL) 120mL; Take 10 mLs by mouth 4 (four) times daily.  Dispense: 500 mL; Refill: 0  -     CBC Oncology; Future  -     Comprehensive Metabolic Panel; Future    3. History of right radical nephrectomy      Metastatic renal carcinoma-   Cancer Staging   Clear cell carcinoma of right kidney  Staging form: Kidney, AJCC 8th Edition  - Clinical stage from 10/31/2023: Stage IV (cT3a, cNX, cM1) - Signed by Ruby Turk MD on 10/31/2023    73 y.o. male, patient of Dr. Turk, to clinic today for urgent care visit. Discuss Inlyta causing tongue tenderness and sores. Refill of Cunningham's given, which has helped. Patient states has been able to hydrate and eat soft foods. May benefit from dose reduction when he resume Inlyta. More importantly, his blood pressure was low in clinic today even with a manual recheck. He feels weak and has required previous blood transfusions. Labs from 1/18/24 showed an improved hemoglobin but will check CBC and CMP today and give 1L NS in infusion.       Patient is in agreement with the proposed treatment plan. All questions were answered to the patient's satisfaction. Pt knows to call clinic if anything is needed before the next clinic visit.      Jess Taylor, MSN, APRN, FNP-C  Hematology and Medical Oncology  Manager- [facility]  Nurse Practitioner/Clinical Investigator, [facility]  Nurse Practitioner to Dr. Jim Ayala      Route Chart for Scheduling    Med Onc Chart Routing      Follow up with physician . As scheduled   Follow up with [facility]    Infusion scheduling note    Injection scheduling note    Labs    Imaging    Pharmacy appointment    Other referrals              Treatment Plan Information   OP AXITINIB + PEMBROLIZUMAB 200MG Q3W   Ruby Turk MD   Upcoming Treatment Dates - OP AXITINIB + PEMBROLIZUMAB 200MG Q3W    2/6/2024       Chemotherapy       pembrolizumab (KEYTRUDA) 200 mg in sodium chloride 0.9% SolP 108  mL infusion  2/27/2024       Chemotherapy       pembrolizumab (KEYTRUDA) 200 mg in sodium chloride 0.9% SolP 108 mL infusion  3/19/2024       Chemotherapy       pembrolizumab (KEYTRUDA) 200 mg in sodium chloride 0.9% SolP 108 mL infusion  4/9/2024       Chemotherapy       pembrolizumab (KEYTRUDA) 200 mg in sodium chloride 0.9% SolP 108 mL infusion    Therapy Plan Information  EPINEPHrine (EPIPEN) 0.3 mg/0.3 mL pen injection 0.3 mg  0.3 mg, Intramuscular, PRN  diphenhydrAMINE injection 50 mg  50 mg, Intravenous, PRN  hydrocortisone sodium succinate injection 100 mg  100 mg, Intravenous, PRN   No follow-ups on file.

## 2024-01-29 NOTE — PROGRESS NOTES
PATIENT IDENTIFICATION:  Patient Name: Gamaliel Pete Jr.  MRN: [id number]  : 1950    DIAGNOSIS:  Cancer Staging   Clear cell carcinoma of right kidney  Staging form: Kidney, AJCC 8th Edition  - Clinical stage from 10/31/2023: Stage IV (cT3a, cNX, cM1) - Signed by Ruby Turk MD on 10/31/2023      HISTORY OF PRESENT ILLNESS:   The patient is a 73 year old man with metastatic RCC.  He is status post c spine corpectomy, T9 kyphoplasty.  He now has spinal cord compression at T9 and is awaiting decompressive laminectomy and intramedullary nail at the right femur.  The patient has been referred for consideration of palliative radiation to these sites.    The patient presented in 2023 with complaint of gross hematuria.  The patient was referred to Urology for management.  CT scan performed on 2023 revealed a mass in the right kidney lower pole measuring 4.9 x 6.3 x 6.1 cm.  There were multiple lung nodules seen measuring up to 11 mm concerning for metastatic disease.  A lytic lesion at T9 with mild compression was noted.     Bone scan performed on 2023 revealed focal uptake in the T9 vertebral body corresponding to the lytic lesion seen on CT scan.  The patient was planned for biopsy of the T9 lesion but developed worsening hematuria and was recommended to undergo nephrectomy.     The patient was taken to the operating room on 10/04/2023 for right robotic nephrectomy.  Pathology revealed a grade 4 clear cell renal cell carcinoma measuring 5.5 cm in greatest dimension.  There were no lymph nodes submitted.     The patient reports worsening back pain over the past two weeks.  He was admitted on 10/31/2023 for pain control and evaluation by Neurosurgery.  MRI of the thoracic spine performed yesterday revealed enhancing destructive lesions involving C5 and T9 vertebral bodies with associated compression deformity, progressed from prior at level T9.  Additional mild retropulsion of T9 causing mild  canal stenosis at the T8-T9 level.  Possible lesion involving the anterior C6 vertebral body.  Consider further evaluation with cervical spine MRI.    The patient was taken to the OR on 11/7/23 for C spine corpectomy.  Pathology was c/w metastatic RCC.    MRI t spine performed 12/30/23: Impression:     1. Relative to prior MRI of the thoracic spine performed 10/31/2023, the patient is now status post vertebral augmentation at T9, at the level of presumed pathologic fracture due to metastasis. There has been further height loss of the vertebra since the reference MR examination, however configuration of the T9 vertebra appears similar when compared to more recently performed intervening CTA of the chest of 11/20/2023.  2. On the current examination, centered at the T9 level there is posterior buckling of the cortex and overlying enhancing soft tissue in the ventral epidural space, possibly combination of extraosseous extension of neoplastic soft tissue and inflammatory change. This enhancing soft tissue measures on the order of 8 x 19 x 27 mm (AP x ML x CC). This contributes to moderate central spinal canal stenosis with effacement of CSF and ventral cord deformity. Question intramedullary edema-like signal.  3. Additionally, there is new mild height loss of the T8 vertebra with patchy edema-like signal enhancement since the October 2023 MRI, at least some of which is likely related to acute or subacute fracture deformity, with underlying extension of metastasis not excluded. Question subtle height loss of the T8 vertebra since the 11/20/2023 CT chest.  4. Further, there appears to be a new enhancing lesion within the left paramedian T6 vertebral body, possibly metastatic lesion.  Equivocal new T2 weighted lesion within T12 vertebra.  Attention on follow-up recommended.    The patient returned to the OR on 1/5/24 for laminectomy for decompression of thecal sac T8-T10.  Oncology History   Cancer with pulmonary  metastases   9/14/2023 Initial Diagnosis    Cancer with pulmonary metastases     12/22/2023 -  Chemotherapy    Treatment Summary   Plan Name: OP AXITINIB + PEMBROLIZUMAB 200MG Q3W  Treatment Goal: Palliative  Status: Active  Start Date: 12/22/2023  End Date: 12/9/2025 (Planned)  Provider: Ruby Turk MD  Chemotherapy: axitinib (INLYTA) 5 mg Tab, 5 mg, Oral, 2 times daily, 1 of 1 cycle, Start date: 12/18/2023, End date: --     Clear cell carcinoma of right kidney   10/11/2023 Initial Diagnosis    Clear cell carcinoma of right kidney     10/31/2023 Cancer Staged    Staging form: Kidney, AJCC 8th Edition  - Clinical stage from 10/31/2023: Stage IV (cT3a, cNX, cM1)          REVIEW OF SYSTEMS:   ROS    PAST MEDICAL HISTORY:  Past Medical History:   Diagnosis Date    Asthma     no inhaler use    Borderline hypertension     ED (erectile dysfunction)     Gout     Hematuria     Hypertension        PAST SURGICAL HISTORY:  Past Surgical History:   Procedure Laterality Date    COLONOSCOPY  02/10/2022    COLONOSCOPY N/A 02/10/2022    Procedure: COLONOSCOPY;  Surgeon: Desmond Keenan MD;  Location: [facility];  Service: General;  Laterality: N/A;    POSTERIOR FUSION OF CERVICAL SPINE WITH LAMINECTOMY N/A 11/15/2023    Procedure: SPINE, CERVICAL, WITH POSTERIOR FUSION T4-6;  Surgeon: Nasim Martinez DO;  Location: [address];  Service: Neurosurgery;  Laterality: N/A;  C2-T1 laminectomy and posterior fusion. [place]. C-arm. T[place]et. Neuromonitoring.    ROBOT-ASSISTED LAPAROSCOPIC NEPHRECTOMY Right 10/4/2023    Procedure: ROBOTIC NEPHRECTOMY;  Surgeon: Henry Michaels MD;  Location: [facility];  Service: Urology;  Laterality: Right;    SURGICAL REMOVAL OF VERTEBRAL BODY OF CERVICAL SPINE Right 11/7/2023    Procedure: CORPECTOMY, SPINE, CERVICAL;  Surgeon: Nasim Martinez DO;  Location: [address];  Service: Neurosurgery;  Laterality: Right;  C4 and C5 corpectomy with globus; familia brito tongs; c-arm;  neuromonitoring; microscope; type and cross 2 units    SURGICAL REMOVAL OF VERTEBRAL BODY OF CERVICAL SPINE N/A 11/15/2023    Procedure: CORPECTOMY, SPINE, CERVICAL C3-6 REVISION;  Surgeon: Nasim Martinez DO;  Location: [facility] OR 2ND FLR;  Service: Neurosurgery;  Laterality: N/A;    THORACIC LAMINECTOMY WITH FUSION N/A 1/5/2024    Procedure: LAMINECTOMY, SPINE, THORACIC, WITH FUSION;  Surgeon: Nasim Martinez DO;  Location: [facility] OR 2ND FLR;  Service: Neurosurgery;  Laterality: N/A;  T7-T11 fusions with robot    TONSILLECTOMY         ALLERGIES:   Review of patient's allergies indicates:  No Known Allergies    MEDICATIONS:  Current Outpatient Medications   Medication Sig    allopurinoL (ZYLOPRIM) 100 MG tablet Take 1 tablet (100 mg total) by mouth once daily.    amlodipine-benazepril 5-10 mg (LOTREL) 5-10 mg per capsule Take 1 capsule by mouth once daily.    axitinib (INLYTA) 5 mg Tab Take 1 tablet (5 mg) by mouth 2 (two) times daily.    gabapentin (NEURONTIN) 300 MG capsule Take 1 capsule (300 mg total) by mouth 3 (three) times daily.    omega-3 fatty acids/fish oil (FISH OIL-OMEGA-3 FATTY ACIDS) 300-1,000 mg capsule Take 2 capsules by mouth once daily.    oxyCODONE-acetaminophen (PERCOCET) 5-325 mg per tablet Take 1 to 2 tablets by mouth every 4-6 hours as needed for pain    turmeric (CURCUMIN MISC) 1,000 mg by Misc.(Non-Drug; Combo Route) route Daily.    UNABLE TO FIND Take 500 mg by mouth 2 (two) times a day. medication name: Tudca    UNABLE TO FIND Take 2 capsules by mouth 2 (two) times a day. medication name: Turkey tail mushroom    UNABLE TO FIND Take 15 drops by mouth once daily. medication name: Essiac-20    UNABLE TO FIND Take 5 mLs by mouth 2 (two) times a day. medication name: barley leaf juice powder    UNABLE TO FIND Take 5 mLs by mouth 2 (two) times a day. medication name: black seed oil (cumin seed)    diazePAM (VALIUM) 5 MG tablet Take 1 tablet (5 mg total) by mouth every 6 (six) hours as  needed (severe muscles spasms). (Patient not taking: Reported on 1/18/2024)    duke's soln (benadryl 30 mL, mylanta 30 mL, LIDOcaine 30 mL, nystatin 30 mL) 120mL Take 10 mLs by mouth 4 (four) times daily.    naloxone (NARCAN) 4 mg/actuation Spry 1 spray (4mg) by nasal route as needed for opioid overdose; may repeat every 2-3 minutes in alternating nostrils until medical help arrives. Call 911 (Patient not taking: Reported on 12/28/2023)    sildenafiL (VIAGRA) 100 MG tablet Take 1 tablet (100 mg total) by mouth daily as needed for Erectile Dysfunction. (Patient not taking: Reported on 1/18/2024)     No current facility-administered medications for this visit.       SOCIAL HISTORY:  Social History     Socioeconomic History    Marital status:    Tobacco Use    Smoking status: Never    Smokeless tobacco: Never   Substance and Sexual Activity    Alcohol use: Not Currently     Alcohol/week: 0.0 standard drinks of alcohol     Comment: rarely    Drug use: Never     Social Determinants of Health     Financial Resource Strain: Patient Declined (12/22/2023)    Overall Financial Resource Strain (CARDIA)     Difficulty of Paying Living Expenses: Patient declined   Food Insecurity: Patient Declined (12/22/2023)    Hunger Vital Sign     Worried About Running Out of Food in the Last Year: Patient declined     Ran Out of Food in the Last Year: Patient declined   Transportation Needs: Patient Declined (12/22/2023)    PRAPARE - Transportation     Lack of Transportation (Medical): Patient declined     Lack of Transportation (Non-Medical): Patient declined   Physical Activity: Unknown (12/22/2023)    Exercise Vital Sign     Days of Exercise per Week: Patient declined     Minutes of Exercise per Session: 0 min   Recent Concern: Physical Activity - Inactive (11/16/2023)    Exercise Vital Sign     Days of Exercise per Week: 0 days     Minutes of Exercise per Session: 0 min   Stress: Patient Declined (12/22/2023)    [facility]  of Occupational Health - Occupational Stress Questionnaire     Feeling of Stress : Patient declined   Social Connections: Unknown (2023)    Social Connection and Isolation Panel [NHANES]     Frequency of Communication with Friends and Family: Patient declined     Frequency of Social Gatherings with Friends and Family: Patient declined     Attends Pentecostal Services: Never     Active Member of Clubs or Organizations: Patient declined     Attends Club or Organization Meetings: Patient declined     Marital Status: Patient declined   Recent Concern: Social Connections - Moderately Isolated (2023)    Social Connection and Isolation Panel [NHANES]     Frequency of Communication with Friends and Family: More than three times a week     Frequency of Social Gatherings with Friends and Family: More than three times a week     Attends Pentecostal Services: Never     Active Member of Clubs or Organizations: No     Attends Club or Organization Meetings: Never     Marital Status:    Housing Stability: Patient Declined (2023)    Housing Stability Vital Sign     Unable to Pay for Housing in the Last Year: Patient declined     Number of Places Lived in the Last Year: 1     Unstable Housing in the Last Year: Patient declined       FAMILY HISTORY:  No family history on file.      PHYSICAL EXAMINATION:  Vitals:    24 0906   BP: (!) 79/49   Pulse: 108   Temp: 100.3 °F (37.9 °C)     There is no height or weight on file to calculate BMI.    ECO  Physical Exam  Constitutional:       Appearance: He is normal weight.      Comments: In wheelchair, wife present   HENT:      Head: Normocephalic and atraumatic.      Nose: Nose normal.      Mouth/Throat:      Mouth: Mucous membranes are moist.   Pulmonary:      Effort: Pulmonary effort is normal.   Skin:     General: Skin is warm and dry.   Neurological:      Mental Status: He is alert.             ASSESSMENT/PLAN:  Gamaliel was seen today for  follow-up.    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  He was osseous metastatic disease involving bilateral Femora and spine.  The patient is status post decompressive laminectomy and is recommended radiation to the C3-C6 spine as well as T7-T11 spine.  Additionally, the patient will follow up with orthopedics as he has osseous in disease involving the right femur and is at risk for fracture.  He will be re-evaluated for intramedullary nail.  If he proceeds with surgery then would recommend postoperative radiation to the site.  The patient will undergo CT simulation to the cervical and thoracic spine today with a plan to start treatment on 01/31/2024.  He will receive a dose of 20 Gy delivered in 5 fractions over 1 week.      The risks and benefits of treatment have been discussed with the patient and he expressed full understanding. he understands the treatment plan and willing to proceed accordingly.    I spent approximately 60 minutes reviewing the available records and evaluating the patient, out of which over 50% of the time was spent face to face with the patient in counseling and coordinating this patient's care.

## 2024-01-30 PROCEDURE — 77334 RADIATION TREATMENT AID(S): CPT | Mod: TC | Performed by: RADIOLOGY

## 2024-01-30 PROCEDURE — 77300 RADIATION THERAPY DOSE PLAN: CPT | Mod: 26,,, | Performed by: RADIOLOGY

## 2024-01-30 PROCEDURE — 77263 THER RADIOLOGY TX PLNG CPLX: CPT | Mod: ,,, | Performed by: RADIOLOGY

## 2024-01-30 PROCEDURE — 77290 THER RAD SIMULAJ FIELD CPLX: CPT | Mod: TC | Performed by: RADIOLOGY

## 2024-01-30 PROCEDURE — 77014 HC CT GUIDANCE RADIATION THERAPY FLDS PLACEMENT: CPT | Mod: TC | Performed by: RADIOLOGY

## 2024-01-30 PROCEDURE — 77295 3-D RADIOTHERAPY PLAN: CPT | Mod: 26,,, | Performed by: RADIOLOGY

## 2024-01-30 PROCEDURE — 77334 RADIATION TREATMENT AID(S): CPT | Mod: 26,,, | Performed by: RADIOLOGY

## 2024-01-30 PROCEDURE — 77295 3-D RADIOTHERAPY PLAN: CPT | Mod: TC | Performed by: RADIOLOGY

## 2024-01-30 PROCEDURE — 77290 THER RAD SIMULAJ FIELD CPLX: CPT | Mod: 26,,, | Performed by: RADIOLOGY

## 2024-01-30 PROCEDURE — 77300 RADIATION THERAPY DOSE PLAN: CPT | Mod: TC | Performed by: RADIOLOGY

## 2024-01-31 ENCOUNTER — HOSPITAL ENCOUNTER (INPATIENT)
Facility: HOSPITAL | Age: 74
LOS: 8 days | Discharge: HOME HEALTH | DRG: 857 | End: 2024-02-08
Attending: STUDENT IN AN ORGANIZED HEALTH CARE EDUCATION/TRAINING PROGRAM | Admitting: STUDENT IN AN ORGANIZED HEALTH CARE EDUCATION/TRAINING PROGRAM
Payer: MEDICARE

## 2024-01-31 ENCOUNTER — OFFICE VISIT (OUTPATIENT)
Dept: NEUROSURGERY | Facility: CLINIC | Age: 74
End: 2024-01-31
Payer: MEDICARE

## 2024-01-31 DIAGNOSIS — Z91.89 IMPENDING PATHOLOGIC FRACTURE: ICD-10-CM

## 2024-01-31 DIAGNOSIS — Z91.89 AT HIGH RISK FOR SKIN BREAKDOWN: ICD-10-CM

## 2024-01-31 DIAGNOSIS — T81.31XA SURGICAL WOUND BREAKDOWN: Primary | ICD-10-CM

## 2024-01-31 DIAGNOSIS — T81.31XA SURGICAL WOUND BREAKDOWN, INITIAL ENCOUNTER: Primary | ICD-10-CM

## 2024-01-31 DIAGNOSIS — Z98.1 S/P FUSION OF THORACIC SPINE: ICD-10-CM

## 2024-01-31 DIAGNOSIS — T81.31XA SURGICAL WOUND BREAKDOWN, INITIAL ENCOUNTER: ICD-10-CM

## 2024-01-31 LAB
ABO + RH BLD: NORMAL
ANION GAP SERPL CALC-SCNC: 9 MMOL/L (ref 8–16)
APTT PPP: 28.5 SEC (ref 21–32)
BASOPHILS # BLD AUTO: 0.02 K/UL (ref 0–0.2)
BASOPHILS NFR BLD: 0.5 % (ref 0–1.9)
BLD GP AB SCN CELLS X3 SERPL QL: NORMAL
BUN SERPL-MCNC: 17 MG/DL (ref 8–23)
CALCIUM SERPL-MCNC: 9 MG/DL (ref 8.7–10.5)
CHLORIDE SERPL-SCNC: 95 MMOL/L (ref 95–110)
CO2 SERPL-SCNC: 22 MMOL/L (ref 23–29)
CREAT SERPL-MCNC: 1.3 MG/DL (ref 0.5–1.4)
CRP SERPL-MCNC: 160.7 MG/L (ref 0–8.2)
DIFFERENTIAL METHOD BLD: ABNORMAL
EOSINOPHIL # BLD AUTO: 0 K/UL (ref 0–0.5)
EOSINOPHIL NFR BLD: 0.5 % (ref 0–8)
ERYTHROCYTE [DISTWIDTH] IN BLOOD BY AUTOMATED COUNT: 15.1 % (ref 11.5–14.5)
ERYTHROCYTE [SEDIMENTATION RATE] IN BLOOD BY PHOTOMETRIC METHOD: 69 MM/HR (ref 0–23)
EST. GFR  (NO RACE VARIABLE): 58.7 ML/MIN/1.73 M^2
GLUCOSE SERPL-MCNC: 120 MG/DL (ref 70–110)
HCT VFR BLD AUTO: 29.4 % (ref 40–54)
HGB BLD-MCNC: 9.9 G/DL (ref 14–18)
IMM GRANULOCYTES # BLD AUTO: 0.03 K/UL (ref 0–0.04)
IMM GRANULOCYTES NFR BLD AUTO: 0.8 % (ref 0–0.5)
INR PPP: 1.1 (ref 0.8–1.2)
LYMPHOCYTES # BLD AUTO: 0.3 K/UL (ref 1–4.8)
LYMPHOCYTES NFR BLD: 8 % (ref 18–48)
MCH RBC QN AUTO: 29 PG (ref 27–31)
MCHC RBC AUTO-ENTMCNC: 33.7 G/DL (ref 32–36)
MCV RBC AUTO: 86 FL (ref 82–98)
MONOCYTES # BLD AUTO: 0.3 K/UL (ref 0.3–1)
MONOCYTES NFR BLD: 8.5 % (ref 4–15)
NEUTROPHILS # BLD AUTO: 3.1 K/UL (ref 1.8–7.7)
NEUTROPHILS NFR BLD: 81.7 % (ref 38–73)
NRBC BLD-RTO: 0 /100 WBC
PLATELET # BLD AUTO: 159 K/UL (ref 150–450)
PMV BLD AUTO: 9 FL (ref 9.2–12.9)
POTASSIUM SERPL-SCNC: 4.9 MMOL/L (ref 3.5–5.1)
PROCALCITONIN SERPL IA-MCNC: 0.24 NG/ML
PROTHROMBIN TIME: 11.3 SEC (ref 9–12.5)
RBC # BLD AUTO: 3.41 M/UL (ref 4.6–6.2)
SODIUM SERPL-SCNC: 126 MMOL/L (ref 136–145)
SPECIMEN OUTDATE: NORMAL
WBC # BLD AUTO: 3.76 K/UL (ref 3.9–12.7)

## 2024-01-31 PROCEDURE — 25000003 PHARM REV CODE 250

## 2024-01-31 PROCEDURE — 63600175 PHARM REV CODE 636 W HCPCS

## 2024-01-31 PROCEDURE — 87040 BLOOD CULTURE FOR BACTERIA: CPT

## 2024-01-31 PROCEDURE — 99215 OFFICE O/P EST HI 40 MIN: CPT | Mod: 24,S$GLB,,

## 2024-01-31 PROCEDURE — 86140 C-REACTIVE PROTEIN: CPT

## 2024-01-31 PROCEDURE — 80048 BASIC METABOLIC PNL TOTAL CA: CPT

## 2024-01-31 PROCEDURE — 85025 COMPLETE CBC W/AUTO DIFF WBC: CPT

## 2024-01-31 PROCEDURE — 77417 THER RADIOLOGY PORT IMAGE(S): CPT | Performed by: RADIOLOGY

## 2024-01-31 PROCEDURE — 85652 RBC SED RATE AUTOMATED: CPT

## 2024-01-31 PROCEDURE — 11000001 HC ACUTE MED/SURG PRIVATE ROOM

## 2024-01-31 PROCEDURE — 86850 RBC ANTIBODY SCREEN: CPT

## 2024-01-31 PROCEDURE — 25500020 PHARM REV CODE 255: Performed by: STUDENT IN AN ORGANIZED HEALTH CARE EDUCATION/TRAINING PROGRAM

## 2024-01-31 PROCEDURE — 77412 RADIATION TX DELIVERY LVL 3: CPT | Performed by: RADIOLOGY

## 2024-01-31 PROCEDURE — 77427 RADIATION TX MANAGEMENT X5: CPT | Mod: ,,, | Performed by: RADIOLOGY

## 2024-01-31 PROCEDURE — 36415 COLL VENOUS BLD VENIPUNCTURE: CPT

## 2024-01-31 PROCEDURE — 85610 PROTHROMBIN TIME: CPT

## 2024-01-31 PROCEDURE — 85730 THROMBOPLASTIN TIME PARTIAL: CPT

## 2024-01-31 PROCEDURE — 99024 POSTOP FOLLOW-UP VISIT: CPT | Mod: ,,,

## 2024-01-31 PROCEDURE — A9585 GADOBUTROL INJECTION: HCPCS | Performed by: STUDENT IN AN ORGANIZED HEALTH CARE EDUCATION/TRAINING PROGRAM

## 2024-01-31 PROCEDURE — 84145 PROCALCITONIN (PCT): CPT

## 2024-01-31 RX ORDER — ALLOPURINOL 100 MG/1
100 TABLET ORAL DAILY
Status: DISCONTINUED | OUTPATIENT
Start: 2024-02-01 | End: 2024-01-31

## 2024-01-31 RX ORDER — GABAPENTIN 300 MG/1
300 CAPSULE ORAL 3 TIMES DAILY
Status: DISCONTINUED | OUTPATIENT
Start: 2024-01-31 | End: 2024-02-08 | Stop reason: HOSPADM

## 2024-01-31 RX ORDER — METHOCARBAMOL 750 MG/1
750 TABLET, FILM COATED ORAL EVERY 8 HOURS PRN
Status: DISCONTINUED | OUTPATIENT
Start: 2024-01-31 | End: 2024-02-08 | Stop reason: HOSPADM

## 2024-01-31 RX ORDER — AMLODIPINE AND BENAZEPRIL HYDROCHLORIDE 5; 10 MG/1; MG/1
1 CAPSULE ORAL DAILY
Status: DISCONTINUED | OUTPATIENT
Start: 2024-02-01 | End: 2024-01-31

## 2024-01-31 RX ORDER — AMOXICILLIN 250 MG
2 CAPSULE ORAL NIGHTLY PRN
Status: DISCONTINUED | OUTPATIENT
Start: 2024-01-31 | End: 2024-02-08 | Stop reason: HOSPADM

## 2024-01-31 RX ORDER — OXYCODONE AND ACETAMINOPHEN 5; 325 MG/1; MG/1
1 TABLET ORAL EVERY 4 HOURS PRN
Status: DISCONTINUED | OUTPATIENT
Start: 2024-01-31 | End: 2024-02-02

## 2024-01-31 RX ORDER — ACETAMINOPHEN 325 MG/1
650 TABLET ORAL EVERY 6 HOURS PRN
Status: DISCONTINUED | OUTPATIENT
Start: 2024-01-31 | End: 2024-02-08 | Stop reason: HOSPADM

## 2024-01-31 RX ORDER — DIAZEPAM 5 MG/1
5 TABLET ORAL EVERY 6 HOURS PRN
Status: DISCONTINUED | OUTPATIENT
Start: 2024-01-31 | End: 2024-02-08 | Stop reason: HOSPADM

## 2024-01-31 RX ORDER — GABAPENTIN 300 MG/1
300 CAPSULE ORAL 3 TIMES DAILY
Status: DISCONTINUED | OUTPATIENT
Start: 2024-01-31 | End: 2024-01-31

## 2024-01-31 RX ORDER — SODIUM CHLORIDE 9 MG/ML
INJECTION, SOLUTION INTRAVENOUS CONTINUOUS
Status: DISCONTINUED | OUTPATIENT
Start: 2024-01-31 | End: 2024-02-01

## 2024-01-31 RX ORDER — POLYETHYLENE GLYCOL 3350 17 G/17G
17 POWDER, FOR SOLUTION ORAL DAILY
Status: DISCONTINUED | OUTPATIENT
Start: 2024-01-31 | End: 2024-02-08 | Stop reason: HOSPADM

## 2024-01-31 RX ORDER — ACETAMINOPHEN 500 MG
500 TABLET ORAL EVERY 4 HOURS PRN
Status: DISCONTINUED | OUTPATIENT
Start: 2024-01-31 | End: 2024-01-31

## 2024-01-31 RX ORDER — GABAPENTIN 300 MG/1
300 CAPSULE ORAL 3 TIMES DAILY
Status: DISCONTINUED | OUTPATIENT
Start: 2024-02-01 | End: 2024-01-31

## 2024-01-31 RX ORDER — OXYCODONE AND ACETAMINOPHEN 10; 325 MG/1; MG/1
1 TABLET ORAL EVERY 4 HOURS PRN
Status: DISCONTINUED | OUTPATIENT
Start: 2024-01-31 | End: 2024-02-02

## 2024-01-31 RX ORDER — GADOBUTROL 604.72 MG/ML
9 INJECTION INTRAVENOUS
Status: COMPLETED | OUTPATIENT
Start: 2024-01-31 | End: 2024-01-31

## 2024-01-31 RX ORDER — AMLODIPINE AND BENAZEPRIL HYDROCHLORIDE 5; 10 MG/1; MG/1
1 CAPSULE ORAL DAILY
Status: DISCONTINUED | OUTPATIENT
Start: 2024-02-01 | End: 2024-02-08 | Stop reason: HOSPADM

## 2024-01-31 RX ORDER — ALLOPURINOL 100 MG/1
100 TABLET ORAL DAILY
Status: DISCONTINUED | OUTPATIENT
Start: 2024-02-01 | End: 2024-02-08 | Stop reason: HOSPADM

## 2024-01-31 RX ORDER — ENOXAPARIN SODIUM 100 MG/ML
40 INJECTION SUBCUTANEOUS EVERY 24 HOURS
Status: COMPLETED | OUTPATIENT
Start: 2024-01-31 | End: 2024-02-01

## 2024-01-31 RX ADMIN — ENOXAPARIN SODIUM 40 MG: 40 INJECTION SUBCUTANEOUS at 07:01

## 2024-01-31 RX ADMIN — GABAPENTIN 300 MG: 300 CAPSULE ORAL at 10:01

## 2024-01-31 RX ADMIN — SODIUM CHLORIDE: 9 INJECTION, SOLUTION INTRAVENOUS at 06:01

## 2024-01-31 RX ADMIN — ALUMINUM HYDROXIDE, MAGNESIUM HYDROXIDE, AND DIMETHICONE 10 ML: 400; 400; 40 SUSPENSION ORAL at 10:01

## 2024-01-31 RX ADMIN — GADOBUTROL 9 ML: 604.72 INJECTION INTRAVENOUS at 09:01

## 2024-01-31 NOTE — ASSESSMENT & PLAN NOTE
Gamaliel Pete Jr. is a 73 y.o. male s/p T7-11 decompression/fusion on 1/5/24. He presented to clinic on 1/31 with surgical wound breakdown.    - Admit to NPU under NSGY.  - q4h neurochecks.  - F/u MRI thoracic spine w/wo contrast STAT.  - F/u inflammatory markers.  - F/u preop labs.  - F/u blood cultures.  - Okay for diet at this time.  - Hyponatremia: Na 126 on previous labs. Will reorder labs and consult medicine in AM for assistance. Starting IVF.  - TLSO brace when up/out of bed. Okay for activities as tolerated.  - Plan for wound exploration/washout and revision on 2/2/24 with assistance from plastic surgery.  - Hold chemo tx perioperatively.  - DVT ppx: deyanira hose/SCDs/LVX.    D/w Dr. Martinez.

## 2024-01-31 NOTE — HPI
Gamaliel Pete JrLizz is a 73 y.o. 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 He was then taken for a T7-11 posterior decompression/fusion. The patient presented recently with scabbing to his lower thoracic incision. He was sent to wound care who advised him to be evaluated because it appeared worse. He presents to clinic today with reports of drainage from his incision. He denies fevers, headaches, numbness, tingling, focal weakness, bowel/bladder dysfunction. He will be direct admitted to the hospital for further workup and likely neurosurgical intervention to address the surgical wound breakdown.

## 2024-01-31 NOTE — PROGRESS NOTES
1/19/24: Gamaliel Pete Jr. is a 73 y.o. male s/p T7-11 decompression/fusion on 1/6/24 with Dr. Martinez presents to clinic for a wound check. He was getting a chemotherapy infusion today and was directed to NSGY clinic due to incision drainage. Patient states he noticed some yellowish drainage on his shirt one day but it had stopped after that. He denies fevers, chills, bodyaches, new focal weakness or numbness. He denies back pain but states his bottoms is extremely tender and sore. He was seen by wound care in the hospital for sacral pressure injuries to this area and was given a cream to take home to apply to the area but it has gotten worse.     Interval f/u 131/24: Patient presents to the clinic today per wound care nurse's concerns regarding his surgical incision. He was told by the nurse that she is not supposed to address the patient surgical incision. She was treating the patient for his sacral pressure injury. The patient states that the sacral pressure wound has improved but he is now having clear, yellow drainage from his incision. He denies headaches, fevers, new focal weakness, gait difficulties, numbness, sensory changes, bowel/bladder dysfunction.    Physical Exam:  General: Well developed, well nourished, not in acute distress.   Head: Normocephalic, atraumatic.  Neck: Full ROM.   Neurologic: Alert and oriented x 4. Thought content appropriate.  GCS: Motor:6  Verbal:5  Eyes:4   GCS Total: 15  Language: No aphasia.  Speech: No dysarthria.  Cranial nerves: Face symmetric, tongue midline, CN II-XII grossly intact.   Eyes: Pupils equal, round, reactive to light with accomodation, EOMI.   Pulmonary: Normal respirations, no signs of respiratory distress.  Abdomen: Soft, non-distended, non-tender to palpation.  Sensory: Intact to light touch throughout.  Motor Strength: Moves all extremities spontaneously with good tone. Full strength upper and lower extremities. No abnormal movements seen.       Thoracic incision: There is thick scabbing to the midline incision that has extended further up the incision with worsening erythema and skin breakdown surrounding. There is now a fluctuant collection underneath the incision. There is serous fluid able to be expressed from the bottom of the incision. There is no deep dehiscence.          Sacrum with erythema and tenderness. No open wounds.     Plan:  - Admit to NPU under NSGY.   - Will order inflammatory markers and any necessary imaging.   - Keep NPO for possible neurosurgical intervention.     Discussed with Dr. Martinez.     DINO AlfaroC  Neurosurgery   [facility]

## 2024-01-31 NOTE — NURSING
Message to CHINO Campoverde:  Patient has arrived to room. Reading over orders and noted order for NPO and I just wanted to inform you that the patient reports ate lunch @ 14 :30.

## 2024-02-01 ENCOUNTER — HOSPITAL ENCOUNTER (OUTPATIENT)
Dept: RADIATION THERAPY | Facility: HOSPITAL | Age: 74
Discharge: HOME | End: 2024-02-01
Attending: RADIOLOGY
Payer: MEDICARE

## 2024-02-01 ENCOUNTER — ANESTHESIA EVENT (OUTPATIENT)
Dept: SURGERY | Facility: HOSPITAL | Age: 74
DRG: 857 | End: 2024-02-01
Payer: MEDICARE

## 2024-02-01 PROBLEM — T81.30XA WOUND DEHISCENCE: Status: ACTIVE | Noted: 2024-02-01

## 2024-02-01 PROBLEM — E87.1 HYPONATREMIA: Status: ACTIVE | Noted: 2024-02-01

## 2024-02-01 LAB
ANION GAP SERPL CALC-SCNC: 5 MMOL/L (ref 8–16)
ANION GAP SERPL CALC-SCNC: 5 MMOL/L (ref 8–16)
ANION GAP SERPL CALC-SCNC: 6 MMOL/L (ref 8–16)
ANION GAP SERPL CALC-SCNC: 7 MMOL/L (ref 8–16)
BASOPHILS # BLD AUTO: 0.01 K/UL (ref 0–0.2)
BASOPHILS NFR BLD: 0.4 % (ref 0–1.9)
BUN SERPL-MCNC: 13 MG/DL (ref 8–23)
BUN SERPL-MCNC: 14 MG/DL (ref 8–23)
CALCIUM SERPL-MCNC: 8.6 MG/DL (ref 8.7–10.5)
CALCIUM SERPL-MCNC: 8.6 MG/DL (ref 8.7–10.5)
CALCIUM SERPL-MCNC: 8.7 MG/DL (ref 8.7–10.5)
CALCIUM SERPL-MCNC: 8.8 MG/DL (ref 8.7–10.5)
CHLORIDE SERPL-SCNC: 98 MMOL/L (ref 95–110)
CHLORIDE SERPL-SCNC: 99 MMOL/L (ref 95–110)
CO2 SERPL-SCNC: 24 MMOL/L (ref 23–29)
CO2 SERPL-SCNC: 25 MMOL/L (ref 23–29)
CO2 SERPL-SCNC: 26 MMOL/L (ref 23–29)
CO2 SERPL-SCNC: 27 MMOL/L (ref 23–29)
CREAT SERPL-MCNC: 1 MG/DL (ref 0.5–1.4)
CREAT SERPL-MCNC: 1.1 MG/DL (ref 0.5–1.4)
DIFFERENTIAL METHOD BLD: ABNORMAL
EOSINOPHIL # BLD AUTO: 0.1 K/UL (ref 0–0.5)
EOSINOPHIL NFR BLD: 2 % (ref 0–8)
ERYTHROCYTE [DISTWIDTH] IN BLOOD BY AUTOMATED COUNT: 14.6 % (ref 11.5–14.5)
EST. GFR  (NO RACE VARIABLE): >60 ML/MIN/1.73 M^2
GLUCOSE SERPL-MCNC: 103 MG/DL (ref 70–110)
GLUCOSE SERPL-MCNC: 106 MG/DL (ref 70–110)
GLUCOSE SERPL-MCNC: 91 MG/DL (ref 70–110)
GLUCOSE SERPL-MCNC: 98 MG/DL (ref 70–110)
HCT VFR BLD AUTO: 28.4 % (ref 40–54)
HGB BLD-MCNC: 9.5 G/DL (ref 14–18)
IMM GRANULOCYTES # BLD AUTO: 0.02 K/UL (ref 0–0.04)
IMM GRANULOCYTES NFR BLD AUTO: 0.8 % (ref 0–0.5)
LYMPHOCYTES # BLD AUTO: 0.5 K/UL (ref 1–4.8)
LYMPHOCYTES NFR BLD: 17.7 % (ref 18–48)
MCH RBC QN AUTO: 29.1 PG (ref 27–31)
MCHC RBC AUTO-ENTMCNC: 33.5 G/DL (ref 32–36)
MCV RBC AUTO: 87 FL (ref 82–98)
MONOCYTES # BLD AUTO: 0.3 K/UL (ref 0.3–1)
MONOCYTES NFR BLD: 11 % (ref 4–15)
NEUTROPHILS # BLD AUTO: 1.7 K/UL (ref 1.8–7.7)
NEUTROPHILS NFR BLD: 68.1 % (ref 38–73)
NRBC BLD-RTO: 0 /100 WBC
OSMOLALITY SERPL: 276 MOSM/KG (ref 280–300)
OSMOLALITY UR: 412 MOSM/KG (ref 50–1200)
PLATELET # BLD AUTO: 132 K/UL (ref 150–450)
PMV BLD AUTO: 8.8 FL (ref 9.2–12.9)
POTASSIUM SERPL-SCNC: 4.4 MMOL/L (ref 3.5–5.1)
POTASSIUM SERPL-SCNC: 4.4 MMOL/L (ref 3.5–5.1)
POTASSIUM SERPL-SCNC: 4.9 MMOL/L (ref 3.5–5.1)
POTASSIUM SERPL-SCNC: 4.9 MMOL/L (ref 3.5–5.1)
RBC # BLD AUTO: 3.26 M/UL (ref 4.6–6.2)
SODIUM SERPL-SCNC: 128 MMOL/L (ref 136–145)
SODIUM SERPL-SCNC: 129 MMOL/L (ref 136–145)
SODIUM SERPL-SCNC: 130 MMOL/L (ref 136–145)
SODIUM SERPL-SCNC: 131 MMOL/L (ref 136–145)
SODIUM UR-SCNC: 86 MMOL/L (ref 20–250)
WBC # BLD AUTO: 2.54 K/UL (ref 3.9–12.7)

## 2024-02-01 PROCEDURE — 63600175 PHARM REV CODE 636 W HCPCS

## 2024-02-01 PROCEDURE — 84300 ASSAY OF URINE SODIUM: CPT

## 2024-02-01 PROCEDURE — 36415 COLL VENOUS BLD VENIPUNCTURE: CPT

## 2024-02-01 PROCEDURE — 36415 COLL VENOUS BLD VENIPUNCTURE: CPT | Mod: XB

## 2024-02-01 PROCEDURE — 99024 POSTOP FOLLOW-UP VISIT: CPT | Mod: ,,,

## 2024-02-01 PROCEDURE — 80048 BASIC METABOLIC PNL TOTAL CA: CPT | Mod: 91

## 2024-02-01 PROCEDURE — 80048 BASIC METABOLIC PNL TOTAL CA: CPT

## 2024-02-01 PROCEDURE — 85025 COMPLETE CBC W/AUTO DIFF WBC: CPT

## 2024-02-01 PROCEDURE — 25000003 PHARM REV CODE 250

## 2024-02-01 PROCEDURE — 11000001 HC ACUTE MED/SURG PRIVATE ROOM

## 2024-02-01 PROCEDURE — 83930 ASSAY OF BLOOD OSMOLALITY: CPT

## 2024-02-01 PROCEDURE — 83935 ASSAY OF URINE OSMOLALITY: CPT

## 2024-02-01 RX ORDER — SODIUM CHLORIDE 9 MG/ML
INJECTION, SOLUTION INTRAVENOUS CONTINUOUS
Status: ACTIVE | OUTPATIENT
Start: 2024-02-01 | End: 2024-02-03

## 2024-02-01 RX ADMIN — GABAPENTIN 300 MG: 300 CAPSULE ORAL at 08:02

## 2024-02-01 RX ADMIN — ALUMINUM HYDROXIDE, MAGNESIUM HYDROXIDE, AND DIMETHICONE 10 ML: 400; 400; 40 SUSPENSION ORAL at 04:02

## 2024-02-01 RX ADMIN — ALUMINUM HYDROXIDE, MAGNESIUM HYDROXIDE, AND DIMETHICONE 10 ML: 400; 400; 40 SUSPENSION ORAL at 09:02

## 2024-02-01 RX ADMIN — ALLOPURINOL 100 MG: 100 TABLET ORAL at 08:02

## 2024-02-01 RX ADMIN — SODIUM CHLORIDE: 9 INJECTION, SOLUTION INTRAVENOUS at 08:02

## 2024-02-01 RX ADMIN — ALUMINUM HYDROXIDE, MAGNESIUM HYDROXIDE, AND DIMETHICONE 10 ML: 400; 400; 40 SUSPENSION ORAL at 08:02

## 2024-02-01 RX ADMIN — GABAPENTIN 300 MG: 300 CAPSULE ORAL at 10:02

## 2024-02-01 RX ADMIN — ACETAMINOPHEN 650 MG: 325 TABLET ORAL at 11:02

## 2024-02-01 RX ADMIN — GABAPENTIN 300 MG: 300 CAPSULE ORAL at 03:02

## 2024-02-01 RX ADMIN — AMLODIPINE BESYLATE AND BENAZEPRIL HYDROCHLORIDE 1 CAPSULE: 5; 10 CAPSULE ORAL at 08:02

## 2024-02-01 RX ADMIN — ALUMINUM HYDROXIDE, MAGNESIUM HYDROXIDE, AND DIMETHICONE 10 ML: 400; 400; 40 SUSPENSION ORAL at 12:02

## 2024-02-01 RX ADMIN — SODIUM CHLORIDE: 9 INJECTION, SOLUTION INTRAVENOUS at 10:02

## 2024-02-01 RX ADMIN — ACETAMINOPHEN 650 MG: 325 TABLET ORAL at 07:02

## 2024-02-01 RX ADMIN — ENOXAPARIN SODIUM 40 MG: 40 INJECTION SUBCUTANEOUS at 04:02

## 2024-02-01 NOTE — HOSPITAL COURSE
2/1: Febrile x 2 overnight. Vitals stable. Na 128 this AM. Medicine consulted for assistance. MRI completed without abscess/infection. Plan for OR tomorrow for wound exploration and washout/revision. NPO at midnight tonight.  2/3: POD 1. NAEON. Afebrile. Neuro stable. Pain controlled. IV cef/vanc started per ID. OR Cultures pending. Keep drains. Pending PT/OT. Tolerating PO this AM. Voiding spontaneously.  2/4 naeon, exam stable, oob, WV in place, ID on board  2/5: NAEON. Neuro stable. OR cultures w/ staph epidermidis. ID rec IV Vanc x 6 weeks. PICC team consulted. Continue drains and WV.   2/6: NAEON. AFVSS. Patient receiving radiation to his cervical spine this admission. HV drain with 100 cc output, will keep. WILLY drain per plastics. WV in place, likely removal per plastics tomorrow. Pain controlled. PICC placed for IV abx. CM working on home infusions with home health.  2/7: NAEON. AFVSS. HV drain with decreased output. WILLY and WV still in place. Pain controlled. Ambulated in the hallway today without issues. Likely plan for dc home with home health tomorrow. Will remove HV drain before DC.

## 2024-02-01 NOTE — CARE UPDATE
I have reviewed the chart of Gamaliel Pete Jr. and participated in the care of the patient who is hospitalized for the following:    Active Hospital Problems    Diagnosis    *Surgical wound breakdown    Hyponatremia    Wound dehiscence     See primary problem             I have reviewed the Gamaliel Pete Jr. with the multidisciplinary team during rounds.      Araceli Villar NP  [facility]

## 2024-02-01 NOTE — CONSULTS
Lj Krueger - Neurosurgery ([facility])  Wound Care    Patient Name:  Gamaliel Pete Jr.   MRN:  [id number]  Date: 2/1/2024  Diagnosis: Surgical wound breakdown    History:     Past Medical History:   Diagnosis Date    Asthma     no inhaler use    Borderline hypertension     ED (erectile dysfunction)     Gout     Hematuria     Hypertension        Social History     Socioeconomic History    Marital status:    Tobacco Use    Smoking status: Never    Smokeless tobacco: Never   Substance and Sexual Activity    Alcohol use: Not Currently     Alcohol/week: 0.0 standard drinks of alcohol     Comment: rarely    Drug use: Never     Social Determinants of Health     Financial Resource Strain: Low Risk  (2/1/2024)    Overall Financial Resource Strain (CARDIA)     Difficulty of Paying Living Expenses: Not hard at all   Food Insecurity: No Food Insecurity (2/1/2024)    Hunger Vital Sign     Worried About Running Out of Food in the Last Year: Never true     Ran Out of Food in the Last Year: Never true   Transportation Needs: No Transportation Needs (2/1/2024)    PRAPARE - Transportation     Lack of Transportation (Medical): No     Lack of Transportation (Non-Medical): No   Physical Activity: Inactive (2/1/2024)    Exercise Vital Sign     Days of Exercise per Week: 0 days     Minutes of Exercise per Session: 0 min   Stress: No Stress Concern Present (2/1/2024)    [employer] [place] [employer] - Occupational Stress Questionnaire     Feeling of Stress : Not at all   Social Connections: Moderately Isolated (2/1/2024)    Social Connection and Isolation Panel [NHANES]     Frequency of Communication with Friends and Family: More than three times a week     Frequency of Social Gatherings with Friends and Family: More than three times a week     Attends Orthodoxy Services: Never     Active Member of Clubs or Organizations: No     Attends Club or Organization Meetings: Never     Marital Status:    Housing Stability:  Low Risk  (2/1/2024)    Housing Stability Vital Sign     Unable to Pay for Housing in the Last Year: No     Number of Places Lived in the Last Year: 1     Unstable Housing in the Last Year: No       Precautions:     Allergies as of 01/31/2024    (No Known Allergies)       [facility] Assessment Details/Treatment   Patient seen for wound care consultation buttocks and back incision.   Reviewed chart for this encounter.   See Flow Sheet for findings.      Per chart review. Gamaliel Pete Jr. is a 73 y.o. 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 Wound care noted intact blanchable redness to the buttocks. Wound care applied a Mepilex for prevention and protection. Wound care applied an Aquacel Extra directly to back incision and used a Mepilex as a cover dressing for absorption. Pt denied any needs at this time.       RECOMMENDATIONS:  - Waffle mattress   - Wheel chair cushion  - IP skin integrity   - Bedside nurse to perform wound care to buttocks:  Cleanse wound with soap and water  Pat dry.  Cover with a foam border (Mepilex)   Apply this Weekly.  - Bedside nurse to perform wound care to back incision:  Cleanse wound with normal saline  Pat dry.  Apply a Hydrofiber (Aquacel Extra)  Cover with a foam border (Mepilex)   Apply this every two days.      Recommendations made to primary team for above plan via secured chat. Wound Care to follow up. Orders placed.     Discussed POC with patient and primary RN.   See EMR for orders & patient education.  Discussed POC with primary team.  ABEBA Notified.    Nursing to continue care.  Nursing to maintain pressure injury prevention interventions.  Contact wound care for any further questions.         02/01/24 1430   WOCN Assessment   WOCN Total Time (mins) 30   Visit Date 02/01/24   Visit Time 1430   Consult Type New   WOCN Speciality Wound   Wound surgical   Intervention  assessed;changed;applied;chart review;coordination of care;orders   Teaching on-going        Altered Skin Integrity 11/15/23 2030 Buttocks Other (comment) Intact skin with non-blanchable redness of localized area   Date First Assessed/Time First Assessed: 11/15/23 2030   Altered Skin Integrity Present on Admission - Did Patient arrive to the hospital with altered skin?: suspected hospital acquired  Location: Buttocks  Primary Wound Type: (c) Other (comment)  Nilson...   Dressing Appearance Dry;Intact;Clean   Drainage Amount None   Appearance Intact;Red;Dry   Dressing Applied;Foam   Dressing Change Due 02/08/24        Altered Skin Integrity 01/31/24 2035 Left  Buttocks   Date First Assessed/Time First Assessed: 01/31/24 2035   Altered Skin Integrity Present on Admission - Did Patient arrive to the hospital with altered skin?: yes  Side: (c) Left  Orientation: (c)   Location: Buttocks  Is this injury device related?: No   Wound Image    Dressing Appearance Dry;Intact;Clean   Drainage Amount None   Appearance Intact;Red;Dry   Dressing Applied;Foam   Dressing Change Due 02/08/24        Incision/Site 01/05/24 1837 Back   Date First Assessed/Time First Assessed: 01/05/24 1837   Location: Back   Dressing Appearance Saturated   Drainage Amount Copious   Drainage Characteristics/Odor Clear;Yellow;No odor   Appearance Pink;Red;Black;Yellow;Moist   Care Cleansed with:;Soap and water   Dressing Applied;Hydrofiber;Foam   Dressing Change Due 02/03/24 02/01/2024

## 2024-02-01 NOTE — SUBJECTIVE & OBJECTIVE
Past Medical History:   Diagnosis Date    Asthma     no inhaler use    Borderline hypertension     ED (erectile dysfunction)     Gout     Hematuria     Hypertension        Past Surgical History:   Procedure Laterality Date    COLONOSCOPY  02/10/2022    COLONOSCOPY N/A 02/10/2022    Procedure: COLONOSCOPY;  Surgeon: Desmond Keenan MD;  Location: [facility];  Service: General;  Laterality: N/A;    POSTERIOR FUSION OF CERVICAL SPINE WITH LAMINECTOMY N/A 11/15/2023    Procedure: SPINE, CERVICAL, WITH POSTERIOR FUSION T4-6;  Surgeon: Nasim Martinez DO;  Location: [address];  Service: Neurosurgery;  Laterality: N/A;  C2-T1 laminectomy and posterior fusion. Amasa. C-arm. [facility]. Neuromonitoring.    ROBOT-ASSISTED LAPAROSCOPIC NEPHRECTOMY Right 10/4/2023    Procedure: ROBOTIC NEPHRECTOMY;  Surgeon: Henry Michaels MD;  Location: [facility];  Service: Urology;  Laterality: Right;    SURGICAL REMOVAL OF VERTEBRAL BODY OF CERVICAL SPINE Right 11/7/2023    Procedure: CORPECTOMY, SPINE, CERVICAL;  Surgeon: Nasim Martinez DO;  Location: [address];  Service: Neurosurgery;  Laterality: Right;  C4 and C5 corpectomy with globus; familia wells tongs; c-arm; neuromonitoring; microscope; type and cross 2 units    SURGICAL REMOVAL OF VERTEBRAL BODY OF CERVICAL SPINE N/A 11/15/2023    Procedure: CORPECTOMY, SPINE, CERVICAL C3-6 REVISION;  Surgeon: Nasim Martinez DO;  Location: [facility] OR [address] [place] [address];  Service: Neurosurgery;  Laterality: N/A;    THORACIC LAMINECTOMY WITH FUSION N/A 1/5/2024    Procedure: LAMINECTOMY, SPINE, THORACIC, WITH FUSION;  Surgeon: Nasim Martinez DO;  Location: [address];  Service: Neurosurgery;  Laterality: N/A;  T7-T11 fusions with robot    TONSILLECTOMY         Review of patient's allergies indicates:  No Known Allergies    No current facility-administered medications on file prior to encounter.     Current Outpatient Medications on File Prior to Encounter   Medication Sig     allopurinoL (ZYLOPRIM) 100 MG tablet Take 1 tablet (100 mg total) by mouth once daily.    amlodipine-benazepril 5-10 mg (LOTREL) 5-10 mg per capsule Take 1 capsule by mouth once daily.    axitinib (INLYTA) 5 mg Tab Take 1 tablet (5 mg) by mouth 2 (two) times daily.    diazePAM (VALIUM) 5 MG tablet Take 1 tablet (5 mg total) by mouth every 6 (six) hours as needed (severe muscles spasms). (Patient not taking: Reported on 1/18/2024)    duke's soln (benadryl 30 mL, mylanta 30 mL, LIDOcaine 30 mL, nystatin 30 mL) 120mL Take 10 mLs by mouth 4 (four) times daily.    gabapentin (NEURONTIN) 300 MG capsule Take 1 capsule (300 mg total) by mouth 3 (three) times daily.    naloxone (NARCAN) 4 mg/actuation Spry 1 spray (4mg) by nasal route as needed for opioid overdose; may repeat every 2-3 minutes in alternating nostrils until medical help arrives. Call 911 (Patient not taking: Reported on 12/28/2023)    omega-3 fatty acids/fish oil (FISH OIL-OMEGA-3 FATTY ACIDS) 300-1,000 mg capsule Take 2 capsules by mouth once daily.    oxyCODONE-acetaminophen (PERCOCET) 5-325 mg per tablet Take 1 to 2 tablets by mouth every 4-6 hours as needed for pain    sildenafiL (VIAGRA) 100 MG tablet Take 1 tablet (100 mg total) by mouth daily as needed for Erectile Dysfunction. (Patient not taking: Reported on 1/18/2024)    turmeric (CURCUMIN MISC) 1,000 mg by Misc.(Non-Drug; Combo Route) route Daily.    UNABLE TO FIND Take 500 mg by mouth 2 (two) times a day. medication name: Tudca    UNABLE TO FIND Take 2 capsules by mouth 2 (two) times a day. medication name: Turkey tail mushroom    UNABLE TO FIND Take 15 drops by mouth once daily. medication name: Essiac-20    UNABLE TO FIND Take 5 mLs by mouth 2 (two) times a day. medication name: barley leaf juice powder    UNABLE TO FIND Take 5 mLs by mouth 2 (two) times a day. medication name: black seed oil (cumin seed)     Family History    None       Tobacco Use    Smoking status: Never    Smokeless  tobacco: Never   Substance and Sexual Activity    Alcohol use: Not Currently     Alcohol/week: 0.0 standard drinks of alcohol     Comment: rarely    Drug use: Never    Sexual activity: Not on file     Review of Systems   Constitutional:  Positive for fever. Negative for chills.   Eyes:  Negative for photophobia and visual disturbance.   Respiratory:  Negative for shortness of breath.    Cardiovascular:  Negative for chest pain.   Gastrointestinal:  Negative for abdominal pain, nausea and vomiting.   Genitourinary:  Negative for difficulty urinating.   Neurological:  Negative for dizziness, seizures, speech difficulty, weakness, light-headedness, numbness and headaches.     Objective:     Vital Signs (Most Recent):  Temp: 98 °F (36.7 °C) (02/01/24 1353)  Pulse: 91 (02/01/24 1147)  Resp: 18 (02/01/24 1130)  BP: (!) 116/55 (02/01/24 1130)  SpO2: (!) 94 % (02/01/24 1130) Vital Signs (24h Range):  Temp:  [98 °F (36.7 °C)-101.4 °F (38.6 °C)] 98 °F (36.7 °C)  Pulse:  [] 91  Resp:  [18-19] 18  SpO2:  [92 %-98 %] 94 %  BP: (116-135)/(55-63) 116/55     Weight: 87.1 kg (192 lb)  Body mass index is 27.55 kg/m².     Physical Exam  Constitutional:       General: He is not in acute distress.  HENT:      Head: Normocephalic and atraumatic.      Mouth/Throat:      Mouth: Mucous membranes are moist.   Eyes:      General: No scleral icterus.  Cardiovascular:      Rate and Rhythm: Normal rate and regular rhythm.      Pulses: Normal pulses.      Heart sounds: Normal heart sounds. No murmur heard.  Pulmonary:      Effort: Pulmonary effort is normal. No respiratory distress.      Breath sounds: Normal breath sounds. No wheezing.   Abdominal:      General: There is no distension.      Palpations: Abdomen is soft.      Tenderness: There is no abdominal tenderness.   Musculoskeletal:      Right lower leg: No edema.      Left lower leg: No edema.   Skin:     General: Skin is warm and dry.      Coloration: Skin is not jaundiced.    Neurological:      General: No focal deficit present.      Mental Status: He is alert and oriented to person, place, and time.      Cranial Nerves: Cranial nerves 2-12 are intact. No cranial nerve deficit.      Sensory: Sensation is intact.      Motor: Motor function is intact.      Coordination: Coordination is intact.   Psychiatric:         Mood and Affect: Mood normal.         Behavior: Behavior normal.          Significant Labs: All pertinent labs within the past 24 hours have been reviewed.  CBC:   Recent Labs   Lab 01/31/24  1652 02/01/24  0351   WBC 3.76* 2.54*   HGB 9.9* 9.5*   HCT 29.4* 28.4*    132*     CMP:   Recent Labs   Lab 02/01/24  0351 02/01/24  0753 02/01/24  1117   * 129* 130*   K 4.4 4.4 4.9   CL 98 98 99   CO2 25 24 26   GLU 98 91 103   BUN 14 13 13   CREATININE 1.0 1.0 1.0   CALCIUM 8.8 8.6* 8.6*   ANIONGAP 5* 7* 5*       Significant Imaging: I have reviewed all pertinent imaging results/findings within the past 24 hours.

## 2024-02-01 NOTE — NURSING
Patient to NPU room 944 as direct admit; orient to room, unit and staff; instruct on POC, safety/ and to call for needs.  Admit process begun.

## 2024-02-01 NOTE — ASSESSMENT & PLAN NOTE
Gamaliel Pete Jr. is a 73 y.o. male s/p T7-11 decompression/fusion on 1/5/24. He presented to clinic on 1/31 with surgical wound breakdown.    Diagnostics:  MRI t spine w/wo con 1/31/24: no epidural abscess or infection, likely seroma. Adequate decompression of spinal cord.   ESR 69 Procal 0.24  Preop labs WNL.  Blood cultures 1/31/24: NGTD/pending.    - Admit to NPU under NSGY.  - q4h neurochecks.  - TLSO brace when up/out of bed. Okay for activities as tolerated.  - Plan for wound exploration/washout and revision on 2/2/24 with assistance from plastic surgery. NPO at midnight tonight.  - Hold chemo tx perioperatively.  - DVT ppx: deyanira hose/SCDs/LVX.    D/w Dr. Martinez.

## 2024-02-01 NOTE — HPI
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 HTN, and gout who presented for concerns of surgical site infection. He was at an appt for wound care and they were concerned about the appearance of his incisional site so they recommend he come to the hospital.     Hospital medicine was consulted to assist in managing his hyponatremia. The pt had been taking inlyta as part of his cancer regimen when he developed oral ulcers, a common side effect of this medication; this med has since been stopped. Secondary to the pain of these ulcers he had decreased oral intake. Duke's solution has recently been added and has alleviated much of the discomfort to where he feels he is eating more. He denies any focal weakness, headaches, vision changes, confusion, other neurologic symptoms; wife at bedside also denies noticing any neurologic changes in her .

## 2024-02-01 NOTE — ASSESSMENT & PLAN NOTE
Chronic, controlled. Latest blood pressure and vitals reviewed-     Temp:  [98 °F (36.7 °C)-101.4 °F (38.6 °C)]   Pulse:  []   Resp:  [18-19]   BP: (116-135)/(55-63)   SpO2:  [92 %-98 %] .   Home meds for hypertension were reviewed and noted below.   Hypertension Medications               amlodipine-benazepril 5-10 mg (LOTREL) 5-10 mg per capsule Take 1 capsule by mouth once daily.            While in the hospital, will manage blood pressure as follows; Continue home antihypertensive regimen

## 2024-02-01 NOTE — PROGRESS NOTES
Lj Krueger - Neurosurgery ([facility])  Neurosurgery  Progress Note    Subjective:     History of Present Illness: Gamaliel Pete Jr. is a 73 y.o. 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 He was then taken for a T7-11 posterior decompression/fusion. The patient presented recently with scabbing to his lower thoracic incision. He was sent to wound care who advised him to be evaluated because it appeared worse. He presents to clinic today with reports of drainage from his incision. He denies fevers, headaches, numbness, tingling, focal weakness, bowel/bladder dysfunction. He will be direct admitted to the hospital for further workup and likely neurosurgical intervention to address the surgical wound breakdown.     Post-Op Info:  Procedure(s) (LRB):  EXPLORATION, WOUND (N/A)       Interval History: Febrile x 2 overnight. Vitals stable. Na 128 this AM. Medicine consulted for assistance. MRI completed without abscess/infection. Plan for OR tomorrow for wound exploration and washout/revision. NPO at midnight tonight.    Medications:  Continuous Infusions:   sodium chloride 0.9% 75 mL/hr at 02/01/24 0845     Scheduled Meds:   allopurinoL  100 mg Oral Daily    amlodipine-benazepril 5-10 mg  1 capsule Oral Daily    duke's soln (benadryl 30 mL, mylanta 30 mL, LIDOcaine 30 mL, nystatin 30 mL) 120 mL  10 mL Oral QID    enoxparin  40 mg Subcutaneous Q24H (prophylaxis, 1700)    gabapentin  300 mg Oral TID    polyethylene glycol  17 g Oral Daily     PRN Meds:acetaminophen, diazePAM, methocarbamoL, oxyCODONE-acetaminophen, oxyCODONE-acetaminophen, senna-docusate 8.6-50 mg     Review of Systems  Objective:     Weight: 87.1 kg (192 lb)  Body mass index is 27.55 kg/m².  Vital Signs (Most Recent):  Temp: 99.9 °F (37.7 °C) (02/01/24 0739)  Pulse: 83 (02/01/24 0739)  Resp: 19 (02/01/24 0739)  BP: 135/63 (02/01/24 0739)  SpO2: (!) 90 % (02/01/24  0739) Vital Signs (24h Range):  Temp:  [99 °F (37.2 °C)-101.4 °F (38.6 °C)] 99.9 °F (37.7 °C)  Pulse:  [] 83  Resp:  [18-19] 19  SpO2:  [90 %-98 %] 90 %  BP: (116-135)/(57-63) 135/63           Neurosurgery Physical Exam  Physical Exam:  General: Well developed, well nourished, not in acute distress.   Head: Normocephalic, atraumatic.  Neck: Full ROM.   Neurologic: Alert and oriented x 4. Thought content appropriate.  GCS: Motor:6  Verbal:5  Eyes:4   GCS Total: 15  Language: No aphasia.  Speech: No dysarthria.  Cranial nerves: Face symmetric, tongue midline, CN II-XII grossly intact.   Eyes: Pupils equal, round, reactive to light with accomodation, EOMI.   Pulmonary: Normal respirations, no signs of respiratory distress.  Abdomen: Soft, non-distended, non-tender to palpation.  Sensory: Intact to light touch throughout.  Motor Strength: Moves all extremities spontaneously with good tone. Full strength upper and lower extremities. No abnormal movements seen.      Thoracic incision: There is thick scabbing to the midline incision that has extended further up the incision with worsening erythema and skin breakdown surrounding. There is a fluctuant collection underneath the incision. There is serous fluid able to be expressed from the bottom of the incision. There is no deep dehiscence. No overt signs of infection.    Significant Labs:  Recent Labs   Lab 01/31/24 1652 02/01/24  0351   * 98   * 128*   K 4.9 4.4   CL 95 98   CO2 22* 25   BUN 17 14   CREATININE 1.3 1.0   CALCIUM 9.0 8.8     Recent Labs   Lab 01/31/24 1652 02/01/24  0351   WBC 3.76* 2.54*   HGB 9.9* 9.5*   HCT 29.4* 28.4*    132*     Recent Labs   Lab 01/31/24 1652   INR 1.1   APTT 28.5     Microbiology Results (last 7 days)       Procedure Component Value Units Date/Time    Blood culture [[id number]] Collected: 01/31/24 1948    Order Status: Completed Specimen: Blood Updated: 02/01/24 0315     Blood Culture, Routine No Growth  to date          All pertinent labs from the last 24 hours have been reviewed.    Significant Diagnostics:  I have reviewed and interpreted all pertinent imaging results/findings within the past 24 hours.  Assessment/Plan:     * Surgical wound breakdown  Gamaliel Pete Jr. is a 73 y.o. male s/p T7-11 decompression/fusion on 1/5/24. He presented to clinic on 1/31 with surgical wound breakdown.    Diagnostics:  MRI t spine w/wo con 1/31/24: no epidural abscess or infection, likely seroma. Adequate decompression of spinal cord.   ESR 69 Procal 0.24  Preop labs WNL.  Blood cultures 1/31/24: NGTD/pending.    - Admit to NPU under NSGY.  - q4h neurochecks.  - TLSO brace when up/out of bed. Okay for activities as tolerated.  - Plan for wound exploration/washout and revision on 2/2/24 with assistance from plastic surgery. NPO at midnight tonight.  - Hold chemo tx perioperatively.  - DVT ppx: deyanira hose/SCDs/LVX.    D/w Dr. Martinez.    Hyponatremia  Na 126 on admission. Improved to 128 this AM.    - Medicine consulted for assistance.  - IVF.          Nadine Santos PA-C  Neurosurgery  Lj Krueger - Neurosurgery ([facility])

## 2024-02-01 NOTE — ASSESSMENT & PLAN NOTE
Patient has hyponatremia which is controlled,We will aim to correct the sodium by 4-6mEq in 24 hours. We will monitor sodium Every 6 hours. The hyponatremia is due to Dehydration/hypovolemia. We will obtain the following studies: Urine sodium, urine osmolality, serum osmolality. We will treat the hyponatremia with IV fluids as follows: 74 cc/hour. The patient's sodium results have been reviewed and are listed below.  Recent Labs   Lab 02/01/24  1117   *     Pt with hyponatremia as low as 126 in the setting of decreased oral intake 2/2 oral lesions. Negative for any neurological symptoms. Urine studies ordered but this is after normal saline had already been initiated: Serum osm- 276, urine osm-412, urine na-86. Serium Na has been improving with NS infusion; indicating hypovolemic hyponatremia.     -Normal saline 75 cc/hour  -Correct Na by 4-6 mEq every 24 hours; if this is surpassed pause the NS infusion and consider D5 bolus if greatly over corrected.   -BMP q6 hours until Na corrected  -Neurochecks q4

## 2024-02-01 NOTE — SUBJECTIVE & OBJECTIVE
Interval History: Febrile x 2 overnight. Vitals stable. Na 128 this AM. Medicine consulted for assistance. MRI completed without abscess/infection. Plan for OR tomorrow for wound exploration and washout/revision. NPO at midnight tonight.    Medications:  Continuous Infusions:   sodium chloride 0.9% 75 mL/hr at 02/01/24 0845     Scheduled Meds:   allopurinoL  100 mg Oral Daily    amlodipine-benazepril 5-10 mg  1 capsule Oral Daily    duke's soln (benadryl 30 mL, mylanta 30 mL, LIDOcaine 30 mL, nystatin 30 mL) 120 mL  10 mL Oral QID    enoxparin  40 mg Subcutaneous Q24H (prophylaxis, 1700)    gabapentin  300 mg Oral TID    polyethylene glycol  17 g Oral Daily     PRN Meds:acetaminophen, diazePAM, methocarbamoL, oxyCODONE-acetaminophen, oxyCODONE-acetaminophen, senna-docusate 8.6-50 mg     Review of Systems  Objective:     Weight: 87.1 kg (192 lb)  Body mass index is 27.55 kg/m².  Vital Signs (Most Recent):  Temp: 99.9 °F (37.7 °C) (02/01/24 0739)  Pulse: 83 (02/01/24 0739)  Resp: 19 (02/01/24 0739)  BP: 135/63 (02/01/24 0739)  SpO2: (!) 90 % (02/01/24 0739) Vital Signs (24h Range):  Temp:  [99 °F (37.2 °C)-101.4 °F (38.6 °C)] 99.9 °F (37.7 °C)  Pulse:  [] 83  Resp:  [18-19] 19  SpO2:  [90 %-98 %] 90 %  BP: (116-135)/(57-63) 135/63           Neurosurgery Physical Exam  Physical Exam:  General: Well developed, well nourished, not in acute distress.   Head: Normocephalic, atraumatic.  Neck: Full ROM.   Neurologic: Alert and oriented x 4. Thought content appropriate.  GCS: Motor:6  Verbal:5  Eyes:4   GCS Total: 15  Language: No aphasia.  Speech: No dysarthria.  Cranial nerves: Face symmetric, tongue midline, CN II-XII grossly intact.   Eyes: Pupils equal, round, reactive to light with accomodation, EOMI.   Pulmonary: Normal respirations, no signs of respiratory distress.  Abdomen: Soft, non-distended, non-tender to palpation.  Sensory: Intact to light touch throughout.  Motor Strength: Moves all extremities  spontaneously with good tone. Full strength upper and lower extremities. No abnormal movements seen.      Thoracic incision: There is thick scabbing to the midline incision that has extended further up the incision with worsening erythema and skin breakdown surrounding. There is a fluctuant collection underneath the incision. There is serous fluid able to be expressed from the bottom of the incision. There is no deep dehiscence. No overt signs of infection.    Significant Labs:  Recent Labs   Lab 01/31/24 1652 02/01/24  0351   * 98   * 128*   K 4.9 4.4   CL 95 98   CO2 22* 25   BUN 17 14   CREATININE 1.3 1.0   CALCIUM 9.0 8.8     Recent Labs   Lab 01/31/24 1652 02/01/24  0351   WBC 3.76* 2.54*   HGB 9.9* 9.5*   HCT 29.4* 28.4*    132*     Recent Labs   Lab 01/31/24 1652   INR 1.1   APTT 28.5     Microbiology Results (last 7 days)       Procedure Component Value Units Date/Time    Blood culture [[id number]] Collected: 01/31/24 1948    Order Status: Completed Specimen: Blood Updated: 02/01/24 0315     Blood Culture, Routine No Growth to date          All pertinent labs from the last 24 hours have been reviewed.    Significant Diagnostics:  I have reviewed and interpreted all pertinent imaging results/findings within the past 24 hours.

## 2024-02-01 NOTE — PLAN OF CARE
[facility] - Neurosurgery (Hospital)  Initial Discharge Assessment       Primary Care Provider: Slava Lowery MD    Admission Diagnosis: Disruption of external operation (surgical) wound, not elsewhere classified, initial encounter [T81.31XA]  Surgical wound breakdown, initial encounter [T81.31XA]    Admission Date: 1/31/2024  Expected Discharge Date:     Transition of Care Barriers: None    Payor: [employer] MGD MCARE [facility] / Plan: [employer] CHOICES / Product Type: Medicare Advantage /     Extended Emergency Contact Information  Primary Emergency Contact: Mary Pete  Address: [address] [place] [address]  Home Phone: [phone]  Mobile Phone: [phone]  Relation: Spouse    Discharge Plan A: Home with family, Home Health  Discharge Plan B: Home with family      [facility] - [place] - [address]  [address] [place] [address]  Phone: [phone] Fax: [phone]    [facility] [address] [place] [address]  Phone: [phone] Fax: [phone]    [id number] - [place] - [address] AT [facility] [address]  [address] [place] [address]  Phone: [phone] Fax: [phone]    CM obtained discharge planning assessment with patient, wife at bedside.  Initial Assessment (most recent)       Adult Discharge Assessment - 02/01/24 1541          Discharge Assessment    Assessment Type Discharge Planning Assessment     Confirmed/corrected address, phone number and insurance Yes     Confirmed Demographics Correct on Facesheet     Source of Information patient     Communicated WAQAS with patient/caregiver Yes     Reason For Admission Surgical wound breakdown     People in Home spouse     Do you expect to return to your current living situation? Yes     Do you have help at home or someone to help you manage your care at home? Yes      Who are your caregiver(s) and their phone number(s)? Mary Pete - spouse [phone]     Prior to hospitilization cognitive status: Alert/Oriented     Current cognitive status: Alert/Oriented     Walking or Climbing Stairs Difficulty no     Dressing/Bathing Difficulty no     Equipment Currently Used at Home walker, rolling     Readmission within 30 days? Yes     Patient currently being followed by outpatient case management? No     Do you currently have service(s) that help you manage your care at home? Yes     Name and Contact number of agency HH, unknown agency     Is the pt/caregiver preference to resume services with current agency Yes     Do you take prescription medications? Yes     Do you have prescription coverage? Yes     Coverage [employer] MGD MCARE [facility] CHO[facility] -     Do you have any problems affording any of your prescribed medications? No     Is the patient taking medications as prescribed? yes     Who is going to help you get home at discharge? family     How do you get to doctors appointments? family or friend will provide;car, drives self     Are you on dialysis? No     Do you take coumadin? No     Discharge Plan A Home with family;Home Health     Discharge Plan B Home with family     DME Needed Upon Discharge  none     Discharge Plan discussed with: Patient;Spouse/sig other     Name(s) and Number(s) Mary Pete - spouse [phone]     Transition of Care Barriers None        Physical Activity    On average, how many days per week do you engage in moderate to strenuous exercise (like a brisk walk)? 0 days     On average, how many minutes do you engage in exercise at this level? 0 min        Financial Resource Strain    How hard is it for you to pay for the very basics like food, housing, medical care, and heating? Not hard at all        Housing Stability    In the last 12 months, was there a time when you were not able to pay the mortgage or rent on time? No     In the  last 12 months, was there a time when you did not have a steady place to sleep or slept in a shelter (including now)? No        Transportation Needs    In the past 12 months, has lack of transportation kept you from medical appointments or from getting medications? No     In the past 12 months, has lack of transportation kept you from meetings, work, or from getting things needed for daily living? No        Food Insecurity    Within the past 12 months, you worried that your food would run out before you got the money to buy more. Never true     Within the past 12 months, the food you bought just didn't last and you didn't have money to get more. Never true        Stress    Do you feel stress - tense, restless, nervous, or anxious, or unable to sleep at night because your mind is troubled all the time - these days? Not at all        Social Connections    In a typical week, how many times do you talk on the phone with family, friends, or neighbors? More than three times a week     How often do you get together with friends or relatives? More than three times a week     How often do you attend Confucianist or Judaism services? Never     Do you belong to any clubs or organizations such as Confucianist groups, unions, fraternal or athletic groups, or school groups? No     How often do you attend meetings of the clubs or organizations you belong to? Never     Are you , , , , never , or living with a partner?         Alcohol Use    Q1: How often do you have a drink containing alcohol? Never     Q2: How many drinks containing alcohol do you have on a typical day when you are drinking? Patient does not drink     Q3: How often do you have six or more drinks on one occasion? Never        OTHER    Name(s) of People in Home Mary piedra

## 2024-02-01 NOTE — SUBJECTIVE & OBJECTIVE
Medications Prior to Admission   Medication Sig Dispense Refill Last Dose    allopurinoL (ZYLOPRIM) 100 MG tablet Take 1 tablet (100 mg total) by mouth once daily. 30 tablet 3     amlodipine-benazepril 5-10 mg (LOTREL) 5-10 mg per capsule Take 1 capsule by mouth once daily. 90 capsule 3     axitinib (INLYTA) 5 mg Tab Take 1 tablet (5 mg) by mouth 2 (two) times daily. 60 tablet 11     diazePAM (VALIUM) 5 MG tablet Take 1 tablet (5 mg total) by mouth every 6 (six) hours as needed (severe muscles spasms). (Patient not taking: Reported on 1/18/2024) 10 tablet 0     duke's soln (benadryl 30 mL, mylanta 30 mL, LIDOcaine 30 mL, nystatin 30 mL) 120mL Take 10 mLs by mouth 4 (four) times daily. 500 mL 0     gabapentin (NEURONTIN) 300 MG capsule Take 1 capsule (300 mg total) by mouth 3 (three) times daily. 90 capsule 11     naloxone (NARCAN) 4 mg/actuation Spry 1 spray (4mg) by nasal route as needed for opioid overdose; may repeat every 2-3 minutes in alternating nostrils until medical help arrives. Call 911 (Patient not taking: Reported on 12/28/2023) 2 each 0     omega-3 fatty acids/fish oil (FISH OIL-OMEGA-3 FATTY ACIDS) 300-1,000 mg capsule Take 2 capsules by mouth once daily.       oxyCODONE-acetaminophen (PERCOCET) 5-325 mg per tablet Take 1 to 2 tablets by mouth every 4-6 hours as needed for pain 100 tablet 0     sildenafiL (VIAGRA) 100 MG tablet Take 1 tablet (100 mg total) by mouth daily as needed for Erectile Dysfunction. (Patient not taking: Reported on 1/18/2024) 30 tablet 11     turmeric (CURCUMIN MISC) 1,000 mg by Misc.(Non-Drug; Combo Route) route Daily.       UNABLE TO FIND Take 500 mg by mouth 2 (two) times a day. medication name: Tudca       UNABLE TO FIND Take 2 capsules by mouth 2 (two) times a day. medication name: Turkey tail mushroom       UNABLE TO FIND Take 15 drops by mouth once daily. medication name: Essiac-20       UNABLE TO FIND Take 5 mLs by mouth 2 (two) times a day. medication name: barley leaf  juice powder       UNABLE TO FIND Take 5 mLs by mouth 2 (two) times a day. medication name: black seed oil (cumin seed)          Review of patient's allergies indicates:  No Known Allergies    Past Medical History:   Diagnosis Date    Asthma     no inhaler use    Borderline hypertension     ED (erectile dysfunction)     Gout     Hematuria     Hypertension      Past Surgical History:   Procedure Laterality Date    COLONOSCOPY  02/10/2022    COLONOSCOPY N/A 02/10/2022    Procedure: COLONOSCOPY;  Surgeon: Desmond Keenan MD;  Location: [facility];  Service: General;  Laterality: N/A;    POSTERIOR FUSION OF CERVICAL SPINE WITH LAMINECTOMY N/A 11/15/2023    Procedure: SPINE, CERVICAL, WITH POSTERIOR FUSION T4-6;  Surgeon: Nasim Martinez DO;  [facility];  Service: Neurosurgery;  Laterality: N/A;  C2-T1 laminectomy and posterior fusion. [place]. C-arm. CitiSent. Neuromonitoring.    ROBOT-ASSISTED LAPAROSCOPIC NEPHRECTOMY Right 10/4/2023    Procedure: ROBOTIC NEPHRECTOMY;  Surgeon: Henry Michaels MD;  Location: [facility];  Service: Urology;  Laterality: Right;    SURGICAL REMOVAL OF VERTEBRAL BODY OF CERVICAL SPINE Right 11/7/2023    Procedure: CORPECTOMY, SPINE, CERVICAL;  Surgeon: Nasim Martinez DO;  [facility];  Service: Neurosurgery;  Laterality: Right;  C4 and C5 corpectomy with globus;  wells tongs; c-arm; neuromonitoring; microscope; type and cross 2 units    SURGICAL REMOVAL OF VERTEBRAL BODY OF CERVICAL SPINE N/A 11/15/2023    Procedure: CORPECTOMY, SPINE, CERVICAL C3-6 REVISION;  Surgeon: Nasim Martinez DO;  [facility];  Service: Neurosurgery;  Laterality: N/A;    THORACIC LAMINECTOMY WITH FUSION N/A 1/5/2024    Procedure: LAMINECTOMY, SPINE, THORACIC, WITH FUSION;  Surgeon: Nasim Martinez DO;  [facility];  Service: Neurosurgery;  Laterality: N/A;  T7-T11 fusions with robot    TONSILLECTOMY       Family History    None        Tobacco Use    Smoking status: Never    Smokeless tobacco: Never   Substance and Sexual Activity    Alcohol use: Not Currently     Alcohol/week: 0.0 standard drinks of alcohol     Comment: rarely    Drug use: Never    Sexual activity: Not on file     Review of Systems  Objective:     Weight: 87.1 kg (192 lb 0.3 oz)  Body mass index is 27.55 kg/m².  Vital Signs (Most Recent):  Temp: (!) 101.4 °F (38.6 °C) (01/31/24 1742)  Pulse: 100 (01/31/24 1742)  Resp: 18 (01/31/24 1742)  BP: (!) 116/57 (01/31/24 1742)  SpO2: 98 % (01/31/24 1742) Vital Signs (24h Range):  Temp:  [101.4 °F (38.6 °C)] 101.4 °F (38.6 °C)  Pulse:  [100] 100  Resp:  [18] 18  SpO2:  [98 %] 98 %  BP: (116)/(57) 116/57                  Neurosurgery Physical Exam  Physical Exam:  General: Well developed, well nourished, not in acute distress.   Head: Normocephalic, atraumatic.  Neck: Full ROM.   Neurologic: Alert and oriented x 4. Thought content appropriate.  GCS: Motor:6  Verbal:5  Eyes:4   GCS Total: 15  Language: No aphasia.  Speech: No dysarthria.  Cranial nerves: Face symmetric, tongue midline, CN II-XII grossly intact.   Eyes: Pupils equal, round, reactive to light with accomodation, EOMI.   Pulmonary: Normal respirations, no signs of respiratory distress.  Abdomen: Soft, non-distended, non-tender to palpation.  Sensory: Intact to light touch throughout.  Motor Strength: Moves all extremities spontaneously with good tone. Full strength upper and lower extremities. No abnormal movements seen.      Thoracic incision: There is thick scabbing to the midline incision that has extended further up the incision with worsening erythema and skin breakdown surrounding. There is now a fluctuant collection underneath the incision. There is serous fluid able to be expressed from the bottom of the incision. There is no deep dehiscence.          Sacrum with erythema and tenderness. No open wounds.     Significant Labs:  Recent Labs   Lab 01/31/24  6812   GLU  120*   *   K 4.9   CL 95   CO2 22*   BUN 17   CREATININE 1.3   CALCIUM 9.0     Recent Labs   Lab 01/31/24  1652   WBC 3.76*   HGB 9.9*   HCT 29.4*        Recent Labs   Lab 01/31/24  1652   INR 1.1   APTT 28.5     Microbiology Results (last 7 days)       ** No results found for the last 168 hours. **          All pertinent labs from the last 24 hours have been reviewed.    Significant Diagnostics:  I have reviewed and interpreted all pertinent imaging results/findings within the past 24 hours.

## 2024-02-01 NOTE — H&P
Lj Krueger - Neurosurgery ([facility])  Neuorsurgery  History and Physical     Patient Name: Gamaliel Pete Jr.  MRN: [id number]  Admission Date: 1/31/2024  Attending Physician: Nasim Martinez DO   Primary Care Physician: Slava Lowery MD    Patient information was obtained from patient and past medical records.     Subjective:     Chief Complaint/Reason for Admission: surgical wound breakdown     HPI:  Gamaliel Pete Jr. is a 73 y.o. 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 He was then taken for a T7-11 posterior decompression/fusion. The patient presented recently with scabbing to his lower thoracic incision. He was sent to wound care who advised him to be evaluated because it appeared worse. He presents to clinic today with reports of drainage from his incision. He denies fevers, headaches, numbness, tingling, focal weakness, bowel/bladder dysfunction. He will be direct admitted to the hospital for further workup and likely neurosurgical intervention to address the surgical wound breakdown.     Medications Prior to Admission   Medication Sig Dispense Refill Last Dose    allopurinoL (ZYLOPRIM) 100 MG tablet Take 1 tablet (100 mg total) by mouth once daily. 30 tablet 3     amlodipine-benazepril 5-10 mg (LOTREL) 5-10 mg per capsule Take 1 capsule by mouth once daily. 90 capsule 3     axitinib (INLYTA) 5 mg Tab Take 1 tablet (5 mg) by mouth 2 (two) times daily. 60 tablet 11     diazePAM (VALIUM) 5 MG tablet Take 1 tablet (5 mg total) by mouth every 6 (six) hours as needed (severe muscles spasms). (Patient not taking: Reported on 1/18/2024) 10 tablet 0     duke's soln (benadryl 30 mL, mylanta 30 mL, LIDOcaine 30 mL, nystatin 30 mL) 120mL Take 10 mLs by mouth 4 (four) times daily. 500 mL 0     gabapentin (NEURONTIN) 300 MG capsule Take 1 capsule (300 mg total) by mouth 3 (three) times daily. 90 capsule 11      naloxone (NARCAN) 4 mg/actuation Spry 1 spray (4mg) by nasal route as needed for opioid overdose; may repeat every 2-3 minutes in alternating nostrils until medical help arrives. Call 911 (Patient not taking: Reported on 12/28/2023) 2 each 0     omega-3 fatty acids/fish oil (FISH OIL-OMEGA-3 FATTY ACIDS) 300-1,000 mg capsule Take 2 capsules by mouth once daily.       oxyCODONE-acetaminophen (PERCOCET) 5-325 mg per tablet Take 1 to 2 tablets by mouth every 4-6 hours as needed for pain 100 tablet 0     sildenafiL (VIAGRA) 100 MG tablet Take 1 tablet (100 mg total) by mouth daily as needed for Erectile Dysfunction. (Patient not taking: Reported on 1/18/2024) 30 tablet 11     turmeric (CURCUMIN MISC) 1,000 mg by Misc.(Non-Drug; Combo Route) route Daily.       UNABLE TO FIND Take 500 mg by mouth 2 (two) times a day. medication name: Tudca       UNABLE TO FIND Take 2 capsules by mouth 2 (two) times a day. medication name: Turkey tail mushroom       UNABLE TO FIND Take 15 drops by mouth once daily. medication name: Essiac-20       UNABLE TO FIND Take 5 mLs by mouth 2 (two) times a day. medication name: barley leaf juice powder       UNABLE TO FIND Take 5 mLs by mouth 2 (two) times a day. medication name: black seed oil (cumin seed)          Review of patient's allergies indicates:  No Known Allergies    Past Medical History:   Diagnosis Date    Asthma     no inhaler use    Borderline hypertension     ED (erectile dysfunction)     Gout     Hematuria     Hypertension      Past Surgical History:   Procedure Laterality Date    COLONOSCOPY  02/10/2022    COLONOSCOPY N/A 02/10/2022    Procedure: COLONOSCOPY;  Surgeon: Desmond Keenan MD;  Location: [facility];  Service: General;  Laterality: N/A;    POSTERIOR FUSION OF CERVICAL SPINE WITH LAMINECTOMY N/A 11/15/2023    Procedure: SPINE, CERVICAL, WITH POSTERIOR FUSION T4-6;  Surgeon: Nasim Martinez DO;  Location: [facility] OR [address] [place] [address];  Service: Neurosurgery;  Laterality: N/A;   C2-T1 laminectomy and posterior fusion. Patel. C-arm. [employer]. Neuromonitoring.    ROBOT-ASSISTED LAPAROSCOPIC NEPHRECTOMY Right 10/4/2023    Procedure: ROBOTIC NEPHRECTOMY;  Surgeon: Henry Michaels MD;  Location: [facility];  Service: Urology;  Laterality: Right;    SURGICAL REMOVAL OF VERTEBRAL BODY OF CERVICAL SPINE Right 11/7/2023    Procedure: CORPECTOMY, SPINE, CERVICAL;  Surgeon: Nasim Martinez DO;  Location: [facility] OR [facility] FLR;  Service: Neurosurgery;  Laterality: Right;  C4 and C5 corpectomy with globus;  wells tongs; c-arm; neuromonitoring; microscope; type and cross 2 units    SURGICAL REMOVAL OF VERTEBRAL BODY OF CERVICAL SPINE N/A 11/15/2023    Procedure: CORPECTOMY, SPINE, CERVICAL C3-6 REVISION;  Surgeon: Nasim Martinez DO;  Location: [facility] OR 2ND FLR;  Service: Neurosurgery;  Laterality: N/A;    THORACIC LAMINECTOMY WITH FUSION N/A 1/5/2024    Procedure: LAMINECTOMY, SPINE, THORACIC, WITH FUSION;  Surgeon: Nasim Martinez DO;  Location: [facility] OR 2ND FLR;  Service: Neurosurgery;  Laterality: N/A;  T7-T11 fusions with robot    TONSILLECTOMY       Family History    None       Tobacco Use    Smoking status: Never    Smokeless tobacco: Never   Substance and Sexual Activity    Alcohol use: Not Currently     Alcohol/week: 0.0 standard drinks of alcohol     Comment: rarely    Drug use: Never    Sexual activity: Not on file     Review of Systems  Objective:     Weight: 87.1 kg (192 lb 0.3 oz)  Body mass index is 27.55 kg/m².  Vital Signs (Most Recent):  Temp: (!) 101.4 °F (38.6 °C) (01/31/24 1742)  Pulse: 100 (01/31/24 1742)  Resp: 18 (01/31/24 1742)  BP: (!) 116/57 (01/31/24 1742)  SpO2: 98 % (01/31/24 1742) Vital Signs (24h Range):  Temp:  [101.4 °F (38.6 °C)] 101.4 °F (38.6 °C)  Pulse:  [100] 100  Resp:  [18] 18  SpO2:  [98 %] 98 %  BP: (116)/(57) 116/57                  Neurosurgery Physical Exam  Physical Exam:  General: Well developed, well nourished, not in acute distress.   Head:  Normocephalic, atraumatic.  Neck: Full ROM.   Neurologic: Alert and oriented x 4. Thought content appropriate.  GCS: Motor:6  Verbal:5  Eyes:4   GCS Total: 15  Language: No aphasia.  Speech: No dysarthria.  Cranial nerves: Face symmetric, tongue midline, CN II-XII grossly intact.   Eyes: Pupils equal, round, reactive to light with accomodation, EOMI.   Pulmonary: Normal respirations, no signs of respiratory distress.  Abdomen: Soft, non-distended, non-tender to palpation.  Sensory: Intact to light touch throughout.  Motor Strength: Moves all extremities spontaneously with good tone. Full strength upper and lower extremities. No abnormal movements seen.      Thoracic incision: There is thick scabbing to the midline incision that has extended further up the incision with worsening erythema and skin breakdown surrounding. There is now a fluctuant collection underneath the incision. There is serous fluid able to be expressed from the bottom of the incision. There is no deep dehiscence.          Sacrum with erythema and tenderness. No open wounds.     Significant Labs:  Recent Labs   Lab 01/31/24  1652   *   *   K 4.9   CL 95   CO2 22*   BUN 17   CREATININE 1.3   CALCIUM 9.0     Recent Labs   Lab 01/31/24  1652   WBC 3.76*   HGB 9.9*   HCT 29.4*        Recent Labs   Lab 01/31/24  1652   INR 1.1   APTT 28.5     Microbiology Results (last 7 days)       ** No results found for the last 168 hours. **          All pertinent labs from the last 24 hours have been reviewed.    Significant Diagnostics:  I have reviewed and interpreted all pertinent imaging results/findings within the past 24 hours.  Assessment and Plan:     * Surgical wound breakdown  Gamaliel Parrishsamir Thomas is a 73 y.o. male s/p T7-11 decompression/fusion on 1/5/24. He presented to clinic on 1/31 with surgical wound breakdown.    - Admit to NPU under NSGY.  - q4h neurochecks.  - F/u MRI thoracic spine w/wo contrast STAT.  - F/u inflammatory  markers.  - F/u preop labs.  - F/u blood cultures.  - Okay for diet at this time.  - Hyponatremia: Na 126 on previous labs. Will reorder labs and consult medicine in AM for assistance. Starting IVF.  - TLSO brace when up/out of bed. Okay for activities as tolerated.  - Plan for wound exploration/washout and revision on 2/2/24 with assistance from plastic surgery.  - Hold chemo tx perioperatively.  - DVT ppx: deyanira hose/SCDs/LVX.    D/w Dr. Martinez.        Nadine Santos PA-C  Neurosurgery  Lj Krueger - Neurosurgery ([facility])

## 2024-02-01 NOTE — ANESTHESIA PREPROCEDURE EVALUATION
[facility]  Anesthesia Pre-Operative Evaluation         Patient Name: Gamaliel Pete Jr.  YOB: 1950  MRN: [id number]    SUBJECTIVE:     Pre-operative evaluation for Procedure(s) (LRB):  EXPLORATION, WOUND (N/A)     02/01/2024    Gamaliel Pete Jr. is a 73 y.o. 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 He was then taken for a T7-11 posterior decompression/fusion. The patient presented recently with scabbing to his lower thoracic incision with drainage. Concerning for surgical wound breakdown.     Patient now presents for the above procedure(s).    No echo on file     LDA: None documented.       Peripheral IV - Single Lumen 01/31/24 1750 20 G Anterior;Left Wrist (Active)   Site Assessment Clean;Dry;Intact 02/01/24 0800   Extremity Assessment Distal to IV No abnormal discoloration 02/01/24 0800   Line Status Infusing 02/01/24 0800   Dressing Status Clean;Dry;Intact 02/01/24 0800   Dressing Intervention Integrity maintained 02/01/24 0800   Number of days: 0       Prev airway:  Induction:  Intravenous    Intubated:  Postinduction    Mask Ventilation:  N/a    Attempts:  1    Attempted By:  Student    Method of Intubation:  Video laryngoscopy    Blade:  Yoder 3    Laryngeal View Grade: Grade I - full view of cords      Difficult Airway Encountered?: No      Airway Device:  Oral endotracheal tube    Airway Device Size:  7.5    Style/Cuff Inflation:  Cuffed (inflated to minimal occlusive pressure)    Tube secured:  23    Secured at:  The lips    Placement Verified By:  Capnometry and Revisualization with laryngoscopy    Complicating Factors:  None    Findings Post-Intubation:  BS equal bilateral and atraumatic/condition of teeth unchanged    Drips: None documented.   sodium chloride 0.9% 75 mL/hr at 02/01/24 0845       Patient Active Problem List   Diagnosis    Class 1  obesity without serious comorbidity with body mass index (BMI) of 31.0 to 31.9 in adult    Onychomycosis    History of gout    Seborrheic keratosis    Tinea corporis    Positive colorectal cancer screening using Cologuard test    Renal mass    Cancer with pulmonary metastases    Metastatic cancer to spine    Iron deficiency anemia    History of right radical nephrectomy    Clear cell carcinoma of right kidney    SABINE (acute kidney injury)    Hypercalcemia    Transaminitis    Essential hypertension    Therapeutic opioid-induced constipation (OIC)    Debility    Diaphoresis    Nausea & vomiting    Normocytic anemia    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     Current Outpatient Medications on File Prior to Encounter   Medication Sig Dispense Refill    allopurinoL (ZYLOPRIM) 100 MG tablet Take 1 tablet (100 mg total) by mouth once daily. 30 tablet 3    amlodipine-benazepril 5-10 mg (LOTREL) 5-10 mg per capsule Take 1 capsule by mouth once daily. 90 capsule 3    axitinib (INLYTA) 5 mg Tab Take 1 tablet (5 mg) by mouth 2 (two) times daily. 60 tablet 11    diazePAM (VALIUM) 5 MG tablet Take 1 tablet (5 mg total) by mouth every 6 (six) hours as needed (severe muscles spasms). (Patient not taking: Reported on 1/18/2024) 10 tablet 0    duke's soln (benadryl 30 mL, mylanta 30 mL, LIDOcaine 30 mL, nystatin 30  mL) 120mL Take 10 mLs by mouth 4 (four) times daily. 500 mL 0    gabapentin (NEURONTIN) 300 MG capsule Take 1 capsule (300 mg total) by mouth 3 (three) times daily. 90 capsule 11    naloxone (NARCAN) 4 mg/actuation Spry 1 spray (4mg) by nasal route as needed for opioid overdose; may repeat every 2-3 minutes in alternating nostrils until medical help arrives. Call 911 (Patient not taking: Reported on 12/28/2023) 2 each 0    omega-3 fatty acids/fish oil (FISH OIL-OMEGA-3 FATTY ACIDS) 300-1,000 mg capsule Take 2 capsules by mouth once daily.      oxyCODONE-acetaminophen (PERCOCET) 5-325 mg per tablet Take 1 to 2 tablets by mouth every 4-6 hours as needed for pain 100 tablet 0    sildenafiL (VIAGRA) 100 MG tablet Take 1 tablet (100 mg total) by mouth daily as needed for Erectile Dysfunction. (Patient not taking: Reported on 1/18/2024) 30 tablet 11    turmeric (CURCUMIN MISC) 1,000 mg by Misc.(Non-Drug; Combo Route) route Daily.      UNABLE TO FIND Take 500 mg by mouth 2 (two) times a day. medication name: Tudca      UNABLE TO FIND Take 2 capsules by mouth 2 (two) times a day. medication name: Turkey tail mushroom      UNABLE TO FIND Take 15 drops by mouth once daily. medication name: Essiac-20      UNABLE TO FIND Take 5 mLs by mouth 2 (two) times a day. medication name: barley leaf juice powder      UNABLE TO FIND Take 5 mLs by mouth 2 (two) times a day. medication name: black seed oil (cumin seed)         Past Surgical History:   Procedure Laterality Date    COLONOSCOPY  02/10/2022    COLONOSCOPY N/A 02/10/2022    Procedure: COLONOSCOPY;  Surgeon: Desmond Keenan MD;  Location: [facility];  Service: General;  Laterality: N/A;    POSTERIOR FUSION OF CERVICAL SPINE WITH LAMINECTOMY N/A 11/15/2023    Procedure: SPINE, CERVICAL, WITH POSTERIOR FUSION T4-6;  Surgeon: Nasim Martinez DO;  Location: [address];  Service: Neurosurgery;  Laterality: N/A;  C2-T1 laminectomy and posterior fusion. Amanda. C-arm. [employer].  Neuromonitoring.    ROBOT-ASSISTED LAPAROSCOPIC NEPHRECTOMY Right 10/4/2023    Procedure: ROBOTIC NEPHRECTOMY;  Surgeon: Henry Michaels MD;  Location: [facility];  Service: Urology;  Laterality: Right;    SURGICAL REMOVAL OF VERTEBRAL BODY OF CERVICAL SPINE Right 11/7/2023    Procedure: CORPECTOMY, SPINE, CERVICAL;  Surgeon: Nasim Martinez DO;  Location: [facility] OR 2ND FLR;  Service: Neurosurgery;  Laterality: Right;  C4 and C5 corpectomy with globus;  wells tongs; c-arm; neuromonitoring; microscope; type and cross 2 units    SURGICAL REMOVAL OF VERTEBRAL BODY OF CERVICAL SPINE N/A 11/15/2023    Procedure: CORPECTOMY, SPINE, CERVICAL C3-6 REVISION;  Surgeon: Nasim Martinez DO;  Location: [facility] OR 2ND FLR;  Service: Neurosurgery;  Laterality: N/A;    THORACIC LAMINECTOMY WITH FUSION N/A 1/5/2024    Procedure: LAMINECTOMY, SPINE, THORACIC, WITH FUSION;  Surgeon: Nasim Martinez DO;  Location: [facility] OR 2ND FLR;  Service: Neurosurgery;  Laterality: N/A;  T7-T11 fusions with robot    TONSILLECTOMY         Social History     Socioeconomic History    Marital status:    Tobacco Use    Smoking status: Never    Smokeless tobacco: Never   Substance and Sexual Activity    Alcohol use: Not Currently     Alcohol/week: 0.0 standard drinks of alcohol     Comment: rarely    Drug use: Never     Social Determinants of Health     Financial Resource Strain: Patient Declined (12/22/2023)    Overall Financial Resource Strain (CARDIA)     Difficulty of Paying Living Expenses: Patient declined   Food Insecurity: Patient Declined (12/22/2023)    Hunger Vital Sign     Worried About Running Out of Food in the Last Year: Patient declined     Ran Out of Food in the Last Year: Patient declined   Transportation Needs: Patient Declined (12/22/2023)    PRAPARE - Transportation     Lack of Transportation (Medical): Patient declined     Lack of Transportation (Non-Medical): Patient declined   Physical Activity: Unknown  (12/22/2023)    Exercise Vital Sign     Days of Exercise per Week: Patient declined     Minutes of Exercise per Session: 0 min   Recent Concern: Physical Activity - Inactive (11/16/2023)    Exercise Vital Sign     Days of Exercise per Week: 0 days     Minutes of Exercise per Session: 0 min   Stress: Patient Declined (12/22/2023)    [employer] - Occupational Stress Questionnaire     Feeling of Stress : Patient declined   Social Connections: Unknown (12/22/2023)    Social Connection and Isolation Panel [NHANES]     Frequency of Communication with Friends and Family: Patient declined     Frequency of Social Gatherings with Friends and Family: Patient declined     Attends Jewish Services: Never     Active Member of Clubs or Organizations: Patient declined     Attends Club or Organization Meetings: Patient declined     Marital Status: Patient declined   Recent Concern: Social Connections - Moderately Isolated (11/16/2023)    Social Connection and Isolation Panel [NHANES]     Frequency of Communication with Friends and Family: More than three times a week     Frequency of Social Gatherings with Friends and Family: More than three times a week     Attends Jewish Services: Never     Active Member of Clubs or Organizations: No     Attends Club or Organization Meetings: Never     Marital Status:    Housing Stability: Patient Declined (12/22/2023)    Housing Stability Vital Sign     Unable to Pay for Housing in the Last Year: Patient declined     Number of Places Lived in the Last Year: 1     Unstable Housing in the Last Year: Patient declined       OBJECTIVE:     Vital Signs Range (Last 24H):  Temp:  [37.2 °C (99 °F)-38.6 °C (101.4 °F)]   Pulse:  []   Resp:  [18-19]   BP: (116-135)/(57-63)   SpO2:  [92 %-98 %]       Significant Labs:  Lab Results   Component Value Date    WBC 2.54 (L) 02/01/2024    HGB 9.5 (L) 02/01/2024    HCT 28.4 (L) 02/01/2024     (L) 02/01/2024     CHOL 221 (H) 08/24/2023    TRIG 208 (H) 08/24/2023    HDL 30 (L) 08/24/2023    ALT 42 01/29/2024    AST 28 01/29/2024     (L) 02/01/2024    K 4.4 02/01/2024    CL 98 02/01/2024    CREATININE 1.0 02/01/2024    BUN 13 02/01/2024    CO2 24 02/01/2024    TSH 3.153 01/29/2024    PSA 8.4 (H) 08/24/2023    INR 1.1 01/31/2024       Diagnostic Studies: No relevant studies.    EKG:   Results for orders placed or performed during the hospital encounter of 01/02/24   EKG 12-lead    Collection Time: 01/02/24  8:34 PM    Narrative    Test Reason : Z01.810,    Vent. Rate : 079 BPM     Atrial Rate : 079 BPM     P-R Int : 210 ms          QRS Dur : 162 ms      QT Int : 410 ms       P-R-T Axes : 068 -78 079 degrees     QTc Int : 470 ms    Sinus rhythm with 1st degree A-V block  Right bundle branch block  Left anterior fascicular block   Bifascicular block   LVH with repolarization abnormality  Cannot rule out Septal infarct (cited on or before 20-NOV-2023)  Abnormal ECG  When compared with ECG of 20-NOV-2023 12:17,  IN interval has increased  Confirmed by NAMAN SANTOS MD ([id number]) on 1/3/2024 11:36:57 PM    Referred By: [email or web address]   SELF           Confirmed By:NAMAN SANTOS MD       2D ECHO:  TTE:  No results found for this or any previous visit.    JOEY:  No results found for this or any previous visit.    ASSESSMENT/PLAN:         Pre-op Assessment    I have reviewed the Patient Summary Reports.     I have reviewed the Nursing Notes. I have reviewed the NPO Status.   I have reviewed the Medications.     Review of Systems  Anesthesia Hx:  No problems with previous Anesthesia   History of prior surgery of interest to airway management or planning:          Denies Family Hx of Anesthesia complications.    Denies Personal Hx of Anesthesia complications.                    Social:  Non-Smoker, No Alcohol Use       Hematology/Oncology:                      Current/Recent Cancer.                EENT/Dental:  denies chronic allergic rhinitis            Cardiovascular:      Denies Hypertension.    Denies CAD.           no hyperlipidemia                             Pulmonary:    Denies COPD.  Denies Asthma.     Denies Sleep Apnea.                Renal/:   Denies Chronic Renal Disease.                Hepatic/GI:      Denies GERD.             Musculoskeletal:  Denies Arthritis.               Neurological:    Denies CVA.    Denies Seizures.                                Psych:  Denies Psychiatric History.                  Physical Exam  General: Well nourished, Cooperative, Alert and Oriented    Airway:  Mallampati: III / II  Mouth Opening: Normal  TM Distance: Normal  Tongue: Normal  Neck ROM: Normal ROM    Dental:  Intact        Anesthesia Plan  Type of Anesthesia, risks & benefits discussed:    Anesthesia Type: Gen ETT  Intra-op Monitoring Plan: Standard ASA Monitors  Post Op Pain Control Plan: multimodal analgesia  Induction:  IV  Airway Plan: Direct, Post-Induction  Informed Consent: Informed consent signed with the Patient and all parties understand the risks and agree with anesthesia plan.  All questions answered.   ASA Score: 3  Day of Surgery Review of History & Physical: H&P Update referred to the surgeon/provider.    Ready For Surgery From Anesthesia Perspective.     .

## 2024-02-01 NOTE — NURSING
Nurses Note -- 4 Eyes      1/31/24  1650      Skin assessed during: Admit      [] No Altered Skin Integrity Present    []Prevention Measures Documented      [x] Yes- Altered Skin Integrity Present or Discovered   [x] LDA Added if Not in Epic (Describe Wound)   [x] New Altered Skin Integrity was Present on Admit and Documented in LDA   [] Wound Image Taken    Wound Care Consulted? Yes    Attending Nurse:  Shala Schwab RN/Staff Member:  BRIAN Abrams          CLD (chronic lung disease)

## 2024-02-01 NOTE — NURSING
Nurses Note -- 4 Eyes      1/31/2024 17:00 PM      Skin assessed during: Admit      [] No Altered Skin Integrity Present    []Prevention Measures Documented      [x] Yes- Altered Skin Integrity Present or Discovered   [x] LDA Added if Not in Epic (Describe Wound)   [] New Altered Skin Integrity was Present on Admit and Documented in LDA   [] Wound Image Taken    Wound Care Consulted? Yes    Attending Nurse:  BRIAN Meredith    Second RN/Staff Member:   BRIAN Corral)

## 2024-02-01 NOTE — PLAN OF CARE
Problem: Adult Inpatient Plan of Care  Goal: Plan of Care Review  Outcome: Ongoing, Progressing  Flowsheets (Taken 2/1/2024 0503)  Plan of Care Reviewed With:   patient   spouse  Goal: Absence of Hospital-Acquired Illness or Injury  Outcome: Ongoing, Progressing  Intervention: Identify and Manage Fall Risk  Flowsheets (Taken 2/1/2024 0503)  Safety Promotion/Fall Prevention:   bed alarm set   assistive device/personal item within reach   Fall Risk reviewed with patient/family   Fall Risk signage in place   family to remain at bedside   nonskid shoes/socks when out of bed   side rails raised x 3   toileting scheduled   instructed to call staff for mobility  Goal: Optimal Comfort and Wellbeing  Outcome: Ongoing, Progressing  Intervention: Monitor Pain and Promote Comfort  Flowsheets (Taken 2/1/2024 0503)  Pain Management Interventions:   pain management plan reviewed with patient/caregiver   pillow support provided   position adjusted   quiet environment facilitated     Bed in lowest position, bed alarm on, call light within reach and pt instructed to call if they need to get up or need any assistance. Oriented pt to room and staff. POC/education discussed with pt and wife at bedside. MRI completed. No complaints of pain overnight, IVF continued. Awaiting surgical intervention

## 2024-02-01 NOTE — CONSULTS
Lj Krueger - Neurosurgery ([facility]  [facility] Medicine  Consult Note    Patient Name: Gamaliel Pete Jr.  MRN: [id number]  Admission Date: 1/31/2024  Hospital Length of Stay: 1 days  Attending Physician: Nasim Martinez DO   Primary Care Provider: Slava Lowery MD           Patient information was obtained from patient, spouse/SO, past medical records, and primary team.     Inpatient consult to [facility] Medicine-General  Consult performed by: Dennis Davis DO  Consult ordered by: Nadine Santos PA-C        Subjective:     Principal Problem: Surgical wound breakdown    Chief Complaint: No chief complaint on file.       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 HTN, and gout who presented for concerns of surgical site infection. He was at an appt for wound care and they were concerned about the appearance of his incisional site so they recommend he come to the hospital.     Hospital medicine was consulted to assist in managing his hyponatremia. The pt had been taking inlyta as part of his cancer regimen when he developed oral ulcers, a common side effect of this medication; this med has since been stopped. Secondary to the pain of these ulcers he had decreased oral intake. Duke's solution has recently been added and has alleviated much of the discomfort to where he feels he is eating more. He denies any focal weakness, headaches, vision changes, confusion, other neurologic symptoms; wife at bedside also denies noticing any neurologic changes in her .     Past Medical History:   Diagnosis Date    Asthma     no inhaler use    Borderline hypertension     ED (erectile dysfunction)     Gout     Hematuria     Hypertension        Past Surgical History:   Procedure Laterality Date    COLONOSCOPY   02/10/2022    COLONOSCOPY N/A 02/10/2022    Procedure: COLONOSCOPY;  Surgeon: Desmond Keenan MD;  Location: [facility];  Service: General;  Laterality: N/A;    POSTERIOR FUSION OF CERVICAL SPINE WITH LAMINECTOMY N/A 11/15/2023    Procedure: SPINE, CERVICAL, WITH POSTERIOR FUSION T4-6;  Surgeon: Nasim Martinez DO;  [facility];  Service: Neurosurgery;  Laterality: N/A;  C2-T1 laminectomy and posterior fusion. [place]. C-arm. [employer]. Neuromonitoring.    ROBOT-ASSISTED LAPAROSCOPIC NEPHRECTOMY Right 10/4/2023    Procedure: ROBOTIC NEPHRECTOMY;  Surgeon: Henry Michaels MD;  Location: [facility];  Service: Urology;  Laterality: Right;    SURGICAL REMOVAL OF VERTEBRAL BODY OF CERVICAL SPINE Right 11/7/2023    Procedure: CORPECTOMY, SPINE, CERVICAL;  Surgeon: Nasim Martinez DO;  [facility];  Service: Neurosurgery;  Laterality: Right;  C4 and C5 corpectomy with globus;  wells tongs; c-arm; neuromonitoring; microscope; type and cross 2 units    SURGICAL REMOVAL OF VERTEBRAL BODY OF CERVICAL SPINE N/A 11/15/2023    Procedure: CORPECTOMY, SPINE, CERVICAL C3-6 REVISION;  Surgeon: Nasim Martinez DO;  [facility];  Service: Neurosurgery;  Laterality: N/A;    THORACIC LAMINECTOMY WITH FUSION N/A 1/5/2024    Procedure: LAMINECTOMY, SPINE, THORACIC, WITH FUSION;  Surgeon: Nasim Martinez DO;  Location: [facility];  Service: Neurosurgery;  Laterality: N/A;  T7-T11 fusions with robot    TONSILLECTOMY         Review of patient's allergies indicates:  No Known Allergies    No current facility-administered medications on file prior to encounter.     Current Outpatient Medications on File Prior to Encounter   Medication Sig    allopurinoL (ZYLOPRIM) 100 MG tablet Take 1 tablet (100 mg total) by mouth once daily.    amlodipine-benazepril 5-10 mg (LOTREL) 5-10 mg per capsule Take 1 capsule by mouth once daily.    axitinib (INLYTA) 5 mg Tab Take 1 tablet (5 mg)  by mouth 2 (two) times daily.    diazePAM (VALIUM) 5 MG tablet Take 1 tablet (5 mg total) by mouth every 6 (six) hours as needed (severe muscles spasms). (Patient not taking: Reported on 1/18/2024)    duke's soln (benadryl 30 mL, mylanta 30 mL, LIDOcaine 30 mL, nystatin 30 mL) 120mL Take 10 mLs by mouth 4 (four) times daily.    gabapentin (NEURONTIN) 300 MG capsule Take 1 capsule (300 mg total) by mouth 3 (three) times daily.    naloxone (NARCAN) 4 mg/actuation Spry 1 spray (4mg) by nasal route as needed for opioid overdose; may repeat every 2-3 minutes in alternating nostrils until medical help arrives. Call 911 (Patient not taking: Reported on 12/28/2023)    omega-3 fatty acids/fish oil (FISH OIL-OMEGA-3 FATTY ACIDS) 300-1,000 mg capsule Take 2 capsules by mouth once daily.    oxyCODONE-acetaminophen (PERCOCET) 5-325 mg per tablet Take 1 to 2 tablets by mouth every 4-6 hours as needed for pain    sildenafiL (VIAGRA) 100 MG tablet Take 1 tablet (100 mg total) by mouth daily as needed for Erectile Dysfunction. (Patient not taking: Reported on 1/18/2024)    turmeric (CURCUMIN MISC) 1,000 mg by Misc.(Non-Drug; Combo Route) route Daily.    UNABLE TO FIND Take 500 mg by mouth 2 (two) times a day. medication name: Tudca    UNABLE TO FIND Take 2 capsules by mouth 2 (two) times a day. medication name: Turkey tail mushroom    UNABLE TO FIND Take 15 drops by mouth once daily. medication name: Essiac-20    UNABLE TO FIND Take 5 mLs by mouth 2 (two) times a day. medication name: barley leaf juice powder    UNABLE TO FIND Take 5 mLs by mouth 2 (two) times a day. medication name: black seed oil (cumin seed)     Family History    None       Tobacco Use    Smoking status: Never    Smokeless tobacco: Never   Substance and Sexual Activity    Alcohol use: Not Currently     Alcohol/week: 0.0 standard drinks of alcohol     Comment: rarely    Drug use: Never    Sexual activity: Not on file     Review of Systems   Constitutional:   Positive for fever. Negative for chills.   Eyes:  Negative for photophobia and visual disturbance.   Respiratory:  Negative for shortness of breath.    Cardiovascular:  Negative for chest pain.   Gastrointestinal:  Negative for abdominal pain, nausea and vomiting.   Genitourinary:  Negative for difficulty urinating.   Neurological:  Negative for dizziness, seizures, speech difficulty, weakness, light-headedness, numbness and headaches.     Objective:     Vital Signs (Most Recent):  Temp: 98 °F (36.7 °C) (02/01/24 1353)  Pulse: 91 (02/01/24 1147)  Resp: 18 (02/01/24 1130)  BP: (!) 116/55 (02/01/24 1130)  SpO2: (!) 94 % (02/01/24 1130) Vital Signs (24h Range):  Temp:  [98 °F (36.7 °C)-101.4 °F (38.6 °C)] 98 °F (36.7 °C)  Pulse:  [] 91  Resp:  [18-19] 18  SpO2:  [92 %-98 %] 94 %  BP: (116-135)/(55-63) 116/55     Weight: 87.1 kg (192 lb)  Body mass index is 27.55 kg/m².     Physical Exam  Constitutional:       General: He is not in acute distress.  HENT:      Head: Normocephalic and atraumatic.      Mouth/Throat:      Mouth: Mucous membranes are moist.   Eyes:      General: No scleral icterus.  Cardiovascular:      Rate and Rhythm: Normal rate and regular rhythm.      Pulses: Normal pulses.      Heart sounds: Normal heart sounds. No murmur heard.  Pulmonary:      Effort: Pulmonary effort is normal. No respiratory distress.      Breath sounds: Normal breath sounds. No wheezing.   Abdominal:      General: There is no distension.      Palpations: Abdomen is soft.      Tenderness: There is no abdominal tenderness.   Musculoskeletal:      Right lower leg: No edema.      Left lower leg: No edema.   Skin:     General: Skin is warm and dry.      Coloration: Skin is not jaundiced.   Neurological:      General: No focal deficit present.      Mental Status: He is alert and oriented to person, place, and time.      Cranial Nerves: Cranial nerves 2-12 are intact. No cranial nerve deficit.      Sensory: Sensation is intact.       Motor: Motor function is intact.      Coordination: Coordination is intact.   Psychiatric:         Mood and Affect: Mood normal.         Behavior: Behavior normal.          Significant Labs: All pertinent labs within the past 24 hours have been reviewed.  CBC:   Recent Labs   Lab 01/31/24  1652 02/01/24  0351   WBC 3.76* 2.54*   HGB 9.9* 9.5*   HCT 29.4* 28.4*    132*     CMP:   Recent Labs   Lab 02/01/24  0351 02/01/24  0753 02/01/24  1117   * 129* 130*   K 4.4 4.4 4.9   CL 98 98 99   CO2 25 24 26   GLU 98 91 103   BUN 14 13 13   CREATININE 1.0 1.0 1.0   CALCIUM 8.8 8.6* 8.6*   ANIONGAP 5* 7* 5*       Significant Imaging: I have reviewed all pertinent imaging results/findings within the past 24 hours.  Assessment/Plan:     * Surgical wound breakdown  -management per primary team      Wound dehiscence  -management per primary team       Hyponatremia  Patient has hyponatremia which is controlled,We will aim to correct the sodium by 4-6mEq in 24 hours. We will monitor sodium Every 6 hours. The hyponatremia is due to Dehydration/hypovolemia. We will obtain the following studies: Urine sodium, urine osmolality, serum osmolality. We will treat the hyponatremia with IV fluids as follows: 74 cc/hour. The patient's sodium results have been reviewed and are listed below.  Recent Labs   Lab 02/01/24  1117   *     Pt with hyponatremia as low as 126 in the setting of decreased oral intake 2/2 oral lesions. Negative for any neurological symptoms. Urine studies ordered but this is after normal saline had already been initiated: Serum osm- 276, urine osm-412, urine na-86. Serium Na has been improving with NS infusion; indicating hypovolemic hyponatremia.     -Normal saline 75 cc/hour  -Correct Na by 4-6 mEq every 24 hours; if this is surpassed pause the NS infusion and consider D5 bolus if greatly over corrected.   -BMP q6 hours until Na corrected  -Neurochecks q4    Gout of ankle  -continue allopurinol        Essential hypertension  Chronic, controlled. Latest blood pressure and vitals reviewed-     Temp:  [98 °F (36.7 °C)-101.4 °F (38.6 °C)]   Pulse:  []   Resp:  [18-19]   BP: (116-135)/(55-63)   SpO2:  [92 %-98 %] .   Home meds for hypertension were reviewed and noted below.   Hypertension Medications               amlodipine-benazepril 5-10 mg (LOTREL) 5-10 mg per capsule Take 1 capsule by mouth once daily.            While in the hospital, will manage blood pressure as follows; Continue home antihypertensive regimen          VTE Risk Mitigation (From admission, onward)           Ordered     enoxaparin injection 40 mg  Every 24 hours         01/31/24 1549     IP VTE HIGH RISK PATIENT  Once         01/31/24 1330     Place sequential compression device  Until discontinued         01/31/24 1330                        Thank you for your consult. I will follow-up with patient. Please contact us if you have any additional questions.    Dennis Davis DO  Department of Hospital Medicine   [facility] - Neurosurgery ([facility])

## 2024-02-02 ENCOUNTER — ANESTHESIA (OUTPATIENT)
Dept: SURGERY | Facility: HOSPITAL | Age: 74
DRG: 857 | End: 2024-02-02
Payer: MEDICARE

## 2024-02-02 LAB
ANION GAP SERPL CALC-SCNC: 5 MMOL/L (ref 8–16)
ANION GAP SERPL CALC-SCNC: 6 MMOL/L (ref 8–16)
ANION GAP SERPL CALC-SCNC: 6 MMOL/L (ref 8–16)
ANION GAP SERPL CALC-SCNC: 7 MMOL/L (ref 8–16)
BASOPHILS # BLD AUTO: 0.01 K/UL (ref 0–0.2)
BASOPHILS NFR BLD: 0.4 % (ref 0–1.9)
BUN SERPL-MCNC: 12 MG/DL (ref 8–23)
BUN SERPL-MCNC: 12 MG/DL (ref 8–23)
BUN SERPL-MCNC: 13 MG/DL (ref 8–23)
BUN SERPL-MCNC: 14 MG/DL (ref 8–23)
CALCIUM SERPL-MCNC: 8.2 MG/DL (ref 8.7–10.5)
CALCIUM SERPL-MCNC: 8.2 MG/DL (ref 8.7–10.5)
CALCIUM SERPL-MCNC: 8.3 MG/DL (ref 8.7–10.5)
CALCIUM SERPL-MCNC: 8.3 MG/DL (ref 8.7–10.5)
CHLORIDE SERPL-SCNC: 100 MMOL/L (ref 95–110)
CHLORIDE SERPL-SCNC: 101 MMOL/L (ref 95–110)
CHLORIDE SERPL-SCNC: 102 MMOL/L (ref 95–110)
CHLORIDE SERPL-SCNC: 102 MMOL/L (ref 95–110)
CO2 SERPL-SCNC: 23 MMOL/L (ref 23–29)
CO2 SERPL-SCNC: 23 MMOL/L (ref 23–29)
CO2 SERPL-SCNC: 24 MMOL/L (ref 23–29)
CO2 SERPL-SCNC: 25 MMOL/L (ref 23–29)
CREAT SERPL-MCNC: 0.8 MG/DL (ref 0.5–1.4)
CREAT SERPL-MCNC: 1 MG/DL (ref 0.5–1.4)
DIFFERENTIAL METHOD BLD: ABNORMAL
EOSINOPHIL # BLD AUTO: 0 K/UL (ref 0–0.5)
EOSINOPHIL NFR BLD: 1.1 % (ref 0–8)
ERYTHROCYTE [DISTWIDTH] IN BLOOD BY AUTOMATED COUNT: 14.8 % (ref 11.5–14.5)
EST. GFR  (NO RACE VARIABLE): >60 ML/MIN/1.73 M^2
GLUCOSE SERPL-MCNC: 111 MG/DL (ref 70–110)
GLUCOSE SERPL-MCNC: 120 MG/DL (ref 70–110)
GLUCOSE SERPL-MCNC: 138 MG/DL (ref 70–110)
GLUCOSE SERPL-MCNC: 96 MG/DL (ref 70–110)
GRAM STN SPEC: NORMAL
HCT VFR BLD AUTO: 27.2 % (ref 40–54)
HGB BLD-MCNC: 9.1 G/DL (ref 14–18)
IMM GRANULOCYTES # BLD AUTO: 0.04 K/UL (ref 0–0.04)
IMM GRANULOCYTES NFR BLD AUTO: 1.5 % (ref 0–0.5)
LYMPHOCYTES # BLD AUTO: 0.3 K/UL (ref 1–4.8)
LYMPHOCYTES NFR BLD: 9.5 % (ref 18–48)
MCH RBC QN AUTO: 29.2 PG (ref 27–31)
MCHC RBC AUTO-ENTMCNC: 33.5 G/DL (ref 32–36)
MCV RBC AUTO: 87 FL (ref 82–98)
MONOCYTES # BLD AUTO: 0.2 K/UL (ref 0.3–1)
MONOCYTES NFR BLD: 8.4 % (ref 4–15)
NEUTROPHILS # BLD AUTO: 2.1 K/UL (ref 1.8–7.7)
NEUTROPHILS NFR BLD: 79.1 % (ref 38–73)
NRBC BLD-RTO: 0 /100 WBC
PLATELET # BLD AUTO: 166 K/UL (ref 150–450)
PMV BLD AUTO: 8.8 FL (ref 9.2–12.9)
POTASSIUM SERPL-SCNC: 4.2 MMOL/L (ref 3.5–5.1)
POTASSIUM SERPL-SCNC: 4.4 MMOL/L (ref 3.5–5.1)
POTASSIUM SERPL-SCNC: 4.5 MMOL/L (ref 3.5–5.1)
POTASSIUM SERPL-SCNC: 4.6 MMOL/L (ref 3.5–5.1)
RBC # BLD AUTO: 3.12 M/UL (ref 4.6–6.2)
SODIUM SERPL-SCNC: 130 MMOL/L (ref 136–145)
SODIUM SERPL-SCNC: 131 MMOL/L (ref 136–145)
SODIUM SERPL-SCNC: 131 MMOL/L (ref 136–145)
SODIUM SERPL-SCNC: 132 MMOL/L (ref 136–145)
WBC # BLD AUTO: 2.62 K/UL (ref 3.9–12.7)

## 2024-02-02 PROCEDURE — 25000003 PHARM REV CODE 250

## 2024-02-02 PROCEDURE — 87015 SPECIMEN INFECT AGNT CONCNTJ: CPT | Performed by: STUDENT IN AN ORGANIZED HEALTH CARE EDUCATION/TRAINING PROGRAM

## 2024-02-02 PROCEDURE — 63600175 PHARM REV CODE 636 W HCPCS: Performed by: NURSE ANESTHETIST, CERTIFIED REGISTERED

## 2024-02-02 PROCEDURE — 36000707: Performed by: STUDENT IN AN ORGANIZED HEALTH CARE EDUCATION/TRAINING PROGRAM

## 2024-02-02 PROCEDURE — 87102 FUNGUS ISOLATION CULTURE: CPT | Mod: 59 | Performed by: STUDENT IN AN ORGANIZED HEALTH CARE EDUCATION/TRAINING PROGRAM

## 2024-02-02 PROCEDURE — 22010 I&D P-SPINE C/T/CERV-THOR: CPT | Mod: 78,,, | Performed by: STUDENT IN AN ORGANIZED HEALTH CARE EDUCATION/TRAINING PROGRAM

## 2024-02-02 PROCEDURE — 87075 CULTR BACTERIA EXCEPT BLOOD: CPT | Performed by: STUDENT IN AN ORGANIZED HEALTH CARE EDUCATION/TRAINING PROGRAM

## 2024-02-02 PROCEDURE — 88305 TISSUE EXAM BY PATHOLOGIST: CPT | Mod: 26,,, | Performed by: STUDENT IN AN ORGANIZED HEALTH CARE EDUCATION/TRAINING PROGRAM

## 2024-02-02 PROCEDURE — 87116 MYCOBACTERIA CULTURE: CPT | Mod: 59 | Performed by: STUDENT IN AN ORGANIZED HEALTH CARE EDUCATION/TRAINING PROGRAM

## 2024-02-02 PROCEDURE — 11000001 HC ACUTE MED/SURG PRIVATE ROOM

## 2024-02-02 PROCEDURE — 87070 CULTURE OTHR SPECIMN AEROBIC: CPT | Mod: 59 | Performed by: STUDENT IN AN ORGANIZED HEALTH CARE EDUCATION/TRAINING PROGRAM

## 2024-02-02 PROCEDURE — D9220A PRA ANESTHESIA: Mod: CRNA,,, | Performed by: NURSE ANESTHETIST, CERTIFIED REGISTERED

## 2024-02-02 PROCEDURE — 97606 NEG PRS WND THER DME>50 SQCM: CPT | Mod: ,,, | Performed by: SURGERY

## 2024-02-02 PROCEDURE — 94761 N-INVAS EAR/PLS OXIMETRY MLT: CPT

## 2024-02-02 PROCEDURE — D9220A PRA ANESTHESIA: Mod: ANES,,, | Performed by: ANESTHESIOLOGY

## 2024-02-02 PROCEDURE — 25000003 PHARM REV CODE 250: Performed by: STUDENT IN AN ORGANIZED HEALTH CARE EDUCATION/TRAINING PROGRAM

## 2024-02-02 PROCEDURE — 87205 SMEAR GRAM STAIN: CPT | Mod: 59 | Performed by: STUDENT IN AN ORGANIZED HEALTH CARE EDUCATION/TRAINING PROGRAM

## 2024-02-02 PROCEDURE — 25000003 PHARM REV CODE 250: Performed by: NURSE ANESTHETIST, CERTIFIED REGISTERED

## 2024-02-02 PROCEDURE — 37000008 HC ANESTHESIA 1ST 15 MINUTES: Performed by: STUDENT IN AN ORGANIZED HEALTH CARE EDUCATION/TRAINING PROGRAM

## 2024-02-02 PROCEDURE — 88305 TISSUE EXAM BY PATHOLOGIST: CPT | Performed by: STUDENT IN AN ORGANIZED HEALTH CARE EDUCATION/TRAINING PROGRAM

## 2024-02-02 PROCEDURE — 71000015 HC POSTOP RECOV 1ST HR: Performed by: STUDENT IN AN ORGANIZED HEALTH CARE EDUCATION/TRAINING PROGRAM

## 2024-02-02 PROCEDURE — 0KBG0ZZ EXCISION OF LEFT TRUNK MUSCLE, OPEN APPROACH: ICD-10-PCS | Performed by: SURGERY

## 2024-02-02 PROCEDURE — 63600175 PHARM REV CODE 636 W HCPCS

## 2024-02-02 PROCEDURE — 27201423 OPTIME MED/SURG SUP & DEVICES STERILE SUPPLY: Performed by: STUDENT IN AN ORGANIZED HEALTH CARE EDUCATION/TRAINING PROGRAM

## 2024-02-02 PROCEDURE — 85025 COMPLETE CBC W/AUTO DIFF WBC: CPT

## 2024-02-02 PROCEDURE — 15734 MUSCLE-SKIN GRAFT TRUNK: CPT | Mod: ,,, | Performed by: SURGERY

## 2024-02-02 PROCEDURE — 0KRF07Z REPLACEMENT OF RIGHT TRUNK MUSCLE WITH AUTOLOGOUS TISSUE SUBSTITUTE, OPEN APPROACH: ICD-10-PCS | Performed by: SURGERY

## 2024-02-02 PROCEDURE — 63600175 PHARM REV CODE 636 W HCPCS: Performed by: STUDENT IN AN ORGANIZED HEALTH CARE EDUCATION/TRAINING PROGRAM

## 2024-02-02 PROCEDURE — 36415 COLL VENOUS BLD VENIPUNCTURE: CPT | Mod: XB

## 2024-02-02 PROCEDURE — 87077 CULTURE AEROBIC IDENTIFY: CPT | Mod: 59 | Performed by: STUDENT IN AN ORGANIZED HEALTH CARE EDUCATION/TRAINING PROGRAM

## 2024-02-02 PROCEDURE — 87176 TISSUE HOMOGENIZATION CULTR: CPT | Performed by: STUDENT IN AN ORGANIZED HEALTH CARE EDUCATION/TRAINING PROGRAM

## 2024-02-02 PROCEDURE — 25000003 PHARM REV CODE 250: Performed by: INTERNAL MEDICINE

## 2024-02-02 PROCEDURE — 80048 BASIC METABOLIC PNL TOTAL CA: CPT | Mod: 91

## 2024-02-02 PROCEDURE — 36000706: Performed by: STUDENT IN AN ORGANIZED HEALTH CARE EDUCATION/TRAINING PROGRAM

## 2024-02-02 PROCEDURE — 87206 SMEAR FLUORESCENT/ACID STAI: CPT | Mod: 91 | Performed by: STUDENT IN AN ORGANIZED HEALTH CARE EDUCATION/TRAINING PROGRAM

## 2024-02-02 PROCEDURE — 71000033 HC RECOVERY, INTIAL HOUR: Performed by: STUDENT IN AN ORGANIZED HEALTH CARE EDUCATION/TRAINING PROGRAM

## 2024-02-02 PROCEDURE — 0KRG07Z REPLACEMENT OF LEFT TRUNK MUSCLE WITH AUTOLOGOUS TISSUE SUBSTITUTE, OPEN APPROACH: ICD-10-PCS | Performed by: SURGERY

## 2024-02-02 PROCEDURE — 0KBF0ZZ EXCISION OF RIGHT TRUNK MUSCLE, OPEN APPROACH: ICD-10-PCS | Performed by: SURGERY

## 2024-02-02 PROCEDURE — 22010 I&D P-SPINE C/T/CERV-THOR: CPT | Mod: 78,AS,, | Performed by: PHYSICIAN ASSISTANT

## 2024-02-02 PROCEDURE — 99223 1ST HOSP IP/OBS HIGH 75: CPT | Mod: ,,, | Performed by: INTERNAL MEDICINE

## 2024-02-02 PROCEDURE — 37000009 HC ANESTHESIA EA ADD 15 MINS: Performed by: STUDENT IN AN ORGANIZED HEALTH CARE EDUCATION/TRAINING PROGRAM

## 2024-02-02 PROCEDURE — 25000003 PHARM REV CODE 250: Performed by: FAMILY MEDICINE

## 2024-02-02 PROCEDURE — 0JB70ZZ EXCISION OF BACK SUBCUTANEOUS TISSUE AND FASCIA, OPEN APPROACH: ICD-10-PCS | Performed by: STUDENT IN AN ORGANIZED HEALTH CARE EDUCATION/TRAINING PROGRAM

## 2024-02-02 PROCEDURE — 87186 SC STD MICRODIL/AGAR DIL: CPT | Performed by: STUDENT IN AN ORGANIZED HEALTH CARE EDUCATION/TRAINING PROGRAM

## 2024-02-02 PROCEDURE — 63600175 PHARM REV CODE 636 W HCPCS: Performed by: INTERNAL MEDICINE

## 2024-02-02 RX ORDER — MIDAZOLAM HYDROCHLORIDE 1 MG/ML
INJECTION, SOLUTION INTRAMUSCULAR; INTRAVENOUS
Status: DISCONTINUED | OUTPATIENT
Start: 2024-02-02 | End: 2024-02-02

## 2024-02-02 RX ORDER — TALC
6 POWDER (GRAM) TOPICAL NIGHTLY PRN
Status: DISCONTINUED | OUTPATIENT
Start: 2024-02-02 | End: 2024-02-08 | Stop reason: HOSPADM

## 2024-02-02 RX ORDER — SODIUM CHLORIDE, SODIUM LACTATE, POTASSIUM CHLORIDE, CALCIUM CHLORIDE 600; 310; 30; 20 MG/100ML; MG/100ML; MG/100ML; MG/100ML
INJECTION, SOLUTION INTRAVENOUS CONTINUOUS
Status: DISCONTINUED | OUTPATIENT
Start: 2024-02-02 | End: 2024-02-08 | Stop reason: HOSPADM

## 2024-02-02 RX ORDER — ROCURONIUM BROMIDE 10 MG/ML
INJECTION, SOLUTION INTRAVENOUS
Status: DISCONTINUED | OUTPATIENT
Start: 2024-02-02 | End: 2024-02-02

## 2024-02-02 RX ORDER — ACETAMINOPHEN 325 MG/1
650 TABLET ORAL EVERY 6 HOURS
Status: DISCONTINUED | OUTPATIENT
Start: 2024-02-02 | End: 2024-02-05

## 2024-02-02 RX ORDER — PROPOFOL 10 MG/ML
VIAL (ML) INTRAVENOUS
Status: DISCONTINUED | OUTPATIENT
Start: 2024-02-02 | End: 2024-02-02

## 2024-02-02 RX ORDER — ALUMINUM HYDROXIDE, MAGNESIUM HYDROXIDE, AND SIMETHICONE 1200; 120; 1200 MG/30ML; MG/30ML; MG/30ML
30 SUSPENSION ORAL EVERY 4 HOURS PRN
Status: DISCONTINUED | OUTPATIENT
Start: 2024-02-02 | End: 2024-02-08 | Stop reason: HOSPADM

## 2024-02-02 RX ORDER — VANCOMYCIN HYDROCHLORIDE 1 G/20ML
INJECTION, POWDER, LYOPHILIZED, FOR SOLUTION INTRAVENOUS
Status: DISCONTINUED | OUTPATIENT
Start: 2024-02-02 | End: 2024-02-02 | Stop reason: HOSPADM

## 2024-02-02 RX ORDER — HYDROMORPHONE HYDROCHLORIDE 1 MG/ML
0.2 INJECTION, SOLUTION INTRAMUSCULAR; INTRAVENOUS; SUBCUTANEOUS EVERY 5 MIN PRN
Status: DISCONTINUED | OUTPATIENT
Start: 2024-02-02 | End: 2024-02-02 | Stop reason: HOSPADM

## 2024-02-02 RX ORDER — OXYCODONE HYDROCHLORIDE 5 MG/1
5 TABLET ORAL EVERY 4 HOURS PRN
Status: DISCONTINUED | OUTPATIENT
Start: 2024-02-02 | End: 2024-02-08 | Stop reason: HOSPADM

## 2024-02-02 RX ORDER — PROCHLORPERAZINE EDISYLATE 5 MG/ML
5 INJECTION INTRAMUSCULAR; INTRAVENOUS EVERY 6 HOURS PRN
Status: DISCONTINUED | OUTPATIENT
Start: 2024-02-02 | End: 2024-02-08 | Stop reason: HOSPADM

## 2024-02-02 RX ORDER — LIDOCAINE HYDROCHLORIDE 20 MG/ML
INJECTION, SOLUTION EPIDURAL; INFILTRATION; INTRACAUDAL; PERINEURAL
Status: DISCONTINUED | OUTPATIENT
Start: 2024-02-02 | End: 2024-02-02

## 2024-02-02 RX ORDER — SODIUM CHLORIDE 9 MG/ML
INJECTION, SOLUTION INTRAVENOUS CONTINUOUS
Status: DISCONTINUED | OUTPATIENT
Start: 2024-02-02 | End: 2024-02-05

## 2024-02-02 RX ORDER — FENTANYL CITRATE 50 UG/ML
INJECTION, SOLUTION INTRAMUSCULAR; INTRAVENOUS
Status: DISCONTINUED | OUTPATIENT
Start: 2024-02-02 | End: 2024-02-02

## 2024-02-02 RX ORDER — ONDANSETRON HYDROCHLORIDE 2 MG/ML
INJECTION, SOLUTION INTRAVENOUS
Status: DISCONTINUED | OUTPATIENT
Start: 2024-02-02 | End: 2024-02-02

## 2024-02-02 RX ORDER — OXYCODONE HYDROCHLORIDE 10 MG/1
10 TABLET ORAL EVERY 4 HOURS PRN
Status: DISCONTINUED | OUTPATIENT
Start: 2024-02-02 | End: 2024-02-08 | Stop reason: HOSPADM

## 2024-02-02 RX ORDER — HALOPERIDOL 5 MG/ML
0.5 INJECTION INTRAMUSCULAR EVERY 10 MIN PRN
Status: DISCONTINUED | OUTPATIENT
Start: 2024-02-02 | End: 2024-02-02 | Stop reason: HOSPADM

## 2024-02-02 RX ORDER — DIAZEPAM 5 MG/1
5 TABLET ORAL EVERY 6 HOURS
Status: DISPENSED | OUTPATIENT
Start: 2024-02-02 | End: 2024-02-05

## 2024-02-02 RX ORDER — PHENYLEPHRINE HCL IN 0.9% NACL 1 MG/10 ML
SYRINGE (ML) INTRAVENOUS
Status: DISCONTINUED | OUTPATIENT
Start: 2024-02-02 | End: 2024-02-02

## 2024-02-02 RX ORDER — MORPHINE SULFATE 2 MG/ML
2 INJECTION, SOLUTION INTRAMUSCULAR; INTRAVENOUS
Status: DISCONTINUED | OUTPATIENT
Start: 2024-02-02 | End: 2024-02-05

## 2024-02-02 RX ORDER — HEPARIN SODIUM 5000 [USP'U]/ML
5000 INJECTION, SOLUTION INTRAVENOUS; SUBCUTANEOUS EVERY 8 HOURS
Status: DISCONTINUED | OUTPATIENT
Start: 2024-02-03 | End: 2024-02-08 | Stop reason: HOSPADM

## 2024-02-02 RX ORDER — MUPIROCIN 20 MG/G
OINTMENT TOPICAL 2 TIMES DAILY
Status: DISPENSED | OUTPATIENT
Start: 2024-02-02 | End: 2024-02-07

## 2024-02-02 RX ORDER — HYDROMORPHONE HYDROCHLORIDE 1 MG/ML
INJECTION, SOLUTION INTRAMUSCULAR; INTRAVENOUS; SUBCUTANEOUS
Status: DISCONTINUED | OUTPATIENT
Start: 2024-02-02 | End: 2024-02-02

## 2024-02-02 RX ORDER — LIDOCAINE HYDROCHLORIDE 10 MG/ML
1 INJECTION, SOLUTION EPIDURAL; INFILTRATION; INTRACAUDAL; PERINEURAL ONCE
Status: DISCONTINUED | OUTPATIENT
Start: 2024-02-02 | End: 2024-02-02 | Stop reason: HOSPADM

## 2024-02-02 RX ORDER — DEXAMETHASONE SODIUM PHOSPHATE 4 MG/ML
INJECTION, SOLUTION INTRA-ARTICULAR; INTRALESIONAL; INTRAMUSCULAR; INTRAVENOUS; SOFT TISSUE
Status: DISCONTINUED | OUTPATIENT
Start: 2024-02-02 | End: 2024-02-02

## 2024-02-02 RX ORDER — DEXMEDETOMIDINE HYDROCHLORIDE 100 UG/ML
INJECTION, SOLUTION INTRAVENOUS
Status: DISCONTINUED | OUTPATIENT
Start: 2024-02-02 | End: 2024-02-02

## 2024-02-02 RX ADMIN — Medication 200 MCG: at 07:02

## 2024-02-02 RX ADMIN — CEFAZOLIN 2 G: 2 INJECTION, POWDER, FOR SOLUTION INTRAMUSCULAR; INTRAVENOUS at 01:02

## 2024-02-02 RX ADMIN — Medication 200 MCG: at 08:02

## 2024-02-02 RX ADMIN — GABAPENTIN 300 MG: 300 CAPSULE ORAL at 09:02

## 2024-02-02 RX ADMIN — HYDROMORPHONE HYDROCHLORIDE 0.4 MG: 1 INJECTION, SOLUTION INTRAMUSCULAR; INTRAVENOUS; SUBCUTANEOUS at 09:02

## 2024-02-02 RX ADMIN — DIAZEPAM 5 MG: 5 TABLET ORAL at 12:02

## 2024-02-02 RX ADMIN — SODIUM CHLORIDE: 9 INJECTION, SOLUTION INTRAVENOUS at 08:02

## 2024-02-02 RX ADMIN — FENTANYL CITRATE 100 MCG: 50 INJECTION INTRAMUSCULAR; INTRAVENOUS at 07:02

## 2024-02-02 RX ADMIN — LIDOCAINE HYDROCHLORIDE 100 MG: 20 INJECTION, SOLUTION EPIDURAL; INFILTRATION; INTRACAUDAL at 07:02

## 2024-02-02 RX ADMIN — MUPIROCIN: 20 OINTMENT TOPICAL at 09:02

## 2024-02-02 RX ADMIN — ACETAMINOPHEN 650 MG: 325 TABLET ORAL at 12:02

## 2024-02-02 RX ADMIN — PROPOFOL 100 MG: 10 INJECTION, EMULSION INTRAVENOUS at 07:02

## 2024-02-02 RX ADMIN — DEXAMETHASONE SODIUM PHOSPHATE 4 MG: 4 INJECTION INTRA-ARTICULAR; INTRALESIONAL; INTRAMUSCULAR; INTRAVENOUS; SOFT TISSUE at 08:02

## 2024-02-02 RX ADMIN — DEXMEDETOMIDINE 8 MCG: 200 INJECTION, SOLUTION INTRAVENOUS at 09:02

## 2024-02-02 RX ADMIN — ROCURONIUM BROMIDE 50 MG: 10 INJECTION, SOLUTION INTRAVENOUS at 07:02

## 2024-02-02 RX ADMIN — VANCOMYCIN HYDROCHLORIDE 1000 MG: 1 INJECTION, POWDER, LYOPHILIZED, FOR SOLUTION INTRAVENOUS at 09:02

## 2024-02-02 RX ADMIN — CEFEPIME 2 G: 2 INJECTION, POWDER, FOR SOLUTION INTRAVENOUS at 02:02

## 2024-02-02 RX ADMIN — SODIUM CHLORIDE: 9 INJECTION, SOLUTION INTRAVENOUS at 07:02

## 2024-02-02 RX ADMIN — GABAPENTIN 300 MG: 300 CAPSULE ORAL at 03:02

## 2024-02-02 RX ADMIN — CEFEPIME 2 G: 2 INJECTION, POWDER, FOR SOLUTION INTRAVENOUS at 11:02

## 2024-02-02 RX ADMIN — ALUMINUM HYDROXIDE, MAGNESIUM HYDROXIDE, AND DIMETHICONE 10 ML: 400; 400; 40 SUSPENSION ORAL at 09:02

## 2024-02-02 RX ADMIN — SODIUM CHLORIDE 1000 MG: 9 INJECTION, SOLUTION INTRAVENOUS at 08:02

## 2024-02-02 RX ADMIN — PROPOFOL 50 MG: 10 INJECTION, EMULSION INTRAVENOUS at 08:02

## 2024-02-02 RX ADMIN — ONDANSETRON 4 MG: 2 INJECTION INTRAMUSCULAR; INTRAVENOUS at 08:02

## 2024-02-02 RX ADMIN — SUGAMMADEX 200 MG: 100 INJECTION, SOLUTION INTRAVENOUS at 10:02

## 2024-02-02 RX ADMIN — MIDAZOLAM 2 MG: 1 INJECTION INTRAMUSCULAR; INTRAVENOUS at 07:02

## 2024-02-02 RX ADMIN — SODIUM CHLORIDE, POTASSIUM CHLORIDE, SODIUM LACTATE AND CALCIUM CHLORIDE: 600; 310; 30; 20 INJECTION, SOLUTION INTRAVENOUS at 01:02

## 2024-02-02 NOTE — BRIEF OP NOTE
Brief Operative Note     SUMMARY     Surgery Date: 2/2/2024     Surgeon(s) and Role:  Panel 1:     * Kin Lamb,  - Primary     * Brandon Hawk MD - Resident - Assisting  Panel 2:     * Ernesto Villanueva MD - Primary        Pre-op Diagnosis:  Surgical wound breakdown, initial encounter [T81.31XA]    Post-op Diagnosis:  Surgical wound breakdown, initial encounter [T81.31XA]    Procedure(s) (LRB):  EXPLORATION, WOUND (N/A)  CLOSURE (N/A)    Anesthesia: General    Description of Procedure:   Muscle flap closure of spinal wound, 1 drain in place.    Findings/Key Components:  See Op note    Estimated Blood Loss: less than 100 mL         Specimens Removed:   Specimen (24h ago, onward)       Start     Ordered    02/02/24 0908  Specimen to Pathology, Surgery Neurosurgery  Once        Comments: Pre-op Diagnosis: Surgical wound breakdown, initial encounter [T81.31XA]Procedure(s):EXPLORATION, WOUNDCLOSURE Number of specimens: 1Name of specimens: 1. Soft tissue back     References:    Click here for ordering Quick Tip   Question Answer Comment   Procedure Type: Neurosurgery    Specimen Class: Routine/Screening    Which provider would you like to cc? KIN LAMB    Which provider would you like to cc? ERNESTO VILLANUEVA    Release to patient Immediate        02/02/24 0908                    Certification of Assistant at Surgery       Surgery Date: 2/2/2024     Participating Surgeons:  Surgeon(s) and Role:  Panel 1:     * Kin Lamb DO - Primary     * Brandon Hawk MD - Resident - Assisting  Panel 2:     * Ernesto Villanueva MD - Primary    Procedures:  Procedure(s) (LRB):  EXPLORATION, WOUND (N/A)  CLOSURE (N/A)    Assistant Surgeon's Certification of Necessity:  I understand that section 1842 (b) (6) (d) of the Social Security Act generally prohibits Medicare Part B reasonable charge payment for the services of assistants at surgery in teaching hospitals when qualified  residents are available to furnish such services. I certify that the services for which payment is claimed were medically necessary, and that no qualified resident was available to perform the services. I further understand that these services are subject to post-payment review by the Medicare carrier.      Ángela Stratton PA-C    02/02/2024  9:47 AM

## 2024-02-02 NOTE — ASSESSMENT & PLAN NOTE
73 year old male with pmh male w/ a significant PMHx of RCC metastasis to the spine s/p 360 cervical spine decompression/fusion in November and then a T9 vertebroplasty that failed, He was then taken for a T7-11 posterior decompression/fusion 1/5/24  (on Inlya + Keytruda), HTN, and gout who presented to [facility] on 1/31/24 for concerns of surgical site infection after noted to have incisional wound dehiscence. On admission afebrile and HDS. WBC 5.46. MRI of T spine with post op changes, abnormal enhancement within soft tissues at postop bed and within subq tissues from T5-L1 with multiple areas of enhancement concerning for infection. He was started on cefazolin. He went to OR with NSGY on 2/2 for exploration and I&D which found copious serous fluid without purulence, some epidural tissue concerning for chronic hematoma, no purulence and fluids sent for cultures. No concern for deep infection. Has had fevers as high as 102.8. ID consulted for wound dehiscence.     --follow surgical and blood cultures  --would broaden to vanc/cefepime for now and narrow pending cultures  --treatment duration TBD

## 2024-02-02 NOTE — ASSESSMENT & PLAN NOTE
Patient has hyponatremia which is controlled,We will aim to correct the sodium by 4-6mEq in 24 hours. We will monitor sodium Every 6 hours. The hyponatremia is due to Dehydration/hypovolemia. We will obtain the following studies: Urine sodium, urine osmolality, serum osmolality. We will treat the hyponatremia with IV fluids as follows: 74 cc/hour. The patient's sodium results have been reviewed and are listed below.  Recent Labs   Lab 02/02/24  0514   *       Pt with hyponatremia as low as 126 in the setting of decreased oral intake 2/2 oral lesions. Negative for any neurological symptoms. Urine studies ordered but this is after normal saline had already been initiated: Serum osm- 276, urine osm-412, urine na-86. Serium Na has been improving with NS infusion; indicating hypovolemic hyponatremia.     -Normal saline 75 cc/hour; will consider increasing rate  -Correct Na by 4-6 mEq every 24 hours; if this is surpassed pause the NS infusion and consider D5 bolus if greatly over corrected.   -BMP q6 hours until Na corrected  -Neurochecks q4

## 2024-02-02 NOTE — NURSING TRANSFER
Nursing Transfer Note      2/2/2024   11:04 AM    Nurse giving handoff:BRIAN lundy   Nurse receiving handoff:BRIAN Ag    Reason patient is being transferred: post op    Transfer To: 944 return to room     Transfer via stretcher    Transfer with cardiac monitoring    Transported by transport     Telemetry: Box Number [id number]  Order for Tele Monitor? Yes    Additional Lines: Hoskins Catheter  Any special needs or follow-up needed: routine  Chart send with patient: Yes    Notified: spouse    Patient reassessed at: 1105

## 2024-02-02 NOTE — SUBJECTIVE & OBJECTIVE
Past Medical History:   Diagnosis Date    Asthma     no inhaler use    Borderline hypertension     ED (erectile dysfunction)     Gout     Hematuria     Hypertension        Past Surgical History:   Procedure Laterality Date    COLONOSCOPY  02/10/2022    COLONOSCOPY N/A 02/10/2022    Procedure: COLONOSCOPY;  Surgeon: Desmond Keenan MD;  Location: [facility];  Service: General;  Laterality: N/A;    POSTERIOR FUSION OF CERVICAL SPINE WITH LAMINECTOMY N/A 11/15/2023    Procedure: SPINE, CERVICAL, WITH POSTERIOR FUSION T4-6;  Surgeon: Nasim Martinez DO;  Location: [address];  Service: Neurosurgery;  Laterality: N/A;  C2-T1 laminectomy and posterior fusion. [place]. C-arm. [employer]. Neuromonitoring.    ROBOT-ASSISTED LAPAROSCOPIC NEPHRECTOMY Right 10/4/2023    Procedure: ROBOTIC NEPHRECTOMY;  Surgeon: Henry Michaels MD;  Location: [facility];  Service: Urology;  Laterality: Right;    SURGICAL REMOVAL OF VERTEBRAL BODY OF CERVICAL SPINE Right 11/7/2023    Procedure: CORPECTOMY, SPINE, CERVICAL;  Surgeon: Nasim Martinez DO;  Location: [address];  Service: Neurosurgery;  Laterality: Right;  C4 and C5 corpectomy with globus; familia wells tongs; c-arm; neuromonitoring; microscope; type and cross 2 units    SURGICAL REMOVAL OF VERTEBRAL BODY OF CERVICAL SPINE N/A 11/15/2023    Procedure: CORPECTOMY, SPINE, CERVICAL C3-6 REVISION;  Surgeon: Nasim Martinez DO;  Location: [facility] OR [address] [place] [address];  Service: Neurosurgery;  Laterality: N/A;    THORACIC LAMINECTOMY WITH FUSION N/A 1/5/2024    Procedure: LAMINECTOMY, SPINE, THORACIC, WITH FUSION;  Surgeon: Nasim Martinez DO;  Location: [address];  Service: Neurosurgery;  Laterality: N/A;  T7-T11 fusions with robot    TONSILLECTOMY         Review of patient's allergies indicates:  No Known Allergies    Medications:  Medications Prior to Admission   Medication Sig    allopurinoL (ZYLOPRIM) 100 MG tablet Take 1 tablet (100 mg total) by mouth once daily.     amlodipine-benazepril 5-10 mg (LOTREL) 5-10 mg per capsule Take 1 capsule by mouth once daily.    axitinib (INLYTA) 5 mg Tab Take 1 tablet (5 mg) by mouth 2 (two) times daily.    diazePAM (VALIUM) 5 MG tablet Take 1 tablet (5 mg total) by mouth every 6 (six) hours as needed (severe muscles spasms). (Patient not taking: Reported on 1/18/2024)    duke's soln (benadryl 30 mL, mylanta 30 mL, LIDOcaine 30 mL, nystatin 30 mL) 120mL Take 10 mLs by mouth 4 (four) times daily.    gabapentin (NEURONTIN) 300 MG capsule Take 1 capsule (300 mg total) by mouth 3 (three) times daily.    naloxone (NARCAN) 4 mg/actuation Spry 1 spray (4mg) by nasal route as needed for opioid overdose; may repeat every 2-3 minutes in alternating nostrils until medical help arrives. Call 911 (Patient not taking: Reported on 12/28/2023)    omega-3 fatty acids/fish oil (FISH OIL-OMEGA-3 FATTY ACIDS) 300-1,000 mg capsule Take 2 capsules by mouth once daily.    oxyCODONE-acetaminophen (PERCOCET) 5-325 mg per tablet Take 1 to 2 tablets by mouth every 4-6 hours as needed for pain    sildenafiL (VIAGRA) 100 MG tablet Take 1 tablet (100 mg total) by mouth daily as needed for Erectile Dysfunction. (Patient not taking: Reported on 1/18/2024)    turmeric (CURCUMIN MISC) 1,000 mg by Misc.(Non-Drug; Combo Route) route Daily.    UNABLE TO FIND Take 500 mg by mouth 2 (two) times a day. medication name: Tudca    UNABLE TO FIND Take 2 capsules by mouth 2 (two) times a day. medication name: Turkey tail mushroom    UNABLE TO FIND Take 15 drops by mouth once daily. medication name: Essiac-20    UNABLE TO FIND Take 5 mLs by mouth 2 (two) times a day. medication name: barley leaf juice powder    UNABLE TO FIND Take 5 mLs by mouth 2 (two) times a day. medication name: black seed oil (cumin seed)     Antibiotics (From admission, onward)      Start     Stop Route Frequency Ordered    02/02/24 2000  vancomycin (VANCOCIN) 1,000 mg in dextrose 5 % (D5W) 250 mL IVPB  (Vial-Mate)         -- IV Every 12 hours (non-standard times) 02/02/24 1409    02/02/24 1445  ceFEPIme (MAXIPIME) 2 g in dextrose 5 % in water (D5W) 100 mL IVPB (MB+)         -- IV Every 8 hours (non-standard times) 02/02/24 1339    02/02/24 1438  vancomycin - pharmacy to dose  (vancomycin IVPB (PEDS and ADULTS))        See Hyperspace for full Linked Orders Report.    -- IV pharmacy to manage frequency 02/02/24 1338    02/02/24 0930  mupirocin 2 % ointment         02/07/24 0859 Nasl 2 times daily 02/02/24 0920          Antifungals (From admission, onward)      Start     Stop Route Frequency Ordered    01/31/24 2200  duke's soln (benadryl 30 mL, mylanta 30 mL, LIDOcaine 30 mL, nystatin 30 mL) 120 mL         -- Oral 4 times daily 01/31/24 2159          Antivirals (From admission, onward)      None               There is no immunization history on file for this patient.    Family History    None       Social History     Socioeconomic History    Marital status:    Tobacco Use    Smoking status: Never    Smokeless tobacco: Never   Substance and Sexual Activity    Alcohol use: Not Currently     Alcohol/week: 0.0 standard drinks of alcohol     Comment: rarely    Drug use: Never     Social Determinants of Health     Financial Resource Strain: Low Risk  (2/1/2024)    Overall Financial Resource Strain (CARDIA)     Difficulty of Paying Living Expenses: Not hard at all   Food Insecurity: No Food Insecurity (2/1/2024)    Hunger Vital Sign     Worried About Running Out of Food in the Last Year: Never true     Ran Out of Food in the Last Year: Never true   Transportation Needs: No Transportation Needs (2/1/2024)    PRAPARE - Transportation     Lack of Transportation (Medical): No     Lack of Transportation (Non-Medical): No   Physical Activity: Inactive (2/1/2024)    Exercise Vital Sign     Days of Exercise per Week: 0 days     Minutes of Exercise per Session: 0 min   Stress: No Stress Concern Present (2/1/2024)    [place]  [employer] - Occupational Stress Questionnaire     Feeling of Stress : Not at all   Social Connections: Moderately Isolated (2/1/2024)    Social Connection and Isolation Panel [NHANES]     Frequency of Communication with Friends and Family: More than three times a week     Frequency of Social Gatherings with Friends and Family: More than three times a week     Attends Holiness Services: Never     Active Member of Clubs or Organizations: No     Attends Club or Organization Meetings: Never     Marital Status:    Housing Stability: Low Risk  (2/1/2024)    Housing Stability Vital Sign     Unable to Pay for Housing in the Last Year: No     Number of Places Lived in the Last Year: 1     Unstable Housing in the Last Year: No     Review of Systems   Constitutional:  Negative for appetite change, chills, fatigue and fever.   HENT:  Negative for congestion, sinus pain and sore throat.    Eyes: Negative.    Respiratory:  Negative for cough and shortness of breath.    Cardiovascular:  Negative for chest pain and leg swelling.   Gastrointestinal:  Negative for abdominal pain, constipation, diarrhea, nausea and vomiting.   Endocrine: Negative.    Genitourinary:  Negative for dysuria, flank pain and frequency.   Musculoskeletal:  Negative for arthralgias, back pain, joint swelling, myalgias and neck pain.   Skin:  Positive for wound. Negative for color change and rash.   Allergic/Immunologic: Positive for immunocompromised state.   Neurological:  Negative for weakness and headaches.   Hematological: Negative.    Psychiatric/Behavioral: Negative.       Objective:     Vital Signs (Most Recent):  Temp: 97.8 °F (36.6 °C) (02/02/24 1347)  Pulse: 73 (02/02/24 1347)  Resp: 18 (02/02/24 1347)  BP: 130/64 (02/02/24 1347)  SpO2: (!) 94 % (02/02/24 1347) Vital Signs (24h Range):  Temp:  [97.8 °F (36.6 °C)-102.8 °F (39.3 °C)] 97.8 °F (36.6 °C)  Pulse:  [] 73  Resp:  [14-23] 18  SpO2:  [87 %-98 %] 94 %  BP:  (109-136)/(56-65) 130/64     Weight: 87.1 kg (192 lb)  Body mass index is 27.55 kg/m².    Estimated Creatinine Clearance: 87.4 mL/min (based on SCr of 0.8 mg/dL).     Physical Exam  Constitutional:       General: He is not in acute distress.     Appearance: He is not toxic-appearing.   HENT:      Head: Normocephalic and atraumatic.      Right Ear: External ear normal.      Left Ear: External ear normal.      Nose: Nose normal.      Mouth/Throat:      Mouth: Mucous membranes are moist.      Pharynx: Oropharynx is clear.   Eyes:      Extraocular Movements: Extraocular movements intact.      Conjunctiva/sclera: Conjunctivae normal.      Pupils: Pupils are equal, round, and reactive to light.   Pulmonary:      Effort: Pulmonary effort is normal. No respiratory distress.   Abdominal:      General: There is no distension.      Palpations: Abdomen is soft.   Musculoskeletal:         General: No swelling or deformity.      Cervical back: Normal range of motion and neck supple.      Right lower leg: No edema.      Left lower leg: No edema.   Skin:     General: Skin is warm and dry.   Neurological:      General: No focal deficit present.      Mental Status: He is alert and oriented to person, place, and time. Mental status is at baseline.   Psychiatric:         Mood and Affect: Mood normal.         Behavior: Behavior normal.         Thought Content: Thought content normal.         Judgment: Judgment normal.                Significant Labs: All pertinent labs within the past 24 hours have been reviewed.    Significant Imaging: I have reviewed all pertinent imaging results/findings within the past 24 hours.

## 2024-02-02 NOTE — BRIEF OP NOTE
[facility] - Neurosurgery ([facility])  Brief Operative Note    SUMMARY     Surgery Date: 2/2/2024     Surgeon(s) and Role:  Panel 1:     * Kin Lamb,  - Primary     * Brandon Hawk MD - Resident - Assisting  Panel 2:     * Ernesto Villanueva MD - Primary        Pre-op Diagnosis:  Surgical wound breakdown, initial encounter [T81.31XA]    Post-op Diagnosis:  Post-Op Diagnosis Codes:     * Surgical wound breakdown, initial encounter [T81.31XA]    Procedure(s) (LRB):  EXPLORATION, WOUND (N/A)  CLOSURE (N/A)    Anesthesia: General    Implants:  * No implants in log *    Operative Findings: See full opnote    Estimated Blood Loss: * No values recorded between 2/2/2024  8:03 AM and 2/2/2024  9:21 AM *    Estimated Blood Loss has not been documented. EBL = Minimal.         Specimens:   Specimen (24h ago, onward)       Start     Ordered    02/02/24 0908  Specimen to Pathology, Surgery Neurosurgery  Once        Comments: Pre-op Diagnosis: Surgical wound breakdown, initial encounter [T81.31XA]Procedure(s):EXPLORATION, WOUNDCLOSURE Number of specimens: 1Name of specimens: 1. Soft tissue back     References:    Click here for ordering Quick Tip   Question Answer Comment   Procedure Type: Neurosurgery    Specimen Class: Routine/Screening    Which provider would you like to cc? KIN LAMB    Which provider would you like to cc? ERNESTO VILLANUEVA    Release to patient Immediate        02/02/24 0908                    [id number]

## 2024-02-02 NOTE — ASSESSMENT & PLAN NOTE
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 On admission afebrile and HDS. WBC 5.46. MRI of T spine with post op changes, abnormal enhancement within soft tissues at postop bed and within subq tissues from T5-L1 with multiple areas of enhancement concerning for infection. He was started on cefazolin. He went to OR with NSGY on 2/2 for exploration and I&D which found copious serous fluid without purulence, some epidural tissue concerning for chronic hematoma, no purulence and fluids sent for cultures. No concern for deep infection. ID consulted for wound dehiscence.     --follow surgical and blood cultures  --would broaden to vanc/cefepime for now and narrow pending cultures  --treatment duration TBD

## 2024-02-02 NOTE — ANESTHESIA PROCEDURE NOTES
Intubation    Date/Time: 2/2/2024 7:33 AM    Performed by: Lore Maloney CRNA  Authorized by: Slava Graf MD    Intubation:     Induction:  Intravenous    Intubated:  Postinduction    Mask Ventilation:  Easy mask    Attempts:  1    Attempted By:  CRNA    Method of Intubation:  Video laryngoscopy    Blade:  Yoder 3    Laryngeal View Grade: Grade I - full view of cords      Difficult Airway Encountered?: No      Complications:  None    Airway Device:  Oral endotracheal tube    Airway Device Size:  8.0    Style/Cuff Inflation:  Cuffed (inflated to minimal occlusive pressure)    Tube secured:  21    Secured at:  The lips    Placement Verified By:  Capnometry    Complicating Factors:  None    Findings Post-Intubation:  BS equal bilateral and atraumatic/condition of teeth unchanged

## 2024-02-02 NOTE — ASSESSMENT & PLAN NOTE
Patient has metastatic cancer of RCC primary. The cancer has metastasized to spine. The patient is under the care of an outpatient oncologist. The patient is undergoing active chemotherapy as follows- [unfilled] .Their staging information is listed below.   Cancer Staging   Clear cell carcinoma of right kidney  Staging form: Kidney, AJCC 8th Edition  - Clinical stage from 10/31/2023: Stage IV (cT3a, cNX, cM1) - Signed by Ruby Turk MD on 10/31/2023

## 2024-02-02 NOTE — SUBJECTIVE & OBJECTIVE
Interval History: NAEON and no new complaints. Pt s/p OR today for washout. Na increasing at appropriate rate, will cont IVF.    Review of Systems   Constitutional:  Negative for chills and fever.   Respiratory:  Negative for shortness of breath.    Cardiovascular:  Negative for chest pain.   Gastrointestinal:  Negative for abdominal pain.   Genitourinary:  Negative for dysuria.   Neurological:  Negative for headaches.     Objective:     Vital Signs (Most Recent):  Temp: 98.7 °F (37.1 °C) (02/02/24 1251)  Pulse: 84 (02/02/24 1150)  Resp: 16 (02/02/24 1150)  BP: 109/61 (02/02/24 1150)  SpO2: (!) 94 % (02/02/24 1150) Vital Signs (24h Range):  Temp:  [97.8 °F (36.6 °C)-102.8 °F (39.3 °C)] 98.7 °F (37.1 °C)  Pulse:  [] 84  Resp:  [14-23] 16  SpO2:  [87 %-98 %] 94 %  BP: (109-136)/(56-65) 109/61     Weight: 87.1 kg (192 lb)  Body mass index is 27.55 kg/m².    Intake/Output Summary (Last 24 hours) at 2/2/2024 1316  Last data filed at 2/2/2024 1002  Gross per 24 hour   Intake 1500 ml   Output 595 ml   Net 905 ml         Physical Exam  Constitutional:       General: He is not in acute distress.  HENT:      Head: Normocephalic and atraumatic.      Mouth/Throat:      Mouth: Mucous membranes are moist.   Eyes:      General: No scleral icterus.  Cardiovascular:      Rate and Rhythm: Normal rate and regular rhythm.      Pulses: Normal pulses.      Heart sounds: Normal heart sounds. No murmur heard.  Pulmonary:      Effort: Pulmonary effort is normal. No respiratory distress.      Breath sounds: Normal breath sounds. No wheezing.   Abdominal:      Palpations: Abdomen is soft.      Tenderness: There is no abdominal tenderness.   Musculoskeletal:      Right lower leg: No edema.   Skin:     General: Skin is warm and dry.   Neurological:      General: No focal deficit present.      Mental Status: He is alert and oriented to person, place, and time.   Psychiatric:         Mood and Affect: Mood normal.         Behavior: Behavior  normal.             Significant Labs: All pertinent labs within the past 24 hours have been reviewed.  BMP:   Recent Labs   Lab 02/02/24  0514   GLU 96   *   K 4.2      CO2 24   BUN 12   CREATININE 0.8   CALCIUM 8.3*     CBC:   Recent Labs   Lab 01/31/24  1652 02/01/24  0351 02/02/24  0514   WBC 3.76* 2.54* 2.62*   HGB 9.9* 9.5* 9.1*   HCT 29.4* 28.4* 27.2*    132* 166     CMP:   Recent Labs   Lab 02/01/24  1921 02/01/24  2315 02/02/24  0514   * 131* 130*   K 4.9 4.5 4.2   CL 98 100 101   CO2 27 25 24    111* 96   BUN 13 14 12   CREATININE 1.1 1.0 0.8   CALCIUM 8.7 8.2* 8.3*   ANIONGAP 6* 6* 5*       Significant Imaging: I have reviewed all pertinent imaging results/findings within the past 24 hours.

## 2024-02-02 NOTE — HPI
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 He was at an appt for wound care and they were concerned about the appearance of his incisional site so they recommend he come to the hospital. He reports top portion of incision has been healing well, however small area near bottom with some dehiscence and clear drainage without purulence and minimal surrounding erythema. No fevers, chills, sweats. On admission afebrile and HDS. WBC 5.46. MRI of T spine with post op changes, abnormal enhancement within soft tissues at postop bed and within subq tissues from T5-L1 with multiple areas of enhancement concerning for infection. He was started on cefazolin. He went to OR with NSGY on 2/2 for exploration and I&D which found copious serous fluid without purulence, some epidural tissue concerning for chronic hematoma, no purulence and fluids sent for cultures. ID consulted for wound dehiscence. Surgica cultures now positive for MRSE, including epidural cultures.

## 2024-02-02 NOTE — PROGRESS NOTES
Telemetry monitor #[id number] applied to pt and called in to monitor room in preparation for admission.

## 2024-02-02 NOTE — OP NOTE
DATE OF PROCEDURE: 2/2/2024       PREOPERATIVE DIAGNOSES:   Thoracic wound infection  Hx of metastatic renal cell cancer to cervical and thoracic spine  S/p 360 cervical decompression/fusion  S/p thoracic decompression/fusion     POSTOPERATIVE DIAGNOSES:   Same as above     PROCEDURES PERFORMED:   Thoracic wound washout  Complex wound closure        Surgeon(s) and Role:     * Nasim Martinez DO    Co surgeon: Ernesto Villanueva MD     FIRST ASSISTANT:  CHINO Ulrich; Brandon Hawk MD     ANESTHESIA: General     ESTIMATED BLOOD LOSS: 100 ml     CLINICAL HISTORY AND INDICATION FOR SURGERY:   73 M with hx of metastatic renal cell cancer to spine s/p cervical and more recent thoracic decompression/fusion presented to clinic with serous wound drainage from inferior aspect of his thoracic incision.  Imaging showed an enhancing supra/subfascial fluid collection but no epidural abscess or discitis.  His inflammatory markers were elevated.  He was offered a thoracic wound washout with complex revision.    All risks, benefits, alternatives were discussed and the pt agreed to the procedure.     OPERATIVE PROCEDURE:   The patient was brought to the OR where he was intubated and all lines were placed by anesthesia.  He was then transferred prone onto the [place] [id number] post with appropriate padding of all pressure points.  The prior thoracic incision was marked out, prepped, and draped in the usual sterile fashion.  Preop abx were held.  The incision was then opened with metzenbaum scissors.  Copious serous fluid came out under pressure which was sent for culture.  Vancomycin was then give for antibiotics once the culture was sent.  We then opened the fascial layer and encountered more serous fluid but no purulence.  This subfascial fluid was also sent as culture.  The hardware was then exposed in addition to the prior laminectomy defect from T8-10.  No epidural abscess was seen.  Some epidural tissue which appeared most  consistent with chronic hematoma was sent for culture.  The supra and subfascial tissue was debrided with yusuf elevators.  The wound was copiously irrigated with dilute betadine and saline solution.  Pulsavac lavage was then used to further debride the sub and suprafascial tissues.  1 gm of vanc powder was then placed in the subfascial space and a deep HV drain was tunneled out of the wound superiorly where it was secured with vicryl suture.  The wound was then turned over to Dr. Villanueva with plastics for wound closure.  Please see his op note for the remainder of the procedure.    I was present for all portions of my procedure and all counts were correct.  The patient appeared to tolerate the procedure well from a hemodynamic standpoint.     COMPLICATIONS: none     Justification of Co Surgeon:  It was felt that Dr. Villanueva with Plastics was needed in order to perform the complex wound revision and closure in order to optimize patient outcomes and wound healing in this patient with hx of metastatic renal cell cancer, prior thoracic decompression/fusion, and the need for postop radiation.

## 2024-02-02 NOTE — PROGRESS NOTES
Pharmacokinetic Initial Assessment: IV Vancomycin    Assessment/Plan:    1 GM given in surgery followed by a maintenance dose of vancomycin 1000mg IV every 12 hours  Desired empiric serum trough concentration is 10 to 20 mcg/mL  Draw vancomycin trough level 60 min prior to third dose on 2/3/24 at approximately 1900  Pharmacy will continue to follow and monitor vancomycin.      Please contact pharmacy at extension [phone] with any questions regarding this assessment.     Thank you for the consult,   eSrge Matias       Patient brief summary:  Gamaliel Pete Jr. is a 73 y.o. male initiated on antimicrobial therapy with IV Vancomycin for treatment of suspected skin & soft tissue infection    Drug Allergies:   Review of patient's allergies indicates:  No Known Allergies    Actual Body Weight:   87.1 kg    Renal Function:   Estimated Creatinine Clearance: 87.4 mL/min (based on SCr of 0.8 mg/dL).,     Dialysis Method (if applicable):  N/A    CBC (last 72 hours):  Recent Labs   Lab Result Units 01/31/24  1652 02/01/24  0351 02/02/24  0514   WBC K/uL 3.76* 2.54* 2.62*   Hemoglobin g/dL 9.9* 9.5* 9.1*   Hematocrit % 29.4* 28.4* 27.2*   Platelets K/uL 159 132* 166   Gran % % 81.7* 68.1 79.1*   Lymph % % 8.0* 17.7* 9.5*   Mono % % 8.5 11.0 8.4   Eosinophil % % 0.5 2.0 1.1   Basophil % % 0.5 0.4 0.4   Differential Method  Automated Automated Automated       Metabolic Panel (last 72 hours):  Recent Labs   Lab Result Units 01/31/24  1652 02/01/24  0351 02/01/24  0753 02/01/24  1053 02/01/24  1117 02/01/24  1921 02/01/24  2315 02/02/24  0514   Sodium mmol/L 126* 128* 129*  --  130* 131* 131* 130*   Sodium, Urine mmol/L  --   --   --  86  --   --   --   --    Potassium mmol/L 4.9 4.4 4.4  --  4.9 4.9 4.5 4.2   Chloride mmol/L 95 98 98  --  99 98 100 101   CO2 mmol/L 22* 25 24  --  26 27 25 24   Glucose mg/dL 120* 98 91  --  103 106 111* 96   BUN mg/dL 17 14 13  --  13 13 14 12   Creatinine mg/dL 1.3 1.0 1.0  --  1.0 1.1 1.0 0.8  "      Drug levels (last 3 results):  No results for input(s): "VANCOMYCINRA", "VANCORANDOM", "VANCOMYCINPE", "VANCOPEAK", "VANCOMYCINTR", "VANCOTROUGH" in the last 72 hours.    Microbiologic Results:  Microbiology Results (last 7 days)       Procedure Component Value Units Date/Time    Gram stain [[id number]] Collected: 02/02/24 0924    Order Status: Completed Specimen: Wound from Back Updated: 02/02/24 1355     Gram Stain Result No WBC's      No organisms seen    Narrative:      Epidural tissue    Gram stain [[id number]] Collected: 02/02/24 0924    Order Status: Completed Specimen: Wound from Back Updated: 02/02/24 1350     Gram Stain Result Rare WBC's      No organisms seen    Narrative:      Superficial fluid    Gram stain [[id number]] Collected: 02/02/24 0924    Order Status: Completed Specimen: Wound from Back Updated: 02/02/24 1341     Gram Stain Result Rare WBC's      No organisms seen    Narrative:      Epidural swab    Fungus culture [[id number]] Collected: 02/02/24 0924    Order Status: Sent Specimen: Wound from Back Updated: 02/02/24 0939    Aerobic culture [[id number]] Collected: 02/02/24 0924    Order Status: Sent Specimen: Wound from Back Updated: 02/02/24 0939    AFB Culture & Smear [[id number]] Collected: 02/02/24 0924    Order Status: Sent Specimen: Wound from Back Updated: 02/02/24 0939    Culture, Anaerobe [[id number]] Collected: 02/02/24 0924    Order Status: Sent Specimen: Wound from Back Updated: 02/02/24 0938    AFB Culture & Smear [[id number]] Collected: 02/02/24 0924    Order Status: Sent Specimen: Wound from Back Updated: 02/02/24 0937    Aerobic culture [[id number]] Collected: 02/02/24 0924    Order Status: Sent Specimen: Wound from Back Updated: 02/02/24 0937    Fungus culture [[id number]] Collected: 02/02/24 0924    Order Status: Sent Specimen: Wound from Back Updated: 02/02/24 0936    Culture, Anaerobe [[id number]] Collected: 02/02/24 0924    Order Status: Sent Specimen: Wound from Back " Updated: 02/02/24 0936    AFB Culture & Smear [[id number]] Collected: 02/02/24 0924    Order Status: Sent Specimen: Wound from Back Updated: 02/02/24 0935    Aerobic culture [[id number]] Collected: 02/02/24 0924    Order Status: Sent Specimen: Wound from Back Updated: 02/02/24 0934    Fungus culture [[id number]] Collected: 02/02/24 0924    Order Status: Sent Specimen: Wound from Back Updated: 02/02/24 0933    Culture, Anaerobe [[id number]] Collected: 02/02/24 0924    Order Status: Sent Specimen: Wound from Back Updated: 02/02/24 0933    Blood culture [[id number]] Collected: 01/31/24 1948    Order Status: Completed Specimen: Blood Updated: 02/01/24 2022     Blood Culture, Routine No Growth to date      No Growth to date

## 2024-02-02 NOTE — OP NOTE
Date of surgery 02/02/2024   Preoperative diagnosis spinal wound   Postoperative diagnosis is the same  Procedure performed  1. Excisional debridement of skin subcutaneous tissue fascia and muscle measuring 75 cm on each side for a total of 150 cm  2. Closure of spinal wound using bilateral paraspinous muscle flaps  3. Advancement flap closure of latissimus dorsi muscle measuring 25 cm by 6 cm  Placement of Prevena wound VAC  Surgeon Dayanara Leahy general  Complications none  Drains x2     After completion of the neurosurgical portion of the procedure I entered the room.  This patient had several surgeries in the past and had a chronic seroma which did not appear infected.  There was some nonviable skin surrounding the border of the wound inferiorly.  Since the patient did have a chronic seroma there was a serosal lined cavity overlying the muscle.  Some of the muscle did not look very healthy.  Thus on each side which measured 75 cm and 3 cm in depth all the skin subcutaneous tissue and fascia and muscle were debrided.  This was done on the opposite side as well 25 cm x 3 cm for a total debridement excisional of 150 cm.    Next, the paraspinous muscle had been partially  from the latissimus dorsi by about 1-1/2 cm on both sides.  But both the paraspinous muscles were totally scarred in.  Thus the latissimus dorsi muscle was elevated off of the paraspinous muscle and the muscle was dissected back proximally 5 cm the deep fascia overlying the paraspinous muscle was then incised.  This still did not allow adequate mobility to the muscle.  Thus the scar tissue below the paraspinous muscle was incised and the muscle was dissected back to its perforating arteries and veins.  These were based on the lumbar system.  Thus both paraspinous muscles were elevated on their vascular supply and then moved to the midline and closed using 0 PDS sutures.  Next, the latissimus dorsi muscles were simply advanced over  this using running 0 PDS sutures.  The superficial fascia was then closed using 2-0 PDS sutures.  The skin was then closed using interrupted 2-0 nylon alternating vertical mattress and simple sutures.  A Prevena wound VAC was then applied.

## 2024-02-02 NOTE — CARE UPDATE
Certification of Assistant at Surgery       Surgery Date: 2/2/2024     Participating Surgeons:  Surgeon(s) and Role:  Panel 1:     * Nasim Martinez DO - Primary     * Felicia Alcala PA-C - Assisting  Panel 2:     * Ernesto Villanueva MD - Primary    Procedures:  Procedure(s) (LRB):  EXPLORATION, WOUND (N/A)  CLOSURE (N/A)    Assistant Surgeon's Certification of Necessity:  I understand that section 1842 (b) (6) (d) of the Social Security Act generally prohibits Medicare Part B reasonable charge payment for the services of assistants at surgery in teaching hospitals when qualified residents are available to furnish such services. I certify that the services for which payment is claimed were medically necessary, and that no qualified resident was available to perform the services. I further understand that these services are subject to post-payment review by the Medicare carrier.      Felicia Alcala PA-C    02/02/2024  10:27 AM

## 2024-02-02 NOTE — PROGRESS NOTES
[facility] - Neurosurgery ([facility])  [facility] Medicine  Progress Note    Patient Name: Gamaliel Pete Jr.  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 HTN, and gout who presented for concerns of surgical site infection. He was at an appt for wound care and they were concerned about the appearance of his incisional site so they recommend he come to the hospital.     Hospital medicine was consulted to assist in managing his hyponatremia. The pt had been taking inlyta as part of his cancer regimen when he developed oral ulcers, a common side effect of this medication; this med has since been stopped. Secondary to the pain of these ulcers he had decreased oral intake. Duke's solution has recently been added and has alleviated much of the discomfort to where he feels he is eating more. He denies any focal weakness, headaches, vision changes, confusion, other neurologic symptoms; wife at bedside also denies noticing any neurologic changes in her .     Overview/Hospital Course:  No notes on file    Interval History: NAEON and no new complaints. Pt s/p OR today for washout. Na increasing at appropriate rate, will cont IVF.    Review of Systems   Constitutional:  Negative for chills and fever.   Respiratory:  Negative for shortness of breath.    Cardiovascular:  Negative for chest pain.   Gastrointestinal:  Negative for abdominal pain.   Genitourinary:  Negative for dysuria.   Neurological:  Negative for headaches.     Objective:     Vital Signs (Most  Recent):  Temp: 98.7 °F (37.1 °C) (02/02/24 1251)  Pulse: 84 (02/02/24 1150)  Resp: 16 (02/02/24 1150)  BP: 109/61 (02/02/24 1150)  SpO2: (!) 94 % (02/02/24 1150) Vital Signs (24h Range):  Temp:  [97.8 °F (36.6 °C)-102.8 °F (39.3 °C)] 98.7 °F (37.1 °C)  Pulse:  [] 84  Resp:  [14-23] 16  SpO2:  [87 %-98 %] 94 %  BP: (109-136)/(56-65) 109/61     Weight: 87.1 kg (192 lb)  Body mass index is 27.55 kg/m².    Intake/Output Summary (Last 24 hours) at 2/2/2024 1316  Last data filed at 2/2/2024 1002  Gross per 24 hour   Intake 1500 ml   Output 595 ml   Net 905 ml         Physical Exam  Constitutional:       General: He is not in acute distress.  HENT:      Head: Normocephalic and atraumatic.      Mouth/Throat:      Mouth: Mucous membranes are moist.   Eyes:      General: No scleral icterus.  Cardiovascular:      Rate and Rhythm: Normal rate and regular rhythm.      Pulses: Normal pulses.      Heart sounds: Normal heart sounds. No murmur heard.  Pulmonary:      Effort: Pulmonary effort is normal. No respiratory distress.      Breath sounds: Normal breath sounds. No wheezing.   Abdominal:      Palpations: Abdomen is soft.      Tenderness: There is no abdominal tenderness.   Musculoskeletal:      Right lower leg: No edema.   Skin:     General: Skin is warm and dry.   Neurological:      General: No focal deficit present.      Mental Status: He is alert and oriented to person, place, and time.   Psychiatric:         Mood and Affect: Mood normal.         Behavior: Behavior normal.             Significant Labs: All pertinent labs within the past 24 hours have been reviewed.  BMP:   Recent Labs   Lab 02/02/24  0514   GLU 96   *   K 4.2      CO2 24   BUN 12   CREATININE 0.8   CALCIUM 8.3*     CBC:   Recent Labs   Lab 01/31/24  1652 02/01/24  0351 02/02/24  0514   WBC 3.76* 2.54* 2.62*   HGB 9.9* 9.5* 9.1*   HCT 29.4* 28.4* 27.2*    132* 166     CMP:   Recent Labs   Lab 02/01/24  1921 02/01/24  5515  02/02/24  0514   * 131* 130*   K 4.9 4.5 4.2   CL 98 100 101   CO2 27 25 24    111* 96   BUN 13 14 12   CREATININE 1.1 1.0 0.8   CALCIUM 8.7 8.2* 8.3*   ANIONGAP 6* 6* 5*       Significant Imaging: I have reviewed all pertinent imaging results/findings within the past 24 hours.    Assessment/Plan:      * Surgical wound breakdown  -management per primary team      Wound dehiscence  -management per primary team       Hyponatremia  Patient has hyponatremia which is controlled,We will aim to correct the sodium by 4-6mEq in 24 hours. We will monitor sodium Every 6 hours. The hyponatremia is due to Dehydration/hypovolemia. We will obtain the following studies: Urine sodium, urine osmolality, serum osmolality. We will treat the hyponatremia with IV fluids as follows: 74 cc/hour. The patient's sodium results have been reviewed and are listed below.  Recent Labs   Lab 02/02/24  0514   *       Pt with hyponatremia as low as 126 in the setting of decreased oral intake 2/2 oral lesions. Negative for any neurological symptoms. Urine studies ordered but this is after normal saline had already been initiated: Serum osm- 276, urine osm-412, urine na-86. Serium Na has been improving with NS infusion; indicating hypovolemic hyponatremia.     -Normal saline 75 cc/hour; will consider increasing rate  -Correct Na by 4-6 mEq every 24 hours; if this is surpassed pause the NS infusion and consider D5 bolus if greatly over corrected.   -BMP q6 hours until Na corrected  -Neurochecks q4    Gout of ankle  -continue allopurinol       Essential hypertension  Chronic, controlled. Latest blood pressure and vitals reviewed-     Temp:  [97.8 °F (36.6 °C)-102.8 °F (39.3 °C)]   Pulse:  []   Resp:  [14-23]   BP: (109-136)/(56-65)   SpO2:  [87 %-98 %] .   Home meds for hypertension were reviewed and noted below.   Hypertension Medications               amlodipine-benazepril 5-10 mg (LOTREL) 5-10 mg per capsule Take 1 capsule by  mouth once daily.            While in the hospital, will manage blood pressure as follows; Continue home antihypertensive regimen        Metastatic cancer to spine  Patient has metastatic cancer of RCC primary. The cancer has metastasized to spine. The patient is under the care of an outpatient oncologist. The patient is undergoing active chemotherapy as follows- [unfilled] .Their staging information is listed below.   Cancer Staging   Clear cell carcinoma of right kidney  Staging form: Kidney, AJCC 8th Edition  - Clinical stage from 10/31/2023: Stage IV (cT3a, cNX, cM1) - Signed by Ruby Turk MD on 10/31/2023          VTE Risk Mitigation (From admission, onward)           Ordered     heparin (porcine) injection 5,000 Units  Every 8 hours         02/02/24 0920     IP VTE HIGH RISK PATIENT  Once         02/02/24 0920     Place JONATHON hose  Until discontinued         02/02/24 0920     Place sequential compression device  Until discontinued         02/02/24 0920     Place sequential compression device  Until discontinued         01/31/24 1330                    Discharge Planning   WAQAS: 2/5/2024     Code Status: Full Code   Is the patient medically ready for discharge?: No    Reason for patient still in hospital (select all that apply): Patient trending condition and Treatment  Discharge Plan A: Home with family, Home Health                  Dennis Davis DO  Department of Hospital Medicine   [facility] - Neurosurgery ([facility])

## 2024-02-02 NOTE — PLAN OF CARE
Problem: Adult Inpatient Plan of Care  Goal: Patient-Specific Goal (Individualized)  Description: Pt will maintain sbp below 180  Outcome: Ongoing, Progressing  Flowsheets (Taken 2/2/2024 0320)  Anxieties, Fears or Concerns: none  Individualized Care Needs: calm environment  Goal: Optimal Comfort and Wellbeing  Outcome: Ongoing, Progressing  Intervention: Monitor Pain and Promote Comfort  Flowsheets (Taken 2/2/2024 0320)  Pain Management Interventions: care clustered  Intervention: Provide Person-Centered Care  Flowsheets (Taken 2/2/2024 0320)  Trust Relationship/Rapport:   care explained   choices provided     Problem: Fall Injury Risk  Goal: Absence of Fall and Fall-Related Injury  Outcome: Ongoing, Progressing  Intervention: Identify and Manage Contributors  Flowsheets (Taken 2/2/2024 0320)  Self-Care Promotion: independence encouraged  Medication Review/Management: medications reviewed  Intervention: Promote Injury-Free Environment  Flowsheets (Taken 2/2/2024 0320)  Safety Promotion/Fall Prevention: bed alarm set    @1930 Team notified and tylenol given for temperature 102.8. Temperature returned to 98.4. VSS. Bed in lowest position,side rails x3, call light in reach. Pt. awaiting surgery or exploration, wound.

## 2024-02-02 NOTE — BRIEF OP NOTE
[facility] - Surgery ([facility])  Brief Operative Note    SUMMARY     Surgery Date: 2/2/2024     Surgeon(s) and Role:  Panel 1:     * Kin Lamb,  - Primary     * Felicia Alcala PA-C - Assisting  Panel 2:     * Ernesto Villanueva MD - Primary        Pre-op Diagnosis:  Surgical wound breakdown, initial encounter [T81.31XA]    Post-op Diagnosis:  Post-Op Diagnosis Codes:     * Surgical wound breakdown, initial encounter [T81.31XA]    Procedure(s) (LRB):  EXPLORATION, WOUND (N/A)  CLOSURE (N/A)    Anesthesia: General    Implants:  * No implants in log *    Operative Findings: Thoracic wound washout and closure with plastics surgery, cultures sent. Serosanguinous fluid seen, no purulence noted    Estimated Blood Loss: 50 mL    Estimated Blood Loss has been documented.         Specimens:   Specimen (24h ago, onward)       Start     Ordered    02/02/24 0908  Specimen to Pathology, Surgery Neurosurgery  Once        Comments: Pre-op Diagnosis: Surgical wound breakdown, initial encounter [T81.31XA]Procedure(s):EXPLORATION, WOUNDCLOSURE Number of specimens: 1Name of specimens: 1. Soft tissue back     References:    Click here for ordering Quick Tip   Question Answer Comment   Procedure Type: Neurosurgery    Specimen Class: Routine/Screening    Which provider would you like to cc? KIN LAMB    Which provider would you like to cc? ERNESTO VILLANUEVA    Release to patient Immediate        02/02/24 0908                    [id number]

## 2024-02-02 NOTE — CONSULTS
Lj Krueger - [facility]surgery [facility])  Infectious Disease  Consult Note    Patient Name: Gamaliel Pete Jr.  MRN: [id number]  Admission Date: 1/31/2024  Hospital Length of Stay: 2 days  Attending Physician: Nasim Martinez DO  Primary Care Provider: Slava Lowery MD     Isolation Status: No active isolations    Patient information was obtained from patient, past medical records, ER records, and primary team.      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 On admission afebrile and HDS. WBC 5.46. MRI of T spine with post op changes, abnormal enhancement within soft tissues at postop bed and within subq tissues from T5-L1 with multiple areas of enhancement concerning for infection. He was started on cefazolin. He went to OR with NSGY on 2/2 for exploration and I&D which found copious serous fluid without purulence, some epidural tissue concerning for chronic hematoma, no purulence and fluids sent for cultures. No concern for deep infection. Has had fevers as high as 102.8. ID consulted for wound dehiscence.     --follow surgical and blood cultures  --would broaden to vanc/cefepime for now and narrow pending cultures  --treatment duration TBD          Thank you for your consult. I will follow-up with patient. Please contact us if you have any additional questions.    Mariluz Webb DO  Infectious Disease  Lj harpreet - [facility]surgery [facility])    Subjective:      Principal Problem: Surgical wound breakdown    HPI: 73 year old male with pmh male w/ a significant PMHx of RCC metastasis to the spine s/p 360 cervical spine decompression/fusion in November and then a T9 vertebroplasty that failed, He was then taken for a T7-11 posterior decompression/fusion 1/5/24  (on Inlya + Keytruda), HTN, and gout who [place] [facility] [place] for concerns of surgical site infection. He was at an appt for wound care and they were concerned about the appearance of his incisional site so they recommend he come to the hospital. He reports top portion of incision has been healing well, however small area near bottom with some dehiscence and clear drainage without purulence and minimal surrounding erythema. No fevers, chills, sweats. On admission afebrile and HDS. WBC 5.46. MRI of T spine with post op changes, abnormal enhancement within soft tissues at postop bed and within subq tissues from T5-L1 with multiple areas of enhancement concerning for infection. He was started on cefazolin. He went to OR with NSGY on 2/2 for exploration and I&D which found copious serous fluid without purulence, some epidural tissue concerning for chronic hematoma, no purulence and fluids sent for cultures. ID consulted for wound dehiscence.       Past Medical History:   Diagnosis Date    Asthma     no inhaler use    Borderline hypertension     ED (erectile dysfunction)     Gout     Hematuria     Hypertension        Past Surgical History:   Procedure Laterality Date    COLONOSCOPY  02/10/2022    COLONOSCOPY N/A 02/10/2022    Procedure: COLONOSCOPY;  Surgeon: Desmond Keenan MD;  Location: [facility];  Service: General;  Laterality: N/A;    POSTERIOR FUSION OF CERVICAL SPINE WITH LAMINECTOMY N/A 11/15/2023    Procedure: SPINE, CERVICAL, WITH POSTERIOR FUSION T4-6;  Surgeon: Nasim Martinez DO;  Location: [address];  Service: Neurosurgery;  Laterality: N/A;  C2-T1 laminectomy and posterior fusion.  Amanda. C-arm. Banksnobosei. Neuromonitoring.    ROBOT-ASSISTED LAPAROSCOPIC NEPHRECTOMY Right 10/4/2023    Procedure: ROBOTIC NEPHRECTOMY;  Surgeon: Henry Michaels MD;  Location: [facility];  Service: Urology;  Laterality: Right;    SURGICAL REMOVAL OF VERTEBRAL BODY OF CERVICAL SPINE Right 11/7/2023    Procedure: CORPECTOMY, SPINE, CERVICAL;  Surgeon: Nasim Martinez DO;  Location: [facility] OR [facility] FLR;  Service: Neurosurgery;  Laterality: Right;  C4 and C5 corpectomy with globus; familia wells tongs; c-arm; neuromonitoring; microscope; type and cross 2 units    SURGICAL REMOVAL OF VERTEBRAL BODY OF CERVICAL SPINE N/A 11/15/2023    Procedure: CORPECTOMY, SPINE, CERVICAL C3-6 REVISION;  Surgeon: Nasim Martinez DO;  Location: [facility] OR 2ND FLR;  Service: Neurosurgery;  Laterality: N/A;    THORACIC LAMINECTOMY WITH FUSION N/A 1/5/2024    Procedure: LAMINECTOMY, SPINE, THORACIC, WITH FUSION;  Surgeon: Nasim Martinez DO;  Location: [facility] OR 2ND FLR;  Service: Neurosurgery;  Laterality: N/A;  T7-T11 fusions with robot    TONSILLECTOMY         Review of patient's allergies indicates:  No Known Allergies    Medications:  Medications Prior to Admission   Medication Sig    allopurinoL (ZYLOPRIM) 100 MG tablet Take 1 tablet (100 mg total) by mouth once daily.    amlodipine-benazepril 5-10 mg (LOTREL) 5-10 mg per capsule Take 1 capsule by mouth once daily.    axitinib (INLYTA) 5 mg Tab Take 1 tablet (5 mg) by mouth 2 (two) times daily.    diazePAM (VALIUM) 5 MG tablet Take 1 tablet (5 mg total) by mouth every 6 (six) hours as needed (severe muscles spasms). (Patient not taking: Reported on 1/18/2024)    duke's soln (benadryl 30 mL, mylanta 30 mL, LIDOcaine 30 mL, nystatin 30 mL) 120mL Take 10 mLs by mouth 4 (four) times daily.    gabapentin (NEURONTIN) 300 MG capsule Take 1 capsule (300 mg total) by mouth 3 (three) times daily.    naloxone (NARCAN) 4 mg/actuation Spry 1 spray (4mg) by nasal route as needed for opioid  overdose; may repeat every 2-3 minutes in alternating nostrils until medical help arrives. Call 911 (Patient not taking: Reported on 12/28/2023)    omega-3 fatty acids/fish oil (FISH OIL-OMEGA-3 FATTY ACIDS) 300-1,000 mg capsule Take 2 capsules by mouth once daily.    oxyCODONE-acetaminophen (PERCOCET) 5-325 mg per tablet Take 1 to 2 tablets by mouth every 4-6 hours as needed for pain    sildenafiL (VIAGRA) 100 MG tablet Take 1 tablet (100 mg total) by mouth daily as needed for Erectile Dysfunction. (Patient not taking: Reported on 1/18/2024)    turmeric (CURCUMIN MISC) 1,000 mg by Misc.(Non-Drug; Combo Route) route Daily.    UNABLE TO FIND Take 500 mg by mouth 2 (two) times a day. medication name: Tudca    UNABLE TO FIND Take 2 capsules by mouth 2 (two) times a day. medication name: Turkey tail mushroom    UNABLE TO FIND Take 15 drops by mouth once daily. medication name: Essiac-20    UNABLE TO FIND Take 5 mLs by mouth 2 (two) times a day. medication name: barley leaf juice powder    UNABLE TO FIND Take 5 mLs by mouth 2 (two) times a day. medication name: black seed oil (cumin seed)     Antibiotics (From admission, onward)      Start     Stop Route Frequency Ordered    02/02/24 2000  vancomycin (VANCOCIN) 1,000 mg in dextrose 5 % (D5W) 250 mL IVPB (Vial-Mate)         -- IV Every 12 hours (non-standard times) 02/02/24 1409    02/02/24 1445  ceFEPIme (MAXIPIME) 2 g in dextrose 5 % in water (D5W) 100 mL IVPB (MB+)         -- IV Every 8 hours (non-standard times) 02/02/24 1339    02/02/24 1438  vancomycin - pharmacy to dose  (vancomycin IVPB (PEDS and ADULTS))        See Hyperspace for full Linked Orders Report.    -- IV pharmacy to manage frequency 02/02/24 1338    02/02/24 0930  mupirocin 2 % ointment         02/07/24 0859 Nasl 2 times daily 02/02/24 0920          Antifungals (From admission, onward)      Start     Stop Route Frequency Ordered    01/31/24 2200  [facility] soln (benadryl 30 mL, mylanta 30 mL, LIDOcaine 30  mL, nystatin 30 mL) 120 mL         -- Oral 4 times daily 01/31/24 2159          Antivirals (From admission, onward)      None               There is no immunization history on file for this patient.    Family History    None       Social History     Socioeconomic History    Marital status:    Tobacco Use    Smoking status: Never    Smokeless tobacco: Never   Substance and Sexual Activity    Alcohol use: Not Currently     Alcohol/week: 0.0 standard drinks of alcohol     Comment: rarely    Drug use: Never     Social Determinants of Health     Financial Resource Strain: Low Risk  (2/1/2024)    Overall Financial Resource Strain (CARDIA)     Difficulty of Paying Living Expenses: Not hard at all   Food Insecurity: No Food Insecurity (2/1/2024)    Hunger Vital Sign     Worried About Running Out of Food in the Last Year: Never true     Ran Out of Food in the Last Year: Never true   Transportation Needs: No Transportation Needs (2/1/2024)    PRAPARE - Transportation     Lack of Transportation (Medical): No     Lack of Transportation (Non-Medical): No   Physical Activity: Inactive (2/1/2024)    Exercise Vital Sign     Days of Exercise per Week: 0 days     Minutes of Exercise per Session: 0 min   Stress: No Stress Concern Present (2/1/2024)    Citizen of [place] of Occupational Health - Occupational Stress Questionnaire     Feeling of Stress : Not at all   Social Connections: Moderately Isolated (2/1/2024)    Social Connection and Isolation Panel [NHANES]     Frequency of Communication with Friends and Family: More than three times a week     Frequency of Social Gatherings with Friends and Family: More than three times a week     Attends Judaism Services: Never     Active Member of Clubs or Organizations: No     Attends Club or Organization Meetings: Never     Marital Status:    Housing Stability: Low Risk  (2/1/2024)    Housing Stability Vital Sign     Unable to Pay for Housing in the Last Year: No     Number  of Places Lived in the Last Year: 1     Unstable Housing in the Last Year: No     Review of Systems   Constitutional:  Negative for appetite change, chills, fatigue and fever.   HENT:  Negative for congestion, sinus pain and sore throat.    Eyes: Negative.    Respiratory:  Negative for cough and shortness of breath.    Cardiovascular:  Negative for chest pain and leg swelling.   Gastrointestinal:  Negative for abdominal pain, constipation, diarrhea, nausea and vomiting.   Endocrine: Negative.    Genitourinary:  Negative for dysuria, flank pain and frequency.   Musculoskeletal:  Negative for arthralgias, back pain, joint swelling, myalgias and neck pain.   Skin:  Positive for wound. Negative for color change and rash.   Allergic/Immunologic: Positive for immunocompromised state.   Neurological:  Negative for weakness and headaches.   Hematological: Negative.    Psychiatric/Behavioral: Negative.       Objective:     Vital Signs (Most Recent):  Temp: 97.8 °F (36.6 °C) (02/02/24 1347)  Pulse: 73 (02/02/24 1347)  Resp: 18 (02/02/24 1347)  BP: 130/64 (02/02/24 1347)  SpO2: (!) 94 % (02/02/24 1347) Vital Signs (24h Range):  Temp:  [97.8 °F (36.6 °C)-102.8 °F (39.3 °C)] 97.8 °F (36.6 °C)  Pulse:  [] 73  Resp:  [14-23] 18  SpO2:  [87 %-98 %] 94 %  BP: (109-136)/(56-65) 130/64     Weight: 87.1 kg (192 lb)  Body mass index is 27.55 kg/m².    Estimated Creatinine Clearance: 87.4 mL/min (based on SCr of 0.8 mg/dL).     Physical Exam  Constitutional:       General: He is not in acute distress.     Appearance: He is not toxic-appearing.   HENT:      Head: Normocephalic and atraumatic.      Right Ear: External ear normal.      Left Ear: External ear normal.      Nose: Nose normal.      Mouth/Throat:      Mouth: Mucous membranes are moist.      Pharynx: Oropharynx is clear.   Eyes:      Extraocular Movements: Extraocular movements intact.      Conjunctiva/sclera: Conjunctivae normal.      Pupils: Pupils are equal, round, and  reactive to light.   Pulmonary:      Effort: Pulmonary effort is normal. No respiratory distress.   Abdominal:      General: There is no distension.      Palpations: Abdomen is soft.   Musculoskeletal:         General: No swelling or deformity.      Cervical back: Normal range of motion and neck supple.      Right lower leg: No edema.      Left lower leg: No edema.   Skin:     General: Skin is warm and dry.   Neurological:      General: No focal deficit present.      Mental Status: He is alert and oriented to person, place, and time. Mental status is at baseline.   Psychiatric:         Mood and Affect: Mood normal.         Behavior: Behavior normal.         Thought Content: Thought content normal.         Judgment: Judgment normal.                Significant Labs: All pertinent labs within the past 24 hours have been reviewed.    Significant Imaging: I have reviewed all pertinent imaging results/findings within the past 24 hours.

## 2024-02-02 NOTE — TRANSFER OF CARE
"Anesthesia Transfer of Care Note    Patient: Gamaliel Pete Jr.    Procedure(s) Performed: Procedure(s) (LRB):  EXPLORATION, WOUND (N/A)  CLOSURE (N/A)    Patient location: PACU    Anesthesia Type: general    Transport from OR: Transported from OR on 6-10 L/min O2 by face mask with adequate spontaneous ventilation    Post pain: adequate analgesia    Post assessment: no apparent anesthetic complications and tolerated procedure well    Post vital signs: stable    Level of consciousness: awake, alert and oriented    Nausea/Vomiting: no nausea/vomiting    Complications: none    Transfer of care protocol was followed    Last vitals: Visit Vitals  /65 (BP Location: Left arm, Patient Position: Lying)   Pulse 90   Temp 37.6 °C (99.7 °F) (Oral)   Resp 14   Ht 5' 10" (1.778 m)   Wt 87.1 kg (192 lb)   SpO2 (!) 94%   BMI 27.55 kg/m²     "

## 2024-02-02 NOTE — ASSESSMENT & PLAN NOTE
Chronic, controlled. Latest blood pressure and vitals reviewed-     Temp:  [97.8 °F (36.6 °C)-102.8 °F (39.3 °C)]   Pulse:  []   Resp:  [14-23]   BP: (109-136)/(56-65)   SpO2:  [87 %-98 %] .   Home meds for hypertension were reviewed and noted below.   Hypertension Medications               amlodipine-benazepril 5-10 mg (LOTREL) 5-10 mg per capsule Take 1 capsule by mouth once daily.            While in the hospital, will manage blood pressure as follows; Continue home antihypertensive regimen

## 2024-02-03 LAB
ANION GAP SERPL CALC-SCNC: 7 MMOL/L (ref 8–16)
ANION GAP SERPL CALC-SCNC: 8 MMOL/L (ref 8–16)
BUN SERPL-MCNC: 12 MG/DL (ref 8–23)
BUN SERPL-MCNC: 14 MG/DL (ref 8–23)
CALCIUM SERPL-MCNC: 8.6 MG/DL (ref 8.7–10.5)
CALCIUM SERPL-MCNC: 9 MG/DL (ref 8.7–10.5)
CHLORIDE SERPL-SCNC: 102 MMOL/L (ref 95–110)
CHLORIDE SERPL-SCNC: 102 MMOL/L (ref 95–110)
CO2 SERPL-SCNC: 22 MMOL/L (ref 23–29)
CO2 SERPL-SCNC: 25 MMOL/L (ref 23–29)
CREAT SERPL-MCNC: 0.8 MG/DL (ref 0.5–1.4)
CREAT SERPL-MCNC: 0.9 MG/DL (ref 0.5–1.4)
EST. GFR  (NO RACE VARIABLE): >60 ML/MIN/1.73 M^2
EST. GFR  (NO RACE VARIABLE): >60 ML/MIN/1.73 M^2
GLUCOSE SERPL-MCNC: 109 MG/DL (ref 70–110)
GLUCOSE SERPL-MCNC: 150 MG/DL (ref 70–110)
POTASSIUM SERPL-SCNC: 4.5 MMOL/L (ref 3.5–5.1)
POTASSIUM SERPL-SCNC: 4.5 MMOL/L (ref 3.5–5.1)
SODIUM SERPL-SCNC: 132 MMOL/L (ref 136–145)
SODIUM SERPL-SCNC: 134 MMOL/L (ref 136–145)
VANCOMYCIN TROUGH SERPL-MCNC: 13.1 UG/ML (ref 10–22)

## 2024-02-03 PROCEDURE — 97116 GAIT TRAINING THERAPY: CPT

## 2024-02-03 PROCEDURE — 80048 BASIC METABOLIC PNL TOTAL CA: CPT

## 2024-02-03 PROCEDURE — 63600175 PHARM REV CODE 636 W HCPCS: Performed by: INTERNAL MEDICINE

## 2024-02-03 PROCEDURE — 25000003 PHARM REV CODE 250: Performed by: STUDENT IN AN ORGANIZED HEALTH CARE EDUCATION/TRAINING PROGRAM

## 2024-02-03 PROCEDURE — 25000003 PHARM REV CODE 250: Performed by: INTERNAL MEDICINE

## 2024-02-03 PROCEDURE — 63600175 PHARM REV CODE 636 W HCPCS: Performed by: STUDENT IN AN ORGANIZED HEALTH CARE EDUCATION/TRAINING PROGRAM

## 2024-02-03 PROCEDURE — 25000003 PHARM REV CODE 250

## 2024-02-03 PROCEDURE — 36415 COLL VENOUS BLD VENIPUNCTURE: CPT | Mod: XB

## 2024-02-03 PROCEDURE — 99024 POSTOP FOLLOW-UP VISIT: CPT | Mod: ,,,

## 2024-02-03 PROCEDURE — 80202 ASSAY OF VANCOMYCIN: CPT | Performed by: STUDENT IN AN ORGANIZED HEALTH CARE EDUCATION/TRAINING PROGRAM

## 2024-02-03 PROCEDURE — 63600175 PHARM REV CODE 636 W HCPCS

## 2024-02-03 PROCEDURE — 36415 COLL VENOUS BLD VENIPUNCTURE: CPT | Mod: XB | Performed by: STUDENT IN AN ORGANIZED HEALTH CARE EDUCATION/TRAINING PROGRAM

## 2024-02-03 PROCEDURE — 25000003 PHARM REV CODE 250: Performed by: FAMILY MEDICINE

## 2024-02-03 PROCEDURE — 97162 PT EVAL MOD COMPLEX 30 MIN: CPT

## 2024-02-03 PROCEDURE — 80048 BASIC METABOLIC PNL TOTAL CA: CPT | Mod: 91

## 2024-02-03 PROCEDURE — 11000001 HC ACUTE MED/SURG PRIVATE ROOM

## 2024-02-03 PROCEDURE — 97530 THERAPEUTIC ACTIVITIES: CPT

## 2024-02-03 PROCEDURE — 97165 OT EVAL LOW COMPLEX 30 MIN: CPT

## 2024-02-03 PROCEDURE — 97535 SELF CARE MNGMENT TRAINING: CPT

## 2024-02-03 RX ADMIN — ACETAMINOPHEN 650 MG: 325 TABLET ORAL at 01:02

## 2024-02-03 RX ADMIN — VANCOMYCIN HYDROCHLORIDE 1000 MG: 1 INJECTION, POWDER, LYOPHILIZED, FOR SOLUTION INTRAVENOUS at 08:02

## 2024-02-03 RX ADMIN — ALLOPURINOL 100 MG: 100 TABLET ORAL at 08:02

## 2024-02-03 RX ADMIN — ACETAMINOPHEN 650 MG: 325 TABLET ORAL at 07:02

## 2024-02-03 RX ADMIN — CEFEPIME 2 G: 2 INJECTION, POWDER, FOR SOLUTION INTRAVENOUS at 10:02

## 2024-02-03 RX ADMIN — DIAZEPAM 5 MG: 5 TABLET ORAL at 07:02

## 2024-02-03 RX ADMIN — SODIUM CHLORIDE: 9 INJECTION, SOLUTION INTRAVENOUS at 07:02

## 2024-02-03 RX ADMIN — ACETAMINOPHEN 650 MG: 325 TABLET ORAL at 05:02

## 2024-02-03 RX ADMIN — ALUMINUM HYDROXIDE, MAGNESIUM HYDROXIDE, AND DIMETHICONE 10 ML: 400; 400; 40 SUSPENSION ORAL at 05:02

## 2024-02-03 RX ADMIN — GABAPENTIN 300 MG: 300 CAPSULE ORAL at 08:02

## 2024-02-03 RX ADMIN — AMLODIPINE BESYLATE AND BENAZEPRIL HYDROCHLORIDE 1 CAPSULE: 5; 10 CAPSULE ORAL at 08:02

## 2024-02-03 RX ADMIN — HEPARIN SODIUM 5000 UNITS: 5000 INJECTION INTRAVENOUS; SUBCUTANEOUS at 07:02

## 2024-02-03 RX ADMIN — GABAPENTIN 300 MG: 300 CAPSULE ORAL at 03:02

## 2024-02-03 RX ADMIN — ALUMINUM HYDROXIDE, MAGNESIUM HYDROXIDE, AND DIMETHICONE 10 ML: 400; 400; 40 SUSPENSION ORAL at 08:02

## 2024-02-03 RX ADMIN — CEFEPIME 2 G: 2 INJECTION, POWDER, FOR SOLUTION INTRAVENOUS at 03:02

## 2024-02-03 RX ADMIN — SODIUM CHLORIDE, POTASSIUM CHLORIDE, SODIUM LACTATE AND CALCIUM CHLORIDE: 600; 310; 30; 20 INJECTION, SOLUTION INTRAVENOUS at 02:02

## 2024-02-03 RX ADMIN — MUPIROCIN: 20 OINTMENT TOPICAL at 08:02

## 2024-02-03 RX ADMIN — VANCOMYCIN HYDROCHLORIDE 1000 MG: 1 INJECTION, POWDER, LYOPHILIZED, FOR SOLUTION INTRAVENOUS at 09:02

## 2024-02-03 RX ADMIN — DIAZEPAM 5 MG: 5 TABLET ORAL at 01:02

## 2024-02-03 RX ADMIN — CEFEPIME 2 G: 2 INJECTION, POWDER, FOR SOLUTION INTRAVENOUS at 07:02

## 2024-02-03 RX ADMIN — ALUMINUM HYDROXIDE, MAGNESIUM HYDROXIDE, AND DIMETHICONE 10 ML: 400; 400; 40 SUSPENSION ORAL at 09:02

## 2024-02-03 NOTE — PLAN OF CARE
TID     Patient at US on attempted visit. Afebrile and HDS, no new events per chart. A duplex US was Negative for DVT of Les. AM labs still pending. Gram stain of cultures from OR with NOS and few WBC. Will follow cultures. Continue vanc/cefepime for now. Final course and duration TBD.     Mariluz Webb, DO

## 2024-02-03 NOTE — NURSING
.Nurses Note -- 4 Eyes      2/2/23   1900      Skin assessed during: Q Shift Change    [x] Yes- Altered Skin Integrity Present or Discovered   Documented on flowsheet.  Wound Care Consulted? Yes    Attending Nurse:  Miguelina Schwab RN/Staff Member:  BRIAN Abrams

## 2024-02-03 NOTE — SUBJECTIVE & OBJECTIVE
Interval History: POD 1. NAEON. Afebrile. Neuro stable. Pain controlled. IV cef/vanc started per ID. OR Cultures pending. Keep drains. WV in place per plastics. Pending PT/OT. Tolerating PO this AM. Voiding spontaneously.    Medications:  Continuous Infusions:   sodium chloride 0.9% 75 mL/hr at 02/01/24 2234    sodium chloride 0.9% 75 mL/hr at 02/03/24 0754    lactated ringers 100 mL/hr at 02/03/24 0204     Scheduled Meds:   acetaminophen  650 mg Oral Q6H    allopurinoL  100 mg Oral Daily    amlodipine-benazepril 5-10 mg  1 capsule Oral Daily    ceFEPime IV (PEDS and ADULTS)  2 g Intravenous Q8H    diazePAM  5 mg Oral Q6H    duke's soln (benadryl 30 mL, mylanta 30 mL, LIDOcaine 30 mL, nystatin 30 mL) 120 mL  10 mL Oral QID    gabapentin  300 mg Oral TID    heparin (porcine)  5,000 Units Subcutaneous Q8H    mupirocin   Nasal BID    polyethylene glycol  17 g Oral Daily    vancomycin (VANCOCIN) IV (PEDS and ADULTS)  1,000 mg Intravenous Q12H     PRN Meds:acetaminophen, aluminum-magnesium hydroxide-simethicone, diazePAM, melatonin, methocarbamoL, morphine, oxyCODONE, oxyCODONE, oxyCODONE, prochlorperazine, senna-docusate 8.6-50 mg, Pharmacy to dose Vancomycin consult **AND** vancomycin - pharmacy to dose     Review of Systems  Objective:     Weight: 87.1 kg (192 lb)  Body mass index is 27.55 kg/m².  Vital Signs (Most Recent):  Temp: 97 °F (36.1 °C) (02/03/24 0459)  Pulse: 74 (02/03/24 0752)  Resp: 19 (02/03/24 0752)  BP: 124/63 (02/03/24 0752)  SpO2: (!) 90 % (02/03/24 0752) Vital Signs (24h Range):  Temp:  [97 °F (36.1 °C)-98.7 °F (37.1 °C)] 97 °F (36.1 °C)  Pulse:  [] 74  Resp:  [15-23] 19  SpO2:  [87 %-98 %] 90 %  BP: (109-141)/(56-65) 124/63     Date 02/03/24 0700 - 02/04/24 0659   Shift 3413-5090 1341-6079 3433-6134 24 Hour Total   INTAKE   Shift Total(mL/kg)       OUTPUT   Drains 130   130   Shift Total(mL/kg) 130(1.5)   130(1.5)   Weight (kg) 87.1 87.1 87.1 87.1                            Closed/Suction  "Drain 02/02/24 0914 Tube - 1 Inferior;Right Back Bulb 15 Fr. (Active)   Dressing Type Central line dressing 02/02/24 1946   Dressing Status Dry;Clean;Intact 02/02/24 1946   Dressing Intervention Integrity maintained 02/02/24 1946   Drainage Serosanguineous 02/02/24 1946   Status To bulb suction 02/02/24 1946   Output (mL) 5 mL 02/03/24 0722            Closed/Suction Drain 02/02/24 0840 Tube - 2 Lateral;Left;Superior Back Accordion 10 Fr. (Active)   Dressing Type Central line dressing 02/02/24 1946   Dressing Status Clean;Dry;Intact 02/02/24 1946   Dressing Intervention Integrity maintained 02/02/24 1946   Drainage Serosanguineous 02/02/24 1946   Status Other (Comment) 02/02/24 1109   Output (mL) 125 mL 02/03/24 0722         Neurosurgery Physical Exam  Physical Exam:  General: Well developed, well nourished, not in acute distress.   Head: Normocephalic, atraumatic.  Neck: Full ROM.   Neurologic: Alert and oriented x 4. Thought content appropriate.  GCS: Motor:6  Verbal:5  Eyes:4   GCS Total: 15  Language: No aphasia.  Speech: No dysarthria.  Cranial nerves: Face symmetric, tongue midline, CN II-XII grossly intact.   Eyes: Pupils equal, round, reactive to light with accomodation, EOMI.   Pulmonary: Normal respirations, no signs of respiratory distress.  Abdomen: Soft, non-distended, non-tender to palpation.  Sensory: Intact to light touch throughout.  Motor Strength: Moves all extremities spontaneously with good tone. Full strength upper and lower extremities. No abnormal movements seen.      Incision: Prevena in place.     Significant Labs:  Recent Labs   Lab 02/02/24  1154 02/02/24  1835 02/03/24  0013   * 138* 150*   * 131* 132*   K 4.6 4.4 4.5    102 102   CO2 23 23 22*   BUN 12 13 14   CREATININE 0.8 0.8 0.8   CALCIUM 8.2* 8.3* 8.6*     Recent Labs   Lab 02/02/24  0514   WBC 2.62*   HGB 9.1*   HCT 27.2*        No results for input(s): "LABPT", "INR", "APTT" in the last 48 " hours.  Microbiology Results (last 7 days)       Procedure Component Value Units Date/Time    Aerobic culture [[id number]] Collected: 02/02/24 0924    Order Status: Completed Specimen: Wound from Back Updated: 02/03/24 0730    Narrative:      Superficial fluid    Blood culture [[id number]] Collected: 01/31/24 1948    Order Status: Completed Specimen: Blood Updated: 02/02/24 2022     Blood Culture, Routine No Growth to date      No Growth to date      No Growth to date    Gram stain [[id number]] Collected: 02/02/24 0924    Order Status: Completed Specimen: Wound from Back Updated: 02/02/24 1355     Gram Stain Result No WBC's      No organisms seen    Narrative:      Epidural tissue    Gram stain [[id number]] Collected: 02/02/24 0924    Order Status: Completed Specimen: Wound from Back Updated: 02/02/24 1350     Gram Stain Result Rare WBC's      No organisms seen    Narrative:      Superficial fluid    Gram stain [[id number]] Collected: 02/02/24 0924    Order Status: Completed Specimen: Wound from Back Updated: 02/02/24 1341     Gram Stain Result Rare WBC's      No organisms seen    Narrative:      Epidural swab    Fungus culture [[id number]] Collected: 02/02/24 0924    Order Status: Sent Specimen: Wound from Back Updated: 02/02/24 0939    Aerobic culture [[id number]] Collected: 02/02/24 0924    Order Status: Sent Specimen: Wound from Back Updated: 02/02/24 0939    AFB Culture & Smear [[id number]] Collected: 02/02/24 0924    Order Status: Sent Specimen: Wound from Back Updated: 02/02/24 0939    Culture, Anaerobe [[id number]] Collected: 02/02/24 0924    Order Status: Sent Specimen: Wound from Back Updated: 02/02/24 0938    AFB Culture & Smear [[id number]] Collected: 02/02/24 0924    Order Status: Sent Specimen: Wound from Back Updated: 02/02/24 0937    Aerobic culture [[id number]] Collected: 02/02/24 0924    Order Status: Sent Specimen: Wound from Back Updated: 02/02/24 0937    Fungus culture [[id number]] Collected:  02/02/24 0924    Order Status: Sent Specimen: Wound from Back Updated: 02/02/24 0936    Culture, Anaerobe [[id number]] Collected: 02/02/24 0924    Order Status: Sent Specimen: Wound from Back Updated: 02/02/24 0936    AFB Culture & Smear [[id number]] Collected: 02/02/24 0924    Order Status: Sent Specimen: Wound from Back Updated: 02/02/24 0935    Fungus culture [[id number]] Collected: 02/02/24 0924    Order Status: Sent Specimen: Wound from Back Updated: 02/02/24 0933    Culture, Anaerobe [[id number]] Collected: 02/02/24 0924    Order Status: Sent Specimen: Wound from Back Updated: 02/02/24 0933          All pertinent labs from the last 24 hours have been reviewed.    Significant Diagnostics:  I have reviewed and interpreted all pertinent imaging results/findings within the past 24 hours.

## 2024-02-03 NOTE — PLAN OF CARE
Patient rounded on through shift as per policy, Patient in stable condition with no complaints. Safety measures in place: bed in lowest position, three rails up, call light in reach, personal belonging in reach. Patient had no complaint of pain after coming back from surgery. Had one episode of emesis right after surgery but have not complained of nausea or had emesis since. Spouse at bedside. Plan of care ongoing.       Problem: Adult Inpatient Plan of Care  Goal: Plan of Care Review  Outcome: Ongoing, Progressing  Goal: Patient-Specific Goal (Individualized)  Description: Pt will maintain sbp below 180  Outcome: Ongoing, Progressing  Goal: Absence of Hospital-Acquired Illness or Injury  Outcome: Ongoing, Progressing  Goal: Optimal Comfort and Wellbeing  Outcome: Ongoing, Progressing  Goal: Readiness for Transition of Care  Outcome: Ongoing, Progressing     Problem: Fluid and Electrolyte Imbalance (Acute Kidney Injury/Impairment)  Goal: Fluid and Electrolyte Balance  Outcome: Ongoing, Progressing     Problem: Oral Intake Inadequate (Acute Kidney Injury/Impairment)  Goal: Optimal Nutrition Intake  Outcome: Ongoing, Progressing     Problem: Renal Function Impairment (Acute Kidney Injury/Impairment)  Goal: Effective Renal Function  Outcome: Ongoing, Progressing     Problem: Impaired Wound Healing  Goal: Optimal Wound Healing  Outcome: Ongoing, Progressing     Problem: Fall Injury Risk  Goal: Absence of Fall and Fall-Related Injury  Outcome: Ongoing, Progressing     Problem: Skin Injury Risk Increased  Goal: Skin Health and Integrity  Outcome: Ongoing, Progressing     Problem: Infection  Goal: Absence of Infection Signs and Symptoms  Outcome: Ongoing, Progressing

## 2024-02-03 NOTE — PLAN OF CARE
Problem: Physical Therapy  Goal: Physical Therapy Goal  Description: Goals to be met by:      Patient will increase functional independence with mobility by performin. Supine to sit with modified independent   2. Sit to supine with Modified [place]  3. Sit to stand transfer with Modified [place]  4. Gait  x 150 feet with Stand-by Assistance using Rolling Walker.   5. Ascend/descend 5 stair with unilateral Handrails Contact Guard Assistance using no AD.     Outcome: Ongoing, Progressing   Evaluation completed, initiated plan of care.   Frieda Maurer, PT  2/3/2024

## 2024-02-03 NOTE — PROGRESS NOTES
Lj Krueger - Neurosurgery ([facility])  [facility] Medicine  Progress Note    Patient Name: Gamaliel Pete Jr.  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 HTN, and gout who presented for concerns of surgical site infection. He was at an appt for wound care and they were concerned about the appearance of his incisional site so they recommend he come to the hospital.     Hospital medicine was consulted to assist in managing his hyponatremia. The pt had been taking inlyta as part of his cancer regimen when he developed oral ulcers, a common side effect of this medication; this med has since been stopped. Secondary to the pain of these ulcers he had decreased oral intake. Duke's solution has recently been added and has alleviated much of the discomfort to where he feels he is eating more. He denies any focal weakness, headaches, vision changes, confusion, other neurologic symptoms; wife at bedside also denies noticing any neurologic changes in her .     Overview/Hospital Course:  No notes on file    Interval History: NAEON and no new complaints. Na 132 this am. Will consider stopping fluids after next BMP.    Review of Systems   Constitutional:  Negative for chills and fever.   Respiratory:  Negative for shortness of breath.    Cardiovascular:  Negative for chest pain and leg swelling.   Gastrointestinal:  Negative for abdominal pain.   Genitourinary:  Negative for dysuria.   Musculoskeletal:  Positive for back pain.   Neurological:  Negative for  dizziness, seizures, speech difficulty and headaches.     Objective:     Vital Signs (Most Recent):  Temp: 96.5 °F (35.8 °C) (02/03/24 0752)  Pulse: 74 (02/03/24 0752)  Resp: 19 (02/03/24 0752)  BP: 124/63 (02/03/24 0752)  SpO2: (!) 90 % (02/03/24 0900) Vital Signs (24h Range):  Temp:  [96.5 °F (35.8 °C)-98.7 °F (37.1 °C)] 96.5 °F (35.8 °C)  Pulse:  [61-84] 74  Resp:  [16-19] 19  SpO2:  [90 %-97 %] 90 %  BP: (109-141)/(60-65) 124/63     Weight: 87.1 kg (192 lb)  Body mass index is 27.55 kg/m².    Intake/Output Summary (Last 24 hours) at 2/3/2024 1047  Last data filed at 2/3/2024 0900  Gross per 24 hour   Intake 1540.87 ml   Output 2805 ml   Net -1264.13 ml         Physical Exam  Constitutional:       General: He is not in acute distress.  HENT:      Head: Normocephalic and atraumatic.      Mouth/Throat:      Mouth: Mucous membranes are moist.      Pharynx: Oropharynx is clear.   Eyes:      General: No scleral icterus.  Cardiovascular:      Rate and Rhythm: Normal rate and regular rhythm.      Pulses: Normal pulses.      Heart sounds: Normal heart sounds. No murmur heard.  Pulmonary:      Effort: Pulmonary effort is normal. No respiratory distress.      Breath sounds: Normal breath sounds. No wheezing.   Abdominal:      General: Abdomen is flat. There is no distension.      Palpations: Abdomen is soft.      Tenderness: There is no abdominal tenderness.   Musculoskeletal:      Right lower leg: No edema.      Left lower leg: No edema.   Skin:     General: Skin is warm and dry.   Neurological:      General: No focal deficit present.      Mental Status: He is alert and oriented to person, place, and time. Mental status is at baseline.   Psychiatric:         Mood and Affect: Mood normal.         Behavior: Behavior normal.             Significant Labs: All pertinent labs within the past 24 hours have been reviewed.  BMP:   Recent Labs   Lab 02/03/24  0013   *   *   K 4.5      CO2 22*   BUN 14    CREATININE 0.8   CALCIUM 8.6*     CBC:   Recent Labs   Lab 02/02/24  0514   WBC 2.62*   HGB 9.1*   HCT 27.2*          Significant Imaging: I have reviewed all pertinent imaging results/findings within the past 24 hours.    Assessment/Plan:      * Surgical wound breakdown  -management per primary team      Wound dehiscence  -management per primary team       Hyponatremia  Patient has hyponatremia which is controlled,We will aim to correct the sodium by 4-6mEq in 24 hours. We will monitor sodium Every 6 hours. The hyponatremia is due to Dehydration/hypovolemia. We will obtain the following studies: Urine sodium, urine osmolality, serum osmolality. We will treat the hyponatremia with IV fluids as follows: 74 cc/hour. The patient's sodium results have been reviewed and are listed below.  Recent Labs   Lab 02/03/24  0013   *       Pt with hyponatremia as low as 126 in the setting of decreased oral intake 2/2 oral lesions. Negative for any neurological symptoms. Urine studies ordered but this is after normal saline had already been initiated: Serum osm- 276, urine osm-412, urine na-86. Serium Na has been improving with NS infusion; indicating hypovolemic hyponatremia.     -Normal saline 75 cc/hour; will consider stopping today as he is nearing normal range   -Correct Na by 4-6 mEq every 24 hours; if this is surpassed pause the NS infusion and consider D5 bolus if greatly over corrected.   -BMP   -Neurochecks q4    Gout of ankle  -continue allopurinol       Essential hypertension  Chronic, controlled. Latest blood pressure and vitals reviewed-     Temp:  [96.5 °F (35.8 °C)-98.7 °F (37.1 °C)]   Pulse:  [61-84]   Resp:  [16-19]   BP: (109-141)/(60-65)   SpO2:  [90 %-97 %] .   Home meds for hypertension were reviewed and noted below.   Hypertension Medications               amlodipine-benazepril 5-10 mg (LOTREL) 5-10 mg per capsule Take 1 capsule by mouth once daily.            While in the hospital, will  manage blood pressure as follows; Continue home antihypertensive regimen        Metastatic cancer to spine  Patient has metastatic cancer of RCC primary. The cancer has metastasized to spine. The patient is under the care of an outpatient oncologist. The patient is undergoing active chemotherapy as follows- [unfilled] .Their staging information is listed below.    Cancer Staging   Clear cell carcinoma of right kidney  Staging form: Kidney, AJCC 8th Edition  - Clinical stage from 10/31/2023: Stage IV (cT3a, cNX, cM1) - Signed by Ruby Turk MD on 10/31/2023          VTE Risk Mitigation (From admission, onward)           Ordered     heparin (porcine) injection 5,000 Units  Every 8 hours         02/02/24 0920     IP VTE HIGH RISK PATIENT  Once         02/02/24 0920     Place JONATHON hose  Until discontinued         02/02/24 0920     Place sequential compression device  Until discontinued         02/02/24 0920     Place sequential compression device  Until discontinued         01/31/24 1330                    Discharge Planning   WAQAS: 2/5/2024     Code Status: Full Code   Is the patient medically ready for discharge?: No    Reason for patient still in hospital (select all that apply): Patient trending condition  Discharge Plan A: Home with family, Home Health        Hospital medicine will be signing off at this time, please do not hesitate to reach out with any further questions.           Dennis Davis DO  Department of Hospital Medicine   Lj harpreet - Neurosurgery ([facility])

## 2024-02-03 NOTE — SUBJECTIVE & OBJECTIVE
Interval History: tmax 98.7, awaiting labs this AM. On vanc/cefepime . Drains in place     Review of Systems   Constitutional:  Negative for chills and fever.   Respiratory:  Negative for cough and shortness of breath.    Cardiovascular:  Negative for chest pain and leg swelling.   Gastrointestinal:  Negative for abdominal pain, constipation, diarrhea, nausea and vomiting.   Genitourinary:  Negative for dysuria and frequency.   Musculoskeletal:  Positive for neck pain. Negative for back pain and joint swelling.   Skin:  Positive for wound. Negative for rash.   Allergic/Immunologic: Positive for immunocompromised state.   Neurological:  Negative for weakness.     Objective:     Vital Signs (Most Recent):  Temp: 97 °F (36.1 °C) (02/03/24 0459)  Pulse: 74 (02/03/24 0752)  Resp: 19 (02/03/24 0752)  BP: 124/63 (02/03/24 0752)  SpO2: (!) 90 % (02/03/24 0752) Vital Signs (24h Range):  Temp:  [97 °F (36.1 °C)-98.7 °F (37.1 °C)] 97 °F (36.1 °C)  Pulse:  [] 74  Resp:  [15-23] 19  SpO2:  [87 %-98 %] 90 %  BP: (109-141)/(56-65) 124/63     Weight: 87.1 kg (192 lb)  Body mass index is 27.55 kg/m².    Estimated Creatinine Clearance: 87.4 mL/min (based on SCr of 0.8 mg/dL).     Physical Exam  Vitals and nursing note reviewed.   Constitutional:       General: He is not in acute distress.     Appearance: He is not toxic-appearing.   HENT:      Head: Normocephalic and atraumatic.      Right Ear: External ear normal.      Left Ear: External ear normal.      Nose: Nose normal.      Mouth/Throat:      Mouth: Mucous membranes are moist.      Pharynx: Oropharynx is clear.   Eyes:      Extraocular Movements: Extraocular movements intact.      Conjunctiva/sclera: Conjunctivae normal.      Pupils: Pupils are equal, round, and reactive to light.   Cardiovascular:      Rate and Rhythm: Normal rate and regular rhythm.      Heart sounds: Normal heart sounds.   Pulmonary:      Effort: Pulmonary effort is normal.      Breath sounds: Normal  breath sounds.   Abdominal:      General: There is no distension.      Palpations: Abdomen is soft.      Tenderness: There is no abdominal tenderness.   Musculoskeletal:         General: No swelling or deformity.      Cervical back: Neck supple. No rigidity.      Comments: 2 drains in place    Skin:     General: Skin is warm and dry.      Findings: No erythema or rash.   Neurological:      General: No focal deficit present.      Mental Status: He is alert and oriented to person, place, and time. Mental status is at baseline.   Psychiatric:         Mood and Affect: Mood normal.         Behavior: Behavior normal.         Thought Content: Thought content normal.         Judgment: Judgment normal.          Significant Labs: All pertinent labs within the past 24 hours have been reviewed.    Significant Imaging: I have reviewed all pertinent imaging results/findings within the past 24 hours.

## 2024-02-03 NOTE — PLAN OF CARE
Problem: Adult Inpatient Plan of Care  Goal: Plan of Care Review  Outcome: Ongoing, Progressing  Goal: Patient-Specific Goal (Individualized)  Description: Pt will maintain sbp below 180  Outcome: Ongoing, Progressing     Problem: Impaired Wound Healing  Goal: Optimal Wound Healing  Outcome: Ongoing, Progressing     Problem: Fall Injury Risk  Goal: Absence of Fall and Fall-Related Injury  Outcome: Ongoing, Progressing     Problem: Skin Injury Risk Increased  Goal: Skin Health and Integrity  Outcome: Ongoing, Progressing     Problem: Infection  Goal: Absence of Infection Signs and Symptoms  Outcome: Ongoing, Progressing

## 2024-02-03 NOTE — PROGRESS NOTES
Lj Krueger - Neurosurgery ([facility])  Neurosurgery  Progress Note    Subjective:     History of Present Illness: Gamaliel Pete Jr. is a 73 y.o. 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 He was then taken for a T7-11 posterior decompression/fusion. The patient presented recently with scabbing to his lower thoracic incision. He was sent to wound care who advised him to be evaluated because it appeared worse. He presents to clinic today with reports of drainage from his incision. He denies fevers, headaches, numbness, tingling, focal weakness, bowel/bladder dysfunction. He will be direct admitted to the hospital for further workup and likely neurosurgical intervention to address the surgical wound breakdown.     Post-Op Info:  Procedure(s) (LRB):  EXPLORATION, WOUND (N/A)  CLOSURE (N/A)   1 Day Post-Op   Interval History: POD 1. NAEON. Afebrile. Neuro stable. Pain controlled. IV cef/vanc started per ID. OR Cultures pending. Keep drains. WV in place per plastics. Pending PT/OT. Tolerating PO this AM. Voiding spontaneously.    Medications:  Continuous Infusions:   sodium chloride 0.9% 75 mL/hr at 02/01/24 2234    sodium chloride 0.9% 75 mL/hr at 02/03/24 0754    lactated ringers 100 mL/hr at 02/03/24 0204     Scheduled Meds:   acetaminophen  650 mg Oral Q6H    allopurinoL  100 mg Oral Daily    amlodipine-benazepril 5-10 mg  1 capsule Oral Daily    ceFEPime IV (PEDS and ADULTS)  2 g Intravenous Q8H    diazePAM  5 mg Oral Q6H    duke's soln (benadryl 30 mL, mylanta 30 mL, LIDOcaine 30 mL, nystatin 30 mL) 120 mL  10 mL Oral QID    gabapentin  300 mg Oral TID    heparin (porcine)  5,000 Units Subcutaneous Q8H    mupirocin   Nasal BID    polyethylene glycol  17 g Oral Daily    vancomycin (VANCOCIN) IV (PEDS and ADULTS)  1,000 mg Intravenous Q12H     PRN Meds:acetaminophen, aluminum-magnesium hydroxide-simethicone, diazePAM,  melatonin, methocarbamoL, morphine, oxyCODONE, oxyCODONE, oxyCODONE, prochlorperazine, senna-docusate 8.6-50 mg, Pharmacy to dose Vancomycin consult **AND** vancomycin - pharmacy to dose     Review of Systems  Objective:     Weight: 87.1 kg (192 lb)  Body mass index is 27.55 kg/m².  Vital Signs (Most Recent):  Temp: 97 °F (36.1 °C) (02/03/24 0459)  Pulse: 74 (02/03/24 0752)  Resp: 19 (02/03/24 0752)  BP: 124/63 (02/03/24 0752)  SpO2: (!) 90 % (02/03/24 0752) Vital Signs (24h Range):  Temp:  [97 °F (36.1 °C)-98.7 °F (37.1 °C)] 97 °F (36.1 °C)  Pulse:  [] 74  Resp:  [15-23] 19  SpO2:  [87 %-98 %] 90 %  BP: (109-141)/(56-65) 124/63     Date 02/03/24 0700 - 02/04/24 0659   Shift 8048-4020 1582-4820 7220-1869 24 Hour Total   INTAKE   Shift Total(mL/kg)       OUTPUT   Drains 130   130   Shift Total(mL/kg) 130(1.5)   130(1.5)   Weight (kg) 87.1 87.1 87.1 87.1                            Closed/Suction Drain 02/02/24 0914 Tube - 1 Inferior;Right Back Bulb 15 Fr. (Active)   Dressing Type Central line dressing 02/02/24 1946   Dressing Status Dry;Clean;Intact 02/02/24 1946   Dressing Intervention Integrity maintained 02/02/24 1946   Drainage Serosanguineous 02/02/24 1946   Status To bulb suction 02/02/24 1946   Output (mL) 5 mL 02/03/24 0722            Closed/Suction Drain 02/02/24 0840 Tube - 2 Lateral;Left;Superior Back Accordion 10 Fr. (Active)   Dressing Type Central line dressing 02/02/24 1946   Dressing Status Clean;Dry;Intact 02/02/24 1946   Dressing Intervention Integrity maintained 02/02/24 1946   Drainage Serosanguineous 02/02/24 1946   Status Other (Comment) 02/02/24 1109   Output (mL) 125 mL 02/03/24 0722         Neurosurgery Physical Exam  Physical Exam:  General: Well developed, well nourished, not in acute distress.   Head: Normocephalic, atraumatic.  Neck: Full ROM.   Neurologic: Alert and oriented x 4. Thought content appropriate.  GCS: Motor:6  Verbal:5  Eyes:4   GCS Total: 15  Language: No  "aphasia.  Speech: No dysarthria.  Cranial nerves: Face symmetric, tongue midline, CN II-XII grossly intact.   Eyes: Pupils equal, round, reactive to light with accomodation, EOMI.   Pulmonary: Normal respirations, no signs of respiratory distress.  Abdomen: Soft, non-distended, non-tender to palpation.  Sensory: Intact to light touch throughout.  Motor Strength: Moves all extremities spontaneously with good tone. Full strength upper and lower extremities. No abnormal movements seen.      Incision: Prevena in place.     Significant Labs:  Recent Labs   Lab 02/02/24  1154 02/02/24  1835 02/03/24  0013   * 138* 150*   * 131* 132*   K 4.6 4.4 4.5    102 102   CO2 23 23 22*   BUN 12 13 14   CREATININE 0.8 0.8 0.8   CALCIUM 8.2* 8.3* 8.6*     Recent Labs   Lab 02/02/24  0514   WBC 2.62*   HGB 9.1*   HCT 27.2*        No results for input(s): "LABPT", "INR", "APTT" in the last 48 hours.  Microbiology Results (last 7 days)       Procedure Component Value Units Date/Time    Aerobic culture [[id number]] Collected: 02/02/24 0924    Order Status: Completed Specimen: Wound from Back Updated: 02/03/24 0730    Narrative:      Superficial fluid    Blood culture [[id number]] Collected: 01/31/24 1948    Order Status: Completed Specimen: Blood Updated: 02/02/24 2022     Blood Culture, Routine No Growth to date      No Growth to date      No Growth to date    Gram stain [[id number]] Collected: 02/02/24 0924    Order Status: Completed Specimen: Wound from Back Updated: 02/02/24 1355     Gram Stain Result No WBC's      No organisms seen    Narrative:      Epidural tissue    Gram stain [[id number]] Collected: 02/02/24 0924    Order Status: Completed Specimen: Wound from Back Updated: 02/02/24 1350     Gram Stain Result Rare WBC's      No organisms seen    Narrative:      Superficial fluid    Gram stain [[id number]] Collected: 02/02/24 0924    Order Status: Completed Specimen: Wound from Back Updated: 02/02/24 1341 "     Gram Stain Result Rare WBC's      No organisms seen    Narrative:      Epidural swab    Fungus culture [[id number]] Collected: 02/02/24 0924    Order Status: Sent Specimen: Wound from Back Updated: 02/02/24 0939    Aerobic culture [[id number]] Collected: 02/02/24 0924    Order Status: Sent Specimen: Wound from Back Updated: 02/02/24 0939    AFB Culture & Smear [[id number]] Collected: 02/02/24 0924    Order Status: Sent Specimen: Wound from Back Updated: 02/02/24 0939    Culture, Anaerobe [[id number]] Collected: 02/02/24 0924    Order Status: Sent Specimen: Wound from Back Updated: 02/02/24 0938    AFB Culture & Smear [[id number]] Collected: 02/02/24 0924    Order Status: Sent Specimen: Wound from Back Updated: 02/02/24 0937    Aerobic culture [[id number]] Collected: 02/02/24 0924    Order Status: Sent Specimen: Wound from Back Updated: 02/02/24 0937    Fungus culture [[id number]] Collected: 02/02/24 0924    Order Status: Sent Specimen: Wound from Back Updated: 02/02/24 0936    Culture, Anaerobe [[id number]] Collected: 02/02/24 0924    Order Status: Sent Specimen: Wound from Back Updated: 02/02/24 0936    AFB Culture & Smear [[id number]] Collected: 02/02/24 0924    Order Status: Sent Specimen: Wound from Back Updated: 02/02/24 0935    Fungus culture [[id number]] Collected: 02/02/24 0924    Order Status: Sent Specimen: Wound from Back Updated: 02/02/24 0933    Culture, Anaerobe [[id number]] Collected: 02/02/24 0924    Order Status: Sent Specimen: Wound from Back Updated: 02/02/24 0933          All pertinent labs from the last 24 hours have been reviewed.    Significant Diagnostics:  I have reviewed and interpreted all pertinent imaging results/findings within the past 24 hours.  Assessment/Plan:     * Surgical wound breakdown  Gamaliel Pete  is a 73 y.o. male s/p T7-11 decompression/fusion on 1/5/24. He presented to clinic on 1/31 with surgical wound breakdown.    Diagnostics:  MRI t spine w/wo con  1/31/24: no epidural abscess or infection, likely seroma. Adequate decompression of spinal cord.   ESR 69 Procal 0.24  Preop labs WNL.  Blood cultures 1/31/24: NGTD/pending.  OR cultures 2/2: pending.    Now s/p thoracic wound washout/revision with plastic surgery on 2/2.    - Admit to NPU under NSGY.  - q4h neurochecks.  - TLSO brace when up/out of bed. Okay for activities as tolerated.  - Drains to suction. Record output qshift.  - WV in place - per plastics.  - Hold chemo tx perioperatively.  - DVT ppx: deyanira hose/SCDs/LVX.  - Atelectasis ppx: up in chair, IS hourly.  - Bowel regimen.    D/w Dr. Martinez.    Hyponatremia  Na 126 on admission. Improving.    - Medicine consulted for assistance.            Nadine Santos PA-C  Neurosurgery  Lj Krueger - Neurosurgery ([facility])

## 2024-02-03 NOTE — ASSESSMENT & PLAN NOTE
Patient has hyponatremia which is controlled,We will aim to correct the sodium by 4-6mEq in 24 hours. We will monitor sodium Every 6 hours. The hyponatremia is due to Dehydration/hypovolemia. We will obtain the following studies: Urine sodium, urine osmolality, serum osmolality. We will treat the hyponatremia with IV fluids as follows: 74 cc/hour. The patient's sodium results have been reviewed and are listed below.  Recent Labs   Lab 02/03/24  0013   *       Pt with hyponatremia as low as 126 in the setting of decreased oral intake 2/2 oral lesions. Negative for any neurological symptoms. Urine studies ordered but this is after normal saline had already been initiated: Serum osm- 276, urine osm-412, urine na-86. Serium Na has been improving with NS infusion; indicating hypovolemic hyponatremia.     -Normal saline 75 cc/hour; will consider stopping today as he is nearing normal range   -Correct Na by 4-6 mEq every 24 hours; if this is surpassed pause the NS infusion and consider D5 bolus if greatly over corrected.   -BMP   -Neurochecks q4

## 2024-02-03 NOTE — ASSESSMENT & PLAN NOTE
Gamaliel Pete . is a 73 y.o. male s/p T7-11 decompression/fusion on 1/5/24. He presented to clinic on 1/31 with surgical wound breakdown.    Diagnostics:  MRI t spine w/wo con 1/31/24: no epidural abscess or infection, likely seroma. Adequate decompression of spinal cord.   ESR 69 Procal 0.24  Preop labs WNL.  Blood cultures 1/31/24: NGTD/pending.  OR cultures 2/2: pending.    Now s/p thoracic wound washout/revision with plastic surgery on 2/2.    - Admit to NPU under NSGY.  - q4h neurochecks.  - TLSO brace when up/out of bed. Okay for activities as tolerated.  - Drains to suction. Record output qshift.  - WV in place - per plastics.  - Hold chemo tx perioperatively.  - DVT ppx: deyanira hose/SCDs/LVX.  - Atelectasis ppx: up in chair, IS hourly.  - Bowel regimen.    D/w Dr. Martinez.

## 2024-02-03 NOTE — PLAN OF CARE
Pt engaged well in evaluative session this date.     Problem: Occupational Therapy  Goal: Occupational Therapy Goal  Description: Goals to be met by: 3/3/24     Patient will increase functional independence with ADLs by performing:    UE Dressing with Stand-by Assistance.  LE Dressing with Contact Guard Assistance.  Grooming while standing at sink with Stand-by Assistance.  Toileting from raised toilet with Minimal Assistance for hygiene and clothing management.   Step transfer with Stand-by Assistance/RW  Toilet transfer to raised toilet with Stand-by Assistance/RW  Upper extremity exercise program x10 reps per handout, with supervision.  2/3/2024 1513 by Melissa Abdalla, DEBBY  Outcome: Ongoing, Progressing

## 2024-02-03 NOTE — PT/OT/SLP EVAL
Occupational Therapy  Co -  Evaluation with PT    Co-evaluation/treatment performed due to patient's multiple deficits requiring two skilled therapists to appropriately and safely assess patient's strength and endurance while facilitating functional tasks in addition to accommodating for patient's activity tolerance.       Name: Gamaliel Pete Jr.  MRN: [id number]  Admitting Diagnosis: Surgical wound breakdown  Recent Surgery: Procedure(s) (LRB):  EXPLORATION, WOUND (N/A)  CLOSURE (N/A) 1 Day Post-Op    Recommendations:     Discharge Recommendations: Low Intensity Therapy  Discharge Equipment Recommendations:  bedside commode  Barriers to discharge:   increased skilled A needed    Assessment:     Gamaliel Pete Jr. is a 73 y.o. male with a medical diagnosis of Surgical wound breakdown.  He presents with performance deficits affecting function: weakness, impaired endurance, impaired self care skills, impaired functional mobility, decreased safety awareness, pain.  Pt engaged well in therapy this date, with pt's wife present. Pt able to complete bed mobility with light assistance, STS transfer with light assistance/RW, and functional mobility from EOB to toilet with light assistance. Pt's toilet was low, required min A x2 persons/RW to lower self to toilet seat. Pt spent remainder of session in bathroom while PT/OT educated wife on mobility.  BSC brought to pt's room to raise toilet height, and RW brought to room to assist with mobilization with 1 staff member. While pt communicated understanding that he does not need TLSO after surgery, MD note indicates that he does need TLSO for OOB mobility going forward.  Patient currently demonstrates a need for low intensity therapy on a scheduled basis secondary to a decline in functional status due to surgical procedure.        Rehab Prognosis: Good; patient would benefit from acute skilled OT services to address these deficits and reach maximum level of function.    "    Plan:     Patient to be seen 4 x/week to address the above listed problems via self-care/home management, therapeutic activities, therapeutic exercises, neuromuscular re-education  Plan of Care Expires: 03/03/24  Plan of Care Reviewed with: patient, spouse    Subjective     Chief Complaint: "My back hurts"   Patient/Family Comments/goals: Get better, return home     Occupational Profile:  Living Environment: Pt lives with wife in 2SH, 5STE to front door with "brick bolsters" and a railing after bolsters end. Pt sleeps on first floor on daybed, uses half bath, wife sleeps upstairs in primary bedroom.   Previous level of function: functional mobility: mod I with RW. Pt's wife states pt has only been transferring from bed to reclining chair, and staying in reclining chair for much of the day.  Pt communicates he needs max A for sponge bath, 25% A or set up for dressing  Roles and Routines: , father, grandfather, great grandfather.  Equipment Used at Home: walker, rolling Equipment Owned, doesn't use: SC  Assistance upon Discharge: wife and family     Pain/Comfort:  Pain Rating 1: 3/10  Location - Side 1: Bilateral  Location - Orientation 1: lower  Location 1: back  Pain Addressed 1: Distraction, Reposition  Pain Rating Post-Intervention 1:  (not quantified, but increased with mobility)    Patients cultural, spiritual, Oriental orthodox conflicts given the current situation: no    Objective:     Communicated with: RN prior to session.  Patient found HOB elevated with wound vac, peripheral IV (closed suction drain x2) upon OT entry to room. Wife present    General Precautions: Standard, fall  Orthopedic Precautions: spinal precautions  Braces: TLSO, Aspen collar (TLSO while OOB)  Respiratory Status: Room air    Occupational Performance:    Bed Mobility:    Patient completed Rolling/Turning to Left with  stand by assistance  Patient completed Rolling/Turning to Right with stand by assistance  Patient completed " Scooting/Bridging with stand by assistance  Patient completed Supine to Sit with stand by assistance  Pt sat EOB for strength testing ~10min, good balance and SBA    Functional Mobility/Transfers:  Patient completed Sit <> Stand Transfer   From EOB with contact guard assistance  with  rolling walker   To toilet with min A x2/RW due to low seat  Functional Mobility: Pt engaging in functional mobility to simulate household/community distances from EOB to toilet with CGA and utilizing RW with PT while writing OT managed IV pole, in order to maximize functional activity tolerance and standing balance required for engagement in occupations of choice.     Activities of Daily Living:  Upper Body Dressing: minimum assistance donning second robe like jacket  Lower Body Dressing: maximal assistance donning bilateral socks  Toileting: stand by assistance as pt used toilet, leaning forward and supported by RW.  Pt stayed on toilet ~10+ min, RN notified and pt handed off to RN with wife present    Cognitive/Visual Perceptual:  Cognitive/Psychosocial Skills:     -       Oriented to: AOx4   -       Follows Commands/attention:Follows multistep  commands  -       Communication: clear/fluent  -       Memory: No Deficits noted  -       Safety awareness/insight to disability: impaired   -       Mood/Affect/Coping skills/emotional control: Pleasant  Visual/Perceptual:      -Intact      Physical Exam:  Balance:    -       Impaired  Skin integrity: Visible skin intact  Edema:  None noted  Sensation:    -       Intact  Motor Planning:    -       Impaired  Dominant hand:    -       right  Upper Extremity Range of Motion:     -       BLE limited 2* pain, could not assess this date  Upper Extremity Strength:    -       Right Upper Extremity: WFL  -       Left Upper Extremity: WFL   Strength:    -       Right Upper Extremity: WFL  -       Left Upper Extremity: WFL  Fine Motor Coordination:    -       Intact  Neurological:    -        Impaired    AMPAC 6 Click ADL:  AMPAC Total Score: 17    Treatment & Education:  Pt educated on role of OT, POC, and goals for therapy.    POC was dicussed with patient/caregiver, who was included in its development and is in agreement with the identified goals and treatment plan.   Patient and family aware of patient's deficits and therapy progression.   Time provided for therapeutic counseling and discussion of health disposition.   Educated on importance of EOB/OOB mobility, maintaining routine, sitting up in chair, and maximizing independence with ADLs during admission   Pt completed ADLs and functional mobility for treatment session as noted above   Pt/caregiver verbalized understanding and expressed no further concerns/questions.  Updated communication board with level of assist required       Patient left  on toilet  with all lines intact, call button in reach, RN notified, and wife present    GOALS:   Multidisciplinary Problems       Occupational Therapy Goals          Problem: Occupational Therapy    Goal Priority Disciplines Outcome Interventions   Occupational Therapy Goal     OT, PT/OT Ongoing, Progressing    Description: Goals to be met by: 3/3/24     Patient will increase functional independence with ADLs by performing:    UE Dressing with Stand-by Assistance.  LE Dressing with Contact Guard Assistance.  Grooming while standing at sink with Stand-by Assistance.  Toileting from raised toilet with Minimal Assistance for hygiene and clothing management.   Step transfer with Stand-by Assistance/RW  Toilet transfer to raised toilet with Stand-by Assistance/RW  Upper extremity exercise program x10 reps per handout, with supervision.                       History:     Past Medical History:   Diagnosis Date    Asthma     no inhaler use    Borderline hypertension     ED (erectile dysfunction)     Gout     Hematuria     Hypertension          Past Surgical History:   Procedure Laterality Date    COLONOSCOPY   02/10/2022    COLONOSCOPY N/A 02/10/2022    Procedure: COLONOSCOPY;  Surgeon: Desmond Keenan MD;  Location: [facility];  Service: General;  Laterality: N/A;    POSTERIOR FUSION OF CERVICAL SPINE WITH LAMINECTOMY N/A 11/15/2023    Procedure: SPINE, CERVICAL, WITH POSTERIOR FUSION T4-6;  Surgeon: Nasim Martinez DO;  Location: [facility];  Service: Neurosurgery;  Laterality: N/A;  C2-T1 laminectomy and posterior fusion. [employer]. C-arm. [employer]. Neuromonitoring.    ROBOT-ASSISTED LAPAROSCOPIC NEPHRECTOMY Right 10/4/2023    Procedure: ROBOTIC NEPHRECTOMY;  Surgeon: Henry Michaels MD;  Location: [facility];  Service: Urology;  Laterality: Right;    SURGICAL REMOVAL OF VERTEBRAL BODY OF CERVICAL SPINE Right 11/7/2023    Procedure: CORPECTOMY, SPINE, CERVICAL;  Surgeon: Nasim Martinez DO;  [facility];  Service: Neurosurgery;  Laterality: Right;  C4 and C5 corpectomy with globus;  wells tongs; c-arm; neuromonitoring; microscope; type and cross 2 units    SURGICAL REMOVAL OF VERTEBRAL BODY OF CERVICAL SPINE N/A 11/15/2023    Procedure: CORPECTOMY, SPINE, CERVICAL C3-6 REVISION;  Surgeon: Nasim Martinez DO;  Location: [facility];  Service: Neurosurgery;  Laterality: N/A;    THORACIC LAMINECTOMY WITH FUSION N/A 1/5/2024    Procedure: LAMINECTOMY, SPINE, THORACIC, WITH FUSION;  Surgeon: Nasim Martinez DO;  [facility];  Service: Neurosurgery;  Laterality: N/A;  T7-T11 fusions with robot    TONSILLECTOMY         Time Tracking:     OT Date of Treatment: 02/03/24  OT Start Time: 1424  OT Stop Time: 1457  OT Total Time (min): 33 min    Billable Minutes:Evaluation 8  Self Care/Home Management 25    2/3/2024

## 2024-02-03 NOTE — ASSESSMENT & PLAN NOTE
Chronic, controlled. Latest blood pressure and vitals reviewed-     Temp:  [96.5 °F (35.8 °C)-98.7 °F (37.1 °C)]   Pulse:  [61-84]   Resp:  [16-19]   BP: (109-141)/(60-65)   SpO2:  [90 %-97 %] .   Home meds for hypertension were reviewed and noted below.   Hypertension Medications               amlodipine-benazepril 5-10 mg (LOTREL) 5-10 mg per capsule Take 1 capsule by mouth once daily.            While in the hospital, will manage blood pressure as follows; Continue home antihypertensive regimen

## 2024-02-03 NOTE — PT/OT/SLP EVAL
"Physical Therapy Evaluation  Co-evaluation with OT due to acuity of condition, level of skilled assist needed for assessment of safety with mobility.   Patient Name:  Gamaliel Pete Jr.   MRN:  [id number]    Recommendations:     Discharge Recommendations: Low Intensity Therapy   Discharge Equipment Recommendations: bedside commode   Barriers to discharge: None    Assessment:     Gamaliel Pete Jr. is a 73 y.o. male admitted with a medical diagnosis of Surgical wound breakdown.  He presents with the following impairments/functional limitations: weakness, impaired endurance, pain, impaired functional mobility, gait instability, decreased lower extremity function, impaired skin, edema, orthopedic precautions. The patient presents s/p wound washout and closure by plastics on 2/2/2024, patient with recent T7-11 decompression and fusion on 1/5/2024. The patient's mobility was limited by pain and generalized weakness. He stood with contact guard assist using RW, ambulated 12' to bathroom for toileting. Required minimum assistance for stand to sit on low height toilet. Patient needing additional time for toileting, he was in agreement to use call bell and RN alerted to patient's location. Patient currently demonstrates a need for low intensity therapy on a scheduled basis secondary to a decline in functional status due to illness, surgical procedure     Rehab Prognosis: Good; patient would benefit from acute skilled PT services to address these deficits and reach maximum level of function.    Recent Surgery: Procedure(s) (LRB):  EXPLORATION, WOUND (N/A)  CLOSURE (N/A) 1 Day Post-Op    Plan:     During this hospitalization, patient to be seen 3 x/week to address the identified rehab impairments via gait training, therapeutic activities, therapeutic exercises, neuromuscular re-education and progress toward the following goals:    Plan of Care Expires:  03/04/24    Subjective     Chief Complaint: "We went to the clinic, " "the wound nurse was going to drain my wound, but she was worried about the hardware, so Dr Martinez told us to come to the hospital"  Patient/Family Comments/goals: per wife "He's basically been sitting in the recliner for the past two weeks, that's how he's getting wounds on his butt, he just doesn't want to get up"  Pain/Comfort:  Pain Rating 1: 3/10  Location - Orientation 1: lower  Location 1: back  Pain Addressed 1: Reposition, Distraction  Pain Rating Post-Intervention 1:  (increased back pain upon sitting)    Patients cultural, spiritual, Judaism conflicts given the current situation: no    Living Environment:  The patient lives with his wife in a 2 SH (he stays on the first floor), 5 DIEGO with unilateral HR. Has a half bath downstairs, taking sponge baths with wife's assist.   Prior to admission, patients level of function was requiring assist with ADLs, ambulatory household distances with RW.  Equipment used at home: walker, rolling.  DME owned (not currently used): none.  Upon discharge, patient will have assistance from wife.    Objective:     Communicated with RN prior to session.  Patient found HOB elevated with WILLY drain, wound vac, telemetry, hemovac  upon PT entry to room.Wife at bedside.     General Precautions: Standard, fall  Orthopedic Precautions:N/A   Braces: TLSO (per patient- he did not need to wear TLSO, per neurosx note post-session- patient needs TLSO when out of bed)  Respiratory Status: Room air    Exams:    Cognitive Exam  Patient is A&O x4 and follows 100% of one -step commands    Fine Motor Coordination   -       WNL     Postural Exam Patient presented with the following abnormalities:    -       Rounded shoulders  -       Forward head  -       Kyphosis  -       Posterior pelvic tilt  -   Sensation    -       Light touch intact GENE LE   Skin Integrity/Edema     -       Skin integrity: incision clean and dressed  -       Edema: incisional swelling   R LE ROM WNL   R LE Strength 3/5 hip " flexion, 4-/5 knee ext/flex, and ankle DF/PF   L LE ROM WNL   L LE Strength  3/5 hip flexion, 4-/5 knee ext/flex, and ankle DF/PF            Functional Mobility:    Bed Mobility  Rolling to R: stand by assistance   Supine to Sit on the R side:  stand by assistance, increased time, good recall of log roll technique     Transfers Sit to Stand:  contact guard assist with RW from EOB    Stand to sit on low height toilet: minimum assistance with RW, bracing on RW, slow controlled descent   Gait  Gait Distance: 12 ft with RW  Assistance Level: contact guard assist   Description: kyphotic posture, short shuffling steps, slow deliberate gait speed, decreased foot clearance          AM-PAC 6 CLICK MOBILITY  Total Score:20       Treatment & Education:  Patient educated on:  -role of therapy  -goals of session  -PT POC  -benefits of out of bed mobility and consequences of immobility  -calling for staff assist to mobilize safely  Patient agreeable to mobilize with therapy.      Gait training: cued for upright posture, reciprocal strides, cued to ambulate inside RW AHMET, pacing for energy conservation     Patient encouraged to ambulate, sit up in chair 3x/day to prevent deconditioning during hospitalization. Patient verbalized understanding and agreement to mobilize only with RN assist for safety.     Patient safe to ambulate with RN using RW, RW and BSC left at bedside. RN alerted to patient's performance and that patient would call for assist when finished toileting.     Patient left  sitting on toilet  with all lines intact, call button in reach, RN notified, and wife present.    GOALS:   Multidisciplinary Problems       Physical Therapy Goals          Problem: Physical Therapy    Goal Priority Disciplines Outcome Goal Variances Interventions   Physical Therapy Goal     PT, PT/OT Ongoing, Progressing     Description: Goals to be met by:      Patient will increase functional independence with mobility by performin.  Supine to sit with modified independent   2. Sit to supine with Modified [place]  3. Sit to stand transfer with Modified [place]  4. Gait  x 150 feet with Stand-by Assistance using Rolling Walker.   5. Ascend/descend 5 stair with unilateral Handrails Contact Guard Assistance using no AD.                          History:     Past Medical History:   Diagnosis Date    Asthma     no inhaler use    Borderline hypertension     ED (erectile dysfunction)     Gout     Hematuria     Hypertension        Past Surgical History:   Procedure Laterality Date    COLONOSCOPY  02/10/2022    COLONOSCOPY N/A 02/10/2022    Procedure: COLONOSCOPY;  Surgeon: Desmond Keenan MD;  Location: [facility];  Service: General;  Laterality: N/A;    POSTERIOR FUSION OF CERVICAL SPINE WITH LAMINECTOMY N/A 11/15/2023    Procedure: SPINE, CERVICAL, WITH POSTERIOR FUSION T4-6;  Surgeon: Nasim Martinez DO;  Location: [address];  Service: Neurosurgery;  Laterality: N/A;  C2-T1 laminectomy and posterior fusion. [place]. C-arm. [employer]. Neuromonitoring.    ROBOT-ASSISTED LAPAROSCOPIC NEPHRECTOMY Right 10/4/2023    Procedure: ROBOTIC NEPHRECTOMY;  Surgeon: Henry Michaels MD;  Location: [facility];  Service: Urology;  Laterality: Right;    SURGICAL REMOVAL OF VERTEBRAL BODY OF CERVICAL SPINE Right 11/7/2023    Procedure: CORPECTOMY, SPINE, CERVICAL;  Surgeon: Nasim Martinez DO;  Location: [address];  Service: Neurosurgery;  Laterality: Right;  C4 and C5 corpectomy with globus;  wells tongs; c-arm; neuromonitoring; microscope; type and cross 2 units    SURGICAL REMOVAL OF VERTEBRAL BODY OF CERVICAL SPINE N/A 11/15/2023    Procedure: CORPECTOMY, SPINE, CERVICAL C3-6 REVISION;  Surgeon: Nasim Martinez DO;  [facility];  Service: Neurosurgery;  Laterality: N/A;    THORACIC LAMINECTOMY WITH FUSION N/A 1/5/2024    Procedure: LAMINECTOMY, SPINE, THORACIC, WITH FUSION;  Surgeon: Nasim Martinez DO;   Location: [facility] OR [address] [place] [address];  Service: Neurosurgery;  Laterality: N/A;  T7-T11 fusions with robot    TONSILLECTOMY         Time Tracking:     PT Received On: 02/03/24  PT Start Time: 1425     PT Stop Time: 1457  PT Total Time (min): 32 min     Billable Minutes: Evaluation 8, Gait Training 12, and Therapeutic Activity 12      02/03/2024

## 2024-02-03 NOTE — PROGRESS NOTES
Plastic and Reconstructive Surgery   Progress Note    Subjective:    Doing well no issues overnight   Pain control  Prevena in place with output   WILLY serosanguineous 15cc/overnight  Has not been out of bed yet    Objective:  Vital signs in last 24 hours:  Temp:  [97 °F (36.1 °C)-98.7 °F (37.1 °C)] 97 °F (36.1 °C)  Pulse:  [] 74  Resp:  [15-23] 19  SpO2:  [87 %-98 %] 90 %  BP: (109-141)/(56-65) 124/63    Intake/Output last 3 shifts:  I/O last 3 completed shifts:  In: 2366.7 [P.O.:120; I.V.:2000; IV Piggyback:246.7]  Out: 3270 [Urine:2845; Drains:375; Blood:50]    Intake/Output this shift:  I/O this shift:  In: -   Out: 130 [Drains:130]        Physical Exam:  VITAL SIGNS:   Vitals:    24 2322 24 0002 24 0459 24 0752   BP:  (!) 141/65 125/62 124/63   BP Location:  Right arm Right arm Right arm   Patient Position:  Lying Lying Lying   Pulse: 62 63 69 74   Resp:  18 16 19   Temp:  97.5 °F (36.4 °C) 97 °F (36.1 °C)    TempSrc:  Axillary Oral    SpO2:  96% 95% (!) 90%   Weight:       Height:         TMAX: Temp (24hrs), Av.8 °F (36.6 °C), Min:97 °F (36.1 °C), Max:98.7 °F (37.1 °C)    General: Alert; No acute distress  Cardiovascular: Regular rate   Respiratory: Normal respiratory effort. Chest rise symmetric.   Abdomen: Soft, nontender, nondistended  Extremity: Moves all extremities equally.  Neurologic: No focal deficit. Speech normal  Back: appropriate ttp, WILLY serosang, Hemovac in place also serosang, Prevena with good suction    Scheduled Medications acetaminophen, 650 mg, Q6H  allopurinoL, 100 mg, Daily  amlodipine-benazepril 5-10 mg, 1 capsule, Daily  ceFEPime IV (PEDS and ADULTS), 2 g, Q8H  diazePAM, 5 mg, Q6H  duke's soln (benadryl 30 mL, mylanta 30 mL, LIDOcaine 30 mL, nystatin 30 mL) 120 mL, 10 mL, QID  gabapentin, 300 mg, TID  heparin (porcine), 5,000 Units, Q8H  mupirocin, , BID  polyethylene glycol, 17 g, Daily  vancomycin (VANCOCIN) IV (PEDS and ADULTS), 1,000 mg, Q12H      PRN  Medications acetaminophen, aluminum-magnesium hydroxide-simethicone, diazePAM, melatonin, methocarbamoL, morphine, oxyCODONE, oxyCODONE, oxyCODONE, prochlorperazine, senna-docusate 8.6-50 mg, Pharmacy to dose Vancomycin consult **AND** vancomycin - pharmacy to dose    Recent Labs:   Lab Results   Component Value Date    WBC 2.62 (L) 02/02/2024    HGB 9.1 (L) 02/02/2024    HCT 27.2 (L) 02/02/2024    MCV 87 02/02/2024     02/02/2024     Lab Results   Component Value Date     (H) 02/03/2024     (L) 02/03/2024    K 4.5 02/03/2024     02/03/2024    BUN 14 02/03/2024         Assessment: 73 y.o. y/o male 1 Day Post-Op s/p Procedure(s):  EXPLORATION, WOUND  CLOSURE Doing well postoperatively.    Plan  Pain control  Reg diet  Incentive spirometry  OK to ambulate  DVT ppx  Please do not remove WILLY/Prevena placed by Plastic Surgery     Bruce Palomares MD- Fellow  Department of Plastic and Reconstructive Surgery  [phone] (office)

## 2024-02-03 NOTE — SUBJECTIVE & OBJECTIVE
Interval History: NAEON and no new complaints. Na 132 this am. Will consider stopping fluids after next BMP.    Review of Systems   Constitutional:  Negative for chills and fever.   Respiratory:  Negative for shortness of breath.    Cardiovascular:  Negative for chest pain and leg swelling.   Gastrointestinal:  Negative for abdominal pain.   Genitourinary:  Negative for dysuria.   Musculoskeletal:  Positive for back pain.   Neurological:  Negative for dizziness, seizures, speech difficulty and headaches.     Objective:     Vital Signs (Most Recent):  Temp: 96.5 °F (35.8 °C) (02/03/24 0752)  Pulse: 74 (02/03/24 0752)  Resp: 19 (02/03/24 0752)  BP: 124/63 (02/03/24 0752)  SpO2: (!) 90 % (02/03/24 0900) Vital Signs (24h Range):  Temp:  [96.5 °F (35.8 °C)-98.7 °F (37.1 °C)] 96.5 °F (35.8 °C)  Pulse:  [61-84] 74  Resp:  [16-19] 19  SpO2:  [90 %-97 %] 90 %  BP: (109-141)/(60-65) 124/63     Weight: 87.1 kg (192 lb)  Body mass index is 27.55 kg/m².    Intake/Output Summary (Last 24 hours) at 2/3/2024 1047  Last data filed at 2/3/2024 0900  Gross per 24 hour   Intake 1540.87 ml   Output 2805 ml   Net -1264.13 ml         Physical Exam  Constitutional:       General: He is not in acute distress.  HENT:      Head: Normocephalic and atraumatic.      Mouth/Throat:      Mouth: Mucous membranes are moist.      Pharynx: Oropharynx is clear.   Eyes:      General: No scleral icterus.  Cardiovascular:      Rate and Rhythm: Normal rate and regular rhythm.      Pulses: Normal pulses.      Heart sounds: Normal heart sounds. No murmur heard.  Pulmonary:      Effort: Pulmonary effort is normal. No respiratory distress.      Breath sounds: Normal breath sounds. No wheezing.   Abdominal:      General: Abdomen is flat. There is no distension.      Palpations: Abdomen is soft.      Tenderness: There is no abdominal tenderness.   Musculoskeletal:      Right lower leg: No edema.      Left lower leg: No edema.   Skin:     General: Skin is warm and  dry.   Neurological:      General: No focal deficit present.      Mental Status: He is alert and oriented to person, place, and time. Mental status is at baseline.   Psychiatric:         Mood and Affect: Mood normal.         Behavior: Behavior normal.             Significant Labs: All pertinent labs within the past 24 hours have been reviewed.  BMP:   Recent Labs   Lab 02/03/24  0013   *   *   K 4.5      CO2 22*   BUN 14   CREATININE 0.8   CALCIUM 8.6*     CBC:   Recent Labs   Lab 02/02/24  0514   WBC 2.62*   HGB 9.1*   HCT 27.2*          Significant Imaging: I have reviewed all pertinent imaging results/findings within the past 24 hours.

## 2024-02-03 NOTE — NURSING
Pt refused second lab draw this morning; states that he was stuck once and did not wish to be stuck again. Instructed to lab tech to return later on in the morning.

## 2024-02-04 LAB
ANISOCYTOSIS BLD QL SMEAR: SLIGHT
BASOPHILS # BLD AUTO: ABNORMAL K/UL (ref 0–0.2)
BASOPHILS NFR BLD: 0 % (ref 0–1.9)
DIFFERENTIAL METHOD BLD: ABNORMAL
EOSINOPHIL # BLD AUTO: ABNORMAL K/UL (ref 0–0.5)
EOSINOPHIL NFR BLD: 0 % (ref 0–8)
ERYTHROCYTE [DISTWIDTH] IN BLOOD BY AUTOMATED COUNT: 15.6 % (ref 11.5–14.5)
HCT VFR BLD AUTO: 27.5 % (ref 40–54)
HGB BLD-MCNC: 9 G/DL (ref 14–18)
HYPOCHROMIA BLD QL SMEAR: ABNORMAL
IMM GRANULOCYTES # BLD AUTO: ABNORMAL K/UL (ref 0–0.04)
IMM GRANULOCYTES NFR BLD AUTO: ABNORMAL % (ref 0–0.5)
LYMPHOCYTES # BLD AUTO: ABNORMAL K/UL (ref 1–4.8)
LYMPHOCYTES NFR BLD: 1 % (ref 18–48)
MCH RBC QN AUTO: 29.1 PG (ref 27–31)
MCHC RBC AUTO-ENTMCNC: 32.7 G/DL (ref 32–36)
MCV RBC AUTO: 89 FL (ref 82–98)
MONOCYTES # BLD AUTO: ABNORMAL K/UL (ref 0.3–1)
MONOCYTES NFR BLD: 1 % (ref 4–15)
MYELOCYTES NFR BLD MANUAL: 1 %
NEUTROPHILS NFR BLD: 93 % (ref 38–73)
NEUTS BAND NFR BLD MANUAL: 4 %
NRBC BLD-RTO: 0 /100 WBC
PLATELET # BLD AUTO: 259 K/UL (ref 150–450)
PMV BLD AUTO: 8.9 FL (ref 9.2–12.9)
POIKILOCYTOSIS BLD QL SMEAR: SLIGHT
POLYCHROMASIA BLD QL SMEAR: ABNORMAL
RBC # BLD AUTO: 3.09 M/UL (ref 4.6–6.2)
SPHEROCYTES BLD QL SMEAR: ABNORMAL
WBC # BLD AUTO: 3.5 K/UL (ref 3.9–12.7)

## 2024-02-04 PROCEDURE — 25000003 PHARM REV CODE 250: Performed by: STUDENT IN AN ORGANIZED HEALTH CARE EDUCATION/TRAINING PROGRAM

## 2024-02-04 PROCEDURE — 99223 1ST HOSP IP/OBS HIGH 75: CPT | Mod: ,,, | Performed by: INTERNAL MEDICINE

## 2024-02-04 PROCEDURE — 36415 COLL VENOUS BLD VENIPUNCTURE: CPT

## 2024-02-04 PROCEDURE — 63600175 PHARM REV CODE 636 W HCPCS: Performed by: INTERNAL MEDICINE

## 2024-02-04 PROCEDURE — 25000003 PHARM REV CODE 250: Performed by: INTERNAL MEDICINE

## 2024-02-04 PROCEDURE — 63600175 PHARM REV CODE 636 W HCPCS

## 2024-02-04 PROCEDURE — 11000001 HC ACUTE MED/SURG PRIVATE ROOM

## 2024-02-04 PROCEDURE — 63600175 PHARM REV CODE 636 W HCPCS: Performed by: STUDENT IN AN ORGANIZED HEALTH CARE EDUCATION/TRAINING PROGRAM

## 2024-02-04 PROCEDURE — 25000003 PHARM REV CODE 250

## 2024-02-04 PROCEDURE — 85007 BL SMEAR W/DIFF WBC COUNT: CPT

## 2024-02-04 PROCEDURE — 85027 COMPLETE CBC AUTOMATED: CPT

## 2024-02-04 RX ORDER — DICLOFENAC SODIUM 10 MG/G
2 GEL TOPICAL 2 TIMES DAILY PRN
Status: DISCONTINUED | OUTPATIENT
Start: 2024-02-04 | End: 2024-02-08 | Stop reason: HOSPADM

## 2024-02-04 RX ADMIN — DIAZEPAM 5 MG: 5 TABLET ORAL at 05:02

## 2024-02-04 RX ADMIN — VANCOMYCIN HYDROCHLORIDE 1000 MG: 1 INJECTION, POWDER, LYOPHILIZED, FOR SOLUTION INTRAVENOUS at 09:02

## 2024-02-04 RX ADMIN — HEPARIN SODIUM 5000 UNITS: 5000 INJECTION INTRAVENOUS; SUBCUTANEOUS at 09:02

## 2024-02-04 RX ADMIN — GABAPENTIN 300 MG: 300 CAPSULE ORAL at 02:02

## 2024-02-04 RX ADMIN — ALLOPURINOL 100 MG: 100 TABLET ORAL at 08:02

## 2024-02-04 RX ADMIN — HEPARIN SODIUM 5000 UNITS: 5000 INJECTION INTRAVENOUS; SUBCUTANEOUS at 02:02

## 2024-02-04 RX ADMIN — VANCOMYCIN HYDROCHLORIDE 1000 MG: 1 INJECTION, POWDER, LYOPHILIZED, FOR SOLUTION INTRAVENOUS at 08:02

## 2024-02-04 RX ADMIN — GABAPENTIN 300 MG: 300 CAPSULE ORAL at 09:02

## 2024-02-04 RX ADMIN — ALUMINUM HYDROXIDE, MAGNESIUM HYDROXIDE, AND DIMETHICONE 10 ML: 400; 400; 40 SUSPENSION ORAL at 08:02

## 2024-02-04 RX ADMIN — CEFEPIME 2 G: 2 INJECTION, POWDER, FOR SOLUTION INTRAVENOUS at 06:02

## 2024-02-04 RX ADMIN — ACETAMINOPHEN 650 MG: 325 TABLET ORAL at 05:02

## 2024-02-04 RX ADMIN — GABAPENTIN 300 MG: 300 CAPSULE ORAL at 08:02

## 2024-02-04 NOTE — SUBJECTIVE & OBJECTIVE
Interval History: 2/4 naeon, exam stable, oob, WV in place, ID on board    Medications:  Continuous Infusions:   sodium chloride 0.9% 75 mL/hr at 02/03/24 0754    lactated ringers 100 mL/hr at 02/03/24 0204     Scheduled Meds:   acetaminophen  650 mg Oral Q6H    allopurinoL  100 mg Oral Daily    amlodipine-benazepril 5-10 mg  1 capsule Oral Daily    ceFEPime IV (PEDS and ADULTS)  2 g Intravenous Q8H    diazePAM  5 mg Oral Q6H    duke's soln (benadryl 30 mL, mylanta 30 mL, LIDOcaine 30 mL, nystatin 30 mL) 120 mL  10 mL Oral QID    gabapentin  300 mg Oral TID    heparin (porcine)  5,000 Units Subcutaneous Q8H    mupirocin   Nasal BID    polyethylene glycol  17 g Oral Daily    vancomycin (VANCOCIN) IV (PEDS and ADULTS)  1,000 mg Intravenous Q12H     PRN Meds:acetaminophen, aluminum-magnesium hydroxide-simethicone, diazePAM, melatonin, methocarbamoL, morphine, oxyCODONE, oxyCODONE, oxyCODONE, prochlorperazine, senna-docusate 8.6-50 mg, Pharmacy to dose Vancomycin consult **AND** vancomycin - pharmacy to dose     Review of Systems  Objective:     Weight: 87.1 kg (192 lb)  Body mass index is 27.55 kg/m².  Vital Signs (Most Recent):  Temp: 99 °F (37.2 °C) (02/04/24 0522)  Pulse: 91 (02/04/24 0522)  Resp: 18 (02/04/24 0522)  BP: (!) 121/58 (02/04/24 0522)  SpO2: (!) 92 % (02/04/24 0522) Vital Signs (24h Range):  Temp:  [97.6 °F (36.4 °C)-99 °F (37.2 °C)] 99 °F (37.2 °C)  Pulse:  [] 91  Resp:  [18-20] 18  SpO2:  [90 %-95 %] 92 %  BP: (106-136)/(54-63) 121/58     Date 02/04/24 0700 - 02/05/24 0659   Shift 0693-3834 7421-3598 4748-4267 24 Hour Total   INTAKE   Shift Total(mL/kg)       OUTPUT   Drains 70   70   Shift Total(mL/kg) 70(0.8)   70(0.8)   Weight (kg) 87.1 87.1 87.1 87.1                              Closed/Suction Drain 02/02/24 0914 Tube - 1 Inferior;Right Back Bulb 15 Fr. (Active)   Dressing Type Central line dressing 02/02/24 1946   Dressing Status Dry;Clean;Intact 02/02/24 1946   Dressing Intervention  "Integrity maintained 02/02/24 1946   Drainage Serosanguineous 02/02/24 1946   Status To bulb suction 02/02/24 1946   Output (mL) 5 mL 02/03/24 0722            Closed/Suction Drain 02/02/24 0840 Tube - 2 Lateral;Left;Superior Back Accordion 10 Fr. (Active)   Dressing Type Central line dressing 02/02/24 1946   Dressing Status Clean;Dry;Intact 02/02/24 1946   Dressing Intervention Integrity maintained 02/02/24 1946   Drainage Serosanguineous 02/02/24 1946   Status Other (Comment) 02/02/24 1109   Output (mL) 125 mL 02/03/24 0722         Neurosurgery Physical Exam  Physical Exam:  General: Well developed, well nourished, not in acute distress.   Head: Normocephalic, atraumatic.  Neck: Full ROM.   Neurologic: Alert and oriented x 4. Thought content appropriate.  GCS: Motor:6  Verbal:5  Eyes:4   GCS Total: 15  Language: No aphasia.  Speech: No dysarthria.  Cranial nerves: Face symmetric, tongue midline, CN II-XII grossly intact.   Eyes: Pupils equal, round, reactive to light with accomodation, EOMI.   Pulmonary: Normal respirations, no signs of respiratory distress.  Abdomen: Soft, non-distended, non-tender to palpation.  Sensory: Intact to light touch throughout.  Motor Strength: Moves all extremities spontaneously with good tone. Full strength upper and lower extremities. No abnormal movements seen.      Incision: Prevena in place.     Significant Labs:  Recent Labs   Lab 02/02/24  1835 02/03/24  0013 02/03/24  1304   * 150* 109   * 132* 134*   K 4.4 4.5 4.5    102 102   CO2 23 22* 25   BUN 13 14 12   CREATININE 0.8 0.8 0.9   CALCIUM 8.3* 8.6* 9.0       Recent Labs   Lab 02/04/24  0456   WBC 3.50*   HGB 9.0*   HCT 27.5*          No results for input(s): "LABPT", "INR", "APTT" in the last 48 hours.  Microbiology Results (last 7 days)       Procedure Component Value Units Date/Time    Aerobic culture [[id number]]  (Abnormal) Collected: 02/02/24 0924    Order Status: Completed Specimen: Wound " from Back Updated: 02/04/24 0722     Aerobic Bacterial Culture STAPHYLOCOCCUS SPECIES  Few  Identification and susceptibility pending      Narrative:      Epidural tissue    AFB Culture & Smear [[id number]] Collected: 02/02/24 0924    Order Status: Completed Specimen: Wound from Back Updated: 02/03/24 2127     AFB Culture & Smear Culture in progress    Narrative:      Superficial fluid    AFB Culture & Smear [[id number]] Collected: 02/02/24 0924    Order Status: Completed Specimen: Wound from Back Updated: 02/03/24 2127     AFB Culture & Smear Culture in progress    Narrative:      Epidural swab    AFB Culture & Smear [[id number]] Collected: 02/02/24 0924    Order Status: Completed Specimen: Wound from Back Updated: 02/03/24 2127     AFB Culture & Smear Culture in progress    Narrative:      Epidural tissue    Blood culture [[id number]] Collected: 01/31/24 1948    Order Status: Completed Specimen: Blood Updated: 02/03/24 2022     Blood Culture, Routine No Growth to date      No Growth to date      No Growth to date      No Growth to date    Aerobic culture [[id number]]  (Abnormal) Collected: 02/02/24 0924    Order Status: Completed Specimen: Wound from Back Updated: 02/03/24 1327     Aerobic Bacterial Culture STAPHYLOCOCCUS EPIDERMIDIS  Few  Susceptibility pending      Narrative:      Epidural swab    Aerobic culture [[id number]]  (Abnormal) Collected: 02/02/24 0924    Order Status: Completed Specimen: Wound from Back Updated: 02/03/24 1320     Aerobic Bacterial Culture STAPHYLOCOCCUS EPIDERMIDIS  Few  Susceptibility pending      Narrative:      Superficial fluid    Culture, Anaerobe [[id number]] Collected: 02/02/24 0924    Order Status: Completed Specimen: Wound from Back Updated: 02/03/24 1222     Anaerobic Culture Culture in progress    Narrative:      Superficial fluid    Culture, Anaerobe [[id number]] Collected: 02/02/24 0924    Order Status: Completed Specimen: Wound from Back Updated: 02/03/24 1222      Anaerobic Culture Culture in progress    Narrative:      Epidural swab    Culture, Anaerobe [[id number]] Collected: 02/02/24 0924    Order Status: Completed Specimen: Wound from Back Updated: 02/03/24 1222     Anaerobic Culture Culture in progress    Narrative:      Epidural tissue    Gram stain [[id number]] Collected: 02/02/24 0924    Order Status: Completed Specimen: Wound from Back Updated: 02/02/24 1355     Gram Stain Result No WBC's      No organisms seen    Narrative:      Epidural tissue    Gram stain [[id number]] Collected: 02/02/24 0924    Order Status: Completed Specimen: Wound from Back Updated: 02/02/24 1350     Gram Stain Result Rare WBC's      No organisms seen    Narrative:      Superficial fluid    Gram stain [[id number]] Collected: 02/02/24 0924    Order Status: Completed Specimen: Wound from Back Updated: 02/02/24 1341     Gram Stain Result Rare WBC's      No organisms seen    Narrative:      Epidural swab    Fungus culture [[id number]] Collected: 02/02/24 0924    Order Status: Sent Specimen: Wound from Back Updated: 02/02/24 0939    Fungus culture [[id number]] Collected: 02/02/24 0924    Order Status: Sent Specimen: Wound from Back Updated: 02/02/24 0936    Fungus culture [[id number]] Collected: 02/02/24 0924    Order Status: Sent Specimen: Wound from Back Updated: 02/02/24 0933          All pertinent labs from the last 24 hours have been reviewed.    Significant Diagnostics:  I have reviewed and interpreted all pertinent imaging results/findings within the past 24 hours.  Physical Exam

## 2024-02-04 NOTE — ASSESSMENT & PLAN NOTE
Gamaliel Pete . is a 73 y.o. male s/p T7-11 decompression/fusion on 1/5/24. He presented to clinic on 1/31 with surgical wound breakdown.    Diagnostics:  MRI t spine w/wo con 1/31/24: no epidural abscess or infection, likely seroma. Adequate decompression of spinal cord.   ESR 69 Procal 0.24  Preop labs WNL.  Blood cultures 1/31/24: NGTD/pending.  OR cultures 2/2: NGTD    Now s/p thoracic wound washout/revision with plastic surgery on 2/2.    - Admit to NPU under NSGY.  - q4h neurochecks.  - TLSO brace when up/out of bed. Okay for activities as tolerated.  - Drains to suction. Record output qshift.  - WV in place - per plastics.  - Hold chemo tx perioperatively.  - DVT ppx: deyanira hose/SCDs/LVX.  - Atelectasis ppx: up in chair, IS hourly.  - Bowel regimen.    D/w Dr. Martinez.

## 2024-02-04 NOTE — PROGRESS NOTES
Pharmacokinetic Assessment Follow Up: IV Vancomycin    Vancomycin serum concentration assessment(s):    The trough level was drawn correctly and can be used to guide therapy at this time. The measurement is within the desired definitive target range of 10 to 20 mcg/mL.    Vancomycin Regimen Plan:    Continue regimen to Vancomycin 1000 mg IV every 12 hours with next serum trough concentration measured at 0800 prior to 0900 dose on 2/5    Drug levels (last 3 results):  Recent Labs   Lab Result Units 02/03/24  2059   Vancomycin-Trough ug/mL 13.1       Pharmacy will continue to follow and monitor vancomycin.    Please contact pharmacy at extension [phone] for questions regarding this assessment.    Thank you for the consult,   Lay Hood       Patient brief summary:  Gamaliel Pete Jr. is a 73 y.o. male initiated on antimicrobial therapy with IV Vancomycin for treatment of skin & soft tissue infection    Drug Allergies:   Review of patient's allergies indicates:  No Known Allergies    Actual Body Weight:   87.1 kg     Renal Function:   Estimated Creatinine Clearance: 77.7 mL/min (based on SCr of 0.9 mg/dL).,     Dialysis Method (if applicable):  N/A    CBC (last 72 hours):  Recent Labs   Lab Result Units 02/01/24  0351 02/02/24  0514   WBC K/uL 2.54* 2.62*   Hemoglobin g/dL 9.5* 9.1*   Hematocrit % 28.4* 27.2*   Platelets K/uL 132* 166   Gran % % 68.1 79.1*   Lymph % % 17.7* 9.5*   Mono % % 11.0 8.4   Eosinophil % % 2.0 1.1   Basophil % % 0.4 0.4   Differential Method  Automated Automated       Metabolic Panel (last 72 hours):  Recent Labs   Lab Result Units 02/01/24  0351 02/01/24  0753 02/01/24  1053 02/01/24  1117 02/01/24  1921 02/01/24  2315 02/02/24  0514 02/02/24  1154 02/02/24  1835 02/03/24  0013 02/03/24  1304   Sodium mmol/L 128* 129*  --  130* 131* 131* 130* 132* 131* 132* 134*   Sodium, Urine mmol/L  --   --  86  --   --   --   --   --   --   --   --    Potassium mmol/L 4.4 4.4  --  4.9 4.9 4.5 4.2  4.6 4.4 4.5 4.5   Chloride mmol/L 98 98  --  99 98 100 101 102 102 102 102   CO2 mmol/L 25 24  --  26 27 25 24 23 23 22* 25   Glucose mg/dL 98 91  --  103 106 111* 96 120* 138* 150* 109   BUN mg/dL 14 13  --  13 13 14 12 12 13 14 12   Creatinine mg/dL 1.0 1.0  --  1.0 1.1 1.0 0.8 0.8 0.8 0.8 0.9       Vancomycin Administrations:  vancomycin given in the last 96 hours                     vancomycin (VANCOCIN) 1,000 mg in dextrose 5 % (D5W) 250 mL IVPB (Vial-Mate) (mg) 1,000 mg New Bag 02/03/24 2057     1,000 mg New Bag  0900     1,000 mg New Bag 02/02/24 2142    vancomycin injection (g) 1 g Given 02/02/24 0822    vancomycin (VANCOCIN) 1,000 mg in sodium chloride 0.9% 100 mL IVPB (mg) 1,000 mg New Bag 02/02/24 0818                    Microbiologic Results:  Microbiology Results (last 7 days)       Procedure Component Value Units Date/Time    AFB Culture & Smear [[id number]] Collected: 02/02/24 0924    Order Status: Completed Specimen: Wound from Back Updated: 02/03/24 2127     AFB Culture & Smear Culture in progress    Narrative:      Superficial fluid    AFB Culture & Smear [[id number]] Collected: 02/02/24 0924    Order Status: Completed Specimen: Wound from Back Updated: 02/03/24 2127     AFB Culture & Smear Culture in progress    Narrative:      Epidural swab    AFB Culture & Smear [[id number]] Collected: 02/02/24 0924    Order Status: Completed Specimen: Wound from Back Updated: 02/03/24 2127     AFB Culture & Smear Culture in progress    Narrative:      Epidural tissue    Blood culture [[id number]] Collected: 01/31/24 1948    Order Status: Completed Specimen: Blood Updated: 02/03/24 2022     Blood Culture, Routine No Growth to date      No Growth to date      No Growth to date      No Growth to date    Aerobic culture [[id number]]  (Abnormal) Collected: 02/02/24 0924    Order Status: Completed Specimen: Wound from Back Updated: 02/03/24 1327     Aerobic Bacterial Culture STAPHYLOCOCCUS  EPIDERMIDIS  Few  Susceptibility pending      Narrative:      Epidural swab    Aerobic culture [[id number]]  (Abnormal) Collected: 02/02/24 0924    Order Status: Completed Specimen: Wound from Back Updated: 02/03/24 1320     Aerobic Bacterial Culture STAPHYLOCOCCUS EPIDERMIDIS  Few  Susceptibility pending      Narrative:      Superficial fluid    Culture, Anaerobe [[id number]] Collected: 02/02/24 0924    Order Status: Completed Specimen: Wound from Back Updated: 02/03/24 1222     Anaerobic Culture Culture in progress    Narrative:      Superficial fluid    Culture, Anaerobe [[id number]] Collected: 02/02/24 0924    Order Status: Completed Specimen: Wound from Back Updated: 02/03/24 1222     Anaerobic Culture Culture in progress    Narrative:      Epidural swab    Culture, Anaerobe [[id number]] Collected: 02/02/24 0924    Order Status: Completed Specimen: Wound from Back Updated: 02/03/24 1222     Anaerobic Culture Culture in progress    Narrative:      Epidural tissue    Gram stain [[id number]] Collected: 02/02/24 0924    Order Status: Completed Specimen: Wound from Back Updated: 02/02/24 1355     Gram Stain Result No WBC's      No organisms seen    Narrative:      Epidural tissue    Gram stain [[id number]] Collected: 02/02/24 0924    Order Status: Completed Specimen: Wound from Back Updated: 02/02/24 1350     Gram Stain Result Rare WBC's      No organisms seen    Narrative:      Superficial fluid    Gram stain [[id number]] Collected: 02/02/24 0924    Order Status: Completed Specimen: Wound from Back Updated: 02/02/24 1341     Gram Stain Result Rare WBC's      No organisms seen    Narrative:      Epidural swab    Fungus culture [[id number]] Collected: 02/02/24 0924    Order Status: Sent Specimen: Wound from Back Updated: 02/02/24 0939    Aerobic culture [[id number]] Collected: 02/02/24 0924    Order Status: Sent Specimen: Wound from Back Updated: 02/02/24 0939    Fungus culture [[id number]] Collected: 02/02/24 0924     Order Status: Sent Specimen: Wound from Back Updated: 02/02/24 0936    Fungus culture [[id number]] Collected: 02/02/24 0924    Order Status: Sent Specimen: Wound from Back Updated: 02/02/24 0933

## 2024-02-04 NOTE — PLAN OF CARE
Problem: Adult Inpatient Plan of Care  Goal: Plan of Care Review  Outcome: Ongoing, Progressing  Goal: Patient-Specific Goal (Individualized)  Description: Pt will maintain sbp below 180  Outcome: Ongoing, Progressing  Goal: Absence of Hospital-Acquired Illness or Injury  Outcome: Ongoing, Progressing  Goal: Optimal Comfort and Wellbeing  Outcome: Ongoing, Progressing  Goal: Readiness for Transition of Care  Outcome: Ongoing, Progressing     Problem: Fluid and Electrolyte Imbalance (Acute Kidney Injury/Impairment)  Goal: Fluid and Electrolyte Balance  Outcome: Ongoing, Progressing     Problem: Oral Intake Inadequate (Acute Kidney Injury/Impairment)  Goal: Optimal Nutrition Intake  Outcome: Ongoing, Progressing     Problem: Renal Function Impairment (Acute Kidney Injury/Impairment)  Goal: Effective Renal Function  Outcome: Ongoing, Progressing     Problem: Impaired Wound Healing  Goal: Optimal Wound Healing  Outcome: Ongoing, Progressing     Problem: Fall Injury Risk  Goal: Absence of Fall and Fall-Related Injury  Outcome: Ongoing, Progressing     Problem: Skin Injury Risk Increased  Goal: Skin Health and Integrity  Outcome: Ongoing, Progressing     Problem: Infection  Goal: Absence of Infection Signs and Symptoms  Outcome: Ongoing, Progressing

## 2024-02-04 NOTE — PLAN OF CARE
Problem: Adult Inpatient Plan of Care  Goal: Plan of Care Review  Outcome: Ongoing, Progressing  Flowsheets (Taken 2/4/2024 0507)  Plan of Care Reviewed With:   patient   spouse  Goal: Patient-Specific Goal (Individualized)  Description: Pt will maintain sbp below 180  Outcome: Ongoing, Progressing  Goal: Absence of Hospital-Acquired Illness or Injury  Outcome: Ongoing, Progressing  Intervention: Identify and Manage Fall Risk  Flowsheets (Taken 2/4/2024 0507)  Safety Promotion/Fall Prevention:   assistive device/personal item within reach   bed alarm set   side rails raised x 2  Goal: Optimal Comfort and Wellbeing  Outcome: Ongoing, Progressing  Intervention: Monitor Pain and Promote Comfort  Flowsheets (Taken 2/4/2024 0507)  Pain Management Interventions:   medication offered   medication offered but refused  Goal: Readiness for Transition of Care  Outcome: Ongoing, Progressing

## 2024-02-04 NOTE — PROGRESS NOTES
Lj Krueger - Neurosurgery ([facility])  Neurosurgery  Progress Note    Subjective:     History of Present Illness: Gamaliel Pete Jr. is a 73 y.o. 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 He was then taken for a T7-11 posterior decompression/fusion. The patient presented recently with scabbing to his lower thoracic incision. He was sent to wound care who advised him to be evaluated because it appeared worse. He presents to clinic today with reports of drainage from his incision. He denies fevers, headaches, numbness, tingling, focal weakness, bowel/bladder dysfunction. He will be direct admitted to the hospital for further workup and likely neurosurgical intervention to address the surgical wound breakdown.     Post-Op Info:  Procedure(s) (LRB):  EXPLORATION, WOUND (N/A)  CLOSURE (N/A)   2 Days Post-Op   Interval History: 2/4 naeon, exam stable, oob, WV in place, ID on board    Medications:  Continuous Infusions:   sodium chloride 0.9% 75 mL/hr at 02/03/24 0754    lactated ringers 100 mL/hr at 02/03/24 0204     Scheduled Meds:   acetaminophen  650 mg Oral Q6H    allopurinoL  100 mg Oral Daily    amlodipine-benazepril 5-10 mg  1 capsule Oral Daily    ceFEPime IV (PEDS and ADULTS)  2 g Intravenous Q8H    diazePAM  5 mg Oral Q6H    duke's soln (benadryl 30 mL, mylanta 30 mL, LIDOcaine 30 mL, nystatin 30 mL) 120 mL  10 mL Oral QID    gabapentin  300 mg Oral TID    heparin (porcine)  5,000 Units Subcutaneous Q8H    mupirocin   Nasal BID    polyethylene glycol  17 g Oral Daily    vancomycin (VANCOCIN) IV (PEDS and ADULTS)  1,000 mg Intravenous Q12H     PRN Meds:acetaminophen, aluminum-magnesium hydroxide-simethicone, diazePAM, melatonin, methocarbamoL, morphine, oxyCODONE, oxyCODONE, oxyCODONE, prochlorperazine, senna-docusate 8.6-50 mg, Pharmacy to dose Vancomycin consult **AND** vancomycin - pharmacy to dose     Review of  Systems  Objective:     Weight: 87.1 kg (192 lb)  Body mass index is 27.55 kg/m².  Vital Signs (Most Recent):  Temp: 99 °F (37.2 °C) (02/04/24 0522)  Pulse: 91 (02/04/24 0522)  Resp: 18 (02/04/24 0522)  BP: (!) 121/58 (02/04/24 0522)  SpO2: (!) 92 % (02/04/24 0522) Vital Signs (24h Range):  Temp:  [97.6 °F (36.4 °C)-99 °F (37.2 °C)] 99 °F (37.2 °C)  Pulse:  [] 91  Resp:  [18-20] 18  SpO2:  [90 %-95 %] 92 %  BP: (106-136)/(54-63) 121/58     Date 02/04/24 0700 - 02/05/24 0659   Shift 2598-6426 8290-1430 9464-1910 24 Hour Total   INTAKE   Shift Total(mL/kg)       OUTPUT   Drains 70   70   Shift Total(mL/kg) 70(0.8)   70(0.8)   Weight (kg) 87.1 87.1 87.1 87.1                              Closed/Suction Drain 02/02/24 0914 Tube - 1 Inferior;Right Back Bulb 15 Fr. (Active)   Dressing Type Central line dressing 02/02/24 1946   Dressing Status Dry;Clean;Intact 02/02/24 1946   Dressing Intervention Integrity maintained 02/02/24 1946   Drainage Serosanguineous 02/02/24 1946   Status To bulb suction 02/02/24 1946   Output (mL) 5 mL 02/03/24 0722            Closed/Suction Drain 02/02/24 0840 Tube - 2 Lateral;Left;Superior Back Accordion 10 Fr. (Active)   Dressing Type Central line dressing 02/02/24 1946   Dressing Status Clean;Dry;Intact 02/02/24 1946   Dressing Intervention Integrity maintained 02/02/24 1946   Drainage Serosanguineous 02/02/24 1946   Status Other (Comment) 02/02/24 1109   Output (mL) 125 mL 02/03/24 0722         Neurosurgery Physical Exam  Physical Exam:  General: Well developed, well nourished, not in acute distress.   Head: Normocephalic, atraumatic.  Neck: Full ROM.   Neurologic: Alert and oriented x 4. Thought content appropriate.  GCS: Motor:6  Verbal:5  Eyes:4   GCS Total: 15  Language: No aphasia.  Speech: No dysarthria.  Cranial nerves: Face symmetric, tongue midline, CN II-XII grossly intact.   Eyes: Pupils equal, round, reactive to light with accomodation, EOMI.   Pulmonary: Normal  "respirations, no signs of respiratory distress.  Abdomen: Soft, non-distended, non-tender to palpation.  Sensory: Intact to light touch throughout.  Motor Strength: Moves all extremities spontaneously with good tone. Full strength upper and lower extremities. No abnormal movements seen.      Incision: Prevena in place.     Significant Labs:  Recent Labs   Lab 02/02/24  1835 02/03/24  0013 02/03/24  1304   * 150* 109   * 132* 134*   K 4.4 4.5 4.5    102 102   CO2 23 22* 25   BUN 13 14 12   CREATININE 0.8 0.8 0.9   CALCIUM 8.3* 8.6* 9.0       Recent Labs   Lab 02/04/24  0456   WBC 3.50*   HGB 9.0*   HCT 27.5*          No results for input(s): "LABPT", "INR", "APTT" in the last 48 hours.  Microbiology Results (last 7 days)       Procedure Component Value Units Date/Time    Aerobic culture [[id number]]  (Abnormal) Collected: 02/02/24 0924    Order Status: Completed Specimen: Wound from Back Updated: 02/04/24 0722     Aerobic Bacterial Culture STAPHYLOCOCCUS SPECIES  Few  Identification and susceptibility pending      Narrative:      Epidural tissue    AFB Culture & Smear [[id number]] Collected: 02/02/24 0924    Order Status: Completed Specimen: Wound from Back Updated: 02/03/24 2127     AFB Culture & Smear Culture in progress    Narrative:      Superficial fluid    AFB Culture & Smear [[id number]] Collected: 02/02/24 0924    Order Status: Completed Specimen: Wound from Back Updated: 02/03/24 2127     AFB Culture & Smear Culture in progress    Narrative:      Epidural swab    AFB Culture & Smear [[id number]] Collected: 02/02/24 0924    Order Status: Completed Specimen: Wound from Back Updated: 02/03/24 2127     AFB Culture & Smear Culture in progress    Narrative:      Epidural tissue    Blood culture [[id number]] Collected: 01/31/24 1948    Order Status: Completed Specimen: Blood Updated: 02/03/24 2022     Blood Culture, Routine No Growth to date      No Growth to date      No Growth to date    "   No Growth to date    Aerobic culture [[id number]]  (Abnormal) Collected: 02/02/24 0924    Order Status: Completed Specimen: Wound from Back Updated: 02/03/24 1327     Aerobic Bacterial Culture STAPHYLOCOCCUS EPIDERMIDIS  Few  Susceptibility pending      Narrative:      Epidural swab    Aerobic culture [[id number]]  (Abnormal) Collected: 02/02/24 0924    Order Status: Completed Specimen: Wound from Back Updated: 02/03/24 1320     Aerobic Bacterial Culture STAPHYLOCOCCUS EPIDERMIDIS  Few  Susceptibility pending      Narrative:      Superficial fluid    Culture, Anaerobe [[id number]] Collected: 02/02/24 0924    Order Status: Completed Specimen: Wound from Back Updated: 02/03/24 1222     Anaerobic Culture Culture in progress    Narrative:      Superficial fluid    Culture, Anaerobe [[id number]] Collected: 02/02/24 0924    Order Status: Completed Specimen: Wound from Back Updated: 02/03/24 1222     Anaerobic Culture Culture in progress    Narrative:      Epidural swab    Culture, Anaerobe [[id number]] Collected: 02/02/24 0924    Order Status: Completed Specimen: Wound from Back Updated: 02/03/24 1222     Anaerobic Culture Culture in progress    Narrative:      Epidural tissue    Gram stain [[id number]] Collected: 02/02/24 0924    Order Status: Completed Specimen: Wound from Back Updated: 02/02/24 1355     Gram Stain Result No WBC's      No organisms seen    Narrative:      Epidural tissue    Gram stain [[id number]] Collected: 02/02/24 0924    Order Status: Completed Specimen: Wound from Back Updated: 02/02/24 1350     Gram Stain Result Rare WBC's      No organisms seen    Narrative:      Superficial fluid    Gram stain [[id number]] Collected: 02/02/24 0924    Order Status: Completed Specimen: Wound from Back Updated: 02/02/24 1341     Gram Stain Result Rare WBC's      No organisms seen    Narrative:      Epidural swab    Fungus culture [[id number]] Collected: 02/02/24 0924    Order Status: Sent Specimen: Wound from  Back Updated: 02/02/24 0939    Fungus culture [[id number]] Collected: 02/02/24 0924    Order Status: Sent Specimen: Wound from Back Updated: 02/02/24 0936    Fungus culture [[id number]] Collected: 02/02/24 0924    Order Status: Sent Specimen: Wound from Back Updated: 02/02/24 0933          All pertinent labs from the last 24 hours have been reviewed.    Significant Diagnostics:  I have reviewed and interpreted all pertinent imaging results/findings within the past 24 hours.  Physical Exam  Assessment/Plan:     * Surgical wound breakdown  Gamaliel Pete Jr. is a 73 y.o. male s/p T7-11 decompression/fusion on 1/5/24. He presented to clinic on 1/31 with surgical wound breakdown.    Diagnostics:  MRI t spine w/wo con 1/31/24: no epidural abscess or infection, likely seroma. Adequate decompression of spinal cord.   ESR 69 Procal 0.24  Preop labs WNL.  Blood cultures 1/31/24: NGTD/pending.  OR cultures 2/2: NGTD    Now s/p thoracic wound washout/revision with plastic surgery on 2/2.    - Admit to NPU under NSGY.  - q4h neurochecks.  - TLSO brace when up/out of bed. Okay for activities as tolerated.  - Drains to suction. Record output qshift.  - WV in place - per plastics.  - Hold chemo tx perioperatively.  - DVT ppx: deyanira hose/SCDs/LVX.  - Atelectasis ppx: up in chair, IS hourly.  - Bowel regimen.    D/w Dr. Martinez.    Hyponatremia  Na 126 on admission. Improving.    - Medicine consulted for assistance.            Vikas Gonzales MD  Neurosurgery  Lj Krueger - Neurosurgery ([facility])

## 2024-02-05 LAB
ANISOCYTOSIS BLD QL SMEAR: SLIGHT
BACTERIA BLD CULT: NORMAL
BACTERIA SPEC AEROBE CULT: ABNORMAL
BACTERIA SPEC AEROBE CULT: ABNORMAL
BASOPHILS NFR BLD: 0 % (ref 0–1.9)
CREAT SERPL-MCNC: 0.9 MG/DL (ref 0.5–1.4)
DIFFERENTIAL METHOD BLD: ABNORMAL
EOSINOPHIL NFR BLD: 0 % (ref 0–8)
ERYTHROCYTE [DISTWIDTH] IN BLOOD BY AUTOMATED COUNT: 15.4 % (ref 11.5–14.5)
EST. GFR  (NO RACE VARIABLE): >60 ML/MIN/1.73 M^2
HCT VFR BLD AUTO: 25.6 % (ref 40–54)
HGB BLD-MCNC: 8.3 G/DL (ref 14–18)
HYPOCHROMIA BLD QL SMEAR: ABNORMAL
IMM GRANULOCYTES # BLD AUTO: ABNORMAL K/UL (ref 0–0.04)
IMM GRANULOCYTES NFR BLD AUTO: ABNORMAL % (ref 0–0.5)
LYMPHOCYTES NFR BLD: 9 % (ref 18–48)
MCH RBC QN AUTO: 28.3 PG (ref 27–31)
MCHC RBC AUTO-ENTMCNC: 32.4 G/DL (ref 32–36)
MCV RBC AUTO: 87 FL (ref 82–98)
MONOCYTES NFR BLD: 5 % (ref 4–15)
MYELOCYTES NFR BLD MANUAL: 1 %
NEUTROPHILS NFR BLD: 84 % (ref 38–73)
NEUTS BAND NFR BLD MANUAL: 1 %
NRBC BLD-RTO: 0 /100 WBC
PLATELET # BLD AUTO: 246 K/UL (ref 150–450)
PLATELET BLD QL SMEAR: ABNORMAL
PMV BLD AUTO: 8.9 FL (ref 9.2–12.9)
POIKILOCYTOSIS BLD QL SMEAR: SLIGHT
POLYCHROMASIA BLD QL SMEAR: ABNORMAL
RBC # BLD AUTO: 2.93 M/UL (ref 4.6–6.2)
SPHEROCYTES BLD QL SMEAR: ABNORMAL
VANCOMYCIN TROUGH SERPL-MCNC: 16.9 UG/ML (ref 10–22)
WBC # BLD AUTO: 2.91 K/UL (ref 3.9–12.7)

## 2024-02-05 PROCEDURE — 25000003 PHARM REV CODE 250

## 2024-02-05 PROCEDURE — 76937 US GUIDE VASCULAR ACCESS: CPT

## 2024-02-05 PROCEDURE — 80202 ASSAY OF VANCOMYCIN: CPT | Performed by: STUDENT IN AN ORGANIZED HEALTH CARE EDUCATION/TRAINING PROGRAM

## 2024-02-05 PROCEDURE — 25000003 PHARM REV CODE 250: Performed by: STUDENT IN AN ORGANIZED HEALTH CARE EDUCATION/TRAINING PROGRAM

## 2024-02-05 PROCEDURE — 77387 GUIDANCE FOR RADJ TX DLVR: CPT | Mod: TC | Performed by: RADIOLOGY

## 2024-02-05 PROCEDURE — 02HV33Z INSERTION OF INFUSION DEVICE INTO SUPERIOR VENA CAVA, PERCUTANEOUS APPROACH: ICD-10-PCS

## 2024-02-05 PROCEDURE — 85007 BL SMEAR W/DIFF WBC COUNT: CPT

## 2024-02-05 PROCEDURE — 99024 POSTOP FOLLOW-UP VISIT: CPT | Mod: ,,,

## 2024-02-05 PROCEDURE — 94761 N-INVAS EAR/PLS OXIMETRY MLT: CPT

## 2024-02-05 PROCEDURE — 36573 INSJ PICC RS&I 5 YR+: CPT

## 2024-02-05 PROCEDURE — 27000221 HC OXYGEN, UP TO 24 HOURS

## 2024-02-05 PROCEDURE — C1751 CATH, INF, PER/CENT/MIDLINE: HCPCS

## 2024-02-05 PROCEDURE — 63600175 PHARM REV CODE 636 W HCPCS

## 2024-02-05 PROCEDURE — 11000001 HC ACUTE MED/SURG PRIVATE ROOM

## 2024-02-05 PROCEDURE — 85027 COMPLETE CBC AUTOMATED: CPT

## 2024-02-05 PROCEDURE — 97530 THERAPEUTIC ACTIVITIES: CPT

## 2024-02-05 PROCEDURE — 63600175 PHARM REV CODE 636 W HCPCS: Performed by: STUDENT IN AN ORGANIZED HEALTH CARE EDUCATION/TRAINING PROGRAM

## 2024-02-05 PROCEDURE — G6002 STEREOSCOPIC X-RAY GUIDANCE: HCPCS | Mod: 26,,, | Performed by: RADIOLOGY

## 2024-02-05 PROCEDURE — 82565 ASSAY OF CREATININE: CPT | Performed by: STUDENT IN AN ORGANIZED HEALTH CARE EDUCATION/TRAINING PROGRAM

## 2024-02-05 PROCEDURE — 77412 RADIATION TX DELIVERY LVL 3: CPT | Performed by: RADIOLOGY

## 2024-02-05 PROCEDURE — 36415 COLL VENOUS BLD VENIPUNCTURE: CPT

## 2024-02-05 RX ORDER — ACETAMINOPHEN 325 MG/1
650 TABLET ORAL EVERY 6 HOURS PRN
Status: DISCONTINUED | OUTPATIENT
Start: 2024-02-05 | End: 2024-02-05

## 2024-02-05 RX ORDER — SODIUM CHLORIDE 0.9 % (FLUSH) 0.9 %
10 SYRINGE (ML) INJECTION
Status: DISCONTINUED | OUTPATIENT
Start: 2024-02-05 | End: 2024-02-08 | Stop reason: HOSPADM

## 2024-02-05 RX ORDER — SODIUM CHLORIDE 0.9 % (FLUSH) 0.9 %
10 SYRINGE (ML) INJECTION EVERY 6 HOURS
Status: DISCONTINUED | OUTPATIENT
Start: 2024-02-05 | End: 2024-02-08 | Stop reason: HOSPADM

## 2024-02-05 RX ADMIN — ACETAMINOPHEN 650 MG: 325 TABLET ORAL at 06:02

## 2024-02-05 RX ADMIN — SODIUM CHLORIDE, POTASSIUM CHLORIDE, SODIUM LACTATE AND CALCIUM CHLORIDE: 600; 310; 30; 20 INJECTION, SOLUTION INTRAVENOUS at 09:02

## 2024-02-05 RX ADMIN — VANCOMYCIN HYDROCHLORIDE 1000 MG: 1 INJECTION, POWDER, LYOPHILIZED, FOR SOLUTION INTRAVENOUS at 09:02

## 2024-02-05 RX ADMIN — ACETAMINOPHEN 650 MG: 325 TABLET ORAL at 01:02

## 2024-02-05 RX ADMIN — GABAPENTIN 300 MG: 300 CAPSULE ORAL at 09:02

## 2024-02-05 RX ADMIN — GABAPENTIN 300 MG: 300 CAPSULE ORAL at 03:02

## 2024-02-05 RX ADMIN — HEPARIN SODIUM 5000 UNITS: 5000 INJECTION INTRAVENOUS; SUBCUTANEOUS at 09:02

## 2024-02-05 RX ADMIN — HEPARIN SODIUM 5000 UNITS: 5000 INJECTION INTRAVENOUS; SUBCUTANEOUS at 06:02

## 2024-02-05 RX ADMIN — OXYCODONE HYDROCHLORIDE 10 MG: 10 TABLET ORAL at 09:02

## 2024-02-05 RX ADMIN — ALLOPURINOL 100 MG: 100 TABLET ORAL at 09:02

## 2024-02-05 RX ADMIN — ALUMINUM HYDROXIDE, MAGNESIUM HYDROXIDE, AND DIMETHICONE 10 ML: 400; 400; 40 SUSPENSION ORAL at 09:02

## 2024-02-05 RX ADMIN — AMLODIPINE BESYLATE AND BENAZEPRIL HYDROCHLORIDE 1 CAPSULE: 5; 10 CAPSULE ORAL at 09:02

## 2024-02-05 RX ADMIN — ACETAMINOPHEN 650 MG: 325 TABLET ORAL at 03:02

## 2024-02-05 RX ADMIN — DIAZEPAM 5 MG: 5 TABLET ORAL at 03:02

## 2024-02-05 RX ADMIN — DICLOFENAC SODIUM 2 G: 10 GEL TOPICAL at 12:02

## 2024-02-05 RX ADMIN — DIAZEPAM 5 MG: 5 TABLET ORAL at 01:02

## 2024-02-05 RX ADMIN — DIAZEPAM 5 MG: 5 TABLET ORAL at 06:02

## 2024-02-05 NOTE — ASSESSMENT & PLAN NOTE
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 He was then taken for a T7-11 posterior decompression/fusion who recently developed wound drainage and dehiscence. Underwent washout on 2/2/24 w/ cultures positive MRSE from both superficial and deep layers. ID consulted for recommendations.    Recommendations:   - will plan to treat w/ 6 week course of vancomycin  - stop cefepime  - follow up final cultures  - see OPAT notes for d/c recs

## 2024-02-05 NOTE — PLAN OF CARE
Problem: Adult Inpatient Plan of Care  Goal: Plan of Care Review  2/5/2024 0726 by Mimi Roque RN  Outcome: Ongoing, Progressing  2/5/2024 0726 by Mimi Roque RN  Outcome: Ongoing, Progressing  Goal: Patient-Specific Goal (Individualized)  Description: Pt will maintain sbp below 180  2/5/2024 0726 by Mimi Roque RN  Outcome: Ongoing, Progressing  2/5/2024 0726 by Mimi Roque RN  Outcome: Ongoing, Progressing  Goal: Absence of Hospital-Acquired Illness or Injury  2/5/2024 0726 by Mimi Roque RN  Outcome: Ongoing, Progressing  2/5/2024 0726 by Mimi Roque RN  Outcome: Ongoing, Progressing  Goal: Optimal Comfort and Wellbeing  2/5/2024 0726 by Mimi Roque RN  Outcome: Ongoing, Progressing  2/5/2024 0726 by Mimi Roque RN  Outcome: Ongoing, Progressing  Goal: Readiness for Transition of Care  2/5/2024 0726 by Mimi Roque RN  Outcome: Ongoing, Progressing  2/5/2024 0726 by Mimi Roque RN  Outcome: Ongoing, Progressing     Problem: Fluid and Electrolyte Imbalance (Acute Kidney Injury/Impairment)  Goal: Fluid and Electrolyte Balance  2/5/2024 0726 by Mimi Roque RN  Outcome: Ongoing, Progressing  2/5/2024 0726 by Mimi Roque RN  Outcome: Ongoing, Progressing     Problem: Oral Intake Inadequate (Acute Kidney Injury/Impairment)  Goal: Optimal Nutrition Intake  2/5/2024 0726 by Mimi Roque RN  Outcome: Ongoing, Progressing  2/5/2024 0726 by Mimi Roque RN  Outcome: Ongoing, Progressing     Problem: Renal Function Impairment (Acute Kidney Injury/Impairment)  Goal: Effective Renal Function  2/5/2024 0726 by Mimi Roque RN  Outcome: Ongoing, Progressing  2/5/2024 0726 by Donnermeyer, Mimi, RN  Outcome: Ongoing, Progressing     Problem: Impaired Wound Healing  Goal: Optimal Wound Healing  2/5/2024 0726 by Mimi Roque, RN  Outcome:  Ongoing, Progressing  2/5/2024 0726 by Mimi Roque RN  Outcome: Ongoing, Progressing     Problem: Fall Injury Risk  Goal: Absence of Fall and Fall-Related Injury  2/5/2024 0726 by Mimi Roque RN  Outcome: Ongoing, Progressing  2/5/2024 0726 by Mimi Roque RN  Outcome: Ongoing, Progressing     Problem: Skin Injury Risk Increased  Goal: Skin Health and Integrity  2/5/2024 0726 by Mimi Roque RN  Outcome: Ongoing, Progressing  2/5/2024 0726 by Mimi Roque RN  Outcome: Ongoing, Progressing     Problem: Infection  Goal: Absence of Infection Signs and Symptoms  2/5/2024 0726 by Mimi Roque RN  Outcome: Ongoing, Progressing  2/5/2024 0726 by Mimi Roque RN  Outcome: Ongoing, Progressing

## 2024-02-05 NOTE — CONSULTS
Lj Krueger - Neurosurgery ([facility])  Infectious Disease  Consult Note    Patient Name: Gamaliel Pete Jr.  MRN: [id number]  Admission Date: 1/31/2024  Hospital Length of Stay: 4 days  Attending Physician: Nasim Martinez DO  Primary Care Provider: Slava Lowery MD     Isolation Status: No active isolations    Patient information was obtained from patient and ER records.      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 He was then taken for a T7-11 posterior decompression/fusion who recently developed wound drainage and dehiscence. Underwent washout on 2/2/24 w/ cultures positive MRSE from both superficial and deep layers. ID consulted for recommendations.    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 On admission afebrile and HDS. WBC 5.46. MRI of T spine with post op changes, abnormal enhancement within soft tissues at postop bed and within subq tissues from T5-L1 with multiple areas of enhancement concerning for infection. He was started on cefazolin. He went to OR with NSGY on 2/2 for exploration and I&D which found copious serous fluid without purulence, some epidural tissue concerning for chronic hematoma, no purulence and fluids sent for cultures. No concern for deep infection. Has had fevers as high as 102.8. ID  consulted for wound dehiscence.     --follow surgical and blood cultures  --would broaden to vanc/cefepime for now and narrow pending cultures  --treatment duration TBD          Thank you for your consult. I will sign off. Please contact us if you have any additional questions.    Rhiannon Chi DO  Transplant Infectious Disease    Time: 75 minutes   50% of time spent on face-to-face counseling and coordination of care. Counseling included review of test results, diagnosis, and treatment plan with patient and/or family.        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 He was at an appt for wound care and they were concerned about the appearance of his incisional site so they recommend he come to the hospital. He reports top portion of incision has been healing well, however small area near bottom with some dehiscence and clear drainage without purulence and minimal surrounding erythema. No fevers, chills, sweats. On admission afebrile and HDS. WBC 5.46. MRI of T spine with post op changes, abnormal enhancement within soft tissues at postop bed and within subq tissues from T5-L1 with multiple areas of enhancement concerning for infection. He was started on cefazolin. He went to OR with NSGY on 2/2 for exploration and I&D which found copious serous fluid without purulence, some epidural tissue concerning for chronic hematoma, no purulence and fluids sent for cultures. ID consulted for wound dehiscence. Surgica cultures now positive for MRSE, including epidural cultures.       Interval History: tmax 98.7, awaiting labs this AM. On  vanc/cefepime . Drains in place     Review of Systems   Constitutional:  Negative for chills and fever.   Respiratory:  Negative for cough and shortness of breath.    Cardiovascular:  Negative for chest pain and leg swelling.   Gastrointestinal:  Negative for abdominal pain, constipation, diarrhea, nausea and vomiting.   Genitourinary:  Negative for dysuria and frequency.   Musculoskeletal:  Positive for neck pain. Negative for back pain and joint swelling.   Skin:  Positive for wound. Negative for rash.   Allergic/Immunologic: Positive for immunocompromised state.   Neurological:  Negative for weakness.     Objective:     Vital Signs (Most Recent):  Temp: 97 °F (36.1 °C) (02/03/24 0459)  Pulse: 74 (02/03/24 0752)  Resp: 19 (02/03/24 0752)  BP: 124/63 (02/03/24 0752)  SpO2: (!) 90 % (02/03/24 0752) Vital Signs (24h Range):  Temp:  [97 °F (36.1 °C)-98.7 °F (37.1 °C)] 97 °F (36.1 °C)  Pulse:  [] 74  Resp:  [15-23] 19  SpO2:  [87 %-98 %] 90 %  BP: (109-141)/(56-65) 124/63     Weight: 87.1 kg (192 lb)  Body mass index is 27.55 kg/m².    Estimated Creatinine Clearance: 87.4 mL/min (based on SCr of 0.8 mg/dL).     Physical Exam  Vitals and nursing note reviewed.   Constitutional:       General: He is not in acute distress.     Appearance: He is not toxic-appearing.   HENT:      Head: Normocephalic and atraumatic.      Right Ear: External ear normal.      Left Ear: External ear normal.      Nose: Nose normal.      Mouth/Throat:      Mouth: Mucous membranes are moist.      Pharynx: Oropharynx is clear.   Eyes:      Extraocular Movements: Extraocular movements intact.      Conjunctiva/sclera: Conjunctivae normal.      Pupils: Pupils are equal, round, and reactive to light.   Cardiovascular:      Rate and Rhythm: Normal rate and regular rhythm.      Heart sounds: Normal heart sounds.   Pulmonary:      Effort: Pulmonary effort is normal.      Breath sounds: Normal breath sounds.   Abdominal:      General: There is no  distension.      Palpations: Abdomen is soft.      Tenderness: There is no abdominal tenderness.   Musculoskeletal:         General: No swelling or deformity.      Cervical back: Neck supple. No rigidity.      Comments: 2 drains in place    Skin:     General: Skin is warm and dry.      Findings: No erythema or rash.   Neurological:      General: No focal deficit present.      Mental Status: He is alert and oriented to person, place, and time. Mental status is at baseline.   Psychiatric:         Mood and Affect: Mood normal.         Behavior: Behavior normal.         Thought Content: Thought content normal.         Judgment: Judgment normal.          Significant Labs: All pertinent labs within the past 24 hours have been reviewed.    Significant Imaging: I have reviewed all pertinent imaging results/findings within the past 24 hours.               Curettage Text: The wound bed was treated with curettage after the biopsy was performed.

## 2024-02-05 NOTE — PLAN OF CARE
Patient anticipated to have 6 weeks of ivab therapy. Referral sent to [facility].   02/05/24 0944   Post-Acute Status   Post-Acute Authorization IV Infusion   IV Infusion Status Referral(s) sent

## 2024-02-05 NOTE — PLAN OF CARE
Outpatient Antibiotic Therapy Plan:    Please send referral to [facility].    1) Infection: MRSE spinal infection    2) Discharge Antibiotics:    Intravenous antibiotics:  Vancomycin dosed to maintain a trough 15-20      3) Therapy Duration:  6 weeks    Estimated end date of IV antibiotics: 3/15/24    4) Outpatient Weekly Labs:    Order the following labs to be drawn on Mondays:   CBC  CMP   CRP  Vancomycin trough. Target 15-20    If discharged on vancomycin IV, order the following additional labs to be drawn on Thursdays:  CMP   Vancomycin trough. Target 15-20    If vancomycin trough is not at target (15-20) prior to discharge, schedule vancomycin trough to be drawn before their fourth outpatient dose.    5) Fax Lab Results to Infectious Diseases Provider: [facility] Fax Number: [phone]    6) Outpatient Infectious Diseases Follow-up    Follow-up appointment will be arranged by the ID clinic and will be found in the patient's appointments tab.    Prior to discharge, please ensure the patient's follow-up has been scheduled.    If there is still no follow-up scheduled prior to discharge, please send an EPIC message to Vianney Gross in Infectious Diseases.

## 2024-02-05 NOTE — PLAN OF CARE
Problem: Occupational Therapy  Goal: Occupational Therapy Goal  Description: Goals to be met by: 3/3/24     Patient will increase functional independence with ADLs by performing:    UE Dressing with Stand-by Assistance.  LE Dressing with Contact Guard Assistance.  Grooming while standing at sink with Stand-by Assistance.  Toileting from raised toilet with Minimal Assistance for hygiene and clothing management.   Step transfer with Stand-by Assistance/RW  Toilet transfer to raised toilet with Stand-by Assistance/RW  Upper extremity exercise program x10 reps per handout, with supervision.  Outcome: Ongoing, Progressing     Goals remain appropriate

## 2024-02-05 NOTE — PT/OT/SLP PROGRESS
Occupational Therapy   Treatment    Name: Gamaliel Pete Jr.  MRN: [id number]  Admitting Diagnosis:  Surgical wound breakdown  3 Days Post-Op    Recommendations:     Discharge Recommendations: Low Intensity Therapy  Discharge Equipment Recommendations:  bedside commode  Barriers to discharge:   (increased (A) required)    Assessment:     Gamaliel Pete Jr. is a 73 y.o. male with a medical diagnosis of Surgical wound breakdown.  He presents with limited session due to pt did not want to wear TLSO. Performance deficits affecting function are weakness, impaired endurance, impaired self care skills, impaired functional mobility, impaired balance, decreased safety awareness, orthopedic precautions.     Rehab Prognosis:  Fair; patient would benefit from acute skilled OT services to address these deficits and reach maximum level of function.       Plan:     Patient to be seen 3 x/week to address the above listed problems via self-care/home management, therapeutic activities, therapeutic exercises, neuromuscular re-education  Plan of Care Expires: 03/03/24  Plan of Care Reviewed with: patient, spouse    Subjective     Chief Complaint: not wanting to wear TLSO  Patient/Family Comments/goals: Pt reported that he did not need to wear TLSO to walk around room (only for when out of the house & in the car) and that he would not be getting up if that meant he had to wear the brace. OT sent secure chat to CHINO Santos for NS and Tom approved transfer to chair without brace & remaining up in chair without brace however that he would need brace for walking in room or hallway.  Notified pt however pt did not want to get up to chair if that was all he could do without brace donned, stated that he just wants to go home.  Pain/Comfort:  Pain Rating 1:  (did not report)  Pain Rating Post-Intervention 1:  (did not report)    Objective:     Communicated with: RN prior to session.  Patient found supine with wound vac, WILLY drain, telemetry,  hemovac (spouse present during session) upon OT entry to room.    General Precautions: Standard, fall    Orthopedic Precautions:spinal precautions  Braces: TLSO (BETSY Santos PA stated pt can transfer to chair & sit up in chair without TLSO however for walking in room or hallway needs to don TLSO)     Occupational Performance:       [facility] 6 Click ADL: 16    Treatment & Education:  Session only in bed due to pt not wanting to don TLSO and not wanting to only get up to chair without brace donned.  Pt agreeable to education session.  Provided education on spinal precautions & safety during  ADL's & IADL's within spinal precautions.  Provided education on importance of OOB activities to facilitate decreased joint & muscle tightness/stiffness.  Provided education on weighted exercises pt could perform for BUE & BLE with less than 5 lbs weights.  Pt verbalized understanding.  Pt had no further questions & when asked whether there were any concerns pt reported none.      Patient left supine with all lines intact, call button in reach, RN notified, and spouse present    GOALS:   Multidisciplinary Problems       Occupational Therapy Goals          Problem: Occupational Therapy    Goal Priority Disciplines Outcome Interventions   Occupational Therapy Goal     OT, PT/OT Ongoing, Progressing    Description: Goals to be met by: 3/3/24     Patient will increase functional independence with ADLs by performing:    UE Dressing with Stand-by Assistance.  LE Dressing with Contact Guard Assistance.  Grooming while standing at sink with Stand-by Assistance.  Toileting from raised toilet with Minimal Assistance for hygiene and clothing management.   Step transfer with Stand-by Assistance/RW  Toilet transfer to raised toilet with Stand-by Assistance/RW  Upper extremity exercise program x10 reps per handout, with supervision.                       Time Tracking:     OT Date of Treatment: 02/05/24  OT Start Time: 1125  OT Stop Time: 1144  OT  Total Time (min): 19 min    Billable Minutes:Therapeutic Activity 19    OT/ELIJAH: OT          2/5/2024

## 2024-02-05 NOTE — ANESTHESIA POSTPROCEDURE EVALUATION
Anesthesia Post Evaluation    Patient: Gamaliel Pete     Procedure(s) Performed: Procedure(s) (LRB):  EXPLORATION, WOUND (N/A)  CLOSURE (N/A)    Final Anesthesia Type: general      Patient location during evaluation: PACU  Patient participation: Yes- Able to Participate  Level of consciousness: awake and alert  Post-procedure vital signs: reviewed and stable  Pain management: adequate  Airway patency: patent    PONV status at discharge: No PONV  Anesthetic complications: no      Cardiovascular status: blood pressure returned to baseline  Respiratory status: unassisted  Hydration status: euvolemic  Follow-up not needed.              Vitals Value Taken Time   /56 02/05/24 1153   Temp 36.6 °C (97.9 °F) 02/05/24 1153   Pulse 84 02/05/24 1153   Resp 18 02/05/24 1153   SpO2 92 % 02/05/24 1153         Event Time   Out of Recovery 02/02/2024 10:55:00         Pain/Juany Score: Pain Rating Prior to Med Admin: 5 (2/5/2024  6:16 AM)  Pain Rating Post Med Admin: 0 (2/5/2024  9:48 AM)

## 2024-02-05 NOTE — SUBJECTIVE & OBJECTIVE
Interval History: NAEON. Neuro stable. OR cultures w/ staph epidermidis. ID rec IV Vanc x 6 weeks. PICC team consulted. Continue drains and WV.     Medications:  Continuous Infusions:   sodium chloride 0.9% 75 mL/hr at 02/03/24 0754    lactated ringers 100 mL/hr at 02/03/24 0204     Scheduled Meds:   allopurinoL  100 mg Oral Daily    amlodipine-benazepril 5-10 mg  1 capsule Oral Daily    diazePAM  5 mg Oral Q6H    duke's soln (benadryl 30 mL, mylanta 30 mL, LIDOcaine 30 mL, nystatin 30 mL) 120 mL  10 mL Oral QID    gabapentin  300 mg Oral TID    heparin (porcine)  5,000 Units Subcutaneous Q8H    mupirocin   Nasal BID    polyethylene glycol  17 g Oral Daily    vancomycin (VANCOCIN) IV (PEDS and ADULTS)  1,000 mg Intravenous Q12H     PRN Meds:acetaminophen, aluminum-magnesium hydroxide-simethicone, diazePAM, diclofenac sodium, melatonin, methocarbamoL, oxyCODONE, oxyCODONE, oxyCODONE, prochlorperazine, senna-docusate 8.6-50 mg, Pharmacy to dose Vancomycin consult **AND** vancomycin - pharmacy to dose     Review of Systems  Objective:     Weight: 87.1 kg (192 lb)  Body mass index is 27.55 kg/m².  Vital Signs (Most Recent):  Temp: 97.5 °F (36.4 °C) (02/05/24 0927)  Pulse: 74 (02/05/24 0927)  Resp: 19 (02/05/24 0927)  BP: (!) 119/58 (02/05/24 0927)  SpO2: (!) 94 % (02/05/24 0927) Vital Signs (24h Range):  Temp:  [97.5 °F (36.4 °C)-99.5 °F (37.5 °C)] 97.5 °F (36.4 °C)  Pulse:  [72-93] 74  Resp:  [18-20] 19  SpO2:  [90 %-94 %] 94 %  BP: (118-137)/(57-63) 119/58                              Closed/Suction Drain 02/02/24 0914 Tube - 1 Inferior;Right Back Bulb 15 Fr. (Active)   Site Description Unable to view 02/05/24 0719   Dressing Type Central line dressing 02/05/24 0719   Dressing Status Clean;Dry;Intact 02/05/24 0719   Dressing Intervention Integrity maintained 02/05/24 0719   Drainage Serosanguineous 02/05/24 0719   Status To bulb suction 02/04/24 1928   Output (mL) 50 mL 02/05/24 0600            Closed/Suction Drain  "02/02/24 0840 Tube - 2 Lateral;Left;Superior Back Accordion 10 Fr. (Active)   Site Description Unable to view 02/05/24 0719   Dressing Type Central line dressing 02/05/24 0719   Dressing Status Clean;Dry;Intact 02/05/24 0719   Dressing Intervention Integrity maintained 02/05/24 0719   Drainage Serosanguineous 02/05/24 0719   Status Other (Comment) 02/04/24 1928   Output (mL) 10 mL 02/05/24 0400           Neurosurgery Physical Exam  Physical Exam:  General: Well developed, well nourished, not in acute distress.   Head: Normocephalic, atraumatic.  Neck: Full ROM.   Neurologic: Alert and oriented x 4. Thought content appropriate.  GCS: Motor:6  Verbal:5  Eyes:4   GCS Total: 15  Language: No aphasia.  Speech: No dysarthria.  Cranial nerves: Face symmetric, tongue midline, CN II-XII grossly intact.   Eyes: Pupils equal, round, reactive to light with accomodation, EOMI.   Pulmonary: Normal respirations, no signs of respiratory distress.  Abdomen: Soft, non-distended, non-tender to palpation.  Sensory: Intact to light touch throughout.  Motor Strength: Moves all extremities spontaneously with good tone. Full strength upper and lower extremities. No abnormal movements seen.      Incision: Prevena in place.   Left sided deep HV and R sided WILLY drain in place to suction.    Significant Labs:  Recent Labs   Lab 02/03/24  1304 02/05/24  0810     --    *  --    K 4.5  --      --    CO2 25  --    BUN 12  --    CREATININE 0.9 0.9   CALCIUM 9.0  --      Recent Labs   Lab 02/04/24  0456 02/05/24  0305   WBC 3.50* 2.91*   HGB 9.0* 8.3*   HCT 27.5* 25.6*    246     No results for input(s): "LABPT", "INR", "APTT" in the last 48 hours.  Microbiology Results (last 7 days)       Procedure Component Value Units Date/Time    Blood culture [[id number]] Collected: 01/31/24 1948    Order Status: Completed Specimen: Blood Updated: 02/04/24 2022     Blood Culture, Routine No Growth to date      No Growth to date      " No Growth to date      No Growth to date      No Growth to date    Aerobic culture [[id number]]  (Abnormal) Collected: 02/02/24 0924    Order Status: Completed Specimen: Wound from Back Updated: 02/04/24 1321     Aerobic Bacterial Culture STAPHYLOCOCCUS EPIDERMIDIS  Few  Susceptibility pending      Narrative:      Epidural tissue    Aerobic culture [[id number]]  (Abnormal)  (Susceptibility) Collected: 02/02/24 0924    Order Status: Completed Specimen: Wound from Back Updated: 02/04/24 1007     Aerobic Bacterial Culture STAPHYLOCOCCUS EPIDERMIDIS  Few      Narrative:      Superficial fluid    Aerobic culture [[id number]]  (Abnormal)  (Susceptibility) Collected: 02/02/24 0924    Order Status: Completed Specimen: Wound from Back Updated: 02/04/24 1007     Aerobic Bacterial Culture STAPHYLOCOCCUS EPIDERMIDIS  Few      Narrative:      Epidural swab    AFB Culture & Smear [[id number]] Collected: 02/02/24 0924    Order Status: Completed Specimen: Wound from Back Updated: 02/03/24 2127     AFB Culture & Smear Culture in progress    Narrative:      Superficial fluid    AFB Culture & Smear [[id number]] Collected: 02/02/24 0924    Order Status: Completed Specimen: Wound from Back Updated: 02/03/24 2127     AFB Culture & Smear Culture in progress    Narrative:      Epidural swab    AFB Culture & Smear [[id number]] Collected: 02/02/24 0924    Order Status: Completed Specimen: Wound from Back Updated: 02/03/24 2127     AFB Culture & Smear Culture in progress    Narrative:      Epidural tissue    Culture, Anaerobe [[id number]] Collected: 02/02/24 0924    Order Status: Completed Specimen: Wound from Back Updated: 02/03/24 1222     Anaerobic Culture Culture in progress    Narrative:      Superficial fluid    Culture, Anaerobe [[id number]] Collected: 02/02/24 0924    Order Status: Completed Specimen: Wound from Back Updated: 02/03/24 1222     Anaerobic Culture Culture in progress    Narrative:      Epidural swab    Culture, Anaerobe  [[id number]] Collected: 02/02/24 0924    Order Status: Completed Specimen: Wound from Back Updated: 02/03/24 1222     Anaerobic Culture Culture in progress    Narrative:      Epidural tissue    Gram stain [[id number]] Collected: 02/02/24 0924    Order Status: Completed Specimen: Wound from Back Updated: 02/02/24 1355     Gram Stain Result No WBC's      No organisms seen    Narrative:      Epidural tissue    Gram stain [[id number]] Collected: 02/02/24 0924    Order Status: Completed Specimen: Wound from Back Updated: 02/02/24 1350     Gram Stain Result Rare WBC's      No organisms seen    Narrative:      Superficial fluid    Gram stain [[id number]] Collected: 02/02/24 0924    Order Status: Completed Specimen: Wound from Back Updated: 02/02/24 1341     Gram Stain Result Rare WBC's      No organisms seen    Narrative:      Epidural swab    Fungus culture [[id number]] Collected: 02/02/24 0924    Order Status: Sent Specimen: Wound from Back Updated: 02/02/24 0939    Fungus culture [[id number]] Collected: 02/02/24 0924    Order Status: Sent Specimen: Wound from Back Updated: 02/02/24 0936    Fungus culture [[id number]] Collected: 02/02/24 0924    Order Status: Sent Specimen: Wound from Back Updated: 02/02/24 0933          All pertinent labs from the last 24 hours have been reviewed.    Significant Diagnostics:  I have reviewed and interpreted all pertinent imaging results/findings within the past 24 hours.

## 2024-02-05 NOTE — PLAN OF CARE
Problem: Adult Inpatient Plan of Care  Goal: Plan of Care Review  Outcome: Ongoing, Progressing  Goal: Patient-Specific Goal (Individualized)  Description: Pt will maintain sbp below 180  Outcome: Ongoing, Progressing  Goal: Absence of Hospital-Acquired Illness or Injury  Outcome: Ongoing, Progressing  Goal: Optimal Comfort and Wellbeing  Outcome: Ongoing, Progressing  Goal: Readiness for Transition of Care  Outcome: Ongoing, Progressing     Problem: Fluid and Electrolyte Imbalance (Acute Kidney Injury/Impairment)  Goal: Fluid and Electrolyte Balance  Outcome: Ongoing, Progressing     Problem: Oral Intake Inadequate (Acute Kidney Injury/Impairment)  Goal: Optimal Nutrition Intake  Outcome: Ongoing, Progressing     Problem: Fall Injury Risk  Goal: Absence of Fall and Fall-Related Injury  Outcome: Ongoing, Progressing     Problem: Skin Injury Risk Increased  Goal: Skin Health and Integrity  Outcome: Ongoing, Progressing     Problem: Infection  Goal: Absence of Infection Signs and Symptoms  Outcome: Ongoing, Progressing

## 2024-02-05 NOTE — PLAN OF CARE
CHW met with patient/family at bedside. Patient experience rounding completed and reviewed the following.     Do you know your discharge plan? Yes or No,    If yes, what is the plan?  Yes Home Health     Have you discussed your needs and preferences with your SW/CM? Yes      If you are discharging home, do you have help at home? Yes    Do you think you will need help additional at home at discharge?  No     Do you currently have difficulty keeping up with bills, affording medicine or buying food?  No    Assigned SW/CM notified of any patient/family needs or concerns. Appropriate resources provided to address patient's needs.   Nicole Morales W  Case Management  [phone]

## 2024-02-05 NOTE — PROCEDURES
"Gamaliel Pete Jr. is a 73 y.o. male patient.    Temp: 97.5 °F (36.4 °C) (02/05/24 0927)  Pulse: 74 (02/05/24 0927)  Resp: 19 (02/05/24 0927)  BP: (!) 119/58 (02/05/24 0927)  SpO2: (!) 94 % (02/05/24 0927)  Weight: 87.1 kg (192 lb) (01/31/24 2043)  Height: 5' 10" (177.8 cm) (01/31/24 2043)    PICC  Date/Time: 2/5/2024 10:54 AM  Performed by: Eliel Garland RN  Assisting provider: Dominique Forbes RN  Consent Done: Yes  Time out: Immediately prior to procedure a time out was called to verify the correct patient, procedure, equipment, support staff and site/side marked as required  Indications: med administration  Anesthesia: local infiltration  Local anesthetic: lidocaine 1% without epinephrine  Anesthetic Total (mL): 3  Preparation: skin prepped with ChloraPrep  Skin prep agent dried: skin prep agent completely dried prior to procedure  Sterile barriers: all five maximum sterile barriers used - cap, mask, sterile gown, sterile gloves, and large sterile sheet  Hand hygiene: hand hygiene performed prior to central venous catheter insertion  Location details: right basilic  Catheter type: double lumen  Catheter size: 5 Fr  Catheter Length: 38cm    Ultrasound guidance: yes  Vessel Caliber: large and patent, compressibility normal  Vascular Doppler: not done  Needle advanced into vessel with real time Ultrasound guidance.  Guidewire confirmed in vessel.  Image recorded and saved.  Sterile sheath used.  Number of attempts: 1  Post-procedure: blood return through all ports, chlorhexidine patch and sterile dressing applied  Technical procedures used: 3CG  Specimens: No  Implants: No  Assessment: placement verified by x-ray  Complications: none        Name ADDI Forbes RN  2/5/2024    "

## 2024-02-05 NOTE — ASSESSMENT & PLAN NOTE
Gamaliel Pete Jr. is a 73 y.o. male s/p T7-11 decompression/fusion on 1/5/24. He presented to clinic on 1/31 with surgical wound breakdown.    Diagnostics:  MRI t spine w/wo con 1/31/24: no epidural abscess or infection, likely seroma. Adequate decompression of spinal cord.   ESR 69 Procal 0.24  Preop labs WNL.  Blood cultures 1/31/24: NGTD/pending.  OR cultures 2/2: staph epidermidis.    Now s/p thoracic wound washout/revision with plastic surgery on 2/2.    - Admit to NPU under NSGY.  - q4h neurochecks.  - TLSO brace when up/out of bed. Okay for activities as tolerated.  - Drains to suction. Record output qshift. L deep HV per nsgy. R WILLY per plastics.  - WV in place- per plastics.  - ID consulted- rec IV Vanc x 6 weeks. PICC team consulted.  - Hold chemo tx perioperatively.  - DVT ppx: deyanira hose/SCDs/LVX.  - Atelectasis ppx: up in chair, IS hourly.  - Bowel regimen.    D/w Dr. Martinez.

## 2024-02-05 NOTE — CONSULTS
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    Drug levels (last 3 results):  Recent Labs   Lab Result Units 02/03/24  2059 02/05/24  0810   Vancomycin-Trough ug/mL 13.1 16.9       Please contact pharmacy at extension [phone] for questions regarding this assessment.    Thank you for the consult,   Margie Wang, PharmD       Patient brief summary:  Gamaliel Pete Jr. is a 73 y.o. male initiated on antimicrobial therapy with IV Vancomycin for treatment of skin & soft tissue infection      Drug Allergies:   Review of patient's allergies indicates:  No Known Allergies    Actual Body Weight:   87.1 kg    Renal Function:   Estimated Creatinine Clearance: 77.7 mL/min (based on SCr of 0.9 mg/dL).,     Dialysis Method (if applicable):  N/A    CBC (last 72 hours):  Recent Labs   Lab Result Units 02/04/24  0456 02/05/24  0305   WBC K/uL 3.50* 2.91*   Hemoglobin g/dL 9.0* 8.3*   Hematocrit % 27.5* 25.6*   Platelets K/uL 259 246   Gran % % 93.0* 84.0*   Lymph % % 1.0* 9.0*   Mono % % 1.0* 5.0   Eosinophil % % 0.0 0.0   Basophil % % 0.0 0.0   Differential Method  Manual Manual       Metabolic Panel (last 72 hours):  Recent Labs   Lab Result Units 02/02/24  1154 02/02/24  1835 02/03/24  0013 02/03/24  1304 02/05/24  0810   Sodium mmol/L 132* 131* 132* 134*  --    Potassium mmol/L 4.6 4.4 4.5 4.5  --    Chloride mmol/L 102 102 102 102  --    CO2 mmol/L 23 23 22* 25  --    Glucose mg/dL 120* 138* 150* 109  --    BUN mg/dL 12 13 14 12  --    Creatinine mg/dL 0.8 0.8 0.8 0.9 0.9       Vancomycin Administrations:  vancomycin given in the last 96 hours                     vancomycin (VANCOCIN) 1,000 mg in dextrose 5 %  (D5W) 250 mL IVPB (Vial-Mate) (mg) 1,000 mg New Bag 02/05/24 0912     1,000 mg New Bag 02/04/24 2112     1,000 mg New Bag  0813     1,000 mg New Bag 02/03/24 2057     1,000 mg New Bag  0900     1,000 mg New Bag 02/02/24 2142    vancomycin injection (g) 1 g Given 02/02/24 0822    vancomycin (VANCOCIN) 1,000 mg in sodium chloride 0.9% 100 mL IVPB (mg) 1,000 mg New Bag 02/02/24 0818                    Microbiologic Results:  Microbiology Results (last 7 days)       Procedure Component Value Units Date/Time    Aerobic culture [[id number]]  (Abnormal)  (Susceptibility) Collected: 02/02/24 0924    Order Status: Completed Specimen: Wound from Back Updated: 02/05/24 0956     Aerobic Bacterial Culture STAPHYLOCOCCUS EPIDERMIDIS  Few      Narrative:      Superficial fluid    Blood culture [[id number]] Collected: 01/31/24 1948    Order Status: Completed Specimen: Blood Updated: 02/04/24 2022     Blood Culture, Routine No Growth to date      No Growth to date      No Growth to date      No Growth to date      No Growth to date    Aerobic culture [[id number]]  (Abnormal) Collected: 02/02/24 0924    Order Status: Completed Specimen: Wound from Back Updated: 02/04/24 1321     Aerobic Bacterial Culture STAPHYLOCOCCUS EPIDERMIDIS  Few  Susceptibility pending      Narrative:      Epidural tissue    Aerobic culture [[id number]]  (Abnormal)  (Susceptibility) Collected: 02/02/24 0924    Order Status: Completed Specimen: Wound from Back Updated: 02/04/24 1007     Aerobic Bacterial Culture STAPHYLOCOCCUS EPIDERMIDIS  Few      Narrative:      Epidural swab    AFB Culture & Smear [[id number]] Collected: 02/02/24 0924    Order Status: Completed Specimen: Wound from Back Updated: 02/03/24 2127     AFB Culture & Smear Culture in progress    Narrative:      Superficial fluid    AFB Culture & Smear [[id number]] Collected: 02/02/24 0924    Order Status: Completed Specimen: Wound from Back Updated: 02/03/24 2127     AFB Culture & Smear Culture in  progress    Narrative:      Epidural swab    AFB Culture & Smear [[id number]] Collected: 02/02/24 0924    Order Status: Completed Specimen: Wound from Back Updated: 02/03/24 2127     AFB Culture & Smear Culture in progress    Narrative:      Epidural tissue    Culture, Anaerobe [[id number]] Collected: 02/02/24 0924    Order Status: Completed Specimen: Wound from Back Updated: 02/03/24 1222     Anaerobic Culture Culture in progress    Narrative:      Superficial fluid    Culture, Anaerobe [[id number]] Collected: 02/02/24 0924    Order Status: Completed Specimen: Wound from Back Updated: 02/03/24 1222     Anaerobic Culture Culture in progress    Narrative:      Epidural swab    Culture, Anaerobe [[id number]] Collected: 02/02/24 0924    Order Status: Completed Specimen: Wound from Back Updated: 02/03/24 1222     Anaerobic Culture Culture in progress    Narrative:      Epidural tissue    Gram stain [[id number]] Collected: 02/02/24 0924    Order Status: Completed Specimen: Wound from Back Updated: 02/02/24 1355     Gram Stain Result No WBC's      No organisms seen    Narrative:      Epidural tissue    Gram stain [[id number]] Collected: 02/02/24 0924    Order Status: Completed Specimen: Wound from Back Updated: 02/02/24 1350     Gram Stain Result Rare WBC's      No organisms seen    Narrative:      Superficial fluid    Gram stain [[id number]] Collected: 02/02/24 0924    Order Status: Completed Specimen: Wound from Back Updated: 02/02/24 1341     Gram Stain Result Rare WBC's      No organisms seen    Narrative:      Epidural swab    Fungus culture [[id number]] Collected: 02/02/24 0924    Order Status: Sent Specimen: Wound from Back Updated: 02/02/24 0939    Fungus culture [[id number]] Collected: 02/02/24 0924    Order Status: Sent Specimen: Wound from Back Updated: 02/02/24 0936    Fungus culture [[id number]] Collected: 02/02/24 0924    Order Status: Sent Specimen: Wound from Back Updated: 02/02/24 0933

## 2024-02-05 NOTE — PLAN OF CARE
Lj Krueger - Neurosurgery ([facility])  Discharge Reassessment    Primary Care Provider: Slava Lowery MD    Expected Discharge Date: 2/5/2024    Plan for discharge is home with home ivab therapy and hh.  CM sent referral [facility].    Update:  2/5/2024 4:00  [facility] unable to staff timely.  Referral sent to [facility] who is unable to staff.  [facility] should be able to staff on Wednesday, Thursday at the latest.    Discharge Plan A and Plan B have been determined by review of patient's clinical status, future medical and therapeutic needs, and coverage/benefits for post-acute care in coordination with multidisciplinary team members.   Reassessment (most recent)       Discharge Reassessment - 02/05/24 1255          Discharge Reassessment    Assessment Type Discharge Planning Reassessment     Did the patient's condition or plan change since previous assessment? Yes     Discharge Plan discussed with: Patient;Spouse/sig other     Communicated WAQAS with patient/caregiver Yes     Discharge Plan A Home with family;Home Health     Discharge Plan B Home with family     DME Needed Upon Discharge  other (see comments)     Transition of Care Barriers None     Why the patient remains in the hospital Requires continued medical care        Post-Acute Status    Post-Acute Authorization IV Infusion;Home Health     Home Health Status Referrals Sent     IV Infusion Status Referral(s) sent     Discharge Delays None known at this time                        Please follow up in 1 week. You are advised to contact us if your condition worsens. Foot Pain: Care Instructions  Your Care Instructions  Foot injuries that cause pain and swelling are fairly common. Almost all sports or home repair projects can cause a misstep that ends up as foot pain. Normal wear and tear, especially as you get older, also can cause foot pain. Most minor foot injuries will heal on their own, and home treatment is usually all you need to do. If you have a severe injury, you may need tests and treatment. Follow-up care is a key part of your treatment and safety. Be sure to make and go to all appointments, and call your doctor if you are having problems. It's also a good idea to know your test results and keep a list of the medicines you take. How can you care for yourself at home? · Take pain medicines exactly as directed. ¨ If the doctor gave you a prescription medicine for pain, take it as prescribed. ¨ If you are not taking a prescription pain medicine, ask your doctor if you can take an over-the-counter medicine. · Rest and protect your foot. Take a break from any activity that may cause pain. · Put ice or a cold pack on your foot for 10 to 20 minutes at a time. Put a thin cloth between the ice and your skin. · Prop up the sore foot on a pillow when you ice it or anytime you sit or lie down during the next 3 days. Try to keep it above the level of your heart. This will help reduce swelling. · Your doctor may recommend that you wrap your foot with an elastic bandage. Keep your foot wrapped for as long as your doctor advises. · If your doctor recommends crutches, use them as directed. · Wear roomy footwear. · As soon as pain and swelling end, begin gentle exercises of your foot. Your doctor can tell you which exercises will help. When should you call for help? Call 911 anytime you think you may need emergency care.  For example, call if:  ? · Your foot turns pale, white, blue, or cold. ?Call your doctor now or seek immediate medical care if:  ? · You cannot move or stand on your foot. ? · Your foot looks twisted or out of its normal position. ? · Your foot is not stable when you step down. ? · You have signs of infection, such as:  ¨ Increased pain, swelling, warmth, or redness. ¨ Red streaks leading from the sore area. ¨ Pus draining from a place on your foot. ¨ A fever. ? · Your foot is numb or tingly. ? Watch closely for changes in your health, and be sure to contact your doctor if:  ? · You do not get better as expected. ? · You have bruises from an injury that last longer than 2 weeks. Where can you learn more? Go to http://jose martin-karrie.info/. Enter I927 in the search box to learn more about \"Foot Pain: Care Instructions. \"  Current as of: March 21, 2017  Content Version: 11.4  © 7752-1880 [employer]. Care instructions adapted under license by [employer] (which disclaims liability or warranty for this information). If you have questions about a medical condition or this instruction, always ask your healthcare professional. [place] [address] any warranty or liability for your use of this information.

## 2024-02-05 NOTE — PROGRESS NOTES
Lj Krueger - Neurosurgery ([facility])  Neurosurgery  Progress Note    Subjective:     History of Present Illness: Gamaliel Pete Jr. is a 73 y.o. 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 He was then taken for a T7-11 posterior decompression/fusion. The patient presented recently with scabbing to his lower thoracic incision. He was sent to wound care who advised him to be evaluated because it appeared worse. He presents to clinic today with reports of drainage from his incision. He denies fevers, headaches, numbness, tingling, focal weakness, bowel/bladder dysfunction. He will be direct admitted to the hospital for further workup and likely neurosurgical intervention to address the surgical wound breakdown.     Post-Op Info:  Procedure(s) (LRB):  EXPLORATION, WOUND (N/A)  CLOSURE (N/A)   3 Days Post-Op   Interval History: NAEON. Neuro stable. OR cultures w/ staph epidermidis. ID rec IV Vanc x 6 weeks. PICC team consulted. Continue drains and WV.     Medications:  Continuous Infusions:   sodium chloride 0.9% 75 mL/hr at 02/03/24 0754    lactated ringers 100 mL/hr at 02/03/24 0204     Scheduled Meds:   allopurinoL  100 mg Oral Daily    amlodipine-benazepril 5-10 mg  1 capsule Oral Daily    diazePAM  5 mg Oral Q6H    duke's soln (benadryl 30 mL, mylanta 30 mL, LIDOcaine 30 mL, nystatin 30 mL) 120 mL  10 mL Oral QID    gabapentin  300 mg Oral TID    heparin (porcine)  5,000 Units Subcutaneous Q8H    mupirocin   Nasal BID    polyethylene glycol  17 g Oral Daily    vancomycin (VANCOCIN) IV (PEDS and ADULTS)  1,000 mg Intravenous Q12H     PRN Meds:acetaminophen, aluminum-magnesium hydroxide-simethicone, diazePAM, diclofenac sodium, melatonin, methocarbamoL, oxyCODONE, oxyCODONE, oxyCODONE, prochlorperazine, senna-docusate 8.6-50 mg, Pharmacy to dose Vancomycin consult **AND** vancomycin - pharmacy to dose     Review of  Systems  Objective:     Weight: 87.1 kg (192 lb)  Body mass index is 27.55 kg/m².  Vital Signs (Most Recent):  Temp: 97.5 °F (36.4 °C) (02/05/24 0927)  Pulse: 74 (02/05/24 0927)  Resp: 19 (02/05/24 0927)  BP: (!) 119/58 (02/05/24 0927)  SpO2: (!) 94 % (02/05/24 0927) Vital Signs (24h Range):  Temp:  [97.5 °F (36.4 °C)-99.5 °F (37.5 °C)] 97.5 °F (36.4 °C)  Pulse:  [72-93] 74  Resp:  [18-20] 19  SpO2:  [90 %-94 %] 94 %  BP: (118-137)/(57-63) 119/58                              Closed/Suction Drain 02/02/24 0914 Tube - 1 Inferior;Right Back Bulb 15 Fr. (Active)   Site Description Unable to view 02/05/24 0719   Dressing Type Central line dressing 02/05/24 0719   Dressing Status Clean;Dry;Intact 02/05/24 0719   Dressing Intervention Integrity maintained 02/05/24 0719   Drainage Serosanguineous 02/05/24 0719   Status To bulb suction 02/04/24 1928   Output (mL) 50 mL 02/05/24 0600            Closed/Suction Drain 02/02/24 0840 Tube - 2 Lateral;Left;Superior Back Accordion 10 Fr. (Active)   Site Description Unable to view 02/05/24 0719   Dressing Type Central line dressing 02/05/24 0719   Dressing Status Clean;Dry;Intact 02/05/24 0719   Dressing Intervention Integrity maintained 02/05/24 0719   Drainage Serosanguineous 02/05/24 0719   Status Other (Comment) 02/04/24 1928   Output (mL) 10 mL 02/05/24 0400           Neurosurgery Physical Exam  Physical Exam:  General: Well developed, well nourished, not in acute distress.   Head: Normocephalic, atraumatic.  Neck: Full ROM.   Neurologic: Alert and oriented x 4. Thought content appropriate.  GCS: Motor:6  Verbal:5  Eyes:4   GCS Total: 15  Language: No aphasia.  Speech: No dysarthria.  Cranial nerves: Face symmetric, tongue midline, CN II-XII grossly intact.   Eyes: Pupils equal, round, reactive to light with accomodation, EOMI.   Pulmonary: Normal respirations, no signs of respiratory distress.  Abdomen: Soft, non-distended, non-tender to palpation.  Sensory: Intact to light  "touch throughout.  Motor Strength: Moves all extremities spontaneously with good tone. Full strength upper and lower extremities. No abnormal movements seen.      Incision: Prevena in place.   Left sided deep HV and R sided WILLY drain in place to suction.    Significant Labs:  Recent Labs   Lab 02/03/24  1304 02/05/24  0810     --    *  --    K 4.5  --      --    CO2 25  --    BUN 12  --    CREATININE 0.9 0.9   CALCIUM 9.0  --      Recent Labs   Lab 02/04/24  0456 02/05/24  0305   WBC 3.50* 2.91*   HGB 9.0* 8.3*   HCT 27.5* 25.6*    246     No results for input(s): "LABPT", "INR", "APTT" in the last 48 hours.  Microbiology Results (last 7 days)       Procedure Component Value Units Date/Time    Blood culture [[id number]] Collected: 01/31/24 1948    Order Status: Completed Specimen: Blood Updated: 02/04/24 2022     Blood Culture, Routine No Growth to date      No Growth to date      No Growth to date      No Growth to date      No Growth to date    Aerobic culture [[id number]]  (Abnormal) Collected: 02/02/24 0924    Order Status: Completed Specimen: Wound from Back Updated: 02/04/24 1321     Aerobic Bacterial Culture STAPHYLOCOCCUS EPIDERMIDIS  Few  Susceptibility pending      Narrative:      Epidural tissue    Aerobic culture [[id number]]  (Abnormal)  (Susceptibility) Collected: 02/02/24 0924    Order Status: Completed Specimen: Wound from Back Updated: 02/04/24 1007     Aerobic Bacterial Culture STAPHYLOCOCCUS EPIDERMIDIS  Few      Narrative:      Superficial fluid    Aerobic culture [[id number]]  (Abnormal)  (Susceptibility) Collected: 02/02/24 0924    Order Status: Completed Specimen: Wound from Back Updated: 02/04/24 1007     Aerobic Bacterial Culture STAPHYLOCOCCUS EPIDERMIDIS  Few      Narrative:      Epidural swab    AFB Culture & Smear [[id number]] Collected: 02/02/24 0924    Order Status: Completed Specimen: Wound from Back Updated: 02/03/24 2127     AFB Culture & Smear Culture in " progress    Narrative:      Superficial fluid    AFB Culture & Smear [[id number]] Collected: 02/02/24 0924    Order Status: Completed Specimen: Wound from Back Updated: 02/03/24 2127     AFB Culture & Smear Culture in progress    Narrative:      Epidural swab    AFB Culture & Smear [[id number]] Collected: 02/02/24 0924    Order Status: Completed Specimen: Wound from Back Updated: 02/03/24 2127     AFB Culture & Smear Culture in progress    Narrative:      Epidural tissue    Culture, Anaerobe [[id number]] Collected: 02/02/24 0924    Order Status: Completed Specimen: Wound from Back Updated: 02/03/24 1222     Anaerobic Culture Culture in progress    Narrative:      Superficial fluid    Culture, Anaerobe [[id number]] Collected: 02/02/24 0924    Order Status: Completed Specimen: Wound from Back Updated: 02/03/24 1222     Anaerobic Culture Culture in progress    Narrative:      Epidural swab    Culture, Anaerobe [[id number]] Collected: 02/02/24 0924    Order Status: Completed Specimen: Wound from Back Updated: 02/03/24 1222     Anaerobic Culture Culture in progress    Narrative:      Epidural tissue    Gram stain [[id number]] Collected: 02/02/24 0924    Order Status: Completed Specimen: Wound from Back Updated: 02/02/24 1355     Gram Stain Result No WBC's      No organisms seen    Narrative:      Epidural tissue    Gram stain [[id number]] Collected: 02/02/24 0924    Order Status: Completed Specimen: Wound from Back Updated: 02/02/24 1350     Gram Stain Result Rare WBC's      No organisms seen    Narrative:      Superficial fluid    Gram stain [[id number]] Collected: 02/02/24 0924    Order Status: Completed Specimen: Wound from Back Updated: 02/02/24 1341     Gram Stain Result Rare WBC's      No organisms seen    Narrative:      Epidural swab    Fungus culture [[id number]] Collected: 02/02/24 0924    Order Status: Sent Specimen: Wound from Back Updated: 02/02/24 0939    Fungus culture [[id number]] Collected: 02/02/24 0924     Order Status: Sent Specimen: Wound from Back Updated: 02/02/24 0936    Fungus culture [[id number]] Collected: 02/02/24 0924    Order Status: Sent Specimen: Wound from Back Updated: 02/02/24 0933          All pertinent labs from the last 24 hours have been reviewed.    Significant Diagnostics:  I have reviewed and interpreted all pertinent imaging results/findings within the past 24 hours.  Assessment/Plan:     * Surgical wound breakdown  Gamaliel Pete Jr. is a 73 y.o. male s/p T7-11 decompression/fusion on 1/5/24. He presented to clinic on 1/31 with surgical wound breakdown.    Diagnostics:  MRI t spine w/wo con 1/31/24: no epidural abscess or infection, likely seroma. Adequate decompression of spinal cord.   ESR 69 Procal 0.24  Preop labs WNL.  Blood cultures 1/31/24: NGTD/pending.  OR cultures 2/2: staph epidermidis.    Now s/p thoracic wound washout/revision with plastic surgery on 2/2.    - Admit to NPU under NSGY.  - q4h neurochecks.  - TLSO brace when up/out of bed. Okay for activities as tolerated.  - Drains to suction. Record output qshift. L deep HV per nsgy. R WILLY per plastics.  - WV in place- per plastics.  - ID consulted- rec IV Vanc x 6 weeks. PICC team consulted.  - Hold chemo tx perioperatively.  - DVT ppx: deyanira hose/SCDs/LVX.  - Atelectasis ppx: up in chair, IS hourly.  - Bowel regimen.    D/w Dr. Martinez.    Hyponatremia  Na 126 on admission. Improving.    - Medicine consulted for assistance.            Nadine Santos PA-C  Neurosurgery  Lj Krueger - Neurosurgery ([facility])

## 2024-02-05 NOTE — CONSULTS
MICHAEL consulted for PICC insertion using modified Seldinger technique via u/s guidance:     Indication: IV ABX: VANCOMYCIN EED 3/15/2024  Size: 5F  Lumen: DOUBLE  Vein: RIGHT BASILIC  Cm in length: 38  Arm circumference: 29  Cm exposed: 0  Lot#: [id number]    Image saved and uploaded to EMR

## 2024-02-06 LAB
ANISOCYTOSIS BLD QL SMEAR: SLIGHT
BACTERIA SPEC AEROBE CULT: ABNORMAL
BACTERIA SPEC ANAEROBE CULT: NORMAL
BASOPHILS # BLD AUTO: ABNORMAL K/UL (ref 0–0.2)
BASOPHILS NFR BLD: 0 % (ref 0–1.9)
DIFFERENTIAL METHOD BLD: ABNORMAL
EOSINOPHIL # BLD AUTO: ABNORMAL K/UL (ref 0–0.5)
EOSINOPHIL NFR BLD: 0 % (ref 0–8)
ERYTHROCYTE [DISTWIDTH] IN BLOOD BY AUTOMATED COUNT: 15.5 % (ref 11.5–14.5)
FINAL PATHOLOGIC DIAGNOSIS: NORMAL
GROSS: NORMAL
HCT VFR BLD AUTO: 27.5 % (ref 40–54)
HGB BLD-MCNC: 8.8 G/DL (ref 14–18)
IMM GRANULOCYTES # BLD AUTO: ABNORMAL K/UL (ref 0–0.04)
IMM GRANULOCYTES NFR BLD AUTO: ABNORMAL % (ref 0–0.5)
LYMPHOCYTES # BLD AUTO: ABNORMAL K/UL (ref 1–4.8)
LYMPHOCYTES NFR BLD: 13 % (ref 18–48)
Lab: NORMAL
MCH RBC QN AUTO: 28.9 PG (ref 27–31)
MCHC RBC AUTO-ENTMCNC: 32 G/DL (ref 32–36)
MCV RBC AUTO: 90 FL (ref 82–98)
MICROSCOPIC EXAM: NORMAL
MONOCYTES # BLD AUTO: ABNORMAL K/UL (ref 0.3–1)
MONOCYTES NFR BLD: 5 % (ref 4–15)
MYELOCYTES NFR BLD MANUAL: 1 %
NEUTROPHILS NFR BLD: 78 % (ref 38–73)
NEUTS BAND NFR BLD MANUAL: 2 %
NRBC BLD-RTO: 0 /100 WBC
OVALOCYTES BLD QL SMEAR: ABNORMAL
PLATELET # BLD AUTO: 254 K/UL (ref 150–450)
PLATELET BLD QL SMEAR: ABNORMAL
PMV BLD AUTO: 8.4 FL (ref 9.2–12.9)
PROMYELOCYTES NFR BLD MANUAL: 1 %
RBC # BLD AUTO: 3.05 M/UL (ref 4.6–6.2)
WBC # BLD AUTO: 4 K/UL (ref 3.9–12.7)

## 2024-02-06 PROCEDURE — 25000003 PHARM REV CODE 250

## 2024-02-06 PROCEDURE — 77412 RADIATION TX DELIVERY LVL 3: CPT | Performed by: RADIOLOGY

## 2024-02-06 PROCEDURE — 85027 COMPLETE CBC AUTOMATED: CPT

## 2024-02-06 PROCEDURE — 11000001 HC ACUTE MED/SURG PRIVATE ROOM

## 2024-02-06 PROCEDURE — 99499 UNLISTED E&M SERVICE: CPT | Mod: ,,, | Performed by: NURSE PRACTITIONER

## 2024-02-06 PROCEDURE — A4216 STERILE WATER/SALINE, 10 ML: HCPCS | Performed by: STUDENT IN AN ORGANIZED HEALTH CARE EDUCATION/TRAINING PROGRAM

## 2024-02-06 PROCEDURE — 77387 GUIDANCE FOR RADJ TX DLVR: CPT | Mod: TC | Performed by: RADIOLOGY

## 2024-02-06 PROCEDURE — G6002 STEREOSCOPIC X-RAY GUIDANCE: HCPCS | Mod: 26,,, | Performed by: RADIOLOGY

## 2024-02-06 PROCEDURE — 99024 POSTOP FOLLOW-UP VISIT: CPT | Mod: ,,,

## 2024-02-06 PROCEDURE — 25000003 PHARM REV CODE 250: Performed by: STUDENT IN AN ORGANIZED HEALTH CARE EDUCATION/TRAINING PROGRAM

## 2024-02-06 PROCEDURE — 85007 BL SMEAR W/DIFF WBC COUNT: CPT

## 2024-02-06 PROCEDURE — 63600175 PHARM REV CODE 636 W HCPCS: Performed by: STUDENT IN AN ORGANIZED HEALTH CARE EDUCATION/TRAINING PROGRAM

## 2024-02-06 PROCEDURE — 63600175 PHARM REV CODE 636 W HCPCS

## 2024-02-06 RX ORDER — METHOCARBAMOL 750 MG/1
750 TABLET, FILM COATED ORAL 3 TIMES DAILY
Qty: 30 TABLET | Refills: 0 | Status: SHIPPED | OUTPATIENT
Start: 2024-02-06 | End: 2024-02-18

## 2024-02-06 RX ORDER — OXYCODONE HYDROCHLORIDE 10 MG/1
10 TABLET ORAL EVERY 4 HOURS PRN
Qty: 30 TABLET | Refills: 0 | Status: SHIPPED | OUTPATIENT
Start: 2024-02-06 | End: 2024-04-04

## 2024-02-06 RX ADMIN — HEPARIN SODIUM 5000 UNITS: 5000 INJECTION INTRAVENOUS; SUBCUTANEOUS at 05:02

## 2024-02-06 RX ADMIN — GABAPENTIN 300 MG: 300 CAPSULE ORAL at 08:02

## 2024-02-06 RX ADMIN — OXYCODONE HYDROCHLORIDE 10 MG: 10 TABLET ORAL at 02:02

## 2024-02-06 RX ADMIN — ALLOPURINOL 100 MG: 100 TABLET ORAL at 08:02

## 2024-02-06 RX ADMIN — MUPIROCIN: 20 OINTMENT TOPICAL at 08:02

## 2024-02-06 RX ADMIN — VANCOMYCIN HYDROCHLORIDE 1000 MG: 1 INJECTION, POWDER, LYOPHILIZED, FOR SOLUTION INTRAVENOUS at 09:02

## 2024-02-06 RX ADMIN — Medication 10 ML: at 11:02

## 2024-02-06 RX ADMIN — HEPARIN SODIUM 5000 UNITS: 5000 INJECTION INTRAVENOUS; SUBCUTANEOUS at 09:02

## 2024-02-06 RX ADMIN — SENNOSIDES AND DOCUSATE SODIUM 2 TABLET: 8.6; 5 TABLET ORAL at 02:02

## 2024-02-06 RX ADMIN — Medication 10 ML: at 12:02

## 2024-02-06 RX ADMIN — VANCOMYCIN HYDROCHLORIDE 1000 MG: 1 INJECTION, POWDER, LYOPHILIZED, FOR SOLUTION INTRAVENOUS at 11:02

## 2024-02-06 RX ADMIN — Medication 10 ML: at 05:02

## 2024-02-06 RX ADMIN — OXYCODONE HYDROCHLORIDE 10 MG: 10 TABLET ORAL at 07:02

## 2024-02-06 RX ADMIN — OXYCODONE HYDROCHLORIDE 10 MG: 10 TABLET ORAL at 06:02

## 2024-02-06 RX ADMIN — GABAPENTIN 300 MG: 300 CAPSULE ORAL at 02:02

## 2024-02-06 NOTE — PROGRESS NOTES
[facility]    [facility] educ[facility] met with the patient and his  spouse and discussed the discharge plan for home IVABX. Gamaliel Pete Jr. will discharge home with family support. The patient will infuse his medication via Elastomeric Pump. The patient's spouse was educated on the S.A.S.H procedure and a S.A.S.H mat was provided. The patient education checklist was reviewed and acknowledged by the above person and she is agreeable to discharge with the home infusion plan of care. The IV administration process using aspetic technique was reviewed with successful return demonstration. The patient feels comfortable with his wife doing his home infusions. The patient will discharge home with Vancomycin 1 gm IV every 12 hours via double lumen PICC line for an estimated end of therapy date of 3/15/24. Dosing schedule times are 0900 and 2100 daily. [facility] will follow patient for weekly dressing changes and lab draws. Time was allotted for questions. The patient's nurse and case management team were notified that the teaching has been completed.     Medication delivery will be made to home on the day of discharge.    Patient was accepted to care by [facility] and report called to Sasha   Phone number [phone]    [facility]  Chichi Riley RN, Clinical Educator  Cell [phone]  Office [phone]  Fax [phone]

## 2024-02-06 NOTE — PLAN OF CARE
Lj Krueger - Neurosurgery ([facility])      HOME HEALTH ORDERS  FACE TO FACE ENCOUNTER    Patient Name: Gamaliel Pete Jr.  YOB: 1950    PCP: Slava Lowery MD   PCP Address: [address] / [place] [address]  PCP Phone Number: [phone]  PCP Fax: [phone]    Encounter Date: 1/31/24    Admit to Home Health    Diagnoses:  Active Hospital Problems    Diagnosis  POA    *Surgical wound breakdown [T81.31XA]  Yes    Hyponatremia [E87.1]  Yes    Wound dehiscence [T81.30XA]  Yes     See primary problem       Gout of ankle [M10.9]  Yes    Essential hypertension [I10]  Yes    Metastatic cancer to spine [C79.51]  Yes      Resolved Hospital Problems   No resolved problems to display.       Follow Up Appointments:  Future Appointments   Date Time Provider Department Center   2/8/2024 12:00 PM LAB, HEMONC CANCER BLDG [facility]   2/8/2024  1:00 PM Ruby Turk MD [facility] HEMONC3 [facility]   2/22/2024  1:15 PM [facility] OIC-CT2 500 LB LIMIT [facility]   2/22/2024  1:45 PM [facility]-XRAY [facility]   2/22/2024  2:30 PM Nadine Santos PA-C [facility] NEUROS8 [facility]   2/23/2024  1:00 PM [facility]   2/23/2024  1:00 PM [facility]-XRAY [facility] X[facility]   2/23/2024  1:00 PM [facility] OIC EOS [facility]   2/29/2024  7:20 AM LAB, HEMONC CANCER BLDG [facility]   2/29/2024  8:30 AM Kelly Lopez CNS [facility] HEMONC2 [facility]   2/29/2024  9:00 AM NURSE 7, [facility] CHEMO [facility] CHE[facility]   3/1/2024 11:30 AM Apurva Chi DO [facility] ID [facility]   4/15/2024  3:00 PM Slava Lowery MD [facility]       Allergies:Review of patient's allergies indicates:  No Known Allergies    Medications: Review discharge medications with patient and family and provide education.    Current Facility-Administered Medications   Medication Dose Route Frequency Provider Last Rate Last Admin     acetaminophen tablet 650 mg  650 mg Oral Q6H PRN Jennifer Zuñiga MD   650 mg at 02/05/24 1516    allopurinoL tablet 100 mg  100 mg Oral Daily Jennifer Zuñiga MD   100 mg at 02/06/24 0832    aluminum-magnesium hydroxide-simethicone 200-200-20 mg/5 mL suspension 30 mL  30 mL Oral Q4H PRN Brandon Hawk MD        amlodipine-benazepril 5-10 mg per capsule 1 capsule  1 capsule Oral Daily Jennifer Zuñiga MD   1 capsule at 02/05/24 0911    diazePAM tablet 5 mg  5 mg Oral Q6H PRN Jennifer Zuñiga MD   5 mg at 02/05/24 1516    diclofenac sodium 1 % gel 2 g  2 g Topical (Top) BID PRN Jennifer Zuñiga MD   2 g at 02/05/24 0030    duke's soln (benadryl 30 mL, mylanta 30 mL, LIDOcaine 30 mL, nystatin 30 mL) 120 mL  10 mL Oral QID Jennifer Zuñiga MD   10 mL at 02/05/24 0911    gabapentin capsule 300 mg  300 mg Oral TID Jennifer Zuñiga MD   300 mg at 02/06/24 0832    heparin (porcine) injection 5,000 Units  5,000 Units Subcutaneous Q8H Brandon Hawk MD   5,000 Units at 02/06/24 0536    lactated ringers infusion   Intravenous Continuous Brandon Hawk  mL/hr at 02/05/24 2109 New Bag at 02/05/24 2109    melatonin tablet 6 mg  6 mg Oral Nightly PRN Brandon Hawk MD        methocarbamoL tablet 750 mg  750 mg Oral Q8H PRN Jennifer Zuñiga MD        mupirocin 2 % ointment   Nasal BID Brandon Hawk MD   Given at 02/03/24 2057    oxyCODONE immediate release tablet 15 mg  15 mg Oral Q4H PRN Brandon Hawk MD        oxyCODONE immediate release tablet 5 mg  5 mg Oral Q4H PRN Brandon Hawk MD        oxyCODONE immediate release tablet Tab 10 mg  10 mg Oral Q4H PRN Brandon Hawk MD   10 mg at 02/06/24 0629    polyethylene glycol packet 17 g  17 g Oral Daily Jennifer Zuñiga MD        prochlorperazine injection Soln 5 mg  5 mg Intravenous Q6H PRN Brandon Hawk MD        senna-docusate 8.6-50 mg per tablet 2 tablet  2 tablet Oral Nightly PRN Jennifer Zuñiga MD        sodium chloride 0.9% flush 10 mL   10 mL Intravenous Q6H Nasim Martinez, DO   10 mL at 02/06/24 1130    And    sodium chloride 0.9% flush 10 mL  10 mL Intravenous PRN Nasim Martinez, DO        vancomycin (VANCOCIN) 1,000 mg in dextrose 5 % (D5W) 250 mL IVPB (Vial-Mate)  1,000 mg Intravenous Q12H Nasim Martinez, .7 mL/hr at 02/06/24 1129 1,000 mg at 02/06/24 1129    vancomycin - pharmacy to dose   Intravenous pharmacy to manage frequency Mariluz Webb MD         Current Discharge Medication List        START taking these medications    Details   dextrose 5 % (D5W) SolP 250 mL with vancomycin 1,000 mg SolR 1,000 mg Inject 1,000 mg into the vein every 12 (twelve) hours.      methocarbamoL (ROBAXIN) 750 MG Tab Take 1 tablet (750 mg total) by mouth 3 (three) times daily. for 10 days  Qty: 30 tablet, Refills: 0      oxyCODONE (ROXICODONE) 10 mg Tab immediate release tablet Take 1 tablet (10 mg total) by mouth every 4 (four) hours as needed for Pain.  Qty: 30 tablet, Refills: 0    Comments: Quantity prescribed more than 7 day supply? Yes, quantity medically necessary           CONTINUE these medications which have NOT CHANGED    Details   allopurinoL (ZYLOPRIM) 100 MG tablet Take 1 tablet (100 mg total) by mouth once daily.  Qty: 30 tablet, Refills: 3    Associated Diagnoses: Gout, unspecified cause, unspecified chronicity, unspecified site      amlodipine-benazepril 5-10 mg (LOTREL) 5-10 mg per capsule Take 1 capsule by mouth once daily.  Qty: 90 capsule, Refills: 3    Associated Diagnoses: Elevated blood pressure reading      axitinib (INLYTA) 5 mg Tab Take 1 tablet (5 mg) by mouth 2 (two) times daily.  Qty: 60 tablet, Refills: 11    Associated Diagnoses: Malignant neoplasm metastatic to lung, unspecified laterality      duke's soln (benadryl 30 mL, mylanta 30 mL, LIDOcaine 30 mL, nystatin 30 mL) 120mL Take 10 mLs by mouth 4 (four) times daily.  Qty: 500 mL, Refills: 0    Associated Diagnoses: Mouth sores      gabapentin  (NEURONTIN) 300 MG capsule Take 1 capsule (300 mg total) by mouth 3 (three) times daily.  Qty: 90 capsule, Refills: 11      naloxone (NARCAN) 4 mg/actuation Spry 1 spray (4mg) by nasal route as needed for opioid overdose; may repeat every 2-3 minutes in alternating nostrils until medical help arrives. Call 911  Qty: 2 each, Refills: 0    Associated Diagnoses: Intractable back pain      omega-3 fatty acids/fish oil (FISH OIL-OMEGA-3 FATTY ACIDS) 300-1,000 mg capsule Take 2 capsules by mouth once daily.      sildenafiL (VIAGRA) 100 MG tablet Take 1 tablet (100 mg total) by mouth daily as needed for Erectile Dysfunction.  Qty: 30 tablet, Refills: 11      turmeric (CURCUMIN MISC) 1,000 mg by Misc.(Non-Drug; Combo Route) route Daily.      !! UNABLE TO FIND Take 500 mg by mouth 2 (two) times a day. medication name: Tudca      !! UNABLE TO FIND Take 2 capsules by mouth 2 (two) times a day. medication name: Turkey tail mushroom      !! UNABLE TO FIND Take 15 drops by mouth once daily. medication name: Essiac-20      !! UNABLE TO FIND Take 5 mLs by mouth 2 (two) times a day. medication name: barley leaf juice powder      !! UNABLE TO FIND Take 5 mLs by mouth 2 (two) times a day. medication name: black seed oil (cumin seed)       !! - Potential duplicate medications found. Please discuss with provider.        STOP taking these medications       diazePAM (VALIUM) 5 MG tablet Comments:   Reason for Stopping:         oxyCODONE-acetaminophen (PERCOCET) 5-325 mg per tablet Comments:   Reason for Stopping:                 I have seen and examined this patient within the last 30 days. My clinical findings that support the need for the home health skilled services and home bound status are the following:no   Weakness/numbness causing balance and gait disturbance due to Surgery making it taxing to leave home.     Diet:   regular diet    Labs:  SN to perform labs:  CBC: Weekly; 6 week(s), CMP: Weekly; 6 week(s), CRP: Biweekly; 6  week(s), and Vancomycin trough biweekly for 6 weeks    Referrals/ Consults  Physical Therapy to evaluate and treat. Evaluate for home safety and equipment needs; Establish/upgrade home exercise program. Perform / instruct on therapeutic exercises, gait training, transfer training, and Range of Motion.  Occupational Therapy to evaluate and treat. Evaluate home environment for safety and equipment needs. Perform/Instruct on transfers, ADL training, ROM, and therapeutic exercises.    Activities:   activity as tolerated; TLSO brace when ambulating    Nursing:   Agency to admit patient within 24 hours of hospital discharge unless specified on physician order or at patient request    SN to complete comprehensive assessment including routine vital signs. Instruct on disease process and s/s of complications to report to MD. Review/verify medication list sent home with the patient at time of discharge  and instruct patient/caregiver as needed. Frequency may be adjusted depending on start of care date.     Skilled nurse to perform up to 3 visits PRN for symptoms related to diagnosis    Notify MD if SBP > 160 or < 90; DBP > 90 or < 50; HR > 120 or < 50; Temp > 101; O2 < 88%; Other:       Ok to schedule additional visits based on staff availability and patient request on consecutive days within the home health episode.    When multiple disciplines ordered:    Start of Care occurs on Sunday - Wednesday schedule remaining discipline evaluations as ordered on separate consecutive days following the start of care.    Thursday SOC -schedule subsequent evaluations Friday and Monday the following week.     Friday - Saturday SOC - schedule subsequent discipline evaluations on consecutive days starting Monday of the following week.    For all post-discharge communication and subsequent orders please contact patient's primary care physician. If unable to reach primary care physician or do not receive response within 30 minutes, please  contact [phone] for clinical staff order clarification    Miscellaneous   Home Infusion Therapy:   SN to perform Infusion Therapy/Central Line Care.  Review Central Line Care & Central Line Flush with patient.    Administer (drug and dose): IV Vancomycin    Last dose given: 1130 2/6/24                         Home dose due: 2100 2/6/24    Scrub the Hub: Prior to accessing the line, always perform a 30 second alcohol scrub  Each lumen of the central line is to be flushed at least daily with 10 mL Normal Saline and 3 mL Heparin flush (10 units/mL)  Skilled Nurse (SN) may draw blood from IV access  Blood Draw Procedure:   - Aspirate at least 5 mL of blood   - Discard   - Obtain specimen   - Change injection cap   - Flush with 20 mL Normal Saline followed by a                 3-5 mL Heparin flush (10 units/mL)  Central :   - Sterile dressing changes are done weekly and as needed.   - Use chlor-hexadine scrub to cleanse site, apply Biopatch to insertion site,       apply securement device dressing   - Injection caps are changed weekly and after EVERY lab draw.   - If sterile gauze is under dressing to control oozing,                 dressing change must be performed every 24 hours until gauze is not needed.    Estimated end date of IV antibiotics 3/15/24    Home Health Aide:  Nursing Three times weekly, Physical Therapy Three times weekly, and Occupational Therapy Three times weekly    Wound Care Orders  no    I certify that this patient is confined to his home and needs physical therapy and occupational therapy.

## 2024-02-06 NOTE — ASSESSMENT & PLAN NOTE
Gamaliel Pete Jr. is a 73 y.o. male s/p T7-11 decompression/fusion on 1/5/24. He presented to clinic on 1/31 with surgical wound breakdown.    Diagnostics:  MRI t spine w/wo con 1/31/24: no epidural abscess or infection, likely seroma. Adequate decompression of spinal cord.   ESR 69 Procal 0.24  Preop labs WNL.  Blood cultures 1/31/24: NGTD/pending.  OR cultures 2/2: staph epidermidis.    Now s/p thoracic wound washout/revision with plastic surgery on 2/2.    - Admit to NPU under NSGY.  - q4h neurochecks.  - TLSO brace when up/out of bed. Okay for activities as tolerated.  - Drains to suction. Record output qshift. L deep HV per nsgy. R WILLY per plastics.  - WV in place- per plastics.  - ID consulted- rec IV Vanc x 6 weeks. PICC placed 2/5.  - Hold chemo tx perioperatively.   - Receiving radiation inpatient to cervical spine. Day 2/4 complete.  - DVT ppx: deyanira hose/SCDs/LVX.  - Atelectasis ppx: up in chair, IS hourly.  - Bowel regimen.    D/w Dr. Martinez.

## 2024-02-06 NOTE — PROGRESS NOTES
Lj Krueger - Neurosurgery ([facility])  Plastic Surgery  Progress Note    Subjective:     History of Present Illness:  No notes on file    Post-Op Info:  Procedure(s) (LRB):  EXPLORATION, WOUND (N/A)  CLOSURE (N/A)   4 Days Post-Op     Interval History: AFVSS. NAEO. Output from Hemovac SS, WILLY output SS. Woundvac in place with good suction.     Medications:  Continuous Infusions:   lactated ringers 100 mL/hr at 02/05/24 2109     Scheduled Meds:   allopurinoL  100 mg Oral Daily    amlodipine-benazepril 5-10 mg  1 capsule Oral Daily    duke's soln (benadryl 30 mL, mylanta 30 mL, LIDOcaine 30 mL, nystatin 30 mL) 120 mL  10 mL Oral QID    gabapentin  300 mg Oral TID    heparin (porcine)  5,000 Units Subcutaneous Q8H    mupirocin   Nasal BID    polyethylene glycol  17 g Oral Daily    sodium chloride 0.9%  10 mL Intravenous Q6H    vancomycin (VANCOCIN) IV (PEDS and ADULTS)  1,000 mg Intravenous Q12H     PRN Meds:acetaminophen, aluminum-magnesium hydroxide-simethicone, diazePAM, diclofenac sodium, melatonin, methocarbamoL, oxyCODONE, oxyCODONE, oxyCODONE, prochlorperazine, senna-docusate 8.6-50 mg, Flushing PICC/Midline Protocol **AND** sodium chloride 0.9% **AND** sodium chloride 0.9%, Pharmacy to dose Vancomycin consult **AND** vancomycin - pharmacy to dose     Review of patient's allergies indicates:  No Known Allergies  Objective:     Vital Signs (Most Recent):  Temp: 97.5 °F (36.4 °C) (02/06/24 1547)  Pulse: 85 (02/06/24 1547)  Resp: 18 (02/06/24 1547)  BP: (!) 103/58 (02/06/24 1547)  SpO2: 95 % (02/06/24 1547) Vital Signs (24h Range):  Temp:  [97.5 °F (36.4 °C)-98.6 °F (37 °C)] 97.5 °F (36.4 °C)  Pulse:  [] 85  Resp:  [18-19] 18  SpO2:  [92 %-95 %] 95 %  BP: (100-161)/(54-67) 103/58     Weight: 87.1 kg (192 lb)  Body mass index is 27.55 kg/m².    Intake/Output - Last 3 Shifts         02/04 0700 02/05 0659 02/05 0700 02/06 0659 02/06 0700 02/07 0659    P.O. 600 680     IV Piggyback       Total Intake(mL/kg) 600  (6.9) 680 (7.8)     Urine (mL/kg/hr) 2950 (1.4) 1550 (0.7) 800 (0.9)    Drains 430 320 190    Stool       Total Output 3380 1870 990    Net -2780 -1190 -990                    Physical Exam  Constitutional:       Appearance: Normal appearance.   HENT:      Head: Normocephalic and atraumatic.   Eyes:      Extraocular Movements: Extraocular movements intact.      Pupils: Pupils are equal, round, and reactive to light.   Cardiovascular:      Rate and Rhythm: Normal rate.   Pulmonary:      Effort: No respiratory distress.   Skin:     Comments: WILLY output SS, Hemovac in place with SS output, Prevena with good suction          Significant Labs:  I have reviewed all pertinent lab results within the past 24 hours.    Significant Diagnostics:  I have reviewed all pertinent imaging results/findings within the past 24 hours.  Assessment/Plan:     * Surgical wound breakdown  Pain control  Reg diet  Incentive spirometry  OK to ambulate  DVT ppx  Please do not remove WILLY/Prevena placed by Plastic Surgery  Will need follow up with Dr Rich Falk,   Plastic Surgery  Lj Krueger - Neurosurgery ([facility])

## 2024-02-06 NOTE — PROGRESS NOTES
Lj Krueger - Neurosurgery ([facility])  Neurosurgery  Progress Note    Subjective:     History of Present Illness: Gamaliel Pete Jr. is a 73 y.o. 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 He was then taken for a T7-11 posterior decompression/fusion. The patient presented recently with scabbing to his lower thoracic incision. He was sent to wound care who advised him to be evaluated because it appeared worse. He presents to clinic today with reports of drainage from his incision. He denies fevers, headaches, numbness, tingling, focal weakness, bowel/bladder dysfunction. He will be direct admitted to the hospital for further workup and likely neurosurgical intervention to address the surgical wound breakdown.     Post-Op Info:  Procedure(s) (LRB):  EXPLORATION, WOUND (N/A)  CLOSURE (N/A)   4 Days Post-Op   Interval History: NAEON. AFVSS. Patient receiving radiation to his cervical spine this admission. HV drain with 100 cc output, will keep. WILLY drain per plastics. WV in place, likely removal per plastics tomorrow. Pain controlled. PICC placed for IV abx. CM working on home infusions with home health.    Medications:  Continuous Infusions:   lactated ringers 100 mL/hr at 02/05/24 2109     Scheduled Meds:   allopurinoL  100 mg Oral Daily    amlodipine-benazepril 5-10 mg  1 capsule Oral Daily    duke's soln (benadryl 30 mL, mylanta 30 mL, LIDOcaine 30 mL, nystatin 30 mL) 120 mL  10 mL Oral QID    gabapentin  300 mg Oral TID    heparin (porcine)  5,000 Units Subcutaneous Q8H    mupirocin   Nasal BID    polyethylene glycol  17 g Oral Daily    sodium chloride 0.9%  10 mL Intravenous Q6H    vancomycin (VANCOCIN) IV (PEDS and ADULTS)  1,000 mg Intravenous Q12H     PRN Meds:acetaminophen, aluminum-magnesium hydroxide-simethicone, diazePAM, diclofenac sodium, melatonin, methocarbamoL, oxyCODONE, oxyCODONE, oxyCODONE, prochlorperazine,  senna-docusate 8.6-50 mg, Flushing PICC/Midline Protocol **AND** sodium chloride 0.9% **AND** sodium chloride 0.9%, Pharmacy to dose Vancomycin consult **AND** vancomycin - pharmacy to dose     Review of Systems  Objective:     Weight: 87.1 kg (192 lb)  Body mass index is 27.55 kg/m².  Vital Signs (Most Recent):  Temp: 98.2 °F (36.8 °C) (02/06/24 1122)  Pulse: 95 (02/06/24 1127)  Resp: 18 (02/06/24 1122)  BP: (!) 100/54 (02/06/24 1122)  SpO2: (!) 92 % (02/06/24 1122) Vital Signs (24h Range):  Temp:  [97.8 °F (36.6 °C)-98.6 °F (37 °C)] 98.2 °F (36.8 °C)  Pulse:  [] 95  Resp:  [18-19] 18  SpO2:  [92 %-95 %] 92 %  BP: (100-161)/(54-67) 100/54                              Closed/Suction Drain 02/02/24 0914 Tube - 1 Inferior;Right Back Bulb 15 Fr. (Active)   Site Description Unable to view 02/06/24 0710   Dressing Type Transparent (Tegaderm) 02/06/24 0710   Dressing Status Clean;Dry;Intact 02/06/24 0710   Dressing Intervention Integrity maintained 02/06/24 0710   Drainage Serosanguineous 02/06/24 0710   Status To bulb suction 02/06/24 0410   Output (mL) 40 mL 02/06/24 0551            Closed/Suction Drain 02/02/24 0840 Tube - 2 Lateral;Left;Superior Back Accordion 10 Fr. (Active)   Site Description Unable to view 02/06/24 0710   Dressing Type Transparent (Tegaderm) 02/06/24 0710   Dressing Status Clean;Dry;Intact 02/06/24 0710   Dressing Intervention Integrity maintained 02/06/24 0710   Drainage Serosanguineous 02/06/24 0710   Status To bulb suction 02/06/24 0710   Output (mL) 40 mL 02/06/24 0631           Neurosurgery Physical Exam  Physical Exam:  General: Well developed, well nourished, not in acute distress.   Head: Normocephalic, atraumatic.  Neck: Full ROM.   Neurologic: Alert and oriented x 4. Thought content appropriate.  GCS: Motor:6  Verbal:5  Eyes:4   GCS Total: 15  Language: No aphasia.  Speech: No dysarthria.  Cranial nerves: Face symmetric, tongue midline, CN II-XII grossly intact.   Eyes: Pupils  "equal, round, reactive to light with accomodation, EOMI.   Pulmonary: Normal respirations, no signs of respiratory distress.  Abdomen: Soft, non-distended, non-tender to palpation.  Sensory: Intact to light touch throughout.  Motor Strength: Moves all extremities spontaneously with good tone. Full strength upper and lower extremities. No abnormal movements seen.      Incision: Prevena in place.   Left sided deep HV and R sided WILLY drain in place to suction.    Significant Labs:  Recent Labs   Lab 02/05/24  0810   CREATININE 0.9     Recent Labs   Lab 02/05/24  0305   WBC 2.91*   HGB 8.3*   HCT 25.6*        No results for input(s): "LABPT", "INR", "APTT" in the last 48 hours.  Microbiology Results (last 7 days)       Procedure Component Value Units Date/Time    Aerobic culture [[id number]]  (Abnormal)  (Susceptibility) Collected: 02/02/24 0924    Order Status: Completed Specimen: Wound from Back Updated: 02/06/24 1015     Aerobic Bacterial Culture STAPHYLOCOCCUS EPIDERMIDIS  Few      Narrative:      Superficial fluid    Culture, Anaerobe [[id number]] Collected: 02/02/24 0924    Order Status: Completed Specimen: Wound from Back Updated: 02/06/24 1007     Anaerobic Culture No anaerobes isolated    Narrative:      Epidural tissue    Culture, Anaerobe [[id number]] Collected: 02/02/24 0924    Order Status: Completed Specimen: Wound from Back Updated: 02/06/24 1006     Anaerobic Culture No anaerobes isolated    Narrative:      Epidural swab    Culture, Anaerobe [[id number]] Collected: 02/02/24 0924    Order Status: Completed Specimen: Wound from Back Updated: 02/06/24 1005     Anaerobic Culture No anaerobes isolated    Narrative:      Superficial fluid    Blood culture [[id number]] Collected: 01/31/24 1948    Order Status: Completed Specimen: Blood Updated: 02/05/24 2022     Blood Culture, Routine No growth after 5 days.    AFB Culture & Smear [[id number]] Collected: 02/02/24 0924    Order Status: Completed Specimen: " Wound from Back Updated: 02/05/24 1412     AFB Culture & Smear Culture in progress     AFB CULTURE STAIN No acid fast bacilli seen.    Narrative:      Superficial fluid    AFB Culture & Smear [[id number]] Collected: 02/02/24 0924    Order Status: Completed Specimen: Wound from Back Updated: 02/05/24 1412     AFB Culture & Smear Culture in progress     AFB CULTURE STAIN No acid fast bacilli seen.    Narrative:      Epidural swab    AFB Culture & Smear [[id number]] Collected: 02/02/24 0924    Order Status: Completed Specimen: Wound from Back Updated: 02/05/24 1412     AFB Culture & Smear Culture in progress     AFB CULTURE STAIN No acid fast bacilli seen.    Narrative:      Epidural tissue    Aerobic culture [[id number]]  (Abnormal)  (Susceptibility) Collected: 02/02/24 0924    Order Status: Completed Specimen: Wound from Back Updated: 02/05/24 1135     Aerobic Bacterial Culture STAPHYLOCOCCUS EPIDERMIDIS  Few      Narrative:      Epidural tissue    Aerobic culture [[id number]]  (Abnormal)  (Susceptibility) Collected: 02/02/24 0924    Order Status: Completed Specimen: Wound from Back Updated: 02/05/24 1036     Aerobic Bacterial Culture STAPHYLOCOCCUS EPIDERMIDIS  Few      Narrative:      Epidural swab    Gram stain [[id number]] Collected: 02/02/24 0924    Order Status: Completed Specimen: Wound from Back Updated: 02/02/24 1355     Gram Stain Result No WBC's      No organisms seen    Narrative:      Epidural tissue    Gram stain [[id number]] Collected: 02/02/24 0924    Order Status: Completed Specimen: Wound from Back Updated: 02/02/24 1350     Gram Stain Result Rare WBC's      No organisms seen    Narrative:      Superficial fluid    Gram stain [[id number]] Collected: 02/02/24 0924    Order Status: Completed Specimen: Wound from Back Updated: 02/02/24 1341     Gram Stain Result Rare WBC's      No organisms seen    Narrative:      Epidural swab    Fungus culture [[id number]] Collected: 02/02/24 0924    Order Status:  Sent Specimen: Wound from Back Updated: 02/02/24 0939    Fungus culture [[id number]] Collected: 02/02/24 0924    Order Status: Sent Specimen: Wound from Back Updated: 02/02/24 0936    Fungus culture [[id number]] Collected: 02/02/24 0924    Order Status: Sent Specimen: Wound from Back Updated: 02/02/24 0933          All pertinent labs from the last 24 hours have been reviewed.    Significant Diagnostics:  I have reviewed and interpreted all pertinent imaging results/findings within the past 24 hours.  Assessment/Plan:     * Surgical wound breakdown  Gamaliel Pete Jr. is a 73 y.o. male s/p T7-11 decompression/fusion on 1/5/24. He presented to clinic on 1/31 with surgical wound breakdown.    Diagnostics:  MRI t spine w/wo con 1/31/24: no epidural abscess or infection, likely seroma. Adequate decompression of spinal cord.   ESR 69 Procal 0.24  Preop labs WNL.  Blood cultures 1/31/24: NGTD/pending.  OR cultures 2/2: staph epidermidis.    Now s/p thoracic wound washout/revision with plastic surgery on 2/2.    - Admit to NPU under NSGY.  - q4h neurochecks.  - TLSO brace when up/out of bed. Okay for activities as tolerated.  - Drains to suction. Record output qshift. L deep HV per nsgy. R WILLY per plastics.  - WV in place- per plastics.  - ID consulted- rec IV Vanc x 6 weeks. PICC placed 2/5.  - Hold chemo tx perioperatively.   - Receiving radiation inpatient to cervical spine. Day 2/4 complete.  - DVT ppx: deyanira hose/SCDs/LVX.  - Atelectasis ppx: up in chair, IS hourly.  - Bowel regimen.    D/w Dr. Martinez.    Hyponatremia  Na 126 on admission. Improving.    - Medicine consulted for assistance.            Nadine Santos PA-C  Neurosurgery  Lj Krueger - Neurosurgery ([facility])

## 2024-02-06 NOTE — SUBJECTIVE & OBJECTIVE
Interval History: NAEON. AFVSS. Patient receiving radiation to his cervical spine this admission. HV drain with 100 cc output, will keep. WILLY drain per plastics. WV in place, likely removal per plastics tomorrow. Pain controlled. PICC placed for IV abx. CM working on home infusions with home health.    Medications:  Continuous Infusions:   lactated ringers 100 mL/hr at 02/05/24 2109     Scheduled Meds:   allopurinoL  100 mg Oral Daily    amlodipine-benazepril 5-10 mg  1 capsule Oral Daily    duke's soln (benadryl 30 mL, mylanta 30 mL, LIDOcaine 30 mL, nystatin 30 mL) 120 mL  10 mL Oral QID    gabapentin  300 mg Oral TID    heparin (porcine)  5,000 Units Subcutaneous Q8H    mupirocin   Nasal BID    polyethylene glycol  17 g Oral Daily    sodium chloride 0.9%  10 mL Intravenous Q6H    vancomycin (VANCOCIN) IV (PEDS and ADULTS)  1,000 mg Intravenous Q12H     PRN Meds:acetaminophen, aluminum-magnesium hydroxide-simethicone, diazePAM, diclofenac sodium, melatonin, methocarbamoL, oxyCODONE, oxyCODONE, oxyCODONE, prochlorperazine, senna-docusate 8.6-50 mg, Flushing PICC/Midline Protocol **AND** sodium chloride 0.9% **AND** sodium chloride 0.9%, Pharmacy to dose Vancomycin consult **AND** vancomycin - pharmacy to dose     Review of Systems  Objective:     Weight: 87.1 kg (192 lb)  Body mass index is 27.55 kg/m².  Vital Signs (Most Recent):  Temp: 98.2 °F (36.8 °C) (02/06/24 1122)  Pulse: 95 (02/06/24 1127)  Resp: 18 (02/06/24 1122)  BP: (!) 100/54 (02/06/24 1122)  SpO2: (!) 92 % (02/06/24 1122) Vital Signs (24h Range):  Temp:  [97.8 °F (36.6 °C)-98.6 °F (37 °C)] 98.2 °F (36.8 °C)  Pulse:  [] 95  Resp:  [18-19] 18  SpO2:  [92 %-95 %] 92 %  BP: (100-161)/(54-67) 100/54                              Closed/Suction Drain 02/02/24 0914 Tube - 1 Inferior;Right Back Bulb 15 Fr. (Active)   Site Description Unable to view 02/06/24 0710   Dressing Type Transparent (Tegaderm) 02/06/24 0710   Dressing Status Clean;Dry;Intact  "02/06/24 0710   Dressing Intervention Integrity maintained 02/06/24 0710   Drainage Serosanguineous 02/06/24 0710   Status To bulb suction 02/06/24 0410   Output (mL) 40 mL 02/06/24 0551            Closed/Suction Drain 02/02/24 0840 Tube - 2 Lateral;Left;Superior Back Accordion 10 Fr. (Active)   Site Description Unable to view 02/06/24 0710   Dressing Type Transparent (Tegaderm) 02/06/24 0710   Dressing Status Clean;Dry;Intact 02/06/24 0710   Dressing Intervention Integrity maintained 02/06/24 0710   Drainage Serosanguineous 02/06/24 0710   Status To bulb suction 02/06/24 0710   Output (mL) 40 mL 02/06/24 0631           Neurosurgery Physical Exam  Physical Exam:  General: Well developed, well nourished, not in acute distress.   Head: Normocephalic, atraumatic.  Neck: Full ROM.   Neurologic: Alert and oriented x 4. Thought content appropriate.  GCS: Motor:6  Verbal:5  Eyes:4   GCS Total: 15  Language: No aphasia.  Speech: No dysarthria.  Cranial nerves: Face symmetric, tongue midline, CN II-XII grossly intact.   Eyes: Pupils equal, round, reactive to light with accomodation, EOMI.   Pulmonary: Normal respirations, no signs of respiratory distress.  Abdomen: Soft, non-distended, non-tender to palpation.  Sensory: Intact to light touch throughout.  Motor Strength: Moves all extremities spontaneously with good tone. Full strength upper and lower extremities. No abnormal movements seen.      Incision: Prevena in place.   Left sided deep HV and R sided WILLY drain in place to suction.    Significant Labs:  Recent Labs   Lab 02/05/24  0810   CREATININE 0.9     Recent Labs   Lab 02/05/24  0305   WBC 2.91*   HGB 8.3*   HCT 25.6*        No results for input(s): "LABPT", "INR", "APTT" in the last 48 hours.  Microbiology Results (last 7 days)       Procedure Component Value Units Date/Time    Aerobic culture [[id number]]  (Abnormal)  (Susceptibility) Collected: 02/02/24 0924    Order Status: Completed Specimen: Wound from " Back Updated: 02/06/24 1015     Aerobic Bacterial Culture STAPHYLOCOCCUS EPIDERMIDIS  Few      Narrative:      Superficial fluid    Culture, Anaerobe [[id number]] Collected: 02/02/24 0924    Order Status: Completed Specimen: Wound from Back Updated: 02/06/24 1007     Anaerobic Culture No anaerobes isolated    Narrative:      Epidural tissue    Culture, Anaerobe [[id number]] Collected: 02/02/24 0924    Order Status: Completed Specimen: Wound from Back Updated: 02/06/24 1006     Anaerobic Culture No anaerobes isolated    Narrative:      Epidural swab    Culture, Anaerobe [[id number]] Collected: 02/02/24 0924    Order Status: Completed Specimen: Wound from Back Updated: 02/06/24 1005     Anaerobic Culture No anaerobes isolated    Narrative:      Superficial fluid    Blood culture [[id number]] Collected: 01/31/24 1948    Order Status: Completed Specimen: Blood Updated: 02/05/24 2022     Blood Culture, Routine No growth after 5 days.    AFB Culture & Smear [[id number]] Collected: 02/02/24 0924    Order Status: Completed Specimen: Wound from Back Updated: 02/05/24 1412     AFB Culture & Smear Culture in progress     AFB CULTURE STAIN No acid fast bacilli seen.    Narrative:      Superficial fluid    AFB Culture & Smear [[id number]] Collected: 02/02/24 0924    Order Status: Completed Specimen: Wound from Back Updated: 02/05/24 1412     AFB Culture & Smear Culture in progress     AFB CULTURE STAIN No acid fast bacilli seen.    Narrative:      Epidural swab    AFB Culture & Smear [[id number]] Collected: 02/02/24 0924    Order Status: Completed Specimen: Wound from Back Updated: 02/05/24 1412     AFB Culture & Smear Culture in progress     AFB CULTURE STAIN No acid fast bacilli seen.    Narrative:      Epidural tissue    Aerobic culture [[id number]]  (Abnormal)  (Susceptibility) Collected: 02/02/24 0924    Order Status: Completed Specimen: Wound from Back Updated: 02/05/24 1135     Aerobic Bacterial Culture STAPHYLOCOCCUS  EPIDERMIDIS  Few      Narrative:      Epidural tissue    Aerobic culture [[id number]]  (Abnormal)  (Susceptibility) Collected: 02/02/24 0924    Order Status: Completed Specimen: Wound from Back Updated: 02/05/24 1036     Aerobic Bacterial Culture STAPHYLOCOCCUS EPIDERMIDIS  Few      Narrative:      Epidural swab    Gram stain [[id number]] Collected: 02/02/24 0924    Order Status: Completed Specimen: Wound from Back Updated: 02/02/24 1355     Gram Stain Result No WBC's      No organisms seen    Narrative:      Epidural tissue    Gram stain [[id number]] Collected: 02/02/24 0924    Order Status: Completed Specimen: Wound from Back Updated: 02/02/24 1350     Gram Stain Result Rare WBC's      No organisms seen    Narrative:      Superficial fluid    Gram stain [[id number]] Collected: 02/02/24 0924    Order Status: Completed Specimen: Wound from Back Updated: 02/02/24 1341     Gram Stain Result Rare WBC's      No organisms seen    Narrative:      Epidural swab    Fungus culture [[id number]] Collected: 02/02/24 0924    Order Status: Sent Specimen: Wound from Back Updated: 02/02/24 0939    Fungus culture [[id number]] Collected: 02/02/24 0924    Order Status: Sent Specimen: Wound from Back Updated: 02/02/24 0936    Fungus culture [[id number]] Collected: 02/02/24 0924    Order Status: Sent Specimen: Wound from Back Updated: 02/02/24 0933          All pertinent labs from the last 24 hours have been reviewed.    Significant Diagnostics:  I have reviewed and interpreted all pertinent imaging results/findings within the past 24 hours.

## 2024-02-06 NOTE — ASSESSMENT & PLAN NOTE
Pain control  Reg diet  Incentive spirometry  OK to ambulate  DVT ppx  Please do not remove WILLY/Prevena placed by Plastic Surgery  Will need follow up with Dr Villanueva

## 2024-02-06 NOTE — PROGRESS NOTES
Lj Krueger - Neurosurgery ([facility])  Plastic Surgery  Progress Note    Subjective:     History of Present Illness:  No notes on file    Post-Op Info:  Procedure(s) (LRB):  EXPLORATION, WOUND (N/A)  CLOSURE (N/A)   4 Days Post-Op     Interval History:     Medications:  Continuous Infusions:   lactated ringers 100 mL/hr at 02/05/24 2109     Scheduled Meds:   allopurinoL  100 mg Oral Daily    amlodipine-benazepril 5-10 mg  1 capsule Oral Daily    duke's soln (benadryl 30 mL, mylanta 30 mL, LIDOcaine 30 mL, nystatin 30 mL) 120 mL  10 mL Oral QID    gabapentin  300 mg Oral TID    heparin (porcine)  5,000 Units Subcutaneous Q8H    mupirocin   Nasal BID    polyethylene glycol  17 g Oral Daily    sodium chloride 0.9%  10 mL Intravenous Q6H    vancomycin (VANCOCIN) IV (PEDS and ADULTS)  1,000 mg Intravenous Q12H     PRN Meds:acetaminophen, aluminum-magnesium hydroxide-simethicone, diazePAM, diclofenac sodium, melatonin, methocarbamoL, oxyCODONE, oxyCODONE, oxyCODONE, prochlorperazine, senna-docusate 8.6-50 mg, Flushing PICC/Midline Protocol **AND** sodium chloride 0.9% **AND** sodium chloride 0.9%, Pharmacy to dose Vancomycin consult **AND** vancomycin - pharmacy to dose     Review of patient's allergies indicates:  No Known Allergies  Objective:     Vital Signs (Most Recent):  Temp: 97.5 °F (36.4 °C) (02/06/24 1547)  Pulse: 85 (02/06/24 1547)  Resp: 18 (02/06/24 1547)  BP: (!) 103/58 (02/06/24 1547)  SpO2: 95 % (02/06/24 1547) Vital Signs (24h Range):  Temp:  [97.5 °F (36.4 °C)-98.6 °F (37 °C)] 97.5 °F (36.4 °C)  Pulse:  [] 85  Resp:  [18-19] 18  SpO2:  [92 %-95 %] 95 %  BP: (100-161)/(54-67) 103/58     Weight: 87.1 kg (192 lb)  Body mass index is 27.55 kg/m².    Intake/Output - Last 3 Shifts         02/04 0700 02/05 0659 02/05 0700 02/06 0659 02/06 0700 02/07 0659    P.O. 600 680     IV Piggyback       Total Intake(mL/kg) 600 (6.9) 680 (7.8)     Urine (mL/kg/hr) 2950 (1.4) 1550 (0.7) 800 (0.9)    Drains 430 320 190     Stool       Total Output 3380 1870 990    Net -2780 -1190 -990                    Physical Exam  Constitutional:       Appearance: Normal appearance.   HENT:      Head: Normocephalic and atraumatic.   Eyes:      Extraocular Movements: Extraocular movements intact.      Pupils: Pupils are equal, round, and reactive to light.   Cardiovascular:      Rate and Rhythm: Normal rate.   Pulmonary:      Effort: No respiratory distress.   Skin:     Comments: WILLY output SS, Hemovac in place with SS output, Prevena with good suction          Significant Labs:  I have reviewed all pertinent lab results within the past 24 hours.    Significant Diagnostics:  I have reviewed all pertinent imaging results/findings within the past 24 hours.  Assessment/Plan:     * Surgical wound breakdown  Pain control  Reg diet  Incentive spirometry  OK to ambulate  DVT ppx  Please do not remove WILLY/Prevena placed by Plastic Surgery  Will need follow up with Dr Rich Falk, DO  Plastic Surgery  Lj Krueger - Neurosurgery ([facility])

## 2024-02-06 NOTE — SUBJECTIVE & OBJECTIVE
Interval History: AFVSS. NAEO. Output from Hemovac SS, WILLY output SS. Woundvac in place with good suction.     Medications:  Continuous Infusions:   lactated ringers 100 mL/hr at 02/05/24 2109     Scheduled Meds:   allopurinoL  100 mg Oral Daily    amlodipine-benazepril 5-10 mg  1 capsule Oral Daily    duke's soln (benadryl 30 mL, mylanta 30 mL, LIDOcaine 30 mL, nystatin 30 mL) 120 mL  10 mL Oral QID    gabapentin  300 mg Oral TID    heparin (porcine)  5,000 Units Subcutaneous Q8H    mupirocin   Nasal BID    polyethylene glycol  17 g Oral Daily    sodium chloride 0.9%  10 mL Intravenous Q6H    vancomycin (VANCOCIN) IV (PEDS and ADULTS)  1,000 mg Intravenous Q12H     PRN Meds:acetaminophen, aluminum-magnesium hydroxide-simethicone, diazePAM, diclofenac sodium, melatonin, methocarbamoL, oxyCODONE, oxyCODONE, oxyCODONE, prochlorperazine, senna-docusate 8.6-50 mg, Flushing PICC/Midline Protocol **AND** sodium chloride 0.9% **AND** sodium chloride 0.9%, Pharmacy to dose Vancomycin consult **AND** vancomycin - pharmacy to dose     Review of patient's allergies indicates:  No Known Allergies  Objective:     Vital Signs (Most Recent):  Temp: 97.5 °F (36.4 °C) (02/06/24 1547)  Pulse: 85 (02/06/24 1547)  Resp: 18 (02/06/24 1547)  BP: (!) 103/58 (02/06/24 1547)  SpO2: 95 % (02/06/24 1547) Vital Signs (24h Range):  Temp:  [97.5 °F (36.4 °C)-98.6 °F (37 °C)] 97.5 °F (36.4 °C)  Pulse:  [] 85  Resp:  [18-19] 18  SpO2:  [92 %-95 %] 95 %  BP: (100-161)/(54-67) 103/58     Weight: 87.1 kg (192 lb)  Body mass index is 27.55 kg/m².    Intake/Output - Last 3 Shifts         02/04 0700  02/05 0659 02/05 0700 02/06 0659 02/06 0700  02/07 0659    P.O. 600 680     IV Piggyback       Total Intake(mL/kg) 600 (6.9) 680 (7.8)     Urine (mL/kg/hr) 2950 (1.4) 1550 (0.7) 800 (0.9)    Drains 430 320 190    Stool       Total Output 3380 1870 990    Net -4270 -1190 -990                    Physical Exam  Constitutional:       Appearance: Normal  appearance.   HENT:      Head: Normocephalic and atraumatic.   Eyes:      Extraocular Movements: Extraocular movements intact.      Pupils: Pupils are equal, round, and reactive to light.   Cardiovascular:      Rate and Rhythm: Normal rate.   Pulmonary:      Effort: No respiratory distress.   Skin:     Comments: WILLY output SS, Hemovac in place with SS output, Prevena with good suction          Significant Labs:  I have reviewed all pertinent lab results within the past 24 hours.    Significant Diagnostics:  I have reviewed all pertinent imaging results/findings within the past 24 hours.

## 2024-02-06 NOTE — PLAN OF CARE
Problem: Fall Injury Risk  Goal: Absence of Fall and Fall-Related Injury  Outcome: Ongoing, Progressing     Problem: Skin Injury Risk Increased  Goal: Skin Health and Integrity  Outcome: Ongoing, Progressing     Problem: Infection  Goal: Absence of Infection Signs and Symptoms  Outcome: Ongoing, Progressing     Problem: Adult Inpatient Plan of Care  Goal: Plan of Care Review  Outcome: Ongoing, Progressing  Goal: Patient-Specific Goal (Individualized)  Description: Pt will maintain sbp below 180  Outcome: Ongoing, Progressing  Goal: Absence of Hospital-Acquired Illness or Injury  Outcome: Ongoing, Progressing  Goal: Optimal Comfort and Wellbeing  Outcome: Ongoing, Progressing  Goal: Readiness for Transition of Care  Outcome: Ongoing, Progressing

## 2024-02-06 NOTE — NURSING
Nurses Note -- 4 Eyes      2/5/2024   11:16 PM      Skin assessed during: Q Shift Change      [x] No Altered Skin Integrity Present    []Prevention Measures Documented      [] Yes- Altered Skin Integrity Present or Discovered   [] LDA Added if Not in Epic (Describe Wound)   [] New Altered Skin Integrity was Present on Admit and Documented in LDA   [] Wound Image Taken    Wound Care Consulted? No    Attending Nurse:  Sheela Schwab RN/Staff Member: BRIAN Smith

## 2024-02-06 NOTE — PT/OT/SLP PROGRESS
Occupational Therapy      Patient Name:  Gamaliel ROSS Juan R Thomas   MRN:  [id number]    Patient not seen today secondary to patient wanting to eat lunch on first attempt and second attempt patient declined due to fatigue.   . Will follow-up for therapy as scheduled and appropriate.    2/6/2024

## 2024-02-06 NOTE — PLAN OF CARE
Patient remains with drain in place.  Patient accepted by [facility] and Juan Ramon for  once medically ready for discharge.  Patient has been educated by MAGALI NI.

## 2024-02-06 NOTE — PT/OT/SLP PROGRESS
Physical Therapy      Patient Name:  Gamaliel VANDANA Pete Jr.   MRN:  [id number]    Patient not seen today secondary to  (pt declined 2/2 fatigue). Will follow-up as appropriate.

## 2024-02-07 PROBLEM — M10.9 GOUT: Status: ACTIVE | Noted: 2024-02-07

## 2024-02-07 PROBLEM — L03.90 CELLULITIS: Status: ACTIVE | Noted: 2024-02-07

## 2024-02-07 PROBLEM — Z91.89 AT HIGH RISK FOR SKIN BREAKDOWN: Status: ACTIVE | Noted: 2024-02-07

## 2024-02-07 PROCEDURE — 77387 GUIDANCE FOR RADJ TX DLVR: CPT | Mod: TC | Performed by: RADIOLOGY

## 2024-02-07 PROCEDURE — 97116 GAIT TRAINING THERAPY: CPT

## 2024-02-07 PROCEDURE — 99024 POSTOP FOLLOW-UP VISIT: CPT | Mod: ,,,

## 2024-02-07 PROCEDURE — 25000003 PHARM REV CODE 250: Performed by: STUDENT IN AN ORGANIZED HEALTH CARE EDUCATION/TRAINING PROGRAM

## 2024-02-07 PROCEDURE — G6002 STEREOSCOPIC X-RAY GUIDANCE: HCPCS | Mod: 26,,, | Performed by: RADIOLOGY

## 2024-02-07 PROCEDURE — 25000003 PHARM REV CODE 250

## 2024-02-07 PROCEDURE — 63600175 PHARM REV CODE 636 W HCPCS

## 2024-02-07 PROCEDURE — 11000001 HC ACUTE MED/SURG PRIVATE ROOM

## 2024-02-07 PROCEDURE — 63600175 PHARM REV CODE 636 W HCPCS: Performed by: STUDENT IN AN ORGANIZED HEALTH CARE EDUCATION/TRAINING PROGRAM

## 2024-02-07 PROCEDURE — A4216 STERILE WATER/SALINE, 10 ML: HCPCS | Performed by: STUDENT IN AN ORGANIZED HEALTH CARE EDUCATION/TRAINING PROGRAM

## 2024-02-07 PROCEDURE — 77412 RADIATION TX DELIVERY LVL 3: CPT | Performed by: RADIOLOGY

## 2024-02-07 PROCEDURE — 94761 N-INVAS EAR/PLS OXIMETRY MLT: CPT

## 2024-02-07 RX ADMIN — SENNOSIDES AND DOCUSATE SODIUM 2 TABLET: 8.6; 5 TABLET ORAL at 09:02

## 2024-02-07 RX ADMIN — GABAPENTIN 300 MG: 300 CAPSULE ORAL at 03:02

## 2024-02-07 RX ADMIN — HEPARIN SODIUM 5000 UNITS: 5000 INJECTION INTRAVENOUS; SUBCUTANEOUS at 09:02

## 2024-02-07 RX ADMIN — Medication 10 ML: at 12:02

## 2024-02-07 RX ADMIN — Medication 10 ML: at 11:02

## 2024-02-07 RX ADMIN — OXYCODONE HYDROCHLORIDE 10 MG: 10 TABLET ORAL at 09:02

## 2024-02-07 RX ADMIN — HEPARIN SODIUM 5000 UNITS: 5000 INJECTION INTRAVENOUS; SUBCUTANEOUS at 03:02

## 2024-02-07 RX ADMIN — OXYCODONE HYDROCHLORIDE 10 MG: 10 TABLET ORAL at 03:02

## 2024-02-07 RX ADMIN — GABAPENTIN 300 MG: 300 CAPSULE ORAL at 08:02

## 2024-02-07 RX ADMIN — POLYETHYLENE GLYCOL 3350 17 G: 17 POWDER, FOR SOLUTION ORAL at 08:02

## 2024-02-07 RX ADMIN — Medication 10 ML: at 06:02

## 2024-02-07 RX ADMIN — GABAPENTIN 300 MG: 300 CAPSULE ORAL at 09:02

## 2024-02-07 RX ADMIN — SODIUM CHLORIDE, POTASSIUM CHLORIDE, SODIUM LACTATE AND CALCIUM CHLORIDE: 600; 310; 30; 20 INJECTION, SOLUTION INTRAVENOUS at 11:02

## 2024-02-07 RX ADMIN — VANCOMYCIN HYDROCHLORIDE 1000 MG: 1 INJECTION, POWDER, LYOPHILIZED, FOR SOLUTION INTRAVENOUS at 09:02

## 2024-02-07 RX ADMIN — ALLOPURINOL 100 MG: 100 TABLET ORAL at 08:02

## 2024-02-07 RX ADMIN — HEPARIN SODIUM 5000 UNITS: 5000 INJECTION INTRAVENOUS; SUBCUTANEOUS at 05:02

## 2024-02-07 RX ADMIN — SODIUM CHLORIDE, POTASSIUM CHLORIDE, SODIUM LACTATE AND CALCIUM CHLORIDE: 600; 310; 30; 20 INJECTION, SOLUTION INTRAVENOUS at 12:02

## 2024-02-07 RX ADMIN — Medication 10 ML: at 05:02

## 2024-02-07 NOTE — PHYSICIAN QUERY
PT Name: Gamaliel Pete Jr.  MR #: [id number]     DOCUMENTATION CLARIFICATION     CDS/: Laura Kyle RN, CDS              Contact information: -- Reconciled, as is: Final codeset and DD reconciled as is, with no additional review of codeset completed since previous review, due to no new progress notes present; therefore noninclusive to productivity.   This form is a permanent document in the medical record.    Query Date: February 7, 2024    By submitting this query, we are merely seeking further clarification of documentation.  Please utilize your independent clinical judgment when addressing the question(s) below.    The Medical Record contains the following:   Indicator Supporting Clinical Findings Location in Medical Record    Kidney (Renal) Insufficiency      Kidney (Renal) Failure/Injury      Nephrotoxic Agents     x BUN/Creatinine           GFR BUN 17 14 12 14 12   Creatinine 1.3 1.0 0.8 0.8 0.9   eGFR 58.7 ! >60.0 >60.0 >60.0 >60.0    Labs 1/31-2/3    Urine: Casts         Eosinophils      Urine Output     x Dehydration --Secondary to the pain of these ulcers he had decreased oral intake.   --Hyponatremia           -due to Dehydration/hypovolemia.            -Pt with hyponatremia as low as 126 in the setting of decreased oral intake 2/2 oral lesions.  Hosp med c/s 2/1 (Dr Maria/Ryan)    Nausea/Vomiting      Dialysis/CRRT     x Treatment -- We will treat the hyponatremia with IV fluids as follows: 74 cc/hour.  Hosp med c/s 2/1 (Dr Maria/Ryan)     x Other --Hyponatremia and SABINE resolved.  Hosp med Note 2/3 (Dr Messina/Ryan)         [facility] approved diagnostic criteria for acute kidney injury is based on KDIGO criteria:    An increase in serum creatinine > 0.3mg/dl within 48 hours  OR  Increase in serum creatinine to > 1.5x baseline, which is known or presumed to have occurred within the prior 7 days  OR  Urine volume <0.5 ml/kg/hr for 6 hours       The clinical guidelines noted  above are only a system guideline. It does not replace the providers clinical judgment.     Provider, please clarify the SABINE diagnosis.     [  x  ] Unspecified Acute Kidney Failure/Injury    Pre renal resolved with IVFs      [    ] Acute Renal Insufficiency  - Consider if SCr rise is transient and normalizes quickly with no efforts at real resuscitation of vital signs and perfusion     [    ] Other (please specify): _______________________________   [  ] Clinically Undetermined       Please document in your progress notes daily for the duration of treatment until resolved and include in your discharge summary.    References:   KDIGO Clinical Practice Guideline for Acute Kidney Injury. (2012, March). Retrieved October 21, 2020, from https://kdigo.org/wp-content/uploads/2016/10/LQCPH-8875-JTG-Guideline-English.pdf    RIAAN Suarez MD, MARIAH Boone MD, & SRIKANTH Queen MD. (1960). Renal medullary necrosis [Abstract]. The American Journal of Medicine, 29(1), 132-156. Doi:https://www.sciencedirect.com/science/article/abs/pii/5895422318539104    SRIKANTH Perez MD, & LARRY Manuel MD, MS. (2020, June 18). Definition and staging of chronic kidney disease in adults ([id number] MARIAH Joshi MD, ScD & [id number] REJI Winters MD, MSc, Eds.). Retrieved October 21, 2020, from https://www.[employer]/contents/definition-and-staging-of-chronic-kidney-disease-in-adults?search=ckd%20staging&source=search_result&selectedTitle=1~150&usage_type=default&display_rank=1     LYN Curiel MD, FACP. (2015, Bev 15). Acute kidney injury revisited. Retrieved October 21, 2020, from https://acphospitalist.org/archives/2015/06/coding-acute-kidney-injury.htm    AUBREY Callahan MD. (2019, July). Renal Cortical Necrosis. Retrieved October 21, 2020, from https://www.[employer]/professional/genitourinary-disorders/renovascular-disorders/renal-cortical-necrosis    Form No. 64014

## 2024-02-07 NOTE — NURSING
Nurses Note -- 4 Eyes      2/7/2024   3:51 AM      Skin assessed during: Daily Assessment      [] No Altered Skin Integrity Present    []Prevention Measures Documented      [x] Yes- Altered Skin Integrity Present or Discovered   [] LDA Added if Not in Epic (Describe Wound)   [] New Altered Skin Integrity was Present on Admit and Documented in LDA   [] Wound Image Taken    Wound Care Consulted? No    Attending Nurse:  Briseyda Schwab RN/Staff Member:  BRIAN Mcintyre

## 2024-02-07 NOTE — PROGRESS NOTES
[facility] - Neurosurgery ([facility])  Neurosurgery  Progress Note    Subjective:     History of Present Illness: Gamaliel Pete Jr. is a 73 y.o. 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 He was then taken for a T7-11 posterior decompression/fusion. The patient presented recently with scabbing to his lower thoracic incision. He was sent to wound care who advised him to be evaluated because it appeared worse. He presents to clinic today with reports of drainage from his incision. He denies fevers, headaches, numbness, tingling, focal weakness, bowel/bladder dysfunction. He will be direct admitted to the hospital for further workup and likely neurosurgical intervention to address the surgical wound breakdown.     Post-Op Info:  Procedure(s) (LRB):  EXPLORATION, WOUND (N/A)  CLOSURE (N/A)   5 Days Post-Op   Interval History: NAEON. AFVSS. HV drain with decreased output. WILLY and WV still in place. Pain controlled. Ambulated in the hallway today without issues. Likely plan for dc home with home health tomorrow. Will remove HV drain before DC.    Medications:  Continuous Infusions:   lactated ringers 100 mL/hr at 02/07/24 0017     Scheduled Meds:   allopurinoL  100 mg Oral Daily    amlodipine-benazepril 5-10 mg  1 capsule Oral Daily    duke's soln (benadryl 30 mL, mylanta 30 mL, LIDOcaine 30 mL, nystatin 30 mL) 120 mL  10 mL Oral QID    gabapentin  300 mg Oral TID    heparin (porcine)  5,000 Units Subcutaneous Q8H    polyethylene glycol  17 g Oral Daily    sodium chloride 0.9%  10 mL Intravenous Q6H    vancomycin (VANCOCIN) IV (PEDS and ADULTS)  1,000 mg Intravenous Q12H     PRN Meds:acetaminophen, aluminum-magnesium hydroxide-simethicone, diazePAM, diclofenac sodium, melatonin, methocarbamoL, oxyCODONE, oxyCODONE, oxyCODONE, prochlorperazine, senna-docusate 8.6-50 mg, Flushing PICC/Midline Protocol **AND** sodium chloride 0.9%  **AND** sodium chloride 0.9%, Pharmacy to dose Vancomycin consult **AND** vancomycin - pharmacy to dose     Review of Systems  Objective:     Weight: 87.1 kg (192 lb)  Body mass index is 27.55 kg/m².  Vital Signs (Most Recent):  Temp: 97.4 °F (36.3 °C) (02/07/24 1208)  Pulse: 92 (02/07/24 1208)  Resp: 18 (02/07/24 1208)  BP: 118/61 (02/07/24 1208)  SpO2: (!) 93 % (02/07/24 1208) Vital Signs (24h Range):  Temp:  [97.4 °F (36.3 °C)-98.2 °F (36.8 °C)] 97.4 °F (36.3 °C)  Pulse:  [79-95] 92  Resp:  [16-18] 18  SpO2:  [92 %-95 %] 93 %  BP: (100-129)/(56-62) 118/61     Date 02/07/24 0700 - 02/08/24 0659   Shift 7740-7401 2163-3733 6410-3340 24 Hour Total   INTAKE   I.V.(mL/kg) 10(0.1)   10(0.1)   Shift Total(mL/kg) 10(0.1)   10(0.1)   OUTPUT   Drains 90   90   Shift Total(mL/kg) 90(1)   90(1)   Weight (kg) 87.1 87.1 87.1 87.1                            Closed/Suction Drain 02/02/24 0914 Tube - 1 Inferior;Right Back Bulb 15 Fr. (Active)   Site Description Unable to view 02/07/24 0800   Dressing Type Transparent (Tegaderm) 02/07/24 0800   Dressing Status Clean;Dry;Intact 02/07/24 0800   Dressing Intervention Integrity maintained 02/07/24 0800   Drainage Serosanguineous 02/07/24 0800   Status To bulb suction 02/07/24 0800   Output (mL) 90 mL 02/07/24 0800            Closed/Suction Drain 02/02/24 0840 Tube - 2 Lateral;Left;Superior Back Accordion 10 Fr. (Active)   Site Description Unable to view 02/07/24 0800   Dressing Type Transparent (Tegaderm) 02/07/24 0800   Dressing Status Clean;Dry;Intact 02/07/24 0800   Dressing Intervention Integrity maintained 02/07/24 0800   Drainage Serosanguineous 02/07/24 0800   Status To bulb suction 02/07/24 0800   Output (mL) 0 mL 02/07/24 0800       Neurosurgery Physical Exam  General: Well developed, well nourished, not in acute distress.   Head: Normocephalic, atraumatic.  Neck: Full ROM.   Neurologic: Alert and oriented x 4. Thought content appropriate.  GCS: Motor:6  Verbal:5  Eyes:4    "GCS Total: 15  Language: No aphasia.  Speech: No dysarthria.  Cranial nerves: Face symmetric, tongue midline, CN II-XII grossly intact.   Eyes: Pupils equal, round, reactive to light with accomodation, EOMI.   Pulmonary: Normal respirations, no signs of respiratory distress.  Abdomen: Soft, non-distended, non-tender to palpation.  Sensory: Intact to light touch throughout.  Motor Strength: Moves all extremities spontaneously with good tone. Full strength upper and lower extremities. No abnormal movements seen.      Incision: Prevena in place.   Left sided deep HV and R sided WILLY drain in place to suction.    Significant Labs:  No results for input(s): "GLU", "NA", "K", "CL", "CO2", "BUN", "CREATININE", "CALCIUM", "MG" in the last 48 hours.  Recent Labs   Lab 02/06/24  1130   WBC 4.00   HGB 8.8*   HCT 27.5*        No results for input(s): "LABPT", "INR", "APTT" in the last 48 hours.  Microbiology Results (last 7 days)       Procedure Component Value Units Date/Time    Fungus culture [[id number]] Collected: 02/02/24 0924    Order Status: Completed Specimen: Wound from Back Updated: 02/07/24 0930     Fungus (Mycology) Culture Culture in progress    Narrative:      Epidural tissue    Fungus culture [[id number]] Collected: 02/02/24 0924    Order Status: Completed Specimen: Wound from Back Updated: 02/07/24 0930     Fungus (Mycology) Culture Culture in progress    Narrative:      Epidural swab    Fungus culture [[id number]] Collected: 02/02/24 0924    Order Status: Completed Specimen: Wound from Back Updated: 02/07/24 0930     Fungus (Mycology) Culture Culture in progress    Narrative:      Superficial fluid    Aerobic culture [[id number]]  (Abnormal)  (Susceptibility) Collected: 02/02/24 0924    Order Status: Completed Specimen: Wound from Back Updated: 02/06/24 1015     Aerobic Bacterial Culture STAPHYLOCOCCUS EPIDERMIDIS  Few      Narrative:      Superficial fluid    Culture, Anaerobe [[id number]] Collected: " 02/02/24 0924    Order Status: Completed Specimen: Wound from Back Updated: 02/06/24 1007     Anaerobic Culture No anaerobes isolated    Narrative:      Epidural tissue    Culture, Anaerobe [[id number]] Collected: 02/02/24 0924    Order Status: Completed Specimen: Wound from Back Updated: 02/06/24 1006     Anaerobic Culture No anaerobes isolated    Narrative:      Epidural swab    Culture, Anaerobe [[id number]] Collected: 02/02/24 0924    Order Status: Completed Specimen: Wound from Back Updated: 02/06/24 1005     Anaerobic Culture No anaerobes isolated    Narrative:      Superficial fluid    Blood culture [[id number]] Collected: 01/31/24 1948    Order Status: Completed Specimen: Blood Updated: 02/05/24 2022     Blood Culture, Routine No growth after 5 days.    AFB Culture & Smear [[id number]] Collected: 02/02/24 0924    Order Status: Completed Specimen: Wound from Back Updated: 02/05/24 1412     AFB Culture & Smear Culture in progress     AFB CULTURE STAIN No acid fast bacilli seen.    Narrative:      Superficial fluid    AFB Culture & Smear [[id number]] Collected: 02/02/24 0924    Order Status: Completed Specimen: Wound from Back Updated: 02/05/24 1412     AFB Culture & Smear Culture in progress     AFB CULTURE STAIN No acid fast bacilli seen.    Narrative:      Epidural swab    AFB Culture & Smear [[id number]] Collected: 02/02/24 0924    Order Status: Completed Specimen: Wound from Back Updated: 02/05/24 1412     AFB Culture & Smear Culture in progress     AFB CULTURE STAIN No acid fast bacilli seen.    Narrative:      Epidural tissue    Aerobic culture [[id number]]  (Abnormal)  (Susceptibility) Collected: 02/02/24 0924    Order Status: Completed Specimen: Wound from Back Updated: 02/05/24 1135     Aerobic Bacterial Culture STAPHYLOCOCCUS EPIDERMIDIS  Few      Narrative:      Epidural tissue    Aerobic culture [[id number]]  (Abnormal)  (Susceptibility) Collected: 02/02/24 0924    Order Status: Completed Specimen:  Wound from Back Updated: 02/05/24 1036     Aerobic Bacterial Culture STAPHYLOCOCCUS EPIDERMIDIS  Few      Narrative:      Epidural swab    Gram stain [[id number]] Collected: 02/02/24 0924    Order Status: Completed Specimen: Wound from Back Updated: 02/02/24 1355     Gram Stain Result No WBC's      No organisms seen    Narrative:      Epidural tissue    Gram stain [[id number]] Collected: 02/02/24 0924    Order Status: Completed Specimen: Wound from Back Updated: 02/02/24 1350     Gram Stain Result Rare WBC's      No organisms seen    Narrative:      Superficial fluid    Gram stain [[id number]] Collected: 02/02/24 0924    Order Status: Completed Specimen: Wound from Back Updated: 02/02/24 1341     Gram Stain Result Rare WBC's      No organisms seen    Narrative:      Epidural swab          All pertinent labs from the last 24 hours have been reviewed.    Significant Diagnostics:  I have reviewed and interpreted all pertinent imaging results/findings within the past 24 hours.  Assessment/Plan:     * Surgical wound breakdown  Gamaliel Osheamaura Thomas is a 73 y.o. male s/p T7-11 decompression/fusion on 1/5/24. He presented to clinic on 1/31 with surgical wound breakdown.    Diagnostics:  MRI t spine w/wo con 1/31/24: no epidural abscess or infection, likely seroma. Adequate decompression of spinal cord.   ESR 69 Procal 0.24  Preop labs WNL.  Blood cultures 1/31/24: NGTD/pending.  OR cultures 2/2: staph epidermidis.    Now s/p thoracic wound washout/revision with plastic surgery on 2/2.    - Admit to NPU under NSGY.  - q4h neurochecks.  - TLSO brace when up/out of bed. Okay for activities as tolerated.  - Drains to suction. Record output qshift. L deep HV per nsgy. R WILLY per plastics.  - WV in place- per plastics.  - ID consulted- rec IV Vanc x 6 weeks. PICC placed 2/5.  - Hold chemo tx perioperatively.   - Receiving radiation inpatient to cervical spine. Day 3/4 complete.  - DVT ppx: deyanira hose/SCDs/LVX.  - Atelectasis  ppx: up in chair, IS hourly.  - Bowel regimen.    D/w Dr. Martinez.    Hyponatremia  Na 126 on admission. Improving.    - Medicine consulted for assistance.            Nadine Santos PA-C  Neurosurgery  [facility] - Neurosurgery ([facility])

## 2024-02-07 NOTE — PROGRESS NOTES
[facility] - Neurosurgery ([facility])  Plastic Surgery  Progress Note    Subjective:     History of Present Illness:  No notes on file    Post-Op Info:  Procedure(s) (LRB):  EXPLORATION, WOUND (N/A)  CLOSURE (N/A)   5 Days Post-Op     Interval History: AFVSS. NAEO. Provera removed today. WILLY output 360. Hemvac output 60.    Medications:  Continuous Infusions:   lactated ringers 100 mL/hr at 02/07/24 0017     Scheduled Meds:   allopurinoL  100 mg Oral Daily    amlodipine-benazepril 5-10 mg  1 capsule Oral Daily    duke's soln (benadryl 30 mL, mylanta 30 mL, LIDOcaine 30 mL, nystatin 30 mL) 120 mL  10 mL Oral QID    gabapentin  300 mg Oral TID    heparin (porcine)  5,000 Units Subcutaneous Q8H    polyethylene glycol  17 g Oral Daily    sodium chloride 0.9%  10 mL Intravenous Q6H    vancomycin (VANCOCIN) IV (PEDS and ADULTS)  1,000 mg Intravenous Q12H     PRN Meds:acetaminophen, aluminum-magnesium hydroxide-simethicone, diazePAM, diclofenac sodium, melatonin, methocarbamoL, oxyCODONE, oxyCODONE, oxyCODONE, prochlorperazine, senna-docusate 8.6-50 mg, Flushing PICC/Midline Protocol **AND** sodium chloride 0.9% **AND** sodium chloride 0.9%, Pharmacy to dose Vancomycin consult **AND** vancomycin - pharmacy to dose     Review of patient's allergies indicates:  No Known Allergies  Objective:     Vital Signs (Most Recent):  Temp: 97.4 °F (36.3 °C) (02/07/24 1208)  Pulse: 91 (02/07/24 1457)  Resp: 16 (02/07/24 1510)  BP: 118/61 (02/07/24 1208)  SpO2: (!) 93 % (02/07/24 1208) Vital Signs (24h Range):  Temp:  [97.4 °F (36.3 °C)-98.2 °F (36.8 °C)] 97.4 °F (36.3 °C)  Pulse:  [82-95] 91  Resp:  [16-18] 16  SpO2:  [92 %-95 %] 93 %  BP: (100-129)/(56-62) 118/61     Weight: 87.1 kg (192 lb)  Body mass index is 27.55 kg/m².    Intake/Output - Last 3 Shifts         02/05 0700  02/06 0659 02/06 0700  02/07 0659 02/07 0700  02/08 0659    P.O. 680  240    I.V. (mL/kg)   10 (0.1)    Total Intake(mL/kg) 680 (7.8)  250 (2.9)    Urine (mL/kg/hr)  1550 (0.7) 2100 (1)     Drains 320 420 90    Total Output 1870 2520 90    Net -1190 -2520 +160                    Physical Exam     Significant Labs:  I have reviewed all pertinent lab results within the past 24 hours.    Significant Diagnostics:  I have reviewed all pertinent imaging results/findings within the past 24 hours.  Assessment/Plan:     * Surgical wound breakdown  Pain control  Reg diet  Incentive spirometry  OK to ambulate  DVT ppx  Provera removed, dressed with occlusive dressing  Maintain WILLY on discharge.  OK for dc from plastics perspective          Rocky Falk DO  Plastic Surgery  [facility] - Neurosurgery (Hospital)

## 2024-02-07 NOTE — ASSESSMENT & PLAN NOTE
Pain control  Reg diet  Incentive spirometry  OK to ambulate  DVT ppx  Provera removed, dressed with occlusive dressing  Maintain WILLY on discharge.  OK for dc from plastics perspective

## 2024-02-07 NOTE — ASSESSMENT & PLAN NOTE
- consult received for evaluation of skin injury.  - intact skin with blanchable redness to buttocks and sacrum.  - sacral foam intact. Continue to protection against friction and shearing.  - baljit surface with waffle overlay.  - urinal at bedside.  - turns well independently. Pt encouraged to turn q2h.  - pt and wife educated on proper use/inflation of waffle overlay.  - ancelmo score 18 with a nutrition sub scale score of 3.  - nursing to maintain pressure injury prevention measures and continue wound care per orders.

## 2024-02-07 NOTE — PT/OT/SLP PROGRESS
"Physical Therapy Treatment    Patient Name: Gamaliel Pete Jr.   MRN: [id number]    Recommendations:     Discharge Recommendations: Low Intensity Therapy  Discharge Equipment Recommendations: bedside commode  Patient has a mobility limitation that significantly impairs their ability to participate in one or more mobility related activities of daily living, including toileting. This deficit can be resolved by using a bedside commode. Patient demonstrates mobility limitations that will cause them to be confined to one room at home without bathroom access for up to 30 days. Using a bedside commode will greatly improve the patient's ability to participate in MRADLs.   Barriers to discharge: None    Assessment:     Gamaliel Pete Jr. is a 73 y.o. male admitted with a medical diagnosis of Surgical wound breakdown. He presents with the following impairments/functional limitations: weakness, impaired endurance, impaired self care skills, impaired functional mobility, gait instability, impaired balance, decreased safety awareness, pain, orthopedic precautions. Pt with good tolerance to therapy session on this date. Provided pt with extensive education on importance of adherence to TLSO wear, spinal precautions, and continued mobility upon discharge home; pt stating he will "not be doing those things." Following moderate encouragement, pt able to ambulate greater than household distance using RW with limited assist from therapist. Pt would continue to benefit from skilled acute PT in order to address current deficits and progress functional mobility.     Rehab Prognosis: Good; patient continues to benefit from acute skilled PT services to address these deficits and reach maximum level of function.  Recent Surgery: Procedure(s) (LRB):  EXPLORATION, WOUND (N/A)  CLOSURE (N/A) 5 Days Post-Op    Plan:     During this hospitalization, patient to be seen 3 x/week to address the identified rehab impairments via gait training, " "therapeutic activities, therapeutic exercises, neuromuscular re-education and progress toward the following goals:    Plan of Care Expires:  03/04/24    Subjective     Chief Complaint: None verbalized  Patient/Family Comments/Goals: "I just feel so weak."  Pain/Comfort:  Pain Rating 1: 0/10    Objective:     Communicated with RN prior to session. Patient found HOB elevated with telemetry, WILLY drain, hemovac, wound vac, peripheral IV upon PT entry to room.     General Precautions: Standard, fall  Orthopedic Precautions: spinal precautions  Braces: TLSO; donned at EOB    Functional Mobility:  Bed Mobility:    Supine to Sit: stand by assistance  Sit to Supine: stand by assistance  Transfers:    Sit to Stand: contact guard assistance with rolling walker with cues for hand placement  Gait: Patient ambulated 115' with rolling walker and contact guard assistance.   Patient demonstrates occasional unsteady gait, decreased step length, wide base of support, decreased weight shift, decreased foot clearance, ambulates outside AHMET of RW, flexed posture, and decreased miya.   Patient required cues for upright posture, gluteal activation, sequencing, rolling walker management, obstacle navigation, safe rolling walker usage, to ambulate within AHMET of RW, increased step size, and increased foot clearance.  All lines remained intact throughout ambulation trial, gait belt utilized, chair follow for patient safety.    AM-PAC 6 CLICK MOBILITY  Turning over in bed (including adjusting bedclothes, sheets and blankets)?: 3  Sitting down on and standing up from a chair with arms (e.g., wheelchair, bedside commode, etc.): 3  Moving from lying on back to sitting on the side of the bed?: 3  Moving to and from a bed to a chair (including a wheelchair)?: 3  Need to walk in hospital room?: 3  Climbing 3-5 steps with a railing?: 3  Basic Mobility Total Score: 18     Therapeutic Activities and Exercises:  Patient educated on role of acute care " PT and PT POC, safety while in hospital including calling nurse for mobility, and call light usage  Pt educated on the effects of bed rest and the importance of OOB activity. Pt encouraged to sit UIC majority of day as tolerated and continue daily ambulation with nursing assist. Pt verbalized understanding.  Pt educated on importance of maximal participation in therapy session in order to reduce negative effects of prolonged sedentary positioning.   Educated on spinal precautions including avoidance of bending, lifting, and twisting. Patient expresses understanding.  Educated on log roll technique, performed to right.  Answered all questions within PT scope of practice and addressed functional mobility concerns.    Patient left HOB elevated with all lines intact, call button in reach, RN notified, and spouse present.    GOALS:   Multidisciplinary Problems       Physical Therapy Goals          Problem: Physical Therapy    Goal Priority Disciplines Outcome Goal Variances Interventions   Physical Therapy Goal     PT, PT/OT Ongoing, Progressing     Description: Goals to be met by:      Patient will increase functional independence with mobility by performin. Supine to sit with modified independent   2. Sit to supine with Modified [place]  3. Sit to stand transfer with Modified [place]  4. Gait  x 150 feet with Stand-by Assistance using Rolling Walker.   5. Ascend/descend 5 stair with unilateral Handrails Contact Guard Assistance using no AD.                          Time Tracking:     PT Received On: 24  PT Start Time: 1128     PT Stop Time: 1144  PT Total Time (min): 16 min     Billable Minutes: Gait Training 16      Treatment Type: Treatment  PT/PTA: PT     Number of PTA visits since last PT visit: 0     2024

## 2024-02-07 NOTE — PLAN OF CARE
Problem: Adult Inpatient Plan of Care  Goal: Plan of Care Review  Outcome: Ongoing, Progressing  Goal: Patient-Specific Goal (Individualized)  Description: Pt will maintain sbp below 180  Outcome: Ongoing, Progressing  Goal: Absence of Hospital-Acquired Illness or Injury  Outcome: Ongoing, Progressing  Goal: Optimal Comfort and Wellbeing  Outcome: Ongoing, Progressing  Goal: Readiness for Transition of Care  Outcome: Ongoing, Progressing     Problem: Fluid and Electrolyte Imbalance (Acute Kidney Injury/Impairment)  Goal: Fluid and Electrolyte Balance  Outcome: Ongoing, Progressing     Problem: Oral Intake Inadequate (Acute Kidney Injury/Impairment)  Goal: Optimal Nutrition Intake  Outcome: Ongoing, Progressing     Problem: Renal Function Impairment (Acute Kidney Injury/Impairment)  Goal: Effective Renal Function  Outcome: Ongoing, Progressing     Problem: Impaired Wound Healing  Goal: Optimal Wound Healing  Outcome: Ongoing, Progressing     Problem: Fall Injury Risk  Goal: Absence of Fall and Fall-Related Injury  Outcome: Ongoing, Progressing     Problem: Skin Injury Risk Increased  Goal: Skin Health and Integrity  Outcome: Ongoing, Progressing     Problem: Infection  Goal: Absence of Infection Signs and Symptoms  Outcome: Ongoing, Progressing   Reviewed plan of care with patient, plan is to discharge tomorrow after inpatient radiation. Patient progressing as anticipated. No acute distress observed or reported at this time.

## 2024-02-07 NOTE — ASSESSMENT & PLAN NOTE
Gamaliel Pete Jr. is a 73 y.o. male s/p T7-11 decompression/fusion on 1/5/24. He presented to clinic on 1/31 with surgical wound breakdown.    Diagnostics:  MRI t spine w/wo con 1/31/24: no epidural abscess or infection, likely seroma. Adequate decompression of spinal cord.   ESR 69 Procal 0.24  Preop labs WNL.  Blood cultures 1/31/24: NGTD/pending.  OR cultures 2/2: staph epidermidis.    Now s/p thoracic wound washout/revision with plastic surgery on 2/2.    - Admit to NPU under NSGY.  - q4h neurochecks.  - TLSO brace when up/out of bed. Okay for activities as tolerated.  - Drains to suction. Record output qshift. L deep HV per nsgy. R WILLY per plastics.  - WV in place- per plastics.  - ID consulted- rec IV Vanc x 6 weeks. PICC placed 2/5.  - Hold chemo tx perioperatively.   - Receiving radiation inpatient to cervical spine. Day 3/4 complete.  - DVT ppx: deyanira hose/SCDs/LVX.  - Atelectasis ppx: up in chair, IS hourly.  - Bowel regimen.    D/w Dr. Martinez.

## 2024-02-07 NOTE — SUBJECTIVE & OBJECTIVE
Interval History: NAEON. AFVSS. HV drain with decreased output. WILLY and WV still in place. Pain controlled. Ambulated in the hallway today without issues. Likely plan for dc home with home health tomorrow. Will remove HV drain before DC.    Medications:  Continuous Infusions:   lactated ringers 100 mL/hr at 02/07/24 0017     Scheduled Meds:   allopurinoL  100 mg Oral Daily    amlodipine-benazepril 5-10 mg  1 capsule Oral Daily    duke's soln (benadryl 30 mL, mylanta 30 mL, LIDOcaine 30 mL, nystatin 30 mL) 120 mL  10 mL Oral QID    gabapentin  300 mg Oral TID    heparin (porcine)  5,000 Units Subcutaneous Q8H    polyethylene glycol  17 g Oral Daily    sodium chloride 0.9%  10 mL Intravenous Q6H    vancomycin (VANCOCIN) IV (PEDS and ADULTS)  1,000 mg Intravenous Q12H     PRN Meds:acetaminophen, aluminum-magnesium hydroxide-simethicone, diazePAM, diclofenac sodium, melatonin, methocarbamoL, oxyCODONE, oxyCODONE, oxyCODONE, prochlorperazine, senna-docusate 8.6-50 mg, Flushing PICC/Midline Protocol **AND** sodium chloride 0.9% **AND** sodium chloride 0.9%, Pharmacy to dose Vancomycin consult **AND** vancomycin - pharmacy to dose     Review of Systems  Objective:     Weight: 87.1 kg (192 lb)  Body mass index is 27.55 kg/m².  Vital Signs (Most Recent):  Temp: 97.4 °F (36.3 °C) (02/07/24 1208)  Pulse: 92 (02/07/24 1208)  Resp: 18 (02/07/24 1208)  BP: 118/61 (02/07/24 1208)  SpO2: (!) 93 % (02/07/24 1208) Vital Signs (24h Range):  Temp:  [97.4 °F (36.3 °C)-98.2 °F (36.8 °C)] 97.4 °F (36.3 °C)  Pulse:  [79-95] 92  Resp:  [16-18] 18  SpO2:  [92 %-95 %] 93 %  BP: (100-129)/(56-62) 118/61     Date 02/07/24 0700 - 02/08/24 0659   Shift 3927-6300 8441-5567 2054-4535 24 Hour Total   INTAKE   I.V.(mL/kg) 10(0.1)   10(0.1)   Shift Total(mL/kg) 10(0.1)   10(0.1)   OUTPUT   Drains 90   90   Shift Total(mL/kg) 90(1)   90(1)   Weight (kg) 87.1 87.1 87.1 87.1                            Closed/Suction Drain 02/02/24 0914 Tube - 1  "Inferior;Right Back Bulb 15 Fr. (Active)   Site Description Unable to view 02/07/24 0800   Dressing Type Transparent (Tegaderm) 02/07/24 0800   Dressing Status Clean;Dry;Intact 02/07/24 0800   Dressing Intervention Integrity maintained 02/07/24 0800   Drainage Serosanguineous 02/07/24 0800   Status To bulb suction 02/07/24 0800   Output (mL) 90 mL 02/07/24 0800            Closed/Suction Drain 02/02/24 0840 Tube - 2 Lateral;Left;Superior Back Accordion 10 Fr. (Active)   Site Description Unable to view 02/07/24 0800   Dressing Type Transparent (Tegaderm) 02/07/24 0800   Dressing Status Clean;Dry;Intact 02/07/24 0800   Dressing Intervention Integrity maintained 02/07/24 0800   Drainage Serosanguineous 02/07/24 0800   Status To bulb suction 02/07/24 0800   Output (mL) 0 mL 02/07/24 0800       Neurosurgery Physical Exam  General: Well developed, well nourished, not in acute distress.   Head: Normocephalic, atraumatic.  Neck: Full ROM.   Neurologic: Alert and oriented x 4. Thought content appropriate.  GCS: Motor:6  Verbal:5  Eyes:4   GCS Total: 15  Language: No aphasia.  Speech: No dysarthria.  Cranial nerves: Face symmetric, tongue midline, CN II-XII grossly intact.   Eyes: Pupils equal, round, reactive to light with accomodation, EOMI.   Pulmonary: Normal respirations, no signs of respiratory distress.  Abdomen: Soft, non-distended, non-tender to palpation.  Sensory: Intact to light touch throughout.  Motor Strength: Moves all extremities spontaneously with good tone. Full strength upper and lower extremities. No abnormal movements seen.      Incision: Prevena in place.   Left sided deep HV and R sided WILLY drain in place to suction.    Significant Labs:  No results for input(s): "GLU", "NA", "K", "CL", "CO2", "BUN", "CREATININE", "CALCIUM", "MG" in the last 48 hours.  Recent Labs   Lab 02/06/24  1130   WBC 4.00   HGB 8.8*   HCT 27.5*        No results for input(s): "LABPT", "INR", "APTT" in the last 48 " hours.  Microbiology Results (last 7 days)       Procedure Component Value Units Date/Time    Fungus culture [[id number]] Collected: 02/02/24 0924    Order Status: Completed Specimen: Wound from Back Updated: 02/07/24 0930     Fungus (Mycology) Culture Culture in progress    Narrative:      Epidural tissue    Fungus culture [[id number]] Collected: 02/02/24 0924    Order Status: Completed Specimen: Wound from Back Updated: 02/07/24 0930     Fungus (Mycology) Culture Culture in progress    Narrative:      Epidural swab    Fungus culture [[id number]] Collected: 02/02/24 0924    Order Status: Completed Specimen: Wound from Back Updated: 02/07/24 0930     Fungus (Mycology) Culture Culture in progress    Narrative:      Superficial fluid    Aerobic culture [[id number]]  (Abnormal)  (Susceptibility) Collected: 02/02/24 0924    Order Status: Completed Specimen: Wound from Back Updated: 02/06/24 1015     Aerobic Bacterial Culture STAPHYLOCOCCUS EPIDERMIDIS  Few      Narrative:      Superficial fluid    Culture, Anaerobe [[id number]] Collected: 02/02/24 0924    Order Status: Completed Specimen: Wound from Back Updated: 02/06/24 1007     Anaerobic Culture No anaerobes isolated    Narrative:      Epidural tissue    Culture, Anaerobe [[id number]] Collected: 02/02/24 0924    Order Status: Completed Specimen: Wound from Back Updated: 02/06/24 1006     Anaerobic Culture No anaerobes isolated    Narrative:      Epidural swab    Culture, Anaerobe [[id number]] Collected: 02/02/24 0924    Order Status: Completed Specimen: Wound from Back Updated: 02/06/24 1005     Anaerobic Culture No anaerobes isolated    Narrative:      Superficial fluid    Blood culture [[id number]] Collected: 01/31/24 1948    Order Status: Completed Specimen: Blood Updated: 02/05/24 2022     Blood Culture, Routine No growth after 5 days.    AFB Culture & Smear [[id number]] Collected: 02/02/24 0924    Order Status: Completed Specimen: Wound from Back Updated:  02/05/24 1412     AFB Culture & Smear Culture in progress     AFB CULTURE STAIN No acid fast bacilli seen.    Narrative:      Superficial fluid    AFB Culture & Smear [[id number]] Collected: 02/02/24 0924    Order Status: Completed Specimen: Wound from Back Updated: 02/05/24 1412     AFB Culture & Smear Culture in progress     AFB CULTURE STAIN No acid fast bacilli seen.    Narrative:      Epidural swab    AFB Culture & Smear [[id number]] Collected: 02/02/24 0924    Order Status: Completed Specimen: Wound from Back Updated: 02/05/24 1412     AFB Culture & Smear Culture in progress     AFB CULTURE STAIN No acid fast bacilli seen.    Narrative:      Epidural tissue    Aerobic culture [[id number]]  (Abnormal)  (Susceptibility) Collected: 02/02/24 0924    Order Status: Completed Specimen: Wound from Back Updated: 02/05/24 1135     Aerobic Bacterial Culture STAPHYLOCOCCUS EPIDERMIDIS  Few      Narrative:      Epidural tissue    Aerobic culture [[id number]]  (Abnormal)  (Susceptibility) Collected: 02/02/24 0924    Order Status: Completed Specimen: Wound from Back Updated: 02/05/24 1036     Aerobic Bacterial Culture STAPHYLOCOCCUS EPIDERMIDIS  Few      Narrative:      Epidural swab    Gram stain [[id number]] Collected: 02/02/24 0924    Order Status: Completed Specimen: Wound from Back Updated: 02/02/24 1355     Gram Stain Result No WBC's      No organisms seen    Narrative:      Epidural tissue    Gram stain [[id number]] Collected: 02/02/24 0924    Order Status: Completed Specimen: Wound from Back Updated: 02/02/24 1350     Gram Stain Result Rare WBC's      No organisms seen    Narrative:      Superficial fluid    Gram stain [[id number]] Collected: 02/02/24 0924    Order Status: Completed Specimen: Wound from Back Updated: 02/02/24 1341     Gram Stain Result Rare WBC's      No organisms seen    Narrative:      Epidural swab          All pertinent labs from the last 24 hours have been reviewed.    Significant  Diagnostics:  I have reviewed and interpreted all pertinent imaging results/findings within the past 24 hours.

## 2024-02-07 NOTE — CARE UPDATE
I have reviewed the chart of Gamaliel Pete Jr. and participated in the care of the patient who is hospitalized for the following:    Active Hospital Problems    Diagnosis    *Surgical wound breakdown    At high risk for skin breakdown    Cellulitis     On vanc for skin and soft tissue infection of the wound       Gout     On allupurinol      Hyponatremia    Wound dehiscence     See primary problem       Gout of ankle    Essential hypertension    Metastatic cancer to spine          I have reviewed the Gamaliel Pete Jr. with the multidisciplinary team during rounds.      Araceli Villar NP  [facility]

## 2024-02-07 NOTE — HPI
Gamaliel Pete Jr. is a 73 year old male with RCC metastasis to the spine s/p 360 cervical spine decompression/fusion in November and then a T9 vertebroplasty that failed. He was then taken for a T7-11 posterior decompression/fusion. The patient presented recently with scabbing to his lower thoracic incision. He was sent to wound care who advised him to be evaluated because it appeared worse. He presents to clinic today with reports of drainage from his incision. He denies fevers, headaches, numbness, tingling, focal weakness, bowel/bladder dysfunction. He will be direct admitted to the hospital for further workup and likely neurosurgical intervention to address the surgical wound breakdown.  Skin integrity [facility] consulted for evaluation of skin injury.

## 2024-02-07 NOTE — CONSULTS
Lj Krueger - Neurosurgery ([facility])  Skin Integrity ABEBA  Consult Note    Patient Name: Gamaliel Pete Jr.  MRN: [id number]  Admission Date: 1/31/2024  Hospital Length of Stay: 7 days  Attending Physician: Nasim Martinez DO  Primary Care Provider: Slava Lowery MD     Inpatient consult to Skin Integrity  Practitioner  Consult performed by: Claudia Nichole, NP  Consult ordered by: Nadine Santos PA-C        Subjective:     History of Present Illness:  Gamaliel Pete Jr. is a 73 year old male with RCC metastasis to the spine s/p 360 cervical spine decompression/fusion in November and then a T9 vertebroplasty that failed. He was then taken for a T7-11 posterior decompression/fusion. The patient presented recently with scabbing to his lower thoracic incision. He was sent to wound care who advised him to be evaluated because it appeared worse. He presents to clinic today with reports of drainage from his incision. He denies fevers, headaches, numbness, tingling, focal weakness, bowel/bladder dysfunction. He will be direct admitted to the hospital for further workup and likely neurosurgical intervention to address the surgical wound breakdown.  Skin integrity ABEBA consulted for evaluation of skin injury.    Scheduled Meds:   allopurinoL  100 mg Oral Daily    amlodipine-benazepril 5-10 mg  1 capsule Oral Daily    duke's soln (benadryl 30 mL, mylanta 30 mL, LIDOcaine 30 mL, nystatin 30 mL) 120 mL  10 mL Oral QID    gabapentin  300 mg Oral TID    heparin (porcine)  5,000 Units Subcutaneous Q8H    polyethylene glycol  17 g Oral Daily    sodium chloride 0.9%  10 mL Intravenous Q6H    vancomycin (VANCOCIN) IV (PEDS and ADULTS)  1,000 mg Intravenous Q12H     Continuous Infusions:   lactated ringers 100 mL/hr at 02/07/24 0017     PRN Meds:acetaminophen, aluminum-magnesium hydroxide-simethicone, diazePAM, diclofenac sodium, melatonin, methocarbamoL, oxyCODONE, oxyCODONE, oxyCODONE, prochlorperazine,  senna-docusate 8.6-50 mg, Flushing PICC/Midline Protocol **AND** sodium chloride 0.9% **AND** sodium chloride 0.9%, Pharmacy to dose Vancomycin consult **AND** vancomycin - pharmacy to dose    Review of patient's allergies indicates:  No Known Allergies     Past Medical History:   Diagnosis Date    Asthma     no inhaler use    Borderline hypertension     ED (erectile dysfunction)     Gout     Hematuria     Hypertension      Past Surgical History:   Procedure Laterality Date    CLOSURE N/A 2/2/2024    Procedure: CLOSURE;  Surgeon: Ernesto Villanueva MD;  Location: [address];  Service: Plastics;  Laterality: N/A;    COLONOSCOPY  02/10/2022    COLONOSCOPY N/A 02/10/2022    Procedure: COLONOSCOPY;  Surgeon: Desmond Keenan MD;  Location: [facility];  Service: General;  Laterality: N/A;    POSTERIOR FUSION OF CERVICAL SPINE WITH LAMINECTOMY N/A 11/15/2023    Procedure: SPINE, CERVICAL, WITH POSTERIOR FUSION T4-6;  Surgeon: Nasim Martinez DO;  Location: [address];  Service: Neurosurgery;  Laterality: N/A;  C2-T1 laminectomy and posterior fusion. [place]. C-arm. RingCredible. Neuromonitoring.    ROBOT-ASSISTED LAPAROSCOPIC NEPHRECTOMY Right 10/4/2023    Procedure: ROBOTIC NEPHRECTOMY;  Surgeon: Henry Michaels MD;  Location: [facility];  Service: Urology;  Laterality: Right;    SURGICAL REMOVAL OF VERTEBRAL BODY OF CERVICAL SPINE Right 11/7/2023    Procedure: CORPECTOMY, SPINE, CERVICAL;  Surgeon: Nasim Martinez DO;  Location: [address];  Service: Neurosurgery;  Laterality: Right;  C4 and C5 corpectomy with globus; familia wells tongs; c-arm; neuromonitoring; microscope; type and cross 2 units    SURGICAL REMOVAL OF VERTEBRAL BODY OF CERVICAL SPINE N/A 11/15/2023    Procedure: CORPECTOMY, SPINE, CERVICAL C3-6 REVISION;  Surgeon: Nasim Martinez DO;  Location: [address];  Service: Neurosurgery;  Laterality: N/A;    THORACIC LAMINECTOMY WITH FUSION N/A 1/5/2024    Procedure:  LAMINECTOMY, SPINE, THORACIC, WITH FUSION;  Surgeon: Nasim Martinez DO;  [facility];  Service: Neurosurgery;  Laterality: N/A;  T7-T11 fusions with robot    TONSILLECTOMY      WOUND EXPLORATION N/A 2/2/2024    Procedure: EXPLORATION, WOUND;  Surgeon: Nasim Martinez DO;  Location: [facility] OR 2ND FLR;  Service: Neurosurgery;  Laterality: N/A;  Thoracic wound exploration, washout       Family History    None       Tobacco Use    Smoking status: Never    Smokeless tobacco: Never   Substance and Sexual Activity    Alcohol use: Not Currently     Alcohol/week: 0.0 standard drinks of alcohol     Comment: rarely    Drug use: Never    Sexual activity: Not on file     Review of Systems   Skin:  Positive for wound.       Objective:     Vital Signs (Most Recent):  Temp: 98 °F (36.7 °C) (02/07/24 0850)  Pulse: 95 (02/07/24 0850)  Resp: 16 (02/07/24 0850)  BP: (!) 100/58 (02/07/24 0850)  SpO2: (!) 93 % (02/07/24 0850) Vital Signs (24h Range):  Temp:  [97.5 °F (36.4 °C)-98.2 °F (36.8 °C)] 98 °F (36.7 °C)  Pulse:  [] 95  Resp:  [16-18] 16  SpO2:  [92 %-95 %] 93 %  BP: (100-129)/(54-62) 100/58     Weight: 87.1 kg (192 lb)  Body mass index is 27.55 kg/m².     Physical Exam  Constitutional:       Appearance: Normal appearance.   Skin:     General: Skin is warm and dry.      Findings: Lesion present.   Neurological:      Mental Status: He is alert.          Laboratory:  All pertinent labs reviewed within the last 24 hours.    Diagnostic Results:  None      Assessment/Plan:        ABEBA Skin Integrity Evaluation      Skin Integrity ABEBA evaluation of patient as part of the comprehensive skin care team.     He has been admitted for 6 days. Skin injury was noted on 1/31/24. POA yes.    Buttocks        Orthopedic  At high risk for skin breakdown  - consult received for evaluation of skin injury.  - intact skin with blanchable redness to buttocks and sacrum.  - sacral foam intact. Continue to protection against  friction and shearing.  - baljit surface with waffle overlay.  - urinal at bedside.  - turns well independently. Pt encouraged to turn q2h.  - pt and wife educated on proper use/inflation of waffle overlay.  - ancelmo score 18 with a nutrition sub scale score of 3.  - nursing to maintain pressure injury prevention measures and continue wound care per orders.        Thank you for your consult. I will follow-up with patient. Please contact us if you have any additional questions.      Claudia Nichole NP  Skin Integrity ABEBA  Lj Krueger - Neurosurgery ([facility])

## 2024-02-07 NOTE — SUBJECTIVE & OBJECTIVE
Scheduled Meds:   allopurinoL  100 mg Oral Daily    amlodipine-benazepril 5-10 mg  1 capsule Oral Daily    duke's soln (benadryl 30 mL, mylanta 30 mL, LIDOcaine 30 mL, nystatin 30 mL) 120 mL  10 mL Oral QID    gabapentin  300 mg Oral TID    heparin (porcine)  5,000 Units Subcutaneous Q8H    polyethylene glycol  17 g Oral Daily    sodium chloride 0.9%  10 mL Intravenous Q6H    vancomycin (VANCOCIN) IV (PEDS and ADULTS)  1,000 mg Intravenous Q12H     Continuous Infusions:   lactated ringers 100 mL/hr at 02/07/24 0017     PRN Meds:acetaminophen, aluminum-magnesium hydroxide-simethicone, diazePAM, diclofenac sodium, melatonin, methocarbamoL, oxyCODONE, oxyCODONE, oxyCODONE, prochlorperazine, senna-docusate 8.6-50 mg, Flushing PICC/Midline Protocol **AND** sodium chloride 0.9% **AND** sodium chloride 0.9%, Pharmacy to dose Vancomycin consult **AND** vancomycin - pharmacy to dose    Review of patient's allergies indicates:  No Known Allergies     Past Medical History:   Diagnosis Date    Asthma     no inhaler use    Borderline hypertension     ED (erectile dysfunction)     Gout     Hematuria     Hypertension      Past Surgical History:   Procedure Laterality Date    CLOSURE N/A 2/2/2024    Procedure: CLOSURE;  Surgeon: Ernesto Villanueva MD;  Location: [address];  Service: Plastics;  Laterality: N/A;    COLONOSCOPY  02/10/2022    COLONOSCOPY N/A 02/10/2022    Procedure: COLONOSCOPY;  Surgeon: Desmond Keenan MD;  Location: [facility];  Service: General;  Laterality: N/A;    POSTERIOR FUSION OF CERVICAL SPINE WITH LAMINECTOMY N/A 11/15/2023    Procedure: SPINE, CERVICAL, WITH POSTERIOR FUSION T4-6;  Surgeon: Nasim Martinez DO;  Location: [address];  Service: Neurosurgery;  Laterality: N/A;  C2-T1 laminectomy and posterior fusion. [place]. C-arm. gDecide. Neuromonitoring.    ROBOT-ASSISTED LAPAROSCOPIC NEPHRECTOMY Right 10/4/2023    Procedure: ROBOTIC NEPHRECTOMY;  Surgeon: Henry Michaels MD;   Location: [facility] [place];  Service: Urology;  Laterality: Right;    SURGICAL REMOVAL OF VERTEBRAL BODY OF CERVICAL SPINE Right 11/7/2023    Procedure: CORPECTOMY, SPINE, CERVICAL;  Surgeon: Nasim Martinez DO;  Location: [facility] OR 2ND FLR;  Service: Neurosurgery;  Laterality: Right;  C4 and C5 corpectomy with globus;  wells tongs; c-arm; neuromonitoring; microscope; type and cross 2 units    SURGICAL REMOVAL OF VERTEBRAL BODY OF CERVICAL SPINE N/A 11/15/2023    Procedure: CORPECTOMY, SPINE, CERVICAL C3-6 REVISION;  Surgeon: Nasim Martinez DO;  Location: [facility] OR 2ND FLR;  Service: Neurosurgery;  Laterality: N/A;    THORACIC LAMINECTOMY WITH FUSION N/A 1/5/2024    Procedure: LAMINECTOMY, SPINE, THORACIC, WITH FUSION;  Surgeon: Nasim Martinez DO;  Location: [facility] OR 2ND FLR;  Service: Neurosurgery;  Laterality: N/A;  T7-T11 fusions with robot    TONSILLECTOMY      WOUND EXPLORATION N/A 2/2/2024    Procedure: EXPLORATION, WOUND;  Surgeon: Nasim Martinez DO;  Location: [facility] OR 2ND FLR;  Service: Neurosurgery;  Laterality: N/A;  Thoracic wound exploration, washout       Family History    None       Tobacco Use    Smoking status: Never    Smokeless tobacco: Never   Substance and Sexual Activity    Alcohol use: Not Currently     Alcohol/week: 0.0 standard drinks of alcohol     Comment: rarely    Drug use: Never    Sexual activity: Not on file     Review of Systems   Skin:  Positive for wound.       Objective:     Vital Signs (Most Recent):  Temp: 98 °F (36.7 °C) (02/07/24 0850)  Pulse: 95 (02/07/24 0850)  Resp: 16 (02/07/24 0850)  BP: (!) 100/58 (02/07/24 0850)  SpO2: (!) 93 % (02/07/24 0850) Vital Signs (24h Range):  Temp:  [97.5 °F (36.4 °C)-98.2 °F (36.8 °C)] 98 °F (36.7 °C)  Pulse:  [] 95  Resp:  [16-18] 16  SpO2:  [92 %-95 %] 93 %  BP: (100-129)/(54-62) 100/58     Weight: 87.1 kg (192 lb)  Body mass index is 27.55 kg/m².     Physical Exam  Constitutional:       Appearance: Normal appearance.    Skin:     General: Skin is warm and dry.      Findings: Lesion present.   Neurological:      Mental Status: He is alert.          Laboratory:  All pertinent labs reviewed within the last 24 hours.    Diagnostic Results:  None

## 2024-02-07 NOTE — SUBJECTIVE & OBJECTIVE
Interval History: AFVSS. NAEO. Provera removed today. WILLY output 360. Hemvac output 60.    Medications:  Continuous Infusions:   lactated ringers 100 mL/hr at 02/07/24 0017     Scheduled Meds:   allopurinoL  100 mg Oral Daily    amlodipine-benazepril 5-10 mg  1 capsule Oral Daily    duke's soln (benadryl 30 mL, mylanta 30 mL, LIDOcaine 30 mL, nystatin 30 mL) 120 mL  10 mL Oral QID    gabapentin  300 mg Oral TID    heparin (porcine)  5,000 Units Subcutaneous Q8H    polyethylene glycol  17 g Oral Daily    sodium chloride 0.9%  10 mL Intravenous Q6H    vancomycin (VANCOCIN) IV (PEDS and ADULTS)  1,000 mg Intravenous Q12H     PRN Meds:acetaminophen, aluminum-magnesium hydroxide-simethicone, diazePAM, diclofenac sodium, melatonin, methocarbamoL, oxyCODONE, oxyCODONE, oxyCODONE, prochlorperazine, senna-docusate 8.6-50 mg, Flushing PICC/Midline Protocol **AND** sodium chloride 0.9% **AND** sodium chloride 0.9%, Pharmacy to dose Vancomycin consult **AND** vancomycin - pharmacy to dose     Review of patient's allergies indicates:  No Known Allergies  Objective:     Vital Signs (Most Recent):  Temp: 97.4 °F (36.3 °C) (02/07/24 1208)  Pulse: 91 (02/07/24 1457)  Resp: 16 (02/07/24 1510)  BP: 118/61 (02/07/24 1208)  SpO2: (!) 93 % (02/07/24 1208) Vital Signs (24h Range):  Temp:  [97.4 °F (36.3 °C)-98.2 °F (36.8 °C)] 97.4 °F (36.3 °C)  Pulse:  [82-95] 91  Resp:  [16-18] 16  SpO2:  [92 %-95 %] 93 %  BP: (100-129)/(56-62) 118/61     Weight: 87.1 kg (192 lb)  Body mass index is 27.55 kg/m².    Intake/Output - Last 3 Shifts         02/05 0700  02/06 0659 02/06 0700 02/07 0659 02/07 0700  02/08 0659    P.O. 680  240    I.V. (mL/kg)   10 (0.1)    Total Intake(mL/kg) 680 (7.8)  250 (2.9)    Urine (mL/kg/hr) 1550 (0.7) 2100 (1)     Drains 320 420 90    Total Output 1870 2520 90    Net -1190 -2520 +160                    Physical Exam     Significant Labs:  I have reviewed all pertinent lab results within the past 24 hours.    Significant  Diagnostics:  I have reviewed all pertinent imaging results/findings within the past 24 hours.

## 2024-02-08 ENCOUNTER — TELEPHONE (OUTPATIENT)
Dept: ORTHOPEDICS | Facility: CLINIC | Age: 74
End: 2024-02-08
Payer: MEDICARE

## 2024-02-08 VITALS
HEART RATE: 85 BPM | TEMPERATURE: 98 F | SYSTOLIC BLOOD PRESSURE: 117 MMHG | RESPIRATION RATE: 18 BRPM | DIASTOLIC BLOOD PRESSURE: 56 MMHG | BODY MASS INDEX: 27.49 KG/M2 | HEIGHT: 70 IN | OXYGEN SATURATION: 93 % | WEIGHT: 192 LBS

## 2024-02-08 LAB
CREAT SERPL-MCNC: 0.9 MG/DL (ref 0.5–1.4)
EST. GFR  (NO RACE VARIABLE): >60 ML/MIN/1.73 M^2
VANCOMYCIN TROUGH SERPL-MCNC: 17.2 UG/ML (ref 10–22)

## 2024-02-08 PROCEDURE — 25000003 PHARM REV CODE 250

## 2024-02-08 PROCEDURE — 63600175 PHARM REV CODE 636 W HCPCS

## 2024-02-08 PROCEDURE — 80202 ASSAY OF VANCOMYCIN: CPT | Performed by: STUDENT IN AN ORGANIZED HEALTH CARE EDUCATION/TRAINING PROGRAM

## 2024-02-08 PROCEDURE — 63600175 PHARM REV CODE 636 W HCPCS: Performed by: STUDENT IN AN ORGANIZED HEALTH CARE EDUCATION/TRAINING PROGRAM

## 2024-02-08 PROCEDURE — 77412 RADIATION TX DELIVERY LVL 3: CPT | Performed by: RADIOLOGY

## 2024-02-08 PROCEDURE — 77336 RADIATION PHYSICS CONSULT: CPT | Performed by: RADIOLOGY

## 2024-02-08 PROCEDURE — 25000003 PHARM REV CODE 250: Performed by: STUDENT IN AN ORGANIZED HEALTH CARE EDUCATION/TRAINING PROGRAM

## 2024-02-08 PROCEDURE — A4216 STERILE WATER/SALINE, 10 ML: HCPCS | Performed by: STUDENT IN AN ORGANIZED HEALTH CARE EDUCATION/TRAINING PROGRAM

## 2024-02-08 PROCEDURE — 77387 GUIDANCE FOR RADJ TX DLVR: CPT | Mod: TC | Performed by: RADIOLOGY

## 2024-02-08 PROCEDURE — 82565 ASSAY OF CREATININE: CPT | Performed by: STUDENT IN AN ORGANIZED HEALTH CARE EDUCATION/TRAINING PROGRAM

## 2024-02-08 PROCEDURE — 97535 SELF CARE MNGMENT TRAINING: CPT | Mod: CO

## 2024-02-08 PROCEDURE — G6002 STEREOSCOPIC X-RAY GUIDANCE: HCPCS | Mod: 26,,, | Performed by: RADIOLOGY

## 2024-02-08 PROCEDURE — 99024 POSTOP FOLLOW-UP VISIT: CPT | Mod: ,,,

## 2024-02-08 RX ADMIN — HEPARIN SODIUM 5000 UNITS: 5000 INJECTION INTRAVENOUS; SUBCUTANEOUS at 05:02

## 2024-02-08 RX ADMIN — AMLODIPINE BESYLATE AND BENAZEPRIL HYDROCHLORIDE 1 CAPSULE: 5; 10 CAPSULE ORAL at 08:02

## 2024-02-08 RX ADMIN — VANCOMYCIN HYDROCHLORIDE 1000 MG: 1 INJECTION, POWDER, LYOPHILIZED, FOR SOLUTION INTRAVENOUS at 10:02

## 2024-02-08 RX ADMIN — Medication 10 ML: at 05:02

## 2024-02-08 RX ADMIN — OXYCODONE HYDROCHLORIDE 10 MG: 10 TABLET ORAL at 06:02

## 2024-02-08 RX ADMIN — PROCHLORPERAZINE EDISYLATE 5 MG: 5 INJECTION INTRAMUSCULAR; INTRAVENOUS at 05:02

## 2024-02-08 RX ADMIN — ALUMINUM HYDROXIDE, MAGNESIUM HYDROXIDE, AND DIMETHICONE 10 ML: 400; 400; 40 SUSPENSION ORAL at 08:02

## 2024-02-08 RX ADMIN — Medication 10 ML: at 12:02

## 2024-02-08 RX ADMIN — POLYETHYLENE GLYCOL 3350 17 G: 17 POWDER, FOR SOLUTION ORAL at 10:02

## 2024-02-08 RX ADMIN — GABAPENTIN 300 MG: 300 CAPSULE ORAL at 08:02

## 2024-02-08 RX ADMIN — ALLOPURINOL 100 MG: 100 TABLET ORAL at 08:02

## 2024-02-08 NOTE — PLAN OF CARE
Problem: Adult Inpatient Plan of Care  Goal: Plan of Care Review  Outcome: Ongoing, Progressing  Goal: Patient-Specific Goal (Individualized)  Description: Pt will maintain sbp below 180  Outcome: Ongoing, Progressing  Goal: Absence of Hospital-Acquired Illness or Injury  Outcome: Ongoing, Progressing  Goal: Optimal Comfort and Wellbeing  Outcome: Ongoing, Progressing  Goal: Readiness for Transition of Care  Outcome: Ongoing, Progressing     Problem: Fluid and Electrolyte Imbalance (Acute Kidney Injury/Impairment)  Goal: Fluid and Electrolyte Balance  Outcome: Ongoing, Progressing     Problem: Renal Function Impairment (Acute Kidney Injury/Impairment)  Goal: Effective Renal Function  Outcome: Ongoing, Progressing     Problem: Impaired Wound Healing  Goal: Optimal Wound Healing  Outcome: Ongoing, Progressing     Problem: Fall Injury Risk  Goal: Absence of Fall and Fall-Related Injury  Outcome: Ongoing, Progressing     Problem: Skin Injury Risk Increased  Goal: Skin Health and Integrity  Outcome: Ongoing, Progressing     Problem: Infection  Goal: Absence of Infection Signs and Symptoms  Outcome: Ongoing, Progressing

## 2024-02-08 NOTE — CONSULTS
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    Drug levels (last 3 results):  Recent Labs   Lab Result Units 02/08/24  0800   Vancomycin-Trough ug/mL 17.2       Pharmacy will continue to follow and monitor vancomycin.    Please contact pharmacy at extension [phone] for questions regarding this assessment.    Thank you for the consult,   Margie Wang       Patient brief summary:  Gamaliel Pete Jr. is a 73 y.o. male initiated on antimicrobial therapy with IV Vancomycin for treatment of MRSE spinal infection     Drug Allergies:   Review of patient's allergies indicates:  No Known Allergies    Actual Body Weight:   87.1 kg    Renal Function:   Estimated Creatinine Clearance: 77.7 mL/min (based on SCr of 0.9 mg/dL).,     Dialysis Method (if applicable):  N/A    CBC (last 72 hours):  Recent Labs   Lab Result Units 02/06/24  1130   WBC K/uL 4.00   Hemoglobin g/dL 8.8*   Hematocrit % 27.5*   Platelets K/uL 254   Gran % % 78.0*   Lymph % % 13.0*   Mono % % 5.0   Eosinophil % % 0.0   Basophil % % 0.0   Differential Method  Manual       Metabolic Panel (last 72 hours):  Recent Labs   Lab Result Units 02/08/24  0800   Creatinine mg/dL 0.9       Vancomycin Administrations:  vancomycin given in the last 96 hours                     vancomycin (VANCOCIN) 1,000 mg in dextrose 5 % (D5W) 250 mL IVPB (Vial-Mate) (mg) 1,000 mg New Bag 02/07/24 2102     1,000 mg New Bag  0903     1,000 mg New Bag 02/06/24 2116     1,000 mg New Bag  1129     1,000 mg New Bag 02/05/24 2117     1,000 mg New Bag  0912     1,000 mg New Bag 02/04/24 2112                    Microbiologic  Results:  Microbiology Results (last 7 days)       Procedure Component Value Units Date/Time    Fungus culture [[id number]] Collected: 02/02/24 0924    Order Status: Completed Specimen: Wound from Back Updated: 02/07/24 0930     Fungus (Mycology) Culture Culture in progress    Narrative:      Epidural tissue    Fungus culture [[id number]] Collected: 02/02/24 0924    Order Status: Completed Specimen: Wound from Back Updated: 02/07/24 0930     Fungus (Mycology) Culture Culture in progress    Narrative:      Epidural swab    Fungus culture [[id number]] Collected: 02/02/24 0924    Order Status: Completed Specimen: Wound from Back Updated: 02/07/24 0930     Fungus (Mycology) Culture Culture in progress    Narrative:      Superficial fluid    Aerobic culture [[id number]]  (Abnormal)  (Susceptibility) Collected: 02/02/24 0924    Order Status: Completed Specimen: Wound from Back Updated: 02/06/24 1015     Aerobic Bacterial Culture STAPHYLOCOCCUS EPIDERMIDIS  Few      Narrative:      Superficial fluid    Culture, Anaerobe [[id number]] Collected: 02/02/24 0924    Order Status: Completed Specimen: Wound from Back Updated: 02/06/24 1007     Anaerobic Culture No anaerobes isolated    Narrative:      Epidural tissue    Culture, Anaerobe [[id number]] Collected: 02/02/24 0924    Order Status: Completed Specimen: Wound from Back Updated: 02/06/24 1006     Anaerobic Culture No anaerobes isolated    Narrative:      Epidural swab    Culture, Anaerobe [[id number]] Collected: 02/02/24 0924    Order Status: Completed Specimen: Wound from Back Updated: 02/06/24 1005     Anaerobic Culture No anaerobes isolated    Narrative:      Superficial fluid    Blood culture [[id number]] Collected: 01/31/24 1948    Order Status: Completed Specimen: Blood Updated: 02/05/24 2022     Blood Culture, Routine No growth after 5 days.    AFB Culture & Smear [[id number]] Collected: 02/02/24 0924    Order Status: Completed Specimen: Wound from Back Updated:  02/05/24 1412     AFB Culture & Smear Culture in progress     AFB CULTURE STAIN No acid fast bacilli seen.    Narrative:      Superficial fluid    AFB Culture & Smear [[id number]] Collected: 02/02/24 0924    Order Status: Completed Specimen: Wound from Back Updated: 02/05/24 1412     AFB Culture & Smear Culture in progress     AFB CULTURE STAIN No acid fast bacilli seen.    Narrative:      Epidural swab    AFB Culture & Smear [[id number]] Collected: 02/02/24 0924    Order Status: Completed Specimen: Wound from Back Updated: 02/05/24 1412     AFB Culture & Smear Culture in progress     AFB CULTURE STAIN No acid fast bacilli seen.    Narrative:      Epidural tissue    Aerobic culture [[id number]]  (Abnormal)  (Susceptibility) Collected: 02/02/24 0924    Order Status: Completed Specimen: Wound from Back Updated: 02/05/24 1135     Aerobic Bacterial Culture STAPHYLOCOCCUS EPIDERMIDIS  Few      Narrative:      Epidural tissue    Aerobic culture [[id number]]  (Abnormal)  (Susceptibility) Collected: 02/02/24 0924    Order Status: Completed Specimen: Wound from Back Updated: 02/05/24 1036     Aerobic Bacterial Culture STAPHYLOCOCCUS EPIDERMIDIS  Few      Narrative:      Epidural swab    Gram stain [[id number]] Collected: 02/02/24 0924    Order Status: Completed Specimen: Wound from Back Updated: 02/02/24 1355     Gram Stain Result No WBC's      No organisms seen    Narrative:      Epidural tissue    Gram stain [[id number]] Collected: 02/02/24 0924    Order Status: Completed Specimen: Wound from Back Updated: 02/02/24 1350     Gram Stain Result Rare WBC's      No organisms seen    Narrative:      Superficial fluid    Gram stain [[id number]] Collected: 02/02/24 0924    Order Status: Completed Specimen: Wound from Back Updated: 02/02/24 1341     Gram Stain Result Rare WBC's      No organisms seen    Narrative:      Epidural swab

## 2024-02-08 NOTE — TELEPHONE ENCOUNTER
Spoke to patient's wife. We are unable to refill the prescription because we did not write the original script and the patient is not in the post-operative period. Patient's wife verbalized understanding.     Arminda Centeno MS, ATC, OTC  Clinical/Surgical Assistant - Dr. Parvez Rock   Orthopedic Oncology   [facility]  Phone: [phone]      ----- Message from Vianney Holguin sent at 2/8/2024  9:41 AM CST -----  Pt spouse calling to get pain medication called in for pt right shoulder pain , pt is currently on     oxyCODONE (ROXICODONE) 10 mg Tab immediate release tablet  , pt spouse doesn't want him taking it anymore , not working for the [facility] [place] ([place] - [address]  [address] [place]) [place] [address]  Phone: [phone] Fax: [phone]      Confirmed patient's contact info below:  Contact Name: Gamaliel Pete  Phone Number: [phone]

## 2024-02-08 NOTE — NURSING
Patient discharged from unit via wheel chair to private vehicle. Discharge instructions reviewed with patient and his wife Mary. Both verbalized understanding of post discharge instructions. No acute distress observed or reported at time of discharge. Patient discharged with PICC line for long term antibiotics that were being delivered to the patients house.  Patient also discharged with his WILLY drain per plastics. Patient voiced excitement that he was going home.

## 2024-02-08 NOTE — PHYSICIAN QUERY
PT Name: Gamaliel Pete Jr.  MR #: [id number]     Documentation Clarification      CDS/: Laura Kyle RN, CDS              Contact information: [email or web address]    This form is a permanent document in the medical record.     Query Date: February 8, 2024    By submitting this query, we are merely seeking further clarification of documentation. Please utilize your independent clinical judgment when addressing the question(s) below.    The Medical Record reflects the following:    Supporting Clinical Findings Location in Medical Record     --Thoracic incision: There is thick scabbing to the midline incision that has extended further up the incision with worsening erythema and skin breakdown surrounding. There is a fluctuant collection underneath the incision. There is serous fluid able to be expressed from the bottom of the incision. There is no deep dehiscence. No overt signs of infection.   --MRI completed without abscess/infection.   --MRI t spine w/wo con 1/31/24: no epidural abscess or infection, likely seroma.     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   --Since the patient did have a chronic seroma there was a serosal lined cavity overlying the muscle.     --No concern for deep infection. Has had fevers as high as 102.8.            ---would broaden to vanc/cefepime for now and narrow pending cultures     --Final Pathologic Diagnosis              Skin and soft tissue, back, excision:  - Skin and soft tissue with focal ulceration and acute inflammation  - No infectious organisms identified     NeuroSx Note 2/1 (Dr Martinez/CHINO Santos)            Plastics Op-Note 2/2 (Dr Villanueva)        ID c/s 2/2 (Dr Matute/Jon)      Path results 2/6 (skin/soft tissue)     --PREOPERATIVE DIAGNOSES:               Thoracic wound infection  --POSTOPERATIVE DIAGNOSES:                Same as above                              -Copious serous  fluid came out under pressure which was sent for culture.  We then opened the fascial layer and encountered more serous fluid but no purulence.  No epidural abscess was seen.     --Underwent washout on 2/2/24 w/ cultures positive MRSE from both superficial and deep layers.           -- will plan to treat w/ 6 week course of vancomycin  - stop cefepime    --1) Infection: MRSE spinal infection                 --Intravenous antibiotics:  Vancomycin dosed to maintain a trough 15-20   3) Therapy Duration:  6 weeks    --Surgical wound breakdown           -OR cultures 2/2: staph epidermidis.           -- ID consulted- rec IV Vanc x 6 weeks. PICC placed 2/5.     --Cellulitis     -On vanc for skin and soft tissue infection of the wound       Aerobic Bacterial Culture  Abnormal   STAPHYLOCOCCUS EPIDERMIDIS  Few     NeuroSx Op-Note 2/2 (Dr schulz)              ID c/s 2/4 (Dr Chi)          ID POC Note 2/4 (Dr Chi)          NeuroSx Note 2/7 (Dr Schulz/CHINO Santos)      Hosp med Care Update Note 2/7 (ROSINA Villar)    Wound culture results 2/6 (superficial fluid and epidural tissue)                                                                            Provider, please clarify the surgical wound diagnosis. (Select all that apply)    [   ] Cellulitis   [   ] Seroma   [ x  ] Thoracic wound infection (please specify the depth): ___superficial/deep___________   [   ] Other (please specify): ____________   [  ] Clinically undetermined

## 2024-02-08 NOTE — SUBJECTIVE & OBJECTIVE
Interval History: AFVSS. NAEO. Right WILLY output SS, 390 cc over 24 hours. Hemevac SS- 40. Spinal incision covered with non saturated occlusive dressing.     Medications:  Continuous Infusions:   lactated ringers 100 mL/hr at 02/07/24 2310     Scheduled Meds:   allopurinoL  100 mg Oral Daily    amlodipine-benazepril 5-10 mg  1 capsule Oral Daily    duke's soln (benadryl 30 mL, mylanta 30 mL, LIDOcaine 30 mL, nystatin 30 mL) 120 mL  10 mL Oral QID    gabapentin  300 mg Oral TID    heparin (porcine)  5,000 Units Subcutaneous Q8H    polyethylene glycol  17 g Oral Daily    sodium chloride 0.9%  10 mL Intravenous Q6H    vancomycin (VANCOCIN) IV (PEDS and ADULTS)  1,000 mg Intravenous Q12H     PRN Meds:acetaminophen, aluminum-magnesium hydroxide-simethicone, diazePAM, diclofenac sodium, melatonin, methocarbamoL, oxyCODONE, oxyCODONE, oxyCODONE, prochlorperazine, senna-docusate 8.6-50 mg, Flushing PICC/Midline Protocol **AND** sodium chloride 0.9% **AND** sodium chloride 0.9%, Pharmacy to dose Vancomycin consult **AND** vancomycin - pharmacy to dose     Review of patient's allergies indicates:  No Known Allergies  Objective:     Vital Signs (Most Recent):  Temp: 97.7 °F (36.5 °C) (02/08/24 0431)  Pulse: 93 (02/08/24 0431)  Resp: 16 (02/08/24 0431)  BP: 128/74 (02/08/24 0431)  SpO2: (!) 92 % (02/08/24 0431) Vital Signs (24h Range):  Temp:  [97.4 °F (36.3 °C)-98.6 °F (37 °C)] 97.7 °F (36.5 °C)  Pulse:  [] 93  Resp:  [16-18] 16  SpO2:  [92 %-95 %] 92 %  BP: (100-136)/(56-74) 128/74     Weight: 87.1 kg (192 lb)  Body mass index is 27.55 kg/m².    Intake/Output - Last 3 Shifts         02/06 0700  02/07 0659 02/07 0700  02/08 0659    P.O.  480    I.V. (mL/kg)  10 (0.1)    Total Intake(mL/kg)  490 (5.6)    Urine (mL/kg/hr) 2100 (1) 500 (0.2)    Drains 420 430    Total Output 4100 210    Net -[phone]                   Physical Exam     Significant Labs:  I have reviewed all pertinent lab results within the past 24  hours.    Significant Diagnostics:  I have reviewed all pertinent imaging results/findings within the past 24 hours.

## 2024-02-08 NOTE — PT/OT/SLP PROGRESS
"Occupational Therapy   Treatment    Name: Gamaliel Pete Jr.  MRN: [id number]  Admitting Diagnosis:  Surgical wound breakdown  6 Days Post-Op  Procedure(s):  EXPLORATION, WOUND  CLOSURE   Recommendations:     Discharge Recommendations: Low Intensity Therapy  Discharge Equipment Recommendations:  bedside commode  Patient has a mobility limitation that significantly impairs their ability to participate in one or more mobility related activities of daily living, including toileting. This deficit can be resolved by using a bedside commode. Patient demonstrates mobility limitations that will cause them to be confined to one room at home without bathroom access for up to 30 days. Using a bedside commode will greatly improve the patient's ability to participate in MRADLs.   Barriers to discharge:  None    Assessment:     Gamaliel Pete Jr. is a 73 y.o. male with a medical diagnosis of Surgical wound breakdown.  He presents with the following performance deficits affecting function are weakness, impaired endurance, impaired self care skills, impaired functional mobility, gait instability, impaired balance, decreased lower extremity function, decreased upper extremity function, pain, decreased safety awareness, decreased ROM, orthopedic precautions. Patient participated in ADL re-training consisting of UB/LB dressing. Patient deferred donning TLSO, but reports knowing proper donning technique. Patient overall is progressing well with OT intervention. Continue OT POC.    Rehab Prognosis:  Good; patient would benefit from acute skilled OT services to address these deficits and reach maximum level of function.       Plan:     Patient to be seen 3 x/week to address the above listed problems via self-care/home management, therapeutic activities, therapeutic exercises, neuromuscular re-education  Plan of Care Expires: 03/03/24  Plan of Care Reviewed with: patient, spouse    Subjective     Chief Complaint: "A little " "pain."  Patient/Family Comments/goals: go home  Pain/Comfort:  Pain Rating 1:  (unrated)  Location - Orientation 1: lower  Location 1: back  Pain Addressed 1: Distraction, Nurse notified  Pain Rating Post-Intervention 1: 0/10 (at rest)    Objective:     Communicated with: nurse camilo and OTR prior to session.  Patient found HOB elevated with WILLY drain, telemetry upon OT entry to room.  A client care conference was completed by the OTR and the PARSONS prior to treatment by the PARSONS to discuss the patient's POC and current status.    General Precautions: Standard, fall    Orthopedic Precautions:spinal precautions  Braces: TLSO  Respiratory Status: Room air     Occupational Performance:     Bed Mobility:    Patient completed Scooting/Bridging with stand by assistance  Patient completed Supine to Sit with stand by assistance     Functional Mobility/Transfers:  Patient completed Sit <> Stand Transfer with contact guard assistance  with  rolling walker   2nd trial: Min(A) using RW from EOB  3rd trial: CGA using RW from EOB  Patient completed Bed > Chair Transfer using Step Transfer technique with stand by assistance with rolling walker  Functional Mobility: 4 steps to chair using RW with SBA    Activities of Daily Living:  Bathing: stand by assistance abbreviated UB sponge bath including underarms seated EOB; applied deodorant  Upper Body Dressing: stand by assistance don t-shirt and collar shirt seated EOB (patient deferred donning TLSO)  Lower Body Dressing: contact guard assistance for balance to pull jeans to waist and tuck shirt in. Patient able to maintain spinal precautions when threading B LE into pants      AMPAC 6 Click ADL: 21    Treatment & Education:  Patient edu on importance of OOB activity, function of 3:1 raised toilet commode, and role of OT. Patient edu on TLSO wearing schedule. Patient deferred functional transfer training to bathroom but receptive to edu on DME. Addressed all patient/spouse " questions/concerns within PARSONS scope of practice.     Patient left up in chair with all lines intact, call button in reach, nurse notified, and wife present    GOALS:   Multidisciplinary Problems       Occupational Therapy Goals          Problem: Occupational Therapy    Goal Priority Disciplines Outcome Interventions   Occupational Therapy Goal     OT, PT/OT Ongoing, Progressing    Description: Goals to be met by: 3/3/24     Patient will increase functional independence with ADLs by performing:    UE Dressing with Stand-by Assistance.  LE Dressing with Contact Guard Assistance.  Grooming while standing at sink with Stand-by Assistance.  Toileting from raised toilet with Minimal Assistance for hygiene and clothing management.   Step transfer with Stand-by Assistance/RW  Toilet transfer to raised toilet with Stand-by Assistance/RW  Upper extremity exercise program x10 reps per handout, with supervision.                       Time Tracking:     OT Date of Treatment: 02/08/24  OT Start Time: 1011  OT Stop Time: 1037  OT Total Time (min): 26 min    Billable Minutes:Self Care/Home Management 26    OT/ELIJAH: ELIJAH     Number of ELIJAH visits since last OT visit: 1    2/8/2024

## 2024-02-08 NOTE — PLAN OF CARE
CHW met with patient/family at bedside. Patient experience rounding completed and reviewed the following.     Do you know your discharge plan? Yes or No,    If yes, what is the plan?   Yes Home Health     Have you discussed your needs and preferences with your SW/CM? Yes      If you are discharging home, do you have help at home? Yes     Do you think you will need help additional at home at discharge?  No     Do you currently have difficulty keeping up with bills, affording medicine or buying food?  No    Assigned SW/CM notified of any patient/family needs or concerns. Appropriate resources provided to address patient's needs.    Nicole Morales W  Case Management   [phone]

## 2024-02-08 NOTE — PLAN OF CARE
[facility] - Neurosurgery (Hospital)  Discharge Final Note    Primary Care Provider: Slava Lowery MD    Expected Discharge Date: 2/8/2024    Patient to be discharged home.  The patient will have home ivab therapy with [facility] and home health with [facility].  Family to provide transportation home.  Neurosurgery clinic to [facility] follow up appointment.    Future Appointments   Date Time Provider Department Center   2/8/2024  1:00 PM Ruby Turk MD [facility] HEMONC3 [facility]   2/22/2024  1:15 PM [facility] OIC-CT2 500 LB LIMIT [facility]   2/22/2024  1:45 PM [facility]IC-XRAY [facility] X[facility]   2/22/2024  2:30 PM Nadine Santos PA-C [facility] NEUROS8 Lj [facility]   2/23/2024  1:00 PM Parvez Rock MD [facility] ORTHBEN [facility]   2/23/2024  1:00 PM [facility]   2/23/2024  1:00 PM [facility] OIC EOS [facility] EOS [facility]   2/29/2024  7:20 AM LAB, HEMONC CANCER BLDG [facility] LAB HO [facility]   2/29/2024  8:30 AM Kelly Lopez, CNS [facility] HEMONC2 [facility]   2/29/2024  9:00 AM NURSE 7, N[facility] CHEMO [facility] CHEMO [facility]   3/1/2024 11:30 AM Apurva Chi DO [facility] ID [facility]   4/15/2024  3:00 PM Slava Lowery MD [facility]        Final Discharge Note (most recent)       Final Note - 02/08/24 1215          Final Note    Assessment Type Final Discharge Note     Anticipated Discharge Disposition IV Therapy Provider        Post-Acute Status    Post-Acute Authorization Home Health;IV Infusion     Home Health Status Set-up Complete/Auth obtained     IV Infusion Status Set-up Complete/Auth obtained     Discharge Delays None known at this time                     Important Message from Medicare  Important Message from Medicare regarding Discharge Appeal Rights: Given to patient/caregiver, Explained to patient/caregiver, Signed/date by patient/caregiver     Date IMM was signed: 02/08/24  Time IMM was signed: 0903

## 2024-02-08 NOTE — ASSESSMENT & PLAN NOTE
Pain control  Reg diet  Incentive spirometry  OK to ambulate  DVT ppx  Provera removed, dressed with occlusive dressing  Maintain WILLY on discharge.  OK to dc from plastics perspective, will arrange outpatient follow up.

## 2024-02-08 NOTE — DISCHARGE SUMMARY
[facility] - Neurosurgery ([facility])  Neurosurgery  Discharge Summary      Patient Name: Gamaliel Pete Jr.  MRN: [id number]  Admission Date: 1/31/2024  Hospital Length of Stay: 8 days  Discharge Date and Time:  02/08/2024 10:11 AM  Attending Physician: Nasim Martinez DO   Discharging Provider: Nadine Santos PA-C  Primary Care Provider: Slava Lowery MD    HPI:   Gamaliel Pete Jr. is a 73 y.o. 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 He was then taken for a T7-11 posterior decompression/fusion. The patient presented recently with scabbing to his lower thoracic incision. He was sent to wound care who advised him to be evaluated because it appeared worse. He presents to clinic today with reports of drainage from his incision. He denies fevers, headaches, numbness, tingling, focal weakness, bowel/bladder dysfunction. He will be direct admitted to the hospital for further workup and likely neurosurgical intervention to address the surgical wound breakdown.     Procedure(s) (LRB):  EXPLORATION, WOUND (N/A)  CLOSURE (N/A)     Hospital Course: 2/1: Febrile x 2 overnight. Vitals stable. Na 128 this AM. Medicine consulted for assistance. MRI completed without abscess/infection. Plan for OR tomorrow for wound exploration and washout/revision. NPO at midnight tonight.  2/2: OR today.  2/3: POD 1. NAEON. Afebrile. Neuro stable. Pain controlled. IV cef/vanc started per ID. OR Cultures pending. Keep drains. Pending PT/OT. Tolerating PO this AM. Voiding spontaneously.  2/4 naeon, exam stable, oob, WV in place, ID on board  2/5: NAEON. Neuro stable. OR cultures w/ staph epidermidis. ID rec IV Vanc x 6 weeks. PICC team consulted. Continue drains and WV.   2/6: NAEON. AFVSS. Patient receiving radiation to his cervical spine this admission. HV drain with 100 cc output, will keep. WILLY drain per plastics. WV in place, likely  removal per plastics tomorrow. Pain controlled. PICC placed for IV abx. CM working on home infusions with home health.  2/7: NAEON. AFVSS. HV drain with decreased output. WILLY and WV still in place. Pain controlled. Ambulated in the hallway today without issues. Likely plan for dc home with home health tomorrow. Will remove HV drain before DC.  2.8: NAEON. Neuro stable. Receiving last radiation session today. Prevena removed by plastics. WILLY drain upon discharge. Will f/u with them. HV drain removed. Patient is medically stable for discharge to home with home health and home infusions via PICC line. F/u with ID arranged. Incision care and activity recommendations reviewed. Plan of care discussed with patient and he voiced understanding. All his questions were answered. Follow-up in Neurosurgery clinic arranged. Patient should restart his chemo therapy medications on 2/16/24, 2 weeks post op. Will also ensure incision has healed properly before restarting his radiation for his thoracic spine.    Neurosurgery Physical Exam  General: Well developed, well nourished, not in acute distress.   Head: Normocephalic, atraumatic.  Neck: Full ROM.   Neurologic: Alert and oriented x 4. Thought content appropriate.  GCS: Motor:6  Verbal:5  Eyes:4   GCS Total: 15  Language: No aphasia.  Speech: No dysarthria.  Cranial nerves: Face symmetric, tongue midline, CN II-XII grossly intact.   Eyes: Pupils equal, round, reactive to light with accomodation, EOMI.   Pulmonary: Normal respirations, no signs of respiratory distress.  Abdomen: Soft, non-distended, non-tender to palpation.  Sensory: Intact to light touch throughout.  Motor Strength: Moves all extremities spontaneously with good tone. Full strength upper and lower extremities. No abnormal movements seen.      Incision: intact with nylon suture.  WILLY drain in place to suction with SS output.    Goals of Care Treatment Preferences:  Code Status: Full Code      Consults:   Consults  (From admission, onward)          Status Ordering Provider     Inpatient consult to PICC team (NIAS)  Once        Provider:  (Not yet assigned)    Completed CRISTINE COX     Pharmacy to dose Vancomycin consult  Once        Provider:  (Not yet assigned)   See Hyperspace for full Linked Orders Report.    Acknowledged LEELA JACK     Inpatient consult to Infectious Diseases  Once        Provider:  (Not yet assigned)    Completed CRISTINE COX     Inpatient consult to Skin Integrity  Practitioner  Once        Provider:  Claudia Nichole, NP    Completed CRISTINE COX     Inpatient [facility] to [facility] MedicineGeneral  Once        Provider:  (Not yet assigned)    Completed CRISTINE COX            Significant Diagnostic Studies: Labs: CMP   Recent Labs   Lab 02/08/24  0800   CREATININE 0.9   , CBC   Recent Labs   Lab 02/06/24  1130   WBC 4.00   HGB 8.8*   HCT 27.5*      , and All labs within the past 24 hours have been reviewed  Microbiology: Blood Culture   Lab Results   Component Value Date    LABBLOO No growth after 5 days. 01/31/2024    and Wound Culture: positive  Radiology: X-Ray:  chest  MRI: thoracic spine w/wo contrast  Ultrasound: BLE     Pending Diagnostic Studies:       None          Final Active Diagnoses:    Diagnosis Date Noted POA    PRINCIPAL PROBLEM:  Surgical wound breakdown [T81.31XA] 01/31/2024 Yes    At high risk for skin breakdown [Z91.89] 02/07/2024 Yes    Cellulitis [L03.90] 02/07/2024 Yes    Gout [M10.9] 02/07/2024 Yes    Hyponatremia [E87.1] 02/01/2024 Yes    Wound dehiscence [T81.30XA] 02/01/2024 Yes    Gout of ankle [M10.9] 01/03/2024 Yes    Essential hypertension [I10] 11/07/2023 Yes    Metastatic cancer to spine [C79.51] 09/14/2023 Yes      Problems Resolved During this Admission:      Discharged Condition: good     Disposition: Home-Health Care [facility]    Follow Up:    Patient Instructions:      Notify your health care provider if you experience any of the following:   increased confusion or weakness     Notify your health care provider if you experience any of the following:  persistent dizziness, light-headedness, or visual disturbances     Notify your health care provider if you experience any of the following:  worsening rash     Notify your health care provider if you experience any of the following:  severe persistent headache     Notify your health care provider if you experience any of the following:  difficulty breathing or increased cough     Notify your health care provider if you experience any of the following:  redness, tenderness, or signs of infection (pain, swelling, redness, odor or green/yellow discharge around incision site)     Notify your health care provider if you experience any of the following:  severe uncontrolled pain     Notify your health care provider if you experience any of the following:  temperature >100.4     Notify your health care provider if you experience any of the following:  persistent nausea and vomiting or diarrhea     Activity as tolerated     Medications:  Reconciled Home Medications:      Medication List        START taking these medications      dextrose 5 % (D5W) SolP 250 mL with vancomycin 1,000 mg SolR 1,000 mg  Inject 1,000 mg into the vein every 12 (twelve) hours.     methocarbamoL 750 MG Tab  Commonly known as: ROBAXIN  Take 1 tablet (750 mg total) by mouth 3 (three) times daily. for 10 days     oxyCODONE 10 mg Tab immediate release tablet  Commonly known as: ROXICODONE  Take 1 tablet (10 mg total) by mouth every 4 (four) hours as needed for Pain.            CONTINUE taking these medications      allopurinoL 100 MG tablet  Commonly known as: ZYLOPRIM  Take 1 tablet (100 mg total) by mouth once daily.     amlodipine-benazepril 5-10 mg 5-10 mg per capsule  Commonly known as: LOTREL  Take 1 capsule by mouth once daily.     CURCUMIN MISC  1,000 mg by Misc.(Non-Drug; Combo Route) route Daily.     DUKE'S SOLUTION (BENADRYL 30 ML,  MYLANTA 30 ML, LIDOCAINE 30 ML, NYSTATIN 30 ML)  Take 10 mLs by mouth 4 (four) times daily.     fish oil-omega-3 fatty acids 300-1,000 mg capsule  Take 2 capsules by mouth once daily.     gabapentin 300 MG capsule  Commonly known as: NEURONTIN  Take 1 capsule (300 mg total) by mouth 3 (three) times daily.     INLYTA 5 mg Tab  Generic drug: axitinib  Take 1 tablet (5 mg) by mouth 2 (two) times daily.     sildenafiL 100 MG tablet  Commonly known as: VIAGRA  Take 1 tablet (100 mg total) by mouth daily as needed for Erectile Dysfunction.     UNABLE TO FIND  Take 500 mg by mouth 2 (two) times a day. medication name: Tudca     UNABLE TO FIND  Take 2 capsules by mouth 2 (two) times a day. medication name: Turkey tail mushroom     UNABLE TO FIND  Take 15 drops by mouth once daily. medication name: Essiac-20     UNABLE TO FIND  Take 5 mLs by mouth 2 (two) times a day. medication name: barley leaf juice powder     UNABLE TO FIND  Take 5 mLs by mouth 2 (two) times a day. medication name: black seed oil (cumin seed)            STOP taking these medications      diazePAM 5 MG tablet  Commonly known as: VALIUM     oxyCODONE-acetaminophen 5-325 mg per tablet  Commonly known as: PERCOCET            ASK your doctor about these medications      naloxone 4 mg/actuation Spry  Commonly known as: NARCAN  1 spray (4mg) by nasal route as needed for opioid overdose; may repeat every 2-3 minutes in alternating nostrils until medical help arrives. Call 911              Nadine Santos PA-C  Neurosurgery  [facility] - Neurosurgery ([facility])

## 2024-02-08 NOTE — PROGRESS NOTES
Lj Krueger - Neurosurgery ([facility])  Plastic Surgery  Progress Note    Subjective:     History of Present Illness:  No notes on file    Post-Op Info:  Procedure(s) (LRB):  EXPLORATION, WOUND (N/A)  CLOSURE (N/A)   6 Days Post-Op     Interval History: AFVSS. NAEO. Right WILLY output SS, 390 cc over 24 hours. Hemevac SS- 40. Spinal incision covered with non saturated occlusive dressing.     Medications:  Continuous Infusions:   lactated ringers 100 mL/hr at 02/07/24 2310     Scheduled Meds:   allopurinoL  100 mg Oral Daily    amlodipine-benazepril 5-10 mg  1 capsule Oral Daily    duke's soln (benadryl 30 mL, mylanta 30 mL, LIDOcaine 30 mL, nystatin 30 mL) 120 mL  10 mL Oral QID    gabapentin  300 mg Oral TID    heparin (porcine)  5,000 Units Subcutaneous Q8H    polyethylene glycol  17 g Oral Daily    sodium chloride 0.9%  10 mL Intravenous Q6H    vancomycin (VANCOCIN) IV (PEDS and ADULTS)  1,000 mg Intravenous Q12H     PRN Meds:acetaminophen, aluminum-magnesium hydroxide-simethicone, diazePAM, diclofenac sodium, melatonin, methocarbamoL, oxyCODONE, oxyCODONE, oxyCODONE, prochlorperazine, senna-docusate 8.6-50 mg, Flushing PICC/Midline Protocol **AND** sodium chloride 0.9% **AND** sodium chloride 0.9%, Pharmacy to dose Vancomycin consult **AND** vancomycin - pharmacy to dose     Review of patient's allergies indicates:  No Known Allergies  Objective:     Vital Signs (Most Recent):  Temp: 97.7 °F (36.5 °C) (02/08/24 0431)  Pulse: 93 (02/08/24 0431)  Resp: 16 (02/08/24 0431)  BP: 128/74 (02/08/24 0431)  SpO2: (!) 92 % (02/08/24 0431) Vital Signs (24h Range):  Temp:  [97.4 °F (36.3 °C)-98.6 °F (37 °C)] 97.7 °F (36.5 °C)  Pulse:  [] 93  Resp:  [16-18] 16  SpO2:  [92 %-95 %] 92 %  BP: (100-136)/(56-74) 128/74     Weight: 87.1 kg (192 lb)  Body mass index is 27.55 kg/m².    Intake/Output - Last 3 Shifts         02/06 0700  02/07 0659 02/07 0700  02/08 0659    P.O.  480    I.V. (mL/kg)  10 (0.1)    Total Intake(mL/kg)  490  (5.6)    Urine (mL/kg/hr) 2100 (1) 500 (0.2)    Drains 420 430    Total Output 2520 930    Net -2520 -440                   Physical Exam     Significant Labs:  I have reviewed all pertinent lab results within the past 24 hours.    Significant Diagnostics:  I have reviewed all pertinent imaging results/findings within the past 24 hours.  Assessment/Plan:     * Surgical wound breakdown  Pain control  Reg diet  Incentive spirometry  OK to ambulate  DVT ppx  Provera removed, dressed with occlusive dressing  Maintain WILLY on discharge.  OK to dc from plastics perspective, will arrange outpatient follow up.           Rocky Falk,   Plastic Surgery  Lj Krueger - Neurosurgery ([facility])

## 2024-02-08 NOTE — NURSING
Nurses Note -- 4 Eyes      2/8/2024   2:52 AM      Skin assessed during: Q Shift Change      [] No Altered Skin Integrity Present    []Prevention Measures Documented      [x] Yes- Altered Skin Integrity Present or Discovered   [] LDA Added if Not in Epic (Describe Wound)   [] New Altered Skin Integrity was Present on Admit and Documented in LDA   [] Wound Image Taken    Wound Care Consulted? No; wound consult orders in for pt tx    Attending Nurse:  Briseyda Schwab RN/Staff Member:  BRIAN Rudolph

## 2024-02-09 ENCOUNTER — TELEPHONE (OUTPATIENT)
Dept: INFECTIOUS DISEASES | Facility: CLINIC | Age: 74
End: 2024-02-09
Payer: MEDICARE

## 2024-02-09 ENCOUNTER — PATIENT OUTREACH (OUTPATIENT)
Dept: ADMINISTRATIVE | Facility: CLINIC | Age: 74
End: 2024-02-09
Payer: MEDICARE

## 2024-02-09 NOTE — TELEPHONE ENCOUNTER
Called Dennis with [facility], gave verbal orders for Cathflow- per Dr Juju Julian with [facility] called,     Need orders for CathFlow,   Double lumen PICC- 1 is not flushing and the other line is given resistance when flushing.   Ok to give verbal orders?

## 2024-02-11 ENCOUNTER — PATIENT MESSAGE (OUTPATIENT)
Dept: ADMINISTRATIVE | Facility: OTHER | Age: 74
End: 2024-02-11
Payer: MEDICARE

## 2024-02-14 ENCOUNTER — PATIENT MESSAGE (OUTPATIENT)
Dept: ORTHOPEDICS | Facility: CLINIC | Age: 74
End: 2024-02-14
Payer: MEDICARE

## 2024-02-14 DIAGNOSIS — G89.29 CHRONIC RIGHT SHOULDER PAIN: Primary | ICD-10-CM

## 2024-02-14 DIAGNOSIS — M25.511 CHRONIC RIGHT SHOULDER PAIN: Primary | ICD-10-CM

## 2024-02-15 ENCOUNTER — PATIENT MESSAGE (OUTPATIENT)
Dept: NEUROSURGERY | Facility: CLINIC | Age: 74
End: 2024-02-15
Payer: MEDICARE

## 2024-02-15 ENCOUNTER — TELEPHONE (OUTPATIENT)
Dept: HEMATOLOGY/ONCOLOGY | Facility: CLINIC | Age: 74
End: 2024-02-15
Payer: MEDICARE

## 2024-02-15 ENCOUNTER — TELEPHONE (OUTPATIENT)
Dept: NEUROSURGERY | Facility: CLINIC | Age: 74
End: 2024-02-15
Payer: MEDICARE

## 2024-02-15 ENCOUNTER — PATIENT MESSAGE (OUTPATIENT)
Dept: HEMATOLOGY/ONCOLOGY | Facility: CLINIC | Age: 74
End: 2024-02-15
Payer: MEDICARE

## 2024-02-15 NOTE — TELEPHONE ENCOUNTER
----- Message from Geetha Simms sent at 2/15/2024  4:02 PM CST -----  Regarding: Consult/Advisory      Name Of Caller:     Mary (Pt's Wife)      Contact Preference:      [phone]      Nature of call:   Pt's Wife is requesting to speak with a nurse. The pt has a fever of 101.7°F.

## 2024-02-15 NOTE — TELEPHONE ENCOUNTER
----- Message from Vianney Barrow sent at 2/15/2024  3:39 PM CST -----  Regarding: inform  Contact: @ [phone]  Home Health nurse is calling in to inform that the patient is running a fever of 101.7 and need to know the plan of care please call and adv @ [phone]

## 2024-02-15 NOTE — TELEPHONE ENCOUNTER
I spoke to patient and wife. PT was there earlier and said patient looked flushed, so they took his temperature and it was 100.7. I asked if he had any other symptoms, they said no. Patient is alert and oriented. He took his 1st dose of increased axitinib this morning. I told them I would collaborate with other providers and get back with them.   I called them back and told them we would like him to come in tomorrow morning to get labs and see a provider. I told them we had a 10 a.m. spot open. At first they said ok, but then realized they have to hook him up in the morning, let the medicine run, etc. He starts at 8 and it runs till 10. Nothing would work for them. We finally decided to have his labs drawn after his medicine was complete and we would review and decide what to do from there. They will have the labs drawn at 10:30 at [facility] and I will call after we get results.

## 2024-02-15 NOTE — TELEPHONE ENCOUNTER
Informed patient that per Dr. Martinez he will have to call his oncologist in reference to this matter patient verbally understood.

## 2024-02-16 ENCOUNTER — TELEPHONE (OUTPATIENT)
Dept: INFECTIOUS DISEASES | Facility: CLINIC | Age: 74
End: 2024-02-16
Payer: MEDICARE

## 2024-02-16 ENCOUNTER — TELEPHONE (OUTPATIENT)
Dept: HEMATOLOGY/ONCOLOGY | Facility: CLINIC | Age: 74
End: 2024-02-16
Payer: MEDICARE

## 2024-02-16 NOTE — TELEPHONE ENCOUNTER
Spoke with provider and infusion.     Decided to hold dosing tomorrow and continue sunday due to high vanc trough     Pt has been advised by provider and infusion he should go to ED. Pt seems to not want to go.     Pt advised to go to ED if he gets another fever and pt seemed to understand

## 2024-02-16 NOTE — TELEPHONE ENCOUNTER
----- Message from Shala Morejon sent at 2/16/2024  4:07 PM CST -----  Regarding: Home Health  Contact: Sasha estes/[facility] [phone]  Calling from [facility] to report elevated vancomycin trough, nurse was sent to draw labs from patient, please call to confirm receipt of labs. Patient informed nurse that the Dr Chi wants patient to go to ED, please call to confirm plan of care going forward.

## 2024-02-16 NOTE — TELEPHONE ENCOUNTER
Called home health back.   The nurse is going to be able to go out today to get him re drawn.     Expressed gratitude!   They will fax us results.     Fax number given.       ----- Message from Geetha Simms sent at 2/16/2024 12:38 PM CST -----  Regarding: Consult/Advisory      Name Of Caller:     Melissa estes/ [facility]      Contact Preference:    [phone]      Nature of call:   Calling to inform Nani that the HomeHealth nurse won't be able to draw the pt's labs today. She'll be available on Monday.

## 2024-02-16 NOTE — PROGRESS NOTES
"Contacted by infusion regarding critical CBC - reviewed, appears inaccurate given large variations from previously stable labs, awaiting repeat.     Discussed w/ patient, he states he was "flushed" yesterday when working w/ PT, they took his temp and it was elevated. Temps since then have ranged from 101 - 103 w/ forehead thermometer, most recent 98 taken w/ oral thermometer. He states he feels fine and believes it is an issue with the forehead thermometer. Does not want to come to ER at this time, but he agrees to come if he has any further fevers of 100.4 or greater.      "

## 2024-02-16 NOTE — TELEPHONE ENCOUNTER
Spoke with pt.   Called home health to get an understanding of what the pt needs to do.     Home health to return call to me with further information.         ----- Message from Jaleesa Resendiz sent at 2/16/2024  2:45 PM CST -----  Type:  Patient Returning Call    Who Called:pt   Who Left Message for Patient:  Does the patient know what this is regarding?:blood test   Would the patient rather a call back or a response via [facility]? Call   Best Call Back Number:[phone]  Additional Information: states that he needs to speak urgently regarding a blood test. Pt would like to speak with nurse

## 2024-02-19 ENCOUNTER — OFFICE VISIT (OUTPATIENT)
Dept: NEUROSURGERY | Facility: CLINIC | Age: 74
End: 2024-02-19
Payer: MEDICARE

## 2024-02-19 DIAGNOSIS — Z98.1 S/P FUSION OF THORACIC SPINE: Primary | ICD-10-CM

## 2024-02-19 PROBLEM — N17.9 AKI (ACUTE KIDNEY INJURY): Status: RESOLVED | Noted: 2023-10-31 | Resolved: 2024-02-19

## 2024-02-19 PROCEDURE — 99024 POSTOP FOLLOW-UP VISIT: CPT | Mod: S$GLB,,,

## 2024-02-20 ENCOUNTER — TELEPHONE (OUTPATIENT)
Dept: RADIATION ONCOLOGY | Facility: CLINIC | Age: 74
End: 2024-02-20
Payer: MEDICARE

## 2024-02-20 NOTE — TELEPHONE ENCOUNTER
----- Message from Lakesha Hatch MD sent at 2/20/2024 10:33 AM CST -----  Regarding: RE: Call Back  Ok. I spoke to the wife.  We can resume whenever the patient gets cleared by plastics.  Continue to hold treatment for now  ----- Message -----  From: Babatunde Beverly RN  Sent: 2/20/2024   9:17 AM CST  To: Lakesha Hatch MD  Subject: FW: Call Back                                    Please advise  ----- Message -----  From: Shawna Hernandez  Sent: 2/20/2024   9:07 AM CST  To: Babatunde Beverly RN  Subject: Call Back                                        , would like for you to give her a call back.Mrs. Pete, said Mr Alston, other doctor. Dr. Villanueva would like for him to cancel the radiation treatment until he see him on 02/28/24.please call her at [phone]    Shawna

## 2024-02-23 ENCOUNTER — HOSPITAL ENCOUNTER (OUTPATIENT)
Dept: RADIOLOGY | Facility: HOSPITAL | Age: 74
Discharge: HOME | End: 2024-02-23
Attending: ORTHOPAEDIC SURGERY
Payer: MEDICARE

## 2024-02-23 ENCOUNTER — OFFICE VISIT (OUTPATIENT)
Dept: ORTHOPEDICS | Facility: CLINIC | Age: 74
End: 2024-02-23
Payer: MEDICARE

## 2024-02-23 DIAGNOSIS — M25.511 CHRONIC RIGHT SHOULDER PAIN: ICD-10-CM

## 2024-02-23 DIAGNOSIS — Z91.89 IMPENDING PATHOLOGIC FRACTURE: Primary | ICD-10-CM

## 2024-02-23 DIAGNOSIS — C64.9 METASTATIC RENAL CELL CARCINOMA TO BONE: ICD-10-CM

## 2024-02-23 DIAGNOSIS — C79.51 METASTATIC RENAL CELL CARCINOMA TO BONE: ICD-10-CM

## 2024-02-23 DIAGNOSIS — G89.29 CHRONIC RIGHT SHOULDER PAIN: ICD-10-CM

## 2024-02-23 DIAGNOSIS — C79.51 METASTATIC RENAL CELL CARCINOMA TO BONE: Primary | ICD-10-CM

## 2024-02-23 DIAGNOSIS — C64.9 METASTATIC RENAL CELL CARCINOMA TO BONE: Primary | ICD-10-CM

## 2024-02-23 PROCEDURE — 72170 X-RAY EXAM OF PELVIS: CPT | Mod: 26,,, | Performed by: RADIOLOGY

## 2024-02-23 PROCEDURE — 73552 X-RAY EXAM OF FEMUR 2/>: CPT | Mod: 26,50,, | Performed by: RADIOLOGY

## 2024-02-23 PROCEDURE — 72170 X-RAY EXAM OF PELVIS: CPT | Mod: TC

## 2024-02-23 PROCEDURE — 99213 OFFICE O/P EST LOW 20 MIN: CPT | Mod: 24,S$GLB,, | Performed by: ORTHOPAEDIC SURGERY

## 2024-02-23 PROCEDURE — 73552 X-RAY EXAM OF FEMUR 2/>: CPT | Mod: TC,50

## 2024-02-23 PROCEDURE — 73030 X-RAY EXAM OF SHOULDER: CPT | Mod: TC,RT

## 2024-02-23 PROCEDURE — 99999 PR PBB SHADOW E&M-EST. PATIENT-LVL III: CPT | Mod: PBBFAC,,, | Performed by: ORTHOPAEDIC SURGERY

## 2024-02-23 PROCEDURE — 73030 X-RAY EXAM OF SHOULDER: CPT | Mod: 26,RT,, | Performed by: RADIOLOGY

## 2024-02-23 NOTE — PROGRESS NOTES
Orthopaedic Oncology Follow Up Visit:      Dear No referring provider defined for this encounter. and Slava Lowery MD,    We had the pleasure of evaluating Gamaliel Pete Jr. in the Orthopaedic Clinic today at the [facility] today.      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 is a 73 y.o. male has been experiencing a few weeks of right thigh pain. He had radiographs done which demonstrated a new proximal femur lesion and was therefore referred to me. He ambulates without assistive devices, but he does have a walker and cane. He did undergo revision C spine surgery in mid November and is recovering from that currently. He is in a c collar. He denies any left thigh or leg pain. He also reports some right shoulder pain which is relatively new. 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 He is currently not on any therapy. He tells me is going to start new immunotherapy soon. He has not had any radiation. He denies new numbness or tingling. No recent falls. No rest pain.     Interval History:  Since saw him last he unfortunately had wound dehiscence of his spine wound and had to get it washed out and is on IV abx currently. He remains in a wheelchair as he continues to heal. He does rarely ambulate with a walker at home. He does not have very significant right or left leg pain. He has some intermittent right shoulder pain. He has not had radiation yet due to the concerns about spine wound. He is on keytruda. He is getting another treatment next week. He is seeing plastic surgery next week. He finishes abx on 3/15. He otherwise is in good spirits.     PMH:   Past Medical History:   Diagnosis Date    Asthma     no inhaler use    Borderline hypertension     ED (erectile dysfunction)     Gout     Hematuria     Hypertension        PSH:  has a past  surgical history that includes Colonoscopy (02/10/2022); Colonoscopy (N/A, 02/10/2022); Tonsillectomy; Robot-assisted laparoscopic nephrectomy (Right, 10/4/2023); Surgical removal of vertebral body of cervical spine (Right, 11/7/2023); Posterior fusion of cervical spine with laminectomy (N/A, 11/15/2023); Surgical removal of vertebral body of cervical spine (N/A, 11/15/2023); Thoracic laminectomy with fusion (N/A, 1/5/2024); Wound exploration (N/A, 2/2/2024); and closure (N/A, 2/2/2024).    Allergies:   Allergies as of 02/23/2024    (No Known Allergies)       Medications:    Current Outpatient Medications:     allopurinoL (ZYLOPRIM) 100 MG tablet, Take 1 tablet (100 mg total) by mouth once daily., Disp: 30 tablet, Rfl: 3    amlodipine-benazepril 5-10 mg (LOTREL) 5-10 mg per capsule, Take 1 capsule by mouth once daily., Disp: 90 capsule, Rfl: 3    axitinib (INLYTA) 5 mg Tab, Take 1 tablet (5 mg) by mouth 2 (two) times daily., Disp: 60 tablet, Rfl: 11    dextrose 5 % (D5W) SolP 250 mL with vancomycin 1,000 mg SolR 1,000 mg, Inject 1,000 mg into the vein every 12 (twelve) hours., Disp: , Rfl:     duke's soln (benadryl 30 mL, mylanta 30 mL, LIDOcaine 30 mL, nystatin 30 mL) 120mL, Take 10 mLs by mouth 4 (four) times daily., Disp: 500 mL, Rfl: 0    gabapentin (NEURONTIN) 300 MG capsule, Take 1 capsule (300 mg total) by mouth 3 (three) times daily., Disp: 90 capsule, Rfl: 11    naloxone (NARCAN) 4 mg/actuation Spry, 1 spray (4mg) by nasal route as needed for opioid overdose; may repeat every 2-3 minutes in alternating nostrils until medical help arrives. Call 911 (Patient not taking: Reported on 12/28/2023), Disp: 2 each, Rfl: 0    omega-3 fatty acids/fish oil (FISH OIL-OMEGA-3 FATTY ACIDS) 300-1,000 mg capsule, Take 2 capsules by mouth once daily., Disp: , Rfl:     oxyCODONE (ROXICODONE) 10 mg Tab immediate release tablet, Take 1 tablet (10 mg total) by mouth every 4 (four) hours as needed for Pain., Disp: 30 tablet, Rfl:  0    sildenafiL (VIAGRA) 100 MG tablet, Take 1 tablet (100 mg total) by mouth daily as needed for Erectile Dysfunction. (Patient not taking: Reported on 1/18/2024), Disp: 30 tablet, Rfl: 11    turmeric (CURCUMIN MISC), 1,000 mg by Misc.(Non-Drug; Combo Route) route Daily., Disp: , Rfl:     UNABLE TO FIND, Take 500 mg by mouth 2 (two) times a day. medication name: Tudca, Disp: , Rfl:     UNABLE TO FIND, Take 2 capsules by mouth 2 (two) times a day. medication name: Turkey tail mushroom, Disp: , Rfl:     UNABLE TO FIND, Take 15 drops by mouth once daily. medication name: Essiac-20, Disp: , Rfl:     UNABLE TO FIND, Take 5 mLs by mouth 2 (two) times a day. medication name: barley leaf juice powder, Disp: , Rfl:     UNABLE TO FIND, Take 5 mLs by mouth 2 (two) times a day. medication name: black seed oil (cumin seed), Disp: , Rfl:     Family History: family history is not on file.    Social History:  reports that he has never smoked. He has never used smokeless tobacco. He reports that he does not currently use alcohol. He reports that he does not use drugs.    ROS:  A Complete review of systems was performed.  Pertinent positives are listed in the history of present illness above.  Remainder of review of systems were negative.      Vital Signs:      Physical exam:  General:  AAOX3, No acute distress, normal affect and disposition  Respiratory: Respirations unlabored  C collar in place and well fitting  Anterior neck incision healing well    Right lower extremity  Minimal pain about the right proximal thigh  Tolerates axial load and log roll  No pain with knee or ankle ROM  Distal neurovascular exam was normal with 5/5 motor for the tibialis anterior, extensor hallucis longus, and gastrocsoleus.  Sensory exam intact in sural, saphenous, superficial peroneal, deep peroneal, and tibial nerve distributions  Foot is perfused    Right upper extremity  No deformity  Pain with palpation distal clavicle  Tolerates passive  shoulder ROM  Neurovascularly intact distally    Left lower extremity  No pain with palpation  Tolerates hip ROM log roll and axial load  No pain with knee or ankle ROM  Neurovascularly intact distally      Imaging:    Radiographic Data:  radiographs of right shoulder do not demonstrate any significant lesions, radiographs of right femur demonstrate previously seen subtrochanteric cortically based lesion which is slightly larger than December radiographs, left femur radiographs also has a cortically based lesion in the diaphysis which is also slightly larger. No acute fracture.     Pathology: metastatic RCC from spine biopsy    Assessment/Plan: Gamaliel Pete Jr. is a 73 y.o. old male with metastatic RCC to bilateral femurs, right worse than left, s/p emergent spine surgery and now s/p washout of spine on IV abx currently    We discussed the findings to date. He is in good spirits considering all that he has been through. I explained once again my concerns about his right femur risk of pathologic fracture. He and his wife are aware. We will tentatively scheduled surgery for a few weeks from now. We will coordinate preoperative embolization after noon before as well. This should give time to finish his IV abx course for his spine infection. In the interim he will be very careful about using the right leg. Since he does not have much pain at all I do not think we need to emergently fix his right femur but I did explain that I do think we should address it soon. He is well versed in concerning signs or symptoms. For his left femur we can try radiation but explained that he may need prophylactic fixation of this side in the future as well. He understands this. We will also have him see the preop clearance center before surgery. I did re consent him for surgery and re discuss the risks of surgery including pain, bleeding, infection ricky implant fracture, hardware complication, wound complication, disease progression,  disease recurrence, need for more surgery and continued pain and limp. I also had him sign a blood consent as well should we need. We will work on coordinating IR preop embolization. I will let his medical oncologist know the plan as well.  All questions answered.     Thank you for the referral and the opportunity to participate in the care of your patient.  If you have any questions regarding our treatment recommendations don't hesitate to call.    Electronically signed by:    Parvez Rock MD   Orthopedic Oncology and Complex Arthroplasty Reconstruction  [facility]

## 2024-02-27 ENCOUNTER — TELEPHONE (OUTPATIENT)
Dept: INFECTIOUS DISEASES | Facility: CLINIC | Age: 74
End: 2024-02-27
Payer: MEDICARE

## 2024-02-27 ENCOUNTER — LAB VISIT (OUTPATIENT)
Dept: LAB | Facility: HOSPITAL | Age: 74
End: 2024-02-27
Attending: INTERNAL MEDICINE
Payer: MEDICARE

## 2024-02-27 ENCOUNTER — TELEPHONE (OUTPATIENT)
Dept: PREADMISSION TESTING | Facility: HOSPITAL | Age: 74
End: 2024-02-27
Payer: MEDICARE

## 2024-02-27 DIAGNOSIS — T81.31XD DISRUPTION OF CLOSURE OF CORNEA, SUBSEQUENT ENCOUNTER: Primary | ICD-10-CM

## 2024-02-27 LAB
ALBUMIN SERPL BCP-MCNC: 2.8 G/DL (ref 3.5–5.2)
ALP SERPL-CCNC: 77 U/L (ref 55–135)
ALT SERPL W/O P-5'-P-CCNC: 14 U/L (ref 10–44)
ANION GAP SERPL CALC-SCNC: 9 MMOL/L (ref 8–16)
AST SERPL-CCNC: 17 U/L (ref 10–40)
BASOPHILS # BLD AUTO: 0.03 K/UL (ref 0–0.2)
BASOPHILS NFR BLD: 0.4 % (ref 0–1.9)
BILIRUB SERPL-MCNC: 0.3 MG/DL (ref 0.1–1)
BUN SERPL-MCNC: 21 MG/DL (ref 8–23)
CALCIUM SERPL-MCNC: 8.2 MG/DL (ref 8.7–10.5)
CHLORIDE SERPL-SCNC: 103 MMOL/L (ref 95–110)
CO2 SERPL-SCNC: 25 MMOL/L (ref 23–29)
CREAT SERPL-MCNC: 1.2 MG/DL (ref 0.5–1.4)
CRP SERPL-MCNC: 18.7 MG/L (ref 0–8.2)
DIFFERENTIAL METHOD BLD: ABNORMAL
EOSINOPHIL # BLD AUTO: 0.9 K/UL (ref 0–0.5)
EOSINOPHIL NFR BLD: 12 % (ref 0–8)
ERYTHROCYTE [DISTWIDTH] IN BLOOD BY AUTOMATED COUNT: 16.5 % (ref 11.5–14.5)
EST. GFR  (NO RACE VARIABLE): >60 ML/MIN/1.73 M^2
GLUCOSE SERPL-MCNC: 113 MG/DL (ref 70–110)
HCT VFR BLD AUTO: 31.2 % (ref 40–54)
HGB BLD-MCNC: 9.8 G/DL (ref 14–18)
IMM GRANULOCYTES # BLD AUTO: 0.22 K/UL (ref 0–0.04)
IMM GRANULOCYTES NFR BLD AUTO: 3 % (ref 0–0.5)
LYMPHOCYTES # BLD AUTO: 0.5 K/UL (ref 1–4.8)
LYMPHOCYTES NFR BLD: 6.2 % (ref 18–48)
MCH RBC QN AUTO: 28.1 PG (ref 27–31)
MCHC RBC AUTO-ENTMCNC: 31.4 G/DL (ref 32–36)
MCV RBC AUTO: 89 FL (ref 82–98)
MONOCYTES # BLD AUTO: 1 K/UL (ref 0.3–1)
MONOCYTES NFR BLD: 13.4 % (ref 4–15)
NEUTROPHILS # BLD AUTO: 4.7 K/UL (ref 1.8–7.7)
NEUTROPHILS NFR BLD: 65 % (ref 38–73)
NRBC BLD-RTO: 0 /100 WBC
PLATELET # BLD AUTO: 225 K/UL (ref 150–450)
PMV BLD AUTO: 8.8 FL (ref 9.2–12.9)
POTASSIUM SERPL-SCNC: 4.5 MMOL/L (ref 3.5–5.1)
PROT SERPL-MCNC: 5.5 G/DL (ref 6–8.4)
RBC # BLD AUTO: 3.49 M/UL (ref 4.6–6.2)
SODIUM SERPL-SCNC: 137 MMOL/L (ref 136–145)
VANCOMYCIN TROUGH SERPL-MCNC: 21 UG/ML (ref 10–22)
WBC # BLD AUTO: 7.24 K/UL (ref 3.9–12.7)

## 2024-02-27 PROCEDURE — 80053 COMPREHEN METABOLIC PANEL: CPT | Performed by: INTERNAL MEDICINE

## 2024-02-27 PROCEDURE — 86140 C-REACTIVE PROTEIN: CPT | Performed by: INTERNAL MEDICINE

## 2024-02-27 PROCEDURE — 80202 ASSAY OF VANCOMYCIN: CPT | Performed by: INTERNAL MEDICINE

## 2024-02-27 PROCEDURE — 36415 COLL VENOUS BLD VENIPUNCTURE: CPT | Mod: XB | Performed by: INTERNAL MEDICINE

## 2024-02-27 PROCEDURE — 85025 COMPLETE CBC W/AUTO DIFF WBC: CPT | Performed by: INTERNAL MEDICINE

## 2024-02-27 NOTE — TELEPHONE ENCOUNTER
"Called pt and spoke with wife    Pt still has ongoing rash from yesterday, on chest, arms, legs.     Wife stated that the ED said it was unknown what caused "allergic reaction"     His lip still hurts like it has a split but it feels a little better than yesterday    Wife stated since he has been on stronger dose he has been itching until yesterday it got very bad and this morning   "

## 2024-02-27 NOTE — TELEPHONE ENCOUNTER
----- Message from Kate Martinez sent at 2/27/2024  8:35 AM CST -----  Regarding: break out from medication  Contact: Pt's Wife @[phone]  Pt is calling to speak to someone in the office to discuss symptoms they are having. Pt is asking for a call back today. Please call to advise. Thanks.         Symptoms: rash from rx Vancomycin         How long has patient had these symptoms: a week            Additional Information:  Rash is on his face, chest and legs

## 2024-02-27 NOTE — TELEPHONE ENCOUNTER
Spoke with Eliel and [facility]. Eliel stated we can slow the infusion time down from 1.5 hours to 2 hours. stability wise, IV benadryl is only good for 1 day and would require patient to draw up and dilute solution, PO benadryl is the best option for patient to premedicate w/ in home.

## 2024-02-27 NOTE — TELEPHONE ENCOUNTER
----- Message from Arminda Centeno sent at 2/23/2024  2:37 PM CST -----  Regarding: RE: Pre-Op Clearance  Sorry, anesthesia evaluation.     Arminda Centeno MS, ATC, [facility]  Clinical/Surgical Assistant - Dr. Parvez Rock   Orthopedic Oncology   [facility]  Phone: [phone]    ----- Message -----  From: Yazmin De La Torre  Sent: 2/23/2024   2:36 PM CST  To: Arminda Centeno  Subject: RE: Pre-Op Clearance                             Hi, anesthesia does not clear patients, do you want a medical clearance or an anesthesia evaluation?  ----- Message -----  From: Arminda Centeno  Sent: 2/23/2024   2:05 PM CST  To: [facility] Pre-Op Clinic (Pat) Scheduling  Subject: Pre-Op Clearance                                 Hello,     Patient is scheduled for surgery on 3/19/24. Needs anesthesia clearance. Please call patient for scheduling.     Arminda Centeno MS, ATC, [facility]  Clinical/Surgical Assistant - Dr. Parvez Rock   Orthopedic Oncology   [facility]  Phone: [phone]

## 2024-02-28 ENCOUNTER — OFFICE VISIT (OUTPATIENT)
Dept: PLASTIC SURGERY | Facility: CLINIC | Age: 74
End: 2024-02-28
Payer: MEDICARE

## 2024-02-28 ENCOUNTER — INFUSION (OUTPATIENT)
Dept: INFECTIOUS DISEASES | Facility: HOSPITAL | Age: 74
End: 2024-02-28
Payer: MEDICARE

## 2024-02-28 ENCOUNTER — OFFICE VISIT (OUTPATIENT)
Dept: INFECTIOUS DISEASES | Facility: CLINIC | Age: 74
End: 2024-02-28
Payer: MEDICARE

## 2024-02-28 VITALS
OXYGEN SATURATION: 100 % | HEIGHT: 70 IN | HEART RATE: 101 BPM | BODY MASS INDEX: 26.11 KG/M2 | DIASTOLIC BLOOD PRESSURE: 55 MMHG | SYSTOLIC BLOOD PRESSURE: 102 MMHG

## 2024-02-28 VITALS
SYSTOLIC BLOOD PRESSURE: 111 MMHG | OXYGEN SATURATION: 100 % | RESPIRATION RATE: 20 BRPM | WEIGHT: 182.13 LBS | HEIGHT: 70 IN | TEMPERATURE: 98 F | WEIGHT: 182 LBS | BODY MASS INDEX: 26.07 KG/M2 | DIASTOLIC BLOOD PRESSURE: 72 MMHG | TEMPERATURE: 98 F | HEART RATE: 93 BPM | SYSTOLIC BLOOD PRESSURE: 124 MMHG | BODY MASS INDEX: 26.05 KG/M2 | HEIGHT: 70 IN | HEART RATE: 104 BPM | DIASTOLIC BLOOD PRESSURE: 72 MMHG

## 2024-02-28 DIAGNOSIS — Z09 SURGERY FOLLOW-UP EXAMINATION: Primary | ICD-10-CM

## 2024-02-28 DIAGNOSIS — R21 RASH: Primary | ICD-10-CM

## 2024-02-28 PROCEDURE — 99024 POSTOP FOLLOW-UP VISIT: CPT | Mod: S$GLB,,, | Performed by: SURGERY

## 2024-02-28 PROCEDURE — 99999 PR PBB SHADOW E&M-EST. PATIENT-LVL III: CPT | Mod: PBBFAC,,, | Performed by: INTERNAL MEDICINE

## 2024-02-28 PROCEDURE — 99213 OFFICE O/P EST LOW 20 MIN: CPT | Mod: S$GLB,,, | Performed by: INTERNAL MEDICINE

## 2024-02-28 PROCEDURE — 99999 PR PBB SHADOW E&M-EST. PATIENT-LVL III: CPT | Mod: PBBFAC,,, | Performed by: SURGERY

## 2024-02-28 RX ORDER — LINEZOLID 600 MG/1
600 TABLET, FILM COATED ORAL EVERY 12 HOURS
Qty: 28 TABLET | Refills: 0 | Status: SHIPPED | OUTPATIENT
Start: 2024-02-28 | End: 2024-03-13

## 2024-02-28 NOTE — PROGRESS NOTES
Pt arrived to infusion clinic for PICC dressing change. Dressing change completed per policy with sterile field. Pt tolerated well.

## 2024-02-28 NOTE — PROGRESS NOTES
"Subjective     Patient ID: Gamaliel Pete Jr. is a 73 y.o. male.    Chief Complaint: No chief complaint on file.    HPI    Presents for follow up prior to cycle 3  Inlyta held after last clinic visit d/t mucositis, improved with duke's solution and holding Inlyta.  Was admitted for wound infection 1/31-2/8   This caused him to miss last visit and infusion on 2/8  Discharged with PICC line for IV antibiotic therapy   Dr. Martinez instructed patient to restart Inlyta on 2/16 - took one dose in AM and noted increased oral symptoms again  Mouth sores have resolved, still has slight "odd" feeling in his mouth   Mild itching over the last 3-4 weeks   Notes generalized rash started on Monday   Feels this was related to vancomycin (started this early February 2024)   Given benadryl IV and steroids in ED   Does not feel there have been any changes since this  Notes antibiotic changed to PO linezolid yesterday    Has not decided yet on whether he will be undergoing orthopedic surgery   States it is scheduled 3/19 but not set in stone   He is wanting to hold off on surgery to further recover from last surgery if he can, wife is hoping to proceed sooner rather than later d/t leg pain   Eating well, small portions at a time   Stamina and strength remain low   Climbs 15-16 stairs once daily, tolerates fairly well   Not very active, says not doing physical therapy   PT saw him and gave exercises but he has not been doing these  Family is encouraging him to do the exercises consistently   No fevers or chills  Mild SOB with exertion, otherwise no SOB, CP, palpitations  No nausea, diarrhea, constipation   No blood in urine or stool  No skin changes to hands or feet   No pain other than "healing and recovery" pains - these are stable        Diagnosis: metastatic RCC     Oncology History:  - Presented with gross hematuria mid June 2023  Building a house and has been active working on this in the [place]- 1st noted dark urine and felt " related to being dehydrated  Occurred a 2nd time approximately 2 weeks later and this time he noted darker and small clots  Noted again 2 weeks later and worse but ultimately he was prompted to seek evaluation when he had significant blood when he urinated     - went to his PCP and urine sample was yellow again  He had blood work done and referred to Urology at that time     - 9/5/2023 CT Urogram:  FINDINGS:  The lung bases demonstrate bilateral nodules, for example 9 mm at the right lung base and up to 11 mm at the left lung base.  Atelectasis present.  There are bilateral fat containing inguinal hernias.  The liver demonstrates no mass.  The spleen is not enlarged.  The stomach, pancreas and adrenal glands are within normal limits.  Gallbladder is unremarkable.  There is no biliary ductal dilatation.  The kidneys concentrate and excrete contrast satisfactorily.  There is no renal calculus or ureteral calculus.  Within the mid to lower pole of the right kidney is a 4.9 x 6.3 x 6.1 cm heterogeneous solid mass which is overall slightly hypodense in relation to the enhancing parenchyma.  The mass is partially exophytic posteriorly.  It does extend partially into the renal sinus  At the upper pole of the right kidney is a subcentimeter exophytic focus which is isodense to the parenchyma on postcontrast imaging appearing slightly hyperdense on the noncontrast study, too small to characterize.  There are multiple additional subcentimeter hypodense right kidney foci which are suggestive of cysts.  Finally at the lower pole of the right kidney is a exophytic hypodense structure most compatible with a cyst.  The right main renal vein appears patent.  There is no significant periaortic lymphadenopathy.  Left kidney demonstrates several subcentimeter foci which are hypodense but too small to characterize, suggestive of cysts.  There is somewhat of a small amount of contrast within the upper collecting systems and ureters,  with no definite focal abnormality, noting that there is some distortion of the collecting system in the region in which the right kidney mass involves the renal sinus.  The bladder is partially distended appearing grossly unremarkable.  The bowel demonstrates no significant abnormality.  The osseous structures demonstrate degenerative changes.  T9 demonstrates a lytic focus occupying the anterior half of the vertebral body.  There is slight loss of height along superior and inferior endplates with cortical disruption..  Impression:  Solid right renal mass concerning for neoplasm.  Multiple lung base nodules up to 11 mm, concerning for metastatic disease.  Recommend chest CT for full evaluation of lungs.  Lytic lesion at T9 with mild compression, concerning for pathologic compression fracture.  Subcentimeter right kidney lesion isodense to parenchyma on contrast enhanced imaging, too small to characterize.  Additional bilateral renal hypodensities which are too small to characterize but suggestive of cysts.     - 9/7/2023 CT Chest:  FINDINGS:  The base of the neck is within normal limits.  The thyroid gland is unremarkable.  The supraclavicular regions are within normal limits.  The trachea is unremarkable.  The central airways are within normal limits.  No endobronchial lesion is identified.  There is no evidence of bronchiectasis.  The heart is unremarkable.  There are no pericardial effusions.  There are coronary artery calcifications.  The thoracic aorta is normal in caliber.  There are subcentimeter mediastinal and hilar lymph nodes.  There are subcentimeter axillary lymph nodes.  There are no pleural effusions.  There is no evidence of a pneumothorax.  There is no evidence of pneumomediastinum.  There are innumerable bilateral pulmonary nodules.  There is no focal consolidation.  The esophagus is unremarkable.  Please see the dedicated CT abdomen pelvis for the upper abdominal findings.  The chest wall is  unremarkable.  There is unchanged lytic lesion involving the anterior aspect of T9 vertebral body with associated cortical erosions.  Impression:  Innumerable pulmonary nodules, concerning for metastatic disease to the chest.  Unchanged lytic lesion in the T9 vertebral body with cortical erosions.  Follow-up MRI of the spine, as clinically warranted.     - 9/7/2023 CT Abd:  FINDINGS:  CT renal protocol:  There is a 4.8 x 6.3 cm exophytic enhancing lesion in the lower pole of the right kidney.  There is hypoenhancement of the lesion compared to the normal renal parenchyma.  There is unchanged appearance of the mass extending into the right renal sinus.  There is no extension into the right renal vein  No additional enhancing lesions are identified.  There is a subcentimeter lesion in the right kidney is too small complete characterization.  There is prompt excretion from both collecting systems.  There is incomplete distension of the right distal ureter.  No definitive filling defect is identified within the.  The urinary bladder is unremarkable.  CT abdomen pelvis:  There is unchanged appearance of multiple pulmonary nodules in the lung bases.  No new nodules identified  The heart is unremarkable.  There is normal tapering of the abdominal aorta.  There are single bilateral renal arteries.  The renal veins remain patent.  There is no evidence of lymphadenopathy.  The esophagus, stomach, and duodenum are within normal limits.  The small bowel loops are unremarkable.  The appendix is not visualized.  There are no secondary findings of acute appendicitis.  There is colonic diverticula without evidence of acute diverticulitis.  The liver is unremarkable.  The gallbladder is within normal limits.  The biliary tree is within normal limits.  The spleen is unremarkable.  The pancreas is within normal limits.  The adrenal glands are unremarkable.  There is no evidence of free fluid in the abdomen or pelvis.  There is no  evidence of free air.  There is no evidence of pneumatosis.  No portal venous air is identified.  The psoas margins are unremarkable.  There are bilateral fat containing inguinal hernias.  There are degenerative changes in the osseous structures.  There is unchanged appearance of a lytic lesion involving the anterior aspect of the T9 vertebral body with associated cortical erosions.  Impression:  Unchanged 4.8 x 6.3 cm exophytic hypoenhancing lesion in the lower pole of the right kidney, concerning for renal cell carcinoma.  Extension of lesion into the renal sinus.  No evidence of renal vein invasion.  No regional lymphadenopathy.  Additional smaller subcentimeter lesion too small complete characterization in the right kidney.  Bilateral pulmonary nodules remain concerning for metastatic disease.  Lytic lesion along the anterior aspect of the T9 vertebral body, also concerning for osseous metastatic disease.  Additional findings as above.     - 9/20/2023 Bone scan:  FINDINGS:  There is physiologic distribution of the radiopharmaceutical throughout the skeleton.  Focal uptake in the T9 vertebral body corresponding to lytic lesion seen on CT 09/05/2023.  Focal uptake in the right kidney in patient with known right renal mass.  There is otherwise normal uptake in the genitourinary system and soft tissues.  Impression:  Focal uptake in the T9 vertebral body corresponding to lytic lesion seen on prior CT and concerning for metastatic disease.  Additional focus of increased uptake in the right kidney in patient with known right renal mass     - 10/4/2023 Robotic right nephrectomy  Pathology:  RIGHT KIDNEY, TOTAL NEPHRECTOMY:   - Clear cell renal cell carcinoma, ISUP grade 4, 5.5 cm.   - Benign cortical cysts.   - See [facility] synoptic report below.   SURGICAL PATHOLOGY CANCER CASE SUMMARY   Procedure: Total nephrectomy.   Specimen laterality: Right.   Tumor size: 5.5 cm.   Tumor focality: Unifocal.   Histologic type: Clear  cell renal cell carcinoma.   Sarcomatoid features: Present, 5%.   Rhabdoid features: Not identified.   Histologic Grade (ISUP Grade): 4.   Tumor necrosis: Present, 20%.   Tumor extension: Extends into pelvicalyceal system and renal sinus fat.   Margins: Uninvolved.   Lymphovascular invasion (excluding renal vein and its segmental branches): Not identified.   Regional lymph nodes: No lymph nodes submitted or found.   Distant metastases: Not applicable in this specimen.   Pathologic stage (pTNM, AJCC 8th edition): pT3a pN not assigned (no lymph nodes submitted or found).      - treatment not initiated with below issues:  2 prior admissions      - Admitted from 11/6- 11/9/2023  Hospital Course: Pt admitted for intractable back pain in the setting of renal carcinoma (RCC) s/p R nephrectomy 10/4 followed by Dr. Turk not on systemic therapy. CT and MRI concerning for spinal, lung, and hepatic mets. NSGY, Rad Onc, and IR on board in the hospital and evaluated for C5 and T9 lesions on spine. Pt pain controlled with oxycodone 12 HR BID and Oxycodone 10 PRN for thoracic and cervical pain. Patient underwent preop embolization of cervical tumor with IR on 11/6/23. And then had a cervical corpectomy with NSGY on 11/7/23 which he tolerated well. They obtained tissue samples and sent it over to pathology. IR to perform osteocool/kyphoplasty/biopsy T9 11/14. 2nd stage of surgery with NSGY on 11/15 with Dr. Martinez after kyphoplasty. Patient is to see Dr. Turk for systemic therapy afterwards as well as option for postoperative radiation. Stable to discharge home with c-collar and back brace. Patient also to receive rolling walker and hospital bed due to weakness and pain.   Pathology:  Spine, C5 vertebral body, corpectomy:   - Metastatic renal cell carcinoma, consistent with patient's history   - [facility] has been ordered and results will be issued in a separate      - Admitted 11/14- 11/18/2023 and underwent IR kyphoplasty of  T4-6  Procedure(s) (LRB):  SPINE, CERVICAL, WITH POSTERIOR FUSION T4-6 (N/A)  CORPECTOMY, SPINE, CERVICAL C3-6 REVISION (N/A)   Hospital Course: 11/14: admitted; IR kyphoplasty of T9 today  11/15: OR today for C3-6 PCF.   11/16: POD 1 s/p C3-C6 PCF. NAEO. VSS, afebrile and WBC WNL. Patient complains of mild incisional pain but reports his LUE radiculopathy is improved. Reports mild discomfort with swallowing but no difficulty. Pending PT.   11/17: POD 2 s/p C3-C6 PCF. NAEO and VSS. Mild SABINE on BMP, will resume fluids. Anterior HV drain removed at bedside. Two posterior HV drains remain w/output 50 each.   11/18: POD 3. NAEON. AFVSS. Neurologically stable. Tolerating PO diet and voiding spontaneously. HV drain x 2 removed and pressure dressing placed. Medically stable to discharge home. Follow up in clinic in 2 weeks. Discharge instructions given verbally to patient. All of his questions were answered.  He is encouraged to call the clinic with any questions or concerns prior to follow up appt.      [facility] x[facility] (11/30/2023 9:26 AM CST)  Results - [facility] (11/30/2023 9:26 AM CST)  Component Value Ref Range Test Method Analysis Time Performed At Pathologist Signature   Reason for Study To identify somatic and germline mutations relevant to patient's cancer.     11/30/2023 9:26 AM CST [facility]     Genetic Diseases Assessed Cancer     11/30/2023 9:26 AM CST [facility]     Description of Ranges of DNA Sequences Examined 648 gene panel     11/30/2023 9:26 AM CST [facility]     Overall Interpretation positive     11/30/2023 9:26 AM CST [facility]     MSI Stable     11/30/2023 9:26 AM CST [facility]     TMB 7.4 m/MB   11/30/2023 9:26 AM CST [facility]     [facility] Portal [email or web address]     11/30/2023 9:26 AM CST [facility]     Comment: [facility] link   Fusion Addendum Issue Type NEGATIVE  Negative -  This addendum is being issued to report that no gene fusions or altered splicing variants from RNA sequencing analysis were found.     11/30/2023 9:26 AM CST [facility]     Therapy Count 4     11/30/2023 9:26 AM CST [facility]     [facility]: Potential Therapy 1 Gene: [id number]^VHL^HGNC  Variant: p.E186fs  Agent: Belzutifan  Tissue: Renal Cell Carcinoma  Association: response  Evidence Status: Consensus  Evidence ID: NCCN  Evidence URL:  FDA Approved?: Yes  on label?: No     11/30/2023 9:26 AM CST [facility]     [facility]: Potential Therapy 2 Gene: 28087^PBRM1^HGNC  Variant: p.L618fs  Agent: Nivolumab  Tissue: Clear Cell Renal Cell Carcinoma  Association: response  Evidence Status: Clinical research  Evidence ID: [id number]  Evidence URL: https://www.ncbi.nlm.nih.gov/pubmed/04638496  FDA Approved?: Yes  on label?: Yes     11/30/2023 9:26 AM CST [facility]     [facility]: Potential Therapy 3 Gene: 8975^PIK3CA^HGNC  Variant: p.Q546E  Agent: Capivasertib + Fulvestrant  Tissue: Breast Cancer  Association: response  Evidence Status: Consensus  Evidence ID: FDA  Evidence URL:  FDA Approved?: Yes  on label?: No     11/30/2023 9:26 AM CST [facility]     [facility]: Potential Therapy 4 Gene: 8975^PIK3CA^HGNC  Variant: p.Q546E  Agent: Alpelisib  Tissue: Solid Tumors  Association: response  Evidence Status: Clinical research  Evidence ID: [id number]  Evidence URL: https://www.ncbi.nlm.nih.gov/pubmed/73185541  FDA Approved?: Yes  on label?: No     11/30/2023 9:26 AM CST [facility]     Trial Count 3     11/30/2023 9:26 AM CST [facility]     [facility]: Clinical Trial Match 1 Clinical Trial NCT ID: [id number]  Clinical Trial Title: AFB985 as a Single Agent and in Combination With Everolimus & Immuno-Oncology Agents in Advanced/Relapsed Renal Cancer & Other Malignancies  Clinical Trial URL: https://clinicaltrials.gov/ct2/show/VHU63506741  Clinical Phase: Phase 1  Matched criteria: VHL p.E186fs mutation     11/30/2023 9:26 AM CST [facility]      [facility]: Clinical Trial Match 2 Clinical Trial NCT ID: [id number]  Clinical Trial Title: First-in-Human Study of STX-478 as Monotherapy and in Combination With Other Antineoplastic Agents in Participants With Advanced Solid Tumors  Clinical Trial URL: https://clinicaltrials.gov/ct2/show/JBI46432420  Clinical Phase: Phase 1/Phase 2  Matched criteria: PIK3CA p.Q546E mutation     11/30/2023 9:26 AM CST [facility]     [facility]: Clinical Trial Match 3 Clinical Trial NCT ID: [id number]  Clinical Trial Title: Testing the Combination of Two Anti-cancer Drugs, Peposertib () and  for Advanced Solid Tumors  Clinical Trial URL: https://clinicaltrials.gov/ct2/show/ERG92336794  Clinical Phase: Phase 1  Matched criteria: PBRM1 p.L618fs mutation     11/30/2023 9:26 AM CST [facility]        Results - [facility] (11/30/2023 9:26 AM CST)  Specimen (Source) Anatomical Location / Laterality Collection Method / Volume Collection Time Received Time   Tissue       11/16/2023 11:02 AM CST      Results - [facility] (11/30/2023 9:26 AM CST)  Narrative   This result has genomic variants that were not included in this document.          Results - [facility] (11/30/2023 9:26 AM CST)  Authorizing Provider Result Type   Ruby Turk MD LAB MOLECULAR DIAGNOSTICS ORDERABLES      Results - [facility] (11/30/2023 9:26 AM CST)  Performing Organization Address City/State/ZIP Code Phone Number   [facility]  [address], [address]  [place] [address],   [phone]       - initiated systemic therapy with Pembro/Axitinib on 12/18/2023    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  - fractured collar bone  - Pediatric hernia             Active Ambulatory Problems     Diagnosis Date Noted    Obesity (BMI 35.0-39.9 without comorbidity) 04/01/2019    Borderline  hypertension 2019    Acute gout of left ankle 2019    Onychomycosis 2019    History of gout 2021    Seborrheic keratosis 2021    Tinea corporis 2021    Positive colorectal cancer screening using Cologuard test 2021              Resolved Ambulatory Problems     Diagnosis Date Noted    No Resolved Ambulatory Problems              Past Medical History:   Diagnosis Date    Asthma      ED (erectile dysfunction)      Gout        SH:  Retired from [employer]    1 child, 2 stepchildren  Prior tobacco- teens, early 20s  Social EtOH     FH:  Maternal grandmother-  at 91- she had prior cancers- colon cancer and breast cancer  Mother - liver cancer - prior blood transfusion Hep C-  at age 77 yo  Father-  in his 50s- EtOH cirrhosis  Paternal grandfather- cancer in his 70s, unclear type  Maternal grandfather- H+N cancer-  (smoker)      Review of Systems   Constitutional:  Positive for activity change (since surgery), fatigue (decreased stamina, energy) and unexpected weight change. Negative for appetite change, chills and fever.   HENT:  Negative for trouble swallowing and voice change.    Respiratory:  Positive for shortness of breath (exertional). Negative for cough and wheezing.    Cardiovascular:  Negative for chest pain, palpitations and leg swelling.   Gastrointestinal:  Negative for abdominal distention, abdominal pain, change in bowel habit, constipation, diarrhea, nausea, vomiting and reflux.   Genitourinary:  Negative for decreased urine volume, difficulty urinating, dysuria, frequency and hematuria.   Musculoskeletal:  Positive for arthralgias and gait problem (d/t weakness, in wheelchair). Negative for back pain and neck pain.   Integumentary:  Positive for rash and wound (buttocks).   Neurological:  Positive for weakness (generalized). Negative for dizziness, numbness and headaches.   Psychiatric/Behavioral:  Negative for dysphoric mood and sleep disturbance.  The patient is not nervous/anxious.       Objective     Physical Exam  Vitals reviewed.   Constitutional:       General: He is not in acute distress.     Appearance: Normal appearance. He is not ill-appearing, toxic-appearing or diaphoretic.   HENT:      Head: Normocephalic and atraumatic.      Right Ear: External ear normal.      Left Ear: External ear normal.      Nose: Nose normal.      Mouth/Throat:      Pharynx: Oropharynx is clear.   Eyes:      General: No scleral icterus.     Conjunctiva/sclera: Conjunctivae normal.   Cardiovascular:      Rate and Rhythm: Normal rate.   Pulmonary:      Effort: Pulmonary effort is normal. No respiratory distress.   Abdominal:      General: There is no distension.   Musculoskeletal:      Comments: Wearing back brace in clinic   Skin:     Coloration: Skin is not jaundiced or pale.      Findings: Lesion (to buttocks, healing) and rash (to arms, chest, back) present. No bruising or erythema.   Neurological:      Mental Status: He is alert and oriented to person, place, and time.      Motor: Weakness (generalized) present.      Gait: Gait abnormal (in wheelchair for distances).   Psychiatric:         Mood and Affect: Mood normal.         Behavior: Behavior normal.        Assessment and Plan     1. Clear cell carcinoma of right kidney    2. Metastatic cancer to spine    3. 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 Malignant neoplasm metastatic to lung, unspecified laterality    5. 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 Intractable back pain    8. Right leg pain    9. Essential hypertension    10. Mouth sores    11. Rash    Other orders  -     Cancel: pembrolizumab (KEYTRUDA) 200 mg in sodium chloride 0.9% SolP 108 mL infusion  -     Cancel: EPINEPHrine (EPIPEN) 0.3 mg/0.3 mL pen injection 0.3 mg  -     Cancel: diphenhydrAMINE injection 50 mg  -     Cancel: hydrocortisone sodium succinate injection 100 mg  -     Cancel:  sodium chloride 0.9% 100 mL flush bag  -     Cancel: sodium chloride 0.9% flush 10 mL  -     heparin, porcine (PF) 100 unit/mL injection flush 500 Units  -     alteplase injection 2 mg      Metastatic renal carcinoma-  Has been tolerating infusion well   Inlyta on hold since January d/t mucositis, surgery   Has duke's solution for mucositis which has helped  Will continue to hold at this time d/t potential upcoming surgery   Upon restarting, will likely dose reduce to 3 mg BID   Receiving XRT to T9 lesion - scheduled today after infusion to resume XRT    Labs reviewed, adequate for treatment  Thyroid functions normal today. Monitor closely on treatment.   Proceed with pembro infusion today.  RTC in 3 weeks for next cycle    Of note, he is currently scheduled for intramedullary dulce placement on 3/19/24. He is still unsure if he is going to proceed with this - plans to further discuss risks/benefits/recovery with Dr. Rock. Advised him to update our office once he has confirmed decision in order to better anticipate next steps for Inlyta.     He also still has PICC in place. No longer on IV abx therapy at home. He would like ongoing infusions to be given through PICC line. Discussed concerns with rash, increased risk of infection, etc.. Reviewed with MD as well. Should he prefer more permanent access, recommend port placement. He does not want to have a port placed at this time. Will continue with PIV infusions going forward. He sees Dr. Chi next week and will determine when to remove line in clinic.     Rash-  Likely abx related based on timeline, symptoms  Will monitor for changes  Advised patient to let us know if any worsening of rash occurs after this infusion     Pain-  No longer taking pain medications - only using gabapentin and robaxin  States pain resolved  Continue to monitor    HTN-   Well controlled  BP on lower side today, asymptomatic   Continue to monitor    Patient is in agreement with the  proposed treatment plan. All questions were answered to the patient's satisfaction. Pt knows to call clinic if anything is needed before the next clinic visit.    Patient discussed with collaborating physician, Dr. Turk.    At least 40 minutes were spent today on this encounter including face to face time with the patient, data gathering/interpretation and documentation.       Kelly Lopez, MSN, APRN, [facility]-  Hematology and Medical Oncology  Clinical Nurse Specialist to Dr. Ramirez, Dr. Turk & Dr. Ramos Chart for Scheduling    Med Onc Chart Routing  Urgent    Follow up with physician 3 weeks. with labs to see Dr. Turk for infusion   Follow up with ABEBA 6 weeks. with labs to see ABE[facility] for infusion   Infusion scheduling note   keytruda every 3 weeks   Injection scheduling note    Labs CBC, CMP, TSH and free T4   Scheduling:  Preferred lab:  Lab interval: every 3 weeks     Imaging    Pharmacy appointment    Other referrals              Treatment Plan Information   OP AXITINIB + PEMBROLIZUMAB 200MG Q3W   Ruby Turk MD   Upcoming Treatment Dates - OP AXITINIB + PEMBROLIZUMAB 200MG Q3W    2/6/2024       Chemotherapy       pembrolizumab (KEYTRUDA) 200 mg in sodium chloride 0.9% SolP 108 mL infusion  2/27/2024       Chemotherapy       pembrolizumab (KEYTRUDA) 200 mg in sodium chloride 0.9% SolP 108 mL infusion  3/19/2024       Chemotherapy       pembrolizumab (KEYTRUDA) 200 mg in sodium chloride 0.9% SolP 108 mL infusion  4/9/2024       Chemotherapy       pembrolizumab (KEYTRUDA) 200 mg in sodium chloride 0.9% SolP 108 mL infusion    Supportive Plan Information  IV FLUIDS AND ELECTROLYTES   Ruby Turk MD   Upcoming Treatment Dates - IV FLUIDS AND ELECTROLYTES    No upcoming days in selected categories.    Therapy Plan Information  EPINEPHrine (EPIPEN) 0.3 mg/0.3 mL pen injection 0.3 mg  0.3 mg, Intramuscular, PRN  diphenhydrAMINE injection 50 mg  50 mg, Intravenous, PRN  hydrocortisone sodium succinate  injection 100 mg  100 mg, Intravenous, PRN   No follow-ups on file.

## 2024-02-28 NOTE — PROGRESS NOTES
Subjective:     Patient ID: Gamaliel Pete Jr. is a 73 y.o. 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 Underwent washout on 2/2/24 w/ cultures positive VIKKI from both superficial and deep layers. He was discharged on vancomycin to complete 6 weeks. Received call from plastic surgery today regarding new onset rash.    Patient seen in clinic for evaluation of rash. Notes that he had mild rash for the last few weeks, but Monday developed disseminated rash on trunk and extremities. No other new products or medications. States he is minimally itchy. No other symptoms. Eos on labs yesterday 0.9      There is no immunization history on file for this patient.     Review of Systems   All other systems reviewed and are negative.       Past Medical History:   Diagnosis Date    Asthma     no inhaler use    Borderline hypertension     ED (erectile dysfunction)     Gout     Hematuria     Hypertension      Past Surgical History:   Procedure Laterality Date    CLOSURE N/A 2/2/2024    Procedure: CLOSURE;  Surgeon: Ernesto Villanueva MD;  Location: [address];  Service: Plastics;  Laterality: N/A;    COLONOSCOPY  02/10/2022    COLONOSCOPY N/A 02/10/2022    Procedure: COLONOSCOPY;  Surgeon: Desmond Keenan MD;  Location: [facility];  Service: General;  Laterality: N/A;    POSTERIOR FUSION OF CERVICAL SPINE WITH LAMINECTOMY N/A 11/15/2023    Procedure: SPINE, CERVICAL, WITH POSTERIOR FUSION T4-6;  Surgeon: Nasim Martinez DO;  Location: [address];  Service: Neurosurgery;  Laterality: N/A;  C2-T1 laminectomy and posterior fusion. [place]. C-arm. [employer]. Neuromonitoring.    ROBOT-ASSISTED LAPAROSCOPIC NEPHRECTOMY Right 10/4/2023    Procedure: ROBOTIC NEPHRECTOMY;  Surgeon: Henry Michaels,  MD;  Location: [facility] OR;  Service: Urology;  Laterality: Right;    SURGICAL REMOVAL OF VERTEBRAL BODY OF CERVICAL SPINE Right 11/7/2023    Procedure: CORPECTOMY, SPINE, CERVICAL;  Surgeon: Nasim Martinez DO;  [facility];  Service: Neurosurgery;  Laterality: Right;  C4 and C5 corpectomy with globus;  wells tongs; c-arm; neuromonitoring; microscope; type and cross 2 units    SURGICAL REMOVAL OF VERTEBRAL BODY OF CERVICAL SPINE N/A 11/15/2023    Procedure: CORPECTOMY, SPINE, CERVICAL C3-6 REVISION;  Surgeon: Nasim Martinez DO;  [facility];  Service: Neurosurgery;  Laterality: N/A;    THORACIC LAMINECTOMY WITH FUSION N/A 1/5/2024    Procedure: LAMINECTOMY, SPINE, THORACIC, WITH FUSION;  Surgeon: Nasim Martinez DO;  [facility];  Service: Neurosurgery;  Laterality: N/A;  T7-T11 fusions with robot    TONSILLECTOMY      WOUND EXPLORATION N/A 2/2/2024    Procedure: EXPLORATION, WOUND;  Surgeon: Nasim Martinez DO;  [facility];  Service: Neurosurgery;  Laterality: N/A;  Thoracic wound exploration, washout     No family history on file.  Social History     Tobacco Use    Smoking status: Never    Smokeless tobacco: Never   Substance Use Topics    Alcohol use: Not Currently     Alcohol/week: 0.0 standard drinks of alcohol     Comment: rarely    Drug use: Never       Objective:     Physical Exam  Constitutional:       General: He is not in acute distress.     Appearance: Normal appearance. He is well-developed. He is not ill-appearing or diaphoretic.   HENT:      Head: Normocephalic and atraumatic.      Right Ear: External ear normal.      Left Ear: External ear normal.      Nose: Nose normal.   Eyes:      General: No scleral icterus.        Right eye: No discharge.         Left eye: No discharge.      Extraocular Movements: Extraocular movements intact.      Conjunctiva/sclera: Conjunctivae normal.   Pulmonary:      Effort: Pulmonary effort  is normal. No respiratory distress.      Breath sounds: No stridor.   Skin:     General: Skin is dry.      Coloration: Skin is not jaundiced or pale.      Findings: Rash present. No erythema.      Comments: Maculopapular rash on trunk and extremities   Neurological:      General: No focal deficit present.      Mental Status: He is alert and oriented to person, place, and time. Mental status is at baseline.   Psychiatric:         Mood and Affect: Mood normal.         Behavior: Behavior normal.         Thought Content: Thought content normal.         Judgment: Judgment normal.         Data:    All data, including recent labs, radiology, and pathology, has been independently reviewed.    Labs:    Recent Labs   Lab Result Units 01/29/24  1243 01/29/24  1243 01/31/24  1652 02/01/24  0351 02/03/24  1304 02/04/24  0456 02/06/24  1130 02/08/24  0800 02/16/24  1049 02/27/24  1600   WBC K/uL 5.46  --  3.76*   < >  --    < > 4.00  --  1.61* 7.24   Hemoglobin g/dL 12.7*  --  9.9*   < >  --    < > 8.8*  --  18.9* 9.8*   Hematocrit % 37.2*  --  29.4*   < >  --    < > 27.5*  --  57.0* 31.2*   Sodium mmol/L 126*  --  126*   < > 134*  --   --   --  127* 137   Potassium mmol/L 4.4  --  4.9   < > 4.5  --   --   --  4.2 4.5   Chloride mmol/L 94*  --  95   < > 102  --   --   --  93* 103   BUN mg/dL 21  --  17   < > 12  --   --   --  14 21   Creatinine mg/dL 1.4  --  1.3   < > 0.9   < >  --  0.9 1.1 1.2   AST U/L 28  --   --   --   --   --   --   --  24 17   ALT U/L 42  --   --   --   --   --   --   --  21 14   Alkaline Phosphatase U/L 99  --   --   --   --   --   --   --  79 77   Total Bilirubin mg/dL 0.6  --   --   --   --   --   --   --  0.6 0.3   CRP mg/L  --    < > 160.7*  --   --   --   --   --   --  18.7*   INR   --   --  1.1  --   --   --   --   --   --   --     < > = values in this interval not displayed.        Radiology:    No results found in the last 24 hours.     Assessment:    1. Rash  Stop vanc  PICC line to remain in  place per patient request for keytruda infusions  Will do dressing change in office today  Start linezolid 600mg BID to complete antibiotic course  Pt having labs done again tomorrow - monitor eos for evidence of DRESS  Will see back in clinic next week for follow up    Nursing communication           Follow up in 1 weeks    The total time for evaluation and management services performed on 2/28/24 was greater than 20 minutes.     Rhiannon Chi DO  Transplant Infectious Disease

## 2024-02-28 NOTE — PROGRESS NOTES
Plastic Surgery Clinic Postop Visit    Subjective:      Gamaliel Pete Jr. is a 73 y.o. year old male who presents to the Plastic Surgery Clinic on 02/28/2024 for follow up visit status post spinal closure on 2.2.2024. Pt reports that his drain has an output of < 25 ccs in a 24 hour period. Of note, pt was recently started on vancomycin for bacteremia. He went to the the ED with a rash covering his entire body. He has been taking benadryl without relief. He also states that the form of vanc was changed to slow release per ID.   Denies fever, chills, nausea, vomiting, or other systemic signs of infection.    Date of surgery 02/02/2024   Preoperative diagnosis spinal wound   Postoperative diagnosis is the same  Procedure performed  1. Excisional debridement of skin subcutaneous tissue fascia and muscle measuring 75 cm on each side for a total of 150 cm  2. Closure of spinal wound using bilateral paraspinous muscle flaps  3. Advancement flap closure of latissimus dorsi muscle measuring 25 cm by 6 cm  Placement of Prevena wound VAC  Surgeon Dayanara  Anesthesia general  Complications none  Drains x2         ROS:  Negative unless otherwise stated above in HPI    Objective:     Physical Exam:  Vitals:    02/28/24 1508   BP: (!) 102/55   Pulse: 101       WD WN NAD  VSS  Normal resp effort  Spine: incision CDI. No erythema or drainage. No signs of infection. Nylon sutures in place. Drain w/ SS output. Allergic reaction covering entire back and body.         Assessment:       1. Surgery follow-up examination        Plan:   73 y.o. male status post spinal closure  - Pt was seen and evaluated by myself and Dr. Ernesto Villanueva.   - Removed drain. No complications.  - Removed all Nylons today and applied Dermabond with no complications. Pt tolerated well.   - Pt cleared for radiation although pt does have rash covering entire body. We have contacted ID and they will see him today.   - Return to clinic in 2  weeks. Staff to schedule.      All questions were answered. The patient was advised to call the clinic with any questions or concerns prior to their next visit.       Ángela Stratton PA-C  Plastic and Reconstructive Surgery  [phone]

## 2024-02-29 ENCOUNTER — OFFICE VISIT (OUTPATIENT)
Dept: HEMATOLOGY/ONCOLOGY | Facility: CLINIC | Age: 74
End: 2024-02-29
Payer: MEDICARE

## 2024-02-29 ENCOUNTER — TELEPHONE (OUTPATIENT)
Dept: INFECTIOUS DISEASES | Facility: CLINIC | Age: 74
End: 2024-02-29
Payer: MEDICARE

## 2024-02-29 ENCOUNTER — INFUSION (OUTPATIENT)
Dept: INFUSION THERAPY | Facility: HOSPITAL | Age: 74
End: 2024-02-29
Attending: STUDENT IN AN ORGANIZED HEALTH CARE EDUCATION/TRAINING PROGRAM
Payer: MEDICARE

## 2024-02-29 VITALS
RESPIRATION RATE: 18 BRPM | SYSTOLIC BLOOD PRESSURE: 135 MMHG | HEIGHT: 70 IN | OXYGEN SATURATION: 100 % | WEIGHT: 188.19 LBS | HEART RATE: 83 BPM | TEMPERATURE: 98 F | DIASTOLIC BLOOD PRESSURE: 63 MMHG | BODY MASS INDEX: 26.94 KG/M2

## 2024-02-29 VITALS
OXYGEN SATURATION: 100 % | HEIGHT: 70 IN | BODY MASS INDEX: 26.94 KG/M2 | HEART RATE: 92 BPM | TEMPERATURE: 98 F | DIASTOLIC BLOOD PRESSURE: 57 MMHG | SYSTOLIC BLOOD PRESSURE: 102 MMHG | WEIGHT: 188.19 LBS

## 2024-02-29 DIAGNOSIS — Z79.899 IMMUNODEFICIENCY SECONDARY TO CHEMOTHERAPY: ICD-10-CM

## 2024-02-29 DIAGNOSIS — M79.604 RIGHT LEG PAIN: ICD-10-CM

## 2024-02-29 DIAGNOSIS — C79.51 METASTATIC CANCER TO SPINE: ICD-10-CM

## 2024-02-29 DIAGNOSIS — C78.00 MALIGNANT NEOPLASM METASTATIC TO LUNG, UNSPECIFIED LATERALITY: Primary | ICD-10-CM

## 2024-02-29 DIAGNOSIS — C78.00 MALIGNANT NEOPLASM METASTATIC TO LUNG, UNSPECIFIED LATERALITY: ICD-10-CM

## 2024-02-29 DIAGNOSIS — I10 ESSENTIAL HYPERTENSION: ICD-10-CM

## 2024-02-29 DIAGNOSIS — M54.9 INTRACTABLE BACK PAIN: ICD-10-CM

## 2024-02-29 DIAGNOSIS — D84.81 IMMUNODEFICIENCY SECONDARY TO NEOPLASM: ICD-10-CM

## 2024-02-29 DIAGNOSIS — C64.1 CLEAR CELL CARCINOMA OF RIGHT KIDNEY: Primary | ICD-10-CM

## 2024-02-29 DIAGNOSIS — D84.821 IMMUNODEFICIENCY SECONDARY TO CHEMOTHERAPY: ICD-10-CM

## 2024-02-29 DIAGNOSIS — D49.9 IMMUNODEFICIENCY SECONDARY TO NEOPLASM: ICD-10-CM

## 2024-02-29 DIAGNOSIS — R21 RASH: ICD-10-CM

## 2024-02-29 DIAGNOSIS — T45.1X5A IMMUNODEFICIENCY SECONDARY TO CHEMOTHERAPY: ICD-10-CM

## 2024-02-29 DIAGNOSIS — K13.79 MOUTH SORES: ICD-10-CM

## 2024-02-29 DIAGNOSIS — C79.51 METASTATIC ADENOCARCINOMA TO RIGHT FEMUR: ICD-10-CM

## 2024-02-29 PROCEDURE — 99215 OFFICE O/P EST HI 40 MIN: CPT | Mod: S$GLB,,, | Performed by: REGISTERED NURSE

## 2024-02-29 PROCEDURE — 77427 RADIATION TX MANAGEMENT X5: CPT | Mod: ,,, | Performed by: RADIOLOGY

## 2024-02-29 PROCEDURE — 96413 CHEMO IV INFUSION 1 HR: CPT

## 2024-02-29 PROCEDURE — G6002 STEREOSCOPIC X-RAY GUIDANCE: HCPCS | Mod: 26,,, | Performed by: RADIOLOGY

## 2024-02-29 PROCEDURE — 77412 RADIATION TX DELIVERY LVL 3: CPT | Performed by: RADIOLOGY

## 2024-02-29 PROCEDURE — 77387 GUIDANCE FOR RADJ TX DLVR: CPT | Mod: TC | Performed by: RADIOLOGY

## 2024-02-29 PROCEDURE — 25000003 PHARM REV CODE 250: Performed by: REGISTERED NURSE

## 2024-02-29 PROCEDURE — 63600175 PHARM REV CODE 636 W HCPCS: Performed by: REGISTERED NURSE

## 2024-02-29 PROCEDURE — 99999 PR PBB SHADOW E&M-EST. PATIENT-LVL IV: CPT | Mod: PBBFAC,,, | Performed by: REGISTERED NURSE

## 2024-02-29 PROCEDURE — A4216 STERILE WATER/SALINE, 10 ML: HCPCS | Performed by: REGISTERED NURSE

## 2024-02-29 RX ORDER — DIPHENHYDRAMINE HYDROCHLORIDE 50 MG/ML
50 INJECTION INTRAMUSCULAR; INTRAVENOUS ONCE AS NEEDED
Status: DISCONTINUED | OUTPATIENT
Start: 2024-02-29 | End: 2024-02-29 | Stop reason: HOSPADM

## 2024-02-29 RX ORDER — SODIUM CHLORIDE 0.9 % (FLUSH) 0.9 %
10 SYRINGE (ML) INJECTION
Status: CANCELLED | OUTPATIENT
Start: 2024-02-29

## 2024-02-29 RX ORDER — HEPARIN 100 UNIT/ML
500 SYRINGE INTRAVENOUS
Status: CANCELLED | OUTPATIENT
Start: 2024-02-29

## 2024-02-29 RX ORDER — SODIUM CHLORIDE 0.9 % (FLUSH) 0.9 %
10 SYRINGE (ML) INJECTION
Status: DISCONTINUED | OUTPATIENT
Start: 2024-02-29 | End: 2024-02-29 | Stop reason: HOSPADM

## 2024-02-29 RX ORDER — EPINEPHRINE 0.3 MG/.3ML
0.3 INJECTION SUBCUTANEOUS ONCE AS NEEDED
Status: CANCELLED | OUTPATIENT
Start: 2024-02-29

## 2024-02-29 RX ORDER — DIPHENHYDRAMINE HYDROCHLORIDE 50 MG/ML
50 INJECTION INTRAMUSCULAR; INTRAVENOUS ONCE AS NEEDED
Status: CANCELLED | OUTPATIENT
Start: 2024-02-29

## 2024-02-29 RX ORDER — HEPARIN 100 UNIT/ML
500 SYRINGE INTRAVENOUS
Status: DISCONTINUED | OUTPATIENT
Start: 2024-02-29 | End: 2024-02-29 | Stop reason: HOSPADM

## 2024-02-29 RX ORDER — EPINEPHRINE 0.3 MG/.3ML
0.3 INJECTION SUBCUTANEOUS ONCE AS NEEDED
Status: DISCONTINUED | OUTPATIENT
Start: 2024-02-29 | End: 2024-02-29 | Stop reason: HOSPADM

## 2024-02-29 RX ADMIN — HEPARIN 500 UNITS: 100 SYRINGE at 10:02

## 2024-02-29 RX ADMIN — Medication 10 ML: at 10:02

## 2024-02-29 RX ADMIN — SODIUM CHLORIDE: 9 INJECTION, SOLUTION INTRAVENOUS at 09:02

## 2024-02-29 RX ADMIN — SODIUM CHLORIDE 200 MG: 9 INJECTION, SOLUTION INTRAVENOUS at 10:02

## 2024-02-29 NOTE — PLAN OF CARE
"  Problem: Fatigue  Goal: Improved Activity Tolerance  Outcome: Ongoing, Progressing  Intervention: Promote Improved Energy  Flowsheets (Taken 2/29/2024 1016)  Fatigue Management:   activity schedule adjusted   activity assistance provided   fatigue-related activity identified   frequent rest breaks encouraged   paced activity encouraged  Sleep/Rest Enhancement:   awakenings minimized   consistent schedule promoted   family presence promoted   natural light exposure provided   noise level reduced   reading promoted   regular sleep/rest pattern promoted   relaxation techniques promoted  Activity Management:   Ambulated -L4   Up in chair - L3  BP (!) 102/57 (Patient Position: Sitting)   Pulse 92   Temp 98.1 °F (36.7 °C)   Resp 20   Ht 5' 10" (1.778 m)   Wt 85.3 kg (188 lb 2.6 oz)   SpO2 100%   BMI 27.00 kg/m²   Pleasant, alert and oriented patient to Chemo Infusion per wife via w/c for D1C3 Keytruda - VSS and DL PICC-Line to RADHA intact and maintained with no phlebitis observed, dressing and stockingette intact, dressing change on 2/28/24, and non-blood port flushed with NS, blood return observed, and NS infusion with no resistance - patient tolerated infusion with no AVE's, port flushed with NS/Heparin, blood return observed, port clamped and new green cap applied - patient discharged to home with no concerns, RTC TBA    "

## 2024-02-29 NOTE — TELEPHONE ENCOUNTER
"----- Message from Agata Dumont sent at 2/29/2024 12:29 PM CST -----  Regarding: orders  Contact: [phone]  Name Of Caller: Ann estes/Home Health     Contact Preference?: [phone]     What is the nature of the call?: requesting a call back to verify home health orders     Additional Notes:    "Thank you for all that you do for our patients"        "

## 2024-03-01 ENCOUNTER — TELEPHONE (OUTPATIENT)
Dept: INFECTIOUS DISEASES | Facility: CLINIC | Age: 74
End: 2024-03-01

## 2024-03-01 ENCOUNTER — HOSPITAL ENCOUNTER (OUTPATIENT)
Dept: RADIATION THERAPY | Facility: HOSPITAL | Age: 74
Discharge: HOME | End: 2024-03-01
Attending: RADIOLOGY
Payer: MEDICARE

## 2024-03-01 ENCOUNTER — INFUSION (OUTPATIENT)
Dept: INFECTIOUS DISEASES | Facility: HOSPITAL | Age: 74
End: 2024-03-01
Payer: MEDICARE

## 2024-03-01 VITALS
WEIGHT: 188 LBS | BODY MASS INDEX: 26.92 KG/M2 | SYSTOLIC BLOOD PRESSURE: 116 MMHG | HEART RATE: 91 BPM | OXYGEN SATURATION: 97 % | DIASTOLIC BLOOD PRESSURE: 56 MMHG | TEMPERATURE: 99 F | HEIGHT: 70 IN | RESPIRATION RATE: 19 BRPM

## 2024-03-01 DIAGNOSIS — R21 RASH: Primary | ICD-10-CM

## 2024-03-01 PROCEDURE — G6002 STEREOSCOPIC X-RAY GUIDANCE: HCPCS | Mod: 26,,, | Performed by: RADIOLOGY

## 2024-03-01 PROCEDURE — 77412 RADIATION TX DELIVERY LVL 3: CPT | Performed by: RADIOLOGY

## 2024-03-01 PROCEDURE — 77387 GUIDANCE FOR RADJ TX DLVR: CPT | Mod: TC | Performed by: RADIOLOGY

## 2024-03-01 NOTE — TELEPHONE ENCOUNTER
----- Message from Ghazala Skinner sent at 3/1/2024  8:44 AM CST -----  Regarding: Pic line  Pt's wife, Yinka, is calling to speak with someone in provider's office. Pt's wife  is asking for a return call  regarding her husbands pic line removal. She is asking if it can be removed today. please call pt wife at [phone]

## 2024-03-01 NOTE — PROGRESS NOTES
Pt arrived to infusion clinic for PICC removal. PICC removed. 38cm intact. Pt tolerated well.  Instructed pt to wait 30 minutes to be observed. Pt verbalized understanding.

## 2024-03-01 NOTE — PROGRESS NOTES
Patient seen today in clinic for a wound check. Incision with some erythema and small area of scabbing. No drainage or dehiscence. Bulb drain in place. Plastic surgery called to the room to evaluate. Keep drain as well as sutures and RTC to see Dr. Villanueva next week. Will determine if cleared for radiation then.    Patient knows to call the clinic with further questions or concerns.     Nadine Santos PA-C  Neurosurgery   [facility]

## 2024-03-04 ENCOUNTER — OFFICE VISIT (OUTPATIENT)
Dept: PLASTIC SURGERY | Facility: CLINIC | Age: 74
End: 2024-03-04
Payer: MEDICARE

## 2024-03-04 VITALS
WEIGHT: 188 LBS | HEART RATE: 88 BPM | BODY MASS INDEX: 26.92 KG/M2 | SYSTOLIC BLOOD PRESSURE: 115 MMHG | HEIGHT: 70 IN | DIASTOLIC BLOOD PRESSURE: 60 MMHG

## 2024-03-04 DIAGNOSIS — Z09 SURGERY FOLLOW-UP EXAMINATION: Primary | ICD-10-CM

## 2024-03-04 PROCEDURE — 99024 POSTOP FOLLOW-UP VISIT: CPT | Mod: S$GLB,,, | Performed by: SURGERY

## 2024-03-04 PROCEDURE — 99999 PR PBB SHADOW E&M-EST. PATIENT-LVL III: CPT | Mod: PBBFAC,,, | Performed by: SURGERY

## 2024-03-04 PROCEDURE — 77387 GUIDANCE FOR RADJ TX DLVR: CPT | Mod: TC | Performed by: RADIOLOGY

## 2024-03-04 PROCEDURE — 77412 RADIATION TX DELIVERY LVL 3: CPT | Performed by: RADIOLOGY

## 2024-03-04 PROCEDURE — G6002 STEREOSCOPIC X-RAY GUIDANCE: HCPCS | Mod: 26,,, | Performed by: RADIOLOGY

## 2024-03-05 DIAGNOSIS — R21 RASH: Primary | ICD-10-CM

## 2024-03-05 LAB
FUNGUS SPEC CULT: NORMAL

## 2024-03-05 PROCEDURE — 77387 GUIDANCE FOR RADJ TX DLVR: CPT | Mod: TC | Performed by: RADIOLOGY

## 2024-03-05 PROCEDURE — G6002 STEREOSCOPIC X-RAY GUIDANCE: HCPCS | Mod: 26,,, | Performed by: RADIOLOGY

## 2024-03-05 PROCEDURE — 77412 RADIATION TX DELIVERY LVL 3: CPT | Performed by: RADIOLOGY

## 2024-03-05 NOTE — PROGRESS NOTES
Patient presents Plastic surgery Clinic after having muscle flap closure of spinal wound.  Presents to the clinic today.  His drain was removed 1 week ago.  He does have a small seroma.  This was aspirated it straw-colored fluid 25 cc.  There is no sign of any type of infection.  He will return next week.

## 2024-03-06 ENCOUNTER — OFFICE VISIT (OUTPATIENT)
Dept: INFECTIOUS DISEASES | Facility: CLINIC | Age: 74
End: 2024-03-06
Payer: MEDICARE

## 2024-03-06 ENCOUNTER — LAB VISIT (OUTPATIENT)
Dept: LAB | Facility: HOSPITAL | Age: 74
End: 2024-03-06
Attending: INTERNAL MEDICINE
Payer: MEDICARE

## 2024-03-06 ENCOUNTER — OFFICE VISIT (OUTPATIENT)
Dept: INTERVENTIONAL RADIOLOGY/VASCULAR | Facility: CLINIC | Age: 74
End: 2024-03-06
Payer: MEDICARE

## 2024-03-06 VITALS
DIASTOLIC BLOOD PRESSURE: 73 MMHG | BODY MASS INDEX: 26.89 KG/M2 | SYSTOLIC BLOOD PRESSURE: 114 MMHG | TEMPERATURE: 98 F | WEIGHT: 187.81 LBS | HEART RATE: 61 BPM | HEIGHT: 70 IN

## 2024-03-06 VITALS
SYSTOLIC BLOOD PRESSURE: 118 MMHG | DIASTOLIC BLOOD PRESSURE: 74 MMHG | HEIGHT: 70 IN | BODY MASS INDEX: 26.88 KG/M2 | HEART RATE: 98 BPM | WEIGHT: 187.75 LBS

## 2024-03-06 DIAGNOSIS — C79.51 METASTASIS TO BONE: ICD-10-CM

## 2024-03-06 DIAGNOSIS — R21 RASH: Primary | ICD-10-CM

## 2024-03-06 DIAGNOSIS — A49.8 STAPHYLOCOCCUS EPIDERMIDIS INFECTION: ICD-10-CM

## 2024-03-06 DIAGNOSIS — R21 RASH: ICD-10-CM

## 2024-03-06 DIAGNOSIS — C79.51 METASTASIS TO BONE: Primary | ICD-10-CM

## 2024-03-06 LAB
ALBUMIN SERPL BCP-MCNC: 3.3 G/DL (ref 3.5–5.2)
ALP SERPL-CCNC: 80 U/L (ref 55–135)
ALT SERPL W/O P-5'-P-CCNC: 12 U/L (ref 10–44)
ANION GAP SERPL CALC-SCNC: 9 MMOL/L (ref 8–16)
AST SERPL-CCNC: 14 U/L (ref 10–40)
BASOPHILS # BLD AUTO: 0.06 K/UL (ref 0–0.2)
BASOPHILS NFR BLD: 1.3 % (ref 0–1.9)
BILIRUB SERPL-MCNC: 0.3 MG/DL (ref 0.1–1)
BUN SERPL-MCNC: 12 MG/DL (ref 8–23)
CALCIUM SERPL-MCNC: 9.6 MG/DL (ref 8.7–10.5)
CHLORIDE SERPL-SCNC: 102 MMOL/L (ref 95–110)
CO2 SERPL-SCNC: 25 MMOL/L (ref 23–29)
CREAT SERPL-MCNC: 1 MG/DL (ref 0.5–1.4)
DIFFERENTIAL METHOD BLD: ABNORMAL
EOSINOPHIL # BLD AUTO: 0.2 K/UL (ref 0–0.5)
EOSINOPHIL NFR BLD: 5 % (ref 0–8)
ERYTHROCYTE [DISTWIDTH] IN BLOOD BY AUTOMATED COUNT: 16.6 % (ref 11.5–14.5)
EST. GFR  (NO RACE VARIABLE): >60 ML/MIN/1.73 M^2
GLUCOSE SERPL-MCNC: 81 MG/DL (ref 70–110)
HCT VFR BLD AUTO: 32.7 % (ref 40–54)
HGB BLD-MCNC: 10.6 G/DL (ref 14–18)
IMM GRANULOCYTES # BLD AUTO: 0.04 K/UL (ref 0–0.04)
IMM GRANULOCYTES NFR BLD AUTO: 0.9 % (ref 0–0.5)
INR PPP: 0.9 (ref 0.8–1.2)
LYMPHOCYTES # BLD AUTO: 0.4 K/UL (ref 1–4.8)
LYMPHOCYTES NFR BLD: 9 % (ref 18–48)
MCH RBC QN AUTO: 28.9 PG (ref 27–31)
MCHC RBC AUTO-ENTMCNC: 32.4 G/DL (ref 32–36)
MCV RBC AUTO: 89 FL (ref 82–98)
MONOCYTES # BLD AUTO: 0.5 K/UL (ref 0.3–1)
MONOCYTES NFR BLD: 9.8 % (ref 4–15)
NEUTROPHILS # BLD AUTO: 3.4 K/UL (ref 1.8–7.7)
NEUTROPHILS NFR BLD: 74 % (ref 38–73)
NRBC BLD-RTO: 0 /100 WBC
PLATELET # BLD AUTO: 192 K/UL (ref 150–450)
PMV BLD AUTO: 8.1 FL (ref 9.2–12.9)
POTASSIUM SERPL-SCNC: 3.9 MMOL/L (ref 3.5–5.1)
PROT SERPL-MCNC: 6.8 G/DL (ref 6–8.4)
PROTHROMBIN TIME: 10 SEC (ref 9–12.5)
RBC # BLD AUTO: 3.67 M/UL (ref 4.6–6.2)
SODIUM SERPL-SCNC: 136 MMOL/L (ref 136–145)
WBC # BLD AUTO: 4.58 K/UL (ref 3.9–12.7)

## 2024-03-06 PROCEDURE — 99999 PR PBB SHADOW E&M-EST. PATIENT-LVL II: CPT | Mod: PBBFAC,,,

## 2024-03-06 PROCEDURE — 99999 PR PBB SHADOW E&M-EST. PATIENT-LVL II: CPT | Mod: PBBFAC,,, | Performed by: INTERNAL MEDICINE

## 2024-03-06 PROCEDURE — 80053 COMPREHEN METABOLIC PANEL: CPT | Performed by: INTERNAL MEDICINE

## 2024-03-06 PROCEDURE — 77336 RADIATION PHYSICS CONSULT: CPT | Performed by: RADIOLOGY

## 2024-03-06 PROCEDURE — 85610 PROTHROMBIN TIME: CPT

## 2024-03-06 PROCEDURE — 99214 OFFICE O/P EST MOD 30 MIN: CPT | Mod: S$GLB,,,

## 2024-03-06 PROCEDURE — 99215 OFFICE O/P EST HI 40 MIN: CPT | Mod: S$GLB,,, | Performed by: INTERNAL MEDICINE

## 2024-03-06 PROCEDURE — 36415 COLL VENOUS BLD VENIPUNCTURE: CPT

## 2024-03-06 PROCEDURE — 85025 COMPLETE CBC W/AUTO DIFF WBC: CPT | Performed by: INTERNAL MEDICINE

## 2024-03-06 NOTE — PROGRESS NOTES
Subjective:     Patient ID: Gamaliel Pete Jr. is a 73 y.o. 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 Underwent washout on 2/2/24 w/ cultures positive MRSE from both superficial and deep layers. He was discharged on vancomycin to complete 6 weeks. Received call from plastic surgery regarding new onset rash. Vancomycin was stopped, and patient was transitioned to PO linezolid to complete last 2 weeks of therapy. Lab work showed rise in eosinophils at the time of rash development, no other organ dysfunction noted.     Seen today for follow up. Rash has significantly improved, and patient is now only dealing with some dry skin. Today's labs show resolution of eosinophilia. He is otherwise feeling well and has no complaints.    There is no immunization history on file for this patient.     Review of Systems   All other systems reviewed and are negative.       Past Medical History:   Diagnosis Date    Asthma     no inhaler use    Borderline hypertension     ED (erectile dysfunction)     Gout     Hematuria     Hypertension      Past Surgical History:   Procedure Laterality Date    CLOSURE N/A 2/2/2024    Procedure: CLOSURE;  Surgeon: Ernesto Villanueva MD;  Location: [facility] OR [address] [place] [address];  Service: Plastics;  Laterality: N/A;    COLONOSCOPY  02/10/2022    COLONOSCOPY N/A 02/10/2022    Procedure: COLONOSCOPY;  Surgeon: Desmond Keenan MD;  Location: [facility];  Service: General;  Laterality: N/A;    POSTERIOR FUSION OF CERVICAL SPINE WITH LAMINECTOMY N/A 11/15/2023    Procedure: SPINE, CERVICAL, WITH POSTERIOR FUSION T4-6;  Surgeon: Nasim Martinez DO;  Location: [address];  Service: Neurosurgery;  Laterality: N/A;  C2-T1 laminectomy and posterior fusion. [place]. C-arm.  [employer]. Neuromonitoring.    ROBOT-ASSISTED LAPAROSCOPIC NEPHRECTOMY Right 10/4/2023    Procedure: ROBOTIC NEPHRECTOMY;  Surgeon: Henry Michaels MD;  Location: [facility];  Service: Urology;  Laterality: Right;    SURGICAL REMOVAL OF VERTEBRAL BODY OF CERVICAL SPINE Right 11/7/2023    Procedure: CORPECTOMY, SPINE, CERVICAL;  Surgeon: Nasim Martinez DO;  Location: [facility] OR 2ND FLR;  Service: Neurosurgery;  Laterality: Right;  C4 and C5 corpectomy with globus;  wells tongs; c-arm; neuromonitoring; microscope; type and cross 2 units    SURGICAL REMOVAL OF VERTEBRAL BODY OF CERVICAL SPINE N/A 11/15/2023    Procedure: CORPECTOMY, SPINE, CERVICAL C3-6 REVISION;  Surgeon: Nasim Martinez DO;  Location: [facility] OR 2ND FLR;  Service: Neurosurgery;  Laterality: N/A;    THORACIC LAMINECTOMY WITH FUSION N/A 1/5/2024    Procedure: LAMINECTOMY, SPINE, THORACIC, WITH FUSION;  Surgeon: Nasim Martinez DO;  Location: [facility] OR 2ND FLR;  Service: Neurosurgery;  Laterality: N/A;  T7-T11 fusions with robot    TONSILLECTOMY      WOUND EXPLORATION N/A 2/2/2024    Procedure: EXPLORATION, WOUND;  Surgeon: Nasim Martinez DO;  Location: [facility] OR 2ND FLR;  Service: Neurosurgery;  Laterality: N/A;  Thoracic wound exploration, washout     No family history on file.  Social History     Tobacco Use    Smoking status: Never    Smokeless tobacco: Never   Substance Use Topics    Alcohol use: Not Currently     Alcohol/week: 0.0 standard drinks of alcohol     Comment: rarely    Drug use: Never       Objective:     Physical Exam  Constitutional:       General: He is not in acute distress.     Appearance: Normal appearance. He is well-developed. He is not ill-appearing or diaphoretic.   HENT:      Head: Normocephalic and atraumatic.      Right Ear: External ear normal.      Left Ear: External ear normal.      Nose: Nose normal.   Eyes:      General: No scleral icterus.        Right eye: No discharge.         Left eye: No discharge.       Extraocular Movements: Extraocular movements intact.      Conjunctiva/sclera: Conjunctivae normal.   Pulmonary:      Effort: Pulmonary effort is normal. No respiratory distress.      Breath sounds: No stridor.   Skin:     General: Skin is dry.      Coloration: Skin is not jaundiced or pale.      Findings: No erythema.      Comments: Rash resolved, some dry skin noted   Neurological:      General: No focal deficit present.      Mental Status: He is alert and oriented to person, place, and time. Mental status is at baseline.   Psychiatric:         Mood and Affect: Mood normal.         Behavior: Behavior normal.         Thought Content: Thought content normal.         Judgment: Judgment normal.         Data:    All data, including recent labs, radiology, and pathology, has been independently reviewed.    Labs:    Recent Labs   Lab Result Units 02/27/24  1600 02/29/24  0739 03/06/24  1351   WBC K/uL 7.24 6.32 4.58   Hemoglobin g/dL 9.8* 10.6* 10.6*   Hematocrit % 31.2* 34.2* 32.7*   Sodium mmol/L 137 137 136   Potassium mmol/L 4.5 4.3 3.9   Chloride mmol/L 103 102 102   BUN mg/dL 21 18 12   Creatinine mg/dL 1.2 1.3 1.0   AST U/L 17 23 14   ALT U/L 14 22 12   Alkaline Phosphatase U/L 77 85 80   Total Bilirubin mg/dL 0.3 0.4 0.3   CRP mg/L 18.7*  --   --    INR   --   --  0.9        Radiology:    No results found in the last 24 hours.     Assessment:    1. Rash  Rash and eosinophilia resolved w/ discontinuation of vancomycin  No concerns for DRESS at this time      2. Staphylococcus epidermidis infection  Finishing 2 week course of linezolid to complete therapy         Follow up as needed    The total time for evaluation and management services performed on 3/6/24 was greater than 40 minutes.     Rhiannon Chi DO  Transplant Infectious Disease

## 2024-03-06 NOTE — PROGRESS NOTES
Subjective     Patient ID: Gamaliel Pete Jr. is a 73 y.o. male.    Chief Complaint: Leg Pain    Referral from Dr. Rock to discuss and schedule preop embo of right proximal femur lesion.   74 yo with metastatic RCC to the spine and bones. Experiences right thigh pain. Lesion of the right thigh discovered. He reports that he is feeling much better after hospitalizations and spinal surgery. Seeing ID today to follow up of spinal infection following surgery. Otherwise no complaints.     PMH and medications reviewed.    No taking any GLP-1 Agonists.   Dr. Rock clinic note reviewed.     Review of Systems   Constitutional:  Positive for activity change, fatigue and unexpected weight change.   Respiratory:  Negative for shortness of breath.    Cardiovascular:  Negative for chest pain.   Gastrointestinal:  Negative for abdominal pain.   Neurological:  Positive for weakness (generalized.). Negative for facial asymmetry and speech difficulty.   Psychiatric/Behavioral:  Negative for behavioral problems and confusion.           Objective     Physical Exam  Vitals reviewed.   Constitutional:       Appearance: Normal appearance.   HENT:      Head: Normocephalic and atraumatic.      Nose: Nose normal.   Eyes:      Conjunctiva/sclera: Conjunctivae normal.   Pulmonary:      Effort: Pulmonary effort is normal.   Musculoskeletal:      Comments: Back brace in use.      Skin:     General: Skin is warm and dry.      Coloration: Skin is not jaundiced.   Neurological:      General: No focal deficit present.      Mental Status: He is alert and oriented to person, place, and time.      Motor: Weakness (generalized.) present.      Coordination: Coordination abnormal (patient in wheelchair for visit. Reports he does walk some.).   Psychiatric:         Mood and Affect: Mood normal.         Behavior: Behavior normal.        Assessment and Plan     1. Metastasis to bone  -     IR Embolization for Tumor_Organ Ischemia; Future;  Expected date: 03/06/2024  -     Protime-INR; Future; Expected date: 03/06/2024    - Approved by Dr. Whittaker. Discussed with Dr. Marquis. Plan for preop embo of right proximal femur lesion. Patient will be admitted overnight and Dr. Rock will complete surgery the next morning.   - discussed that the biggest complaint following these embolization procedures is pain.   - Discussed how the procedure will be performed, risks (including, but not limited to, pain, bleeding, infection, damage to nearby structures, and the need for additional procedures), benefits, possible complications, pre-post procedure expectations, and alternatives. The patient voices understanding and all questions have been answered.  The patient agrees to proceed as planned. Patient scheduled for 3/18/2024. Pre-procedure handout with clinic phone number provided.    Radha Bills PA-C  Interventional Radiology  [phone]

## 2024-03-06 NOTE — LETTER
March 6, 2024    Gamaliel Pete Jr.  [address] [place] [id number]       [facility] [address]  [address]  [facility] [address]  Phone: [phone] PRE-PROCEDURE INSTRUCTIONS    Your procedure with Interventional Radiology is scheduled for 3/18/2024. Please arrive by 10:30 am.    **Do not eat or drink anything between midnight and the time of your procedure. This includes gum, mints, and candy lemon drops.    **Do not smoke or drink alcoholic beverages 24 hours prior to your procedure.    **If you wear contact lenses, dentures, hearing aids, or glasses, bring a container to put them in during the procedure and give them to a family member for safekeeping.    **If you have been diagnosed with sleep apnea please bring your CPAP machine.    **If your doctor has scheduled you for an overnight stay, bring a small overnight bag with any personal items that you may need.    **Make arrangements in advance for transportation home by a responsible adult. It is not safe to drive a vehicle during the 24 hours following the procedure.    **All [facility] facilities and properties are tobacco free. Smoking is NOT allowed.    PLEASE NOTE: The procedure schedule has many variables which affect the time of your procedure. Family members should be available if your surgery time changes.    If you have any questions about these instructions call Interventional Radiology at [phone] Monday - Friday between 8:00am and 4:00pm or [phone] (ask for interventional radiology resident) for after hours.

## 2024-03-07 ENCOUNTER — TELEPHONE (OUTPATIENT)
Dept: PREADMISSION TESTING | Facility: HOSPITAL | Age: 74
End: 2024-03-07
Payer: MEDICARE

## 2024-03-08 ENCOUNTER — HOSPITAL ENCOUNTER (OUTPATIENT)
Dept: PREADMISSION TESTING | Facility: HOSPITAL | Age: 74
Discharge: HOME | End: 2024-03-08
Attending: ORTHOPAEDIC SURGERY
Payer: MEDICARE

## 2024-03-08 ENCOUNTER — ANESTHESIA EVENT (OUTPATIENT)
Dept: SURGERY | Facility: HOSPITAL | Age: 74
DRG: 481 | End: 2024-03-08
Payer: MEDICARE

## 2024-03-08 ENCOUNTER — OFFICE VISIT (OUTPATIENT)
Dept: NEUROSURGERY | Facility: CLINIC | Age: 74
End: 2024-03-08
Payer: MEDICARE

## 2024-03-08 DIAGNOSIS — M84.58XA PATHOLOGICAL FRACTURE OF THORACIC VERTEBRA DUE TO NEOPLASTIC DISEASE: ICD-10-CM

## 2024-03-08 DIAGNOSIS — Z98.1 S/P CERVICAL SPINAL FUSION: ICD-10-CM

## 2024-03-08 DIAGNOSIS — Z98.1 S/P FUSION OF THORACIC SPINE: Primary | ICD-10-CM

## 2024-03-08 DIAGNOSIS — C79.51 METASTATIC CANCER TO SPINE: ICD-10-CM

## 2024-03-08 PROCEDURE — 99999 PR PBB SHADOW E&M-EST. PATIENT-LVL I: CPT | Mod: PBBFAC,,,

## 2024-03-08 PROCEDURE — 99024 POSTOP FOLLOW-UP VISIT: CPT | Mod: S$GLB,,,

## 2024-03-08 PROCEDURE — 1125F AMNT PAIN NOTED PAIN PRSNT: CPT | Mod: CPTII,S$GLB,,

## 2024-03-08 NOTE — ANESTHESIA PREPROCEDURE EVALUATION
[facility]  Anesthesia Pre-Operative Evaluation     Patient Name: Gamaliel Pete Jr.  YOB: 1950  MRN: [id number]  CSN: [id number]      Code Status: Full Code   Date of Procedure: 5/23/2024  Anesthesia: General Procedure: Procedure(s) (LRB):  INSERTION, INTRAMEDULLARY NGHIA (Right)  INSERTION, SINGLE LUMEN CATHETER WITH PORT, WITH FLUOROSCOPIC GUIDANCE (N/A)  Pre-Operative Diagnosis: Impending pathologic fracture [Z91.89]  Proceduralist: Surgeons and Role:  Panel 1:     * Parvez Rock MD - Primary  Panel 2:     * Jj Reilly MD - Primary          SUBJECTIVE:     Pre-operative evaluation for Procedure(s) (LRB):  INSERTION, INTRAMEDULLARY NGHIA (Right)  INSERTION, SINGLE LUMEN CATHETER WITH PORT, WITH FLUOROSCOPIC GUIDANCE (N/A)     05/22/2024    Gamaliel Pete Jr. is a 73 y.o. male with clear cell carcinoma with pulmonary mets, pathologic fracture of the R femur, asthma, and normocytic anemia. Pt went for IR embolization yesterday. K at 5.4 this morning.        Patient now presents for the above procedure(s).       No current facility-administered medications for this encounter.        ________________________________________  No results found for this or any previous visit.    ________________________________________    Prev airway: None documented.     Performed by: Lore Maloney CRNA  Authorized by: Slava Graf MD    Intubation:     Induction:  Intravenous    Intubated:  Postinduction    Mask Ventilation:  Easy mask    Attempts:  1    Attempted By:  CRNA    Method of Intubation:  Video laryngoscopy    Blade:  Yoder 3    Laryngeal View Grade: Grade I - full view of cords      Difficult Airway Encountered?: No      Complications:  None    Airway Device:  Oral endotracheal tube    Airway Device Size:  8.0    Style/Cuff Inflation:  Cuffed (inflated to minimal occlusive pressure)    Tube secured:   21    Secured at:  The lips    Placement Verified By:  Capnometry    Complicating Factors:  None    Findings Post-Intubation:  BS equal bilateral and atraumatic/condition of teeth unchanged       LDA:        Peripheral IV - Single Lumen 05/22/24 1318 20 G Left;Posterior Hand (Active)   Site Assessment Clean;Dry;Intact;No redness;No swelling 05/22/24 1318   Extremity Assessment Distal to IV No warmth;No swelling;No redness;No abnormal discoloration 05/22/24 1318   Line Status Blood return noted;Flushed;Saline locked 05/22/24 1318   Dressing Status Clean;Dry;Intact 05/22/24 1318   Dressing Intervention First dressing 05/22/24 1318   Number of days: 0            Closed/Suction Drain 02/02/24 0914 Tube - 1 Inferior;Right Back Bulb 15 Fr. (Active)   Number of days: 110       Drips: None documented.      Patient Active Problem List   Diagnosis    Class 1 obesity without serious comorbidity with body mass index (BMI) of 31.0 to 31.9 in adult    Onychomycosis    History of gout    Seborrheic keratosis    Tinea corporis    Positive colorectal cancer screening using Cologuard test    Renal mass    Cancer with pulmonary metastases    Metastasis to bone    Iron deficiency anemia    History of right radical nephrectomy    Clear cell carcinoma of right kidney    Hypercalcemia    Transaminitis    Constipation due to opioid therapy    Debility    Diaphoresis    Nausea & vomiting    Normocytic anemia    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     Current Outpatient Medications on File Prior to Encounter   Medication Sig Dispense Refill    amlodipine-benazepril 5-10 mg (LOTREL) 5-10 mg per capsule Take 1 capsule by mouth once daily. (Patient not taking: Reported on 4/15/2024) 90 capsule 3    duke's soln (benadryl 30 mL, mylanta 30 mL, LIDOcaine 30 mL, nystatin 30 mL) 120mL Take 10 mLs by mouth 4 (four) times daily. (Patient not taking: Reported on 4/15/2024) 500 mL 0    gabapentin (NEURONTIN) 300 MG capsule Take 1 capsule (300 mg total) by mouth 3 (three) times daily. (Patient not taking: Reported on 4/15/2024) 90 capsule 11    naloxone (NARCAN) 4 mg/actuation Spry 1 spray (4mg) by nasal route as needed for opioid overdose; may repeat every 2-3 minutes in alternating nostrils until medical help arrives. Call 911 (Patient not taking: Reported on 4/15/2024) 2 each 0    omega-3 fatty acids/fish oil (FISH OIL-OMEGA-3 FATTY ACIDS) 300-1,000 mg capsule Take 2 capsules by mouth once daily. (Patient not taking: Reported on 4/15/2024)      sildenafiL (VIAGRA) 100 MG tablet Take 1 tablet (100 mg total) by mouth daily as needed for Erectile Dysfunction. (Patient not taking: Reported on 1/18/2024) 30 tablet 11    turmeric (CURCUMIN MISC) 1,000 mg by Misc.(Non-Drug; Combo Route) route Daily. (Patient not taking: Reported on 4/15/2024)      UNABLE TO FIND Take 500 mg by mouth 2 (two) times a day. medication name: Tudca (Patient not taking: Reported on 4/15/2024)      UNABLE TO FIND Take 2 capsules by mouth 2 (two) times a day. medication name: Turkey tail mushroom (Patient not taking: Reported on 4/15/2024)      UNABLE TO FIND Take 15 drops by mouth once daily. medication name: Essiac-20 (Patient not taking: Reported on 4/15/2024)      UNABLE TO FIND Take 5 mLs by mouth 2 (two) times a day. medication name: barley leaf juice powder (Patient not taking: Reported on 4/15/2024)      UNABLE TO FIND Take 5 mLs by mouth 2 (two) times a day. medication name: black  seed oil (cumin seed) (Patient not taking: Reported on 4/15/2024)         Past Surgical History:   Procedure Laterality Date    CLOSURE N/A 2/2/2024    Procedure: CLOSURE;  Surgeon: Ernesto Villanueva MD;  Location: [address];  Service: Plastics;  Laterality: N/A;    COLONOSCOPY  02/10/2022    COLONOSCOPY N/A 02/10/2022    Procedure: COLONOSCOPY;  Surgeon: Desmond Keenan MD;  [facility];  Service: General;  Laterality: N/A;    POSTERIOR FUSION OF CERVICAL SPINE WITH LAMINECTOMY N/A 11/15/2023    Procedure: SPINE, CERVICAL, WITH POSTERIOR FUSION T4-6;  Surgeon: Nasim Martinez DO;  Location: [address];  Service: Neurosurgery;  Laterality: N/A;  C2-T1 laminectomy and posterior fusion. [place]. C-arm. [email or web address]. Neuromonitoring.    ROBOT-ASSISTED LAPAROSCOPIC NEPHRECTOMY Right 10/4/2023    Procedure: ROBOTIC NEPHRECTOMY;  Surgeon: Henry Michaels MD;  Location: [facility];  Service: Urology;  Laterality: Right;    SURGICAL REMOVAL OF VERTEBRAL BODY OF CERVICAL SPINE Right 11/7/2023    Procedure: CORPECTOMY, SPINE, CERVICAL;  Surgeon: Nasim Martinez DO;  Location: [address];  Service: Neurosurgery;  Laterality: Right;  C4 and C5 corpectomy with globus;  wells tongs; c-arm; neuromonitoring; microscope; type and cross 2 units    SURGICAL REMOVAL OF VERTEBRAL BODY OF CERVICAL SPINE N/A 11/15/2023    Procedure: CORPECTOMY, SPINE, CERVICAL C3-6 REVISION;  Surgeon: Nasim Martinez DO;  Location: [facility] OR [address] [place] [address];  Service: Neurosurgery;  Laterality: N/A;    THORACIC LAMINECTOMY WITH FUSION N/A 1/5/2024    Procedure: LAMINECTOMY, SPINE, THORACIC, WITH FUSION;  Surgeon: Nasim Martinez DO;  Location: [address];  Service: Neurosurgery;  Laterality: N/A;  T7-T11 fusions with robot    TONSILLECTOMY      WOUND EXPLORATION N/A 2/2/2024    Procedure: EXPLORATION, WOUND;  Surgeon: Nasim Martinez DO;  Location: [facility] OR 2ND FLR;  Service: Neurosurgery;  Laterality:  N/A;  Thoracic wound exploration, washout       Social History:  Tobacco Use: Low Risk  (5/22/2024)    Patient History     Smoking Tobacco Use: Never     Smokeless Tobacco Use: Never     Passive Exposure: Not on file       Alcohol Use: Not At Risk (2/1/2024)    AUDIT-C     Frequency of Alcohol Consumption: Never     Average Number of Drinks: Patient does not drink     Frequency of Binge Drinking: Never       OBJECTIVE:     Vital Signs Range:  BMI Readings from Last 1 Encounters:   05/06/24 25.72 kg/m²               Significant Labs:        Component Value Date/Time    WBC 5.83 05/06/2024 0834    HGB 13.6 (L) 05/06/2024 0834    HCT 40.2 05/06/2024 0834    HCT 26 (L) 01/05/2024 1956     05/06/2024 0834     05/06/2024 0834    K 4.4 05/06/2024 0834     05/06/2024 0834    CO2 22 (L) 05/06/2024 0834     (H) 05/06/2024 0834    BUN 16 05/06/2024 0834    CREATININE 1.1 05/06/2024 0834    MG 2.1 01/08/2024 0310    PHOS 2.9 01/08/2024 0310    CALCIUM 10.9 (H) 05/06/2024 0834    ALBUMIN 3.9 05/06/2024 0834    PROT 7.9 05/06/2024 0834    ALKPHOS 80 05/06/2024 0834    BILITOT 0.4 05/06/2024 0834    AST 36 05/06/2024 0834    ALT 38 05/06/2024 0834    INR 0.9 05/21/2024 1150        Please see Results Review for additional labs.     Diagnostic Studies: No relevant studies.    EKG:   Results for orders placed or performed during the hospital encounter of 01/02/24   EKG 12-lead    Collection Time: 01/02/24  8:34 PM    Narrative    Test Reason : Z01.810,    Vent. Rate : 079 BPM     Atrial Rate : 079 BPM     P-R Int : 210 ms          QRS Dur : 162 ms      QT Int : 410 ms       P-R-T Axes : 068 -78 079 degrees     QTc Int : 470 ms    Sinus rhythm with 1st degree A-V block  Right bundle branch block  Left anterior fascicular block   Bifascicular block   LVH with repolarization abnormality  Cannot rule out Septal infarct (cited on or before 20-NOV-2023)  Abnormal ECG  When compared with ECG of 20-NOV-2023  12:17,  MS interval has increased  Confirmed by NAMAN SANTOS MD ([id number]) on 1/3/2024 11:36:57 PM    Referred By: [email or web address]   SELF           Confirmed By:NAMAN SANTOS MD       ECHO:  See subjective, if available.      ASSESSMENT/PLAN:           Pre-op Assessment    I have reviewed the Patient Summary Reports.       I have reviewed the Medications.     Review of Systems  Anesthesia Hx:  No problems with previous Anesthesia   History of prior surgery of interest to airway management or planning:            Denies Personal Hx of Anesthesia complications.                    Social:  Non-Smoker, No Alcohol Use       Hematology/Oncology:       -- Anemia:                  Current/Recent Cancer.         surgery       EENT/Dental:  EENT/Dental Normal           Cardiovascular:     Hypertension       Denies Angina.       Denies JACOBSON.                            Pulmonary:    Asthma asymptomatic  Denies Shortness of breath.                  Renal/:  Chronic Renal Disease                Hepatic/GI:     GERD, well controlled   Dysphagia for solids          Musculoskeletal:  Arthritis          Spine Disorders: cervical and thoracic            Neurological:  Neurology Normal                                      Endocrine:  Endocrine Normal                Physical Exam  General: Well nourished, Cooperative, Alert and Oriented    Airway:  Mallampati: III / II  Mouth Opening: Normal  TM Distance: Normal  Tongue: Normal  Neck ROM: Normal ROM    Dental:  Intact    Chest/Lungs:  Normal Respiratory Rate    Heart:  Rate: Tachycardia      Anesthesia Plan  Type of Anesthesia, risks & benefits discussed:    Anesthesia Type: Gen ETT, Gen Supraglottic Airway, Regional  Intra-op Monitoring Plan: Standard ASA Monitors  Post Op Pain Control Plan: multimodal analgesia, IV/PO Opioids PRN and peripheral nerve block  Induction:  IV  Airway Plan: Video, Post-Induction  Informed Consent: Informed consent signed with the Patient and all parties understand  the risks and agree with anesthesia plan.  All questions answered.   ASA Score: 3  Day of Surgery Review of History & Physical: H&P Update referred to the surgeon/provider.    Ready For Surgery From Anesthesia Perspective.     .

## 2024-03-11 ENCOUNTER — PATIENT MESSAGE (OUTPATIENT)
Dept: ORTHOPEDICS | Facility: CLINIC | Age: 74
End: 2024-03-11
Payer: MEDICARE

## 2024-03-11 ENCOUNTER — PATIENT MESSAGE (OUTPATIENT)
Dept: NEUROSURGERY | Facility: CLINIC | Age: 74
End: 2024-03-11
Payer: MEDICARE

## 2024-03-11 ENCOUNTER — OFFICE VISIT (OUTPATIENT)
Dept: PLASTIC SURGERY | Facility: CLINIC | Age: 74
End: 2024-03-11
Payer: MEDICARE

## 2024-03-11 VITALS
HEIGHT: 70 IN | HEART RATE: 61 BPM | DIASTOLIC BLOOD PRESSURE: 73 MMHG | BODY MASS INDEX: 26.77 KG/M2 | WEIGHT: 187 LBS | SYSTOLIC BLOOD PRESSURE: 114 MMHG

## 2024-03-11 DIAGNOSIS — Z09 SURGERY FOLLOW-UP EXAMINATION: Primary | ICD-10-CM

## 2024-03-11 PROCEDURE — 99999 PR PBB SHADOW E&M-EST. PATIENT-LVL III: CPT | Mod: PBBFAC,,, | Performed by: SURGERY

## 2024-03-11 PROCEDURE — 99024 POSTOP FOLLOW-UP VISIT: CPT | Mod: S$GLB,,, | Performed by: SURGERY

## 2024-03-11 NOTE — PROGRESS NOTES
Patient presents Plastic surgery Clinic after having a muscle flap coverage of spinal wound.  He had a seroma last week.  This was aspirated.  Today he presents with a smaller seroma.  Proximally 40 cc was aspirated.  It is straw-colored fluid no sign of infection.  We will see him back next week.  He may indeed need another aspiration.  If this is not successful we will go ahead make a very small incision in the most dependent portion inferiorly and use a small wick.  He will return to clinic next week.

## 2024-03-12 DIAGNOSIS — C64.1 CLEAR CELL CARCINOMA OF RIGHT KIDNEY: Primary | ICD-10-CM

## 2024-03-13 ENCOUNTER — TELEPHONE (OUTPATIENT)
Dept: ORTHOPEDICS | Facility: CLINIC | Age: 74
End: 2024-03-13
Payer: MEDICARE

## 2024-03-13 ENCOUNTER — PATIENT MESSAGE (OUTPATIENT)
Dept: HEMATOLOGY/ONCOLOGY | Facility: CLINIC | Age: 74
End: 2024-03-13
Payer: MEDICARE

## 2024-03-13 NOTE — TELEPHONE ENCOUNTER
Called patient. He tells me he is worried about having surgery next week. He continues to have drainage from his spine incision and tells me he is being further worked up for this. Additionally he has had significant nausea and was hoping to get a keytruda infusion next week. He has no worsening or significant leg pain. We reviewed that he is at risk for pathologic fracture and he understands. He is going to let my office know in a few weeks when he would like to reschedule or sooner should he develop more pain. He will continue to be careful when moving. We will contact IR to let them know we are canceling surgery so that they are aware to cancel his embolization.

## 2024-03-19 ENCOUNTER — TELEPHONE (OUTPATIENT)
Dept: HEMATOLOGY/ONCOLOGY | Facility: CLINIC | Age: 74
End: 2024-03-19
Payer: MEDICARE

## 2024-03-19 ENCOUNTER — OFFICE VISIT (OUTPATIENT)
Dept: HEMATOLOGY/ONCOLOGY | Facility: CLINIC | Age: 74
End: 2024-03-19
Payer: MEDICARE

## 2024-03-19 ENCOUNTER — LAB VISIT (OUTPATIENT)
Dept: LAB | Facility: HOSPITAL | Age: 74
End: 2024-03-19
Payer: MEDICARE

## 2024-03-19 ENCOUNTER — TELEPHONE (OUTPATIENT)
Dept: PALLIATIVE MEDICINE | Facility: CLINIC | Age: 74
End: 2024-03-19
Payer: MEDICARE

## 2024-03-19 VITALS
OXYGEN SATURATION: 98 % | SYSTOLIC BLOOD PRESSURE: 111 MMHG | TEMPERATURE: 98 F | HEIGHT: 70 IN | DIASTOLIC BLOOD PRESSURE: 63 MMHG | HEART RATE: 109 BPM | WEIGHT: 179.38 LBS | BODY MASS INDEX: 25.68 KG/M2

## 2024-03-19 DIAGNOSIS — I10 ESSENTIAL HYPERTENSION: ICD-10-CM

## 2024-03-19 DIAGNOSIS — R21 RASH: ICD-10-CM

## 2024-03-19 DIAGNOSIS — K13.79 MOUTH SORES: ICD-10-CM

## 2024-03-19 DIAGNOSIS — C78.00 MALIGNANT NEOPLASM METASTATIC TO LUNG, UNSPECIFIED LATERALITY: ICD-10-CM

## 2024-03-19 DIAGNOSIS — C79.51 METASTATIC ADENOCARCINOMA TO RIGHT FEMUR: ICD-10-CM

## 2024-03-19 DIAGNOSIS — C64.1 CLEAR CELL CARCINOMA OF RIGHT KIDNEY: ICD-10-CM

## 2024-03-19 DIAGNOSIS — M54.9 INTRACTABLE BACK PAIN: ICD-10-CM

## 2024-03-19 DIAGNOSIS — C64.1 CLEAR CELL CARCINOMA OF RIGHT KIDNEY: Primary | ICD-10-CM

## 2024-03-19 DIAGNOSIS — T45.1X5A IMMUNODEFICIENCY SECONDARY TO CHEMOTHERAPY: ICD-10-CM

## 2024-03-19 DIAGNOSIS — R93.89 ABNORMAL FINDINGS ON DIAGNOSTIC IMAGING OF OTHER SPECIFIED BODY STRUCTURES: ICD-10-CM

## 2024-03-19 DIAGNOSIS — C79.51 METASTATIC CANCER TO SPINE: ICD-10-CM

## 2024-03-19 DIAGNOSIS — Z79.899 IMMUNODEFICIENCY SECONDARY TO CHEMOTHERAPY: ICD-10-CM

## 2024-03-19 DIAGNOSIS — R63.4 WEIGHT LOSS: ICD-10-CM

## 2024-03-19 DIAGNOSIS — D84.81 IMMUNODEFICIENCY SECONDARY TO NEOPLASM: ICD-10-CM

## 2024-03-19 DIAGNOSIS — M79.604 RIGHT LEG PAIN: ICD-10-CM

## 2024-03-19 DIAGNOSIS — D84.821 IMMUNODEFICIENCY SECONDARY TO CHEMOTHERAPY: ICD-10-CM

## 2024-03-19 DIAGNOSIS — D49.9 IMMUNODEFICIENCY SECONDARY TO NEOPLASM: ICD-10-CM

## 2024-03-19 LAB
ALBUMIN SERPL BCP-MCNC: 3.7 G/DL (ref 3.5–5.2)
ALP SERPL-CCNC: 83 U/L (ref 55–135)
ALT SERPL W/O P-5'-P-CCNC: 16 U/L (ref 10–44)
ANION GAP SERPL CALC-SCNC: 7 MMOL/L (ref 8–16)
AST SERPL-CCNC: 16 U/L (ref 10–40)
BILIRUB SERPL-MCNC: 0.3 MG/DL (ref 0.1–1)
BUN SERPL-MCNC: 12 MG/DL (ref 8–23)
CALCIUM SERPL-MCNC: 11.1 MG/DL (ref 8.7–10.5)
CHLORIDE SERPL-SCNC: 104 MMOL/L (ref 95–110)
CO2 SERPL-SCNC: 27 MMOL/L (ref 23–29)
CREAT SERPL-MCNC: 1.2 MG/DL (ref 0.5–1.4)
ERYTHROCYTE [DISTWIDTH] IN BLOOD BY AUTOMATED COUNT: 15.3 % (ref 11.5–14.5)
EST. GFR  (NO RACE VARIABLE): >60 ML/MIN/1.73 M^2
GLUCOSE SERPL-MCNC: 111 MG/DL (ref 70–110)
HCT VFR BLD AUTO: 31.9 % (ref 40–54)
HGB BLD-MCNC: 10.4 G/DL (ref 14–18)
IMM GRANULOCYTES # BLD AUTO: 0.56 K/UL (ref 0–0.04)
MCH RBC QN AUTO: 28.4 PG (ref 27–31)
MCHC RBC AUTO-ENTMCNC: 32.6 G/DL (ref 32–36)
MCV RBC AUTO: 87 FL (ref 82–98)
NEUTROPHILS # BLD AUTO: 3.1 K/UL (ref 1.8–7.7)
PLATELET # BLD AUTO: 84 K/UL (ref 150–450)
PMV BLD AUTO: 8.8 FL (ref 9.2–12.9)
POTASSIUM SERPL-SCNC: 4.9 MMOL/L (ref 3.5–5.1)
PROT SERPL-MCNC: 7.4 G/DL (ref 6–8.4)
RBC # BLD AUTO: 3.66 M/UL (ref 4.6–6.2)
SODIUM SERPL-SCNC: 138 MMOL/L (ref 136–145)
T4 FREE SERPL-MCNC: 0.89 NG/DL (ref 0.71–1.51)
TSH SERPL DL<=0.005 MIU/L-ACNC: 3 UIU/ML (ref 0.4–4)
WBC # BLD AUTO: 5.9 K/UL (ref 3.9–12.7)

## 2024-03-19 PROCEDURE — 84439 ASSAY OF FREE THYROXINE: CPT | Performed by: REGISTERED NURSE

## 2024-03-19 PROCEDURE — 85027 COMPLETE CBC AUTOMATED: CPT | Performed by: REGISTERED NURSE

## 2024-03-19 PROCEDURE — 99215 OFFICE O/P EST HI 40 MIN: CPT | Mod: S$GLB,,, | Performed by: REGISTERED NURSE

## 2024-03-19 PROCEDURE — 36415 COLL VENOUS BLD VENIPUNCTURE: CPT | Performed by: REGISTERED NURSE

## 2024-03-19 PROCEDURE — 84443 ASSAY THYROID STIM HORMONE: CPT | Performed by: REGISTERED NURSE

## 2024-03-19 PROCEDURE — 80053 COMPREHEN METABOLIC PANEL: CPT | Performed by: REGISTERED NURSE

## 2024-03-19 PROCEDURE — 99999 PR PBB SHADOW E&M-EST. PATIENT-LVL V: CPT | Mod: PBBFAC,,, | Performed by: REGISTERED NURSE

## 2024-03-19 RX ORDER — HEPARIN 100 UNIT/ML
500 SYRINGE INTRAVENOUS
Status: CANCELLED | OUTPATIENT
Start: 2024-03-19

## 2024-03-19 RX ORDER — SODIUM CHLORIDE 0.9 % (FLUSH) 0.9 %
10 SYRINGE (ML) INJECTION
Status: CANCELLED | OUTPATIENT
Start: 2024-03-19

## 2024-03-19 RX ORDER — EPINEPHRINE 0.3 MG/.3ML
0.3 INJECTION SUBCUTANEOUS ONCE AS NEEDED
Status: CANCELLED | OUTPATIENT
Start: 2024-03-19

## 2024-03-19 RX ORDER — DIPHENHYDRAMINE HYDROCHLORIDE 50 MG/ML
50 INJECTION INTRAMUSCULAR; INTRAVENOUS ONCE AS NEEDED
Status: CANCELLED | OUTPATIENT
Start: 2024-03-19

## 2024-03-19 NOTE — TELEPHONE ENCOUNTER
Spoke w/ pt in regards to scheduling nutrition referral placed by BRIAN Durham. Pt approved to be scheduled w/ Cassidy Bruce RD for Wednesday 4/10/24 @ 1:30PM.       [place], [place] ext [phone]

## 2024-03-19 NOTE — PROGRESS NOTES
"Subjective     Patient ID: Gamaliel Pete Jr. is a 73 y.o. male.    Chief Complaint: No chief complaint on file.    HPI    Presents for follow up prior to cycle 4  Has put surgery on hold for now - was initially scheduled today   States still an option and needs to be done but wants to get stronger first   Back pain has been increased recently   Feeling generalized weakness even with support of walker   Afraid of falling and will sit down quickly to avoid this   Was given PT exercises to do at home but has not tried these   Generally stays in chair most of the day   States appetite is low, but food is tasting "like grease"  Weight downtrending    Tried Boost and Premier Protein drinks but was not a fan of the flavors  Had one episode of nausea this morning, no vomiting  No longer on antibiotics and PICC line removed per ID  Notes generally BMs are normal, has not had BM in several days   Felt this is related to not eating much   No other pains besides in back   Will likely have small incision with wick placement to back for drainage tomorrow as has had 2 aspirations thus far - was told no signs of infection however  Weaning off of gabapentin - down to 1 per day - states he does not have nerve pain so does not want to continue if not needed  Off of all supplements   Not taking oxycodone for pain unless absolutely required   States not really taking anything for pain   Only other medication he is taking is allopurinol   Not on antihypertensives       Diagnosis: metastatic RCC     Oncology History:  - Presented with gross hematuria mid June 2023  Building a house and has been active working on this in the [facility]- 1st noted dark urine and felt related to being dehydrated  Occurred a 2nd time approximately 2 weeks later and this time he noted darker and small clots  Noted again 2 weeks later and worse but ultimately he was prompted to seek evaluation when he had significant blood when he urinated     - went to his PCP " and urine sample was yellow again  He had blood work done and referred to Urology at that time     - 9/5/2023 CT Urogram:  FINDINGS:  The lung bases demonstrate bilateral nodules, for example 9 mm at the right lung base and up to 11 mm at the left lung base.  Atelectasis present.  There are bilateral fat containing inguinal hernias.  The liver demonstrates no mass.  The spleen is not enlarged.  The stomach, pancreas and adrenal glands are within normal limits.  Gallbladder is unremarkable.  There is no biliary ductal dilatation.  The kidneys concentrate and excrete contrast satisfactorily.  There is no renal calculus or ureteral calculus.  Within the mid to lower pole of the right kidney is a 4.9 x 6.3 x 6.1 cm heterogeneous solid mass which is overall slightly hypodense in relation to the enhancing parenchyma.  The mass is partially exophytic posteriorly.  It does extend partially into the renal sinus  At the upper pole of the right kidney is a subcentimeter exophytic focus which is isodense to the parenchyma on postcontrast imaging appearing slightly hyperdense on the noncontrast study, too small to characterize.  There are multiple additional subcentimeter hypodense right kidney foci which are suggestive of cysts.  Finally at the lower pole of the right kidney is a exophytic hypodense structure most compatible with a cyst.  The right main renal vein appears patent.  There is no significant periaortic lymphadenopathy.  Left kidney demonstrates several subcentimeter foci which are hypodense but too small to characterize, suggestive of cysts.  There is somewhat of a small amount of contrast within the upper collecting systems and ureters, with no definite focal abnormality, noting that there is some distortion of the collecting system in the region in which the right kidney mass involves the renal sinus.  The bladder is partially distended appearing grossly unremarkable.  The bowel demonstrates no significant  abnormality.  The osseous structures demonstrate degenerative changes.  T9 demonstrates a lytic focus occupying the anterior half of the vertebral body.  There is slight loss of height along superior and inferior endplates with cortical disruption..  Impression:  Solid right renal mass concerning for neoplasm.  Multiple lung base nodules up to 11 mm, concerning for metastatic disease.  Recommend chest CT for full evaluation of lungs.  Lytic lesion at T9 with mild compression, concerning for pathologic compression fracture.  Subcentimeter right kidney lesion isodense to parenchyma on contrast enhanced imaging, too small to characterize.  Additional bilateral renal hypodensities which are too small to characterize but suggestive of cysts.     - 9/7/2023 CT Chest:  FINDINGS:  The base of the neck is within normal limits.  The thyroid gland is unremarkable.  The supraclavicular regions are within normal limits.  The trachea is unremarkable.  The central airways are within normal limits.  No endobronchial lesion is identified.  There is no evidence of bronchiectasis.  The heart is unremarkable.  There are no pericardial effusions.  There are coronary artery calcifications.  The thoracic aorta is normal in caliber.  There are subcentimeter mediastinal and hilar lymph nodes.  There are subcentimeter axillary lymph nodes.  There are no pleural effusions.  There is no evidence of a pneumothorax.  There is no evidence of pneumomediastinum.  There are innumerable bilateral pulmonary nodules.  There is no focal consolidation.  The esophagus is unremarkable.  Please see the dedicated CT abdomen pelvis for the upper abdominal findings.  The chest wall is unremarkable.  There is unchanged lytic lesion involving the anterior aspect of T9 vertebral body with associated cortical erosions.  Impression:  Innumerable pulmonary nodules, concerning for metastatic disease to the chest.  Unchanged lytic lesion in the T9 vertebral body with  cortical erosions.  Follow-up MRI of the spine, as clinically warranted.     - 9/7/2023 CT Abd:  FINDINGS:  CT renal protocol:  There is a 4.8 x 6.3 cm exophytic enhancing lesion in the lower pole of the right kidney.  There is hypoenhancement of the lesion compared to the normal renal parenchyma.  There is unchanged appearance of the mass extending into the right renal sinus.  There is no extension into the right renal vein  No additional enhancing lesions are identified.  There is a subcentimeter lesion in the right kidney is too small complete characterization.  There is prompt excretion from both collecting systems.  There is incomplete distension of the right distal ureter.  No definitive filling defect is identified within the.  The urinary bladder is unremarkable.  CT abdomen pelvis:  There is unchanged appearance of multiple pulmonary nodules in the lung bases.  No new nodules identified  The heart is unremarkable.  There is normal tapering of the abdominal aorta.  There are single bilateral renal arteries.  The renal veins remain patent.  There is no evidence of lymphadenopathy.  The esophagus, stomach, and duodenum are within normal limits.  The small bowel loops are unremarkable.  The appendix is not visualized.  There are no secondary findings of acute appendicitis.  There is colonic diverticula without evidence of acute diverticulitis.  The liver is unremarkable.  The gallbladder is within normal limits.  The biliary tree is within normal limits.  The spleen is unremarkable.  The pancreas is within normal limits.  The adrenal glands are unremarkable.  There is no evidence of free fluid in the abdomen or pelvis.  There is no evidence of free air.  There is no evidence of pneumatosis.  No portal venous air is identified.  The psoas margins are unremarkable.  There are bilateral fat containing inguinal hernias.  There are degenerative changes in the osseous structures.  There is unchanged appearance of a  lytic lesion involving the anterior aspect of the T9 vertebral body with associated cortical erosions.  Impression:  Unchanged 4.8 x 6.3 cm exophytic hypoenhancing lesion in the lower pole of the right kidney, concerning for renal cell carcinoma.  Extension of lesion into the renal sinus.  No evidence of renal vein invasion.  No regional lymphadenopathy.  Additional smaller subcentimeter lesion too small complete characterization in the right kidney.  Bilateral pulmonary nodules remain concerning for metastatic disease.  Lytic lesion along the anterior aspect of the T9 vertebral body, also concerning for osseous metastatic disease.  Additional findings as above.     - 9/20/2023 Bone scan:  FINDINGS:  There is physiologic distribution of the radiopharmaceutical throughout the skeleton.  Focal uptake in the T9 vertebral body corresponding to lytic lesion seen on CT 09/05/2023.  Focal uptake in the right kidney in patient with known right renal mass.  There is otherwise normal uptake in the genitourinary system and soft tissues.  Impression:  Focal uptake in the T9 vertebral body corresponding to lytic lesion seen on prior CT and concerning for metastatic disease.  Additional focus of increased uptake in the right kidney in patient with known right renal mass     - 10/4/2023 Robotic right nephrectomy  Pathology:  RIGHT KIDNEY, TOTAL NEPHRECTOMY:   - Clear cell renal cell carcinoma, ISUP grade 4, 5.5 cm.   - Benign cortical cysts.   - See [facility] synoptic report below.   SURGICAL PATHOLOGY CANCER CASE SUMMARY   Procedure: Total nephrectomy.   Specimen laterality: Right.   Tumor size: 5.5 cm.   Tumor focality: Unifocal.   Histologic type: Clear cell renal cell carcinoma.   Sarcomatoid features: Present, 5%.   Rhabdoid features: Not identified.   Histologic Grade (ISUP Grade): 4.   Tumor necrosis: Present, 20%.   Tumor extension: Extends into pelvicalyceal system and renal sinus fat.   Margins: Uninvolved.   Lymphovascular  invasion (excluding renal vein and its segmental branches): Not identified.   Regional lymph nodes: No lymph nodes submitted or found.   Distant metastases: Not applicable in this specimen.   Pathologic stage (pTNM, AJCC 8th edition): pT3a pN not assigned (no lymph nodes submitted or found).      - treatment not initiated with below issues:  2 prior admissions      - Admitted from 11/6- 11/9/2023  Hospital Course: Pt admitted for intractable back pain in the setting of renal carcinoma (RCC) s/p R nephrectomy 10/4 followed by Dr. Turk not on systemic therapy. CT and MRI concerning for spinal, lung, and hepatic mets. NSGY, Rad Onc, and IR on board in the hospital and evaluated for C5 and T9 lesions on spine. Pt pain controlled with oxycodone 12 HR BID and Oxycodone 10 PRN for thoracic and cervical pain. Patient underwent preop embolization of cervical tumor with IR on 11/6/23. And then had a cervical corpectomy with NSGY on 11/7/23 which he tolerated well. They obtained tissue samples and sent it over to pathology. IR to perform osteocool/kyphoplasty/biopsy T9 11/14. 2nd stage of surgery with NSGY on 11/15 with Dr. Martinez after kyphoplasty. Patient is to see Dr. Turk for systemic therapy afterwards as well as option for postoperative radiation. Stable to discharge home with c-collar and back brace. Patient also to receive rolling walker and hospital bed due to weakness and pain.   Pathology:  Spine, C5 vertebral body, corpectomy:   - Metastatic renal cell carcinoma, consistent with patient's history   - [facility] has been ordered and results will be issued in a separate      - Admitted 11/14- 11/18/2023 and underwent IR kyphoplasty of T4-6  Procedure(s) (LRB):  SPINE, CERVICAL, WITH POSTERIOR FUSION T4-6 (N/A)  CORPECTOMY, SPINE, CERVICAL C3-6 REVISION (N/A)   Hospital Course: 11/14: admitted; IR kyphoplasty of T9 today  11/15: OR today for C3-6 PCF.   11/16: POD 1 s/p C3-C6 PCF. NAEO. VSS, afebrile and WBC WNL.  Patient complains of mild incisional pain but reports his LUE radiculopathy is improved. Reports mild discomfort with swallowing but no difficulty. Pending PT.   11/17: POD 2 s/p C3-C6 PCF. NAEO and VSS. Mild SABINE on BMP, will resume fluids. Anterior HV drain removed at bedside. Two posterior HV drains remain w/output 50 each.   11/18: POD 3. NAEON. AFVSS. Neurologically stable. Tolerating PO diet and voiding spontaneously. HV drain x 2 removed and pressure dressing placed. Medically stable to discharge home. Follow up in clinic in 2 weeks. Discharge instructions given verbally to patient. All of his questions were answered.  He is encouraged to call the clinic with any questions or concerns prior to follow up appt.      [facility] (11/30/2023 9:26 AM CST)  Results - [facility] (11/30/2023 9:26 AM CST)  Component Value Ref Range Test Method Analysis Time Performed At Pathologist Signature   Reason for Study To identify somatic and germline mutations relevant to patient's cancer.     11/30/2023 9:26 AM CST [facility]     Genetic Diseases Assessed Cancer     11/30/2023 9:26 AM CST [facility]     Description of Ranges of DNA Sequences Examined 648 gene panel     11/30/2023 9:26 AM CST [facility]     Overall Interpretation positive     11/30/2023 9:26 AM CST [facility]     MSI Stable     11/30/2023 9:26 AM CST [facility]     TMB 7.4 m/MB   11/30/2023 9:26 AM CST [facility]     [facility] [email or web address]     11/30/2023 9:26 AM CST [facility]     Comment: [facility] link   Fusion Addendum Issue Type NEGATIVE  Negative - This addendum is being issued to report that no gene fusions or altered splicing variants from RNA sequencing analysis were found.     11/30/2023 9:26 AM CST [facility]     Therapy Count 4     11/30/2023 9:26 AM CST [facility]     [facility]: Potential Therapy 1 Gene:  [id number]^VHL^HGNC  Variant: p.E186fs  Agent: Belzutifan  Tissue: Renal Cell Carcinoma  Association: response  Evidence Status: Consensus  Evidence ID: NCCN  Evidence URL:  FDA Approved?: Yes  on label?: No     11/30/2023 9:26 AM CST [facility]     [facility]: Potential Therapy 2 Gene: 80895^PBRM1^HGNC  Variant: p.L618fs  Agent: Nivolumab  Tissue: Clear Cell Renal Cell Carcinoma  Association: response  Evidence Status: Clinical research  Evidence ID: [id number]  Evidence URL: https://www.ncbi.nlm.nih.gov/pubmed/47676300  FDA Approved?: Yes  on label?: Yes     11/30/2023 9:26 AM CST [facility]     [facility]: Potential Therapy 3 Gene: 8975^PIK3CA^HGNC  Variant: p.Q546E  Agent: Capivasertib + Fulvestrant  Tissue: Breast Cancer  Association: response  Evidence Status: Consensus  Evidence ID: FDA  Evidence URL:  FDA Approved?: Yes  on label?: No     11/30/2023 9:26 AM CST [facility]     [facility]: Potential Therapy 4 Gene: 8975^PIK3CA^HGNC  Variant: p.Q546E  Agent: Alpelisib  Tissue: Solid Tumors  Association: response  Evidence Status: Clinical research  Evidence ID: [id number]  Evidence URL: https://www.ncbi.nlm.nih.gov/pubmed/16100954  FDA Approved?: Yes  on label?: No     11/30/2023 9:26 AM CST [facility]     Trial Count 3     11/30/2023 9:26 AM CST [facility]     [facility]: Clinical Trial Match 1 Clinical Trial NCT ID: [id number]  Clinical Trial Title: RJA273 as a Single Agent and in Combination With Everolimus & Immuno-Oncology Agents in Advanced/Relapsed Renal Cancer & Other Malignancies  Clinical Trial URL: https://clinicaltrials.gov/ct2/show/WPM13417614  Clinical Phase: Phase 1  Matched criteria: VHL p.E186fs mutation     11/30/2023 9:26 AM CST [facility]     [facility]: Clinical Trial Match 2 Clinical Trial NCT ID: [id number]  Clinical Trial Title: First-in-Human Study of STX-478 as Monotherapy and in Combination With Other Antineoplastic Agents in Participants With Advanced Solid Tumors  Clinical Trial URL:  https://clinicaltrials.gov/ct2/show/HFB08640090  Clinical Phase: Phase 1/Phase 2  Matched criteria: PIK3CA p.Q546E mutation     11/30/2023 9:26 AM CST [facility]     [facility]: Clinical Trial Match 3 Clinical Trial NCT ID: [id number]  Clinical Trial Title: Testing the Combination of Two Anti-cancer Drugs, Peposertib () and  for Advanced Solid Tumors  Clinical Trial URL: https://clinicaltrials.gov/ct2/show/YKS66287119  Clinical Phase: Phase 1  Matched criteria: PBRM1 p.L618fs mutation     11/30/2023 9:26 AM CST [facility]        Results - [facility] (11/30/2023 9:26 AM CST)  Specimen (Source) Anatomical Location / Laterality Collection Method / Volume Collection Time Received Time   Tissue       11/16/2023 11:02 AM CST      Results - [facility] (11/30/2023 9:26 AM CST)  Narrative   This result has genomic variants that were not included in this document.          Results - [facility] (11/30/2023 9:26 AM CST)  Authorizing Provider Result Type   Ruby Turk MD LAB MOLECULAR DIAGNOSTICS ORDERABLES      Results - [facility] (11/30/2023 9:26 AM CST)  Performing Organization Address City/State/ZIP Code Phone Number   [facility]  [address], [address]  [place] [address],   [phone]       - initiated systemic therapy with Pembro/Axitinib on 12/18/2023    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  - fractured collar bone  - Pediatric hernia             Active Ambulatory Problems     Diagnosis Date Noted    Obesity (BMI 35.0-39.9 without comorbidity) 04/01/2019    Borderline hypertension 04/01/2019    Acute gout of left ankle 04/01/2019    Onychomycosis 04/01/2019    History of gout 06/02/2021    Seborrheic keratosis 06/02/2021    Tinea corporis 12/07/2021    Positive colorectal cancer screening using Cologuard test 12/07/2021               Resolved Ambulatory Problems     Diagnosis Date Noted    No Resolved Ambulatory Problems              Past Medical History:   Diagnosis Date    Asthma      ED (erectile dysfunction)      Gout        SH:  Retired from [employer]    1 child, 2 stepchildren  Prior tobacco- teens, early 20s  Social EtOH     FH:  Maternal grandmother-  at 91- she had prior cancers- colon cancer and breast cancer  Mother - liver cancer - prior blood transfusion Hep C-  at age 75 yo  Father-  in his 50s- EtOH cirrhosis  Paternal grandfather- cancer in his 70s, unclear type  Maternal grandfather- H+N cancer-  (smoker)      Review of Systems   Constitutional:  Positive for activity change (since surgery), appetite change, fatigue (decreased stamina, energy) and unexpected weight change. Negative for chills and fever.   HENT:  Negative for trouble swallowing and voice change.    Respiratory:  Positive for shortness of breath (exertional). Negative for cough and wheezing.    Cardiovascular:  Negative for chest pain, palpitations and leg swelling.   Gastrointestinal:  Positive for constipation. Negative for abdominal distention, abdominal pain, change in bowel habit, diarrhea, nausea, vomiting and reflux.   Genitourinary:  Negative for decreased urine volume, difficulty urinating, dysuria, frequency and hematuria.   Musculoskeletal:  Positive for arthralgias, back pain and gait problem (d/t weakness, in wheelchair). Negative for neck pain.   Integumentary:  Positive for wound (buttocks). Negative for rash.   Neurological:  Positive for weakness (generalized). Negative for dizziness, numbness and headaches.   Psychiatric/Behavioral:  Negative for dysphoric mood and sleep disturbance. The patient is not nervous/anxious.       Objective     Physical Exam  Vitals reviewed.   Constitutional:       General: He is not in acute distress.     Appearance: Normal appearance. He is not ill-appearing, toxic-appearing or diaphoretic.    HENT:      Head: Normocephalic and atraumatic.      Right Ear: External ear normal.      Left Ear: External ear normal.      Nose: Nose normal.      Mouth/Throat:      Pharynx: Oropharynx is clear.   Eyes:      General: No scleral icterus.     Conjunctiva/sclera: Conjunctivae normal.   Cardiovascular:      Rate and Rhythm: Tachycardia present.   Pulmonary:      Effort: Pulmonary effort is normal. No respiratory distress.   Abdominal:      General: There is no distension.   Skin:     Coloration: Skin is not jaundiced or pale.      Findings: Lesion (to buttocks, healing) present. No bruising, erythema or rash.   Neurological:      Mental Status: He is alert and oriented to person, place, and time.      Motor: Weakness (generalized) present.      Gait: Gait abnormal (in wheelchair for distances).   Psychiatric:         Mood and Affect: Mood normal.         Behavior: Behavior normal.        Assessment and Plan     1. Clear cell carcinoma of right kidney  -     Ambulatory referral/consult to Hematology/Oncology/Nutrition; Future; Expected date: 03/26/2024  -     Ambulatory referral/consult to CLINIC Palliative Care; Future; Expected date: 03/26/2024    2. Weight loss  -     Ambulatory referral/consult to Hematology/Oncology/Nutrition; Future; Expected date: 03/26/2024    3. Metastatic cancer to spine  -     Ambulatory referral/consult to CLINIC Palliative Care; Future; Expected date: 03/26/2024    4. 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 Malignant neoplasm metastatic to lung, unspecified laterality    6. 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 Intractable back pain    9. Right leg pain    10. Essential hypertension    11. Mouth sores    12. Rash    Other orders  -     pembrolizumab (KEYTRUDA) 200 mg in sodium chloride 0.9% SolP 108 mL infusion  -     EPINEPHrine  (EPIPEN) 0.3 mg/0.3 mL pen injection 0.3 mg  -     diphenhydrAMINE injection 50 mg  -     hydrocortisone sodium succinate injection 100 mg  -     sodium chloride 0.9% 100 mL flush bag  -     sodium chloride 0.9% flush 10 mL  -     heparin, porcine (PF) 100 unit/mL injection flush 500 Units  -     alteplase injection 2 mg        Metastatic renal carcinoma-  Has been tolerating infusion well   Inlyta on hold since January d/t mucositis, surgery   Has duke's solution for mucositis which has helped - no current symptoms  Will continue to hold at this time d/t potential upcoming surgery - long discussion with patient re: continuing to hold medication until surgery occurs if in near future or is no longer an option/decided against as cannot be on perioperatively    Upon restarting, will dose reduce to 3 mg BID - will send to [facility] when ready to start  Received XRT to T9 lesion     Labs reviewed, adequate for treatment  Thyroid functions normal today. Monitor closely on treatment.   Proceed with pembro infusion as scheduled tomorrow.   RTC in 3 weeks for next cycle    Will refer to nutrition for weight loss, taste changes  Will refer to palliative care to assist with support re: treatment goals, decisions    Rash-  No current symptoms.   Likely abx related. Continue to monitor.     Pain-  No longer taking pain medications - only using gabapentin once daily  States pain is worsening but does not feel strong enough for surgery at this time - trying to gain strength prior to operation   Continue to monitor. Encouraged to discuss PT versus potential prehab program to provide support and increase strength prior to potential surgery.     HTN-   Well controlled, no longer taking his antihypertensives  BP on lower side today, asymptomatic   Continue to monitor    Patient is in agreement with the proposed treatment plan. All questions were answered to the patient's satisfaction. Pt knows to call clinic if anything is needed before  the next clinic visit.    Patient discussed with collaborating physician, Dr. Turk.    At least 40 minutes were spent today on this encounter including face to face time with the patient, data gathering/interpretation and documentation.       Kelly Lopez, MSN, APRN, ACCNS-  Hematology and Medical Oncology  Clinical Nurse Specialist to Dr. Ramirez, Dr. Turk & Dr. Swift    Route Chart for Scheduling    Med Onc Chart Routing      Follow up with physician . Labs and scans 1-2 days prior to MD visit in 3 weeks for cycle 5. keep as scheduled in 6 weeks for cycle 6.   Follow up with [facility] . With labs for cycle 7 to be scheduled in 9 weeks.   Infusion scheduling note   infusion every 3 weeks   Injection scheduling note    Labs CBC, CMP, TSH and free T4   Scheduling:  Preferred lab:  Lab interval: every 3 weeks     Imaging CT chest abdomen pelvis   1-2 days prior to MD visit in 3 weeks   Pharmacy appointment    Other referrals              Treatment Plan Information   OP AXITINIB + PEMBROLIZUMAB 200MG Q3W   Ruby Turk MD   Upcoming Treatment Dates - OP AXITINIB + PEMBROLIZUMAB 200MG Q3W    3/1/2024       Chemotherapy       pembrolizumab (KEYTRUDA) 200 mg in sodium chloride 0.9% SolP 108 mL infusion  3/22/2024       Chemotherapy       pembrolizumab (KEYTRUDA) 200 mg in sodium chloride 0.9% SolP 108 mL infusion  4/12/2024       Chemotherapy       pembrolizumab (KEYTRUDA) 200 mg in sodium chloride 0.9% SolP 108 mL infusion  5/3/2024       Chemotherapy       pembrolizumab (KEYTRUDA) 200 mg in sodium chloride 0.9% SolP 108 mL infusion    Supportive Plan Information  IV FLUIDS AND ELECTROLYTES   Ruby Turk MD   Upcoming Treatment Dates - IV FLUIDS AND ELECTROLYTES    No upcoming days in selected categories.    Therapy Plan Information  EPINEPHrine (EPIPEN) 0.3 mg/0.3 mL pen injection 0.3 mg  0.3 mg, Intramuscular, PRN  diphenhydrAMINE injection 50 mg  50 mg, Intravenous, PRN  hydrocortisone sodium succinate injection  100 mg  100 mg, Intravenous, PRN   No follow-ups on file.

## 2024-03-19 NOTE — TELEPHONE ENCOUNTER
Attempted to reach pt and left Voicemail with appointment details in Palliative Medicine. A copy of the appt has been mailed out as well.

## 2024-03-20 ENCOUNTER — PATIENT MESSAGE (OUTPATIENT)
Dept: NEUROSURGERY | Facility: CLINIC | Age: 74
End: 2024-03-20
Payer: MEDICARE

## 2024-03-20 ENCOUNTER — OFFICE VISIT (OUTPATIENT)
Dept: PLASTIC SURGERY | Facility: CLINIC | Age: 74
End: 2024-03-20
Payer: MEDICARE

## 2024-03-20 ENCOUNTER — INFUSION (OUTPATIENT)
Dept: INFUSION THERAPY | Facility: HOSPITAL | Age: 74
End: 2024-03-20
Attending: STUDENT IN AN ORGANIZED HEALTH CARE EDUCATION/TRAINING PROGRAM
Payer: MEDICARE

## 2024-03-20 VITALS
RESPIRATION RATE: 16 BRPM | BODY MASS INDEX: 25.69 KG/M2 | WEIGHT: 179.38 LBS | OXYGEN SATURATION: 100 % | SYSTOLIC BLOOD PRESSURE: 109 MMHG | DIASTOLIC BLOOD PRESSURE: 68 MMHG | HEART RATE: 78 BPM | HEIGHT: 70 IN | WEIGHT: 179.44 LBS | BODY MASS INDEX: 25.68 KG/M2 | DIASTOLIC BLOOD PRESSURE: 68 MMHG | HEIGHT: 70 IN | TEMPERATURE: 98 F | HEART RATE: 78 BPM | SYSTOLIC BLOOD PRESSURE: 109 MMHG

## 2024-03-20 DIAGNOSIS — C78.00 MALIGNANT NEOPLASM METASTATIC TO LUNG, UNSPECIFIED LATERALITY: Primary | ICD-10-CM

## 2024-03-20 DIAGNOSIS — Z09 SURGERY FOLLOW-UP EXAMINATION: Primary | ICD-10-CM

## 2024-03-20 PROCEDURE — 99999 PR PBB SHADOW E&M-EST. PATIENT-LVL III: CPT | Mod: PBBFAC,,, | Performed by: SURGERY

## 2024-03-20 PROCEDURE — 25000003 PHARM REV CODE 250: Performed by: REGISTERED NURSE

## 2024-03-20 PROCEDURE — 63600175 PHARM REV CODE 636 W HCPCS: Mod: JZ,JG | Performed by: REGISTERED NURSE

## 2024-03-20 PROCEDURE — 99024 POSTOP FOLLOW-UP VISIT: CPT | Mod: S$GLB,,, | Performed by: SURGERY

## 2024-03-20 PROCEDURE — 96413 CHEMO IV INFUSION 1 HR: CPT

## 2024-03-20 RX ORDER — DIPHENHYDRAMINE HYDROCHLORIDE 50 MG/ML
50 INJECTION INTRAMUSCULAR; INTRAVENOUS ONCE AS NEEDED
Status: DISCONTINUED | OUTPATIENT
Start: 2024-03-20 | End: 2024-03-20 | Stop reason: HOSPADM

## 2024-03-20 RX ORDER — SODIUM CHLORIDE 0.9 % (FLUSH) 0.9 %
10 SYRINGE (ML) INJECTION
Status: DISCONTINUED | OUTPATIENT
Start: 2024-03-20 | End: 2024-03-20 | Stop reason: HOSPADM

## 2024-03-20 RX ORDER — EPINEPHRINE 0.3 MG/.3ML
0.3 INJECTION SUBCUTANEOUS ONCE AS NEEDED
Status: DISCONTINUED | OUTPATIENT
Start: 2024-03-20 | End: 2024-03-20 | Stop reason: HOSPADM

## 2024-03-20 RX ORDER — HEPARIN 100 UNIT/ML
500 SYRINGE INTRAVENOUS
Status: DISCONTINUED | OUTPATIENT
Start: 2024-03-20 | End: 2024-03-20 | Stop reason: HOSPADM

## 2024-03-20 RX ORDER — ACETAMINOPHEN 325 MG/1
650 TABLET ORAL
Status: COMPLETED | OUTPATIENT
Start: 2024-03-20 | End: 2024-03-20

## 2024-03-20 RX ADMIN — SODIUM CHLORIDE 200 MG: 9 INJECTION, SOLUTION INTRAVENOUS at 03:03

## 2024-03-20 RX ADMIN — SODIUM CHLORIDE: 9 INJECTION, SOLUTION INTRAVENOUS at 02:03

## 2024-03-20 RX ADMIN — ACETAMINOPHEN 650 MG: 325 TABLET ORAL at 03:03

## 2024-03-20 NOTE — PLAN OF CARE
Pt admitted for C3 Keytruda. Labs reviewed and assessment performed. Pt has been experiencing severe back pain X 4 days, Kelly SIMENTAL NP aware, pt given Tylenol 650 mg po during treatment which brought pain to a 5. Pt scheduled to see Palliative  care to assist with control of symptoms. Pt tolerated treatment well. Note-Pt is a difficult IV start.  Pt discharged home in w/c with wife

## 2024-03-21 ENCOUNTER — TELEPHONE (OUTPATIENT)
Dept: HEMATOLOGY/ONCOLOGY | Facility: CLINIC | Age: 74
End: 2024-03-21
Payer: MEDICARE

## 2024-03-21 NOTE — TELEPHONE ENCOUNTER
Spoke w/ pt to r/s pt nutrition appt with Meaghan Beckford RD instead of Cassidy Bruce RD. Pt decline and stated pt does not do virtual appts and prefer to be seen in clinic. MA informed pt that the appt will stay the same for in person visit on 04/10/24 @ 1:30PM. Pt also requested to be placed on waiting list if there's a cancellation.    [place] ext [phone]

## 2024-03-21 NOTE — PROGRESS NOTES
Plastic Surgery Clinic Postop Visit    Subjective:      Gamaliel Pete Jr. is a 73 y.o. year old male who presents to the Plastic Surgery Clinic on 03/21/2024 for follow up visit status post spinal closure on 2.2.2024.  He has since had his drains removed.  He was last 2 occasions he has had seromas that were aspirated.  There were no signs of infection.  He is here today for follow up.  Denies fever, chills, nausea, vomiting, or other systemic signs of infection.    Date of surgery 02/02/2024   Preoperative diagnosis spinal wound   Postoperative diagnosis is the same  Procedure performed  1. Excisional debridement of skin subcutaneous tissue fascia and muscle measuring 75 cm on each side for a total of 150 cm  2. Closure of spinal wound using bilateral paraspinous muscle flaps  3. Advancement flap closure of latissimus dorsi muscle measuring 25 cm by 6 cm  Placement of Prevena wound VAC  Surgeon Dayanara  Anesthesia general  Complications none  Drains x2         ROS:  Negative unless otherwise stated above in HPI    Objective:     Physical Exam:  Vitals:    03/20/24 1630   BP: 109/68   Pulse: 78       WD WN NAD  VSS  Normal resp effort  Spine: incision CDI. No erythema or drainage. No signs of infection. Has small palpable seroma         Assessment:       No diagnosis found.      Plan:   73 y.o. male status post spinal closure  - Pt was seen and evaluated by myself and Dr. Ernesto Villanueva.   - Another 19cc seroma was aspirated today in clinic.  It was discussed previously if this is recurrent may have to make a small skin incision the drain with wick in place  - Return to clinic in 2 weeks. Staff to schedule.      All questions were answered. The patient was advised to call the clinic with any questions or concerns prior to their next visit.       Bruce Palomares MD Fellow  Plastic and Reconstructive Surgery  [phone]

## 2024-03-22 ENCOUNTER — TELEPHONE (OUTPATIENT)
Dept: HEMATOLOGY/ONCOLOGY | Facility: CLINIC | Age: 74
End: 2024-03-22
Payer: MEDICARE

## 2024-03-22 LAB
ACID FAST MOD KINY STN SPEC: NORMAL
MYCOBACTERIUM SPEC QL CULT: NORMAL

## 2024-03-22 NOTE — TELEPHONE ENCOUNTER
Spoke w/ pt in regards to offering pt an earlier appt for Tuesday 4/2/24 @ 10AM with Ten Sandoval RD. Pt approved and express gratitude for getting pt in for an earlier appt. MA informed pt that clinic is located on the 3rd floor of the [facility].     [place] ext [phone]

## 2024-03-28 ENCOUNTER — PATIENT MESSAGE (OUTPATIENT)
Dept: NEUROSURGERY | Facility: CLINIC | Age: 74
End: 2024-03-28
Payer: MEDICARE

## 2024-04-01 NOTE — PROGRESS NOTES
"Oncology Nutrition Assessment Medical Nutrition Therapy Initial Visit    Gamaliel Pete Jr.  1950    Referring Provider: Kelly Lopez CNS   Reason for Referral: weight loss    Diagnosis: Metastatic Renal Cell Carcinoma  Treatment: AXITINIB + PEMBROLIZUMAB 200MG Q3W     PMHx:   Past Medical History:   Diagnosis Date    Asthma     no inhaler use    Borderline hypertension     ED (erectile dysfunction)     Gout     Hematuria     Hypertension      Allergies: Vancomycin analogues    Nutrition Assessment    Anthropometrics:   Weight:   Presented to clinic in wheelchair.  Wt Readings from Last 3 Encounters:   03/20/24 81.4 kg (179 lb 7.3 oz)   03/20/24 81.4 kg (179 lb 5.5 oz)   03/19/24 81.4 kg (179 lb 5.5 oz)   01/04/24: 208 lb  Height: 70 inches   BMI: 25.7  BSA: 2 Usual BW: 240 lb. Timeframe: Mid 2023  Weight Change: 29 lb weight loss in 3 months; since Jan 2024     Appetite/Intake: Decreased appetite but improved per patient. Eating 50-75% of 2-3 main meals daily with snacks like ice cream and Oreo cookies. Drinking Ensure Complete once daily as well as 32-48 oz of water daily and lightly sweetened iced tea. Not eating much meat due to loss of taste for it.     Encounter Notes:  Gamaliel Pete Jr. is a 73 y.o. male with Metastatic Renal Cell Carcinoma referred by Kelly Lopez for nutrition assessment while under active treatment. Patient is noted to have a weight loss of ~29 lbs in 3 months. Mr. Pete presented to clinic in a wheelchair accompanied by his wife. Weight not taken in clinic however patient weighs himself once a week at home and reports that weight has stabilized. Reported improvements in appetite, intake, nausea, and taste changes. Episode of weakness/dizziness/light headed feeling intermittently.     Nutrition Impact Symptoms    []  Nausea   []  Vomiting   [] Dysphagia - difficulty swallowing bread. Unable to swallow. Must "cough up" bread.  [] Odynophagia   [] Dysgeusia - " "previously reported that food tasted "like grease"   [] Ageusia   [] Xerostomia - previously, not present.   [] Mucositis   [] Thick saliva   [] Diarrhea   [] Constipation   [] Early Satiety - yes, unable to eat a full meal  [] Fatigue - associated with pain level    Current Medications:    Current Outpatient Medications:     allopurinoL (ZYLOPRIM) 100 MG tablet, Take 1 tablet (100 mg total) by mouth once daily., Disp: 30 tablet, Rfl: 3    amlodipine-benazepril 5-10 mg (LOTREL) 5-10 mg per capsule, Take 1 capsule by mouth once daily., Disp: 90 capsule, Rfl: 3    axitinib (INLYTA) 5 mg Tab, Take 1 tablet (5 mg) by mouth 2 (two) times daily., Disp: 60 tablet, Rfl: 11    diphenhydramine HCl (BENADRYL ALLERGY ORAL), Take by mouth as needed., Disp: , Rfl:     duke's soln (benadryl 30 mL, mylanta 30 mL, LIDOcaine 30 mL, nystatin 30 mL) 120mL, Take 10 mLs by mouth 4 (four) times daily., Disp: 500 mL, Rfl: 0    EPINEPHrine (EPIPEN) 0.3 mg/0.3 mL AtIn, Inject 0.3 mLs (0.3 mg total) into the muscle as needed., Disp: 1 each, Rfl: 0    gabapentin (NEURONTIN) 300 MG capsule, Take 1 capsule (300 mg total) by mouth 3 (three) times daily., Disp: 90 capsule, Rfl: 11    naloxone (NARCAN) 4 mg/actuation Spry, 1 spray (4mg) by nasal route as needed for opioid overdose; may repeat every 2-3 minutes in alternating nostrils until medical help arrives. Call 911, Disp: 2 each, Rfl: 0    omega-3 fatty acids/fish oil (FISH OIL-OMEGA-3 FATTY ACIDS) 300-1,000 mg capsule, Take 2 capsules by mouth once daily., Disp: , Rfl:     oxyCODONE (ROXICODONE) 10 mg Tab immediate release tablet, Take 1 tablet (10 mg total) by mouth every 4 (four) hours as needed for Pain., Disp: 30 tablet, Rfl: 0    sildenafiL (VIAGRA) 100 MG tablet, Take 1 tablet (100 mg total) by mouth daily as needed for Erectile Dysfunction. (Patient not taking: Reported on 1/18/2024), Disp: 30 tablet, Rfl: 11    turmeric (CURCUMIN MISC), 1,000 mg by Misc.(Non-Drug; Combo Route) route " Daily., Disp: , Rfl:     UNABLE TO FIND, Take 500 mg by mouth 2 (two) times a day. medication name: Tudca, Disp: , Rfl:     UNABLE TO FIND, Take 2 capsules by mouth 2 (two) times a day. medication name: Turkey tail mushroom, Disp: , Rfl:     UNABLE TO FIND, Take 15 drops by mouth once daily. medication name: Essiac-20, Disp: , Rfl:     UNABLE TO FIND, Take 5 mLs by mouth 2 (two) times a day. medication name: barley leaf juice powder, Disp: , Rfl:     UNABLE TO FIND, Take 5 mLs by mouth 2 (two) times a day. medication name: black seed oil (cumin seed), Disp: , Rfl:     Labs: Reviewed 3/19/24: gluc 111, ca 11.1    Nutrition Diagnosis    Nutrition Problem:  Malnutrition  Etiology (related to): appetite loss , nausea, dysphagia , early satiety , and dysgeusia    Signs/Symptoms (as evidenced by): weight loss and muscle loss due to deconditioning    Nutrition Intervention    Nutrition Prescription  2187 kcals/day  27 kcal/kg   78 g protein/day  0.8 g/kg  2187 mL fluid/day 1 ml/kcal    Recommendations:   Reviewed potential causes of dizziness spells.   Encouraged pairing protein with carbohydrate.   Encouraged BP monitoring at home when this episode occurs  Encouraged increasing fluid intake to 2 L daily.  Reviewed protein sources, serving sizes, and grams of protein per serving  Reviewed calcium sources, mg per serving and serving sizes.  Discussed protein goals.  Recommend referral to SLP for MBSS due to reported difficulty swallowing bread which requires coughing to bring food back up.    Materials Provided/Reviewed: Provided handouts from Nutrition Care Manual on Calcium Content of Foods and Protein Content of Foods.    Nutrition Monitoring and Evaluation    Monitor: diet tolerance , weight, and diet education needs     Follow up Patient provided with dietitian contact number and advised to call with questions or make future appointment if further intervention is needed.    Communication to referring provider/care  team: Note available in chart.     Consultation Time: 45 Minutes    Ten GASCA, , LDN  Advanced Practice in Clinical Nutrition  Board Certified Specialist in Oncology Nutrition   [facility]  [phone]

## 2024-04-02 ENCOUNTER — CLINICAL SUPPORT (OUTPATIENT)
Dept: HEMATOLOGY/ONCOLOGY | Facility: CLINIC | Age: 74
End: 2024-04-02
Payer: MEDICARE

## 2024-04-02 DIAGNOSIS — C79.51 METASTATIC CANCER TO SPINE: ICD-10-CM

## 2024-04-02 DIAGNOSIS — Z71.3 NUTRITIONAL COUNSELING: Primary | ICD-10-CM

## 2024-04-02 DIAGNOSIS — C78.00 MALIGNANT NEOPLASM METASTATIC TO LUNG, UNSPECIFIED LATERALITY: ICD-10-CM

## 2024-04-02 DIAGNOSIS — R63.4 WEIGHT LOSS: ICD-10-CM

## 2024-04-02 DIAGNOSIS — C64.1 CLEAR CELL CARCINOMA OF RIGHT KIDNEY: ICD-10-CM

## 2024-04-02 NOTE — Clinical Note
"Hi Dr. Turk - met with Mr. Pete this morning for nutrition assessment. Noted that he is having some difficulty swallowing, specifically with bread, which occasionally requires him to cough/"work hard" to get the food up due to inability to swallow it.   I am thinking referral to SLP for MBSS?  Ten Sandoval RD-AP, , LDN Advanced Practice in Clinical Nutrition  Board Certified Specialist in Oncology Nutrition  [facility] 3rd [facility] [phone] "

## 2024-04-03 NOTE — PROGRESS NOTES
Neurosurgery  Established Patient    SUBJECTIVE:     History of Present Illness:  Gamaliel Pete Jr. is a 73 y.o. 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 Martinez due to metastatic disease to those areas. He is also s/p T9 kyphoplasty for a pathological fracture at that level with subsequent T7-11 decompression/fusion on 1/6/24 due to worsening fracture. He is also s/p thoracic wound washout/revision with plastic surgery on 2/2/24 for wound dehiscence. Intra-op cultures at the time grew staph epidermis. Patient completed IV/oral abx course per ID. He has been following with Dr. Villanueva post op to ensure his incision is healing appropriately and has needed multiple seroma aspirations. He has been on chemotherapy as well as radiation to the spine after his incision healed. Furthermore, he has been following with ortho for possible R femur IMR due to risk of impending pathological fracture. This has been postponed until his incision heals entirely per the patient's request.     Today, he states he has been doing very well. He has no neck or back pain. He feels stronger and has gained more weight. He denies new neurological symptoms including radicular pain, focal weakness, paraesthesias or bowel/bladder dysfunction. He is using a cane now to walk for the most part.     Documentation and office visit notes from Plastic surgery, heme/onc and ortho reviewed.     Review of patient's allergies indicates:   Allergen Reactions    Vancomycin analogues Rash     IV antibiotic caused a rash        Current Outpatient Medications   Medication Sig Dispense Refill    allopurinoL (ZYLOPRIM) 100 MG tablet Take 1 tablet (100 mg total) by mouth once daily. 30 tablet 3    amlodipine-benazepril 5-10 mg (LOTREL) 5-10 mg per capsule Take 1 capsule by mouth once daily.  90 capsule 3    axitinib (INLYTA) 5 mg Tab Take 1 tablet (5 mg) by mouth 2 (two) times daily. 60 tablet 11    diphenhydramine HCl (BENADRYL ALLERGY ORAL) Take by mouth as needed.      duke's soln (benadryl 30 mL, mylanta 30 mL, LIDOcaine 30 mL, nystatin 30 mL) 120mL Take 10 mLs by mouth 4 (four) times daily. 500 mL 0    EPINEPHrine (EPIPEN) 0.3 mg/0.3 mL AtIn Inject 0.3 mLs (0.3 mg total) into the muscle as needed. 1 each 0    gabapentin (NEURONTIN) 300 MG capsule Take 1 capsule (300 mg total) by mouth 3 (three) times daily. 90 capsule 11    naloxone (NARCAN) 4 mg/actuation Spry 1 spray (4mg) by nasal route as needed for opioid overdose; may repeat every 2-3 minutes in alternating nostrils until medical help arrives. Call 911 2 each 0    omega-3 fatty acids/fish oil (FISH OIL-OMEGA-3 FATTY ACIDS) 300-1,000 mg capsule Take 2 capsules by mouth once daily.      oxyCODONE (ROXICODONE) 10 mg Tab immediate release tablet Take 1 tablet (10 mg total) by mouth every 4 (four) hours as needed for Pain. 30 tablet 0    sildenafiL (VIAGRA) 100 MG tablet Take 1 tablet (100 mg total) by mouth daily as needed for Erectile Dysfunction. (Patient not taking: Reported on 1/18/2024) 30 tablet 11    turmeric (CURCUMIN MISC) 1,000 mg by Misc.(Non-Drug; Combo Route) route Daily.      UNABLE TO FIND Take 500 mg by mouth 2 (two) times a day. medication name: Tudca      UNABLE TO FIND Take 2 capsules by mouth 2 (two) times a day. medication name: Turkey tail mushroom      UNABLE TO FIND Take 15 drops by mouth once daily. medication name: Essiac-20      UNABLE TO FIND Take 5 mLs by mouth 2 (two) times a day. medication name: barley leaf juice powder      UNABLE TO FIND Take 5 mLs by mouth 2 (two) times a day. medication name: black seed oil (cumin seed)       No current facility-administered medications for this visit.       Past Medical History:   Diagnosis Date    Asthma     no inhaler use    Borderline hypertension     ED (erectile  dysfunction)     Gout     Hematuria     Hypertension      Past Surgical History:   Procedure Laterality Date    CLOSURE N/A 2/2/2024    Procedure: CLOSURE;  Surgeon: Ernesto Villanueva MD;  Location: [facility] OR 2ND FLR;  Service: Plastics;  Laterality: N/A;    COLONOSCOPY  02/10/2022    COLONOSCOPY N/A 02/10/2022    Procedure: COLONOSCOPY;  Surgeon: Desmond Keenan MD;  Location: [facility] ENDO;  Service: General;  Laterality: N/A;    POSTERIOR FUSION OF CERVICAL SPINE WITH LAMINECTOMY N/A 11/15/2023    Procedure: SPINE, CERVICAL, WITH POSTERIOR FUSION T4-6;  Surgeon: Nasim Martinez DO;  Location: [facility] OR 2ND FLR;  Service: Neurosurgery;  Laterality: N/A;  C2-T1 laminectomy and posterior fusion. Haynes. C-arm. [employer]. Neuromonitoring.    ROBOT-ASSISTED LAPAROSCOPIC NEPHRECTOMY Right 10/4/2023    Procedure: ROBOTIC NEPHRECTOMY;  Surgeon: Henry Michaels MD;  Location: [facility];  Service: Urology;  Laterality: Right;    SURGICAL REMOVAL OF VERTEBRAL BODY OF CERVICAL SPINE Right 11/7/2023    Procedure: CORPECTOMY, SPINE, CERVICAL;  Surgeon: Nasim Martinez DO;  Location: [facility] OR [facility] FLR;  Service: Neurosurgery;  Laterality: Right;  C4 and C5 corpectomy with globus;  wells tongs; c-arm; neuromonitoring; microscope; type and cross 2 units    SURGICAL REMOVAL OF VERTEBRAL BODY OF CERVICAL SPINE N/A 11/15/2023    Procedure: CORPECTOMY, SPINE, CERVICAL C3-6 REVISION;  Surgeon: Nasim Martinez DO;  Location: [facility] OR 2ND FLR;  Service: Neurosurgery;  Laterality: N/A;    THORACIC LAMINECTOMY WITH FUSION N/A 1/5/2024    Procedure: LAMINECTOMY, SPINE, THORACIC, WITH FUSION;  Surgeon: Nasim Martinez DO;  Location: [facility] OR 2ND FLR;  Service: Neurosurgery;  Laterality: N/A;  T7-T11 fusions with robot    TONSILLECTOMY      WOUND EXPLORATION N/A 2/2/2024    Procedure: EXPLORATION, WOUND;  Surgeon: Nasim Martinez DO;  Location: [facility] OR 2ND FLR;  Service: Neurosurgery;  Laterality: N/A;  Thoracic  wound exploration, washout     Family History    None       Social History     Socioeconomic History    Marital status:    Tobacco Use    Smoking status: Never    Smokeless tobacco: Never   Substance and Sexual Activity    Alcohol use: Not Currently     Alcohol/week: 0.0 standard drinks of alcohol     Comment: rarely    Drug use: Never     Social Determinants of Health     Financial Resource Strain: Low Risk  (2/3/2024)    Overall Financial Resource Strain (CARDIA)     Difficulty of Paying Living Expenses: Not hard at all   Food Insecurity: No Food Insecurity (2/3/2024)    Hunger Vital Sign     Worried About Running Out of Food in the Last Year: Never true     Ran Out of Food in the Last Year: Never true   Transportation Needs: No Transportation Needs (2/3/2024)    PRAPARE - Transportation     Lack of Transportation (Medical): No     Lack of Transportation (Non-Medical): No   Physical Activity: Inactive (2/1/2024)    Exercise Vital Sign     Days of Exercise per Week: 0 days     Minutes of Exercise per Session: 0 min   Stress: No Stress Concern Present (2/3/2024)    [employer] - Occupational Stress Questionnaire     Feeling of Stress : Not at all   Social Connections: Moderately Isolated (2/1/2024)    Social Connection and Isolation Panel [NHANES]     Frequency of Communication with Friends and Family: More than three times a week     Frequency of Social Gatherings with Friends and Family: More than three times a week     Attends Gnosticism Services: Never     Active Member of Clubs or Organizations: No     Attends Club or Organization Meetings: Never     Marital Status:    Housing Stability: Low Risk  (2/3/2024)    Housing Stability Vital Sign     Unable to Pay for Housing in the Last Year: No     Number of Places Lived in the Last Year: 1     Unstable Housing in the Last Year: No       Review of Systems  Positive per HPI, otherwise a pertinent ROS was performed and was  negative.        OBJECTIVE:     Vital Signs  Pain Score:   3  There is no height or weight on file to calculate BMI.    Neurosurgery Physical Exam  General: well developed, well nourished, no distress.   Head: normocephalic, atraumatic.  Neck: No tracheal deviation. No palpable masses. Full ROM.   Neurologic: Alert and oriented. Thought content appropriate.  GCS: E4 V5 M6; Total: 15  Mental Status: Awake, Alert, Oriented x 4  Language: No aphasia.  Speech: No dysarthria.  Cranial nerves: face symmetric, tongue midline, CN II-XII grossly intact.   Eyes: pupils equal, round, reactive to light with accomodation, EOMI.   Pulmonary: normal respirations, no signs of respiratory distress.  Abdomen: soft, non-distended, not tender to palpation.  Vascular: Pulses 2+ and symmetric radial and dorsalis pedis. No LE edema.   Skin: Skin is warm, dry and intact.  Sensory: intact to light touch throughout.  Motor Strength: Moves all extremities spontaneously with good tone. Full strength upper and lower extremities. No abnormal movements seen.     Strength  Deltoids Triceps Biceps Wrist Extension Wrist Flexion Hand    Upper: R 5/5 5/5 5/5 5/5 5/5 5/5    L 5/5 5/5 5/5 5/5 5/5 5/5     Iliopsoas Quadriceps Knee  Flexion Tibialis  anterior Gastro- cnemius EHL   Lower: R 5/5 5/5 5/5 5/5 5/5 5/5    L 5/5 5/5 5/5 5/5 5/5 5/5        Coordination/Cerebellar:   Finger-to-nose: intact bilaterally   Pronator drift: absent bilaterally  Gait: stable     Cervical:   Midline TTP: Negative.     Thoracic:  Midline TTP: Negative.     Lumbar:  Midline TTP: Negative.     Cervical incision healed well.    Thoracic incision healing well.      Diagnostic Results:  Imaging was independently reviewed by myself.  CT cervical spine without contrast 3/5/24: There is a worsened lytic lesion about the R C5 screw and in the R posterolateral aspect of the C7 vertebral body; however, there is no evidence of hardware failure or complication.   X-Ray thoracic  spine AP and Lateral 3/5/24: no hardware complication.    ASSESSMENT/PLAN:     Gamaliel Pete Jr. is a 73 y.o. 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 His operative course is as follows:    11/7/23: C4/5 corpectomy and C3-6 anterior fusion and revision of the C4/5 anterior corpectomy cage and C3-6 plate   11/14/23: T9 kyphoplasty for a pathological fracture   11/16/23: C3-6 posterior fusion   1/6/24: T7-11 decompression/fusion on 1/6/24 2/2/24: Thoracic wound washout/revision with plastic surgery for wound dehiscence    He has been following with Dr. Villanueva for the thoracic incision. He has also received radiation therapy to the spine. He is currently undergoing chemotherapy with Dr. Turk.    Patient has clinically been doing great since his last visit. He is neurologically intact. There is no hardware complication or failure on imaging. RTC in 6 weeks with CT C/T spine without contrast.     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

## 2024-04-04 ENCOUNTER — OFFICE VISIT (OUTPATIENT)
Dept: PALLIATIVE MEDICINE | Facility: CLINIC | Age: 74
End: 2024-04-04
Payer: MEDICARE

## 2024-04-04 VITALS
HEART RATE: 88 BPM | OXYGEN SATURATION: 96 % | WEIGHT: 187.81 LBS | SYSTOLIC BLOOD PRESSURE: 132 MMHG | BODY MASS INDEX: 26.95 KG/M2 | DIASTOLIC BLOOD PRESSURE: 63 MMHG

## 2024-04-04 DIAGNOSIS — C64.1 CLEAR CELL CARCINOMA OF RIGHT KIDNEY: Primary | ICD-10-CM

## 2024-04-04 DIAGNOSIS — G47.00 INSOMNIA, UNSPECIFIED TYPE: ICD-10-CM

## 2024-04-04 DIAGNOSIS — Z71.89 ADVANCED CARE PLANNING/COUNSELING DISCUSSION: ICD-10-CM

## 2024-04-04 DIAGNOSIS — K59.03 CONSTIPATION DUE TO OPIOID THERAPY: ICD-10-CM

## 2024-04-04 DIAGNOSIS — R53.0 NEOPLASTIC (MALIGNANT) RELATED FATIGUE: ICD-10-CM

## 2024-04-04 DIAGNOSIS — G89.3 CANCER RELATED PAIN: ICD-10-CM

## 2024-04-04 DIAGNOSIS — F43.23 ADJUSTMENT DISORDER WITH MIXED ANXIETY AND DEPRESSED MOOD: ICD-10-CM

## 2024-04-04 DIAGNOSIS — C79.51 METASTATIC ADENOCARCINOMA TO RIGHT FEMUR: ICD-10-CM

## 2024-04-04 DIAGNOSIS — Z51.5 ENCOUNTER FOR PALLIATIVE CARE: ICD-10-CM

## 2024-04-04 DIAGNOSIS — T40.2X5A CONSTIPATION DUE TO OPIOID THERAPY: ICD-10-CM

## 2024-04-04 DIAGNOSIS — R06.09 DYSPNEA ON EXERTION: ICD-10-CM

## 2024-04-04 DIAGNOSIS — C79.51 METASTATIC CANCER TO SPINE: ICD-10-CM

## 2024-04-04 DIAGNOSIS — R63.0 ANOREXIA: ICD-10-CM

## 2024-04-04 PROCEDURE — 1160F RVW MEDS BY RX/DR IN RCRD: CPT | Mod: CPTII,S$GLB,, | Performed by: STUDENT IN AN ORGANIZED HEALTH CARE EDUCATION/TRAINING PROGRAM

## 2024-04-04 PROCEDURE — 3078F DIAST BP <80 MM HG: CPT | Mod: CPTII,S$GLB,, | Performed by: STUDENT IN AN ORGANIZED HEALTH CARE EDUCATION/TRAINING PROGRAM

## 2024-04-04 PROCEDURE — 1125F AMNT PAIN NOTED PAIN PRSNT: CPT | Mod: CPTII,S$GLB,, | Performed by: STUDENT IN AN ORGANIZED HEALTH CARE EDUCATION/TRAINING PROGRAM

## 2024-04-04 PROCEDURE — 99205 OFFICE O/P NEW HI 60 MIN: CPT | Mod: S$GLB,,, | Performed by: STUDENT IN AN ORGANIZED HEALTH CARE EDUCATION/TRAINING PROGRAM

## 2024-04-04 PROCEDURE — 1101F PT FALLS ASSESS-DOCD LE1/YR: CPT | Mod: CPTII,S$GLB,, | Performed by: STUDENT IN AN ORGANIZED HEALTH CARE EDUCATION/TRAINING PROGRAM

## 2024-04-04 PROCEDURE — 3288F FALL RISK ASSESSMENT DOCD: CPT | Mod: CPTII,S$GLB,, | Performed by: STUDENT IN AN ORGANIZED HEALTH CARE EDUCATION/TRAINING PROGRAM

## 2024-04-04 PROCEDURE — 1158F ADVNC CARE PLAN TLK DOCD: CPT | Mod: CPTII,S$GLB,, | Performed by: STUDENT IN AN ORGANIZED HEALTH CARE EDUCATION/TRAINING PROGRAM

## 2024-04-04 PROCEDURE — 3075F SYST BP GE 130 - 139MM HG: CPT | Mod: CPTII,S$GLB,, | Performed by: STUDENT IN AN ORGANIZED HEALTH CARE EDUCATION/TRAINING PROGRAM

## 2024-04-04 PROCEDURE — 3008F BODY MASS INDEX DOCD: CPT | Mod: CPTII,S$GLB,, | Performed by: STUDENT IN AN ORGANIZED HEALTH CARE EDUCATION/TRAINING PROGRAM

## 2024-04-04 PROCEDURE — 1159F MED LIST DOCD IN RCRD: CPT | Mod: CPTII,S$GLB,, | Performed by: STUDENT IN AN ORGANIZED HEALTH CARE EDUCATION/TRAINING PROGRAM

## 2024-04-04 PROCEDURE — 99999 PR PBB SHADOW E&M-EST. PATIENT-LVL IV: CPT | Mod: PBBFAC,,, | Performed by: STUDENT IN AN ORGANIZED HEALTH CARE EDUCATION/TRAINING PROGRAM

## 2024-04-04 RX ORDER — OXYCODONE HYDROCHLORIDE 5 MG/1
5 TABLET ORAL EVERY 4 HOURS PRN
Qty: 42 TABLET | Refills: 0 | Status: SHIPPED | OUTPATIENT
Start: 2024-04-04 | End: 2024-05-06 | Stop reason: SDUPTHER

## 2024-04-04 NOTE — PROGRESS NOTES
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 Turk and BRIAN Lopez  - currently on disease directed therapy with immunotherapy  - Inlyta on hold due to mucositis and possible upcoming in surgery    Encounter for palliative care/Advance Care Planning   - patient decisional and accompanied by his wife today in clinic  - no ACP documents uploaded into EMR  - philosophy of Palliative Medicine reviewed with patient and family today  - new patient folder given to and reviewed with patient and family today  - ACP booklet given to and reviewed with patient and family including HCPOA and living will  - goals: life prolonging but continue to improve symptom management  - code status not discussed in detail today     Cancer related pain  - patient with severe pain in his lower back as well as his right leg  - patient previously used opioid medication as well as gabapentin for pain, but he did not feel they were as helpful or had increased side effects  - discussion regarding pain control today including restarting oxycodone 5 mg q4h prn with strict bowel regimen   - patient open to 1 week prescription until he sees surgeon next week  - opioid safety reviewed with patient and opioid safety sheet in new patient folder for patient to review  - patient already has narcan prescription at home    Dyspnea on exertion  - patient noting moderate dyspnea with exertion  - he reports using rollator walker and will rest when needed  - no need for pharmacologic intervention at this time  - tip sheet for shortness of breath in new patient folder for patient to review  - will continue to monitor    Adjustment disorder with  mixed anxiety and depressed mood  - patient reports mild anxiety on ESAS and no depression  - he reports overall his mood is good  - he still finds arturo with spending time with his family  - he did become tearful around discussion of his spirituality but reports he is doing well   - emotional support provided at this time  - will continue to monitor    Anorexia  - patient with changes in appetite but able to maintain weight  - has met with RD  - discussed need for adequate hydration  - no need for pharmacologic intervention    Neoplastic (malignant) related fatigue/Insomnia  - patient with increased fatigue and overall poor sleep due to increasing pain  - will work on pain control as noted above  - discussed finding balance between activity and rest  - tip sheet for better sleep in new patient folder for patient to review    Constipation due to opioid therapy  - patient with increased constipation due to opioid use  - discussed need for adequate hydration  - discussed use of consistent bowel regimen with opioid use  - patient to use miralax in AM and senna 1-2 tabs hospitals    Advance Care Planning   Advance Directives:   Living Will: No    [place]POST: No    Do Not Resuscitate Status: No    Medical Power of : No    Agent's Name:  Mary Parrishsamir   Agent's Contact Number:  [phone]    Decision Making:  Patient answered questions and Family answered questions  Goals of Care: I engaged the patient and family in a voluntary conversation about advance care planning and we specifically addressed what the goals of care would be moving forward, in light of the patient's change in clinical status, specifically RCC.  We did specifically address the patient's likely prognosis, which is fair .  We explored the patient's values and preferences for future care.  The patient and family endorses that what is most important right now is to focus on remaining as independent as possible, symptom/pain control, and extending life as  long as possible, even it it means sacrificing quality    Accordingly, we have decided that the best plan to meet the patient's goals includes continuing with treatment    I did not explain the role for hospice care at this stage of the patient's illness, including its ability to help the patient live with the best quality of life possible.  We will not be making a hospice referral.    I spent a total of 18 minutes engaging the patient in this advance care planning discussion.         Follow up: 6 weeks     Plan discussed with: oncology team       SUBJECTIVE:      History of Present Illness / Interval History:  Gamaliel Pete Jr. is 73 y.o. male with gout and metastatic clear cell carcinoma of right kidney presents to Palliative Care Clinic for physical symptoms, advance care planning,, clarification of goals of care, and additional support.. Please see oncology notes for more details on oncologic history and treatment course.       4/4/24  History of Present Illness    CHIEF COMPLAINT:  - Persistent back pain  - Concerns about upcoming surgery to remove a lesion on the femur    HISTORY OBTAINED FROM:  - Patient  - Patient's Spouse    HPI:  The patient, currently undergoing cancer treatment, is experiencing persistent back pain in the shoulder, lower back, and general back area. He also has a lesion on his femur, which requires surgery. However, due to his current condition and pain levels, he is uncertain about proceeding with the surgery. He has been managing his pain with Tylenol for the past week and a half, which has provided some relief but not completely. His sleep has been disrupted due to discomfort, and he has not yet tried heat therapy or OTC lidocaine patches for his pain. Previously, he was on narcotics and gabapentin for pain management, but discontinued them due to concerns about constipation and dependency. He has been off narcotics for approximately 1.5 to two months. He is open to trying  "low-dose pain medication again, with the understanding that constipation management will be part of the treatment plan. His mood is described as "even-keeled," and he finds arturo in taking care of his family. He has a supportive family network, including his wife, children, and grandchildren. He is also a believer and has been reflecting on his spiritual beliefs since his diagnosis.    GOALS OF CARE/ADVANCED DIRECTIVES:  - His primary goal is to stay alive and continue taking care of his family. He mentioned that material things no longer hold the same importance for him, and his focus is on his health and family. His family, including his wife, son, and daughter-in-law, are actively involved in his care. His son and daughter-in-law are researching his condition and providing support. He lives in a two-story house and is able to climb the stairs at least daily. His quality of life is currently impacted by his physical pain and he is seeking ways to manage it.    ACTIVITIES OF DAILY LIVING:  - Significant back pain is experienced, affecting sleep and comfort.  - A four-rangel walker with brakes is used for mobility.  - Stairs are climbed at least once, sometimes twice daily, to reach the bedroom in a two-story house.  - A lesion on the femur causes pain and affects mobility.  - Difficulty sleeping is experienced due to pain, often switching between two beds and a chair for comfort.  - Eating is done independently, but meals are sometimes not finished in one sitting and are completed later in the day.  - Weight has remained stable.    SOCIAL HISTORY:  -   - Believer           ROS:  Review of Systems   Constitutional:  Positive for activity change, appetite change and fatigue.   HENT: Negative.     Eyes: Negative.    Respiratory:  Positive for shortness of breath (with exertion).    Gastrointestinal:  Positive for constipation. Negative for nausea.   Genitourinary: Negative.    Musculoskeletal:  Positive for " arthralgias, back pain, leg pain and myalgias.   Neurological: Negative.    Psychiatric/Behavioral:  Negative for dysphoric mood. The patient is nervous/anxious.    All other systems reviewed and are negative.      Review of Symptoms      Symptom Assessment (ESAS 0-10 Scale)  Pain:  9  Dyspnea:  5  Anxiety:  2  Nausea:  0  Depression:  0  Anorexia:  5  Fatigue:  7  Insomnia:  10  Restlessness:  0  Agitation:  0     CAM / Delirium:  Negative  Constipation:  Positive  Diarrhea:  Negative    Anxiety:  Is nervous/anxious  Constipation:  Constipation    Bowel Management Plan (BMP):  No      Pain Assessment:  OME in 24 hours:  0  Location(s): leg    Leg       Location: right        Quality: Aching        Quantity: 9/10 in intensity        Chronicity: Onset 1 (greater than) month(s) ago, gradually worsening        Aggravating Factors: Walking        Alleviating Factors: Acetaminophen        Associated Symptoms: None    Modified Michelle Scale:  0    ECOG Performance Status ndGndrndanddndend:nd nd2nd Living Arrangements:  Lives with spouse    Psychosocial/Cultural:   See Palliative Psychosocial Note: No  Social Issues Identified: New Diagnosis/Trauma  Bereavement Risk: No  Caregiver Needs Discussed. Caregiver Distress: No: none  Cultural: enjoys spending time with family  **Primary  to Follow**  Palliative Care  Consult: No    Spiritual:  F - Stephani and Belief:  Yes        External  database queried on 04/04/2024 by Mariluz OLGUIN : 03/01/2024 Gabapentin 300 mg Disp: 90 for 30 days  02/06/2024 Oxycodone 10 mg Disp: 30 for 5 days    Medications:    Current Outpatient Medications:     allopurinoL (ZYLOPRIM) 100 MG tablet, Take 1 tablet (100 mg total) by mouth once daily., Disp: 30 tablet, Rfl: 3    amlodipine-benazepril 5-10 mg (LOTREL) 5-10 mg per capsule, Take 1 capsule by mouth once daily., Disp: 90 capsule, Rfl: 3    axitinib (INLYTA) 5 mg Tab, Take 1 tablet (5 mg) by mouth 2 (two) times daily.,  Disp: 60 tablet, Rfl: 11    diphenhydramine HCl (BENADRYL ALLERGY ORAL), Take by mouth as needed., Disp: , Rfl:     duke's soln (benadryl 30 mL, mylanta 30 mL, LIDOcaine 30 mL, nystatin 30 mL) 120mL, Take 10 mLs by mouth 4 (four) times daily., Disp: 500 mL, Rfl: 0    EPINEPHrine (EPIPEN) 0.3 mg/0.3 mL AtIn, Inject 0.3 mLs (0.3 mg total) into the muscle as needed., Disp: 1 each, Rfl: 0    gabapentin (NEURONTIN) 300 MG capsule, Take 1 capsule (300 mg total) by mouth 3 (three) times daily., Disp: 90 capsule, Rfl: 11    naloxone (NARCAN) 4 mg/actuation Spry, 1 spray (4mg) by nasal route as needed for opioid overdose; may repeat every 2-3 minutes in alternating nostrils until medical help arrives. Call 911, Disp: 2 each, Rfl: 0    omega-3 fatty acids/fish oil (FISH OIL-OMEGA-3 FATTY ACIDS) 300-1,000 mg capsule, Take 2 capsules by mouth once daily., Disp: , Rfl:     turmeric (CURCUMIN MISC), 1,000 mg by Misc.(Non-Drug; Combo Route) route Daily., Disp: , Rfl:     UNABLE TO FIND, Take 500 mg by mouth 2 (two) times a day. medication name: Tudca, Disp: , Rfl:     UNABLE TO FIND, Take 2 capsules by mouth 2 (two) times a day. medication name: Turkey tail mushroom, Disp: , Rfl:     UNABLE TO FIND, Take 15 drops by mouth once daily. medication name: Essiac-20, Disp: , Rfl:     UNABLE TO FIND, Take 5 mLs by mouth 2 (two) times a day. medication name: barley leaf juice powder, Disp: , Rfl:     UNABLE TO FIND, Take 5 mLs by mouth 2 (two) times a day. medication name: black seed oil (cumin seed), Disp: , Rfl:     oxyCODONE (ROXICODONE) 5 MG immediate release tablet, Take 1 tablet (5 mg total) by mouth every 4 (four) hours as needed for Pain., Disp: 42 tablet, Rfl: 0    sildenafiL (VIAGRA) 100 MG tablet, Take 1 tablet (100 mg total) by mouth daily as needed for Erectile Dysfunction. (Patient not taking: Reported on 1/18/2024), Disp: 30 tablet, Rfl: 11    Review of patient's allergies indicates:   Allergen Reactions    Vancomycin  "analogues Rash     IV antibiotic caused a rash            OBJECTIVE:         Physical Exam:  Vitals: Pulse: 88 (04/04/24 0754)  BP: 132/63 (04/04/24 0754)  SpO2: 96 % (04/04/24 0754)  Physical Exam  Vitals reviewed.   Constitutional:       General: He is not in acute distress.     Appearance: He is not toxic-appearing or diaphoretic.   HENT:      Head: Normocephalic and atraumatic.      Right Ear: External ear normal.      Left Ear: External ear normal.      Nose: Nose normal.      Mouth/Throat:      Mouth: Mucous membranes are moist.   Eyes:      General: No scleral icterus.        Right eye: No discharge.         Left eye: No discharge.      Extraocular Movements: Extraocular movements intact.   Neck:      Comments: Trachea midline  Pulmonary:      Effort: Pulmonary effort is normal. No respiratory distress.   Abdominal:      General: Abdomen is flat. There is no distension.   Musculoskeletal:      Cervical back: Normal range of motion.      Right lower leg: No edema.      Left lower leg: No edema.   Skin:     General: Skin is warm and dry.      Coloration: Skin is not jaundiced.      Findings: No rash.   Neurological:      General: No focal deficit present.      Mental Status: He is alert and oriented to person, place, and time.      Cranial Nerves: No cranial nerve deficit.   Psychiatric:         Behavior: Behavior normal.         Thought Content: Thought content normal.         Judgment: Judgment normal.           Labs: I have reviewed the latest labs including CBC showing mild anemia and thrombocytopenia as well as CMP showing Cr WNL.     Imaging: I have reviewed the latest imaging MRI thoracic spine on 01/31/2024 showing "Interval postop changes of posterior thoracic spine instrumented fusion from T7 to T11 for stabilizing pathological fracture at the level of T9 and failed vertebroplasty at T9. Persistent soft tissue at the level of T9 with minimal increase in the degree of kyphosis at the level of T9 with " "soft tissue effacing the ventral thecal sac and associated abnormal cord signal at level of T9. Abnormal enhancement within the soft tissues at the postoperative bed and within the subcutaneous soft tissues extending from T5 through L1, with multiple peripheral areas of enhancement, concerning for infected postoperative collection (abscess, infected hematoma, or superinfected pseudomeningocele). Unchanged appearance of additional soft tissue enhancement in the T6 vertebral body, in keeping with known osseous metastatic disease. Additional findings as above."       I spent a total of 60 of 78 minutes on the day of the visit. This includes face to face time in discussion of goals of care, symptom assessment, coordination of care and emotional support.  This also includes non-face to face time preparing to see the patient (eg, review of tests/imaging), obtaining and/or reviewing separately obtained history, documenting clinical information in the electronic or other health record, independently interpreting results and communicating results to the patient/family/caregiver, or care coordinator.     Additional 18 of 78 min time spent on a voluntary advance care planning and /or goals of care discussion, providing emotional support, formulating and communicating prognosis and exploring burden/benefit of various approaches of treatment.       Mariluz Dennis MD    Advance Care Planning     Date: 04/04/2024    Power of   I initiated the process of voluntary advance care planning today and explained the importance of this process to the patient.  I introduced the concept of advance directives to the patient, as well. Then the patient received detailed information about the importance of designating a Health Care Power of  (HCPOA). He was also instructed to communicate with this person about their wishes for future healthcare, should he become sick and lose decision-making capacity. The patient has not " previously appointed a HCPOA. After our discussion, the patient has decided to complete a HCPOA and has appointed his significant other, health care agent:  Mary Pete  & health care agent number:  [phone]  I spent a total time of 18 minutes discussing this issue with the patient.

## 2024-04-08 DIAGNOSIS — R13.10 DYSPHAGIA, UNSPECIFIED TYPE: Primary | ICD-10-CM

## 2024-04-11 ENCOUNTER — PATIENT MESSAGE (OUTPATIENT)
Dept: ORTHOPEDICS | Facility: CLINIC | Age: 74
End: 2024-04-11
Payer: MEDICARE

## 2024-04-11 ENCOUNTER — HOSPITAL ENCOUNTER (OUTPATIENT)
Dept: RADIOLOGY | Facility: HOSPITAL | Age: 74
Discharge: HOME | End: 2024-04-11
Payer: MEDICARE

## 2024-04-11 ENCOUNTER — OFFICE VISIT (OUTPATIENT)
Dept: NEUROSURGERY | Facility: CLINIC | Age: 74
End: 2024-04-11
Payer: MEDICARE

## 2024-04-11 VITALS — HEART RATE: 89 BPM | SYSTOLIC BLOOD PRESSURE: 128 MMHG | DIASTOLIC BLOOD PRESSURE: 73 MMHG

## 2024-04-11 DIAGNOSIS — C79.51 METASTATIC CANCER TO SPINE: ICD-10-CM

## 2024-04-11 DIAGNOSIS — Z98.1 S/P FUSION OF THORACIC SPINE: ICD-10-CM

## 2024-04-11 DIAGNOSIS — M84.58XA: ICD-10-CM

## 2024-04-11 DIAGNOSIS — M84.58XA PATHOLOGICAL FRACTURE OF THORACIC VERTEBRA DUE TO NEOPLASTIC DISEASE: ICD-10-CM

## 2024-04-11 DIAGNOSIS — C79.51 METASTATIC CANCER TO SPINE: Primary | ICD-10-CM

## 2024-04-11 DIAGNOSIS — Z98.1 S/P CERVICAL SPINAL FUSION: ICD-10-CM

## 2024-04-11 PROCEDURE — A9585 GADOBUTROL INJECTION: HCPCS

## 2024-04-11 PROCEDURE — 3288F FALL RISK ASSESSMENT DOCD: CPT | Mod: CPTII,S$GLB,,

## 2024-04-11 PROCEDURE — 99024 POSTOP FOLLOW-UP VISIT: CPT | Mod: S$GLB,,,

## 2024-04-11 PROCEDURE — 72157 MRI CHEST SPINE W/O & W/DYE: CPT | Mod: TC

## 2024-04-11 PROCEDURE — 3074F SYST BP LT 130 MM HG: CPT | Mod: CPTII,S$GLB,,

## 2024-04-11 PROCEDURE — 1101F PT FALLS ASSESS-DOCD LE1/YR: CPT | Mod: CPTII,S$GLB,,

## 2024-04-11 PROCEDURE — 25500020 PHARM REV CODE 255

## 2024-04-11 PROCEDURE — 72158 MRI LUMBAR SPINE W/O & W/DYE: CPT | Mod: TC

## 2024-04-11 PROCEDURE — 72157 MRI CHEST SPINE W/O & W/DYE: CPT | Mod: 26,,, | Performed by: RADIOLOGY

## 2024-04-11 PROCEDURE — 3078F DIAST BP <80 MM HG: CPT | Mod: CPTII,S$GLB,,

## 2024-04-11 PROCEDURE — 72158 MRI LUMBAR SPINE W/O & W/DYE: CPT | Mod: 26,,, | Performed by: RADIOLOGY

## 2024-04-11 PROCEDURE — 1125F AMNT PAIN NOTED PAIN PRSNT: CPT | Mod: CPTII,S$GLB,,

## 2024-04-11 PROCEDURE — 99999 PR PBB SHADOW E&M-EST. PATIENT-LVL III: CPT | Mod: PBBFAC,,,

## 2024-04-11 RX ORDER — GADOBUTROL 604.72 MG/ML
9 INJECTION INTRAVENOUS
Status: COMPLETED | OUTPATIENT
Start: 2024-04-11 | End: 2024-04-11

## 2024-04-11 RX ADMIN — GADOBUTROL 9 ML: 604.72 INJECTION INTRAVENOUS at 08:04

## 2024-04-12 ENCOUNTER — PATIENT MESSAGE (OUTPATIENT)
Dept: HEMATOLOGY/ONCOLOGY | Facility: CLINIC | Age: 74
End: 2024-04-12
Payer: MEDICARE

## 2024-04-12 ENCOUNTER — PATIENT MESSAGE (OUTPATIENT)
Dept: ORTHOPEDICS | Facility: CLINIC | Age: 74
End: 2024-04-12
Payer: MEDICARE

## 2024-04-15 ENCOUNTER — OFFICE VISIT (OUTPATIENT)
Dept: PRIMARY CARE CLINIC | Facility: CLINIC | Age: 74
End: 2024-04-15
Payer: MEDICARE

## 2024-04-15 ENCOUNTER — OFFICE VISIT (OUTPATIENT)
Dept: HEMATOLOGY/ONCOLOGY | Facility: CLINIC | Age: 74
End: 2024-04-15
Payer: MEDICARE

## 2024-04-15 ENCOUNTER — DOCUMENTATION ONLY (OUTPATIENT)
Dept: HEMATOLOGY/ONCOLOGY | Facility: CLINIC | Age: 74
End: 2024-04-15
Payer: MEDICARE

## 2024-04-15 ENCOUNTER — INFUSION (OUTPATIENT)
Dept: INFUSION THERAPY | Facility: HOSPITAL | Age: 74
End: 2024-04-15
Attending: STUDENT IN AN ORGANIZED HEALTH CARE EDUCATION/TRAINING PROGRAM
Payer: MEDICARE

## 2024-04-15 VITALS
TEMPERATURE: 97 F | WEIGHT: 190.25 LBS | DIASTOLIC BLOOD PRESSURE: 65 MMHG | HEIGHT: 70 IN | HEART RATE: 102 BPM | SYSTOLIC BLOOD PRESSURE: 131 MMHG | RESPIRATION RATE: 17 BRPM | OXYGEN SATURATION: 97 % | BODY MASS INDEX: 27.24 KG/M2

## 2024-04-15 VITALS
DIASTOLIC BLOOD PRESSURE: 62 MMHG | SYSTOLIC BLOOD PRESSURE: 116 MMHG | OXYGEN SATURATION: 97 % | HEART RATE: 94 BPM | WEIGHT: 191 LBS | RESPIRATION RATE: 18 BRPM | BODY MASS INDEX: 27.35 KG/M2 | HEIGHT: 70 IN

## 2024-04-15 VITALS
HEART RATE: 85 BPM | DIASTOLIC BLOOD PRESSURE: 56 MMHG | BODY MASS INDEX: 27.24 KG/M2 | OXYGEN SATURATION: 97 % | WEIGHT: 190.25 LBS | RESPIRATION RATE: 18 BRPM | SYSTOLIC BLOOD PRESSURE: 115 MMHG | HEIGHT: 70 IN | TEMPERATURE: 97 F

## 2024-04-15 DIAGNOSIS — C78.00 MALIGNANT NEOPLASM METASTATIC TO LUNG, UNSPECIFIED LATERALITY: ICD-10-CM

## 2024-04-15 DIAGNOSIS — C64.1 CLEAR CELL CARCINOMA OF RIGHT KIDNEY: Primary | ICD-10-CM

## 2024-04-15 DIAGNOSIS — I70.0 AORTIC ATHEROSCLEROSIS: ICD-10-CM

## 2024-04-15 DIAGNOSIS — E83.52 HYPERCALCEMIA: ICD-10-CM

## 2024-04-15 DIAGNOSIS — D50.9 IRON DEFICIENCY ANEMIA, UNSPECIFIED IRON DEFICIENCY ANEMIA TYPE: ICD-10-CM

## 2024-04-15 DIAGNOSIS — C64.1 CLEAR CELL CARCINOMA OF RIGHT KIDNEY: ICD-10-CM

## 2024-04-15 DIAGNOSIS — C79.51 METASTATIC CANCER TO SPINE: Primary | ICD-10-CM

## 2024-04-15 DIAGNOSIS — Z87.39 HISTORY OF GOUT: ICD-10-CM

## 2024-04-15 DIAGNOSIS — C79.51 METASTATIC CANCER TO SPINE: ICD-10-CM

## 2024-04-15 DIAGNOSIS — D64.9 NORMOCYTIC ANEMIA: ICD-10-CM

## 2024-04-15 DIAGNOSIS — Z90.5 HISTORY OF RIGHT RADICAL NEPHRECTOMY: ICD-10-CM

## 2024-04-15 DIAGNOSIS — M10.9 GOUT, UNSPECIFIED CAUSE, UNSPECIFIED CHRONICITY, UNSPECIFIED SITE: ICD-10-CM

## 2024-04-15 DIAGNOSIS — C78.00 MALIGNANT NEOPLASM METASTATIC TO LUNG, UNSPECIFIED LATERALITY: Primary | ICD-10-CM

## 2024-04-15 PROBLEM — I10 ESSENTIAL HYPERTENSION: Status: RESOLVED | Noted: 2023-11-07 | Resolved: 2024-04-15

## 2024-04-15 PROBLEM — E87.1 HYPONATREMIA: Status: RESOLVED | Noted: 2024-02-01 | Resolved: 2024-04-15

## 2024-04-15 PROCEDURE — 3008F BODY MASS INDEX DOCD: CPT | Mod: CPTII,S$GLB,, | Performed by: INTERNAL MEDICINE

## 2024-04-15 PROCEDURE — 25000003 PHARM REV CODE 250: Performed by: INTERNAL MEDICINE

## 2024-04-15 PROCEDURE — 99214 OFFICE O/P EST MOD 30 MIN: CPT | Mod: S$GLB,,, | Performed by: FAMILY MEDICINE

## 2024-04-15 PROCEDURE — 1125F AMNT PAIN NOTED PAIN PRSNT: CPT | Mod: CPTII,S$GLB,, | Performed by: FAMILY MEDICINE

## 2024-04-15 PROCEDURE — 3008F BODY MASS INDEX DOCD: CPT | Mod: CPTII,S$GLB,, | Performed by: FAMILY MEDICINE

## 2024-04-15 PROCEDURE — 1160F RVW MEDS BY RX/DR IN RCRD: CPT | Mod: CPTII,S$GLB,, | Performed by: INTERNAL MEDICINE

## 2024-04-15 PROCEDURE — 96361 HYDRATE IV INFUSION ADD-ON: CPT

## 2024-04-15 PROCEDURE — 99999 PR PBB SHADOW E&M-EST. PATIENT-LVL III: CPT | Mod: PBBFAC,,, | Performed by: INTERNAL MEDICINE

## 2024-04-15 PROCEDURE — 96413 CHEMO IV INFUSION 1 HR: CPT

## 2024-04-15 PROCEDURE — 63600175 PHARM REV CODE 636 W HCPCS: Mod: JZ,JG | Performed by: INTERNAL MEDICINE

## 2024-04-15 PROCEDURE — 99999 PR PBB SHADOW E&M-EST. PATIENT-LVL IV: CPT | Mod: PBBFAC,,, | Performed by: FAMILY MEDICINE

## 2024-04-15 PROCEDURE — 1159F MED LIST DOCD IN RCRD: CPT | Mod: CPTII,S$GLB,, | Performed by: INTERNAL MEDICINE

## 2024-04-15 PROCEDURE — 3078F DIAST BP <80 MM HG: CPT | Mod: CPTII,S$GLB,, | Performed by: INTERNAL MEDICINE

## 2024-04-15 PROCEDURE — 3078F DIAST BP <80 MM HG: CPT | Mod: CPTII,S$GLB,, | Performed by: FAMILY MEDICINE

## 2024-04-15 PROCEDURE — 99215 OFFICE O/P EST HI 40 MIN: CPT | Mod: S$GLB,,, | Performed by: INTERNAL MEDICINE

## 2024-04-15 PROCEDURE — 1101F PT FALLS ASSESS-DOCD LE1/YR: CPT | Mod: CPTII,S$GLB,, | Performed by: FAMILY MEDICINE

## 2024-04-15 PROCEDURE — 1126F AMNT PAIN NOTED NONE PRSNT: CPT | Mod: CPTII,S$GLB,, | Performed by: INTERNAL MEDICINE

## 2024-04-15 PROCEDURE — 3288F FALL RISK ASSESSMENT DOCD: CPT | Mod: CPTII,S$GLB,, | Performed by: FAMILY MEDICINE

## 2024-04-15 PROCEDURE — 3074F SYST BP LT 130 MM HG: CPT | Mod: CPTII,S$GLB,, | Performed by: FAMILY MEDICINE

## 2024-04-15 PROCEDURE — 3075F SYST BP GE 130 - 139MM HG: CPT | Mod: CPTII,S$GLB,, | Performed by: INTERNAL MEDICINE

## 2024-04-15 RX ORDER — DIPHENHYDRAMINE HYDROCHLORIDE 50 MG/ML
50 INJECTION INTRAMUSCULAR; INTRAVENOUS ONCE AS NEEDED
Status: DISCONTINUED | OUTPATIENT
Start: 2024-04-15 | End: 2024-04-15 | Stop reason: HOSPADM

## 2024-04-15 RX ORDER — SODIUM CHLORIDE 9 MG/ML
INJECTION, SOLUTION INTRAVENOUS ONCE
Status: COMPLETED | OUTPATIENT
Start: 2024-04-15 | End: 2024-04-15

## 2024-04-15 RX ORDER — ALLOPURINOL 100 MG/1
100 TABLET ORAL DAILY
Qty: 90 TABLET | Refills: 3 | Status: SHIPPED | OUTPATIENT
Start: 2024-04-15

## 2024-04-15 RX ORDER — SODIUM CHLORIDE 0.9 % (FLUSH) 0.9 %
10 SYRINGE (ML) INJECTION
Status: DISCONTINUED | OUTPATIENT
Start: 2024-04-15 | End: 2024-04-15 | Stop reason: HOSPADM

## 2024-04-15 RX ORDER — HEPARIN 100 UNIT/ML
500 SYRINGE INTRAVENOUS
Status: CANCELLED | OUTPATIENT
Start: 2024-04-15

## 2024-04-15 RX ORDER — EPINEPHRINE 0.3 MG/.3ML
0.3 INJECTION SUBCUTANEOUS ONCE AS NEEDED
Status: DISCONTINUED | OUTPATIENT
Start: 2024-04-15 | End: 2024-04-15 | Stop reason: HOSPADM

## 2024-04-15 RX ORDER — DIPHENHYDRAMINE HYDROCHLORIDE 50 MG/ML
50 INJECTION INTRAMUSCULAR; INTRAVENOUS ONCE AS NEEDED
Status: CANCELLED | OUTPATIENT
Start: 2024-04-15

## 2024-04-15 RX ORDER — EPINEPHRINE 0.3 MG/.3ML
0.3 INJECTION SUBCUTANEOUS ONCE AS NEEDED
Status: CANCELLED | OUTPATIENT
Start: 2024-04-15

## 2024-04-15 RX ORDER — MORPHINE SULFATE 15 MG/1
15 TABLET, FILM COATED, EXTENDED RELEASE ORAL EVERY 8 HOURS
Qty: 90 TABLET | Refills: 0 | Status: SHIPPED | OUTPATIENT
Start: 2024-04-15

## 2024-04-15 RX ORDER — SODIUM CHLORIDE 0.9 % (FLUSH) 0.9 %
10 SYRINGE (ML) INJECTION
Status: CANCELLED | OUTPATIENT
Start: 2024-04-15

## 2024-04-15 RX ORDER — DIAZEPAM 5 MG/1
5 TABLET ORAL EVERY 8 HOURS PRN
Qty: 10 TABLET | Refills: 0 | Status: SHIPPED | OUTPATIENT
Start: 2024-04-15 | End: 2025-04-15

## 2024-04-15 RX ADMIN — SODIUM CHLORIDE 200 MG: 9 INJECTION, SOLUTION INTRAVENOUS at 11:04

## 2024-04-15 RX ADMIN — SODIUM CHLORIDE: 9 INJECTION, SOLUTION INTRAVENOUS at 10:04

## 2024-04-15 NOTE — PROGRESS NOTES
Subjective:       Patient ID: Gamaliel Pete Jr. is a 73 y.o. male.    Chief Complaint: Follow-up (6 month)    HPI: 74 yo WM in for 6 month checkup --EATING WELL--+ BM 0n oxycodone --BM q 7 days --saw heme oncologist this morning and supposed to prescribed pain medicine and something for bowels--Dr. Michaels did nephrectomy--right --Dr Burke lech[facility] hem onc --patient had metastatic lesions to the spine and right femur .  Patient had clear cell renal carcinoma. Surgery Sept 2023 kidney removed in October 2023  Lab--glucose 117 calcium 12.1 was 11 hematocrit 36.1-improved was 31.9  Had infusion for renal cancer this morning--has to scheduled in May  History of hypercalcemia--told she was going to give them a medication that would lower the calcium level if puts some calcium back and bone    ROS:  Skin: no psoriasis, eczema, skin cancer--itchy   HEENT: No headache, ocular pain, blurred vision, diplopia, epistaxis, hoarseness change in voice, thyroid trouble  Lung: No pneumonia, asthma, Tb, wheezing, SOB, no smoking  Heart: No chest pain, ankle edema, palpitations, MI, bam murmur, no hypertension--patient lost so much weight off blood pressure medications blood pressure 116/62,no hyperlipidemia--no stent bypass arrhythmia  Abdomen:+ nausea,no  vomiting, diarrhea, constipation, ulcers, hepatitis, gallbladder disease, melena, hematochezia, hematemesis  : no UTI, renal disease, stones prostate venereal disease--hx nephrectomy see HPI   MS: no fractures, O/A, lupus, rheumatoid, +gout--flares up on occasion but rarely--usually in ankle area used to be in the toe--metastatic lesion to the vertebra into the right femur  Neuro: No dizziness, LOC, seizures   No diabetes, + allow after surgery anemia--, no anxiety, no depression  , one biological 2 stepchildren work retired lives with wife     Objective:   Physical Exam:  General: Well nourished, well developed, no acute distress +obesity  Skin:  Ecchymosis on the  forearms bilaterally   HEENT: Eyes PERRLA, EOM intact, nose patent, throat non-erythematous ears TMs clear  NECK: Supple, no bruits, No JVD, no nodes  Lungs: Clear, no rales, rhonchi, wheezing  Heart: Regular rate and rhythm, no murmurs, gallops, or rubs  Abdomen: flat, bowel sounds positive, no tenderness, or organomegaly  MS: ROM and MS intact --history back pain wearing a lumbar corset  Neuro: Alert, CN intact, oriented X 3  Extremities: No cyanosis, clubbing, or edema         Assessment:       1. Clear cell carcinoma of right kidney    2. History of gout    3. History of right radical nephrectomy    4. Hypercalcemia    5. Iron deficiency anemia, unspecified iron deficiency anemia type    6. Metastatic cancer to spine    7. Gout, unspecified cause, unspecified chronicity, unspecified site    8. Aortic atherosclerosis          Plan:       Clear cell carcinoma of right kidney    History of gout    History of right radical nephrectomy    Hypercalcemia    Iron deficiency anemia, unspecified iron deficiency anemia type    Metastatic cancer to spine    Gout, unspecified cause, unspecified chronicity, unspecified site  -     allopurinoL (ZYLOPRIM) 100 MG tablet; Take 1 tablet (100 mg total) by mouth once daily.  Dispense: 90 tablet; Refill: 3    Aortic atherosclerosis          Main Reason for Visit six-month checkup  Appt Dr Michaels Oct 2023 --right nephrectomy  Saw Hem Onc today had infusion for clear cell renal carcinoma--with metastasis to the vertebra and right femur--had infusion today  Constipation told will get medication help with opioid constipation   Hx anemia given 2 iron infusions   Patient has a possible lesion at T9 concern for metastatic disease supposed to have a bone biopsy--no lung lesions    hypertension resolved due to weight lossBP 160/62  History of gout no recent flairs needs refill of allopurinol  Obesity--exercise try to get ideal body weight   Lab as per Hem Onc

## 2024-04-15 NOTE — PROGRESS NOTES
Subjective     Patient ID: Gamaliel Pete Jr. is a 73 y.o. male.    Chief Complaint: Cancer    HPI    Returns for follow up of metastatic RCC  He has most recently been on Keytruda alone as he was intolerant of Inlyta.  Unfortunately, recent MRI scans with neurosurgery with bone progression as below    - 4/11/2024 MRI T/L spine:  FINDINGS:  Partially visualized postsurgical changes of the cervical spine with enhancing C7 lesion, correlating with CT cervical spine 04/11/2024.  There is slight osseous retropulsion into the spinal canal without significant stenosis.  Postoperative changes of T7-T11 posterior instrumented fusion and T8-T10 decompressive laminectomy.  Fluid collection within the posterior subcutaneous soft tissues of the surgical bed measuring approximately 3.9 x 1.3 x 23.5 cm (19-61; 16-8).  Additional smaller collection noted superiorly (series 19, image 28).  These have slightly improved from the prior exam.  Mild surrounding subcutaneous edema.  THORACIC  Alignment: Normal.  Vertebrae: Enhancing T6 vertebral body lesion extending into the left posterior elements with mild height loss, correlating with pathologic fracture on recent CT.  Lesion extends posteriorly into the anterior epidural space and left T5-T6 and T6-T7 neural foramen, contributing to severe neural foraminal narrowing.  No spinal canal stenosis at this level.  Enhancing T10 vertebral body lesion extending anteriorly and superiorly involving the prevertebral soft tissues, anterior T8-T9 epidural space, and bilateral T9-T10 neural foramen with severe neural foraminal narrowing, similar to prior exam.  Enhancing lesion noted of the right posterior 5th rib (22-2).  Discs: Multilevel disc height loss and desiccation.  No evidence to suggest discitis.  Cord: Normal caliber and signal.  Degenerative findings: Moderate narrowing of the thecal sac at T8-T9 and T9-T10.  LUMBAR  Alignment: Grade 1 retrolisthesis of L1 on L2.  Vertebrae:  Vertebral body heights are maintained.  No acute fractures.  No marrow placing process.  Small enhancing lesion in the inferior endplate at L3 measuring 8 mm (series 22, image 11), possible small metastasis.  L2 vertebral body hemangiomas.  Discs: Multilevel disc height loss and desiccation, most pronounced at T12-L1 and L1-L2.  no evidence of discitis.  Cord: Normal caliber and signal.  Degenerative findings:  L1-L2: No spinal canal stenosis or neural foraminal narrowing.  L2-L3: Grade 1 retrolisthesis.  Mild bilateral facet arthropathy.  No spinal canal stenosis or neural foraminal narrowing.  L3-L4: Circumferential disc bulge and bilateral facet arthropathy.  No spinal canal stenosis or neural foraminal narrowing.  L4-L5: Circumferential disc bulge and bilateral facet arthropathy.  Findings contribute to mild/moderate bilateral neural foraminal narrowing and mild spinal canal stenosis.  L5-S1: Circumferential disc bulge and bilateral facet arthropathy.  Findings contribute to mild/moderate bilateral neural foraminal narrowing.  No spinal canal stenosis.  Paraspinal muscles & soft tissues: Multiple pulmonary nodules bilaterally, suggestive of metastatic disease.  Bibasilar subsegmental atelectasis.  Mediastinal lymphadenopathy.  1.5 cm right hepatic lobe T2 hyperintense lesion, concerning for metastatic disease.  Impression:  Patient history of metastatic renal cell carcinoma.  Multiple osseous lesions in the thoracic spine, as above, consistent with metastatic disease.  Findings correlate with the 04/11/2024 CT exam, and have progressed from the 01/31/2024 MRI exam at C7, right T5 rib, and T6.  Prior T7-T11 posterior instrumented fusion with decompressive laminectomies, as above, noting slight improvement in the fluid collection in the posterior surgical bed.  Indeterminate small (8 mm) lesion in the L3 vertebral body, possible metastasis.  Lumbar spondylosis, contributing to mild spinal canal stenosis at L4-5  and mild/moderate neural foraminal narrowing at L4-5 and L5-S1, as above.  This report was flagged in Epic as abnormal.    Presents for follow up prior to cycle 5  Note prior surgery and surgical healing issues (requiring surgery) leading to delay    He reports:  Low energy, does minimal activity with pain issues and mobility issues (brace)- he is now ambulating however with a walker  Pain has increased and working with palliative team as well- currently taking Roxicodone 2 tabs (10 mg total) q 4-6 hours. Notes takes 2 hours to kick in.  He still has extended release but was not taking.  Not currently taking Gabapentin- does describe some pain that is nerve like  + constipation noted on narcotics  Some nausea noted, no vomiting- seems related to constipation  Denies headaches  Normal urination- increased with increased water intake- no dysuria or hematuria  No incontinence  Mild pill dysphagia since cervical surgery noted  Weight now stabilized- working with nutrition    Diagnosis: metastatic RCC     Oncology History:  - Presented with gross hematuria mid June 2023  Building a house and has been active working on this in the hat- 1st noted dark urine and felt related to being dehydrated  Occurred a 2nd time approximately 2 weeks later and this time he noted darker and small clots  Noted again 2 weeks later and worse but ultimately he was prompted to seek evaluation when he had significant blood when he urinated     - went to his PCP and urine sample was yellow again  He had blood work done and referred to Urology at that time     - 9/5/2023 CT Urogram:  FINDINGS:  The lung bases demonstrate bilateral nodules, for example 9 mm at the right lung base and up to 11 mm at the left lung base.  Atelectasis present.  There are bilateral fat containing inguinal hernias.  The liver demonstrates no mass.  The spleen is not enlarged.  The stomach, pancreas and adrenal glands are within normal limits.  Gallbladder is  unremarkable.  There is no biliary ductal dilatation.  The kidneys concentrate and excrete contrast satisfactorily.  There is no renal calculus or ureteral calculus.  Within the mid to lower pole of the right kidney is a 4.9 x 6.3 x 6.1 cm heterogeneous solid mass which is overall slightly hypodense in relation to the enhancing parenchyma.  The mass is partially exophytic posteriorly.  It does extend partially into the renal sinus  At the upper pole of the right kidney is a subcentimeter exophytic focus which is isodense to the parenchyma on postcontrast imaging appearing slightly hyperdense on the noncontrast study, too small to characterize.  There are multiple additional subcentimeter hypodense right kidney foci which are suggestive of cysts.  Finally at the lower pole of the right kidney is a exophytic hypodense structure most compatible with a cyst.  The right main renal vein appears patent.  There is no significant periaortic lymphadenopathy.  Left kidney demonstrates several subcentimeter foci which are hypodense but too small to characterize, suggestive of cysts.  There is somewhat of a small amount of contrast within the upper collecting systems and ureters, with no definite focal abnormality, noting that there is some distortion of the collecting system in the region in which the right kidney mass involves the renal sinus.  The bladder is partially distended appearing grossly unremarkable.  The bowel demonstrates no significant abnormality.  The osseous structures demonstrate degenerative changes.  T9 demonstrates a lytic focus occupying the anterior half of the vertebral body.  There is slight loss of height along superior and inferior endplates with cortical disruption..  Impression:  Solid right renal mass concerning for neoplasm.  Multiple lung base nodules up to 11 mm, concerning for metastatic disease.  Recommend chest CT for full evaluation of lungs.  Lytic lesion at T9 with mild compression,  concerning for pathologic compression fracture.  Subcentimeter right kidney lesion isodense to parenchyma on contrast enhanced imaging, too small to characterize.  Additional bilateral renal hypodensities which are too small to characterize but suggestive of cysts.     - 9/7/2023 CT Chest:  FINDINGS:  The base of the neck is within normal limits.  The thyroid gland is unremarkable.  The supraclavicular regions are within normal limits.  The trachea is unremarkable.  The central airways are within normal limits.  No endobronchial lesion is identified.  There is no evidence of bronchiectasis.  The heart is unremarkable.  There are no pericardial effusions.  There are coronary artery calcifications.  The thoracic aorta is normal in caliber.  There are subcentimeter mediastinal and hilar lymph nodes.  There are subcentimeter axillary lymph nodes.  There are no pleural effusions.  There is no evidence of a pneumothorax.  There is no evidence of pneumomediastinum.  There are innumerable bilateral pulmonary nodules.  There is no focal consolidation.  The esophagus is unremarkable.  Please see the dedicated CT abdomen pelvis for the upper abdominal findings.  The chest wall is unremarkable.  There is unchanged lytic lesion involving the anterior aspect of T9 vertebral body with associated cortical erosions.  Impression:  Innumerable pulmonary nodules, concerning for metastatic disease to the chest.  Unchanged lytic lesion in the T9 vertebral body with cortical erosions.  Follow-up MRI of the spine, as clinically warranted.     - 9/7/2023 CT Abd:  FINDINGS:  CT renal protocol:  There is a 4.8 x 6.3 cm exophytic enhancing lesion in the lower pole of the right kidney.  There is hypoenhancement of the lesion compared to the normal renal parenchyma.  There is unchanged appearance of the mass extending into the right renal sinus.  There is no extension into the right renal vein  No additional enhancing lesions are identified.   There is a subcentimeter lesion in the right kidney is too small complete characterization.  There is prompt excretion from both collecting systems.  There is incomplete distension of the right distal ureter.  No definitive filling defect is identified within the.  The urinary bladder is unremarkable.  CT abdomen pelvis:  There is unchanged appearance of multiple pulmonary nodules in the lung bases.  No new nodules identified  The heart is unremarkable.  There is normal tapering of the abdominal aorta.  There are single bilateral renal arteries.  The renal veins remain patent.  There is no evidence of lymphadenopathy.  The esophagus, stomach, and duodenum are within normal limits.  The small bowel loops are unremarkable.  The appendix is not visualized.  There are no secondary findings of acute appendicitis.  There is colonic diverticula without evidence of acute diverticulitis.  The liver is unremarkable.  The gallbladder is within normal limits.  The biliary tree is within normal limits.  The spleen is unremarkable.  The pancreas is within normal limits.  The adrenal glands are unremarkable.  There is no evidence of free fluid in the abdomen or pelvis.  There is no evidence of free air.  There is no evidence of pneumatosis.  No portal venous air is identified.  The psoas margins are unremarkable.  There are bilateral fat containing inguinal hernias.  There are degenerative changes in the osseous structures.  There is unchanged appearance of a lytic lesion involving the anterior aspect of the T9 vertebral body with associated cortical erosions.  Impression:  Unchanged 4.8 x 6.3 cm exophytic hypoenhancing lesion in the lower pole of the right kidney, concerning for renal cell carcinoma.  Extension of lesion into the renal sinus.  No evidence of renal vein invasion.  No regional lymphadenopathy.  Additional smaller subcentimeter lesion too small complete characterization in the right kidney.  Bilateral pulmonary  nodules remain concerning for metastatic disease.  Lytic lesion along the anterior aspect of the T9 vertebral body, also concerning for osseous metastatic disease.  Additional findings as above.     - 9/20/2023 Bone scan:  FINDINGS:  There is physiologic distribution of the radiopharmaceutical throughout the skeleton.  Focal uptake in the T9 vertebral body corresponding to lytic lesion seen on CT 09/05/2023.  Focal uptake in the right kidney in patient with known right renal mass.  There is otherwise normal uptake in the genitourinary system and soft tissues.  Impression:  Focal uptake in the T9 vertebral body corresponding to lytic lesion seen on prior CT and concerning for metastatic disease.  Additional focus of increased uptake in the right kidney in patient with known right renal mass     - 10/4/2023 Robotic right nephrectomy  Pathology:  RIGHT KIDNEY, TOTAL NEPHRECTOMY:   - Clear cell renal cell carcinoma, ISUP grade 4, 5.5 cm.   - Benign cortical cysts.   - See [facility] synoptic report below.   SURGICAL PATHOLOGY CANCER CASE SUMMARY   Procedure: Total nephrectomy.   Specimen laterality: Right.   Tumor size: 5.5 cm.   Tumor focality: Unifocal.   Histologic type: Clear cell renal cell carcinoma.   Sarcomatoid features: Present, 5%.   Rhabdoid features: Not identified.   Histologic Grade (ISUP Grade): 4.   Tumor necrosis: Present, 20%.   Tumor extension: Extends into pelvicalyceal system and renal sinus fat.   Margins: Uninvolved.   Lymphovascular invasion (excluding renal vein and its segmental branches): Not identified.   Regional lymph nodes: No lymph nodes submitted or found.   Distant metastases: Not applicable in this specimen.   Pathologic stage (pTNM, AJCC 8th edition): pT3a pN not assigned (no lymph nodes submitted or found).      - treatment not initiated with below issues:  2 prior admissions      - Admitted from 11/6- 11/9/2023  Hospital Course: Pt admitted for intractable back pain in the setting of  renal carcinoma (RCC) s/p R nephrectomy 10/4 followed by Dr. Turk not on systemic therapy. CT and MRI concerning for spinal, lung, and hepatic mets. NSGY, Rad Onc, and IR on board in the hospital and evaluated for C5 and T9 lesions on spine. Pt pain controlled with oxycodone 12 HR BID and Oxycodone 10 PRN for thoracic and cervical pain. Patient underwent preop embolization of cervical tumor with IR on 11/6/23. And then had a cervical corpectomy with NSGY on 11/7/23 which he tolerated well. They obtained tissue samples and sent it over to pathology. IR to perform osteocool/kyphoplasty/biopsy T9 11/14. 2nd stage of surgery with NSGY on 11/15 with Dr. Martinez after kyphoplasty. Patient is to see Dr. Turk for systemic therapy afterwards as well as option for postoperative radiation. Stable to discharge home with c-collar and back brace. Patient also to receive rolling walker and hospital bed due to weakness and pain.   Pathology:  Spine, C5 vertebral body, corpectomy:   - Metastatic renal cell carcinoma, consistent with patient's history   - [facility] has been ordered and results will be issued in a separate      - Admitted 11/14- 11/18/2023 and underwent IR kyphoplasty of T4-6  Procedure(s) (LRB):  SPINE, CERVICAL, WITH POSTERIOR FUSION T4-6 (N/A)  CORPECTOMY, SPINE, CERVICAL C3-6 REVISION (N/A)   Hospital Course: 11/14: admitted; IR kyphoplasty of T9 today  11/15: OR today for C3-6 PCF.   11/16: POD 1 s/p C3-C6 PCF. NAEO. VSS, afebrile and WBC WNL. Patient complains of mild incisional pain but reports his LUE radiculopathy is improved. Reports mild discomfort with swallowing but no difficulty. Pending PT.   11/17: POD 2 s/p C3-C6 PCF. NAEO and VSS. Mild SABINE on BMP, will resume fluids. Anterior HV drain removed at bedside. Two posterior HV drains remain w/output 50 each.   11/18: POD 3. NAEON. AFVSS. Neurologically stable. Tolerating PO diet and voiding spontaneously. HV drain x 2 removed and pressure dressing  placed. Medically stable to discharge home. Follow up in clinic in 2 weeks. Discharge instructions given verbally to patient. All of his questions were answered.  He is encouraged to call the clinic with any questions or concerns prior to follow up appt.      [facility] (11/30/2023 9:26 AM CST)  Results - [facility] (11/30/2023 9:26 AM CST)  Component Value Ref Range Test Method Analysis Time Performed At Pathologist Signature   Reason for Study To identify somatic and germline mutations relevant to patient's cancer.     11/30/2023 9:26 AM CST [facility]     Genetic Diseases Assessed Cancer     11/30/2023 9:26 AM CST [facility]     Description of Ranges of DNA Sequences Examined 648 gene panel     11/30/2023 9:26 AM CST [facility]     Overall Interpretation positive     11/30/2023 9:26 AM CST [facility]     MSI Stable     11/30/2023 9:26 AM CST [facility]     TMB 7.4 m/MB   11/30/2023 9:26 AM CST [facility]     [facility] [email or web address]     11/30/2023 9:26 AM CST [facility]     Comment: [facility] link   Fusion Addendum Issue Type NEGATIVE  Negative - This addendum is being issued to report that no gene fusions or altered splicing variants from RNA sequencing analysis were found.     11/30/2023 9:26 AM CST [facility]     Therapy Count 4     11/30/2023 9:26 AM CST [facility]     [facility]: Potential Therapy 1 Gene: 81476^VHL^HGNC  Variant: p.E186fs  Agent: Belzutifan  Tissue: Renal Cell Carcinoma  Association: response  Evidence Status: Consensus  Evidence ID: NCCN  Evidence URL:  FDA Approved?: Yes  on label?: No     11/30/2023 9:26 AM CST [facility]     [facility]: Potential Therapy 2 Gene: 97477^PBRM1^HGNC  Variant: p.L618fs  Agent: Nivolumab  Tissue: Clear Cell Renal Cell Carcinoma  Association: response  Evidence Status: Clinical research  Evidence ID: [id number]  Evidence URL:  https://www.ncbi.nlm.nih.gov/pubmed/53972149  FDA Approved?: Yes  on label?: Yes     11/30/2023 9:26 AM CST [facility]     [facility]: Potential Therapy 3 Gene: 8975^PIK3CA^HGNC  Variant: p.Q546E  Agent: Capivasertib + Fulvestrant  Tissue: Breast Cancer  Association: response  Evidence Status: Consensus  Evidence ID: FDA  Evidence URL:  FDA Approved?: Yes  on label?: No     11/30/2023 9:26 AM CST [facility]     [facility]: Potential Therapy 4 Gene: 8975^PIK3CA^HGNC  Variant: p.Q546E  Agent: Alpelisib  Tissue: Solid Tumors  Association: response  Evidence Status: Clinical research  Evidence ID: [id number]  Evidence URL: https://www.ncbi.nlm.nih.gov/pubmed/95724675  FDA Approved?: Yes  on label?: No     11/30/2023 9:26 AM CST [facility]     Trial Count 3     11/30/2023 9:26 AM CST [facility]     [facility]: Clinical Trial Match 1 Clinical Trial NCT ID: [id number]  Clinical Trial Title: IQS799 as a Single Agent and in Combination With Everolimus & Immuno-Oncology Agents in Advanced/Relapsed Renal Cancer & Other Malignancies  Clinical Trial URL: https://clinicaltrials.gov/ct2/show/RNB61032683  Clinical Phase: Phase 1  Matched criteria: VHL p.E186fs mutation     11/30/2023 9:26 AM CST [facility]     [facility]: Clinical Trial Match 2 Clinical Trial NCT ID: [id number]  Clinical Trial Title: First-in-Human Study of STX-478 as Monotherapy and in Combination With Other Antineoplastic Agents in Participants With Advanced Solid Tumors  Clinical Trial URL: https://clinicaltrials.gov/ct2/show/DLL21205087  Clinical Phase: Phase 1/Phase 2  Matched criteria: PIK3CA p.Q546E mutation     11/30/2023 9:26 AM CST [facility]     [facility]: Clinical Trial Match 3 Clinical Trial NCT ID: [id number]  Clinical Trial Title: Testing the Combination of Two Anti-cancer Drugs, Peposertib () and  for Advanced Solid Tumors  Clinical Trial URL: https://clinicaltrials.gov/ct2/show/ZNU02604344  Clinical Phase: Phase 1  Matched criteria: PBRM1 p.L618fs  mutation     11/30/2023 9:26 AM CST [facility]        Results - [facility] (11/30/2023 9:26 AM CST)  Specimen (Source) Anatomical Location / Laterality Collection Method / Volume Collection Time Received Time   Tissue       11/16/2023 11:02 AM CST      Results - [facility] (11/30/2023 9:26 AM CST)  Narrative   This result has genomic variants that were not included in this document.          Results - [facility] (11/30/2023 9:26 AM CST)  Authorizing Provider Result Type   Ruby Turk MD LAB MOLECULAR DIAGNOSTICS ORDERABLES      Results - [facility] (11/30/2023 9:26 AM CST)  Performing Organization Address City/State/ZIP Code Phone Number   [facility]  [address], [address]  [place] [address],   [phone]       - initiated systemic therapy with Pembro/Axitinib on 12/18/2023    - Axitinib held per patient request with side effects    - 11/15/2023 Neurosurgery  1. Posterior spinal fusion, C3-C6  2. Posterior segmental spinal fixation, C3-C6 (Depuy)  3. Revision/fusion of C4/5 anterior cervical corpectomy cage with revision of plate from C3-6    - updated MRIs as above      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  - fractured collar bone  - Pediatric hernia             Active Ambulatory Problems     Diagnosis Date Noted    Obesity (BMI 35.0-39.9 without comorbidity) 04/01/2019    Borderline hypertension 04/01/2019    Acute gout of left ankle 04/01/2019    Onychomycosis 04/01/2019    History of gout 06/02/2021    Seborrheic keratosis 06/02/2021    Tinea corporis 12/07/2021    Positive colorectal cancer screening using Cologuard test 12/07/2021              Resolved Ambulatory Problems     Diagnosis Date Noted    No Resolved Ambulatory Problems              Past Medical History:   Diagnosis Date    Asthma      ED (erectile dysfunction)       Gout        SH:  Retired from [employer]    1 child, 2 stepchildren  Prior tobacco- teens, early 20s  Social EtOH    Review of Systems   Constitutional:  Positive for activity change, appetite change and fatigue. Negative for chills, fever and unexpected weight change.   HENT:  Positive for dental problem and trouble swallowing. Negative for mouth sores and voice change.    Respiratory:  Positive for shortness of breath (exertional). Negative for cough and wheezing.    Cardiovascular:  Negative for chest pain, palpitations and leg swelling.   Gastrointestinal:  Positive for change in bowel habit, constipation and nausea. Negative for abdominal distention, abdominal pain, diarrhea, vomiting and reflux.   Genitourinary:  Positive for frequency. Negative for decreased urine volume, difficulty urinating, dysuria, hematuria and urgency.   Musculoskeletal:  Positive for arthralgias, back pain and gait problem. Negative for neck pain.   Integumentary:  Positive for wound (buttocks). Negative for rash.   Neurological:  Positive for weakness (generalized) and numbness. Negative for dizziness, headaches, memory loss and coordination difficulties.   Psychiatric/Behavioral:  Positive for sleep disturbance. Negative for dysphoric mood. The patient is nervous/anxious.           Objective     Physical Exam  Vitals and nursing note reviewed.   Constitutional:       General: He is not in acute distress.     Appearance: Normal appearance. He is ill-appearing.      Comments: Presents with his wife  Using walker, brace in place  ECOG= 1   HENT:      Head: Normocephalic and atraumatic.      Mouth/Throat:      Mouth: Mucous membranes are dry.      Pharynx: Oropharynx is clear. No oropharyngeal exudate.   Eyes:      General: No scleral icterus.     Extraocular Movements: Extraocular movements intact.      Conjunctiva/sclera: Conjunctivae normal.      Pupils: Pupils are equal, round, and reactive to light.   Cardiovascular:      Rate  and Rhythm: Normal rate and regular rhythm.      Heart sounds: Normal heart sounds. No murmur heard.     No friction rub. No gallop.   Pulmonary:      Effort: Pulmonary effort is normal. No respiratory distress.      Breath sounds: Normal breath sounds. No wheezing, rhonchi or rales.   Abdominal:      General: Abdomen is flat. Bowel sounds are normal. There is no distension.      Palpations: Abdomen is soft. There is no mass.      Tenderness: There is no guarding or rebound.      Comments: No organomegaly   Musculoskeletal:         General: No swelling. Normal range of motion.      Cervical back: Normal range of motion. Rigidity present.      Right lower leg: No edema.      Left lower leg: No edema.      Comments: Wearing back brace in clinic   Skin:     Coloration: Skin is not jaundiced or pale.      Findings: No bruising, erythema, lesion or rash.   Neurological:      Mental Status: He is alert and oriented to person, place, and time.      Cranial Nerves: No cranial nerve deficit.      Sensory: Sensory deficit present.      Motor: Weakness present.      Gait: Gait abnormal.   Psychiatric:         Mood and Affect: Mood normal.         Behavior: Behavior normal.         Thought Content: Thought content normal.         Judgment: Judgment normal.   Imaging- reviewed  Labs- reviewed       Assessment and Plan     1. 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  Dispense: 90 tablet; Refill: 0  -     diazePAM (VALIUM) 5 MG tablet; Take 1 tablet (5 mg total) by mouth every 8 (eight) hours as needed for Anxiety or Insomnia (take 30 minutes prior to imaging).  Dispense: 10 tablet; Refill: 0  -     linaCLOtide (LINZESS) 72 mcg Cap capsule; Take 1 capsule (72 mcg total) by mouth before breakfast.  Dispense: 30 capsule; Refill: 1    2. Clear cell carcinoma of right kidney    3. Malignant neoplasm metastatic to lung, unspecified  laterality    4. Normocytic anemia    5. Hypercalcemia      Metastatic renal carcinoma-  Continue immunotherapy and discussed adding in the following:  Lenvatinib (concerned for further axitinib)- start at lower dose per his history  Start Xgeva (or Zometa if insurance requires)  Explained why IO will continue    Needs body scans    Labs reviewed, adequate for treatment    Reviewed pain management plan  Restart MS Contin and reviewed and sent scripts  Linzess sent for constipation    Anemia- stable    Hypercalcemia  Discussed Xgeva  IVFs today    Route Chart for Scheduling    Med Onc Chart Routing  Urgent    Follow up with physician . CT scan bone scan and xrays asap; EP and labs and chemo 3 weeks and new Xgeva as well that day   Follow up with [facility]    Infusion scheduling note    Injection scheduling note    Labs    Imaging    Pharmacy appointment    Other referrals            Treatment Plan Information   OP AXITINIB + PEMBROLIZUMAB 200MG Q3W   Ruby Turk MD   Upcoming Treatment Dates - OP AXITINIB + PEMBROLIZUMAB 200MG Q3W    4/16/2024       Chemotherapy       pembrolizumab (KEYTRUDA) 200 mg in sodium chloride 0.9% SolP 108 mL infusion  5/7/2024       Chemotherapy       pembrolizumab (KEYTRUDA) 200 mg in sodium chloride 0.9% SolP 108 mL infusion  5/28/2024       Chemotherapy       pembrolizumab (KEYTRUDA) 200 mg in sodium chloride 0.9% SolP 108 mL infusion  6/18/2024       Chemotherapy       pembrolizumab (KEYTRUDA) 200 mg in sodium chloride 0.9% SolP 108 mL infusion    Supportive Plan Information  IV FLUIDS AND ELECTROLYTES   Ruby Turk MD   Upcoming Treatment Dates - IV FLUIDS AND ELECTROLYTES    No upcoming days in selected categories.    Therapy Plan Information  EPINEPHrine (EPIPEN) 0.3 mg/0.3 mL pen injection 0.3 mg  0.3 mg, Intramuscular, PRN  diphenhydrAMINE injection 50 mg  50 mg, Intravenous, PRN  hydrocortisone sodium succinate injection 100 mg  100 mg, Intravenous, PRN

## 2024-04-15 NOTE — PLAN OF CARE
"  Problem: Fatigue  Goal: Improved Activity Tolerance  Outcome: Ongoing, Progressing  Intervention: Promote Improved Energy  Flowsheets (Taken 4/15/2024 1107)  Fatigue Management:   activity schedule adjusted   activity assistance provided   fatigue-related activity identified   frequent rest breaks encouraged   paced activity encouraged  Sleep/Rest Enhancement:   awakenings minimized   consistent schedule promoted   family presence promoted   natural light exposure provided   noise level reduced   reading promoted   regular sleep/rest pattern promoted   relaxation techniques promoted  Activity Management:   Ambulated -L4   Up in chair - L3  /65 (Patient Position: Sitting)   Pulse 102   Temp 97 °F (36.1 °C)   Resp 17   Ht 5' 10" (1.778 m)   Wt 86.3 kg (190 lb 4.1 oz)   SpO2 97%   BMI 27.30 kg/m² Pleasant, alert and oriented patient to Chemo Infusion per self with wife for C5 Keytruda with IVF's - VSS and PIV started x2 attempts with flashback observed, flushed with NS, site secured and patient tolerated procedure well - patient tolerated treatment with no AVE's, PIV discontinued, pressure dressing applied, and patient discharged to home with no concerns - RTC on 5/6/24       "

## 2024-04-16 RX ORDER — SODIUM CHLORIDE 0.9 % (FLUSH) 0.9 %
10 SYRINGE (ML) INJECTION
OUTPATIENT
Start: 2024-04-23

## 2024-04-16 RX ORDER — HEPARIN 100 UNIT/ML
500 SYRINGE INTRAVENOUS
OUTPATIENT
Start: 2024-04-23

## 2024-04-17 ENCOUNTER — TELEPHONE (OUTPATIENT)
Dept: ORTHOPEDICS | Facility: CLINIC | Age: 74
End: 2024-04-17
Payer: MEDICARE

## 2024-04-17 ENCOUNTER — PATIENT MESSAGE (OUTPATIENT)
Dept: ORTHOPEDICS | Facility: CLINIC | Age: 74
End: 2024-04-17
Payer: MEDICARE

## 2024-04-17 DIAGNOSIS — C64.9 METASTATIC RENAL CELL CARCINOMA TO BONE: ICD-10-CM

## 2024-04-17 DIAGNOSIS — Z91.89 IMPENDING PATHOLOGIC FRACTURE: Primary | ICD-10-CM

## 2024-04-17 DIAGNOSIS — C79.51 METASTATIC RENAL CELL CARCINOMA TO BONE: ICD-10-CM

## 2024-04-17 NOTE — TELEPHONE ENCOUNTER
Patient requesting follow up appointment to discuss xray. Patient is scheduled for 4/29.    Arminda Centeno MS, ATC, OTC  Clinical/Surgical Assistant - Dr. Parvez Rock   Orthopedic Oncology   [facility]  Phone: [phone]

## 2024-04-24 ENCOUNTER — TELEPHONE (OUTPATIENT)
Dept: SPEECH THERAPY | Facility: HOSPITAL | Age: 74
End: 2024-04-24
Payer: MEDICARE

## 2024-04-24 DIAGNOSIS — C64.1 CLEAR CELL CARCINOMA OF RIGHT KIDNEY: ICD-10-CM

## 2024-04-24 DIAGNOSIS — R93.89 ABNORMAL FINDINGS ON DIAGNOSTIC IMAGING OF OTHER SPECIFIED BODY STRUCTURES: ICD-10-CM

## 2024-04-24 DIAGNOSIS — C79.51 METASTATIC CANCER TO SPINE: Primary | ICD-10-CM

## 2024-04-24 NOTE — TELEPHONE ENCOUNTER
Angelina pt to schedule mbss 5/2@2:30. Informed to fast 2hrs before test, [facility] radiology clinic.

## 2024-04-29 ENCOUNTER — TELEPHONE (OUTPATIENT)
Dept: RADIATION ONCOLOGY | Facility: CLINIC | Age: 74
End: 2024-04-29
Payer: MEDICARE

## 2024-04-29 ENCOUNTER — TELEPHONE (OUTPATIENT)
Dept: PREADMISSION TESTING | Facility: HOSPITAL | Age: 74
End: 2024-04-29
Payer: MEDICARE

## 2024-04-29 ENCOUNTER — OFFICE VISIT (OUTPATIENT)
Dept: ORTHOPEDICS | Facility: CLINIC | Age: 74
End: 2024-04-29
Payer: MEDICARE

## 2024-04-29 DIAGNOSIS — Z91.89 IMPENDING PATHOLOGIC FRACTURE: ICD-10-CM

## 2024-04-29 DIAGNOSIS — C79.51 METASTATIC RENAL CELL CARCINOMA TO BONE: Primary | ICD-10-CM

## 2024-04-29 DIAGNOSIS — C64.9 METASTATIC RENAL CELL CARCINOMA TO BONE: Primary | ICD-10-CM

## 2024-04-29 PROCEDURE — 1159F MED LIST DOCD IN RCRD: CPT | Mod: CPTII,S$GLB,, | Performed by: ORTHOPAEDIC SURGERY

## 2024-04-29 PROCEDURE — 99213 OFFICE O/P EST LOW 20 MIN: CPT | Mod: S$GLB,,, | Performed by: ORTHOPAEDIC SURGERY

## 2024-04-29 PROCEDURE — 99999 PR PBB SHADOW E&M-EST. PATIENT-LVL III: CPT | Mod: PBBFAC,,, | Performed by: ORTHOPAEDIC SURGERY

## 2024-04-29 NOTE — PROGRESS NOTES
Orthopaedic Oncology Follow Up Visit:      Dear No referring provider defined for this encounter. and Slava Lowery MD,    We had the pleasure of evaluating Gamaliel Pete Jr. in the Orthopaedic Clinic today at the [facility] today.      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 is a 73 y.o. male has been experiencing a few weeks of right thigh pain. He had radiographs done which demonstrated a new proximal femur lesion and was therefore referred to me. He ambulates without assistive devices, but he does have a walker and cane. He did undergo revision C spine surgery in mid November and is recovering from that currently. He is in a c collar. He denies any left thigh or leg pain. He also reports some right shoulder pain which is relatively new. 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 He is currently not on any therapy. He tells me is going to start new immunotherapy soon. He has not had any radiation. He denies new numbness or tingling. No recent falls. No rest pain.     Interval History:  Patient had cancelled surgery due to continued complications after spine surgery. He continues to follow with medical oncology. 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 He continues to have right hip/thigh pain. He now also has some left thigh pain. He has back pain. He thinks he was supposed to have radiation but has not yet. He wears a TLSO now. He uses wheelchair for longer distances. Walker at home. His right shoulder is weak as well now. No bowel or bladder dysfunction. He is drinking an ensure a day.     PMH:   Past Medical History:   Diagnosis Date    Asthma     no inhaler use    Borderline hypertension     ED (erectile dysfunction)     Gout     Hematuria     Hypertension        PSH:  has a past surgical history that includes Colonoscopy  (02/10/2022); Colonoscopy (N/A, 02/10/2022); Tonsillectomy; Robot-assisted laparoscopic nephrectomy (Right, 10/4/2023); Surgical removal of vertebral body of cervical spine (Right, 11/7/2023); Posterior fusion of cervical spine with laminectomy (N/A, 11/15/2023); Surgical removal of vertebral body of cervical spine (N/A, 11/15/2023); Thoracic laminectomy with fusion (N/A, 1/5/2024); Wound exploration (N/A, 2/2/2024); and closure (N/A, 2/2/2024).    Allergies:   Allergies as of 04/29/2024 - Reviewed 04/16/2024   Allergen Reaction Noted    Vancomycin analogues Rash 02/29/2024       Medications:    Current Outpatient Medications:     allopurinoL (ZYLOPRIM) 100 MG tablet, Take 1 tablet (100 mg total) by mouth once daily., Disp: 90 tablet, Rfl: 3    amlodipine-benazepril 5-10 mg (LOTREL) 5-10 mg per capsule, Take 1 capsule by mouth once daily. (Patient not taking: Reported on 4/15/2024), Disp: 90 capsule, Rfl: 3    diazePAM (VALIUM) 5 MG tablet, Take 1 tablet (5 mg total) by mouth every 8 (eight) hours as needed for Anxiety or Insomnia (take 30 minutes prior to imaging). (Patient not taking: Reported on 4/15/2024), Disp: 10 tablet, Rfl: 0    diphenhydramine HCl (BENADRYL ALLERGY ORAL), Take by mouth as needed. (Patient not taking: Reported on 4/15/2024), Disp: , Rfl:     duke's soln (benadryl 30 mL, mylanta 30 mL, LIDOcaine 30 mL, nystatin 30 mL) 120mL, Take 10 mLs by mouth 4 (four) times daily. (Patient not taking: Reported on 4/15/2024), Disp: 500 mL, Rfl: 0    EPINEPHrine (EPIPEN) 0.3 mg/0.3 mL AtIn, Inject 0.3 mLs (0.3 mg total) into the muscle as needed. (Patient not taking: Reported on 4/15/2024), Disp: 1 each, Rfl: 0    gabapentin (NEURONTIN) 300 MG capsule, Take 1 capsule (300 mg total) by mouth 3 (three) times daily. (Patient not taking: Reported on 4/15/2024), Disp: 90 capsule, Rfl: 11    lenvatinib (LENVIMA) 10 mg/day (10 mg x 1) Cap, Take 10 mg by mouth once daily., Disp: 30 capsule, Rfl: 2    linaCLOtide  (LINZESS) 72 mcg Cap capsule, Take 1 capsule (72 mcg total) by mouth before breakfast. (Patient not taking: Reported on 4/15/2024), Disp: 30 capsule, Rfl: 1    morphine (MS CONTIN) 15 MG 12 hr tablet, Take 1 tablet (15 mg total) by mouth every 8 (eight) hours. (Patient not taking: Reported on 4/15/2024), Disp: 90 tablet, Rfl: 0    naloxone (NARCAN) 4 mg/actuation Spry, 1 spray (4mg) by nasal route as needed for opioid overdose; may repeat every 2-3 minutes in alternating nostrils until medical help arrives. Call 911 (Patient not taking: Reported on 4/15/2024), Disp: 2 each, Rfl: 0    omega-3 fatty acids/fish oil (FISH OIL-OMEGA-3 FATTY ACIDS) 300-1,000 mg capsule, Take 2 capsules by mouth once daily. (Patient not taking: Reported on 4/15/2024), Disp: , Rfl:     oxyCODONE (ROXICODONE) 5 MG immediate release tablet, Take 1 tablet (5 mg total) by mouth every 4 (four) hours as needed for Pain., Disp: 42 tablet, Rfl: 0    sildenafiL (VIAGRA) 100 MG tablet, Take 1 tablet (100 mg total) by mouth daily as needed for Erectile Dysfunction. (Patient not taking: Reported on 1/18/2024), Disp: 30 tablet, Rfl: 11    turmeric (CURCUMIN MISC), 1,000 mg by Misc.(Non-Drug; Combo Route) route Daily. (Patient not taking: Reported on 4/15/2024), Disp: , Rfl:     UNABLE TO FIND, Take 500 mg by mouth 2 (two) times a day. medication name: Tudca (Patient not taking: Reported on 4/15/2024), Disp: , Rfl:     UNABLE TO FIND, Take 2 capsules by mouth 2 (two) times a day. medication name: Turkey tail mushroom (Patient not taking: Reported on 4/15/2024), Disp: , Rfl:     UNABLE TO FIND, Take 15 drops by mouth once daily. medication name: Essiac-20 (Patient not taking: Reported on 4/15/2024), Disp: , Rfl:     UNABLE TO FIND, Take 5 mLs by mouth 2 (two) times a day. medication name: barley leaf juice powder (Patient not taking: Reported on 4/15/2024), Disp: , Rfl:     UNABLE TO FIND, Take 5 mLs by mouth 2 (two) times a day. medication name: black  seed oil (cumin seed) (Patient not taking: Reported on 4/15/2024), Disp: , Rfl:     Family History: family history is not on file.    Social History:  reports that he has never smoked. He has never used smokeless tobacco. He reports that he does not currently use alcohol. He reports that he does not use drugs.    ROS:  A Complete review of systems was performed.  Pertinent positives are listed in the history of present illness above.  Remainder of review of systems were negative.      Vital Signs:  no vital signs obtained    Physical exam:  General:  AAOX3, No acute distress, normal affect and disposition  Respiratory: Respirations unlabored  C collar in place and well fitting  Anterior neck incision healing well    Right lower extremity  Minimal pain about the right proximal thigh  Tolerates axial load and log roll  No pain with knee or ankle ROM  Distal neurovascular exam was normal with 5/5 motor for the tibialis anterior, extensor hallucis longus, and gastrocsoleus.  Sensory exam intact in sural, saphenous, superficial peroneal, deep peroneal, and tibial nerve distributions  Foot is perfused    Left lower extremity  Mild pain with palpation left proximal thigh  Tolerates hip ROM log roll and axial load  No pain with knee or ankle ROM  Neurovascularly intact distally      Imaging:    Radiographic Data:  radiographs of bilateral femurs obtained a week prior demonstrate continued presence of the proximal femur lateral cortically based lesion, no fracture, on the left side there are multifocal diaphyseal lesions as well    CT abdomen and pelvis - there is cortical breakthrough of the right proximal femur lesion on axial views with likely associated soft tissue component     Pathology: metastatic RCC from spine biopsy    Assessment/Plan: Gamaliel Osheamaura Thomas is a 73 y.o. 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 Patient is here for surgical  discussion. He had been scheduled twice previously but due to issues with his spine and then complications from surgery had to be rescheduled. We reviewed his imaging. I am still concerned about his right of pathologic fracture especially on the right side. He understands this. He is amenable to proceeding with surgery. I will have to coordinate preoperative embolization for the procedure, but will tentatively try for 5/23 with embolization afternoon before. I will let his medical oncologist know. We reviewed the surgery again. Consent had previously been obtained and remains active in the media tab. New blood consent was obtained. He will let me know if he is unable to make this surgical date work as soon as possible so that I can let the IR embolization team know. In the interim he will be careful with his right leg. A referral to the preop center was also placed for clearance for surgery.  For his left leg we may consider radiation as needed and may need IMN on this side as well. He also mentioned possibly wanting a port. I will ask his medical oncologist about this. If it is indicated then will try and do same day as IMN right femur. I will message spine for clearance as well as he has not had follow up recently and thinks he was supposed to follow up again. All questions answered.     Thank you for the referral and the opportunity to participate in the care of your patient.  If you have any questions regarding our treatment recommendations don't hesitate to call.    Electronically signed by:    Parvez Rock MD   Orthopedic Oncology and Complex Arthroplasty Reconstruction  [facility]

## 2024-04-29 NOTE — TELEPHONE ENCOUNTER
Call to pt to see Dr Hatch and CT simulation same day for left femur radiation. Appt scheduled on 5/8/24 at 8:30 AM to see Dr Hatch with CT simulation to follow at 9:00 AM. Pt verbalized understanding. Consent to be obtained at appt.

## 2024-04-29 NOTE — TELEPHONE ENCOUNTER
----- Message from Lakesha Hatch MD sent at 4/29/2024  1:53 PM CDT -----  Can we get him in for a consult/sim next week for radiation to the left femur.  ----- Message -----  From: Parvez Rock MD  Sent: 4/29/2024   1:47 PM CDT  To: Lakesha Hatch MD; Arminda Chan    I have been following this patient for his MBD. His right femur needs surgery, but his left is not as progressed but he is developing some symptoms. Was wondering if you would be able to do some radiation to his left femur and then to the right femur post op? I saw he had seen you before for his spine involvement. Let me know if you need a new referral.    Thanks!  Nasrin Rock

## 2024-05-01 ENCOUNTER — APPOINTMENT (OUTPATIENT)
Dept: RADIATION THERAPY | Facility: OTHER | Age: 74
End: 2024-05-01
Attending: RADIOLOGY
Payer: MEDICARE

## 2024-05-02 ENCOUNTER — HOSPITAL ENCOUNTER (OUTPATIENT)
Dept: RADIOLOGY | Facility: HOSPITAL | Age: 74
Discharge: HOME | End: 2024-05-02
Attending: INTERNAL MEDICINE
Payer: MEDICARE

## 2024-05-02 ENCOUNTER — CLINICAL SUPPORT (OUTPATIENT)
Dept: SPEECH THERAPY | Facility: HOSPITAL | Age: 74
End: 2024-05-02
Attending: INTERNAL MEDICINE
Payer: MEDICARE

## 2024-05-02 ENCOUNTER — PATIENT MESSAGE (OUTPATIENT)
Dept: SPEECH THERAPY | Facility: HOSPITAL | Age: 74
End: 2024-05-02

## 2024-05-02 DIAGNOSIS — R13.12 DYSPHAGIA, OROPHARYNGEAL: Primary | ICD-10-CM

## 2024-05-02 DIAGNOSIS — R13.10 DYSPHAGIA, UNSPECIFIED TYPE: ICD-10-CM

## 2024-05-02 DIAGNOSIS — N28.89 RENAL MASS: ICD-10-CM

## 2024-05-02 DIAGNOSIS — R09.A2 GLOBUS SENSATION: ICD-10-CM

## 2024-05-02 PROCEDURE — A9698 NON-RAD CONTRAST MATERIALNOC: HCPCS | Performed by: INTERNAL MEDICINE

## 2024-05-02 PROCEDURE — 74230 X-RAY XM SWLNG FUNCJ C+: CPT | Mod: TC

## 2024-05-02 PROCEDURE — 78306 BONE IMAGING WHOLE BODY: CPT | Mod: TC

## 2024-05-02 PROCEDURE — 92611 MOTION FLUOROSCOPY/SWALLOW: CPT | Mod: GN | Performed by: SPEECH-LANGUAGE PATHOLOGIST

## 2024-05-02 PROCEDURE — 74230 X-RAY XM SWLNG FUNCJ C+: CPT | Mod: 26,,, | Performed by: RADIOLOGY

## 2024-05-02 PROCEDURE — 25500020 PHARM REV CODE 255: Performed by: INTERNAL MEDICINE

## 2024-05-02 PROCEDURE — A9503 TC99M MEDRONATE: HCPCS | Performed by: INTERNAL MEDICINE

## 2024-05-02 PROCEDURE — 78306 BONE IMAGING WHOLE BODY: CPT | Mod: 26,,, | Performed by: RADIOLOGY

## 2024-05-02 RX ORDER — TC 99M MEDRONATE 20 MG/10ML
20 INJECTION, POWDER, LYOPHILIZED, FOR SOLUTION INTRAVENOUS
Status: COMPLETED | OUTPATIENT
Start: 2024-05-02 | End: 2024-05-02

## 2024-05-02 RX ADMIN — BARIUM SULFATE 140 ML: 0.81 POWDER, FOR SUSPENSION ORAL at 02:05

## 2024-05-02 RX ADMIN — TECHNETIUM TC 99M MEDRONATE 23.7 MILLICURIE: 25 INJECTION, POWDER, FOR SOLUTION INTRAVENOUS at 08:05

## 2024-05-02 NOTE — PROGRESS NOTES
MODIFIED BARIUM SWALLOW STUDY    REASON FOR REFERRAL:  Gamaliel Pete Jr., age 73, was referred by Dr. Ruby Turk, Medical Oncologist, for a Modified Barium Swallow Study to rule out aspiration and assess his overall swallowing function. He presents today with complaints of a globus sensation after eating dry/dense foods and having to cough the bolus up into his mouth to clear it. He also reports that some pills, depending on their size, shape, and external material get stuck in his throat. This happens at least once if not multiples times per meal every time he eats bread, meat, or other dry/dense foods. This issue has been ongoing and stable since his cervical spine fusion in November of 2023. He has been avoiding these foods and/or has been adding gravy, condiments, etc., to them since then, and has selected specific brands of bread that seem to be easier to swallow. This issue is occurring in the background of metastatic renal cell carcinoma for which he has received chemoradiation and continues in immunotherapy.       MEDICAL HISTORY:  Past Medical History:   Diagnosis Date    Asthma     no inhaler use    Borderline hypertension     ED (erectile dysfunction)     Gout     Hematuria     Hypertension      Patient Active Problem List   Diagnosis    Class 1 obesity without serious comorbidity with body mass index (BMI) of 31.0 to 31.9 in adult    Onychomycosis    History of gout    Seborrheic keratosis    Tinea corporis    Positive colorectal cancer screening using Cologuard test    Renal mass    Cancer with pulmonary metastases    Metastatic cancer to spine    Iron deficiency anemia    History of right radical nephrectomy    Clear cell carcinoma of right kidney    Hypercalcemia    Transaminitis    Constipation due to opioid therapy    Debility    Diaphoresis    Nausea & vomiting    Normocytic anemia    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    Gout of ankle    Anemia    Hypotension  due to drugs    Surgical wound breakdown    Wound dehiscence    At high risk for skin breakdown    Cellulitis    Gout    Aortic atherosclerosis      SURGICAL HISTORY:  Past Surgical History:   Procedure Laterality Date    CLOSURE N/A 2/2/2024    Procedure: CLOSURE;  Surgeon: Ernesto Villanueva MD;  Location: [address];  Service: Plastics;  Laterality: N/A;    COLONOSCOPY  02/10/2022    COLONOSCOPY N/A 02/10/2022    Procedure: COLONOSCOPY;  Surgeon: Desmond Keenan MD;  Location: [facility];  Service: General;  Laterality: N/A;    POSTERIOR FUSION OF CERVICAL SPINE WITH LAMINECTOMY N/A 11/15/2023    Procedure: SPINE, CERVICAL, WITH POSTERIOR FUSION T4-6;  Surgeon: Nasim Martinez DO;  Location: [address];  Service: Neurosurgery;  Laterality: N/A;  C2-T1 laminectomy and posterior fusion. [place]. C-arm. [employer]. Neuromonitoring.    ROBOT-ASSISTED LAPAROSCOPIC NEPHRECTOMY Right 10/4/2023    Procedure: ROBOTIC NEPHRECTOMY;  Surgeon: Henry Michaels MD;  Location: [facility];  Service: Urology;  Laterality: Right;    SURGICAL REMOVAL OF VERTEBRAL BODY OF CERVICAL SPINE Right 11/7/2023    Procedure: CORPECTOMY, SPINE, CERVICAL;  Surgeon: Nasim Martinez DO;  Location: [address];  Service: Neurosurgery;  Laterality: Right;  C4 and C5 corpectomy with globus;  wells tongs; c-arm; neuromonitoring; microscope; type and cross 2 units    SURGICAL REMOVAL OF VERTEBRAL BODY OF CERVICAL SPINE N/A 11/15/2023    Procedure: CORPECTOMY, SPINE, CERVICAL C3-6 REVISION;  Surgeon: Nasim Martinez DO;  Location: [address];  Service: Neurosurgery;  Laterality: N/A;    THORACIC LAMINECTOMY WITH FUSION N/A 1/5/2024    Procedure: LAMINECTOMY, SPINE, THORACIC, WITH FUSION;  Surgeon: Nasim Martinez DO;  Location: [address];  Service: Neurosurgery;  Laterality: N/A;  T7-T11 fusions with robot    TONSILLECTOMY      WOUND EXPLORATION N/A 2/2/2024    Procedure: EXPLORATION, WOUND;   Surgeon: Nasim Martinez DO;  Location: [facility] OR [address] [place] [address];  Service: Neurosurgery;  Laterality: N/A;  Thoracic wound exploration, washout     ONCOLOGIC AND TREATMENT HISTORY  Oncology History   Cancer with pulmonary metastases   9/14/2023 Initial Diagnosis    Cancer with pulmonary metastases     12/22/2023 -  Chemotherapy    Treatment Summary   Plan Name: OP AXITINIB + PEMBROLIZUMAB 200MG Q3W  Treatment Goal: Palliative  Status: Active  Start Date: 12/22/2023  End Date: 12/16/2025 (Planned)  Provider: Ruby Turk MD  Chemotherapy: axitinib (INLYTA) 5 mg Tab, 5 mg, Oral, 2 times daily, 1 of 1 cycle, Start date: 12/18/2023, End date: --     Clear cell carcinoma of right kidney   10/11/2023 Initial Diagnosis    Clear cell carcinoma of right kidney     10/31/2023 Cancer Staged    Staging form: Kidney, AJCC 8th Edition  - Clinical stage from 10/31/2023: Stage IV (cT3a, cNX, cM1)     He had 20 Gy to his C Spine and T Spine, completed 3/5/24.    SWALLOWING HISTORY:  He presents with complaints of dry/dense foods such as meat and bread getting stuck in his throat and requiring coughing it up to his mouth before either attempting to swallow it again or spitting it out. This has been ongoing for almost 6 months, and he has made some dietary changes because of it. Otherwise he is on a regular consistency diet with thin liquids. Pills as above. Per Ten Sandoval, Dietician, he reports a decreased appetite that is slowly improving, and drinks one Ensure Complete to supplement his diet. He has experienced a weight loss of about 29 lbs over 3 months with cancer treatment, but is reportedly not due to diet or appetite. There is no hx of pneumonia or needing the Heimlich maneuver.     FAMILY HISTORY:  No family history on file.     SOCIAL HISTORY:  He lives with his wife. There is no smoking in the home.        Tobacco: non-smoker  ETOH: no alcohol     BEHAVIOR:  Gamaliel Peet Jr. was a very pleasant man who had normal  affect and social interaction. He arrived via wheelchair but was able to transition to the stool with minimal assistance. He was able to fully cooperate during the study. Results of today's assessment were considered indicative of his current levels of swallowing functioning.      HEARING:  Subjectively, within normal limits.     ORAL PERIPHERAL:   Informal examination of the oral mechanism revealed structures and functioning within normal limits for swallowing and speech purposes.    Voice quality was within normal limits with no wet quality before, during, or after the study.     TEST FINDINGS:   Mr. Pete was seen in Radiology with the radiology technician (Radiologist available for in-person input during a study as needed) for a Modified Barium Swallow Study. He was seated on a stool for a left lateral videofluoroscopic view.      Consistencies assessed using radiopaque barium contrast:  thin (four single and then continuous swallows via open cup or straw)   thin puree (two 1-tsp boluses) via spoon  thick puree (two 1-tsp boluses) via spoon  solid (two whole cracker boluses) by pt's hand  20 Fr barium capsule embedded in puree    Strategies:  Multiple dry swallows- effective in clearing vallecular residue for thin liquids and purees  Liquid wash- effective in clearing residue for solid foods and softening masticated solids for easier transport  Embed capsule is puree- effective in facilitating progression through the pharynx.  Drinking with straw- patient has difficulty tilting head back status post C spine surgery and straw facilitated obtaining liquid without the need for a head tilt    Phases:  Oral: He was able to obtain liquid and strip utensils adequately with no loss of material from the oral cavity. He moved boluses through the oral cavity with appropriate transit time. There was no pooling of liquids in the mouth. Swallow reflex was triggered within normal limits. There was slightly increased time  for mastication on solid foods.     Pharyngeal: Boluses moved through the pharyngeal phase with no laryngeal penetration and no aspiration or nasal regurgitation. Reduced base of tongue retraction and reduced pharyngeal stripping wave was observed which led to mild to moderate vallecular and pyriform residue with liquids and mild to moderate residue in the valleculae with purees and masticated solids. An additional dry swallow was required to clear the residue with thin liquid and purees and multiple swallows and/or liquid wash was required to clear solid foods. The capsule also experienced momentary stasis in the pyriform but was readily cleared with an additional dry swallow.     - Timely initiation  - Adequate soft palate elevation  - Adequate laryngeal elevation and anterior hyoid excursion  - Reduced tongue base retraction  - Complete epiglottic inversion  - Complete laryngeal vestibular closure  - Reduced pharyngeal stripping wave  - Adequate PE segment opening.  - Mild to moderate pharyngeal residue in valleculae and pyriforms with thin liquids and mild to moderate residue in valleculae with purees and solids      Cervical Esophageal: Boluses entered the upper esophagus within normal limits without any evident problems. A small prominence consistent with a CP bar appeared to occupy less than 50% of the esophageal lumen. It was observed to inhibit the tails of liquid and puree boluses so that a minute volume was retained immediately superior to the prominence with no retrograde movement. There was no significant obstruction with any consistency or the barium capsule.     Rosenbeck 8-point Penetration-Aspiration Scale:  1 - Material does not enter airway.      IMPRESSIONS:   This 73 y.o. man appears to present with  1.  Mild oropharyngeal swallowing dysfunction characterized by reduced base of tongue retraction and reduced pharyngeal stripping wave leading to mild to moderate amounts of residue after the  swallow in the valleculae and pyriforms, greater with solid foods. There was no laryngeal penetration or aspiration. This reduced contraction is likely related to the recent cervical spine fusion surgery, and subsequent revision.   2. The cervical esophageal phase was mostly WNL for all consistencies and capsule, but a small prominence consistent with a CP bar was seen on all trials of thin liquids and purees leading to a minute amount of the bolus retained immediately superior to the prominence with no retrograde movement. There was no significant obstruction to any consistency or the barium capsule.        RECOMMENDATIONS/PLAN OF CARE:   It is felt that he would benefit from  1.  Continuation of pt's current regular consistency diet with thin liquids using the following strategies and common aspiration precautions, including, but not limited to  A. Appropriate upright seating for all eating and drinking.  B. A second dry swallow when feeling globus sensation.  C. A liquid wash following bites of solid food especially when feeling a globus sensation.   D. Prepare food to tender, moist state, and/or add condiments, sauces, gravy, and/or add sip of liquid during mastication for dry and/or dense foods.  E. Perform an effortful swallow with all swallows.  F. Monitoring for any signs/symptoms of aspiration (such as wet/gurgly voice that does not clear with coughing, inability to make any voice sounds, any persistent coughing with oral intake, otherwise unexplained fever, unexplained increased or new difficulty or discomfort breathing, unexplained increase in sleepiness/lethargy/significant fatigue, unexplained increase or new onset confusion or change in cognitive functioning, or any other unexplained change in health or well-being that could be related to swallowing).   2. Consider a short course of swallowing therapy (weekly for 2-6 visits) for training and use of an effortful swallow.   3. Follow up with Dr. Turk  as directed.  4. Repeat modified swallow study as needed.

## 2024-05-06 ENCOUNTER — OFFICE VISIT (OUTPATIENT)
Dept: HEMATOLOGY/ONCOLOGY | Facility: CLINIC | Age: 74
End: 2024-05-06
Payer: MEDICARE

## 2024-05-06 ENCOUNTER — INFUSION (OUTPATIENT)
Dept: INFUSION THERAPY | Facility: HOSPITAL | Age: 74
End: 2024-05-06
Attending: STUDENT IN AN ORGANIZED HEALTH CARE EDUCATION/TRAINING PROGRAM
Payer: MEDICARE

## 2024-05-06 VITALS
OXYGEN SATURATION: 98 % | DIASTOLIC BLOOD PRESSURE: 63 MMHG | TEMPERATURE: 97 F | BODY MASS INDEX: 25.66 KG/M2 | HEART RATE: 106 BPM | SYSTOLIC BLOOD PRESSURE: 127 MMHG | RESPIRATION RATE: 17 BRPM | HEIGHT: 70 IN | WEIGHT: 179.25 LBS

## 2024-05-06 VITALS — SYSTOLIC BLOOD PRESSURE: 125 MMHG | HEART RATE: 82 BPM | DIASTOLIC BLOOD PRESSURE: 62 MMHG

## 2024-05-06 DIAGNOSIS — K59.03 CONSTIPATION DUE TO OPIOID THERAPY: ICD-10-CM

## 2024-05-06 DIAGNOSIS — G89.3 NEOPLASM RELATED PAIN: ICD-10-CM

## 2024-05-06 DIAGNOSIS — C79.51 METASTASIS TO BONE: ICD-10-CM

## 2024-05-06 DIAGNOSIS — C78.00 MALIGNANT NEOPLASM METASTATIC TO LUNG, UNSPECIFIED LATERALITY: Primary | ICD-10-CM

## 2024-05-06 DIAGNOSIS — M84.58XA PATHOLOGICAL FRACTURE OF THORACIC VERTEBRA DUE TO NEOPLASTIC DISEASE: ICD-10-CM

## 2024-05-06 DIAGNOSIS — G89.3 CANCER RELATED PAIN: ICD-10-CM

## 2024-05-06 DIAGNOSIS — K64.9 HEMORRHOIDS, UNSPECIFIED HEMORRHOID TYPE: Primary | ICD-10-CM

## 2024-05-06 DIAGNOSIS — E83.52 HYPERCALCEMIA: ICD-10-CM

## 2024-05-06 DIAGNOSIS — R93.89 ABNORMAL FINDINGS ON DIAGNOSTIC IMAGING OF OTHER SPECIFIED BODY STRUCTURES: ICD-10-CM

## 2024-05-06 DIAGNOSIS — C64.1 CLEAR CELL CARCINOMA OF RIGHT KIDNEY: ICD-10-CM

## 2024-05-06 DIAGNOSIS — T40.2X5A CONSTIPATION DUE TO OPIOID THERAPY: ICD-10-CM

## 2024-05-06 PROCEDURE — 1159F MED LIST DOCD IN RCRD: CPT | Mod: CPTII,S$GLB,, | Performed by: INTERNAL MEDICINE

## 2024-05-06 PROCEDURE — 96372 THER/PROPH/DIAG INJ SC/IM: CPT | Mod: 59

## 2024-05-06 PROCEDURE — 1126F AMNT PAIN NOTED NONE PRSNT: CPT | Mod: CPTII,S$GLB,, | Performed by: INTERNAL MEDICINE

## 2024-05-06 PROCEDURE — 3074F SYST BP LT 130 MM HG: CPT | Mod: CPTII,S$GLB,, | Performed by: INTERNAL MEDICINE

## 2024-05-06 PROCEDURE — 99999 PR PBB SHADOW E&M-EST. PATIENT-LVL IV: CPT | Mod: PBBFAC,,, | Performed by: INTERNAL MEDICINE

## 2024-05-06 PROCEDURE — 1101F PT FALLS ASSESS-DOCD LE1/YR: CPT | Mod: CPTII,S$GLB,, | Performed by: INTERNAL MEDICINE

## 2024-05-06 PROCEDURE — 25000003 PHARM REV CODE 250: Performed by: INTERNAL MEDICINE

## 2024-05-06 PROCEDURE — 3288F FALL RISK ASSESSMENT DOCD: CPT | Mod: CPTII,S$GLB,, | Performed by: INTERNAL MEDICINE

## 2024-05-06 PROCEDURE — 63600175 PHARM REV CODE 636 W HCPCS: Mod: JZ,JG | Performed by: INTERNAL MEDICINE

## 2024-05-06 PROCEDURE — 96413 CHEMO IV INFUSION 1 HR: CPT

## 2024-05-06 PROCEDURE — 99215 OFFICE O/P EST HI 40 MIN: CPT | Mod: S$GLB,,, | Performed by: INTERNAL MEDICINE

## 2024-05-06 PROCEDURE — 3078F DIAST BP <80 MM HG: CPT | Mod: CPTII,S$GLB,, | Performed by: INTERNAL MEDICINE

## 2024-05-06 PROCEDURE — 3008F BODY MASS INDEX DOCD: CPT | Mod: CPTII,S$GLB,, | Performed by: INTERNAL MEDICINE

## 2024-05-06 RX ORDER — HYDROCORTISONE 25 MG/G
CREAM TOPICAL 2 TIMES DAILY
Qty: 4 EACH | Refills: 1 | Status: SHIPPED | OUTPATIENT
Start: 2024-05-06

## 2024-05-06 RX ORDER — SODIUM CHLORIDE 0.9 % (FLUSH) 0.9 %
10 SYRINGE (ML) INJECTION
Status: DISCONTINUED | OUTPATIENT
Start: 2024-05-06 | End: 2024-05-06 | Stop reason: HOSPADM

## 2024-05-06 RX ORDER — DIPHENHYDRAMINE HYDROCHLORIDE 50 MG/ML
50 INJECTION INTRAMUSCULAR; INTRAVENOUS ONCE AS NEEDED
Status: DISCONTINUED | OUTPATIENT
Start: 2024-05-06 | End: 2024-05-06 | Stop reason: HOSPADM

## 2024-05-06 RX ORDER — HEPARIN 100 UNIT/ML
500 SYRINGE INTRAVENOUS
Status: DISCONTINUED | OUTPATIENT
Start: 2024-05-06 | End: 2024-05-06 | Stop reason: HOSPADM

## 2024-05-06 RX ORDER — SODIUM CHLORIDE 0.9 % (FLUSH) 0.9 %
10 SYRINGE (ML) INJECTION
Status: CANCELLED | OUTPATIENT
Start: 2024-05-06

## 2024-05-06 RX ORDER — EPINEPHRINE 0.3 MG/.3ML
0.3 INJECTION SUBCUTANEOUS ONCE AS NEEDED
Status: DISCONTINUED | OUTPATIENT
Start: 2024-05-06 | End: 2024-05-06 | Stop reason: HOSPADM

## 2024-05-06 RX ORDER — DIPHENHYDRAMINE HYDROCHLORIDE 50 MG/ML
50 INJECTION INTRAMUSCULAR; INTRAVENOUS ONCE AS NEEDED
Status: CANCELLED | OUTPATIENT
Start: 2024-05-06

## 2024-05-06 RX ORDER — OXYCODONE HYDROCHLORIDE 5 MG/1
5 TABLET ORAL EVERY 4 HOURS PRN
Qty: 180 TABLET | Refills: 0 | Status: SHIPPED | OUTPATIENT
Start: 2024-05-06

## 2024-05-06 RX ORDER — HEPARIN 100 UNIT/ML
500 SYRINGE INTRAVENOUS
Status: CANCELLED | OUTPATIENT
Start: 2024-05-06

## 2024-05-06 RX ORDER — EPINEPHRINE 0.3 MG/.3ML
0.3 INJECTION SUBCUTANEOUS ONCE AS NEEDED
Status: CANCELLED | OUTPATIENT
Start: 2024-05-06

## 2024-05-06 RX ADMIN — SODIUM CHLORIDE 200 MG: 9 INJECTION, SOLUTION INTRAVENOUS at 11:05

## 2024-05-06 RX ADMIN — DENOSUMAB 120 MG: 120 INJECTION SUBCUTANEOUS at 10:05

## 2024-05-06 NOTE — PLAN OF CARE
1000 Labs, hx, and medications reviewed, pt meets parameters for treatment today. Assessment completed and plan of care reviewed. Pt verbalized understanding. PIV accessed with no complications. Pt voices no new complaints or concerns, will continue to monitor for safety.

## 2024-05-06 NOTE — PROGRESS NOTES
Subjective     Patient ID: Gamaliel Pete Jr. is a 73 y.o. male.    Chief Complaint: Clear cell carcinoma of right kidney    HPI    Returns for follow up of metastatic RCC  Currently on Keytruda and Lenvatinib   Previously intolerant of Inlyta.    Gamaliel Pete Jr. is a 73 y.o. old male with metastatic RCC to bone and lung, including bilateral femurs, right worse than left  Surgery and XRT pending -- he will need preoperative embolization  Port to be placed at same time    Reviewed interval bone scan:  - 5/2/2024  FINDINGS:  There is physiologic distribution of the radiopharmaceutical throughout the skeleton.  There are focal areas of increased uptake in the proximal right femoral shaft, left proximal and mid femoral shaft, right parasternal superior rib, thoracic spine and sacrum corresponding to lytic lesions on CT chest abdomen pelvis 04/18/2024.  Additional area of increased uptake in the distal right and proximal left tibia.  There is normal uptake in the genitourinary system and soft tissues noting absence of the right kidney.  Impression:  Multifocal areas of increased tracer uptake within the axial and appendicular skeleton corresponding to lesions seen on CT chest abdomen pelvis 04/18/2024.  Progression of osseous metastatic disease when compared to 09/20/2023.  Additional areas of increased uptake in the distal right and proximal left tibia could be further evaluated with x-ray if indicated.     Presents for follow up prior to cycle 6  Note prior surgery and surgical healing issues (requiring surgery) leading to delay     He reports:  Energy stable- low due to lack of mobility and pain  Notes pain in femurs- fairly controlled on pain regimen  Pain aslo below right shoulder blade noted  Stopped long acting Morphine as felt it was not working and impacting his appetite  Reports appetite is better and eating 3 meals per day - weight stabilized    + constipation on narcotics  Reviewed regimen and  wrote out directions  This has led to hemorrhoids    Nausea- dry heaves after AM pain medication noted at times     Diagnosis: metastatic RCC     Oncology History:  - Presented with gross hematuria mid June 2023  Building a house and has been active working on this in the hat- 1st noted dark urine and felt related to being dehydrated  Occurred a 2nd time approximately 2 weeks later and this time he noted darker and small clots  Noted again 2 weeks later and worse but ultimately he was prompted to seek evaluation when he had significant blood when he urinated     - went to his PCP and urine sample was yellow again  He had blood work done and referred to Urology at that time     - 9/5/2023 CT Urogram:  FINDINGS:  The lung bases demonstrate bilateral nodules, for example 9 mm at the right lung base and up to 11 mm at the left lung base.  Atelectasis present.  There are bilateral fat containing inguinal hernias.  The liver demonstrates no mass.  The spleen is not enlarged.  The stomach, pancreas and adrenal glands are within normal limits.  Gallbladder is unremarkable.  There is no biliary ductal dilatation.  The kidneys concentrate and excrete contrast satisfactorily.  There is no renal calculus or ureteral calculus.  Within the mid to lower pole of the right kidney is a 4.9 x 6.3 x 6.1 cm heterogeneous solid mass which is overall slightly hypodense in relation to the enhancing parenchyma.  The mass is partially exophytic posteriorly.  It does extend partially into the renal sinus  At the upper pole of the right kidney is a subcentimeter exophytic focus which is isodense to the parenchyma on postcontrast imaging appearing slightly hyperdense on the noncontrast study, too small to characterize.  There are multiple additional subcentimeter hypodense right kidney foci which are suggestive of cysts.  Finally at the lower pole of the right kidney is a exophytic hypodense structure most compatible with a cyst.  The right  main renal vein appears patent.  There is no significant periaortic lymphadenopathy.  Left kidney demonstrates several subcentimeter foci which are hypodense but too small to characterize, suggestive of cysts.  There is somewhat of a small amount of contrast within the upper collecting systems and ureters, with no definite focal abnormality, noting that there is some distortion of the collecting system in the region in which the right kidney mass involves the renal sinus.  The bladder is partially distended appearing grossly unremarkable.  The bowel demonstrates no significant abnormality.  The osseous structures demonstrate degenerative changes.  T9 demonstrates a lytic focus occupying the anterior half of the vertebral body.  There is slight loss of height along superior and inferior endplates with cortical disruption..  Impression:  Solid right renal mass concerning for neoplasm.  Multiple lung base nodules up to 11 mm, concerning for metastatic disease.  Recommend chest CT for full evaluation of lungs.  Lytic lesion at T9 with mild compression, concerning for pathologic compression fracture.  Subcentimeter right kidney lesion isodense to parenchyma on contrast enhanced imaging, too small to characterize.  Additional bilateral renal hypodensities which are too small to characterize but suggestive of cysts.     - 9/7/2023 CT Chest:  FINDINGS:  The base of the neck is within normal limits.  The thyroid gland is unremarkable.  The supraclavicular regions are within normal limits.  The trachea is unremarkable.  The central airways are within normal limits.  No endobronchial lesion is identified.  There is no evidence of bronchiectasis.  The heart is unremarkable.  There are no pericardial effusions.  There are coronary artery calcifications.  The thoracic aorta is normal in caliber.  There are subcentimeter mediastinal and hilar lymph nodes.  There are subcentimeter axillary lymph nodes.  There are no pleural  effusions.  There is no evidence of a pneumothorax.  There is no evidence of pneumomediastinum.  There are innumerable bilateral pulmonary nodules.  There is no focal consolidation.  The esophagus is unremarkable.  Please see the dedicated CT abdomen pelvis for the upper abdominal findings.  The chest wall is unremarkable.  There is unchanged lytic lesion involving the anterior aspect of T9 vertebral body with associated cortical erosions.  Impression:  Innumerable pulmonary nodules, concerning for metastatic disease to the chest.  Unchanged lytic lesion in the T9 vertebral body with cortical erosions.  Follow-up MRI of the spine, as clinically warranted.     - 9/7/2023 CT Abd:  FINDINGS:  CT renal protocol:  There is a 4.8 x 6.3 cm exophytic enhancing lesion in the lower pole of the right kidney.  There is hypoenhancement of the lesion compared to the normal renal parenchyma.  There is unchanged appearance of the mass extending into the right renal sinus.  There is no extension into the right renal vein  No additional enhancing lesions are identified.  There is a subcentimeter lesion in the right kidney is too small complete characterization.  There is prompt excretion from both collecting systems.  There is incomplete distension of the right distal ureter.  No definitive filling defect is identified within the.  The urinary bladder is unremarkable.  CT abdomen pelvis:  There is unchanged appearance of multiple pulmonary nodules in the lung bases.  No new nodules identified  The heart is unremarkable.  There is normal tapering of the abdominal aorta.  There are single bilateral renal arteries.  The renal veins remain patent.  There is no evidence of lymphadenopathy.  The esophagus, stomach, and duodenum are within normal limits.  The small bowel loops are unremarkable.  The appendix is not visualized.  There are no secondary findings of acute appendicitis.  There is colonic diverticula without evidence of acute  diverticulitis.  The liver is unremarkable.  The gallbladder is within normal limits.  The biliary tree is within normal limits.  The spleen is unremarkable.  The pancreas is within normal limits.  The adrenal glands are unremarkable.  There is no evidence of free fluid in the abdomen or pelvis.  There is no evidence of free air.  There is no evidence of pneumatosis.  No portal venous air is identified.  The psoas margins are unremarkable.  There are bilateral fat containing inguinal hernias.  There are degenerative changes in the osseous structures.  There is unchanged appearance of a lytic lesion involving the anterior aspect of the T9 vertebral body with associated cortical erosions.  Impression:  Unchanged 4.8 x 6.3 cm exophytic hypoenhancing lesion in the lower pole of the right kidney, concerning for renal cell carcinoma.  Extension of lesion into the renal sinus.  No evidence of renal vein invasion.  No regional lymphadenopathy.  Additional smaller subcentimeter lesion too small complete characterization in the right kidney.  Bilateral pulmonary nodules remain concerning for metastatic disease.  Lytic lesion along the anterior aspect of the T9 vertebral body, also concerning for osseous metastatic disease.  Additional findings as above.     - 9/20/2023 Bone scan:  FINDINGS:  There is physiologic distribution of the radiopharmaceutical throughout the skeleton.  Focal uptake in the T9 vertebral body corresponding to lytic lesion seen on CT 09/05/2023.  Focal uptake in the right kidney in patient with known right renal mass.  There is otherwise normal uptake in the genitourinary system and soft tissues.  Impression:  Focal uptake in the T9 vertebral body corresponding to lytic lesion seen on prior CT and concerning for metastatic disease.  Additional focus of increased uptake in the right kidney in patient with known right renal mass     - 10/4/2023 Robotic right nephrectomy  Pathology:  RIGHT KIDNEY, TOTAL  NEPHRECTOMY:   - Clear cell renal cell carcinoma, ISUP grade 4, 5.5 cm.   - Benign cortical cysts.   - See [facility] synoptic report below.   SURGICAL PATHOLOGY CANCER CASE SUMMARY   Procedure: Total nephrectomy.   Specimen laterality: Right.   Tumor size: 5.5 cm.   Tumor focality: Unifocal.   Histologic type: Clear cell renal cell carcinoma.   Sarcomatoid features: Present, 5%.   Rhabdoid features: Not identified.   Histologic Grade (ISUP Grade): 4.   Tumor necrosis: Present, 20%.   Tumor extension: Extends into pelvicalyceal system and renal sinus fat.   Margins: Uninvolved.   Lymphovascular invasion (excluding renal vein and its segmental branches): Not identified.   Regional lymph nodes: No lymph nodes submitted or found.   Distant metastases: Not applicable in this specimen.   Pathologic stage (pTNM, AJCC 8th edition): pT3a pN not assigned (no lymph nodes submitted or found).      - treatment not initiated with below issues:  2 prior admissions      - Admitted from 11/6- 11/9/2023  Hospital Course: Pt admitted for intractable back pain in the setting of renal carcinoma (RCC) s/p R nephrectomy 10/4 followed by Dr. Turk not on systemic therapy. CT and MRI concerning for spinal, lung, and hepatic mets. NSGY, Rad Onc, and IR on board in the hospital and evaluated for C5 and T9 lesions on spine. Pt pain controlled with oxycodone 12 HR BID and Oxycodone 10 PRN for thoracic and cervical pain. Patient underwent preop embolization of cervical tumor with IR on 11/6/23. And then had a cervical corpectomy with NSGY on 11/7/23 which he tolerated well. They obtained tissue samples and sent it over to pathology. IR to perform osteocool/kyphoplasty/biopsy T9 11/14. 2nd stage of surgery with NSGY on 11/15 with Dr. Martinez after kyphoplasty. Patient is to see Dr. Turk for systemic therapy afterwards as well as option for postoperative radiation. Stable to discharge home with c-collar and back brace. Patient also to receive rolling  walker and hospital bed due to weakness and pain.   Pathology:  Spine, C5 vertebral body, corpectomy:   - Metastatic renal cell carcinoma, consistent with patient's history   - [facility] has been ordered and results will be issued in a separate      - Admitted 11/14- 11/18/2023 and underwent IR kyphoplasty of T4-6  Procedure(s) (LRB):  SPINE, CERVICAL, WITH POSTERIOR FUSION T4-6 (N/A)  CORPECTOMY, SPINE, CERVICAL C3-6 REVISION (N/A)   Hospital Course: 11/14: admitted; IR kyphoplasty of T9 today  11/15: OR today for C3-6 PCF.   11/16: POD 1 s/p C3-C6 PCF. NAEO. VSS, afebrile and WBC WNL. Patient complains of mild incisional pain but reports his LUE radiculopathy is improved. Reports mild discomfort with swallowing but no difficulty. Pending PT.   11/17: POD 2 s/p C3-C6 PCF. NAEO and VSS. Mild SABINE on BMP, will resume fluids. Anterior HV drain removed at bedside. Two posterior HV drains remain w/output 50 each.   11/18: POD 3. NAEON. AFVSS. Neurologically stable. Tolerating PO diet and voiding spontaneously. HV drain x 2 removed and pressure dressing placed. Medically stable to discharge home. Follow up in clinic in 2 weeks. Discharge instructions given verbally to patient. All of his questions were answered.  He is encouraged to call the clinic with any questions or concerns prior to follow up appt.      [facility] (11/30/2023 9:26 AM CST)  Results - [facility] (11/30/2023 9:26 AM CST)  Component Value Ref Range Test Method Analysis Time Performed At Pathologist Signature   Reason for Study To identify somatic and germline mutations relevant to patient's cancer.     11/30/2023 9:26 AM CST [facility]     Genetic Diseases Assessed Cancer     11/30/2023 9:26 AM CST [facility]     Description of Ranges of DNA Sequences Examined 648 gene panel     11/30/2023 9:26 AM CST [facility]     Overall Interpretation positive     11/30/2023 9:26 AM CST [facility]     MSI Stable     11/30/2023 9:26 AM CST [facility]     TMB 7.4  m/MB   11/30/2023 9:26 AM CST [facility]     [facility] [email or web address]     11/30/2023 9:26 AM CST [facility]     Comment: [facility] link   Fusion Addendum Issue Type NEGATIVE  Negative - This addendum is being issued to report that no gene fusions or altered splicing variants from RNA sequencing analysis were found.     11/30/2023 9:26 AM CST [facility]     Therapy Count 4     11/30/2023 9:26 AM CST [facility]     [facility]: Potential Therapy 1 Gene: [id number]^VHL^HGNC  Variant: p.E186fs  Agent: Belzutifan  Tissue: Renal Cell Carcinoma  Association: response  Evidence Status: Consensus  Evidence ID: NCCN  Evidence URL:  FDA Approved?: Yes  on label?: No     11/30/2023 9:26 AM CST [facility]     [facility]: Potential Therapy 2 Gene: [id number]^PBRM1^HGNC  Variant: p.L618fs  Agent: Nivolumab  Tissue: Clear Cell Renal Cell Carcinoma  Association: response  Evidence Status: Clinical research  Evidence ID: [id number]  Evidence URL: https://www.ncbi.nlm.nih.gov/pubmed/89379302  FDA Approved?: Yes  on label?: Yes     11/30/2023 9:26 AM CST [facility]     [facility]: Potential Therapy 3 Gene: 8975^PIK3CA^HGNC  Variant: p.Q546E  Agent: Capivasertib + Fulvestrant  Tissue: Breast Cancer  Association: response  Evidence Status: Consensus  Evidence ID: FDA  Evidence URL:  FDA Approved?: Yes  on label?: No     11/30/2023 9:26 AM CST [facility]     [facility]: Potential Therapy 4 Gene: 8975^PIK3CA^HGNC  Variant: p.Q546E  Agent: Alpelisib  Tissue: Solid Tumors  Association: response  Evidence Status: Clinical research  Evidence ID: [id number]  Evidence URL: https://www.ncbi.nlm.nih.gov/pubmed/36267764  FDA Approved?: Yes  on label?: No     11/30/2023 9:26 AM CST [facility]     Trial Count 3     11/30/2023 9:26 AM CST [facility]     [facility]: Clinical Trial Match 1 Clinical Trial NCT ID: [id number]  Clinical Trial Title: AXC678 as  a Single Agent and in Combination With Everolimus & Immuno-Oncology Agents in Advanced/Relapsed Renal Cancer & Other Malignancies  Clinical Trial URL: https://clinicaltrials.gov/ct2/show/HSC66977800  Clinical Phase: Phase 1  Matched criteria: VHL p.E186fs mutation     11/30/2023 9:26 AM CST [facility]     [facility]: Clinical Trial Match 2 Clinical Trial NCT ID: [id number]  Clinical Trial Title: First-in-Human Study of STX-478 as Monotherapy and in Combination With Other Antineoplastic Agents in Participants With Advanced Solid Tumors  Clinical Trial URL: https://clinicaltrials.gov/ct2/show/KUZ84448296  Clinical Phase: Phase 1/Phase 2  Matched criteria: PIK3CA p.Q546E mutation     11/30/2023 9:26 AM CST [facility]     [facility]: Clinical Trial Match 3 Clinical Trial NCT ID: [id number]  Clinical Trial Title: Testing the Combination of Two Anti-cancer Drugs, Peposertib () and  for Advanced Solid Tumors  Clinical Trial URL: https://clinicaltrials.gov/ct2/show/MDS28640064  Clinical Phase: Phase 1  Matched criteria: PBRM1 p.L618fs mutation     11/30/2023 9:26 AM CST [facility]        Results - [facility] (11/30/2023 9:26 AM CST)  Specimen (Source) Anatomical Location / Laterality Collection Method / Volume Collection Time Received Time   Tissue       11/16/2023 11:02 AM CST      Results - [facility] (11/30/2023 9:26 AM CST)  Narrative   This result has genomic variants that were not included in this document.          Results - [facility] (11/30/2023 9:26 AM CST)  Authorizing Provider Result Type   Ruby Turk MD LAB MOLECULAR DIAGNOSTICS ORDERABLES      Results - [facility] (11/30/2023 9:26 AM CST)  Performing Organization Address City/State/ZIP Code Phone Number   [place]  [address], [address]  [place] [address],   [phone]       - initiated systemic therapy with Pembro/Axitinib on 12/18/2023     - Axitinib held per patient request with side effects     - 11/15/2023 Neurosurgery  1. Posterior  spinal fusion, C3-C6  2. Posterior segmental spinal fixation, C3-C6 (Depuy)  3. Revision/fusion of C4/5 anterior cervical corpectomy cage with revision of plate from C3-6     - 4/18/2024 CT C/A/P:  FINDINGS:  Chest:  Heart and mediastinal vasculature: Unremarkable.  Cynthia/Mediastinum: Stable metastatic mediastinal lymphadenopathy.  Diffuse thyroid goiter.  Lungs: Innumerable mostly subcentimeter pulmonary metastatic lesions are stable.  Stable pleuroparenchymal scarring.  Abdomen and pelvis:  Liver: Vague low-density focus in the subcapsular aspect of the right lobe that demonstrated enhancement on the previous exam is less conspicuous, probably related to contrast bolus timing and is indeterminate.  Gallbladder: No calcified gallstones.  Bile Ducts: No evidence of dilated ducts.  Pancreas: No mass or peripancreatic fat stranding.  Spleen: Unremarkable.  Adrenals: Unremarkable.  Kidneys/ Ureters: Status post right nephrectomy.  Left kidney remarkable for low-density 1 cm cysts.  GI Tract/Mesentery: No evidence of bowel obstruction or inflammation.  Peritoneal Space: No ascites. No free air.  Retroperitoneum: No significant adenopathy.  Vasculature: Aortic atherosclerosis is present.  Bladder: No evidence of wall thickening.  Reproductive organs: Unremarkable.  Bones: Compared with the prior study lytic metastatic disease to bone appears grossly stable noting a large right proximal femoral diaphyseal lesion with significant soft tissue component and complete destruction/resorption of the lateral femoral diaphyseal cortex.  Large lytic lesions are seen within the sacrum most notably at the S5 segment.  Operative changes of posterior decompression and instrumented fusion for essentially destroyed T9 vertebral body is stable.  T6 metastatic lesion with significant left-sided paraspinal and posterior element involvement stable.  Impression:  No change from 1 week prior in this patient with metastatic renal cell carcinoma  to the mediastinum, lungs, and bones.    - 4/18/2024 Femur xrays:  FINDINGS:  Multiple lytic metastatic lesions are seen throughout the mid and distal left femoral shaft.  There is no evidence of pathologic fracture.  On the right side, there is a single large metastatic lesion involving the subtrochanteric proximal diaphysis which is associated with significant cortical involvement..  No evidence of pathologic fracture.  Mild bilateral hip osteoarthritis.  Overall, the lesions appear stable.  Impression:  See above      - 4/11/2024 MRI T/L spine:  FINDINGS:  Partially visualized postsurgical changes of the cervical spine with enhancing C7 lesion, correlating with CT cervical spine 04/11/2024.  There is slight osseous retropulsion into the spinal canal without significant stenosis.  Postoperative changes of T7-T11 posterior instrumented fusion and T8-T10 decompressive laminectomy.  Fluid collection within the posterior subcutaneous soft tissues of the surgical bed measuring approximately 3.9 x 1.3 x 23.5 cm (19-61; 16-8).  Additional smaller collection noted superiorly (series 19, image 28).  These have slightly improved from the prior exam.  Mild surrounding subcutaneous edema.  THORACIC  Alignment: Normal.  Vertebrae: Enhancing T6 vertebral body lesion extending into the left posterior elements with mild height loss, correlating with pathologic fracture on recent CT.  Lesion extends posteriorly into the anterior epidural space and left T5-T6 and T6-T7 neural foramen, contributing to severe neural foraminal narrowing.  No spinal canal stenosis at this level.  Enhancing T10 vertebral body lesion extending anteriorly and superiorly involving the prevertebral soft tissues, anterior T8-T9 epidural space, and bilateral T9-T10 neural foramen with severe neural foraminal narrowing, similar to prior exam.  Enhancing lesion noted of the right posterior 5th rib (22-2).  Discs: Multilevel disc height loss and desiccation.  No  evidence to suggest discitis.  Cord: Normal caliber and signal.  Degenerative findings: Moderate narrowing of the thecal sac at T8-T9 and T9-T10.  LUMBAR  Alignment: Grade 1 retrolisthesis of L1 on L2.  Vertebrae: Vertebral body heights are maintained.  No acute fractures.  No marrow placing process.  Small enhancing lesion in the inferior endplate at L3 measuring 8 mm (series 22, image 11), possible small metastasis.  L2 vertebral body hemangiomas.  Discs: Multilevel disc height loss and desiccation, most pronounced at T12-L1 and L1-L2.  no evidence of discitis.  Cord: Normal caliber and signal.  Degenerative findings:  L1-L2: No spinal canal stenosis or neural foraminal narrowing.  L2-L3: Grade 1 retrolisthesis.  Mild bilateral facet arthropathy.  No spinal canal stenosis or neural foraminal narrowing.  L3-L4: Circumferential disc bulge and bilateral facet arthropathy.  No spinal canal stenosis or neural foraminal narrowing.  L4-L5: Circumferential disc bulge and bilateral facet arthropathy.  Findings contribute to mild/moderate bilateral neural foraminal narrowing and mild spinal canal stenosis.  L5-S1: Circumferential disc bulge and bilateral facet arthropathy.  Findings contribute to mild/moderate bilateral neural foraminal narrowing.  No spinal canal stenosis.  Paraspinal muscles & soft tissues: Multiple pulmonary nodules bilaterally, suggestive of metastatic disease.  Bibasilar subsegmental atelectasis.  Mediastinal lymphadenopathy.  1.5 cm right hepatic lobe T2 hyperintense lesion, concerning for metastatic disease.  Impression:  Patient history of metastatic renal cell carcinoma.  Multiple osseous lesions in the thoracic spine, as above, consistent with metastatic disease.  Findings correlate with the 04/11/2024 CT exam, and have progressed from the 01/31/2024 MRI exam at C7, right T5 rib, and T6.  Prior T7-T11 posterior instrumented fusion with decompressive laminectomies, as above, noting slight  improvement in the fluid collection in the posterior surgical bed.  Indeterminate small (8 mm) lesion in the L3 vertebral body, possible metastasis.  Lumbar spondylosis, contributing to mild spinal canal stenosis at L4-5 and mild/moderate neural foraminal narrowing at L4-5 and L5-S1, as above.  This report was flagged in Epic as abnormal.       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  - fractured collar bone  - Pediatric hernia             Active Ambulatory Problems     Diagnosis Date Noted    Obesity (BMI 35.0-39.9 without comorbidity) 04/01/2019    Borderline hypertension 04/01/2019    Acute gout of left ankle 04/01/2019    Onychomycosis 04/01/2019    History of gout 06/02/2021    Seborrheic keratosis 06/02/2021    Tinea corporis 12/07/2021    Positive colorectal cancer screening using Cologuard test 12/07/2021              Resolved Ambulatory Problems     Diagnosis Date Noted    No Resolved Ambulatory Problems              Past Medical History:   Diagnosis Date    Asthma      ED (erectile dysfunction)      Gout        SH:  Retired from [employer]    1 child, 2 stepchildren  Prior tobacco- teens, early 20s  Social EtOH    Review of Systems   Constitutional:  Positive for activity change, appetite change and fatigue. Negative for chills, fever and unexpected weight change.   HENT:  Positive for dental problem. Negative for mouth sores, trouble swallowing and voice change.    Respiratory:  Negative for cough, shortness of breath and wheezing.    Cardiovascular:  Negative for chest pain, palpitations and leg swelling.   Gastrointestinal:  Positive for change in bowel habit, constipation and nausea. Negative for abdominal distention, abdominal pain, diarrhea, vomiting and reflux.   Genitourinary:  Positive for frequency (at night).  Negative for decreased urine volume, difficulty urinating, dysuria, hematuria and urgency.   Musculoskeletal:  Positive for arthralgias, back pain and gait problem. Negative for neck pain.   Integumentary:  Positive for wound (buttocks). Negative for rash.   Neurological:  Positive for weakness (generalized) and numbness. Negative for dizziness, headaches, memory loss and coordination difficulties.   Psychiatric/Behavioral:  Positive for sleep disturbance. Negative for dysphoric mood. The patient is nervous/anxious.           Objective     Physical Exam  Vitals and nursing note reviewed.   Constitutional:       General: He is not in acute distress.     Appearance: Normal appearance. He is not ill-appearing.      Comments: Presents with his wife  In wheelchair  ECOG= 1-2   HENT:      Head: Normocephalic and atraumatic.      Mouth/Throat:      Mouth: Mucous membranes are dry.      Pharynx: Oropharynx is clear. No oropharyngeal exudate.   Eyes:      General: No scleral icterus.     Extraocular Movements: Extraocular movements intact.      Conjunctiva/sclera: Conjunctivae normal.      Pupils: Pupils are equal, round, and reactive to light.   Cardiovascular:      Rate and Rhythm: Normal rate and regular rhythm.      Heart sounds: Normal heart sounds. No murmur heard.     No friction rub. No gallop.   Pulmonary:      Effort: Pulmonary effort is normal. No respiratory distress.      Breath sounds: Normal breath sounds. No wheezing, rhonchi or rales.   Abdominal:      General: Abdomen is flat. Bowel sounds are normal. There is no distension.      Palpations: Abdomen is soft. There is no mass.      Tenderness: There is no guarding or rebound.      Comments: No organomegaly   Musculoskeletal:         General: No swelling. Normal range of motion.      Cervical back: Normal range of motion. No rigidity.      Right lower leg: No edema.      Left lower leg: No edema.      Comments: Wearing back brace in clinic   Skin:      Coloration: Skin is not jaundiced or pale.      Findings: No bruising, erythema, lesion or rash.   Neurological:      Mental Status: He is alert and oriented to person, place, and time.      Cranial Nerves: No cranial nerve deficit.      Sensory: Sensory deficit present.      Motor: Weakness present.      Gait: Gait abnormal.   Psychiatric:         Mood and Affect: Mood normal.         Behavior: Behavior normal.         Thought Content: Thought content normal.         Judgment: Judgment normal.     Labs- reviewed  Imaging- reviewed     Assessment and Plan     1. Hemorrhoids, unspecified hemorrhoid type  -     hydrocortisone (ANUSOL-HC) 2.5 % rectal cream; Place rectally 2 (two) times daily.  Dispense: 4 each; Refill: 1    2. Clear cell carcinoma of right kidney  -     oxyCODONE (ROXICODONE) 5 MG immediate release tablet; Take 1 tablet (5 mg total) by mouth every 4 (four) hours as needed for Pain.  Dispense: 180 tablet; Refill: 0    3. Cancer related pain  -     oxyCODONE (ROXICODONE) 5 MG immediate release tablet; Take 1 tablet (5 mg total) by mouth every 4 (four) hours as needed for Pain.  Dispense: 180 tablet; Refill: 0    4. Metastasis to bone    5. 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 Neoplasm related pain    7. Constipation due to opioid therapy    8. Hypercalcemia    Other orders  -     Cancel: pembrolizumab (KEYTRUDA) 200 mg in sodium chloride 0.9% SolP 108 mL infusion  -     Cancel: EPINEPHrine (EPIPEN) 0.3 mg/0.3 mL pen injection 0.3 mg  -     Cancel: diphenhydrAMINE injection 50 mg  -     Cancel: hydrocortisone sodium succinate injection 100 mg  -     Cancel: sodium chloride 0.9% 100 mL flush bag  -     Cancel: sodium chloride 0.9% flush 10 mL  -     Cancel: heparin, porcine (PF) 100 unit/mL injection flush 500 Units  -     Cancel: alteplase injection 2 mg        Metastatic renal carcinoma to bone, lung-  Continue Keytruda and Lenvatinib - started at lower dose per his  history  Lenvatinib will need to be held 1 week prior to surgery  Continue monthly Xgeva  Surgical and XRT plan as above    Hypercalcemia-  Xgeva today    Labs reviewed, adequate for treatment     Neoplasm related pain-  Reviewed pain management plan  Refill Oxycodone  Advised to use Senna in AM and PM, Miralax every AM, Dulcolax in PM, Linzess every AM     Hemorrhoids-  Related to constipation, Proctofoam sent    Route Chart for Scheduling    Med Onc Chart Routing      Follow up with physician    Follow up with [facility] 4 weeks. cbc, cmp, tsh and T4 and EP and Xgeva and Keytruda (pushed back with surgery)   Infusion scheduling note    Injection scheduling note    Labs    Imaging    Pharmacy appointment    Other referrals            Treatment Plan Information   OP AXITINIB + PEMBROLIZUMAB 200MG Q3W   Ruby Turk MD   Upcoming Treatment Dates - OP AXITINIB + PEMBROLIZUMAB 200MG Q3W    5/7/2024       Chemotherapy       pembrolizumab (KEYTRUDA) 200 mg in sodium chloride 0.9% SolP 108 mL infusion  5/28/2024       Chemotherapy       pembrolizumab (KEYTRUDA) 200 mg in sodium chloride 0.9% SolP 108 mL infusion  6/18/2024       Chemotherapy       pembrolizumab (KEYTRUDA) 200 mg in sodium chloride 0.9% SolP 108 mL infusion  7/9/2024       Chemotherapy       pembrolizumab (KEYTRUDA) 200 mg in sodium chloride 0.9% SolP 108 mL infusion    Supportive Plan Information  IV FLUIDS AND ELECTROLYTES   Ruby Turk MD   Upcoming Treatment Dates - IV FLUIDS AND ELECTROLYTES    No upcoming days in selected categories.    Therapy Plan Information  INJECTAFER (FERRIC CARBOXYMALTOSE)  EPINEPHrine (EPIPEN) 0.3 mg/0.3 mL pen injection 0.3 mg  0.3 mg, Intramuscular, PRN  diphenhydrAMINE injection 50 mg  50 mg, Intravenous, PRN  hydrocortisone sodium succinate injection 100 mg  100 mg, Intravenous, PRN    DENOSUMAB (XGEVA) Q4W  Medications  denosumab (XGEVA) solution 120 mg  120 mg, Subcutaneous, Every 4 weeks

## 2024-05-06 NOTE — PLAN OF CARE
1200 Pt tolerated Keytruda and Xgeva well today, no complaints or complications,. VSS. Pt aware to call provider with any questions or concerns and is aware of upcoming appts. Pt ambulatory from clinic with steady gait, no distress noted.

## 2024-05-08 ENCOUNTER — PATIENT MESSAGE (OUTPATIENT)
Dept: ORTHOPEDICS | Facility: CLINIC | Age: 74
End: 2024-05-08
Payer: MEDICARE

## 2024-05-08 ENCOUNTER — HOSPITAL ENCOUNTER (OUTPATIENT)
Dept: RADIATION THERAPY | Facility: HOSPITAL | Age: 74
Discharge: HOME | End: 2024-05-08
Attending: FAMILY MEDICINE
Payer: MEDICARE

## 2024-05-08 ENCOUNTER — OFFICE VISIT (OUTPATIENT)
Dept: RADIATION ONCOLOGY | Facility: CLINIC | Age: 74
End: 2024-05-08
Payer: MEDICARE

## 2024-05-08 VITALS
TEMPERATURE: 98 F | OXYGEN SATURATION: 97 % | HEART RATE: 98 BPM | DIASTOLIC BLOOD PRESSURE: 63 MMHG | SYSTOLIC BLOOD PRESSURE: 99 MMHG

## 2024-05-08 DIAGNOSIS — C64.1 CLEAR CELL CARCINOMA OF RIGHT KIDNEY: Primary | ICD-10-CM

## 2024-05-08 DIAGNOSIS — C79.51 METASTASIS TO BONE: ICD-10-CM

## 2024-05-08 PROCEDURE — 1125F AMNT PAIN NOTED PAIN PRSNT: CPT | Mod: CPTII,S$GLB,, | Performed by: RADIOLOGY

## 2024-05-08 PROCEDURE — 3078F DIAST BP <80 MM HG: CPT | Mod: CPTII,S$GLB,, | Performed by: RADIOLOGY

## 2024-05-08 PROCEDURE — 77334 RADIATION TREATMENT AID(S): CPT | Mod: TC | Performed by: RADIOLOGY

## 2024-05-08 PROCEDURE — 77290 THER RAD SIMULAJ FIELD CPLX: CPT | Mod: 26,,, | Performed by: RADIOLOGY

## 2024-05-08 PROCEDURE — 99215 OFFICE O/P EST HI 40 MIN: CPT | Mod: S$GLB,,, | Performed by: RADIOLOGY

## 2024-05-08 PROCEDURE — 77263 THER RADIOLOGY TX PLNG CPLX: CPT | Mod: ,,, | Performed by: RADIOLOGY

## 2024-05-08 PROCEDURE — 77334 RADIATION TREATMENT AID(S): CPT | Mod: 26,,, | Performed by: RADIOLOGY

## 2024-05-08 PROCEDURE — 77014 HC CT GUIDANCE RADIATION THERAPY FLDS PLACEMENT: CPT | Mod: TC | Performed by: RADIOLOGY

## 2024-05-08 PROCEDURE — 77290 THER RAD SIMULAJ FIELD CPLX: CPT | Mod: TC | Performed by: RADIOLOGY

## 2024-05-08 PROCEDURE — 99999 PR PBB SHADOW E&M-EST. PATIENT-LVL II: CPT | Mod: PBBFAC,,, | Performed by: RADIOLOGY

## 2024-05-08 PROCEDURE — 3074F SYST BP LT 130 MM HG: CPT | Mod: CPTII,S$GLB,, | Performed by: RADIOLOGY

## 2024-05-08 NOTE — PROGRESS NOTES
PATIENT IDENTIFICATION:  Patient Name: Gamaliel Pete Jr.  MRN: [id number]  : 1950    DIAGNOSIS:  Cancer Staging   Clear cell carcinoma of right kidney  Staging form: Kidney, AJCC 8th Edition  - Clinical stage from 10/31/2023: Stage IV (cT3a, cNX, cM1) - Signed by Ruby Turk MD on 10/31/2023      HISTORY OF PRESENT ILLNESS:   The patient is a 73 year old man with metastatic RCC.  He is status post c spine corpectomy, T9 kyphoplasty.  He now has spinal cord compression at T9 and is awaiting decompressive laminectomy and intramedullary nail at the right femur.  The patient has been referred for consideration of palliative radiation to these sites.    The patient presented in 2023 with complaint of gross hematuria.  The patient was referred to Urology for management.  CT scan performed on 2023 revealed a mass in the right kidney lower pole measuring 4.9 x 6.3 x 6.1 cm.  There were multiple lung nodules seen measuring up to 11 mm concerning for metastatic disease.  A lytic lesion at T9 with mild compression was noted.     Bone scan performed on 2023 revealed focal uptake in the T9 vertebral body corresponding to the lytic lesion seen on CT scan.  The patient was planned for biopsy of the T9 lesion but developed worsening hematuria and was recommended to undergo nephrectomy.     The patient was taken to the operating room on 10/04/2023 for right robotic nephrectomy.  Pathology revealed a grade 4 clear cell renal cell carcinoma measuring 5.5 cm in greatest dimension.  There were no lymph nodes submitted.     The patient reports worsening back pain over the past two weeks.  He was admitted on 10/31/2023 for pain control and evaluation by Neurosurgery.  MRI of the thoracic spine performed yesterday revealed enhancing destructive lesions involving C5 and T9 vertebral bodies with associated compression deformity, progressed from prior at level T9.  Additional mild retropulsion of T9 causing mild  canal stenosis at the T8-T9 level.  Possible lesion involving the anterior C6 vertebral body.  Consider further evaluation with cervical spine MRI.    The patient was taken to the OR on 11/7/23 for C spine corpectomy.  Pathology was c/w metastatic RCC.    MRI t spine performed 12/30/23: Impression:     1. Relative to prior MRI of the thoracic spine performed 10/31/2023, the patient is now status post vertebral augmentation at T9, at the level of presumed pathologic fracture due to metastasis. There has been further height loss of the vertebra since the reference MR examination, however configuration of the T9 vertebra appears similar when compared to more recently performed intervening CTA of the chest of 11/20/2023.  2. On the current examination, centered at the T9 level there is posterior buckling of the cortex and overlying enhancing soft tissue in the ventral epidural space, possibly combination of extraosseous extension of neoplastic soft tissue and inflammatory change. This enhancing soft tissue measures on the order of 8 x 19 x 27 mm (AP x ML x CC). This contributes to moderate central spinal canal stenosis with effacement of CSF and ventral cord deformity. Question intramedullary edema-like signal.  3. Additionally, there is new mild height loss of the T8 vertebra with patchy edema-like signal enhancement since the October 2023 MRI, at least some of which is likely related to acute or subacute fracture deformity, with underlying extension of metastasis not excluded. Question subtle height loss of the T8 vertebra since the 11/20/2023 CT chest.  4. Further, there appears to be a new enhancing lesion within the left paramedian T6 vertebral body, possibly metastatic lesion.  Equivocal new T2 weighted lesion within T12 vertebra.  Attention on follow-up recommended.    The patient returned to the OR on 1/5/24 for laminectomy for decompression of thecal sac T8-T10.  The patient receive postoperative radiation to  the cervical and thoracic spine as noted below.    X ray of the femurs revealed:  Multiple lytic metastatic lesions are seen throughout the mid and distal left femoral shaft.  There is no evidence of pathologic fracture.     On the right side, there is a single large metastatic lesion involving the subtrochanteric proximal diaphysis which is associated with significant cortical involvement..  No evidence of pathologic fracture.      The patient has been evaluated by Orthopedic surgery and is planned for intramedullary nail placement to be performed on 05/23/2024.  The patient is recommended palliative radiation to the left distal femur now.  The patient will likely require postoperative radiation to the right femur.  Oncology History   Cancer with pulmonary metastases   9/14/2023 Initial Diagnosis    Cancer with pulmonary metastases     12/22/2023 -  Chemotherapy    Treatment Summary   Plan Name: OP AXITINIB + PEMBROLIZUMAB 200MG Q3W  Treatment Goal: Palliative  Status: Active  Start Date: 12/22/2023  End Date: 12/16/2025 (Planned)  Provider: Ruby Turk MD  Chemotherapy: axitinib (INLYTA) 5 mg Tab, 5 mg, Oral, 2 times daily, 1 of 1 cycle, Start date: 12/18/2023, End date: 4/15/2024     Clear cell carcinoma of right kidney   10/11/2023 Initial Diagnosis    Clear cell carcinoma of right kidney     10/31/2023 Cancer Staged    Staging form: Kidney, AJCC 8th Edition  - Clinical stage from 10/31/2023: Stage IV (cT3a, cNX, cM1)     1/31/2024 - 3/5/2024 Radiation Therapy    Treating physician: Dr. Lakesha Hatch  Total Dose: 20 Gy to T7-T11 spine  Fractions: 5     1/31/2024 - 2/8/2024 Radiation Therapy    Treating physician: Dr. Lakesha Hatch  Total Dose: 20 Gy to C3-C6 spine  Fractions: 5          REVIEW OF SYSTEMS:   Review of Systems   Musculoskeletal:  Positive for back pain.       PAST MEDICAL HISTORY:  Past Medical History:   Diagnosis Date    Asthma     no inhaler use    Borderline hypertension     ED (erectile  dysfunction)     Gout     Hematuria     Hypertension        PAST SURGICAL HISTORY:  Past Surgical History:   Procedure Laterality Date    CLOSURE N/A 2/2/2024    Procedure: CLOSURE;  Surgeon: Ernesto Villanueva MD;  Location: [facility] OR [facility] FLR;  Service: Plastics;  Laterality: N/A;    COLONOSCOPY  02/10/2022    COLONOSCOPY N/A 02/10/2022    Procedure: COLONOSCOPY;  Surgeon: Desmond Keenan MD;  Location: [facility];  Service: General;  Laterality: N/A;    POSTERIOR FUSION OF CERVICAL SPINE WITH LAMINECTOMY N/A 11/15/2023    Procedure: SPINE, CERVICAL, WITH POSTERIOR FUSION T4-6;  Surgeon: Nasim Martinez DO;  [facility];  Service: Neurosurgery;  Laterality: N/A;  C2-T1 laminectomy and posterior fusion. [place]. C-arm. FAD ? IO. Neuromonitoring.    ROBOT-ASSISTED LAPAROSCOPIC NEPHRECTOMY Right 10/4/2023    Procedure: ROBOTIC NEPHRECTOMY;  Surgeon: Henry Michaels MD;  Location: [facility];  Service: Urology;  Laterality: Right;    SURGICAL REMOVAL OF VERTEBRAL BODY OF CERVICAL SPINE Right 11/7/2023    Procedure: CORPECTOMY, SPINE, CERVICAL;  Surgeon: Nasim Martinez DO;  [facility];  Service: Neurosurgery;  Laterality: Right;  C4 and C5 corpectomy with globus;  wells tongs; c-arm; neuromonitoring; microscope; type and cross 2 units    SURGICAL REMOVAL OF VERTEBRAL BODY OF CERVICAL SPINE N/A 11/15/2023    Procedure: CORPECTOMY, SPINE, CERVICAL C3-6 REVISION;  Surgeon: Nasim Martinez DO;  [facility];  Service: Neurosurgery;  Laterality: N/A;    THORACIC LAMINECTOMY WITH FUSION N/A 1/5/2024    Procedure: LAMINECTOMY, SPINE, THORACIC, WITH FUSION;  Surgeon: Nasim Martinez DO;  Location: [facility] OR 2ND FLR;  Service: Neurosurgery;  Laterality: N/A;  T7-T11 fusions with robot    TONSILLECTOMY      WOUND EXPLORATION N/A 2/2/2024    Procedure: EXPLORATION, WOUND;  Surgeon: Nasim Martinez DO;  Location: [facility] OR [facility] FLR;  Service: Neurosurgery;   Laterality: N/A;  Thoracic wound exploration, washout       ALLERGIES:   Review of patient's allergies indicates:   Allergen Reactions    Vancomycin analogues Rash     IV antibiotic caused a rash        MEDICATIONS:  Current Outpatient Medications   Medication Sig    allopurinoL (ZYLOPRIM) 100 MG tablet Take 1 tablet (100 mg total) by mouth once daily.    amlodipine-benazepril 5-10 mg (LOTREL) 5-10 mg per capsule Take 1 capsule by mouth once daily. (Patient not taking: Reported on 4/15/2024)    diazePAM (VALIUM) 5 MG tablet Take 1 tablet (5 mg total) by mouth every 8 (eight) hours as needed for Anxiety or Insomnia (take 30 minutes prior to imaging). (Patient not taking: Reported on 4/15/2024)    diphenhydramine HCl (BENADRYL ALLERGY ORAL) Take by mouth as needed. (Patient not taking: Reported on 4/15/2024)    duke's soln (benadryl 30 mL, mylanta 30 mL, LIDOcaine 30 mL, nystatin 30 mL) 120mL Take 10 mLs by mouth 4 (four) times daily. (Patient not taking: Reported on 4/15/2024)    EPINEPHrine (EPIPEN) 0.3 mg/0.3 mL AtIn Inject 0.3 mLs (0.3 mg total) into the muscle as needed. (Patient not taking: Reported on 4/15/2024)    gabapentin (NEURONTIN) 300 MG capsule Take 1 capsule (300 mg total) by mouth 3 (three) times daily. (Patient not taking: Reported on 4/15/2024)    hydrocortisone (ANUSOL-HC) 2.5 % rectal cream Place rectally 2 (two) times daily.    lenvatinib (LENVIMA) 10 mg/day (10 mg x 1) Cap Take 10 mg by mouth once daily.    linaCLOtide (LINZESS) 72 mcg Cap capsule Take 1 capsule (72 mcg total) by mouth before breakfast. (Patient not taking: Reported on 4/15/2024)    morphine (MS CONTIN) 15 MG 12 hr tablet Take 1 tablet (15 mg total) by mouth every 8 (eight) hours. (Patient not taking: Reported on 4/15/2024)    naloxone (NARCAN) 4 mg/actuation Spry 1 spray (4mg) by nasal route as needed for opioid overdose; may repeat every 2-3 minutes in alternating nostrils until medical help arrives. Call 911 (Patient not  taking: Reported on 4/15/2024)    omega-3 fatty acids/fish oil (FISH OIL-OMEGA-3 FATTY ACIDS) 300-1,000 mg capsule Take 2 capsules by mouth once daily. (Patient not taking: Reported on 4/15/2024)    oxyCODONE (ROXICODONE) 5 MG immediate release tablet Take 1 tablet (5 mg total) by mouth every 4 (four) hours as needed for Pain.    sildenafiL (VIAGRA) 100 MG tablet Take 1 tablet (100 mg total) by mouth daily as needed for Erectile Dysfunction. (Patient not taking: Reported on 1/18/2024)    turmeric (CURCUMIN MISC) 1,000 mg by Misc.(Non-Drug; Combo Route) route Daily. (Patient not taking: Reported on 4/15/2024)    UNABLE TO FIND Take 500 mg by mouth 2 (two) times a day. medication name: Tudca (Patient not taking: Reported on 4/15/2024)    UNABLE TO FIND Take 2 capsules by mouth 2 (two) times a day. medication name: Turkey tail mushroom (Patient not taking: Reported on 4/15/2024)    UNABLE TO FIND Take 15 drops by mouth once daily. medication name: Essiac-20 (Patient not taking: Reported on 4/15/2024)    UNABLE TO FIND Take 5 mLs by mouth 2 (two) times a day. medication name: barley leaf juice powder (Patient not taking: Reported on 4/15/2024)    UNABLE TO FIND Take 5 mLs by mouth 2 (two) times a day. medication name: black seed oil (cumin seed) (Patient not taking: Reported on 4/15/2024)     No current facility-administered medications for this visit.       SOCIAL HISTORY:  Social History     Socioeconomic History    Marital status:    Tobacco Use    Smoking status: Never    Smokeless tobacco: Never   Substance and Sexual Activity    Alcohol use: Not Currently     Alcohol/week: 0.0 standard drinks of alcohol     Comment: rarely    Drug use: Never     Social Determinants of Health     Financial Resource Strain: Low Risk  (2/3/2024)    Overall Financial Resource Strain (CARDIA)     Difficulty of Paying Living Expenses: Not hard at all   Food Insecurity: No Food Insecurity (2/3/2024)    Hunger Vital Sign      Worried About Running Out of Food in the Last Year: Never true     Ran Out of Food in the Last Year: Never true   Transportation Needs: No Transportation Needs (2/3/2024)    PRAPARE - Transportation     Lack of Transportation (Medical): No     Lack of Transportation (Non-Medical): No   Physical Activity: Inactive (2024)    Exercise Vital Sign     Days of Exercise per Week: 0 days     Minutes of Exercise per Session: 0 min   Stress: No Stress Concern Present (2/3/2024)    [employer] - Occupational Stress Questionnaire     Feeling of Stress : Not at all   Housing Stability: Low Risk  (2/3/2024)    Housing Stability Vital Sign     Unable to Pay for Housing in the Last Year: No     Number of Places Lived in the Last Year: 1     Unstable Housing in the Last Year: No       FAMILY HISTORY:  No family history on file.      PHYSICAL EXAMINATION:  Vitals:    24 0907   BP: 99/63   Pulse: 98   Temp: 98 °F (36.7 °C)     There is no height or weight on file to calculate BMI.    ECO  Physical Exam  Constitutional:       Appearance: He is normal weight.      Comments: In wheelchair, wife present   HENT:      Head: Normocephalic and atraumatic.      Nose: Nose normal.      Mouth/Throat:      Mouth: Mucous membranes are moist.   Pulmonary:      Effort: Pulmonary effort is normal.   Skin:     General: Skin is warm and dry.   Neurological:      Mental Status: He is alert.           ASSESSMENT/PLAN:  Diagnoses and all orders for this visit:    Clear cell carcinoma of right kidney    Metastasis to bone        The patient is a 73-year-old man with metastatic renal cell carcinoma.  He has osseous metastatic disease involving bilateral Femora.    He will receive a dose of 20 Gy delivered in 5 fractions over 1 week to the left femur.  The patient is planned for intramedullary dulce placement at the right femur.  He should be referred back post-operatively for radiation to the right femur.

## 2024-05-09 DIAGNOSIS — M84.58XA PATHOLOGICAL FRACTURE OF THORACIC VERTEBRA DUE TO NEOPLASTIC DISEASE: Primary | ICD-10-CM

## 2024-05-09 DIAGNOSIS — M84.58XA: ICD-10-CM

## 2024-05-09 PROCEDURE — 77295 3-D RADIOTHERAPY PLAN: CPT | Mod: TC | Performed by: RADIOLOGY

## 2024-05-09 PROCEDURE — 77295 3-D RADIOTHERAPY PLAN: CPT | Mod: 26,,, | Performed by: RADIOLOGY

## 2024-05-09 PROCEDURE — 77334 RADIATION TREATMENT AID(S): CPT | Mod: TC | Performed by: RADIOLOGY

## 2024-05-09 PROCEDURE — 77300 RADIATION THERAPY DOSE PLAN: CPT | Mod: 26,,, | Performed by: RADIOLOGY

## 2024-05-09 PROCEDURE — 77334 RADIATION TREATMENT AID(S): CPT | Mod: 26,,, | Performed by: RADIOLOGY

## 2024-05-09 PROCEDURE — 77300 RADIATION THERAPY DOSE PLAN: CPT | Mod: TC | Performed by: RADIOLOGY

## 2024-05-10 ENCOUNTER — TELEPHONE (OUTPATIENT)
Dept: NEUROSURGERY | Facility: CLINIC | Age: 74
End: 2024-05-10
Payer: MEDICARE

## 2024-05-10 PROCEDURE — G6002 STEREOSCOPIC X-RAY GUIDANCE: HCPCS | Mod: 26,,, | Performed by: RADIOLOGY

## 2024-05-10 PROCEDURE — 77417 THER RADIOLOGY PORT IMAGE(S): CPT | Performed by: RADIOLOGY

## 2024-05-10 PROCEDURE — 77427 RADIATION TX MANAGEMENT X5: CPT | Mod: ,,, | Performed by: RADIOLOGY

## 2024-05-10 PROCEDURE — 77387 GUIDANCE FOR RADJ TX DLVR: CPT | Mod: TC | Performed by: RADIOLOGY

## 2024-05-10 PROCEDURE — 77412 RADIATION TX DELIVERY LVL 3: CPT | Performed by: RADIOLOGY

## 2024-05-10 NOTE — PLAN OF CARE
IMPRESSIONS:   This 73 y.o. man appears to present with  1.  Mild oropharyngeal swallowing dysfunction characterized by reduced base of tongue retraction and reduced pharyngeal stripping wave leading to mild to moderate amounts of residue after the swallow in the valleculae and pyriforms, greater with solid foods. There was no laryngeal penetration or aspiration. This reduced contraction is likely related to the recent cervical spine fusion surgery, and subsequent revision.   2. The cervical esophageal phase was mostly WNL for all consistencies and capsule, but a small prominence consistent with a CP bar was seen on all trials of thin liquids and purees leading to a minute amount of the bolus retained immediately superior to the prominence with no retrograde movement. There was no significant obstruction to any consistency or the barium capsule.          RECOMMENDATIONS/PLAN OF CARE:   It is felt that he would benefit from  1.  Continuation of pt's current regular consistency diet with thin liquids using the following strategies and common aspiration precautions, including, but not limited to  A. Appropriate upright seating for all eating and drinking.  B. A second dry swallow when feeling globus sensation.  C. A liquid wash following bites of solid food especially when feeling a globus sensation.   D. Prepare food to tender, moist state, and/or add condiments, sauces, gravy, and/or add sip of liquid during mastication for dry and/or dense foods.  E. Perform an effortful swallow with all swallows.  F. Monitoring for any signs/symptoms of aspiration (such as wet/gurgly voice that does not clear with coughing, inability to make any voice sounds, any persistent coughing with oral intake, otherwise unexplained fever, unexplained increased or new difficulty or discomfort breathing, unexplained increase in sleepiness/lethargy/significant fatigue, unexplained increase or new onset confusion or change in cognitive  functioning, or any other unexplained change in health or well-being that could be related to swallowing).   2. Consider a short course of swallowing therapy (weekly for 2-6 visits) for training and use of an effortful swallow.   3. Follow up with Dr. Turk as directed.  4. Repeat modified swallow study as needed.

## 2024-05-10 NOTE — PROGRESS NOTES
I was present for the entire study and agree with the findings and recommendations of the fellow.

## 2024-05-10 NOTE — TELEPHONE ENCOUNTER
----- Message from Radha Nails MA sent at 5/9/2024  3:24 PM CDT -----    ----- Message -----  From: Nadine Santos PA-C  Sent: 5/9/2024  12:45 PM CDT  To: [facility] Staff    Hi this patient needs f/u with Dr. Martinez in 6 weeks with X-rays and CTs of the cervical and thoracic spine. I will place orders. He is a higher risk patient so if we need to move someone due to schedule availability then that is ok! Thanks!

## 2024-05-13 ENCOUNTER — TELEPHONE (OUTPATIENT)
Dept: PALLIATIVE MEDICINE | Facility: CLINIC | Age: 74
End: 2024-05-13
Payer: MEDICARE

## 2024-05-13 PROCEDURE — 77412 RADIATION TX DELIVERY LVL 3: CPT | Performed by: RADIOLOGY

## 2024-05-13 PROCEDURE — 77387 GUIDANCE FOR RADJ TX DLVR: CPT | Mod: TC | Performed by: RADIOLOGY

## 2024-05-13 PROCEDURE — G6002 STEREOSCOPIC X-RAY GUIDANCE: HCPCS | Mod: 26,,, | Performed by: RADIOLOGY

## 2024-05-14 PROCEDURE — G6002 STEREOSCOPIC X-RAY GUIDANCE: HCPCS | Mod: 26,,, | Performed by: RADIOLOGY

## 2024-05-14 PROCEDURE — 77412 RADIATION TX DELIVERY LVL 3: CPT | Performed by: RADIOLOGY

## 2024-05-14 PROCEDURE — 77387 GUIDANCE FOR RADJ TX DLVR: CPT | Mod: TC | Performed by: RADIOLOGY

## 2024-05-15 PROCEDURE — G6002 STEREOSCOPIC X-RAY GUIDANCE: HCPCS | Mod: 26,,, | Performed by: RADIOLOGY

## 2024-05-15 PROCEDURE — 77387 GUIDANCE FOR RADJ TX DLVR: CPT | Mod: TC | Performed by: RADIOLOGY

## 2024-05-15 PROCEDURE — 77412 RADIATION TX DELIVERY LVL 3: CPT | Performed by: RADIOLOGY

## 2024-05-15 NOTE — DISCHARGE INSTRUCTIONS
Your surgery has been scheduled for:__________5/23/24________________________________    You should report to:  ____[facility], located on the [place] side of the first floor of the           [facility] ([phone])  __X__[facility], located on the [facility] side of the            Second floor of the [facility] ([phone])  ____3rd Floor [facility] located on the [facility] side of the [facility] [phone]  ____[facility]: located at [address] [address] [place] [address]. [address]         Please Note   Tell your doctor if you take Aspirin, products containing Aspirin, herbal medications  or blood thinners, such as Coumadin, Ticlid, or Plavix.  (Consult your provider regarding holding or stopping before surgery).  Arrange for someone to drive you home following surgery.  You will not be allowed to leave the surgical facility alone or drive yourself home following sedation and anesthesia.    Before Surgery  Stop taking all herbal medications, vitamins, and supplements 7 days prior to surgery  No Motrin/Advil (Ibuprofen) 7 days before surgery  No Aleve (Naproxen) 7 days before surgery  Stop Taking Asprin, products containing Asprin __7___days before surgery  Stop taking blood thinners_______days before surgery  No Goody's/BC  Powder 7 days before surgery  Refrain from drinking alcoholic beverages for 24hours before and after surgery  Stop or limit smoking ____7_____days before surgery  You may take Tylenol for pain    Night before Surgery  Do not eat or drink after midnight  Take a shower or bath (shower is recommended).  Bathe with Hibiclens soap or an antibacterial soap from the neck down.  If not supplied by your surgeon, hibiclens soap will need to be purchased over the counter in pharmacy.  Rinse soap off thoroughly.  Shampoo your hair with your regular shampoo    The Day of  Surgery  Take another bath or shower with hibiclens or any antibacterial soap, to reduce the chance of infection.  Take heart and blood pressure medications with a small sip of water, as advised by the perioperative team.  Do not take fluid pills  You may brush your teeth and rinse your mouth, but do not swall any additional water.   Do not apply perfumes, powder, body lotions or deodorant on the day of surgery.  Nail polish should be removed.  Do not wear makeup or moisturizer  Wear comfortable clothes, such as a button front shirt and loose fitting pants.  Leave all jewelry, including body piercings, and valuables at home.    Bring any devices you will neeed after surgery such as crutches or canes.  If you have sleep apnea, please bring your CPAP machine  In the event that your physical condition changes including the onset of a cold or respiratory illness, or if you have to delay or cancel your surgery, please notify your surgeon.       Anesthesia: General Anesthesia     You are watched continuously during your procedure by your anesthesia provider.     Youre due to have surgery. During surgery, youll be given medicine called anesthesia or anesthetic. This will keep you comfortable and pain-free. Your anesthesia provider will use general anesthesia.  What is general anesthesia?  General anesthesia puts you into a state like deep sleep. It goes into the bloodstream (IV anesthetics), into the lungs (gas anesthetics), or both. You feel nothing during the procedure. You will not remember it. During the procedure, the anesthesia provider monitors you continuously. He or she checks your heart rate and rhythm, blood pressure, breathing, and blood oxygen.  IV anesthetics. IV anesthetics are given through an IV line in your arm. Theyre often given first. This is so you are asleep before a gas anesthetic is started. Some kinds of IV anesthetics relieve pain. Others relax you. Your doctor will decide which kind is best  in your case.  Gas anesthetics. Gas anesthetics are breathed into the lungs. They are often used to keep you asleep. They can be given through a facemask or a tube placed in your larynx or trachea (breathing tube).  If you have a facemask, your anesthesia provider will most likely place it over your nose and mouth while youre still awake. Youll breathe oxygen through the mask as your IV anesthetic is started. Gas anesthetic may be added through the mask.  If you have a tube in the larynx or trachea, it will be inserted into your throat after youre asleep.  Anesthesia tools and medicines  You will likely have:  IV anesthetics. These are put into an IV line into your bloodstream.  Gas anesthetics. You breathe these anesthetics into your lungs, where they pass into your bloodstream.  Pulse oximeter. This is a small clip that is attached to the end of your finger. This measures your blood oxygen level.  Electrocardiography leads (electrodes). These are small sticky pads that are placed on your chest. They record your heart rate and rhythm.  Blood pressure cuff. This reads your blood pressure.  Risks and possible complications  General anesthesia has some risks. These include:  Breathing problems  Nausea and vomiting  Sore throat or hoarseness (usually temporary)  Allergic reaction to the anesthetic  Irregular heartbeat (rare)  Cardiac arrest (rare)   Anesthesia safety  Follow all instructions you are given for how long not to eat or drink before your procedure.  Be sure your doctor knows what medicines and drugs you take. This includes over-the-counter medicines, herbs, supplements, alcohol or other drugs. You will be asked when those were last taken.  Have an adult family member or friend drive you home after the procedure.  For the first 24 hours after your surgery:  Do not drive or use heavy equipment.  Do not make important decisions or sign legal documents. If important decisions or signing legal documents is  necessary during the first 24 hours after surgery, have a trusted family member or spouse act on your behalf.  Avoid alcohol.  Have a responsible adult stay with you. He or she can watch for problems and help keep you safe.  Date Last Reviewed: 12/1/2016 © 2000-2017 [employer]. [address] [place] [address], [place] [address]. All rights reserved. This information is not intended as a substitute for professional medical care. Always follow your healthcare professional's instructions.

## 2024-05-15 NOTE — ANESTHESIA PAT ROS NOTE
5/16/24  Gamaliel Pete Jr. is a 73 y.o., male, with Bone Metastasis, arrives for anesthesia assessment and preop instructions.      Pre-op Assessment    I have reviewed the Patient Summary Reports.     I have reviewed the Nursing Notes. I have reviewed the NPO Status.   I have reviewed the Medications.     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    Denies Personal Hx of Anesthesia complications.                    Social:  Non-Smoker, No Alcohol Use Smoking  Never  Smokeless Tobacco  Never  Alcohol  0.0 standard drinks of alcohol/week        Hematology/Oncology:       -- Anemia:               Hematology Comments:    Iron deficiency anemia  Normocytic anemia      Current/Recent Cancer.            Oncology Comments: Positive colorectal cancer screening using Cologuard test, Renal mass, Cancer with pulmonary metastases, Metastasis to bone    Cancer with pulmonary metastases  Metastasis to bone  Clear cell carcinoma of right kidney  Neoplasm related pain          EENT/Dental:  denies chronic allergic rhinitis Tonsillectomy          Cardiovascular:  Exercise tolerance: poor   Denies Pacemaker. Hypertension, well controlled       Denies Angina.    denies PVD  JACOBSON  ECG has been reviewed.  Functional Capacity 2 METS                   Hypertension         Pulmonary:    Asthma asymptomatic Shortness of breath  Denies Recent URI.  Denies Sleep Apnea. Cancer with pulmonary metastases,   Metastasis to bone     9/7/2023 CT Chest:  FINDINGS:  The base of the neck is within normal limits.  The thyroid gland is unremarkable.  The supraclavicular regions are within normal limits.  The trachea is unremarkable.  The central airways are within normal limits.  No endobronchial lesion is identified.  There is no evidence of bronchiectasis.  The heart is unremarkable.  There  are no pericardial effusions.  There are coronary artery calcifications.  The thoracic aorta is normal in caliber.  There are subcentimeter mediastinal and hilar lymph nodes.  There are subcentimeter axillary lymph nodes.  There are no pleural effusions.  There is no evidence of a pneumothorax.  There is no evidence of pneumomediastinum.  There are innumerable bilateral pulmonary nodules.  There is no focal consolidation.  The esophagus is unremarkable.  Please see the dedicated CT abdomen pelvis for the upper abdominal findings.  The chest wall is unremarkable.  There is unchanged lytic lesion involving the anterior aspect of T9 vertebral body with associated cortical erosions.  Impression:  Innumerable pulmonary nodules, concerning for metastatic disease to the chest.  Unchanged lytic lesion in the T9 vertebral body with cortical erosions.       Pulmonary Symptoms:          Chest Tumor/Mass:             Renal/:  Chronic Renal Disease no renal calculi BPH   Clear cell carcinoma of right kidney    ED (erectile dysfunction)  Date Unknown  Hematuria    9/5/2023 CT Urogram:  Impression:  Solid right renal mass concerning for neoplasm.  Multiple lung base nodules up to 11 mm, concerning for metastatic disease.  Recommend chest CT for full evaluation of lungs.  Lytic lesion at T9 with mild compression, concerning for pathologic compression fracture.  Subcentimeter right kidney lesion isodense to parenchyma on contrast enhanced imaging, too small to characterize.  Additional bilateral renal hypodensities which are too small to characterize but suggestive of cysts.      Renal Symptoms/Infections/Stones:    Neoplasm/Tumor.   Kidney Function/Disease, Chronic Kidney Disease (CKD)        10/4/2023  Robot-assisted laparoscopic nephrectomy (Right)   Renal mass  History of right radical nephrectomy  Hypercalcemia              Hepatic/GI:       Hepatitis Positive colorectal cancer screening using Cologuard test,  Renal mass,    Cancer with pulmonary metastases,   Metastasis to bone       Liver Disease     1/31/24 MRI   stable lesion in the right hepatic lobe       Musculoskeletal:  Arthritis    Musculoskeletal General/Symptoms: muscle weakness, generalized. Functional capacity is wheelchair dependant.   Joint Disease:  Gout   Bone Disorders: Bone Tumor, Metastic Cancer To Bone, Fracture , location of thoracic vertebra.  Osteoporosis 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      - 9/20/2023 Bone scan:  FINDINGS:  There is physiologic distribution of the radiopharmaceutical throughout the skeleton.  Focal uptake in the T9 vertebral body corresponding to lytic lesion seen on CT 09/05/2023.  Focal uptake in the right kidney in patient with known right renal mass.  There is otherwise normal uptake in the genitourinary system and soft tissues.  Impression:  Focal uptake in the T9 vertebral body corresponding to lytic lesion seen on prior CT and concerning for metastatic disease.  Additional focus of increased uptake in the right kidney in patient with known right renal mass        Spine Disorders: cervical and lumbar Disc disease, Degenerative disease and Chronic Pain       Cervical Spine Disorder, S/P Cervical Fusion   11/15/2023  Posterior fusion of cervical spine with laminectomy (N/A)   11/15/2023  Surgical removal of vertebral body of cervical spine (N/A)     11/7/23 C spine corpectomy.   11/7/2023  Surgical removal of vertebral body of cervical spine (Right)   Thoracic Spine Disorders 1/5/2024  Thoracic laminectomy with fusion (N/A)     1/31/24 MRI   T9 vertebroplasty as well as T7-T11 posterior decompression and fusion.     1/5/24 for laminectomy for decompression of thecal sac T8-T10 There is minimal increase in the degree of kyphosis centered at the T9 level.   Soft tissue enhancement in the T6 vertebral body, in keeping with known osseous metastatic disease.    1/5/24 for laminectomy for decompression of  thecal sac T8-T10   Neurological:  Denies TIA.  Denies CVA.    Denies Seizures.       Pain Syndrome   Chronic Pain Syndrome Pain Etiology/Diagnosis, Cervical Radiculopathy, Lumbar Radiculopathy   Neoplasm related pain                        Endocrine:  Denies Diabetes. Denies Hypothyroidism.  Denies Hyperthyroidism. Class 1 obesity without serious comorbidity with body mass index (BMI) of 31.0 to 31.9 in adult  10/23 245#    Extreme wt loss 5/16/20024 178#               Denies Hypoglycemia in Non-Diabetic  Thyroid Disease              Metabolic Bone Disease   Denies Adrenal Disease  Denies Obesity / BMI > 30  Dermatological:  Seborrheic keratosis   Psych:   denies anxiety denies depression                Physical Exam  General: Well nourished, Cooperative, Alert, Oriented and Cachexia  FRAIL  Airway:  Mouth Opening: Normal  Tongue: Normal  Neck ROM: Flexion Decreased, Extension Decreased, Left Lateral Motion Decreased, Right Lateral Motion Decreased  DYSPHAGIA---BREAD AND MEATS  Dental:  Intact    Chest/Lungs:  Clear to auscultation, Normal Respiratory Rate    Heart:  Rate: Normal  Rhythm: Regular Rhythm  Sounds: Normal    Musculoskeletal:  Bone Tumor, Metastic Cancer To Bone, Fracture , location of thoracic vertebra      DEFERRED MEDICAL CLEARANCE r/t  Clear cell carcinoma of right kidney/Metastatic cancer to spine/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 Dennis's progress note 5/16/2024

## 2024-05-16 ENCOUNTER — OFFICE VISIT (OUTPATIENT)
Dept: PALLIATIVE MEDICINE | Facility: CLINIC | Age: 74
End: 2024-05-16
Payer: MEDICARE

## 2024-05-16 ENCOUNTER — HOSPITAL ENCOUNTER (OUTPATIENT)
Dept: PREADMISSION TESTING | Facility: HOSPITAL | Age: 74
Discharge: HOME | End: 2024-05-16
Attending: ORTHOPAEDIC SURGERY
Payer: MEDICARE

## 2024-05-16 VITALS — DIASTOLIC BLOOD PRESSURE: 58 MMHG | SYSTOLIC BLOOD PRESSURE: 128 MMHG | HEART RATE: 78 BPM | OXYGEN SATURATION: 99 %

## 2024-05-16 DIAGNOSIS — R11.0 NAUSEA: ICD-10-CM

## 2024-05-16 DIAGNOSIS — K59.03 CONSTIPATION DUE TO OPIOID THERAPY: ICD-10-CM

## 2024-05-16 DIAGNOSIS — R53.0 NEOPLASTIC (MALIGNANT) RELATED FATIGUE: ICD-10-CM

## 2024-05-16 DIAGNOSIS — T40.2X5A CONSTIPATION DUE TO OPIOID THERAPY: ICD-10-CM

## 2024-05-16 DIAGNOSIS — G47.00 INSOMNIA, UNSPECIFIED TYPE: ICD-10-CM

## 2024-05-16 DIAGNOSIS — Z71.89 ADVANCED CARE PLANNING/COUNSELING DISCUSSION: ICD-10-CM

## 2024-05-16 DIAGNOSIS — Z51.5 ENCOUNTER FOR PALLIATIVE CARE: ICD-10-CM

## 2024-05-16 DIAGNOSIS — C64.1 CLEAR CELL CARCINOMA OF RIGHT KIDNEY: Primary | ICD-10-CM

## 2024-05-16 DIAGNOSIS — G89.3 CANCER RELATED PAIN: ICD-10-CM

## 2024-05-16 DIAGNOSIS — C79.51 METASTATIC ADENOCARCINOMA TO RIGHT FEMUR: ICD-10-CM

## 2024-05-16 DIAGNOSIS — R06.09 DYSPNEA ON EXERTION: ICD-10-CM

## 2024-05-16 DIAGNOSIS — C79.51 METASTATIC CANCER TO SPINE: ICD-10-CM

## 2024-05-16 DIAGNOSIS — R63.0 ANOREXIA: ICD-10-CM

## 2024-05-16 DIAGNOSIS — F43.23 ADJUSTMENT DISORDER WITH MIXED ANXIETY AND DEPRESSED MOOD: ICD-10-CM

## 2024-05-16 PROCEDURE — G6002 STEREOSCOPIC X-RAY GUIDANCE: HCPCS | Mod: 26,,, | Performed by: RADIOLOGY

## 2024-05-16 PROCEDURE — 3074F SYST BP LT 130 MM HG: CPT | Mod: CPTII,S$GLB,, | Performed by: STUDENT IN AN ORGANIZED HEALTH CARE EDUCATION/TRAINING PROGRAM

## 2024-05-16 PROCEDURE — 3288F FALL RISK ASSESSMENT DOCD: CPT | Mod: CPTII,S$GLB,, | Performed by: STUDENT IN AN ORGANIZED HEALTH CARE EDUCATION/TRAINING PROGRAM

## 2024-05-16 PROCEDURE — 1159F MED LIST DOCD IN RCRD: CPT | Mod: CPTII,S$GLB,, | Performed by: STUDENT IN AN ORGANIZED HEALTH CARE EDUCATION/TRAINING PROGRAM

## 2024-05-16 PROCEDURE — 3078F DIAST BP <80 MM HG: CPT | Mod: CPTII,S$GLB,, | Performed by: STUDENT IN AN ORGANIZED HEALTH CARE EDUCATION/TRAINING PROGRAM

## 2024-05-16 PROCEDURE — 99999 PR PBB SHADOW E&M-EST. PATIENT-LVL III: CPT | Mod: PBBFAC,,, | Performed by: STUDENT IN AN ORGANIZED HEALTH CARE EDUCATION/TRAINING PROGRAM

## 2024-05-16 PROCEDURE — 77387 GUIDANCE FOR RADJ TX DLVR: CPT | Mod: TC | Performed by: RADIOLOGY

## 2024-05-16 PROCEDURE — 1101F PT FALLS ASSESS-DOCD LE1/YR: CPT | Mod: CPTII,S$GLB,, | Performed by: STUDENT IN AN ORGANIZED HEALTH CARE EDUCATION/TRAINING PROGRAM

## 2024-05-16 PROCEDURE — 1160F RVW MEDS BY RX/DR IN RCRD: CPT | Mod: CPTII,S$GLB,, | Performed by: STUDENT IN AN ORGANIZED HEALTH CARE EDUCATION/TRAINING PROGRAM

## 2024-05-16 PROCEDURE — 77412 RADIATION TX DELIVERY LVL 3: CPT | Performed by: RADIOLOGY

## 2024-05-16 PROCEDURE — 99215 OFFICE O/P EST HI 40 MIN: CPT | Mod: S$GLB,,, | Performed by: STUDENT IN AN ORGANIZED HEALTH CARE EDUCATION/TRAINING PROGRAM

## 2024-05-16 NOTE — PROGRESS NOTES
Palliative Medicine Clinic Note        Consult Requested By: No ref. 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 Turk and CNS John  - currently on disease directed therapy with with Keytruda and Lenvatinib  - previously intolerant of Inlyta  - patient has upcoming surgery for intramedullary dulce insertion on 05/23/24  - s/p radiation therapy   - has follow up with NSGY in June  - discussed looking into other opinions, such as [facility] in [place]  - patient also using other supplements such as fenbendazole/ivermectin for treatment of his disease    Encounter for palliative care/Advance Care Planning   - patient decisional and accompanied by his wife today in clinic  - no ACP documents uploaded into EMR  - philosophy of Palliative Medicine and new patient folder given to and reviewed with patient and family at first visit  - ACP booklet given to and reviewed with patient and family including HCPOA and living will  - unable to discuss further ACP documents today due to symptom burden and time constraints as patient had pre-op visit immediately after this visit  - goals: life prolonging but continue to improve symptom management  - code status not discussed in detail today     Cancer related pain  - patient with severe pain in his lower back as well as his right leg  - patient previously used opioid medication as well as gabapentin for pain, but he did not feel they were as helpful or had increased side effects  - current regimen is oxycodone 10 mg q4-6h prn  - patient using his medications sparingly as he expressed concerned about becoming addicted to these medications; education provided today  - encouraged patient to use q4h for better control of  pain if needed  - patient unable to tolerate MS Contin due to side effects, he is no longer taking any long acting medication  - opioid safety reviewed with patient and opioid safety sheet in new patient folder for patient to review  - patient already has narcan prescription at home    Dyspnea on exertion  - patient noting moderate dyspnea with exertion  - he reports using rollator walker and will rest when needed  - no need for pharmacologic intervention at this time  - tip sheet for shortness of breath in new patient folder for patient to review  - will continue to monitor    Adjustment disorder with mixed anxiety and depressed mood  - patient reports mild anxiety on ESAS and no depression  - he reports overall his mood is good  - he still finds arturo with spending time with his family and has motivation to continue working on the house in [place] as he is able (eg. Coordinating workers, etc)  - discussed possibility of starting pharmacologic intervention if needed if patient finds his mood becomes more down/depressed or if motivation is not present  - emotional support provided at this time  - will continue to monitor    Nausea/Anorexia  - patient with changes in appetite but able to maintain weight  - has improved since stopping MS Contin  - has met with RD  - discussed need for adequate hydration  - patient with nausea and dry heaves about once a week  - has medication but does not use on regular basis  - no need for changes in pharmacologic intervention    Neoplastic (malignant) related fatigue/Insomnia  - patient with severe fatigue as he is not able to do much physically due to pain in his back and legs  - patient noting that he sleeps well except having to wake up at night a couple of times to use restroom, he is able to go back to sleep afterwards  - will work on pain control as noted above  - discussed finding balance between activity and rest  - tip sheet for better sleep in new patient folder for patient  to review    Constipation due to opioid therapy  - patient with increased constipation due to opioid use  - discussed need for adequate hydration  - discussed use of consistent bowel regimen with opioid use  - patient using Linzess and senna in AM and senna PM  - patient also uses suppositories PRN    Advance Care Planning   Advance Directives:   Living Will: No    [place]POST: No    Do Not Resuscitate Status: No    Medical Power of : No    Agent's Name:  Mary Pete   Agent's Contact Number:  [phone]    Decision Making:  Patient answered questions and Family answered questions  Goals of Care: I engaged the patient and family in a voluntary conversation about advance care planning and we specifically addressed what the goals of care would be moving forward, in light of the patient's change in clinical status, specifically RCC.  We did specifically address the patient's likely prognosis, which is fair .  We explored the patient's values and preferences for future care.  The patient and family endorses that what is most important right now is to focus on remaining as independent as possible, symptom/pain control, and extending life as long as possible, even it it means sacrificing quality    Accordingly, we have decided that the best plan to meet the patient's goals includes continuing with treatment    I did not explain the role for hospice care at this stage of the patient's illness, including its ability to help the patient live with the best quality of life possible.  We will not be making a hospice referral.         Follow up: 6 weeks     Plan discussed with: oncology team       SUBJECTIVE:      History of Present Illness / Interval History:  Gamaliel Osheamaura Thomas is 73 y.o. male with gout and metastatic clear cell carcinoma of right kidney presents to Palliative Care Clinic for physical symptoms, advance care planning,, clarification of goals of care, and additional support.. Please see oncology  notes for more details on oncologic history and treatment course.       5/16/24  History of Present Illness    CHIEF COMPLAINT:  - Follow-up visit  - Continue severe back pain despite pain medications  - Discussion of upcoming leg surgery and treatment plan    HISTORY OBTAINED FROM:  - Patient, Spouse    HPI:  The patient presents for a follow-up visit, accompanied by his wife. He reports ongoing back pain that significantly limits his mobility and daily activities. The patient previously took morphine for pain management but discontinued it due to side effects like nausea and general discomfort. He currently takes oxycodone, which provides relief for a few hours before the pain gradually returns. The patient is scheduled for leg surgery on the 23rd of this month and has one more radiation treatment on his leg today, with additional radiation planned for the other leg after surgery. He also has an upcoming appointment with Dr. Martinez in about five weeks, on June 17th, for his back pain. The patient expresses frustration about the lack of explanations and updates on his progress from his doctors. The patient's back pain significantly impacts his mobility, requiring his wife's assistance and a wheelchair when needed. He can walk slowly with support but experiences pain and breathlessness with exertion. His breathing is manageable at rest but becomes labored with physical activity. He occasionally experiences dry heaves about once every couple of weeks, with a recent episode while traveling home. The patient's bowel movements have improved with the use of Miralax, Senna in the morning, and Senokot in the evening, as recommended by Dr. Turk. He sometimes requires suppositories but not daily. His weight has been stable, and his appetite has improved since discontinuing morphine. The patient's sleep is disrupted by the need to urinate 2-3 times per night. He experiences joint pain and stiffness upon waking, which  improves with movement. The patient finds motivation in planning and coordinating projects, such as building a house, although his physical limitations have forced him to rely on others for execution. He enjoys teaching his grandchildren about automotive topics and finds purpose in these activities. The patient expresses frustration with his physical limitations and their impact on his daily life. He is eager to move into the new house being built but acknowledges the challenges posed by his current two-story home and the upcoming surgery. The patient is considering the option of a port placement during surgery to facilitate blood draws and treatment administration, as he finds repeated needle sticks painful and anxiety-provoking.     GOALS OF CARE/ADVANCED DIRECTIVES:  - The patient expresses a strong desire to move into the new house they are building.  - The patient and his partner demonstrate a realistic understanding of his serious condition and potential for progression  - The patient is motivated by projects like building the new house and barn, and teaching his grandchildren about automotive topics. He becomes frustrated by his physical limitations impacting his ability to be more involved.  - The patient's quality of life priorities center around having things to keep him occupied and distracted from focusing on his condition.    ACTIVITIES OF DAILY LIVING:  - Patient cannot exert himself or exercise due to pain.  - He is able to walk from the parking lot into the building while holding onto his partner for support, taking breaks as needed. He obtained a wheelchair once inside the building instead of using his walker.  - Patient is able to climb stairs, as he had to do so the day after a previous kidney removal surgery.  - Patient's two-story house has the bedroom upstairs. He is uncertain if he will be able to climb the stairs to the bedroom after his upcoming leg surgery.  - Patient gets up to urinate 2  times per night.  - Patient's sleep is impacted by pain. He wakes up hurting and has to sit up and move around to feel better.  - Patient is taking medication to keep his bowels moving and is having a bowel movement every 2-3 days. He has needed to use suppositories at times for constipation, but not every day.  - Patient is eating and drinking well with an improved appetite since stopping morphine. He enjoys eating fruits that help with constipation like peaches, pears, and plums.  - Patient does not feel refreshed when waking up in the morning due to pain.  - Patient spends most of his day sitting and watching TV as he cannot do much else. He cannot work or drive.  - Patient feels better and happier when he is able to get out of the house.  - Patient keeps himself occupied by working on plans for building a new house, though he has to rely on others to execute the plans due to his physical limitations.    SOCIAL HISTORY:  - Unable to work currently due to health condition         Please see previous notes for full details of encounters on 04/04/2024;     ROS:  Review of Systems   Constitutional:  Positive for activity change, appetite change and fatigue.   HENT: Negative.     Eyes: Negative.    Respiratory:  Positive for shortness of breath (with exertion).    Gastrointestinal:  Positive for constipation and nausea.   Genitourinary:         + nocturia   Musculoskeletal:  Positive for arthralgias, back pain, leg pain and myalgias.   Neurological: Negative.    Psychiatric/Behavioral:  Negative for dysphoric mood. The patient is nervous/anxious.    All other systems reviewed and are negative.      Review of Symptoms      Symptom Assessment (ESAS 0-10 Scale)  Pain:  7  Dyspnea:  5  Anxiety:  3  Nausea:  5  Depression:  0  Anorexia:  5  Fatigue:  8  Insomnia:  0  Restlessness:  0  Agitation:  0     CAM / Delirium:  Negative  Constipation:  Positive  Diarrhea:  Negative    Anxiety:  Is nervous/anxious  Constipation:   Constipation    Bowel Management Plan (BMP):  Yes      Pain Assessment:  OME in 24 hours:  10-40  Location(s): leg and back    Back       Location: lower        Quality: Aching        Quantity: 7/10 in intensity        Chronicity: Onset 1 (greater than) month(s) ago, unchanged        Aggravating Factors: Activity        Alleviating Factors: Opiates       Associated Symptoms: None  Leg       Location: right        Quality: Aching        Quantity: 7/10 in intensity        Chronicity: Onset 3 (greater than) month(s) ago, gradually worsening        Aggravating Factors: Walking        Alleviating Factors: Acetaminophen        Associated Symptoms: None    Modified Michelle Scale:  0    ECOG Performance Status ndGndrndanddndend:nd nd2nd Living Arrangements:  Lives with spouse    Psychosocial/Cultural:   See Palliative Psychosocial Note: No  Social Issues Identified: New Diagnosis/Trauma  Bereavement Risk: No  Caregiver Needs Discussed. Caregiver Distress: No: none  Cultural: enjoys spending time with family  **Primary  to Follow**  Palliative Care  Consult: No    Spiritual:  F - Stephani and Belief:  Yes        External  database queried on 04/04/2024 by Mariluz OLGUIN : 05/08/2024 Oxycodone 5 mg disp: 180 for 30 days  04/16/2024 MS contin 15 mg Disp: 90 for 30 days  04/15/2024 Diazepam 5 mg Disp: 10 for 4 days    Medications:    Current Outpatient Medications:     allopurinoL (ZYLOPRIM) 100 MG tablet, Take 1 tablet (100 mg total) by mouth once daily., Disp: 90 tablet, Rfl: 3    amlodipine-benazepril 5-10 mg (LOTREL) 5-10 mg per capsule, Take 1 capsule by mouth once daily. (Patient not taking: Reported on 4/15/2024), Disp: 90 capsule, Rfl: 3    diazePAM (VALIUM) 5 MG tablet, Take 1 tablet (5 mg total) by mouth every 8 (eight) hours as needed for Anxiety or Insomnia (take 30 minutes prior to imaging). (Patient not taking: Reported on 4/15/2024), Disp: 10 tablet, Rfl: 0    diphenhydramine HCl (BENADRYL  ALLERGY ORAL), Take by mouth as needed. (Patient not taking: Reported on 4/15/2024), Disp: , Rfl:     duke's soln (benadryl 30 mL, mylanta 30 mL, LIDOcaine 30 mL, nystatin 30 mL) 120mL, Take 10 mLs by mouth 4 (four) times daily. (Patient not taking: Reported on 4/15/2024), Disp: 500 mL, Rfl: 0    EPINEPHrine (EPIPEN) 0.3 mg/0.3 mL AtIn, Inject 0.3 mLs (0.3 mg total) into the muscle as needed. (Patient not taking: Reported on 4/15/2024), Disp: 1 each, Rfl: 0    gabapentin (NEURONTIN) 300 MG capsule, Take 1 capsule (300 mg total) by mouth 3 (three) times daily. (Patient not taking: Reported on 4/15/2024), Disp: 90 capsule, Rfl: 11    hydrocortisone (ANUSOL-HC) 2.5 % rectal cream, Place rectally 2 (two) times daily., Disp: 4 each, Rfl: 1    lenvatinib (LENVIMA) 10 mg/day (10 mg x 1) Cap, Take 10 mg by mouth once daily., Disp: 30 capsule, Rfl: 2    linaCLOtide (LINZESS) 72 mcg Cap capsule, Take 1 capsule (72 mcg total) by mouth before breakfast. (Patient not taking: Reported on 4/15/2024), Disp: 30 capsule, Rfl: 1    morphine (MS CONTIN) 15 MG 12 hr tablet, Take 1 tablet (15 mg total) by mouth every 8 (eight) hours. (Patient not taking: Reported on 4/15/2024), Disp: 90 tablet, Rfl: 0    naloxone (NARCAN) 4 mg/actuation Spry, 1 spray (4mg) by nasal route as needed for opioid overdose; may repeat every 2-3 minutes in alternating nostrils until medical help arrives. Call 911 (Patient not taking: Reported on 4/15/2024), Disp: 2 each, Rfl: 0    omega-3 fatty acids/fish oil (FISH OIL-OMEGA-3 FATTY ACIDS) 300-1,000 mg capsule, Take 2 capsules by mouth once daily. (Patient not taking: Reported on 4/15/2024), Disp: , Rfl:     oxyCODONE (ROXICODONE) 5 MG immediate release tablet, Take 1 tablet (5 mg total) by mouth every 4 (four) hours as needed for Pain., Disp: 180 tablet, Rfl: 0    sildenafiL (VIAGRA) 100 MG tablet, Take 1 tablet (100 mg total) by mouth daily as needed for Erectile Dysfunction. (Patient not taking: Reported on  1/18/2024), Disp: 30 tablet, Rfl: 11    turmeric (CURCUMIN MISC), 1,000 mg by Misc.(Non-Drug; Combo Route) route Daily. (Patient not taking: Reported on 4/15/2024), Disp: , Rfl:     UNABLE TO FIND, Take 500 mg by mouth 2 (two) times a day. medication name: Tudca (Patient not taking: Reported on 4/15/2024), Disp: , Rfl:     UNABLE TO FIND, Take 2 capsules by mouth 2 (two) times a day. medication name: Turkey tail mushroom (Patient not taking: Reported on 4/15/2024), Disp: , Rfl:     UNABLE TO FIND, Take 15 drops by mouth once daily. medication name: Essiac-20 (Patient not taking: Reported on 4/15/2024), Disp: , Rfl:     UNABLE TO FIND, Take 5 mLs by mouth 2 (two) times a day. medication name: barley leaf juice powder (Patient not taking: Reported on 4/15/2024), Disp: , Rfl:     UNABLE TO FIND, Take 5 mLs by mouth 2 (two) times a day. medication name: black seed oil (cumin seed) (Patient not taking: Reported on 4/15/2024), Disp: , Rfl:     Review of patient's allergies indicates:   Allergen Reactions    Vancomycin analogues Rash     IV antibiotic caused a rash            OBJECTIVE:         Physical Exam:  Vitals:    Vitals:    05/16/24 0907   BP: (!) 128/58   Pulse: 78     Physical Exam  Vitals reviewed.   Constitutional:       Appearance: He is not toxic-appearing or diaphoretic.   HENT:      Head: Normocephalic and atraumatic.      Right Ear: External ear normal.      Left Ear: External ear normal.      Nose: Nose normal.      Mouth/Throat:      Mouth: Mucous membranes are moist.   Eyes:      General: No scleral icterus.        Right eye: No discharge.         Left eye: No discharge.      Extraocular Movements: Extraocular movements intact.   Neck:      Comments: Trachea midline  Pulmonary:      Effort: Pulmonary effort is normal. No respiratory distress.   Abdominal:      General: Abdomen is flat. There is no distension.   Musculoskeletal:      Cervical back: Normal range of motion.      Right lower leg: No edema.  "     Left lower leg: No edema.   Skin:     General: Skin is warm and dry.      Coloration: Skin is not jaundiced.      Findings: No rash.   Neurological:      General: No focal deficit present.      Mental Status: He is alert and oriented to person, place, and time.      Cranial Nerves: No cranial nerve deficit.   Psychiatric:         Attention and Perception: Attention normal.         Behavior: Behavior normal.         Thought Content: Thought content normal.         Cognition and Memory: Cognition normal.           Labs: I have reviewed the latest labs on 2024 including CBC showing H.6 and CMP showing Cr: 1.1, Ca: 10.9.     Imaging: I have reviewed the latest imaging on 2024 CT C/A/P: "No change from 1 week prior in this patient with metastatic renal cell carcinoma to the mediastinum, lungs, and bones."       I spent a total of 61 minutes on the day of the visit. This includes face to face time in discussion of goals of care, symptom assessment, coordination of care and emotional support.  This also includes non-face to face time preparing to see the patient (eg, review of tests/imaging), obtaining and/or reviewing separately obtained history, documenting clinical information in the electronic or other health record, independently interpreting results and communicating results to the patient/family/caregiver, or care coordinator.     This note was generated with the assistance of ambient listening technology. Verbal consent was obtained by the patient and accompanying visitor(s) for the recording of patient appointment to facilitate this note. I attest to having reviewed and edited the generated note for accuracy, though some syntax or spelling errors may persist. Please contact the author of this note for any clarification.    Mariluz Dennis MD    Advance Care Planning     Date: 2024    Power of   I initiated the process of voluntary advance care planning today and explained " the importance of this process to the patient.  I introduced the concept of advance directives to the patient, as well. Then the patient received detailed information about the importance of designating a Health Care Power of  (HCPOA). He was also instructed to communicate with this person about their wishes for future healthcare, should he become sick and lose decision-making capacity. The patient has not previously appointed a HCPOA. After our discussion, the patient has decided to complete a HCPOA and has appointed his significant other, health care agent:  Mary Pete  & health care agent number:  [phone]

## 2024-05-17 ENCOUNTER — TELEPHONE (OUTPATIENT)
Dept: HEMATOLOGY/ONCOLOGY | Facility: CLINIC | Age: 74
End: 2024-05-17
Payer: MEDICARE

## 2024-05-17 NOTE — TELEPHONE ENCOUNTER
Patient and wife would like to know how often should he alternate between morphine and oxycodone?  Patient took oxycodone 10mg at 11:15am , his wife would like to know when can she give him morphine ? Patient's wife is worried about him being too drowsy from both pain meds. Informed patient's wife message sent to dr wylie for advice.         ----- Message from Chloe Stubbs sent at 5/17/2024  1:33 PM CDT -----  Regarding: Consult/Advisory/ medication  Contact: Gamaliel  Consult/Advisory     Name Of Caller:Gamaliel Pete Jr.          Contact Preference:[phone] (Mobile)     Nature of call:pt is calling in regards to pain meds for some back pain that he is experiencing, pt is also scheduled to have surgery on 05/23/2024, but want to know if he can take meds before surgery. Please advise. Requesting a call back.

## 2024-05-20 ENCOUNTER — TELEPHONE (OUTPATIENT)
Dept: SPEECH THERAPY | Facility: HOSPITAL | Age: 74
End: 2024-05-20
Payer: MEDICARE

## 2024-05-20 PROCEDURE — 77336 RADIATION PHYSICS CONSULT: CPT | Performed by: RADIOLOGY

## 2024-05-20 NOTE — TELEPHONE ENCOUNTER
Sw pt to inquire if he would like to schedule therapy based upon recommendations from [facility]ss. He stated no he has too much going on right now.

## 2024-05-21 ENCOUNTER — DOCUMENTATION ONLY (OUTPATIENT)
Dept: PREADMISSION TESTING | Facility: HOSPITAL | Age: 74
End: 2024-05-21
Payer: MEDICARE

## 2024-05-21 ENCOUNTER — PATIENT MESSAGE (OUTPATIENT)
Dept: INTERVENTIONAL RADIOLOGY/VASCULAR | Facility: HOSPITAL | Age: 74
End: 2024-05-21
Payer: MEDICARE

## 2024-05-21 RX ORDER — MORPHINE SULFATE 30 MG/1
30 TABLET, FILM COATED, EXTENDED RELEASE ORAL 2 TIMES DAILY
COMMUNITY

## 2024-05-21 NOTE — PRE-PROCEDURE INSTRUCTIONS
PRE-OP INSTRUCTIONS:  Instructed patient to have no food,milk or milk products after midnight   It is ok to take AM medications with a few sips of water   Medication instructions for pm prior to and am of surgery reviewed.  Instructed patient to avoid taking vitamins,supplements,aspirin or ibuprofen the am of surgery.  Shower instructions provided    Patient denies any side effects or issues with anesthesia or sedation.

## 2024-05-22 ENCOUNTER — ANESTHESIA EVENT (OUTPATIENT)
Dept: INTERVENTIONAL RADIOLOGY/VASCULAR | Facility: HOSPITAL | Age: 74
DRG: 481 | End: 2024-05-22
Payer: MEDICARE

## 2024-05-22 ENCOUNTER — HOSPITAL ENCOUNTER (INPATIENT)
Dept: INTERVENTIONAL RADIOLOGY/VASCULAR | Facility: HOSPITAL | Age: 74
LOS: 3 days | Discharge: HOME | DRG: 481 | End: 2024-05-25
Attending: STUDENT IN AN ORGANIZED HEALTH CARE EDUCATION/TRAINING PROGRAM | Admitting: ORTHOPAEDIC SURGERY
Payer: MEDICARE

## 2024-05-22 DIAGNOSIS — D49.9 NEOPLASM: ICD-10-CM

## 2024-05-22 DIAGNOSIS — R03.0 ELEVATED BLOOD PRESSURE READING: Primary | ICD-10-CM

## 2024-05-22 DIAGNOSIS — C79.51 METASTASIS TO BONE: Primary | ICD-10-CM

## 2024-05-22 PROBLEM — C64.9 METASTATIC RENAL CELL CARCINOMA TO BONE: Status: ACTIVE | Noted: 2023-09-14

## 2024-05-22 LAB
BASOPHILS # BLD AUTO: 0 K/UL (ref 0–0.2)
BASOPHILS NFR BLD: 0 % (ref 0–1.9)
DIFFERENTIAL METHOD BLD: ABNORMAL
EOSINOPHIL # BLD AUTO: 0 K/UL (ref 0–0.5)
EOSINOPHIL NFR BLD: 0 % (ref 0–8)
ERYTHROCYTE [DISTWIDTH] IN BLOOD BY AUTOMATED COUNT: 16.3 % (ref 11.5–14.5)
HCT VFR BLD AUTO: 36.1 % (ref 40–54)
HGB BLD-MCNC: 12 G/DL (ref 14–18)
IMM GRANULOCYTES # BLD AUTO: 0.01 K/UL (ref 0–0.04)
IMM GRANULOCYTES NFR BLD AUTO: 0.4 % (ref 0–0.5)
LYMPHOCYTES # BLD AUTO: 0.8 K/UL (ref 1–4.8)
LYMPHOCYTES NFR BLD: 28.9 % (ref 18–48)
MCH RBC QN AUTO: 28.9 PG (ref 27–31)
MCHC RBC AUTO-ENTMCNC: 33.2 G/DL (ref 32–36)
MCV RBC AUTO: 87 FL (ref 82–98)
MONOCYTES # BLD AUTO: 0.2 K/UL (ref 0.3–1)
MONOCYTES NFR BLD: 6.4 % (ref 4–15)
NEUTROPHILS # BLD AUTO: 1.8 K/UL (ref 1.8–7.7)
NEUTROPHILS NFR BLD: 64.3 % (ref 38–73)
NRBC BLD-RTO: 0 /100 WBC
PLATELET # BLD AUTO: 152 K/UL (ref 150–450)
PMV BLD AUTO: 8.8 FL (ref 9.2–12.9)
RBC # BLD AUTO: 4.15 M/UL (ref 4.6–6.2)
WBC # BLD AUTO: 2.8 K/UL (ref 3.9–12.7)

## 2024-05-22 PROCEDURE — 76937 US GUIDE VASCULAR ACCESS: CPT | Mod: 26,,, | Performed by: RADIOLOGY

## 2024-05-22 PROCEDURE — 76377 3D RENDER W/INTRP POSTPROCES: CPT | Mod: TC | Performed by: RADIOLOGY

## 2024-05-22 PROCEDURE — G0269 OCCLUSIVE DEVICE IN VEIN ART: HCPCS | Performed by: RADIOLOGY

## 2024-05-22 PROCEDURE — 75710 ARTERY X-RAYS ARM/LEG: CPT | Mod: TC,59 | Performed by: RADIOLOGY

## 2024-05-22 PROCEDURE — 76380 CAT SCAN FOLLOW-UP STUDY: CPT | Mod: TC,59 | Performed by: RADIOLOGY

## 2024-05-22 PROCEDURE — 20600001 HC STEP DOWN PRIVATE ROOM

## 2024-05-22 PROCEDURE — 37243 VASC EMBOLIZE/OCCLUDE ORGAN: CPT | Performed by: RADIOLOGY

## 2024-05-22 PROCEDURE — 25000003 PHARM REV CODE 250: Performed by: NURSE ANESTHETIST, CERTIFIED REGISTERED

## 2024-05-22 PROCEDURE — 80048 BASIC METABOLIC PNL TOTAL CA: CPT | Performed by: STUDENT IN AN ORGANIZED HEALTH CARE EDUCATION/TRAINING PROGRAM

## 2024-05-22 PROCEDURE — 75774 ARTERY X-RAY EACH VESSEL: CPT | Mod: 26,59,, | Performed by: RADIOLOGY

## 2024-05-22 PROCEDURE — 63600175 PHARM REV CODE 636 W HCPCS: Performed by: NURSE ANESTHETIST, CERTIFIED REGISTERED

## 2024-05-22 PROCEDURE — 85025 COMPLETE CBC W/AUTO DIFF WBC: CPT | Performed by: STUDENT IN AN ORGANIZED HEALTH CARE EDUCATION/TRAINING PROGRAM

## 2024-05-22 PROCEDURE — 36248 INS CATH ABD/L-EXT ART ADDL: CPT | Mod: 59 | Performed by: RADIOLOGY

## 2024-05-22 PROCEDURE — 25000003 PHARM REV CODE 250: Performed by: STUDENT IN AN ORGANIZED HEALTH CARE EDUCATION/TRAINING PROGRAM

## 2024-05-22 PROCEDURE — 36247 INS CATH ABD/L-EXT ART 3RD: CPT | Performed by: RADIOLOGY

## 2024-05-22 PROCEDURE — 63600175 PHARM REV CODE 636 W HCPCS: Performed by: STUDENT IN AN ORGANIZED HEALTH CARE EDUCATION/TRAINING PROGRAM

## 2024-05-22 PROCEDURE — D9220A PRA ANESTHESIA: Mod: CRNA,,, | Performed by: NURSE ANESTHETIST, CERTIFIED REGISTERED

## 2024-05-22 PROCEDURE — D9220A PRA ANESTHESIA: Mod: ANES,,, | Performed by: STUDENT IN AN ORGANIZED HEALTH CARE EDUCATION/TRAINING PROGRAM

## 2024-05-22 PROCEDURE — 75774 ARTERY X-RAY EACH VESSEL: CPT | Mod: TC,59 | Performed by: RADIOLOGY

## 2024-05-22 PROCEDURE — 63600175 PHARM REV CODE 636 W HCPCS

## 2024-05-22 PROCEDURE — 76937 US GUIDE VASCULAR ACCESS: CPT | Mod: TC | Performed by: RADIOLOGY

## 2024-05-22 PROCEDURE — 86850 RBC ANTIBODY SCREEN: CPT | Performed by: STUDENT IN AN ORGANIZED HEALTH CARE EDUCATION/TRAINING PROGRAM

## 2024-05-22 PROCEDURE — 37000008 HC ANESTHESIA 1ST 15 MINUTES

## 2024-05-22 PROCEDURE — 36248 INS CATH ABD/L-EXT ART ADDL: CPT | Mod: 59,,, | Performed by: RADIOLOGY

## 2024-05-22 PROCEDURE — 76380 CAT SCAN FOLLOW-UP STUDY: CPT | Mod: 26,59,, | Performed by: RADIOLOGY

## 2024-05-22 PROCEDURE — 76377 3D RENDER W/INTRP POSTPROCES: CPT | Mod: 26,,, | Performed by: RADIOLOGY

## 2024-05-22 PROCEDURE — 37000009 HC ANESTHESIA EA ADD 15 MINS

## 2024-05-22 PROCEDURE — 36247 INS CATH ABD/L-EXT ART 3RD: CPT | Mod: 51,,, | Performed by: RADIOLOGY

## 2024-05-22 PROCEDURE — 63600175 PHARM REV CODE 636 W HCPCS: Mod: JZ,JG | Performed by: RADIOLOGY

## 2024-05-22 PROCEDURE — 75710 ARTERY X-RAYS ARM/LEG: CPT | Mod: 26,59,, | Performed by: RADIOLOGY

## 2024-05-22 PROCEDURE — C1894 INTRO/SHEATH, NON-LASER: HCPCS

## 2024-05-22 RX ORDER — MORPHINE SULFATE 2 MG/ML
INJECTION, SOLUTION INTRAMUSCULAR; INTRAVENOUS
Status: COMPLETED
Start: 2024-05-22 | End: 2024-05-22

## 2024-05-22 RX ORDER — PHENYLEPHRINE HYDROCHLORIDE 10 MG/ML
INJECTION INTRAVENOUS
Status: DISCONTINUED | OUTPATIENT
Start: 2024-05-22 | End: 2024-05-22

## 2024-05-22 RX ORDER — ACETAMINOPHEN 325 MG/1
650 TABLET ORAL EVERY 8 HOURS
Status: DISCONTINUED | OUTPATIENT
Start: 2024-05-22 | End: 2024-05-23

## 2024-05-22 RX ORDER — LIDOCAINE HYDROCHLORIDE 10 MG/ML
1 INJECTION, SOLUTION EPIDURAL; INFILTRATION; INTRACAUDAL; PERINEURAL ONCE
Status: DISCONTINUED | OUTPATIENT
Start: 2024-05-22 | End: 2024-05-23 | Stop reason: HOSPADM

## 2024-05-22 RX ORDER — FENTANYL CITRATE 50 UG/ML
INJECTION, SOLUTION INTRAMUSCULAR; INTRAVENOUS
Status: DISCONTINUED | OUTPATIENT
Start: 2024-05-22 | End: 2024-05-22

## 2024-05-22 RX ORDER — MORPHINE SULFATE 2 MG/ML
2 INJECTION, SOLUTION INTRAMUSCULAR; INTRAVENOUS EVERY 5 MIN PRN
Status: DISCONTINUED | OUTPATIENT
Start: 2024-05-22 | End: 2024-05-22

## 2024-05-22 RX ORDER — KETAMINE HCL IN 0.9 % NACL 50 MG/5 ML
SYRINGE (ML) INTRAVENOUS
Status: DISCONTINUED | OUTPATIENT
Start: 2024-05-22 | End: 2024-05-22

## 2024-05-22 RX ORDER — ALLOPURINOL 100 MG/1
100 TABLET ORAL DAILY
Status: DISCONTINUED | OUTPATIENT
Start: 2024-05-23 | End: 2024-05-23

## 2024-05-22 RX ORDER — LIDOCAINE HYDROCHLORIDE 20 MG/ML
INJECTION, SOLUTION EPIDURAL; INFILTRATION; INTRACAUDAL; PERINEURAL
Status: DISCONTINUED | OUTPATIENT
Start: 2024-05-22 | End: 2024-05-22

## 2024-05-22 RX ORDER — OXYCODONE HYDROCHLORIDE 5 MG/1
5 TABLET ORAL
Status: DISCONTINUED | OUTPATIENT
Start: 2024-05-22 | End: 2024-05-23

## 2024-05-22 RX ORDER — SODIUM CHLORIDE 9 MG/ML
INJECTION, SOLUTION INTRAVENOUS CONTINUOUS
Status: DISCONTINUED | OUTPATIENT
Start: 2024-05-22 | End: 2024-05-25 | Stop reason: HOSPADM

## 2024-05-22 RX ORDER — AMLODIPINE AND BENAZEPRIL HYDROCHLORIDE 5; 10 MG/1; MG/1
1 CAPSULE ORAL DAILY
Qty: 90 CAPSULE | Refills: 3 | Status: SHIPPED | OUTPATIENT
Start: 2024-05-22 | End: 2025-05-22

## 2024-05-22 RX ORDER — ACETAMINOPHEN 10 MG/ML
INJECTION, SOLUTION INTRAVENOUS
Status: DISCONTINUED | OUTPATIENT
Start: 2024-05-22 | End: 2024-05-22

## 2024-05-22 RX ORDER — DEXAMETHASONE SODIUM PHOSPHATE 4 MG/ML
INJECTION, SOLUTION INTRA-ARTICULAR; INTRALESIONAL; INTRAMUSCULAR; INTRAVENOUS; SOFT TISSUE
Status: DISCONTINUED | OUTPATIENT
Start: 2024-05-22 | End: 2024-05-22

## 2024-05-22 RX ORDER — HYDROMORPHONE HYDROCHLORIDE 1 MG/ML
0.2 INJECTION, SOLUTION INTRAMUSCULAR; INTRAVENOUS; SUBCUTANEOUS EVERY 5 MIN PRN
Status: COMPLETED | OUTPATIENT
Start: 2024-05-22 | End: 2024-05-22

## 2024-05-22 RX ORDER — AMLODIPINE AND BENAZEPRIL HYDROCHLORIDE 5; 10 MG/1; MG/1
1 CAPSULE ORAL DAILY
Status: DISCONTINUED | OUTPATIENT
Start: 2024-05-23 | End: 2024-05-25 | Stop reason: HOSPADM

## 2024-05-22 RX ORDER — MIDAZOLAM HYDROCHLORIDE 1 MG/ML
INJECTION, SOLUTION INTRAMUSCULAR; INTRAVENOUS
Status: DISCONTINUED | OUTPATIENT
Start: 2024-05-22 | End: 2024-05-22

## 2024-05-22 RX ORDER — HYDROMORPHONE HYDROCHLORIDE 1 MG/ML
0.5 INJECTION, SOLUTION INTRAMUSCULAR; INTRAVENOUS; SUBCUTANEOUS EVERY 6 HOURS PRN
Status: DISCONTINUED | OUTPATIENT
Start: 2024-05-22 | End: 2024-05-23

## 2024-05-22 RX ORDER — ONDANSETRON HYDROCHLORIDE 2 MG/ML
4 INJECTION, SOLUTION INTRAVENOUS DAILY PRN
Status: DISCONTINUED | OUTPATIENT
Start: 2024-05-22 | End: 2024-05-23 | Stop reason: HOSPADM

## 2024-05-22 RX ORDER — HYDROMORPHONE HYDROCHLORIDE 1 MG/ML
INJECTION, SOLUTION INTRAMUSCULAR; INTRAVENOUS; SUBCUTANEOUS
Status: COMPLETED
Start: 2024-05-22 | End: 2024-05-22

## 2024-05-22 RX ORDER — PROPOFOL 10 MG/ML
VIAL (ML) INTRAVENOUS CONTINUOUS PRN
Status: DISCONTINUED | OUTPATIENT
Start: 2024-05-22 | End: 2024-05-22

## 2024-05-22 RX ADMIN — MORPHINE SULFATE 2 MG: 2 INJECTION, SOLUTION INTRAMUSCULAR; INTRAVENOUS at 05:05

## 2024-05-22 RX ADMIN — HYDROMORPHONE HYDROCHLORIDE 0.2 MG: 1 INJECTION, SOLUTION INTRAMUSCULAR; INTRAVENOUS; SUBCUTANEOUS at 04:05

## 2024-05-22 RX ADMIN — FENTANYL CITRATE 25 MCG: 50 INJECTION, SOLUTION INTRAMUSCULAR; INTRAVENOUS at 02:05

## 2024-05-22 RX ADMIN — PHENYLEPHRINE HYDROCHLORIDE 100 MCG: 10 INJECTION INTRAVENOUS at 03:05

## 2024-05-22 RX ADMIN — NITROGLYCERIN 1 ML: 20 INJECTION INTRAVENOUS at 03:05

## 2024-05-22 RX ADMIN — Medication 10 MG: at 03:05

## 2024-05-22 RX ADMIN — HYDROMORPHONE HYDROCHLORIDE 0.2 MG: 1 INJECTION, SOLUTION INTRAMUSCULAR; INTRAVENOUS; SUBCUTANEOUS at 05:05

## 2024-05-22 RX ADMIN — GLYCOPYRROLATE 0.2 MG: 0.2 INJECTION, SOLUTION INTRAMUSCULAR; INTRAVENOUS at 02:05

## 2024-05-22 RX ADMIN — DEXAMETHASONE SODIUM PHOSPHATE 4 MG: 4 INJECTION, SOLUTION INTRAMUSCULAR; INTRAVENOUS at 03:05

## 2024-05-22 RX ADMIN — FENTANYL CITRATE 25 MCG: 50 INJECTION, SOLUTION INTRAMUSCULAR; INTRAVENOUS at 01:05

## 2024-05-22 RX ADMIN — PHENYLEPHRINE HYDROCHLORIDE 100 MCG: 10 INJECTION INTRAVENOUS at 02:05

## 2024-05-22 RX ADMIN — Medication 30 MG: at 02:05

## 2024-05-22 RX ADMIN — SODIUM CHLORIDE: 0.9 INJECTION, SOLUTION INTRAVENOUS at 01:05

## 2024-05-22 RX ADMIN — ACETAMINOPHEN 1000 MG: 10 INJECTION, SOLUTION INTRAVENOUS at 03:05

## 2024-05-22 RX ADMIN — PHENYLEPHRINE HYDROCHLORIDE 200 MCG: 10 INJECTION INTRAVENOUS at 03:05

## 2024-05-22 RX ADMIN — GLYCOPYRROLATE 0.2 MG: 0.2 INJECTION, SOLUTION INTRAMUSCULAR; INTRAVENOUS at 03:05

## 2024-05-22 RX ADMIN — FENTANYL CITRATE 50 MCG: 50 INJECTION, SOLUTION INTRAMUSCULAR; INTRAVENOUS at 01:05

## 2024-05-22 RX ADMIN — PROPOFOL 125 MCG/KG/MIN: 10 INJECTION, EMULSION INTRAVENOUS at 02:05

## 2024-05-22 RX ADMIN — OXYCODONE 5 MG: 5 TABLET ORAL at 05:05

## 2024-05-22 RX ADMIN — LIDOCAINE HYDROCHLORIDE 100 MG: 20 INJECTION, SOLUTION EPIDURAL; INFILTRATION; INTRACAUDAL; PERINEURAL at 02:05

## 2024-05-22 RX ADMIN — MIDAZOLAM HYDROCHLORIDE 2 MG: 2 INJECTION, SOLUTION INTRAMUSCULAR; INTRAVENOUS at 01:05

## 2024-05-22 NOTE — PLAN OF CARE
Pt arrived to IR rm 189 for R femur tumor embo. Pt oriented to unit and staff, Pt safely transferred from stretcher to procedural table. Fall risk reviewed and comfort measures utilized with interventions. Safety strap applied, position pillows to minimize pressure points. Blankets applied. Pt prepped and draped utilizing standard sterile technique. Patient placed on continuous monitoring, as required by sedation policy. Timeouts implemented utilizing standard universal time-out per department and facility policy. RN to remain at bedside with continuous monitoring. Pt resting comfortably. Denies pain/discomfort. Will continue to monitor. See flow sheets for monitoring, medication administration, and updates. patient verbalizes understanding.

## 2024-05-22 NOTE — NURSING TRANSFER
Nursing Transfer Note      5/22/2024   5:59 PM    Nurse giving handoff:BRIAN Zavala  Nurse receiving handoff:ONC RN    Reason patient is being transferred: post anesthesia    Transfer To: 828    Transfer via stretcher    Transported by PCT    Transfer Vital Signs:  Blood Pressure:133/67  Heart Rate:78  O2:99%-- RA  Temperature:97.8  Respirations:15    4eyes on Skin: yes    Medicines sent: n/a    Any special needs or follow-up needed: n/a    Patient belongings transferred with patient: No    Chart send with patient: Yes    Notified: spouse    Patient reassessed at: 5/22/24@ 1800

## 2024-05-22 NOTE — ANESTHESIA PREPROCEDURE EVALUATION
Pre-operative evaluation for * No procedures listed *    Gamaliel Pete Jr. is a 73 y.o. male w/ HTN and metastatic renal cell carcinoma (mets to lung, spine, bilateral femurs). He presents for IR guided tumor embolization of femur mets.       Prev airway (2/2/2024):     Induction:  Intravenous    Intubated:  Postinduction    Mask Ventilation:  Easy mask    Attempts:  1    Attempted By:  CRNA    Method of Intubation:  Video laryngoscopy    Blade:  Yoder 3    Laryngeal View Grade: Grade I - full view of cords      Difficult Airway Encountered?: No      Complications:  None    Airway Device:  Oral endotracheal tube    Airway Device Size:  8.0    Style/Cuff Inflation:  Cuffed (inflated to minimal occlusive pressure)    Tube secured:  21    Secured at:  The lips    Placement Verified By:  Capnometry    Complicating Factors:  None    Findings Post-Intubation:  BS equal bilateral and atraumatic/condition of teeth unchanged    2D Echo: none on record      EKG (1/2/2024):   Vent. Rate : 079 BPM     Atrial Rate : 079 BPM      P-R Int : 210 ms          QRS Dur : 162 ms       QT Int : 410 ms       P-R-T Axes : 068 -78 079 degrees      QTc Int : 470 ms   Sinus rhythm with 1st degree A-V block   Right bundle branch block   Left anterior fascicular block    Bifascicular block   LVH with repolarization abnormality     Patient Active Problem List   Diagnosis    Class 1 obesity without serious comorbidity with body mass index (BMI) of 31.0 to 31.9 in adult    Onychomycosis    History of gout    Seborrheic keratosis    Tinea corporis    Positive colorectal cancer screening using Cologuard test    Renal mass    Cancer with pulmonary metastases    Metastasis to bone    Iron deficiency anemia    History of right radical nephrectomy    Clear cell carcinoma of right kidney    Hypercalcemia    Transaminitis    Constipation due to opioid therapy    Debility    Diaphoresis    Nausea & vomiting    Normocytic anemia     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    Gout of ankle    Anemia    Hypotension due to drugs    Surgical wound breakdown    Wound dehiscence    At high risk for skin breakdown    Cellulitis    Gout    Aortic atherosclerosis    Neoplasm related pain       Review of patient's allergies indicates:   Allergen Reactions    Vancomycin analogues Rash     IV antibiotic caused a rash--RED MAN SYNDROME       Past Surgical History:   Procedure Laterality Date    CLOSURE N/A 2/2/2024    Procedure: CLOSURE;  Surgeon: Ernesto Villanueva MD;  Location: [address];  Service: Plastics;  Laterality: N/A;    COLONOSCOPY  02/10/2022    COLONOSCOPY N/A 02/10/2022    Procedure: COLONOSCOPY;  Surgeon: Desmond Keenan MD;  Location: [facility];  Service: General;  Laterality: N/A;    POSTERIOR FUSION OF CERVICAL SPINE WITH LAMINECTOMY N/A 11/15/2023    Procedure: SPINE, CERVICAL, WITH POSTERIOR FUSION T4-6;  Surgeon: Nasim Martinez DO;  Location: [address];  Service: Neurosurgery;  Laterality: N/A;  C2-T1 laminectomy and posterior fusion. [place]. C-arm. [place]i. Neuromonitoring.    ROBOT-ASSISTED LAPAROSCOPIC NEPHRECTOMY Right 10/4/2023    Procedure: ROBOTIC NEPHRECTOMY;  Surgeon: Henry Michaels MD;  Location: [facility];  Service: Urology;  Laterality: Right;    SURGICAL REMOVAL OF VERTEBRAL BODY OF CERVICAL SPINE Right 11/7/2023    Procedure: CORPECTOMY, SPINE, CERVICAL;  Surgeon: Nasim Martinez DO;  Location: [address];  Service: Neurosurgery;  Laterality: Right;  C4 and C5 corpectomy with globus;  wells tongs; c-arm; neuromonitoring; microscope; type and cross 2 units    SURGICAL REMOVAL OF VERTEBRAL BODY OF CERVICAL SPINE N/A 11/15/2023    Procedure: CORPECTOMY, SPINE, CERVICAL C3-6 REVISION;  Surgeon: Nasim Martinez DO;  [facility];  Service: Neurosurgery;  Laterality: N/A;    THORACIC LAMINECTOMY WITH FUSION N/A 1/5/2024    Procedure:  "LAMINECTOMY, SPINE, THORACIC, WITH FUSION;  Surgeon: Nasim Martinez DO;  Location: [facility] OR [address] [place] [address];  Service: Neurosurgery;  Laterality: N/A;  T7-T11 fusions with robot    TONSILLECTOMY      WOUND EXPLORATION N/A 2/2/2024    Procedure: EXPLORATION, WOUND;  Surgeon: Nasim Martinez DO;  Location: [facility] OR [address] [place] [address];  Service: Neurosurgery;  Laterality: N/A;  Thoracic wound exploration, washout         Vital Signs:         CBC: No results for input(s): "WBC", "RBC", "HGB", "HCT", "PLT", "MCV", "MCH", "MCHC" in the last 72 hours.    CMP: No results for input(s): "NA", "K", "CL", "CO2", "BUN", "CREATININE", "GLU", "MG", "PHOS", "CALCIUM", "ALBUMIN", "PROT", "ALKPHOS", "ALT", "AST", "BILITOT" in the last 72 hours.    INR  Recent Labs     05/21/24  1150   INR 0.9             Pre-op Assessment    I have reviewed the Patient Summary Reports.     I have reviewed the Nursing Notes. I have reviewed the NPO Status.   I have reviewed the Medications.     Review of Systems  Anesthesia Hx:  No problems with previous Anesthesia             Denies Family Hx of Anesthesia complications.    Denies Personal Hx of Anesthesia complications.                    Hematology/Oncology:                      Current/Recent Cancer.         surgery and radiation       Cardiovascular:     Hypertension                                        Renal/:  Chronic Renal Disease                Hepatic/GI:  Hepatic/GI Normal                 Neurological:  Neurology Normal      Denies Seizures.                                Endocrine:  Endocrine Normal                Physical Exam  General: Alert and Oriented    Airway:  Mallampati: II   Mouth Opening: Normal  TM Distance: Normal  Tongue: Normal    Dental:  Intact    Chest/Lungs:  Clear to auscultation, Normal Respiratory Rate    Heart:  Rate: Normal  Rhythm: Regular Rhythm        Anesthesia Plan  Type of Anesthesia, risks & benefits discussed:    Anesthesia Type: Gen Natural Airway  Intra-op " Monitoring Plan: Standard ASA Monitors  Post Op Pain Control Plan: IV/PO Opioids PRN and multimodal analgesia  Induction:  IV  Informed Consent: Informed consent signed with the Patient and all parties understand the risks and agree with anesthesia plan.  All questions answered.   ASA Score: 3  Day of Surgery Review of History & Physical: H&P Update referred to the surgeon/provider.    Ready For Surgery From Anesthesia Perspective.     .

## 2024-05-22 NOTE — TRANSFER OF CARE
Anesthesia Transfer of Care Note    Patient: Gamaliel Pete Jr.    Procedure(s) Performed: * No procedures listed *    Patient location: PACU    Anesthesia Type: general    Transport from OR: Transported from OR on 6-10 L/min O2 by face mask with adequate spontaneous ventilation    Post pain: adequate analgesia    Post assessment: no apparent anesthetic complications and tolerated procedure well    Post vital signs: stable    Level of consciousness: lethargic and responds to stimulation    Nausea/Vomiting: no nausea/vomiting    Complications: none    Transfer of care protocol was followed      Last vitals: Visit Vitals  BP (!) 108/59   Pulse 80   Temp 36.6 °C (97.9 °F) (Temporal)   Resp 11   SpO2 100%

## 2024-05-22 NOTE — H&P
Radiology History & Physical      SUBJECTIVE:     Chief Complaint: bone mets    History of Present Illness:  Gamaliel Pete Jr. is a 73 y.o. male who presents for R femur mets embolization.    Past Medical History:   Diagnosis Date    Asthma     no inhaler use    Borderline hypertension     ED (erectile dysfunction)     Gout     Hematuria     Hypertension      Past Surgical History:   Procedure Laterality Date    CLOSURE N/A 2/2/2024    Procedure: CLOSURE;  Surgeon: Ernesto Villanueva MD;  [facility];  Service: Plastics;  Laterality: N/A;    COLONOSCOPY  02/10/2022    COLONOSCOPY N/A 02/10/2022    Procedure: COLONOSCOPY;  Surgeon: Desmond Keenan MD;  Location: [facility];  Service: General;  Laterality: N/A;    POSTERIOR FUSION OF CERVICAL SPINE WITH LAMINECTOMY N/A 11/15/2023    Procedure: SPINE, CERVICAL, WITH POSTERIOR FUSION T4-6;  Surgeon: Nasim Martinez DO;  [facility];  Service: Neurosurgery;  Laterality: N/A;  C2-T1 laminectomy and posterior fusion. [place]. C-arm. [place]. Neuromonitoring.    ROBOT-ASSISTED LAPAROSCOPIC NEPHRECTOMY Right 10/4/2023    Procedure: ROBOTIC NEPHRECTOMY;  Surgeon: Henry Michaels MD;  Location: [facility];  Service: Urology;  Laterality: Right;    SURGICAL REMOVAL OF VERTEBRAL BODY OF CERVICAL SPINE Right 11/7/2023    Procedure: CORPECTOMY, SPINE, CERVICAL;  Surgeon: Nasim Martinez DO;  [facility];  Service: Neurosurgery;  Laterality: Right;  C4 and C5 corpectomy with globus;  wells tongs; c-arm; neuromonitoring; microscope; type and cross 2 units    SURGICAL REMOVAL OF VERTEBRAL BODY OF CERVICAL SPINE N/A 11/15/2023    Procedure: CORPECTOMY, SPINE, CERVICAL C3-6 REVISION;  Surgeon: Nasim Martinez DO;  [facility];  Service: Neurosurgery;  Laterality: N/A;    THORACIC LAMINECTOMY WITH FUSION N/A 1/5/2024    Procedure: LAMINECTOMY, SPINE, THORACIC, WITH FUSION;  Surgeon: Nasim Martinez  DO TAMIA;  Location: [facility] OR 2ND FLR;  Service: Neurosurgery;  Laterality: N/A;  T7-T11 fusions with robot    TONSILLECTOMY      WOUND EXPLORATION N/A 2/2/2024    Procedure: EXPLORATION, WOUND;  Surgeon: Nasim Martinez DO;  Location: [facility] OR 2ND FLR;  Service: Neurosurgery;  Laterality: N/A;  Thoracic wound exploration, washout       Home Meds:   Prior to Admission medications    Medication Sig Start Date End Date Taking? Authorizing Provider   allopurinoL (ZYLOPRIM) 100 MG tablet Take 1 tablet (100 mg total) by mouth once daily.  Patient taking differently: Take 100 mg by mouth every morning. 4/15/24  Yes Slava Lowery MD   lenvatinib (LENVIMA) 10 mg/day (10 mg x 1) Cap Take 10 mg by mouth once daily. 4/15/24  Yes Ruby Turk MD   linaCLOtide (LINZESS) 72 mcg Cap capsule Take 1 capsule (72 mcg total) by mouth before breakfast. 4/15/24  Yes Ruby Turk MD   morphine (MS CONTIN) 30 MG 12 hr tablet Take 30 mg by mouth 2 (two) times daily.   Yes Provider, Historical   oxyCODONE (ROXICODONE) 5 MG immediate release tablet Take 1 tablet (5 mg total) by mouth every 4 (four) hours as needed for Pain. 5/6/24  Yes Ruby Turk MD   amlodipine-benazepril 5-10 mg (LOTREL) 5-10 mg per capsule Take 1 capsule by mouth once daily.  Patient not taking: Reported on 4/15/2024 9/28/23 9/27/24  Fred Aviles MD   diazePAM (VALIUM) 5 MG tablet Take 1 tablet (5 mg total) by mouth every 8 (eight) hours as needed for Anxiety or Insomnia (take 30 minutes prior to imaging).  Patient not taking: Reported on 4/15/2024 4/15/24 4/15/25  Ruby Turk MD   burnette's soln (benadryl 30 mL, mylanta 30 mL, LIDOcaine 30 mL, nystatin 30 mL) 120mL Take 10 mLs by mouth 4 (four) times daily.  Patient not taking: Reported on 4/15/2024 1/29/24   Jess Taylor, NP   EPINEPHrine (EPIPEN) 0.3 mg/0.3 mL AtIn Inject 0.3 mLs (0.3 mg total) into the muscle as needed.  Patient not taking: Reported on 4/15/2024 2/26/24 2/25/25  Renato Hernandez  MD Dane   gabapentin (NEURONTIN) 300 MG capsule Take 1 capsule (300 mg total) by mouth 3 (three) times daily.  Patient not taking: Reported on 4/15/2024 10/30/23 10/29/24  Sejal Yates PA-C   hydrocortisone (ANUSOL-HC) 2.5 % rectal cream Place rectally 2 (two) times daily. 5/6/24   Ruby Turk MD   morphine (MS CONTIN) 15 MG 12 hr tablet Take 1 tablet (15 mg total) by mouth every 8 (eight) hours. 4/15/24   Ruby Turk MD   naloxone (NARCAN) 4 mg/actuation Spry 1 spray (4mg) by nasal route as needed for opioid overdose; may repeat every 2-3 minutes in alternating nostrils until medical help arrives. Call 911  Patient not taking: Reported on 4/15/2024 11/2/23   Janiya Jarvis MD   omega-3 fatty acids/fish oil (FISH OIL-OMEGA-3 FATTY ACIDS) 300-1,000 mg capsule Take 2 capsules by mouth once daily.  Patient not taking: Reported on 4/15/2024    Provider, Historical   sildenafiL (VIAGRA) 100 MG tablet Take 1 tablet (100 mg total) by mouth daily as needed for Erectile Dysfunction.  Patient not taking: Reported on 1/18/2024 6/2/21 12/12/23  Slava Lowery MD   turmeric (CURCUMIN MISC) 1,000 mg by Misc.(Non-Drug; Combo Route) route Daily.  Patient not taking: Reported on 4/15/2024    Provider, Historical   UNABLE TO FIND Take 500 mg by mouth 2 (two) times a day. medication name: Tudca  Patient not taking: Reported on 4/15/2024    Provider, Historical   UNABLE TO FIND Take 2 capsules by mouth 2 (two) times a day. medication name: Turkey tail mushroom  Patient not taking: Reported on 4/15/2024    Provider, Historical   UNABLE TO FIND Take 15 drops by mouth once daily. medication name: Essiac-20  Patient not taking: Reported on 4/15/2024    Provider, Historical   UNABLE TO FIND Take 5 mLs by mouth 2 (two) times a day. medication name: barley leaf juice powder  Patient not taking: Reported on 4/15/2024    Provider, Historical   UNABLE TO FIND Take 5 mLs by mouth 2 (two) times a day. medication name:  black seed oil (cumin seed)  Patient not taking: Reported on 4/15/2024    Provider, Historical     Anticoagulants/Antiplatelets: no anticoagulation    Allergies:   Review of patient's allergies indicates:   Allergen Reactions    Vancomycin analogues Rash     IV antibiotic caused a rash--RED MAN SYNDROME     Sedation History:  no adverse reactions    Review of Systems:   Hematological: no known coagulopathies  Respiratory: no shortness of breath  Cardiovascular: no chest pain  Gastrointestinal: no abdominal pain  Genito-Urinary: no dysuria  Musculoskeletal: negative  Neurological: no TIA or stroke symptoms         OBJECTIVE:     Vital Signs (Most Recent)       Physical Exam:  ASA: per anesthesia  Mallampati: per anesthesia    General: no acute distress  Mental Status: alert and oriented to person, place and time  HEENT: normocephalic, atraumatic  Chest: unlabored breathing  Heart: regular heart rate  Abdomen: nondistended  Extremity: moves all extremities    Laboratory  Lab Results   Component Value Date    INR 0.9 05/21/2024       Lab Results   Component Value Date    WBC 5.83 05/06/2024    HGB 13.6 (L) 05/06/2024    HCT 40.2 05/06/2024    MCV 84 05/06/2024     05/06/2024      Lab Results   Component Value Date     (H) 05/06/2024     05/06/2024    K 4.4 05/06/2024     05/06/2024    CO2 22 (L) 05/06/2024    BUN 16 05/06/2024    CREATININE 1.1 05/06/2024    CALCIUM 10.9 (H) 05/06/2024    MG 2.1 01/08/2024    ALT 38 05/06/2024    AST 36 05/06/2024    ALBUMIN 3.9 05/06/2024    BILITOT 0.4 05/06/2024       ASSESSMENT/PLAN:     Sedation Plan: per anesthesia  Patient will undergo R femur lesion embolization.    Tere De La Vega MD MSCR  PGY-5 Radiology Resident

## 2024-05-23 ENCOUNTER — ANESTHESIA (OUTPATIENT)
Dept: SURGERY | Facility: HOSPITAL | Age: 74
DRG: 481 | End: 2024-05-23
Payer: MEDICARE

## 2024-05-23 LAB
ABO + RH BLD: NORMAL
ANION GAP SERPL CALC-SCNC: 10 MMOL/L (ref 8–16)
ANION GAP SERPL CALC-SCNC: 9 MMOL/L (ref 8–16)
BLD GP AB SCN CELLS X3 SERPL QL: NORMAL
BUN SERPL-MCNC: 11 MG/DL (ref 8–23)
BUN SERPL-MCNC: 9 MG/DL (ref 8–23)
CALCIUM SERPL-MCNC: 8.5 MG/DL (ref 8.7–10.5)
CALCIUM SERPL-MCNC: 9.2 MG/DL (ref 8.7–10.5)
CHLORIDE SERPL-SCNC: 106 MMOL/L (ref 95–110)
CHLORIDE SERPL-SCNC: 106 MMOL/L (ref 95–110)
CO2 SERPL-SCNC: 20 MMOL/L (ref 23–29)
CO2 SERPL-SCNC: 23 MMOL/L (ref 23–29)
CREAT SERPL-MCNC: 0.9 MG/DL (ref 0.5–1.4)
CREAT SERPL-MCNC: 1 MG/DL (ref 0.5–1.4)
EST. GFR  (NO RACE VARIABLE): >60 ML/MIN/1.73 M^2
EST. GFR  (NO RACE VARIABLE): >60 ML/MIN/1.73 M^2
GLUCOSE SERPL-MCNC: 107 MG/DL (ref 70–110)
GLUCOSE SERPL-MCNC: 120 MG/DL (ref 70–110)
POTASSIUM SERPL-SCNC: 4.2 MMOL/L (ref 3.5–5.1)
POTASSIUM SERPL-SCNC: 5.4 MMOL/L (ref 3.5–5.1)
SODIUM SERPL-SCNC: 135 MMOL/L (ref 136–145)
SODIUM SERPL-SCNC: 139 MMOL/L (ref 136–145)
SPECIMEN OUTDATE: NORMAL

## 2024-05-23 PROCEDURE — 88311 DECALCIFY TISSUE: CPT | Performed by: PATHOLOGY

## 2024-05-23 PROCEDURE — 25000003 PHARM REV CODE 250

## 2024-05-23 PROCEDURE — C1769 GUIDE WIRE: HCPCS | Performed by: ORTHOPAEDIC SURGERY

## 2024-05-23 PROCEDURE — 71000033 HC RECOVERY, INTIAL HOUR: Performed by: ORTHOPAEDIC SURGERY

## 2024-05-23 PROCEDURE — 80048 BASIC METABOLIC PNL TOTAL CA: CPT

## 2024-05-23 PROCEDURE — C1788 PORT, INDWELLING, IMP: HCPCS | Performed by: ORTHOPAEDIC SURGERY

## 2024-05-23 PROCEDURE — 63600175 PHARM REV CODE 636 W HCPCS

## 2024-05-23 PROCEDURE — 02HV33Z INSERTION OF INFUSION DEVICE INTO SUPERIOR VENA CAVA, PERCUTANEOUS APPROACH: ICD-10-PCS | Performed by: SURGERY

## 2024-05-23 PROCEDURE — 94761 N-INVAS EAR/PLS OXIMETRY MLT: CPT

## 2024-05-23 PROCEDURE — 63600175 PHARM REV CODE 636 W HCPCS: Performed by: ANESTHESIOLOGY

## 2024-05-23 PROCEDURE — 88341 IMHCHEM/IMCYTCHM EA ADD ANTB: CPT | Performed by: PATHOLOGY

## 2024-05-23 PROCEDURE — 88342 IMHCHEM/IMCYTCHM 1ST ANTB: CPT | Mod: 26,,, | Performed by: PATHOLOGY

## 2024-05-23 PROCEDURE — 88311 DECALCIFY TISSUE: CPT | Mod: 26,,, | Performed by: PATHOLOGY

## 2024-05-23 PROCEDURE — 77001 FLUOROGUIDE FOR VEIN DEVICE: CPT | Mod: 26,,, | Performed by: SURGERY

## 2024-05-23 PROCEDURE — 27187 REINFORCE HIP BONES: CPT | Mod: RT,,, | Performed by: ORTHOPAEDIC SURGERY

## 2024-05-23 PROCEDURE — 64448 NJX AA&/STRD FEM NRV NFS IMG: CPT

## 2024-05-23 PROCEDURE — 27201423 OPTIME MED/SURG SUP & DEVICES STERILE SUPPLY: Performed by: ORTHOPAEDIC SURGERY

## 2024-05-23 PROCEDURE — C1886 CATHETER, ABLATION: HCPCS | Performed by: ORTHOPAEDIC SURGERY

## 2024-05-23 PROCEDURE — 36000711: Performed by: ORTHOPAEDIC SURGERY

## 2024-05-23 PROCEDURE — 25000003 PHARM REV CODE 250: Performed by: FAMILY MEDICINE

## 2024-05-23 PROCEDURE — 63600175 PHARM REV CODE 636 W HCPCS: Performed by: ORTHOPAEDIC SURGERY

## 2024-05-23 PROCEDURE — 88305 TISSUE EXAM BY PATHOLOGIST: CPT | Mod: 26,,, | Performed by: PATHOLOGY

## 2024-05-23 PROCEDURE — 64448 NJX AA&/STRD FEM NRV NFS IMG: CPT | Mod: 59,RT,, | Performed by: ANESTHESIOLOGY

## 2024-05-23 PROCEDURE — 37000008 HC ANESTHESIA 1ST 15 MINUTES: Performed by: ORTHOPAEDIC SURGERY

## 2024-05-23 PROCEDURE — 36561 INSERT TUNNELED CV CATH: CPT | Mod: LT,,, | Performed by: SURGERY

## 2024-05-23 PROCEDURE — 86920 COMPATIBILITY TEST SPIN: CPT

## 2024-05-23 PROCEDURE — 71000015 HC POSTOP RECOV 1ST HR: Performed by: ORTHOPAEDIC SURGERY

## 2024-05-23 PROCEDURE — 20600001 HC STEP DOWN PRIVATE ROOM

## 2024-05-23 PROCEDURE — D9220A PRA ANESTHESIA: Mod: AA,,, | Performed by: ANESTHESIOLOGY

## 2024-05-23 PROCEDURE — 0JH63XZ INSERTION OF TUNNELED VASCULAR ACCESS DEVICE INTO CHEST SUBCUTANEOUS TISSUE AND FASCIA, PERCUTANEOUS APPROACH: ICD-10-PCS | Performed by: SURGERY

## 2024-05-23 PROCEDURE — 88342 IMHCHEM/IMCYTCHM 1ST ANTB: CPT | Performed by: PATHOLOGY

## 2024-05-23 PROCEDURE — 37000009 HC ANESTHESIA EA ADD 15 MINS: Performed by: ORTHOPAEDIC SURGERY

## 2024-05-23 PROCEDURE — 88341 IMHCHEM/IMCYTCHM EA ADD ANTB: CPT | Mod: 26,,, | Performed by: PATHOLOGY

## 2024-05-23 PROCEDURE — 88305 TISSUE EXAM BY PATHOLOGIST: CPT | Performed by: PATHOLOGY

## 2024-05-23 PROCEDURE — C1713 ANCHOR/SCREW BN/BN,TIS/BN: HCPCS | Performed by: ORTHOPAEDIC SURGERY

## 2024-05-23 PROCEDURE — 71000016 HC POSTOP RECOV ADDL HR: Performed by: ORTHOPAEDIC SURGERY

## 2024-05-23 PROCEDURE — 0QH606Z INSERTION OF INTRAMEDULLARY INTERNAL FIXATION DEVICE INTO RIGHT UPPER FEMUR, OPEN APPROACH: ICD-10-PCS | Performed by: ORTHOPAEDIC SURGERY

## 2024-05-23 PROCEDURE — 36000710: Performed by: ORTHOPAEDIC SURGERY

## 2024-05-23 DEVICE — SCREW TFN ADVANCE 95MM: Type: IMPLANTABLE DEVICE | Site: FEMUR | Status: FUNCTIONAL

## 2024-05-23 DEVICE — KIT POWERPORT SINGLE 8FR: Type: IMPLANTABLE DEVICE | Site: CHEST | Status: FUNCTIONAL

## 2024-05-23 DEVICE — SCREW XL25 IM NAIL 36X5MM: Type: IMPLANTABLE DEVICE | Site: FEMUR | Status: FUNCTIONAL

## 2024-05-23 DEVICE — NAIL TFN-ADV R 130DEG 10X380MM: Type: IMPLANTABLE DEVICE | Site: FEMUR | Status: FUNCTIONAL

## 2024-05-23 DEVICE — SCREW XL25 IM NAIL 40X5MM: Type: IMPLANTABLE DEVICE | Site: FEMUR | Status: FUNCTIONAL

## 2024-05-23 RX ORDER — METHOCARBAMOL 750 MG/1
750 TABLET, FILM COATED ORAL EVERY 6 HOURS
Status: DISCONTINUED | OUTPATIENT
Start: 2024-05-23 | End: 2024-05-25 | Stop reason: HOSPADM

## 2024-05-23 RX ORDER — CELECOXIB 100 MG/1
200 CAPSULE ORAL DAILY
Status: DISCONTINUED | OUTPATIENT
Start: 2024-05-24 | End: 2024-05-25 | Stop reason: HOSPADM

## 2024-05-23 RX ORDER — ALLOPURINOL 100 MG/1
100 TABLET ORAL DAILY
Status: DISCONTINUED | OUTPATIENT
Start: 2024-05-24 | End: 2024-05-25 | Stop reason: HOSPADM

## 2024-05-23 RX ORDER — OXYCODONE HYDROCHLORIDE 5 MG/1
5 TABLET ORAL
Status: DISCONTINUED | OUTPATIENT
Start: 2024-05-23 | End: 2024-05-23

## 2024-05-23 RX ORDER — PHENYLEPHRINE HYDROCHLORIDE 10 MG/ML
INJECTION INTRAVENOUS
Status: DISCONTINUED | OUTPATIENT
Start: 2024-05-23 | End: 2024-05-23

## 2024-05-23 RX ORDER — FENTANYL CITRATE 50 UG/ML
25-200 INJECTION, SOLUTION INTRAMUSCULAR; INTRAVENOUS
Status: DISCONTINUED | OUTPATIENT
Start: 2024-05-23 | End: 2024-05-23

## 2024-05-23 RX ORDER — METHOCARBAMOL 500 MG/1
500 TABLET, FILM COATED ORAL 4 TIMES DAILY
Status: DISCONTINUED | OUTPATIENT
Start: 2024-05-23 | End: 2024-05-23

## 2024-05-23 RX ORDER — HALOPERIDOL 5 MG/ML
0.5 INJECTION INTRAMUSCULAR EVERY 10 MIN PRN
Status: DISCONTINUED | OUTPATIENT
Start: 2024-05-23 | End: 2024-05-23 | Stop reason: HOSPADM

## 2024-05-23 RX ORDER — ACETAMINOPHEN 500 MG
1000 TABLET ORAL EVERY 6 HOURS
Status: DISCONTINUED | OUTPATIENT
Start: 2024-05-23 | End: 2024-05-23

## 2024-05-23 RX ORDER — ROCURONIUM BROMIDE 10 MG/ML
INJECTION, SOLUTION INTRAVENOUS
Status: DISCONTINUED | OUTPATIENT
Start: 2024-05-23 | End: 2024-05-23

## 2024-05-23 RX ORDER — ACETAMINOPHEN 500 MG
1000 TABLET ORAL EVERY 8 HOURS
Status: DISCONTINUED | OUTPATIENT
Start: 2024-05-25 | End: 2024-05-25 | Stop reason: HOSPADM

## 2024-05-23 RX ORDER — NAPROXEN SODIUM 220 MG/1
81 TABLET, FILM COATED ORAL 2 TIMES DAILY
Status: DISCONTINUED | OUTPATIENT
Start: 2024-05-23 | End: 2024-05-23

## 2024-05-23 RX ORDER — NAPROXEN SODIUM 220 MG/1
81 TABLET, FILM COATED ORAL 2 TIMES DAILY
Status: DISCONTINUED | OUTPATIENT
Start: 2024-05-23 | End: 2024-05-25 | Stop reason: HOSPADM

## 2024-05-23 RX ORDER — MIDAZOLAM HYDROCHLORIDE 1 MG/ML
.5-4 INJECTION, SOLUTION INTRAMUSCULAR; INTRAVENOUS
Status: DISCONTINUED | OUTPATIENT
Start: 2024-05-23 | End: 2024-05-23

## 2024-05-23 RX ORDER — BUPIVACAINE HYDROCHLORIDE 2.5 MG/ML
INJECTION, SOLUTION EPIDURAL; INFILTRATION; INTRACAUDAL
Status: DISCONTINUED | OUTPATIENT
Start: 2024-05-23 | End: 2024-05-23 | Stop reason: HOSPADM

## 2024-05-23 RX ORDER — DEXMEDETOMIDINE HYDROCHLORIDE 100 UG/ML
INJECTION, SOLUTION INTRAVENOUS
Status: DISCONTINUED | OUTPATIENT
Start: 2024-05-23 | End: 2024-05-23

## 2024-05-23 RX ORDER — SODIUM CHLORIDE 0.9 % (FLUSH) 0.9 %
10 SYRINGE (ML) INJECTION
Status: DISCONTINUED | OUTPATIENT
Start: 2024-05-23 | End: 2024-05-23 | Stop reason: HOSPADM

## 2024-05-23 RX ORDER — CEFAZOLIN SODIUM 1 G/3ML
INJECTION, POWDER, FOR SOLUTION INTRAMUSCULAR; INTRAVENOUS
Status: DISCONTINUED | OUTPATIENT
Start: 2024-05-23 | End: 2024-05-23

## 2024-05-23 RX ORDER — OXYCODONE HYDROCHLORIDE 10 MG/1
10 TABLET ORAL EVERY 4 HOURS PRN
Status: DISCONTINUED | OUTPATIENT
Start: 2024-05-23 | End: 2024-05-24

## 2024-05-23 RX ORDER — SENNOSIDES 8.6 MG/1
8.6 TABLET ORAL DAILY
Status: DISCONTINUED | OUTPATIENT
Start: 2024-05-23 | End: 2024-05-25 | Stop reason: HOSPADM

## 2024-05-23 RX ORDER — HYDROMORPHONE HYDROCHLORIDE 1 MG/ML
0.2 INJECTION, SOLUTION INTRAMUSCULAR; INTRAVENOUS; SUBCUTANEOUS EVERY 5 MIN PRN
Status: DISCONTINUED | OUTPATIENT
Start: 2024-05-23 | End: 2024-05-23 | Stop reason: HOSPADM

## 2024-05-23 RX ORDER — PROPOFOL 10 MG/ML
VIAL (ML) INTRAVENOUS
Status: DISCONTINUED | OUTPATIENT
Start: 2024-05-23 | End: 2024-05-23

## 2024-05-23 RX ORDER — ROPIVACAINE HYDROCHLORIDE 5 MG/ML
INJECTION, SOLUTION EPIDURAL; INFILTRATION; PERINEURAL
Status: COMPLETED | OUTPATIENT
Start: 2024-05-23 | End: 2024-05-23

## 2024-05-23 RX ORDER — IBUPROFEN 200 MG
400 TABLET ORAL EVERY 6 HOURS PRN
Status: DISCONTINUED | OUTPATIENT
Start: 2024-05-23 | End: 2024-05-24

## 2024-05-23 RX ORDER — FENTANYL CITRATE 50 UG/ML
INJECTION, SOLUTION INTRAMUSCULAR; INTRAVENOUS
Status: DISCONTINUED | OUTPATIENT
Start: 2024-05-23 | End: 2024-05-23

## 2024-05-23 RX ORDER — OXYCODONE HYDROCHLORIDE 5 MG/1
5 TABLET ORAL EVERY 4 HOURS PRN
Status: DISCONTINUED | OUTPATIENT
Start: 2024-05-23 | End: 2024-05-24

## 2024-05-23 RX ORDER — OXYCODONE HYDROCHLORIDE 5 MG/1
5 TABLET ORAL EVERY 6 HOURS PRN
Status: DISCONTINUED | OUTPATIENT
Start: 2024-05-23 | End: 2024-05-23

## 2024-05-23 RX ORDER — GABAPENTIN 300 MG/1
300 CAPSULE ORAL EVERY 8 HOURS
Status: DISCONTINUED | OUTPATIENT
Start: 2024-05-23 | End: 2024-05-25 | Stop reason: HOSPADM

## 2024-05-23 RX ORDER — POLYETHYLENE GLYCOL 3350 17 G/17G
17 POWDER, FOR SOLUTION ORAL DAILY
Status: DISCONTINUED | OUTPATIENT
Start: 2024-05-23 | End: 2024-05-25 | Stop reason: HOSPADM

## 2024-05-23 RX ORDER — LIDOCAINE HYDROCHLORIDE 20 MG/ML
INJECTION INTRAVENOUS
Status: DISCONTINUED | OUTPATIENT
Start: 2024-05-23 | End: 2024-05-23

## 2024-05-23 RX ORDER — ACETAMINOPHEN 500 MG
1000 TABLET ORAL EVERY 6 HOURS
Status: COMPLETED | OUTPATIENT
Start: 2024-05-23 | End: 2024-05-25

## 2024-05-23 RX ORDER — DEXAMETHASONE SODIUM PHOSPHATE 4 MG/ML
INJECTION, SOLUTION INTRA-ARTICULAR; INTRALESIONAL; INTRAMUSCULAR; INTRAVENOUS; SOFT TISSUE
Status: DISCONTINUED | OUTPATIENT
Start: 2024-05-23 | End: 2024-05-23

## 2024-05-23 RX ORDER — MORPHINE SULFATE 15 MG/1
15 TABLET, FILM COATED, EXTENDED RELEASE ORAL EVERY 8 HOURS
Status: DISCONTINUED | OUTPATIENT
Start: 2024-05-23 | End: 2024-05-24

## 2024-05-23 RX ORDER — NALOXONE HCL 0.4 MG/ML
0.4 VIAL (ML) INJECTION
Status: DISCONTINUED | OUTPATIENT
Start: 2024-05-23 | End: 2024-05-25 | Stop reason: HOSPADM

## 2024-05-23 RX ORDER — HEPARIN 100 UNIT/ML
SYRINGE INTRAVENOUS
Status: DISCONTINUED | OUTPATIENT
Start: 2024-05-23 | End: 2024-05-23 | Stop reason: HOSPADM

## 2024-05-23 RX ORDER — ROPIVACAINE HYDROCHLORIDE 2 MG/ML
INJECTION, SOLUTION EPIDURAL; INFILTRATION; PERINEURAL CONTINUOUS
Status: DISCONTINUED | OUTPATIENT
Start: 2024-05-23 | End: 2024-05-25 | Stop reason: HOSPADM

## 2024-05-23 RX ORDER — ONDANSETRON HYDROCHLORIDE 2 MG/ML
INJECTION, SOLUTION INTRAVENOUS
Status: DISCONTINUED | OUTPATIENT
Start: 2024-05-23 | End: 2024-05-23

## 2024-05-23 RX ORDER — TRANEXAMIC ACID 100 MG/ML
INJECTION, SOLUTION INTRAVENOUS
Status: DISCONTINUED | OUTPATIENT
Start: 2024-05-23 | End: 2024-05-23

## 2024-05-23 RX ADMIN — ONDANSETRON 4 MG: 2 INJECTION INTRAMUSCULAR; INTRAVENOUS at 10:05

## 2024-05-23 RX ADMIN — ACETAMINOPHEN 1000 MG: 500 TABLET ORAL at 11:05

## 2024-05-23 RX ADMIN — HYDROMORPHONE HYDROCHLORIDE 0.2 MG: 1 INJECTION, SOLUTION INTRAMUSCULAR; INTRAVENOUS; SUBCUTANEOUS at 12:05

## 2024-05-23 RX ADMIN — DEXMEDETOMIDINE 12 MCG: 100 INJECTION, SOLUTION, CONCENTRATE INTRAVENOUS at 11:05

## 2024-05-23 RX ADMIN — TRANEXAMIC ACID 1000 MG: 100 INJECTION INTRAVENOUS at 08:05

## 2024-05-23 RX ADMIN — ROCURONIUM BROMIDE 40 MG: 10 INJECTION, SOLUTION INTRAVENOUS at 07:05

## 2024-05-23 RX ADMIN — METHOCARBAMOL 750 MG: 750 TABLET ORAL at 11:05

## 2024-05-23 RX ADMIN — HYDROMORPHONE HYDROCHLORIDE 0.2 MG: 1 INJECTION, SOLUTION INTRAMUSCULAR; INTRAVENOUS; SUBCUTANEOUS at 11:05

## 2024-05-23 RX ADMIN — ASPIRIN 81 MG CHEWABLE TABLET 81 MG: 81 TABLET CHEWABLE at 09:05

## 2024-05-23 RX ADMIN — GABAPENTIN 300 MG: 300 CAPSULE ORAL at 09:05

## 2024-05-23 RX ADMIN — SODIUM CHLORIDE: 9 INJECTION, SOLUTION INTRAVENOUS at 05:05

## 2024-05-23 RX ADMIN — ROCURONIUM BROMIDE 10 MG: 10 INJECTION, SOLUTION INTRAVENOUS at 09:05

## 2024-05-23 RX ADMIN — Medication: at 11:05

## 2024-05-23 RX ADMIN — SODIUM CHLORIDE: 9 INJECTION, SOLUTION INTRAVENOUS at 07:05

## 2024-05-23 RX ADMIN — CEFAZOLIN 2 G: 2 INJECTION, POWDER, FOR SOLUTION INTRAMUSCULAR; INTRAVENOUS at 11:05

## 2024-05-23 RX ADMIN — DEXAMETHASONE SODIUM PHOSPHATE 4 MG: 4 INJECTION, SOLUTION INTRAMUSCULAR; INTRAVENOUS at 10:05

## 2024-05-23 RX ADMIN — FENTANYL CITRATE 100 MCG: 50 INJECTION, SOLUTION INTRAMUSCULAR; INTRAVENOUS at 07:05

## 2024-05-23 RX ADMIN — PROPOFOL 170 MG: 10 INJECTION, EMULSION INTRAVENOUS at 07:05

## 2024-05-23 RX ADMIN — FENTANYL CITRATE 50 MCG: 50 INJECTION INTRAMUSCULAR; INTRAVENOUS at 06:05

## 2024-05-23 RX ADMIN — ACETAMINOPHEN 1000 MG: 500 TABLET ORAL at 05:05

## 2024-05-23 RX ADMIN — SODIUM CHLORIDE, SODIUM GLUCONATE, SODIUM ACETATE, POTASSIUM CHLORIDE, MAGNESIUM CHLORIDE, SODIUM PHOSPHATE, DIBASIC, AND POTASSIUM PHOSPHATE: .53; .5; .37; .037; .03; .012; .00082 INJECTION, SOLUTION INTRAVENOUS at 07:05

## 2024-05-23 RX ADMIN — HYDROMORPHONE HYDROCHLORIDE 0.5 MG: 1 INJECTION, SOLUTION INTRAMUSCULAR; INTRAVENOUS; SUBCUTANEOUS at 12:05

## 2024-05-23 RX ADMIN — ROCURONIUM BROMIDE 10 MG: 10 INJECTION, SOLUTION INTRAVENOUS at 08:05

## 2024-05-23 RX ADMIN — OXYCODONE HYDROCHLORIDE 10 MG: 10 TABLET ORAL at 11:05

## 2024-05-23 RX ADMIN — LIDOCAINE HYDROCHLORIDE 60 MG: 20 INJECTION INTRAVENOUS at 07:05

## 2024-05-23 RX ADMIN — ROCURONIUM BROMIDE 20 MG: 10 INJECTION, SOLUTION INTRAVENOUS at 09:05

## 2024-05-23 RX ADMIN — CEFAZOLIN 2 G: 2 INJECTION, POWDER, FOR SOLUTION INTRAMUSCULAR; INTRAVENOUS at 04:05

## 2024-05-23 RX ADMIN — PHENYLEPHRINE HYDROCHLORIDE 100 MCG: 10 INJECTION INTRAVENOUS at 07:05

## 2024-05-23 RX ADMIN — ROPIVACAINE HYDROCHLORIDE 20 ML: 5 INJECTION EPIDURAL; INFILTRATION; PERINEURAL at 06:05

## 2024-05-23 RX ADMIN — CEFAZOLIN 2 G: 330 INJECTION, POWDER, FOR SOLUTION INTRAMUSCULAR; INTRAVENOUS at 07:05

## 2024-05-23 RX ADMIN — MIDAZOLAM 1 MG: 1 INJECTION INTRAMUSCULAR; INTRAVENOUS at 06:05

## 2024-05-23 RX ADMIN — METHOCARBAMOL 750 MG: 750 TABLET ORAL at 05:05

## 2024-05-23 RX ADMIN — MIDAZOLAM 2 MG: 1 INJECTION INTRAMUSCULAR; INTRAVENOUS at 06:05

## 2024-05-23 RX ADMIN — DEXMEDETOMIDINE 12 MCG: 100 INJECTION, SOLUTION, CONCENTRATE INTRAVENOUS at 12:05

## 2024-05-23 RX ADMIN — MORPHINE SULFATE 15 MG: 15 TABLET, EXTENDED RELEASE ORAL at 09:05

## 2024-05-23 NOTE — NURSING
Nurses Note -- 4 Eyes      5/22/2024   8:00 PM      Skin assessed during: Admit      [x] No Altered Skin Integrity Present    [x]Prevention Measures Documented  Has a R groin puncture from IR    [] Yes- Altered Skin Integrity Present or Discovered   [] LDA Added if Not in Epic (Describe Wound)   [] New Altered Skin Integrity was Present on Admit and Documented in LDA   [] Wound Image Taken    Wound Care Consulted? No    Attending Nurse:  Elsa TORRES    Second RN/Staff Member:   Cynthia PIERSON

## 2024-05-23 NOTE — ANESTHESIA PROCEDURE NOTES
Right SIFI Catheter    Patient location during procedure: pre-op   Block not for primary anesthetic.  Reason for block: at surgeon's request and post-op pain management   Post-op Pain Location: Right Leg Pain   Start time: 5/23/2024 6:35 AM  Timeout: 5/23/2024 6:35 AM   End time: 5/23/2024 6:45 AM    Staffing  Authorizing Provider: Graham Nicolas MD  Performing Provider: Kelsea Stratton DO    Staffing  Performed by: Kelsea Stratton DO  Authorized by: Graham Nicolas MD    Preanesthetic Checklist  Completed: patient identified, IV checked, site marked, risks and benefits discussed, surgical consent, monitors and equipment checked, pre-op evaluation and timeout performed  Peripheral Block  Patient position: supine  Prep: ChloraPrep and site prepped and draped  Patient monitoring: heart rate, cardiac monitor, continuous pulse ox, continuous capnometry and frequent blood pressure checks  Block type: fascia iliaca  Laterality: right  Injection technique: continuous  Needle  Needle type: Tuohy   Needle gauge: 17 G  Needle length: 3.5 in  Needle localization: anatomical landmarks and ultrasound guidance  Catheter type: spring wound  Catheter size: 19 G  Test dose: lidocaine 1.5% with Epi 1-to-200,000 and negative   -ultrasound image captured on disc.  Assessment  Injection assessment: negative aspiration, negative parasthesia and local visualized surrounding nerve  Paresthesia pain: none  Heart rate change: no  Slow fractionated injection: yes  Pain Tolerance: comfortable throughout block and no complaints  Medications:    Medications: ropivacaine (NAROPIN) injection 0.5% - Perineural   20 mL - 5/23/2024 6:45:00 AM    Additional Notes  A time out was conducted. Site gauri confirmed with team and patient. Allergies reviewed.   Vital signs stable throughout block. RN monitoring vitals throughout.   Needle advanced under continuous ultrasound guidance.  Local injected incrementally after confirming negative  aspiration. No signs or symptoms of intravascular or intraneural injection noted.   No persistent paresthesias elicited or expressed.   40cc 0.25% ropivacaine with epinephrine 1:300K for the block.    VSS.  DOSC RN monitoring vitals throughout procedure.  Patient tolerated procedure well.

## 2024-05-23 NOTE — OP NOTE
[facility] - Surgery ([facility])  Surgery Department  Operative Note       Date of Procedure: 5/23/2024     Procedure: Procedure(s) (LRB):  INSERTION, INTRAMEDULLARY NGHIA (Right)  INSERTION, SINGLE LUMEN CATHETER WITH PORT, WITH FLUOROSCOPIC GUIDANCE (Left)     Surgeons and Role:    Panel 2:     * Jj Reilly MD - Primary        Pre-Operative Diagnosis: Impending pathologic fracture [Z91.89]  Metastatic RCC      Post-Operative Diagnosis:   Same      Anesthesia: General    Operative Findings:   Port in satisfactory position on fluoroscopy, flushes and withdraws    Procedures:  Left IJ port under US and fluoroscopic guidance    Estimated Blood Loss (EBL):  5 mL           Indications:  Gamaliel Pete  presents for the above procedures.  Risks and benefits were reviewed including bleeding, infection, damage to local structures, cardiovascular and pulmonary complications, need for additional procedures, death, and imponderables.  He understands and gave informed consent to proceed.    Details:  The patient was transported to the operating room and satisfactory anesthesia established.  Preoperative antibiotics were administered.  The patient was placed in the supine position and extremities were padded and protected throughout.  A hyde catheter was placed. An appropriate timeout was performed.    Tocal anesthesia was infiltrated into the skin and an 18g needle was used to access to the left IJ vein under US guidance. A wire was fed easily and viewed to be in good position on fluoroscopy. A pocket was made inferiorly and the catheter tunneled to the wire insertion site. The port was secured with vicryl sutures into the pocket. The catheter was measured and cut. A dilator and sheath were then inserted over the wire under fluoroscopy. The dilator and wire were removed and the catheter inserted through the sheath. A final fluoro shot was performed and the catheter was viewed to be in correct position without kinks.  This image was saved in the chart. The port was aspirated easily and flushed with saline and hep locked. The incision was closed in layers with vicryl and monocryl sutures and dressed with Dermabond. Patient tolerated the procedure without apparent complication. A post procedure chest film will be performed.    All needle, lap, and sponge counts were reported as correct.   Specimens:   none              Condition: stable    Disposition:  remained in OR for case completion    Attestation: I was present and scrubbed for the entire procedure.

## 2024-05-23 NOTE — NURSING
Patient back from PACU at around at 1520.A&O.Bed in low and locked position.call light and personal item within reach.

## 2024-05-23 NOTE — PLAN OF CARE
Problem: Adult Inpatient Plan of Care  Goal: Plan of Care Review  Outcome: Progressing  Goal: Patient-Specific Goal (Individualized)  Outcome: Progressing  Goal: Absence of Hospital-Acquired Illness or Injury  Outcome: Progressing  Goal: Optimal Comfort and Wellbeing  Outcome: Progressing  Goal: Readiness for Transition of Care  Outcome: Progressing     Problem: Wound  Goal: Optimal Coping  Outcome: Progressing

## 2024-05-23 NOTE — H&P
No changes to recent H and P for surgery. Here for planned right femur IMN. No recent changes in health. Patient would like to proceed with surgery. Surgery to consent for port placement to follow same case.

## 2024-05-23 NOTE — NURSING
Patient is alert and oriented.Remained afebrile.NS infusing 75 ml/hr.Ropivacaine continues infusion.Denied pain nausea at the moment.Bed in low and locked position.call light and personal item within reach.family at the bed side.

## 2024-05-23 NOTE — NURSING TRANSFER
Nursing Transfer Note      5/23/2024       Nurse giving handoff:Terrance TORRES    Nurse receiving handoff:Dafne TORRES    Reason patient is being transferred: post procedure    Transfer To: Room 828    Transfer via bed    Transported by Terrance TORRES and Rhiannon TORRES    Transfer Vital Signs:see flowsheet    Order for Tele Monitor? No    4eyes on Skin: no    Medicines sent: None    Any special needs or follow-up needed: Routine    Patient belongings transferred with patient:  Port access supplies and perineural pump box    Chart send with patient: Yes    Notified: spouse and son present in the room    Patient reassessed at: 5/23/2024 7268(date, time)    Upon arrival to floor: call bell in reach and bed in lowest position

## 2024-05-23 NOTE — ANESTHESIA PROCEDURE NOTES
Intubation    Date/Time: 5/23/2024 7:27 AM    Performed by: Artemio Sloan MD  Authorized by: Dimitrios Wells MD    Intubation:     Induction:  Intravenous    Intubated:  Postinduction    Mask Ventilation:  Easy with oral airway    Attempts:  1    Attempted By:  Resident anesthesiologist    Method of Intubation:  Video laryngoscopy    Blade:  Yoder 3    Laryngeal View Grade: Grade I - full view of cords      Difficult Airway Encountered?: No      Complications:  None    Airway Device:  Oral endotracheal tube    Airway Device Size:  7.5    Style/Cuff Inflation:  Cuffed (inflated to minimal occlusive pressure)    Inflation Amount (mL):  8    Tube secured:  23    Secured at:  The lips    Placement Verified By:  Capnometry    Complicating Factors:  None    Findings Post-Intubation:  BS equal bilateral and atraumatic/condition of teeth unchanged

## 2024-05-23 NOTE — SUBJECTIVE & OBJECTIVE
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 JEAN SANDRA. Reported worsening right shoulder pain and pain in his right femur. Had paresthesia of  bilat hands after embolization but has resolved. Denies paresthesia of bilat LE. He his hyperkalemic to 5.4 on morning labs.    Review of patient's allergies indicates:   Allergen Reactions    Vancomycin analogues Rash     IV antibiotic caused a rash--RED MAN SYNDROME       Current Facility-Administered Medications   Medication    0.9%  NaCl infusion    acetaminophen tablet 650 mg    allopurinoL tablet 100 mg    amlodipine-benazepril 5-10 mg per capsule 1 capsule    ceFAZolin 2 g in dextrose 5 % in water (D5W) 50 mL IVPB (MB+)    fentaNYL 50 mcg/mL injection  mcg    HYDROmorphone injection 0.5 mg    LIDOcaine (PF) 10 mg/ml (1%) injection 10 mg    midazolam injection 0.5-4 mg    ondansetron injection 4 mg    oxyCODONE immediate release tablet 5 mg    tranexamic acid (CYKLOKAPRON) 1,000 mg in sodium chloride 0.9 % 100 mL IVPB (MB+)     Facility-Administered Medications Ordered in Other Encounters   Medication    allopurinoL tablet 100 mg    amlodipine-benazepril 5-10 mg per capsule 1 capsule    ascorbic acid (vitamin C) tablet 250 mg    calcium carbonate 200 mg calcium (500 mg) chewable tablet 500 mg    celecoxib capsule 200 mg    dextrose 10% bolus 125 mL 125 mL    dextrose 10% bolus 250 mL 250 mL    docusate sodium capsule 100 mg    gabapentin capsule 300 mg    glucagon (human recombinant) injection 1 mg    glucose chewable tablet 16 g    glucose chewable tablet 24 g    magnesium oxide tablet 800 mg    melatonin tablet 6 mg    methocarbamoL tablet 750 mg    multivitamin tablet    naloxone 0.4 mg/mL injection 0.4 mg    ondansetron injection 4 mg    polyethylene glycol packet 17 g    potassium bicarbonate disintegrating tablet 50 mEq    potassium,  "sodium phosphates 280-160-250 mg packet 2 packet    promethazine tablet 25 mg    sodium chloride 0.9% flush 10 mL    vitamin D 1000 units tablet 2,000 Units     Objective:     Vital Signs (Most Recent):  Temp: 97.2 °F (36.2 °C) (05/23/24 0347)  Pulse: 75 (05/23/24 0347)  Resp: 17 (05/23/24 0347)  BP: 134/68 (05/23/24 0347)  SpO2: 97 % (05/23/24 0347) Vital Signs (24h Range):  Temp:  [97.2 °F (36.2 °C)-98.3 °F (36.8 °C)] 97.2 °F (36.2 °C)  Pulse:  [70-92] 75  Resp:  [8-31] 17  SpO2:  [95 %-100 %] 97 %  BP: (107-139)/(53-74) 134/68     Weight: 75.4 kg (166 lb 3.6 oz)  Height: 5' 10" (177.8 cm)  Body mass index is 23.85 kg/m².      Intake/Output Summary (Last 24 hours) at 5/23/2024 0600  Last data filed at 5/22/2024 1444  Gross per 24 hour   Intake 300 ml   Output --   Net 300 ml        Ortho/SPM Exam     AAOx4  NAD  Reg rate  No increased WOB    Bilat LE    Skin intact without wounds.  Compartments soft/compressible  SILT throughout  Motor intact TA/EHL/Gastroc/FHL   DP pulse 2+. Brisk cap refill      Significant Labs: CBC:   Recent Labs   Lab 05/22/24  2327   WBC 2.80*   HGB 12.0*   HCT 36.1*        CMP:   Recent Labs   Lab 05/22/24  2327      K 5.4*      CO2 23   *   BUN 9   CREATININE 1.0   CALCIUM 9.2   ANIONGAP 10     All pertinent labs within the past 24 hours have been reviewed.    Significant Imaging: I have reviewed and interpreted all pertinent imaging results/findings.  "

## 2024-05-23 NOTE — ASSESSMENT & PLAN NOTE
Gamaliel VANDANA Pete Jr. is a 73 y.o. male with a history or RCC with mets to bilat femur who is here today for prophylactic IMN of R femur and port placement by gen surgery. He is is s/p IR embolization.    - Booked, marked and consented for OR today  - Antibiotics: Preop Ancef  - WBAT RLE  - DVT Prophylaxis: SCDs at all times while in bed  - Pain control: multimodal pain management

## 2024-05-23 NOTE — TRANSFER OF CARE
"Anesthesia Transfer of Care Note    Patient: Gamaliel Pete Jr.    Procedure(s) Performed: Procedure(s) (LRB):  INSERTION, INTRAMEDULLARY NGHIA (Right)  INSERTION, SINGLE LUMEN CATHETER WITH PORT, WITH FLUOROSCOPIC GUIDANCE (Left)    Patient location: PACU    Anesthesia Type: general    Transport from OR: Transported from OR on 6-10 L/min O2 by face mask with adequate spontaneous ventilation    Post pain: adequate analgesia    Post assessment: no apparent anesthetic complications    Post vital signs: stable    Level of consciousness: awake and alert    Nausea/Vomiting: no nausea/vomiting    Complications: none    Transfer of care protocol was followed      Last vitals: Visit Vitals  BP (!) 111/58 (BP Location: Left arm, Patient Position: Lying)   Pulse 80   Temp 36.2 °C (97.2 °F) (Oral)   Resp (!) 21   Ht 5' 10" (1.778 m)   Wt 75.4 kg (166 lb 3.6 oz)   SpO2 100%   BMI 23.85 kg/m²     "

## 2024-05-23 NOTE — PROGRESS NOTES
[facility] - Oncology ([facility])  Orthopedics  Progress Note    Patient Name: Gamaliel Pete Jr.  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 VSSLizz SANDRA. Reported worsening right shoulder pain and pain in his right femur. Had paresthesia of  bilat hands after embolization but has resolved. Denies paresthesia of bilat LE. He his hyperkalemic to 5.4 on morning labs.    Review of patient's allergies indicates:   Allergen Reactions    Vancomycin analogues Rash     IV antibiotic caused a rash--RED MAN SYNDROME       Current Facility-Administered Medications   Medication    0.9%  NaCl infusion    acetaminophen tablet 650 mg    allopurinoL tablet 100 mg    amlodipine-benazepril 5-10 mg per capsule 1 capsule    ceFAZolin 2 g in dextrose 5 % in water (D5W) 50 mL IVPB (MB+)    fentaNYL 50 mcg/mL injection  mcg    HYDROmorphone injection 0.5 mg    LIDOcaine (PF) 10 mg/ml (1%) injection 10 mg    midazolam injection 0.5-4 mg    ondansetron injection 4 mg    oxyCODONE immediate release tablet 5 mg    tranexamic acid (CYKLOKAPRON) 1,000 mg in sodium chloride 0.9 % 100 mL IVPB (MB+)     Facility-Administered Medications Ordered in Other Encounters   Medication    allopurinoL tablet 100 mg    amlodipine-benazepril 5-10 mg per capsule 1 capsule    ascorbic acid (vitamin C) tablet 250 mg    calcium carbonate 200 mg calcium (500 mg) chewable tablet 500 mg    celecoxib capsule 200 mg    dextrose 10% bolus 125 mL 125 mL    dextrose 10% bolus 250 mL 250 mL    docusate sodium capsule 100 mg    gabapentin capsule 300 mg    glucagon (human recombinant)  "injection 1 mg    glucose chewable tablet 16 g    glucose chewable tablet 24 g    magnesium oxide tablet 800 mg    melatonin tablet 6 mg    methocarbamoL tablet 750 mg    multivitamin tablet    naloxone 0.4 mg/mL injection 0.4 mg    ondansetron injection 4 mg    polyethylene glycol packet 17 g    potassium bicarbonate disintegrating tablet 50 mEq    potassium, sodium phosphates 280-160-250 mg packet 2 packet    promethazine tablet 25 mg    sodium chloride 0.9% flush 10 mL    vitamin D 1000 units tablet 2,000 Units     Objective:     Vital Signs (Most Recent):  Temp: 97.2 °F (36.2 °C) (05/23/24 0347)  Pulse: 75 (05/23/24 0347)  Resp: 17 (05/23/24 0347)  BP: 134/68 (05/23/24 0347)  SpO2: 97 % (05/23/24 0347) Vital Signs (24h Range):  Temp:  [97.2 °F (36.2 °C)-98.3 °F (36.8 °C)] 97.2 °F (36.2 °C)  Pulse:  [70-92] 75  Resp:  [8-31] 17  SpO2:  [95 %-100 %] 97 %  BP: (107-139)/(53-74) 134/68     Weight: 75.4 kg (166 lb 3.6 oz)  Height: 5' 10" (177.8 cm)  Body mass index is 23.85 kg/m².      Intake/Output Summary (Last 24 hours) at 5/23/2024 0600  Last data filed at 5/22/2024 1444  Gross per 24 hour   Intake 300 ml   Output --   Net 300 ml        Ortho/SPM Exam     AAOx4  NAD  Reg rate  No increased WOB    RUE  Skin  intact, no open wound  Compartments soft and compressible  SILT   3/5 strength with abduction and extension  Bilat LE    Skin intact without wounds.  Compartments soft/compressible  SILT throughout  Motor intact TA/EHL/Gastroc/FHL   DP pulse 2+. Brisk cap refill      Significant Labs: CBC:   Recent Labs   Lab 05/22/24 2327   WBC 2.80*   HGB 12.0*   HCT 36.1*        CMP:   Recent Labs   Lab 05/22/24 2327      K 5.4*      CO2 23   *   BUN 9   CREATININE 1.0   CALCIUM 9.2   ANIONGAP 10     All pertinent labs within the past 24 hours have been reviewed.    Significant Imaging: I have reviewed and interpreted all pertinent imaging results/findings.  Assessment/Plan:     Metastatic renal " cell carcinoma to bone  Gamaliel Pete Jr. is a 73 y.o. male with a history or RCC with mets to bilat femur who is here today for prophylactic IMN of R femur and port placement by gen surgery. He is is s/p IR embolization.    - Booked, marked and consented for OR today  - Antibiotics: Preop Ancef  - WBAT RLE  - DVT Prophylaxis: SCDs at all times while in bed  - Pain control: multimodal pain management            Audi Lopez MD  Orthopedics  [facility] - Oncology ([facility])    NPO for surgery, marked in preop, ancef, SIFI block  Consent for port by surgery pending  2 units on hold for OR

## 2024-05-23 NOTE — OP NOTE
Orthopaedic Oncology Operative Note  Service Orthopedics  5/23/2024, 6:39 AM    Date of Service: 5/23/2024    Pre-Op Diagnosis:  Impending pathologic fracture [Z91.89]    Post-Op Diagnosis:  Post-Op Diagnosis Codes:     * Impending pathologic fracture [Z91.89]    Procedure Performed/Description:  Procedure(s):  INSERTION, INTRAMEDULLARY NGHIA (Right)  INSERTION, SINGLE LUMEN CATHETER WITH PORT, WITH FLUOROSCOPIC GUIDANCE (N/A)    Name of Surgeon and Assistants:  Surgeons and Role:  Panel 1:     * Parvez Rock MD - Primary  Panel 2:     * Jj Reilly MD - Primary    Type of Anesthesia:  General    DVT Prophylaxis: SCD    Findings:  abnormal bone consistent with known metastatic renal cell carcinoma    Specimens Removed:  reamings sent to pathology    Implants:   Implant Name Type Inv. Item Serial No.  Lot No. LRB No. Used Action   KIT POWERPORT SINGLE 8FR - [id number]  KIT POWERPORT SINGLE 8FR [id number] [employer] [id number] Left 1 Implanted   WIRE GUIDE 3.2MM 400MM - [id number]  WIRE GUIDE 3.2MM 400MM  [employer]  Right 2 Implanted and Explanted   NAIL TFN-ADV R 130DEG [id number] - [id number]  NAIL TFN-ADV R 130DEG [id number]  [employer]  Right 1 Implanted   SCREW TFN ADVANCE 95MM - [id number]  SCREW TFN ADVANCE 95MM  [employer] [id number] Right 1 Implanted   SCREW XL25 IM NAIL 36X5MM - [id number]  SCREW XL25 IM NAIL 36X5MM  [employer] [id number] Right 1 Implanted        EBL:  150 mL    Drains:  none needed    Fluids:  see anesthesia report    Complications:  none    Outcome: stable to PACU       Operative Indications:  The patient is a 73 yr old male with known metastatic renal cell carcinoma involving the right proximal femur as well as spine. He had previously been scheduled for prophylactic fixation of the right femur given the extent of cortical involvement previously but unfortunately surgery had to be delayed due to urgent spine surgery. He now has been cleared by medical oncology and  other services to proceed with prophylactic fixation of the right femur. He had pain in the right leg and imaging findings concerning for high risk of pathologic fracture, but with enough bone to still make intramedullary nail fixation an appropriate surgery. Given known renal cell carcinoma, he underwent preoperative embolization within 24 hours before surgery. Additionally, we coordinated with surgical oncology for port placement during the same anesthetic at patient request. Patient elected to proceed with prophylactic fixation of the right femur.  We discussed risks of surgery including infection, neurovascular injury, recurrence, continued pain, progression of disease, need for more surgery, spread of disease elsewhere, ricky implant fracture, wound complications and damage to nearby structures. The patient understood the risks and benefits and signed consent.  Blood consent was also obtained.     Operative Procedure in Detail:  The patient was identified in the preoperative holding area.  The operative site was marked and the patient was taken back to the operating suite by anesthesia staff and placed supine on the operative table.  SCD's were placed on the non operative leg and all bony prominences were well padded.  The patient was then positioned for the planned surgical procedure in the supine position. The patient received cefazolin for preop antibiotic prophylaxis. TXA was given. The operative extremity was prepped with chloroprep and draped in sterile standard fashion.  A surgical timeout was conducted and all criteria met.     Dr. Reilly began with the port placement, please see his operative note for details. We began by marking out using palpation and with imaging guidance a 4 cm incision proximal to the greater trochanter about the lateral hip. We then used a sharp knife to incise skin. Deep dissection down to bone was done with electrocautery. We then used a starting pin to obtain our start point  for a trochanteric entry. Once we confirmed excellent position of the starting pin on all views the pin was advanced into the proximal femur. We then reamed over the pin with an opening reamer. We then removed the pin and the opening reamer and transitioned to the ablation portion of the case since we had good access to the lesion from the top of the hip. We then inserted the 2 probes and held them in place for ablation of the lesion. Once completed the probes were removed. These helped obtained excellent hemostasis. We then inserted a ball tip guide wire under imaging to the appropriate depth. We then measured for a nail length of 380. We then reamed up to a 11.5 for a size 10 nail as had templated preoperatively under imaging. We saved the contents of the reaming for pathology. We then opened the appropriate length and size nail on the back table in a sterile fashion and attached it to the insertion handle. We then inserted the nail over the guide wire to the appropriate depth and rotation on imaging. Once we were happy with the position of the nail the ball tip guide wire was removed. We then turned our attention to the cephalomedullary screw. We planned to have this slightly proximal so that we would still be in decent lateral cortex given where the lesion is. We made a separate incision about the lateral hip to insert the trocar down to the level of bone. We confirmed the version of the screw was appropriate then inserted the guide pin and checked again under live fluoroscopy that position was acceptable. We were slightly off center but still well within the head in very good quality bone. I had planned this given the femoral head cyst to end up in better bone in the head. We then measured the length of the i pin, drilled just short of this to check our length and then opened the appropriate length screw and inserted it with good bite. It grabbed the lateral cortex well. We did lock the lag screw up top. We  then confirmed on all views and on live fluoro that the femoral neck screw was contained within the neck and head without penetration. We then turned our attention to the distal interlocks screw. We used perfect Saxman technique to localize lateral to medial interlock hole. We did both a static screw and used the dynamic oblique hole as well. We then sequentially drilled and conformed in the hole and continued to drill bicortically in each hole. We then measured for the appropriate length screw and inserted the screws with excellent bite and confirmed was within the hole in the nail. We then took final radiographs confirming excellent position of all hardware. We then irrigated with peroxide and saline copiously. We then began closure with 0 vicryl deep, 2-0 vicryl, staples. Dry sterile dressings were applied and compressive ACE wrap.     The patient tolerated the procedure well and was transferred to PACU in stable condition. Prior to transport in PACU, a debriefing was performed and all final counts were correct.     Postoperative Plan:    Weight bearing: as tolerated on RLE  Activity: as tolerated  Immobilization: ace wrap  Antibiotics: ancef x 24 hours  Drains: none needed  DVT ppx: ASA 81 BID x 4 weeks  PT/OT: as needed  Disposition: to pacu then floor  Follow up Plan: 2 weeks wound check then 6 weeks with radiographs    Operating Room Procedure Presence: I was the attending for this procedure and I was present throughout the entirety of the case    Parvez Rock MD   Orthopedic Oncology and Complex Arthroplasty Reconstruction

## 2024-05-23 NOTE — ANESTHESIA POSTPROCEDURE EVALUATION
Anesthesia Post Evaluation    Patient: Gamaliel Pete     Procedure(s) Performed: * No procedures listed *    Final Anesthesia Type: general      Patient location during evaluation: PACU  Patient participation: Yes- Able to Participate  Level of consciousness: awake  Post-procedure vital signs: reviewed and stable  Pain management: adequate  Airway patency: patent    PONV status at discharge: No PONV  Anesthetic complications: no      Cardiovascular status: blood pressure returned to baseline  Respiratory status: unassisted, spontaneous ventilation and room air              Vitals Value Taken Time   /61 05/23/24 1430   Temp 36.5 °C (97.7 °F) 05/23/24 1300   Pulse 76 05/23/24 1435   Resp 28 05/23/24 1435   SpO2 97 % 05/23/24 1435   Vitals shown include unfiled device data.      Event Time   Out of Recovery 18:25:00         Pain/Juany Score: Pain Rating Prior to Med Admin: 7 (5/23/2024 12:35 PM)  Pain Rating Post Med Admin: 3 (5/23/2024  1:30 PM)  Juany Score: 10 (5/23/2024 11:45 AM)

## 2024-05-24 PROCEDURE — 25000003 PHARM REV CODE 250

## 2024-05-24 PROCEDURE — 25000003 PHARM REV CODE 250: Performed by: STUDENT IN AN ORGANIZED HEALTH CARE EDUCATION/TRAINING PROGRAM

## 2024-05-24 PROCEDURE — 99231 SBSQ HOSP IP/OBS SF/LOW 25: CPT | Mod: ,,, | Performed by: ANESTHESIOLOGY

## 2024-05-24 PROCEDURE — 63600175 PHARM REV CODE 636 W HCPCS

## 2024-05-24 PROCEDURE — 97530 THERAPEUTIC ACTIVITIES: CPT

## 2024-05-24 PROCEDURE — 97165 OT EVAL LOW COMPLEX 30 MIN: CPT

## 2024-05-24 PROCEDURE — 20600001 HC STEP DOWN PRIVATE ROOM

## 2024-05-24 PROCEDURE — 97535 SELF CARE MNGMENT TRAINING: CPT

## 2024-05-24 PROCEDURE — 97161 PT EVAL LOW COMPLEX 20 MIN: CPT

## 2024-05-24 PROCEDURE — 25000003 PHARM REV CODE 250: Performed by: FAMILY MEDICINE

## 2024-05-24 RX ORDER — MORPHINE SULFATE 15 MG/1
15 TABLET, FILM COATED, EXTENDED RELEASE ORAL EVERY 8 HOURS
Status: DISCONTINUED | OUTPATIENT
Start: 2024-05-24 | End: 2024-05-25 | Stop reason: HOSPADM

## 2024-05-24 RX ORDER — OXYCODONE HYDROCHLORIDE 10 MG/1
10 TABLET ORAL EVERY 4 HOURS PRN
Status: DISCONTINUED | OUTPATIENT
Start: 2024-05-24 | End: 2024-05-25 | Stop reason: HOSPADM

## 2024-05-24 RX ADMIN — ACETAMINOPHEN 1000 MG: 500 TABLET ORAL at 12:05

## 2024-05-24 RX ADMIN — ASPIRIN 81 MG CHEWABLE TABLET 81 MG: 81 TABLET CHEWABLE at 08:05

## 2024-05-24 RX ADMIN — GABAPENTIN 300 MG: 300 CAPSULE ORAL at 06:05

## 2024-05-24 RX ADMIN — GABAPENTIN 300 MG: 300 CAPSULE ORAL at 09:05

## 2024-05-24 RX ADMIN — MORPHINE SULFATE 15 MG: 15 TABLET, EXTENDED RELEASE ORAL at 01:05

## 2024-05-24 RX ADMIN — POLYETHYLENE GLYCOL 3350 17 G: 17 POWDER, FOR SOLUTION ORAL at 08:05

## 2024-05-24 RX ADMIN — CEFAZOLIN 2 G: 2 INJECTION, POWDER, FOR SOLUTION INTRAMUSCULAR; INTRAVENOUS at 07:05

## 2024-05-24 RX ADMIN — MORPHINE SULFATE 15 MG: 15 TABLET, EXTENDED RELEASE ORAL at 06:05

## 2024-05-24 RX ADMIN — SODIUM CHLORIDE: 9 INJECTION, SOLUTION INTRAVENOUS at 07:05

## 2024-05-24 RX ADMIN — CELECOXIB 200 MG: 100 CAPSULE ORAL at 08:05

## 2024-05-24 RX ADMIN — ALLOPURINOL 100 MG: 100 TABLET ORAL at 08:05

## 2024-05-24 RX ADMIN — METHOCARBAMOL 750 MG: 750 TABLET ORAL at 05:05

## 2024-05-24 RX ADMIN — ACETAMINOPHEN 1000 MG: 500 TABLET ORAL at 05:05

## 2024-05-24 RX ADMIN — ACETAMINOPHEN 1000 MG: 500 TABLET ORAL at 06:05

## 2024-05-24 RX ADMIN — GABAPENTIN 300 MG: 300 CAPSULE ORAL at 01:05

## 2024-05-24 RX ADMIN — METHOCARBAMOL 750 MG: 750 TABLET ORAL at 11:05

## 2024-05-24 RX ADMIN — METHOCARBAMOL 750 MG: 750 TABLET ORAL at 06:05

## 2024-05-24 RX ADMIN — SENNOSIDES 8.6 MG: 8.6 TABLET, FILM COATED ORAL at 08:05

## 2024-05-24 RX ADMIN — METHOCARBAMOL 750 MG: 750 TABLET ORAL at 12:05

## 2024-05-24 RX ADMIN — ACETAMINOPHEN 1000 MG: 500 TABLET ORAL at 11:05

## 2024-05-24 RX ADMIN — MORPHINE SULFATE 15 MG: 15 TABLET, EXTENDED RELEASE ORAL at 09:05

## 2024-05-24 NOTE — NURSING
Patient is alert and oriented.Remained afebrile.NS infusing 75 ml/hr.Ropivacaine continues infusion as  per MAR.SpO2 maintained in room air.Patient sitting up on  chair most of the shift.Voids on urinal.No bowel movement on this shift.Remaining free from falls and injury throughout the shift. Denied pain nausea at the moment.Bed in low and locked position.call light and personal item within reach.family at the bed side.

## 2024-05-24 NOTE — PT/OT/SLP EVAL
Occupational Therapy   Evaluation    Name: Gamaliel Pete Jr.  MRN: [id number]  Admitting Diagnosis: pathologic right femur fx   Recent Surgery:  S/p R femur IMN 05-23  Recommendations:     Discharge Recommendations: High Intensity Therapy  Discharge Equipment Recommendations:  shower chair  Barriers to discharge:  Inaccessible home environment (bed and full  bath on second floor)    Assessment:     Gamaliel Pete Jr. is a 73 y.o. male with a medical diagnosis of pathologic right femur fx s/p IMN 05-23  He presents with decreased sensation and ROM in RLE at this time as well as pain in RUE and decreased grasp/ROM per pt. Pt. Was independent with ADL tasks and mobility PTA and used a Rollator as needed. Pt. Is below PLOF and is a High fall risk at this timethe patient. Is highly motivated and participated well in session. Patient would benefit from continued OT services to maximize level of safety and independence with self-care tasks. '. Performance deficits affecting function: weakness, impaired endurance, impaired self care skills, impaired functional mobility, gait instability, orthopedic precautions, pain, decreased lower extremity function, decreased upper extremity function, decreased ROM, impaired fine motor.      Rehab Prognosis: Good; patient would benefit from acute skilled OT services to address these deficits and reach maximum level of function.       Plan:     Patient to be seen 4 x/week to address the above listed problems via self-care/home management, therapeutic activities, therapeutic exercises, neuromuscular re-education  Plan of Care Expires: 06/23/24  Plan of Care Reviewed with: patient, spouse    Subjective     Chief Complaint: Pt. Reported he felt burning by right thigh region above knee when standing and sitting  Patient/Family Comments/goals: to get better    Occupational Profile:  Living Environment: Pt. Resides with spouse in 2 story house with bed and full bath upstairs. If needed,  has a day bed can move to 1st floor but would prefer to use own bed bath. 1/2 bath downstairs. Has WI with very Old chair and no grab bar.   Previous level of function: Pt. Was I with ADL tasks and ambulation. Used rollator when needed to .   Roles and Routines: caretaker of self, spouse, building new house in [place]. Almost done , father  Equipment Used at Home: rollator, walker, rolling  Assistance upon Discharge: spouse    Pain/Comfort:  Pain Rating 1:  (did not rate)  Location - Side 1: Right  Location - Orientation 1: upper  Location 1: leg (above knee)  Pain Addressed 1: Reposition, Distraction  Pain Rating Post-Intervention 1:  (did not rate)  Pain Rating 2:  (did not rate)  Location - Side 2: Right  Location - Orientation 2: upper  Location 2: arm  Pain Addressed 2: Reposition, Distraction  Pain Rating Post-Intervention 2:  (did not rate)    Patients cultural, spiritual, [place]ybeterian conflicts given the current situation: no    Objective:     Communicated with: nurse prior to session.  Patient found supine with perineural catheter, peripheral IV upon OT entry to room.    General Precautions: Standard, fall  Orthopedic Precautions: RLE weight bearing as tolerated (per ortho notes)  Braces: N/A  Respiratory Status: Room air    Occupational Performance:    Bed Mobility:    Patient completed Supine to Sit with moderate assistance    Functional Mobility/Transfers:  Patient completed Sit <> Stand Transfer with minimum assistance  with  rolling walker and right knee blocking  Patient completed Bed <> Chair Transfer using Step Transfer technique with minimum assistance and of 2 persons with rolling walker for safety  Functional Mobility: Performed steps to bedside chair with Min A x 2 and RW (guarding of RLE)    Activities of Daily Living:  Lower Body Dressing: total assistance to don socks on this date        Feeding: set up A for breakfast  Cognitive/Visual Perceptual:  Cognitive/Psychosocial Skills:     -        Oriented to: Person, Place, Time, and Situation   -       Follows Commands/attention:Follows multistep  commands  -       Communication: clear/fluent  -       Memory: No Deficits noted  -       Safety awareness/insight to disability: intact   -       Mood/Affect/Coping skills/emotional control: Appropriate to situation  Visual/Perceptual:      -wears glasses    Physical Exam:  Balance: -       sit: good; stand: min A  Postural examination/scapula alignment:    -       Rounded shoulders  -       Posterior pelvic tilt  Skin integrity: Visible skin intact  Upper Extremity Range of Motion:     -       Right Upper Extremity: Deficits: at shoulder ROM; elbow and below WFL  -       Left Upper Extremity: WNL   Strength:    -       Right Upper Extremity: Deficits: for grasp per pt.   -       Left Upper Extremity: WNL    AMPAC 6 Click ADL:  AMPAC Total Score: 18    Treatment & Education:  Pt. Educated on role of oT and pOC  BSC ordered for pt. Use in room with staff assist  Pt. Educated on need for staff assist for all mobility    Patient left up in chair with all lines intact, call button in reach, nurse notified, and spouse present    GOALS:   Multidisciplinary Problems       Occupational Therapy Goals          Problem: Occupational Therapy    Goal Priority Disciplines Outcome Interventions   Occupational Therapy Goal     OT, PT/OT Progressing    Description: Goals to be met by: 06-07-24     Patient will increase functional independence with ADLs by performing:    UE Dressing with [place].  LE Dressing with Supervision with AE as needed  Grooming while standing at sink with Supervision.  Toileting from toilet with Supervision for hygiene and clothing management.   Step transfer with Supervision with RW  Toilet transfer to toilet with Supervision.                         History:     Past Medical History:   Diagnosis Date    Asthma     no inhaler use    Borderline hypertension     ED (erectile dysfunction)     Gout      Hematuria     Hypertension          Past Surgical History:   Procedure Laterality Date    CLOSURE N/A 2/2/2024    Procedure: CLOSURE;  Surgeon: Ernesto Villanueva MD;  Location: [facility] OR 2ND FLR;  Service: Plastics;  Laterality: N/A;    COLONOSCOPY  02/10/2022    COLONOSCOPY N/A 02/10/2022    Procedure: COLONOSCOPY;  Surgeon: Desmond Keenan MD;  Location: [facility] ENDO;  Service: General;  Laterality: N/A;    INSERTION OF INTRAMEDULLARY NGHIA Right 5/23/2024    Procedure: INSERTION, INTRAMEDULLARY NGHIA;  Surgeon: Parvez Rock MD;  Location: [facility] OR [facility] FLR;  Service: Orthopedics;  Laterality: Right;    INSERTION OF TUNNELED CENTRAL VENOUS CATHETER (CVC) WITH SUBCUTANEOUS PORT Left 5/23/2024    Procedure: INSERTION, SINGLE LUMEN CATHETER WITH PORT, WITH FLUOROSCOPIC GUIDANCE;  Surgeon: Jj Reilly MD;  Location: [facility] OR [facility] FLR;  Service: General;  Laterality: Left;    POSTERIOR FUSION OF CERVICAL SPINE WITH LAMINECTOMY N/A 11/15/2023    Procedure: SPINE, CERVICAL, WITH POSTERIOR FUSION T4-6;  Surgeon: Nasim Martinez DO;  Location: [facility] OR [facility] FLR;  Service: Neurosurgery;  Laterality: N/A;  C2-T1 laminectomy and posterior fusion. [place]. C-arm. [place]. Neuromonitoring.    ROBOT-ASSISTED LAPAROSCOPIC NEPHRECTOMY Right 10/4/2023    Procedure: ROBOTIC NEPHRECTOMY;  Surgeon: Henry Michaels MD;  Location: [facility];  Service: Urology;  Laterality: Right;    SURGICAL REMOVAL OF VERTEBRAL BODY OF CERVICAL SPINE Right 11/7/2023    Procedure: CORPECTOMY, SPINE, CERVICAL;  Surgeon: Nasim Martinez DO;  [facility];  Service: Neurosurgery;  Laterality: Right;  C4 and C5 corpectomy with globus;  wells tongs; c-arm; neuromonitoring; microscope; type and cross 2 units    SURGICAL REMOVAL OF VERTEBRAL BODY OF CERVICAL SPINE N/A 11/15/2023    Procedure: CORPECTOMY, SPINE, CERVICAL C3-6 REVISION;  Surgeon: Nasim Martinez DO;  Location: [facility] OR 2ND FLR;  Service: Neurosurgery;   Laterality: N/A;    THORACIC LAMINECTOMY WITH FUSION N/A 1/5/2024    Procedure: LAMINECTOMY, SPINE, THORACIC, WITH FUSION;  Surgeon: aNsim Martinez DO;  Location: [facility] OR [address] [place] [address];  Service: Neurosurgery;  Laterality: N/A;  T7-T11 fusions with robot    TONSILLECTOMY      WOUND EXPLORATION N/A 2/2/2024    Procedure: EXPLORATION, WOUND;  Surgeon: Nasim Martinez DO;  Location: [facility] OR [address] [place] [address];  Service: Neurosurgery;  Laterality: N/A;  Thoracic wound exploration, washout       Time Tracking:     OT Date of Treatment: 05/24/24  OT Start Time: 0823  OT Stop Time: 0852  OT Total Time (min): 29 min    Billable Minutes:Evaluation 15  Self Care/Home Management 14    5/24/2024

## 2024-05-24 NOTE — PT/OT/SLP EVAL
Physical Therapy Evaluation    Patient Name:  Gamaliel Pete Jr.   MRN:  [id number]    Recommendations:     Discharge Recommendations: Low Intensity Therapy   Discharge Equipment Recommendations: shower chair   Barriers to discharge: None    Assessment:     Gamaliel Pete Jr. is a 73 y.o. male admitted with a medical diagnosis of Metastatic renal cell carcinoma to bone.  He presents with the following impairments/functional limitations: weakness, impaired self care skills, impaired balance, decreased ROM, impaired endurance, impaired functional mobility, pain, decreased upper extremity function, impaired sensation, gait instability, decreased lower extremity function, orthopedic precautions. Patient demonstrating high motivation to participate in evaluation, with generally good response to completed activities. Patient with impairments in proximal RLE sensation & hip & knee muscular strength. Aforementioned deficits contributing to impaired RLE weight acceptance, as well as LOB episode (x1) requiring PT intervention. Based upon information gained, PT recommends high intensity skilled physical therapy services post-acutely. Provided recommendation based upon needed intensity to not only directly address patient's previously listed functional impairments, discrepancy between functional baseline & current mobility status, and high falls risk, but to also positively impact patient's quality of life & intervene on high risk for caregiver burnout.    Rehab Prognosis: Good; patient would benefit from acute skilled PT services to address these deficits and reach maximum level of function.    Recent Surgery: * No procedures listed * 3 Days Post-Op    Plan:     During this hospitalization, patient to be seen 4 x/week to address the identified rehab impairments via gait training, therapeutic activities, therapeutic exercises, neuromuscular re-education and progress toward the following goals:    Plan of Care Expires:   "06/24/24    Subjective     Chief Complaint: Fear of falling  Patient/Family Comments/goals: To get stronger ; "I go up and down my stairs sideways so that I have both of my hands on the railing"  Pain/Comfort:   Pain Rating 1: Intensity not specified  Location - Side 1: Right  Location 1: leg  Pain Addressed 1: Distraction  Pain Rating Post-Intervention 1: Intensity not specified    Patients cultural, spiritual, [place]din conflicts given the current situation: no    Social History:  Residence: Patient lives with their spouse in a 2 story house with  5STE, with associated LHR . Bed & bathroom on 2nd floor, requiring negotiation of 15steps w/ RHR.  Equipment Owned: walker, rolling, rollator, bath bench  Prior level of function:  Prior to admission, patient was independent for HH ambulation and modified independent for community ambulation, utilizing his rollator. Patient was independent for all other mobility.  Assistance Upon Discharge:  Spouse    Objective:     Communicated with RN prior to session.  Patient found up in chair with perineural catheter, peripheral IV  upon PT entry to room.    General Precautions: Standard, fall   Orthopedic Precautions:RLE weight bearing as tolerated   Braces: N/A   Body mass index is 23.85 kg/m².  Oxygen Device: Room Air  Vitals: BP (!) 155/67 (Patient Position: Sitting)   Pulse 87   Temp 98.2 °F (36.8 °C) (Oral)   Resp 18   Ht 5' 10" (1.778 m)   Wt 75.4 kg (166 lb 3.6 oz)   SpO2 95%   BMI 23.85 kg/m²     Exams:  Cognition:   Alert and Cooperative   Patient is oriented to Person, Place, Time, Situation  Command following: Follows multistep verbal commands  Fluency: clear/fluent  Postural Assessment: rounded shoulders and forward head  Physical Exam:    Left LE Right LE   Edema absent present   Sensation Not formally tested; patient denied any sensory impairment. Impaired to light touch   Coordination Intact Impaired     LLE ROM: WFL  RLE ROM: WFL    LLE Strength (out of " 5):  Hip Flexion:4+: Holds test position against MODERATE to STRONG pressure  Hip Abduction:4: Holds test positioning against MODERATE pressure  Hip Adduction:4: Holds test positioning against MODERATE pressure  Knee Extension:4+: Holds test position against MODERATE to STRONG pressure  Knee Flexion:4+: Holds test position against MODERATE to STRONG pressure  Ankle Dorsiflexion:4+: Holds test position against MODERATE to STRONG pressure  Ankle Plantarflexion:4+: Holds test position against MODERATE to STRONG pressure    RLE Strength (out of 5):   Hip Flexion:3: Holds test position  Hip Abduction:4: Holds test positioning against MODERATE pressure  Hip Adduction:4: Holds test positioning against MODERATE pressure  Knee Extension:2+: Moves through Partial AROM  Knee Flexion:3: Holds test position  Ankle Dorsiflexion:4+: Holds test position against MODERATE to STRONG pressure  Ankle Plantarflexion:4+: Holds test position against MODERATE to STRONG pressure    Functional Mobility:  Transfers:     Sit<>Stand: Pt listing to L w/ staggered foot position  x1, CGA from Bedside Chair with Rolling Walker  x1, [place] from Bedside Chair with Rolling Walker    Gait: Pt deferred 2/2 to episode of R knee buckling    Balance:   Static Sitting: Independent  Dynamic Sitting: Supervision    Static Standing:  CGA  Dynamic Standing:  MaxA-CGA . See Lateral WS to R  Total Time Standing:  Trial 1: ~30 sec  Trial 2: 4 mins    AM-PAC 6 CLICK MOBILITY  Total Score: 19    Treatment & Education:  Increased patient contact time due to:  Rolling walker suggested adjustments + detailed education on why suggestions were made & the associated safety of adjustments  Patient agreeable to adjustment  Rest break post-knee buckling episode  Verbal review of exercises for patient to complete on own time, including seated marches, hip abd/add, LAQs, and ankle pumps. Also encouraged patient to sit UIC throughout the day    Patient completed the following  activities:  Lateral WS to R: MaxA-CGA with Rolling Walker. RLE buckling episode resulting in Max LOB (x1), requiring PT intervention to safely return pt to chair.     Patient Education Provided on:  The role of physical therapy and how the patient can benefit from skilled services  The negative effects of prolonged bed rest/sedentary behavior, along with the importance of OOB activity & patient participation with PT  The importance of contacting RN, via call light, for mobility throughout the day  Pt white board updated with current therapists name and level of mobility assistance needed.     Patient Verbalized understanding of all topics touched on this date. All questions answered within the PT scope of practice    Patient left up in chair with all lines intact, call button in reach, RN notified, and spouse present.    GOALS:   Multidisciplinary Problems       Physical Therapy Goals          Problem: Physical Therapy    Goal Priority Disciplines Outcome Goal Variances Interventions   Physical Therapy Goal     PT, PT/OT Progressing     Description: Goals to be met by: 05/10/24     Patient will increase functional independence with mobility by performin. Supine <> sit with Set-up [place]  2. Rolling to Left and Right with Set-up Assistance.  3. Sit <> stand transfer with Modified [place] with LRAD  4. Bed <> chair transfer with Supervision using with LRAD  5. Gait  x 20 feet with Supervision using LRAD   6. Ascend/descend 5 stair with left Handrails Contact Guard Assistance using LRAD   7. Stand for 5 minutes with Modified [place] using LRAD  8. Lower extremity exercise program x30 reps per handout, with assistance as needed                         History:     Past Medical History:   Diagnosis Date    Asthma     no inhaler use    Borderline hypertension     ED (erectile dysfunction)     Gout     Hematuria     Hypertension        Past Surgical History:   Procedure Laterality Date    CLOSURE  N/A 2/2/2024    Procedure: CLOSURE;  Surgeon: Ernesto Villanueva MD;  Location: [facility] OR [facility] FLR;  Service: Plastics;  Laterality: N/A;    COLONOSCOPY  02/10/2022    COLONOSCOPY N/A 02/10/2022    Procedure: COLONOSCOPY;  Surgeon: Desmond Keenan MD;  Location: [facility];  Service: General;  Laterality: N/A;    INSERTION OF INTRAMEDULLARY NGHIA Right 5/23/2024    Procedure: INSERTION, INTRAMEDULLARY NGHIA;  Surgeon: Parvez Rock MD;  [facility];  Service: Orthopedics;  Laterality: Right;    INSERTION OF TUNNELED CENTRAL VENOUS CATHETER (CVC) WITH SUBCUTANEOUS PORT Left 5/23/2024    Procedure: INSERTION, SINGLE LUMEN CATHETER WITH PORT, WITH FLUOROSCOPIC GUIDANCE;  Surgeon: Jj Reilly MD;  [facility];  Service: General;  Laterality: Left;    POSTERIOR FUSION OF CERVICAL SPINE WITH LAMINECTOMY N/A 11/15/2023    Procedure: SPINE, CERVICAL, WITH POSTERIOR FUSION T4-6;  Surgeon: Nasim Martinez DO;  Location: [address];  Service: Neurosurgery;  Laterality: N/A;  C2-T1 laminectomy and posterior fusion. [place]. C-arm. [place]. Neuromonitoring.    ROBOT-ASSISTED LAPAROSCOPIC NEPHRECTOMY Right 10/4/2023    Procedure: ROBOTIC NEPHRECTOMY;  Surgeon: Henry Michaels MD;  Location: [facility];  Service: Urology;  Laterality: Right;    SURGICAL REMOVAL OF VERTEBRAL BODY OF CERVICAL SPINE Right 11/7/2023    Procedure: CORPECTOMY, SPINE, CERVICAL;  Surgeon: Nasim Martinez DO;  [address]  Service: Neurosurgery;  Laterality: Right;  C4 and C5 corpectomy with globus;  wells tongs; c-arm; neuromonitoring; microscope; type and cross 2 units    SURGICAL REMOVAL OF VERTEBRAL BODY OF CERVICAL SPINE N/A 11/15/2023    Procedure: CORPECTOMY, SPINE, CERVICAL C3-6 REVISION;  Surgeon: Nasim Martinez DO;  Location: [facility] OR [facility] FLR;  Service: Neurosurgery;  Laterality: N/A;    THORACIC LAMINECTOMY WITH FUSION N/A 1/5/2024    Procedure: LAMINECTOMY, SPINE,  THORACIC, WITH FUSION;  Surgeon: Nasim Martinez DO;  Location: [facility] OR [address] [place] [address];  Service: Neurosurgery;  Laterality: N/A;  T7-T11 fusions with robot    TONSILLECTOMY      WOUND EXPLORATION N/A 2/2/2024    Procedure: EXPLORATION, WOUND;  Surgeon: Nasim Martinez DO;  Location: [facility] OR [address] [place] [address];  Service: Neurosurgery;  Laterality: N/A;  Thoracic wound exploration, washout       Time Tracking:     PT Received On: 05/24/24  PT Start Time: 1139     PT Stop Time: 1226  PT Total Time (min): 47 min     Billable Minutes: Evaluation 8 and Therapeutic Activity 39    05/25/2024

## 2024-05-24 NOTE — PROGRESS NOTES
Assumed care of pt from [id number].     - A/Ox4  - VSS on RA  - C/o chronic shoulder pain and mild RLE pain  - PIV flushes w/o difficulty; NS @ 75  - Ropivacaine gtt infusing per MAR  - Newly placed port not accessed; site is ELIJAH; C/D/I  - RLE dressings C/D/I  - C/o numbness to R lower leg; pedal pulses +2; no discoloration; warm/dry  - Voiding w/o difficulty using urinal  - Bed alarm refused; spouse at the bedside  - Call bell within reach    No acute events overnight. POC discussed - pt verbalizes understanding and denies questions/concerns at this time.       At the conclusion of this shift, pt is observed to be resting comfortably w/ unlabored breathing and exhibits no s/s of distress. Bed is in lowest position and locked. POC followed.

## 2024-05-24 NOTE — PLAN OF CARE
Problem: Occupational Therapy  Goal: Occupational Therapy Goal  Description: Goals to be met by: 06-07-24     Patient will increase functional independence with ADLs by performing:    UE Dressing with [place].  LE Dressing with Supervision with AE as needed  Grooming while standing at sink with Supervision.  Toileting from toilet with Supervision for hygiene and clothing management.   Step transfer with Supervision with RW  Toilet transfer to toilet with Supervision.    Outcome: Progressing

## 2024-05-24 NOTE — PLAN OF CARE
Problem: Adult Inpatient Plan of Care  Goal: Plan of Care Review  Outcome: Progressing  Goal: Absence of Hospital-Acquired Illness or Injury  Outcome: Progressing  Goal: Optimal Comfort and Wellbeing  Outcome: Progressing     Problem: Wound  Goal: Absence of Infection Signs and Symptoms  Outcome: Progressing  Goal: Optimal Wound Healing  Outcome: Progressing     Problem: Fall Injury Risk  Goal: Absence of Fall and Fall-Related Injury  Outcome: Progressing

## 2024-05-24 NOTE — ANESTHESIA POST-OP PAIN MANAGEMENT
Acute Pain Service Progress Note    Gamaliel Pete Jr. is a 73 y.o., male, [id number].    Surgery:  INSERTION, INTRAMEDULLARY NGHIA (Right: Hip)       INSERTION, SINGLE LUMEN CATHETER WITH PORT, WITH FLUOROSCOPIC GUIDANCE (Left: Chest)     Post Op Day #: 1    Catheter type: R SIFI PNC    Infusion type: Ropi 0.2% 15ml q3h IB + 1ml/hr cont    Problem List:    Active Hospital Problems    Diagnosis  POA    Metastatic renal cell carcinoma to bone [C79.51, C64.9]  Yes      Resolved Hospital Problems   No resolved problems to display.       Subjective:     General appearance of alert, oriented, no complaints   Pain with rest: 1    Numbers   Pain with movement: 4    Numbers   Side Effects    1. Pruritis No    2. Nausea No    3. Motor Blockade No, 1=Ability to bend knees and ankles    4. Sedation No, 1=awake and alert    Objective:     Catheter site clean, dry, intact      Vitals   Vitals:    05/24/24 0727   BP: (!) 121/58   Pulse: 67   Resp: 16   Temp: 36.4 °C (97.5 °F)        Labs    Admission on 05/22/2024   Component Date Value Ref Range Status    WBC 05/22/2024 2.80 (L)  3.90 - 12.70 K/uL Final    RBC 05/22/2024 4.15 (L)  4.60 - 6.20 M/uL Final    Hemoglobin 05/22/2024 12.0 (L)  14.0 - 18.0 g/dL Final    Hematocrit 05/22/2024 36.1 (L)  40.0 - 54.0 % Final    MCV 05/22/2024 87  82 - 98 fL Final    MCH 05/22/2024 28.9  27.0 - 31.0 pg Final    MCHC 05/22/2024 33.2  32.0 - 36.0 g/dL Final    RDW 05/22/2024 16.3 (H)  11.5 - 14.5 % Final    Platelets 05/22/2024 152  150 - 450 K/uL Final    MPV 05/22/2024 8.8 (L)  9.2 - 12.9 fL Final    Immature Granulocytes 05/22/2024 0.4  0.0 - 0.5 % Final    Gran # (ANC) 05/22/2024 1.8  1.8 - 7.7 K/uL Final    Immature Grans (Abs) 05/22/2024 0.01  0.00 - 0.04 K/uL Final    Lymph # 05/22/2024 0.8 (L)  1.0 - 4.8 K/uL Final    Mono # 05/22/2024 0.2 (L)  0.3 - 1.0 K/uL Final    Eos # 05/22/2024 0.0  0.0 - 0.5 K/uL Final    Baso # 05/22/2024 0.00  0.00 - 0.20 K/uL Final    nRBC 05/22/2024 0  0  /100 WBC Final    Gran % 05/22/2024 64.3  38.0 - 73.0 % Final    Lymph % 05/22/2024 28.9  18.0 - 48.0 % Final    Mono % 05/22/2024 6.4  4.0 - 15.0 % Final    Eosinophil % 05/22/2024 0.0  0.0 - 8.0 % Final    Basophil % 05/22/2024 0.0  0.0 - 1.9 % Final    Differential Method 05/22/2024 Automated   Final    Sodium 05/22/2024 139  136 - 145 mmol/L Final    Potassium 05/22/2024 5.4 (H)  3.5 - 5.1 mmol/L Final    Chloride 05/22/2024 106  95 - 110 mmol/L Final    CO2 05/22/2024 23  23 - 29 mmol/L Final    Glucose 05/22/2024 120 (H)  70 - 110 mg/dL Final    BUN 05/22/2024 9  8 - 23 mg/dL Final    Creatinine 05/22/2024 1.0  0.5 - 1.4 mg/dL Final    Calcium 05/22/2024 9.2  8.7 - 10.5 mg/dL Final    Anion Gap 05/22/2024 10  8 - 16 mmol/L Final    eGFR 05/22/2024 >60.0  >60 mL/min/1.73 m^2 Final    Group & Rh 05/22/2024 O NEG   Final    Indirect Anil 05/22/2024 NEG   Final    Specimen Outdate 05/22/2024 05/25/2024 23:59   Final    UNIT NUMBER 05/22/2024 [id number]   Preliminary    Product Code 05/22/2024 [id number]   Preliminary    DISPENSE STATUS 05/22/2024 CROSSMATCHED   Preliminary    CODING SYSTEM 05/22/2024 [id number]   Preliminary    Unit Blood Type Code 05/22/2024 9500   Preliminary    Unit Blood Type 05/22/2024 O NEG   Preliminary    Unit Expiration 05/22/2024 [id number]   Preliminary    CROSSMATCH INTERPRETATION 05/22/2024 Compatible   Preliminary    UNIT NUMBER 05/22/2024 [id number]   Preliminary    Product Code 05/22/2024 [id number]   Preliminary    DISPENSE STATUS 05/22/2024 CROSSMATCHED   Preliminary    CODING SYSTEM 05/22/2024 [id number]   Preliminary    Unit Blood Type Code 05/22/2024 9500   Preliminary    Unit Blood Type 05/22/2024 O NEG   Preliminary    Unit Expiration 05/22/2024 [id number]   Preliminary    CROSSMATCH INTERPRETATION 05/22/2024 Compatible   Preliminary    Sodium 05/23/2024 135 (L)  136 - 145 mmol/L Final    Potassium 05/23/2024 4.2  3.5 - 5.1 mmol/L Final    Chloride 05/23/2024 106  95 - 110  mmol/L Final    CO2 05/23/2024 20 (L)  23 - 29 mmol/L Final    Glucose 05/23/2024 107  70 - 110 mg/dL Final    BUN 05/23/2024 11  8 - 23 mg/dL Final    Creatinine 05/23/2024 0.9  0.5 - 1.4 mg/dL Final    Calcium 05/23/2024 8.5 (L)  8.7 - 10.5 mg/dL Final    Anion Gap 05/23/2024 9  8 - 16 mmol/L Final    eGFR 05/23/2024 >60.0  >60 mL/min/1.73 m^2 Final        Meds   Current Facility-Administered Medications   Medication Dose Route Frequency Provider Last Rate Last Admin    0.9%  NaCl infusion   Intravenous Continuous Jennifer Hummel, NP 75 mL/hr at 05/24/24 0751 New Bag at 05/24/24 0751    acetaminophen tablet 1,000 mg  1,000 mg Oral Q6H Armand Pena MD   1,000 mg at 05/24/24 0621    Followed by    [START ON 5/25/2024] acetaminophen tablet 1,000 mg  1,000 mg Oral Q8H Armand Pena MD        allopurinoL tablet 100 mg  100 mg Oral Daily Ananya Hall MD   100 mg at 05/24/24 0801    amlodipine-benazepril 5-10 mg per capsule 1 capsule  1 capsule Oral Daily Audi Lopez MD        aspirin chewable tablet 81 mg  81 mg Oral BID Audi Lopez MD   81 mg at 05/24/24 0801    celecoxib capsule 200 mg  200 mg Oral Daily Armand Pena MD   200 mg at 05/24/24 0801    gabapentin capsule 300 mg  300 mg Oral Q8H Armand Pena MD   300 mg at 05/24/24 0621    ibuprofen tablet 400 mg  400 mg Oral Q6H PRN Audi Lopez MD        methocarbamoL tablet 750 mg  750 mg Oral Q6H Armand Pena MD   750 mg at 05/24/24 0621    naloxone 0.4 mg/mL injection 0.4 mg  0.4 mg Intravenous PRN Armand Pena MD        oxyCODONE immediate release tablet Tab 10 mg  10 mg Oral Q4H PRN Armand Pena MD        polyethylene glycol packet 17 g  17 g Oral Daily Armand Pena MD   17 g at 05/24/24 0802    ropivacaine 0.2% Perineural Pump infusion 500 ML   Perineural Continuous Kelsea Stratton DO New Bag at 05/23/24 1125    senna tablet 8.6 mg  8.6 mg Oral Daily Armand Pena MD   8.6 mg at 05/24/24 0801     Facility-Administered  Medications Ordered in Other Encounters   Medication Dose Route Frequency Provider Last Rate Last Admin    ascorbic acid (vitamin C) tablet 250 mg  250 mg Oral Daily Kenn Vu MD        calcium carbonate 200 mg calcium (500 mg) chewable tablet 500 mg  500 mg Oral TID PRN Kenn Vu MD        celecoxib capsule 200 mg  200 mg Oral Daily Kenn Vu MD        dextrose 10% bolus 125 mL 125 mL  12.5 g Intravenous PRN Kenn Vu MD        dextrose 10% bolus 250 mL 250 mL  25 g Intravenous PRN Kenn Vu MD        docusate sodium capsule 100 mg  100 mg Oral BID Kenn Vu MD        glucagon (human recombinant) injection 1 mg  1 mg Intramuscular PRN Kenn Vu MD        glucose chewable tablet 16 g  16 g Oral PRN Kenn Vu MD        glucose chewable tablet 24 g  24 g Oral PRN Kenn Vu MD        magnesium oxide tablet 800 mg  800 mg Oral PRN Kenn Vu MD        melatonin tablet 6 mg  6 mg Oral Nightly PRN Kenn Vu MD        multivitamin tablet  1 tablet Oral Daily Kenn Vu MD        naloxone 0.4 mg/mL injection 0.4 mg  0.4 mg Intravenous PRN Kenn Vu MD        ondansetron injection 4 mg  4 mg Intravenous Q8H PRN Kenn uV MD        polyethylene glycol packet 17 g  17 g Oral BID PRN Kenn Vu MD        potassium bicarbonate disintegrating tablet 50 mEq  50 mEq Oral PRN Kenn Vu MD        potassium, sodium phosphates 280-160-250 mg packet 2 packet  2 packet Oral PRN Kenn Vu MD        promethazine tablet 25 mg  25 mg Oral Q6H PRN Kenn Vu MD        sodium chloride 0.9% flush 10 mL  10 mL Intravenous Q12H PRN Kenn Vu MD        vitamin D 1000 units tablet 2,000 Units  2,000 Units Oral Daily Kenn Vu MD             Assessment:    Pain control adequate. No hip pain this morning, pain mostly in his R shoulder which is chronic.     Plan:  -R SIFI PNC  -Morphine 15mg q8h   -Tylenol 1g  q6h  -Celebrex 200mg QD  -Gabapentin 300mg TID  -Robaxin 750mg q6h  -Oxycodone 10mg q4h prn      Case discussed with staff, final recommendations per attestation above.     Thank you for the consult and allowing us to participate in the care of this patient. We will continue to follow along. Please call Acute Pain Service at [phone] or Anesthesia at [phone] if you have any further questions or concerns.     Armand Pena MD (PGY3/CA2)  Resident Physician, Anesthesiology  [facility]

## 2024-05-24 NOTE — ASSESSMENT & PLAN NOTE
Gamaliel Parrishsamir Thomas is a 73 y.o. male with a history or RCC with mets to bilat femur who is here today for prophylactic IMN of R femur and port placement by gen surgery. He is is s/p IR embolization  on 05/22 &right femur IMN on 05/23.    - Antibiotics: ancef 24h post-op  - WBAT RLE  - DVT Prophylaxis: ASA 81mg BID  - Pain control: multimodal pain management  - PT/OT evaluate and treat today.

## 2024-05-24 NOTE — ASSESSMENT & PLAN NOTE
Gamaliel Parrishsamir Thomas is a 73 y.o. male with a history or RCC with mets to bilat femur who is here today for prophylactic IMN of R femur and port placement by gen surgery. He is is s/p IR embolization on 05/22 & right femur IMN on 05/23.    - Antibiotics: ancef 24h post-op completed  - WBAT RLE  - DVT Prophylaxis: ASA 81mg BID  - Pain control: multimodal pain management, APS following, pause PNC today  - PT/OT eval    Dispo: pending PT

## 2024-05-24 NOTE — PLAN OF CARE
Problem: Physical Therapy  Goal: Physical Therapy Goal  Description: Goals to be met by: 05/10/24     Patient will increase functional independence with mobility by performin. Supine <> sit with Set-up [place]  2. Rolling to Left and Right with Set-up Assistance.  3. Sit <> stand transfer with Modified [place] with LRAD  4. Bed <> chair transfer with Supervision using with LRAD  5. Gait  x 20 feet with Supervision using LRAD   6. Ascend/descend 5 stair with left Handrails Contact Guard Assistance using LRAD   7. Stand for 5 minutes with Modified [place] using LRAD  8. Lower extremity exercise program x30 reps per handout, with assistance as needed    Outcome: Progressing    Evaluation Complete. Goals Appropriate.

## 2024-05-24 NOTE — PROGRESS NOTES
[facility] - Oncology ([facility])  Orthopedics  Progress Note    Patient Name: Gamaliel Pete Jr.  MRN: [id number]  Admission Date: 5/22/2024  Hospital Length of Stay: 2 days  Attending Provider: No att. providers found  Primary Care Provider: Slava Lowery MD  Follow-up For: * No procedures listed *    Post-Operative Day: 2 Days Post-Op  Subjective:     Principal Problem:<principal problem not specified>    Principal Orthopedic Problem: RCC mets to bilat femur s/p R femur IMN 05/24    Interval History: POD-1. Pt seen and examined at bedside. JEAN SANDRA. Reported hearing a cracking sound at the hip when he moved his leg earlier in the night. Pain has been well controlled. Reports no new symptoms. Denies fevers or chill. Moving right with less pain.       Review of patient's allergies indicates:   Allergen Reactions    Vancomycin analogues Rash     IV antibiotic caused a rash--RED MAN SYNDROME       Current Facility-Administered Medications   Medication    0.9%  NaCl infusion    acetaminophen tablet 1,000 mg    Followed by    [START ON 5/25/2024] acetaminophen tablet 1,000 mg    allopurinoL tablet 100 mg    amlodipine-benazepril 5-10 mg per capsule 1 capsule    aspirin chewable tablet 81 mg    ceFAZolin 2 g in dextrose 5 % in water (D5W) 50 mL IVPB (MB+)    celecoxib capsule 200 mg    gabapentin capsule 300 mg    ibuprofen tablet 400 mg    methocarbamoL tablet 750 mg    morphine 12 hr tablet 15 mg    naloxone 0.4 mg/mL injection 0.4 mg    oxyCODONE immediate release tablet 5 mg    oxyCODONE immediate release tablet Tab 10 mg    polyethylene glycol packet 17 g    ropivacaine 0.2% Perineural Pump infusion 500 ML    senna tablet 8.6 mg     Facility-Administered Medications Ordered in Other Encounters   Medication    ascorbic acid (vitamin C) tablet 250 mg    calcium carbonate 200 mg calcium (500 mg) chewable tablet 500 mg    celecoxib capsule 200 mg    dextrose 10% bolus 125 mL 125 mL    dextrose 10% bolus 250 mL  "250 mL    docusate sodium capsule 100 mg    glucagon (human recombinant) injection 1 mg    glucose chewable tablet 16 g    glucose chewable tablet 24 g    magnesium oxide tablet 800 mg    melatonin tablet 6 mg    multivitamin tablet    naloxone 0.4 mg/mL injection 0.4 mg    ondansetron injection 4 mg    polyethylene glycol packet 17 g    potassium bicarbonate disintegrating tablet 50 mEq    potassium, sodium phosphates 280-160-250 mg packet 2 packet    promethazine tablet 25 mg    sodium chloride 0.9% flush 10 mL    vitamin D 1000 units tablet 2,000 Units     Objective:     Vital Signs (Most Recent):  Temp: 97.4 °F (36.3 °C) (05/24/24 0444)  Pulse: 70 (05/24/24 0444)  Resp: 20 (05/24/24 0444)  BP: 127/61 (05/24/24 0444)  SpO2: 99 % (05/24/24 0444) Vital Signs (24h Range):  Temp:  [97.2 °F (36.2 °C)-98.6 °F (37 °C)] 97.4 °F (36.3 °C)  Pulse:  [] 70  Resp:  [11-32] 20  SpO2:  [95 %-100 %] 99 %  BP: (111-167)/(58-85) 127/61     Weight: 75.4 kg (166 lb 3.6 oz)  Height: 5' 10" (177.8 cm)  Body mass index is 23.85 kg/m².      Intake/Output Summary (Last 24 hours) at 5/24/2024 0608  Last data filed at 5/24/2024 0307  Gross per 24 hour   Intake 560 ml   Output 1950 ml   Net -1390 ml        Ortho/SPM Exam     AAOx4  NAD  Reg rate  No increased WOB    RLE    Dressing C/D/I   Appropriate post-op swelling and tenderness over the incision site.  NO gross hip tenderness  No ecchymosis at the hip or thigh.  Compartments soft/compressible  SILT throughout  Motor intact TA/EHL/Gastroc/FHL   DP pulse 2+. Brisk cap refill      Significant Labs: All pertinent labs within the past 24 hours have been reviewed.    Significant Imaging: I have reviewed and interpreted all pertinent imaging results/findings.  Assessment/Plan:     Metastatic renal cell carcinoma to bone  Gamaliel VANDANA Pete Jr. is a 73 y.o. male with a history or RCC with mets to bilat femur who is here today for prophylactic IMN of R femur and port placement by gen " surgery. He is is s/p IR embolization  on 05/22 &right femur IMN on 05/23.    - Antibiotics: ancef 24h post-op  - WBAT RLE  - DVT Prophylaxis: ASA 81mg BID  - Pain control: multimodal pain management  - PT/OT evaluate and treat today.            Audi Lopez MD  Orthopedics  [facility] - Oncology ([facility])      Radiographs right humerus without significant lesion. Radiating pain to RUE may be c spine related. Has follow up with neurosurgeon. WBAT RLE.

## 2024-05-24 NOTE — PLAN OF CARE
[facility] - Oncology (Hospital)  Initial Discharge Assessment       Primary Care Provider: Slava Lowery MD    Admission Diagnosis: Metastasis to bone [C79.51]  Abnormality of femur [Q74.2]    Admission Date: 5/22/2024  Expected Discharge Date: 5/28/2024    Transition of Care Barriers: None    Payor: [employer] [facility] / Plan: [employer] CHO[employer] / Product Type: Medicare Advantage /     Extended Emergency Contact Information  Primary Emergency Contact: aMry Pete  Address: [address] [place], [place] [address] [facility]  Home Phone: [phone]  Mobile Phone: [phone]  Relation: Spouse    Discharge Plan A: Home with family  Discharge Plan B: Home Health      [id number] - [place] - [address]  [address] [place] [place] [address]  Phone: [phone] Fax: [phone]    [facility] [address] [address]  Phone: [phone] Fax: [phone]    [id number] - [place], [place] - [address] [facility] [address]  [address] [place] [address]  Phone: [phone] Fax: [phone]        Transferred from:     Past Medical History:   Diagnosis Date    Asthma     no inhaler use    Borderline hypertension     ED (erectile dysfunction)     Gout     Hematuria     Hypertension          CM met with patient and Mary Pete (spouse) [phone]  in room for Discharge Planning Assessment.  Patient able to answer questions.  Per patient, he lives with Mary Pete (spouse) [phone]  in a 2-story home with 15 steps to upstairs bedroom, and  with 5 step(s) to enter residence.   Per patient, he was semi-dependent with ADLS and used rolling walker, rollator for ambulation. Per patient, he is not on dialysis and does not take Coumadin. Patient is taking aspirin while in hospital. PCP and pharmacy verified. Patient does not have home health, and has  not been hospitalized in past 30 days.   Patient will have help from Maryemilio Pete (spouse) [phone]  upon discharge.   Discharge Planning Booklet given to patient/family and discussed.  All questions addressed.  CM will follow for needs.    Discharge Plan A and Plan B have been determined by review of patient's clinical status, future medical and therapeutic needs, and coverage/benefits for post-acute care in coordination with multidisciplinary team members.        Initial Assessment (most recent)       Adult Discharge Assessment - 05/24/24 1641          Discharge Assessment    Assessment Type Discharge Planning Assessment     Confirmed/corrected address, phone number and insurance Yes     Confirmed Demographics Correct on Facesheet     Source of Information patient;family     When was your last doctors appointment? 05/16/24     Communicated WAQAS with patient/caregiver Date not available/Unable to determine     Reason For Admission Metastasis to bone     People in Home spouse     Facility Arrived From: Home     Do you have help at home or someone to help you manage your care at home? Yes     Who are your caregiver(s) and their phone number(s)? Mary Osheamaura (spouse) [phone]     Prior to hospitilization cognitive status: Alert/Oriented     Current cognitive status: Alert/Oriented     Walking or Climbing Stairs Difficulty yes     Walking or Climbing Stairs ambulation difficulty, requires equipment     Mobility Management rolling walker, rollator     Dressing/Bathing Difficulty yes     Dressing/Bathing bathing difficulty, assistance 1 person;dressing difficulty, assistance 1 person     Dressing/Bathing Management wife assists in these tasks     Home Layout Bedroom on 2nd floor;Bathroom on 2nd floor     Equipment Currently Used at Home walker, rolling;rollator;bath bench     Readmission within 30 days? No     Patient currently being followed by outpatient case management? No     Do you currently have  service(s) that help you manage your care at home? No     Do you take prescription medications? Yes     Do you have prescription coverage? Yes     Coverage [employer] [facility] - [employer]     Do you have any problems affording any of your prescribed medications? No     Is the patient taking medications as prescribed? yes     Who is going to help you get home at discharge? Mary Pete (spouse) [phone]     How do you get to doctors appointments? family or friend will provide     Are you on dialysis? No     Do you take coumadin? No   patient taking aspirin while admitted    Discharge Plan A Home with family     Discharge Plan B Home Health     DME Needed Upon Discharge  other (see comments)   TBD    Discharge Plan discussed with: Spouse/sig other;Patient     Name(s) and Number(s) Mary Pete (spouse) [phone]     Transition of Care Barriers None        Physical Activity    On average, how many days per week do you engage in moderate to strenuous exercise (like a brisk walk)? 0 days     On average, how many minutes do you engage in exercise at this level? 0 min        Financial Resource Strain    How hard is it for you to pay for the very basics like food, housing, medical care, and heating? Not very hard        Housing Stability    In the last 12 months, was there a time when you were not able to pay the mortgage or rent on time? No     At any time in the past 12 months, were you homeless or living in a shelter (including now)? No        Transportation Needs    Has the lack of transportation kept you from medical appointments, meetings, work or from getting things needed for daily living? No        Food Insecurity    Within the past 12 months, you worried that your food would run out before you got the money to buy more. Never true     Within the past 12 months, the food you bought just didn't last and you didn't have money to get more. Never true        Stress    Do you feel  stress - tense, restless, nervous, or anxious, or unable to sleep at night because your mind is troubled all the time - these days? Very much        Social Isolation    How often do you feel lonely or isolated from those around you?  Never        Alcohol Use    Q1: How often do you have a drink containing alcohol? Never     Q2: How many drinks containing alcohol do you have on a typical day when you are drinking? Patient does not drink     Q3: How often do you have six or more drinks on one occasion? Never        Utilities    In the past 12 months has the electric, gas, oil, or water company threatened to shut off services in your home? No        Health Literacy    How often do you need to have someone help you when you read instructions, pamphlets, or other written material from your doctor or pharmacy? Sometimes        OTHER    Name(s) of People in Home Mary Pete (spouse) [phone]                            PCP:  Slava Lowery MD  [phone]        Pharmacy:    [facility] [employer] [place] - [address]  [address] [place] [place] [address]  Phone: [phone] Fax: [phone]    [facility] [address] [place] [address]  Phone: [phone] Fax: [phone]    [id number]  [place], [place] - [address] AT [facility] [address]  [address] [place] [address]  Phone: [phone] Fax: [phone]        Emergency Contacts:  Extended Emergency Contact Information  Primary Emergency Contact: Mary Pete  Address: [address] [place] [address]  Home Phone: [phone]  Mobile Phone: [phone]  Relation: Spouse      Insurance:    Payor: [employer] [facility] / Plan: [employer] CHOICES / Product Type: Medicare Advantage /     Mery Velázquez RN     [phone]      05/24/2024  4:50 PM

## 2024-05-25 VITALS
DIASTOLIC BLOOD PRESSURE: 67 MMHG | RESPIRATION RATE: 18 BRPM | SYSTOLIC BLOOD PRESSURE: 155 MMHG | OXYGEN SATURATION: 97 % | WEIGHT: 166.25 LBS | BODY MASS INDEX: 23.8 KG/M2 | TEMPERATURE: 98 F | HEART RATE: 87 BPM | HEIGHT: 70 IN

## 2024-05-25 LAB
ANION GAP SERPL CALC-SCNC: 10 MMOL/L (ref 8–16)
BASOPHILS # BLD AUTO: 0.02 K/UL (ref 0–0.2)
BASOPHILS NFR BLD: 0.5 % (ref 0–1.9)
BUN SERPL-MCNC: 13 MG/DL (ref 8–23)
CALCIUM SERPL-MCNC: 8 MG/DL (ref 8.7–10.5)
CHLORIDE SERPL-SCNC: 107 MMOL/L (ref 95–110)
CO2 SERPL-SCNC: 22 MMOL/L (ref 23–29)
CREAT SERPL-MCNC: 0.9 MG/DL (ref 0.5–1.4)
DIFFERENTIAL METHOD BLD: ABNORMAL
EOSINOPHIL # BLD AUTO: 0.1 K/UL (ref 0–0.5)
EOSINOPHIL NFR BLD: 2.1 % (ref 0–8)
ERYTHROCYTE [DISTWIDTH] IN BLOOD BY AUTOMATED COUNT: 16.8 % (ref 11.5–14.5)
EST. GFR  (NO RACE VARIABLE): >60 ML/MIN/1.73 M^2
GLUCOSE SERPL-MCNC: 91 MG/DL (ref 70–110)
HCT VFR BLD AUTO: 32.8 % (ref 40–54)
HGB BLD-MCNC: 10.4 G/DL (ref 14–18)
IMM GRANULOCYTES # BLD AUTO: 0.02 K/UL (ref 0–0.04)
IMM GRANULOCYTES NFR BLD AUTO: 0.5 % (ref 0–0.5)
LYMPHOCYTES # BLD AUTO: 1.2 K/UL (ref 1–4.8)
LYMPHOCYTES NFR BLD: 27.6 % (ref 18–48)
MCH RBC QN AUTO: 29.1 PG (ref 27–31)
MCHC RBC AUTO-ENTMCNC: 31.7 G/DL (ref 32–36)
MCV RBC AUTO: 92 FL (ref 82–98)
MONOCYTES # BLD AUTO: 0.7 K/UL (ref 0.3–1)
MONOCYTES NFR BLD: 17 % (ref 4–15)
NEUTROPHILS # BLD AUTO: 2.2 K/UL (ref 1.8–7.7)
NEUTROPHILS NFR BLD: 52.3 % (ref 38–73)
NRBC BLD-RTO: 0 /100 WBC
PLATELET # BLD AUTO: 127 K/UL (ref 150–450)
PMV BLD AUTO: 8.7 FL (ref 9.2–12.9)
POTASSIUM SERPL-SCNC: 4 MMOL/L (ref 3.5–5.1)
RBC # BLD AUTO: 3.58 M/UL (ref 4.6–6.2)
SODIUM SERPL-SCNC: 139 MMOL/L (ref 136–145)
WBC # BLD AUTO: 4.24 K/UL (ref 3.9–12.7)

## 2024-05-25 PROCEDURE — 36415 COLL VENOUS BLD VENIPUNCTURE: CPT

## 2024-05-25 PROCEDURE — 97116 GAIT TRAINING THERAPY: CPT

## 2024-05-25 PROCEDURE — 25000003 PHARM REV CODE 250

## 2024-05-25 PROCEDURE — 99231 SBSQ HOSP IP/OBS SF/LOW 25: CPT | Mod: ,,, | Performed by: ANESTHESIOLOGY

## 2024-05-25 PROCEDURE — 25000003 PHARM REV CODE 250: Performed by: STUDENT IN AN ORGANIZED HEALTH CARE EDUCATION/TRAINING PROGRAM

## 2024-05-25 PROCEDURE — 85025 COMPLETE CBC W/AUTO DIFF WBC: CPT

## 2024-05-25 PROCEDURE — 80048 BASIC METABOLIC PNL TOTAL CA: CPT

## 2024-05-25 PROCEDURE — 97530 THERAPEUTIC ACTIVITIES: CPT | Mod: CO

## 2024-05-25 RX ORDER — POLYETHYLENE GLYCOL 3350 17 G/17G
17 POWDER, FOR SOLUTION ORAL DAILY
Qty: 238 G | Refills: 0 | Status: SHIPPED | OUTPATIENT
Start: 2024-05-26 | End: 2024-06-08

## 2024-05-25 RX ORDER — NAPROXEN SODIUM 220 MG/1
81 TABLET, FILM COATED ORAL 2 TIMES DAILY
Qty: 56 TABLET | Refills: 0 | Status: SHIPPED | OUTPATIENT
Start: 2024-05-25 | End: 2024-06-22

## 2024-05-25 RX ORDER — OXYCODONE HYDROCHLORIDE 5 MG/1
5 TABLET ORAL EVERY 4 HOURS PRN
Qty: 28 TABLET | Refills: 0 | Status: SHIPPED | OUTPATIENT
Start: 2024-05-25

## 2024-05-25 RX ORDER — ACETAMINOPHEN 500 MG
1000 TABLET ORAL EVERY 8 HOURS
Qty: 180 TABLET | Refills: 0 | Status: SHIPPED | OUTPATIENT
Start: 2024-05-25 | End: 2024-06-24

## 2024-05-25 RX ORDER — METHOCARBAMOL 750 MG/1
750 TABLET, FILM COATED ORAL EVERY 6 HOURS
Qty: 40 TABLET | Refills: 0 | Status: SHIPPED | OUTPATIENT
Start: 2024-05-25 | End: 2024-06-04 | Stop reason: SDUPTHER

## 2024-05-25 RX ORDER — GABAPENTIN 300 MG/1
300 CAPSULE ORAL 3 TIMES DAILY
Qty: 90 CAPSULE | Refills: 0 | Status: SHIPPED | OUTPATIENT
Start: 2024-05-25 | End: 2024-06-04

## 2024-05-25 RX ORDER — SENNOSIDES 8.6 MG/1
1 TABLET ORAL DAILY
Qty: 7 TABLET | Refills: 0 | Status: SHIPPED | OUTPATIENT
Start: 2024-05-26 | End: 2024-06-02

## 2024-05-25 RX ORDER — CELECOXIB 200 MG/1
200 CAPSULE ORAL DAILY
Qty: 30 CAPSULE | Refills: 0 | Status: SHIPPED | OUTPATIENT
Start: 2024-05-26 | End: 2024-06-25

## 2024-05-25 RX ADMIN — METHOCARBAMOL 750 MG: 750 TABLET ORAL at 12:05

## 2024-05-25 RX ADMIN — POLYETHYLENE GLYCOL 3350 17 G: 17 POWDER, FOR SOLUTION ORAL at 09:05

## 2024-05-25 RX ADMIN — ALLOPURINOL 100 MG: 100 TABLET ORAL at 09:05

## 2024-05-25 RX ADMIN — GABAPENTIN 300 MG: 300 CAPSULE ORAL at 02:05

## 2024-05-25 RX ADMIN — MORPHINE SULFATE 15 MG: 15 TABLET, EXTENDED RELEASE ORAL at 05:05

## 2024-05-25 RX ADMIN — CELECOXIB 200 MG: 100 CAPSULE ORAL at 09:05

## 2024-05-25 RX ADMIN — ASPIRIN 81 MG CHEWABLE TABLET 81 MG: 81 TABLET CHEWABLE at 09:05

## 2024-05-25 RX ADMIN — METHOCARBAMOL 750 MG: 750 TABLET ORAL at 05:05

## 2024-05-25 RX ADMIN — ACETAMINOPHEN 1000 MG: 500 TABLET ORAL at 02:05

## 2024-05-25 RX ADMIN — GABAPENTIN 300 MG: 300 CAPSULE ORAL at 05:05

## 2024-05-25 RX ADMIN — SENNOSIDES 8.6 MG: 8.6 TABLET, FILM COATED ORAL at 09:05

## 2024-05-25 RX ADMIN — ACETAMINOPHEN 1000 MG: 500 TABLET ORAL at 05:05

## 2024-05-25 NOTE — ANESTHESIA POST-OP PAIN MANAGEMENT
Acute Pain Service Progress Note    Gamaliel Pete Jr. is a 73 y.o., male, [id number].    Surgery:  INSERTION, INTRAMEDULLARY NGHIA (Right: Hip)       INSERTION, SINGLE LUMEN CATHETER WITH PORT, WITH FLUOROSCOPIC GUIDANCE (Left: Chest)     Post Op Day #: 2    Catheter type: R SIFI PNC    Infusion type: Ropi 0.2% 15ml q3h IB + 1ml/hr cont    Problem List:    Active Hospital Problems    Diagnosis  POA    *Metastatic renal cell carcinoma to bone [C79.51, C64.9]  Yes      Resolved Hospital Problems   No resolved problems to display.       Subjective:     General appearance of alert, oriented, no complaints   Pain with rest: 1    Numbers   Pain with movement: 4    Numbers   Side Effects    1. Pruritis No    2. Nausea No    3. Motor Blockade No, 1=Ability to bend knees and ankles    4. Sedation No, 1=awake and alert    Objective:     Catheter site clean, dry, intact      Vitals   Vitals:    05/25/24 1228   BP: (!) 155/67   Pulse: 87   Resp: 18   Temp: 36.8 °C (98.2 °F)        Labs    Admission on 05/22/2024   Component Date Value Ref Range Status    WBC 05/22/2024 2.80 (L)  3.90 - 12.70 K/uL Final    RBC 05/22/2024 4.15 (L)  4.60 - 6.20 M/uL Final    Hemoglobin 05/22/2024 12.0 (L)  14.0 - 18.0 g/dL Final    Hematocrit 05/22/2024 36.1 (L)  40.0 - 54.0 % Final    MCV 05/22/2024 87  82 - 98 fL Final    MCH 05/22/2024 28.9  27.0 - 31.0 pg Final    MCHC 05/22/2024 33.2  32.0 - 36.0 g/dL Final    RDW 05/22/2024 16.3 (H)  11.5 - 14.5 % Final    Platelets 05/22/2024 152  150 - 450 K/uL Final    MPV 05/22/2024 8.8 (L)  9.2 - 12.9 fL Final    Immature Granulocytes 05/22/2024 0.4  0.0 - 0.5 % Final    Gran # (ANC) 05/22/2024 1.8  1.8 - 7.7 K/uL Final    Immature Grans (Abs) 05/22/2024 0.01  0.00 - 0.04 K/uL Final    Lymph # 05/22/2024 0.8 (L)  1.0 - 4.8 K/uL Final    Mono # 05/22/2024 0.2 (L)  0.3 - 1.0 K/uL Final    Eos # 05/22/2024 0.0  0.0 - 0.5 K/uL Final    Baso # 05/22/2024 0.00  0.00 - 0.20 K/uL Final    nRBC 05/22/2024 0  0  /100 WBC Final    Gran % 05/22/2024 64.3  38.0 - 73.0 % Final    Lymph % 05/22/2024 28.9  18.0 - 48.0 % Final    Mono % 05/22/2024 6.4  4.0 - 15.0 % Final    Eosinophil % 05/22/2024 0.0  0.0 - 8.0 % Final    Basophil % 05/22/2024 0.0  0.0 - 1.9 % Final    Differential Method 05/22/2024 Automated   Final    Sodium 05/22/2024 139  136 - 145 mmol/L Final    Potassium 05/22/2024 5.4 (H)  3.5 - 5.1 mmol/L Final    Chloride 05/22/2024 106  95 - 110 mmol/L Final    CO2 05/22/2024 23  23 - 29 mmol/L Final    Glucose 05/22/2024 120 (H)  70 - 110 mg/dL Final    BUN 05/22/2024 9  8 - 23 mg/dL Final    Creatinine 05/22/2024 1.0  0.5 - 1.4 mg/dL Final    Calcium 05/22/2024 9.2  8.7 - 10.5 mg/dL Final    Anion Gap 05/22/2024 10  8 - 16 mmol/L Final    eGFR 05/22/2024 >60.0  >60 mL/min/1.73 m^2 Final    Group & Rh 05/22/2024 O NEG   Final    Indirect Anil 05/22/2024 NEG   Final    Specimen Outdate 05/22/2024 05/25/2024 23:59   Final    UNIT NUMBER 05/22/2024 [id number]   Preliminary    Product Code 05/22/2024 [id number]   Preliminary    DISPENSE STATUS 05/22/2024 CROSSMATCHED   Preliminary    CODING SYSTEM 05/22/2024 [id number]   Preliminary    Unit Blood Type Code 05/22/2024 9500   Preliminary    Unit Blood Type 05/22/2024 O NEG   Preliminary    Unit Expiration 05/22/2024 [id number]   Preliminary    CROSSMATCH INTERPRETATION 05/22/2024 Compatible   Preliminary    UNIT NUMBER 05/22/2024 [id number]   Preliminary    Product Code 05/22/2024 K2522L62   Preliminary    DISPENSE STATUS 05/22/2024 CROSSMATCHED   Preliminary    CODING SYSTEM 05/22/2024 [id number]   Preliminary    Unit Blood Type Code 05/22/2024 9500   Preliminary    Unit Blood Type 05/22/2024 O NEG   Preliminary    Unit Expiration 05/22/2024 [id number]   Preliminary    CROSSMATCH INTERPRETATION 05/22/2024 Compatible   Preliminary    Sodium 05/23/2024 135 (L)  136 - 145 mmol/L Final    Potassium 05/23/2024 4.2  3.5 - 5.1 mmol/L Final    Chloride 05/23/2024 106  95 - 110  mmol/L Final    CO2 05/23/2024 20 (L)  23 - 29 mmol/L Final    Glucose 05/23/2024 107  70 - 110 mg/dL Final    BUN 05/23/2024 11  8 - 23 mg/dL Final    Creatinine 05/23/2024 0.9  0.5 - 1.4 mg/dL Final    Calcium 05/23/2024 8.5 (L)  8.7 - 10.5 mg/dL Final    Anion Gap 05/23/2024 9  8 - 16 mmol/L Final    eGFR 05/23/2024 >60.0  >60 mL/min/1.73 m^2 Final    WBC 05/25/2024 4.24  3.90 - 12.70 K/uL Final    RBC 05/25/2024 3.58 (L)  4.60 - 6.20 M/uL Final    Hemoglobin 05/25/2024 10.4 (L)  14.0 - 18.0 g/dL Final    Hematocrit 05/25/2024 32.8 (L)  40.0 - 54.0 % Final    MCV 05/25/2024 92  82 - 98 fL Final    MCH 05/25/2024 29.1  27.0 - 31.0 pg Final    MCHC 05/25/2024 31.7 (L)  32.0 - 36.0 g/dL Final    RDW 05/25/2024 16.8 (H)  11.5 - 14.5 % Final    Platelets 05/25/2024 127 (L)  150 - 450 K/uL Final    MPV 05/25/2024 8.7 (L)  9.2 - 12.9 fL Final    Immature Granulocytes 05/25/2024 0.5  0.0 - 0.5 % Final    Gran # (ANC) 05/25/2024 2.2  1.8 - 7.7 K/uL Final    Immature Grans (Abs) 05/25/2024 0.02  0.00 - 0.04 K/uL Final    Lymph # 05/25/2024 1.2  1.0 - 4.8 K/uL Final    Mono # 05/25/2024 0.7  0.3 - 1.0 K/uL Final    Eos # 05/25/2024 0.1  0.0 - 0.5 K/uL Final    Baso # 05/25/2024 0.02  0.00 - 0.20 K/uL Final    nRBC 05/25/2024 0  0 /100 WBC Final    Gran % 05/25/2024 52.3  38.0 - 73.0 % Final    Lymph % 05/25/2024 27.6  18.0 - 48.0 % Final    Mono % 05/25/2024 17.0 (H)  4.0 - 15.0 % Final    Eosinophil % 05/25/2024 2.1  0.0 - 8.0 % Final    Basophil % 05/25/2024 0.5  0.0 - 1.9 % Final    Differential Method 05/25/2024 Automated   Final    Sodium 05/25/2024 139  136 - 145 mmol/L Final    Potassium 05/25/2024 4.0  3.5 - 5.1 mmol/L Final    Chloride 05/25/2024 107  95 - 110 mmol/L Final    CO2 05/25/2024 22 (L)  23 - 29 mmol/L Final    Glucose 05/25/2024 91  70 - 110 mg/dL Final    BUN 05/25/2024 13  8 - 23 mg/dL Final    Creatinine 05/25/2024 0.9  0.5 - 1.4 mg/dL Final    Calcium 05/25/2024 8.0 (L)  8.7 - 10.5 mg/dL Final     Anion Gap 05/25/2024 10  8 - 16 mmol/L Final    eGFR 05/25/2024 >60.0  >60 mL/min/1.73 m^2 Final        Meds   Current Facility-Administered Medications   Medication Dose Route Frequency Provider Last Rate Last Admin    0.9%  NaCl infusion   Intravenous Continuous Jennifer Hummel NP   Stopped at 05/25/24 0700    acetaminophen tablet 1,000 mg  1,000 mg Oral Q8H Armand Pena MD        allopurinoL tablet 100 mg  100 mg Oral Daily Ananya Hall MD   100 mg at 05/25/24 0904    amlodipine-benazepril 5-10 mg per capsule 1 capsule  1 capsule Oral Daily Audi Lopez MD        aspirin chewable tablet 81 mg  81 mg Oral BID Audi Lopez MD   81 mg at 05/25/24 0903    celecoxib capsule 200 mg  200 mg Oral Daily Armand Pena MD   200 mg at 05/25/24 0904    gabapentin capsule 300 mg  300 mg Oral Q8H Armand Pena MD   300 mg at 05/25/24 0529    methocarbamoL tablet 750 mg  750 mg Oral Q6H Armand Pena MD   750 mg at 05/25/24 1220    morphine 12 hr tablet 15 mg  15 mg Oral Q8H Armand Pena MD   15 mg at 05/25/24 0530    naloxone 0.4 mg/mL injection 0.4 mg  0.4 mg Intravenous PRN Armand Pena MD        oxyCODONE immediate release tablet Tab 10 mg  10 mg Oral Q4H PRN Armand Pena MD        polyethylene glycol packet 17 g  17 g Oral Daily Armand Pena MD   17 g at 05/25/24 0903    ropivacaine 0.2% Perineural Pump infusion 500 ML   Perineural Continuous Kelsea Stratton DO   New Bag at 05/23/24 1125    senna tablet 8.6 mg  8.6 mg Oral Daily Armand Pena MD   8.6 mg at 05/25/24 0904     Facility-Administered Medications Ordered in Other Encounters   Medication Dose Route Frequency Provider Last Rate Last Admin    ascorbic acid (vitamin C) tablet 250 mg  250 mg Oral Daily Kenn Vu MD        calcium carbonate 200 mg calcium (500 mg) chewable tablet 500 mg  500 mg Oral TID PRN Kenn Vu MD        celecoxib capsule 200 mg  200 mg Oral Daily Kenn Vu MD        dextrose 10% bolus 125 mL  125 mL  12.5 g Intravenous PRN Kenn Vu MD        dextrose 10% bolus 250 mL 250 mL  25 g Intravenous PRN Kenn Vu MD        docusate sodium capsule 100 mg  100 mg Oral BID Kenn Vu MD        glucagon (human recombinant) injection 1 mg  1 mg Intramuscular PRN Kenn Vu MD        glucose chewable tablet 16 g  16 g Oral PRN Kenn Vu MD        glucose chewable tablet 24 g  24 g Oral PRN Kenn Vu MD        magnesium oxide tablet 800 mg  800 mg Oral PRN Kenn Vu MD        melatonin tablet 6 mg  6 mg Oral Nightly PRN Kenn Vu MD        multivitamin tablet  1 tablet Oral Daily Kenn Vu MD        naloxone 0.4 mg/mL injection 0.4 mg  0.4 mg Intravenous PRN Kenn Vu MD        ondansetron injection 4 mg  4 mg Intravenous Q8H PRN Kenn Vu MD        polyethylene glycol packet 17 g  17 g Oral BID PRN Kenn Vu MD        potassium bicarbonate disintegrating tablet 50 mEq  50 mEq Oral PRN Kenn Vu MD        potassium, sodium phosphates 280-160-250 mg packet 2 packet  2 packet Oral PRN Kenn Vu MD        promethazine tablet 25 mg  25 mg Oral Q6H PRN Kenn Vu MD        sodium chloride 0.9% flush 10 mL  10 mL Intravenous Q12H PRN Kenn Vu MD        vitamin D 1000 units tablet 2,000 Units  2,000 Units Oral Daily Kenn Vu MD             Assessment:    Pain control adequate. No hip pain this morning, pain mostly in his R shoulder which is chronic. Pump paused early AM and pt did well with PT with little to no pain.     Plan:  -R SIFI PNC pulled 05/25  -Rec d/c with RX of gabapentin 300mg TID and Robaxin 750mg q6h  -Morphine 15mg q8h   -Tylenol 1g q6h  -Celebrex 200mg QD  -Gabapentin 300mg TID  -Robaxin 750mg q6h  -Oxycodone 10mg q4h prn  -Rest of care per primary      Case discussed with staff, final recommendations per attestation above.     Thank you for the consult and allowing us to participate in the  care of this patient. We will continue to follow along. Please call Acute Pain Service at [phone] or Anesthesia at [phone] if you have any further questions or concerns.     Joss Irwin MD (PGY2/CA1)  Resident Physician, Anesthesiology  [facility]

## 2024-05-25 NOTE — PT/OT/SLP PROGRESS
Physical Therapy Treatment    Patient Name:  Gamaliel Pete Jr.   MRN:  [id number]    Recommendations:     Discharge Recommendations: Low Intensity Therapy  Discharge Equipment Recommendations: shower chair  Barriers to discharge: None    Assessment:     Gamaliel Pete Jr. is a 73 y.o. male admitted with a medical diagnosis of Metastatic renal cell carcinoma to bone.  He presents with the following impairments/functional limitations: weakness, impaired self care skills, impaired balance, decreased ROM, impaired endurance, impaired functional mobility, pain, decreased upper extremity function, impaired sensation, gait instability, decreased lower extremity function, orthopedic precautions.    Pt ambulated 50ft using RW and ascended/descended 4 DIEGO using 1HR to mimic home setup to ensure safe discharge home. Pt required CGA for safety throughout. Pt with increased caution regarding R knee buckling, good awareness of deficits and importance of R quadriceps activation with mobility.    Rehab Prognosis: Good; patient would benefit from acute skilled PT services to address these deficits and reach maximum level of function.    Recent Surgery: * No procedures listed * 4 Days Post-Op    Plan:     During this hospitalization, patient to be seen 4 x/week to address the identified rehab impairments via gait training, therapeutic activities, therapeutic exercises, neuromuscular re-education and progress toward the following goals:    Plan of Care Expires:  06/24/24    Subjective     Chief Complaint: Concern for R knee buckling  Patient/Family Comments/goals: Eager to attempt stairs to d/c home today  Pain/Comfort:         Objective:     Communicated with nurse prior to session.  Patient found HOB elevated with perineural catheter upon PT entry to room.     General Precautions: Standard, fall  Orthopedic Precautions: RLE weight bearing as tolerated  Braces: N/A  Respiratory Status: Room air     Functional Mobility:  Bed  "Mobility:     Supine to Sit: supervision  Transfers:     Sit to Stand:  stand by assistance with rolling walker  Bed to Chair: stand by assistance with  rolling walker  using  Step Transfer  Gait: x50ft using RW with CGA per pt request 2/2 fear of R knee buckling  Periods of SBA; however, pt then requesting PT provide CGA for safety   No R knee buckling throughout  Stairs:  Pt ascended/descended 4 stair(s) with No Assistive Device with right handrail with Contact Guard Assistance.   Step-to pattern ascending with LLE and descending with RLE  Utilized BUEs on 1 HR, side-stepping up stairs; pt reporting this is how he performs stair navigation at home      AM-PAC 6 CLICK MOBILITY          Treatment & Education:  -Discussed step-to pattern during ambulation, leading with RLE and use of BUEs into RW for additional support  -Educated on safe stair navigation  -Educated on proper level of assistance for discharge home  -Educated on LAQs with 5" hold to improve R quad activation     Patient left up in chair with all lines intact, call button in reach, nurse notified, and MD and wife present..    GOALS:   Multidisciplinary Problems       Physical Therapy Goals          Problem: Physical Therapy    Goal Priority Disciplines Outcome Goal Variances Interventions   Physical Therapy Goal     PT, PT/OT Progressing     Description: Goals to be met by: 05/10/24     Patient will increase functional independence with mobility by performin. Supine <> sit with Set-up Joaquin  2. Rolling to Left and Right with Set-up Assistance.  3. Sit <> stand transfer with Modified Joaquin with LRAD  4. Bed <> chair transfer with Supervision using with LRAD  5. Gait  x 20 feet with Supervision using LRAD   6. Ascend/descend 5 stair with left Handrails Contact Guard Assistance using LRAD   7. Stand for 5 minutes with Modified Joaquin using LRAD  8. Lower extremity exercise program x30 reps per handout, with assistance as needed   "                       Time Tracking:     PT Received On: 05/25/24  PT Start Time: 1013     PT Stop Time: 1036  PT Total Time (min): 23 min     Billable Minutes: Gait Training 23    Treatment Type: Treatment  PT/PTA: PT     Number of PTA visits since last PT visit: 0     05/26/2024

## 2024-05-25 NOTE — PLAN OF CARE
Pt discharged to home per MD order. Discharge instructions and prescriptions given and explained to pt. Pt and wife verbalized understanding of d/c instructions and prescription education. PIV removed; site clean, dry, and intact.  Pt ambulating in room with steady gait with use of walker; tolerating regular diet; voiding without difficulty; pain well-controlled with PO pain meds.  All VSS; O2 sats WNL. Pt left floor by wheelchair via hospital transportation; accompanied by wife from the floor.

## 2024-05-25 NOTE — PROGRESS NOTES
[facility] - Oncology ([facility])  Orthopedics  Progress Note    Patient Name: Gamaliel Pete Jr.  MRN: [id number]  Admission Date: 5/22/2024  Hospital Length of Stay: 3 days  Attending Provider: Parvez Rock MD  Primary Care Provider: Slava Lowery MD  Follow-up For: * No procedures listed *    Post-Operative Day: 3 Days Post-Op  Subjective:     Principal Problem:Metastatic renal cell carcinoma to bone    Principal Orthopedic Problem: RCC mets to bilat femur s/p R femur IMN 05/24    Interval History: Pt seen and examined at bedside. NAEON, VSS, AF. Pain controlled. Denies fevers, chills, chest pain, SOB, N/V/D.   Pt with calf numbness that he feels he cannot lock his leg, states that if numbness dissipates like it did last night would be able to do stairs better. Will pause PNC and see how he does with PT, possible discharge today.       Review of patient's allergies indicates:   Allergen Reactions    Vancomycin analogues Rash     IV antibiotic caused a rash--RED MAN SYNDROME       Current Facility-Administered Medications   Medication    0.9%  NaCl infusion    acetaminophen tablet 1,000 mg    allopurinoL tablet 100 mg    amlodipine-benazepril 5-10 mg per capsule 1 capsule    aspirin chewable tablet 81 mg    celecoxib capsule 200 mg    gabapentin capsule 300 mg    methocarbamoL tablet 750 mg    morphine 12 hr tablet 15 mg    naloxone 0.4 mg/mL injection 0.4 mg    oxyCODONE immediate release tablet Tab 10 mg    polyethylene glycol packet 17 g    ropivacaine 0.2% Perineural Pump infusion 500 ML    senna tablet 8.6 mg     Facility-Administered Medications Ordered in Other Encounters   Medication    ascorbic acid (vitamin C) tablet 250 mg    calcium carbonate 200 mg calcium (500 mg) chewable tablet 500 mg    celecoxib capsule 200 mg    dextrose 10% bolus 125 mL 125 mL    dextrose 10% bolus 250 mL 250 mL    docusate sodium capsule 100 mg    glucagon (human recombinant) injection 1 mg    glucose chewable  "tablet 16 g    glucose chewable tablet 24 g    magnesium oxide tablet 800 mg    melatonin tablet 6 mg    multivitamin tablet    naloxone 0.4 mg/mL injection 0.4 mg    ondansetron injection 4 mg    polyethylene glycol packet 17 g    potassium bicarbonate disintegrating tablet 50 mEq    potassium, sodium phosphates 280-160-250 mg packet 2 packet    promethazine tablet 25 mg    sodium chloride 0.9% flush 10 mL    vitamin D 1000 units tablet 2,000 Units     Objective:     Vital Signs (Most Recent):  Temp: 97.5 °F (36.4 °C) (05/25/24 0425)  Pulse: 69 (05/25/24 0425)  Resp: 18 (05/25/24 0530)  BP: 119/60 (05/25/24 0425)  SpO2: 97 % (05/25/24 0425) Vital Signs (24h Range):  Temp:  [97.5 °F (36.4 °C)-98.6 °F (37 °C)] 97.5 °F (36.4 °C)  Pulse:  [67-84] 69  Resp:  [16-20] 18  SpO2:  [7 %-98 %] 97 %  BP: (118-126)/(56-84) 119/60     Weight: 75.4 kg (166 lb 3.6 oz)  Height: 5' 10" (177.8 cm)  Body mass index is 23.85 kg/m².      Intake/Output Summary (Last 24 hours) at 5/25/2024 0651  Last data filed at 5/24/2024 2106  Gross per 24 hour   Intake 1040 ml   Output 500 ml   Net 540 ml        Ortho/SPM Exam     AAOx4  NAD  Reg rate  No increased WOB    RLE    Dressing C/D/I   Appropriate post-op swelling and tenderness over the incision site.  No gross hip tenderness  No ecchymosis at the hip or thigh.  Compartments soft/compressible  SILT throughout, decreased compared to contralateral side at medial calf  Motor intact TA/EHL/Gastroc/FHL   DP pulse 2+      Significant Labs: All pertinent labs within the past 24 hours have been reviewed.    Significant Imaging: I have reviewed and interpreted all pertinent imaging results/findings.  Assessment/Plan:     * Metastatic renal cell carcinoma to bone  Gamaliel Pete JrLizz is a 73 y.o. male with a history or RCC with mets to bilat femur who is here today for prophylactic IMN of R femur and port placement by gen surgery. He is is s/p IR embolization on 05/22 & right femur IMN on " 05/23.    - Antibiotics: ancef 24h post-op completed  - WBAT RLE  - DVT Prophylaxis: ASA 81mg BID  - Pain control: multimodal pain management, APS following, pause PNC today  - PT/OT eval    Dispo: pending PT          Kenn Vu MD  Orthopedics  [facility] - Oncology ([facility])

## 2024-05-25 NOTE — SUBJECTIVE & OBJECTIVE
Principal Problem:Metastatic renal cell carcinoma to bone    Principal Orthopedic Problem: RCC mets to bilat femur s/p R femur IMN 05/24    Interval History: Pt seen and examined at bedside. BOAZ, MARLIS, AF. Pain controlled. Denies fevers, chills, chest pain, SOB, N/V/D.   Pt with calf numbness that he feels he cannot lock his leg, states that if numbness dissipates like it did last night would be able to do stairs better. Will pause PNC and see how he does with PT, possible discharge today.       Review of patient's allergies indicates:   Allergen Reactions    Vancomycin analogues Rash     IV antibiotic caused a rash--RED MAN SYNDROME       Current Facility-Administered Medications   Medication    0.9%  NaCl infusion    acetaminophen tablet 1,000 mg    allopurinoL tablet 100 mg    amlodipine-benazepril 5-10 mg per capsule 1 capsule    aspirin chewable tablet 81 mg    celecoxib capsule 200 mg    gabapentin capsule 300 mg    methocarbamoL tablet 750 mg    morphine 12 hr tablet 15 mg    naloxone 0.4 mg/mL injection 0.4 mg    oxyCODONE immediate release tablet Tab 10 mg    polyethylene glycol packet 17 g    ropivacaine 0.2% Perineural Pump infusion 500 ML    senna tablet 8.6 mg     Facility-Administered Medications Ordered in Other Encounters   Medication    ascorbic acid (vitamin C) tablet 250 mg    calcium carbonate 200 mg calcium (500 mg) chewable tablet 500 mg    celecoxib capsule 200 mg    dextrose 10% bolus 125 mL 125 mL    dextrose 10% bolus 250 mL 250 mL    docusate sodium capsule 100 mg    glucagon (human recombinant) injection 1 mg    glucose chewable tablet 16 g    glucose chewable tablet 24 g    magnesium oxide tablet 800 mg    melatonin tablet 6 mg    multivitamin tablet    naloxone 0.4 mg/mL injection 0.4 mg    ondansetron injection 4 mg    polyethylene glycol packet 17 g    potassium bicarbonate disintegrating tablet 50 mEq    potassium, sodium phosphates 280-160-250 mg packet 2 packet    promethazine  "tablet 25 mg    sodium chloride 0.9% flush 10 mL    vitamin D 1000 units tablet 2,000 Units     Objective:     Vital Signs (Most Recent):  Temp: 97.5 °F (36.4 °C) (05/25/24 0425)  Pulse: 69 (05/25/24 0425)  Resp: 18 (05/25/24 0530)  BP: 119/60 (05/25/24 0425)  SpO2: 97 % (05/25/24 0425) Vital Signs (24h Range):  Temp:  [97.5 °F (36.4 °C)-98.6 °F (37 °C)] 97.5 °F (36.4 °C)  Pulse:  [67-84] 69  Resp:  [16-20] 18  SpO2:  [7 %-98 %] 97 %  BP: (118-126)/(56-84) 119/60     Weight: 75.4 kg (166 lb 3.6 oz)  Height: 5' 10" (177.8 cm)  Body mass index is 23.85 kg/m².      Intake/Output Summary (Last 24 hours) at 5/25/2024 0651  Last data filed at 5/24/2024 2106  Gross per 24 hour   Intake 1040 ml   Output 500 ml   Net 540 ml        Ortho/SPM Exam     AAOx4  NAD  Reg rate  No increased WOB    RLE    Dressing C/D/I   Appropriate post-op swelling and tenderness over the incision site.  No gross hip tenderness  No ecchymosis at the hip or thigh.  Compartments soft/compressible  SILT throughout, decreased compared to contralateral side at medial calf  Motor intact TA/EHL/Gastroc/FHL   DP pulse 2+      Significant Labs: All pertinent labs within the past 24 hours have been reviewed.    Significant Imaging: I have reviewed and interpreted all pertinent imaging results/findings.  "

## 2024-05-25 NOTE — PROGRESS NOTES
Assumed care of pt from 1845-0715.      - A/Ox4  - VSS on RA  - C/o mild RLE pain  - PIV flushes w/o difficulty; NS @ 75  - Ropivacaine gtt infusing per MAR  - Newly placed port not accessed; site is ELIJAH; C/D/I  - RLE dressings C/D/I  - C/o mild numbness to R lower leg; pedal pulses +2; no discoloration; warm/dry  - Voiding w/o difficulty using urinal  - Bed alarm refused; spouse at the bedside  - Call bell within reach     No acute events overnight. POC discussed - pt verbalizes understanding and denies questions/concerns at this time.      At the conclusion of this shift, pt is observed to be resting comfortably w/ unlabored breathing and exhibits no s/s of distress. Bed is in lowest position and locked. POC followed. Frequent rounding performed.

## 2024-05-25 NOTE — PLAN OF CARE
Problem: Adult Inpatient Plan of Care  Goal: Plan of Care Review  Outcome: Progressing  Goal: Optimal Comfort and Wellbeing  Outcome: Progressing     Problem: Wound  Goal: Optimal Coping  Outcome: Progressing  Goal: Optimal Functional Ability  Outcome: Progressing  Goal: Optimal Pain Control and Function  Outcome: Progressing

## 2024-05-25 NOTE — PT/OT/SLP PROGRESS
Occupational Therapy   Treatment  A client care conference was completed by the OTR and the PARSONS prior to treatment by the OTR to discuss the patient's POC and current status.     Name: Gamaliel Pete Jr.  MRN: [id number]  Admitting Diagnosis:  Metastatic renal cell carcinoma to bone  3 Days Post-Op    Recommendations:     Discharge Recommendations: High Intensity Therapy  Discharge Equipment Recommendations:  shower chair  Barriers to discharge:  Inaccessible home environment    Assessment:     Gamaliel Pete Jr. is a 73 y.o. male with a medical diagnosis of Metastatic renal cell carcinoma to bone.  He presents with the following performance deficits affecting function are weakness, impaired endurance, impaired self care skills, impaired functional mobility, gait instability, decreased lower extremity function, decreased safety awareness, orthopedic precautions, pain, impaired balance.   Pt and pt's spouse present during tx session and very pleasant. Pt demonstrated ability to safely perform toilet transfer with AD and in-room mobility with AD with no LOB observed this date.      Rehab Prognosis:  Good; patient would benefit from acute skilled OT services to address these deficits and reach maximum level of function.       Plan:     Patient to be seen 4 x/week to address the above listed problems via self-care/home management, therapeutic activities, therapeutic exercises, neuromuscular re-education  Plan of Care Expires: 06/23/24  Plan of Care Reviewed with: patient, spouse    Subjective     Chief Complaint: Muscle soreness/pain  Patient/Family Comments/goals: Pt reports he was glad that he was able to get R LE taken care of with surgery and agreeable with tx session.  Pain/Comfort:  Pain Rating 1:  (not quantified per pt)  Location - Side 1: Right  Location - Orientation 1: generalized  Location 1: leg (pt reported muscle soreness/pain)  Pain Addressed 1: Reposition, Distraction    Objective:      Communicated with: nurse prior to session.  Patient found up in chair with  (no active lines) upon OT entry to room. Perineural catheter taken out prior to PARSONS arrival per pt.    General Precautions: Standard, fall (standard)    Orthopedic Precautions:RLE weight bearing as tolerated  Braces: N/A  Respiratory Status: Room air     Occupational Performance:     Bed Mobility:    Not tested 2/2 pt up in chair     Functional Mobility/Transfers:  Patient completed Sit <> Stand Transfer with contact guard assistance  with  rolling walker for first trial and SBA for second trial  Patient completed Toilet Transfer Step Transfer technique with stand by assistance with  rolling walker and grab bars  Min verbal cues provided for safety and technique  Functional Mobility: Pt performed in room mobility with RW SBA/close SBA from bedside chair to bathroom (~18+ feet) to simulate household/community distances to improve safety, endurance, static/dynamic standing balance, and be able to perform occupations of choice.  Then, pt ambulated from toilet to bedside chair with RW and close SBA.    Activities of Daily Living:  Not assessed this date      [facility] 6 Click ADL: 18    Treatment & Education:  Treatment session also focused on safety, discharge equipment (shower chair), home modifications that may be applicable to ensure safety for transfers and ADL performance.   Pt and pt's spouse educated and instructed on the following:  OT POC  Role of PARSONS  Use of call bell for all assistance  R LE WBAT precaution  Addressed questions and /or concerns within PARSONS scope of practice  Pt and spouse verbalized understanding     Patient left up in chair with call button in reach, spouse present, and pt appears in NAD.    GOALS:   Multidisciplinary Problems       Occupational Therapy Goals          Problem: Occupational Therapy    Goal Priority Disciplines Outcome Interventions   Occupational Therapy Goal     OT, PT/OT Progressing     Description: Goals to be met by: 06-07-24     Patient will increase functional independence with ADLs by performing:    UE Dressing with [place].  LE Dressing with Supervision with AE as needed  Grooming while standing at sink with Supervision.  Toileting from toilet with Supervision for hygiene and clothing management.   Step transfer with Supervision with RW  Toilet transfer to toilet with Supervision.                         Time Tracking:     OT Date of Treatment: 05/25/24  OT Start Time: 1109  OT Stop Time: 1148  OT Total Time (min): 39 min    Billable Minutes:Therapeutic Activity 39    OT/ELIJAH: ELIJAH     Number of ELIJAH visits since last OT visit: 1 5/25/2024

## 2024-05-26 ENCOUNTER — NURSE TRIAGE (OUTPATIENT)
Dept: ADMINISTRATIVE | Facility: CLINIC | Age: 74
End: 2024-05-26
Payer: MEDICARE

## 2024-05-26 LAB
BLD PROD TYP BPU: NORMAL
BLD PROD TYP BPU: NORMAL
BLOOD UNIT EXPIRATION DATE: NORMAL
BLOOD UNIT EXPIRATION DATE: NORMAL
BLOOD UNIT TYPE CODE: 9500
BLOOD UNIT TYPE CODE: 9500
BLOOD UNIT TYPE: NORMAL
BLOOD UNIT TYPE: NORMAL
CODING SYSTEM: NORMAL
CODING SYSTEM: NORMAL
CROSSMATCH INTERPRETATION: NORMAL
CROSSMATCH INTERPRETATION: NORMAL
DISPENSE STATUS: NORMAL
DISPENSE STATUS: NORMAL
NUM UNITS TRANS PACKED RBC: NORMAL
NUM UNITS TRANS PACKED RBC: NORMAL

## 2024-05-26 NOTE — DISCHARGE SUMMARY
[facility] - Oncology ([facility])  Orthopedics  Discharge Summary      Patient Name: Gamaliel Pete Jr.  MRN: [id number]  Admission Date: 5/22/2024  Hospital Length of Stay: 3 days  Discharge Date and Time: 5/25/2024  3:17 PM  Attending Physician: No att. providers found   Discharging Provider: Kenn Vu MD  Primary Care Provider: Slava Lowery MD    HPI:   73 y.o. male has been experiencing a few weeks of right thigh pain. He had radiographs done which demonstrated a new proximal femur lesion and was therefore referred to me. He ambulates without assistive devices, but he does have a walker and cane. He did undergo revision C spine surgery in mid November and is recovering from that currently. He is in a c collar. He denies any left thigh or leg pain. He also reports some right shoulder pain which is relatively new. 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 He is currently not on any therapy. He tells me is going to start new immunotherapy soon. He has not had any radiation. He denies new numbness or tingling. No recent falls. No rest pain.     * No procedures listed *      Hospital Course:  On 5/22/24 patient arrives to [facility] underwent preoperative embolization his right metastasis with interventional radiology.  On 05/23/2024 the patient arrived to the [facility] for proper pre-operative management.  Upon completion of pre-operative preparation, the patient was taken back to the operative theatre.  Insertion of a right intramedullary nail in addition to port placement was performed without complication and the patient was transported to the post anesthesia care unit in stable condition.  After appropriate recovery from the anaesthetic agents used during the surgery, the patient was then transported to the hospital inpatient floor.  The interim of the hospital stay from arrival on the floor up to discharge has been  uncomplicated. The patient has tolerated regular diet.  The patient's pain has been controlled using a multimodal approach. Currently, the patient's pain is well controlled on an oral regimen.  The patient has been voiding without difficulty.  The patient began participation in physical therapy after surgery and has progressed throughout the entire hospital stay.  Currently, the patient's progress is sufficient to allow the them to be discharged to home safely.  The patient agrees with this assessment and desires a discharge today.      Goals of Care Treatment Preferences:  Code Status: Full Code    Health care agent: Mary Pete  [facility] care agent number: [phone]          What is most important right now is to focus on remaining as independent as possible, symptom/pain control, extending life as long as possible, even it it means sacrificing quality.  Accordingly, we have decided that the best plan to meet the patient's goals includes continuing with treatment.          Significant Diagnostic Studies: Labs: All labs within the past 24 hours have been reviewed    Pending Diagnostic Studies:       Procedure Component Value Units Date/Time    Specimen to Pathology, Surgery Orthopedics [[id number]] Collected: 05/23/24 1028    Order Status: Sent Lab Status: In process Updated: 05/23/24 1255    Specimen: Tissue           Final Active Diagnoses:    Diagnosis Date Noted POA    PRINCIPAL PROBLEM:  Metastatic renal cell carcinoma to bone [C79.51, C64.9] 09/14/2023 Yes      Problems Resolved During this Admission:      Discharged Condition: good    Disposition: Home or Self Care    Follow Up:   Follow-up Information       Parvez Rock MD Follow up in 2 week(s).    Specialty: Orthopedic Surgery  Why: For wound re-check  Contact information:  [address] [place] [address] [id number] [phone]                           Patient Instructions:      Protime-INR   Standing Status: Future Number of Occurrences: 1  Standing Exp. Date: 06/13/24     Notify your health care provider if you experience any of the following:  temperature >100.4     Notify your health care provider if you experience any of the following:  persistent nausea and vomiting or diarrhea     Notify your health care provider if you experience any of the following:  severe uncontrolled pain     Notify your health care provider if you experience any of the following:  redness, tenderness, or signs of infection (pain, swelling, redness, odor or green/yellow discharge around incision site)     Notify your health care provider if you experience any of the following:  difficulty breathing or increased cough     Notify your health care provider if you experience any of the following:  severe persistent headache     Notify your health care provider if you experience any of the following:  worsening rash     Notify your health care provider if you experience any of the following:  persistent dizziness, light-headedness, or visual disturbances     Notify your health care provider if you experience any of the following:  increased confusion or weakness     Leave dressing on - Keep it clean, dry, and intact until clinic visit     Weight bearing restrictions (specify):   Order Comments: WBAT RLE     Medications:  Reconciled Home Medications:      Medication List        START taking these medications      acetaminophen 500 MG tablet  Commonly known as: TYLENOL  Take 2 tablets (1,000 mg total) by mouth every 8 (eight) hours.     aspirin 81 MG Chew  Chew and swallow 1 tablet (81 mg total) by mouth 2 (two) times a day.     celecoxib 200 MG capsule  Commonly known as: CeleBREX  Take 1 capsule (200 mg total) by mouth once daily.     * gabapentin 300 MG capsule  Commonly known as: NEURONTIN  Take 1 capsule (300 mg total) by mouth 3 (three) times daily.     methocarbamoL 750 MG Tab  Commonly known as: ROBAXIN  Take 1 tablet (750 mg total) by mouth every 6 (six) hours. for 10  days     polyethylene glycol 17 gram/dose powder  Commonly known as: GLYCOLAX  Use cap to measure out (17 g) mix with a liquid and take by mouth once daily for 7 days.     SENNA 8.6 mg tablet  Generic drug: senna  Take 1 tablet by mouth once daily. for 7 days           * This list has 1 medication(s) that are the same as other medications prescribed for you. Read the directions carefully, and ask your doctor or other care provider to review them with you.                CHANGE how you take these medications      allopurinoL 100 MG tablet  Commonly known as: ZYLOPRIM  Take 1 tablet (100 mg total) by mouth once daily.  What changed: when to take this     * oxyCODONE 5 MG immediate release tablet  Commonly known as: ROXICODONE  Take 1 tablet (5 mg total) by mouth every 4 (four) hours as needed for Pain.  What changed: Another medication with the same name was added. Make sure you understand how and when to take each.     * oxyCODONE 5 MG immediate release tablet  Commonly known as: ROXICODONE  Take 1 tablet (5 mg total) by mouth every 4 (four) hours as needed for Pain.  What changed: You were already taking a medication with the same name, and this prescription was added. Make sure you understand how and when to take each.           * This list has 2 medication(s) that are the same as other medications prescribed for you. Read the directions carefully, and ask your doctor or other care provider to review them with you.                CONTINUE taking these medications      amlodipine-benazepril 5-10 mg 5-10 mg per capsule  Commonly known as: LOTREL  Take 1 capsule by mouth once daily.     CURCUMIN MISC  1,000 mg by Misc.(Non-Drug; Combo Route) route Daily.     hydrocortisone 2.5 % rectal cream  Commonly known as: ANUSOL-HC  Place rectally 2 (two) times daily.     lenvatinib 10 mg/day (10 mg x 1) Cap  Commonly known as: LENVIMA  Take 10 mg by mouth once daily.     linaCLOtide 72 mcg Cap capsule  Commonly known as:  LINZESS  Take 1 capsule (72 mcg total) by mouth before breakfast.     * morphine 30 MG 12 hr tablet  Commonly known as: MS CONTIN  Take 30 mg by mouth 2 (two) times daily.     * morphine 15 MG 12 hr tablet  Commonly known as: MS CONTIN  Take 1 tablet (15 mg total) by mouth every 8 (eight) hours.     sildenafiL 100 MG tablet  Commonly known as: VIAGRA  Take 1 tablet (100 mg total) by mouth daily as needed for Erectile Dysfunction.           * This list has 2 medication(s) that are the same as other medications prescribed for you. Read the directions carefully, and ask your doctor or other care provider to review them with you.                ASK your doctor about these medications      diazePAM 5 MG tablet  Commonly known as: VALIUM  Take 1 tablet (5 mg total) by mouth every 8 (eight) hours as needed for Anxiety or Insomnia (take 30 minutes prior to imaging).     DUKE'S SOLUTION (BENADRYL 30 ML, MYLANTA 30 ML, LIDOCAINE 30 ML, NYSTATIN 30 ML)  Take 10 mLs by mouth 4 (four) times daily.     EPINEPHrine 0.3 mg/0.3 mL Atin  Commonly known as: EPIPEN  Inject 0.3 mLs (0.3 mg total) into the muscle as needed.     fish oil-omega-3 fatty acids 300-1,000 mg capsule  Take 2 capsules by mouth once daily.     * gabapentin 300 MG capsule  Commonly known as: NEURONTIN  Take 1 capsule (300 mg total) by mouth 3 (three) times daily.     naloxone 4 mg/actuation Spry  Commonly known as: NARCAN  1 spray (4mg) by nasal route as needed for opioid overdose; may repeat every 2-3 minutes in alternating nostrils until medical help arrives. Call 911     UNABLE TO FIND  Take 500 mg by mouth 2 (two) times a day. medication name: Tudca     UNABLE TO FIND  Take 2 capsules by mouth 2 (two) times a day. medication name: Turkey tail mushroom     UNABLE TO FIND  Take 15 drops by mouth once daily. medication name: Essiac-20     UNABLE TO FIND  Take 5 mLs by mouth 2 (two) times a day. medication name: barley leaf juice powder     UNABLE TO FIND  Take 5  mLs by mouth 2 (two) times a day. medication name: black seed oil (cumin seed)           * This list has 1 medication(s) that are the same as other medications prescribed for you. Read the directions carefully, and ask your doctor or other care provider to review them with you.                  Kenn Vu MD  Orthopedics  [facility] - Oncology ([facility])

## 2024-05-26 NOTE — TELEPHONE ENCOUNTER
Pt was discharged from hospital yesterday. Pt had surgery on last Thursday.He had a dulce place into right femur. Cancer patient. Blake Og stated Dr Turk told him to hold the medication week before procedure but she didn't give them a restart date. On call provider called per care advice. Dr Rubi on call MD stated for pt to hold medication until he speak with Dr Turk on tomorrow. Pts spouse notified and verbalized understanding. Please call and advise pt and cg.   Reason for Disposition   [1] Caller has URGENT medicine question about med that PCP or specialist prescribed AND [2] triager unable to answer question    Additional Information   Negative: [1] Intentional drug overdose AND [2] suicidal thoughts or ideas   Negative: MORE THAN A DOUBLE DOSE of a prescription or over-the-counter (OTC) drug   Negative: [1] DOUBLE DOSE (an extra dose or lesser amount) of prescription drug AND [2] any symptoms (e.g., dizziness, nausea, pain, sleepiness)   Negative: [1] DOUBLE DOSE (an extra dose or lesser amount) of over-the-counter (OTC) drug AND [2] any symptoms (e.g., dizziness, nausea, pain, sleepiness)   Negative: Took another person's prescription drug   Negative: [1] DOUBLE DOSE (an extra dose or lesser amount) of prescription drug AND [2] NO symptoms  (Exception: A double dose of antibiotics.)   Negative: Diabetes drug error or overdose (e.g., took wrong type of insulin or took extra dose)   Negative: [1] Prescription not at pharmacy AND [2] was prescribed by PCP recently (Exception: Triager has access to EMR and prescription is recorded there. Go to Home Care and confirm for pharmacy.)   Negative: [1] Pharmacy calling with prescription question AND [2] triager unable to answer question    Protocols used: Medication Question Call-A-

## 2024-05-26 NOTE — HOSPITAL COURSE
On 5/22/24 patient arrives to [facility] underwent preoperative embolization his right metastasis with interventional radiology.  On 05/23/2024 the patient arrived to the [facility] for proper pre-operative management.  Upon completion of pre-operative preparation, the patient was taken back to the operative theatre.  Insertion of a right intramedullary nail in addition to port placement was performed without complication and the patient was transported to the post anesthesia care unit in stable condition.  After appropriate recovery from the anaesthetic agents used during the surgery, the patient was then transported to the hospital inpatient floor.  The interim of the hospital stay from arrival on the floor up to discharge has been uncomplicated. The patient has tolerated regular diet.  The patient's pain has been controlled using a multimodal approach. Currently, the patient's pain is well controlled on an oral regimen.  The patient has been voiding without difficulty.  The patient began participation in physical therapy after surgery and has progressed throughout the entire hospital stay.  Currently, the patient's progress is sufficient to allow the them to be discharged to home safely.  The patient agrees with this assessment and desires a discharge today.

## 2024-05-26 NOTE — HPI
73 y.o. male has been experiencing a few weeks of right thigh pain. He had radiographs done which demonstrated a new proximal femur lesion and was therefore referred to me. He ambulates without assistive devices, but he does have a walker and cane. He did undergo revision C spine surgery in mid November and is recovering from that currently. He is in a c collar. He denies any left thigh or leg pain. He also reports some right shoulder pain which is relatively new. 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 He is currently not on any therapy. He tells me is going to start new immunotherapy soon. He has not had any radiation. He denies new numbness or tingling. No recent falls. No rest pain.

## 2024-05-27 ENCOUNTER — TELEPHONE (OUTPATIENT)
Dept: HEMATOLOGY/ONCOLOGY | Facility: CLINIC | Age: 74
End: 2024-05-27
Payer: MEDICARE

## 2024-05-27 NOTE — TELEPHONE ENCOUNTER
Spoke with spouse.   Advised that I spoke with Dr. Turk. He is to restart his med 1 week post surgery. They are aware to begin med again on Thursday 5/30/24.    Thankful for call.     ----- Message from Chloe Stubbs sent at 5/27/2024  8:06 AM CDT -----  Regarding: Consult/Advisory  Contact: Mary  Consult/Advisory     Name Of Caller:Mary        Contact Preference:[phone] (Mobile)       Nature of call:pt spouse is calling to see if pt need to start back on the following medication, was released from the the hospital on this Saturday. Please advise. Requesting a call back.       lenvatinib (LENVIMA) 10 mg/day (10 mg x 1) Cap

## 2024-05-27 NOTE — PLAN OF CARE
APPOINTMENT:      Hospital f/u scheduled 06/04 at 12:20pm          Sandra Leal W  Case Management  [phone]

## 2024-05-27 NOTE — PLAN OF CARE
[facility] - Oncology (Hospital)  Discharge Final Note    Primary Care Provider: Slava Lowery MD    Expected Discharge Date: 5/25/2024    Patient discharged 5/25/2024 home with no needs. Family provided transportation. CHW to make hospital follow and notify patient.    Future Appointments   Date Time Provider Department Center   6/3/2024 11:00 AM LAB, HEMONC SAME DAY [facility]   6/3/2024 12:00 PM Kelly Lopez, [facility] HEMONC2 [facility]   6/3/2024  1:00 PM CHEMO 2 [facility]   6/4/2024 12:20 PM Slava Lowery MD [facility]   6/10/2024  1:00 PM [facility] CT[facility]   6/10/2024  1:30 PM SB[facility] XR1 [facility] XRAY [facility]   6/12/2024 10:00 AM Parvez Rock MD [facility]   6/17/2024  1:00 PM Nasim Martinez,  [facility] NEUROS[address]   6/25/2024 10:00 AM LAB, HEMONC CANCER BLDG [facility]   6/25/2024 11:00 AM Kelly Lopez, [facility] HEMONC2 [facility]   6/25/2024 12:30 PM CHEMO 38, [facility] CHEMO [facility]   7/8/2024 10:00 AM Mariluz Dennis MD [facility] PLMDBEN [facility]   7/9/2024 12:00 PM LAB, HEMONC CANCER BLDG [facility]   7/9/2024  1:00 PM Ruby Turk MD [facility] HEMONC2 [facility]   7/9/2024  2:00 PM CHEMO 5 [facility] CHEMO [facility]   7/30/2024  9:00 AM LAB, HEMONC CANCER BLDG [facility]   7/30/2024 10:00 AM Kelly Lopez, [facility] HEMONC2 [facility]   7/30/2024 11:00 AM CHEMO 8 [facility] CHEMO [facility]   8/20/2024  9:00 AM LAB, HEMONC CANCER DG [facility]   8/20/2024 10:00 AM Ruby Turk MD [facility] HEMONC2 [facility]   8/20/2024 11:00 AM CHEMO 8 [facility] CHEMO [facility]   [facility]2024 10[facility]   9/10/2024 11:00 AM Jess Taylor NP [facility] HEMONC3 [facility]   9/10/2024  1:00 PM CHEMO 4 [facility] CHEMO [facility]   10/28/2024  2:00 PM Slava Lowery MD [facility]in         Final Discharge Note (most recent)       Final Note - 05/27/24 0995          Final Note    Anticipated Discharge Disposition Home or Self Care        Post-Acute Status    [facility]     Discharge Delays None known at this time                     Important Message from Medicare             Contact Info       Parvez Rock MD   Specialty: Orthopedic Surgery    [address] [facility] [place] [address]   Phone: [phone]       Next Steps: Follow up in 2 week(s)    Instructions: For wound re-check    Slava Lowery MD   Specialty: Family Medicine   Relationship: PCP - General    [address] [place] [address]   Phone: [phone]       Next Steps: Follow up          Mery Velázquez RN     [phone]'

## 2024-05-28 ENCOUNTER — PATIENT OUTREACH (OUTPATIENT)
Dept: ADMINISTRATIVE | Facility: CLINIC | Age: 74
End: 2024-05-28
Payer: MEDICARE

## 2024-05-30 NOTE — ANESTHESIA POSTPROCEDURE EVALUATION
Anesthesia Post Evaluation    Patient: Gamaliel Pete     Procedure(s) Performed: Procedure(s) (LRB):  INSERTION, INTRAMEDULLARY NGHIA (Right)  INSERTION, SINGLE LUMEN CATHETER WITH PORT, WITH FLUOROSCOPIC GUIDANCE (Left)    Final Anesthesia Type: general      Patient location during evaluation: PACU  Patient participation: Yes- Able to Participate  Level of consciousness: awake and alert and oriented  Post-procedure vital signs: reviewed and stable  Pain management: adequate  Airway patency: patent    PONV status at discharge: No PONV  Anesthetic complications: no      Cardiovascular status: hemodynamically stable  Respiratory status: unassisted, spontaneous ventilation and room air  Hydration status: euvolemic  Follow-up not needed.          Vital signs stable, no issues at this time.  Event Time   Out of Recovery 05/23/2024 11:30:00         Pain/Juany Score: No data recorded

## 2024-06-03 ENCOUNTER — INFUSION (OUTPATIENT)
Dept: INFUSION THERAPY | Facility: HOSPITAL | Age: 74
End: 2024-06-03
Attending: STUDENT IN AN ORGANIZED HEALTH CARE EDUCATION/TRAINING PROGRAM
Payer: MEDICARE

## 2024-06-03 ENCOUNTER — OFFICE VISIT (OUTPATIENT)
Dept: HEMATOLOGY/ONCOLOGY | Facility: CLINIC | Age: 74
End: 2024-06-03
Payer: MEDICARE

## 2024-06-03 ENCOUNTER — PATIENT MESSAGE (OUTPATIENT)
Dept: ORTHOPEDICS | Facility: CLINIC | Age: 74
End: 2024-06-03
Payer: MEDICARE

## 2024-06-03 VITALS
TEMPERATURE: 98 F | DIASTOLIC BLOOD PRESSURE: 59 MMHG | SYSTOLIC BLOOD PRESSURE: 122 MMHG | HEART RATE: 85 BPM | WEIGHT: 175.06 LBS | BODY MASS INDEX: 25.06 KG/M2 | OXYGEN SATURATION: 96 % | HEIGHT: 70 IN

## 2024-06-03 VITALS
BODY MASS INDEX: 25.06 KG/M2 | OXYGEN SATURATION: 99 % | SYSTOLIC BLOOD PRESSURE: 129 MMHG | WEIGHT: 175.06 LBS | HEIGHT: 70 IN | DIASTOLIC BLOOD PRESSURE: 67 MMHG | RESPIRATION RATE: 18 BRPM | TEMPERATURE: 98 F | HEART RATE: 84 BPM

## 2024-06-03 DIAGNOSIS — C79.51 METASTATIC RENAL CELL CARCINOMA TO BONE: ICD-10-CM

## 2024-06-03 DIAGNOSIS — C79.51 METASTASIS TO BONE: ICD-10-CM

## 2024-06-03 DIAGNOSIS — T40.2X5A CONSTIPATION DUE TO OPIOID THERAPY: ICD-10-CM

## 2024-06-03 DIAGNOSIS — C64.1 CLEAR CELL CARCINOMA OF RIGHT KIDNEY: Primary | ICD-10-CM

## 2024-06-03 DIAGNOSIS — C64.1 CLEAR CELL CARCINOMA OF RIGHT KIDNEY: ICD-10-CM

## 2024-06-03 DIAGNOSIS — C79.51 METASTATIC CANCER TO SPINE: ICD-10-CM

## 2024-06-03 DIAGNOSIS — K59.03 CONSTIPATION DUE TO OPIOID THERAPY: ICD-10-CM

## 2024-06-03 DIAGNOSIS — G89.3 NEOPLASM RELATED PAIN: ICD-10-CM

## 2024-06-03 DIAGNOSIS — M84.58XA PATHOLOGICAL FRACTURE OF THORACIC VERTEBRA DUE TO NEOPLASTIC DISEASE: ICD-10-CM

## 2024-06-03 DIAGNOSIS — E83.52 HYPERCALCEMIA: ICD-10-CM

## 2024-06-03 DIAGNOSIS — C64.9 METASTATIC RENAL CELL CARCINOMA TO BONE: ICD-10-CM

## 2024-06-03 DIAGNOSIS — K64.9 HEMORRHOIDS, UNSPECIFIED HEMORRHOID TYPE: ICD-10-CM

## 2024-06-03 DIAGNOSIS — G89.3 CANCER RELATED PAIN: ICD-10-CM

## 2024-06-03 DIAGNOSIS — C78.00 MALIGNANT NEOPLASM METASTATIC TO LUNG, UNSPECIFIED LATERALITY: Primary | ICD-10-CM

## 2024-06-03 LAB
ALBUMIN SERPL BCP-MCNC: 3.6 G/DL (ref 3.5–5.2)
ALP SERPL-CCNC: 77 U/L (ref 55–135)
ALT SERPL W/O P-5'-P-CCNC: 25 U/L (ref 10–44)
ANION GAP SERPL CALC-SCNC: 11 MMOL/L (ref 8–16)
AST SERPL-CCNC: 29 U/L (ref 10–40)
BASOPHILS # BLD AUTO: 0.04 K/UL (ref 0–0.2)
BASOPHILS NFR BLD: 0.7 % (ref 0–1.9)
BILIRUB SERPL-MCNC: 0.4 MG/DL (ref 0.1–1)
BUN SERPL-MCNC: 18 MG/DL (ref 8–23)
CALCIUM SERPL-MCNC: 9.7 MG/DL (ref 8.7–10.5)
CHLORIDE SERPL-SCNC: 105 MMOL/L (ref 95–110)
CO2 SERPL-SCNC: 23 MMOL/L (ref 23–29)
CREAT SERPL-MCNC: 1 MG/DL (ref 0.5–1.4)
DIFFERENTIAL METHOD BLD: ABNORMAL
EOSINOPHIL # BLD AUTO: 0.2 K/UL (ref 0–0.5)
EOSINOPHIL NFR BLD: 3.9 % (ref 0–8)
ERYTHROCYTE [DISTWIDTH] IN BLOOD BY AUTOMATED COUNT: 16.4 % (ref 11.5–14.5)
EST. GFR  (NO RACE VARIABLE): >60 ML/MIN/1.73 M^2
GLUCOSE SERPL-MCNC: 96 MG/DL (ref 70–110)
HCT VFR BLD AUTO: 35.2 % (ref 40–54)
HGB BLD-MCNC: 11.8 G/DL (ref 14–18)
IMM GRANULOCYTES # BLD AUTO: 0.04 K/UL (ref 0–0.04)
IMM GRANULOCYTES NFR BLD AUTO: 0.7 % (ref 0–0.5)
LYMPHOCYTES # BLD AUTO: 1.7 K/UL (ref 1–4.8)
LYMPHOCYTES NFR BLD: 28.1 % (ref 18–48)
MCH RBC QN AUTO: 28.9 PG (ref 27–31)
MCHC RBC AUTO-ENTMCNC: 33.5 G/DL (ref 32–36)
MCV RBC AUTO: 86 FL (ref 82–98)
MONOCYTES # BLD AUTO: 0.6 K/UL (ref 0.3–1)
MONOCYTES NFR BLD: 10 % (ref 4–15)
NEUTROPHILS # BLD AUTO: 3.5 K/UL (ref 1.8–7.7)
NEUTROPHILS NFR BLD: 56.6 % (ref 38–73)
NRBC BLD-RTO: 0 /100 WBC
PLATELET # BLD AUTO: 223 K/UL (ref 150–450)
PMV BLD AUTO: 8.6 FL (ref 9.2–12.9)
POTASSIUM SERPL-SCNC: 4.1 MMOL/L (ref 3.5–5.1)
PROT SERPL-MCNC: 7.5 G/DL (ref 6–8.4)
RBC # BLD AUTO: 4.09 M/UL (ref 4.6–6.2)
SODIUM SERPL-SCNC: 139 MMOL/L (ref 136–145)
T4 FREE SERPL-MCNC: 0.81 NG/DL (ref 0.71–1.51)
T4 SERPL-MCNC: 6.9 UG/DL (ref 4.5–11.5)
TSH SERPL DL<=0.005 MIU/L-ACNC: 8.41 UIU/ML (ref 0.4–4)
WBC # BLD AUTO: 6.09 K/UL (ref 3.9–12.7)

## 2024-06-03 PROCEDURE — A4216 STERILE WATER/SALINE, 10 ML: HCPCS | Performed by: REGISTERED NURSE

## 2024-06-03 PROCEDURE — 96372 THER/PROPH/DIAG INJ SC/IM: CPT | Mod: 59

## 2024-06-03 PROCEDURE — 63600175 PHARM REV CODE 636 W HCPCS: Mod: JZ,JG | Performed by: REGISTERED NURSE

## 2024-06-03 PROCEDURE — 84443 ASSAY THYROID STIM HORMONE: CPT | Performed by: INTERNAL MEDICINE

## 2024-06-03 PROCEDURE — 1160F RVW MEDS BY RX/DR IN RCRD: CPT | Mod: CPTII,S$GLB,, | Performed by: REGISTERED NURSE

## 2024-06-03 PROCEDURE — 99215 OFFICE O/P EST HI 40 MIN: CPT | Mod: S$GLB,,, | Performed by: REGISTERED NURSE

## 2024-06-03 PROCEDURE — 99999 PR PBB SHADOW E&M-EST. PATIENT-LVL V: CPT | Mod: PBBFAC,,, | Performed by: REGISTERED NURSE

## 2024-06-03 PROCEDURE — G2211 COMPLEX E/M VISIT ADD ON: HCPCS | Mod: S$GLB,,, | Performed by: REGISTERED NURSE

## 2024-06-03 PROCEDURE — 3008F BODY MASS INDEX DOCD: CPT | Mod: CPTII,S$GLB,, | Performed by: REGISTERED NURSE

## 2024-06-03 PROCEDURE — 84436 ASSAY OF TOTAL THYROXINE: CPT | Performed by: INTERNAL MEDICINE

## 2024-06-03 PROCEDURE — 3074F SYST BP LT 130 MM HG: CPT | Mod: CPTII,S$GLB,, | Performed by: REGISTERED NURSE

## 2024-06-03 PROCEDURE — 80053 COMPREHEN METABOLIC PANEL: CPT | Performed by: INTERNAL MEDICINE

## 2024-06-03 PROCEDURE — 25000003 PHARM REV CODE 250: Performed by: REGISTERED NURSE

## 2024-06-03 PROCEDURE — 85025 COMPLETE CBC W/AUTO DIFF WBC: CPT | Performed by: INTERNAL MEDICINE

## 2024-06-03 PROCEDURE — 1159F MED LIST DOCD IN RCRD: CPT | Mod: CPTII,S$GLB,, | Performed by: REGISTERED NURSE

## 2024-06-03 PROCEDURE — 4010F ACE/ARB THERAPY RXD/TAKEN: CPT | Mod: CPTII,S$GLB,, | Performed by: REGISTERED NURSE

## 2024-06-03 PROCEDURE — 1111F DSCHRG MED/CURRENT MED MERGE: CPT | Mod: CPTII,S$GLB,, | Performed by: REGISTERED NURSE

## 2024-06-03 PROCEDURE — 1125F AMNT PAIN NOTED PAIN PRSNT: CPT | Mod: CPTII,S$GLB,, | Performed by: REGISTERED NURSE

## 2024-06-03 PROCEDURE — 3078F DIAST BP <80 MM HG: CPT | Mod: CPTII,S$GLB,, | Performed by: REGISTERED NURSE

## 2024-06-03 PROCEDURE — 63600175 PHARM REV CODE 636 W HCPCS: Mod: JZ,JG | Performed by: INTERNAL MEDICINE

## 2024-06-03 PROCEDURE — 96413 CHEMO IV INFUSION 1 HR: CPT

## 2024-06-03 PROCEDURE — 84439 ASSAY OF FREE THYROXINE: CPT | Performed by: INTERNAL MEDICINE

## 2024-06-03 PROCEDURE — 3288F FALL RISK ASSESSMENT DOCD: CPT | Mod: CPTII,S$GLB,, | Performed by: REGISTERED NURSE

## 2024-06-03 PROCEDURE — 1101F PT FALLS ASSESS-DOCD LE1/YR: CPT | Mod: CPTII,S$GLB,, | Performed by: REGISTERED NURSE

## 2024-06-03 RX ORDER — SODIUM CHLORIDE 0.9 % (FLUSH) 0.9 %
10 SYRINGE (ML) INJECTION
Status: CANCELLED | OUTPATIENT
Start: 2024-06-03

## 2024-06-03 RX ORDER — LIDOCAINE AND PRILOCAINE 25; 25 MG/G; MG/G
CREAM TOPICAL
Qty: 30 G | Refills: 0 | Status: SHIPPED | OUTPATIENT
Start: 2024-06-03

## 2024-06-03 RX ORDER — SODIUM CHLORIDE 0.9 % (FLUSH) 0.9 %
10 SYRINGE (ML) INJECTION
Status: DISCONTINUED | OUTPATIENT
Start: 2024-06-03 | End: 2024-06-03 | Stop reason: HOSPADM

## 2024-06-03 RX ORDER — DIPHENHYDRAMINE HYDROCHLORIDE 50 MG/ML
50 INJECTION INTRAMUSCULAR; INTRAVENOUS ONCE AS NEEDED
Status: DISCONTINUED | OUTPATIENT
Start: 2024-06-03 | End: 2024-06-03 | Stop reason: HOSPADM

## 2024-06-03 RX ORDER — HEPARIN 100 UNIT/ML
500 SYRINGE INTRAVENOUS
Status: CANCELLED | OUTPATIENT
Start: 2024-06-03

## 2024-06-03 RX ORDER — EPINEPHRINE 0.3 MG/.3ML
0.3 INJECTION SUBCUTANEOUS ONCE AS NEEDED
Status: DISCONTINUED | OUTPATIENT
Start: 2024-06-03 | End: 2024-06-03 | Stop reason: HOSPADM

## 2024-06-03 RX ORDER — EPINEPHRINE 0.3 MG/.3ML
0.3 INJECTION SUBCUTANEOUS ONCE AS NEEDED
Status: CANCELLED | OUTPATIENT
Start: 2024-06-03

## 2024-06-03 RX ORDER — DIPHENHYDRAMINE HYDROCHLORIDE 50 MG/ML
50 INJECTION INTRAMUSCULAR; INTRAVENOUS ONCE AS NEEDED
Status: CANCELLED | OUTPATIENT
Start: 2024-06-03

## 2024-06-03 RX ORDER — HEPARIN 100 UNIT/ML
500 SYRINGE INTRAVENOUS
Status: DISCONTINUED | OUTPATIENT
Start: 2024-06-03 | End: 2024-06-03 | Stop reason: HOSPADM

## 2024-06-03 RX ADMIN — Medication 10 ML: at 01:06

## 2024-06-03 RX ADMIN — DENOSUMAB 120 MG: 120 INJECTION SUBCUTANEOUS at 01:06

## 2024-06-03 RX ADMIN — HEPARIN 500 UNITS: 100 SYRINGE at 01:06

## 2024-06-03 RX ADMIN — SODIUM CHLORIDE 200 MG: 9 INJECTION, SOLUTION INTRAVENOUS at 01:06

## 2024-06-03 RX ADMIN — SODIUM CHLORIDE: 9 INJECTION, SOLUTION INTRAVENOUS at 12:06

## 2024-06-03 NOTE — PLAN OF CARE
Pt tolerated Keytruda well. No adverse reaction noted. Pt education reinforced on chemo regimen, side effects, what to expect, and when to call Dr. Turk. Pt verbalized understanding. I reviewed pt calendar w/ pt and understanding verbalized.

## 2024-06-03 NOTE — PROGRESS NOTES
Subjective     Patient ID: Gamaliel Pete Jr. is a 73 y.o. male.    Chief Complaint: No chief complaint on file.    HPI    Gamaliel Pete Jr. is a 73 y.o. old male with metastatic RCC to bone and lung, including bilateral femurs, right worse than left    Returns for follow up of metastatic RCC  Currently on Keytruda and Lenvatinib   Previously intolerant of Inlyta.  Underwent IR embolization on 5/22/24 and right intramedullary dulce insertion + port placement on 5/23/24 with Dr. Rock     Restarted Lenvima on Thursday 5/30  Reports continued nerve pain to R shoulder and arm limiting his ability to use his right arm - using gabapentin and robaxin at this time (finishes robaxin today)  Previously has seen Dr. Martinez and discussed XRT - has not yet been scheduled  Notes leg pain since surgery and not ambulating as much   He is taking 1000 mg acetaminophen TID - not using opioids   Was able to ambulate after procedure but now not moving around d/t pain   Has not been doing PT since hospital discharge - states he does not feel home PT is helpful for him  Bowels improved since stopping narcotic pain medications   Planning to go to [facility] in coming weeks for second opinion/treatment as knows someone who was cured after getting treatment there        Diagnosis: metastatic RCC     Oncology History:  - Presented with gross hematuria mid June 2023  Building a house and has been active working on this in the [facility]- 1st noted dark urine and felt related to being dehydrated  Occurred a 2nd time approximately 2 weeks later and this time he noted darker and small clots  Noted again 2 weeks later and worse but ultimately he was prompted to seek evaluation when he had significant blood when he urinated     - went to his PCP and urine sample was yellow again  He had blood work done and referred to Urology at that time     - 9/5/2023 CT Urogram:  FINDINGS:  The lung bases demonstrate bilateral nodules, for example 9 mm at  the right lung base and up to 11 mm at the left lung base.  Atelectasis present.  There are bilateral fat containing inguinal hernias.  The liver demonstrates no mass.  The spleen is not enlarged.  The stomach, pancreas and adrenal glands are within normal limits.  Gallbladder is unremarkable.  There is no biliary ductal dilatation.  The kidneys concentrate and excrete contrast satisfactorily.  There is no renal calculus or ureteral calculus.  Within the mid to lower pole of the right kidney is a 4.9 x 6.3 x 6.1 cm heterogeneous solid mass which is overall slightly hypodense in relation to the enhancing parenchyma.  The mass is partially exophytic posteriorly.  It does extend partially into the renal sinus  At the upper pole of the right kidney is a subcentimeter exophytic focus which is isodense to the parenchyma on postcontrast imaging appearing slightly hyperdense on the noncontrast study, too small to characterize.  There are multiple additional subcentimeter hypodense right kidney foci which are suggestive of cysts.  Finally at the lower pole of the right kidney is a exophytic hypodense structure most compatible with a cyst.  The right main renal vein appears patent.  There is no significant periaortic lymphadenopathy.  Left kidney demonstrates several subcentimeter foci which are hypodense but too small to characterize, suggestive of cysts.  There is somewhat of a small amount of contrast within the upper collecting systems and ureters, with no definite focal abnormality, noting that there is some distortion of the collecting system in the region in which the right kidney mass involves the renal sinus.  The bladder is partially distended appearing grossly unremarkable.  The bowel demonstrates no significant abnormality.  The osseous structures demonstrate degenerative changes.  T9 demonstrates a lytic focus occupying the anterior half of the vertebral body.  There is slight loss of height along superior and  inferior endplates with cortical disruption..  Impression:  Solid right renal mass concerning for neoplasm.  Multiple lung base nodules up to 11 mm, concerning for metastatic disease.  Recommend chest CT for full evaluation of lungs.  Lytic lesion at T9 with mild compression, concerning for pathologic compression fracture.  Subcentimeter right kidney lesion isodense to parenchyma on contrast enhanced imaging, too small to characterize.  Additional bilateral renal hypodensities which are too small to characterize but suggestive of cysts.     - 9/7/2023 CT Chest:  FINDINGS:  The base of the neck is within normal limits.  The thyroid gland is unremarkable.  The supraclavicular regions are within normal limits.  The trachea is unremarkable.  The central airways are within normal limits.  No endobronchial lesion is identified.  There is no evidence of bronchiectasis.  The heart is unremarkable.  There are no pericardial effusions.  There are coronary artery calcifications.  The thoracic aorta is normal in caliber.  There are subcentimeter mediastinal and hilar lymph nodes.  There are subcentimeter axillary lymph nodes.  There are no pleural effusions.  There is no evidence of a pneumothorax.  There is no evidence of pneumomediastinum.  There are innumerable bilateral pulmonary nodules.  There is no focal consolidation.  The esophagus is unremarkable.  Please see the dedicated CT abdomen pelvis for the upper abdominal findings.  The chest wall is unremarkable.  There is unchanged lytic lesion involving the anterior aspect of T9 vertebral body with associated cortical erosions.  Impression:  Innumerable pulmonary nodules, concerning for metastatic disease to the chest.  Unchanged lytic lesion in the T9 vertebral body with cortical erosions.  Follow-up MRI of the spine, as clinically warranted.     - 9/7/2023 CT Abd:  FINDINGS:  CT renal protocol:  There is a 4.8 x 6.3 cm exophytic enhancing lesion in the lower pole of the  right kidney.  There is hypoenhancement of the lesion compared to the normal renal parenchyma.  There is unchanged appearance of the mass extending into the right renal sinus.  There is no extension into the right renal vein  No additional enhancing lesions are identified.  There is a subcentimeter lesion in the right kidney is too small complete characterization.  There is prompt excretion from both collecting systems.  There is incomplete distension of the right distal ureter.  No definitive filling defect is identified within the.  The urinary bladder is unremarkable.  CT abdomen pelvis:  There is unchanged appearance of multiple pulmonary nodules in the lung bases.  No new nodules identified  The heart is unremarkable.  There is normal tapering of the abdominal aorta.  There are single bilateral renal arteries.  The renal veins remain patent.  There is no evidence of lymphadenopathy.  The esophagus, stomach, and duodenum are within normal limits.  The small bowel loops are unremarkable.  The appendix is not visualized.  There are no secondary findings of acute appendicitis.  There is colonic diverticula without evidence of acute diverticulitis.  The liver is unremarkable.  The gallbladder is within normal limits.  The biliary tree is within normal limits.  The spleen is unremarkable.  The pancreas is within normal limits.  The adrenal glands are unremarkable.  There is no evidence of free fluid in the abdomen or pelvis.  There is no evidence of free air.  There is no evidence of pneumatosis.  No portal venous air is identified.  The psoas margins are unremarkable.  There are bilateral fat containing inguinal hernias.  There are degenerative changes in the osseous structures.  There is unchanged appearance of a lytic lesion involving the anterior aspect of the T9 vertebral body with associated cortical erosions.  Impression:  Unchanged 4.8 x 6.3 cm exophytic hypoenhancing lesion in the lower pole of the right  kidney, concerning for renal cell carcinoma.  Extension of lesion into the renal sinus.  No evidence of renal vein invasion.  No regional lymphadenopathy.  Additional smaller subcentimeter lesion too small complete characterization in the right kidney.  Bilateral pulmonary nodules remain concerning for metastatic disease.  Lytic lesion along the anterior aspect of the T9 vertebral body, also concerning for osseous metastatic disease.  Additional findings as above.     - 9/20/2023 Bone scan:  FINDINGS:  There is physiologic distribution of the radiopharmaceutical throughout the skeleton.  Focal uptake in the T9 vertebral body corresponding to lytic lesion seen on CT 09/05/2023.  Focal uptake in the right kidney in patient with known right renal mass.  There is otherwise normal uptake in the genitourinary system and soft tissues.  Impression:  Focal uptake in the T9 vertebral body corresponding to lytic lesion seen on prior CT and concerning for metastatic disease.  Additional focus of increased uptake in the right kidney in patient with known right renal mass     - 10/4/2023 Robotic right nephrectomy  Pathology:  RIGHT KIDNEY, TOTAL NEPHRECTOMY:   - Clear cell renal cell carcinoma, ISUP grade 4, 5.5 cm.   - Benign cortical cysts.   - See [facility] synoptic report below.   SURGICAL PATHOLOGY CANCER CASE SUMMARY   Procedure: Total nephrectomy.   Specimen laterality: Right.   Tumor size: 5.5 cm.   Tumor focality: Unifocal.   Histologic type: Clear cell renal cell carcinoma.   Sarcomatoid features: Present, 5%.   Rhabdoid features: Not identified.   Histologic Grade (ISUP Grade): 4.   Tumor necrosis: Present, 20%.   Tumor extension: Extends into pelvicalyceal system and renal sinus fat.   Margins: Uninvolved.   Lymphovascular invasion (excluding renal vein and its segmental branches): Not identified.   Regional lymph nodes: No lymph nodes submitted or found.   Distant metastases: Not applicable in this specimen.   Pathologic  stage (pTNM, AJCC 8th edition): pT3a pN not assigned (no lymph nodes submitted or found).      - treatment not initiated with below issues:  2 prior admissions      - Admitted from 11/6- 11/9/2023  Hospital Course: Pt admitted for intractable back pain in the setting of renal carcinoma (RCC) s/p R nephrectomy 10/4 followed by Dr. Turk not on systemic therapy. CT and MRI concerning for spinal, lung, and hepatic mets. NSGY, Rad Onc, and IR on board in the hospital and evaluated for C5 and T9 lesions on spine. Pt pain controlled with oxycodone 12 HR BID and Oxycodone 10 PRN for thoracic and cervical pain. Patient underwent preop embolization of cervical tumor with IR on 11/6/23. And then had a cervical corpectomy with NSGY on 11/7/23 which he tolerated well. They obtained tissue samples and sent it over to pathology. IR to perform osteocool/kyphoplasty/biopsy T9 11/14. 2nd stage of surgery with NSGY on 11/15 with Dr. Martinez after kyphoplasty. Patient is to see Dr. Turk for systemic therapy afterwards as well as option for postoperative radiation. Stable to discharge home with c-collar and back brace. Patient also to receive rolling walker and hospital bed due to weakness and pain.   Pathology:  Spine, C5 vertebral body, corpectomy:   - Metastatic renal cell carcinoma, consistent with patient's history   - [facility] has been ordered and results will be issued in a separate      - Admitted 11/14- 11/18/2023 and underwent IR kyphoplasty of T4-6  Procedure(s) (LRB):  SPINE, CERVICAL, WITH POSTERIOR FUSION T4-6 (N/A)  CORPECTOMY, SPINE, CERVICAL C3-6 REVISION (N/A)   Hospital Course: 11/14: admitted; IR kyphoplasty of T9 today  11/15: OR today for C3-6 PCF.   11/16: POD 1 s/p C3-C6 PCF. NAEO. VSS, afebrile and WBC WNL. Patient complains of mild incisional pain but reports his LUE radiculopathy is improved. Reports mild discomfort with swallowing but no difficulty. Pending PT.   11/17: POD 2 s/p C3-C6 PCF. NAEO and VSS.  Mild SABINE on BMP, will resume fluids. Anterior HV drain removed at bedside. Two posterior HV drains remain w/output 50 each.   11/18: POD 3. NAEON. AFVSS. Neurologically stable. Tolerating PO diet and voiding spontaneously. HV drain x 2 removed and pressure dressing placed. Medically stable to discharge home. Follow up in clinic in 2 weeks. Discharge instructions given verbally to patient. All of his questions were answered.  He is encouraged to call the clinic with any questions or concerns prior to follow up appt.      [facility] (11/30/2023 9:26 AM CST)  Results - [facility] (11/30/2023 9:26 AM CST)  Component Value Ref Range Test Method Analysis Time Performed At Pathologist Signature   Reason for Study To identify somatic and germline mutations relevant to patient's cancer.     11/30/2023 9:26 AM CST [facility]     Genetic Diseases Assessed Cancer     11/30/2023 9:26 AM CST [facility]     Description of Ranges of DNA Sequences Examined 648 gene panel     11/30/2023 9:26 AM CST [facility]     Overall Interpretation positive     11/30/2023 9:26 AM CST [facility]     MSI Stable     11/30/2023 9:26 AM CST [facility]     TMB 7.4 m/MB   11/30/2023 9:26 AM CST [facility]     [facility] [email or web address]     11/30/2023 9:26 AM CST [facility]     Comment: [facility] link   Fusion Addendum Issue Type NEGATIVE  Negative - This addendum is being issued to report that no gene fusions or altered splicing variants from RNA sequencing analysis were found.     11/30/2023 9:26 AM CST [facility]     Therapy Count 4     11/30/2023 9:26 AM CST [facility]     [facility]: Potential Therapy 1 Gene: 73339^VHL^HGNC  Variant: p.E186fs  Agent: Belzutifan  Tissue: Renal Cell Carcinoma  Association: response  Evidence Status: Consensus  Evidence ID: NCCN  Evidence URL:  FDA Approved?: Yes  on label?: No     11/30/2023  9:26 AM CST [facility]     [facility]: Potential Therapy 2 Gene: 07930^PBRM1^HGNC  Variant: p.L618fs  Agent: Nivolumab  Tissue: Clear Cell Renal Cell Carcinoma  Association: response  Evidence Status: Clinical research  Evidence ID: [id number]  Evidence URL: https://www.ncbi.nlm.nih.gov/pubmed/08277512  FDA Approved?: Yes  on label?: Yes     11/30/2023 9:26 AM CST [facility]     [facility]: Potential Therapy 3 Gene: 8975^PIK3CA^HGNC  Variant: p.Q546E  Agent: Capivasertib + Fulvestrant  Tissue: Breast Cancer  Association: response  Evidence Status: Consensus  Evidence ID: FDA  Evidence URL:  FDA Approved?: Yes  on label?: No     11/30/2023 9:26 AM CST [facility]     [facility]: Potential Therapy 4 Gene: 8975^PIK3CA^HGNC  Variant: p.Q546E  Agent: Alpelisib  Tissue: Solid Tumors  Association: response  Evidence Status: Clinical research  Evidence ID: [id number]  Evidence URL: https://www.ncbi.nlm.nih.gov/pubmed/80632286  FDA Approved?: Yes  on label?: No     11/30/2023 9:26 AM CST [facility]     Trial Count 3     11/30/2023 9:26 AM CST [facility]     [facility]: Clinical Trial Match 1 Clinical Trial NCT ID: [id number]  Clinical Trial Title: JLL453 as a Single Agent and in Combination With Everolimus & Immuno-Oncology Agents in Advanced/Relapsed Renal Cancer & Other Malignancies  Clinical Trial URL: https://clinicaltrials.gov/ct2/show/GFR34015783  Clinical Phase: Phase 1  Matched criteria: VHL p.E186fs mutation     11/30/2023 9:26 AM CST [facility]     [facility]: Clinical Trial Match 2 Clinical Trial NCT ID: [id number]  Clinical Trial Title: First-in-Human Study of STX-478 as Monotherapy and in Combination With Other Antineoplastic Agents in Participants With Advanced Solid Tumors  Clinical Trial URL: https://clinicaltrials.gov/ct2/show/KOC51009752  Clinical Phase: Phase 1/Phase 2  Matched criteria: PIK3CA p.Q546E mutation     11/30/2023 9:26 AM CST [facility]     [facility]: Clinical Trial Match 3 Clinical Trial NCT ID:  [id number]  Clinical Trial Title: Testing the Combination of Two Anti-cancer Drugs, Peposertib () and  for Advanced Solid Tumors  Clinical Trial URL: https://clinicaltrials.gov/ct2/show/WBR80956334  Clinical Phase: Phase 1  Matched criteria: PBRM1 p.L618fs mutation     11/30/2023 9:26 AM CST [facility]        Results - [facility] (11/30/2023 9:26 AM CST)  Specimen (Source) Anatomical Location / Laterality Collection Method / Volume Collection Time Received Time   Tissue       11/16/2023 11:02 AM CST      Results - [facility] (11/30/2023 9:26 AM CST)  Narrative   This result has genomic variants that were not included in this document.          Results - [facility] (11/30/2023 9:26 AM CST)  Authorizing Provider Result Type   Ruby Turk MD LAB MOLECULAR DIAGNOSTICS ORDERABLES      Results - [facility] (11/30/2023 9:26 AM CST)  Performing Organization Address City/State/ZIP Code Phone Number   [facility]  [address], [address]  [place] [address],   [phone]       - initiated systemic therapy with Pembro/Axitinib on 12/18/2023     - Axitinib held per patient request with side effects     - 11/15/2023 Neurosurgery  1. Posterior spinal fusion, C3-C6  2. Posterior segmental spinal fixation, C3-C6 (Depuy)  3. Revision/fusion of C4/5 anterior cervical corpectomy cage with revision of plate from C3-6     - 4/18/2024 CT C/A/P:  FINDINGS:  Chest:  Heart and mediastinal vasculature: Unremarkable.  Cynthia/Mediastinum: Stable metastatic mediastinal lymphadenopathy.  Diffuse thyroid goiter.  Lungs: Innumerable mostly subcentimeter pulmonary metastatic lesions are stable.  Stable pleuroparenchymal scarring.  Abdomen and pelvis:  Liver: Vague low-density focus in the subcapsular aspect of the right lobe that demonstrated enhancement on the previous exam is less conspicuous, probably related to contrast bolus timing and is indeterminate.  Gallbladder: No calcified gallstones.  Bile Ducts: No evidence of  dilated ducts.  Pancreas: No mass or peripancreatic fat stranding.  Spleen: Unremarkable.  Adrenals: Unremarkable.  Kidneys/ Ureters: Status post right nephrectomy.  Left kidney remarkable for low-density 1 cm cysts.  GI Tract/Mesentery: No evidence of bowel obstruction or inflammation.  Peritoneal Space: No ascites. No free air.  Retroperitoneum: No significant adenopathy.  Vasculature: Aortic atherosclerosis is present.  Bladder: No evidence of wall thickening.  Reproductive organs: Unremarkable.  Bones: Compared with the prior study lytic metastatic disease to bone appears grossly stable noting a large right proximal femoral diaphyseal lesion with significant soft tissue component and complete destruction/resorption of the lateral femoral diaphyseal cortex.  Large lytic lesions are seen within the sacrum most notably at the S5 segment.  Operative changes of posterior decompression and instrumented fusion for essentially destroyed T9 vertebral body is stable.  T6 metastatic lesion with significant left-sided paraspinal and posterior element involvement stable.  Impression:  No change from 1 week prior in this patient with metastatic renal cell carcinoma to the mediastinum, lungs, and bones.    - 4/18/2024 Femur xrays:  FINDINGS:  Multiple lytic metastatic lesions are seen throughout the mid and distal left femoral shaft.  There is no evidence of pathologic fracture.  On the right side, there is a single large metastatic lesion involving the subtrochanteric proximal diaphysis which is associated with significant cortical involvement..  No evidence of pathologic fracture.  Mild bilateral hip osteoarthritis.  Overall, the lesions appear stable.  Impression:  See above      - 4/11/2024 MRI T/L spine:  FINDINGS:  Partially visualized postsurgical changes of the cervical spine with enhancing C7 lesion, correlating with CT cervical spine 04/11/2024.  There is slight osseous retropulsion into the spinal canal without  significant stenosis.  Postoperative changes of T7-T11 posterior instrumented fusion and T8-T10 decompressive laminectomy.  Fluid collection within the posterior subcutaneous soft tissues of the surgical bed measuring approximately 3.9 x 1.3 x 23.5 cm (19-61; 16-8).  Additional smaller collection noted superiorly (series 19, image 28).  These have slightly improved from the prior exam.  Mild surrounding subcutaneous edema.  THORACIC  Alignment: Normal.  Vertebrae: Enhancing T6 vertebral body lesion extending into the left posterior elements with mild height loss, correlating with pathologic fracture on recent CT.  Lesion extends posteriorly into the anterior epidural space and left T5-T6 and T6-T7 neural foramen, contributing to severe neural foraminal narrowing.  No spinal canal stenosis at this level.  Enhancing T10 vertebral body lesion extending anteriorly and superiorly involving the prevertebral soft tissues, anterior T8-T9 epidural space, and bilateral T9-T10 neural foramen with severe neural foraminal narrowing, similar to prior exam.  Enhancing lesion noted of the right posterior 5th rib (22-2).  Discs: Multilevel disc height loss and desiccation.  No evidence to suggest discitis.  Cord: Normal caliber and signal.  Degenerative findings: Moderate narrowing of the thecal sac at T8-T9 and T9-T10.  LUMBAR  Alignment: Grade 1 retrolisthesis of L1 on L2.  Vertebrae: Vertebral body heights are maintained.  No acute fractures.  No marrow placing process.  Small enhancing lesion in the inferior endplate at L3 measuring 8 mm (series 22, image 11), possible small metastasis.  L2 vertebral body hemangiomas.  Discs: Multilevel disc height loss and desiccation, most pronounced at T12-L1 and L1-L2.  no evidence of discitis.  Cord: Normal caliber and signal.  Degenerative findings:  L1-L2: No spinal canal stenosis or neural foraminal narrowing.  L2-L3: Grade 1 retrolisthesis.  Mild bilateral facet arthropathy.  No spinal  canal stenosis or neural foraminal narrowing.  L3-L4: Circumferential disc bulge and bilateral facet arthropathy.  No spinal canal stenosis or neural foraminal narrowing.  L4-L5: Circumferential disc bulge and bilateral facet arthropathy.  Findings contribute to mild/moderate bilateral neural foraminal narrowing and mild spinal canal stenosis.  L5-S1: Circumferential disc bulge and bilateral facet arthropathy.  Findings contribute to mild/moderate bilateral neural foraminal narrowing.  No spinal canal stenosis.  Paraspinal muscles & soft tissues: Multiple pulmonary nodules bilaterally, suggestive of metastatic disease.  Bibasilar subsegmental atelectasis.  Mediastinal lymphadenopathy.  1.5 cm right hepatic lobe T2 hyperintense lesion, concerning for metastatic disease.  Impression:  Patient history of metastatic renal cell carcinoma.  Multiple osseous lesions in the thoracic spine, as above, consistent with metastatic disease.  Findings correlate with the 04/11/2024 CT exam, and have progressed from the 01/31/2024 MRI exam at C7, right T5 rib, and T6.  Prior T7-T11 posterior instrumented fusion with decompressive laminectomies, as above, noting slight improvement in the fluid collection in the posterior surgical bed.  Indeterminate small (8 mm) lesion in the L3 vertebral body, possible metastasis.  Lumbar spondylosis, contributing to mild spinal canal stenosis at L4-5 and mild/moderate neural foraminal narrowing at L4-5 and L5-S1, as above.  This report was flagged in Epic as abnormal.       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  - fractured collar bone  - Pediatric hernia             Active Ambulatory Problems     Diagnosis Date Noted    Obesity (BMI 35.0-39.9 without comorbidity) 04/01/2019    Borderline hypertension 04/01/2019    Acute gout  of left ankle 04/01/2019    Onychomycosis 04/01/2019    History of gout 06/02/2021    Seborrheic keratosis 06/02/2021    Tinea corporis 12/07/2021    Positive colorectal cancer screening using Cologuard test 12/07/2021              Resolved Ambulatory Problems     Diagnosis Date Noted    No Resolved Ambulatory Problems              Past Medical History:   Diagnosis Date    Asthma      ED (erectile dysfunction)      Gout        SH:  Retired from [employer]    1 child, 2 stepchildren  Prior tobacco- teens, early 20s  Social EtOH    Review of Systems   Constitutional:  Positive for activity change, appetite change and fatigue. Negative for chills, fever and unexpected weight change.   HENT:  Positive for dental problem. Negative for mouth sores, trouble swallowing and voice change.    Respiratory:  Negative for cough, shortness of breath and wheezing.    Cardiovascular:  Negative for chest pain, palpitations and leg swelling.   Gastrointestinal:  Positive for constipation. Negative for abdominal distention, abdominal pain, diarrhea, vomiting and reflux.   Genitourinary:  Positive for frequency (at night). Negative for decreased urine volume, difficulty urinating, dysuria, hematuria and urgency.   Musculoskeletal:  Positive for arthralgias, back pain, gait problem and leg pain. Negative for neck pain.   Integumentary:  Positive for wound (buttocks). Negative for rash.   Neurological:  Positive for weakness (generalized) and numbness. Negative for dizziness, headaches, memory loss and coordination difficulties.   Psychiatric/Behavioral:  Positive for agitation and sleep disturbance. Negative for confusion. The patient is nervous/anxious.         Objective     Physical Exam  Vitals and nursing note reviewed.   Constitutional:       General: He is not in acute distress.     Appearance: Normal appearance. He is not ill-appearing.      Comments: Presents with his wife  In wheelchair  ECOG= 2   HENT:      Head: Normocephalic  and atraumatic.      Mouth/Throat:      Mouth: Mucous membranes are dry.      Pharynx: Oropharynx is clear. No oropharyngeal exudate.   Eyes:      General: No scleral icterus.     Extraocular Movements: Extraocular movements intact.      Conjunctiva/sclera: Conjunctivae normal.      Pupils: Pupils are equal, round, and reactive to light.   Cardiovascular:      Rate and Rhythm: Normal rate and regular rhythm.      Heart sounds: Normal heart sounds. No murmur heard.     No friction rub. No gallop.   Pulmonary:      Effort: Pulmonary effort is normal. No respiratory distress.      Breath sounds: Normal breath sounds. No wheezing, rhonchi or rales.   Abdominal:      General: Abdomen is flat. Bowel sounds are normal. There is no distension.      Palpations: Abdomen is soft. There is no mass.      Tenderness: There is no guarding or rebound.   Musculoskeletal:         General: No swelling. Normal range of motion.      Cervical back: Normal range of motion. No rigidity.      Right lower leg: No edema.      Left lower leg: No edema.   Skin:     Coloration: Skin is not jaundiced or pale.      Findings: No bruising, erythema, lesion or rash.   Neurological:      Mental Status: He is alert and oriented to person, place, and time.      Cranial Nerves: No cranial nerve deficit.      Sensory: Sensory deficit present.      Motor: Weakness present.      Gait: Gait abnormal.   Psychiatric:         Mood and Affect: Mood normal.         Behavior: Behavior normal.         Thought Content: Thought content normal.         Judgment: Judgment normal.       Labs- reviewed  Imaging - reviewed      Assessment and Plan     1. Clear cell carcinoma of right kidney  -     LIDOcaine-prilocaine (EMLA) cream; Place to port site 45-60 minutes prior to infusion appointment.  Dispense: 30 g; Refill: 0    2. Cancer related pain    3. Metastasis to bone    4. 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 Neoplasm related  "pain    6. Constipation due to opioid therapy    7. Hemorrhoids, unspecified hemorrhoid type    8. Hypercalcemia    Other orders  -     Cancel: pembrolizumab (KEYTRUDA) 200 mg in sodium chloride 0.9% SolP 108 mL infusion  -     Cancel: EPINEPHrine (EPIPEN) 0.3 mg/0.3 mL pen injection 0.3 mg  -     Cancel: diphenhydrAMINE injection 50 mg  -     Cancel: hydrocortisone sodium succinate injection 100 mg  -     Cancel: sodium chloride 0.9% 100 mL flush bag  -     Cancel: sodium chloride 0.9% flush 10 mL  -     Cancel: heparin, porcine (PF) 100 unit/mL injection flush 500 Units  -     Cancel: alteplase injection 2 mg      Metastatic renal carcinoma to bone, lung-  Continue Keytruda and Lenvatinib - started at lower dose per his history  Lenvatinib held 1 week before and after surgery, now resumed. Tolerating well overall.   Continue monthly Xgeva  Underwent intramedullary dulce placement 5/23/24    Long discussion today with patient re: plan of care. Reviewed most recent scans and current treatment plan, as well as changes that have been made with results of scans since treatment started. He is looking to get "innovative" treatment in [place], most likely going there in July d/t frequent appointments this month.     Hypercalcemia-  Xgeva today  Labs reviewed, adequate for treatment     Neoplasm related pain-  Reviewed pain management plan - not currently using opioids   Reports nerve pain to R arm. Will reach out to Dr. Martinez's team. On gabapentin and celebrex. Continue.      Hemorrhoids-  Related to constipation, improved now. Continue bowel management regimen.     Patient is in agreement with the proposed treatment plan. All questions were answered to the patient's satisfaction. Pt knows to call clinic if anything is needed before the next clinic visit.    Patient discussed with collaborating physician, Dr. Turk.    At least 40 minutes were spent today on this encounter including face to face time with the patient, " data gathering/interpretation and documentation.       Kelly Lopez, MSN, APRN, ACCNS-AG  Hematology and Medical Oncology  Clinical Nurse Specialist to Dr. Ramirez, Dr. Turk & Dr. Ramos Chart for Scheduling    Med Onc Chart Routing      Follow up with physician 3 weeks. with labs to see Dr. Turk for infusion   Follow up with ABEBA 6 weeks. with labs to see Jess for infusion   Infusion scheduling note   keytruda every 3 weeks   Injection scheduling note xgeva every 4 weeks   Labs CBC, CMP, TSH and free T4   Scheduling:  Preferred lab:  Lab interval: every 3 weeks     Imaging    Pharmacy appointment    Other referrals              Treatment Plan Information   OP AXITINIB + PEMBROLIZUMAB 200MG Q3W   Ruby Turk MD   Upcoming Treatment Dates - OP AXITINIB + PEMBROLIZUMAB 200MG Q3W    5/7/2024       Chemotherapy       pembrolizumab (KEYTRUDA) 200 mg in sodium chloride 0.9% SolP 108 mL infusion  5/28/2024       Chemotherapy       pembrolizumab (KEYTRUDA) 200 mg in sodium chloride 0.9% SolP 108 mL infusion  6/18/2024       Chemotherapy       pembrolizumab (KEYTRUDA) 200 mg in sodium chloride 0.9% SolP 108 mL infusion  7/9/2024       Chemotherapy       pembrolizumab (KEYTRUDA) 200 mg in sodium chloride 0.9% SolP 108 mL infusion    Supportive Plan Information  IV FLUIDS AND ELECTROLYTES   Ruby Turk MD   Upcoming Treatment Dates - IV FLUIDS AND ELECTROLYTES    No upcoming days in selected categories.    Therapy Plan Information  INJECTAFER (FERRIC CARBOXYMALTOSE)  EPINEPHrine (EPIPEN) 0.3 mg/0.3 mL pen injection 0.3 mg  0.3 mg, Intramuscular, PRN  diphenhydrAMINE injection 50 mg  50 mg, Intravenous, PRN  hydrocortisone sodium succinate injection 100 mg  100 mg, Intravenous, PRN    DENOSUMAB (XGEVA) Q4W  Medications  denosumab (XGEVA) solution 120 mg  120 mg, Subcutaneous, Every 4 weeks

## 2024-06-04 ENCOUNTER — OFFICE VISIT (OUTPATIENT)
Dept: PRIMARY CARE CLINIC | Facility: CLINIC | Age: 74
End: 2024-06-04
Payer: MEDICARE

## 2024-06-04 VITALS
OXYGEN SATURATION: 93 % | RESPIRATION RATE: 18 BRPM | SYSTOLIC BLOOD PRESSURE: 118 MMHG | HEIGHT: 70 IN | BODY MASS INDEX: 25.04 KG/M2 | HEART RATE: 97 BPM | WEIGHT: 174.94 LBS | DIASTOLIC BLOOD PRESSURE: 64 MMHG

## 2024-06-04 DIAGNOSIS — D64.9 ANEMIA, UNSPECIFIED TYPE: ICD-10-CM

## 2024-06-04 DIAGNOSIS — C78.00 MALIGNANT NEOPLASM METASTATIC TO LUNG, UNSPECIFIED LATERALITY: ICD-10-CM

## 2024-06-04 DIAGNOSIS — C64.9 METASTATIC RENAL CELL CARCINOMA TO BONE: Primary | ICD-10-CM

## 2024-06-04 DIAGNOSIS — C64.1 CLEAR CELL CARCINOMA OF RIGHT KIDNEY: ICD-10-CM

## 2024-06-04 DIAGNOSIS — M84.58XA PATHOLOGICAL FRACTURE OF THORACIC VERTEBRA DUE TO NEOPLASTIC DISEASE: ICD-10-CM

## 2024-06-04 DIAGNOSIS — T40.2X5A CONSTIPATION DUE TO OPIOID THERAPY: ICD-10-CM

## 2024-06-04 DIAGNOSIS — C79.51 METASTATIC RENAL CELL CARCINOMA TO BONE: Primary | ICD-10-CM

## 2024-06-04 DIAGNOSIS — K59.03 CONSTIPATION DUE TO OPIOID THERAPY: ICD-10-CM

## 2024-06-04 DIAGNOSIS — N28.89 RENAL MASS: ICD-10-CM

## 2024-06-04 PROCEDURE — 3078F DIAST BP <80 MM HG: CPT | Mod: CPTII,S$GLB,, | Performed by: FAMILY MEDICINE

## 2024-06-04 PROCEDURE — 1126F AMNT PAIN NOTED NONE PRSNT: CPT | Mod: CPTII,S$GLB,, | Performed by: FAMILY MEDICINE

## 2024-06-04 PROCEDURE — 1111F DSCHRG MED/CURRENT MED MERGE: CPT | Mod: CPTII,S$GLB,, | Performed by: FAMILY MEDICINE

## 2024-06-04 PROCEDURE — 1159F MED LIST DOCD IN RCRD: CPT | Mod: CPTII,S$GLB,, | Performed by: FAMILY MEDICINE

## 2024-06-04 PROCEDURE — 3288F FALL RISK ASSESSMENT DOCD: CPT | Mod: CPTII,S$GLB,, | Performed by: FAMILY MEDICINE

## 2024-06-04 PROCEDURE — 3074F SYST BP LT 130 MM HG: CPT | Mod: CPTII,S$GLB,, | Performed by: FAMILY MEDICINE

## 2024-06-04 PROCEDURE — 4010F ACE/ARB THERAPY RXD/TAKEN: CPT | Mod: CPTII,S$GLB,, | Performed by: FAMILY MEDICINE

## 2024-06-04 PROCEDURE — 1101F PT FALLS ASSESS-DOCD LE1/YR: CPT | Mod: CPTII,S$GLB,, | Performed by: FAMILY MEDICINE

## 2024-06-04 PROCEDURE — 3008F BODY MASS INDEX DOCD: CPT | Mod: CPTII,S$GLB,, | Performed by: FAMILY MEDICINE

## 2024-06-04 PROCEDURE — 99214 OFFICE O/P EST MOD 30 MIN: CPT | Mod: S$GLB,,, | Performed by: FAMILY MEDICINE

## 2024-06-04 PROCEDURE — 99999 PR PBB SHADOW E&M-EST. PATIENT-LVL V: CPT | Mod: PBBFAC,,, | Performed by: FAMILY MEDICINE

## 2024-06-04 RX ORDER — GABAPENTIN 600 MG/1
600 TABLET ORAL 3 TIMES DAILY
Qty: 90 TABLET | Refills: 11 | Status: SHIPPED | OUTPATIENT
Start: 2024-06-04 | End: 2025-06-04

## 2024-06-04 RX ORDER — METHOCARBAMOL 750 MG/1
750 TABLET, FILM COATED ORAL EVERY 6 HOURS
Qty: 40 TABLET | Refills: 0 | Status: SHIPPED | OUTPATIENT
Start: 2024-06-04 | End: 2024-06-14

## 2024-06-04 RX ORDER — TRAMADOL HYDROCHLORIDE 50 MG/1
50 TABLET ORAL EVERY 6 HOURS PRN
Qty: 30 TABLET | Refills: 0 | Status: SHIPPED | OUTPATIENT
Start: 2024-06-04 | End: 2024-06-14

## 2024-06-04 NOTE — PROGRESS NOTES
"  Subjective:       Patient ID: Gamaliel Pete Jr. is a 73 y.o. male.    Chief Complaint: Hospital Follow Up    HPI: 74 yo WM  in for hospital follow-up [facility]--- patient had recent surgery on the right femur 05/22/2024 with scraped in the lesion was cauterized and dulce placed 1-1/2 weeks ago   Saw rebecca oncologist yesterday--txed with Ktruder and Lenvima and "bone shot in stomach "  Hx neck pain to right shoulder to scapular --saw Dr Martinez--seen April told ?? Lesion C5 and T 12 was suppose get call in April never heard from them--told ?? Radiation or another surgery.   CBC CMP TSH 8.54 high Hct 35.3    History of clear cell renal carcinoma metastasis to the 2nd 5th ribs lytic lesion T9 multiple lung lesions suggestive of metastatic disease        office visit 04/15/2024 74 yo WM in for 6 month checkup --EATING WELL--+ BM 0n oxycodone --BM q 7 days --saw rebecca oncologist this morning and supposed to prescribed pain medicine and something for bowels--Dr. Michaels did nephrectomy--right --Dr Burke [facility] onc --patient had metastatic lesions to the spine and right femur .  Patient had clear cell renal carcinoma. Surgery Sept 2023 kidney removed in October 2023  Lab--glucose 117 calcium 12.1 was 11 hematocrit 36.1-improved was 31.9  Had infusion for renal cancer this morning--has to scheduled in May  History of hypercalcemia--told she was going to give them a medication that would lower the calcium level if puts some calcium back and bone  Main problem  is pain in the right shoulder and right arm--- also pain in the right leg where patient recently had surgery with dulce placement  Has morphine 15 mg -- oxycodone 5 and 10-- not taking because it constipates patient-- on gabapentin Robaxin--some relief .  Has slightly less pain with gabapentin but does not Lab  Records difficult to review  Pt  has appointment with neurosurgeon Dr Martinez June 17th for neck pain -- patient states pain is in the neck area " right upper trapezius and shoulder into the right scapula radiates down the right arm no relief with any of the medications he is taking--- will try tramadol/increase gabapentin 600 t.i.d./continue Robaxin--        ROS: no significant change except for history of present illness  Skin: no psoriasis, eczema, skin cancer--itchy   HEENT: No headache, ocular pain, blurred vision, diplopia, epistaxis, hoarseness change in voice, thyroid trouble  Lung: No pneumonia, asthma, Tb, wheezing, SOB, no smoking  Heart: No chest pain, ankle edema, palpitations, MI, bam murmur, no hypertension--patient lost so much weight off blood pressure medications blood pressure 116/62,no hyperlipidemia--no stent bypass arrhythmia  Abdomen:+ nausea,no  vomiting, diarrhea, constipation, ulcers, hepatitis, gallbladder disease, melena, hematochezia, hematemesis  : no UTI, renal disease, stones prostate venereal disease--hx nephrectomy --- clear cell renal carcinoma with metastasis to the 2nd 5th ribs T9 and possibly lung  MS: no fractures, O/A, lupus, rheumatoid, +gout-- recent surgery on right femur had lesion scraped and cauterize and dulce placed 1 1/2 wks ago--Neck pain see HPI   Neuro: No dizziness, LOC, seizures   No diabetes, + allow after surgery anemia--, no anxiety, no depression  , one biological 2 stepchildren work retired lives with wife     Objective:   Physical Exam:  General: Well nourished, well developed, no acute distress +obesity  Skin:  Ecchymosis on the forearms bilaterally   HEENT: Eyes PERRLA, EOM intact, nose patent, throat non-erythematous ears TMs clear  NECK: Supple, no bruits, No JVD, no nodes  Lungs: Clear, no rales, rhonchi, wheezing  Heart: Regular rate and rhythm, no murmurs, gallops, or rubs  Abdomen: flat, bowel sounds positive, no tenderness, or organomegaly  MS:  pain in the area of the right femur where patient had surgery 1-1/2 weeks ago but especially pain in the right side of the neck right upper  trapezius right scapular area right arm with any movement  Neuro: Alert, CN intact, oriented X 3  Extremities: No cyanosis, clubbing, or edema         Assessment:       1. Metastatic renal cell carcinoma to bone    2. 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 Renal mass    4. Constipation due to opioid therapy    5. Clear cell carcinoma of right kidney    6. Malignant neoplasm metastatic to lung, unspecified laterality    7. 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  Dispense: 30 tablet; Refill: 0  -     gabapentin (NEURONTIN) 600 MG tablet; Take 1 tablet (600 mg total) by mouth 3 (three) times daily.  Dispense: 90 tablet; Refill: 11  -     methocarbamoL (ROBAXIN) 750 MG Tab; Take 1 tablet (750 mg total) by mouth every 6 (six) hours. for 10 days  Dispense: 40 tablet; Refill: 0            Main Reason for Visit six-month checkup   Main problems pain in the right neck right upper trapezius right scapula right arm---to see DR Martinez Appt  June 17th-- patient wants to know why he has the pain in the neck what type of things can be done for relief-- unable to tolerate morphine and oxycodone due to constipation--- wants to try tramadol-- will increase gabapentin 600 mg t.i.d.-- restart Robaxin  500 mg q.8 hours   Right leg pain--- patient to follow-up  surgeon   Needs to see heme oncologist for long-term treatment plan on Lenvima--he is not sure regimen    Patient was contacted a facility in [facility]-- the does a lot of cancer therapy is planning on  going out there at the end of June   Patient was concerned that the lesions appear to be spreading quickly have spread to the 2nd rib 5th rib T9 vertebra lungs     Dr Michaels Oct 2023 --right nephrectomy  Saw Hem Onc today had infusion for clear cell renal carcinoma--with metastasis to the vertebra and right femur--had infusion today  Constipation told will get medication help with opioid constipation   Hx anemia given 2 iron infusions   Patient has a possible lesion at T9 concern for metastatic disease supposed to have a bone biopsy--no lung lesions    hypertension resolved due to weight lossBP 118/64  Obesity--exercise try to get ideal body weight   Lab as per Hem Onc    I explained patient that I have a great deal of difficulty understand in the chronological order of which all these things have occurred-- strongly suggest that he sit down prior to going to [facility] and [facility] summary of when [id number] was done--- when he had different surgeries--- when the last surgery was done--- where the metastasis is--- long-term treatment plan--- treatment of the neck pain by Dr Martinez

## 2024-06-05 ENCOUNTER — TELEPHONE (OUTPATIENT)
Dept: PRIMARY CARE CLINIC | Facility: CLINIC | Age: 74
End: 2024-06-05
Payer: MEDICARE

## 2024-06-05 NOTE — TELEPHONE ENCOUNTER
----- Message from Mary Thompson sent at 6/4/2024  2:08 PM CDT -----  Contact: [phone] [facility]  Pharmacy is calling to clarify an RX.  RX name:  traMADoL (ULTRAM) 50 mg tablet  What do they need to clarify:  pharmacy states the pt is already of several other pain meds and wants to know if Dr Lowery wants to proceed with filling the Tramadol Rx.  Comments:

## 2024-06-07 ENCOUNTER — PATIENT MESSAGE (OUTPATIENT)
Dept: ADMINISTRATIVE | Facility: OTHER | Age: 74
End: 2024-06-07
Payer: MEDICARE

## 2024-06-07 ENCOUNTER — PATIENT MESSAGE (OUTPATIENT)
Dept: ORTHOPEDICS | Facility: CLINIC | Age: 74
End: 2024-06-07
Payer: MEDICARE

## 2024-06-12 ENCOUNTER — OFFICE VISIT (OUTPATIENT)
Dept: ORTHOPEDICS | Facility: CLINIC | Age: 74
End: 2024-06-12
Payer: MEDICARE

## 2024-06-12 DIAGNOSIS — C79.51 METASTATIC ADENOCARCINOMA TO RIGHT FEMUR: Primary | ICD-10-CM

## 2024-06-12 PROCEDURE — 99024 POSTOP FOLLOW-UP VISIT: CPT | Mod: S$GLB,,, | Performed by: ORTHOPAEDIC SURGERY

## 2024-06-12 PROCEDURE — 99999 PR PBB SHADOW E&M-EST. PATIENT-LVL I: CPT | Mod: PBBFAC,,, | Performed by: ORTHOPAEDIC SURGERY

## 2024-06-12 PROCEDURE — 1159F MED LIST DOCD IN RCRD: CPT | Mod: CPTII,S$GLB,, | Performed by: ORTHOPAEDIC SURGERY

## 2024-06-12 PROCEDURE — 4010F ACE/ARB THERAPY RXD/TAKEN: CPT | Mod: CPTII,S$GLB,, | Performed by: ORTHOPAEDIC SURGERY

## 2024-06-12 NOTE — PROGRESS NOTES
Orthopaedic Oncology Follow Up Visit:      Dear No referring provider defined for this encounter. and Slava Lowery MD,    We had the pleasure of evaluating Gamaliel Pete Jr. in the Orthopaedic Clinic today at the [facility] today.      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 is a 73 y.o. male has been experiencing a few weeks of right thigh pain. He had radiographs done which demonstrated a new proximal femur lesion and was therefore referred to me. He ambulates without assistive devices, but he does have a walker and cane. He did undergo revision C spine surgery in mid November and is recovering from that currently. He is in a c collar. He denies any left thigh or leg pain. He also reports some right shoulder pain which is relatively new. 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 He is currently not on any therapy. He tells me is going to start new immunotherapy soon. He has not had any radiation. He denies new numbness or tingling. No recent falls. No rest pain.     Interval History:  Patient is here for post operative follow up of the above surgery. He is doing well. No issues with incisions. Port was placed as same time as surgery and so that has been helpful for lab draws. He is going to [place] for some further cancer care and leaves after his follow up with Dr. Martinez. He knows someone who had success with treatment of metastatic lung cancer in [place] and so he would like to see if treatment there will be helpful. He remains in C collar. He is using rolling walker to help ambulate. He continues to have right shoulder pain, but this is better with gabapentin and some post surgical pain in the right leg. Left leg is without pain.    PMH:   Past Medical History:   Diagnosis Date    Asthma     no  inhaler use    Borderline hypertension     ED (erectile dysfunction)     Gout     Hematuria     Hypertension        PSH:  has a past surgical history that includes Colonoscopy (02/10/2022); Colonoscopy (N/A, 02/10/2022); Tonsillectomy; Robot-assisted laparoscopic nephrectomy (Right, 10/4/2023); Surgical removal of vertebral body of cervical spine (Right, 11/7/2023); Posterior fusion of cervical spine with laminectomy (N/A, 11/15/2023); Surgical removal of vertebral body of cervical spine (N/A, 11/15/2023); Thoracic laminectomy with fusion (N/A, 1/5/2024); Wound exploration (N/A, 2/2/2024); closure (N/A, 2/2/2024); Insertion of intramedullary dulce (Right, 5/23/2024); and Insertion of tunneled central venous catheter (CVC) with subcutaneous port (Left, 5/23/2024).    Allergies:   Allergies as of 06/12/2024 - Reviewed 06/04/2024   Allergen Reaction Noted    Vancomycin analogues Rash 02/29/2024       Medications:    Current Outpatient Medications:     acetaminophen (TYLENOL) 500 MG tablet, Take 2 tablets (1,000 mg total) by mouth every 8 (eight) hours., Disp: 180 tablet, Rfl: 0    allopurinoL (ZYLOPRIM) 100 MG tablet, Take 1 tablet (100 mg total) by mouth once daily. (Patient taking differently: Take 100 mg by mouth every morning.), Disp: 90 tablet, Rfl: 3    amlodipine-benazepril 5-10 mg (LOTREL) 5-10 mg per capsule, Take 1 capsule by mouth once daily., Disp: 90 capsule, Rfl: 3    aspirin 81 MG Chew, Chew and swallow 1 tablet (81 mg total) by mouth 2 (two) times a day., Disp: 56 tablet, Rfl: 0    celecoxib (CELEBREX) 200 MG capsule, Take 1 capsule (200 mg total) by mouth once daily., Disp: 30 capsule, Rfl: 0    diazePAM (VALIUM) 5 MG tablet, Take 1 tablet (5 mg total) by mouth every 8 (eight) hours as needed for Anxiety or Insomnia (take 30 minutes prior to imaging). (Patient not taking: Reported on 4/15/2024), Disp: 10 tablet, Rfl: 0    duke's soln (benadryl 30 mL, mylanta 30 mL, LIDOcaine 30 mL, nystatin 30 mL) 120mL,  Take 10 mLs by mouth 4 (four) times daily. (Patient not taking: Reported on 4/15/2024), Disp: 500 mL, Rfl: 0    EPINEPHrine (EPIPEN) 0.3 mg/0.3 mL AtIn, Inject 0.3 mLs (0.3 mg total) into the muscle as needed. (Patient not taking: Reported on 4/15/2024), Disp: 1 each, Rfl: 0    gabapentin (NEURONTIN) 600 MG tablet, Take 1 tablet (600 mg total) by mouth 3 (three) times daily., Disp: 90 tablet, Rfl: 11    hydrocortisone (ANUSOL-HC) 2.5 % rectal cream, Place rectally 2 (two) times daily., Disp: 4 each, Rfl: 1    lenvatinib (LENVIMA) 10 mg/day (10 mg x 1) Cap, Take 10 mg by mouth once daily., Disp: 30 capsule, Rfl: 2    LIDOcaine-prilocaine (EMLA) cream, Place to port site 45-60 minutes prior to infusion appointment., Disp: 30 g, Rfl: 0    linaCLOtide (LINZESS) 72 mcg Cap capsule, Take 1 capsule (72 mcg total) by mouth before breakfast., Disp: 30 capsule, Rfl: 1    methocarbamoL (ROBAXIN) 750 MG Tab, Take 1 tablet (750 mg total) by mouth every 6 (six) hours. for 10 days, Disp: 40 tablet, Rfl: 0    morphine (MS CONTIN) 15 MG 12 hr tablet, Take 1 tablet (15 mg total) by mouth every 8 (eight) hours., Disp: 90 tablet, Rfl: 0    morphine (MS CONTIN) 30 MG 12 hr tablet, Take 30 mg by mouth 2 (two) times daily., Disp: , Rfl:     naloxone (NARCAN) 4 mg/actuation Spry, 1 spray (4mg) by nasal route as needed for opioid overdose; may repeat every 2-3 minutes in alternating nostrils until medical help arrives. Call 911 (Patient not taking: Reported on 4/15/2024), Disp: 2 each, Rfl: 0    omega-3 fatty acids/fish oil (FISH OIL-OMEGA-3 FATTY ACIDS) 300-1,000 mg capsule, Take 2 capsules by mouth once daily. (Patient not taking: Reported on 4/15/2024), Disp: , Rfl:     oxyCODONE (ROXICODONE) 5 MG immediate release tablet, Take 1 tablet (5 mg total) by mouth every 4 (four) hours as needed for Pain., Disp: 180 tablet, Rfl: 0    oxyCODONE (ROXICODONE) 5 MG immediate release tablet, Take 1 tablet (5 mg total) by mouth every 4 (four) hours  as needed for Pain., Disp: 28 tablet, Rfl: 0    sildenafiL (VIAGRA) 100 MG tablet, Take 1 tablet (100 mg total) by mouth daily as needed for Erectile Dysfunction. (Patient not taking: Reported on 1/18/2024), Disp: 30 tablet, Rfl: 11    traMADoL (ULTRAM) 50 mg tablet, Take 1 tablet (50 mg total) by mouth every 6 (six) hours as needed., Disp: 30 tablet, Rfl: 0    turmeric (CURCUMIN MISC), 1,000 mg by Misc.(Non-Drug; Combo Route) route Daily. (Patient not taking: Reported on 4/15/2024), Disp: , Rfl:     UNABLE TO FIND, Take 500 mg by mouth 2 (two) times a day. medication name: Tudca (Patient not taking: Reported on 4/15/2024), Disp: , Rfl:     UNABLE TO FIND, Take 2 capsules by mouth 2 (two) times a day. medication name: Turkey tail mushroom (Patient not taking: Reported on 4/15/2024), Disp: , Rfl:     UNABLE TO FIND, Take 15 drops by mouth once daily. medication name: Essiac-20 (Patient not taking: Reported on 4/15/2024), Disp: , Rfl:     UNABLE TO FIND, Take 5 mLs by mouth 2 (two) times a day. medication name: barley leaf juice powder (Patient not taking: Reported on 4/15/2024), Disp: , Rfl:     UNABLE TO FIND, Take 5 mLs by mouth 2 (two) times a day. medication name: black seed oil (cumin seed) (Patient not taking: Reported on 4/15/2024), Disp: , Rfl:   No current facility-administered medications for this visit.    Facility-Administered Medications Ordered in Other Visits:     ascorbic acid (vitamin C) tablet 250 mg, 250 mg, Oral, Daily, Kenn Vu MD    calcium carbonate 200 mg calcium (500 mg) chewable tablet 500 mg, 500 mg, Oral, TID PRN, Kenn Vu MD    celecoxib capsule 200 mg, 200 mg, Oral, Daily, Kenn Vu MD    dextrose 10% bolus 125 mL 125 mL, 12.5 g, Intravenous, PRN, Kenn Vu MD    dextrose 10% bolus 250 mL 250 mL, 25 g, Intravenous, PRN, Kenn Vu MD    docusate sodium capsule 100 mg, 100 mg, Oral, BID, Kenn Vu MD    glucagon (human recombinant) injection 1  mg, 1 mg, Intramuscular, PRN, Kenn Vu MD    glucose chewable tablet 16 g, 16 g, Oral, PRN, Kenn Vu MD    glucose chewable tablet 24 g, 24 g, Oral, PRN, Kenn Vu MD    magnesium oxide tablet 800 mg, 800 mg, Oral, PRN, Kenn Vu MD    melatonin tablet 6 mg, 6 mg, Oral, Nightly PRN, Kenn Vu MD    multivitamin tablet, 1 tablet, Oral, Daily, Kenn Vu MD    naloxone 0.4 mg/mL injection 0.4 mg, 0.4 mg, Intravenous, PRN, Kenn Vu MD    ondansetron injection 4 mg, 4 mg, Intravenous, Q8H PRN, Kenn Vu MD    polyethylene glycol packet 17 g, 17 g, Oral, BID PRN, Kenn Vu MD    potassium bicarbonate disintegrating tablet 50 mEq, 50 mEq, Oral, PRN, Kenn Vu MD    potassium, sodium phosphates 280-160-250 mg packet 2 packet, 2 packet, Oral, PRN, Kenn Vu MD    promethazine tablet 25 mg, 25 mg, Oral, Q6H PRN, Kenn Vu MD    sodium chloride 0.9% flush 10 mL, 10 mL, Intravenous, Q12H PRN, Kenn Vu MD    vitamin D 1000 units tablet 2,000 Units, 2,000 Units, Oral, Daily, Kenn Vu MD    Family History: family history is not on file.    Social History:  reports that he has never smoked. He has never used smokeless tobacco. He reports that he does not currently use alcohol. He reports that he does not use drugs.    ROS:  A Complete review of systems was performed.  Pertinent positives are listed in the history of present illness above.  Remainder of review of systems were negative.      Vital Signs:  no vital signs obtained    Physical exam:  General:  AAOX3, No acute distress, normal affect and disposition  Respiratory: Respirations unlabored  C collar in place and well fitting  Anterior neck incision healing well    Right lower extremity:  Incisions clean dry and intact  Sutures in place and removed  Steri strips applied  Minimal pain about the right proximal thigh  Tolerates axial load and log roll  No pain with knee or  ankle ROM  Distal neurovascular exam was normal with 5/5 motor for the tibialis anterior, extensor hallucis longus, and gastrocsoleus.  Sensory exam intact in sural, saphenous, superficial peroneal, deep peroneal, and tibial nerve distributions  Foot is perfused    Left lower extremity  Mild pain with palpation left proximal thigh  Tolerates hip ROM log roll and axial load  No pain with knee or ankle ROM  Neurovascularly intact distally      Imaging:  No new radiographs obtained    Radiographic Data:  radiographs of bilateral femurs obtained a week prior demonstrate continued presence of the proximal femur lateral cortically based lesion, no fracture, on the left side there are multifocal diaphyseal lesions as well    CT abdomen and pelvis - there is cortical breakthrough of the right proximal femur lesion on axial views with likely associated soft tissue component     Pathology: metastatic RCC from spine biopsy; right femur samples without viable carcinoma    Assessment/Plan: Gamaliel Pete Jr. is a 73 y.o. old male with metastatic RCC to bilateral femurs, right worse than left now s/p right femur IMN doing well    We discussed the findings to date. He is healing well from surgery. We discussed plan since he is moving to [place] soon. As far as his surgical incision would like him to let steri strips fall off. He is ok to shower. No soaking incision in tubs. He should continue to use walker to protect left side. I counseled him on concerning signs and symptoms for disease progression. His facility in [place] does not do surgeries. Provided him to number of an excellent orthopedic oncologist in the area should he have issues while he is in [place] for prolonged treatment. Patient is very appreciative of care. Recommend regular radiographs to monitor disease in right and left femur. He has follow up with spine here before he leaves. He will reach out over the portal as well with any questions or concerns. Follow  up PRN as he is moving. Wished patient and wife the best and they will reach out should any issues arise and update me on their success in [place] as able. All questions answered.     Thank you for the referral and the opportunity to participate in the care of your patient.  If you have any questions regarding our treatment recommendations don't hesitate to call.    Electronically signed by:    Parvez Rock MD   Orthopedic Oncology and Complex Arthroplasty Reconstruction  [facility]

## 2024-06-17 ENCOUNTER — OFFICE VISIT (OUTPATIENT)
Dept: NEUROSURGERY | Facility: CLINIC | Age: 74
End: 2024-06-17
Payer: MEDICARE

## 2024-06-17 DIAGNOSIS — C79.51 METASTATIC CANCER TO SPINE: Primary | ICD-10-CM

## 2024-06-17 PROCEDURE — 3288F FALL RISK ASSESSMENT DOCD: CPT | Mod: CPTII,S$GLB,, | Performed by: STUDENT IN AN ORGANIZED HEALTH CARE EDUCATION/TRAINING PROGRAM

## 2024-06-17 PROCEDURE — 1159F MED LIST DOCD IN RCRD: CPT | Mod: CPTII,S$GLB,, | Performed by: STUDENT IN AN ORGANIZED HEALTH CARE EDUCATION/TRAINING PROGRAM

## 2024-06-17 PROCEDURE — 1101F PT FALLS ASSESS-DOCD LE1/YR: CPT | Mod: CPTII,S$GLB,, | Performed by: STUDENT IN AN ORGANIZED HEALTH CARE EDUCATION/TRAINING PROGRAM

## 2024-06-17 PROCEDURE — 99999 PR PBB SHADOW E&M-EST. PATIENT-LVL III: CPT | Mod: PBBFAC,,, | Performed by: STUDENT IN AN ORGANIZED HEALTH CARE EDUCATION/TRAINING PROGRAM

## 2024-06-17 PROCEDURE — 4010F ACE/ARB THERAPY RXD/TAKEN: CPT | Mod: CPTII,S$GLB,, | Performed by: STUDENT IN AN ORGANIZED HEALTH CARE EDUCATION/TRAINING PROGRAM

## 2024-06-17 PROCEDURE — 1125F AMNT PAIN NOTED PAIN PRSNT: CPT | Mod: CPTII,S$GLB,, | Performed by: STUDENT IN AN ORGANIZED HEALTH CARE EDUCATION/TRAINING PROGRAM

## 2024-06-17 PROCEDURE — 1111F DSCHRG MED/CURRENT MED MERGE: CPT | Mod: CPTII,S$GLB,, | Performed by: STUDENT IN AN ORGANIZED HEALTH CARE EDUCATION/TRAINING PROGRAM

## 2024-06-17 PROCEDURE — 99215 OFFICE O/P EST HI 40 MIN: CPT | Mod: S$GLB,,, | Performed by: STUDENT IN AN ORGANIZED HEALTH CARE EDUCATION/TRAINING PROGRAM

## 2024-06-17 NOTE — PROGRESS NOTES
Neurosurgery  Established Patient    SUBJECTIVE:     History of Present Illness:  Gamaliel Pete Jr. is a 73 y.o. 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 His operative course is as follows:     11/7/23: C4/5 corpectomy and C3-6 anterior fusion and revision of the C4/5 anterior corpectomy cage and C3-6 plate   11/14/23: T9 kyphoplasty for a pathological fracture   11/16/23: C3-6 posterior fusion   1/6/24: T7-11 decompression/fusion on 1/6/24 2/2/24: Thoracic wound washout/revision with plastic surgery for wound dehiscence     Patient reports mid back pain since early to mid March. He states the pain is worsening and wraps around to his sides. He denies focal weakness, paraesthesias, bowel/bladder dysfunction, falls or gait instability. His incisions are healing well. He is still planning for R femur IMN with ortho for impending pathological fracture risk.     Documentation and office visit notes from Plastic surgery, heme/onc and ortho reviewed.     Interval fu 6/17/24:  Pt presents in fu.  He endorses no worsening neck pain.  He does note RUE radicular pain into all fingers of his right hand.  He feels that his right arm/hand has become weaker.  He increased his neurontin to 600 tid which improved his RUE radicular pain to a bearable level.  He also endorses right sided mid thoracic back pain that is constant and unchanged with position.  He has no bowel/bladder incontinence or saddle anesthesia.  He is currently on chemo per hemonc and has undergone right IM dulce placement with ortho for impending pathologic femur fracture.    Review of patient's allergies indicates:   Allergen Reactions    Vancomycin analogues Rash     IV antibiotic caused a rash--RED MAN SYNDROME       Current Outpatient Medications   Medication Sig Dispense Refill    acetaminophen (TYLENOL) 500 MG tablet Take 2 tablets (1,000 mg total) by mouth every 8 (eight) hours. 180 tablet 0    allopurinoL (ZYLOPRIM) 100 MG tablet Take 1 tablet (100 mg  total) by mouth once daily. (Patient taking differently: Take 100 mg by mouth every morning.) 90 tablet 3    amlodipine-benazepril 5-10 mg (LOTREL) 5-10 mg per capsule Take 1 capsule by mouth once daily. 90 capsule 3    aspirin 81 MG Chew Chew and swallow 1 tablet (81 mg total) by mouth 2 (two) times a day. 56 tablet 0    celecoxib (CELEBREX) 200 MG capsule Take 1 capsule (200 mg total) by mouth once daily. 30 capsule 0    diazePAM (VALIUM) 5 MG tablet Take 1 tablet (5 mg total) by mouth every 8 (eight) hours as needed for Anxiety or Insomnia (take 30 minutes prior to imaging). 10 tablet 0    duke's soln (benadryl 30 mL, mylanta 30 mL, LIDOcaine 30 mL, nystatin 30 mL) 120mL Take 10 mLs by mouth 4 (four) times daily. 500 mL 0    EPINEPHrine (EPIPEN) 0.3 mg/0.3 mL AtIn Inject 0.3 mLs (0.3 mg total) into the muscle as needed. 1 each 0    gabapentin (NEURONTIN) 600 MG tablet Take 1 tablet (600 mg total) by mouth 3 (three) times daily. 90 tablet 11    hydrocortisone (ANUSOL-HC) 2.5 % rectal cream Place rectally 2 (two) times daily. 4 each 1    lenvatinib (LENVIMA) 10 mg/day (10 mg x 1) Cap Take 10 mg by mouth once daily. 30 capsule 2    LIDOcaine-prilocaine (EMLA) cream Place to port site 45-60 minutes prior to infusion appointment. 30 g 0    linaCLOtide (LINZESS) 72 mcg Cap capsule Take 1 capsule (72 mcg total) by mouth before breakfast. 30 capsule 1    morphine (MS CONTIN) 15 MG 12 hr tablet Take 1 tablet (15 mg total) by mouth every 8 (eight) hours. 90 tablet 0    morphine (MS CONTIN) 30 MG 12 hr tablet Take 30 mg by mouth 2 (two) times daily.      naloxone (NARCAN) 4 mg/actuation Spry 1 spray (4mg) by nasal route as needed for opioid overdose; may repeat every 2-3 minutes in alternating nostrils until medical help arrives. Call 911 2 each 0    omega-3 fatty acids/fish oil (FISH OIL-OMEGA-3 FATTY ACIDS) 300-1,000 mg capsule Take 2 capsules by mouth once daily.      oxyCODONE (ROXICODONE) 5 MG immediate release tablet  Take 1 tablet (5 mg total) by mouth every 4 (four) hours as needed for Pain. 180 tablet 0    oxyCODONE (ROXICODONE) 5 MG immediate release tablet Take 1 tablet (5 mg total) by mouth every 4 (four) hours as needed for Pain. 28 tablet 0    turmeric (CURCUMIN MISC) 1,000 mg by Misc.(Non-Drug; Combo Route) route Daily.      UNABLE TO FIND Take 500 mg by mouth 2 (two) times a day. medication name: Tudca      UNABLE TO FIND Take 2 capsules by mouth 2 (two) times a day. medication name: Turkey tail mushroom      UNABLE TO FIND Take 15 drops by mouth once daily. medication name: Essiac-20      UNABLE TO FIND Take 5 mLs by mouth 2 (two) times a day. medication name: barley leaf juice powder      UNABLE TO FIND Take 5 mLs by mouth 2 (two) times a day. medication name: black seed oil (cumin seed)      sildenafiL (VIAGRA) 100 MG tablet Take 1 tablet (100 mg total) by mouth daily as needed for Erectile Dysfunction. (Patient not taking: Reported on 1/18/2024) 30 tablet 11     No current facility-administered medications for this visit.     Facility-Administered Medications Ordered in Other Visits   Medication Dose Route Frequency Provider Last Rate Last Admin    ascorbic acid (vitamin C) tablet 250 mg  250 mg Oral Daily Kenn Vu MD        calcium carbonate 200 mg calcium (500 mg) chewable tablet 500 mg  500 mg Oral TID PRN Kenn Vu MD        celecoxib capsule 200 mg  200 mg Oral Daily Kenn Vu MD        dextrose 10% bolus 125 mL 125 mL  12.5 g Intravenous PRN Kenn Vu MD        dextrose 10% bolus 250 mL 250 mL  25 g Intravenous PRN Kenn Vu MD        docusate sodium capsule 100 mg  100 mg Oral BID Kenn Vu MD        glucagon (human recombinant) injection 1 mg  1 mg Intramuscular PRN Kenn Vu MD        glucose chewable tablet 16 g  16 g Oral PRN Kenn Vu MD        glucose chewable tablet 24 g  24 g Oral PRN Kenn Vu MD        magnesium oxide tablet 800 mg  800  mg Oral PRN Kenn Vu MD        melatonin tablet 6 mg  6 mg Oral Nightly PRN Kenn Vu MD        multivitamin tablet  1 tablet Oral Daily Kenn Vu MD        naloxone 0.4 mg/mL injection 0.4 mg  0.4 mg Intravenous PRN Kenn Vu MD        ondansetron injection 4 mg  4 mg Intravenous Q8H PRN Kenn Vu MD        polyethylene glycol packet 17 g  17 g Oral BID PRN Kenn Vu MD        potassium bicarbonate disintegrating tablet 50 mEq  50 mEq Oral PRN Kenn Vu MD        potassium, sodium phosphates 280-160-250 mg packet 2 packet  2 packet Oral PRN Kenn Vu MD        promethazine tablet 25 mg  25 mg Oral Q6H PRN Kenn Vu MD        sodium chloride 0.9% flush 10 mL  10 mL Intravenous Q12H PRN Kenn Vu MD        vitamin D 1000 units tablet 2,000 Units  2,000 Units Oral Daily Kenn Vu MD           Past Medical History:   Diagnosis Date    Asthma     no inhaler use    Borderline hypertension     ED (erectile dysfunction)     Gout     Hematuria     Hypertension      Past Surgical History:   Procedure Laterality Date    CLOSURE N/A 2/2/2024    Procedure: CLOSURE;  Surgeon: Ernesto Villanueva MD;  [facility];  Service: Plastics;  Laterality: N/A;    COLONOSCOPY  02/10/2022    COLONOSCOPY N/A 02/10/2022    Procedure: COLONOSCOPY;  Surgeon: Desmond Keenan MD;  Location: [facility];  Service: General;  Laterality: N/A;    INSERTION OF INTRAMEDULLARY NGHIA Right 5/23/2024    Procedure: INSERTION, INTRAMEDULLARY NGHIA;  Surgeon: Parvez Rock MD;  [address];  Service: Orthopedics;  Laterality: Right;    INSERTION OF TUNNELED CENTRAL VENOUS CATHETER (CVC) WITH SUBCUTANEOUS PORT Left 5/23/2024    Procedure: INSERTION, SINGLE LUMEN CATHETER WITH PORT, WITH FLUOROSCOPIC GUIDANCE;  Surgeon: Jj Reilly MD;  [facility];  Service: General;  Laterality: Left;    POSTERIOR FUSION OF CERVICAL SPINE WITH  LAMINECTOMY N/A 11/15/2023    Procedure: SPINE, CERVICAL, WITH POSTERIOR FUSION T4-6;  Surgeon: Nasim Martinez DO;  [facility];  Service: Neurosurgery;  Laterality: N/A;  C2-T1 laminectomy and posterior fusion. [place]. C-arm. [employer]. Neuromonitoring.    ROBOT-ASSISTED LAPAROSCOPIC NEPHRECTOMY Right 10/4/2023    Procedure: ROBOTIC NEPHRECTOMY;  Surgeon: Henry Michaels MD;  Location: [facility] OR;  Service: Urology;  Laterality: Right;    SURGICAL REMOVAL OF VERTEBRAL BODY OF CERVICAL SPINE Right 11/7/2023    Procedure: CORPECTOMY, SPINE, CERVICAL;  Surgeon: Nasim Martinez DO;  [facility];  Service: Neurosurgery;  Laterality: Right;  C4 and C5 corpectomy with globus;  wells tongs; c-arm; neuromonitoring; microscope; type and cross 2 units    SURGICAL REMOVAL OF VERTEBRAL BODY OF CERVICAL SPINE N/A 11/15/2023    Procedure: CORPECTOMY, SPINE, CERVICAL C3-6 REVISION;  Surgeon: Nasim Martinez DO;  [facility];  Service: Neurosurgery;  Laterality: N/A;    THORACIC LAMINECTOMY WITH FUSION N/A 1/5/2024    Procedure: LAMINECTOMY, SPINE, THORACIC, WITH FUSION;  Surgeon: Nasim Martinez DO;  [facility];  Service: Neurosurgery;  Laterality: N/A;  T7-T11 fusions with robot    TONSILLECTOMY      WOUND EXPLORATION N/A 2/2/2024    Procedure: EXPLORATION, WOUND;  Surgeon: Nasim Martinez DO;  [facility];  Service: Neurosurgery;  Laterality: N/A;  Thoracic wound exploration, washout     Family History    None       Social History     Socioeconomic History    Marital status:    Tobacco Use    Smoking status: Never    Smokeless tobacco: Never   Substance and Sexual Activity    Alcohol use: Not Currently     Alcohol/week: 0.0 standard drinks of alcohol     Comment: rarely    Drug use: Never     Social Determinants of Health     Financial Resource Strain: Low Risk  (5/24/2024)    Overall Financial Resource Strain (CARDIA)      Difficulty of Paying Living Expenses: Not very hard   Food Insecurity: No Food Insecurity (5/24/2024)    Hunger Vital Sign     Worried About Running Out of Food in the Last Year: Never true     Ran Out of Food in the Last Year: Never true   Transportation Needs: No Transportation Needs (5/24/2024)    TRANSPORTATION NEEDS     Transportation : No   Physical Activity: Inactive (5/24/2024)    Exercise Vital Sign     Days of Exercise per Week: 0 days     Minutes of Exercise per Session: 0 min   Stress: Stress Concern Present (5/24/2024)    [employer] - Occupational Stress Questionnaire     Feeling of Stress : Very much   Housing Stability: Low Risk  (5/24/2024)    Housing Stability Vital Sign     Unable to Pay for Housing in the Last Year: No     Homeless in the Last Year: No       Review of Systems  14 point ROS was negative    OBJECTIVE:     Vital Signs  Pain Score:   9  There is no height or weight on file to calculate BMI.    Neurosurgery Physical Exam  AAOX3  CNII-XII intact  5/5 in proximal RUE, 4/5 in right triceps/WF/WE/HI.  5/5 in LUE  5/5 in BLE  SILT  No go's bilaterally  No TTP in thoracic spine.    Diagnostic Results:  C spine xrays: s/p 360 fusion, no worsening kyphosis between upright/supine.  Suboptimal visualization of CT jxn.  CT c spine: interval worsening of lytic lesion involving the C7 vertebral body.  Stable 360 cervical fusion without hardware failure.  CT T spine: interval worsening of T6 lytic  lesion with left sided fracture and resultant coronal scoliosis.  No hardware failure from T7-11.  Reviewed    ASSESSMENT/PLAN:     Gamaliel Pete Jr. is a 73 y.o. 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 His operative course is as follows:     11/7/23: C4/5 corpectomy and C3-6 anterior fusion and revision of the C4/5 anterior corpectomy cage and C3-6 plate   11/14/23: T9 kyphoplasty for a pathological fracture   11/16/23: C3-6 posterior fusion   1/6/24: T7-11  decompression/fusion on 1/6/24 2/2/24: Thoracic wound washout/revision with plastic surgery for wound dehiscence    Unfortunately, his updated imaging continues to show worsening of the C7 and T5 vertebral bodies with now a pathologic fracture of the left sided T5 vertebral body causing a mild thoracic coronal deformity.  I believe his RUE radicular pain and weakness is related to the C7 tumor involvement.  He is currently not myelopathic.  He is interested in going to [place] this month to pursue therapy for his metastatic RCC.  I would like to get a stat MRI c and MRI t spine prior to his departure.  I also discussed that he is at high risk of developing neuro worsening d/t the impending instability caused by the C7 and T5 lesions.  I discussed that in order to provide spinal stability he would require a revision of his fusion with extension up to C2 and to connect to the prior hardware down to T11.  I plan to fu the MRIs ordered to assess for the need for any corpectomies at these levels.  Will discuss results with the patient.  Continue C collar and TLSO brace while upright and walking.

## 2024-06-18 ENCOUNTER — TELEPHONE (OUTPATIENT)
Dept: NEUROSURGERY | Facility: CLINIC | Age: 74
End: 2024-06-18
Payer: MEDICARE

## 2024-06-18 NOTE — TELEPHONE ENCOUNTER
Date of Service:  12/26/2023    HISTORY:  A 7-year-old white male here with his mother with complaints of left ear pain.  He states that there have been some others in the family sick, for example mom has some sinus issues for the last few weeks.  The patient just started complaining of pain last night before bed.  States his ears seem to be red.  He has as a bit of a runny nose, mostly complains of ear pain.  His temperature is 101 this morning.    The patient did have an ear infection that seemed to return overnight at that time, apparently, he had more pain in the right, it was diagnosed with bilateral ear infection, placed on Omnicef.    ALLERGIES:  He does not have a known allergy to antibiotics.    PHYSICAL EXAMINATION:  GENERAL:  He is alert, appropriate, a bit teary but cooperative with exam.  HEENT:  Eyes are moist.  Extraocular movements are intact.  Nares reveal some mild nasal mucosa.  The oropharynx reveals no unusual exudate or erythema.  NECK:  No unusual lymphadenopathy.  LUNGS:  Clear.  Respirations easy and nonlabored.  COR EXAM:  Regular rate.  ENT:  Examination of the right ear reveals patient to have a slightly dull tympanic membrane, it is not particularly reddened.  There is small amount of cerumen.  More notable cerumen is noted on the left.  The external ear appears to be grossly normal with some mild discomfort with retraction of the external ear.  A lighted ear curette was used to remove a small amount of cerumen from the left ear allowing visualization of the canal, which appears to be grossly normal.  Tympanic membrane appeared to be thickened and with pinkish discoloration.    IMPRESSION:  Left otitis media.    PLAN:  I explained to the mother that placed him on Augmentin to take twice per day.  I have encouraged him to take this after something to eat.  I explained to her that she can use Tylenol or Advil for discomfort and fever.  I have asked to let us know if the fever increases,  Orders faxed    pain worsens, or is not improving in the next 48 or so hours, she is to call sooner with any questions or concerns.        Dictated By:  Michael Chavarria MD  Signing Provider:  MD CADY Callahan/ROBY  D:  12/26/2023 12:12:03 PM  T:  12/26/2023 12:49:15 PM  Job:  [id number]

## 2024-06-18 NOTE — TELEPHONE ENCOUNTER
----- Message from Aaliyah Houston sent at 6/18/2024  9:24 AM CDT -----  Regarding: orders  Contact: [phone] ext [phone]  Pt wife calling in regarding orders for MRI be sent to [facility] fax number [phone] please call to discuss Further

## 2024-06-20 ENCOUNTER — TELEPHONE (OUTPATIENT)
Dept: NEUROSURGERY | Facility: CLINIC | Age: 74
End: 2024-06-20
Payer: MEDICARE

## 2024-06-20 NOTE — TELEPHONE ENCOUNTER
Discussed updated MRI C/T with the patient this morning.    MRI c spine w and w/o: redemonstration of known C7 pathologic fracture resulting in severe right C6/7 foraminal and right C7/T1 foraminal stenosis.  No significant central canal stenosis throughout.  No cord signal change.  MRI T spine w and w/o: redemonstration of pathologic fracture at T6 resulting in moderate central stenosis without cord signal change.  There is some draping of the cord at this level.  Interval improvement in appearance of the known T9 lesion from prior.  No additional areas of significant stenosis.    As previously discussed I have recommended a revision/fusion with stabilization of the cervico thoracic spine and likely separation surgery for the T6 lesion prior to considering additional treatment.  The patient however would like to pursue other treatment at a facility in [place].  I have discussed that both lesions appear unstable and if they worsen could cause irreversible neurologic injury.  The patient is aware of this.  I have instructed the patient to wear his cervical brace and TLSO brace while upright and ambulating.  We will also coordinate sending his updated MRI C/T to this treatment facility [facility] so they may review in case it will change treatment.

## 2024-06-27 DIAGNOSIS — C79.51 METASTATIC CANCER TO SPINE: ICD-10-CM

## 2024-06-27 DIAGNOSIS — C64.1 CLEAR CELL CARCINOMA OF RIGHT KIDNEY: ICD-10-CM

## 2024-06-27 DIAGNOSIS — C78.00 MALIGNANT NEOPLASM METASTATIC TO LUNG, UNSPECIFIED LATERALITY: ICD-10-CM

## 2024-07-11 ENCOUNTER — TELEPHONE (OUTPATIENT)
Dept: PALLIATIVE MEDICINE | Facility: CLINIC | Age: 74
End: 2024-07-11
Payer: MEDICARE

## 2024-07-11 NOTE — TELEPHONE ENCOUNTER
patient canceled apt stating he will be in [place] for 3 months and will call back when he's ready to reschedule.

## 2024-07-17 ENCOUNTER — TELEPHONE (OUTPATIENT)
Dept: ORTHOPEDICS | Facility: CLINIC | Age: 74
End: 2024-07-17
Payer: MEDICARE

## 2024-07-17 NOTE — TELEPHONE ENCOUNTER
Called patient to check in to see how things in [place] were going. He is doing well. Some discomfort with certain positions, but doing ok. He has moved into [facility]. Also let patient know that I will be leaving [facility] at the end of September. He is appreciative of call. Explained letter will be provided with list of other providers in the area here should he need to transition care, but he also has information for providers in [place] as well. All questions answered.

## 2024-07-24 NOTE — CONSULTS
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 Pete is a 74yo man with a past medical history of asthma, HTN, ED, gout, and HTN.  He is also followed in Heme-Onc clinic by Dr. 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  She notes that he was being referred to Dr. Rock for right femur findings, for which IMN surgery on 1/9/24 with preoperative embolization by IR.  He just started systemic therapy with Pembrolizumab (KEYTRUDA) and Axitinib.       His bony and lung metastatic disease has also been complicated by lytic lesions in his spine with pathologic compression fractures.  He was admitted 11/6/23-11/9/23 with cervical corpectomy with NSGY on 11/7/23 which he tolerated well. They obtained tissue samples and sent it over to pathology. IR to perform osteocool/kyphoplasty/biopsy T9 11/14. 2nd stage of surgery with NSGY on 11/15 with Dr. Martinez after kyphoplasty.  He was then admitted 11/4/23-11/18/23 and underwent IR kyphoplasty of T4-6, SPINE, CERVICAL, WITH POSTERIOR FUSION T4-6 (N/A), CORPECTOMY, SPINE, CERVICAL C3-6 REVISION (N/A).      He recently underwent NC CT T-spine on 12/30/23 which showed a pathologic compression fracture of T9 status post vertebral augmentation.  Compared to 11/01/2023, there is increased height loss and an enlarging destructive lesion with extraosseous extension into the ventral epidural space resulting in moderate canal stenosis and left T9-10 neural foramen resulting in moderate foraminal stenosis.  New erosive changes involving the T10 superior endplate, concerning for direct invasion.  New 0.9 cm lytic lesion in the T6 vertebral body.       Follow up MRI T-spine that " Provider is at bedside     Rajinder Rod RN  07/24/24 9212     "same day showed 1. Relative to prior MRI of the thoracic spine performed 10/31/2023, the patient is now status post vertebral augmentation at T9, at the level of presumed pathologic fracture due to metastasis. There has been further height loss of the vertebra since the reference MR examination, however configuration of the T9 vertebra appears similar when compared to more recently performed intervening CTA of the chest of 11/20/2023.  2. On the current examination, centered at the T9 level there is posterior buckling of the cortex and overlying enhancing soft tissue in the ventral epidural space, possibly combination of extraosseous extension of neoplastic soft tissue and inflammatory change. This enhancing soft tissue measures on the order of 8 x 19 x 27 mm (AP x ML x CC). This contributes to moderate central spinal canal stenosis with effacement of CSF and ventral cord deformity. Question intramedullary edema-like signal.  3. Additionally, there is new mild height loss of the T8 vertebra with patchy edema-like signal enhancement since the October 2023 MRI, at least some of which is likely related to acute or subacute fracture deformity, with underlying extension of metastasis not excluded. Question subtle height loss of the T8 vertebra since the 11/20/2023 CT chest.  4. Further, there appears to be a new enhancing lesion within the left paramedian T6 vertebral body, possibly metastatic lesion.  Equivocal new T2 weighted lesion within T12 vertebra.     Dr. Martinez followed up and found that his back pain was worse and notified Dr. Turk, "His thoracic fracture is worse and there looks like there is now epidural extension of tumor at T9 causing cord compression.  I have called the patient and his back pain is worse.  I think he needs a thoracic decompression/fusion in the near future."  Dr. Turk instructed him to come to the ED.  The patient tells me that his legs are fine with no focal weakness or neuropathic " "pain.  He is mostly having severe pain to his mid-thoracic back making it almost impossible to move around.  He is fine if lying perfectly flat on his back without moving, but excruciating with any attempt to stand or walk.       His only other complaint is new gout that started over the past few days to his ankles and maybe early gout to left knee.   "     Sedation History: General sedation    Past Medical History:   Diagnosis Date    Asthma     no inhaler use    Borderline hypertension     ED (erectile dysfunction)     Gout     Hematuria     Hypertension       Past Surgical History:   Procedure Laterality Date    COLONOSCOPY  02/10/2022    COLONOSCOPY N/A 02/10/2022    Procedure: COLONOSCOPY;  Surgeon: Desmond Keenan MD;  Location: [facility];  Service: General;  Laterality: N/A;    POSTERIOR FUSION OF CERVICAL SPINE WITH LAMINECTOMY N/A 11/15/2023    Procedure: SPINE, CERVICAL, WITH POSTERIOR FUSION T4-6;  Surgeon: Nasim Martinez DO;  Location: [address];  Service: Neurosurgery;  Laterality: N/A;  C2-T1 laminectomy and posterior fusion. Nash. C-arm. Chegg. Neuromonitoring.    ROBOT-ASSISTED LAPAROSCOPIC NEPHRECTOMY Right 10/4/2023    Procedure: ROBOTIC NEPHRECTOMY;  Surgeon: Henry Michaels MD;  Location: [facility];  Service: Urology;  Laterality: Right;    SURGICAL REMOVAL OF VERTEBRAL BODY OF CERVICAL SPINE Right 11/7/2023    Procedure: CORPECTOMY, SPINE, CERVICAL;  Surgeon: Nasim Martinez DO;  Location: [facility] OR [address] [place] [address];  Service: Neurosurgery;  Laterality: Right;  C4 and C5 corpectomy with globus; familia wells tongs; c-arm; neuromonitoring; microscope; type and cross 2 units    SURGICAL REMOVAL OF VERTEBRAL BODY OF CERVICAL SPINE N/A 11/15/2023    Procedure: CORPECTOMY, SPINE, CERVICAL C3-6 REVISION;  Surgeon: Nasim Martinez DO;  [facility];  Service: Neurosurgery;  Laterality: N/A;    TONSILLECTOMY        No current facility-administered medications on file prior " to encounter.     Current Outpatient Medications on File Prior to Encounter   Medication Sig Dispense Refill    allopurinoL (ZYLOPRIM) 100 MG tablet Take 1 tablet (100 mg total) by mouth once daily. 30 tablet 3    amlodipine-benazepril 5-10 mg (LOTREL) 5-10 mg per capsule Take 1 capsule by mouth once daily. 90 capsule 3    axitinib (INLYTA) 5 mg Tab Take 1 tablet (5 mg) by mouth 2 (two) times daily. 60 tablet 11    diazePAM (VALIUM) 5 MG tablet Take 1 tablet (5 mg total) by mouth every 6 (six) hours as needed (severe muscles spasms). 10 tablet 0    gabapentin (NEURONTIN) 300 MG capsule Take 1 capsule (300 mg total) by mouth 3 (three) times daily. 90 capsule 11    methocarbamoL (ROBAXIN) 750 MG Tab Take 1 tablet (750 mg total) by mouth 4 (four) times daily as needed (muscle spasms). 90 tablet 0    morphine (MS CONTIN) 30 MG 12 hr tablet Take 1 tablet (30 mg total) by mouth every 12 (twelve) hours. 60 tablet 0    naloxone (NARCAN) 4 mg/actuation Spry 1 spray (4mg) by nasal route as needed for opioid overdose; may repeat every 2-3 minutes in alternating nostrils until medical help arrives. Call 911 (Patient not taking: Reported on 12/28/2023) 2 each 0    omega-3 fatty acids/fish oil (FISH OIL-OMEGA-3 FATTY ACIDS) 300-1,000 mg capsule Take 2 capsules by mouth once daily.      oxyCODONE-acetaminophen (PERCOCET) 5-325 mg per tablet Take 1 to 2 tablets by mouth every 4-6 hours as needed for pain 100 tablet 0    sildenafiL (VIAGRA) 100 MG tablet Take 1 tablet (100 mg total) by mouth daily as needed for Erectile Dysfunction. 30 tablet 11    turmeric (CURCUMIN MISC) 1,000 mg by Misc.(Non-Drug; Combo Route) route Daily.      UNABLE TO FIND Take 500 mg by mouth 2 (two) times a day. medication name: Tudca      UNABLE TO FIND Take 2 capsules by mouth 2 (two) times a day. medication name: Turkey tail mushroom      UNABLE TO FIND Take 15 drops by mouth once daily. medication name: Essiac-20      UNABLE TO FIND Take 5 mLs by mouth 2  (two) times a day. medication name: barley leaf juice powder      UNABLE TO FIND Take 5 mLs by mouth 2 (two) times a day. medication name: black seed oil (cumin seed)      [DISCONTINUED] LIDOcaine (LIDODERM) 5 % Place 1 patch onto the skin once daily. Remove & Discard patch within 12 hours or as directed by MD (Patient not taking: Reported on 12/28/2023) 15 patch 0    [DISCONTINUED] predniSONE (DELTASONE) 20 MG tablet Take 1 tablet (20 mg total) by mouth once daily. (Patient not taking: Reported on 12/28/2023) 10 tablet 0      Review of patient's allergies indicates:  No Known Allergies    No family history on file.    reports that he has never smoked. He has never used smokeless tobacco. He reports that he does not currently use alcohol. He reports that he does not use drugs.     Antiplatelets/Anticoagulants: no    Review of Systems:  ROS    OBJECTIVE:     Vital Signs (Most Recent):  Temp: 98.5 °F (36.9 °C) (01/03/24 0818)  Pulse: 78 (01/03/24 0818)  Resp: 18 (01/03/24 0950)  BP: 124/63 (01/03/24 0818)  SpO2: 96 % (01/03/24 0517) Vital Signs (24h Range):  Temp:  [97.7 °F (36.5 °C)-98.5 °F (36.9 °C)] 98.5 °F (36.9 °C)  Pulse:  [70-82] 78  Resp:  [18-20] 18  SpO2:  [96 %-99 %] 96 %  BP: (124-160)/() 124/63       Physical Exam:  Physical Exam  Physical Exam:  General: well developed, well nourished, no distress.   Head: normocephalic, atraumatic  Neck: No tracheal deviation. No palpable masses. Full ROM.   Neurologic: Alert and oriented. Thought content appropriate.  GCS: E4 V5 M6; Total: 15  Language: No aphasia  Speech: No dysarthria  Cranial nerves: face symmetric, tongue midline, CN II-XII grossly intact.   Eyes: pupils equal, round, reactive to light with accomodation, EOMI.   Pulmonary: normal respirations, no signs of respiratory distress  Abdomen: soft, non-distended, not tender to palpation  Vascular: Pulses 2+ and symmetric radial and dorsalis pedis. No LE edema.   Skin: Skin is warm, dry and  intact.  Sensory: intact to light touch throughout  Motor Strength: Moves all extremities spontaneously with good tone.  Full strength upper and lower extremities. No abnormal movements seen.       ASA: ASA Class II  Mallampati: Class II    Labs:  CBC/Anemia Profile:   Recent Labs   Lab 01/02/24 2104 01/03/24 0623   WBC 7.74 6.68   HGB 14.3 12.4*   HCT 40.5 35.9*    172   MCV 83 84   RDW 13.7 13.7        Coags:   Recent Labs   Lab 01/02/24 2104   INR 1.0   APTT 30.5        Chemistries:   Recent Labs   Lab 01/02/24 2104 01/03/24  0623   * 133*   K 3.8 4.4   CL 99 101   CO2 21* 19*   BUN 17 22   CREATININE 1.1 1.0   CALCIUM 10.4 9.5   PROT 8.8*  --    BILITOT 1.2*  --    ALKPHOS 91  --    *  --    *  --    MG 1.9 2.0   PHOS 3.7 4.5        Imaging:   . Relative to prior MRI of the thoracic spine performed 10/31/2023, the patient is now status post vertebral augmentation at T9, at the level of presumed pathologic fracture due to metastasis. There has been further height loss of the vertebra since the reference MR examination, however configuration of the T9 vertebra appears similar when compared to more recently performed intervening CTA of the chest of 11/20/2023.  2. On the current examination, centered at the T9 level there is posterior buckling of the cortex and overlying enhancing soft tissue in the ventral epidural space, possibly combination of extraosseous extension of neoplastic soft tissue and inflammatory change. This enhancing soft tissue measures on the order of 8 x 19 x 27 mm (AP x ML x CC). This contributes to moderate central spinal canal stenosis with effacement of CSF and ventral cord deformity. Question intramedullary edema-like signal.       ASSESSMENT/PLAN:   Mr. Pete is a 74yo man with a past medical history of asthma, HTN, ED, gout, HTN and metastatic right RCC presents for IMN surgery on 1/9/24 with preoperative embolization by IR and T 9 embolization by  Neuro-IR.  Last dose of Axitinib was yesterday morning.    - case discussed with IR team whether patient will go for combined embolization by IR team and neuro IR team for T 9 embolization followed by IMN and back surgery.  - patient consented for procedure T9 embolization.      Sedation Plan: General anesthesia    Discussed with primary team, Dr. Ayala.    Imaging reviewed with Radiology staff, Dr. Masters.          Norman Rubi MD  Fellow, NeuroEndovascular Surgery, [facility] [place] [facility]  Board certified Vascular Neurologist  [place]

## 2024-08-05 ENCOUNTER — TELEPHONE (OUTPATIENT)
Dept: NEUROSURGERY | Facility: CLINIC | Age: 74
End: 2024-08-05
Payer: MEDICARE

## 2024-08-22 NOTE — TELEPHONE ENCOUNTER
----- Message from Ruby Turk MD sent at 10/24/2023 12:24 PM CDT -----  Patient did not have his virtual appt made for after CT scan   However, on review he no longer needs 10/27 CT scans as had these done in ED on 10/22  Can we get him in next week?       [FreeTextEntry1] : 1) CP/ wellness check  - diet / exercise discussed  - check labs - home safety discussed  -Tdap / SHINGRIX / pneumonia vaccines - nl

## 2024-08-24 NOTE — TRANSFER OF CARE
"Anesthesia Transfer of Care Note    Patient: Gamaleil Pete Jr.    Procedure(s) Performed: * No procedures listed *    Patient location: PACU    Anesthesia Type: general    Transport from OR: Transported from OR on 6-10 L/min O2 by face mask with adequate spontaneous ventilation    Post pain: adequate analgesia    Post assessment: no apparent anesthetic complications and tolerated procedure well    Post vital signs: stable    Level of consciousness: awake and alert    Nausea/Vomiting: no vomiting    Complications: none    Transfer of care protocol was followed    Last vitals: Visit Vitals  BP (!) 160/74 (BP Location: Left arm, Patient Position: Lying)   Pulse 88   Temp 36.7 °C (98.1 °F) (Temporal)   Resp 20   Ht 5' 10" (1.778 m)   Wt 100 kg (220 lb 7.4 oz)   SpO2 97%   BMI 31.63 kg/m²     "
(0) No drift; limb holds 90 (or 45) degrees for full 10 secs

## 2024-11-12 ENCOUNTER — PATIENT OUTREACH (OUTPATIENT)
Dept: ADMINISTRATIVE | Facility: CLINIC | Age: 74
End: 2024-11-12
Payer: MEDICARE

## 2024-11-12 RX ORDER — ALLOPURINOL 300 MG/1
300 TABLET ORAL DAILY
COMMUNITY
Start: 2024-11-09

## 2024-11-12 RX ORDER — SODIUM BICARBONATE 650 MG/1
1300 TABLET ORAL 2 TIMES DAILY
COMMUNITY
Start: 2024-11-08

## 2024-11-12 RX ORDER — ASPIRIN 325 MG/1
100 TABLET, FILM COATED ORAL DAILY
COMMUNITY
Start: 2024-11-09

## 2024-11-12 RX ORDER — BACLOFEN 10 MG/1
10 TABLET ORAL 3 TIMES DAILY PRN
COMMUNITY
Start: 2024-11-08

## 2024-11-12 RX ORDER — OXYCODONE AND ACETAMINOPHEN 10; 325 MG/1; MG/1
1 TABLET ORAL EVERY 4 HOURS PRN
COMMUNITY
Start: 2024-11-08

## 2024-11-12 RX ORDER — GABAPENTIN 300 MG/1
600 CAPSULE ORAL 3 TIMES DAILY
COMMUNITY
Start: 2024-11-08

## 2024-11-12 RX ORDER — METOPROLOL SUCCINATE 50 MG/1
50 TABLET, EXTENDED RELEASE ORAL DAILY
COMMUNITY
Start: 2024-10-10

## 2024-11-12 RX ORDER — FOLIC ACID 1 MG/1
1 TABLET ORAL DAILY
COMMUNITY
Start: 2024-11-09

## 2024-11-12 RX ORDER — LEVOTHYROXINE SODIUM 50 UG/1
50 TABLET ORAL
COMMUNITY
Start: 2024-11-09

## 2024-11-12 RX ORDER — DULOXETIN HYDROCHLORIDE 60 MG/1
60 CAPSULE, DELAYED RELEASE ORAL DAILY
COMMUNITY
Start: 2024-11-09

## 2024-11-12 NOTE — PROGRESS NOTES
C3 nurse attempted to contact Gamaliel Pete Jr. for a TCC post hospital discharge follow up call, regarding his recent discharge from [facility]. The patient is unable to conduct the call @ this time, and requested a callback later today.    The patient does not have a scheduled HOSFU appointment within 5-7 days post hospital discharge date of 11/8/24. No messages routed at this time.

## 2024-11-12 NOTE — PROGRESS NOTES
C3 nurse spoke with Gamaliel VANDANA Pete Jr. and his spouse for a TCC post hospital discharge follow up call, regarding [facility]. The patient does not have a scheduled HOSFU appointment with a PCP within 5-7 days post hospital discharge date of 11/8/24. Pt states he lives in [place] now and has an appt to establish care with a new PCP, Dr. Sutherland, in January of 2025. Pt denies needing a sooner appt with his PCP, stating he feels comfortable following up with his [place] providers, and no NPHVs are available. No messages routed at this time.

## 2024-12-30 ENCOUNTER — TELEPHONE (OUTPATIENT)
Dept: ORTHOPEDICS | Facility: CLINIC | Age: 74
End: 2024-12-30
Payer: MEDICARE

## 2025-01-07 ENCOUNTER — TELEPHONE (OUTPATIENT)
Dept: ORTHOPEDICS | Facility: CLINIC | Age: 75
End: 2025-01-07
Payer: MEDICARE

## 2025-01-13 ENCOUNTER — TELEPHONE (OUTPATIENT)
Dept: ORTHOPEDICS | Facility: CLINIC | Age: 75
End: 2025-01-13
Payer: MEDICARE

## 2025-01-27 ENCOUNTER — PATIENT OUTREACH (OUTPATIENT)
Dept: ADMINISTRATIVE | Facility: CLINIC | Age: 75
End: 2025-01-27
Payer: MEDICARE

## 2025-03-06 NOTE — DISCHARGE INSTRUCTIONS
Post op Spine Patient Instructions    Activity Restrictions:  [x]  Return to work/school will be determined on an individual basis.  [x]  No lifting greater than 5-10 pounds.  [x]  Avoid bending and twisting the area of your surgery more than 45 degrees from neutral position in any direction.  [x]  No driving or operating machinery:  [x]  until cleared by your surgeon.  [x]  while taking narcotic pain medications or muscle relaxants.  [x]  Wear your brace when out of bed or sitting upright.  [x]  Increase ambulation over the next 2 weeks so that you are walking 2 miles per day at 2 weeks post-operatively.  [x]  Walk on paved surfaces only. It is okay to walk up and down stairs while holding onto a side rail.  [x]  No sexual activity for 6 weeks.    Discharge Medication/Follow-up:  [x]  Please refer to discharge medication reconciliation form.  Robaxin (methocarbamol) 750 MG 4 times a day for 10 days.   You should take Tylenol (acetaminophen). The maximum daily dose of Tylenol is 3000 MG. Do not exceed this limit. Be aware of taking any other over the counter medications that may contain acetaminophen.  [x]  Do not take ANY Aspirin or Aspirin containing products for 2 weeks after surgery.   [x]  Do not take ANY herbal supplements for 2 weeks after surgery.    [x]  Do not take ANY non-steroidal anti-inflammatory drugs (NSAIDS), including the following: Ibuprofen, Naproxen, Aleve, Advil, Indocin, Mobic, or Celebrex for 12 weeks after surgery.   [x]  Prescriptions for appropriate medication will be given upon discharge.  [x]  Take docusate (Colace 100 mg): take one capsule a day as needed for constipation. You can get this over the counter.  [x]  Follow-up appointment:  [x]  In 10-14 days post-op for wound check by physician assistant/nurse.  [x]  In 4-6 weeks with MD:  [x]  with x-rays.  [x]  Appointments will be arranged for you and you will be contacted with a date and time.    Wound Care:  [x]  No bandage required.  Keep your incision open to the air.   [x]  You may shower after your surgery. Keep the incision clean and dry as much as possible. Do not allow the force of water to hit the incision. If the incision gets damp, pat it dry. Do not rub or scrub the incision.  [x]  You cannot take a bath until 8 weeks after surgery.    Call your doctor or go to the Emergency Room for any signs of infection, including: increased redness, drainage, pain, or fever (temperature ?101.5 for 24 hours). Call your doctor or go to the Emergency Room if there are any localized neurological changes; problems with speech, vision, numbness, tingling, weakness, or severe headache; inability to control urination or bowel movements; inability to urinate; or for other concerns.    Special Instructions:  [x]  No use of tobacco products.  [x]  Diet: Please eat your regular diet as tolerated.      Physicians need 3 days notice for pain medicine to be refilled. Pain medicine will only be refilled between 8 AM and 5 PM, Monday through Friday, due to Food and Drug Administration regulation of documentation.    If you have any questions about this form, please call [phone].    Form No. 92823 (Revised 10/31/2013)     headache

## 2025-05-08 NOTE — PROGRESS NOTES
Plastic and Reconstructive Surgery   Progress Note    Subjective:    Doing well no issues overnight   Pain control  Prevena in place with no output   WILLY serosanguineous 75cc/overnight  Ambulating  Prevena reinforced    Objective:  Vital signs in last 24 hours:  Temp:  [97.6 °F (36.4 °C)-99 °F (37.2 °C)] 98.6 °F (37 °C)  Pulse:  [] 86  Resp:  [18-20] 19  SpO2:  [92 %-95 %] 92 %  BP: (106-136)/(54-63) 115/56    Intake/Output last 3 shifts:  I/O last 3 completed shifts:  In: 1870.1 [P.O.:1080; IV Piggyback:790.1]  Out: 4090 [Urine:3600; Drains:490]    Intake/Output this shift:  I/O this shift:  In: -   Out: 70 [Drains:70]        Physical Exam:  VITAL SIGNS:   Vitals:    24 0519 24 0522 24 0659 24 0811   BP:  (!) 121/58  (!) 115/56   BP Location:  Right arm     Patient Position:  Lying     Pulse:  91 85 86   Resp:  18  19   Temp: 98.7 °F (37.1 °C) 99 °F (37.2 °C)  98.6 °F (37 °C)   TempSrc:  Oral     SpO2:  (!) 92%  (!) 92%   Weight:       Height:         TMAX: Temp (24hrs), Av.3 °F (36.8 °C), Min:97.6 °F (36.4 °C), Max:99 °F (37.2 °C)    General: Alert; No acute distress  Cardiovascular: Regular rate   Respiratory: Normal respiratory effort. Chest rise symmetric.   Abdomen: Soft, nontender, nondistended  Extremity: Moves all extremities equally.  Neurologic: No focal deficit. Speech normal  Back: appropriate ttp, WILLY serosang, Hemovac in place also serosang, Prevena with good suction    Scheduled Medications acetaminophen, 650 mg, Q6H  allopurinoL, 100 mg, Daily  amlodipine-benazepril 5-10 mg, 1 capsule, Daily  ceFEPime IV (PEDS and ADULTS), 2 g, Q8H  diazePAM, 5 mg, Q6H  duke's soln (benadryl 30 mL, mylanta 30 mL, LIDOcaine 30 mL, nystatin 30 mL) 120 mL, 10 mL, QID  gabapentin, 300 mg, TID  heparin (porcine), 5,000 Units, Q8H  mupirocin, , BID  polyethylene glycol, 17 g, Daily  vancomycin (VANCOCIN) IV (PEDS and ADULTS), 1,000 mg, Q12H      PRN Medications acetaminophen,  aluminum-magnesium hydroxide-simethicone, diazePAM, melatonin, methocarbamoL, morphine, oxyCODONE, oxyCODONE, oxyCODONE, prochlorperazine, senna-docusate 8.6-50 mg, Pharmacy to dose Vancomycin consult **AND** vancomycin - pharmacy to dose    Recent Labs:   Lab Results   Component Value Date    WBC 3.50 (L) 02/04/2024    HGB 9.0 (L) 02/04/2024    HCT 27.5 (L) 02/04/2024    MCV 89 02/04/2024     02/04/2024     Lab Results   Component Value Date     02/03/2024     (L) 02/03/2024    K 4.5 02/03/2024     02/03/2024    BUN 12 02/03/2024         Assessment: 73 y.o. y/o male 2 Days Post-Op s/p Procedure(s):  EXPLORATION, WOUND  CLOSURE Doing well postoperatively.    Plan  Pain control  Reg diet  Incentive spirometry  OK to ambulate  DVT ppx  Please do not remove WILLY/Prevena placed by Plastic Surgery   Will need follow up with Dr Rich Palomares MD- Fellow  Department of Plastic and Reconstructive Surgery  [phone] (office)    No

## 2025-07-02 NOTE — Clinical Note
Anesthesia Type: Anesthesia [Soft] : soft [Non-tender] : non-tender [Non-distended] : non-distended [No HSM] : No HSM [No Lesions] : no lesions [No Mass] : no mass [FreeTextEntry7] : no pelvic or abdominal tenderness to deep palpation no guarding or rebound

## (undated) DEVICE — BIT DRILL REAM GPS 3.5MM

## (undated) DEVICE — DRAPE TOP 53X102IN

## (undated) DEVICE — ADHESIVE DERMABOND ADVANCED

## (undated) DEVICE — DECANTER FLUID TRNSF WHITE 9IN

## (undated) DEVICE — DRESSING SURGICAL 3/4X3/4

## (undated) DEVICE — TROCAR ENDOPATH XCEL 12X100MM

## (undated) DEVICE — DIFFUSER

## (undated) DEVICE — SUT VICRYL PLUS 0 CT1 18IN

## (undated) DEVICE — SUT CTD VICRYL 2-0 CR/CT-2

## (undated) DEVICE — HEMOSTAT SURGICEL NU-KNIT 6X9

## (undated) DEVICE — AUTOPLEX SYSTEM WITHOUT NEEDLES

## (undated) DEVICE — DRAPE INCISE IOBAN 2 23X17IN

## (undated) DEVICE — DRAPE IOBAN 2 STERI

## (undated) DEVICE — TUBE FRAZIER 5MM 2FT SOFT TIP

## (undated) DEVICE — SUT MONOCRYL 4.0 PS2 CP496G

## (undated) DEVICE — GLOVE BIOGEL SKINSENSE PI 6.5

## (undated) DEVICE — BUR BONE CUT MICRO TPS 3X3.8MM

## (undated) DEVICE — SUT STRATAFIX PDS PLUS VIO

## (undated) DEVICE — TRAY NEURO OMC

## (undated) DEVICE — CLIP HEMO-LOK MLX LARGE LF

## (undated) DEVICE — Device

## (undated) DEVICE — BNDG COFLEX FOAM LF2 ST 4X5YD

## (undated) DEVICE — DRESSING ABSRBNT ISLAND 3.6X8

## (undated) DEVICE — HEMOSTAT SURGICEL 4X8IN

## (undated) DEVICE — TOWEL OR DISP STRL BLUE 4/PK

## (undated) DEVICE — DRESSING AQUACEL FOAM 3 X 3

## (undated) DEVICE — SYS CLSR DERMABOND PRINEO 22CM

## (undated) DEVICE — DRAPE C-ARMOR EQUIPMENT COVER

## (undated) DEVICE — SUT VICRYL 2 0 CT 2

## (undated) DEVICE — CAUTERY BOVIE PENCIL

## (undated) DEVICE — DRESSING AQUACEL FOAM 4 X 12

## (undated) DEVICE — MARKER SKIN STND TIP BLUE BARR

## (undated) DEVICE — BAG DRAIN ANTI REFLUX 2000ML

## (undated) DEVICE — DRAPE U SPLIT SHEET 54X76IN

## (undated) DEVICE — SOL NACL 0.9% INJ PF/50151

## (undated) DEVICE — SUT VICRYL PLUS 3-0 SH 18IN

## (undated) DEVICE — KIT PT CARE HANA PROFX SSXT

## (undated) DEVICE — SYR IRRIGATION BULB STER 60ML

## (undated) DEVICE — SUT VICRYL PLUS ANTIBACT

## (undated) DEVICE — SUT MCRYL PLUS 4-0 PS2 27IN

## (undated) DEVICE — NDL INSUFFLATION VERRES 120MM

## (undated) DEVICE — ELECTRODE REM PLYHSV RETURN 9

## (undated) DEVICE — COVER LIGHT HANDLE 80/CA

## (undated) DEVICE — PAD CAST SPECIALIST STRL 4

## (undated) DEVICE — BIT DRILL CANN TAPERED 10MM

## (undated) DEVICE — OBTURATOR BLADELESS 8MM XI CLR

## (undated) DEVICE — SCREWDRIVER SHAFT CREO SPINE

## (undated) DEVICE — ELECTRODE PATIENT RETURN DISP

## (undated) DEVICE — DRESSING AQUACEL SACRAL 9 X 9

## (undated) DEVICE — CORD BIPOLAR 12 FOOT

## (undated) DEVICE — SUT PROLENE 2-0 30 SH

## (undated) DEVICE — GLOVE SENSICARE PI ORTHO 7.0

## (undated) DEVICE — KIT SPINAL PATIENT CARE JACK

## (undated) DEVICE — TIP YANKAUERS BULB NO VENT

## (undated) DEVICE — SUT ETHILON 2-0 BLK PS-2

## (undated) DEVICE — BURR ROUND PRECISION 7.5MM

## (undated) DEVICE — CHLORAPREP 10.5 ML APPLICATOR

## (undated) DEVICE — PACK UNIVERSAL SPLIT II

## (undated) DEVICE — SUT VICRYL+ 1 CT1 18IN

## (undated) DEVICE — SEAL UNIVERSAL 5MM-8MM XI

## (undated) DEVICE — TROCAR ENDOPATH XCEL 5X100MM

## (undated) DEVICE — SUT CTD VICRYL 0 UND BR SUT

## (undated) DEVICE — SUT VICRYL 3-0 27 SH

## (undated) DEVICE — DRAPE THREE-QTR REINF 53X77IN

## (undated) DEVICE — TRAY MINOR GEN SURG OMC

## (undated) DEVICE — SUT VICRYL 2-0 36 CT-1

## (undated) DEVICE — DRAPE T THYROID STERILE

## (undated) DEVICE — NDL SPINAL 18GX3.5 SPINOCAN

## (undated) DEVICE — DRAPE C-ARM ELAS CLIP 42X120IN

## (undated) DEVICE — SPONGE COTTON TRAY 4X4IN

## (undated) DEVICE — SYR 10CC LUER LOCK

## (undated) DEVICE — SUT VICRYL CTD 2-0 GI 27 SH

## (undated) DEVICE — COVER BACK TBL HD 2-TIER 72IN

## (undated) DEVICE — TUBING SUC UNIV W/CONN 12FT

## (undated) DEVICE — CHLORAPREP W TINT 26ML APPL

## (undated) DEVICE — DRAPE STERI INSTRUMENT 1018

## (undated) DEVICE — KIT SURGIFLO HEMOSTATIC MATRIX

## (undated) DEVICE — DRESSING SURGICAL 1/2X1/2

## (undated) DEVICE — SOL POVIDONE SCRUB IODINE 4 OZ

## (undated) DEVICE — TRAY CATH FOL SIL URIMTR 16FR

## (undated) DEVICE — SPONGE GELFOAM 12-7MM

## (undated) DEVICE — TRAY MINOR ORTHO OMC

## (undated) DEVICE — MANIFOLD VERTEPORT CEM DEL 10G

## (undated) DEVICE — SUT VICRYL PLUS 2-0 CT1 18

## (undated) DEVICE — SUT MONOCRYL 4-0 PS-1 UND

## (undated) DEVICE — SEALER AQUAMANTYS 2.3 BIPOLAR

## (undated) DEVICE — BLADE 4IN EDGE INSULATED

## (undated) DEVICE — SUT EASE CROSSBOW CLSR SYS

## (undated) DEVICE — BIT DRILL QC 3FLUTED 4.2X145 S

## (undated) DEVICE — SPONGE LAP 4X18 PREWASHED

## (undated) DEVICE — COVER TIP CURVED SCISSORS XI

## (undated) DEVICE — KIT PREVENA PLUS

## (undated) DEVICE — DRESSING TRANS 4X4 TEGADERM

## (undated) DEVICE — SUT ETHIBOND EXCEL 0 CT2 30

## (undated) DEVICE — DRESSING XEROFORM 1X8IN

## (undated) DEVICE — DRAPE STERI U-SHAPED 47X51IN

## (undated) DEVICE — UNDERGLOVES BIOGEL PI SZ 7 LF

## (undated) DEVICE — DRESSING AQUACEL FOAM 5 X 5

## (undated) DEVICE — DRESSING MEPORE ADH 3.5X12

## (undated) DEVICE — SOL IRRI STRL WATER 1000ML

## (undated) DEVICE — BANDAGE ELAS SOFTWRAP ST 4X5YD

## (undated) DEVICE — DRESSING SURGICAL 1X1

## (undated) DEVICE — CONTAINER SPECIMEN OR STER 4OZ

## (undated) DEVICE — DRESSING MEPILEX BORDER 4 X 4

## (undated) DEVICE — DRESSING MEPILEX BORDR AG 4X12

## (undated) DEVICE — PROBE OSTEOCOOL 2 RFA 17G 10MM

## (undated) DEVICE — IRRIGATOR ENDOSCOPY DISP.

## (undated) DEVICE — PACK ECLIPSE SET-UP W/O DRAPE

## (undated) DEVICE — TAPE SURG DURAPORE 2 X10YD

## (undated) DEVICE — SUT CTD VICRYL 3-0 CR/SH

## (undated) DEVICE — KIT EVACUATOR 3-SPRING 1/8 DRN

## (undated) DEVICE — DRAPE STERI-DRAPE 1000 17X11IN

## (undated) DEVICE — BURR RND FLUT SFT TOUCH 2.0MM

## (undated) DEVICE — SPONGE PATTY SURGICAL .5X3IN

## (undated) DEVICE — FRAZIER 18FR

## (undated) DEVICE — APPLICATOR CHLORAPREP ORN 26ML

## (undated) DEVICE — DRESSING ANTIMICROBIAL 1 INCH

## (undated) DEVICE — SET TRI-LUMEN FILTERED TUBE

## (undated) DEVICE — PINS SKULL ADULT MAYFIELD
Type: IMPLANTABLE DEVICE | Site: CRANIAL | Status: NON-FUNCTIONAL
Removed: 2023-11-15

## (undated) DEVICE — WIRE GUIDE 3.2MM 400MM
Type: IMPLANTABLE DEVICE | Site: FEMUR | Status: NON-FUNCTIONAL
Removed: 2024-05-23

## (undated) DEVICE — NDL ECLIPSE SAFETY 23G 1.5IN

## (undated) DEVICE — DRAPE ARM DAVINCI XI

## (undated) DEVICE — SUT PDS II 1 CT VIL MONO 36

## (undated) DEVICE — SEALER VESSEL EXTEND

## (undated) DEVICE — RELOAD ECHELON FLEX WHT 60MM

## (undated) DEVICE — REAMER STEPPED DHS DCS

## (undated) DEVICE — SYR DISP LL 5CC

## (undated) DEVICE — SYR ONLY LUER LOCK 20CC

## (undated) DEVICE — SPONGE GAUZE 16PLY 4X4

## (undated) DEVICE — SUT ETHICON 3-0 BLK MONO PS

## (undated) DEVICE — STAPLER SKIN PROXIMATE WIDE

## (undated) DEVICE — MARKER FN REG DUAL UTIL RULER

## (undated) DEVICE — BURR FLUTED RND 5MM

## (undated) DEVICE — SUT 3/0 30IN ETHILON BLK M

## (undated) DEVICE — TAPE CURAD SILK ADH 3INX10YD

## (undated) DEVICE — BLADE SURG CARBON STEEL SZ11

## (undated) DEVICE — GOWN AERO CHROME W/ TOWEL XL

## (undated) DEVICE — DRAPE ABDOMINAL TIBURON 14X11

## (undated) DEVICE — NDL HYPO REG 25G X 1 1/2

## (undated) DEVICE — DRAPE LAP T SHT W/ INSTR PAD

## (undated) DEVICE — DRESSING TEGADERM IV 3.5 X 4.5

## (undated) DEVICE — KIT PT CARE FRME POS JACK

## (undated) DEVICE — SOL ELECTROLUBE ANTI-STIC

## (undated) DEVICE — GAUZE SPONGE PEANUT STRL

## (undated) DEVICE — DRESSING GAUZE XEROFORM 5X9

## (undated) DEVICE — BANDAGE ELAS SOFTWRAP ST 6X5YD

## (undated) DEVICE — DRAPE OPMI STERILE

## (undated) DEVICE — TRAY DO THE ROBOT

## (undated) DEVICE — CONTAINER SPEC OR STRL 4.5OZ

## (undated) DEVICE — PORT ACCESS 5MM W/120MM

## (undated) DEVICE — STAPLER SKIN ROTATING HEAD

## (undated) DEVICE — PENCIL ROCKER SWITCH 10FT CORD

## (undated) DEVICE — CARTRIDGE OIL

## (undated) DEVICE — DRAPE COLUMN DAVINCI XI

## (undated) DEVICE — COVER PROXIMA MAYO STAND

## (undated) DEVICE — STAPLER ECHELON FLEX 60MM 44CM

## (undated) DEVICE — DRILL BIT SURG GPS HI SPD 4MM

## (undated) DEVICE — ROUTER TAPERED 2.3MM

## (undated) DEVICE — SYS SEE SHARP SCP ANTIFG LNG